# Patient Record
Sex: FEMALE | Race: WHITE | NOT HISPANIC OR LATINO | Employment: OTHER | URBAN - METROPOLITAN AREA
[De-identification: names, ages, dates, MRNs, and addresses within clinical notes are randomized per-mention and may not be internally consistent; named-entity substitution may affect disease eponyms.]

---

## 2016-12-30 ASSESSMENT — MIFFLIN-ST. JEOR
SCORE: 1380.96
SCORE: 1380.96

## 2016-12-31 ASSESSMENT — MIFFLIN-ST. JEOR
SCORE: 1435.13
SCORE: 1435.13
SCORE: 1428.14
SCORE: 1428.14

## 2017-01-01 ASSESSMENT — MIFFLIN-ST. JEOR
SCORE: 1426.32
SCORE: 1426.32

## 2017-01-02 ASSESSMENT — MIFFLIN-ST. JEOR
SCORE: 1359.65
SCORE: 1359.65

## 2017-01-03 ENCOUNTER — ANESTHESIA - HEALTHEAST (OUTPATIENT)
Dept: SURGERY | Facility: HOSPITAL | Age: 62
End: 2017-01-03

## 2017-01-03 ENCOUNTER — SURGERY - HEALTHEAST (OUTPATIENT)
Dept: SURGERY | Facility: HOSPITAL | Age: 62
End: 2017-01-03

## 2017-01-04 ENCOUNTER — SURGERY - HEALTHEAST (OUTPATIENT)
Dept: SURGERY | Facility: HOSPITAL | Age: 62
End: 2017-01-04

## 2017-01-04 ENCOUNTER — ANESTHESIA - HEALTHEAST (OUTPATIENT)
Dept: SURGERY | Facility: HOSPITAL | Age: 62
End: 2017-01-04

## 2017-01-09 ASSESSMENT — MIFFLIN-ST. JEOR
SCORE: 1360.55
SCORE: 1360.55

## 2017-01-11 ENCOUNTER — COMMUNICATION - HEALTHEAST (OUTPATIENT)
Dept: FAMILY MEDICINE | Facility: CLINIC | Age: 62
End: 2017-01-11

## 2017-01-12 ENCOUNTER — COMMUNICATION - HEALTHEAST (OUTPATIENT)
Dept: FAMILY MEDICINE | Facility: CLINIC | Age: 62
End: 2017-01-12

## 2017-01-12 ENCOUNTER — HOME CARE/HOSPICE - HEALTHEAST (OUTPATIENT)
Dept: HOME HEALTH SERVICES | Facility: HOME HEALTH | Age: 62
End: 2017-01-12

## 2017-01-12 DIAGNOSIS — I82.409 DVT (DEEP VENOUS THROMBOSIS) (H): ICD-10-CM

## 2017-01-12 DIAGNOSIS — I26.99 PULMONARY EMBOLISM (H): ICD-10-CM

## 2017-01-13 ENCOUNTER — COMMUNICATION - HEALTHEAST (OUTPATIENT)
Dept: FAMILY MEDICINE | Facility: CLINIC | Age: 62
End: 2017-01-13

## 2017-01-16 ENCOUNTER — COMMUNICATION - HEALTHEAST (OUTPATIENT)
Dept: FAMILY MEDICINE | Facility: CLINIC | Age: 62
End: 2017-01-16

## 2017-01-16 ENCOUNTER — HOME CARE/HOSPICE - HEALTHEAST (OUTPATIENT)
Dept: HOME HEALTH SERVICES | Facility: HOME HEALTH | Age: 62
End: 2017-01-16

## 2017-01-16 DIAGNOSIS — I26.99 PULMONARY EMBOLISM (H): ICD-10-CM

## 2017-01-16 DIAGNOSIS — I82.409 DVT (DEEP VENOUS THROMBOSIS) (H): ICD-10-CM

## 2017-01-19 ENCOUNTER — AMBULATORY - HEALTHEAST (OUTPATIENT)
Dept: FAMILY MEDICINE | Facility: CLINIC | Age: 62
End: 2017-01-19

## 2017-01-20 ENCOUNTER — COMMUNICATION - HEALTHEAST (OUTPATIENT)
Dept: FAMILY MEDICINE | Facility: CLINIC | Age: 62
End: 2017-01-20

## 2017-01-20 ENCOUNTER — HOME CARE/HOSPICE - HEALTHEAST (OUTPATIENT)
Dept: HOME HEALTH SERVICES | Facility: HOME HEALTH | Age: 62
End: 2017-01-20

## 2017-01-20 DIAGNOSIS — I82.409 DVT (DEEP VENOUS THROMBOSIS) (H): ICD-10-CM

## 2017-01-20 DIAGNOSIS — I26.99 PULMONARY EMBOLISM (H): ICD-10-CM

## 2017-01-24 ENCOUNTER — HOME CARE/HOSPICE - HEALTHEAST (OUTPATIENT)
Dept: HOME HEALTH SERVICES | Facility: HOME HEALTH | Age: 62
End: 2017-01-24

## 2017-01-26 ENCOUNTER — HOME CARE/HOSPICE - HEALTHEAST (OUTPATIENT)
Dept: HOME HEALTH SERVICES | Facility: HOME HEALTH | Age: 62
End: 2017-01-26

## 2017-01-26 ENCOUNTER — COMMUNICATION - HEALTHEAST (OUTPATIENT)
Dept: FAMILY MEDICINE | Facility: CLINIC | Age: 62
End: 2017-01-26

## 2017-01-26 DIAGNOSIS — I82.409 DVT (DEEP VENOUS THROMBOSIS) (H): ICD-10-CM

## 2017-01-26 DIAGNOSIS — I26.99 PULMONARY EMBOLISM (H): ICD-10-CM

## 2017-01-30 ENCOUNTER — HOME CARE/HOSPICE - HEALTHEAST (OUTPATIENT)
Dept: HOME HEALTH SERVICES | Facility: HOME HEALTH | Age: 62
End: 2017-01-30

## 2017-01-30 ENCOUNTER — COMMUNICATION - HEALTHEAST (OUTPATIENT)
Dept: FAMILY MEDICINE | Facility: CLINIC | Age: 62
End: 2017-01-30

## 2017-01-30 DIAGNOSIS — I26.99 PULMONARY EMBOLISM (H): ICD-10-CM

## 2017-01-30 DIAGNOSIS — I82.409 DVT (DEEP VENOUS THROMBOSIS) (H): ICD-10-CM

## 2017-02-01 ENCOUNTER — COMMUNICATION - HEALTHEAST (OUTPATIENT)
Dept: FAMILY MEDICINE | Facility: CLINIC | Age: 62
End: 2017-02-01

## 2017-02-02 ENCOUNTER — HOME CARE/HOSPICE - HEALTHEAST (OUTPATIENT)
Dept: HOME HEALTH SERVICES | Facility: HOME HEALTH | Age: 62
End: 2017-02-02

## 2017-02-02 ENCOUNTER — COMMUNICATION - HEALTHEAST (OUTPATIENT)
Dept: FAMILY MEDICINE | Facility: CLINIC | Age: 62
End: 2017-02-02

## 2017-02-02 DIAGNOSIS — I82.409 DVT (DEEP VENOUS THROMBOSIS) (H): ICD-10-CM

## 2017-02-02 DIAGNOSIS — I26.99 PULMONARY EMBOLISM (H): ICD-10-CM

## 2017-02-03 ENCOUNTER — COMMUNICATION - HEALTHEAST (OUTPATIENT)
Dept: NURSING | Facility: CLINIC | Age: 62
End: 2017-02-03

## 2017-02-04 ENCOUNTER — AMBULATORY - HEALTHEAST (OUTPATIENT)
Dept: FAMILY MEDICINE | Facility: CLINIC | Age: 62
End: 2017-02-04

## 2017-02-06 ENCOUNTER — COMMUNICATION - HEALTHEAST (OUTPATIENT)
Dept: FAMILY MEDICINE | Facility: CLINIC | Age: 62
End: 2017-02-06

## 2017-02-07 ENCOUNTER — HOME CARE/HOSPICE - HEALTHEAST (OUTPATIENT)
Dept: HOME HEALTH SERVICES | Facility: HOME HEALTH | Age: 62
End: 2017-02-07

## 2017-02-09 ENCOUNTER — COMMUNICATION - HEALTHEAST (OUTPATIENT)
Dept: FAMILY MEDICINE | Facility: CLINIC | Age: 62
End: 2017-02-09

## 2017-02-09 ENCOUNTER — HOME CARE/HOSPICE - HEALTHEAST (OUTPATIENT)
Dept: HOME HEALTH SERVICES | Facility: HOME HEALTH | Age: 62
End: 2017-02-09

## 2017-02-09 DIAGNOSIS — I26.99 PULMONARY EMBOLISM (H): ICD-10-CM

## 2017-02-09 DIAGNOSIS — I82.409 DVT (DEEP VENOUS THROMBOSIS) (H): ICD-10-CM

## 2017-02-10 ENCOUNTER — COMMUNICATION - HEALTHEAST (OUTPATIENT)
Dept: FAMILY MEDICINE | Facility: CLINIC | Age: 62
End: 2017-02-10

## 2017-02-10 ENCOUNTER — OFFICE VISIT - HEALTHEAST (OUTPATIENT)
Dept: FAMILY MEDICINE | Facility: CLINIC | Age: 62
End: 2017-02-10

## 2017-02-10 DIAGNOSIS — A49.9 ESBL (EXTENDED SPECTRUM BETA-LACTAMASE) PRODUCING BACTERIA INFECTION: ICD-10-CM

## 2017-02-10 DIAGNOSIS — N39.0 URINARY TRACT INFECTION ASSOCIATED WITH CYSTOSTOMY CATHETER, INITIAL ENCOUNTER (H): ICD-10-CM

## 2017-02-10 DIAGNOSIS — Z16.12 ESBL (EXTENDED SPECTRUM BETA-LACTAMASE) PRODUCING BACTERIA INFECTION: ICD-10-CM

## 2017-02-10 DIAGNOSIS — N39.0 URINARY TRACT INFECTION ASSOCIATED WITH CYSTOSTOMY CATHETER, SEQUELA: ICD-10-CM

## 2017-02-10 DIAGNOSIS — E03.9 HYPOTHYROIDISM, UNSPECIFIED TYPE: ICD-10-CM

## 2017-02-10 DIAGNOSIS — T83.510A URINARY TRACT INFECTION ASSOCIATED WITH CYSTOSTOMY CATHETER, INITIAL ENCOUNTER (H): ICD-10-CM

## 2017-02-10 DIAGNOSIS — I26.99 PULMONARY EMBOLISM (H): ICD-10-CM

## 2017-02-10 DIAGNOSIS — T83.510S URINARY TRACT INFECTION ASSOCIATED WITH CYSTOSTOMY CATHETER, SEQUELA: ICD-10-CM

## 2017-02-14 ENCOUNTER — COMMUNICATION - HEALTHEAST (OUTPATIENT)
Dept: FAMILY MEDICINE | Facility: CLINIC | Age: 62
End: 2017-02-14

## 2017-02-14 ENCOUNTER — HOME CARE/HOSPICE - HEALTHEAST (OUTPATIENT)
Dept: HOME HEALTH SERVICES | Facility: HOME HEALTH | Age: 62
End: 2017-02-14

## 2017-02-14 DIAGNOSIS — I26.99 PULMONARY EMBOLISM (H): ICD-10-CM

## 2017-02-14 DIAGNOSIS — I82.409 DVT (DEEP VENOUS THROMBOSIS) (H): ICD-10-CM

## 2017-02-16 ENCOUNTER — HOME CARE/HOSPICE - HEALTHEAST (OUTPATIENT)
Dept: HOME HEALTH SERVICES | Facility: HOME HEALTH | Age: 62
End: 2017-02-16

## 2017-02-16 ENCOUNTER — COMMUNICATION - HEALTHEAST (OUTPATIENT)
Dept: FAMILY MEDICINE | Facility: CLINIC | Age: 62
End: 2017-02-16

## 2017-02-16 DIAGNOSIS — I82.409 DVT (DEEP VENOUS THROMBOSIS) (H): ICD-10-CM

## 2017-02-16 DIAGNOSIS — I26.99 PULMONARY EMBOLISM (H): ICD-10-CM

## 2017-02-18 ENCOUNTER — COMMUNICATION - HEALTHEAST (OUTPATIENT)
Dept: FAMILY MEDICINE | Facility: CLINIC | Age: 62
End: 2017-02-18

## 2017-02-18 DIAGNOSIS — F32.A DEPRESSION: ICD-10-CM

## 2017-02-18 DIAGNOSIS — F33.2 SEVERE EPISODE OF RECURRENT MAJOR DEPRESSIVE DISORDER, WITHOUT PSYCHOTIC FEATURES (H): ICD-10-CM

## 2017-02-18 DIAGNOSIS — F40.9 INSOMNIA DUE TO ANXIETY AND FEAR: ICD-10-CM

## 2017-02-18 DIAGNOSIS — F06.30 MOOD DISORDER SECONDARY TO MULTIPLE MEDICAL PROBLEMS: ICD-10-CM

## 2017-02-18 DIAGNOSIS — F51.05 INSOMNIA DUE TO ANXIETY AND FEAR: ICD-10-CM

## 2017-02-18 DIAGNOSIS — F41.1 GAD (GENERALIZED ANXIETY DISORDER): ICD-10-CM

## 2017-02-18 DIAGNOSIS — F41.1 ANXIETY STATE: ICD-10-CM

## 2017-02-21 ENCOUNTER — HOME CARE/HOSPICE - HEALTHEAST (OUTPATIENT)
Dept: HOME HEALTH SERVICES | Facility: HOME HEALTH | Age: 62
End: 2017-02-21

## 2017-02-21 ENCOUNTER — COMMUNICATION - HEALTHEAST (OUTPATIENT)
Dept: FAMILY MEDICINE | Facility: CLINIC | Age: 62
End: 2017-02-21

## 2017-02-21 DIAGNOSIS — I26.99 PULMONARY EMBOLISM (H): ICD-10-CM

## 2017-02-22 ENCOUNTER — HOME CARE/HOSPICE - HEALTHEAST (OUTPATIENT)
Dept: HOME HEALTH SERVICES | Facility: HOME HEALTH | Age: 62
End: 2017-02-22

## 2017-02-23 ENCOUNTER — HOME CARE/HOSPICE - HEALTHEAST (OUTPATIENT)
Dept: HOME HEALTH SERVICES | Facility: HOME HEALTH | Age: 62
End: 2017-02-23

## 2017-02-26 ENCOUNTER — COMMUNICATION - HEALTHEAST (OUTPATIENT)
Dept: SCHEDULING | Facility: CLINIC | Age: 62
End: 2017-02-26

## 2017-02-27 ENCOUNTER — HOME CARE/HOSPICE - HEALTHEAST (OUTPATIENT)
Dept: HOME HEALTH SERVICES | Facility: HOME HEALTH | Age: 62
End: 2017-02-27

## 2017-02-28 ENCOUNTER — COMMUNICATION - HEALTHEAST (OUTPATIENT)
Dept: FAMILY MEDICINE | Facility: CLINIC | Age: 62
End: 2017-02-28

## 2017-03-02 ENCOUNTER — HOME CARE/HOSPICE - HEALTHEAST (OUTPATIENT)
Dept: HOME HEALTH SERVICES | Facility: HOME HEALTH | Age: 62
End: 2017-03-02

## 2017-03-02 ENCOUNTER — COMMUNICATION - HEALTHEAST (OUTPATIENT)
Dept: FAMILY MEDICINE | Facility: CLINIC | Age: 62
End: 2017-03-02

## 2017-03-06 ENCOUNTER — HOME CARE/HOSPICE - HEALTHEAST (OUTPATIENT)
Dept: HOME HEALTH SERVICES | Facility: HOME HEALTH | Age: 62
End: 2017-03-06

## 2017-03-07 ENCOUNTER — COMMUNICATION - HEALTHEAST (OUTPATIENT)
Dept: FAMILY MEDICINE | Facility: CLINIC | Age: 62
End: 2017-03-07

## 2017-03-07 ENCOUNTER — COMMUNICATION - HEALTHEAST (OUTPATIENT)
Dept: CARE COORDINATION | Facility: CLINIC | Age: 62
End: 2017-03-07

## 2017-03-07 ENCOUNTER — AMBULATORY - HEALTHEAST (OUTPATIENT)
Dept: CARE COORDINATION | Facility: CLINIC | Age: 62
End: 2017-03-07

## 2017-03-07 ENCOUNTER — HOME CARE/HOSPICE - HEALTHEAST (OUTPATIENT)
Dept: HOME HEALTH SERVICES | Facility: HOME HEALTH | Age: 62
End: 2017-03-07

## 2017-03-07 DIAGNOSIS — I26.99 PULMONARY EMBOLISM (H): ICD-10-CM

## 2017-03-07 DIAGNOSIS — I82.409 DVT (DEEP VENOUS THROMBOSIS) (H): ICD-10-CM

## 2017-03-08 ENCOUNTER — COMMUNICATION - HEALTHEAST (OUTPATIENT)
Dept: FAMILY MEDICINE | Facility: CLINIC | Age: 62
End: 2017-03-08

## 2017-03-09 ENCOUNTER — AMBULATORY - HEALTHEAST (OUTPATIENT)
Dept: FAMILY MEDICINE | Facility: CLINIC | Age: 62
End: 2017-03-09

## 2017-03-09 DIAGNOSIS — R10.9 ABDOMINAL PAIN, UNSPECIFIED LOCATION: ICD-10-CM

## 2017-03-10 ENCOUNTER — HOME CARE/HOSPICE - HEALTHEAST (OUTPATIENT)
Dept: HOME HEALTH SERVICES | Facility: HOME HEALTH | Age: 62
End: 2017-03-10

## 2017-03-10 ENCOUNTER — COMMUNICATION - HEALTHEAST (OUTPATIENT)
Dept: FAMILY MEDICINE | Facility: CLINIC | Age: 62
End: 2017-03-10

## 2017-03-10 DIAGNOSIS — I26.99 PULMONARY EMBOLISM (H): ICD-10-CM

## 2017-03-10 DIAGNOSIS — I82.409 DVT (DEEP VENOUS THROMBOSIS) (H): ICD-10-CM

## 2017-03-13 ENCOUNTER — COMMUNICATION - HEALTHEAST (OUTPATIENT)
Dept: FAMILY MEDICINE | Facility: CLINIC | Age: 62
End: 2017-03-13

## 2017-03-13 ENCOUNTER — HOME CARE/HOSPICE - HEALTHEAST (OUTPATIENT)
Dept: HOME HEALTH SERVICES | Facility: HOME HEALTH | Age: 62
End: 2017-03-13

## 2017-03-13 DIAGNOSIS — I82.409 DVT (DEEP VENOUS THROMBOSIS) (H): ICD-10-CM

## 2017-03-13 DIAGNOSIS — I26.99 PULMONARY EMBOLISM (H): ICD-10-CM

## 2017-03-16 ENCOUNTER — HOME CARE/HOSPICE - HEALTHEAST (OUTPATIENT)
Dept: HOME HEALTH SERVICES | Facility: HOME HEALTH | Age: 62
End: 2017-03-16

## 2017-03-20 ENCOUNTER — HOME CARE/HOSPICE - HEALTHEAST (OUTPATIENT)
Dept: HOME HEALTH SERVICES | Facility: HOME HEALTH | Age: 62
End: 2017-03-20

## 2017-03-20 ENCOUNTER — COMMUNICATION - HEALTHEAST (OUTPATIENT)
Dept: FAMILY MEDICINE | Facility: CLINIC | Age: 62
End: 2017-03-20

## 2017-03-20 DIAGNOSIS — I82.409 DVT (DEEP VENOUS THROMBOSIS) (H): ICD-10-CM

## 2017-03-20 DIAGNOSIS — I26.99 PULMONARY EMBOLISM (H): ICD-10-CM

## 2017-03-21 ENCOUNTER — COMMUNICATION - HEALTHEAST (OUTPATIENT)
Dept: FAMILY MEDICINE | Facility: CLINIC | Age: 62
End: 2017-03-21

## 2017-03-24 ENCOUNTER — COMMUNICATION - HEALTHEAST (OUTPATIENT)
Dept: FAMILY MEDICINE | Facility: CLINIC | Age: 62
End: 2017-03-24

## 2017-03-25 ENCOUNTER — HOME CARE/HOSPICE - HEALTHEAST (OUTPATIENT)
Dept: HOME HEALTH SERVICES | Facility: HOME HEALTH | Age: 62
End: 2017-03-25

## 2017-03-27 ENCOUNTER — COMMUNICATION - HEALTHEAST (OUTPATIENT)
Dept: FAMILY MEDICINE | Facility: CLINIC | Age: 62
End: 2017-03-27

## 2017-03-27 ENCOUNTER — HOME CARE/HOSPICE - HEALTHEAST (OUTPATIENT)
Dept: HOME HEALTH SERVICES | Facility: HOME HEALTH | Age: 62
End: 2017-03-27

## 2017-03-27 DIAGNOSIS — I26.99 PULMONARY EMBOLISM (H): ICD-10-CM

## 2017-03-27 DIAGNOSIS — I82.409 DVT (DEEP VENOUS THROMBOSIS) (H): ICD-10-CM

## 2017-03-29 ENCOUNTER — HOME CARE/HOSPICE - HEALTHEAST (OUTPATIENT)
Dept: HOME HEALTH SERVICES | Facility: HOME HEALTH | Age: 62
End: 2017-03-29

## 2017-03-30 ENCOUNTER — COMMUNICATION - HEALTHEAST (OUTPATIENT)
Dept: FAMILY MEDICINE | Facility: CLINIC | Age: 62
End: 2017-03-30

## 2017-03-30 ENCOUNTER — HOME CARE/HOSPICE - HEALTHEAST (OUTPATIENT)
Dept: HOME HEALTH SERVICES | Facility: HOME HEALTH | Age: 62
End: 2017-03-30

## 2017-04-02 ENCOUNTER — HOME CARE/HOSPICE - HEALTHEAST (OUTPATIENT)
Dept: HOME HEALTH SERVICES | Facility: HOME HEALTH | Age: 62
End: 2017-04-02

## 2017-04-05 ENCOUNTER — COMMUNICATION - HEALTHEAST (OUTPATIENT)
Dept: FAMILY MEDICINE | Facility: CLINIC | Age: 62
End: 2017-04-05

## 2017-04-05 ENCOUNTER — COMMUNICATION - HEALTHEAST (OUTPATIENT)
Dept: HOME HEALTH SERVICES | Facility: HOME HEALTH | Age: 62
End: 2017-04-05

## 2017-04-05 ENCOUNTER — HOME CARE/HOSPICE - HEALTHEAST (OUTPATIENT)
Dept: HOME HEALTH SERVICES | Facility: HOME HEALTH | Age: 62
End: 2017-04-05

## 2017-04-05 DIAGNOSIS — I26.99 PULMONARY EMBOLISM (H): ICD-10-CM

## 2017-04-05 DIAGNOSIS — I82.409 DVT (DEEP VENOUS THROMBOSIS) (H): ICD-10-CM

## 2017-04-07 ENCOUNTER — HOME CARE/HOSPICE - HEALTHEAST (OUTPATIENT)
Dept: HOME HEALTH SERVICES | Facility: HOME HEALTH | Age: 62
End: 2017-04-07

## 2017-04-09 ENCOUNTER — RECORDS - HEALTHEAST (OUTPATIENT)
Dept: ADMINISTRATIVE | Facility: OTHER | Age: 62
End: 2017-04-09

## 2017-04-09 ASSESSMENT — MIFFLIN-ST. JEOR: SCORE: 1321.25

## 2017-04-10 ENCOUNTER — RECORDS - HEALTHEAST (OUTPATIENT)
Dept: ADMINISTRATIVE | Facility: OTHER | Age: 62
End: 2017-04-10

## 2017-04-10 ENCOUNTER — COMMUNICATION - HEALTHEAST (OUTPATIENT)
Dept: NURSING | Facility: CLINIC | Age: 62
End: 2017-04-10

## 2017-04-10 ENCOUNTER — HOME CARE/HOSPICE - HEALTHEAST (OUTPATIENT)
Dept: HOME HEALTH SERVICES | Facility: HOME HEALTH | Age: 62
End: 2017-04-10

## 2017-04-10 ENCOUNTER — SURGERY - HEALTHEAST (OUTPATIENT)
Dept: SURGERY | Facility: CLINIC | Age: 62
End: 2017-04-10

## 2017-04-10 ENCOUNTER — ANESTHESIA - HEALTHEAST (OUTPATIENT)
Dept: SURGERY | Facility: CLINIC | Age: 62
End: 2017-04-10

## 2017-04-10 ASSESSMENT — MIFFLIN-ST. JEOR: SCORE: 1330.25

## 2017-04-12 ENCOUNTER — COMMUNICATION - HEALTHEAST (OUTPATIENT)
Dept: FAMILY MEDICINE | Facility: CLINIC | Age: 62
End: 2017-04-12

## 2017-04-12 ENCOUNTER — RECORDS - HEALTHEAST (OUTPATIENT)
Dept: ADMINISTRATIVE | Facility: OTHER | Age: 62
End: 2017-04-12

## 2017-04-12 ASSESSMENT — MIFFLIN-ST. JEOR: SCORE: 1346.25

## 2017-04-13 ENCOUNTER — RECORDS - HEALTHEAST (OUTPATIENT)
Dept: ADMINISTRATIVE | Facility: OTHER | Age: 62
End: 2017-04-13

## 2017-04-13 ASSESSMENT — MIFFLIN-ST. JEOR: SCORE: 1358.25

## 2017-04-14 ENCOUNTER — RECORDS - HEALTHEAST (OUTPATIENT)
Dept: ADMINISTRATIVE | Facility: OTHER | Age: 62
End: 2017-04-14

## 2017-04-14 ENCOUNTER — COMMUNICATION - HEALTHEAST (OUTPATIENT)
Dept: NEUROSURGERY | Facility: CLINIC | Age: 62
End: 2017-04-14

## 2017-04-14 ENCOUNTER — COMMUNICATION - HEALTHEAST (OUTPATIENT)
Dept: FAMILY MEDICINE | Facility: CLINIC | Age: 62
End: 2017-04-14

## 2017-04-14 DIAGNOSIS — S06.5XAA SDH (SUBDURAL HEMATOMA) (H): ICD-10-CM

## 2017-04-14 ASSESSMENT — MIFFLIN-ST. JEOR: SCORE: 1355.11

## 2017-04-17 ENCOUNTER — HOME CARE/HOSPICE - HEALTHEAST (OUTPATIENT)
Dept: HOME HEALTH SERVICES | Facility: HOME HEALTH | Age: 62
End: 2017-04-17

## 2017-04-17 ENCOUNTER — COMMUNICATION - HEALTHEAST (OUTPATIENT)
Dept: FAMILY MEDICINE | Facility: CLINIC | Age: 62
End: 2017-04-17

## 2017-04-18 ENCOUNTER — COMMUNICATION - HEALTHEAST (OUTPATIENT)
Dept: NEUROSURGERY | Facility: CLINIC | Age: 62
End: 2017-04-18

## 2017-04-18 ENCOUNTER — HOME CARE/HOSPICE - HEALTHEAST (OUTPATIENT)
Dept: HOME HEALTH SERVICES | Facility: HOME HEALTH | Age: 62
End: 2017-04-18

## 2017-04-18 ENCOUNTER — COMMUNICATION - HEALTHEAST (OUTPATIENT)
Dept: SCHEDULING | Facility: CLINIC | Age: 62
End: 2017-04-18

## 2017-04-19 ENCOUNTER — COMMUNICATION - HEALTHEAST (OUTPATIENT)
Dept: FAMILY MEDICINE | Facility: CLINIC | Age: 62
End: 2017-04-19

## 2017-04-19 ENCOUNTER — COMMUNICATION - HEALTHEAST (OUTPATIENT)
Dept: NEUROSURGERY | Facility: CLINIC | Age: 62
End: 2017-04-19

## 2017-04-19 DIAGNOSIS — F41.1 ANXIETY STATE: ICD-10-CM

## 2017-04-20 ENCOUNTER — COMMUNICATION - HEALTHEAST (OUTPATIENT)
Dept: SCHEDULING | Facility: CLINIC | Age: 62
End: 2017-04-20

## 2017-04-20 DIAGNOSIS — F41.9 ANXIETY: ICD-10-CM

## 2017-04-20 DIAGNOSIS — G89.29 CHRONIC PAIN: ICD-10-CM

## 2017-04-21 ENCOUNTER — OFFICE VISIT - HEALTHEAST (OUTPATIENT)
Dept: FAMILY MEDICINE | Facility: CLINIC | Age: 62
End: 2017-04-21

## 2017-04-21 DIAGNOSIS — L89.92: ICD-10-CM

## 2017-04-21 DIAGNOSIS — G93.40 ACUTE ENCEPHALOPATHY: ICD-10-CM

## 2017-04-21 DIAGNOSIS — J43.8 OTHER EMPHYSEMA (H): ICD-10-CM

## 2017-04-21 DIAGNOSIS — I62.03 ACUTE ON CHRONIC INTRACRANIAL SUBDURAL HEMATOMA (H): ICD-10-CM

## 2017-04-21 DIAGNOSIS — G89.4 CHRONIC PAIN SYNDROME: ICD-10-CM

## 2017-04-21 DIAGNOSIS — I62.01 ACUTE ON CHRONIC INTRACRANIAL SUBDURAL HEMATOMA (H): ICD-10-CM

## 2017-04-21 DIAGNOSIS — F32.A DEPRESSION: ICD-10-CM

## 2017-04-21 DIAGNOSIS — F11.20 OPIATE DEPENDENCE, CONTINUOUS (H): ICD-10-CM

## 2017-04-21 DIAGNOSIS — I26.99 PULMONARY EMBOLISM (H): ICD-10-CM

## 2017-04-21 DIAGNOSIS — G93.6 BRAIN EDEMA (H): ICD-10-CM

## 2017-04-22 ENCOUNTER — HOME CARE/HOSPICE - HEALTHEAST (OUTPATIENT)
Dept: HOME HEALTH SERVICES | Facility: HOME HEALTH | Age: 62
End: 2017-04-22

## 2017-04-25 ENCOUNTER — COMMUNICATION - HEALTHEAST (OUTPATIENT)
Dept: ONCOLOGY | Facility: HOSPITAL | Age: 62
End: 2017-04-25

## 2017-04-25 ENCOUNTER — HOME CARE/HOSPICE - HEALTHEAST (OUTPATIENT)
Dept: HOME HEALTH SERVICES | Facility: HOME HEALTH | Age: 62
End: 2017-04-25

## 2017-04-27 ENCOUNTER — HOSPITAL ENCOUNTER (OUTPATIENT)
Dept: CT IMAGING | Facility: CLINIC | Age: 62
Discharge: HOME | End: 2017-04-27
Attending: NEUROLOGICAL SURGERY

## 2017-04-27 ENCOUNTER — OFFICE VISIT - HEALTHEAST (OUTPATIENT)
Dept: NEUROSURGERY | Facility: CLINIC | Age: 62
End: 2017-04-27

## 2017-04-27 DIAGNOSIS — I62.00 NONTRAUMATIC SUBDURAL HEMORRHAGE, UNSPECIFIED (H): ICD-10-CM

## 2017-04-27 DIAGNOSIS — S06.5XAA SDH (SUBDURAL HEMATOMA) (H): ICD-10-CM

## 2017-04-27 DIAGNOSIS — S06.5XAA SUBDURAL HEMATOMA (H): ICD-10-CM

## 2017-04-27 ASSESSMENT — MIFFLIN-ST. JEOR: SCORE: 1346.94

## 2017-04-28 ENCOUNTER — COMMUNICATION - HEALTHEAST (OUTPATIENT)
Dept: NURSING | Facility: CLINIC | Age: 62
End: 2017-04-28

## 2017-04-28 ENCOUNTER — HOME CARE/HOSPICE - HEALTHEAST (OUTPATIENT)
Dept: HOME HEALTH SERVICES | Facility: HOME HEALTH | Age: 62
End: 2017-04-28

## 2017-04-28 ENCOUNTER — COMMUNICATION - HEALTHEAST (OUTPATIENT)
Dept: FAMILY MEDICINE | Facility: CLINIC | Age: 62
End: 2017-04-28

## 2017-04-28 DIAGNOSIS — I82.409 DVT (DEEP VENOUS THROMBOSIS) (H): ICD-10-CM

## 2017-04-28 DIAGNOSIS — I26.99 PULMONARY EMBOLISM (H): ICD-10-CM

## 2017-05-02 ENCOUNTER — HOME CARE/HOSPICE - HEALTHEAST (OUTPATIENT)
Dept: HOME HEALTH SERVICES | Facility: HOME HEALTH | Age: 62
End: 2017-05-02

## 2017-05-04 ENCOUNTER — HOME CARE/HOSPICE - HEALTHEAST (OUTPATIENT)
Dept: HOME HEALTH SERVICES | Facility: HOME HEALTH | Age: 62
End: 2017-05-04

## 2017-05-04 ENCOUNTER — COMMUNICATION - HEALTHEAST (OUTPATIENT)
Dept: ADMINISTRATIVE | Facility: HOSPITAL | Age: 62
End: 2017-05-04

## 2017-05-05 ENCOUNTER — COMMUNICATION - HEALTHEAST (OUTPATIENT)
Dept: FAMILY MEDICINE | Facility: CLINIC | Age: 62
End: 2017-05-05

## 2017-05-06 ENCOUNTER — COMMUNICATION - HEALTHEAST (OUTPATIENT)
Dept: NEUROSURGERY | Facility: CLINIC | Age: 62
End: 2017-05-06

## 2017-05-06 DIAGNOSIS — G89.4 CHRONIC PAIN SYNDROME: ICD-10-CM

## 2017-05-08 ENCOUNTER — COMMUNICATION - HEALTHEAST (OUTPATIENT)
Dept: PULMONOLOGY | Facility: OTHER | Age: 62
End: 2017-05-08

## 2017-05-08 ENCOUNTER — HOME CARE/HOSPICE - HEALTHEAST (OUTPATIENT)
Dept: HOME HEALTH SERVICES | Facility: HOME HEALTH | Age: 62
End: 2017-05-08

## 2017-05-08 ENCOUNTER — COMMUNICATION - HEALTHEAST (OUTPATIENT)
Dept: FAMILY MEDICINE | Facility: CLINIC | Age: 62
End: 2017-05-08

## 2017-05-09 ENCOUNTER — COMMUNICATION - HEALTHEAST (OUTPATIENT)
Dept: FAMILY MEDICINE | Facility: CLINIC | Age: 62
End: 2017-05-09

## 2017-05-09 DIAGNOSIS — R56.9 CONVULSIONS, UNSPECIFIED CONVULSION TYPE (H): ICD-10-CM

## 2017-05-09 DIAGNOSIS — G40.111 PARTIAL SYMPTOMATIC EPILEPSY WITH SIMPLE PARTIAL SEIZURES, INTRACTABLE, WITH STATUS EPILEPTICUS (H): ICD-10-CM

## 2017-05-09 DIAGNOSIS — G89.29 CHRONIC PAIN: ICD-10-CM

## 2017-05-11 ENCOUNTER — COMMUNICATION - HEALTHEAST (OUTPATIENT)
Dept: FAMILY MEDICINE | Facility: CLINIC | Age: 62
End: 2017-05-11

## 2017-05-11 ENCOUNTER — HOME CARE/HOSPICE - HEALTHEAST (OUTPATIENT)
Dept: HOME HEALTH SERVICES | Facility: HOME HEALTH | Age: 62
End: 2017-05-11

## 2017-05-11 ENCOUNTER — AMBULATORY - HEALTHEAST (OUTPATIENT)
Dept: NEUROSURGERY | Facility: CLINIC | Age: 62
End: 2017-05-11

## 2017-05-11 DIAGNOSIS — F41.9 ANXIETY: ICD-10-CM

## 2017-05-12 ENCOUNTER — COMMUNICATION - HEALTHEAST (OUTPATIENT)
Dept: FAMILY MEDICINE | Facility: CLINIC | Age: 62
End: 2017-05-12

## 2017-05-15 ENCOUNTER — COMMUNICATION - HEALTHEAST (OUTPATIENT)
Dept: FAMILY MEDICINE | Facility: CLINIC | Age: 62
End: 2017-05-15

## 2017-05-16 ENCOUNTER — COMMUNICATION - HEALTHEAST (OUTPATIENT)
Dept: FAMILY MEDICINE | Facility: CLINIC | Age: 62
End: 2017-05-16

## 2017-05-16 ENCOUNTER — HOME CARE/HOSPICE - HEALTHEAST (OUTPATIENT)
Dept: HOME HEALTH SERVICES | Facility: HOME HEALTH | Age: 62
End: 2017-05-16

## 2017-05-16 DIAGNOSIS — F41.9 ANXIETY: ICD-10-CM

## 2017-05-16 DIAGNOSIS — G89.29 CHRONIC PAIN: ICD-10-CM

## 2017-05-18 ENCOUNTER — HOME CARE/HOSPICE - HEALTHEAST (OUTPATIENT)
Dept: HOME HEALTH SERVICES | Facility: HOME HEALTH | Age: 62
End: 2017-05-18

## 2017-05-19 ENCOUNTER — AMBULATORY - HEALTHEAST (OUTPATIENT)
Dept: FAMILY MEDICINE | Facility: CLINIC | Age: 62
End: 2017-05-19

## 2017-05-19 ENCOUNTER — COMMUNICATION - HEALTHEAST (OUTPATIENT)
Dept: FAMILY MEDICINE | Facility: CLINIC | Age: 62
End: 2017-05-19

## 2017-05-19 ENCOUNTER — OFFICE VISIT - HEALTHEAST (OUTPATIENT)
Dept: FAMILY MEDICINE | Facility: CLINIC | Age: 62
End: 2017-05-19

## 2017-05-19 DIAGNOSIS — F32.A DEPRESSION: ICD-10-CM

## 2017-05-19 DIAGNOSIS — S06.5XAA SUBDURAL HEMATOMA (H): ICD-10-CM

## 2017-05-19 DIAGNOSIS — C34.90 CANCER OF LUNG (H): ICD-10-CM

## 2017-05-19 DIAGNOSIS — F41.1 GAD (GENERALIZED ANXIETY DISORDER): ICD-10-CM

## 2017-05-19 DIAGNOSIS — R56.9 CONVULSIONS, UNSPECIFIED CONVULSION TYPE (H): ICD-10-CM

## 2017-05-19 DIAGNOSIS — N31.9 NEUROGENIC BLADDER: ICD-10-CM

## 2017-05-19 DIAGNOSIS — G89.4 CHRONIC PAIN SYNDROME: ICD-10-CM

## 2017-05-19 DIAGNOSIS — G82.20 LOWER PARAPLEGIA (H): ICD-10-CM

## 2017-05-19 DIAGNOSIS — L89.92 PRESSURE ULCER, STAGE II (H): ICD-10-CM

## 2017-05-19 DIAGNOSIS — I10 ESSENTIAL HYPERTENSION WITH GOAL BLOOD PRESSURE LESS THAN 140/90: ICD-10-CM

## 2017-05-22 ENCOUNTER — HOME CARE/HOSPICE - HEALTHEAST (OUTPATIENT)
Dept: HOME HEALTH SERVICES | Facility: HOME HEALTH | Age: 62
End: 2017-05-22

## 2017-05-22 ENCOUNTER — COMMUNICATION - HEALTHEAST (OUTPATIENT)
Dept: FAMILY MEDICINE | Facility: CLINIC | Age: 62
End: 2017-05-22

## 2017-05-24 ENCOUNTER — OFFICE VISIT - HEALTHEAST (OUTPATIENT)
Dept: NEUROSURGERY | Facility: CLINIC | Age: 62
End: 2017-05-24

## 2017-05-24 ENCOUNTER — COMMUNICATION - HEALTHEAST (OUTPATIENT)
Dept: HOME HEALTH SERVICES | Facility: HOME HEALTH | Age: 62
End: 2017-05-24

## 2017-05-24 ENCOUNTER — HOSPITAL ENCOUNTER (OUTPATIENT)
Dept: CT IMAGING | Facility: CLINIC | Age: 62
Discharge: HOME | End: 2017-05-24

## 2017-05-24 DIAGNOSIS — I62.00 NONTRAUMATIC SUBDURAL HEMORRHAGE, UNSPECIFIED (H): ICD-10-CM

## 2017-05-24 DIAGNOSIS — S06.5XAA SUBDURAL HEMATOMA (H): ICD-10-CM

## 2017-05-25 ENCOUNTER — COMMUNICATION - HEALTHEAST (OUTPATIENT)
Dept: FAMILY MEDICINE | Facility: CLINIC | Age: 62
End: 2017-05-25

## 2017-05-25 ENCOUNTER — HOME CARE/HOSPICE - HEALTHEAST (OUTPATIENT)
Dept: HOME HEALTH SERVICES | Facility: HOME HEALTH | Age: 62
End: 2017-05-25

## 2017-05-30 ENCOUNTER — HOME CARE/HOSPICE - HEALTHEAST (OUTPATIENT)
Dept: HOME HEALTH SERVICES | Facility: HOME HEALTH | Age: 62
End: 2017-05-30

## 2017-05-31 ENCOUNTER — COMMUNICATION - HEALTHEAST (OUTPATIENT)
Dept: HOME HEALTH SERVICES | Facility: HOME HEALTH | Age: 62
End: 2017-05-31

## 2017-05-31 ENCOUNTER — COMMUNICATION - HEALTHEAST (OUTPATIENT)
Dept: FAMILY MEDICINE | Facility: CLINIC | Age: 62
End: 2017-05-31

## 2017-06-01 ENCOUNTER — COMMUNICATION - HEALTHEAST (OUTPATIENT)
Dept: FAMILY MEDICINE | Facility: CLINIC | Age: 62
End: 2017-06-01

## 2017-06-01 DIAGNOSIS — G89.29 CHRONIC PAIN: ICD-10-CM

## 2017-06-02 ENCOUNTER — COMMUNICATION - HEALTHEAST (OUTPATIENT)
Dept: FAMILY MEDICINE | Facility: CLINIC | Age: 62
End: 2017-06-02

## 2017-06-02 ENCOUNTER — RECORDS - HEALTHEAST (OUTPATIENT)
Dept: ADMINISTRATIVE | Facility: OTHER | Age: 62
End: 2017-06-02

## 2017-06-03 ENCOUNTER — RECORDS - HEALTHEAST (OUTPATIENT)
Dept: ADMINISTRATIVE | Facility: OTHER | Age: 62
End: 2017-06-03

## 2017-06-04 ENCOUNTER — RECORDS - HEALTHEAST (OUTPATIENT)
Dept: ADMINISTRATIVE | Facility: OTHER | Age: 62
End: 2017-06-04

## 2017-06-04 ASSESSMENT — MIFFLIN-ST. JEOR: SCORE: 1349.67

## 2017-06-05 ENCOUNTER — RECORDS - HEALTHEAST (OUTPATIENT)
Dept: ADMINISTRATIVE | Facility: OTHER | Age: 62
End: 2017-06-05

## 2017-06-07 ENCOUNTER — COMMUNICATION - HEALTHEAST (OUTPATIENT)
Dept: FAMILY MEDICINE | Facility: CLINIC | Age: 62
End: 2017-06-07

## 2017-06-07 ENCOUNTER — ANESTHESIA - HEALTHEAST (OUTPATIENT)
Dept: SURGERY | Facility: HOSPITAL | Age: 62
End: 2017-06-07

## 2017-06-08 ENCOUNTER — HOME CARE/HOSPICE - HEALTHEAST (OUTPATIENT)
Dept: HOME HEALTH SERVICES | Facility: HOME HEALTH | Age: 62
End: 2017-06-08

## 2017-06-08 ENCOUNTER — SURGERY - HEALTHEAST (OUTPATIENT)
Dept: SURGERY | Facility: HOSPITAL | Age: 62
End: 2017-06-08

## 2017-06-11 ENCOUNTER — COMMUNICATION - HEALTHEAST (OUTPATIENT)
Dept: FAMILY MEDICINE | Facility: CLINIC | Age: 62
End: 2017-06-11

## 2017-06-11 ENCOUNTER — HOME CARE/HOSPICE - HEALTHEAST (OUTPATIENT)
Dept: HOME HEALTH SERVICES | Facility: HOME HEALTH | Age: 62
End: 2017-06-11

## 2017-06-12 ENCOUNTER — COMMUNICATION - HEALTHEAST (OUTPATIENT)
Dept: CARE COORDINATION | Facility: CLINIC | Age: 62
End: 2017-06-12

## 2017-06-13 ENCOUNTER — HOME CARE/HOSPICE - HEALTHEAST (OUTPATIENT)
Dept: HOME HEALTH SERVICES | Facility: HOME HEALTH | Age: 62
End: 2017-06-13

## 2017-06-13 ENCOUNTER — COMMUNICATION - HEALTHEAST (OUTPATIENT)
Dept: FAMILY MEDICINE | Facility: CLINIC | Age: 62
End: 2017-06-13

## 2017-06-13 DIAGNOSIS — G89.29 CHRONIC PAIN: ICD-10-CM

## 2017-06-14 ENCOUNTER — HOME CARE/HOSPICE - HEALTHEAST (OUTPATIENT)
Dept: HOME HEALTH SERVICES | Facility: HOME HEALTH | Age: 62
End: 2017-06-14

## 2017-06-14 ENCOUNTER — COMMUNICATION - HEALTHEAST (OUTPATIENT)
Dept: FAMILY MEDICINE | Facility: CLINIC | Age: 62
End: 2017-06-14

## 2017-06-16 ENCOUNTER — HOME CARE/HOSPICE - HEALTHEAST (OUTPATIENT)
Dept: HOME HEALTH SERVICES | Facility: HOME HEALTH | Age: 62
End: 2017-06-16

## 2017-06-16 ENCOUNTER — COMMUNICATION - HEALTHEAST (OUTPATIENT)
Dept: FAMILY MEDICINE | Facility: CLINIC | Age: 62
End: 2017-06-16

## 2017-06-18 ENCOUNTER — RECORDS - HEALTHEAST (OUTPATIENT)
Dept: ADMINISTRATIVE | Facility: OTHER | Age: 62
End: 2017-06-18

## 2017-06-19 ENCOUNTER — COMMUNICATION - HEALTHEAST (OUTPATIENT)
Dept: FAMILY MEDICINE | Facility: CLINIC | Age: 62
End: 2017-06-19

## 2017-06-19 ENCOUNTER — HOME CARE/HOSPICE - HEALTHEAST (OUTPATIENT)
Dept: HOME HEALTH SERVICES | Facility: HOME HEALTH | Age: 62
End: 2017-06-19

## 2017-06-22 ENCOUNTER — COMMUNICATION - HEALTHEAST (OUTPATIENT)
Dept: FAMILY MEDICINE | Facility: CLINIC | Age: 62
End: 2017-06-22

## 2017-06-22 ENCOUNTER — HOME CARE/HOSPICE - HEALTHEAST (OUTPATIENT)
Dept: HOME HEALTH SERVICES | Facility: HOME HEALTH | Age: 62
End: 2017-06-22

## 2017-06-22 DIAGNOSIS — G89.29 CHRONIC PAIN: ICD-10-CM

## 2017-06-23 ENCOUNTER — COMMUNICATION - HEALTHEAST (OUTPATIENT)
Dept: NURSING | Facility: CLINIC | Age: 62
End: 2017-06-23

## 2017-06-26 ENCOUNTER — HOME CARE/HOSPICE - HEALTHEAST (OUTPATIENT)
Dept: HOME HEALTH SERVICES | Facility: HOME HEALTH | Age: 62
End: 2017-06-26

## 2017-06-26 ENCOUNTER — COMMUNICATION - HEALTHEAST (OUTPATIENT)
Dept: FAMILY MEDICINE | Facility: CLINIC | Age: 62
End: 2017-06-26

## 2017-06-27 ENCOUNTER — COMMUNICATION - HEALTHEAST (OUTPATIENT)
Dept: FAMILY MEDICINE | Facility: CLINIC | Age: 62
End: 2017-06-27

## 2017-06-27 DIAGNOSIS — G89.29 CHRONIC PAIN: ICD-10-CM

## 2017-06-29 ENCOUNTER — COMMUNICATION - HEALTHEAST (OUTPATIENT)
Dept: HOME HEALTH SERVICES | Facility: HOME HEALTH | Age: 62
End: 2017-06-29

## 2017-06-29 ENCOUNTER — COMMUNICATION - HEALTHEAST (OUTPATIENT)
Dept: ADMINISTRATIVE | Facility: HOSPITAL | Age: 62
End: 2017-06-29

## 2017-06-30 ENCOUNTER — HOME CARE/HOSPICE - HEALTHEAST (OUTPATIENT)
Dept: HOME HEALTH SERVICES | Facility: HOME HEALTH | Age: 62
End: 2017-06-30

## 2017-07-01 ENCOUNTER — HOME CARE/HOSPICE - HEALTHEAST (OUTPATIENT)
Dept: HOME HEALTH SERVICES | Facility: HOME HEALTH | Age: 62
End: 2017-07-01

## 2017-07-03 ENCOUNTER — HOME CARE/HOSPICE - HEALTHEAST (OUTPATIENT)
Dept: HOME HEALTH SERVICES | Facility: HOME HEALTH | Age: 62
End: 2017-07-03

## 2017-07-03 ENCOUNTER — COMMUNICATION - HEALTHEAST (OUTPATIENT)
Dept: FAMILY MEDICINE | Facility: CLINIC | Age: 62
End: 2017-07-03

## 2017-07-05 ENCOUNTER — COMMUNICATION - HEALTHEAST (OUTPATIENT)
Dept: FAMILY MEDICINE | Facility: CLINIC | Age: 62
End: 2017-07-05

## 2017-07-06 ENCOUNTER — HOME CARE/HOSPICE - HEALTHEAST (OUTPATIENT)
Dept: HOME HEALTH SERVICES | Facility: HOME HEALTH | Age: 62
End: 2017-07-06

## 2017-07-06 ENCOUNTER — COMMUNICATION - HEALTHEAST (OUTPATIENT)
Dept: FAMILY MEDICINE | Facility: CLINIC | Age: 62
End: 2017-07-06

## 2017-07-06 ENCOUNTER — COMMUNICATION - HEALTHEAST (OUTPATIENT)
Dept: HOME HEALTH SERVICES | Facility: HOME HEALTH | Age: 62
End: 2017-07-06

## 2017-07-06 DIAGNOSIS — G89.29 CHRONIC PAIN: ICD-10-CM

## 2017-07-07 ENCOUNTER — AMBULATORY - HEALTHEAST (OUTPATIENT)
Dept: CARE COORDINATION | Facility: CLINIC | Age: 62
End: 2017-07-07

## 2017-07-07 ENCOUNTER — COMMUNICATION - HEALTHEAST (OUTPATIENT)
Dept: HOME HEALTH SERVICES | Facility: HOME HEALTH | Age: 62
End: 2017-07-07

## 2017-07-08 ENCOUNTER — COMMUNICATION - HEALTHEAST (OUTPATIENT)
Dept: FAMILY MEDICINE | Facility: CLINIC | Age: 62
End: 2017-07-08

## 2017-07-08 DIAGNOSIS — R56.9 CONVULSIONS, UNSPECIFIED CONVULSION TYPE (H): ICD-10-CM

## 2017-07-10 ENCOUNTER — OFFICE VISIT - HEALTHEAST (OUTPATIENT)
Dept: FAMILY MEDICINE | Facility: CLINIC | Age: 62
End: 2017-07-10

## 2017-07-10 ENCOUNTER — COMMUNICATION - HEALTHEAST (OUTPATIENT)
Dept: FAMILY MEDICINE | Facility: CLINIC | Age: 62
End: 2017-07-10

## 2017-07-10 ENCOUNTER — AMBULATORY - HEALTHEAST (OUTPATIENT)
Dept: FAMILY MEDICINE | Facility: CLINIC | Age: 62
End: 2017-07-10

## 2017-07-10 DIAGNOSIS — G89.29 CHRONIC PAIN: ICD-10-CM

## 2017-07-10 DIAGNOSIS — F41.9 ANXIETY: ICD-10-CM

## 2017-07-10 DIAGNOSIS — F32.9 MAJOR DEPRESSION: ICD-10-CM

## 2017-07-11 ENCOUNTER — COMMUNICATION - HEALTHEAST (OUTPATIENT)
Dept: FAMILY MEDICINE | Facility: CLINIC | Age: 62
End: 2017-07-11

## 2017-07-13 ENCOUNTER — COMMUNICATION - HEALTHEAST (OUTPATIENT)
Dept: HOME HEALTH SERVICES | Facility: HOME HEALTH | Age: 62
End: 2017-07-13

## 2017-07-13 ENCOUNTER — HOME CARE/HOSPICE - HEALTHEAST (OUTPATIENT)
Dept: HOME HEALTH SERVICES | Facility: HOME HEALTH | Age: 62
End: 2017-07-13

## 2017-07-13 ENCOUNTER — COMMUNICATION - HEALTHEAST (OUTPATIENT)
Dept: FAMILY MEDICINE | Facility: CLINIC | Age: 62
End: 2017-07-13

## 2017-07-14 ENCOUNTER — COMMUNICATION - HEALTHEAST (OUTPATIENT)
Dept: FAMILY MEDICINE | Facility: CLINIC | Age: 62
End: 2017-07-14

## 2017-07-14 ENCOUNTER — HOME CARE/HOSPICE - HEALTHEAST (OUTPATIENT)
Dept: HOME HEALTH SERVICES | Facility: HOME HEALTH | Age: 62
End: 2017-07-14

## 2017-07-17 ENCOUNTER — HOME CARE/HOSPICE - HEALTHEAST (OUTPATIENT)
Dept: HOME HEALTH SERVICES | Facility: HOME HEALTH | Age: 62
End: 2017-07-17

## 2017-07-20 ENCOUNTER — HOME CARE/HOSPICE - HEALTHEAST (OUTPATIENT)
Dept: HOME HEALTH SERVICES | Facility: HOME HEALTH | Age: 62
End: 2017-07-20

## 2017-07-20 ENCOUNTER — COMMUNICATION - HEALTHEAST (OUTPATIENT)
Dept: FAMILY MEDICINE | Facility: CLINIC | Age: 62
End: 2017-07-20

## 2017-07-21 ENCOUNTER — COMMUNICATION - HEALTHEAST (OUTPATIENT)
Dept: NURSING | Facility: CLINIC | Age: 62
End: 2017-07-21

## 2017-07-23 ENCOUNTER — HOME CARE/HOSPICE - HEALTHEAST (OUTPATIENT)
Dept: HOME HEALTH SERVICES | Facility: HOME HEALTH | Age: 62
End: 2017-07-23

## 2017-07-24 ENCOUNTER — COMMUNICATION - HEALTHEAST (OUTPATIENT)
Dept: FAMILY MEDICINE | Facility: CLINIC | Age: 62
End: 2017-07-24

## 2017-07-25 ENCOUNTER — COMMUNICATION - HEALTHEAST (OUTPATIENT)
Dept: HOME HEALTH SERVICES | Facility: HOME HEALTH | Age: 62
End: 2017-07-25

## 2017-07-25 ENCOUNTER — COMMUNICATION - HEALTHEAST (OUTPATIENT)
Dept: NURSING | Facility: CLINIC | Age: 62
End: 2017-07-25

## 2017-07-27 ENCOUNTER — HOME CARE/HOSPICE - HEALTHEAST (OUTPATIENT)
Dept: HOME HEALTH SERVICES | Facility: HOME HEALTH | Age: 62
End: 2017-07-27

## 2017-07-27 ENCOUNTER — COMMUNICATION - HEALTHEAST (OUTPATIENT)
Dept: HOME HEALTH SERVICES | Facility: HOME HEALTH | Age: 62
End: 2017-07-27

## 2017-07-27 ENCOUNTER — COMMUNICATION - HEALTHEAST (OUTPATIENT)
Dept: FAMILY MEDICINE | Facility: CLINIC | Age: 62
End: 2017-07-27

## 2017-07-30 ENCOUNTER — COMMUNICATION - HEALTHEAST (OUTPATIENT)
Dept: INTENSIVE CARE | Facility: HOSPITAL | Age: 62
End: 2017-07-30

## 2017-07-31 ENCOUNTER — COMMUNICATION - HEALTHEAST (OUTPATIENT)
Dept: FAMILY MEDICINE | Facility: CLINIC | Age: 62
End: 2017-07-31

## 2017-08-02 ENCOUNTER — COMMUNICATION - HEALTHEAST (OUTPATIENT)
Dept: FAMILY MEDICINE | Facility: CLINIC | Age: 62
End: 2017-08-02

## 2017-08-02 ENCOUNTER — COMMUNICATION - HEALTHEAST (OUTPATIENT)
Dept: NURSING | Facility: CLINIC | Age: 62
End: 2017-08-02

## 2017-08-02 ENCOUNTER — AMBULATORY - HEALTHEAST (OUTPATIENT)
Dept: CARE COORDINATION | Facility: CLINIC | Age: 62
End: 2017-08-02

## 2017-08-03 ENCOUNTER — COMMUNICATION - HEALTHEAST (OUTPATIENT)
Dept: NURSING | Facility: CLINIC | Age: 62
End: 2017-08-03

## 2017-08-04 ENCOUNTER — OFFICE VISIT - HEALTHEAST (OUTPATIENT)
Dept: FAMILY MEDICINE | Facility: CLINIC | Age: 62
End: 2017-08-04

## 2017-08-04 ENCOUNTER — COMMUNICATION - HEALTHEAST (OUTPATIENT)
Dept: FAMILY MEDICINE | Facility: CLINIC | Age: 62
End: 2017-08-04

## 2017-08-04 DIAGNOSIS — N31.9 NEUROGENIC BLADDER: ICD-10-CM

## 2017-08-04 DIAGNOSIS — G89.29 CHRONIC PAIN: ICD-10-CM

## 2017-08-04 DIAGNOSIS — G82.20 PARAPLEGIA (H): ICD-10-CM

## 2017-08-06 ENCOUNTER — COMMUNICATION - HEALTHEAST (OUTPATIENT)
Dept: FAMILY MEDICINE | Facility: CLINIC | Age: 62
End: 2017-08-06

## 2017-08-08 ENCOUNTER — COMMUNICATION - HEALTHEAST (OUTPATIENT)
Dept: FAMILY MEDICINE | Facility: CLINIC | Age: 62
End: 2017-08-08

## 2017-08-08 DIAGNOSIS — G89.29 CHRONIC PAIN: ICD-10-CM

## 2017-08-09 ENCOUNTER — COMMUNICATION - HEALTHEAST (OUTPATIENT)
Dept: FAMILY MEDICINE | Facility: CLINIC | Age: 62
End: 2017-08-09

## 2017-08-11 ENCOUNTER — COMMUNICATION - HEALTHEAST (OUTPATIENT)
Dept: FAMILY MEDICINE | Facility: CLINIC | Age: 62
End: 2017-08-11

## 2017-08-13 ENCOUNTER — COMMUNICATION - HEALTHEAST (OUTPATIENT)
Dept: SCHEDULING | Facility: CLINIC | Age: 62
End: 2017-08-13

## 2017-08-16 ENCOUNTER — AMBULATORY - HEALTHEAST (OUTPATIENT)
Dept: NURSING | Facility: CLINIC | Age: 62
End: 2017-08-16

## 2017-08-16 ENCOUNTER — COMMUNICATION - HEALTHEAST (OUTPATIENT)
Dept: FAMILY MEDICINE | Facility: CLINIC | Age: 62
End: 2017-08-16

## 2017-08-18 ENCOUNTER — COMMUNICATION - HEALTHEAST (OUTPATIENT)
Dept: NURSING | Facility: CLINIC | Age: 62
End: 2017-08-18

## 2017-08-18 ENCOUNTER — OFFICE VISIT - HEALTHEAST (OUTPATIENT)
Dept: FAMILY MEDICINE | Facility: CLINIC | Age: 62
End: 2017-08-18

## 2017-08-18 DIAGNOSIS — I26.99 PULMONARY EMBOLISM (H): ICD-10-CM

## 2017-08-18 DIAGNOSIS — R19.7 DIARRHEA: ICD-10-CM

## 2017-08-18 DIAGNOSIS — I82.409 DVT (DEEP VENOUS THROMBOSIS) (H): ICD-10-CM

## 2017-08-22 ENCOUNTER — COMMUNICATION - HEALTHEAST (OUTPATIENT)
Dept: FAMILY MEDICINE | Facility: CLINIC | Age: 62
End: 2017-08-22

## 2017-08-24 ENCOUNTER — COMMUNICATION - HEALTHEAST (OUTPATIENT)
Dept: FAMILY MEDICINE | Facility: CLINIC | Age: 62
End: 2017-08-24

## 2017-08-24 DIAGNOSIS — F32.A ANXIETY AND DEPRESSION: ICD-10-CM

## 2017-08-24 DIAGNOSIS — F41.9 ANXIETY AND DEPRESSION: ICD-10-CM

## 2017-08-25 ENCOUNTER — COMMUNICATION - HEALTHEAST (OUTPATIENT)
Dept: FAMILY MEDICINE | Facility: CLINIC | Age: 62
End: 2017-08-25

## 2017-08-28 ENCOUNTER — RECORDS - HEALTHEAST (OUTPATIENT)
Dept: ADMINISTRATIVE | Facility: OTHER | Age: 62
End: 2017-08-28

## 2017-08-29 ENCOUNTER — COMMUNICATION - HEALTHEAST (OUTPATIENT)
Dept: FAMILY MEDICINE | Facility: CLINIC | Age: 62
End: 2017-08-29

## 2017-08-29 DIAGNOSIS — R19.7 DIARRHEA: ICD-10-CM

## 2017-09-01 ENCOUNTER — COMMUNICATION - HEALTHEAST (OUTPATIENT)
Dept: FAMILY MEDICINE | Facility: CLINIC | Age: 62
End: 2017-09-01

## 2017-09-05 ENCOUNTER — COMMUNICATION - HEALTHEAST (OUTPATIENT)
Dept: FAMILY MEDICINE | Facility: CLINIC | Age: 62
End: 2017-09-05

## 2017-09-06 ENCOUNTER — COMMUNICATION - HEALTHEAST (OUTPATIENT)
Dept: FAMILY MEDICINE | Facility: CLINIC | Age: 62
End: 2017-09-06

## 2017-09-06 DIAGNOSIS — G89.29 CHRONIC PAIN: ICD-10-CM

## 2017-09-07 ENCOUNTER — COMMUNICATION - HEALTHEAST (OUTPATIENT)
Dept: NURSING | Facility: CLINIC | Age: 62
End: 2017-09-07

## 2017-09-08 ENCOUNTER — COMMUNICATION - HEALTHEAST (OUTPATIENT)
Dept: FAMILY MEDICINE | Facility: CLINIC | Age: 62
End: 2017-09-08

## 2017-09-08 DIAGNOSIS — G89.4 CHRONIC PAIN SYNDROME: ICD-10-CM

## 2017-09-11 ENCOUNTER — COMMUNICATION - HEALTHEAST (OUTPATIENT)
Dept: FAMILY MEDICINE | Facility: CLINIC | Age: 62
End: 2017-09-11

## 2017-09-11 ENCOUNTER — COMMUNICATION - HEALTHEAST (OUTPATIENT)
Dept: NURSING | Facility: CLINIC | Age: 62
End: 2017-09-11

## 2017-09-12 ENCOUNTER — COMMUNICATION - HEALTHEAST (OUTPATIENT)
Dept: FAMILY MEDICINE | Facility: CLINIC | Age: 62
End: 2017-09-12

## 2017-09-15 ENCOUNTER — COMMUNICATION - HEALTHEAST (OUTPATIENT)
Dept: FAMILY MEDICINE | Facility: CLINIC | Age: 62
End: 2017-09-15

## 2017-09-18 ENCOUNTER — COMMUNICATION - HEALTHEAST (OUTPATIENT)
Dept: FAMILY MEDICINE | Facility: CLINIC | Age: 62
End: 2017-09-18

## 2017-09-19 ENCOUNTER — COMMUNICATION - HEALTHEAST (OUTPATIENT)
Dept: FAMILY MEDICINE | Facility: CLINIC | Age: 62
End: 2017-09-19

## 2017-09-21 ENCOUNTER — COMMUNICATION - HEALTHEAST (OUTPATIENT)
Dept: FAMILY MEDICINE | Facility: CLINIC | Age: 62
End: 2017-09-21

## 2017-09-22 ENCOUNTER — OFFICE VISIT - HEALTHEAST (OUTPATIENT)
Dept: FAMILY MEDICINE | Facility: CLINIC | Age: 62
End: 2017-09-22

## 2017-09-22 ENCOUNTER — COMMUNICATION - HEALTHEAST (OUTPATIENT)
Dept: FAMILY MEDICINE | Facility: CLINIC | Age: 62
End: 2017-09-22

## 2017-09-22 DIAGNOSIS — G82.20 LOWER PARAPLEGIA (H): ICD-10-CM

## 2017-09-22 DIAGNOSIS — N31.9 NEUROGENIC BLADDER: ICD-10-CM

## 2017-09-22 DIAGNOSIS — Z93.59 CHRONIC SUPRAPUBIC CATHETER (H): ICD-10-CM

## 2017-09-22 DIAGNOSIS — G89.4 CHRONIC PAIN SYNDROME: ICD-10-CM

## 2017-09-22 DIAGNOSIS — I26.99 PULMONARY EMBOLISM (H): ICD-10-CM

## 2017-09-22 DIAGNOSIS — F32.9 MAJOR DEPRESSION: ICD-10-CM

## 2017-09-22 DIAGNOSIS — E03.9 HYPOTHYROIDISM, UNSPECIFIED TYPE: ICD-10-CM

## 2017-09-22 DIAGNOSIS — F51.05 INSOMNIA DUE TO ANXIETY AND FEAR: ICD-10-CM

## 2017-09-22 DIAGNOSIS — F40.9 INSOMNIA DUE TO ANXIETY AND FEAR: ICD-10-CM

## 2017-09-22 DIAGNOSIS — I10 HYPERTENSION: ICD-10-CM

## 2017-09-26 ENCOUNTER — COMMUNICATION - HEALTHEAST (OUTPATIENT)
Dept: FAMILY MEDICINE | Facility: CLINIC | Age: 62
End: 2017-09-26

## 2017-09-30 ENCOUNTER — COMMUNICATION - HEALTHEAST (OUTPATIENT)
Dept: SCHEDULING | Facility: CLINIC | Age: 62
End: 2017-09-30

## 2017-09-30 DIAGNOSIS — G89.4 CHRONIC PAIN SYNDROME: ICD-10-CM

## 2017-10-02 ENCOUNTER — COMMUNICATION - HEALTHEAST (OUTPATIENT)
Dept: NURSING | Facility: CLINIC | Age: 62
End: 2017-10-02

## 2017-10-02 ENCOUNTER — COMMUNICATION - HEALTHEAST (OUTPATIENT)
Dept: FAMILY MEDICINE | Facility: CLINIC | Age: 62
End: 2017-10-02

## 2017-10-03 ENCOUNTER — COMMUNICATION - HEALTHEAST (OUTPATIENT)
Dept: SCHEDULING | Facility: CLINIC | Age: 62
End: 2017-10-03

## 2017-10-09 ENCOUNTER — COMMUNICATION - HEALTHEAST (OUTPATIENT)
Dept: FAMILY MEDICINE | Facility: CLINIC | Age: 62
End: 2017-10-09

## 2017-10-09 DIAGNOSIS — G89.4 CHRONIC PAIN SYNDROME: ICD-10-CM

## 2017-10-09 DIAGNOSIS — G89.29 CHRONIC PAIN: ICD-10-CM

## 2017-10-12 ENCOUNTER — COMMUNICATION - HEALTHEAST (OUTPATIENT)
Dept: FAMILY MEDICINE | Facility: CLINIC | Age: 62
End: 2017-10-12

## 2017-10-13 ENCOUNTER — COMMUNICATION - HEALTHEAST (OUTPATIENT)
Dept: FAMILY MEDICINE | Facility: CLINIC | Age: 62
End: 2017-10-13

## 2017-10-17 ENCOUNTER — COMMUNICATION - HEALTHEAST (OUTPATIENT)
Dept: FAMILY MEDICINE | Facility: CLINIC | Age: 62
End: 2017-10-17

## 2017-10-17 ENCOUNTER — AMBULATORY - HEALTHEAST (OUTPATIENT)
Dept: NURSING | Facility: CLINIC | Age: 62
End: 2017-10-17

## 2017-10-20 ENCOUNTER — COMMUNICATION - HEALTHEAST (OUTPATIENT)
Dept: FAMILY MEDICINE | Facility: CLINIC | Age: 62
End: 2017-10-20

## 2017-10-24 ENCOUNTER — COMMUNICATION - HEALTHEAST (OUTPATIENT)
Dept: FAMILY MEDICINE | Facility: CLINIC | Age: 62
End: 2017-10-24

## 2017-10-24 DIAGNOSIS — F41.9 ANXIETY AND DEPRESSION: ICD-10-CM

## 2017-10-24 DIAGNOSIS — F32.A ANXIETY AND DEPRESSION: ICD-10-CM

## 2017-10-27 ENCOUNTER — COMMUNICATION - HEALTHEAST (OUTPATIENT)
Dept: FAMILY MEDICINE | Facility: CLINIC | Age: 62
End: 2017-10-27

## 2017-11-02 ENCOUNTER — COMMUNICATION - HEALTHEAST (OUTPATIENT)
Dept: FAMILY MEDICINE | Facility: CLINIC | Age: 62
End: 2017-11-02

## 2017-11-02 DIAGNOSIS — G89.4 CHRONIC PAIN SYNDROME: ICD-10-CM

## 2017-11-02 DIAGNOSIS — R32 BLADDER INCONTINENCE: ICD-10-CM

## 2017-11-03 ENCOUNTER — COMMUNICATION - HEALTHEAST (OUTPATIENT)
Dept: FAMILY MEDICINE | Facility: CLINIC | Age: 62
End: 2017-11-03

## 2017-11-06 ENCOUNTER — COMMUNICATION - HEALTHEAST (OUTPATIENT)
Dept: NURSING | Facility: CLINIC | Age: 62
End: 2017-11-06

## 2017-11-07 ENCOUNTER — OFFICE VISIT - HEALTHEAST (OUTPATIENT)
Dept: FAMILY MEDICINE | Facility: CLINIC | Age: 62
End: 2017-11-07

## 2017-11-07 ENCOUNTER — COMMUNICATION - HEALTHEAST (OUTPATIENT)
Dept: FAMILY MEDICINE | Facility: CLINIC | Age: 62
End: 2017-11-07

## 2017-11-07 DIAGNOSIS — G89.4 CHRONIC PAIN SYNDROME: ICD-10-CM

## 2017-11-07 DIAGNOSIS — K59.2 NEUROGENIC BOWEL: ICD-10-CM

## 2017-11-07 DIAGNOSIS — F32.A DEPRESSION: ICD-10-CM

## 2017-11-07 DIAGNOSIS — I26.99 PULMONARY EMBOLISM (H): ICD-10-CM

## 2017-11-07 DIAGNOSIS — F41.9 ANXIETY: ICD-10-CM

## 2017-11-08 ENCOUNTER — RECORDS - HEALTHEAST (OUTPATIENT)
Dept: ADMINISTRATIVE | Facility: OTHER | Age: 62
End: 2017-11-08

## 2017-11-08 ENCOUNTER — COMMUNICATION - HEALTHEAST (OUTPATIENT)
Dept: FAMILY MEDICINE | Facility: CLINIC | Age: 62
End: 2017-11-08

## 2017-11-08 DIAGNOSIS — G89.4 CHRONIC PAIN SYNDROME: ICD-10-CM

## 2017-11-09 ENCOUNTER — COMMUNICATION - HEALTHEAST (OUTPATIENT)
Dept: FAMILY MEDICINE | Facility: CLINIC | Age: 62
End: 2017-11-09

## 2017-11-09 DIAGNOSIS — G89.4 CHRONIC PAIN SYNDROME: ICD-10-CM

## 2017-11-10 ENCOUNTER — COMMUNICATION - HEALTHEAST (OUTPATIENT)
Dept: FAMILY MEDICINE | Facility: CLINIC | Age: 62
End: 2017-11-10

## 2017-11-15 ENCOUNTER — COMMUNICATION - HEALTHEAST (OUTPATIENT)
Dept: FAMILY MEDICINE | Facility: CLINIC | Age: 62
End: 2017-11-15

## 2017-11-16 ENCOUNTER — COMMUNICATION - HEALTHEAST (OUTPATIENT)
Dept: NURSING | Facility: CLINIC | Age: 62
End: 2017-11-16

## 2017-11-20 ENCOUNTER — COMMUNICATION - HEALTHEAST (OUTPATIENT)
Dept: FAMILY MEDICINE | Facility: CLINIC | Age: 62
End: 2017-11-20

## 2017-11-20 DIAGNOSIS — F41.9 ANXIETY AND DEPRESSION: ICD-10-CM

## 2017-11-20 DIAGNOSIS — F32.A ANXIETY AND DEPRESSION: ICD-10-CM

## 2017-11-21 ENCOUNTER — AMBULATORY - HEALTHEAST (OUTPATIENT)
Dept: NURSING | Facility: CLINIC | Age: 62
End: 2017-11-21

## 2017-11-22 ENCOUNTER — COMMUNICATION - HEALTHEAST (OUTPATIENT)
Dept: FAMILY MEDICINE | Facility: CLINIC | Age: 62
End: 2017-11-22

## 2017-11-24 ENCOUNTER — COMMUNICATION - HEALTHEAST (OUTPATIENT)
Dept: FAMILY MEDICINE | Facility: CLINIC | Age: 62
End: 2017-11-24

## 2017-11-26 ENCOUNTER — COMMUNICATION - HEALTHEAST (OUTPATIENT)
Dept: SCHEDULING | Facility: CLINIC | Age: 62
End: 2017-11-26

## 2017-11-27 ENCOUNTER — RECORDS - HEALTHEAST (OUTPATIENT)
Dept: ADMINISTRATIVE | Facility: OTHER | Age: 62
End: 2017-11-27

## 2017-11-28 ENCOUNTER — COMMUNICATION - HEALTHEAST (OUTPATIENT)
Dept: FAMILY MEDICINE | Facility: CLINIC | Age: 62
End: 2017-11-28

## 2017-11-29 ENCOUNTER — COMMUNICATION - HEALTHEAST (OUTPATIENT)
Dept: FAMILY MEDICINE | Facility: CLINIC | Age: 62
End: 2017-11-29

## 2017-11-30 ENCOUNTER — COMMUNICATION - HEALTHEAST (OUTPATIENT)
Dept: FAMILY MEDICINE | Facility: CLINIC | Age: 62
End: 2017-11-30

## 2017-11-30 DIAGNOSIS — G89.4 CHRONIC PAIN SYNDROME: ICD-10-CM

## 2017-12-01 ENCOUNTER — COMMUNICATION - HEALTHEAST (OUTPATIENT)
Dept: FAMILY MEDICINE | Facility: CLINIC | Age: 62
End: 2017-12-01

## 2017-12-04 ENCOUNTER — COMMUNICATION - HEALTHEAST (OUTPATIENT)
Dept: FAMILY MEDICINE | Facility: CLINIC | Age: 62
End: 2017-12-04

## 2017-12-04 DIAGNOSIS — G62.9 NEUROPATHY: ICD-10-CM

## 2017-12-05 ENCOUNTER — AMBULATORY - HEALTHEAST (OUTPATIENT)
Dept: FAMILY MEDICINE | Facility: CLINIC | Age: 62
End: 2017-12-05

## 2017-12-06 ENCOUNTER — COMMUNICATION - HEALTHEAST (OUTPATIENT)
Dept: FAMILY MEDICINE | Facility: CLINIC | Age: 62
End: 2017-12-06

## 2017-12-07 ENCOUNTER — COMMUNICATION - HEALTHEAST (OUTPATIENT)
Dept: FAMILY MEDICINE | Facility: CLINIC | Age: 62
End: 2017-12-07

## 2017-12-08 ENCOUNTER — AMBULATORY - HEALTHEAST (OUTPATIENT)
Dept: FAMILY MEDICINE | Facility: CLINIC | Age: 62
End: 2017-12-08

## 2017-12-08 ENCOUNTER — COMMUNICATION - HEALTHEAST (OUTPATIENT)
Dept: FAMILY MEDICINE | Facility: CLINIC | Age: 62
End: 2017-12-08

## 2017-12-12 ENCOUNTER — COMMUNICATION - HEALTHEAST (OUTPATIENT)
Dept: FAMILY MEDICINE | Facility: CLINIC | Age: 62
End: 2017-12-12

## 2017-12-14 ENCOUNTER — COMMUNICATION - HEALTHEAST (OUTPATIENT)
Dept: FAMILY MEDICINE | Facility: CLINIC | Age: 62
End: 2017-12-14

## 2017-12-18 ENCOUNTER — COMMUNICATION - HEALTHEAST (OUTPATIENT)
Dept: FAMILY MEDICINE | Facility: CLINIC | Age: 62
End: 2017-12-18

## 2017-12-19 ENCOUNTER — COMMUNICATION - HEALTHEAST (OUTPATIENT)
Dept: FAMILY MEDICINE | Facility: CLINIC | Age: 62
End: 2017-12-19

## 2017-12-19 DIAGNOSIS — G89.4 CHRONIC PAIN SYNDROME: ICD-10-CM

## 2017-12-20 ENCOUNTER — COMMUNICATION - HEALTHEAST (OUTPATIENT)
Dept: FAMILY MEDICINE | Facility: CLINIC | Age: 62
End: 2017-12-20

## 2017-12-22 ENCOUNTER — ANESTHESIA - HEALTHEAST (OUTPATIENT)
Dept: SURGERY | Facility: CLINIC | Age: 62
End: 2017-12-22

## 2017-12-22 ENCOUNTER — SURGERY - HEALTHEAST (OUTPATIENT)
Dept: SURGERY | Facility: CLINIC | Age: 62
End: 2017-12-22

## 2017-12-22 ASSESSMENT — MIFFLIN-ST. JEOR: SCORE: 1274.37

## 2017-12-24 ASSESSMENT — MIFFLIN-ST. JEOR: SCORE: 1257.36

## 2017-12-25 ENCOUNTER — COMMUNICATION - HEALTHEAST (OUTPATIENT)
Dept: FAMILY MEDICINE | Facility: CLINIC | Age: 62
End: 2017-12-25

## 2017-12-25 ASSESSMENT — MIFFLIN-ST. JEOR: SCORE: 1258.72

## 2017-12-27 ENCOUNTER — COMMUNICATION - HEALTHEAST (OUTPATIENT)
Dept: FAMILY MEDICINE | Facility: CLINIC | Age: 62
End: 2017-12-27

## 2017-12-27 ASSESSMENT — MIFFLIN-ST. JEOR: SCORE: 1216.53

## 2017-12-28 ASSESSMENT — MIFFLIN-ST. JEOR: SCORE: 1215.4

## 2017-12-29 ASSESSMENT — MIFFLIN-ST. JEOR: SCORE: 1263.25

## 2018-01-01 ENCOUNTER — RECORDS - HEALTHEAST (OUTPATIENT)
Dept: ADMINISTRATIVE | Facility: OTHER | Age: 63
End: 2018-01-01

## 2018-01-01 ENCOUNTER — NURSING HOME VISIT (OUTPATIENT)
Dept: GERIATRICS | Facility: CLINIC | Age: 63
End: 2018-01-01
Payer: MEDICARE

## 2018-01-01 ENCOUNTER — COMMUNICATION - HEALTHEAST (OUTPATIENT)
Dept: FAMILY MEDICINE | Facility: CLINIC | Age: 63
End: 2018-01-01

## 2018-01-01 ENCOUNTER — APPOINTMENT (OUTPATIENT)
Dept: GENERAL RADIOLOGY | Facility: CLINIC | Age: 63
DRG: 602 | End: 2018-01-01
Attending: INTERNAL MEDICINE
Payer: MEDICARE

## 2018-01-01 ENCOUNTER — RECORDS - HEALTHEAST (OUTPATIENT)
Dept: EMERGENCY MEDICINE | Facility: CLINIC | Age: 63
End: 2018-01-01

## 2018-01-01 ENCOUNTER — ANESTHESIA - HEALTHEAST (OUTPATIENT)
Dept: SURGERY | Facility: CLINIC | Age: 63
End: 2018-01-01

## 2018-01-01 ENCOUNTER — TELEPHONE (OUTPATIENT)
Dept: PHARMACY | Facility: OTHER | Age: 63
End: 2018-01-01

## 2018-01-01 ENCOUNTER — COMMUNICATION - HEALTHEAST (OUTPATIENT)
Dept: SCHEDULING | Facility: CLINIC | Age: 63
End: 2018-01-01

## 2018-01-01 ENCOUNTER — APPOINTMENT (OUTPATIENT)
Dept: GENERAL RADIOLOGY | Facility: CLINIC | Age: 63
DRG: 602 | End: 2018-01-01
Attending: EMERGENCY MEDICINE
Payer: MEDICARE

## 2018-01-01 ENCOUNTER — COMMUNICATION - HEALTHEAST (OUTPATIENT)
Dept: CARE COORDINATION | Facility: CLINIC | Age: 63
End: 2018-01-01

## 2018-01-01 ENCOUNTER — TELEPHONE (OUTPATIENT)
Dept: CARE COORDINATION | Facility: CLINIC | Age: 63
End: 2018-01-01

## 2018-01-01 ENCOUNTER — PRE VISIT (OUTPATIENT)
Dept: UROLOGY | Facility: CLINIC | Age: 63
End: 2018-01-01

## 2018-01-01 ENCOUNTER — COMMUNICATION - HEALTHEAST (OUTPATIENT)
Dept: PALLIATIVE MEDICINE | Facility: OTHER | Age: 63
End: 2018-01-01

## 2018-01-01 ENCOUNTER — COMMUNICATION - HEALTHEAST (OUTPATIENT)
Dept: ANTICOAGULATION | Facility: CLINIC | Age: 63
End: 2018-01-01

## 2018-01-01 ENCOUNTER — COMMUNICATION - HEALTHEAST (OUTPATIENT)
Dept: PHARMACY | Facility: CLINIC | Age: 63
End: 2018-01-01

## 2018-01-01 ENCOUNTER — RECORDS - HEALTHEAST (OUTPATIENT)
Dept: LAB | Facility: CLINIC | Age: 63
End: 2018-01-01

## 2018-01-01 ENCOUNTER — APPOINTMENT (OUTPATIENT)
Dept: INTERVENTIONAL RADIOLOGY/VASCULAR | Facility: CLINIC | Age: 63
DRG: 602 | End: 2018-01-01
Attending: PHYSICIAN ASSISTANT
Payer: MEDICARE

## 2018-01-01 ENCOUNTER — DOCUMENTATION ONLY (OUTPATIENT)
Dept: CARE COORDINATION | Facility: CLINIC | Age: 63
End: 2018-01-01

## 2018-01-01 ENCOUNTER — TELEPHONE (OUTPATIENT)
Dept: UROLOGY | Facility: CLINIC | Age: 63
End: 2018-01-01

## 2018-01-01 ENCOUNTER — APPOINTMENT (OUTPATIENT)
Dept: INTERVENTIONAL RADIOLOGY/VASCULAR | Facility: CLINIC | Age: 63
End: 2018-01-01
Attending: NURSE PRACTITIONER
Payer: MEDICARE

## 2018-01-01 ENCOUNTER — HOSPITAL ENCOUNTER (INPATIENT)
Facility: CLINIC | Age: 63
LOS: 4 days | Discharge: NURSING, REHAB OR LONG-TERM CARE | DRG: 592 | End: 2018-05-19
Attending: EMERGENCY MEDICINE | Admitting: INTERNAL MEDICINE
Payer: MEDICARE

## 2018-01-01 ENCOUNTER — TRANSFERRED RECORDS (OUTPATIENT)
Dept: HEALTH INFORMATION MANAGEMENT | Facility: CLINIC | Age: 63
End: 2018-01-01

## 2018-01-01 ENCOUNTER — APPOINTMENT (OUTPATIENT)
Dept: GENERAL RADIOLOGY | Facility: CLINIC | Age: 63
End: 2018-01-01
Attending: EMERGENCY MEDICINE
Payer: MEDICARE

## 2018-01-01 ENCOUNTER — APPOINTMENT (OUTPATIENT)
Dept: SPEECH THERAPY | Facility: CLINIC | Age: 63
DRG: 602 | End: 2018-01-01
Payer: MEDICARE

## 2018-01-01 ENCOUNTER — HOME CARE/HOSPICE - HEALTHEAST (OUTPATIENT)
Dept: HOSPICE | Facility: HOSPICE | Age: 63
End: 2018-01-01

## 2018-01-01 ENCOUNTER — HOSPITAL ENCOUNTER (EMERGENCY)
Facility: CLINIC | Age: 63
Discharge: NURSING, REHAB OR LONG-TERM CARE | End: 2018-06-22
Attending: EMERGENCY MEDICINE | Admitting: EMERGENCY MEDICINE
Payer: MEDICARE

## 2018-01-01 ENCOUNTER — HOSPITAL ENCOUNTER (EMERGENCY)
Facility: CLINIC | Age: 63
Discharge: NURSING, REHAB OR LONG-TERM CARE | End: 2018-06-16
Attending: EMERGENCY MEDICINE | Admitting: EMERGENCY MEDICINE
Payer: MEDICARE

## 2018-01-01 ENCOUNTER — COMMUNICATION - HEALTHEAST (OUTPATIENT)
Dept: NURSING | Facility: CLINIC | Age: 63
End: 2018-01-01

## 2018-01-01 ENCOUNTER — OFFICE VISIT - HEALTHEAST (OUTPATIENT)
Dept: FAMILY MEDICINE | Facility: CLINIC | Age: 63
End: 2018-01-01

## 2018-01-01 ENCOUNTER — TELEPHONE (OUTPATIENT)
Dept: GERIATRICS | Facility: CLINIC | Age: 63
End: 2018-01-01

## 2018-01-01 ENCOUNTER — HOME CARE/HOSPICE - HEALTHEAST (OUTPATIENT)
Dept: HOME HEALTH SERVICES | Facility: HOME HEALTH | Age: 63
End: 2018-01-01

## 2018-01-01 ENCOUNTER — COMMUNICATION - HEALTHEAST (OUTPATIENT)
Dept: HOME HEALTH SERVICES | Facility: HOME HEALTH | Age: 63
End: 2018-01-01

## 2018-01-01 ENCOUNTER — HOSPITAL ENCOUNTER (INPATIENT)
Facility: CLINIC | Age: 63
LOS: 27 days | Discharge: NURSING, REHAB OR LONG-TERM CARE | DRG: 602 | End: 2018-05-08
Attending: EMERGENCY MEDICINE | Admitting: HOSPITALIST
Payer: MEDICARE

## 2018-01-01 ENCOUNTER — AMBULATORY - HEALTHEAST (OUTPATIENT)
Dept: FAMILY MEDICINE | Facility: CLINIC | Age: 63
End: 2018-01-01

## 2018-01-01 ENCOUNTER — APPOINTMENT (OUTPATIENT)
Dept: CT IMAGING | Facility: CLINIC | Age: 63
End: 2018-01-01
Attending: EMERGENCY MEDICINE
Payer: MEDICARE

## 2018-01-01 ENCOUNTER — AMBULATORY - HEALTHEAST (OUTPATIENT)
Dept: ANTICOAGULATION | Facility: CLINIC | Age: 63
End: 2018-01-01

## 2018-01-01 ENCOUNTER — HOSPITAL ENCOUNTER (OUTPATIENT)
Dept: CT IMAGING | Facility: CLINIC | Age: 63
Discharge: HOME | End: 2018-12-17
Attending: INTERNAL MEDICINE

## 2018-01-01 ENCOUNTER — DOCUMENTATION ONLY (OUTPATIENT)
Dept: OTHER | Facility: CLINIC | Age: 63
End: 2018-01-01

## 2018-01-01 ENCOUNTER — HOSPITAL ENCOUNTER (EMERGENCY)
Facility: CLINIC | Age: 63
Discharge: NURSING, REHAB OR LONG-TERM CARE | DRG: 592 | End: 2018-05-15
Attending: EMERGENCY MEDICINE | Admitting: EMERGENCY MEDICINE
Payer: MEDICARE

## 2018-01-01 ENCOUNTER — APPOINTMENT (OUTPATIENT)
Dept: CT IMAGING | Facility: CLINIC | Age: 63
DRG: 602 | End: 2018-01-01
Attending: INTERNAL MEDICINE
Payer: MEDICARE

## 2018-01-01 ENCOUNTER — TELEPHONE (OUTPATIENT)
Dept: INTERVENTIONAL RADIOLOGY/VASCULAR | Facility: CLINIC | Age: 63
End: 2018-01-01

## 2018-01-01 ENCOUNTER — SURGERY - HEALTHEAST (OUTPATIENT)
Dept: SURGERY | Facility: CLINIC | Age: 63
End: 2018-01-01

## 2018-01-01 ENCOUNTER — HOSPITAL ENCOUNTER (EMERGENCY)
Facility: CLINIC | Age: 63
Discharge: HOME | End: 2018-05-22
Attending: EMERGENCY MEDICINE | Admitting: EMERGENCY MEDICINE
Payer: MEDICARE

## 2018-01-01 ENCOUNTER — HOSPITAL ENCOUNTER (OUTPATIENT)
Facility: CLINIC | Age: 63
End: 2018-01-01
Admitting: RADIOLOGY
Payer: MEDICARE

## 2018-01-01 ENCOUNTER — DISCHARGE SUMMARY NURSING HOME (OUTPATIENT)
Dept: GERIATRICS | Facility: CLINIC | Age: 63
End: 2018-01-01
Payer: MEDICARE

## 2018-01-01 ENCOUNTER — HOSPITAL ENCOUNTER (OUTPATIENT)
Dept: CT IMAGING | Facility: CLINIC | Age: 63
Discharge: HOME | End: 2018-12-07
Attending: INTERNAL MEDICINE

## 2018-01-01 ENCOUNTER — HOSPITAL ENCOUNTER (EMERGENCY)
Facility: CLINIC | Age: 63
Discharge: NURSING, REHAB OR LONG-TERM CARE | End: 2018-05-09
Attending: EMERGENCY MEDICINE | Admitting: EMERGENCY MEDICINE
Payer: MEDICARE

## 2018-01-01 VITALS
TEMPERATURE: 98.1 F | OXYGEN SATURATION: 94 % | BODY MASS INDEX: 29.26 KG/M2 | WEIGHT: 160 LBS | SYSTOLIC BLOOD PRESSURE: 103 MMHG | HEART RATE: 60 BPM | RESPIRATION RATE: 16 BRPM | DIASTOLIC BLOOD PRESSURE: 63 MMHG

## 2018-01-01 VITALS
DIASTOLIC BLOOD PRESSURE: 74 MMHG | HEART RATE: 99 BPM | WEIGHT: 158 LBS | TEMPERATURE: 98.1 F | OXYGEN SATURATION: 99 % | RESPIRATION RATE: 18 BRPM | TEMPERATURE: 98.6 F | HEART RATE: 77 BPM | SYSTOLIC BLOOD PRESSURE: 121 MMHG | BODY MASS INDEX: 28 KG/M2 | DIASTOLIC BLOOD PRESSURE: 79 MMHG | RESPIRATION RATE: 16 BRPM | HEIGHT: 63 IN | WEIGHT: 158 LBS | OXYGEN SATURATION: 97 % | HEIGHT: 63 IN | BODY MASS INDEX: 28 KG/M2 | SYSTOLIC BLOOD PRESSURE: 140 MMHG

## 2018-01-01 VITALS
TEMPERATURE: 97.1 F | SYSTOLIC BLOOD PRESSURE: 108 MMHG | OXYGEN SATURATION: 97 % | RESPIRATION RATE: 18 BRPM | DIASTOLIC BLOOD PRESSURE: 75 MMHG | HEART RATE: 87 BPM

## 2018-01-01 VITALS
BODY MASS INDEX: 28.44 KG/M2 | RESPIRATION RATE: 22 BRPM | DIASTOLIC BLOOD PRESSURE: 82 MMHG | TEMPERATURE: 99.8 F | HEART RATE: 106 BPM | SYSTOLIC BLOOD PRESSURE: 127 MMHG | OXYGEN SATURATION: 97 % | WEIGHT: 158 LBS

## 2018-01-01 VITALS
SYSTOLIC BLOOD PRESSURE: 138 MMHG | OXYGEN SATURATION: 96 % | DIASTOLIC BLOOD PRESSURE: 77 MMHG | BODY MASS INDEX: 28 KG/M2 | HEART RATE: 74 BPM | RESPIRATION RATE: 18 BRPM | HEIGHT: 63 IN | WEIGHT: 158 LBS | TEMPERATURE: 98.7 F

## 2018-01-01 VITALS
RESPIRATION RATE: 18 BRPM | TEMPERATURE: 98.7 F | SYSTOLIC BLOOD PRESSURE: 138 MMHG | HEIGHT: 63 IN | DIASTOLIC BLOOD PRESSURE: 77 MMHG | WEIGHT: 158 LBS | HEART RATE: 74 BPM | OXYGEN SATURATION: 96 % | BODY MASS INDEX: 28 KG/M2

## 2018-01-01 VITALS
RESPIRATION RATE: 18 BRPM | HEART RATE: 104 BPM | BODY MASS INDEX: 34.02 KG/M2 | OXYGEN SATURATION: 96 % | TEMPERATURE: 97.9 F | SYSTOLIC BLOOD PRESSURE: 108 MMHG | DIASTOLIC BLOOD PRESSURE: 64 MMHG | WEIGHT: 186 LBS

## 2018-01-01 VITALS
BODY MASS INDEX: 34.36 KG/M2 | DIASTOLIC BLOOD PRESSURE: 50 MMHG | WEIGHT: 186.7 LBS | SYSTOLIC BLOOD PRESSURE: 83 MMHG | TEMPERATURE: 99.4 F | HEART RATE: 93 BPM | OXYGEN SATURATION: 93 % | HEIGHT: 62 IN | RESPIRATION RATE: 18 BRPM

## 2018-01-01 VITALS
HEIGHT: 63 IN | WEIGHT: 133 LBS | HEART RATE: 64 BPM | OXYGEN SATURATION: 94 % | BODY MASS INDEX: 23.57 KG/M2 | SYSTOLIC BLOOD PRESSURE: 105 MMHG | RESPIRATION RATE: 16 BRPM | DIASTOLIC BLOOD PRESSURE: 58 MMHG | TEMPERATURE: 98.4 F

## 2018-01-01 VITALS
SYSTOLIC BLOOD PRESSURE: 125 MMHG | TEMPERATURE: 98.1 F | OXYGEN SATURATION: 97 % | HEIGHT: 63 IN | HEART RATE: 100 BPM | WEIGHT: 158 LBS | BODY MASS INDEX: 28 KG/M2 | DIASTOLIC BLOOD PRESSURE: 86 MMHG | RESPIRATION RATE: 16 BRPM

## 2018-01-01 VITALS
TEMPERATURE: 97.5 F | DIASTOLIC BLOOD PRESSURE: 62 MMHG | OXYGEN SATURATION: 94 % | RESPIRATION RATE: 18 BRPM | SYSTOLIC BLOOD PRESSURE: 92 MMHG | HEART RATE: 87 BPM

## 2018-01-01 VITALS
SYSTOLIC BLOOD PRESSURE: 95 MMHG | HEART RATE: 83 BPM | DIASTOLIC BLOOD PRESSURE: 60 MMHG | RESPIRATION RATE: 16 BRPM | OXYGEN SATURATION: 95 % | BODY MASS INDEX: 29.26 KG/M2 | WEIGHT: 160 LBS | TEMPERATURE: 98 F

## 2018-01-01 VITALS
HEART RATE: 99 BPM | TEMPERATURE: 98.4 F | HEIGHT: 62 IN | DIASTOLIC BLOOD PRESSURE: 65 MMHG | OXYGEN SATURATION: 92 % | RESPIRATION RATE: 16 BRPM | SYSTOLIC BLOOD PRESSURE: 97 MMHG

## 2018-01-01 VITALS
BODY MASS INDEX: 28.53 KG/M2 | HEIGHT: 63 IN | HEART RATE: 99 BPM | RESPIRATION RATE: 16 BRPM | SYSTOLIC BLOOD PRESSURE: 121 MMHG | OXYGEN SATURATION: 99 % | DIASTOLIC BLOOD PRESSURE: 74 MMHG | WEIGHT: 161 LBS | TEMPERATURE: 98.1 F

## 2018-01-01 VITALS
TEMPERATURE: 98.3 F | OXYGEN SATURATION: 98 % | RESPIRATION RATE: 16 BRPM | SYSTOLIC BLOOD PRESSURE: 111 MMHG | HEIGHT: 62 IN | DIASTOLIC BLOOD PRESSURE: 72 MMHG | HEART RATE: 84 BPM

## 2018-01-01 VITALS
OXYGEN SATURATION: 98 % | HEART RATE: 97 BPM | BODY MASS INDEX: 28.62 KG/M2 | HEIGHT: 63 IN | SYSTOLIC BLOOD PRESSURE: 104 MMHG | TEMPERATURE: 97.9 F | DIASTOLIC BLOOD PRESSURE: 81 MMHG | WEIGHT: 161.5 LBS | RESPIRATION RATE: 18 BRPM

## 2018-01-01 VITALS
SYSTOLIC BLOOD PRESSURE: 95 MMHG | RESPIRATION RATE: 18 BRPM | BODY MASS INDEX: 34.41 KG/M2 | OXYGEN SATURATION: 94 % | DIASTOLIC BLOOD PRESSURE: 55 MMHG | HEIGHT: 62 IN | HEART RATE: 95 BPM | TEMPERATURE: 98.4 F | WEIGHT: 187 LBS

## 2018-01-01 VITALS
TEMPERATURE: 98.1 F | HEART RATE: 106 BPM | OXYGEN SATURATION: 97 % | RESPIRATION RATE: 22 BRPM | WEIGHT: 158 LBS | BODY MASS INDEX: 28 KG/M2 | HEIGHT: 63 IN | SYSTOLIC BLOOD PRESSURE: 127 MMHG | DIASTOLIC BLOOD PRESSURE: 82 MMHG

## 2018-01-01 VITALS
SYSTOLIC BLOOD PRESSURE: 98 MMHG | TEMPERATURE: 97.4 F | DIASTOLIC BLOOD PRESSURE: 63 MMHG | OXYGEN SATURATION: 97 % | HEART RATE: 59 BPM | RESPIRATION RATE: 18 BRPM

## 2018-01-01 VITALS
HEART RATE: 84 BPM | SYSTOLIC BLOOD PRESSURE: 95 MMHG | OXYGEN SATURATION: 90 % | RESPIRATION RATE: 20 BRPM | DIASTOLIC BLOOD PRESSURE: 64 MMHG | TEMPERATURE: 97.9 F

## 2018-01-01 DIAGNOSIS — G40.909 SEIZURE DISORDER (H): ICD-10-CM

## 2018-01-01 DIAGNOSIS — L89.159 DECUBITUS ULCER OF SACRAL REGION, UNSPECIFIED ULCER STAGE: ICD-10-CM

## 2018-01-01 DIAGNOSIS — N31.9 NEUROGENIC BLADDER: ICD-10-CM

## 2018-01-01 DIAGNOSIS — Z78.9 ALCOHOL USE: ICD-10-CM

## 2018-01-01 DIAGNOSIS — G82.20 PARAPLEGIA AT T4 LEVEL (H): ICD-10-CM

## 2018-01-01 DIAGNOSIS — G82.20 PARAPLEGIA AT T4 LEVEL (H): Primary | ICD-10-CM

## 2018-01-01 DIAGNOSIS — F11.29 OPIOID DEPENDENCE WITH OPIOID-INDUCED DISORDER (H): ICD-10-CM

## 2018-01-01 DIAGNOSIS — Z86.14 HISTORY OF MRSA INFECTION: ICD-10-CM

## 2018-01-01 DIAGNOSIS — F11.20 OPIATE DEPENDENCE, CONTINUOUS (H): ICD-10-CM

## 2018-01-01 DIAGNOSIS — M86.9 OSTEOMYELITIS, UNSPECIFIED SITE, UNSPECIFIED TYPE (H): ICD-10-CM

## 2018-01-01 DIAGNOSIS — R32 URINARY INCONTINENCE, UNSPECIFIED TYPE: ICD-10-CM

## 2018-01-01 DIAGNOSIS — J44.9 CHRONIC OBSTRUCTIVE PULMONARY DISEASE, UNSPECIFIED COPD TYPE (H): ICD-10-CM

## 2018-01-01 DIAGNOSIS — E44.0 MODERATE PROTEIN-CALORIE MALNUTRITION (H): ICD-10-CM

## 2018-01-01 DIAGNOSIS — Z86.718 PERSONAL HISTORY OF DVT (DEEP VEIN THROMBOSIS): ICD-10-CM

## 2018-01-01 DIAGNOSIS — M86.9 OSTEOMYELITIS, UNSPECIFIED SITE, UNSPECIFIED TYPE (H): Primary | ICD-10-CM

## 2018-01-01 DIAGNOSIS — T83.511A URINARY TRACT INFECTION ASSOCIATED WITH CATHETERIZATION OF URINARY TRACT, UNSPECIFIED INDWELLING URINARY CATHETER TYPE, INITIAL ENCOUNTER (H): Primary | ICD-10-CM

## 2018-01-01 DIAGNOSIS — D72.829 LEUKOCYTOSIS, UNSPECIFIED TYPE: ICD-10-CM

## 2018-01-01 DIAGNOSIS — A41.9 SEPSIS, DUE TO UNSPECIFIED ORGANISM: ICD-10-CM

## 2018-01-01 DIAGNOSIS — S72.25XS CLOSED NONDISPLACED SUBTROCHANTERIC FRACTURE OF LEFT FEMUR, SEQUELA: ICD-10-CM

## 2018-01-01 DIAGNOSIS — R19.7 DIARRHEA, UNSPECIFIED TYPE: ICD-10-CM

## 2018-01-01 DIAGNOSIS — G89.29 OTHER CHRONIC PAIN: ICD-10-CM

## 2018-01-01 DIAGNOSIS — G82.20 PARAPLEGIA (H): Primary | ICD-10-CM

## 2018-01-01 DIAGNOSIS — Z86.718 HISTORY OF DEEP VENOUS THROMBOSIS: ICD-10-CM

## 2018-01-01 DIAGNOSIS — D64.9 ANEMIA, UNSPECIFIED TYPE: ICD-10-CM

## 2018-01-01 DIAGNOSIS — R56.9 SEIZURES (H): ICD-10-CM

## 2018-01-01 DIAGNOSIS — I47.10 SVT (SUPRAVENTRICULAR TACHYCARDIA) (H): ICD-10-CM

## 2018-01-01 DIAGNOSIS — E06.3 HYPOTHYROIDISM DUE TO HASHIMOTO'S THYROIDITIS: ICD-10-CM

## 2018-01-01 DIAGNOSIS — Z29.89 SEIZURE PROPHYLAXIS: ICD-10-CM

## 2018-01-01 DIAGNOSIS — F43.23 ADJUSTMENT DISORDER WITH MIXED ANXIETY AND DEPRESSED MOOD: ICD-10-CM

## 2018-01-01 DIAGNOSIS — L30.9 DERMATITIS: ICD-10-CM

## 2018-01-01 DIAGNOSIS — F41.9 ANXIETY: Primary | ICD-10-CM

## 2018-01-01 DIAGNOSIS — J20.8 ACUTE BRONCHITIS DUE TO OTHER SPECIFIED ORGANISMS: ICD-10-CM

## 2018-01-01 DIAGNOSIS — G89.4 CHRONIC PAIN SYNDROME: ICD-10-CM

## 2018-01-01 DIAGNOSIS — D50.9 IRON DEFICIENCY ANEMIA, UNSPECIFIED IRON DEFICIENCY ANEMIA TYPE: ICD-10-CM

## 2018-01-01 DIAGNOSIS — L89.159 PRESSURE INJURY OF SKIN OF SACRAL REGION, UNSPECIFIED INJURY STAGE: ICD-10-CM

## 2018-01-01 DIAGNOSIS — M84.48XA SACRAL INSUFFICIENCY FRACTURE: ICD-10-CM

## 2018-01-01 DIAGNOSIS — L89.154 DECUBITUS ULCER OF SACRAL REGION, STAGE 4 (H): ICD-10-CM

## 2018-01-01 DIAGNOSIS — R53.81 PHYSICAL DECONDITIONING: ICD-10-CM

## 2018-01-01 DIAGNOSIS — E03.9 HYPOTHYROIDISM, UNSPECIFIED TYPE: ICD-10-CM

## 2018-01-01 DIAGNOSIS — T07.XXXA MULTIPLE WOUNDS: ICD-10-CM

## 2018-01-01 DIAGNOSIS — B96.5 PSEUDOMONAS URINARY TRACT INFECTION: ICD-10-CM

## 2018-01-01 DIAGNOSIS — I95.9 HYPOTENSION, UNSPECIFIED HYPOTENSION TYPE: ICD-10-CM

## 2018-01-01 DIAGNOSIS — L89.154 DECUBITUS ULCER OF SACRAL REGION, STAGE 4 (H): Primary | ICD-10-CM

## 2018-01-01 DIAGNOSIS — E46 PROTEIN-CALORIE MALNUTRITION, UNSPECIFIED SEVERITY (H): ICD-10-CM

## 2018-01-01 DIAGNOSIS — F43.23 ADJUSTMENT DISORDER WITH MIXED ANXIETY AND DEPRESSED MOOD: Primary | ICD-10-CM

## 2018-01-01 DIAGNOSIS — R10.84 ABDOMINAL PAIN, GENERALIZED: ICD-10-CM

## 2018-01-01 DIAGNOSIS — L03.317 CELLULITIS OF BUTTOCK, RIGHT: ICD-10-CM

## 2018-01-01 DIAGNOSIS — B36.9 FUNGAL DERMATITIS: ICD-10-CM

## 2018-01-01 DIAGNOSIS — K21.9 GASTROESOPHAGEAL REFLUX DISEASE WITHOUT ESOPHAGITIS: ICD-10-CM

## 2018-01-01 DIAGNOSIS — R78.81 BACTEREMIA: Primary | ICD-10-CM

## 2018-01-01 DIAGNOSIS — L89.314 DECUBITUS ULCER OF RIGHT BUTTOCK, STAGE 4 (H): ICD-10-CM

## 2018-01-01 DIAGNOSIS — R10.13 ABDOMINAL PAIN, EPIGASTRIC: ICD-10-CM

## 2018-01-01 DIAGNOSIS — Z93.59 SUPRAPUBIC CATHETER (H): ICD-10-CM

## 2018-01-01 DIAGNOSIS — L89.619 PRESSURE INJURY OF SKIN OF RIGHT HEEL, UNSPECIFIED INJURY STAGE: ICD-10-CM

## 2018-01-01 DIAGNOSIS — N39.0 PSEUDOMONAS URINARY TRACT INFECTION: ICD-10-CM

## 2018-01-01 DIAGNOSIS — R45.1 AGITATION: ICD-10-CM

## 2018-01-01 DIAGNOSIS — R10.2 PELVIC PAIN: ICD-10-CM

## 2018-01-01 DIAGNOSIS — L89.899 PRESSURE INJURY OF SKIN OF RIGHT FOOT, UNSPECIFIED INJURY STAGE: ICD-10-CM

## 2018-01-01 DIAGNOSIS — R63.0 ANOREXIA: ICD-10-CM

## 2018-01-01 DIAGNOSIS — F41.8 ANXIETY WITH DEPRESSION: ICD-10-CM

## 2018-01-01 DIAGNOSIS — D64.9 CHRONIC ANEMIA: ICD-10-CM

## 2018-01-01 DIAGNOSIS — I26.99 PULMONARY EMBOLISM (H): ICD-10-CM

## 2018-01-01 DIAGNOSIS — R78.81 GRAM-POSITIVE BACTEREMIA: ICD-10-CM

## 2018-01-01 DIAGNOSIS — L89.629 PRESSURE INJURY OF SKIN OF LEFT HEEL, UNSPECIFIED INJURY STAGE: ICD-10-CM

## 2018-01-01 DIAGNOSIS — J43.2 CENTRILOBULAR EMPHYSEMA (H): ICD-10-CM

## 2018-01-01 DIAGNOSIS — T85.528A DISLODGED GASTROSTOMY TUBE: ICD-10-CM

## 2018-01-01 DIAGNOSIS — M86.60 CHRONIC OSTEOMYELITIS (H): ICD-10-CM

## 2018-01-01 DIAGNOSIS — Z93.59 CHRONIC SUPRAPUBIC CATHETER (H): ICD-10-CM

## 2018-01-01 DIAGNOSIS — N39.0 UTI (URINARY TRACT INFECTION): ICD-10-CM

## 2018-01-01 DIAGNOSIS — L73.9 FOLLICULITIS: ICD-10-CM

## 2018-01-01 DIAGNOSIS — N39.0 URINARY TRACT INFECTION ASSOCIATED WITH CATHETERIZATION OF URINARY TRACT, UNSPECIFIED INDWELLING URINARY CATHETER TYPE, INITIAL ENCOUNTER (H): Primary | ICD-10-CM

## 2018-01-01 DIAGNOSIS — R11.0 NAUSEA: ICD-10-CM

## 2018-01-01 DIAGNOSIS — S31.819A WOUND OF RIGHT BUTTOCK, INITIAL ENCOUNTER: ICD-10-CM

## 2018-01-01 DIAGNOSIS — J18.9 PNEUMONIA OF LEFT LOWER LOBE DUE TO INFECTIOUS ORGANISM: Primary | ICD-10-CM

## 2018-01-01 DIAGNOSIS — Z53.29 REFUSAL OF CARE BY PATIENT: ICD-10-CM

## 2018-01-01 DIAGNOSIS — E46 MALNUTRITION, UNSPECIFIED TYPE (H): Primary | ICD-10-CM

## 2018-01-01 DIAGNOSIS — G89.29 CHRONIC PAIN: ICD-10-CM

## 2018-01-01 LAB
AEROBIC BLOOD CULTURE BOTTLE: NO GROWTH
ALBUMIN SERPL-MCNC: 1.3 G/DL (ref 3.4–5)
ALBUMIN SERPL-MCNC: 1.5 G/DL (ref 3.4–5)
ALBUMIN SERPL-MCNC: 1.6 G/DL (ref 3.4–5)
ALBUMIN SERPL-MCNC: 1.8 G/DL (ref 3.4–5)
ALBUMIN SERPL-MCNC: 1.9 G/DL (ref 3.4–5)
ALBUMIN SERPL-MCNC: 2 G/DL (ref 3.4–5)
ALBUMIN SERPL-MCNC: 2 G/DL (ref 3.5–5)
ALBUMIN SERPL-MCNC: 2.2 G/DL (ref 3.4–5)
ALBUMIN SERPL-MCNC: 2.3 G/DL (ref 3.4–5)
ALBUMIN SERPL-MCNC: 2.4 G/DL (ref 3.4–5)
ALBUMIN SERPL-MCNC: 2.5 G/DL (ref 3.5–5)
ALBUMIN SERPL-MCNC: 2.6 G/DL (ref 3.5–5)
ALBUMIN SERPL-MCNC: 2.6 G/DL (ref 3.5–5)
ALBUMIN SERPL-MCNC: 2.8 G/DL (ref 3.4–5)
ALBUMIN UR-MCNC: 10 MG/DL
ALBUMIN UR-MCNC: 30 MG/DL
ALBUMIN UR-MCNC: NEGATIVE MG/DL
ALP SERPL-CCNC: 153 U/L (ref 45–120)
ALP SERPL-CCNC: 153 U/L (ref 45–120)
ALP SERPL-CCNC: 158 U/L (ref 45–120)
ALP SERPL-CCNC: 176 U/L (ref 45–120)
ALP SERPL-CCNC: 176 U/L (ref 45–120)
ALP SERPL-CCNC: 178 U/L (ref 40–150)
ALP SERPL-CCNC: 193 U/L (ref 40–150)
ALP SERPL-CCNC: 195 U/L (ref 40–150)
ALP SERPL-CCNC: 199 U/L (ref 40–150)
ALP SERPL-CCNC: 202 U/L (ref 45–120)
ALP SERPL-CCNC: 202 U/L (ref 45–120)
ALP SERPL-CCNC: 212 U/L (ref 40–150)
ALP SERPL-CCNC: 213 U/L (ref 40–150)
ALP SERPL-CCNC: 216 U/L (ref 40–150)
ALP SERPL-CCNC: 228 U/L (ref 40–150)
ALP SERPL-CCNC: 234 U/L (ref 45–120)
ALP SERPL-CCNC: 234 U/L (ref 45–120)
ALP SERPL-CCNC: 266 U/L (ref 40–150)
ALT SERPL W P-5'-P-CCNC: 11 U/L (ref 0–50)
ALT SERPL W P-5'-P-CCNC: 12 U/L (ref 0–50)
ALT SERPL W P-5'-P-CCNC: 15 U/L (ref 0–50)
ALT SERPL W P-5'-P-CCNC: 15 U/L (ref 0–50)
ALT SERPL W P-5'-P-CCNC: 19 U/L (ref 0–50)
ALT SERPL W P-5'-P-CCNC: 20 U/L (ref 0–45)
ALT SERPL W P-5'-P-CCNC: 26 U/L (ref 0–50)
ALT SERPL W P-5'-P-CCNC: <6 U/L (ref 0–50)
ALT SERPL W P-5'-P-CCNC: <9 U/L (ref 0–45)
ALT SERPL-CCNC: 20 U/L (ref 0–45)
ALT SERPL-CCNC: <9 U/L (ref 0–45)
AMORPH CRY #/AREA URNS HPF: ABNORMAL /HPF
ANAEROBIC BLOOD CULTURE BOTTLE: NO GROWTH
ANION GAP SERPL CALCULATED.3IONS-SCNC: 10 MMOL/L (ref 3–14)
ANION GAP SERPL CALCULATED.3IONS-SCNC: 10 MMOL/L (ref 3–14)
ANION GAP SERPL CALCULATED.3IONS-SCNC: 11 MMOL/L (ref 3–14)
ANION GAP SERPL CALCULATED.3IONS-SCNC: 11 MMOL/L (ref 5–18)
ANION GAP SERPL CALCULATED.3IONS-SCNC: 11 MMOL/L (ref 5–18)
ANION GAP SERPL CALCULATED.3IONS-SCNC: 12 MMOL/L (ref 5–18)
ANION GAP SERPL CALCULATED.3IONS-SCNC: 12 MMOL/L (ref 5–18)
ANION GAP SERPL CALCULATED.3IONS-SCNC: 14 MMOL/L (ref 3–14)
ANION GAP SERPL CALCULATED.3IONS-SCNC: 2 MMOL/L (ref 3–14)
ANION GAP SERPL CALCULATED.3IONS-SCNC: 5 MMOL/L (ref 3–14)
ANION GAP SERPL CALCULATED.3IONS-SCNC: 6 MMOL/L (ref 3–14)
ANION GAP SERPL CALCULATED.3IONS-SCNC: 6 MMOL/L (ref 5–18)
ANION GAP SERPL CALCULATED.3IONS-SCNC: 7 MMOL/L (ref 3–14)
ANION GAP SERPL CALCULATED.3IONS-SCNC: 8 MMOL/L (ref 3–14)
ANION GAP SERPL CALCULATED.3IONS-SCNC: 8 MMOL/L (ref 5–18)
ANION GAP SERPL CALCULATED.3IONS-SCNC: 9 MMOL/L (ref 3–14)
ANION GAP SERPL CALCULATED.3IONS-SCNC: 9 MMOL/L (ref 5–18)
APPEARANCE UR: ABNORMAL
APPEARANCE UR: CLEAR
AST SERPL W P-5'-P-CCNC: 10 U/L (ref 0–40)
AST SERPL W P-5'-P-CCNC: 10 U/L (ref 0–45)
AST SERPL W P-5'-P-CCNC: 11 U/L (ref 0–45)
AST SERPL W P-5'-P-CCNC: 12 U/L (ref 0–40)
AST SERPL W P-5'-P-CCNC: 13 U/L (ref 0–40)
AST SERPL W P-5'-P-CCNC: 13 U/L (ref 0–45)
AST SERPL W P-5'-P-CCNC: 16 U/L (ref 0–45)
AST SERPL W P-5'-P-CCNC: 17 U/L (ref 0–45)
AST SERPL W P-5'-P-CCNC: 18 U/L (ref 0–45)
AST SERPL W P-5'-P-CCNC: 19 U/L (ref 0–45)
AST SERPL W P-5'-P-CCNC: 20 U/L (ref 0–45)
AST SERPL W P-5'-P-CCNC: 21 U/L (ref 0–45)
AST SERPL W P-5'-P-CCNC: 24 U/L (ref 0–40)
AST SERPL W P-5'-P-CCNC: 30 U/L (ref 0–45)
AST SERPL W P-5'-P-CCNC: 9 U/L (ref 0–40)
AST SERPL W P-5'-P-CCNC: 9 U/L (ref 0–45)
AST SERPL-CCNC: 12 U/L (ref 0–40)
AST SERPL-CCNC: 13 U/L (ref 0–40)
AST SERPL-CCNC: 24 U/L (ref 0–40)
AST SERPL-CCNC: 9 U/L (ref 0–40)
BACTERIA #/AREA URNS HPF: ABNORMAL /HPF
BACTERIA SPEC CULT: ABNORMAL
BACTERIA SPEC CULT: NO GROWTH
BASOPHILS # BLD AUTO: 0 10E9/L (ref 0–0.2)
BASOPHILS # BLD AUTO: 0 THOU/UL (ref 0–0.2)
BASOPHILS # BLD AUTO: 0.1 10E9/L (ref 0–0.2)
BASOPHILS # BLD AUTO: 0.1 THOU/UL (ref 0–0.2)
BASOPHILS NFR BLD AUTO: 0 % (ref 0–2)
BASOPHILS NFR BLD AUTO: 0.2 %
BASOPHILS NFR BLD AUTO: 0.3 %
BASOPHILS NFR BLD AUTO: 0.4 %
BASOPHILS NFR BLD AUTO: 0.4 %
BASOPHILS NFR BLD AUTO: 0.5 %
BASOPHILS NFR BLD AUTO: 0.5 %
BASOPHILS NFR BLD AUTO: 0.9 %
BASOPHILS NFR BLD AUTO: 1 % (ref 0–2)
BILIRUB SERPL-MCNC: 0.1 MG/DL (ref 0.2–1.3)
BILIRUB SERPL-MCNC: 0.2 MG/DL (ref 0.2–1.3)
BILIRUB SERPL-MCNC: 0.2 MG/DL (ref 0–1)
BILIRUB SERPL-MCNC: 0.3 MG/DL (ref 0.2–1.3)
BILIRUB SERPL-MCNC: 0.4 MG/DL (ref 0.2–1.3)
BILIRUB UR QL STRIP: NEGATIVE
BUN SERPL-MCNC: 10 MG/DL (ref 7–30)
BUN SERPL-MCNC: 12 MG/DL (ref 7–30)
BUN SERPL-MCNC: 12 MG/DL (ref 8–22)
BUN SERPL-MCNC: 13 MG/DL (ref 7–30)
BUN SERPL-MCNC: 14 MG/DL (ref 7–30)
BUN SERPL-MCNC: 14 MG/DL (ref 7–30)
BUN SERPL-MCNC: 15 MG/DL (ref 7–30)
BUN SERPL-MCNC: 15 MG/DL (ref 8–22)
BUN SERPL-MCNC: 18 MG/DL (ref 7–30)
BUN SERPL-MCNC: 19 MG/DL (ref 7–30)
BUN SERPL-MCNC: 20 MG/DL (ref 7–30)
BUN SERPL-MCNC: 21 MG/DL (ref 8–22)
BUN SERPL-MCNC: 21 MG/DL (ref 8–22)
BUN SERPL-MCNC: 22 MG/DL (ref 7–30)
BUN SERPL-MCNC: 22 MG/DL (ref 7–30)
BUN SERPL-MCNC: 23 MG/DL (ref 7–30)
BUN SERPL-MCNC: 26 MG/DL (ref 7–30)
BUN SERPL-MCNC: 27 MG/DL (ref 7–30)
BUN SERPL-MCNC: 4 MG/DL (ref 7–30)
BUN SERPL-MCNC: 4 MG/DL (ref 7–30)
BUN SERPL-MCNC: 5 MG/DL (ref 7–30)
BUN SERPL-MCNC: 6 MG/DL (ref 7–30)
BUN SERPL-MCNC: 7 MG/DL (ref 7–30)
BUN SERPL-MCNC: 7 MG/DL (ref 7–30)
BUN SERPL-MCNC: 8 MG/DL (ref 7–30)
BUN SERPL-MCNC: 8 MG/DL (ref 8–22)
BUN SERPL-MCNC: 8 MG/DL (ref 8–22)
BUN SERPL-MCNC: 9 MG/DL (ref 7–30)
C DIFF TOX B STL QL: NEGATIVE
C DIFF TOX B STL QL: NEGATIVE
CALCIUM SERPL-MCNC: 6.7 MG/DL (ref 8.5–10.1)
CALCIUM SERPL-MCNC: 7 MG/DL (ref 8.5–10.1)
CALCIUM SERPL-MCNC: 7.1 MG/DL (ref 8.5–10.1)
CALCIUM SERPL-MCNC: 7.6 MG/DL (ref 8.5–10.1)
CALCIUM SERPL-MCNC: 7.8 MG/DL (ref 8.5–10.1)
CALCIUM SERPL-MCNC: 7.8 MG/DL (ref 8.5–10.1)
CALCIUM SERPL-MCNC: 8 MG/DL (ref 8.5–10.1)
CALCIUM SERPL-MCNC: 8.1 MG/DL (ref 8.5–10.1)
CALCIUM SERPL-MCNC: 8.2 MG/DL (ref 8.5–10.1)
CALCIUM SERPL-MCNC: 8.3 MG/DL (ref 8.5–10.1)
CALCIUM SERPL-MCNC: 8.4 MG/DL (ref 8.5–10.1)
CALCIUM SERPL-MCNC: 8.5 MG/DL (ref 8.5–10.1)
CALCIUM SERPL-MCNC: 8.6 MG/DL (ref 8.5–10.1)
CALCIUM SERPL-MCNC: 8.6 MG/DL (ref 8.5–10.5)
CALCIUM SERPL-MCNC: 8.6 MG/DL (ref 8.5–10.5)
CALCIUM SERPL-MCNC: 8.7 MG/DL (ref 8.5–10.1)
CALCIUM SERPL-MCNC: 8.7 MG/DL (ref 8.5–10.1)
CALCIUM SERPL-MCNC: 8.7 MG/DL (ref 8.5–10.5)
CALCIUM SERPL-MCNC: 8.7 MG/DL (ref 8.5–10.5)
CALCIUM SERPL-MCNC: 8.9 MG/DL (ref 8.5–10.5)
CALCIUM SERPL-MCNC: 8.9 MG/DL (ref 8.5–10.5)
CALCIUM SERPL-MCNC: 9 MG/DL (ref 8.5–10.1)
CALCIUM SERPL-MCNC: 9 MG/DL (ref 8.5–10.1)
CALCIUM SERPL-MCNC: 9 MG/DL (ref 8.5–10.5)
CALCIUM SERPL-MCNC: 9.2 MG/DL (ref 8.5–10.5)
CALCIUM SERPL-MCNC: 9.2 MG/DL (ref 8.5–10.5)
CHLORIDE BLD-SCNC: 102 MMOL/L (ref 98–107)
CHLORIDE BLD-SCNC: 103 MMOL/L (ref 98–107)
CHLORIDE BLD-SCNC: 104 MMOL/L (ref 98–107)
CHLORIDE BLD-SCNC: 105 MMOL/L (ref 98–107)
CHLORIDE BLD-SCNC: 105 MMOL/L (ref 98–107)
CHLORIDE SERPL-SCNC: 101 MMOL/L (ref 94–109)
CHLORIDE SERPL-SCNC: 102 MMOL/L (ref 94–109)
CHLORIDE SERPL-SCNC: 102 MMOL/L (ref 94–109)
CHLORIDE SERPL-SCNC: 103 MMOL/L (ref 94–109)
CHLORIDE SERPL-SCNC: 104 MMOL/L (ref 94–109)
CHLORIDE SERPL-SCNC: 105 MMOL/L (ref 94–109)
CHLORIDE SERPL-SCNC: 106 MMOL/L (ref 94–109)
CHLORIDE SERPL-SCNC: 107 MMOL/L (ref 94–109)
CHLORIDE SERPL-SCNC: 108 MMOL/L (ref 94–109)
CHLORIDE SERPL-SCNC: 109 MMOL/L (ref 94–109)
CHLORIDE SERPL-SCNC: 110 MMOL/L (ref 94–109)
CHLORIDE SERPL-SCNC: 113 MMOL/L (ref 94–109)
CHLORIDE SERPL-SCNC: 97 MMOL/L (ref 94–109)
CHLORIDE SERPLBLD-SCNC: 102 MMOL/L (ref 98–107)
CHLORIDE SERPLBLD-SCNC: 103 MMOL/L (ref 98–107)
CHLORIDE SERPLBLD-SCNC: 104 MMOL/L (ref 98–107)
CHLORIDE SERPLBLD-SCNC: 105 MMOL/L (ref 98–107)
CLOSURE TME COLL+ADP BLD: 100 SEC
CLOSURE TME COLL+EPINEP BLD: 161 SEC
CO2 BLDCOV-SCNC: 29 MMOL/L (ref 21–28)
CO2 SERPL-SCNC: 21 MMOL/L (ref 20–32)
CO2 SERPL-SCNC: 22 MMOL/L (ref 20–32)
CO2 SERPL-SCNC: 23 MMOL/L (ref 20–32)
CO2 SERPL-SCNC: 23 MMOL/L (ref 22–31)
CO2 SERPL-SCNC: 23 MMOL/L (ref 22–31)
CO2 SERPL-SCNC: 24 MMOL/L (ref 22–31)
CO2 SERPL-SCNC: 24 MMOL/L (ref 22–31)
CO2 SERPL-SCNC: 25 MMOL/L (ref 20–32)
CO2 SERPL-SCNC: 26 MMOL/L (ref 20–32)
CO2 SERPL-SCNC: 26 MMOL/L (ref 22–31)
CO2 SERPL-SCNC: 27 MMOL/L (ref 20–32)
CO2 SERPL-SCNC: 28 MMOL/L (ref 20–32)
CO2 SERPL-SCNC: 29 MMOL/L (ref 20–32)
CO2 SERPL-SCNC: 29 MMOL/L (ref 22–31)
CO2 SERPL-SCNC: 30 MMOL/L (ref 20–32)
CO2 SERPL-SCNC: 31 MMOL/L (ref 20–32)
CO2 SERPL-SCNC: 31 MMOL/L (ref 20–32)
CO2 SERPL-SCNC: 32 MMOL/L (ref 20–32)
CO2 SERPL-SCNC: 33 MMOL/L (ref 20–32)
COLOR UR AUTO: ABNORMAL
COLOR UR AUTO: ABNORMAL
COLOR UR AUTO: YELLOW
CORTIS SERPL-MCNC: 5.4 UG/DL (ref 4–22)
CREAT SERPL-MCNC: 0.35 MG/DL (ref 0.52–1.04)
CREAT SERPL-MCNC: 0.37 MG/DL (ref 0.52–1.04)
CREAT SERPL-MCNC: 0.42 MG/DL (ref 0.52–1.04)
CREAT SERPL-MCNC: 0.42 MG/DL (ref 0.52–1.04)
CREAT SERPL-MCNC: 0.46 MG/DL (ref 0.52–1.04)
CREAT SERPL-MCNC: 0.46 MG/DL (ref 0.52–1.04)
CREAT SERPL-MCNC: 0.47 MG/DL (ref 0.52–1.04)
CREAT SERPL-MCNC: 0.47 MG/DL (ref 0.52–1.04)
CREAT SERPL-MCNC: 0.48 MG/DL (ref 0.52–1.04)
CREAT SERPL-MCNC: 0.49 MG/DL (ref 0.52–1.04)
CREAT SERPL-MCNC: 0.49 MG/DL (ref 0.52–1.04)
CREAT SERPL-MCNC: 0.5 MG/DL (ref 0.52–1.04)
CREAT SERPL-MCNC: 0.51 MG/DL (ref 0.52–1.04)
CREAT SERPL-MCNC: 0.52 MG/DL (ref 0.52–1.04)
CREAT SERPL-MCNC: 0.52 MG/DL (ref 0.52–1.04)
CREAT SERPL-MCNC: 0.54 MG/DL (ref 0.52–1.04)
CREAT SERPL-MCNC: 0.55 MG/DL (ref 0.52–1.04)
CREAT SERPL-MCNC: 0.56 MG/DL (ref 0.52–1.04)
CREAT SERPL-MCNC: 0.57 MG/DL (ref 0.52–1.04)
CREAT SERPL-MCNC: 0.58 MG/DL (ref 0.6–1.1)
CREAT SERPL-MCNC: 0.58 MG/DL (ref 0.6–1.1)
CREAT SERPL-MCNC: 0.59 MG/DL (ref 0.6–1.1)
CREAT SERPL-MCNC: 0.59 MG/DL (ref 0.6–1.1)
CREAT SERPL-MCNC: 0.6 MG/DL (ref 0.52–1.04)
CREAT SERPL-MCNC: 0.62 MG/DL (ref 0.52–1.04)
CREAT SERPL-MCNC: 0.63 MG/DL (ref 0.6–1.1)
CREAT SERPL-MCNC: 0.66 MG/DL (ref 0.6–1.1)
CREAT SERPL-MCNC: 0.66 MG/DL (ref 0.6–1.1)
CREAT SERPL-MCNC: 0.68 MG/DL (ref 0.6–1.1)
CREAT SERPL-MCNC: 0.68 MG/DL (ref 0.6–1.1)
CRP SERPL-MCNC: 31.6 MG/L (ref 0–8)
CRP SERPL-MCNC: 62 MG/L (ref 0–8)
CRP SERPL-MCNC: 66.4 MG/L (ref 0–8)
CRP SERPL-MCNC: 73.9 MG/L (ref 0–8)
CRP SERPL-MCNC: 74 MG/L (ref 0–8)
CRP SERPL-MCNC: 80 MG/L (ref 0–8)
DIFFERENTIAL METHOD BLD: ABNORMAL
DIFFERENTIAL: ABNORMAL
EOSINOPHIL # BLD AUTO: 0.3 10E9/L (ref 0–0.7)
EOSINOPHIL # BLD AUTO: 0.3 10E9/L (ref 0–0.7)
EOSINOPHIL # BLD AUTO: 0.3 THOU/UL (ref 0–0.4)
EOSINOPHIL # BLD AUTO: 0.4 10E9/L (ref 0–0.7)
EOSINOPHIL # BLD AUTO: 0.4 10E9/L (ref 0–0.7)
EOSINOPHIL # BLD AUTO: 0.4 THOU/UL (ref 0–0.4)
EOSINOPHIL # BLD AUTO: 0.5 10E9/L (ref 0–0.7)
EOSINOPHIL # BLD AUTO: 0.6 10E9/L (ref 0–0.7)
EOSINOPHIL # BLD AUTO: 0.6 THOU/UL (ref 0–0.4)
EOSINOPHIL # BLD AUTO: 1 10E9/L (ref 0–0.7)
EOSINOPHIL NFR BLD AUTO: 10.3 %
EOSINOPHIL NFR BLD AUTO: 11.8 %
EOSINOPHIL NFR BLD AUTO: 3.1 %
EOSINOPHIL NFR BLD AUTO: 4.2 %
EOSINOPHIL NFR BLD AUTO: 4.9 %
EOSINOPHIL NFR BLD AUTO: 4.9 %
EOSINOPHIL NFR BLD AUTO: 5 %
EOSINOPHIL NFR BLD AUTO: 5 % (ref 0–6)
EOSINOPHIL NFR BLD AUTO: 7 % (ref 0–6)
ERYTHROCYTE [DISTWIDTH] IN BLOOD BY AUTOMATED COUNT: 18.5 % (ref 10–15)
ERYTHROCYTE [DISTWIDTH] IN BLOOD BY AUTOMATED COUNT: 18.6 % (ref 10–15)
ERYTHROCYTE [DISTWIDTH] IN BLOOD BY AUTOMATED COUNT: 18.6 % (ref 10–15)
ERYTHROCYTE [DISTWIDTH] IN BLOOD BY AUTOMATED COUNT: 18.8 % (ref 10–15)
ERYTHROCYTE [DISTWIDTH] IN BLOOD BY AUTOMATED COUNT: 18.9 % (ref 10–15)
ERYTHROCYTE [DISTWIDTH] IN BLOOD BY AUTOMATED COUNT: 19 % (ref 10–15)
ERYTHROCYTE [DISTWIDTH] IN BLOOD BY AUTOMATED COUNT: 19 % (ref 10–15)
ERYTHROCYTE [DISTWIDTH] IN BLOOD BY AUTOMATED COUNT: 19 % (ref 11–14.5)
ERYTHROCYTE [DISTWIDTH] IN BLOOD BY AUTOMATED COUNT: 19 % (ref 11–14.5)
ERYTHROCYTE [DISTWIDTH] IN BLOOD BY AUTOMATED COUNT: 19.1 % (ref 10–15)
ERYTHROCYTE [DISTWIDTH] IN BLOOD BY AUTOMATED COUNT: 19.2 % (ref 10–15)
ERYTHROCYTE [DISTWIDTH] IN BLOOD BY AUTOMATED COUNT: 19.2 % (ref 11–14.5)
ERYTHROCYTE [DISTWIDTH] IN BLOOD BY AUTOMATED COUNT: 19.2 % (ref 11–14.5)
ERYTHROCYTE [DISTWIDTH] IN BLOOD BY AUTOMATED COUNT: 19.3 % (ref 10–15)
ERYTHROCYTE [DISTWIDTH] IN BLOOD BY AUTOMATED COUNT: 19.4 % (ref 10–15)
ERYTHROCYTE [DISTWIDTH] IN BLOOD BY AUTOMATED COUNT: 19.5 % (ref 10–15)
ERYTHROCYTE [DISTWIDTH] IN BLOOD BY AUTOMATED COUNT: 19.7 % (ref 11–14.5)
ERYTHROCYTE [DISTWIDTH] IN BLOOD BY AUTOMATED COUNT: 19.7 % (ref 11–14.5)
ERYTHROCYTE [DISTWIDTH] IN BLOOD BY AUTOMATED COUNT: 20.2 % (ref 11–14.5)
ERYTHROCYTE [DISTWIDTH] IN BLOOD BY AUTOMATED COUNT: 20.2 % (ref 11–14.5)
ERYTHROCYTE [DISTWIDTH] IN BLOOD BY AUTOMATED COUNT: 20.3 % (ref 10–15)
ERYTHROCYTE [DISTWIDTH] IN BLOOD BY AUTOMATED COUNT: 20.6 % (ref 11–14.5)
ERYTHROCYTE [SEDIMENTATION RATE] IN BLOOD BY WESTERGREN METHOD: 107 MM/H (ref 0–30)
ERYTHROCYTE [SEDIMENTATION RATE] IN BLOOD BY WESTERGREN METHOD: 64 MM/H (ref 0–30)
GFR SERPL CREATININE-BSD FRML MDRD: >60 ML/MIN/1.73M2
GFR SERPL CREATININE-BSD FRML MDRD: >90 ML/MIN/1.7M2
GLUCOSE BLD-MCNC: 102 MG/DL (ref 70–125)
GLUCOSE BLD-MCNC: 106 MG/DL (ref 70–125)
GLUCOSE BLD-MCNC: 114 MG/DL (ref 70–125)
GLUCOSE BLD-MCNC: 116 MG/DL (ref 70–125)
GLUCOSE BLD-MCNC: 79 MG/DL (ref 70–125)
GLUCOSE SERPL-MCNC: 100 MG/DL (ref 70–99)
GLUCOSE SERPL-MCNC: 101 MG/DL (ref 70–99)
GLUCOSE SERPL-MCNC: 101 MG/DL (ref 70–99)
GLUCOSE SERPL-MCNC: 102 MG/DL (ref 70–125)
GLUCOSE SERPL-MCNC: 102 MG/DL (ref 70–99)
GLUCOSE SERPL-MCNC: 106 MG/DL (ref 70–125)
GLUCOSE SERPL-MCNC: 108 MG/DL (ref 70–99)
GLUCOSE SERPL-MCNC: 109 MG/DL (ref 70–99)
GLUCOSE SERPL-MCNC: 109 MG/DL (ref 70–99)
GLUCOSE SERPL-MCNC: 111 MG/DL (ref 70–99)
GLUCOSE SERPL-MCNC: 113 MG/DL (ref 70–99)
GLUCOSE SERPL-MCNC: 114 MG/DL (ref 70–125)
GLUCOSE SERPL-MCNC: 116 MG/DL (ref 70–125)
GLUCOSE SERPL-MCNC: 117 MG/DL (ref 70–99)
GLUCOSE SERPL-MCNC: 118 MG/DL (ref 70–99)
GLUCOSE SERPL-MCNC: 123 MG/DL (ref 70–99)
GLUCOSE SERPL-MCNC: 126 MG/DL (ref 70–99)
GLUCOSE SERPL-MCNC: 127 MG/DL (ref 70–99)
GLUCOSE SERPL-MCNC: 146 MG/DL (ref 70–99)
GLUCOSE SERPL-MCNC: 261 MG/DL (ref 70–99)
GLUCOSE SERPL-MCNC: 79 MG/DL (ref 70–99)
GLUCOSE SERPL-MCNC: 82 MG/DL (ref 70–99)
GLUCOSE SERPL-MCNC: 85 MG/DL (ref 70–99)
GLUCOSE SERPL-MCNC: 87 MG/DL (ref 70–99)
GLUCOSE SERPL-MCNC: 92 MG/DL (ref 70–99)
GLUCOSE SERPL-MCNC: 93 MG/DL (ref 70–99)
GLUCOSE SERPL-MCNC: 94 MG/DL (ref 70–99)
GLUCOSE SERPL-MCNC: 95 MG/DL (ref 70–99)
GLUCOSE SERPL-MCNC: 97 MG/DL (ref 70–99)
GLUCOSE UR STRIP-MCNC: NEGATIVE MG/DL
HCT VFR BLD AUTO: 24.1 % (ref 35–47)
HCT VFR BLD AUTO: 24.3 % (ref 35–47)
HCT VFR BLD AUTO: 24.3 % (ref 35–47)
HCT VFR BLD AUTO: 25.4 % (ref 35–47)
HCT VFR BLD AUTO: 25.9 % (ref 35–47)
HCT VFR BLD AUTO: 26.1 % (ref 35–47)
HCT VFR BLD AUTO: 26.1 % (ref 35–47)
HCT VFR BLD AUTO: 26.4 % (ref 35–47)
HCT VFR BLD AUTO: 26.5 % (ref 35–47)
HCT VFR BLD AUTO: 27 % (ref 35–47)
HCT VFR BLD AUTO: 27 % (ref 35–47)
HCT VFR BLD AUTO: 27.4 % (ref 35–47)
HCT VFR BLD AUTO: 27.8 % (ref 35–47)
HCT VFR BLD AUTO: 27.9 % (ref 35–47)
HCT VFR BLD AUTO: 28.2 % (ref 35–47)
HCT VFR BLD AUTO: 28.3 % (ref 35–47)
HCT VFR BLD AUTO: 29.4 % (ref 35–47)
HCT VFR BLD AUTO: 29.4 % (ref 35–47)
HCT VFR BLD AUTO: 29.5 % (ref 35–47)
HCT VFR BLD AUTO: 31.4 % (ref 35–47)
HCT VFR BLD AUTO: 31.4 % (ref 35–47)
HCT VFR BLD AUTO: 35.7 % (ref 35–47)
HEMOGLOBIN: 7 G/DL (ref 12–16)
HEMOGLOBIN: 7 G/DL (ref 12–16)
HEMOGLOBIN: 7.7 G/DL (ref 12–16)
HEMOGLOBIN: 8.8 G/DL (ref 12–16)
HGB BLD-MCNC: 10.4 G/DL (ref 11.7–15.7)
HGB BLD-MCNC: 6.9 G/DL (ref 11.7–15.7)
HGB BLD-MCNC: 7 G/DL (ref 12–16)
HGB BLD-MCNC: 7 G/DL (ref 12–16)
HGB BLD-MCNC: 7.3 G/DL (ref 11.7–15.7)
HGB BLD-MCNC: 7.7 G/DL (ref 11.7–15.7)
HGB BLD-MCNC: 7.7 G/DL (ref 12–16)
HGB BLD-MCNC: 7.8 G/DL (ref 11.7–15.7)
HGB BLD-MCNC: 7.9 G/DL (ref 11.7–15.7)
HGB BLD-MCNC: 7.9 G/DL (ref 11.7–15.7)
HGB BLD-MCNC: 8.1 G/DL (ref 11.7–15.7)
HGB BLD-MCNC: 8.2 G/DL (ref 11.7–15.7)
HGB BLD-MCNC: 8.2 G/DL (ref 12–16)
HGB BLD-MCNC: 8.4 G/DL (ref 11.7–15.7)
HGB BLD-MCNC: 8.4 G/DL (ref 11.7–15.7)
HGB BLD-MCNC: 8.5 G/DL (ref 11.7–15.7)
HGB BLD-MCNC: 8.8 G/DL (ref 12–16)
HGB UR QL STRIP: ABNORMAL
HGB UR QL STRIP: ABNORMAL
HGB UR QL STRIP: NEGATIVE
IMM GRANULOCYTES # BLD: 0 10E9/L (ref 0–0.4)
IMM GRANULOCYTES NFR BLD: 0.2 %
IMM GRANULOCYTES NFR BLD: 0.3 %
INR PPP: 1.17 (ref 0.86–1.14)
INR PPP: 1.3 (ref 0.86–1.14)
INR PPP: 1.42 (ref 0.86–1.14)
INR PPP: 1.5 (ref 0.86–1.14)
INR PPP: 1.68 (ref 0.86–1.14)
INR PPP: 1.8 (ref 0.9–1.1)
INR PPP: 1.8 (ref 0.9–1.1)
INR PPP: 1.83 (ref 0.86–1.14)
INR PPP: 1.89 (ref 0.86–1.14)
INTERPRETATION ECG - MUSE: NORMAL
KETONES UR STRIP-MCNC: NEGATIVE MG/DL
LACTATE BLD-SCNC: 0.8 MMOL/L (ref 0.7–2)
LACTATE BLD-SCNC: 1.2 MMOL/L (ref 0.4–1.9)
LACTATE BLD-SCNC: 1.3 MMOL/L (ref 0.7–2)
LACTATE BLD-SCNC: 1.5 MMOL/L (ref 0.4–1.9)
LACTATE BLD-SCNC: 1.6 MMOL/L (ref 0.7–2)
LACTATE BLD-SCNC: 1.6 MMOL/L (ref 0.7–2.1)
LACTATE BLD-SCNC: 1.8 MMOL/L (ref 0.7–2)
LACTATE BLD-SCNC: 2.1 MMOL/L (ref 0.4–1.9)
LEUKOCYTE ESTERASE UR QL STRIP: ABNORMAL
LIPASE SERPL-CCNC: 38 U/L (ref 73–393)
LIPASE SERPL-CCNC: 40 U/L (ref 73–393)
LYMPHOCYTES # BLD AUTO: 0.9 10E9/L (ref 0.8–5.3)
LYMPHOCYTES # BLD AUTO: 1 10E9/L (ref 0.8–5.3)
LYMPHOCYTES # BLD AUTO: 1.1 10E9/L (ref 0.8–5.3)
LYMPHOCYTES # BLD AUTO: 1.1 THOU/UL (ref 0.8–4.4)
LYMPHOCYTES # BLD AUTO: 1.3 10E9/L (ref 0.8–5.3)
LYMPHOCYTES # BLD AUTO: 1.3 THOU/UL (ref 0.8–4.4)
LYMPHOCYTES # BLD AUTO: 1.3 THOU/UL (ref 0.8–4.4)
LYMPHOCYTES # BLD AUTO: 1.4 10E9/L (ref 0.8–5.3)
LYMPHOCYTES # BLD AUTO: 1.5 10E9/L (ref 0.8–5.3)
LYMPHOCYTES # BLD AUTO: 2 10E9/L (ref 0.8–5.3)
LYMPHOCYTES NFR BLD AUTO: 10.3 %
LYMPHOCYTES NFR BLD AUTO: 13 % (ref 20–40)
LYMPHOCYTES NFR BLD AUTO: 13 % (ref 20–40)
LYMPHOCYTES NFR BLD AUTO: 13.4 %
LYMPHOCYTES NFR BLD AUTO: 13.5 %
LYMPHOCYTES NFR BLD AUTO: 14.5 %
LYMPHOCYTES NFR BLD AUTO: 15 % (ref 20–40)
LYMPHOCYTES NFR BLD AUTO: 16 % (ref 20–40)
LYMPHOCYTES NFR BLD AUTO: 16.6 %
LYMPHOCYTES NFR BLD AUTO: 17 % (ref 20–40)
LYMPHOCYTES NFR BLD AUTO: 17.4 %
LYMPHOCYTES NFR BLD AUTO: 24.7 %
Lab: ABNORMAL
Lab: NORMAL
MAGNESIUM SERPL-MCNC: 1.7 MG/DL (ref 1.6–2.3)
MAGNESIUM SERPL-MCNC: 1.8 MG/DL (ref 1.6–2.3)
MAGNESIUM SERPL-MCNC: 1.9 MG/DL (ref 1.6–2.3)
MAGNESIUM SERPL-MCNC: 2 MG/DL (ref 1.6–2.3)
MAGNESIUM SERPL-MCNC: 2.1 MG/DL (ref 1.6–2.3)
MAGNESIUM SERPL-MCNC: 2.2 MG/DL (ref 1.6–2.3)
MAGNESIUM SERPL-MCNC: 2.3 MG/DL (ref 1.6–2.3)
MAGNESIUM SERPL-MCNC: 2.3 MG/DL (ref 1.6–2.3)
MAGNESIUM SERPL-MCNC: 2.4 MG/DL (ref 1.6–2.3)
MAGNESIUM SERPL-MCNC: 2.4 MG/DL (ref 1.6–2.3)
MCH RBC QN AUTO: 21.1 PG (ref 26.5–33)
MCH RBC QN AUTO: 21.5 PG (ref 27–34)
MCH RBC QN AUTO: 21.8 PG (ref 27–34)
MCH RBC QN AUTO: 21.9 PG (ref 27–34)
MCH RBC QN AUTO: 22.8 PG (ref 27–34)
MCH RBC QN AUTO: 22.8 PG (ref 27–34)
MCH RBC QN AUTO: 23.1 PG (ref 26.5–33)
MCH RBC QN AUTO: 23.3 PG (ref 27–34)
MCH RBC QN AUTO: 23.3 PG (ref 27–34)
MCH RBC QN AUTO: 23.5 PG (ref 26.5–33)
MCH RBC QN AUTO: 23.6 PG (ref 26.5–33)
MCH RBC QN AUTO: 23.7 PG (ref 26.5–33)
MCH RBC QN AUTO: 23.9 PG (ref 26.5–33)
MCH RBC QN AUTO: 23.9 PG (ref 26.5–33)
MCH RBC QN AUTO: 24 PG (ref 26.5–33)
MCH RBC QN AUTO: 24 PG (ref 26.5–33)
MCH RBC QN AUTO: 24.4 PG (ref 26.5–33)
MCH RBC QN AUTO: 24.5 PG (ref 26.5–33)
MCH RBC QN AUTO: 24.9 PG (ref 26.5–33)
MCH RBC QN AUTO: 25.2 PG (ref 26.5–33)
MCH RBC QN AUTO: 25.2 PG (ref 27–34)
MCH RBC QN AUTO: 25.2 PG (ref 27–34)
MCHC RBC AUTO-ENTMCNC: 27.6 G/DL (ref 32–36)
MCHC RBC AUTO-ENTMCNC: 27.6 G/DL (ref 32–36)
MCHC RBC AUTO-ENTMCNC: 27.8 G/DL (ref 32–36)
MCHC RBC AUTO-ENTMCNC: 28 G/DL (ref 32–36)
MCHC RBC AUTO-ENTMCNC: 28 G/DL (ref 32–36)
MCHC RBC AUTO-ENTMCNC: 28.1 G/DL (ref 31.5–36.5)
MCHC RBC AUTO-ENTMCNC: 28.2 G/DL (ref 31.5–36.5)
MCHC RBC AUTO-ENTMCNC: 28.3 G/DL (ref 31.5–36.5)
MCHC RBC AUTO-ENTMCNC: 28.5 G/DL (ref 31.5–36.5)
MCHC RBC AUTO-ENTMCNC: 28.6 G/DL (ref 31.5–36.5)
MCHC RBC AUTO-ENTMCNC: 28.6 G/DL (ref 31.5–36.5)
MCHC RBC AUTO-ENTMCNC: 28.7 G/DL (ref 31.5–36.5)
MCHC RBC AUTO-ENTMCNC: 28.8 G/DL (ref 32–36)
MCHC RBC AUTO-ENTMCNC: 28.8 G/DL (ref 32–36)
MCHC RBC AUTO-ENTMCNC: 28.9 G/DL (ref 31.5–36.5)
MCHC RBC AUTO-ENTMCNC: 29 G/DL (ref 31.5–36.5)
MCHC RBC AUTO-ENTMCNC: 29.1 G/DL (ref 31.5–36.5)
MCHC RBC AUTO-ENTMCNC: 29.3 G/DL (ref 31.5–36.5)
MCHC RBC AUTO-ENTMCNC: 29.5 G/DL (ref 32–36)
MCHC RBC AUTO-ENTMCNC: 29.5 G/DL (ref 32–36)
MCHC RBC AUTO-ENTMCNC: 29.6 G/DL (ref 31.5–36.5)
MCHC RBC AUTO-ENTMCNC: 30.6 G/DL (ref 31.5–36.5)
MCV RBC AUTO: 73 FL (ref 78–100)
MCV RBC AUTO: 77 FL (ref 80–100)
MCV RBC AUTO: 78 FL (ref 80–100)
MCV RBC AUTO: 78 FL (ref 80–100)
MCV RBC AUTO: 81 FL (ref 78–100)
MCV RBC AUTO: 81 FL (ref 80–100)
MCV RBC AUTO: 81 FL (ref 80–100)
MCV RBC AUTO: 82 FL (ref 78–100)
MCV RBC AUTO: 83 FL (ref 78–100)
MCV RBC AUTO: 83 FL (ref 80–100)
MCV RBC AUTO: 83 FL (ref 80–100)
MCV RBC AUTO: 84 FL (ref 78–100)
MCV RBC AUTO: 85 FL (ref 78–100)
MCV RBC AUTO: 85 FL (ref 78–100)
MCV RBC AUTO: 86 FL (ref 80–100)
MCV RBC AUTO: 86 FL (ref 80–100)
MONOCYTES # BLD AUTO: 0.3 10E9/L (ref 0–1.3)
MONOCYTES # BLD AUTO: 0.4 10E9/L (ref 0–1.3)
MONOCYTES # BLD AUTO: 0.4 10E9/L (ref 0–1.3)
MONOCYTES # BLD AUTO: 0.4 THOU/UL (ref 0–0.9)
MONOCYTES # BLD AUTO: 0.5 10E9/L (ref 0–1.3)
MONOCYTES # BLD AUTO: 0.5 THOU/UL (ref 0–0.9)
MONOCYTES # BLD AUTO: 0.5 THOU/UL (ref 0–0.9)
MONOCYTES # BLD AUTO: 0.6 10E9/L (ref 0–1.3)
MONOCYTES # BLD AUTO: 0.6 10E9/L (ref 0–1.3)
MONOCYTES # BLD AUTO: 0.6 THOU/UL (ref 0–0.9)
MONOCYTES # BLD AUTO: 0.6 THOU/UL (ref 0–0.9)
MONOCYTES # BLD AUTO: 0.8 10E9/L (ref 0–1.3)
MONOCYTES NFR BLD AUTO: 3.8 %
MONOCYTES NFR BLD AUTO: 5.1 %
MONOCYTES NFR BLD AUTO: 5.6 %
MONOCYTES NFR BLD AUTO: 5.9 %
MONOCYTES NFR BLD AUTO: 5.9 %
MONOCYTES NFR BLD AUTO: 6 % (ref 2–10)
MONOCYTES NFR BLD AUTO: 7.8 %
MONOCYTES NFR BLD AUTO: 8 % (ref 2–10)
MONOCYTES NFR BLD AUTO: 9.1 %
MUCOUS THREADS #/AREA URNS LPF: PRESENT /LPF
MUCOUS THREADS #/AREA URNS LPF: PRESENT /LPF
NEUTROPHILS # BLD AUTO: 2 10E9/L (ref 1.6–8.3)
NEUTROPHILS # BLD AUTO: 5.3 THOU/UL (ref 2–7.7)
NEUTROPHILS # BLD AUTO: 5.5 THOU/UL (ref 2–7.7)
NEUTROPHILS # BLD AUTO: 5.6 10E9/L (ref 1.6–8.3)
NEUTROPHILS # BLD AUTO: 6 THOU/UL (ref 2–7.7)
NEUTROPHILS # BLD AUTO: 6.1 10E9/L (ref 1.6–8.3)
NEUTROPHILS # BLD AUTO: 6.2 THOU/UL (ref 2–7.7)
NEUTROPHILS # BLD AUTO: 6.3 THOU/UL (ref 2–7.7)
NEUTROPHILS # BLD AUTO: 7 10E9/L (ref 1.6–8.3)
NEUTROPHILS # BLD AUTO: 7.1 10E9/L (ref 1.6–8.3)
NEUTROPHILS # BLD AUTO: 7.7 10E9/L (ref 1.6–8.3)
NEUTROPHILS # BLD AUTO: 8.9 10E9/L (ref 1.6–8.3)
NEUTROPHILS NFR BLD AUTO: 53.6 %
NEUTROPHILS NFR BLD AUTO: 70.7 %
NEUTROPHILS NFR BLD AUTO: 72 % (ref 50–70)
NEUTROPHILS NFR BLD AUTO: 72.1 %
NEUTROPHILS NFR BLD AUTO: 73 % (ref 50–70)
NEUTROPHILS NFR BLD AUTO: 74 %
NEUTROPHILS NFR BLD AUTO: 75.2 %
NEUTROPHILS NFR BLD AUTO: 75.8 %
NEUTROPHILS NFR BLD AUTO: 76 % (ref 50–70)
NEUTROPHILS NFR BLD AUTO: 76.1 %
NITRATE UR QL: NEGATIVE
NITRATE UR QL: POSITIVE
NITRATE UR QL: POSITIVE
NRBC # BLD AUTO: 0 10*3/UL
NRBC BLD AUTO-RTO: 0 /100
NT-PROBNP SERPL-MCNC: 349 PG/ML (ref 0–900)
NT-PROBNP SERPL-MCNC: 353 PG/ML (ref 0–900)
NT-PROBNP SERPL-MCNC: 708 PG/ML (ref 0–900)
OVALOCYTES: ABNORMAL
OVALOCYTES: ABNORMAL
PCO2 BLDV: 48 MM HG (ref 40–50)
PH BLDV: 7.38 PH (ref 7.32–7.43)
PH UR STRIP: 7 PH (ref 5–7)
PH UR STRIP: 7.5 PH (ref 5–7)
PH UR STRIP: 8 PH (ref 5–7)
PH UR STRIP: 8.5 PH (ref 5–7)
PH UR STRIP: 8.5 PH (ref 5–7)
PHOSPHATE SERPL-MCNC: 2.5 MG/DL (ref 2.5–4.5)
PHOSPHATE SERPL-MCNC: 3.3 MG/DL (ref 2.5–4.5)
PHOSPHATE SERPL-MCNC: 3.4 MG/DL (ref 2.5–4.5)
PHOSPHATE SERPL-MCNC: 3.6 MG/DL (ref 2.5–4.5)
PHOSPHATE SERPL-MCNC: 3.6 MG/DL (ref 2.5–4.5)
PHOSPHATE SERPL-MCNC: 3.8 MG/DL (ref 2.5–4.5)
PHOSPHATE SERPL-MCNC: 3.9 MG/DL (ref 2.5–4.5)
PHOSPHATE SERPL-MCNC: 4 MG/DL (ref 2.5–4.5)
PHOSPHATE SERPL-MCNC: 4.3 MG/DL (ref 2.5–4.5)
PHOSPHATE SERPL-MCNC: 4.3 MG/DL (ref 2.5–4.5)
PHOSPHATE SERPL-MCNC: 4.4 MG/DL (ref 2.5–4.5)
PHOSPHATE SERPL-MCNC: 4.7 MG/DL (ref 2.5–4.5)
PHOSPHATE SERPL-MCNC: 4.9 MG/DL (ref 2.5–4.5)
PLAT MORPH BLD: ABNORMAL
PLAT MORPH BLD: ABNORMAL
PLATELET # BLD AUTO: 207 10E9/L (ref 150–450)
PLATELET # BLD AUTO: 212 10E9/L (ref 150–450)
PLATELET # BLD AUTO: 248 10E9/L (ref 150–450)
PLATELET # BLD AUTO: 256 10E9/L (ref 150–450)
PLATELET # BLD AUTO: 285 10E9/L (ref 150–450)
PLATELET # BLD AUTO: 300 10E9/L (ref 150–450)
PLATELET # BLD AUTO: 365 10E9/L (ref 150–450)
PLATELET # BLD AUTO: 367 THOU/UL (ref 140–440)
PLATELET # BLD AUTO: 367 THOU/UL (ref 140–440)
PLATELET # BLD AUTO: 409 10E9/L (ref 150–450)
PLATELET # BLD AUTO: 430 10E9/L (ref 150–450)
PLATELET # BLD AUTO: 433 THOU/UL (ref 140–440)
PLATELET # BLD AUTO: 433 THOU/UL (ref 140–440)
PLATELET # BLD AUTO: 457 10E9/L (ref 150–450)
PLATELET # BLD AUTO: 463 THOU/UL (ref 140–440)
PLATELET # BLD AUTO: 463 THOU/UL (ref 140–440)
PLATELET # BLD AUTO: 470 10E9/L (ref 150–450)
PLATELET # BLD AUTO: 474 10E9/L (ref 150–450)
PLATELET # BLD AUTO: 486 THOU/UL (ref 140–440)
PLATELET # BLD AUTO: 519 10E9/L (ref 150–450)
PLATELET # BLD AUTO: 549 THOU/UL (ref 140–440)
PLATELET # BLD AUTO: 549 THOU/UL (ref 140–440)
PLATELET # BLD EST: ABNORMAL 10*3/UL
PLATELET # BLD EST: ABNORMAL 10*3/UL
PLATELET FUNCTION ASA: 584 ARU
PMV BLD AUTO: 10.3 FL (ref 8.5–12.5)
PMV BLD AUTO: 10.3 FL (ref 8.5–12.5)
PMV BLD AUTO: 10.7 FL (ref 8.5–12.5)
PMV BLD AUTO: 10.8 FL (ref 8.5–12.5)
PMV BLD AUTO: 11.6 FL (ref 8.5–12.5)
PO2 BLDV: 29 MM HG (ref 25–47)
POLYCHROMASIA BLD QL SMEAR: ABNORMAL
POLYCHROMASIA BLD QL SMEAR: ABNORMAL
POTASSIUM BLD-SCNC: 3.8 MMOL/L (ref 3.5–5)
POTASSIUM BLD-SCNC: 4.1 MMOL/L (ref 3.5–5)
POTASSIUM BLD-SCNC: 4.1 MMOL/L (ref 3.5–5)
POTASSIUM BLD-SCNC: 4.3 MMOL/L (ref 3.5–5)
POTASSIUM BLD-SCNC: 4.4 MMOL/L (ref 3.5–5)
POTASSIUM SERPL-SCNC: 3.1 MMOL/L (ref 3.4–5.3)
POTASSIUM SERPL-SCNC: 3.1 MMOL/L (ref 3.4–5.3)
POTASSIUM SERPL-SCNC: 3.4 MMOL/L (ref 3.4–5.3)
POTASSIUM SERPL-SCNC: 3.4 MMOL/L (ref 3.4–5.3)
POTASSIUM SERPL-SCNC: 3.5 MMOL/L (ref 3.4–5.3)
POTASSIUM SERPL-SCNC: 3.5 MMOL/L (ref 3.4–5.3)
POTASSIUM SERPL-SCNC: 3.7 MMOL/L (ref 3.4–5.3)
POTASSIUM SERPL-SCNC: 3.8 MMOL/L (ref 3.4–5.3)
POTASSIUM SERPL-SCNC: 3.8 MMOL/L (ref 3.4–5.3)
POTASSIUM SERPL-SCNC: 3.8 MMOL/L (ref 3.5–5)
POTASSIUM SERPL-SCNC: 3.9 MMOL/L (ref 3.4–5.3)
POTASSIUM SERPL-SCNC: 4 MMOL/L (ref 3.4–5.3)
POTASSIUM SERPL-SCNC: 4.1 MMOL/L (ref 3.4–5.3)
POTASSIUM SERPL-SCNC: 4.1 MMOL/L (ref 3.5–5)
POTASSIUM SERPL-SCNC: 4.2 MMOL/L (ref 3.4–5.3)
POTASSIUM SERPL-SCNC: 4.3 MMOL/L (ref 3.5–5)
POTASSIUM SERPL-SCNC: 4.4 MMOL/L (ref 3.4–5.3)
POTASSIUM SERPL-SCNC: 4.4 MMOL/L (ref 3.4–5.3)
POTASSIUM SERPL-SCNC: 4.4 MMOL/L (ref 3.5–5)
POTASSIUM SERPL-SCNC: 4.5 MMOL/L (ref 3.4–5.3)
POTASSIUM SERPL-SCNC: 4.5 MMOL/L (ref 3.4–5.3)
POTASSIUM SERPL-SCNC: 4.6 MMOL/L (ref 3.4–5.3)
POTASSIUM SERPL-SCNC: 5.7 MMOL/L (ref 3.4–5.3)
PREALB SERPL IA-MCNC: 12 MG/DL (ref 15–45)
PREALB SERPL IA-MCNC: 13 MG/DL (ref 15–45)
PREALB SERPL IA-MCNC: 19 MG/DL (ref 15–45)
PREALB SERPL IA-MCNC: 20 MG/DL (ref 15–45)
PREALB SERPL IA-MCNC: 6 MG/DL (ref 15–45)
PREALB SERPL IA-MCNC: 8 MG/DL (ref 15–45)
PROCALCITONIN SERPL-MCNC: <0.05 NG/ML
PROT SERPL-MCNC: 6 G/DL (ref 6.8–8.8)
PROT SERPL-MCNC: 6.8 G/DL (ref 6.8–8.8)
PROT SERPL-MCNC: 6.9 G/DL (ref 6.8–8.8)
PROT SERPL-MCNC: 7.2 G/DL (ref 6–8)
PROT SERPL-MCNC: 7.2 G/DL (ref 6–8)
PROT SERPL-MCNC: 7.4 G/DL (ref 6.8–8.8)
PROT SERPL-MCNC: 7.4 G/DL (ref 6–8)
PROT SERPL-MCNC: 7.4 G/DL (ref 6–8)
PROT SERPL-MCNC: 7.7 G/DL (ref 6.8–8.8)
PROT SERPL-MCNC: 7.8 G/DL (ref 6.8–8.8)
PROT SERPL-MCNC: 7.9 G/DL (ref 6.8–8.8)
PROT SERPL-MCNC: 8 G/DL (ref 6–8)
PROT SERPL-MCNC: 8 G/DL (ref 6–8)
PROT SERPL-MCNC: 8.2 G/DL (ref 6–8)
PROT SERPL-MCNC: 8.3 G/DL (ref 6.8–8.8)
PROT SERPL-MCNC: 8.8 G/DL (ref 6.8–8.8)
PROT SERPL-MCNC: 8.8 G/DL (ref 6.8–8.8)
PROT SERPL-MCNC: 8.9 G/DL (ref 6–8)
PROT SERPL-MCNC: 8.9 G/DL (ref 6–8)
PROT SERPL-MCNC: 9 G/DL (ref 6.8–8.8)
RBC # BLD AUTO: 2.99 10E12/L (ref 3.8–5.2)
RBC # BLD AUTO: 3 MILL/UL (ref 3.8–5.4)
RBC # BLD AUTO: 3 MILL/UL (ref 3.8–5.4)
RBC # BLD AUTO: 3.05 MILL/UL (ref 3.8–5.4)
RBC # BLD AUTO: 3.05 MILL/UL (ref 3.8–5.4)
RBC # BLD AUTO: 3.07 MILL/UL (ref 3.8–5.4)
RBC # BLD AUTO: 3.07 MILL/UL (ref 3.8–5.4)
RBC # BLD AUTO: 3.1 10E12/L (ref 3.8–5.2)
RBC # BLD AUTO: 3.22 10E12/L (ref 3.8–5.2)
RBC # BLD AUTO: 3.23 10E12/L (ref 3.8–5.2)
RBC # BLD AUTO: 3.26 10E12/L (ref 3.8–5.2)
RBC # BLD AUTO: 3.29 10E12/L (ref 3.8–5.2)
RBC # BLD AUTO: 3.31 10E12/L (ref 3.8–5.2)
RBC # BLD AUTO: 3.32 10E12/L (ref 3.8–5.2)
RBC # BLD AUTO: 3.37 10E12/L (ref 3.8–5.2)
RBC # BLD AUTO: 3.42 10E12/L (ref 3.8–5.2)
RBC # BLD AUTO: 3.52 10E12/L (ref 3.8–5.2)
RBC # BLD AUTO: 3.82 MILL/UL (ref 3.8–5.4)
RBC # BLD AUTO: 4.02 10E12/L (ref 3.8–5.2)
RBC # BLD AUTO: 4.02 MILL/UL (ref 3.6–5.4)
RBC # BLD AUTO: 4.02 MILL/UL (ref 3.8–5.4)
RBC # BLD AUTO: 4.18 10E12/L (ref 3.8–5.2)
RBC #/AREA URNS AUTO: 1 /HPF (ref 0–2)
RBC #/AREA URNS AUTO: 12 /HPF (ref 0–2)
RBC #/AREA URNS AUTO: 14 /HPF (ref 0–2)
RBC #/AREA URNS AUTO: 3 /HPF (ref 0–2)
RBC #/AREA URNS AUTO: >182 /HPF (ref 0–2)
RENAL EPI CELLS #/AREA URNS HPF: 1 /HPF
SAO2 % BLDV FROM PO2: 52 %
SODIUM SERPL-SCNC: 133 MMOL/L (ref 133–144)
SODIUM SERPL-SCNC: 136 MMOL/L (ref 133–144)
SODIUM SERPL-SCNC: 137 MMOL/L (ref 133–144)
SODIUM SERPL-SCNC: 138 MMOL/L (ref 133–144)
SODIUM SERPL-SCNC: 138 MMOL/L (ref 136–145)
SODIUM SERPL-SCNC: 139 MMOL/L (ref 133–144)
SODIUM SERPL-SCNC: 140 MMOL/L (ref 133–144)
SODIUM SERPL-SCNC: 140 MMOL/L (ref 136–145)
SODIUM SERPL-SCNC: 141 MMOL/L (ref 133–144)
SODIUM SERPL-SCNC: 143 MMOL/L (ref 133–144)
SODIUM SERPL-SCNC: 143 MMOL/L (ref 133–144)
SODIUM SERPL-SCNC: 144 MMOL/L (ref 133–144)
SODIUM SERPL-SCNC: 145 MMOL/L (ref 133–144)
SOURCE: ABNORMAL
SP GR UR STRIP: 1 (ref 1–1.03)
SP GR UR STRIP: 1 (ref 1–1.03)
SP GR UR STRIP: 1.01 (ref 1–1.03)
SPECIMEN SOURCE: ABNORMAL
SPECIMEN SOURCE: NORMAL
SQUAMOUS #/AREA URNS AUTO: 1 /HPF (ref 0–1)
SQUAMOUS #/AREA URNS AUTO: 3 /HPF (ref 0–1)
SQUAMOUS #/AREA URNS AUTO: <1 /HPF (ref 0–1)
SQUAMOUS #/AREA URNS AUTO: <1 /HPF (ref 0–1)
T4 FREE SERPL-MCNC: 0.76 NG/DL (ref 0.76–1.46)
TEAR DROP: ABNORMAL
TRANS CELLS #/AREA URNS HPF: <1 /HPF (ref 0–1)
TRIGL SERPL-MCNC: 1080 MG/DL
TRIGL SERPL-MCNC: 172 MG/DL
TRIGL SERPL-MCNC: 247 MG/DL
TRIGL SERPL-MCNC: 285 MG/DL
TRIGL SERPL-MCNC: 317 MG/DL
TRIGL SERPL-MCNC: 401 MG/DL
TROPONIN I SERPL-MCNC: <0.015 UG/L (ref 0–0.04)
TSH SERPL DL<=0.005 MIU/L-ACNC: 20.39 UIU/ML (ref 0.3–5)
TSH SERPL DL<=0.005 MIU/L-ACNC: 23.91 UIU/ML (ref 0.3–5)
TSH SERPL DL<=0.005 MIU/L-ACNC: 29.15 UIU/ML (ref 0.3–5)
TSH SERPL DL<=0.005 MIU/L-ACNC: 32.78 UIU/ML (ref 0.3–5)
TSH SERPL DL<=0.005 MIU/L-ACNC: 49.56 MU/L (ref 0.4–4)
TSH SERPL DL<=0.005 MIU/L-ACNC: 59.47 MU/L (ref 0.4–4)
TSH SERPL-ACNC: 23.91 UIU/ML (ref 0.3–5)
TSH SERPL-ACNC: 29.15 UIU/ML (ref 0.3–5)
TSH SERPL-ACNC: 32.78 UIU/ML (ref 0.3–5)
URNS CMNT MICRO: ABNORMAL
UROBILINOGEN UR STRIP-MCNC: 0.2 MG/DL (ref 0–2)
UROBILINOGEN UR STRIP-MCNC: NORMAL MG/DL (ref 0–2)
VANCOMYCIN SERPL-MCNC: 23.2 MG/L
WBC # BLD AUTO: 10 10E9/L (ref 4–11)
WBC # BLD AUTO: 10.1 10E9/L (ref 4–11)
WBC # BLD AUTO: 12 10E9/L (ref 4–11)
WBC # BLD AUTO: 3.2 10E9/L (ref 4–11)
WBC # BLD AUTO: 3.7 10E9/L (ref 4–11)
WBC # BLD AUTO: 3.8 10E9/L (ref 4–11)
WBC # BLD AUTO: 5.1 10E9/L (ref 4–11)
WBC # BLD AUTO: 6.2 10E9/L (ref 4–11)
WBC # BLD AUTO: 6.9 10E9/L (ref 4–11)
WBC # BLD AUTO: 7.3 THOU/UL (ref 4–11)
WBC # BLD AUTO: 7.7 THOU/UL (ref 4–11)
WBC # BLD AUTO: 7.8 10E9/L (ref 4–11)
WBC # BLD AUTO: 8 10E9/L (ref 4–11)
WBC # BLD AUTO: 8.3 THOU/UL (ref 4–11)
WBC # BLD AUTO: 8.6 THOU/UL (ref 4–11)
WBC # BLD AUTO: 9.2 10E9/L (ref 4–11)
WBC # BLD AUTO: 9.3 10E9/L (ref 4–11)
WBC #/AREA URNS AUTO: 1 /HPF (ref 0–5)
WBC #/AREA URNS AUTO: 10 /HPF (ref 0–5)
WBC #/AREA URNS AUTO: 10 /HPF (ref 0–5)
WBC #/AREA URNS AUTO: 360 /HPF (ref 0–5)
WBC #/AREA URNS AUTO: 52 /HPF (ref 0–5)
WBC CLUMPS #/AREA URNS HPF: PRESENT /HPF
WBC CLUMPS #/AREA URNS HPF: PRESENT /HPF
WBC: 7.3 THOU/UL (ref 4–11)
WBC: 7.7 THOU/UL (ref 4–11)
WBC: 8.2 THOU/UL (ref 4–11)
WBC: 8.3 THOU/UL (ref 4–11)
WBC: 8.6 THOU/UL (ref 4–11)
YEAST #/AREA URNS HPF: ABNORMAL /HPF

## 2018-01-01 PROCEDURE — 25000132 ZZH RX MED GY IP 250 OP 250 PS 637: Mod: GY | Performed by: INTERNAL MEDICINE

## 2018-01-01 PROCEDURE — 40000556 ZZH STATISTIC PERIPHERAL IV START W US GUIDANCE

## 2018-01-01 PROCEDURE — A9270 NON-COVERED ITEM OR SERVICE: HCPCS | Mod: GY | Performed by: INTERNAL MEDICINE

## 2018-01-01 PROCEDURE — 25000128 H RX IP 250 OP 636: Performed by: NURSE PRACTITIONER

## 2018-01-01 PROCEDURE — 84443 ASSAY THYROID STIM HORMONE: CPT | Performed by: INTERNAL MEDICINE

## 2018-01-01 PROCEDURE — 25000128 H RX IP 250 OP 636: Performed by: INTERNAL MEDICINE

## 2018-01-01 PROCEDURE — 25000132 ZZH RX MED GY IP 250 OP 250 PS 637: Mod: GY | Performed by: EMERGENCY MEDICINE

## 2018-01-01 PROCEDURE — A9270 NON-COVERED ITEM OR SERVICE: HCPCS | Mod: GY | Performed by: HOSPITALIST

## 2018-01-01 PROCEDURE — 12000001 ZZH R&B MED SURG/OB UMMC

## 2018-01-01 PROCEDURE — 99232 SBSQ HOSP IP/OBS MODERATE 35: CPT | Performed by: NURSE PRACTITIONER

## 2018-01-01 PROCEDURE — 74018 RADEX ABDOMEN 1 VIEW: CPT

## 2018-01-01 PROCEDURE — 83605 ASSAY OF LACTIC ACID: CPT | Performed by: EMERGENCY MEDICINE

## 2018-01-01 PROCEDURE — 99308 SBSQ NF CARE LOW MDM 20: CPT | Performed by: NURSE PRACTITIONER

## 2018-01-01 PROCEDURE — 83735 ASSAY OF MAGNESIUM: CPT | Performed by: HOSPITALIST

## 2018-01-01 PROCEDURE — 86140 C-REACTIVE PROTEIN: CPT | Performed by: NURSE PRACTITIONER

## 2018-01-01 PROCEDURE — 87186 SC STD MICRODIL/AGAR DIL: CPT | Performed by: EMERGENCY MEDICINE

## 2018-01-01 PROCEDURE — 36415 COLL VENOUS BLD VENIPUNCTURE: CPT | Performed by: INTERNAL MEDICINE

## 2018-01-01 PROCEDURE — 85018 HEMOGLOBIN: CPT | Performed by: INTERNAL MEDICINE

## 2018-01-01 PROCEDURE — 40000065 ZZH STATISTIC EKG NON-CHARGEABLE: Performed by: EMERGENCY MEDICINE

## 2018-01-01 PROCEDURE — 40000275 ZZH STATISTIC RCP TIME EA 10 MIN

## 2018-01-01 PROCEDURE — 94640 AIRWAY INHALATION TREATMENT: CPT | Mod: 76

## 2018-01-01 PROCEDURE — 27210429 ZZH NUTRITION PRODUCT INTERMEDIATE LITER

## 2018-01-01 PROCEDURE — 25000125 ZZHC RX 250: Performed by: INTERNAL MEDICINE

## 2018-01-01 PROCEDURE — 85027 COMPLETE CBC AUTOMATED: CPT | Performed by: INTERNAL MEDICINE

## 2018-01-01 PROCEDURE — 82040 ASSAY OF SERUM ALBUMIN: CPT | Performed by: SURGERY

## 2018-01-01 PROCEDURE — 80048 BASIC METABOLIC PNL TOTAL CA: CPT | Performed by: INTERNAL MEDICINE

## 2018-01-01 PROCEDURE — 80053 COMPREHEN METABOLIC PANEL: CPT | Performed by: EMERGENCY MEDICINE

## 2018-01-01 PROCEDURE — 84100 ASSAY OF PHOSPHORUS: CPT | Performed by: NURSE PRACTITIONER

## 2018-01-01 PROCEDURE — 87088 URINE BACTERIA CULTURE: CPT | Performed by: EMERGENCY MEDICINE

## 2018-01-01 PROCEDURE — 25000132 ZZH RX MED GY IP 250 OP 250 PS 637: Mod: GY | Performed by: HOSPITALIST

## 2018-01-01 PROCEDURE — 99316 NF DSCHRG MGMT 30 MIN+: CPT | Performed by: NURSE PRACTITIONER

## 2018-01-01 PROCEDURE — 87077 CULTURE AEROBIC IDENTIFY: CPT | Performed by: EMERGENCY MEDICINE

## 2018-01-01 PROCEDURE — 99232 SBSQ HOSP IP/OBS MODERATE 35: CPT | Performed by: INTERNAL MEDICINE

## 2018-01-01 PROCEDURE — A9270 NON-COVERED ITEM OR SERVICE: HCPCS | Mod: GY | Performed by: UROLOGY

## 2018-01-01 PROCEDURE — 83880 ASSAY OF NATRIURETIC PEPTIDE: CPT | Performed by: INTERNAL MEDICINE

## 2018-01-01 PROCEDURE — 99285 EMERGENCY DEPT VISIT HI MDM: CPT | Mod: 25 | Performed by: EMERGENCY MEDICINE

## 2018-01-01 PROCEDURE — 85610 PROTHROMBIN TIME: CPT | Performed by: INTERNAL MEDICINE

## 2018-01-01 PROCEDURE — 85027 COMPLETE CBC AUTOMATED: CPT | Performed by: NURSE PRACTITIONER

## 2018-01-01 PROCEDURE — 36415 COLL VENOUS BLD VENIPUNCTURE: CPT | Performed by: EMERGENCY MEDICINE

## 2018-01-01 PROCEDURE — 99239 HOSP IP/OBS DSCHRG MGMT >30: CPT | Performed by: NURSE PRACTITIONER

## 2018-01-01 PROCEDURE — 40000986 XR CHEST PORT 1 VW

## 2018-01-01 PROCEDURE — 80048 BASIC METABOLIC PNL TOTAL CA: CPT | Performed by: HOSPITALIST

## 2018-01-01 PROCEDURE — 80053 COMPREHEN METABOLIC PANEL: CPT | Performed by: HOSPITALIST

## 2018-01-01 PROCEDURE — 81001 URINALYSIS AUTO W/SCOPE: CPT | Performed by: EMERGENCY MEDICINE

## 2018-01-01 PROCEDURE — 99285 EMERGENCY DEPT VISIT HI MDM: CPT | Mod: 25

## 2018-01-01 PROCEDURE — 99233 SBSQ HOSP IP/OBS HIGH 50: CPT | Performed by: INTERNAL MEDICINE

## 2018-01-01 PROCEDURE — 82803 BLOOD GASES ANY COMBINATION: CPT

## 2018-01-01 PROCEDURE — 99310 SBSQ NF CARE HIGH MDM 45: CPT | Performed by: NURSE PRACTITIONER

## 2018-01-01 PROCEDURE — 96375 TX/PRO/DX INJ NEW DRUG ADDON: CPT | Performed by: EMERGENCY MEDICINE

## 2018-01-01 PROCEDURE — 83735 ASSAY OF MAGNESIUM: CPT | Performed by: INTERNAL MEDICINE

## 2018-01-01 PROCEDURE — C1769 GUIDE WIRE: HCPCS

## 2018-01-01 PROCEDURE — 0DH63UZ INSERTION OF FEEDING DEVICE INTO STOMACH, PERCUTANEOUS APPROACH: ICD-10-PCS | Performed by: RADIOLOGY

## 2018-01-01 PROCEDURE — A9270 NON-COVERED ITEM OR SERVICE: HCPCS | Mod: GY | Performed by: EMERGENCY MEDICINE

## 2018-01-01 PROCEDURE — 49450 REPLACE G/C TUBE PERC: CPT

## 2018-01-01 PROCEDURE — 36569 INSJ PICC 5 YR+ W/O IMAGING: CPT

## 2018-01-01 PROCEDURE — 84132 ASSAY OF SERUM POTASSIUM: CPT | Performed by: INTERNAL MEDICINE

## 2018-01-01 PROCEDURE — 87186 SC STD MICRODIL/AGAR DIL: CPT | Performed by: INTERNAL MEDICINE

## 2018-01-01 PROCEDURE — 12000008 ZZH R&B INTERMEDIATE UMMC

## 2018-01-01 PROCEDURE — 99223 1ST HOSP IP/OBS HIGH 75: CPT | Performed by: CLINICAL NURSE SPECIALIST

## 2018-01-01 PROCEDURE — 87070 CULTURE OTHR SPECIMN AEROBIC: CPT | Performed by: EMERGENCY MEDICINE

## 2018-01-01 PROCEDURE — 84439 ASSAY OF FREE THYROXINE: CPT | Performed by: INTERNAL MEDICINE

## 2018-01-01 PROCEDURE — G0463 HOSPITAL OUTPT CLINIC VISIT: HCPCS | Mod: 25

## 2018-01-01 PROCEDURE — 94640 AIRWAY INHALATION TREATMENT: CPT

## 2018-01-01 PROCEDURE — 25000132 ZZH RX MED GY IP 250 OP 250 PS 637: Mod: GY | Performed by: STUDENT IN AN ORGANIZED HEALTH CARE EDUCATION/TRAINING PROGRAM

## 2018-01-01 PROCEDURE — 71260 CT THORAX DX C+: CPT

## 2018-01-01 PROCEDURE — 36415 COLL VENOUS BLD VENIPUNCTURE: CPT | Performed by: HOSPITALIST

## 2018-01-01 PROCEDURE — A9270 NON-COVERED ITEM OR SERVICE: HCPCS | Mod: GY | Performed by: NURSE PRACTITIONER

## 2018-01-01 PROCEDURE — 49440 PLACE GASTROSTOMY TUBE PERC: CPT

## 2018-01-01 PROCEDURE — 99207 ZZC CDG-MDM COMPONENT: MEETS MODERATE - UP CODED: CPT | Performed by: NURSE PRACTITIONER

## 2018-01-01 PROCEDURE — 25000132 ZZH RX MED GY IP 250 OP 250 PS 637: Mod: GY | Performed by: PHYSICIAN ASSISTANT

## 2018-01-01 PROCEDURE — 94668 MNPJ CHEST WALL SBSQ: CPT

## 2018-01-01 PROCEDURE — 36592 COLLECT BLOOD FROM PICC: CPT | Performed by: NURSE PRACTITIONER

## 2018-01-01 PROCEDURE — 84134 ASSAY OF PREALBUMIN: CPT | Performed by: INTERNAL MEDICINE

## 2018-01-01 PROCEDURE — 25000128 H RX IP 250 OP 636: Performed by: HOSPITALIST

## 2018-01-01 PROCEDURE — 80048 BASIC METABOLIC PNL TOTAL CA: CPT | Performed by: EMERGENCY MEDICINE

## 2018-01-01 PROCEDURE — 25000125 ZZHC RX 250

## 2018-01-01 PROCEDURE — 83690 ASSAY OF LIPASE: CPT | Performed by: EMERGENCY MEDICINE

## 2018-01-01 PROCEDURE — 99285 EMERGENCY DEPT VISIT HI MDM: CPT | Mod: Z6 | Performed by: EMERGENCY MEDICINE

## 2018-01-01 PROCEDURE — 27210916 ZZ H TUBE GASTRO CR5

## 2018-01-01 PROCEDURE — 85610 PROTHROMBIN TIME: CPT | Performed by: HOSPITALIST

## 2018-01-01 PROCEDURE — G0463 HOSPITAL OUTPT CLINIC VISIT: HCPCS

## 2018-01-01 PROCEDURE — 86140 C-REACTIVE PROTEIN: CPT | Performed by: INTERNAL MEDICINE

## 2018-01-01 PROCEDURE — 84145 PROCALCITONIN (PCT): CPT | Performed by: EMERGENCY MEDICINE

## 2018-01-01 PROCEDURE — 83605 ASSAY OF LACTIC ACID: CPT

## 2018-01-01 PROCEDURE — 25000125 ZZHC RX 250: Performed by: HOSPITALIST

## 2018-01-01 PROCEDURE — 84134 ASSAY OF PREALBUMIN: CPT | Performed by: SURGERY

## 2018-01-01 PROCEDURE — 3E0436Z INTRODUCTION OF NUTRITIONAL SUBSTANCE INTO CENTRAL VEIN, PERCUTANEOUS APPROACH: ICD-10-PCS | Performed by: INTERNAL MEDICINE

## 2018-01-01 PROCEDURE — 82533 TOTAL CORTISOL: CPT | Performed by: INTERNAL MEDICINE

## 2018-01-01 PROCEDURE — 36592 COLLECT BLOOD FROM PICC: CPT | Performed by: HOSPITALIST

## 2018-01-01 PROCEDURE — 99356 ZZC PROLONGED SERV,INPATIENT,1ST HR: CPT | Performed by: CLINICAL NURSE SPECIALIST

## 2018-01-01 PROCEDURE — 84478 ASSAY OF TRIGLYCERIDES: CPT | Performed by: HOSPITALIST

## 2018-01-01 PROCEDURE — 87040 BLOOD CULTURE FOR BACTERIA: CPT | Performed by: INTERNAL MEDICINE

## 2018-01-01 PROCEDURE — 36415 COLL VENOUS BLD VENIPUNCTURE: CPT | Performed by: SURGERY

## 2018-01-01 PROCEDURE — 25000128 H RX IP 250 OP 636: Performed by: RADIOLOGY

## 2018-01-01 PROCEDURE — 99283 EMERGENCY DEPT VISIT LOW MDM: CPT | Performed by: EMERGENCY MEDICINE

## 2018-01-01 PROCEDURE — 85027 COMPLETE CBC AUTOMATED: CPT | Performed by: HOSPITALIST

## 2018-01-01 PROCEDURE — 84478 ASSAY OF TRIGLYCERIDES: CPT | Performed by: INTERNAL MEDICINE

## 2018-01-01 PROCEDURE — 84100 ASSAY OF PHOSPHORUS: CPT | Performed by: INTERNAL MEDICINE

## 2018-01-01 PROCEDURE — 92610 EVALUATE SWALLOWING FUNCTION: CPT | Mod: GN | Performed by: SPEECH-LANGUAGE PATHOLOGIST

## 2018-01-01 PROCEDURE — 25000132 ZZH RX MED GY IP 250 OP 250 PS 637: Mod: GY | Performed by: NURSE PRACTITIONER

## 2018-01-01 PROCEDURE — 96361 HYDRATE IV INFUSION ADD-ON: CPT | Mod: 59

## 2018-01-01 PROCEDURE — 96374 THER/PROPH/DIAG INJ IV PUSH: CPT | Performed by: EMERGENCY MEDICINE

## 2018-01-01 PROCEDURE — 25000128 H RX IP 250 OP 636: Performed by: EMERGENCY MEDICINE

## 2018-01-01 PROCEDURE — 84134 ASSAY OF PREALBUMIN: CPT | Performed by: HOSPITALIST

## 2018-01-01 PROCEDURE — 80053 COMPREHEN METABOLIC PANEL: CPT | Performed by: INTERNAL MEDICINE

## 2018-01-01 PROCEDURE — 82040 ASSAY OF SERUM ALBUMIN: CPT | Performed by: INTERNAL MEDICINE

## 2018-01-01 PROCEDURE — 96376 TX/PRO/DX INJ SAME DRUG ADON: CPT

## 2018-01-01 PROCEDURE — 36592 COLLECT BLOOD FROM PICC: CPT | Performed by: INTERNAL MEDICINE

## 2018-01-01 PROCEDURE — 85576 BLOOD PLATELET AGGREGATION: CPT | Performed by: PHYSICIAN ASSISTANT

## 2018-01-01 PROCEDURE — 27210807 ZZH SHEATH CR6

## 2018-01-01 PROCEDURE — 99152 MOD SED SAME PHYS/QHP 5/>YRS: CPT

## 2018-01-01 PROCEDURE — 84100 ASSAY OF PHOSPHORUS: CPT | Performed by: HOSPITALIST

## 2018-01-01 PROCEDURE — 99309 SBSQ NF CARE MODERATE MDM 30: CPT | Performed by: NURSE PRACTITIONER

## 2018-01-01 PROCEDURE — 99207 ZZC APP CREDIT; MD BILLING SHARED VISIT: CPT | Performed by: NURSE PRACTITIONER

## 2018-01-01 PROCEDURE — 87040 BLOOD CULTURE FOR BACTERIA: CPT | Performed by: EMERGENCY MEDICINE

## 2018-01-01 PROCEDURE — 25000125 ZZHC RX 250: Performed by: RADIOLOGY

## 2018-01-01 PROCEDURE — 25000128 H RX IP 250 OP 636

## 2018-01-01 PROCEDURE — 83735 ASSAY OF MAGNESIUM: CPT | Performed by: NURSE PRACTITIONER

## 2018-01-01 PROCEDURE — 99207 ZZC CDG-MDM COMPONENT: MEETS LOW - DOWN CODED: CPT | Performed by: INTERNAL MEDICINE

## 2018-01-01 PROCEDURE — 36592 COLLECT BLOOD FROM PICC: CPT | Performed by: PHYSICIAN ASSISTANT

## 2018-01-01 PROCEDURE — 85025 COMPLETE CBC W/AUTO DIFF WBC: CPT | Performed by: EMERGENCY MEDICINE

## 2018-01-01 PROCEDURE — 97602 WOUND(S) CARE NON-SELECTIVE: CPT

## 2018-01-01 PROCEDURE — 83605 ASSAY OF LACTIC ACID: CPT | Performed by: INTERNAL MEDICINE

## 2018-01-01 PROCEDURE — 83605 ASSAY OF LACTIC ACID: CPT | Performed by: HOSPITALIST

## 2018-01-01 PROCEDURE — 99207 ZZC CONSULT E&M CHANGED TO INITIAL LEVEL: CPT | Performed by: PHYSICIAN ASSISTANT

## 2018-01-01 PROCEDURE — 99283 EMERGENCY DEPT VISIT LOW MDM: CPT | Mod: Z6 | Performed by: EMERGENCY MEDICINE

## 2018-01-01 PROCEDURE — 27210886 ZZH ACCESSORY CR5

## 2018-01-01 PROCEDURE — 94667 MNPJ CHEST WALL 1ST: CPT

## 2018-01-01 PROCEDURE — 85652 RBC SED RATE AUTOMATED: CPT | Performed by: EMERGENCY MEDICINE

## 2018-01-01 PROCEDURE — 87086 URINE CULTURE/COLONY COUNT: CPT | Performed by: EMERGENCY MEDICINE

## 2018-01-01 PROCEDURE — 86140 C-REACTIVE PROTEIN: CPT | Performed by: EMERGENCY MEDICINE

## 2018-01-01 PROCEDURE — 71046 X-RAY EXAM CHEST 2 VIEWS: CPT

## 2018-01-01 PROCEDURE — 87086 URINE CULTURE/COLONY COUNT: CPT | Performed by: INTERNAL MEDICINE

## 2018-01-01 PROCEDURE — 74177 CT ABD & PELVIS W/CONTRAST: CPT

## 2018-01-01 PROCEDURE — 27210905 ZZH KIT CR7

## 2018-01-01 PROCEDURE — 87106 FUNGI IDENTIFICATION YEAST: CPT | Performed by: INTERNAL MEDICINE

## 2018-01-01 PROCEDURE — A9270 NON-COVERED ITEM OR SERVICE: HCPCS | Mod: GY | Performed by: STUDENT IN AN ORGANIZED HEALTH CARE EDUCATION/TRAINING PROGRAM

## 2018-01-01 PROCEDURE — 84134 ASSAY OF PREALBUMIN: CPT | Performed by: NURSE PRACTITIONER

## 2018-01-01 PROCEDURE — 81001 URINALYSIS AUTO W/SCOPE: CPT | Performed by: NURSE PRACTITIONER

## 2018-01-01 PROCEDURE — 87493 C DIFF AMPLIFIED PROBE: CPT | Performed by: INTERNAL MEDICINE

## 2018-01-01 PROCEDURE — 99207 ZZC CDG-CODE INCORRECT PER BILLING BASED ON TIME: CPT | Performed by: INTERNAL MEDICINE

## 2018-01-01 PROCEDURE — 85576 BLOOD PLATELET AGGREGATION: CPT | Performed by: HOSPITALIST

## 2018-01-01 PROCEDURE — 80048 BASIC METABOLIC PNL TOTAL CA: CPT | Performed by: NURSE PRACTITIONER

## 2018-01-01 PROCEDURE — 25000132 ZZH RX MED GY IP 250 OP 250 PS 637: Mod: GY | Performed by: UROLOGY

## 2018-01-01 PROCEDURE — 25000128 H RX IP 250 OP 636: Performed by: PHYSICIAN ASSISTANT

## 2018-01-01 PROCEDURE — 87800 DETECT AGNT MULT DNA DIREC: CPT | Performed by: EMERGENCY MEDICINE

## 2018-01-01 PROCEDURE — 93005 ELECTROCARDIOGRAM TRACING: CPT | Performed by: EMERGENCY MEDICINE

## 2018-01-01 PROCEDURE — 49465 FLUORO EXAM OF G/COLON TUBE: CPT

## 2018-01-01 PROCEDURE — 96365 THER/PROPH/DIAG IV INF INIT: CPT | Performed by: EMERGENCY MEDICINE

## 2018-01-01 PROCEDURE — 85025 COMPLETE CBC W/AUTO DIFF WBC: CPT | Performed by: INTERNAL MEDICINE

## 2018-01-01 PROCEDURE — 99284 EMERGENCY DEPT VISIT MOD MDM: CPT | Mod: Z6 | Performed by: EMERGENCY MEDICINE

## 2018-01-01 PROCEDURE — 87088 URINE BACTERIA CULTURE: CPT | Performed by: INTERNAL MEDICINE

## 2018-01-01 PROCEDURE — 87040 BLOOD CULTURE FOR BACTERIA: CPT | Performed by: HOSPITALIST

## 2018-01-01 PROCEDURE — 96374 THER/PROPH/DIAG INJ IV PUSH: CPT

## 2018-01-01 PROCEDURE — 85025 COMPLETE CBC W/AUTO DIFF WBC: CPT | Performed by: NURSE PRACTITIONER

## 2018-01-01 PROCEDURE — 99238 HOSP IP/OBS DSCHRG MGMT 30/<: CPT | Performed by: INTERNAL MEDICINE

## 2018-01-01 PROCEDURE — 99207 ZZC NO CHARGE LOS: CPT | Performed by: NURSE PRACTITIONER

## 2018-01-01 PROCEDURE — 99221 1ST HOSP IP/OBS SF/LOW 40: CPT | Performed by: PSYCHIATRY & NEUROLOGY

## 2018-01-01 PROCEDURE — 99207 ZZC NO CHARGE FOLLOW UP PS: CPT

## 2018-01-01 PROCEDURE — 83880 ASSAY OF NATRIURETIC PEPTIDE: CPT | Performed by: HOSPITALIST

## 2018-01-01 PROCEDURE — 80202 ASSAY OF VANCOMYCIN: CPT | Performed by: INTERNAL MEDICINE

## 2018-01-01 PROCEDURE — 25000125 ZZHC RX 250: Performed by: PHYSICIAN ASSISTANT

## 2018-01-01 PROCEDURE — 99207 ZZC CDG-MDM COMPONENT: MEETS MODERATE - UP CODED: CPT | Performed by: INTERNAL MEDICINE

## 2018-01-01 PROCEDURE — 74176 CT ABD & PELVIS W/O CONTRAST: CPT

## 2018-01-01 PROCEDURE — 25000125 ZZHC RX 250: Performed by: STUDENT IN AN ORGANIZED HEALTH CARE EDUCATION/TRAINING PROGRAM

## 2018-01-01 PROCEDURE — 84484 ASSAY OF TROPONIN QUANT: CPT | Performed by: EMERGENCY MEDICINE

## 2018-01-01 PROCEDURE — 27210738 ZZH ACCESSORY CR2

## 2018-01-01 PROCEDURE — 99222 1ST HOSP IP/OBS MODERATE 55: CPT | Mod: AI | Performed by: INTERNAL MEDICINE

## 2018-01-01 PROCEDURE — 96376 TX/PRO/DX INJ SAME DRUG ADON: CPT | Mod: 59 | Performed by: EMERGENCY MEDICINE

## 2018-01-01 PROCEDURE — 81001 URINALYSIS AUTO W/SCOPE: CPT | Performed by: INTERNAL MEDICINE

## 2018-01-01 PROCEDURE — 93010 ELECTROCARDIOGRAM REPORT: CPT | Mod: Z6 | Performed by: EMERGENCY MEDICINE

## 2018-01-01 PROCEDURE — 25800025 ZZH RX 258: Performed by: HOSPITALIST

## 2018-01-01 PROCEDURE — 27210775 ZZH KIT CR11

## 2018-01-01 PROCEDURE — 27210903 ZZH KIT CR5

## 2018-01-01 PROCEDURE — 99222 1ST HOSP IP/OBS MODERATE 55: CPT | Performed by: PHYSICIAN ASSISTANT

## 2018-01-01 PROCEDURE — A9270 NON-COVERED ITEM OR SERVICE: HCPCS | Mod: GY | Performed by: PHYSICIAN ASSISTANT

## 2018-01-01 PROCEDURE — 40000225 ZZH STATISTIC SLP WARD VISIT: Performed by: SPEECH-LANGUAGE PATHOLOGIST

## 2018-01-01 PROCEDURE — C1887 CATHETER, GUIDING: HCPCS

## 2018-01-01 PROCEDURE — 70450 CT HEAD/BRAIN W/O DYE: CPT

## 2018-01-01 PROCEDURE — 71045 X-RAY EXAM CHEST 1 VIEW: CPT

## 2018-01-01 PROCEDURE — 36415 COLL VENOUS BLD VENIPUNCTURE: CPT | Performed by: NURSE PRACTITIONER

## 2018-01-01 PROCEDURE — 25000128 H RX IP 250 OP 636: Performed by: STUDENT IN AN ORGANIZED HEALTH CARE EDUCATION/TRAINING PROGRAM

## 2018-01-01 PROCEDURE — 87040 BLOOD CULTURE FOR BACTERIA: CPT | Performed by: NURSE PRACTITIONER

## 2018-01-01 PROCEDURE — 99153 MOD SED SAME PHYS/QHP EA: CPT

## 2018-01-01 PROCEDURE — 99305 1ST NF CARE MODERATE MDM 35: CPT | Performed by: INTERNAL MEDICINE

## 2018-01-01 PROCEDURE — 99306 1ST NF CARE HIGH MDM 50: CPT | Performed by: INTERNAL MEDICINE

## 2018-01-01 PROCEDURE — 96375 TX/PRO/DX INJ NEW DRUG ADDON: CPT

## 2018-01-01 PROCEDURE — 99222 1ST HOSP IP/OBS MODERATE 55: CPT | Performed by: PSYCHIATRY & NEUROLOGY

## 2018-01-01 RX ORDER — DIPHENHYDRAMINE HCL 25 MG
25 CAPSULE ORAL EVERY 6 HOURS PRN
Status: DISCONTINUED | OUTPATIENT
Start: 2018-01-01 | End: 2018-01-01 | Stop reason: HOSPADM

## 2018-01-01 RX ORDER — FLUCONAZOLE 200 MG/1
200 TABLET ORAL DAILY
Status: DISCONTINUED | OUTPATIENT
Start: 2018-01-01 | End: 2018-01-01

## 2018-01-01 RX ORDER — LEVOTHYROXINE SODIUM 125 UG/1
125 TABLET ORAL
Status: DISCONTINUED | OUTPATIENT
Start: 2018-01-01 | End: 2018-01-01 | Stop reason: HOSPADM

## 2018-01-01 RX ORDER — OXYCODONE HYDROCHLORIDE 5 MG/1
5-10 TABLET ORAL EVERY 4 HOURS PRN
Status: DISCONTINUED | OUTPATIENT
Start: 2018-01-01 | End: 2018-01-01 | Stop reason: HOSPADM

## 2018-01-01 RX ORDER — HYDROMORPHONE HYDROCHLORIDE 2 MG/1
2 TABLET ORAL EVERY 4 HOURS PRN
Status: DISCONTINUED | OUTPATIENT
Start: 2018-01-01 | End: 2018-01-01

## 2018-01-01 RX ORDER — ZOLPIDEM TARTRATE 5 MG/1
5 TABLET ORAL
Status: DISCONTINUED | OUTPATIENT
Start: 2018-01-01 | End: 2018-01-01 | Stop reason: HOSPADM

## 2018-01-01 RX ORDER — LOPERAMIDE HCL 2 MG
2 CAPSULE ORAL EVERY 4 HOURS
Status: DISCONTINUED | OUTPATIENT
Start: 2018-01-01 | End: 2018-01-01

## 2018-01-01 RX ORDER — LIDOCAINE HYDROCHLORIDE 10 MG/ML
INJECTION, SOLUTION EPIDURAL; INFILTRATION; INTRACAUDAL; PERINEURAL
Status: DISCONTINUED
Start: 2018-01-01 | End: 2018-01-01 | Stop reason: HOSPADM

## 2018-01-01 RX ORDER — DOXYCYCLINE 100 MG/1
100 CAPSULE ORAL EVERY 12 HOURS SCHEDULED
Status: DISCONTINUED | OUTPATIENT
Start: 2018-01-01 | End: 2018-01-01

## 2018-01-01 RX ORDER — LEVOTHYROXINE SODIUM 125 UG/1
125 TABLET ORAL EVERY MORNING
Status: DISCONTINUED | OUTPATIENT
Start: 2018-01-01 | End: 2018-01-01

## 2018-01-01 RX ORDER — OXYCODONE HYDROCHLORIDE 5 MG/1
15 TABLET ORAL ONCE
Status: COMPLETED | OUTPATIENT
Start: 2018-01-01 | End: 2018-01-01

## 2018-01-01 RX ORDER — MAGNESIUM HYDROXIDE 1200 MG/15ML
LIQUID ORAL
Status: DISCONTINUED
Start: 2018-01-01 | End: 2018-01-01 | Stop reason: HOSPADM

## 2018-01-01 RX ORDER — QUETIAPINE FUMARATE 25 MG/1
25 TABLET, FILM COATED ORAL DAILY
Qty: 60 TABLET | DISCHARGE
Start: 2018-01-01 | End: 2018-01-01

## 2018-01-01 RX ORDER — HYDROMORPHONE HYDROCHLORIDE 1 MG/ML
.3-.5 INJECTION, SOLUTION INTRAMUSCULAR; INTRAVENOUS; SUBCUTANEOUS EVERY 4 HOURS PRN
Status: DISCONTINUED | OUTPATIENT
Start: 2018-01-01 | End: 2018-01-01

## 2018-01-01 RX ORDER — GUAR GUM
1 PACKET (EA) ORAL 3 TIMES DAILY
Status: DISCONTINUED | OUTPATIENT
Start: 2018-01-01 | End: 2018-01-01

## 2018-01-01 RX ORDER — HEPARIN SODIUM,PORCINE 10 UNIT/ML
2-5 VIAL (ML) INTRAVENOUS
Status: COMPLETED | OUTPATIENT
Start: 2018-01-01 | End: 2018-01-01

## 2018-01-01 RX ORDER — IPRATROPIUM BROMIDE AND ALBUTEROL SULFATE 2.5; .5 MG/3ML; MG/3ML
3 SOLUTION RESPIRATORY (INHALATION) EVERY 4 HOURS PRN
Status: DISCONTINUED | OUTPATIENT
Start: 2018-01-01 | End: 2018-01-01 | Stop reason: HOSPADM

## 2018-01-01 RX ORDER — NALOXONE HYDROCHLORIDE 0.4 MG/ML
.1-.4 INJECTION, SOLUTION INTRAMUSCULAR; INTRAVENOUS; SUBCUTANEOUS
Status: DISCONTINUED | OUTPATIENT
Start: 2018-01-01 | End: 2018-01-01

## 2018-01-01 RX ORDER — ACETAMINOPHEN 325 MG/1
650 TABLET ORAL EVERY 6 HOURS PRN
Status: DISCONTINUED | OUTPATIENT
Start: 2018-01-01 | End: 2018-01-01

## 2018-01-01 RX ORDER — OXYCODONE HYDROCHLORIDE 10 MG/1
10 TABLET ORAL EVERY 6 HOURS PRN
Start: 2018-01-01

## 2018-01-01 RX ORDER — NALOXONE HYDROCHLORIDE 0.4 MG/ML
.1-.4 INJECTION, SOLUTION INTRAMUSCULAR; INTRAVENOUS; SUBCUTANEOUS
Status: DISCONTINUED | OUTPATIENT
Start: 2018-01-01 | End: 2018-01-01 | Stop reason: HOSPADM

## 2018-01-01 RX ORDER — DIAZEPAM 2 MG
4 TABLET ORAL EVERY 6 HOURS PRN
Status: DISCONTINUED | OUTPATIENT
Start: 2018-01-01 | End: 2018-01-01 | Stop reason: HOSPADM

## 2018-01-01 RX ORDER — PIPERACILLIN SODIUM, TAZOBACTAM SODIUM 4; .5 G/20ML; G/20ML
4.5 INJECTION, POWDER, LYOPHILIZED, FOR SOLUTION INTRAVENOUS EVERY 6 HOURS
Status: DISCONTINUED | OUTPATIENT
Start: 2018-01-01 | End: 2018-01-01

## 2018-01-01 RX ORDER — CEFAZOLIN SODIUM 1 G/50ML
1250 SOLUTION INTRAVENOUS EVERY 12 HOURS
Status: DISCONTINUED | OUTPATIENT
Start: 2018-01-01 | End: 2018-01-01

## 2018-01-01 RX ORDER — ONDANSETRON 2 MG/ML
4 INJECTION INTRAMUSCULAR; INTRAVENOUS EVERY 6 HOURS PRN
Status: DISCONTINUED | OUTPATIENT
Start: 2018-01-01 | End: 2018-01-01

## 2018-01-01 RX ORDER — FUROSEMIDE 20 MG
20 TABLET ORAL 2 TIMES DAILY
Status: DISCONTINUED | OUTPATIENT
Start: 2018-01-01 | End: 2018-01-01

## 2018-01-01 RX ORDER — FUROSEMIDE 20 MG
20 TABLET ORAL DAILY
Status: DISCONTINUED | OUTPATIENT
Start: 2018-01-01 | End: 2018-01-01

## 2018-01-01 RX ORDER — IPRATROPIUM BROMIDE AND ALBUTEROL SULFATE 2.5; .5 MG/3ML; MG/3ML
3 SOLUTION RESPIRATORY (INHALATION) EVERY 4 HOURS PRN
Status: DISCONTINUED | OUTPATIENT
Start: 2018-01-01 | End: 2018-01-01

## 2018-01-01 RX ORDER — GABAPENTIN 300 MG/1
300 CAPSULE ORAL 3 TIMES DAILY
Status: DISCONTINUED | OUTPATIENT
Start: 2018-01-01 | End: 2018-01-01

## 2018-01-01 RX ORDER — ARIPIPRAZOLE 5 MG/1
5 TABLET ORAL AT BEDTIME
Qty: 7 TABLET | Refills: 1 | Status: SHIPPED | OUTPATIENT
Start: 2018-01-01 | End: 2018-01-01 | Stop reason: DRUGHIGH

## 2018-01-01 RX ORDER — DIAZEPAM 2 MG
2 TABLET ORAL EVERY 6 HOURS PRN
Status: DISCONTINUED | OUTPATIENT
Start: 2018-01-01 | End: 2018-01-01

## 2018-01-01 RX ORDER — ACETAMINOPHEN 325 MG/1
650 TABLET ORAL ONCE
Status: COMPLETED | OUTPATIENT
Start: 2018-01-01 | End: 2018-01-01

## 2018-01-01 RX ORDER — LEVETIRACETAM 250 MG/1
250 TABLET ORAL EVERY EVENING
Status: DISCONTINUED | OUTPATIENT
Start: 2018-01-01 | End: 2018-01-01 | Stop reason: HOSPADM

## 2018-01-01 RX ORDER — DIAZEPAM 5 MG
5 TABLET ORAL ONCE
Status: DISCONTINUED | OUTPATIENT
Start: 2018-01-01 | End: 2018-01-01 | Stop reason: HOSPADM

## 2018-01-01 RX ORDER — CALCIUM CARBONATE 500 MG/1
500 TABLET, CHEWABLE ORAL ONCE
Status: DISCONTINUED | OUTPATIENT
Start: 2018-01-01 | End: 2018-01-01

## 2018-01-01 RX ORDER — DEXTROSE, SOYBEAN OIL, ELECTROLYTES, LYSINE, PHENYLALANINE, LEUCINE, VALINE, THREONINE, METHIONINE, ISOLEUCINE, TRYPTOPHAN, ALANINE, ARGININE, GLYCINE, PROLINE, HISTIDINE, GLUTAMIC ACID, SERINE, ASPARTIC ACID AND TYROSINE 9.8; 3.9; 239; 174; 147; 96; 29; 263; 231; 231; 213; 164; 164; 164; 55; 467; 330; 231; 199; 199; 164; 131; 99; 6.7 G/100ML; G/100ML; MG/100ML; MG/100ML; MG/100ML; MG/100ML; MG/100ML; MG/100ML; MG/100ML; MG/100ML; MG/100ML; MG/100ML; MG/100ML; MG/100ML; MG/100ML; MG/100ML; MG/100ML; MG/100ML; MG/100ML; MG/100ML; MG/100ML; MG/100ML; MG/100ML; MG/100ML
INJECTION, EMULSION INTRAVENOUS CONTINUOUS
Status: DISCONTINUED | OUTPATIENT
Start: 2018-01-01 | End: 2018-01-01

## 2018-01-01 RX ORDER — OXYCODONE HYDROCHLORIDE 5 MG/1
5 TABLET ORAL ONCE
Status: COMPLETED | OUTPATIENT
Start: 2018-01-01 | End: 2018-01-01

## 2018-01-01 RX ORDER — OXYCODONE HYDROCHLORIDE 5 MG/1
5-10 TABLET ORAL EVERY 4 HOURS PRN
Qty: 40 TABLET | Refills: 0 | Status: ON HOLD | DISCHARGE
Start: 2018-01-01 | End: 2018-01-01

## 2018-01-01 RX ORDER — AMINO ACIDS/PROTEIN HYDROLYS 11G-40/45
1 LIQUID IN PACKET (ML) ORAL 3 TIMES DAILY
Status: DISCONTINUED | OUTPATIENT
Start: 2018-01-01 | End: 2018-01-01

## 2018-01-01 RX ORDER — CEFAZOLIN SODIUM 2 G/100ML
2 INJECTION, SOLUTION INTRAVENOUS
Status: COMPLETED | OUTPATIENT
Start: 2018-01-01 | End: 2018-01-01

## 2018-01-01 RX ORDER — HYDROMORPHONE HYDROCHLORIDE 1 MG/ML
1 INJECTION, SOLUTION INTRAMUSCULAR; INTRAVENOUS; SUBCUTANEOUS ONCE
Status: COMPLETED | OUTPATIENT
Start: 2018-01-01 | End: 2018-01-01

## 2018-01-01 RX ORDER — LEVETIRACETAM 250 MG/1
250 TABLET ORAL 2 TIMES DAILY
Status: DISCONTINUED | OUTPATIENT
Start: 2018-01-01 | End: 2018-01-01 | Stop reason: HOSPADM

## 2018-01-01 RX ORDER — GUAIFENESIN 600 MG/1
600 TABLET, EXTENDED RELEASE ORAL 2 TIMES DAILY
Status: DISCONTINUED | OUTPATIENT
Start: 2018-01-01 | End: 2018-01-01

## 2018-01-01 RX ORDER — DULOXETIN HYDROCHLORIDE 60 MG/1
60 CAPSULE, DELAYED RELEASE ORAL EVERY MORNING
Status: DISCONTINUED | OUTPATIENT
Start: 2018-01-01 | End: 2018-01-01

## 2018-01-01 RX ORDER — PIPERACILLIN SODIUM, TAZOBACTAM SODIUM 3; .375 G/15ML; G/15ML
3.38 INJECTION, POWDER, LYOPHILIZED, FOR SOLUTION INTRAVENOUS ONCE
Status: COMPLETED | OUTPATIENT
Start: 2018-01-01 | End: 2018-01-01

## 2018-01-01 RX ORDER — OXYCODONE HYDROCHLORIDE 15 MG/1
15 TABLET ORAL
Qty: 120 TABLET | Refills: 0 | Status: SHIPPED | OUTPATIENT
Start: 2018-01-01 | End: 2018-01-01

## 2018-01-01 RX ORDER — LAMOTRIGINE 25 MG/1
25 TABLET ORAL EVERY MORNING
Status: DISCONTINUED | OUTPATIENT
Start: 2018-01-01 | End: 2018-01-01

## 2018-01-01 RX ORDER — GUAR GUM
1 PACKET (EA) ORAL
Status: DISCONTINUED | OUTPATIENT
Start: 2018-01-01 | End: 2018-01-01

## 2018-01-01 RX ORDER — OXYCODONE HYDROCHLORIDE 10 MG/1
10 TABLET ORAL ONCE
Status: COMPLETED | OUTPATIENT
Start: 2018-01-01 | End: 2018-01-01

## 2018-01-01 RX ORDER — SODIUM CHLORIDE 9 MG/ML
1000 INJECTION, SOLUTION INTRAVENOUS CONTINUOUS
Status: DISCONTINUED | OUTPATIENT
Start: 2018-01-01 | End: 2018-01-01 | Stop reason: HOSPADM

## 2018-01-01 RX ORDER — LEVOTHYROXINE SODIUM 125 UG/1
125 TABLET ORAL
Status: DISCONTINUED | OUTPATIENT
Start: 2018-01-01 | End: 2018-01-01

## 2018-01-01 RX ORDER — DIAZEPAM 5 MG
5 TABLET ORAL EVERY 4 HOURS PRN
Status: DISCONTINUED | OUTPATIENT
Start: 2018-01-01 | End: 2018-01-01

## 2018-01-01 RX ORDER — AMOXICILLIN 250 MG
2 CAPSULE ORAL 2 TIMES DAILY
COMMUNITY

## 2018-01-01 RX ORDER — MAGNESIUM SULFATE HEPTAHYDRATE 40 MG/ML
4 INJECTION, SOLUTION INTRAVENOUS EVERY 4 HOURS PRN
Status: DISCONTINUED | OUTPATIENT
Start: 2018-01-01 | End: 2018-01-01 | Stop reason: HOSPADM

## 2018-01-01 RX ORDER — HYDROXYZINE HYDROCHLORIDE 25 MG/1
25 TABLET, FILM COATED ORAL EVERY 4 HOURS PRN
Qty: 60 TABLET | Refills: 1
Start: 2018-01-01 | End: 2018-01-01

## 2018-01-01 RX ORDER — CEFPODOXIME PROXETIL 200 MG/1
200 TABLET, FILM COATED ORAL 2 TIMES DAILY
Qty: 20 TABLET | Refills: 0
Start: 2018-01-01 | End: 2018-01-01

## 2018-01-01 RX ORDER — OXYBUTYNIN CHLORIDE 15 MG/1
15 TABLET, EXTENDED RELEASE ORAL AT BEDTIME
Qty: 30 TABLET | DISCHARGE
Start: 2018-01-01 | End: 2018-01-01 | Stop reason: DRUGHIGH

## 2018-01-01 RX ORDER — AMINO ACIDS/PROTEIN HYDROLYS 11G-40/45
1 LIQUID IN PACKET (ML) ORAL
Status: DISCONTINUED | OUTPATIENT
Start: 2018-01-01 | End: 2018-01-01 | Stop reason: HOSPADM

## 2018-01-01 RX ORDER — HYDROMORPHONE HYDROCHLORIDE 1 MG/ML
0.5 INJECTION, SOLUTION INTRAMUSCULAR; INTRAVENOUS; SUBCUTANEOUS
Status: DISCONTINUED | OUTPATIENT
Start: 2018-01-01 | End: 2018-01-01 | Stop reason: HOSPADM

## 2018-01-01 RX ORDER — LAMOTRIGINE 25 MG/1
50 TABLET ORAL EVERY MORNING
Status: DISCONTINUED | OUTPATIENT
Start: 2018-01-01 | End: 2018-01-01

## 2018-01-01 RX ORDER — DIAZEPAM 5 MG
5 TABLET ORAL ONCE
Status: COMPLETED | OUTPATIENT
Start: 2018-01-01 | End: 2018-01-01

## 2018-01-01 RX ORDER — HYDROMORPHONE HYDROCHLORIDE 1 MG/ML
.3-.5 INJECTION, SOLUTION INTRAMUSCULAR; INTRAVENOUS; SUBCUTANEOUS
Status: DISCONTINUED | OUTPATIENT
Start: 2018-01-01 | End: 2018-01-01

## 2018-01-01 RX ORDER — LEVOTHYROXINE SODIUM 75 UG/1
150 TABLET ORAL
Status: DISCONTINUED | OUTPATIENT
Start: 2018-01-01 | End: 2018-01-01 | Stop reason: HOSPADM

## 2018-01-01 RX ORDER — ZINC SULFATE 50(220)MG
220 CAPSULE ORAL DAILY
Status: DISCONTINUED | OUTPATIENT
Start: 2018-01-01 | End: 2018-01-01

## 2018-01-01 RX ORDER — GABAPENTIN 100 MG/1
100 CAPSULE ORAL 3 TIMES DAILY
Status: DISCONTINUED | OUTPATIENT
Start: 2018-01-01 | End: 2018-01-01

## 2018-01-01 RX ORDER — IPRATROPIUM BROMIDE AND ALBUTEROL SULFATE 2.5; .5 MG/3ML; MG/3ML
3 SOLUTION RESPIRATORY (INHALATION)
Status: DISCONTINUED | OUTPATIENT
Start: 2018-01-01 | End: 2018-01-01

## 2018-01-01 RX ORDER — LOPERAMIDE HCL 2 MG
2 CAPSULE ORAL 3 TIMES DAILY
Status: DISCONTINUED | OUTPATIENT
Start: 2018-01-01 | End: 2018-01-01

## 2018-01-01 RX ORDER — DRONABINOL 2.5 MG/1
2.5 CAPSULE ORAL 2 TIMES DAILY
Status: DISCONTINUED | OUTPATIENT
Start: 2018-01-01 | End: 2018-01-01 | Stop reason: HOSPADM

## 2018-01-01 RX ORDER — LOPERAMIDE HCL 2 MG
4 CAPSULE ORAL 4 TIMES DAILY
Status: DISCONTINUED | OUTPATIENT
Start: 2018-01-01 | End: 2018-01-01 | Stop reason: HOSPADM

## 2018-01-01 RX ORDER — TRIAMCINOLONE ACETONIDE 1 MG/G
CREAM TOPICAL 3 TIMES DAILY
Status: DISCONTINUED | OUTPATIENT
Start: 2018-01-01 | End: 2018-01-01 | Stop reason: HOSPADM

## 2018-01-01 RX ORDER — CALCIUM CARBONATE 500 MG/1
500 TABLET, CHEWABLE ORAL DAILY PRN
Status: DISCONTINUED | OUTPATIENT
Start: 2018-01-01 | End: 2018-01-01 | Stop reason: HOSPADM

## 2018-01-01 RX ORDER — FENTANYL CITRATE 50 UG/ML
25-50 INJECTION, SOLUTION INTRAMUSCULAR; INTRAVENOUS EVERY 5 MIN PRN
Status: DISCONTINUED | OUTPATIENT
Start: 2018-01-01 | End: 2018-01-01 | Stop reason: CLARIF

## 2018-01-01 RX ORDER — DIAZEPAM 2 MG
4 TABLET ORAL EVERY 6 HOURS PRN
Qty: 60 TABLET | Status: SHIPPED | DISCHARGE
Start: 2018-01-01 | End: 2018-01-01 | Stop reason: ALTCHOICE

## 2018-01-01 RX ORDER — MINERAL OIL/HYDROPHIL PETROLAT
OINTMENT (GRAM) TOPICAL 2 TIMES DAILY
Status: DISCONTINUED | OUTPATIENT
Start: 2018-01-01 | End: 2018-01-01 | Stop reason: HOSPADM

## 2018-01-01 RX ORDER — ONDANSETRON 4 MG/1
4 TABLET, ORALLY DISINTEGRATING ORAL EVERY 6 HOURS PRN
Status: DISCONTINUED | OUTPATIENT
Start: 2018-01-01 | End: 2018-01-01 | Stop reason: HOSPADM

## 2018-01-01 RX ORDER — ARIPIPRAZOLE 5 MG/1
5 TABLET ORAL EVERY MORNING
Status: DISCONTINUED | OUTPATIENT
Start: 2018-01-01 | End: 2018-01-01

## 2018-01-01 RX ORDER — OXYCODONE HYDROCHLORIDE 10 MG/1
10 TABLET ORAL
Start: 2018-01-01 | End: 2018-01-01

## 2018-01-01 RX ORDER — OXYCODONE HYDROCHLORIDE 5 MG/1
5-10 TABLET ORAL EVERY 4 HOURS PRN
Status: DISCONTINUED | OUTPATIENT
Start: 2018-01-01 | End: 2018-01-01

## 2018-01-01 RX ORDER — ZOLPIDEM TARTRATE 5 MG/1
5 TABLET ORAL AT BEDTIME
Status: DISCONTINUED | OUTPATIENT
Start: 2018-01-01 | End: 2018-01-01 | Stop reason: HOSPADM

## 2018-01-01 RX ORDER — LEVETIRACETAM 250 MG/1
250 TABLET ORAL EVERY EVENING
Status: DISCONTINUED | OUTPATIENT
Start: 2018-01-01 | End: 2018-01-01

## 2018-01-01 RX ORDER — OXYBUTYNIN CHLORIDE 5 MG/1
5 TABLET ORAL DAILY
Status: DISCONTINUED | OUTPATIENT
Start: 2018-01-01 | End: 2018-01-01

## 2018-01-01 RX ORDER — LOPERAMIDE HCL 2 MG
4 CAPSULE ORAL 3 TIMES DAILY
Status: DISCONTINUED | OUTPATIENT
Start: 2018-01-01 | End: 2018-01-01

## 2018-01-01 RX ORDER — ACETAMINOPHEN 325 MG/1
650 TABLET ORAL 4 TIMES DAILY
Status: DISCONTINUED | OUTPATIENT
Start: 2018-01-01 | End: 2018-01-01

## 2018-01-01 RX ORDER — LACTOSE-REDUCED FOOD
LIQUID (ML) ORAL 3 TIMES DAILY
COMMUNITY
End: 2018-01-01

## 2018-01-01 RX ORDER — HYDROMORPHONE HYDROCHLORIDE 2 MG/1
2-4 TABLET ORAL EVERY 4 HOURS PRN
Status: DISCONTINUED | OUTPATIENT
Start: 2018-01-01 | End: 2018-01-01

## 2018-01-01 RX ORDER — OXYCODONE HCL 10 MG/1
20 TABLET, FILM COATED, EXTENDED RELEASE ORAL EVERY 12 HOURS
Status: DISCONTINUED | OUTPATIENT
Start: 2018-01-01 | End: 2018-01-01

## 2018-01-01 RX ORDER — IOPAMIDOL 612 MG/ML
1-15 INJECTION, SOLUTION INTRATHECAL ONCE
Status: COMPLETED | OUTPATIENT
Start: 2018-01-01 | End: 2018-01-01

## 2018-01-01 RX ORDER — HYDROMORPHONE HYDROCHLORIDE 1 MG/ML
0.5 INJECTION, SOLUTION INTRAMUSCULAR; INTRAVENOUS; SUBCUTANEOUS
Status: COMPLETED | OUTPATIENT
Start: 2018-01-01 | End: 2018-01-01

## 2018-01-01 RX ORDER — ZINC SULFATE 50(220)MG
220 CAPSULE ORAL DAILY
Status: DISCONTINUED | OUTPATIENT
Start: 2018-01-01 | End: 2018-01-01 | Stop reason: HOSPADM

## 2018-01-01 RX ORDER — TRIAMCINOLONE ACETONIDE 1 MG/G
CREAM TOPICAL 3 TIMES DAILY
DISCHARGE
Start: 2018-01-01 | End: 2018-01-01

## 2018-01-01 RX ORDER — BISACODYL 10 MG
10 SUPPOSITORY, RECTAL RECTAL DAILY PRN
COMMUNITY

## 2018-01-01 RX ORDER — ALBUTEROL SULFATE 1.25 MG/3ML
1 SOLUTION RESPIRATORY (INHALATION) 4 TIMES DAILY
Status: ON HOLD | COMMUNITY
Start: 2018-04-09 | End: 2018-01-01

## 2018-01-01 RX ORDER — SODIUM CHLORIDE 9 MG/ML
INJECTION, SOLUTION INTRAVENOUS
Status: DISPENSED
Start: 2018-01-01 | End: 2018-01-01

## 2018-01-01 RX ORDER — BISACODYL 10 MG
10 SUPPOSITORY, RECTAL RECTAL DAILY PRN
Status: DISCONTINUED | OUTPATIENT
Start: 2018-01-01 | End: 2018-01-01 | Stop reason: HOSPADM

## 2018-01-01 RX ORDER — ZINC/PETROLATUM,WHITE
PASTE (GRAM) TOPICAL
Status: DISCONTINUED | OUTPATIENT
Start: 2018-01-01 | End: 2018-01-01 | Stop reason: HOSPADM

## 2018-01-01 RX ORDER — LOPERAMIDE HCL 2 MG
2 CAPSULE ORAL 4 TIMES DAILY PRN
Status: DISCONTINUED | OUTPATIENT
Start: 2018-01-01 | End: 2018-01-01

## 2018-01-01 RX ORDER — LIDOCAINE 4 G/G
1 PATCH TOPICAL
Status: DISCONTINUED | OUTPATIENT
Start: 2018-01-01 | End: 2018-01-01 | Stop reason: HOSPADM

## 2018-01-01 RX ORDER — POTASSIUM CHLORIDE 7.45 MG/ML
10 INJECTION INTRAVENOUS ONCE
Status: COMPLETED | OUTPATIENT
Start: 2018-01-01 | End: 2018-01-01

## 2018-01-01 RX ORDER — OXYBUTYNIN CHLORIDE 5 MG/1
5 TABLET ORAL 2 TIMES DAILY
Status: DISCONTINUED | OUTPATIENT
Start: 2018-01-01 | End: 2018-01-01

## 2018-01-01 RX ORDER — FLUCONAZOLE 150 MG/1
150 TABLET ORAL DAILY
Qty: 1 TABLET | Status: ON HOLD | DISCHARGE
Start: 2018-01-01 | End: 2018-01-01

## 2018-01-01 RX ORDER — OXYCODONE HYDROCHLORIDE 5 MG/1
5 TABLET ORAL ONCE
Status: DISCONTINUED | OUTPATIENT
Start: 2018-01-01 | End: 2018-01-01 | Stop reason: HOSPADM

## 2018-01-01 RX ORDER — ASCORBIC ACID 500 MG
500 TABLET ORAL DAILY
Status: DISPENSED | OUTPATIENT
Start: 2018-01-01 | End: 2018-01-01

## 2018-01-01 RX ORDER — DIVALPROEX SODIUM 250 MG/1
250 TABLET, DELAYED RELEASE ORAL 2 TIMES DAILY
Status: ON HOLD | COMMUNITY
End: 2018-01-01

## 2018-01-01 RX ORDER — DIVALPROEX SODIUM 125 MG/1
250 CAPSULE, COATED PELLETS ORAL 2 TIMES DAILY
Status: DISCONTINUED | OUTPATIENT
Start: 2018-01-01 | End: 2018-01-01

## 2018-01-01 RX ORDER — MULTIPLE VITAMINS W/ MINERALS TAB 9MG-400MCG
1 TAB ORAL DAILY
Status: DISCONTINUED | OUTPATIENT
Start: 2018-01-01 | End: 2018-01-01 | Stop reason: HOSPADM

## 2018-01-01 RX ORDER — CALCIUM CARBONATE 500 MG/1
500-1000 TABLET, CHEWABLE ORAL 4 TIMES DAILY PRN
Status: DISCONTINUED | OUTPATIENT
Start: 2018-01-01 | End: 2018-01-01 | Stop reason: HOSPADM

## 2018-01-01 RX ORDER — FENTANYL CITRATE 50 UG/ML
50 INJECTION, SOLUTION INTRAMUSCULAR; INTRAVENOUS ONCE
Status: COMPLETED | OUTPATIENT
Start: 2018-01-01 | End: 2018-01-01

## 2018-01-01 RX ORDER — ONDANSETRON 2 MG/ML
4 INJECTION INTRAMUSCULAR; INTRAVENOUS EVERY 30 MIN PRN
Status: DISCONTINUED | OUTPATIENT
Start: 2018-01-01 | End: 2018-01-01 | Stop reason: HOSPADM

## 2018-01-01 RX ORDER — PAROXETINE 10 MG/1
10 TABLET, FILM COATED ORAL AT BEDTIME
Qty: 7 TABLET | Refills: 1 | Status: SHIPPED | OUTPATIENT
Start: 2018-01-01 | End: 2018-01-01

## 2018-01-01 RX ORDER — ASCORBIC ACID 500 MG
500 TABLET ORAL DAILY
Status: DISCONTINUED | OUTPATIENT
Start: 2018-01-01 | End: 2018-01-01 | Stop reason: HOSPADM

## 2018-01-01 RX ORDER — CYCLOBENZAPRINE HCL 5 MG
5 TABLET ORAL 3 TIMES DAILY PRN
Status: DISCONTINUED | OUTPATIENT
Start: 2018-01-01 | End: 2018-01-01 | Stop reason: HOSPADM

## 2018-01-01 RX ORDER — ASPIRIN 81 MG/1
81 TABLET, CHEWABLE ORAL DAILY
Status: DISCONTINUED | OUTPATIENT
Start: 2018-01-01 | End: 2018-01-01

## 2018-01-01 RX ORDER — ACETAMINOPHEN 325 MG/1
975 TABLET ORAL 3 TIMES DAILY
Status: DISCONTINUED | OUTPATIENT
Start: 2018-01-01 | End: 2018-01-01 | Stop reason: HOSPADM

## 2018-01-01 RX ORDER — METHADONE HYDROCHLORIDE 5 MG/1
5 TABLET ORAL EVERY 8 HOURS
Refills: 0
Start: 2018-01-01

## 2018-01-01 RX ORDER — HYDROMORPHONE HYDROCHLORIDE 2 MG/1
2 TABLET ORAL
Status: DISCONTINUED | OUTPATIENT
Start: 2018-01-01 | End: 2018-01-01

## 2018-01-01 RX ORDER — FENTANYL CITRATE 50 UG/ML
25-50 INJECTION, SOLUTION INTRAMUSCULAR; INTRAVENOUS EVERY 5 MIN PRN
Status: DISCONTINUED | OUTPATIENT
Start: 2018-01-01 | End: 2018-01-01 | Stop reason: HOSPADM

## 2018-01-01 RX ORDER — GABAPENTIN 100 MG/1
100 CAPSULE ORAL 2 TIMES DAILY
Status: DISCONTINUED | OUTPATIENT
Start: 2018-01-01 | End: 2018-01-01

## 2018-01-01 RX ORDER — CALCIUM CARBONATE 500 MG/1
500 TABLET, CHEWABLE ORAL 4 TIMES DAILY PRN
Status: DISCONTINUED | OUTPATIENT
Start: 2018-01-01 | End: 2018-01-01

## 2018-01-01 RX ORDER — LOPERAMIDE HCL 2 MG
4 CAPSULE ORAL 4 TIMES DAILY
Qty: 20 CAPSULE | DISCHARGE
Start: 2018-01-01 | End: 2018-01-01

## 2018-01-01 RX ORDER — DRONABINOL 2.5 MG/1
2.5 CAPSULE ORAL
Refills: 0 | Status: SHIPPED | DISCHARGE
Start: 2018-01-01 | End: 2018-01-01

## 2018-01-01 RX ORDER — DIPHENHYDRAMINE HCL 25 MG
25 CAPSULE ORAL EVERY 8 HOURS
Status: DISCONTINUED | OUTPATIENT
Start: 2018-01-01 | End: 2018-01-01 | Stop reason: HOSPADM

## 2018-01-01 RX ORDER — QUETIAPINE FUMARATE 50 MG/1
50 TABLET, FILM COATED ORAL AT BEDTIME
Status: DISCONTINUED | OUTPATIENT
Start: 2018-01-01 | End: 2018-01-01 | Stop reason: HOSPADM

## 2018-01-01 RX ORDER — IOPAMIDOL 755 MG/ML
115 INJECTION, SOLUTION INTRAVASCULAR ONCE
Status: DISCONTINUED | OUTPATIENT
Start: 2018-01-01 | End: 2018-01-01 | Stop reason: CLARIF

## 2018-01-01 RX ORDER — VANCOMYCIN HYDROCHLORIDE 1 G/200ML
1000 INJECTION, SOLUTION INTRAVENOUS EVERY 12 HOURS
Status: DISCONTINUED | OUTPATIENT
Start: 2018-01-01 | End: 2018-01-01

## 2018-01-01 RX ORDER — HYDRALAZINE HYDROCHLORIDE 20 MG/ML
20 INJECTION INTRAMUSCULAR; INTRAVENOUS ONCE
Status: DISCONTINUED | OUTPATIENT
Start: 2018-01-01 | End: 2018-01-01 | Stop reason: CLARIF

## 2018-01-01 RX ORDER — ARIPIPRAZOLE 5 MG/1
5 TABLET ORAL 2 TIMES DAILY
COMMUNITY

## 2018-01-01 RX ORDER — HYDROXYZINE HYDROCHLORIDE 50 MG/1
50 TABLET, FILM COATED ORAL AT BEDTIME
Status: DISCONTINUED | OUTPATIENT
Start: 2018-01-01 | End: 2018-01-01

## 2018-01-01 RX ORDER — IPRATROPIUM BROMIDE AND ALBUTEROL SULFATE 2.5; .5 MG/3ML; MG/3ML
3 SOLUTION RESPIRATORY (INHALATION) EVERY 4 HOURS PRN
Qty: 360 ML | DISCHARGE
Start: 2018-01-01

## 2018-01-01 RX ORDER — POLYETHYLENE GLYCOL 3350 17 G/17G
1 POWDER, FOR SOLUTION ORAL DAILY
Qty: 527 G | Refills: 0 | Status: SHIPPED | OUTPATIENT
Start: 2018-01-01 | End: 2018-01-01

## 2018-01-01 RX ORDER — LEVETIRACETAM 500 MG/1
500 TABLET ORAL EVERY MORNING
Status: DISCONTINUED | OUTPATIENT
Start: 2018-01-01 | End: 2018-01-01

## 2018-01-01 RX ORDER — FLUCONAZOLE 150 MG/1
150 TABLET ORAL DAILY
Status: DISCONTINUED | OUTPATIENT
Start: 2018-01-01 | End: 2018-01-01 | Stop reason: HOSPADM

## 2018-01-01 RX ORDER — FUROSEMIDE 20 MG
20 TABLET ORAL EVERY 8 HOURS
Status: COMPLETED | OUTPATIENT
Start: 2018-01-01 | End: 2018-01-01

## 2018-01-01 RX ORDER — MULTIPLE VITAMINS W/ MINERALS TAB 9MG-400MCG
1 TAB ORAL DAILY
Qty: 30 EACH | Refills: 0 | Status: ON HOLD | DISCHARGE
Start: 2018-01-01 | End: 2018-01-01

## 2018-01-01 RX ORDER — DULOXETIN HYDROCHLORIDE 30 MG/1
30 CAPSULE, DELAYED RELEASE ORAL EVERY MORNING
Status: DISCONTINUED | OUTPATIENT
Start: 2018-01-01 | End: 2018-01-01

## 2018-01-01 RX ORDER — POTASSIUM CHLORIDE 750 MG/1
40 TABLET, EXTENDED RELEASE ORAL ONCE
Status: DISCONTINUED | OUTPATIENT
Start: 2018-01-01 | End: 2018-01-01 | Stop reason: HOSPADM

## 2018-01-01 RX ORDER — SODIUM CHLORIDE, SODIUM LACTATE, POTASSIUM CHLORIDE, CALCIUM CHLORIDE 600; 310; 30; 20 MG/100ML; MG/100ML; MG/100ML; MG/100ML
INJECTION, SOLUTION INTRAVENOUS
Status: COMPLETED
Start: 2018-01-01 | End: 2018-01-01

## 2018-01-01 RX ORDER — QUETIAPINE FUMARATE 25 MG/1
25 TABLET, FILM COATED ORAL 2 TIMES DAILY
Status: DISCONTINUED | OUTPATIENT
Start: 2018-01-01 | End: 2018-01-01

## 2018-01-01 RX ORDER — PIPERACILLIN SODIUM, TAZOBACTAM SODIUM 3; .375 G/15ML; G/15ML
3.38 INJECTION, POWDER, LYOPHILIZED, FOR SOLUTION INTRAVENOUS EVERY 6 HOURS
Status: DISCONTINUED | OUTPATIENT
Start: 2018-01-01 | End: 2018-01-01

## 2018-01-01 RX ORDER — POTASSIUM CHLORIDE 750 MG/1
40 TABLET, EXTENDED RELEASE ORAL ONCE
Status: COMPLETED | OUTPATIENT
Start: 2018-01-01 | End: 2018-01-01

## 2018-01-01 RX ORDER — HYDROMORPHONE HYDROCHLORIDE 1 MG/ML
1 INJECTION, SOLUTION INTRAMUSCULAR; INTRAVENOUS; SUBCUTANEOUS
Status: COMPLETED | OUTPATIENT
Start: 2018-01-01 | End: 2018-01-01

## 2018-01-01 RX ORDER — FERROUS SULFATE 325(65) MG
325 TABLET ORAL 2 TIMES DAILY
Status: DISCONTINUED | OUTPATIENT
Start: 2018-01-01 | End: 2018-01-01

## 2018-01-01 RX ORDER — ONDANSETRON 2 MG/ML
4 INJECTION INTRAMUSCULAR; INTRAVENOUS EVERY 6 HOURS
Status: DISCONTINUED | OUTPATIENT
Start: 2018-01-01 | End: 2018-01-01 | Stop reason: HOSPADM

## 2018-01-01 RX ORDER — ASCORBIC ACID 500 MG
500 TABLET ORAL DAILY
DISCHARGE
Start: 2018-01-01 | End: 2018-01-01

## 2018-01-01 RX ORDER — LEVETIRACETAM 250 MG/1
250 TABLET ORAL 2 TIMES DAILY
COMMUNITY

## 2018-01-01 RX ORDER — QUETIAPINE FUMARATE 25 MG/1
25 TABLET, FILM COATED ORAL DAILY
Status: DISCONTINUED | OUTPATIENT
Start: 2018-01-01 | End: 2018-01-01 | Stop reason: HOSPADM

## 2018-01-01 RX ORDER — ZINC SULFATE 50(220)MG
220 CAPSULE ORAL DAILY
Qty: 7 CAPSULE | Refills: 0 | DISCHARGE
Start: 2018-01-01 | End: 2018-01-01

## 2018-01-01 RX ORDER — LEVOTHYROXINE SODIUM 75 UG/1
150 TABLET ORAL EVERY MORNING
Status: DISCONTINUED | OUTPATIENT
Start: 2018-01-01 | End: 2018-01-01

## 2018-01-01 RX ORDER — HEPARIN SODIUM,PORCINE 10 UNIT/ML
5 VIAL (ML) INTRAVENOUS ONCE
Status: COMPLETED | OUTPATIENT
Start: 2018-01-01 | End: 2018-01-01

## 2018-01-01 RX ORDER — LEVETIRACETAM 250 MG/1
250 TABLET ORAL EVERY EVENING
DISCHARGE
Start: 2018-01-01 | End: 2018-01-01 | Stop reason: DRUGHIGH

## 2018-01-01 RX ORDER — CYCLOBENZAPRINE HCL 5 MG
5 TABLET ORAL 3 TIMES DAILY PRN
Qty: 42 TABLET | DISCHARGE
Start: 2018-01-01

## 2018-01-01 RX ORDER — IOPAMIDOL 755 MG/ML
114 INJECTION, SOLUTION INTRAVASCULAR ONCE
Status: COMPLETED | OUTPATIENT
Start: 2018-01-01 | End: 2018-01-01

## 2018-01-01 RX ORDER — MAGNESIUM HYDROXIDE 1200 MG/15ML
LIQUID ORAL
Status: DISPENSED
Start: 2018-01-01 | End: 2018-01-01

## 2018-01-01 RX ORDER — IOPAMIDOL 755 MG/ML
100 INJECTION, SOLUTION INTRAVASCULAR ONCE
Status: COMPLETED | OUTPATIENT
Start: 2018-01-01 | End: 2018-01-01

## 2018-01-01 RX ORDER — BUPIVACAINE HYDROCHLORIDE 2.5 MG/ML
1-10 INJECTION, SOLUTION EPIDURAL; INFILTRATION; INTRACAUDAL ONCE
Status: COMPLETED | OUTPATIENT
Start: 2018-01-01 | End: 2018-01-01

## 2018-01-01 RX ORDER — IODIXANOL 320 MG/ML
50 INJECTION, SOLUTION INTRAVASCULAR ONCE
Status: COMPLETED | OUTPATIENT
Start: 2018-01-01 | End: 2018-01-01

## 2018-01-01 RX ORDER — ZINC OXIDE
OINTMENT (GRAM) TOPICAL PRN
COMMUNITY

## 2018-01-01 RX ORDER — SODIUM CHLORIDE, SODIUM LACTATE, POTASSIUM CHLORIDE, CALCIUM CHLORIDE 600; 310; 30; 20 MG/100ML; MG/100ML; MG/100ML; MG/100ML
INJECTION, SOLUTION INTRAVENOUS CONTINUOUS
Status: DISCONTINUED | OUTPATIENT
Start: 2018-01-01 | End: 2018-01-01

## 2018-01-01 RX ORDER — ONDANSETRON 4 MG/1
4 TABLET, ORALLY DISINTEGRATING ORAL EVERY 6 HOURS PRN
Qty: 20 TABLET | Refills: 1 | DISCHARGE
Start: 2018-01-01 | End: 2018-01-01

## 2018-01-01 RX ORDER — ONDANSETRON 4 MG/1
4 TABLET, ORALLY DISINTEGRATING ORAL EVERY 6 HOURS PRN
Status: DISCONTINUED | OUTPATIENT
Start: 2018-01-01 | End: 2018-01-01

## 2018-01-01 RX ORDER — POTASSIUM CHLORIDE 750 MG/1
10 TABLET, EXTENDED RELEASE ORAL EVERY MORNING
Status: DISCONTINUED | OUTPATIENT
Start: 2018-01-01 | End: 2018-01-01

## 2018-01-01 RX ORDER — HYDROXYZINE HYDROCHLORIDE 25 MG/1
50 TABLET, FILM COATED ORAL EVERY 4 HOURS PRN
Qty: 60 TABLET | Refills: 1
Start: 2018-01-01

## 2018-01-01 RX ORDER — ZINC SULFATE 50(220)MG
220 CAPSULE ORAL DAILY
Status: DISPENSED | OUTPATIENT
Start: 2018-01-01 | End: 2018-01-01

## 2018-01-01 RX ORDER — QUETIAPINE FUMARATE 50 MG/1
50 TABLET, FILM COATED ORAL AT BEDTIME
Qty: 120 TABLET | DISCHARGE
Start: 2018-01-01 | End: 2018-01-01

## 2018-01-01 RX ORDER — AMINO ACIDS/PROTEIN HYDROLYS 11G-40/45
1 LIQUID IN PACKET (ML) ORAL 3 TIMES DAILY
DISCHARGE
Start: 2018-01-01 | End: 2018-01-01

## 2018-01-01 RX ORDER — AMINO ACIDS/PROTEIN HYDROLYS 11G-40/45
1 LIQUID IN PACKET (ML) ORAL 3 TIMES DAILY
Status: DISCONTINUED | OUTPATIENT
Start: 2018-01-01 | End: 2018-01-01 | Stop reason: HOSPADM

## 2018-01-01 RX ORDER — FERROUS SULFATE 325(65) MG
325 TABLET ORAL DAILY
Status: DISCONTINUED | OUTPATIENT
Start: 2018-01-01 | End: 2018-01-01

## 2018-01-01 RX ORDER — FLUMAZENIL 0.1 MG/ML
0.2 INJECTION, SOLUTION INTRAVENOUS
Status: DISCONTINUED | OUTPATIENT
Start: 2018-01-01 | End: 2018-01-01 | Stop reason: CLARIF

## 2018-01-01 RX ORDER — LIDOCAINE 40 MG/G
CREAM TOPICAL
Status: DISCONTINUED | OUTPATIENT
Start: 2018-01-01 | End: 2018-01-01 | Stop reason: HOSPADM

## 2018-01-01 RX ORDER — HYDROMORPHONE HCL/0.9% NACL/PF 0.2MG/0.2
0.2 SYRINGE (ML) INTRAVENOUS
Status: DISCONTINUED | OUTPATIENT
Start: 2018-01-01 | End: 2018-01-01

## 2018-01-01 RX ADMIN — ZINC SULFATE CAP 220 MG (50 MG ELEMENTAL ZN) 220 MG: 220 (50 ZN) CAP at 15:15

## 2018-01-01 RX ADMIN — LEVOTHYROXINE SODIUM 150 MCG: 75 TABLET ORAL at 08:42

## 2018-01-01 RX ADMIN — OXYCODONE HYDROCHLORIDE 15 MG: 5 TABLET ORAL at 10:28

## 2018-01-01 RX ADMIN — HYDROXYZINE HYDROCHLORIDE 50 MG: 50 TABLET, FILM COATED ORAL at 21:33

## 2018-01-01 RX ADMIN — LOPERAMIDE HYDROCHLORIDE 2 MG: 2 CAPSULE ORAL at 21:04

## 2018-01-01 RX ADMIN — IPRATROPIUM BROMIDE AND ALBUTEROL SULFATE 3 ML: .5; 3 SOLUTION RESPIRATORY (INHALATION) at 08:13

## 2018-01-01 RX ADMIN — LEVETIRACETAM 250 MG: 250 TABLET, FILM COATED ORAL at 10:10

## 2018-01-01 RX ADMIN — Medication 2 MG: at 12:03

## 2018-01-01 RX ADMIN — PIPERACILLIN SODIUM AND TAZOBACTAM SODIUM 4.5 G: 4; .5 INJECTION, POWDER, LYOPHILIZED, FOR SOLUTION INTRAVENOUS at 17:37

## 2018-01-01 RX ADMIN — OMEPRAZOLE 20 MG: 20 CAPSULE, DELAYED RELEASE ORAL at 10:29

## 2018-01-01 RX ADMIN — RIVAROXABAN 20 MG: 10 TABLET, FILM COATED ORAL at 18:00

## 2018-01-01 RX ADMIN — LOPERAMIDE HYDROCHLORIDE 2 MG: 2 CAPSULE ORAL at 13:06

## 2018-01-01 RX ADMIN — ZOLPIDEM TARTRATE 5 MG: 5 TABLET, COATED ORAL at 22:07

## 2018-01-01 RX ADMIN — LOPERAMIDE HYDROCHLORIDE 4 MG: 2 CAPSULE ORAL at 18:18

## 2018-01-01 RX ADMIN — OXYCODONE HYDROCHLORIDE 20 MG: 10 TABLET, FILM COATED, EXTENDED RELEASE ORAL at 19:24

## 2018-01-01 RX ADMIN — Medication 20000 UNITS: at 17:02

## 2018-01-01 RX ADMIN — DIAZEPAM 5 MG: 5 TABLET ORAL at 14:38

## 2018-01-01 RX ADMIN — Medication 2 MG: at 22:57

## 2018-01-01 RX ADMIN — HYDROMORPHONE HYDROCHLORIDE 2 MG: 2 TABLET ORAL at 17:38

## 2018-01-01 RX ADMIN — LOPERAMIDE HYDROCHLORIDE 2 MG: 2 CAPSULE ORAL at 04:34

## 2018-01-01 RX ADMIN — ONDANSETRON 4 MG: 2 INJECTION INTRAMUSCULAR; INTRAVENOUS at 00:43

## 2018-01-01 RX ADMIN — OXYCODONE HYDROCHLORIDE 20 MG: 10 TABLET, FILM COATED, EXTENDED RELEASE ORAL at 21:52

## 2018-01-01 RX ADMIN — Medication 2 MG: at 04:55

## 2018-01-01 RX ADMIN — Medication 2 MG: at 18:21

## 2018-01-01 RX ADMIN — HYDROMORPHONE HYDROCHLORIDE 1 MG: 1 INJECTION, SOLUTION INTRAMUSCULAR; INTRAVENOUS; SUBCUTANEOUS at 01:06

## 2018-01-01 RX ADMIN — Medication 2 MG: at 09:17

## 2018-01-01 RX ADMIN — LOPERAMIDE HYDROCHLORIDE 4 MG: 2 CAPSULE ORAL at 12:34

## 2018-01-01 RX ADMIN — GUAIFENESIN 600 MG: 600 TABLET, EXTENDED RELEASE ORAL at 09:59

## 2018-01-01 RX ADMIN — SODIUM CHLORIDE 1000 ML: 9 INJECTION, SOLUTION INTRAVENOUS at 05:52

## 2018-01-01 RX ADMIN — IPRATROPIUM BROMIDE AND ALBUTEROL SULFATE 3 ML: .5; 3 SOLUTION RESPIRATORY (INHALATION) at 20:18

## 2018-01-01 RX ADMIN — QUETIAPINE 25 MG: 25 TABLET ORAL at 08:57

## 2018-01-01 RX ADMIN — RIVAROXABAN 20 MG: 20 TABLET, FILM COATED ORAL at 20:00

## 2018-01-01 RX ADMIN — IPRATROPIUM BROMIDE AND ALBUTEROL SULFATE 3 ML: .5; 3 SOLUTION RESPIRATORY (INHALATION) at 11:52

## 2018-01-01 RX ADMIN — LEVETIRACETAM 250 MG: 250 TABLET, FILM COATED ORAL at 19:24

## 2018-01-01 RX ADMIN — SODIUM CHLORIDE, POTASSIUM CHLORIDE, SODIUM LACTATE AND CALCIUM CHLORIDE: 600; 310; 30; 20 INJECTION, SOLUTION INTRAVENOUS at 01:02

## 2018-01-01 RX ADMIN — DIAZEPAM 2 MG: 2 TABLET ORAL at 18:57

## 2018-01-01 RX ADMIN — ACETAMINOPHEN 650 MG: 325 TABLET ORAL at 10:29

## 2018-01-01 RX ADMIN — Medication 1 PACKET: at 09:33

## 2018-01-01 RX ADMIN — OXYCODONE HYDROCHLORIDE 15 MG: 5 TABLET ORAL at 18:30

## 2018-01-01 RX ADMIN — Medication 1 PACKET: at 22:31

## 2018-01-01 RX ADMIN — LOPERAMIDE HYDROCHLORIDE 2 MG: 2 CAPSULE ORAL at 03:46

## 2018-01-01 RX ADMIN — LOPERAMIDE HYDROCHLORIDE 2 MG: 2 CAPSULE ORAL at 20:41

## 2018-01-01 RX ADMIN — HYDROMORPHONE HYDROCHLORIDE 4 MG: 2 TABLET ORAL at 18:21

## 2018-01-01 RX ADMIN — DIVALPROEX SODIUM 250 MG: 125 CAPSULE, COATED PELLETS ORAL at 20:45

## 2018-01-01 RX ADMIN — DIVALPROEX SODIUM 250 MG: 125 CAPSULE, COATED PELLETS ORAL at 10:28

## 2018-01-01 RX ADMIN — LEVOTHYROXINE SODIUM 150 MCG: 75 TABLET ORAL at 06:55

## 2018-01-01 RX ADMIN — OMEPRAZOLE 20 MG: 20 CAPSULE, DELAYED RELEASE ORAL at 16:25

## 2018-01-01 RX ADMIN — HYDROMORPHONE HYDROCHLORIDE 4 MG: 2 TABLET ORAL at 12:48

## 2018-01-01 RX ADMIN — DULOXETINE HYDROCHLORIDE 30 MG: 30 CAPSULE, DELAYED RELEASE ORAL at 09:36

## 2018-01-01 RX ADMIN — ONDANSETRON 4 MG: 2 INJECTION INTRAMUSCULAR; INTRAVENOUS at 17:21

## 2018-01-01 RX ADMIN — LOPERAMIDE HYDROCHLORIDE 4 MG: 2 CAPSULE ORAL at 08:56

## 2018-01-01 RX ADMIN — CALCIUM CARBONATE (ANTACID) CHEW TAB 500 MG 1000 MG: 500 CHEW TAB at 01:12

## 2018-01-01 RX ADMIN — IPRATROPIUM BROMIDE AND ALBUTEROL SULFATE 3 ML: .5; 3 SOLUTION RESPIRATORY (INHALATION) at 13:06

## 2018-01-01 RX ADMIN — Medication 2 MG: at 22:38

## 2018-01-01 RX ADMIN — LOPERAMIDE HYDROCHLORIDE 2 MG: 2 CAPSULE ORAL at 21:41

## 2018-01-01 RX ADMIN — OXYCODONE HYDROCHLORIDE 20 MG: 10 TABLET, FILM COATED, EXTENDED RELEASE ORAL at 14:31

## 2018-01-01 RX ADMIN — DIAZEPAM 2 MG: 2 TABLET ORAL at 18:56

## 2018-01-01 RX ADMIN — OXYCODONE HYDROCHLORIDE 10 MG: 10 TABLET ORAL at 19:21

## 2018-01-01 RX ADMIN — Medication 2 MG: at 17:25

## 2018-01-01 RX ADMIN — PIPERACILLIN SODIUM AND TAZOBACTAM SODIUM 4.5 G: 4; .5 INJECTION, POWDER, LYOPHILIZED, FOR SOLUTION INTRAVENOUS at 05:32

## 2018-01-01 RX ADMIN — ZINC SULFATE CAP 220 MG (50 MG ELEMENTAL ZN) 220 MG: 220 (50 ZN) CAP at 09:37

## 2018-01-01 RX ADMIN — ASPIRIN 81 MG CHEWABLE TABLET 81 MG: 81 TABLET CHEWABLE at 09:36

## 2018-01-01 RX ADMIN — MIDAZOLAM HYDROCHLORIDE 1 MG: 1 INJECTION, SOLUTION INTRAMUSCULAR; INTRAVENOUS at 16:41

## 2018-01-01 RX ADMIN — OXYCODONE HYDROCHLORIDE 20 MG: 10 TABLET, FILM COATED, EXTENDED RELEASE ORAL at 07:48

## 2018-01-01 RX ADMIN — DIVALPROEX SODIUM 250 MG: 125 CAPSULE, COATED PELLETS ORAL at 20:35

## 2018-01-01 RX ADMIN — LOPERAMIDE HYDROCHLORIDE 2 MG: 2 CAPSULE ORAL at 09:07

## 2018-01-01 RX ADMIN — LEVETIRACETAM 250 MG: 250 TABLET, FILM COATED ORAL at 20:26

## 2018-01-01 RX ADMIN — Medication 2 MG: at 20:55

## 2018-01-01 RX ADMIN — IPRATROPIUM BROMIDE AND ALBUTEROL SULFATE 3 ML: .5; 3 SOLUTION RESPIRATORY (INHALATION) at 12:02

## 2018-01-01 RX ADMIN — LOPERAMIDE HYDROCHLORIDE 4 MG: 2 CAPSULE ORAL at 16:07

## 2018-01-01 RX ADMIN — ARIPIPRAZOLE 5 MG: 5 TABLET ORAL at 09:54

## 2018-01-01 RX ADMIN — Medication 5 MG: at 22:17

## 2018-01-01 RX ADMIN — Medication 2 MG: at 16:22

## 2018-01-01 RX ADMIN — FUROSEMIDE 20 MG: 20 TABLET ORAL at 10:09

## 2018-01-01 RX ADMIN — DIAZEPAM 2 MG: 2 TABLET ORAL at 21:17

## 2018-01-01 RX ADMIN — ONDANSETRON 4 MG: 2 INJECTION INTRAMUSCULAR; INTRAVENOUS at 18:38

## 2018-01-01 RX ADMIN — HYDROMORPHONE HYDROCHLORIDE 2 MG: 2 TABLET ORAL at 21:33

## 2018-01-01 RX ADMIN — PIPERACILLIN SODIUM AND TAZOBACTAM SODIUM 4.5 G: 4; .5 INJECTION, POWDER, LYOPHILIZED, FOR SOLUTION INTRAVENOUS at 19:24

## 2018-01-01 RX ADMIN — ACETAMINOPHEN 650 MG: 325 TABLET ORAL at 11:29

## 2018-01-01 RX ADMIN — ONDANSETRON 4 MG: 2 INJECTION INTRAMUSCULAR; INTRAVENOUS at 18:17

## 2018-01-01 RX ADMIN — GABAPENTIN 100 MG: 100 CAPSULE ORAL at 19:37

## 2018-01-01 RX ADMIN — OMEPRAZOLE 20 MG: 20 CAPSULE, DELAYED RELEASE ORAL at 08:28

## 2018-01-01 RX ADMIN — DIAZEPAM 4 MG: 2 TABLET ORAL at 18:30

## 2018-01-01 RX ADMIN — ACETAMINOPHEN 650 MG: 325 TABLET ORAL at 15:43

## 2018-01-01 RX ADMIN — ZOLPIDEM TARTRATE 5 MG: 5 TABLET, COATED ORAL at 22:28

## 2018-01-01 RX ADMIN — CALCIUM CARBONATE (ANTACID) CHEW TAB 500 MG 1000 MG: 500 CHEW TAB at 11:03

## 2018-01-01 RX ADMIN — CALCIUM CARBONATE (ANTACID) CHEW TAB 500 MG 1000 MG: 500 CHEW TAB at 22:24

## 2018-01-01 RX ADMIN — OXYCODONE HYDROCHLORIDE 15 MG: 5 TABLET ORAL at 20:28

## 2018-01-01 RX ADMIN — LOPERAMIDE HYDROCHLORIDE 2 MG: 2 CAPSULE ORAL at 10:43

## 2018-01-01 RX ADMIN — DIVALPROEX SODIUM 250 MG: 125 CAPSULE, COATED PELLETS ORAL at 19:14

## 2018-01-01 RX ADMIN — Medication 1 PACKET: at 16:47

## 2018-01-01 RX ADMIN — PIPERACILLIN SODIUM AND TAZOBACTAM SODIUM 3.38 G: 3; .375 INJECTION, POWDER, LYOPHILIZED, FOR SOLUTION INTRAVENOUS at 18:22

## 2018-01-01 RX ADMIN — LEVETIRACETAM 250 MG: 250 TABLET, FILM COATED ORAL at 18:31

## 2018-01-01 RX ADMIN — IPRATROPIUM BROMIDE AND ALBUTEROL SULFATE 3 ML: .5; 3 SOLUTION RESPIRATORY (INHALATION) at 07:49

## 2018-01-01 RX ADMIN — HYDROMORPHONE HYDROCHLORIDE 2 MG: 2 TABLET ORAL at 04:17

## 2018-01-01 RX ADMIN — CALCIUM CARBONATE (ANTACID) CHEW TAB 500 MG 1000 MG: 500 CHEW TAB at 09:44

## 2018-01-01 RX ADMIN — OXYCODONE HYDROCHLORIDE 20 MG: 10 TABLET, FILM COATED, EXTENDED RELEASE ORAL at 07:50

## 2018-01-01 RX ADMIN — OXYCODONE HYDROCHLORIDE 20 MG: 10 TABLET, FILM COATED, EXTENDED RELEASE ORAL at 19:38

## 2018-01-01 RX ADMIN — GLUCAGON HYDROCHLORIDE 1 MG: 1 INJECTION, POWDER, FOR SOLUTION INTRAMUSCULAR; INTRAVENOUS; SUBCUTANEOUS at 16:51

## 2018-01-01 RX ADMIN — LOPERAMIDE HYDROCHLORIDE 2 MG: 2 CAPSULE ORAL at 08:25

## 2018-01-01 RX ADMIN — ACETAMINOPHEN 650 MG: 325 TABLET ORAL at 12:03

## 2018-01-01 RX ADMIN — DIVALPROEX SODIUM 250 MG: 125 CAPSULE, COATED PELLETS ORAL at 19:56

## 2018-01-01 RX ADMIN — OXYCODONE HYDROCHLORIDE 10 MG: 5 TABLET ORAL at 05:42

## 2018-01-01 RX ADMIN — MULTIPLE VITAMINS W/ MINERALS TAB 1 TABLET: TAB at 08:05

## 2018-01-01 RX ADMIN — PIPERACILLIN SODIUM AND TAZOBACTAM SODIUM 4.5 G: 4; .5 INJECTION, POWDER, LYOPHILIZED, FOR SOLUTION INTRAVENOUS at 14:10

## 2018-01-01 RX ADMIN — OXYCODONE HYDROCHLORIDE 10 MG: 5 TABLET ORAL at 13:37

## 2018-01-01 RX ADMIN — FLUCONAZOLE 150 MG: 150 TABLET ORAL at 12:34

## 2018-01-01 RX ADMIN — OMEPRAZOLE 20 MG: 20 CAPSULE, DELAYED RELEASE ORAL at 17:06

## 2018-01-01 RX ADMIN — Medication 2 MG: at 08:21

## 2018-01-01 RX ADMIN — Medication 1 MG: at 11:06

## 2018-01-01 RX ADMIN — IPRATROPIUM BROMIDE AND ALBUTEROL SULFATE 3 ML: .5; 3 SOLUTION RESPIRATORY (INHALATION) at 12:41

## 2018-01-01 RX ADMIN — GUAIFENESIN 600 MG: 600 TABLET, EXTENDED RELEASE ORAL at 07:48

## 2018-01-01 RX ADMIN — ACETAMINOPHEN 650 MG: 325 TABLET, FILM COATED ORAL at 22:32

## 2018-01-01 RX ADMIN — DIAZEPAM 4 MG: 2 TABLET ORAL at 14:51

## 2018-01-01 RX ADMIN — HYDROMORPHONE HYDROCHLORIDE 0.5 MG: 1 INJECTION, SOLUTION INTRAMUSCULAR; INTRAVENOUS; SUBCUTANEOUS at 21:39

## 2018-01-01 RX ADMIN — DIAZEPAM 4 MG: 2 TABLET ORAL at 09:07

## 2018-01-01 RX ADMIN — PIPERACILLIN SODIUM AND TAZOBACTAM SODIUM 4.5 G: 4; .5 INJECTION, POWDER, LYOPHILIZED, FOR SOLUTION INTRAVENOUS at 14:30

## 2018-01-01 RX ADMIN — RIVAROXABAN 20 MG: 10 TABLET, FILM COATED ORAL at 21:17

## 2018-01-01 RX ADMIN — LEVOTHYROXINE SODIUM 125 MCG: 125 TABLET ORAL at 09:32

## 2018-01-01 RX ADMIN — MULTIPLE VITAMINS W/ MINERALS TAB 1 TABLET: TAB at 08:30

## 2018-01-01 RX ADMIN — MIDAZOLAM HYDROCHLORIDE 1 MG: 1 INJECTION, SOLUTION INTRAMUSCULAR; INTRAVENOUS at 17:19

## 2018-01-01 RX ADMIN — VANCOMYCIN HYDROCHLORIDE 1000 MG: 1 INJECTION, SOLUTION INTRAVENOUS at 13:23

## 2018-01-01 RX ADMIN — OMEPRAZOLE 20 MG: 20 CAPSULE, DELAYED RELEASE ORAL at 07:14

## 2018-01-01 RX ADMIN — IOPAMIDOL 114 ML: 755 INJECTION, SOLUTION INTRAVENOUS at 19:01

## 2018-01-01 RX ADMIN — LEVETIRACETAM 250 MG: 250 TABLET, FILM COATED ORAL at 09:02

## 2018-01-01 RX ADMIN — FENTANYL CITRATE 25 MCG: 50 INJECTION, SOLUTION INTRAMUSCULAR; INTRAVENOUS at 17:10

## 2018-01-01 RX ADMIN — FENTANYL CITRATE 50 MCG: 50 INJECTION, SOLUTION INTRAMUSCULAR; INTRAVENOUS at 17:19

## 2018-01-01 RX ADMIN — RIVAROXABAN 20 MG: 10 TABLET, FILM COATED ORAL at 17:20

## 2018-01-01 RX ADMIN — LEVETIRACETAM 250 MG: 250 TABLET, FILM COATED ORAL at 20:00

## 2018-01-01 RX ADMIN — HYDROMORPHONE HYDROCHLORIDE 4 MG: 2 TABLET ORAL at 01:29

## 2018-01-01 RX ADMIN — LOPERAMIDE HYDROCHLORIDE 4 MG: 2 CAPSULE ORAL at 20:28

## 2018-01-01 RX ADMIN — ISOMETHEPTENE MUCATE, DICHLORALPHENAZONE, AND ACETAMINOPHEN 1 CAPSULE: 65; 100; 325 CAPSULE ORAL at 17:15

## 2018-01-01 RX ADMIN — FENTANYL CITRATE 50 MCG: 50 INJECTION, SOLUTION INTRAMUSCULAR; INTRAVENOUS at 15:32

## 2018-01-01 RX ADMIN — DRONABINOL 2.5 MG: 2.5 CAPSULE ORAL at 08:57

## 2018-01-01 RX ADMIN — PIPERACILLIN SODIUM AND TAZOBACTAM SODIUM 4.5 G: 4; .5 INJECTION, POWDER, LYOPHILIZED, FOR SOLUTION INTRAVENOUS at 11:30

## 2018-01-01 RX ADMIN — CALCIUM GLUCONATE: 98 INJECTION, SOLUTION INTRAVENOUS at 20:31

## 2018-01-01 RX ADMIN — LEVETIRACETAM 250 MG: 250 TABLET, FILM COATED ORAL at 21:04

## 2018-01-01 RX ADMIN — DIVALPROEX SODIUM 250 MG: 125 CAPSULE, COATED PELLETS ORAL at 11:28

## 2018-01-01 RX ADMIN — OXYCODONE HYDROCHLORIDE 20 MG: 10 TABLET, FILM COATED, EXTENDED RELEASE ORAL at 08:26

## 2018-01-01 RX ADMIN — ONDANSETRON 4 MG: 2 INJECTION INTRAMUSCULAR; INTRAVENOUS at 19:04

## 2018-01-01 RX ADMIN — Medication 5 MG: at 22:28

## 2018-01-01 RX ADMIN — LOPERAMIDE HYDROCHLORIDE 2 MG: 2 CAPSULE ORAL at 01:29

## 2018-01-01 RX ADMIN — ACETAMINOPHEN 650 MG: 325 TABLET ORAL at 14:31

## 2018-01-01 RX ADMIN — Medication 2 MG: at 17:05

## 2018-01-01 RX ADMIN — DIAZEPAM 2 MG: 2 TABLET ORAL at 14:56

## 2018-01-01 RX ADMIN — IPRATROPIUM BROMIDE AND ALBUTEROL SULFATE 3 ML: .5; 3 SOLUTION RESPIRATORY (INHALATION) at 08:51

## 2018-01-01 RX ADMIN — PIPERACILLIN SODIUM AND TAZOBACTAM SODIUM 3.38 G: 3; .375 INJECTION, POWDER, LYOPHILIZED, FOR SOLUTION INTRAVENOUS at 10:55

## 2018-01-01 RX ADMIN — Medication 20000 UNITS: at 08:41

## 2018-01-01 RX ADMIN — IPRATROPIUM BROMIDE AND ALBUTEROL SULFATE 3 ML: .5; 3 SOLUTION RESPIRATORY (INHALATION) at 12:07

## 2018-01-01 RX ADMIN — LEVETIRACETAM 250 MG: 250 TABLET, FILM COATED ORAL at 22:13

## 2018-01-01 RX ADMIN — DIAZEPAM 4 MG: 2 TABLET ORAL at 01:14

## 2018-01-01 RX ADMIN — POTASSIUM CHLORIDE: 2 INJECTION, SOLUTION, CONCENTRATE INTRAVENOUS at 20:36

## 2018-01-01 RX ADMIN — FENTANYL CITRATE 50 MCG: 50 INJECTION, SOLUTION INTRAMUSCULAR; INTRAVENOUS at 17:00

## 2018-01-01 RX ADMIN — HYDROMORPHONE HYDROCHLORIDE 0.5 MG: 1 INJECTION, SOLUTION INTRAMUSCULAR; INTRAVENOUS; SUBCUTANEOUS at 09:10

## 2018-01-01 RX ADMIN — OXYCODONE HYDROCHLORIDE 15 MG: 5 TABLET ORAL at 01:15

## 2018-01-01 RX ADMIN — Medication 1 PACKET: at 14:39

## 2018-01-01 RX ADMIN — TRIAMCINOLONE ACETONIDE: 1 CREAM TOPICAL at 20:45

## 2018-01-01 RX ADMIN — LEVOTHYROXINE SODIUM 125 MCG: 125 TABLET ORAL at 09:55

## 2018-01-01 RX ADMIN — PIPERACILLIN SODIUM AND TAZOBACTAM SODIUM 3.38 G: 3; .375 INJECTION, POWDER, LYOPHILIZED, FOR SOLUTION INTRAVENOUS at 17:30

## 2018-01-01 RX ADMIN — DIAZEPAM 4 MG: 2 TABLET ORAL at 08:40

## 2018-01-01 RX ADMIN — ZOLPIDEM TARTRATE 5 MG: 5 TABLET, COATED ORAL at 00:56

## 2018-01-01 RX ADMIN — DIAZEPAM 5 MG: 5 TABLET ORAL at 04:17

## 2018-01-01 RX ADMIN — LEVETIRACETAM 250 MG: 250 TABLET, FILM COATED ORAL at 08:29

## 2018-01-01 RX ADMIN — LOPERAMIDE HYDROCHLORIDE 2 MG: 2 CAPSULE ORAL at 06:59

## 2018-01-01 RX ADMIN — OXYCODONE HYDROCHLORIDE 10 MG: 5 TABLET ORAL at 04:48

## 2018-01-01 RX ADMIN — ONDANSETRON 4 MG: 2 INJECTION INTRAMUSCULAR; INTRAVENOUS at 21:09

## 2018-01-01 RX ADMIN — IPRATROPIUM BROMIDE AND ALBUTEROL SULFATE 3 ML: .5; 3 SOLUTION RESPIRATORY (INHALATION) at 08:40

## 2018-01-01 RX ADMIN — LOPERAMIDE HYDROCHLORIDE 2 MG: 2 CAPSULE ORAL at 18:05

## 2018-01-01 RX ADMIN — LEVETIRACETAM 250 MG: 250 TABLET, FILM COATED ORAL at 07:48

## 2018-01-01 RX ADMIN — ARIPIPRAZOLE 5 MG: 5 TABLET ORAL at 09:38

## 2018-01-01 RX ADMIN — DIAZEPAM 4 MG: 2 TABLET ORAL at 10:28

## 2018-01-01 RX ADMIN — Medication 2 MG: at 00:48

## 2018-01-01 RX ADMIN — OMEPRAZOLE 20 MG: 20 CAPSULE, DELAYED RELEASE ORAL at 14:37

## 2018-01-01 RX ADMIN — Medication 2 MG: at 03:46

## 2018-01-01 RX ADMIN — Medication 1 PACKET: at 20:47

## 2018-01-01 RX ADMIN — LOPERAMIDE HYDROCHLORIDE 2 MG: 2 CAPSULE ORAL at 11:03

## 2018-01-01 RX ADMIN — OMEPRAZOLE 20 MG: 20 CAPSULE, DELAYED RELEASE ORAL at 09:18

## 2018-01-01 RX ADMIN — FENTANYL CITRATE 50 MCG: 50 INJECTION, SOLUTION INTRAMUSCULAR; INTRAVENOUS at 16:12

## 2018-01-01 RX ADMIN — LOPERAMIDE HYDROCHLORIDE 2 MG: 2 CAPSULE ORAL at 18:08

## 2018-01-01 RX ADMIN — DOXYCYCLINE HYCLATE 100 MG: 100 CAPSULE ORAL at 18:44

## 2018-01-01 RX ADMIN — Medication 2 MG: at 01:02

## 2018-01-01 RX ADMIN — GUAIFENESIN 600 MG: 600 TABLET, EXTENDED RELEASE ORAL at 08:41

## 2018-01-01 RX ADMIN — Medication 2 MG: at 05:23

## 2018-01-01 RX ADMIN — LEVETIRACETAM 250 MG: 250 TABLET, FILM COATED ORAL at 08:05

## 2018-01-01 RX ADMIN — BISMUTH SUBSALICYLATE 30 ML: 262 LIQUID ORAL at 21:19

## 2018-01-01 RX ADMIN — Medication 2 MG: at 15:45

## 2018-01-01 RX ADMIN — ONDANSETRON 4 MG: 2 INJECTION INTRAMUSCULAR; INTRAVENOUS at 08:45

## 2018-01-01 RX ADMIN — DULOXETINE HYDROCHLORIDE 30 MG: 30 CAPSULE, DELAYED RELEASE ORAL at 10:00

## 2018-01-01 RX ADMIN — PIPERACILLIN SODIUM AND TAZOBACTAM SODIUM 3.38 G: 3; .375 INJECTION, POWDER, LYOPHILIZED, FOR SOLUTION INTRAVENOUS at 00:46

## 2018-01-01 RX ADMIN — PIPERACILLIN SODIUM AND TAZOBACTAM SODIUM 4.5 G: 4; .5 INJECTION, POWDER, LYOPHILIZED, FOR SOLUTION INTRAVENOUS at 11:22

## 2018-01-01 RX ADMIN — ZOLPIDEM TARTRATE 5 MG: 5 TABLET, COATED ORAL at 21:35

## 2018-01-01 RX ADMIN — Medication 1 MG: at 10:01

## 2018-01-01 RX ADMIN — DOXYCYCLINE HYCLATE 100 MG: 100 CAPSULE ORAL at 09:55

## 2018-01-01 RX ADMIN — HYDROMORPHONE HYDROCHLORIDE 4 MG: 2 TABLET ORAL at 21:16

## 2018-01-01 RX ADMIN — Medication 2 MG: at 14:13

## 2018-01-01 RX ADMIN — ONDANSETRON 4 MG: 2 INJECTION INTRAMUSCULAR; INTRAVENOUS at 01:06

## 2018-01-01 RX ADMIN — OXYCODONE HYDROCHLORIDE 15 MG: 5 TABLET ORAL at 20:19

## 2018-01-01 RX ADMIN — LOPERAMIDE HYDROCHLORIDE 4 MG: 2 CAPSULE ORAL at 14:06

## 2018-01-01 RX ADMIN — Medication 2 MG: at 16:42

## 2018-01-01 RX ADMIN — GUAIFENESIN 600 MG: 600 TABLET, EXTENDED RELEASE ORAL at 20:35

## 2018-01-01 RX ADMIN — DIVALPROEX SODIUM 250 MG: 125 CAPSULE, COATED PELLETS ORAL at 09:58

## 2018-01-01 RX ADMIN — DIAZEPAM 2 MG: 2 TABLET ORAL at 06:59

## 2018-01-01 RX ADMIN — CALCIUM CARBONATE (ANTACID) CHEW TAB 500 MG 1000 MG: 500 CHEW TAB at 09:24

## 2018-01-01 RX ADMIN — LEVETIRACETAM 250 MG: 250 TABLET, FILM COATED ORAL at 09:38

## 2018-01-01 RX ADMIN — POTASSIUM CHLORIDE: 2 INJECTION, SOLUTION, CONCENTRATE INTRAVENOUS at 20:50

## 2018-01-01 RX ADMIN — Medication 5 MG: at 21:31

## 2018-01-01 RX ADMIN — IODIXANOL 20 ML: 320 INJECTION, SOLUTION INTRAVASCULAR at 15:36

## 2018-01-01 RX ADMIN — Medication 1 MG: at 20:28

## 2018-01-01 RX ADMIN — ZOLPIDEM TARTRATE 5 MG: 5 TABLET, COATED ORAL at 21:12

## 2018-01-01 RX ADMIN — LEVETIRACETAM 250 MG: 250 TABLET, FILM COATED ORAL at 14:38

## 2018-01-01 RX ADMIN — DIAZEPAM 4 MG: 2 TABLET ORAL at 23:50

## 2018-01-01 RX ADMIN — LOPERAMIDE HYDROCHLORIDE 2 MG: 2 CAPSULE ORAL at 17:19

## 2018-01-01 RX ADMIN — Medication 2 MG: at 17:06

## 2018-01-01 RX ADMIN — Medication 2 MG: at 13:49

## 2018-01-01 RX ADMIN — Medication 2 MG: at 09:07

## 2018-01-01 RX ADMIN — ZINC SULFATE CAP 220 MG (50 MG ELEMENTAL ZN) 220 MG: 220 (50 ZN) CAP at 11:26

## 2018-01-01 RX ADMIN — MICONAZOLE NITRATE: 2 POWDER TOPICAL at 11:15

## 2018-01-01 RX ADMIN — DULOXETINE HYDROCHLORIDE 30 MG: 30 CAPSULE, DELAYED RELEASE ORAL at 11:56

## 2018-01-01 RX ADMIN — ONDANSETRON 4 MG: 2 INJECTION INTRAMUSCULAR; INTRAVENOUS at 06:38

## 2018-01-01 RX ADMIN — OXYCODONE HYDROCHLORIDE 20 MG: 10 TABLET, FILM COATED, EXTENDED RELEASE ORAL at 20:01

## 2018-01-01 RX ADMIN — LIDOCAINE HYDROCHLORIDE 1 ML: 20 JELLY TOPICAL at 15:32

## 2018-01-01 RX ADMIN — ONDANSETRON 4 MG: 2 INJECTION INTRAMUSCULAR; INTRAVENOUS at 21:23

## 2018-01-01 RX ADMIN — DIAZEPAM 2 MG: 2 TABLET ORAL at 00:48

## 2018-01-01 RX ADMIN — LAMOTRIGINE 50 MG: 25 TABLET ORAL at 09:04

## 2018-01-01 RX ADMIN — LEVETIRACETAM 250 MG: 250 TABLET, FILM COATED ORAL at 20:34

## 2018-01-01 RX ADMIN — DIAZEPAM 4 MG: 2 TABLET ORAL at 16:09

## 2018-01-01 RX ADMIN — Medication 2 MG: at 13:16

## 2018-01-01 RX ADMIN — PIPERACILLIN AND TAZOBACTAM 3.38 G: 3; .375 INJECTION, POWDER, LYOPHILIZED, FOR SOLUTION INTRAVENOUS; PARENTERAL at 22:24

## 2018-01-01 RX ADMIN — Medication 2 MG: at 04:01

## 2018-01-01 RX ADMIN — LEVOTHYROXINE SODIUM 150 MCG: 75 TABLET ORAL at 08:05

## 2018-01-01 RX ADMIN — SODIUM CHLORIDE, POTASSIUM CHLORIDE, SODIUM LACTATE AND CALCIUM CHLORIDE: 600; 310; 30; 20 INJECTION, SOLUTION INTRAVENOUS at 12:11

## 2018-01-01 RX ADMIN — RIVAROXABAN 20 MG: 10 TABLET, FILM COATED ORAL at 16:43

## 2018-01-01 RX ADMIN — LOPERAMIDE HYDROCHLORIDE 4 MG: 2 CAPSULE ORAL at 09:46

## 2018-01-01 RX ADMIN — OMEPRAZOLE 20 MG: 20 CAPSULE, DELAYED RELEASE ORAL at 16:44

## 2018-01-01 RX ADMIN — LAMOTRIGINE 50 MG: 25 TABLET ORAL at 11:25

## 2018-01-01 RX ADMIN — LOPERAMIDE HYDROCHLORIDE 2 MG: 2 CAPSULE ORAL at 22:42

## 2018-01-01 RX ADMIN — LEVETIRACETAM 250 MG: 250 TABLET, FILM COATED ORAL at 21:12

## 2018-01-01 RX ADMIN — DOXYCYCLINE HYCLATE 100 MG: 100 CAPSULE ORAL at 08:38

## 2018-01-01 RX ADMIN — Medication 2 MG: at 18:00

## 2018-01-01 RX ADMIN — OXYCODONE HYDROCHLORIDE AND ACETAMINOPHEN 500 MG: 500 TABLET ORAL at 10:29

## 2018-01-01 RX ADMIN — OXYCODONE HYDROCHLORIDE 20 MG: 10 TABLET, FILM COATED, EXTENDED RELEASE ORAL at 08:41

## 2018-01-01 RX ADMIN — OXYCODONE HYDROCHLORIDE 20 MG: 10 TABLET, FILM COATED, EXTENDED RELEASE ORAL at 10:27

## 2018-01-01 RX ADMIN — DIAZEPAM 2 MG: 2 TABLET ORAL at 00:21

## 2018-01-01 RX ADMIN — FLUCONAZOLE 150 MG: 150 TABLET ORAL at 13:04

## 2018-01-01 RX ADMIN — LEVOTHYROXINE SODIUM 150 MCG: 75 TABLET ORAL at 06:48

## 2018-01-01 RX ADMIN — Medication 2 MG: at 19:38

## 2018-01-01 RX ADMIN — DIAZEPAM 2 MG: 2 TABLET ORAL at 11:02

## 2018-01-01 RX ADMIN — ZOLPIDEM TARTRATE 5 MG: 5 TABLET, COATED ORAL at 20:57

## 2018-01-01 RX ADMIN — Medication 5 MG: at 21:59

## 2018-01-01 RX ADMIN — IPRATROPIUM BROMIDE AND ALBUTEROL SULFATE 3 ML: .5; 3 SOLUTION RESPIRATORY (INHALATION) at 16:24

## 2018-01-01 RX ADMIN — DIAZEPAM 4 MG: 2 TABLET ORAL at 13:05

## 2018-01-01 RX ADMIN — DIPHENHYDRAMINE HYDROCHLORIDE 25 MG: 25 CAPSULE ORAL at 10:43

## 2018-01-01 RX ADMIN — Medication 2 MG: at 17:30

## 2018-01-01 RX ADMIN — LEVOTHYROXINE SODIUM 150 MCG: 75 TABLET ORAL at 13:40

## 2018-01-01 RX ADMIN — DIAZEPAM 5 MG: 5 TABLET ORAL at 01:28

## 2018-01-01 RX ADMIN — LOPERAMIDE HYDROCHLORIDE 2 MG: 2 CAPSULE ORAL at 20:27

## 2018-01-01 RX ADMIN — OXYCODONE HYDROCHLORIDE 10 MG: 5 TABLET ORAL at 14:35

## 2018-01-01 RX ADMIN — DOXYCYCLINE HYCLATE 100 MG: 100 CAPSULE ORAL at 19:52

## 2018-01-01 RX ADMIN — IPRATROPIUM BROMIDE AND ALBUTEROL SULFATE 3 ML: .5; 3 SOLUTION RESPIRATORY (INHALATION) at 20:06

## 2018-01-01 RX ADMIN — ZOLPIDEM TARTRATE 5 MG: 5 TABLET, COATED ORAL at 22:01

## 2018-01-01 RX ADMIN — LEVETIRACETAM 250 MG: 250 TABLET, FILM COATED ORAL at 19:15

## 2018-01-01 RX ADMIN — ACETAMINOPHEN 650 MG: 325 TABLET ORAL at 15:37

## 2018-01-01 RX ADMIN — DIAZEPAM 2 MG: 2 TABLET ORAL at 11:06

## 2018-01-01 RX ADMIN — LEVETIRACETAM 250 MG: 250 TABLET, FILM COATED ORAL at 10:29

## 2018-01-01 RX ADMIN — RIVAROXABAN 20 MG: 20 TABLET, FILM COATED ORAL at 02:16

## 2018-01-01 RX ADMIN — DIPHENHYDRAMINE HYDROCHLORIDE 25 MG: 25 CAPSULE ORAL at 20:26

## 2018-01-01 RX ADMIN — OMEPRAZOLE 20 MG: 20 CAPSULE, DELAYED RELEASE ORAL at 06:48

## 2018-01-01 RX ADMIN — OXYCODONE HYDROCHLORIDE AND ACETAMINOPHEN 500 MG: 500 TABLET ORAL at 11:26

## 2018-01-01 RX ADMIN — OMEPRAZOLE 20 MG: 20 CAPSULE, DELAYED RELEASE ORAL at 10:36

## 2018-01-01 RX ADMIN — DIVALPROEX SODIUM 250 MG: 125 CAPSULE, COATED PELLETS ORAL at 11:23

## 2018-01-01 RX ADMIN — DOXYCYCLINE HYCLATE 100 MG: 100 CAPSULE ORAL at 09:37

## 2018-01-01 RX ADMIN — Medication 2 MG: at 17:36

## 2018-01-01 RX ADMIN — DIAZEPAM 2 MG: 2 TABLET ORAL at 00:42

## 2018-01-01 RX ADMIN — DULOXETINE HYDROCHLORIDE 30 MG: 30 CAPSULE, DELAYED RELEASE ORAL at 08:38

## 2018-01-01 RX ADMIN — MICONAZOLE NITRATE: 2 POWDER TOPICAL at 08:28

## 2018-01-01 RX ADMIN — LOPERAMIDE HYDROCHLORIDE 2 MG: 2 CAPSULE ORAL at 02:12

## 2018-01-01 RX ADMIN — HYDROMORPHONE HYDROCHLORIDE 2 MG: 2 TABLET ORAL at 10:54

## 2018-01-01 RX ADMIN — DOXYCYCLINE HYCLATE 100 MG: 100 CAPSULE ORAL at 18:46

## 2018-01-01 RX ADMIN — ACETAMINOPHEN 650 MG: 325 TABLET ORAL at 21:03

## 2018-01-01 RX ADMIN — LOPERAMIDE HYDROCHLORIDE 2 MG: 2 CAPSULE ORAL at 19:14

## 2018-01-01 RX ADMIN — ASPIRIN 81 MG CHEWABLE TABLET 81 MG: 81 TABLET CHEWABLE at 07:48

## 2018-01-01 RX ADMIN — LEVETIRACETAM 250 MG: 250 TABLET, FILM COATED ORAL at 11:57

## 2018-01-01 RX ADMIN — CALCIUM CARBONATE (ANTACID) CHEW TAB 500 MG 1000 MG: 500 CHEW TAB at 19:28

## 2018-01-01 RX ADMIN — Medication 1 MG: at 10:27

## 2018-01-01 RX ADMIN — SODIUM CHLORIDE, POTASSIUM CHLORIDE, SODIUM LACTATE AND CALCIUM CHLORIDE: 600; 310; 30; 20 INJECTION, SOLUTION INTRAVENOUS at 17:22

## 2018-01-01 RX ADMIN — Medication 1 PACKET: at 10:29

## 2018-01-01 RX ADMIN — OMEPRAZOLE 20 MG: 20 CAPSULE, DELAYED RELEASE ORAL at 08:40

## 2018-01-01 RX ADMIN — PIPERACILLIN SODIUM AND TAZOBACTAM SODIUM 3.38 G: 3; .375 INJECTION, POWDER, LYOPHILIZED, FOR SOLUTION INTRAVENOUS at 12:24

## 2018-01-01 RX ADMIN — ONDANSETRON 4 MG: 2 INJECTION INTRAMUSCULAR; INTRAVENOUS at 12:35

## 2018-01-01 RX ADMIN — VANCOMYCIN HYDROCHLORIDE 1250 MG: 10 INJECTION, POWDER, LYOPHILIZED, FOR SOLUTION INTRAVENOUS at 11:26

## 2018-01-01 RX ADMIN — ONDANSETRON 4 MG: 2 INJECTION INTRAMUSCULAR; INTRAVENOUS at 17:48

## 2018-01-01 RX ADMIN — LEVETIRACETAM 250 MG: 250 TABLET, FILM COATED ORAL at 20:16

## 2018-01-01 RX ADMIN — OXYCODONE HYDROCHLORIDE 20 MG: 10 TABLET, FILM COATED, EXTENDED RELEASE ORAL at 20:29

## 2018-01-01 RX ADMIN — MIDAZOLAM HYDROCHLORIDE 1 MG: 1 INJECTION, SOLUTION INTRAMUSCULAR; INTRAVENOUS at 17:10

## 2018-01-01 RX ADMIN — LOPERAMIDE HYDROCHLORIDE 4 MG: 2 CAPSULE ORAL at 02:49

## 2018-01-01 RX ADMIN — OXYCODONE HYDROCHLORIDE 10 MG: 5 TABLET ORAL at 08:47

## 2018-01-01 RX ADMIN — DIVALPROEX SODIUM 250 MG: 125 CAPSULE, COATED PELLETS ORAL at 08:42

## 2018-01-01 RX ADMIN — IPRATROPIUM BROMIDE AND ALBUTEROL SULFATE 3 ML: .5; 3 SOLUTION RESPIRATORY (INHALATION) at 20:11

## 2018-01-01 RX ADMIN — DULOXETINE HYDROCHLORIDE 30 MG: 30 CAPSULE, DELAYED RELEASE ORAL at 10:38

## 2018-01-01 RX ADMIN — LOPERAMIDE HYDROCHLORIDE 4 MG: 2 CAPSULE ORAL at 07:57

## 2018-01-01 RX ADMIN — GABAPENTIN 100 MG: 100 CAPSULE ORAL at 19:33

## 2018-01-01 RX ADMIN — Medication 5 MG: at 21:26

## 2018-01-01 RX ADMIN — ASPIRIN 81 MG CHEWABLE TABLET 81 MG: 81 TABLET CHEWABLE at 09:53

## 2018-01-01 RX ADMIN — DIVALPROEX SODIUM 250 MG: 125 CAPSULE, COATED PELLETS ORAL at 19:33

## 2018-01-01 RX ADMIN — DIAZEPAM 4 MG: 2 TABLET ORAL at 17:30

## 2018-01-01 RX ADMIN — OXYCODONE HYDROCHLORIDE 20 MG: 10 TABLET, FILM COATED, EXTENDED RELEASE ORAL at 20:28

## 2018-01-01 RX ADMIN — Medication 2 MG: at 20:26

## 2018-01-01 RX ADMIN — CYCLOBENZAPRINE HYDROCHLORIDE 5 MG: 5 TABLET, FILM COATED ORAL at 16:08

## 2018-01-01 RX ADMIN — Medication 5 MG: at 22:48

## 2018-01-01 RX ADMIN — Medication 1 PACKET: at 08:03

## 2018-01-01 RX ADMIN — PIPERACILLIN SODIUM AND TAZOBACTAM SODIUM 4.5 G: 4; .5 INJECTION, POWDER, LYOPHILIZED, FOR SOLUTION INTRAVENOUS at 19:38

## 2018-01-01 RX ADMIN — ONDANSETRON 4 MG: 2 INJECTION INTRAMUSCULAR; INTRAVENOUS at 00:04

## 2018-01-01 RX ADMIN — DIAZEPAM 4 MG: 2 TABLET ORAL at 02:17

## 2018-01-01 RX ADMIN — WHITE PETROLATUM: 1.75 OINTMENT TOPICAL at 14:22

## 2018-01-01 RX ADMIN — DIVALPROEX SODIUM 250 MG: 125 CAPSULE, COATED PELLETS ORAL at 20:51

## 2018-01-01 RX ADMIN — RIVAROXABAN 20 MG: 10 TABLET, FILM COATED ORAL at 21:24

## 2018-01-01 RX ADMIN — OXYCODONE HYDROCHLORIDE 15 MG: 5 TABLET ORAL at 09:55

## 2018-01-01 RX ADMIN — OXYCODONE HYDROCHLORIDE 20 MG: 10 TABLET, FILM COATED, EXTENDED RELEASE ORAL at 09:57

## 2018-01-01 RX ADMIN — LAMOTRIGINE 50 MG: 25 TABLET ORAL at 09:35

## 2018-01-01 RX ADMIN — OXYCODONE HYDROCHLORIDE 20 MG: 10 TABLET, FILM COATED, EXTENDED RELEASE ORAL at 20:42

## 2018-01-01 RX ADMIN — OXYCODONE HYDROCHLORIDE 20 MG: 10 TABLET, FILM COATED, EXTENDED RELEASE ORAL at 10:04

## 2018-01-01 RX ADMIN — CALCIUM CARBONATE (ANTACID) CHEW TAB 500 MG 1000 MG: 500 CHEW TAB at 21:31

## 2018-01-01 RX ADMIN — Medication 5 MG: at 22:07

## 2018-01-01 RX ADMIN — ZOLPIDEM TARTRATE 5 MG: 5 TABLET, COATED ORAL at 20:00

## 2018-01-01 RX ADMIN — LOPERAMIDE HYDROCHLORIDE 2 MG: 2 CAPSULE ORAL at 14:21

## 2018-01-01 RX ADMIN — DIVALPROEX SODIUM 250 MG: 125 CAPSULE, COATED PELLETS ORAL at 19:38

## 2018-01-01 RX ADMIN — ACETAMINOPHEN 650 MG: 325 TABLET ORAL at 09:07

## 2018-01-01 RX ADMIN — Medication 2 MG: at 10:35

## 2018-01-01 RX ADMIN — Medication 2 MG: at 20:58

## 2018-01-01 RX ADMIN — BISMUTH SUBSALICYLATE 30 ML: 262 LIQUID ORAL at 19:10

## 2018-01-01 RX ADMIN — DIVALPROEX SODIUM 250 MG: 125 CAPSULE, COATED PELLETS ORAL at 09:05

## 2018-01-01 RX ADMIN — HYDROMORPHONE HYDROCHLORIDE 0.5 MG: 1 INJECTION, SOLUTION INTRAMUSCULAR; INTRAVENOUS; SUBCUTANEOUS at 13:48

## 2018-01-01 RX ADMIN — OXYCODONE HYDROCHLORIDE 20 MG: 10 TABLET, FILM COATED, EXTENDED RELEASE ORAL at 09:44

## 2018-01-01 RX ADMIN — FENTANYL CITRATE 50 MCG: 50 INJECTION, SOLUTION INTRAMUSCULAR; INTRAVENOUS at 16:40

## 2018-01-01 RX ADMIN — ACETAMINOPHEN 650 MG: 325 TABLET ORAL at 22:48

## 2018-01-01 RX ADMIN — ONDANSETRON 4 MG: 4 TABLET, ORALLY DISINTEGRATING ORAL at 20:00

## 2018-01-01 RX ADMIN — GUAIFENESIN 600 MG: 600 TABLET, EXTENDED RELEASE ORAL at 20:27

## 2018-01-01 RX ADMIN — PIPERACILLIN SODIUM AND TAZOBACTAM SODIUM 4.5 G: 4; .5 INJECTION, POWDER, LYOPHILIZED, FOR SOLUTION INTRAVENOUS at 03:47

## 2018-01-01 RX ADMIN — Medication 2 MG: at 02:00

## 2018-01-01 RX ADMIN — Medication 5 MG: at 21:01

## 2018-01-01 RX ADMIN — OXYCODONE HYDROCHLORIDE 20 MG: 10 TABLET, FILM COATED, EXTENDED RELEASE ORAL at 00:22

## 2018-01-01 RX ADMIN — MULTIPLE VITAMINS W/ MINERALS TAB 1 TABLET: TAB at 10:35

## 2018-01-01 RX ADMIN — DIVALPROEX SODIUM 250 MG: 125 CAPSULE, COATED PELLETS ORAL at 09:37

## 2018-01-01 RX ADMIN — Medication 1 PACKET: at 22:32

## 2018-01-01 RX ADMIN — FUROSEMIDE 20 MG: 20 TABLET ORAL at 19:38

## 2018-01-01 RX ADMIN — PIPERACILLIN SODIUM AND TAZOBACTAM SODIUM 3.38 G: 3; .375 INJECTION, POWDER, LYOPHILIZED, FOR SOLUTION INTRAVENOUS at 05:52

## 2018-01-01 RX ADMIN — DOXYCYCLINE HYCLATE 100 MG: 100 CAPSULE ORAL at 07:48

## 2018-01-01 RX ADMIN — DIVALPROEX SODIUM 250 MG: 125 CAPSULE, COATED PELLETS ORAL at 20:28

## 2018-01-01 RX ADMIN — DOXYCYCLINE HYCLATE 100 MG: 100 CAPSULE ORAL at 11:25

## 2018-01-01 RX ADMIN — ONDANSETRON 4 MG: 2 INJECTION INTRAMUSCULAR; INTRAVENOUS at 18:02

## 2018-01-01 RX ADMIN — OXYCODONE HYDROCHLORIDE AND ACETAMINOPHEN 500 MG: 500 TABLET ORAL at 09:59

## 2018-01-01 RX ADMIN — ZOLPIDEM TARTRATE 5 MG: 5 TABLET, COATED ORAL at 22:16

## 2018-01-01 RX ADMIN — RIVAROXABAN 20 MG: 10 TABLET, FILM COATED ORAL at 18:29

## 2018-01-01 RX ADMIN — OXYCODONE HYDROCHLORIDE AND ACETAMINOPHEN 500 MG: 500 TABLET ORAL at 17:02

## 2018-01-01 RX ADMIN — LAMOTRIGINE 50 MG: 25 TABLET ORAL at 10:30

## 2018-01-01 RX ADMIN — DIVALPROEX SODIUM 250 MG: 125 CAPSULE, COATED PELLETS ORAL at 19:24

## 2018-01-01 RX ADMIN — RIVAROXABAN 20 MG: 10 TABLET, FILM COATED ORAL at 21:33

## 2018-01-01 RX ADMIN — OXYCODONE HYDROCHLORIDE 10 MG: 5 TABLET ORAL at 01:49

## 2018-01-01 RX ADMIN — I.V. FAT EMULSION 250 ML: 20 EMULSION INTRAVENOUS at 09:12

## 2018-01-01 RX ADMIN — OMEPRAZOLE 20 MG: 20 CAPSULE, DELAYED RELEASE ORAL at 08:48

## 2018-01-01 RX ADMIN — DIAZEPAM 2 MG: 2 TABLET ORAL at 10:27

## 2018-01-01 RX ADMIN — DULOXETINE HYDROCHLORIDE 60 MG: 60 CAPSULE, DELAYED RELEASE ORAL at 09:01

## 2018-01-01 RX ADMIN — LOPERAMIDE HYDROCHLORIDE 4 MG: 2 CAPSULE ORAL at 20:38

## 2018-01-01 RX ADMIN — Medication 20000 UNITS: at 10:00

## 2018-01-01 RX ADMIN — MIDAZOLAM HYDROCHLORIDE 1 MG: 1 INJECTION, SOLUTION INTRAMUSCULAR; INTRAVENOUS at 16:25

## 2018-01-01 RX ADMIN — LEVETIRACETAM 250 MG: 250 TABLET, FILM COATED ORAL at 20:28

## 2018-01-01 RX ADMIN — PIPERACILLIN SODIUM AND TAZOBACTAM SODIUM 3.38 G: 3; .375 INJECTION, POWDER, LYOPHILIZED, FOR SOLUTION INTRAVENOUS at 00:23

## 2018-01-01 RX ADMIN — LEVETIRACETAM 250 MG: 250 TABLET, FILM COATED ORAL at 09:59

## 2018-01-01 RX ADMIN — OXYCODONE HYDROCHLORIDE 10 MG: 5 TABLET ORAL at 09:32

## 2018-01-01 RX ADMIN — DIAZEPAM 4 MG: 2 TABLET ORAL at 16:44

## 2018-01-01 RX ADMIN — PIPERACILLIN SODIUM AND TAZOBACTAM SODIUM 4.5 G: 4; .5 INJECTION, POWDER, LYOPHILIZED, FOR SOLUTION INTRAVENOUS at 01:18

## 2018-01-01 RX ADMIN — Medication 5 MG: at 21:35

## 2018-01-01 RX ADMIN — LOPERAMIDE HYDROCHLORIDE 4 MG: 2 CAPSULE ORAL at 08:40

## 2018-01-01 RX ADMIN — ZINC SULFATE CAP 220 MG (50 MG ELEMENTAL ZN) 220 MG: 220 (50 ZN) CAP at 17:02

## 2018-01-01 RX ADMIN — HYDROMORPHONE HYDROCHLORIDE 0.5 MG: 1 INJECTION, SOLUTION INTRAMUSCULAR; INTRAVENOUS; SUBCUTANEOUS at 18:47

## 2018-01-01 RX ADMIN — PIPERACILLIN SODIUM AND TAZOBACTAM SODIUM 4.5 G: 4; .5 INJECTION, POWDER, LYOPHILIZED, FOR SOLUTION INTRAVENOUS at 02:07

## 2018-01-01 RX ADMIN — BISMUTH SUBSALICYLATE 30 ML: 262 LIQUID ORAL at 13:44

## 2018-01-01 RX ADMIN — LEVOTHYROXINE SODIUM 150 MCG: 75 TABLET ORAL at 06:56

## 2018-01-01 RX ADMIN — CALCIUM CARBONATE (ANTACID) CHEW TAB 500 MG 1000 MG: 500 CHEW TAB at 01:06

## 2018-01-01 RX ADMIN — Medication 5 MG: at 21:12

## 2018-01-01 RX ADMIN — LOPERAMIDE HYDROCHLORIDE 2 MG: 2 CAPSULE ORAL at 21:24

## 2018-01-01 RX ADMIN — Medication 2 MG: at 11:03

## 2018-01-01 RX ADMIN — CALCIUM GLUCONATE: 98 INJECTION, SOLUTION INTRAVENOUS at 19:47

## 2018-01-01 RX ADMIN — HYDROMORPHONE HYDROCHLORIDE 4 MG: 2 TABLET ORAL at 00:28

## 2018-01-01 RX ADMIN — I.V. FAT EMULSION 250 ML: 20 EMULSION INTRAVENOUS at 21:33

## 2018-01-01 RX ADMIN — LOPERAMIDE HYDROCHLORIDE 2 MG: 2 CAPSULE ORAL at 00:22

## 2018-01-01 RX ADMIN — IPRATROPIUM BROMIDE AND ALBUTEROL SULFATE 3 ML: .5; 3 SOLUTION RESPIRATORY (INHALATION) at 09:37

## 2018-01-01 RX ADMIN — ZOLPIDEM TARTRATE 5 MG: 5 TABLET, COATED ORAL at 17:19

## 2018-01-01 RX ADMIN — Medication 2 MG: at 04:34

## 2018-01-01 RX ADMIN — LOPERAMIDE HYDROCHLORIDE 2 MG: 2 CAPSULE ORAL at 20:58

## 2018-01-01 RX ADMIN — RIVAROXABAN 20 MG: 10 TABLET, FILM COATED ORAL at 16:45

## 2018-01-01 RX ADMIN — Medication 5 MG: at 21:24

## 2018-01-01 RX ADMIN — ACETAMINOPHEN 650 MG: 325 TABLET ORAL at 20:38

## 2018-01-01 RX ADMIN — IPRATROPIUM BROMIDE AND ALBUTEROL SULFATE 3 ML: .5; 3 SOLUTION RESPIRATORY (INHALATION) at 13:20

## 2018-01-01 RX ADMIN — DULOXETINE HYDROCHLORIDE 30 MG: 30 CAPSULE, DELAYED RELEASE ORAL at 11:26

## 2018-01-01 RX ADMIN — IPRATROPIUM BROMIDE AND ALBUTEROL SULFATE 3 ML: .5; 3 SOLUTION RESPIRATORY (INHALATION) at 08:11

## 2018-01-01 RX ADMIN — PIPERACILLIN SODIUM AND TAZOBACTAM SODIUM 3.38 G: 3; .375 INJECTION, POWDER, LYOPHILIZED, FOR SOLUTION INTRAVENOUS at 06:14

## 2018-01-01 RX ADMIN — DIAZEPAM 2 MG: 2 TABLET ORAL at 00:43

## 2018-01-01 RX ADMIN — OXYBUTYNIN CHLORIDE 15 MG: 10 TABLET, FILM COATED, EXTENDED RELEASE ORAL at 21:01

## 2018-01-01 RX ADMIN — Medication 2 MG: at 00:51

## 2018-01-01 RX ADMIN — OXYCODONE HYDROCHLORIDE 15 MG: 5 TABLET ORAL at 17:20

## 2018-01-01 RX ADMIN — Medication 2 MG: at 09:42

## 2018-01-01 RX ADMIN — LOPERAMIDE HYDROCHLORIDE 2 MG: 2 CAPSULE ORAL at 05:05

## 2018-01-01 RX ADMIN — CALCIUM CARBONATE (ANTACID) CHEW TAB 500 MG 1000 MG: 500 CHEW TAB at 15:04

## 2018-01-01 RX ADMIN — LIDOCAINE HYDROCHLORIDE 10 ML: 20 JELLY TOPICAL at 17:33

## 2018-01-01 RX ADMIN — BISMUTH SUBSALICYLATE 30 ML: 262 LIQUID ORAL at 11:41

## 2018-01-01 RX ADMIN — OXYCODONE HYDROCHLORIDE 15 MG: 5 TABLET ORAL at 13:05

## 2018-01-01 RX ADMIN — OXYCODONE HYDROCHLORIDE 15 MG: 5 TABLET ORAL at 06:44

## 2018-01-01 RX ADMIN — LEVETIRACETAM 250 MG: 250 TABLET, FILM COATED ORAL at 20:38

## 2018-01-01 RX ADMIN — DOXYCYCLINE HYCLATE 100 MG: 100 CAPSULE ORAL at 19:04

## 2018-01-01 RX ADMIN — CALCIUM CARBONATE (ANTACID) CHEW TAB 500 MG 1000 MG: 500 CHEW TAB at 07:03

## 2018-01-01 RX ADMIN — ZOLPIDEM TARTRATE 5 MG: 5 TABLET, FILM COATED ORAL at 22:32

## 2018-01-01 RX ADMIN — OMEPRAZOLE 20 MG: 20 CAPSULE, DELAYED RELEASE ORAL at 08:42

## 2018-01-01 RX ADMIN — LEVETIRACETAM 250 MG: 250 TABLET, FILM COATED ORAL at 20:46

## 2018-01-01 RX ADMIN — OXYCODONE HYDROCHLORIDE 20 MG: 10 TABLET, FILM COATED, EXTENDED RELEASE ORAL at 21:41

## 2018-01-01 RX ADMIN — DIAZEPAM 4 MG: 2 TABLET ORAL at 09:54

## 2018-01-01 RX ADMIN — OMEPRAZOLE 20 MG: 20 CAPSULE, DELAYED RELEASE ORAL at 07:50

## 2018-01-01 RX ADMIN — DIAZEPAM 5 MG: 5 TABLET ORAL at 21:03

## 2018-01-01 RX ADMIN — ACETAMINOPHEN 650 MG: 325 TABLET ORAL at 20:45

## 2018-01-01 RX ADMIN — ONDANSETRON 4 MG: 2 INJECTION INTRAMUSCULAR; INTRAVENOUS at 12:48

## 2018-01-01 RX ADMIN — MICONAZOLE NITRATE: 2 POWDER TOPICAL at 20:43

## 2018-01-01 RX ADMIN — OXYCODONE HYDROCHLORIDE 20 MG: 10 TABLET, FILM COATED, EXTENDED RELEASE ORAL at 20:26

## 2018-01-01 RX ADMIN — OMEPRAZOLE 20 MG: 20 CAPSULE, DELAYED RELEASE ORAL at 08:29

## 2018-01-01 RX ADMIN — BISMUTH SUBSALICYLATE 30 ML: 262 LIQUID ORAL at 19:18

## 2018-01-01 RX ADMIN — OXYCODONE HYDROCHLORIDE 20 MG: 10 TABLET, FILM COATED, EXTENDED RELEASE ORAL at 19:56

## 2018-01-01 RX ADMIN — LOPERAMIDE HYDROCHLORIDE 4 MG: 2 CAPSULE ORAL at 12:11

## 2018-01-01 RX ADMIN — QUETIAPINE 50 MG: 50 TABLET ORAL at 21:01

## 2018-01-01 RX ADMIN — HYDROMORPHONE HYDROCHLORIDE 2 MG: 2 TABLET ORAL at 05:52

## 2018-01-01 RX ADMIN — LOPERAMIDE HYDROCHLORIDE 2 MG: 2 CAPSULE ORAL at 10:35

## 2018-01-01 RX ADMIN — FENTANYL CITRATE 50 MCG: 50 INJECTION, SOLUTION INTRAMUSCULAR; INTRAVENOUS at 16:52

## 2018-01-01 RX ADMIN — HYDROMORPHONE HYDROCHLORIDE 0.5 MG: 1 INJECTION, SOLUTION INTRAMUSCULAR; INTRAVENOUS; SUBCUTANEOUS at 17:21

## 2018-01-01 RX ADMIN — GUAIFENESIN 600 MG: 600 TABLET, EXTENDED RELEASE ORAL at 20:51

## 2018-01-01 RX ADMIN — ONDANSETRON 4 MG: 2 INJECTION INTRAMUSCULAR; INTRAVENOUS at 10:47

## 2018-01-01 RX ADMIN — ZOLPIDEM TARTRATE 5 MG: 5 TABLET, COATED ORAL at 22:48

## 2018-01-01 RX ADMIN — OXYCODONE HYDROCHLORIDE 20 MG: 10 TABLET, FILM COATED, EXTENDED RELEASE ORAL at 20:35

## 2018-01-01 RX ADMIN — DIAZEPAM 2 MG: 2 TABLET ORAL at 19:15

## 2018-01-01 RX ADMIN — DOXYCYCLINE HYCLATE 100 MG: 100 CAPSULE ORAL at 10:29

## 2018-01-01 RX ADMIN — Medication 2 MG: at 20:42

## 2018-01-01 RX ADMIN — Medication 1 PACKET: at 22:14

## 2018-01-01 RX ADMIN — ACETAMINOPHEN 650 MG: 325 TABLET ORAL at 16:24

## 2018-01-01 RX ADMIN — LOPERAMIDE HYDROCHLORIDE 2 MG: 2 CAPSULE ORAL at 18:45

## 2018-01-01 RX ADMIN — RIVAROXABAN 20 MG: 10 TABLET, FILM COATED ORAL at 22:03

## 2018-01-01 RX ADMIN — LEVETIRACETAM 250 MG: 250 TABLET, FILM COATED ORAL at 19:37

## 2018-01-01 RX ADMIN — ONDANSETRON 4 MG: 2 INJECTION INTRAMUSCULAR; INTRAVENOUS at 13:28

## 2018-01-01 RX ADMIN — ACETAMINOPHEN 650 MG: 325 TABLET ORAL at 16:38

## 2018-01-01 RX ADMIN — ZOLPIDEM TARTRATE 5 MG: 5 TABLET, COATED ORAL at 21:08

## 2018-01-01 RX ADMIN — OMEPRAZOLE 20 MG: 20 CAPSULE, DELAYED RELEASE ORAL at 16:07

## 2018-01-01 RX ADMIN — ACETAMINOPHEN 650 MG: 325 TABLET ORAL at 15:04

## 2018-01-01 RX ADMIN — DOCUSATE SODIUM 286 ML: 50 LIQUID ORAL at 08:03

## 2018-01-01 RX ADMIN — OXYCODONE HYDROCHLORIDE 10 MG: 5 TABLET ORAL at 19:21

## 2018-01-01 RX ADMIN — OXYCODONE HYDROCHLORIDE AND ACETAMINOPHEN 500 MG: 500 TABLET ORAL at 15:14

## 2018-01-01 RX ADMIN — Medication 20000 UNITS: at 07:48

## 2018-01-01 RX ADMIN — CALCIUM GLUCONATE: 98 INJECTION, SOLUTION INTRAVENOUS at 21:03

## 2018-01-01 RX ADMIN — DIPHENHYDRAMINE HYDROCHLORIDE 25 MG: 25 CAPSULE ORAL at 11:46

## 2018-01-01 RX ADMIN — DOXYCYCLINE HYCLATE 100 MG: 100 CAPSULE ORAL at 18:14

## 2018-01-01 RX ADMIN — DIPHENHYDRAMINE HYDROCHLORIDE 25 MG: 25 CAPSULE ORAL at 14:39

## 2018-01-01 RX ADMIN — LOPERAMIDE HYDROCHLORIDE 4 MG: 2 CAPSULE ORAL at 15:14

## 2018-01-01 RX ADMIN — CALCIUM GLUCONATE: 98 INJECTION, SOLUTION INTRAVENOUS at 20:02

## 2018-01-01 RX ADMIN — HYDROXYZINE HYDROCHLORIDE 50 MG: 50 TABLET, FILM COATED ORAL at 22:03

## 2018-01-01 RX ADMIN — Medication 10 MG: at 21:33

## 2018-01-01 RX ADMIN — LEVETIRACETAM 250 MG: 250 TABLET, FILM COATED ORAL at 21:41

## 2018-01-01 RX ADMIN — CALCIUM CARBONATE (ANTACID) CHEW TAB 500 MG 1000 MG: 500 CHEW TAB at 08:27

## 2018-01-01 RX ADMIN — IPRATROPIUM BROMIDE AND ALBUTEROL SULFATE 3 ML: .5; 3 SOLUTION RESPIRATORY (INHALATION) at 12:55

## 2018-01-01 RX ADMIN — DIAZEPAM 2 MG: 2 TABLET ORAL at 18:00

## 2018-01-01 RX ADMIN — Medication 5 MG: at 17:19

## 2018-01-01 RX ADMIN — RIVAROXABAN 20 MG: 10 TABLET, FILM COATED ORAL at 20:43

## 2018-01-01 RX ADMIN — LEVOTHYROXINE SODIUM 150 MCG: 75 TABLET ORAL at 14:40

## 2018-01-01 RX ADMIN — CALCIUM CARBONATE (ANTACID) CHEW TAB 500 MG 500 MG: 500 CHEW TAB at 17:22

## 2018-01-01 RX ADMIN — LOPERAMIDE HYDROCHLORIDE 4 MG: 2 CAPSULE ORAL at 17:26

## 2018-01-01 RX ADMIN — ALTEPLASE 1 MG: 2.2 INJECTION, POWDER, LYOPHILIZED, FOR SOLUTION INTRAVENOUS at 16:52

## 2018-01-01 RX ADMIN — Medication 2 MG: at 09:44

## 2018-01-01 RX ADMIN — Medication 1 PACKET: at 21:42

## 2018-01-01 RX ADMIN — LEVOTHYROXINE SODIUM 150 MCG: 75 TABLET ORAL at 07:50

## 2018-01-01 RX ADMIN — ACETAMINOPHEN 650 MG: 325 TABLET ORAL at 12:48

## 2018-01-01 RX ADMIN — Medication 2 MG: at 16:38

## 2018-01-01 RX ADMIN — GABAPENTIN 100 MG: 100 CAPSULE ORAL at 08:38

## 2018-01-01 RX ADMIN — OXYCODONE HYDROCHLORIDE AND ACETAMINOPHEN 500 MG: 500 TABLET ORAL at 08:57

## 2018-01-01 RX ADMIN — ACETAMINOPHEN 650 MG: 325 TABLET ORAL at 20:35

## 2018-01-01 RX ADMIN — CALCIUM CARBONATE (ANTACID) CHEW TAB 500 MG 500 MG: 500 CHEW TAB at 11:30

## 2018-01-01 RX ADMIN — FUROSEMIDE 20 MG: 20 TABLET ORAL at 14:31

## 2018-01-01 RX ADMIN — VANCOMYCIN HYDROCHLORIDE 1250 MG: 10 INJECTION, POWDER, LYOPHILIZED, FOR SOLUTION INTRAVENOUS at 11:27

## 2018-01-01 RX ADMIN — PIPERACILLIN SODIUM AND TAZOBACTAM SODIUM 4.5 G: 4; .5 INJECTION, POWDER, LYOPHILIZED, FOR SOLUTION INTRAVENOUS at 10:28

## 2018-01-01 RX ADMIN — HYDROMORPHONE HYDROCHLORIDE 2 MG: 2 TABLET ORAL at 00:56

## 2018-01-01 RX ADMIN — ZINC SULFATE CAP 220 MG (50 MG ELEMENTAL ZN) 220 MG: 220 (50 ZN) CAP at 08:57

## 2018-01-01 RX ADMIN — CALCIUM CARBONATE (ANTACID) CHEW TAB 500 MG 1000 MG: 500 CHEW TAB at 16:33

## 2018-01-01 RX ADMIN — DRONABINOL 2.5 MG: 2.5 CAPSULE ORAL at 17:25

## 2018-01-01 RX ADMIN — LEVETIRACETAM 250 MG: 250 TABLET, FILM COATED ORAL at 19:32

## 2018-01-01 RX ADMIN — RIVAROXABAN 20 MG: 10 TABLET, FILM COATED ORAL at 17:06

## 2018-01-01 RX ADMIN — Medication 20000 UNITS: at 09:35

## 2018-01-01 RX ADMIN — LOPERAMIDE HYDROCHLORIDE 2 MG: 2 CAPSULE ORAL at 10:09

## 2018-01-01 RX ADMIN — OXYCODONE HYDROCHLORIDE 15 MG: 5 TABLET ORAL at 03:09

## 2018-01-01 RX ADMIN — PIPERACILLIN SODIUM AND TAZOBACTAM SODIUM 4.5 G: 4; .5 INJECTION, POWDER, LYOPHILIZED, FOR SOLUTION INTRAVENOUS at 17:26

## 2018-01-01 RX ADMIN — CALCIUM CARBONATE (ANTACID) CHEW TAB 500 MG 1000 MG: 500 CHEW TAB at 08:38

## 2018-01-01 RX ADMIN — I.V. FAT EMULSION 250 ML: 20 EMULSION INTRAVENOUS at 21:02

## 2018-01-01 RX ADMIN — LOPERAMIDE HYDROCHLORIDE 2 MG: 2 CAPSULE ORAL at 08:38

## 2018-01-01 RX ADMIN — POTASSIUM CHLORIDE 10 MEQ: 10 INJECTION, SOLUTION INTRAVENOUS at 00:54

## 2018-01-01 RX ADMIN — OXYCODONE HYDROCHLORIDE 10 MG: 5 TABLET ORAL at 14:11

## 2018-01-01 RX ADMIN — LEVETIRACETAM 250 MG: 250 TABLET, FILM COATED ORAL at 20:36

## 2018-01-01 RX ADMIN — Medication 2 MG: at 21:52

## 2018-01-01 RX ADMIN — OXYCODONE HYDROCHLORIDE 20 MG: 10 TABLET, FILM COATED, EXTENDED RELEASE ORAL at 21:22

## 2018-01-01 RX ADMIN — DIVALPROEX SODIUM 250 MG: 125 CAPSULE, COATED PELLETS ORAL at 22:12

## 2018-01-01 RX ADMIN — Medication 2 MG: at 12:02

## 2018-01-01 RX ADMIN — LOPERAMIDE HYDROCHLORIDE 2 MG: 2 CAPSULE ORAL at 21:37

## 2018-01-01 RX ADMIN — LEVETIRACETAM 250 MG: 250 TABLET, FILM COATED ORAL at 20:42

## 2018-01-01 RX ADMIN — IPRATROPIUM BROMIDE AND ALBUTEROL SULFATE 3 ML: .5; 3 SOLUTION RESPIRATORY (INHALATION) at 21:38

## 2018-01-01 RX ADMIN — SODIUM CHLORIDE 2000 ML: 9 INJECTION, SOLUTION INTRAVENOUS at 01:30

## 2018-01-01 RX ADMIN — Medication 2 MG: at 18:34

## 2018-01-01 RX ADMIN — LOPERAMIDE HYDROCHLORIDE 2 MG: 2 CAPSULE ORAL at 07:50

## 2018-01-01 RX ADMIN — OXYCODONE HYDROCHLORIDE 20 MG: 10 TABLET, FILM COATED, EXTENDED RELEASE ORAL at 08:29

## 2018-01-01 RX ADMIN — OXYCODONE HYDROCHLORIDE 20 MG: 10 TABLET, FILM COATED, EXTENDED RELEASE ORAL at 10:15

## 2018-01-01 RX ADMIN — IPRATROPIUM BROMIDE AND ALBUTEROL SULFATE 3 ML: .5; 3 SOLUTION RESPIRATORY (INHALATION) at 08:49

## 2018-01-01 RX ADMIN — LEVETIRACETAM 250 MG: 250 TABLET, FILM COATED ORAL at 21:09

## 2018-01-01 RX ADMIN — LOPERAMIDE HYDROCHLORIDE 2 MG: 2 CAPSULE ORAL at 17:06

## 2018-01-01 RX ADMIN — PIPERACILLIN SODIUM AND TAZOBACTAM SODIUM 4.5 G: 4; .5 INJECTION, POWDER, LYOPHILIZED, FOR SOLUTION INTRAVENOUS at 15:04

## 2018-01-01 RX ADMIN — MIDAZOLAM HYDROCHLORIDE 1 MG: 1 INJECTION, SOLUTION INTRAMUSCULAR; INTRAVENOUS at 16:46

## 2018-01-01 RX ADMIN — Medication 1 PACKET: at 20:01

## 2018-01-01 RX ADMIN — MIDAZOLAM HYDROCHLORIDE 1 MG: 1 INJECTION, SOLUTION INTRAMUSCULAR; INTRAVENOUS at 16:52

## 2018-01-01 RX ADMIN — RIVAROXABAN 20 MG: 10 TABLET, FILM COATED ORAL at 18:07

## 2018-01-01 RX ADMIN — DOXYCYCLINE HYCLATE 100 MG: 100 CAPSULE ORAL at 19:38

## 2018-01-01 RX ADMIN — DOXYCYCLINE HYCLATE 100 MG: 100 CAPSULE ORAL at 06:27

## 2018-01-01 RX ADMIN — LIDOCAINE HYDROCHLORIDE 5 ML: 10 INJECTION, SOLUTION EPIDURAL; INFILTRATION; INTRACAUDAL; PERINEURAL at 17:26

## 2018-01-01 RX ADMIN — ASPIRIN 81 MG CHEWABLE TABLET 81 MG: 81 TABLET CHEWABLE at 11:23

## 2018-01-01 RX ADMIN — OXYCODONE HYDROCHLORIDE 20 MG: 10 TABLET, FILM COATED, EXTENDED RELEASE ORAL at 12:02

## 2018-01-01 RX ADMIN — LOPERAMIDE HYDROCHLORIDE 2 MG: 2 CAPSULE ORAL at 18:21

## 2018-01-01 RX ADMIN — LOPERAMIDE HYDROCHLORIDE 2 MG: 2 CAPSULE ORAL at 02:16

## 2018-01-01 RX ADMIN — OMEPRAZOLE 20 MG: 20 CAPSULE, DELAYED RELEASE ORAL at 11:56

## 2018-01-01 RX ADMIN — Medication 2 MG: at 22:29

## 2018-01-01 RX ADMIN — OXYCODONE HYDROCHLORIDE 20 MG: 10 TABLET, FILM COATED, EXTENDED RELEASE ORAL at 20:45

## 2018-01-01 RX ADMIN — OXYCODONE HYDROCHLORIDE 15 MG: 5 TABLET ORAL at 02:17

## 2018-01-01 RX ADMIN — LOPERAMIDE HYDROCHLORIDE 2 MG: 2 CAPSULE ORAL at 00:48

## 2018-01-01 RX ADMIN — OMEPRAZOLE 20 MG: 20 CAPSULE, DELAYED RELEASE ORAL at 16:45

## 2018-01-01 RX ADMIN — DIVALPROEX SODIUM 250 MG: 125 CAPSULE, COATED PELLETS ORAL at 21:03

## 2018-01-01 RX ADMIN — CALCIUM CARBONATE (ANTACID) CHEW TAB 500 MG 1000 MG: 500 CHEW TAB at 19:57

## 2018-01-01 RX ADMIN — MICONAZOLE NITRATE: 2 POWDER TOPICAL at 10:14

## 2018-01-01 RX ADMIN — CALCIUM CARBONATE (ANTACID) CHEW TAB 500 MG 1000 MG: 500 CHEW TAB at 21:26

## 2018-01-01 RX ADMIN — DIAZEPAM 2 MG: 2 TABLET ORAL at 12:59

## 2018-01-01 RX ADMIN — CALCIUM CARBONATE (ANTACID) CHEW TAB 500 MG 1000 MG: 500 CHEW TAB at 10:27

## 2018-01-01 RX ADMIN — IPRATROPIUM BROMIDE AND ALBUTEROL SULFATE 3 ML: .5; 3 SOLUTION RESPIRATORY (INHALATION) at 16:58

## 2018-01-01 RX ADMIN — OMEPRAZOLE 20 MG: 20 CAPSULE, DELAYED RELEASE ORAL at 13:55

## 2018-01-01 RX ADMIN — CALCIUM CARBONATE (ANTACID) CHEW TAB 500 MG 1000 MG: 500 CHEW TAB at 11:30

## 2018-01-01 RX ADMIN — ISOMETHEPTENE MUCATE, DICHLORALPHENAZONE, AND ACETAMINOPHEN 2 CAPSULE: 65; 100; 325 CAPSULE ORAL at 11:12

## 2018-01-01 RX ADMIN — LOPERAMIDE HYDROCHLORIDE 2 MG: 2 CAPSULE ORAL at 09:15

## 2018-01-01 RX ADMIN — OXYCODONE HYDROCHLORIDE 15 MG: 5 TABLET ORAL at 17:25

## 2018-01-01 RX ADMIN — LOPERAMIDE HYDROCHLORIDE 2 MG: 2 CAPSULE ORAL at 14:22

## 2018-01-01 RX ADMIN — OXYCODONE HYDROCHLORIDE 10 MG: 5 TABLET ORAL at 21:58

## 2018-01-01 RX ADMIN — LEVOTHYROXINE SODIUM 150 MCG: 75 TABLET ORAL at 11:05

## 2018-01-01 RX ADMIN — IPRATROPIUM BROMIDE AND ALBUTEROL SULFATE 3 ML: .5; 3 SOLUTION RESPIRATORY (INHALATION) at 20:34

## 2018-01-01 RX ADMIN — ONDANSETRON 4 MG: 2 INJECTION INTRAMUSCULAR; INTRAVENOUS at 10:28

## 2018-01-01 RX ADMIN — OXYCODONE HYDROCHLORIDE AND ACETAMINOPHEN 500 MG: 500 TABLET ORAL at 11:56

## 2018-01-01 RX ADMIN — DIPHENHYDRAMINE HYDROCHLORIDE 25 MG: 25 CAPSULE ORAL at 22:24

## 2018-01-01 RX ADMIN — ZINC SULFATE CAP 220 MG (50 MG ELEMENTAL ZN) 220 MG: 220 (50 ZN) CAP at 13:27

## 2018-01-01 RX ADMIN — CALCIUM CARBONATE (ANTACID) CHEW TAB 500 MG 1000 MG: 500 CHEW TAB at 05:58

## 2018-01-01 RX ADMIN — PIPERACILLIN SODIUM AND TAZOBACTAM SODIUM 4.5 G: 4; .5 INJECTION, POWDER, LYOPHILIZED, FOR SOLUTION INTRAVENOUS at 02:12

## 2018-01-01 RX ADMIN — RIVAROXABAN 20 MG: 10 TABLET, FILM COATED ORAL at 18:34

## 2018-01-01 RX ADMIN — DOXYCYCLINE HYCLATE 100 MG: 100 CAPSULE ORAL at 09:58

## 2018-01-01 RX ADMIN — HYDROMORPHONE HYDROCHLORIDE 2 MG: 2 TABLET ORAL at 06:27

## 2018-01-01 RX ADMIN — OXYCODONE HYDROCHLORIDE 15 MG: 5 TABLET ORAL at 15:58

## 2018-01-01 RX ADMIN — Medication 5 MG: at 21:50

## 2018-01-01 RX ADMIN — IOPAMIDOL 70 ML: 755 INJECTION, SOLUTION INTRAVENOUS at 14:33

## 2018-01-01 RX ADMIN — GABAPENTIN 300 MG: 300 CAPSULE ORAL at 14:31

## 2018-01-01 RX ADMIN — Medication 2 MG: at 07:50

## 2018-01-01 RX ADMIN — LOPERAMIDE HYDROCHLORIDE 2 MG: 2 CAPSULE ORAL at 19:04

## 2018-01-01 RX ADMIN — Medication 1 PACKET: at 08:18

## 2018-01-01 RX ADMIN — RIVAROXABAN 20 MG: 10 TABLET, FILM COATED ORAL at 21:26

## 2018-01-01 RX ADMIN — OXYCODONE HYDROCHLORIDE 10 MG: 5 TABLET ORAL at 10:53

## 2018-01-01 RX ADMIN — DRONABINOL 2.5 MG: 2.5 CAPSULE ORAL at 16:08

## 2018-01-01 RX ADMIN — DIAZEPAM 4 MG: 2 TABLET ORAL at 02:56

## 2018-01-01 RX ADMIN — OXYCODONE HYDROCHLORIDE 15 MG: 5 TABLET ORAL at 20:34

## 2018-01-01 RX ADMIN — SODIUM CHLORIDE 1000 ML: 9 INJECTION, SOLUTION INTRAVENOUS at 01:03

## 2018-01-01 RX ADMIN — Medication 20000 UNITS: at 08:56

## 2018-01-01 RX ADMIN — Medication 2 MG: at 05:05

## 2018-01-01 RX ADMIN — OMEPRAZOLE 20 MG: 20 CAPSULE, DELAYED RELEASE ORAL at 17:08

## 2018-01-01 RX ADMIN — Medication 1 PACKET: at 08:58

## 2018-01-01 RX ADMIN — GUAIFENESIN 600 MG: 600 TABLET, EXTENDED RELEASE ORAL at 09:38

## 2018-01-01 RX ADMIN — LEVOTHYROXINE SODIUM 125 MCG: 125 TABLET ORAL at 08:40

## 2018-01-01 RX ADMIN — LOPERAMIDE HYDROCHLORIDE 2 MG: 2 CAPSULE ORAL at 10:34

## 2018-01-01 RX ADMIN — SODIUM CHLORIDE, PRESERVATIVE FREE 78 ML: 5 INJECTION INTRAVENOUS at 19:01

## 2018-01-01 RX ADMIN — GABAPENTIN 100 MG: 100 CAPSULE ORAL at 07:48

## 2018-01-01 RX ADMIN — ASPIRIN 81 MG CHEWABLE TABLET 81 MG: 81 TABLET CHEWABLE at 09:01

## 2018-01-01 RX ADMIN — MICONAZOLE NITRATE: 2 POWDER TOPICAL at 09:02

## 2018-01-01 RX ADMIN — ONDANSETRON 4 MG: 2 INJECTION INTRAMUSCULAR; INTRAVENOUS at 20:37

## 2018-01-01 RX ADMIN — OXYCODONE HYDROCHLORIDE 20 MG: 10 TABLET, FILM COATED, EXTENDED RELEASE ORAL at 10:35

## 2018-01-01 RX ADMIN — ARIPIPRAZOLE 5 MG: 5 TABLET ORAL at 09:58

## 2018-01-01 RX ADMIN — GABAPENTIN 100 MG: 100 CAPSULE ORAL at 20:51

## 2018-01-01 RX ADMIN — ACETAMINOPHEN 650 MG: 325 TABLET ORAL at 20:58

## 2018-01-01 RX ADMIN — ASPIRIN 81 MG CHEWABLE TABLET 81 MG: 81 TABLET CHEWABLE at 08:42

## 2018-01-01 RX ADMIN — LOPERAMIDE HYDROCHLORIDE 2 MG: 2 CAPSULE ORAL at 14:38

## 2018-01-01 RX ADMIN — LOPERAMIDE HYDROCHLORIDE 2 MG: 2 CAPSULE ORAL at 08:51

## 2018-01-01 RX ADMIN — LOPERAMIDE HYDROCHLORIDE 2 MG: 2 CAPSULE ORAL at 15:37

## 2018-01-01 RX ADMIN — ONDANSETRON 4 MG: 2 INJECTION INTRAMUSCULAR; INTRAVENOUS at 09:26

## 2018-01-01 RX ADMIN — ONDANSETRON 4 MG: 2 INJECTION INTRAMUSCULAR; INTRAVENOUS at 22:14

## 2018-01-01 RX ADMIN — LOPERAMIDE HYDROCHLORIDE 2 MG: 2 CAPSULE ORAL at 17:20

## 2018-01-01 RX ADMIN — DOXYCYCLINE HYCLATE 100 MG: 100 CAPSULE ORAL at 18:21

## 2018-01-01 RX ADMIN — POTASSIUM CHLORIDE 40 MEQ: 750 TABLET, FILM COATED, EXTENDED RELEASE ORAL at 09:35

## 2018-01-01 RX ADMIN — DIAZEPAM 4 MG: 2 TABLET ORAL at 03:18

## 2018-01-01 RX ADMIN — ONDANSETRON 4 MG: 2 INJECTION INTRAMUSCULAR; INTRAVENOUS at 08:50

## 2018-01-01 RX ADMIN — ACETAMINOPHEN 650 MG: 325 TABLET ORAL at 11:43

## 2018-01-01 RX ADMIN — ZOLPIDEM TARTRATE 5 MG: 5 TABLET, COATED ORAL at 21:50

## 2018-01-01 RX ADMIN — ZOLPIDEM TARTRATE 5 MG: 5 TABLET, COATED ORAL at 21:41

## 2018-01-01 RX ADMIN — OXYCODONE HYDROCHLORIDE 20 MG: 10 TABLET, FILM COATED, EXTENDED RELEASE ORAL at 20:51

## 2018-01-01 RX ADMIN — GABAPENTIN 100 MG: 100 CAPSULE ORAL at 09:37

## 2018-01-01 RX ADMIN — ACETAMINOPHEN 650 MG: 325 TABLET ORAL at 17:19

## 2018-01-01 RX ADMIN — LEVETIRACETAM 250 MG: 250 TABLET, FILM COATED ORAL at 07:50

## 2018-01-01 RX ADMIN — FERROUS SULFATE TAB 325 MG (65 MG ELEMENTAL FE) 325 MG: 325 (65 FE) TAB at 10:54

## 2018-01-01 RX ADMIN — ZOLPIDEM TARTRATE 5 MG: 5 TABLET, COATED ORAL at 20:58

## 2018-01-01 RX ADMIN — Medication 1 MG: at 13:29

## 2018-01-01 RX ADMIN — OMEPRAZOLE 20 MG: 20 CAPSULE, DELAYED RELEASE ORAL at 16:13

## 2018-01-01 RX ADMIN — MULTIPLE VITAMINS W/ MINERALS TAB 1 TABLET: TAB at 08:38

## 2018-01-01 RX ADMIN — CALCIUM CARBONATE (ANTACID) CHEW TAB 500 MG 500 MG: 500 CHEW TAB at 15:27

## 2018-01-01 RX ADMIN — GABAPENTIN 100 MG: 100 CAPSULE ORAL at 20:35

## 2018-01-01 RX ADMIN — DRONABINOL 2.5 MG: 2.5 CAPSULE ORAL at 08:25

## 2018-01-01 RX ADMIN — Medication 5 MG: at 20:57

## 2018-01-01 RX ADMIN — RIVAROXABAN 20 MG: 10 TABLET, FILM COATED ORAL at 00:56

## 2018-01-01 RX ADMIN — DIAZEPAM 2 MG: 2 TABLET ORAL at 00:47

## 2018-01-01 RX ADMIN — LOPERAMIDE HYDROCHLORIDE 2 MG: 2 CAPSULE ORAL at 21:23

## 2018-01-01 RX ADMIN — CALCIUM CARBONATE (ANTACID) CHEW TAB 500 MG 1000 MG: 500 CHEW TAB at 09:59

## 2018-01-01 RX ADMIN — OXYCODONE HYDROCHLORIDE 10 MG: 5 TABLET ORAL at 22:09

## 2018-01-01 RX ADMIN — PIPERACILLIN SODIUM AND TAZOBACTAM SODIUM 4.5 G: 4; .5 INJECTION, POWDER, LYOPHILIZED, FOR SOLUTION INTRAVENOUS at 07:10

## 2018-01-01 RX ADMIN — OMEPRAZOLE 20 MG: 20 CAPSULE, DELAYED RELEASE ORAL at 09:58

## 2018-01-01 RX ADMIN — OXYCODONE HYDROCHLORIDE 20 MG: 10 TABLET, FILM COATED, EXTENDED RELEASE ORAL at 13:11

## 2018-01-01 RX ADMIN — DIAZEPAM 4 MG: 2 TABLET ORAL at 15:14

## 2018-01-01 RX ADMIN — ASPIRIN 81 MG CHEWABLE TABLET 81 MG: 81 TABLET CHEWABLE at 11:28

## 2018-01-01 RX ADMIN — HYDROMORPHONE HYDROCHLORIDE 2 MG: 2 TABLET ORAL at 14:31

## 2018-01-01 RX ADMIN — OXYCODONE HYDROCHLORIDE 20 MG: 10 TABLET, FILM COATED, EXTENDED RELEASE ORAL at 00:56

## 2018-01-01 RX ADMIN — ZINC SULFATE CAP 220 MG (50 MG ELEMENTAL ZN) 220 MG: 220 (50 ZN) CAP at 11:57

## 2018-01-01 RX ADMIN — CALCIUM CARBONATE (ANTACID) CHEW TAB 500 MG 500 MG: 500 CHEW TAB at 19:14

## 2018-01-01 RX ADMIN — DIAZEPAM 2 MG: 2 TABLET ORAL at 04:15

## 2018-01-01 RX ADMIN — VANCOMYCIN HYDROCHLORIDE 1000 MG: 1 INJECTION, SOLUTION INTRAVENOUS at 14:28

## 2018-01-01 RX ADMIN — ZOLPIDEM TARTRATE 5 MG: 5 TABLET, COATED ORAL at 21:26

## 2018-01-01 RX ADMIN — LOPERAMIDE HYDROCHLORIDE 4 MG: 2 CAPSULE ORAL at 14:40

## 2018-01-01 RX ADMIN — HYDROMORPHONE HYDROCHLORIDE 2 MG: 2 TABLET ORAL at 01:28

## 2018-01-01 RX ADMIN — ONDANSETRON 4 MG: 2 INJECTION INTRAMUSCULAR; INTRAVENOUS at 00:21

## 2018-01-01 RX ADMIN — LEVETIRACETAM 250 MG: 250 TABLET, FILM COATED ORAL at 10:35

## 2018-01-01 RX ADMIN — PIPERACILLIN SODIUM AND TAZOBACTAM SODIUM 3.38 G: 3; .375 INJECTION, POWDER, LYOPHILIZED, FOR SOLUTION INTRAVENOUS at 09:50

## 2018-01-01 RX ADMIN — LOPERAMIDE HYDROCHLORIDE 2 MG: 2 CAPSULE ORAL at 17:59

## 2018-01-01 RX ADMIN — LOPERAMIDE HYDROCHLORIDE 2 MG: 2 CAPSULE ORAL at 00:47

## 2018-01-01 RX ADMIN — OXYCODONE HYDROCHLORIDE 20 MG: 10 TABLET, FILM COATED, EXTENDED RELEASE ORAL at 00:46

## 2018-01-01 RX ADMIN — DIPHENHYDRAMINE HYDROCHLORIDE 25 MG: 25 CAPSULE ORAL at 13:37

## 2018-01-01 RX ADMIN — GABAPENTIN 100 MG: 100 CAPSULE ORAL at 11:24

## 2018-01-01 RX ADMIN — CYCLOBENZAPRINE HYDROCHLORIDE 5 MG: 5 TABLET, FILM COATED ORAL at 00:13

## 2018-01-01 RX ADMIN — Medication 2 MG: at 00:03

## 2018-01-01 RX ADMIN — LEVOTHYROXINE SODIUM 150 MCG: 75 TABLET ORAL at 08:38

## 2018-01-01 RX ADMIN — I.V. FAT EMULSION 250 ML: 20 EMULSION INTRAVENOUS at 20:37

## 2018-01-01 RX ADMIN — IPRATROPIUM BROMIDE AND ALBUTEROL SULFATE 3 ML: .5; 3 SOLUTION RESPIRATORY (INHALATION) at 16:09

## 2018-01-01 RX ADMIN — Medication 2 MG: at 18:08

## 2018-01-01 RX ADMIN — DIAZEPAM 4 MG: 2 TABLET ORAL at 10:35

## 2018-01-01 RX ADMIN — HYDROMORPHONE HYDROCHLORIDE 1 MG: 1 INJECTION, SOLUTION INTRAMUSCULAR; INTRAVENOUS; SUBCUTANEOUS at 05:08

## 2018-01-01 RX ADMIN — RIVAROXABAN 20 MG: 20 TABLET, FILM COATED ORAL at 21:01

## 2018-01-01 RX ADMIN — IPRATROPIUM BROMIDE AND ALBUTEROL SULFATE 3 ML: .5; 3 SOLUTION RESPIRATORY (INHALATION) at 20:21

## 2018-01-01 RX ADMIN — OMEPRAZOLE 20 MG: 20 CAPSULE, DELAYED RELEASE ORAL at 06:59

## 2018-01-01 RX ADMIN — LEVETIRACETAM 250 MG: 250 TABLET, FILM COATED ORAL at 09:17

## 2018-01-01 RX ADMIN — Medication 5 MG: at 21:08

## 2018-01-01 RX ADMIN — ASPIRIN 81 MG CHEWABLE TABLET 81 MG: 81 TABLET CHEWABLE at 10:28

## 2018-01-01 RX ADMIN — LOPERAMIDE HYDROCHLORIDE 2 MG: 2 CAPSULE ORAL at 20:55

## 2018-01-01 RX ADMIN — LOPERAMIDE HYDROCHLORIDE 2 MG: 2 CAPSULE ORAL at 05:55

## 2018-01-01 RX ADMIN — LEVETIRACETAM 250 MG: 250 TABLET, FILM COATED ORAL at 21:23

## 2018-01-01 RX ADMIN — Medication 2 MG: at 22:13

## 2018-01-01 RX ADMIN — HEPARIN, PORCINE (PF) 10 UNIT/ML INTRAVENOUS SYRINGE 5 ML: at 17:33

## 2018-01-01 RX ADMIN — ONDANSETRON 4 MG: 2 INJECTION INTRAMUSCULAR; INTRAVENOUS at 14:47

## 2018-01-01 RX ADMIN — LOPERAMIDE HYDROCHLORIDE 2 MG: 2 CAPSULE ORAL at 13:16

## 2018-01-01 RX ADMIN — OXYCODONE HYDROCHLORIDE 15 MG: 5 TABLET ORAL at 09:36

## 2018-01-01 RX ADMIN — OXYCODONE HYDROCHLORIDE 15 MG: 5 TABLET ORAL at 09:32

## 2018-01-01 RX ADMIN — BISMUTH SUBSALICYLATE 30 ML: 262 LIQUID ORAL at 07:02

## 2018-01-01 RX ADMIN — OMEPRAZOLE 20 MG: 20 CAPSULE, DELAYED RELEASE ORAL at 18:00

## 2018-01-01 RX ADMIN — LAMOTRIGINE 50 MG: 25 TABLET ORAL at 08:42

## 2018-01-01 RX ADMIN — LOPERAMIDE HYDROCHLORIDE 4 MG: 2 CAPSULE ORAL at 12:14

## 2018-01-01 RX ADMIN — ACETAMINOPHEN 650 MG: 325 TABLET ORAL at 15:55

## 2018-01-01 RX ADMIN — ACETAMINOPHEN 650 MG: 325 TABLET ORAL at 09:54

## 2018-01-01 RX ADMIN — DIVALPROEX SODIUM 250 MG: 125 CAPSULE, COATED PELLETS ORAL at 07:47

## 2018-01-01 RX ADMIN — LEVOTHYROXINE SODIUM 150 MCG: 75 TABLET ORAL at 09:58

## 2018-01-01 RX ADMIN — Medication 20000 UNITS: at 10:30

## 2018-01-01 RX ADMIN — OXYCODONE HYDROCHLORIDE 15 MG: 5 TABLET ORAL at 12:14

## 2018-01-01 RX ADMIN — LAMOTRIGINE 50 MG: 25 TABLET ORAL at 11:58

## 2018-01-01 RX ADMIN — PIPERACILLIN SODIUM AND TAZOBACTAM SODIUM 4.5 G: 4; .5 INJECTION, POWDER, LYOPHILIZED, FOR SOLUTION INTRAVENOUS at 11:13

## 2018-01-01 RX ADMIN — Medication 2 MG: at 12:43

## 2018-01-01 RX ADMIN — LOPERAMIDE HYDROCHLORIDE 4 MG: 2 CAPSULE ORAL at 18:45

## 2018-01-01 RX ADMIN — LEVETIRACETAM 250 MG: 250 TABLET, FILM COATED ORAL at 20:29

## 2018-01-01 RX ADMIN — HYDROMORPHONE HYDROCHLORIDE 4 MG: 2 TABLET ORAL at 06:55

## 2018-01-01 RX ADMIN — DIATRIZOATE MEGLUMINE AND DIATRIZOATE SODIUM 120 ML: 660; 100 SOLUTION ORAL; RECTAL at 18:54

## 2018-01-01 RX ADMIN — ZOLPIDEM TARTRATE 5 MG: 5 TABLET, COATED ORAL at 21:24

## 2018-01-01 RX ADMIN — DULOXETINE HYDROCHLORIDE 30 MG: 30 CAPSULE, DELAYED RELEASE ORAL at 07:48

## 2018-01-01 RX ADMIN — Medication 1 MG: at 14:38

## 2018-01-01 RX ADMIN — IPRATROPIUM BROMIDE AND ALBUTEROL SULFATE 3 ML: .5; 3 SOLUTION RESPIRATORY (INHALATION) at 16:25

## 2018-01-01 RX ADMIN — MICONAZOLE NITRATE: 2 POWDER TOPICAL at 09:59

## 2018-01-01 RX ADMIN — CALCIUM CARBONATE (ANTACID) CHEW TAB 500 MG 1000 MG: 500 CHEW TAB at 00:46

## 2018-01-01 RX ADMIN — LEVETIRACETAM 250 MG: 250 TABLET, FILM COATED ORAL at 19:56

## 2018-01-01 RX ADMIN — POTASSIUM CHLORIDE 10 MEQ: 750 TABLET, EXTENDED RELEASE ORAL at 10:54

## 2018-01-01 RX ADMIN — ZINC SULFATE CAP 220 MG (50 MG ELEMENTAL ZN) 220 MG: 220 (50 ZN) CAP at 10:29

## 2018-01-01 RX ADMIN — LEVETIRACETAM 250 MG: 250 TABLET, FILM COATED ORAL at 20:03

## 2018-01-01 RX ADMIN — LOPERAMIDE HYDROCHLORIDE 2 MG: 2 CAPSULE ORAL at 21:59

## 2018-01-01 RX ADMIN — ONDANSETRON 4 MG: 2 INJECTION INTRAMUSCULAR; INTRAVENOUS at 07:48

## 2018-01-01 RX ADMIN — I.V. FAT EMULSION 250 ML: 20 EMULSION INTRAVENOUS at 20:50

## 2018-01-01 RX ADMIN — OMEPRAZOLE 20 MG: 20 CAPSULE, DELAYED RELEASE ORAL at 08:04

## 2018-01-01 RX ADMIN — DIAZEPAM 2 MG: 2 TABLET ORAL at 12:50

## 2018-01-01 RX ADMIN — VANCOMYCIN HYDROCHLORIDE 1000 MG: 1 INJECTION, SOLUTION INTRAVENOUS at 02:54

## 2018-01-01 RX ADMIN — PIPERACILLIN SODIUM AND TAZOBACTAM SODIUM 4.5 G: 4; .5 INJECTION, POWDER, LYOPHILIZED, FOR SOLUTION INTRAVENOUS at 00:02

## 2018-01-01 RX ADMIN — ONDANSETRON 4 MG: 2 INJECTION INTRAMUSCULAR; INTRAVENOUS at 14:40

## 2018-01-01 RX ADMIN — GABAPENTIN 100 MG: 100 CAPSULE ORAL at 22:15

## 2018-01-01 RX ADMIN — LOPERAMIDE HYDROCHLORIDE 2 MG: 2 CAPSULE ORAL at 11:13

## 2018-01-01 RX ADMIN — ZOLPIDEM TARTRATE 5 MG: 5 TABLET, COATED ORAL at 20:43

## 2018-01-01 RX ADMIN — OXYCODONE HYDROCHLORIDE 5 MG: 5 TABLET ORAL at 22:12

## 2018-01-01 RX ADMIN — MIDAZOLAM HYDROCHLORIDE 1 MG: 1 INJECTION, SOLUTION INTRAMUSCULAR; INTRAVENOUS at 17:00

## 2018-01-01 RX ADMIN — DIAZEPAM 4 MG: 2 TABLET ORAL at 13:24

## 2018-01-01 RX ADMIN — SODIUM CHLORIDE 58 ML: 9 INJECTION, SOLUTION INTRAVENOUS at 14:34

## 2018-01-01 RX ADMIN — DIAZEPAM 2 MG: 2 TABLET ORAL at 18:22

## 2018-01-01 RX ADMIN — DIAZEPAM 4 MG: 2 TABLET ORAL at 02:12

## 2018-01-01 RX ADMIN — ARIPIPRAZOLE 5 MG: 5 TABLET ORAL at 11:25

## 2018-01-01 RX ADMIN — PIPERACILLIN SODIUM AND TAZOBACTAM SODIUM 4.5 G: 4; .5 INJECTION, POWDER, LYOPHILIZED, FOR SOLUTION INTRAVENOUS at 12:35

## 2018-01-01 RX ADMIN — HYDROMORPHONE HYDROCHLORIDE 0.5 MG: 1 INJECTION, SOLUTION INTRAMUSCULAR; INTRAVENOUS; SUBCUTANEOUS at 07:58

## 2018-01-01 RX ADMIN — LOPERAMIDE HYDROCHLORIDE 2 MG: 2 CAPSULE ORAL at 04:01

## 2018-01-01 RX ADMIN — ONDANSETRON 4 MG: 2 INJECTION INTRAMUSCULAR; INTRAVENOUS at 11:31

## 2018-01-01 RX ADMIN — ONDANSETRON 4 MG: 2 INJECTION INTRAMUSCULAR; INTRAVENOUS at 04:51

## 2018-01-01 RX ADMIN — OXYCODONE HYDROCHLORIDE 15 MG: 5 TABLET ORAL at 00:12

## 2018-01-01 RX ADMIN — QUETIAPINE 25 MG: 25 TABLET ORAL at 20:00

## 2018-01-01 RX ADMIN — IOPAMIDOL 10 ML: 612 INJECTION, SOLUTION INTRATHECAL at 17:25

## 2018-01-01 RX ADMIN — LEVETIRACETAM 250 MG: 250 TABLET, FILM COATED ORAL at 08:42

## 2018-01-01 RX ADMIN — DIAZEPAM 4 MG: 2 TABLET ORAL at 00:58

## 2018-01-01 RX ADMIN — DIPHENHYDRAMINE HYDROCHLORIDE 25 MG: 25 CAPSULE ORAL at 04:48

## 2018-01-01 RX ADMIN — DIAZEPAM 4 MG: 2 TABLET ORAL at 20:34

## 2018-01-01 RX ADMIN — I.V. FAT EMULSION 250 ML: 20 EMULSION INTRAVENOUS at 19:47

## 2018-01-01 RX ADMIN — Medication 2 MG: at 06:59

## 2018-01-01 RX ADMIN — SODIUM CHLORIDE 500 ML: 9 INJECTION, SOLUTION INTRAVENOUS at 00:50

## 2018-01-01 RX ADMIN — PIPERACILLIN SODIUM AND TAZOBACTAM SODIUM 4.5 G: 4; .5 INJECTION, POWDER, LYOPHILIZED, FOR SOLUTION INTRAVENOUS at 18:14

## 2018-01-01 RX ADMIN — DIAZEPAM 2 MG: 2 TABLET ORAL at 11:41

## 2018-01-01 RX ADMIN — HYDROMORPHONE HYDROCHLORIDE 2 MG: 2 TABLET ORAL at 21:03

## 2018-01-01 RX ADMIN — DIAZEPAM 2 MG: 2 TABLET ORAL at 08:50

## 2018-01-01 RX ADMIN — DOXYCYCLINE HYCLATE 100 MG: 100 CAPSULE ORAL at 19:15

## 2018-01-01 RX ADMIN — Medication 2 MG: at 02:16

## 2018-01-01 RX ADMIN — ONDANSETRON 4 MG: 2 INJECTION INTRAMUSCULAR; INTRAVENOUS at 01:49

## 2018-01-01 RX ADMIN — ARIPIPRAZOLE 5 MG: 5 TABLET ORAL at 08:42

## 2018-01-01 RX ADMIN — ACETAMINOPHEN 650 MG: 325 TABLET ORAL at 17:06

## 2018-01-01 RX ADMIN — ONDANSETRON 4 MG: 2 INJECTION INTRAMUSCULAR; INTRAVENOUS at 05:38

## 2018-01-01 RX ADMIN — ZOLPIDEM TARTRATE 5 MG: 5 TABLET, COATED ORAL at 20:54

## 2018-01-01 RX ADMIN — PIPERACILLIN SODIUM AND TAZOBACTAM SODIUM 4.5 G: 4; .5 INJECTION, POWDER, LYOPHILIZED, FOR SOLUTION INTRAVENOUS at 11:14

## 2018-01-01 RX ADMIN — CEFAZOLIN SODIUM 2 G: 2 INJECTION, SOLUTION INTRAVENOUS at 16:25

## 2018-01-01 RX ADMIN — MICONAZOLE NITRATE: 2 POWDER TOPICAL at 21:38

## 2018-01-01 RX ADMIN — Medication 2 MG: at 17:15

## 2018-01-01 RX ADMIN — GABAPENTIN 100 MG: 100 CAPSULE ORAL at 11:55

## 2018-01-01 RX ADMIN — LEVETIRACETAM 250 MG: 250 TABLET, FILM COATED ORAL at 20:35

## 2018-01-01 RX ADMIN — OXYCODONE HYDROCHLORIDE 10 MG: 10 TABLET ORAL at 20:35

## 2018-01-01 RX ADMIN — PIPERACILLIN SODIUM AND TAZOBACTAM SODIUM 3.38 G: 3; .375 INJECTION, POWDER, LYOPHILIZED, FOR SOLUTION INTRAVENOUS at 21:15

## 2018-01-01 RX ADMIN — Medication 1 PACKET: at 13:06

## 2018-01-01 RX ADMIN — RIVAROXABAN 20 MG: 10 TABLET, FILM COATED ORAL at 22:48

## 2018-01-01 RX ADMIN — OXYCODONE HYDROCHLORIDE 20 MG: 10 TABLET, FILM COATED, EXTENDED RELEASE ORAL at 08:25

## 2018-01-01 RX ADMIN — BISMUTH SUBSALICYLATE 30 ML: 262 LIQUID ORAL at 20:58

## 2018-01-01 RX ADMIN — DIAZEPAM 2 MG: 2 TABLET ORAL at 22:12

## 2018-01-01 RX ADMIN — LIDOCAINE 1 PATCH: 560 PATCH PERCUTANEOUS; TOPICAL; TRANSDERMAL at 18:48

## 2018-01-01 RX ADMIN — Medication 2 MG: at 13:43

## 2018-01-01 RX ADMIN — ONDANSETRON 4 MG: 4 TABLET, ORALLY DISINTEGRATING ORAL at 19:43

## 2018-01-01 RX ADMIN — BISMUTH SUBSALICYLATE 30 ML: 262 LIQUID ORAL at 22:28

## 2018-01-01 RX ADMIN — LOPERAMIDE HYDROCHLORIDE 2 MG: 2 CAPSULE ORAL at 13:43

## 2018-01-01 RX ADMIN — LOPERAMIDE HYDROCHLORIDE 4 MG: 2 CAPSULE ORAL at 20:34

## 2018-01-01 RX ADMIN — OXYCODONE HYDROCHLORIDE AND ACETAMINOPHEN 500 MG: 500 TABLET ORAL at 08:42

## 2018-01-01 RX ADMIN — PIPERACILLIN SODIUM AND TAZOBACTAM SODIUM 4.5 G: 4; .5 INJECTION, POWDER, LYOPHILIZED, FOR SOLUTION INTRAVENOUS at 17:21

## 2018-01-01 RX ADMIN — OMEPRAZOLE 20 MG: 20 CAPSULE, DELAYED RELEASE ORAL at 16:51

## 2018-01-01 RX ADMIN — DULOXETINE HYDROCHLORIDE 60 MG: 60 CAPSULE, DELAYED RELEASE ORAL at 09:58

## 2018-01-01 RX ADMIN — CALCIUM CARBONATE (ANTACID) CHEW TAB 500 MG 500 MG: 500 CHEW TAB at 10:27

## 2018-01-01 RX ADMIN — DIAZEPAM 5 MG: 5 TABLET ORAL at 09:01

## 2018-01-01 RX ADMIN — Medication 2 MG: at 10:15

## 2018-01-01 RX ADMIN — OXYCODONE HYDROCHLORIDE 20 MG: 10 TABLET, FILM COATED, EXTENDED RELEASE ORAL at 14:37

## 2018-01-01 RX ADMIN — ONDANSETRON 4 MG: 2 INJECTION INTRAMUSCULAR; INTRAVENOUS at 15:51

## 2018-01-01 RX ADMIN — LOPERAMIDE HYDROCHLORIDE 2 MG: 2 CAPSULE ORAL at 11:05

## 2018-01-01 RX ADMIN — LEVOTHYROXINE SODIUM 150 MCG: 75 TABLET ORAL at 10:10

## 2018-01-01 RX ADMIN — FUROSEMIDE 20 MG: 20 TABLET ORAL at 14:21

## 2018-01-01 RX ADMIN — IPRATROPIUM BROMIDE AND ALBUTEROL SULFATE 3 ML: .5; 3 SOLUTION RESPIRATORY (INHALATION) at 08:14

## 2018-01-01 RX ADMIN — Medication 2 MG: at 22:12

## 2018-01-01 RX ADMIN — LOPERAMIDE HYDROCHLORIDE 4 MG: 2 CAPSULE ORAL at 08:47

## 2018-01-01 RX ADMIN — DIAZEPAM 2 MG: 2 TABLET ORAL at 19:06

## 2018-01-01 RX ADMIN — ONDANSETRON 4 MG: 2 INJECTION INTRAMUSCULAR; INTRAVENOUS at 00:29

## 2018-01-01 RX ADMIN — IPRATROPIUM BROMIDE AND ALBUTEROL SULFATE 3 ML: .5; 3 SOLUTION RESPIRATORY (INHALATION) at 12:34

## 2018-01-01 RX ADMIN — DOXYCYCLINE HYCLATE 100 MG: 100 CAPSULE ORAL at 08:42

## 2018-01-01 RX ADMIN — GABAPENTIN 300 MG: 300 CAPSULE ORAL at 09:02

## 2018-01-01 RX ADMIN — Medication 1 MG: at 02:16

## 2018-01-01 RX ADMIN — LOPERAMIDE HYDROCHLORIDE 2 MG: 2 CAPSULE ORAL at 05:22

## 2018-01-01 RX ADMIN — Medication 1 PACKET: at 20:28

## 2018-01-01 RX ADMIN — OXYCODONE HYDROCHLORIDE 20 MG: 10 TABLET, FILM COATED, EXTENDED RELEASE ORAL at 21:01

## 2018-01-01 RX ADMIN — OXYCODONE HYDROCHLORIDE 20 MG: 10 TABLET, FILM COATED, EXTENDED RELEASE ORAL at 08:04

## 2018-01-01 RX ADMIN — LEVETIRACETAM 250 MG: 250 TABLET, FILM COATED ORAL at 07:57

## 2018-01-01 RX ADMIN — LOPERAMIDE HYDROCHLORIDE 2 MG: 2 CAPSULE ORAL at 12:43

## 2018-01-01 RX ADMIN — DIAZEPAM 2 MG: 2 TABLET ORAL at 09:24

## 2018-01-01 RX ADMIN — LOPERAMIDE HYDROCHLORIDE 2 MG: 2 CAPSULE ORAL at 16:25

## 2018-01-01 RX ADMIN — LOPERAMIDE HYDROCHLORIDE 2 MG: 2 CAPSULE ORAL at 00:50

## 2018-01-01 RX ADMIN — BUPIVACAINE HYDROCHLORIDE 8 ML: 2.5 INJECTION, SOLUTION EPIDURAL; INFILTRATION; INTRACAUDAL at 17:27

## 2018-01-01 RX ADMIN — CYCLOBENZAPRINE HYDROCHLORIDE 5 MG: 5 TABLET, FILM COATED ORAL at 13:34

## 2018-01-01 RX ADMIN — LEVETIRACETAM 250 MG: 250 TABLET, FILM COATED ORAL at 02:16

## 2018-01-01 RX ADMIN — ZOLPIDEM TARTRATE 5 MG: 5 TABLET, FILM COATED ORAL at 02:17

## 2018-01-01 RX ADMIN — OXYCODONE HYDROCHLORIDE 20 MG: 10 TABLET, FILM COATED, EXTENDED RELEASE ORAL at 20:55

## 2018-01-01 RX ADMIN — LOPERAMIDE HYDROCHLORIDE 2 MG: 2 CAPSULE ORAL at 00:59

## 2018-01-01 RX ADMIN — DIPHENHYDRAMINE HYDROCHLORIDE 25 MG: 25 CAPSULE ORAL at 20:38

## 2018-01-01 RX ADMIN — CALCIUM CARBONATE (ANTACID) CHEW TAB 500 MG 1000 MG: 500 CHEW TAB at 21:18

## 2018-01-01 RX ADMIN — ONDANSETRON 4 MG: 2 INJECTION INTRAMUSCULAR; INTRAVENOUS at 19:49

## 2018-01-01 RX ADMIN — DIPHENHYDRAMINE HYDROCHLORIDE 25 MG: 25 CAPSULE ORAL at 13:15

## 2018-01-01 RX ADMIN — CALCIUM GLUCONATE: 98 INJECTION, SOLUTION INTRAVENOUS at 21:32

## 2018-01-01 RX ADMIN — DIVALPROEX SODIUM 250 MG: 125 CAPSULE, COATED PELLETS ORAL at 11:57

## 2018-01-01 RX ADMIN — Medication 2 MG: at 19:42

## 2018-01-01 RX ADMIN — Medication 2 MG: at 18:11

## 2018-01-01 RX ADMIN — BISMUTH SUBSALICYLATE 30 ML: 262 LIQUID ORAL at 22:52

## 2018-01-01 RX ADMIN — LOPERAMIDE HYDROCHLORIDE 2 MG: 2 CAPSULE ORAL at 00:52

## 2018-01-01 RX ADMIN — ONDANSETRON 4 MG: 2 INJECTION INTRAMUSCULAR; INTRAVENOUS at 13:43

## 2018-01-01 RX ADMIN — DIPHENHYDRAMINE HYDROCHLORIDE 25 MG: 25 CAPSULE ORAL at 03:52

## 2018-01-01 RX ADMIN — LEVETIRACETAM 250 MG: 250 TABLET, FILM COATED ORAL at 11:30

## 2018-01-01 RX ADMIN — OXYCODONE HYDROCHLORIDE 5 MG: 5 TABLET ORAL at 21:50

## 2018-01-01 RX ADMIN — ISOMETHEPTENE MUCATE, DICHLORALPHENAZONE, AND ACETAMINOPHEN 2 CAPSULE: 65; 100; 325 CAPSULE ORAL at 12:42

## 2018-01-01 RX ADMIN — LEVOTHYROXINE SODIUM 150 MCG: 75 TABLET ORAL at 11:57

## 2018-01-01 RX ADMIN — Medication 20000 UNITS: at 11:23

## 2018-01-01 RX ADMIN — ONDANSETRON 4 MG: 2 INJECTION INTRAMUSCULAR; INTRAVENOUS at 18:45

## 2018-01-01 RX ADMIN — LOPERAMIDE HYDROCHLORIDE 2 MG: 2 CAPSULE ORAL at 19:38

## 2018-01-01 RX ADMIN — DOCUSATE SODIUM 286 ML: 50 LIQUID ORAL at 12:00

## 2018-01-01 RX ADMIN — ONDANSETRON 4 MG: 2 INJECTION INTRAMUSCULAR; INTRAVENOUS at 18:54

## 2018-01-01 RX ADMIN — OMEPRAZOLE 20 MG: 20 CAPSULE, DELAYED RELEASE ORAL at 15:43

## 2018-01-01 RX ADMIN — GUAIFENESIN 600 MG: 600 TABLET, EXTENDED RELEASE ORAL at 08:38

## 2018-01-01 RX ADMIN — ZOLPIDEM TARTRATE 5 MG: 5 TABLET, COATED ORAL at 21:59

## 2018-01-01 RX ADMIN — LOPERAMIDE HYDROCHLORIDE 4 MG: 2 CAPSULE ORAL at 20:16

## 2018-01-01 RX ADMIN — ASPIRIN 81 MG CHEWABLE TABLET 81 MG: 81 TABLET CHEWABLE at 11:58

## 2018-01-01 RX ADMIN — OMEPRAZOLE 20 MG: 20 CAPSULE, DELAYED RELEASE ORAL at 16:03

## 2018-01-01 RX ADMIN — OXYCODONE HYDROCHLORIDE 10 MG: 5 TABLET ORAL at 06:02

## 2018-01-01 RX ADMIN — CALCIUM CARBONATE (ANTACID) CHEW TAB 500 MG 1000 MG: 500 CHEW TAB at 11:04

## 2018-01-01 RX ADMIN — HYDROMORPHONE HYDROCHLORIDE 0.5 MG: 1 INJECTION, SOLUTION INTRAMUSCULAR; INTRAVENOUS; SUBCUTANEOUS at 18:21

## 2018-01-01 RX ADMIN — DIAZEPAM 2 MG: 2 TABLET ORAL at 04:51

## 2018-01-01 RX ADMIN — Medication 2 MG: at 17:24

## 2018-01-01 RX ADMIN — Medication 2 MG: at 16:07

## 2018-01-01 RX ADMIN — DEXTROSE MONOHYDRATE: 100 INJECTION, SOLUTION INTRAVENOUS at 17:18

## 2018-01-01 RX ADMIN — Medication 20000 UNITS: at 15:14

## 2018-01-01 RX ADMIN — QUETIAPINE FUMARATE 25 MG: 25 TABLET ORAL at 08:27

## 2018-01-01 RX ADMIN — Medication 5 MG: at 21:22

## 2018-01-01 RX ADMIN — DIPHENHYDRAMINE HYDROCHLORIDE 25 MG: 25 CAPSULE ORAL at 18:30

## 2018-01-01 RX ADMIN — LEVETIRACETAM 250 MG: 250 TABLET, FILM COATED ORAL at 08:47

## 2018-01-01 RX ADMIN — GABAPENTIN 100 MG: 100 CAPSULE ORAL at 09:58

## 2018-01-01 RX ADMIN — OXYCODONE HYDROCHLORIDE 20 MG: 10 TABLET, FILM COATED, EXTENDED RELEASE ORAL at 09:59

## 2018-01-01 RX ADMIN — OXYCODONE HYDROCHLORIDE 15 MG: 5 TABLET ORAL at 14:37

## 2018-01-01 RX ADMIN — ARIPIPRAZOLE 5 MG: 5 TABLET ORAL at 10:30

## 2018-01-01 RX ADMIN — LAMOTRIGINE 50 MG: 25 TABLET ORAL at 07:48

## 2018-01-01 RX ADMIN — Medication 20000 UNITS: at 08:38

## 2018-01-01 RX ADMIN — Medication 1 PACKET: at 12:11

## 2018-01-01 RX ADMIN — ZOLPIDEM TARTRATE 5 MG: 5 TABLET, COATED ORAL at 21:31

## 2018-01-01 RX ADMIN — POTASSIUM CHLORIDE 10 MEQ: 750 TABLET, EXTENDED RELEASE ORAL at 09:55

## 2018-01-01 RX ADMIN — Medication 2 MG: at 11:27

## 2018-01-01 RX ADMIN — ACETAMINOPHEN 650 MG: 325 TABLET ORAL at 14:35

## 2018-01-01 RX ADMIN — ZOLPIDEM TARTRATE 5 MG: 5 TABLET, COATED ORAL at 21:23

## 2018-01-01 RX ADMIN — PIPERACILLIN SODIUM AND TAZOBACTAM SODIUM 4.5 G: 4; .5 INJECTION, POWDER, LYOPHILIZED, FOR SOLUTION INTRAVENOUS at 05:34

## 2018-01-01 RX ADMIN — DIAZEPAM 4 MG: 2 TABLET ORAL at 20:42

## 2018-01-01 RX ADMIN — GUAIFENESIN 600 MG: 600 TABLET, EXTENDED RELEASE ORAL at 20:45

## 2018-01-01 RX ADMIN — OMEPRAZOLE 20 MG: 20 CAPSULE, DELAYED RELEASE ORAL at 06:56

## 2018-01-01 RX ADMIN — RIVAROXABAN 20 MG: 10 TABLET, FILM COATED ORAL at 21:08

## 2018-01-01 RX ADMIN — FENTANYL CITRATE 50 MCG: 50 INJECTION, SOLUTION INTRAMUSCULAR; INTRAVENOUS at 16:46

## 2018-01-01 RX ADMIN — OMEPRAZOLE 20 MG: 20 CAPSULE, DELAYED RELEASE ORAL at 18:08

## 2018-01-01 RX ADMIN — LOPERAMIDE HYDROCHLORIDE 2 MG: 2 CAPSULE ORAL at 11:31

## 2018-01-01 RX ADMIN — ZOLPIDEM TARTRATE 5 MG: 5 TABLET, COATED ORAL at 21:01

## 2018-01-01 RX ADMIN — LOPERAMIDE HYDROCHLORIDE 4 MG: 2 CAPSULE ORAL at 10:28

## 2018-01-01 RX ADMIN — PIPERACILLIN SODIUM AND TAZOBACTAM SODIUM 4.5 G: 4; .5 INJECTION, POWDER, LYOPHILIZED, FOR SOLUTION INTRAVENOUS at 23:28

## 2018-01-01 RX ADMIN — SODIUM CHLORIDE, POTASSIUM CHLORIDE, SODIUM LACTATE AND CALCIUM CHLORIDE 500 ML: 600; 310; 30; 20 INJECTION, SOLUTION INTRAVENOUS at 05:48

## 2018-01-01 RX ADMIN — LEVOTHYROXINE SODIUM 125 MCG: 125 TABLET ORAL at 09:01

## 2018-01-01 RX ADMIN — Medication 1 MG: at 22:32

## 2018-01-01 RX ADMIN — ONDANSETRON 4 MG: 2 INJECTION INTRAMUSCULAR; INTRAVENOUS at 10:16

## 2018-01-01 RX ADMIN — MULTIPLE VITAMINS W/ MINERALS TAB 1 TABLET: TAB at 07:47

## 2018-01-01 RX ADMIN — OXYCODONE HYDROCHLORIDE AND ACETAMINOPHEN 500 MG: 500 TABLET ORAL at 07:48

## 2018-01-01 RX ADMIN — DIPHENHYDRAMINE HYDROCHLORIDE 25 MG: 25 CAPSULE ORAL at 20:16

## 2018-01-01 RX ADMIN — ONDANSETRON 4 MG: 2 INJECTION INTRAMUSCULAR; INTRAVENOUS at 01:00

## 2018-01-01 RX ADMIN — VANCOMYCIN HYDROCHLORIDE 1250 MG: 10 INJECTION, POWDER, LYOPHILIZED, FOR SOLUTION INTRAVENOUS at 00:21

## 2018-01-01 RX ADMIN — Medication 1 PACKET: at 08:09

## 2018-01-01 RX ADMIN — LOPERAMIDE HYDROCHLORIDE 2 MG: 2 CAPSULE ORAL at 04:15

## 2018-01-01 RX ADMIN — HYDROXYZINE HYDROCHLORIDE 50 MG: 50 TABLET, FILM COATED ORAL at 00:56

## 2018-01-01 RX ADMIN — OMEPRAZOLE 20 MG: 20 CAPSULE, DELAYED RELEASE ORAL at 09:02

## 2018-01-01 RX ADMIN — LOPERAMIDE HYDROCHLORIDE 2 MG: 2 CAPSULE ORAL at 18:29

## 2018-01-01 RX ADMIN — VANCOMYCIN HYDROCHLORIDE 1250 MG: 10 INJECTION, POWDER, LYOPHILIZED, FOR SOLUTION INTRAVENOUS at 00:47

## 2018-01-01 RX ADMIN — OXYCODONE HYDROCHLORIDE 20 MG: 10 TABLET, FILM COATED, EXTENDED RELEASE ORAL at 22:14

## 2018-01-01 RX ADMIN — Medication 1 PACKET: at 14:37

## 2018-01-01 RX ADMIN — OXYCODONE HYDROCHLORIDE 10 MG: 5 TABLET ORAL at 18:38

## 2018-01-01 RX ADMIN — DIAZEPAM 4 MG: 2 TABLET ORAL at 20:58

## 2018-01-01 RX ADMIN — MULTIPLE VITAMINS W/ MINERALS TAB 1 TABLET: TAB at 09:19

## 2018-01-01 RX ADMIN — DOXYCYCLINE HYCLATE 100 MG: 100 CAPSULE ORAL at 20:35

## 2018-01-01 RX ADMIN — I.V. FAT EMULSION 250 ML: 20 EMULSION INTRAVENOUS at 09:29

## 2018-01-01 RX ADMIN — GABAPENTIN 100 MG: 100 CAPSULE ORAL at 20:46

## 2018-01-01 RX ADMIN — DIAZEPAM 2 MG: 2 TABLET ORAL at 17:22

## 2018-01-01 RX ADMIN — OXYCODONE HYDROCHLORIDE 10 MG: 5 TABLET ORAL at 02:08

## 2018-01-01 RX ADMIN — CALCIUM CARBONATE (ANTACID) CHEW TAB 500 MG 1000 MG: 500 CHEW TAB at 12:34

## 2018-01-01 RX ADMIN — LEVOTHYROXINE SODIUM 150 MCG: 75 TABLET ORAL at 09:35

## 2018-01-01 RX ADMIN — DIVALPROEX SODIUM 250 MG: 125 CAPSULE, COATED PELLETS ORAL at 09:57

## 2018-01-01 RX ADMIN — OXYCODONE HYDROCHLORIDE 20 MG: 10 TABLET, FILM COATED, EXTENDED RELEASE ORAL at 09:56

## 2018-01-01 RX ADMIN — ONDANSETRON 4 MG: 2 INJECTION INTRAMUSCULAR; INTRAVENOUS at 06:54

## 2018-01-01 RX ADMIN — ZINC SULFATE CAP 220 MG (50 MG ELEMENTAL ZN) 220 MG: 220 (50 ZN) CAP at 10:38

## 2018-01-01 RX ADMIN — ONDANSETRON 4 MG: 2 INJECTION INTRAMUSCULAR; INTRAVENOUS at 00:03

## 2018-01-01 RX ADMIN — ONDANSETRON 4 MG: 4 TABLET, ORALLY DISINTEGRATING ORAL at 08:43

## 2018-01-01 RX ADMIN — LOPERAMIDE HYDROCHLORIDE 2 MG: 2 CAPSULE ORAL at 02:00

## 2018-01-01 RX ADMIN — LEVETIRACETAM 250 MG: 250 TABLET, FILM COATED ORAL at 09:57

## 2018-01-01 RX ADMIN — DIAZEPAM 4 MG: 2 TABLET ORAL at 07:14

## 2018-01-01 RX ADMIN — OMEPRAZOLE 20 MG: 20 CAPSULE, DELAYED RELEASE ORAL at 17:19

## 2018-01-01 RX ADMIN — Medication 1 PACKET: at 00:58

## 2018-01-01 RX ADMIN — GABAPENTIN 300 MG: 300 CAPSULE ORAL at 21:03

## 2018-01-01 RX ADMIN — PIPERACILLIN SODIUM AND TAZOBACTAM SODIUM 4.5 G: 4; .5 INJECTION, POWDER, LYOPHILIZED, FOR SOLUTION INTRAVENOUS at 08:04

## 2018-01-01 RX ADMIN — Medication 2 MG: at 15:04

## 2018-01-01 RX ADMIN — Medication 2 MG: at 22:05

## 2018-01-01 RX ADMIN — SODIUM CHLORIDE 1000 ML: 9 INJECTION, SOLUTION INTRAVENOUS at 02:42

## 2018-01-01 RX ADMIN — HEPARIN, PORCINE (PF) 10 UNIT/ML INTRAVENOUS SYRINGE 5 ML: at 06:49

## 2018-01-01 RX ADMIN — GUAIFENESIN 600 MG: 600 TABLET, EXTENDED RELEASE ORAL at 11:40

## 2018-01-01 RX ADMIN — ZOLPIDEM TARTRATE 5 MG: 5 TABLET, FILM COATED ORAL at 01:14

## 2018-01-01 RX ADMIN — DIAZEPAM 2 MG: 2 TABLET ORAL at 22:42

## 2018-01-01 RX ADMIN — SODIUM CHLORIDE, POTASSIUM CHLORIDE, SODIUM LACTATE AND CALCIUM CHLORIDE 1000 ML: 600; 310; 30; 20 INJECTION, SOLUTION INTRAVENOUS at 05:59

## 2018-01-01 RX ADMIN — DIAZEPAM 5 MG: 5 TABLET ORAL at 15:56

## 2018-01-01 RX ADMIN — FUROSEMIDE 20 MG: 20 TABLET ORAL at 12:03

## 2018-01-01 RX ADMIN — DIPHENHYDRAMINE HYDROCHLORIDE 25 MG: 25 CAPSULE ORAL at 03:54

## 2018-01-01 RX ADMIN — GABAPENTIN 100 MG: 100 CAPSULE ORAL at 10:38

## 2018-01-01 RX ADMIN — OXYCODONE HYDROCHLORIDE 15 MG: 5 TABLET ORAL at 16:09

## 2018-01-01 RX ADMIN — LEVETIRACETAM 250 MG: 250 TABLET, FILM COATED ORAL at 11:29

## 2018-01-01 RX ADMIN — HYDROMORPHONE HYDROCHLORIDE 2 MG: 2 TABLET ORAL at 09:28

## 2018-01-01 RX ADMIN — DIAZEPAM 5 MG: 5 TABLET ORAL at 06:45

## 2018-01-01 RX ADMIN — HYDROMORPHONE HYDROCHLORIDE 4 MG: 2 TABLET ORAL at 08:41

## 2018-01-01 RX ADMIN — ONDANSETRON 4 MG: 2 INJECTION INTRAMUSCULAR; INTRAVENOUS at 05:54

## 2018-01-01 RX ADMIN — ARIPIPRAZOLE 5 MG: 5 TABLET ORAL at 09:06

## 2018-01-01 RX ADMIN — ASPIRIN 81 MG CHEWABLE TABLET 81 MG: 81 TABLET CHEWABLE at 10:00

## 2018-01-01 RX ADMIN — DIAZEPAM 2 MG: 2 TABLET ORAL at 00:22

## 2018-01-01 RX ADMIN — HYDROMORPHONE HYDROCHLORIDE 1 MG: 1 INJECTION, SOLUTION INTRAMUSCULAR; INTRAVENOUS; SUBCUTANEOUS at 00:38

## 2018-01-01 RX ADMIN — DOXYCYCLINE HYCLATE 100 MG: 100 CAPSULE ORAL at 21:35

## 2018-01-01 RX ADMIN — ACETAMINOPHEN 650 MG: 325 TABLET ORAL at 11:13

## 2018-01-01 RX ADMIN — CALCIUM CARBONATE (ANTACID) CHEW TAB 500 MG 1000 MG: 500 CHEW TAB at 14:56

## 2018-01-01 RX ADMIN — RIVAROXABAN 20 MG: 10 TABLET, FILM COATED ORAL at 21:39

## 2018-01-01 RX ADMIN — DIPHENHYDRAMINE HYDROCHLORIDE 25 MG: 25 CAPSULE ORAL at 04:25

## 2018-01-01 RX ADMIN — LOPERAMIDE HYDROCHLORIDE 2 MG: 2 CAPSULE ORAL at 00:46

## 2018-01-01 RX ADMIN — CALCIUM CARBONATE (ANTACID) CHEW TAB 500 MG 500 MG: 500 CHEW TAB at 20:08

## 2018-01-01 RX ADMIN — CALCIUM CARBONATE (ANTACID) CHEW TAB 500 MG 1000 MG: 500 CHEW TAB at 00:49

## 2018-01-01 RX ADMIN — LOPERAMIDE HYDROCHLORIDE 2 MG: 2 CAPSULE ORAL at 21:16

## 2018-01-01 RX ADMIN — Medication 5 MG: at 20:54

## 2018-01-01 RX ADMIN — GABAPENTIN 300 MG: 300 CAPSULE ORAL at 19:15

## 2018-01-01 RX ADMIN — OXYCODONE HYDROCHLORIDE AND ACETAMINOPHEN 500 MG: 500 TABLET ORAL at 10:28

## 2018-01-01 RX ADMIN — Medication 20000 UNITS: at 10:28

## 2018-01-01 RX ADMIN — RIVAROXABAN 20 MG: 10 TABLET, FILM COATED ORAL at 21:30

## 2018-01-01 RX ADMIN — LEVOTHYROXINE SODIUM 125 MCG: 125 TABLET ORAL at 10:28

## 2018-01-01 RX ADMIN — DIAZEPAM 2 MG: 2 TABLET ORAL at 12:58

## 2018-01-01 RX ADMIN — ACETAMINOPHEN 650 MG: 325 TABLET ORAL at 15:42

## 2018-01-01 RX ADMIN — Medication 2 MG: at 17:47

## 2018-01-01 RX ADMIN — LOPERAMIDE HYDROCHLORIDE 4 MG: 2 CAPSULE ORAL at 13:15

## 2018-01-01 RX ADMIN — ZOLPIDEM TARTRATE 5 MG: 5 TABLET, COATED ORAL at 22:03

## 2018-01-01 RX ADMIN — GABAPENTIN 300 MG: 300 CAPSULE ORAL at 14:21

## 2018-01-01 RX ADMIN — OXYBUTYNIN CHLORIDE 5 MG: 5 TABLET ORAL at 08:56

## 2018-01-01 RX ADMIN — BISMUTH SUBSALICYLATE 30 ML: 262 LIQUID ORAL at 04:03

## 2018-01-01 RX ADMIN — LAMOTRIGINE 50 MG: 25 TABLET ORAL at 09:55

## 2018-01-01 RX ADMIN — VANCOMYCIN HYDROCHLORIDE 1250 MG: 10 INJECTION, POWDER, LYOPHILIZED, FOR SOLUTION INTRAVENOUS at 00:01

## 2018-01-01 RX ADMIN — ARIPIPRAZOLE 5 MG: 5 TABLET ORAL at 11:58

## 2018-01-01 RX ADMIN — Medication 5 MG: at 20:58

## 2018-01-01 RX ADMIN — OXYCODONE HYDROCHLORIDE 10 MG: 5 TABLET ORAL at 18:18

## 2018-01-01 RX ADMIN — ZOLPIDEM TARTRATE 5 MG: 5 TABLET, FILM COATED ORAL at 21:01

## 2018-01-01 RX ADMIN — Medication 1 PACKET: at 20:48

## 2018-01-01 RX ADMIN — DIAZEPAM 4 MG: 2 TABLET ORAL at 03:52

## 2018-01-01 RX ADMIN — Medication 2 MG: at 13:06

## 2018-01-01 RX ADMIN — Medication 2 MG: at 00:59

## 2018-01-01 RX ADMIN — LEVOTHYROXINE SODIUM 150 MCG: 75 TABLET ORAL at 10:29

## 2018-01-01 RX ADMIN — OXYCODONE HYDROCHLORIDE 20 MG: 10 TABLET, FILM COATED, EXTENDED RELEASE ORAL at 11:28

## 2018-01-01 RX ADMIN — LOPERAMIDE HYDROCHLORIDE 4 MG: 2 CAPSULE ORAL at 09:16

## 2018-01-01 RX ADMIN — ACETAMINOPHEN 650 MG: 325 TABLET ORAL at 16:13

## 2018-01-01 RX ADMIN — LEVOTHYROXINE SODIUM 150 MCG: 75 TABLET ORAL at 11:24

## 2018-01-01 RX ADMIN — FUROSEMIDE 20 MG: 20 TABLET ORAL at 16:38

## 2018-01-01 RX ADMIN — HYDROMORPHONE HYDROCHLORIDE 4 MG: 2 TABLET ORAL at 13:27

## 2018-01-01 RX ADMIN — OXYCODONE HYDROCHLORIDE 10 MG: 5 TABLET ORAL at 14:39

## 2018-01-01 RX ADMIN — DIAZEPAM 4 MG: 2 TABLET ORAL at 06:02

## 2018-01-01 RX ADMIN — HYDROMORPHONE HYDROCHLORIDE 2 MG: 2 TABLET ORAL at 14:18

## 2018-01-01 RX ADMIN — Medication 2 MG: at 05:04

## 2018-01-01 RX ADMIN — Medication 2 MG: at 03:47

## 2018-01-01 RX ADMIN — Medication 5 MG: at 22:01

## 2018-01-01 RX ADMIN — PIPERACILLIN SODIUM AND TAZOBACTAM SODIUM 4.5 G: 4; .5 INJECTION, POWDER, LYOPHILIZED, FOR SOLUTION INTRAVENOUS at 02:27

## 2018-01-01 RX ADMIN — ZINC SULFATE CAP 220 MG (50 MG ELEMENTAL ZN) 220 MG: 220 (50 ZN) CAP at 08:42

## 2018-01-01 RX ADMIN — LOPERAMIDE HYDROCHLORIDE 2 MG: 2 CAPSULE ORAL at 10:00

## 2018-01-01 RX ADMIN — Medication 2 MG: at 12:33

## 2018-01-01 RX ADMIN — Medication 5 MG: at 20:43

## 2018-01-01 RX ADMIN — CALCIUM CARBONATE (ANTACID) CHEW TAB 500 MG 1000 MG: 500 CHEW TAB at 23:43

## 2018-01-01 RX ADMIN — RIVAROXABAN 20 MG: 20 TABLET, FILM COATED ORAL at 22:32

## 2018-01-01 RX ADMIN — PIPERACILLIN SODIUM AND TAZOBACTAM SODIUM 4.5 G: 4; .5 INJECTION, POWDER, LYOPHILIZED, FOR SOLUTION INTRAVENOUS at 00:44

## 2018-01-01 RX ADMIN — ACETAMINOPHEN 650 MG: 325 TABLET ORAL at 17:00

## 2018-01-01 RX ADMIN — LOPERAMIDE HYDROCHLORIDE 2 MG: 2 CAPSULE ORAL at 04:14

## 2018-01-01 RX ADMIN — HYDROMORPHONE HYDROCHLORIDE 4 MG: 2 TABLET ORAL at 17:19

## 2018-01-01 RX ADMIN — ONDANSETRON 4 MG: 2 INJECTION INTRAMUSCULAR; INTRAVENOUS at 18:21

## 2018-01-01 RX ADMIN — LOPERAMIDE HYDROCHLORIDE 2 MG: 2 CAPSULE ORAL at 16:38

## 2018-01-01 RX ADMIN — LOPERAMIDE HYDROCHLORIDE 2 MG: 2 CAPSULE ORAL at 05:01

## 2018-01-01 RX ADMIN — IPRATROPIUM BROMIDE AND ALBUTEROL SULFATE 3 ML: .5; 3 SOLUTION RESPIRATORY (INHALATION) at 20:24

## 2018-01-01 RX ADMIN — Medication 2 MG: at 11:25

## 2018-01-01 RX ADMIN — DIAZEPAM 4 MG: 2 TABLET ORAL at 14:22

## 2018-01-01 RX ADMIN — CALCIUM CARBONATE (ANTACID) CHEW TAB 500 MG 1000 MG: 500 CHEW TAB at 20:38

## 2018-01-01 RX ADMIN — ARIPIPRAZOLE 5 MG: 5 TABLET ORAL at 07:48

## 2018-01-01 RX ADMIN — DIAZEPAM 5 MG: 5 TABLET ORAL at 15:53

## 2018-01-01 RX ADMIN — HYDROMORPHONE HYDROCHLORIDE 0.5 MG: 1 INJECTION, SOLUTION INTRAMUSCULAR; INTRAVENOUS; SUBCUTANEOUS at 19:12

## 2018-01-01 RX ADMIN — LOPERAMIDE HYDROCHLORIDE 4 MG: 2 CAPSULE ORAL at 14:11

## 2018-01-01 RX ADMIN — OMEPRAZOLE 20 MG: 20 CAPSULE, DELAYED RELEASE ORAL at 18:29

## 2018-01-01 RX ADMIN — DULOXETINE HYDROCHLORIDE 30 MG: 30 CAPSULE, DELAYED RELEASE ORAL at 08:42

## 2018-01-01 RX ADMIN — ONDANSETRON 4 MG: 2 INJECTION INTRAMUSCULAR; INTRAVENOUS at 18:22

## 2018-01-01 RX ADMIN — LOPERAMIDE HYDROCHLORIDE 4 MG: 2 CAPSULE ORAL at 16:08

## 2018-01-01 RX ADMIN — IPRATROPIUM BROMIDE AND ALBUTEROL SULFATE 3 ML: .5; 3 SOLUTION RESPIRATORY (INHALATION) at 12:17

## 2018-01-01 RX ADMIN — ONDANSETRON 4 MG: 2 INJECTION INTRAMUSCULAR; INTRAVENOUS at 00:13

## 2018-01-01 RX ADMIN — OMEPRAZOLE 20 MG: 20 CAPSULE, DELAYED RELEASE ORAL at 08:56

## 2018-01-01 RX ADMIN — Medication 1 PACKET: at 20:09

## 2018-01-01 RX ADMIN — MULTIVITAMIN 15 ML: LIQUID ORAL at 08:56

## 2018-01-01 RX ADMIN — ACETAMINOPHEN 650 MG: 325 TABLET ORAL at 10:54

## 2018-01-01 RX ADMIN — DIPHENHYDRAMINE HYDROCHLORIDE 25 MG: 25 CAPSULE ORAL at 12:04

## 2018-01-01 RX ADMIN — Medication 20000 UNITS: at 11:57

## 2018-01-01 RX ADMIN — DIAZEPAM 4 MG: 2 TABLET ORAL at 19:17

## 2018-01-01 RX ADMIN — BISMUTH SUBSALICYLATE 30 ML: 262 LIQUID ORAL at 00:56

## 2018-01-01 RX ADMIN — DIAZEPAM 4 MG: 2 TABLET ORAL at 10:52

## 2018-01-01 RX ADMIN — DIAZEPAM 5 MG: 5 TABLET ORAL at 11:43

## 2018-01-01 RX ADMIN — DOXYCYCLINE HYCLATE 100 MG: 100 CAPSULE ORAL at 06:45

## 2018-01-01 RX ADMIN — OXYCODONE HYDROCHLORIDE AND ACETAMINOPHEN 500 MG: 500 TABLET ORAL at 09:37

## 2018-01-01 RX ADMIN — LEVOTHYROXINE SODIUM 150 MCG: 75 TABLET ORAL at 11:30

## 2018-01-01 RX ADMIN — ONDANSETRON 4 MG: 2 INJECTION INTRAMUSCULAR; INTRAVENOUS at 11:25

## 2018-01-01 RX ADMIN — ZOLPIDEM TARTRATE 5 MG: 5 TABLET, COATED ORAL at 19:15

## 2018-01-01 RX ADMIN — ONDANSETRON 4 MG: 2 INJECTION INTRAMUSCULAR; INTRAVENOUS at 11:04

## 2018-01-01 RX ADMIN — LOPERAMIDE HYDROCHLORIDE 2 MG: 2 CAPSULE ORAL at 02:56

## 2018-01-01 RX ADMIN — IPRATROPIUM BROMIDE AND ALBUTEROL SULFATE 3 ML: .5; 3 SOLUTION RESPIRATORY (INHALATION) at 16:05

## 2018-01-01 RX ADMIN — IPRATROPIUM BROMIDE AND ALBUTEROL SULFATE 3 ML: .5; 3 SOLUTION RESPIRATORY (INHALATION) at 19:35

## 2018-01-01 RX ADMIN — OMEPRAZOLE 20 MG: 20 CAPSULE, DELAYED RELEASE ORAL at 11:30

## 2018-01-01 RX ADMIN — LAMOTRIGINE 50 MG: 25 TABLET ORAL at 09:57

## 2018-01-01 RX ADMIN — CALCIUM CARBONATE (ANTACID) CHEW TAB 500 MG 1000 MG: 500 CHEW TAB at 11:56

## 2018-01-01 RX ADMIN — DOXYCYCLINE HYCLATE 100 MG: 100 CAPSULE ORAL at 11:56

## 2018-01-01 RX ADMIN — PIPERACILLIN SODIUM AND TAZOBACTAM SODIUM 4.5 G: 4; .5 INJECTION, POWDER, LYOPHILIZED, FOR SOLUTION INTRAVENOUS at 19:35

## 2018-01-01 RX ADMIN — DIAZEPAM 4 MG: 2 TABLET ORAL at 08:21

## 2018-01-01 RX ADMIN — OXYCODONE HYDROCHLORIDE 10 MG: 5 TABLET ORAL at 23:33

## 2018-01-01 RX ADMIN — ACETAMINOPHEN 650 MG: 325 TABLET ORAL at 19:15

## 2018-01-01 RX ADMIN — LEVOTHYROXINE SODIUM 125 MCG: 125 TABLET ORAL at 09:58

## 2018-01-01 RX ADMIN — GABAPENTIN 300 MG: 300 CAPSULE ORAL at 09:54

## 2018-01-01 RX ADMIN — SODIUM CHLORIDE, POTASSIUM CHLORIDE, SODIUM LACTATE AND CALCIUM CHLORIDE: 600; 310; 30; 20 INJECTION, SOLUTION INTRAVENOUS at 14:27

## 2018-01-01 RX ADMIN — OXYCODONE HYDROCHLORIDE 15 MG: 5 TABLET ORAL at 13:23

## 2018-01-01 RX ADMIN — Medication 1 PACKET: at 08:47

## 2018-01-01 RX ADMIN — ZINC SULFATE CAP 220 MG (50 MG ELEMENTAL ZN) 220 MG: 220 (50 ZN) CAP at 09:59

## 2018-01-01 RX ADMIN — FENTANYL CITRATE 50 MCG: 50 INJECTION, SOLUTION INTRAMUSCULAR; INTRAVENOUS at 15:57

## 2018-01-01 RX ADMIN — LOPERAMIDE HYDROCHLORIDE 2 MG: 2 CAPSULE ORAL at 08:00

## 2018-01-01 RX ADMIN — HYDROMORPHONE HYDROCHLORIDE 2 MG: 2 TABLET ORAL at 15:42

## 2018-01-01 RX ADMIN — LEVOTHYROXINE SODIUM 150 MCG: 75 TABLET ORAL at 06:38

## 2018-01-01 RX ADMIN — LEVOTHYROXINE SODIUM 150 MCG: 75 TABLET ORAL at 06:59

## 2018-01-01 RX ADMIN — HYDROMORPHONE HYDROCHLORIDE 4 MG: 2 TABLET ORAL at 04:14

## 2018-01-01 RX ADMIN — ONDANSETRON 4 MG: 2 INJECTION INTRAMUSCULAR; INTRAVENOUS at 08:28

## 2018-01-01 RX ADMIN — RIVAROXABAN 20 MG: 10 TABLET, FILM COATED ORAL at 21:35

## 2018-01-01 RX ADMIN — Medication 5 MG: at 21:41

## 2018-01-01 RX ADMIN — LOPERAMIDE HYDROCHLORIDE 2 MG: 2 CAPSULE ORAL at 11:25

## 2018-01-01 RX ADMIN — OXYCODONE HYDROCHLORIDE 15 MG: 5 TABLET ORAL at 06:01

## 2018-01-01 RX ADMIN — DOXYCYCLINE HYCLATE 100 MG: 100 CAPSULE ORAL at 19:24

## 2018-01-01 RX ADMIN — LEVETIRACETAM 250 MG: 250 TABLET, FILM COATED ORAL at 20:51

## 2018-01-01 RX ADMIN — DIPHENHYDRAMINE HYDROCHLORIDE 25 MG: 25 CAPSULE ORAL at 09:56

## 2018-01-01 RX ADMIN — DRONABINOL 2.5 MG: 2.5 CAPSULE ORAL at 15:58

## 2018-01-01 RX ADMIN — Medication 1 PACKET: at 20:27

## 2018-01-01 RX ADMIN — DIAZEPAM 2 MG: 2 TABLET ORAL at 06:55

## 2018-01-01 RX ADMIN — DIAZEPAM 4 MG: 2 TABLET ORAL at 17:03

## 2018-01-01 RX ADMIN — GABAPENTIN 100 MG: 100 CAPSULE ORAL at 08:42

## 2018-01-01 RX ADMIN — OXYCODONE HYDROCHLORIDE 15 MG: 5 TABLET ORAL at 08:40

## 2018-01-01 RX ADMIN — LOPERAMIDE HYDROCHLORIDE 2 MG: 2 CAPSULE ORAL at 20:34

## 2018-01-01 RX ADMIN — OXYCODONE HYDROCHLORIDE 20 MG: 10 TABLET, FILM COATED, EXTENDED RELEASE ORAL at 12:24

## 2018-01-01 RX ADMIN — LOPERAMIDE HYDROCHLORIDE 2 MG: 2 CAPSULE ORAL at 14:12

## 2018-01-01 RX ADMIN — LOPERAMIDE HYDROCHLORIDE 2 MG: 2 CAPSULE ORAL at 00:03

## 2018-01-01 RX ADMIN — LEVOTHYROXINE SODIUM 150 MCG: 75 TABLET ORAL at 11:46

## 2018-01-01 ASSESSMENT — MIFFLIN-ST. JEOR
SCORE: 1227.87
SCORE: 1278.22
SCORE: 1297.04

## 2018-01-01 ASSESSMENT — ENCOUNTER SYMPTOMS
BACK PAIN: 1
ABDOMINAL PAIN: 1
SHORTNESS OF BREATH: 0
NECK STIFFNESS: 0
DIFFICULTY URINATING: 0
ADENOPATHY: 0
NAUSEA: 1
ABDOMINAL DISTENTION: 1
FEVER: 0
NAUSEA: 0
FEVER: 0
ABDOMINAL PAIN: 0
FLANK PAIN: 0
ABDOMINAL PAIN: 0
WOUND: 1
NECK PAIN: 0
COUGH: 1
CHILLS: 0
AGITATION: 0
FEVER: 1
BACK PAIN: 0
POLYDIPSIA: 0
VOMITING: 0
VOMITING: 0
CHILLS: 0
FEVER: 0
APPETITE CHANGE: 0
COLOR CHANGE: 0
SHORTNESS OF BREATH: 0
LIGHT-HEADEDNESS: 0
FEVER: 0

## 2018-01-01 ASSESSMENT — ACTIVITIES OF DAILY LIVING (ADL)
RETIRED_EATING: 0-->INDEPENDENT
FALL_HISTORY_WITHIN_LAST_SIX_MONTHS: NO
TOILETING: 4-->COMPLETELY DEPENDENT
RETIRED_EATING: 0-->INDEPENDENT
RETIRED_COMMUNICATION: 0-->UNDERSTANDS/COMMUNICATES WITHOUT DIFFICULTY
COGNITION: 0 - NO COGNITION ISSUES REPORTED
TRANSFERRING: 4-->COMPLETELY DEPENDENT
BATHING: 4-->COMPLETELY DEPENDENT
DRESS: 4-->COMPLETELY DEPENDENT
FALL_HISTORY_WITHIN_LAST_SIX_MONTHS: YES
SWALLOWING: 0-->SWALLOWS FOODS/LIQUIDS WITHOUT DIFFICULTY
TOILETING: 4-->COMPLETELY DEPENDENT
RETIRED_COMMUNICATION: 0-->UNDERSTANDS/COMMUNICATES WITHOUT DIFFICULTY
BATHING: 4-->COMPLETELY DEPENDENT
AMBULATION: 4-->COMPLETELY DEPENDENT
SWALLOWING: 0-->SWALLOWS FOODS/LIQUIDS WITHOUT DIFFICULTY
COGNITION: 0 - NO COGNITION ISSUES REPORTED
DRESS: 4-->COMPLETELY DEPENDENT
TRANSFERRING: 4-->COMPLETELY DEPENDENT
AMBULATION: 4-->COMPLETELY DEPENDENT

## 2018-01-01 ASSESSMENT — PAIN DESCRIPTION - DESCRIPTORS
DESCRIPTORS: ACHING;DISCOMFORT;SORE
DESCRIPTORS: ACHING;DISCOMFORT;SORE;SPASM
DESCRIPTORS: PATIENT UNABLE TO DESCRIBE

## 2018-01-03 ENCOUNTER — COMMUNICATION - HEALTHEAST (OUTPATIENT)
Dept: FAMILY MEDICINE | Facility: CLINIC | Age: 63
End: 2018-01-03

## 2018-01-03 ENCOUNTER — HOME CARE/HOSPICE - HEALTHEAST (OUTPATIENT)
Dept: HOME HEALTH SERVICES | Facility: HOME HEALTH | Age: 63
End: 2018-01-03

## 2018-01-03 ASSESSMENT — MIFFLIN-ST. JEOR: SCORE: 1302.26

## 2018-01-04 ENCOUNTER — COMMUNICATION - HEALTHEAST (OUTPATIENT)
Dept: NURSING | Facility: CLINIC | Age: 63
End: 2018-01-04

## 2018-01-04 ENCOUNTER — COMMUNICATION - HEALTHEAST (OUTPATIENT)
Dept: FAMILY MEDICINE | Facility: CLINIC | Age: 63
End: 2018-01-04

## 2018-01-05 ENCOUNTER — COMMUNICATION - HEALTHEAST (OUTPATIENT)
Dept: FAMILY MEDICINE | Facility: CLINIC | Age: 63
End: 2018-01-05

## 2018-01-05 ENCOUNTER — COMMUNICATION - HEALTHEAST (OUTPATIENT)
Dept: SCHEDULING | Facility: CLINIC | Age: 63
End: 2018-01-05

## 2018-01-05 DIAGNOSIS — G89.4 CHRONIC PAIN SYNDROME: ICD-10-CM

## 2018-01-05 LAB — INR PPP: 3.5 (ref 0.9–1.1)

## 2018-01-08 ENCOUNTER — COMMUNICATION - HEALTHEAST (OUTPATIENT)
Dept: FAMILY MEDICINE | Facility: CLINIC | Age: 63
End: 2018-01-08

## 2018-01-08 LAB — INR PPP: 2.9 (ref 0.9–1.1)

## 2018-01-11 ENCOUNTER — COMMUNICATION - HEALTHEAST (OUTPATIENT)
Dept: FAMILY MEDICINE | Facility: CLINIC | Age: 63
End: 2018-01-11

## 2018-01-12 ENCOUNTER — COMMUNICATION - HEALTHEAST (OUTPATIENT)
Dept: FAMILY MEDICINE | Facility: CLINIC | Age: 63
End: 2018-01-12

## 2018-01-12 LAB — INR PPP: 2.9 (ref 0.9–1.1)

## 2018-01-16 ENCOUNTER — COMMUNICATION - HEALTHEAST (OUTPATIENT)
Dept: FAMILY MEDICINE | Facility: CLINIC | Age: 63
End: 2018-01-16

## 2018-01-22 ENCOUNTER — COMMUNICATION - HEALTHEAST (OUTPATIENT)
Dept: FAMILY MEDICINE | Facility: CLINIC | Age: 63
End: 2018-01-22

## 2018-01-24 ENCOUNTER — AMBULATORY - HEALTHEAST (OUTPATIENT)
Dept: NURSING | Facility: CLINIC | Age: 63
End: 2018-01-24

## 2018-01-24 ENCOUNTER — AMBULATORY - HEALTHEAST (OUTPATIENT)
Dept: GERIATRICS | Facility: CLINIC | Age: 63
End: 2018-01-24

## 2018-01-25 ENCOUNTER — COMMUNICATION - HEALTHEAST (OUTPATIENT)
Dept: FAMILY MEDICINE | Facility: CLINIC | Age: 63
End: 2018-01-25

## 2018-01-26 ENCOUNTER — COMMUNICATION - HEALTHEAST (OUTPATIENT)
Dept: FAMILY MEDICINE | Facility: CLINIC | Age: 63
End: 2018-01-26

## 2018-01-26 ENCOUNTER — AMBULATORY - HEALTHEAST (OUTPATIENT)
Dept: GERIATRICS | Facility: CLINIC | Age: 63
End: 2018-01-26

## 2018-01-29 ENCOUNTER — OFFICE VISIT - HEALTHEAST (OUTPATIENT)
Dept: GERIATRICS | Facility: CLINIC | Age: 63
End: 2018-01-29

## 2018-01-29 DIAGNOSIS — G82.20 PARAPLEGIA AT T4 LEVEL (H): ICD-10-CM

## 2018-01-29 DIAGNOSIS — J44.9 CHRONIC OBSTRUCTIVE PULMONARY DISEASE, UNSPECIFIED COPD TYPE (H): ICD-10-CM

## 2018-01-29 DIAGNOSIS — T83.010D SUPRAPUBIC CATHETER DYSFUNCTION, SUBSEQUENT ENCOUNTER: ICD-10-CM

## 2018-01-29 DIAGNOSIS — N31.9 NEUROGENIC BLADDER: ICD-10-CM

## 2018-01-29 DIAGNOSIS — F51.05 INSOMNIA DUE TO ANXIETY AND FEAR: ICD-10-CM

## 2018-01-29 DIAGNOSIS — Z79.01 CHRONIC ANTICOAGULATION: ICD-10-CM

## 2018-01-29 DIAGNOSIS — F40.9 INSOMNIA DUE TO ANXIETY AND FEAR: ICD-10-CM

## 2018-01-29 DIAGNOSIS — G40.909 SEIZURE DISORDER (H): ICD-10-CM

## 2018-01-29 DIAGNOSIS — F10.11 HISTORY OF ALCOHOL ABUSE: ICD-10-CM

## 2018-01-29 DIAGNOSIS — S06.5XAA SUBDURAL HEMATOMA (H): ICD-10-CM

## 2018-01-29 DIAGNOSIS — F41.9 ANXIETY: ICD-10-CM

## 2018-01-29 DIAGNOSIS — Z86.718 HISTORY OF DVT (DEEP VEIN THROMBOSIS): ICD-10-CM

## 2018-01-29 DIAGNOSIS — L02.31 ABSCESS, GLUTEAL, RIGHT: ICD-10-CM

## 2018-01-29 DIAGNOSIS — F33.9 EPISODE OF RECURRENT MAJOR DEPRESSIVE DISORDER, UNSPECIFIED DEPRESSION EPISODE SEVERITY (H): ICD-10-CM

## 2018-01-29 DIAGNOSIS — F11.20 OPIATE DEPENDENCE, CONTINUOUS (H): ICD-10-CM

## 2018-01-29 DIAGNOSIS — E06.3 HYPOTHYROIDISM DUE TO HASHIMOTO'S THYROIDITIS: ICD-10-CM

## 2018-01-29 DIAGNOSIS — G89.4 CHRONIC PAIN SYNDROME: ICD-10-CM

## 2018-01-31 ASSESSMENT — MIFFLIN-ST. JEOR: SCORE: 1227.87

## 2018-02-02 ASSESSMENT — MIFFLIN-ST. JEOR: SCORE: 1273.8

## 2018-02-03 ENCOUNTER — ANESTHESIA - HEALTHEAST (OUTPATIENT)
Dept: SURGERY | Facility: CLINIC | Age: 63
End: 2018-02-03

## 2018-02-05 ENCOUNTER — SURGERY - HEALTHEAST (OUTPATIENT)
Dept: SURGERY | Facility: CLINIC | Age: 63
End: 2018-02-05

## 2018-02-06 ENCOUNTER — AMBULATORY - HEALTHEAST (OUTPATIENT)
Dept: GERIATRICS | Facility: CLINIC | Age: 63
End: 2018-02-06

## 2018-02-16 ENCOUNTER — COMMUNICATION - HEALTHEAST (OUTPATIENT)
Dept: FAMILY MEDICINE | Facility: CLINIC | Age: 63
End: 2018-02-16

## 2018-02-16 ASSESSMENT — MIFFLIN-ST. JEOR: SCORE: 1146.68

## 2018-02-19 ENCOUNTER — AMBULATORY - HEALTHEAST (OUTPATIENT)
Dept: NURSING | Facility: CLINIC | Age: 63
End: 2018-02-19

## 2018-03-08 ENCOUNTER — AMBULATORY - HEALTHEAST (OUTPATIENT)
Dept: NURSING | Facility: CLINIC | Age: 63
End: 2018-03-08

## 2018-03-13 ENCOUNTER — COMMUNICATION - HEALTHEAST (OUTPATIENT)
Dept: FAMILY MEDICINE | Facility: CLINIC | Age: 63
End: 2018-03-13

## 2018-03-15 ENCOUNTER — AMBULATORY - HEALTHEAST (OUTPATIENT)
Dept: NURSING | Facility: CLINIC | Age: 63
End: 2018-03-15

## 2018-03-17 ENCOUNTER — RECORDS - HEALTHEAST (OUTPATIENT)
Dept: ADMINISTRATIVE | Facility: OTHER | Age: 63
End: 2018-03-17

## 2018-03-28 ENCOUNTER — RECORDS - HEALTHEAST (OUTPATIENT)
Dept: ADMINISTRATIVE | Facility: OTHER | Age: 63
End: 2018-03-28

## 2018-03-29 ENCOUNTER — AMBULATORY - HEALTHEAST (OUTPATIENT)
Dept: ANTICOAGULATION | Facility: CLINIC | Age: 63
End: 2018-03-29

## 2018-04-02 ENCOUNTER — HOSPITAL ENCOUNTER (EMERGENCY)
Facility: CLINIC | Age: 63
Discharge: OTHER INSTITUTION | DRG: 592 | End: 2018-04-02
Attending: EMERGENCY MEDICINE | Admitting: EMERGENCY MEDICINE
Payer: MEDICARE

## 2018-04-02 VITALS
SYSTOLIC BLOOD PRESSURE: 106 MMHG | DIASTOLIC BLOOD PRESSURE: 75 MMHG | RESPIRATION RATE: 18 BRPM | OXYGEN SATURATION: 99 % | HEART RATE: 81 BPM | TEMPERATURE: 97.8 F | BODY MASS INDEX: 29.23 KG/M2 | HEIGHT: 63 IN | WEIGHT: 165 LBS

## 2018-04-02 DIAGNOSIS — R10.2 PELVIC PAIN IN FEMALE: ICD-10-CM

## 2018-04-02 DIAGNOSIS — G82.20 PARAPLEGIA (H): ICD-10-CM

## 2018-04-02 LAB
ANION GAP SERPL CALCULATED.3IONS-SCNC: 10 MMOL/L (ref 3–14)
BASOPHILS # BLD AUTO: 0.1 10E9/L (ref 0–0.2)
BASOPHILS NFR BLD AUTO: 0.6 %
BUN SERPL-MCNC: 8 MG/DL (ref 7–30)
CALCIUM SERPL-MCNC: 8.3 MG/DL (ref 8.5–10.1)
CHLORIDE SERPL-SCNC: 101 MMOL/L (ref 94–109)
CO2 SERPL-SCNC: 27 MMOL/L (ref 20–32)
CREAT SERPL-MCNC: 0.49 MG/DL (ref 0.52–1.04)
DIFFERENTIAL METHOD BLD: ABNORMAL
EOSINOPHIL # BLD AUTO: 1.3 10E9/L (ref 0–0.7)
EOSINOPHIL NFR BLD AUTO: 12.4 %
ERYTHROCYTE [DISTWIDTH] IN BLOOD BY AUTOMATED COUNT: 19.3 % (ref 10–15)
GFR SERPL CREATININE-BSD FRML MDRD: >90 ML/MIN/1.7M2
GLUCOSE SERPL-MCNC: 89 MG/DL (ref 70–99)
HCT VFR BLD AUTO: 34.3 % (ref 35–47)
HGB BLD-MCNC: 10.7 G/DL (ref 11.7–15.7)
IMM GRANULOCYTES # BLD: 0.1 10E9/L (ref 0–0.4)
IMM GRANULOCYTES NFR BLD: 0.5 %
INR PPP: 1.15 (ref 0.86–1.14)
LYMPHOCYTES # BLD AUTO: 1.9 10E9/L (ref 0.8–5.3)
LYMPHOCYTES NFR BLD AUTO: 17.4 %
MCH RBC QN AUTO: 25.6 PG (ref 26.5–33)
MCHC RBC AUTO-ENTMCNC: 31.2 G/DL (ref 31.5–36.5)
MCV RBC AUTO: 82 FL (ref 78–100)
MONOCYTES # BLD AUTO: 0.8 10E9/L (ref 0–1.3)
MONOCYTES NFR BLD AUTO: 7.1 %
NEUTROPHILS # BLD AUTO: 6.7 10E9/L (ref 1.6–8.3)
NEUTROPHILS NFR BLD AUTO: 62 %
NRBC # BLD AUTO: 0 10*3/UL
NRBC BLD AUTO-RTO: 0 /100
PLATELET # BLD AUTO: 447 10E9/L (ref 150–450)
POTASSIUM SERPL-SCNC: 4.2 MMOL/L (ref 3.4–5.3)
RBC # BLD AUTO: 4.18 10E12/L (ref 3.8–5.2)
SODIUM SERPL-SCNC: 138 MMOL/L (ref 133–144)
WBC # BLD AUTO: 10.7 10E9/L (ref 4–11)

## 2018-04-02 PROCEDURE — 87040 BLOOD CULTURE FOR BACTERIA: CPT | Performed by: EMERGENCY MEDICINE

## 2018-04-02 PROCEDURE — 36415 COLL VENOUS BLD VENIPUNCTURE: CPT

## 2018-04-02 PROCEDURE — 99283 EMERGENCY DEPT VISIT LOW MDM: CPT

## 2018-04-02 PROCEDURE — 85025 COMPLETE CBC W/AUTO DIFF WBC: CPT | Performed by: EMERGENCY MEDICINE

## 2018-04-02 PROCEDURE — 85610 PROTHROMBIN TIME: CPT | Performed by: EMERGENCY MEDICINE

## 2018-04-02 PROCEDURE — 80048 BASIC METABOLIC PNL TOTAL CA: CPT | Performed by: EMERGENCY MEDICINE

## 2018-04-02 ASSESSMENT — ENCOUNTER SYMPTOMS: FEVER: 0

## 2018-04-02 NOTE — ED AVS SNAPSHOT
Emergency Department    [address] [place] [address]    Phone:  [phone]    Fax:  [phone]                                       Marychuy Zhou   MRN: [id number]    Department:   Emergency Department   Date of Visit:  4/2/2018           After Visit Summary Signature Page     I have received my discharge instructions, and my questions have been answered. I have discussed any challenges I see with this plan with the nurse or doctor.    ..........................................................................................................................................  Patient/Patient Representative Signature      ..........................................................................................................................................  Patient Representative Print Name and Relationship to Patient    ..................................................               ................................................  Date                                            Time    ..........................................................................................................................................  Reviewed by Signature/Title    ...................................................              ..............................................  Date                                                            Time

## 2018-04-02 NOTE — ED AVS SNAPSHOT
Emergency Department    [address] [facility] [address]    Phone:  [phone]    Fax:  [phone]                                       Marychuy Zhou   MRN: [id number]    Department:   Emergency Department   Date of Visit:  4/2/2018           Patient Information     Date Of Birth          1955        Your diagnoses for this visit were:     Pelvic pain in female Chronic    Paraplegia (H) T10       You were seen by Penelope Marcum MD.      Follow-up Information     Schedule an appointment as soon as possible for a visit with Mellisa Haji MD.    Contact information:    [facility]  [address]   [facility] [address]  [phone]          Discharge Instructions       I find no evidence of sepsis at this visit, your blood work looks good.  Continue to take your current medications.  Set up an appointment with your doctor for wound recheck and continue your wound care with the wound nurse.  Recheck with your doctor or the emergency room at [facility] if you develop fevers.              24 Hour Appointment Hotline       To make an appointment at any [facility], call [phone] ([phone]). If you don't have a family doctor or clinic, we will help you find one. [facility] are conveniently located to serve the needs of you and your family.             Review of your medicines      Notice     You have not been prescribed any medications.            Procedures and tests performed during your visit     Basic metabolic panel    Blood culture ONE site    CBC with platelets + differential    INR      Orders Needing Specimen Collection     None      Pending Results     Date and Time Order Name Status Description    4/2/2018 2111 Blood culture ONE site In process             Pending Culture Results     Date and Time Order Name Status Description    4/2/2018 2111 Blood culture ONE site In process             Pending Results Instructions     If you had  any lab results that were not finalized at the time of your Discharge, you can call the ED Lab Result RN at [phone]. You will be contacted by this team for any positive Lab results or changes in treatment. The nurses are available 7 days a week from 10A to 6:30P.  You can leave a message 24 hours per day and they will return your call.        Test Results From Your Hospital Stay        4/2/2018  9:40 PM      Component Results     Component Value Ref Range & Units Status    WBC 10.7 4.0 - 11.0 10e9/L Final    RBC Count 4.18 3.8 - 5.2 10e12/L Final    Hemoglobin 10.7 (L) 11.7 - 15.7 g/dL Final    Hematocrit 34.3 (L) 35.0 - 47.0 % Final    MCV 82 78 - 100 fl Final    MCH 25.6 (L) 26.5 - 33.0 pg Final    MCHC 31.2 (L) 31.5 - 36.5 g/dL Final    RDW 19.3 (H) 10.0 - 15.0 % Final    Platelet Count 447 150 - 450 10e9/L Final    Diff Method Automated Method  Final    % Neutrophils 62.0 % Final    % Lymphocytes 17.4 % Final    % Monocytes 7.1 % Final    % Eosinophils 12.4 % Final    % Basophils 0.6 % Final    % Immature Granulocytes 0.5 % Final    Nucleated RBCs 0 0 /100 Final    Absolute Neutrophil 6.7 1.6 - 8.3 10e9/L Final    Absolute Lymphocytes 1.9 0.8 - 5.3 10e9/L Final    Absolute Monocytes 0.8 0.0 - 1.3 10e9/L Final    Absolute Eosinophils 1.3 (H) 0.0 - 0.7 10e9/L Final    Absolute Basophils 0.1 0.0 - 0.2 10e9/L Final    Abs Immature Granulocytes 0.1 0 - 0.4 10e9/L Final    Absolute Nucleated RBC 0.0  Final         4/2/2018  9:53 PM      Component Results     Component Value Ref Range & Units Status    Sodium 138 133 - 144 mmol/L Final    Potassium 4.2 3.4 - 5.3 mmol/L Final    Chloride 101 94 - 109 mmol/L Final    Carbon Dioxide 27 20 - 32 mmol/L Final    Anion Gap 10 3 - 14 mmol/L Final    Glucose 89 70 - 99 mg/dL Final    Urea Nitrogen 8 7 - 30 mg/dL Final    Creatinine 0.49 (L) 0.52 - 1.04 mg/dL Final    GFR Estimate >90 >60 mL/min/1.7m2 Final    Non  GFR Calc    GFR Estimate If Black >90 >60  mL/min/1.7m2 Final     GFR Calc    Calcium 8.3 (L) 8.5 - 10.1 mg/dL Final         4/2/2018  9:54 PM      Component Results     Component Value Ref Range & Units Status    INR 1.15 (H) 0.86 - 1.14 Final         4/2/2018  9:36 PM                Clinical Quality Measure: Blood Pressure Screening     Your blood pressure was checked while you were in the emergency department today. The last reading we obtained was  BP: 107/75 . Please read the guidelines below about what these numbers mean and what you should do about them.  If your systolic blood pressure (the top number) is less than 120 and your diastolic blood pressure (the bottom number) is less than 80, then your blood pressure is normal. There is nothing more that you need to do about it.  If your systolic blood pressure (the top number) is 120-139 or your diastolic blood pressure (the bottom number) is 80-89, your blood pressure may be higher than it should be. You should have your blood pressure rechecked within a year by a primary care provider.  If your systolic blood pressure (the top number) is 140 or greater or your diastolic blood pressure (the bottom number) is 90 or greater, you may have high blood pressure. High blood pressure is treatable, but if left untreated over time it can put you at risk for heart attack, stroke, or kidney failure. You should have your blood pressure rechecked by a primary care provider within the next 4 weeks.  If your provider in the emergency department today gave you specific instructions to follow-up with your doctor or provider even sooner than that, you should follow that instruction and not wait for up to 4 weeks for your follow-up visit.        Thank you for choosing [facility]       Thank you for choosing [facility] for your care. Our goal is always to provide you with excellent care. Hearing back from our patients is one way we can continue to improve our services. Please take a few minutes to complete the  "written survey that you may receive in the mail after you visit with us. Thank you!        [facility] Information     [facility] lets you send messages to your doctor, view your test results, renew your prescriptions, schedule appointments and more. To sign up, go to [email or web address] Click on \"Log in\" on the left side of the screen, which will take you to the Welcome page. Then click on \"Sign up Now\" on the right side of the page.     You will be asked to enter the access code listed below, as well as some personal information. Please follow the directions to create your username and password.     Your access code is: [email or web address]  Expires: 2018 10:46 PM     Your access code will  in 90 days. If you need help or a new code, please call your [facility] clinic or [phone].        Care EveryWhere ID     This is your Care EveryWhere ID. This could be used by other organizations to access your [facility] medical records  [id number]        Equal Access to Services     DANIELLA HILL : Hadjennyfer monet Solucretia, waaxda luqadaha, qaybta kaaltata kay, hemant dave . So [facility] [phone].    ATENCIÓN: Si habla español, tiene a thorpe disposición servicios gratuitos de asistencia lingüística. Llame al [phone].    We comply with applicable federal civil rights laws and [place] laws. We do not discriminate on the basis of race, color, national origin, age, disability, sex, sexual orientation, or gender identity.            After Visit Summary       This is your record. Keep this with you and show to your community pharmacist(s) and doctor(s) at your next visit.                  "

## 2018-04-03 NOTE — ED NOTES
Bed: ED29  Expected date:   Expected time:   Means of arrival:   Comments:  Cathleen 2- 62F- all over pain

## 2018-04-03 NOTE — DISCHARGE INSTRUCTIONS
I find no evidence of sepsis at this visit, your blood work looks good.  Continue to take your current medications.  Set up an appointment with your doctor for wound recheck and continue your wound care with the wound nurse.  Recheck with your doctor or the emergency room at [facility] if you develop fevers.

## 2018-04-04 ENCOUNTER — APPOINTMENT (OUTPATIENT)
Dept: CT IMAGING | Facility: CLINIC | Age: 63
DRG: 592 | End: 2018-04-04
Attending: PHYSICIAN ASSISTANT
Payer: MEDICARE

## 2018-04-04 ENCOUNTER — HOSPITAL ENCOUNTER (INPATIENT)
Facility: CLINIC | Age: 63
LOS: 3 days | Discharge: NURSING, REHAB OR LONG-TERM CARE | DRG: 592 | End: 2018-04-07
Attending: EMERGENCY MEDICINE | Admitting: HOSPITALIST
Payer: MEDICARE

## 2018-04-04 DIAGNOSIS — G47.00 INSOMNIA, UNSPECIFIED TYPE: ICD-10-CM

## 2018-04-04 DIAGNOSIS — G82.20 PARAPLEGIA (H): ICD-10-CM

## 2018-04-04 DIAGNOSIS — L08.9 WOUND INFECTION: ICD-10-CM

## 2018-04-04 DIAGNOSIS — G89.29 OTHER CHRONIC PAIN: Primary | ICD-10-CM

## 2018-04-04 DIAGNOSIS — T14.8XXA WOUND INFECTION: ICD-10-CM

## 2018-04-04 PROBLEM — M86.9 OSTEOMYELITIS (H): Status: ACTIVE | Noted: 2018-04-04

## 2018-04-04 LAB
ANION GAP SERPL CALCULATED.3IONS-SCNC: 5 MMOL/L (ref 3–14)
BASOPHILS # BLD AUTO: 0.1 10E9/L (ref 0–0.2)
BASOPHILS NFR BLD AUTO: 0.6 %
BUN SERPL-MCNC: 8 MG/DL (ref 7–30)
CALCIUM SERPL-MCNC: 7.9 MG/DL (ref 8.5–10.1)
CHLORIDE SERPL-SCNC: 103 MMOL/L (ref 94–109)
CO2 SERPL-SCNC: 29 MMOL/L (ref 20–32)
CREAT SERPL-MCNC: 0.51 MG/DL (ref 0.52–1.04)
DIFFERENTIAL METHOD BLD: ABNORMAL
EOSINOPHIL # BLD AUTO: 0.9 10E9/L (ref 0–0.7)
EOSINOPHIL NFR BLD AUTO: 8.6 %
ERYTHROCYTE [DISTWIDTH] IN BLOOD BY AUTOMATED COUNT: 19.5 % (ref 10–15)
GFR SERPL CREATININE-BSD FRML MDRD: >90 ML/MIN/1.7M2
GLUCOSE SERPL-MCNC: 114 MG/DL (ref 70–99)
HCT VFR BLD AUTO: 33.1 % (ref 35–47)
HGB BLD-MCNC: 10.1 G/DL (ref 11.7–15.7)
IMM GRANULOCYTES # BLD: 0.1 10E9/L (ref 0–0.4)
IMM GRANULOCYTES NFR BLD: 0.5 %
INR PPP: 1.29 (ref 0.86–1.14)
LACTATE SERPL-SCNC: 2.5 MMOL/L (ref 0.4–2)
LYMPHOCYTES # BLD AUTO: 1.6 10E9/L (ref 0.8–5.3)
LYMPHOCYTES NFR BLD AUTO: 14.9 %
MCH RBC QN AUTO: 25 PG (ref 26.5–33)
MCHC RBC AUTO-ENTMCNC: 30.5 G/DL (ref 31.5–36.5)
MCV RBC AUTO: 82 FL (ref 78–100)
MONOCYTES # BLD AUTO: 0.7 10E9/L (ref 0–1.3)
MONOCYTES NFR BLD AUTO: 6 %
NEUTROPHILS # BLD AUTO: 7.6 10E9/L (ref 1.6–8.3)
NEUTROPHILS NFR BLD AUTO: 69.4 %
NRBC # BLD AUTO: 0 10*3/UL
NRBC BLD AUTO-RTO: 0 /100
PLATELET # BLD AUTO: 450 10E9/L (ref 150–450)
POTASSIUM SERPL-SCNC: 4 MMOL/L (ref 3.4–5.3)
RBC # BLD AUTO: 4.04 10E12/L (ref 3.8–5.2)
SODIUM SERPL-SCNC: 137 MMOL/L (ref 133–144)
VALPROATE SERPL-MCNC: 42 MG/L (ref 50–100)
WBC # BLD AUTO: 11 10E9/L (ref 4–11)

## 2018-04-04 PROCEDURE — 85025 COMPLETE CBC W/AUTO DIFF WBC: CPT | Performed by: PHYSICIAN ASSISTANT

## 2018-04-04 PROCEDURE — 99223 1ST HOSP IP/OBS HIGH 75: CPT | Mod: AI | Performed by: HOSPITALIST

## 2018-04-04 PROCEDURE — 74177 CT ABD & PELVIS W/CONTRAST: CPT

## 2018-04-04 PROCEDURE — 85652 RBC SED RATE AUTOMATED: CPT | Performed by: HOSPITALIST

## 2018-04-04 PROCEDURE — 87040 BLOOD CULTURE FOR BACTERIA: CPT | Performed by: PHYSICIAN ASSISTANT

## 2018-04-04 PROCEDURE — 96374 THER/PROPH/DIAG INJ IV PUSH: CPT | Mod: 59

## 2018-04-04 PROCEDURE — 25000132 ZZH RX MED GY IP 250 OP 250 PS 637: Mod: GY | Performed by: HOSPITALIST

## 2018-04-04 PROCEDURE — A9270 NON-COVERED ITEM OR SERVICE: HCPCS | Mod: GY | Performed by: HOSPITALIST

## 2018-04-04 PROCEDURE — 80164 ASSAY DIPROPYLACETIC ACD TOT: CPT | Performed by: PHYSICIAN ASSISTANT

## 2018-04-04 PROCEDURE — 36415 COLL VENOUS BLD VENIPUNCTURE: CPT | Performed by: EMERGENCY MEDICINE

## 2018-04-04 PROCEDURE — 36415 COLL VENOUS BLD VENIPUNCTURE: CPT

## 2018-04-04 PROCEDURE — 99285 EMERGENCY DEPT VISIT HI MDM: CPT | Mod: 25

## 2018-04-04 PROCEDURE — 83605 ASSAY OF LACTIC ACID: CPT | Performed by: PHYSICIAN ASSISTANT

## 2018-04-04 PROCEDURE — 25000128 H RX IP 250 OP 636: Performed by: PHYSICIAN ASSISTANT

## 2018-04-04 PROCEDURE — 25000125 ZZHC RX 250: Performed by: EMERGENCY MEDICINE

## 2018-04-04 PROCEDURE — 87040 BLOOD CULTURE FOR BACTERIA: CPT | Performed by: EMERGENCY MEDICINE

## 2018-04-04 PROCEDURE — 25000128 H RX IP 250 OP 636: Performed by: HOSPITALIST

## 2018-04-04 PROCEDURE — 25000132 ZZH RX MED GY IP 250 OP 250 PS 637: Mod: GY | Performed by: PHYSICIAN ASSISTANT

## 2018-04-04 PROCEDURE — 80048 BASIC METABOLIC PNL TOTAL CA: CPT | Performed by: PHYSICIAN ASSISTANT

## 2018-04-04 PROCEDURE — A9270 NON-COVERED ITEM OR SERVICE: HCPCS | Mod: GY | Performed by: PHYSICIAN ASSISTANT

## 2018-04-04 PROCEDURE — 25000128 H RX IP 250 OP 636: Performed by: EMERGENCY MEDICINE

## 2018-04-04 PROCEDURE — 84145 PROCALCITONIN (PCT): CPT | Performed by: HOSPITALIST

## 2018-04-04 PROCEDURE — 85610 PROTHROMBIN TIME: CPT | Performed by: PHYSICIAN ASSISTANT

## 2018-04-04 PROCEDURE — 80175 DRUG SCREEN QUAN LAMOTRIGINE: CPT | Performed by: PHYSICIAN ASSISTANT

## 2018-04-04 PROCEDURE — 12000000 ZZH R&B MED SURG/OB

## 2018-04-04 PROCEDURE — 96361 HYDRATE IV INFUSION ADD-ON: CPT

## 2018-04-04 RX ORDER — LEVETIRACETAM 250 MG/1
250 TABLET ORAL 2 TIMES DAILY
Status: DISCONTINUED | OUTPATIENT
Start: 2018-04-08 | End: 2018-04-07 | Stop reason: HOSPADM

## 2018-04-04 RX ORDER — OXYCODONE HYDROCHLORIDE 5 MG/1
10 TABLET ORAL ONCE
Status: COMPLETED | OUTPATIENT
Start: 2018-04-04 | End: 2018-04-04

## 2018-04-04 RX ORDER — ARIPIPRAZOLE 5 MG/1
5 TABLET ORAL EVERY MORNING
Status: DISCONTINUED | OUTPATIENT
Start: 2018-04-05 | End: 2018-04-07 | Stop reason: HOSPADM

## 2018-04-04 RX ORDER — IOPAMIDOL 755 MG/ML
81 INJECTION, SOLUTION INTRAVASCULAR ONCE
Status: COMPLETED | OUTPATIENT
Start: 2018-04-04 | End: 2018-04-04

## 2018-04-04 RX ORDER — POLYETHYLENE GLYCOL 3350 17 G/17G
17 POWDER, FOR SOLUTION ORAL DAILY PRN
Status: DISCONTINUED | OUTPATIENT
Start: 2018-04-04 | End: 2018-04-07 | Stop reason: HOSPADM

## 2018-04-04 RX ORDER — ACETAMINOPHEN 325 MG/1
650 TABLET ORAL EVERY 4 HOURS PRN
Status: DISCONTINUED | OUTPATIENT
Start: 2018-04-04 | End: 2018-04-06

## 2018-04-04 RX ORDER — ASPIRIN 81 MG/1
81 TABLET, CHEWABLE ORAL DAILY
Status: ON HOLD | COMMUNITY
End: 2018-01-01

## 2018-04-04 RX ORDER — ALBUTEROL SULFATE 0.83 MG/ML
2.5 SOLUTION RESPIRATORY (INHALATION)
Status: DISCONTINUED | OUTPATIENT
Start: 2018-04-04 | End: 2018-04-07 | Stop reason: HOSPADM

## 2018-04-04 RX ORDER — LEVETIRACETAM 250 MG/1
250 TABLET ORAL EVERY EVENING
Status: DISCONTINUED | OUTPATIENT
Start: 2018-04-04 | End: 2018-04-07 | Stop reason: HOSPADM

## 2018-04-04 RX ORDER — HYDROMORPHONE HYDROCHLORIDE 1 MG/ML
0.5 INJECTION, SOLUTION INTRAMUSCULAR; INTRAVENOUS; SUBCUTANEOUS ONCE
Status: COMPLETED | OUTPATIENT
Start: 2018-04-04 | End: 2018-04-04

## 2018-04-04 RX ORDER — MEROPENEM 1 G/1
1 INJECTION, POWDER, FOR SOLUTION INTRAVENOUS EVERY 8 HOURS
Status: DISCONTINUED | OUTPATIENT
Start: 2018-04-04 | End: 2018-04-07

## 2018-04-04 RX ORDER — HYDROXYZINE HYDROCHLORIDE 25 MG/1
50 TABLET, FILM COATED ORAL AT BEDTIME
Status: DISCONTINUED | OUTPATIENT
Start: 2018-04-04 | End: 2018-04-07 | Stop reason: HOSPADM

## 2018-04-04 RX ORDER — ONDANSETRON 2 MG/ML
4 INJECTION INTRAMUSCULAR; INTRAVENOUS EVERY 6 HOURS PRN
Status: DISCONTINUED | OUTPATIENT
Start: 2018-04-04 | End: 2018-04-07 | Stop reason: HOSPADM

## 2018-04-04 RX ORDER — HYDROMORPHONE HYDROCHLORIDE 2 MG/1
2 TABLET ORAL EVERY 4 HOURS PRN
Status: DISCONTINUED | OUTPATIENT
Start: 2018-04-04 | End: 2018-04-04

## 2018-04-04 RX ORDER — SODIUM CHLORIDE 9 MG/ML
INJECTION, SOLUTION INTRAVENOUS CONTINUOUS
Status: DISCONTINUED | OUTPATIENT
Start: 2018-04-04 | End: 2018-04-05

## 2018-04-04 RX ORDER — OXYCODONE HCL 20 MG/1
20 TABLET, FILM COATED, EXTENDED RELEASE ORAL EVERY 12 HOURS
Status: DISCONTINUED | OUTPATIENT
Start: 2018-04-04 | End: 2018-04-07 | Stop reason: HOSPADM

## 2018-04-04 RX ORDER — DULOXETIN HYDROCHLORIDE 30 MG/1
60 CAPSULE, DELAYED RELEASE ORAL EVERY MORNING
Status: DISCONTINUED | OUTPATIENT
Start: 2018-04-05 | End: 2018-04-07 | Stop reason: HOSPADM

## 2018-04-04 RX ORDER — DOCUSATE SODIUM 100 MG/1
100 CAPSULE, LIQUID FILLED ORAL 2 TIMES DAILY
Status: DISCONTINUED | OUTPATIENT
Start: 2018-04-04 | End: 2018-04-07 | Stop reason: HOSPADM

## 2018-04-04 RX ORDER — LEVETIRACETAM 500 MG/1
500 TABLET ORAL EVERY MORNING
Status: COMPLETED | OUTPATIENT
Start: 2018-04-05 | End: 2018-04-07

## 2018-04-04 RX ORDER — HYDROMORPHONE HYDROCHLORIDE 1 MG/ML
0.2 INJECTION, SOLUTION INTRAMUSCULAR; INTRAVENOUS; SUBCUTANEOUS EVERY 4 HOURS PRN
Status: DISCONTINUED | OUTPATIENT
Start: 2018-04-04 | End: 2018-04-05

## 2018-04-04 RX ORDER — FERROUS SULFATE 325(65) MG
325 TABLET ORAL 2 TIMES DAILY
Status: DISCONTINUED | OUTPATIENT
Start: 2018-04-05 | End: 2018-04-07 | Stop reason: HOSPADM

## 2018-04-04 RX ORDER — DIAZEPAM 5 MG
5 TABLET ORAL EVERY 4 HOURS PRN
Status: DISCONTINUED | OUTPATIENT
Start: 2018-04-04 | End: 2018-04-07 | Stop reason: HOSPADM

## 2018-04-04 RX ORDER — ASPIRIN 81 MG/1
81 TABLET, CHEWABLE ORAL DAILY
Status: DISCONTINUED | OUTPATIENT
Start: 2018-04-05 | End: 2018-04-07 | Stop reason: HOSPADM

## 2018-04-04 RX ORDER — FERROUS SULFATE 325(65) MG
325 TABLET ORAL 2 TIMES DAILY
Status: ON HOLD | COMMUNITY
End: 2018-01-01

## 2018-04-04 RX ORDER — BISACODYL 10 MG
10 SUPPOSITORY, RECTAL RECTAL DAILY PRN
Status: ON HOLD | COMMUNITY
End: 2018-01-01

## 2018-04-04 RX ORDER — OXYCODONE HYDROCHLORIDE 5 MG/1
5-10 TABLET ORAL EVERY 6 HOURS PRN
Status: DISCONTINUED | OUTPATIENT
Start: 2018-04-04 | End: 2018-04-05

## 2018-04-04 RX ORDER — BISACODYL 10 MG
10 SUPPOSITORY, RECTAL RECTAL DAILY PRN
Status: DISCONTINUED | OUTPATIENT
Start: 2018-04-04 | End: 2018-04-07 | Stop reason: HOSPADM

## 2018-04-04 RX ORDER — ONDANSETRON 4 MG/1
4 TABLET, ORALLY DISINTEGRATING ORAL EVERY 6 HOURS PRN
Status: DISCONTINUED | OUTPATIENT
Start: 2018-04-04 | End: 2018-04-07 | Stop reason: HOSPADM

## 2018-04-04 RX ORDER — LAMOTRIGINE 25 MG/1
50 TABLET ORAL DAILY
Status: DISCONTINUED | OUTPATIENT
Start: 2018-04-09 | End: 2018-04-07 | Stop reason: HOSPADM

## 2018-04-04 RX ORDER — LEVETIRACETAM 250 MG/1
250 TABLET ORAL DAILY
Status: DISCONTINUED | OUTPATIENT
Start: 2018-01-01 | End: 2018-04-07 | Stop reason: HOSPADM

## 2018-04-04 RX ORDER — MEROPENEM 1 G/1
1 INJECTION, POWDER, FOR SOLUTION INTRAVENOUS EVERY 6 HOURS
Status: DISCONTINUED | OUTPATIENT
Start: 2018-04-04 | End: 2018-04-04

## 2018-04-04 RX ORDER — DIVALPROEX SODIUM 250 MG/1
250 TABLET, DELAYED RELEASE ORAL 2 TIMES DAILY
Status: DISCONTINUED | OUTPATIENT
Start: 2018-04-04 | End: 2018-04-07 | Stop reason: HOSPADM

## 2018-04-04 RX ORDER — MINERAL OIL/HYDROPHIL PETROLAT
OINTMENT (GRAM) TOPICAL DAILY
COMMUNITY
End: 2018-01-01

## 2018-04-04 RX ORDER — NALOXONE HYDROCHLORIDE 0.4 MG/ML
.1-.4 INJECTION, SOLUTION INTRAMUSCULAR; INTRAVENOUS; SUBCUTANEOUS
Status: DISCONTINUED | OUTPATIENT
Start: 2018-04-04 | End: 2018-04-07 | Stop reason: HOSPADM

## 2018-04-04 RX ORDER — GABAPENTIN 300 MG/1
300 CAPSULE ORAL 3 TIMES DAILY
Status: DISCONTINUED | OUTPATIENT
Start: 2018-04-04 | End: 2018-04-07 | Stop reason: HOSPADM

## 2018-04-04 RX ORDER — AMOXICILLIN 250 MG
2 CAPSULE ORAL 2 TIMES DAILY
Status: ON HOLD | COMMUNITY
End: 2018-01-01

## 2018-04-04 RX ORDER — ZOLPIDEM TARTRATE 5 MG/1
5 TABLET ORAL
Status: DISCONTINUED | OUTPATIENT
Start: 2018-04-04 | End: 2018-04-07 | Stop reason: HOSPADM

## 2018-04-04 RX ORDER — POTASSIUM CHLORIDE 750 MG/1
10 TABLET, EXTENDED RELEASE ORAL EVERY MORNING
Status: DISCONTINUED | OUTPATIENT
Start: 2018-04-05 | End: 2018-04-07 | Stop reason: HOSPADM

## 2018-04-04 RX ORDER — LAMOTRIGINE 25 MG/1
25 TABLET ORAL EVERY MORNING
Status: DISCONTINUED | OUTPATIENT
Start: 2018-04-05 | End: 2018-04-07 | Stop reason: HOSPADM

## 2018-04-04 RX ADMIN — IOPAMIDOL 81 ML: 755 INJECTION, SOLUTION INTRAVENOUS at 18:00

## 2018-04-04 RX ADMIN — SODIUM CHLORIDE: 9 INJECTION, SOLUTION INTRAVENOUS at 23:38

## 2018-04-04 RX ADMIN — DOCUSATE SODIUM 100 MG: 100 CAPSULE, LIQUID FILLED ORAL at 23:32

## 2018-04-04 RX ADMIN — OXYCODONE HYDROCHLORIDE 10 MG: 5 TABLET ORAL at 18:34

## 2018-04-04 RX ADMIN — SODIUM CHLORIDE 65 ML: 9 INJECTION, SOLUTION INTRAVENOUS at 18:01

## 2018-04-04 RX ADMIN — GABAPENTIN 300 MG: 300 CAPSULE ORAL at 23:32

## 2018-04-04 RX ADMIN — HYDROMORPHONE HYDROCHLORIDE 0.5 MG: 1 INJECTION, SOLUTION INTRAMUSCULAR; INTRAVENOUS; SUBCUTANEOUS at 20:44

## 2018-04-04 RX ADMIN — MELATONIN 5 MG TABLET 10 MG: at 23:32

## 2018-04-04 RX ADMIN — HYDROXYZINE HYDROCHLORIDE 50 MG: 25 TABLET ORAL at 23:31

## 2018-04-04 RX ADMIN — HYDROMORPHONE HYDROCHLORIDE 2 MG: 2 TABLET ORAL at 23:34

## 2018-04-04 RX ADMIN — SODIUM CHLORIDE 1000 ML: 9 INJECTION, SOLUTION INTRAVENOUS at 18:33

## 2018-04-04 ASSESSMENT — ENCOUNTER SYMPTOMS
NAUSEA: 0
FEVER: 0
HEMATURIA: 0
CHILLS: 0
FREQUENCY: 0
COLOR CHANGE: 1
WOUND: 1
VOMITING: 0

## 2018-04-04 NOTE — IP AVS SNAPSHOT
` `     [place] [id number] [facility]: [phone]            Medication Administration Report for Marychuy Zhou as of 04/07/18 0619   Legend:    Given Hold Not Given Due Canceled Entry Other Actions    Time Time (Time) Time  Time-Action       Inactive    Active    Linked        Medications 04/01/18 04/02/18 04/03/18 04/04/18 04/05/18 04/06/18 04/07/18    acetaminophen (TYLENOL) tablet 975 mg  Dose: 975 mg  Freq: 3 TIMES DAILY Route: PO  Start: 04/06/18 1600   Admin Instructions: Alternate ibuprofen (if ordered) with acetaminophen.  Maximum acetaminophen dose from all sources = 75 mg/kg/day not to exceed 4 grams/day.          1556 (975 mg)-Given       2214 (975 mg)-Given        0951 (975 mg)-Given       [ ] 1600       [ ] 2200           albuterol neb solution 2.5 mg  Dose: 2.5 mg  Freq: EVERY 2 HOURS PRN Route: NEBULIZATION  PRN Reason: wheezing  Start: 04/04/18 2210              amoxicillin-clavulanate (AUGMENTIN) 875-125 MG per tablet 1 tablet  Dose: 1 tablet  Freq: EVERY 12 HOURS SCHEDULED Route: PO  Indications of Use: OSTEOMYELITIS  Start: 04/07/18 1100          1137 (1 tablet)-Given       [ ] 2000           ARIPiprazole (ABILIFY) tablet 5 mg  Dose: 5 mg  Freq: EVERY MORNING Route: PO  Start: 04/05/18 0900        0932 (5 mg)-Given        0834 (5 mg)-Given        0949 (5 mg)-Given           ascorbic acid (VITAMIN C) tablet 500 mg  Dose: 500 mg  Freq: DAILY Route: PO  Start: 04/05/18 1230   End: 04/15/18 0859        1408 (500 mg)-Given        0834 (500 mg)-Given        0950 (500 mg)-Given           aspirin chewable tablet 81 mg  Dose: 81 mg  Freq: DAILY Route: PO  Start: 04/05/18 0900        0932 (81 mg)-Given        0835 (81 mg)-Given        0951 (81 mg)-Given           beta carotene capsule 10,000 Units  Dose: 10,000 Units  Freq: 2 TIMES DAILY Route: PO  Start: 04/05/18 1230   End: 04/15/18 0859        1408 (10,000 Units)-Given       2156 (10,000 Units)-Given        0833 (10,000  Units)-Given       2030 (10,000 Units)-Given        0950 (10,000 Units)-Given       [ ] 2100           bisacodyl (DULCOLAX) Suppository 10 mg  Dose: 10 mg  Freq: DAILY PRN Route: RE  PRN Reason: constipation  Start: 04/04/18 2208   Admin Instructions: Hold for loose stools.  This is the third step of a three step constipation treatment.               calcium carbonate (TUMS) chewable tablet 1,000 mg  Dose: 1,000 mg  Freq: EVERY 2 HOURS PRN Route: PO  PRN Reason: heartburn  Start: 04/06/18 1355   Admin Instructions: Do not give if calcium level greater than 10 mg/dL.          1411 (1,000 mg)-Given            diazepam (VALIUM) tablet 5 mg  Dose: 5 mg  Freq: EVERY 4 HOURS PRN Route: PO  PRN Reason: anxiety  Start: 04/04/18 2208        2014 (5 mg)-Given        0221 (5 mg)-Given       1402 (5 mg)-Given       1811 (5 mg)-Given       2215 (5 mg)-Given        0552 (5 mg)-Given       1136 (5 mg)-Given           divalproex sodium delayed-release (DEPAKOTE) DR tablet 250 mg  Dose: 250 mg  Freq: 2 TIMES DAILY Route: PO  Start: 04/04/18 2215        0031 (250 mg)-Given       0932 (250 mg)-Given       2156 (250 mg)-Given        0833 (250 mg)-Given       2030 (250 mg)-Given        0950 (250 mg)-Given       [ ] 2100           docusate sodium (COLACE) capsule 100 mg  Dose: 100 mg  Freq: 2 TIMES DAILY Route: PO  Start: 04/04/18 2215   Admin Instructions: To prevent constipation.  Hold for loose stools.        2332 (100 mg)-Given        0932 (100 mg)-Given       2155 (100 mg)-Given        0836 (100 mg)-Given       2030 (100 mg)-Given        0951 (100 mg)-Given       [ ] 2100           DULoxetine (CYMBALTA) EC capsule 60 mg  Dose: 60 mg  Freq: EVERY MORNING Route: PO  Start: 04/05/18 0900        0932 (60 mg)-Given        0834 (60 mg)-Given        0951 (60 mg)-Given           ferrous sulfate (IRON) tablet 325 mg  Dose: 325 mg  Freq: 2 TIMES DAILY Route: PO  Start: 04/05/18 0900   Admin Instructions: Absorbed best on an empty stomach. If  stomach upset occurs, can take with meals.         0932 (325 mg)-Given       2155 (325 mg)-Given        0834 (325 mg)-Given       2030 (325 mg)-Given        0951 (325 mg)-Given       [ ] 2100           gabapentin (NEURONTIN) capsule 300 mg  Dose: 300 mg  Freq: 3 TIMES DAILY Route: PO  Start: 04/04/18 2215       2332 (300 mg)-Given        0932 (300 mg)-Given       1649 (300 mg)-Given       2155 (300 mg)-Given        0833 (300 mg)-Given       1557 (300 mg)-Given       2215 (300 mg)-Given        0950 (300 mg)-Given       [ ] 1600       [ ] 2200           HYDROmorphone (DILAUDID) tablet 4-6 mg  Dose: 4-6 mg  Freq: EVERY 3 HOURS PRN Route: PO  PRN Reasons: severe pain,breakthrough pain  Start: 04/06/18 1119         1203 (6 mg)-Given [C]       1557 (6 mg)-Given       1906 (6 mg)-Given       2215 (6 mg)-Given        0137 (6 mg)-Given       0552 (6 mg)-Given       1136 (6 mg)-Given           HYDROmorphone (PF) (DILAUDID) injection 0.3-0.5 mg  Dose: 0.3-0.5 mg  Freq: EVERY 3 HOURS PRN Route: IV  PRN Reason: severe pain  Start: 04/05/18 1500   Admin Instructions: For ordered doses up to 4 mg give IV Push undiluted. Administer each 2mg over 2-5 minutes.         1508 (0.5 mg)-Given       1822 (0.5 mg)-Given       2158 (0.5 mg)-Given        0151 (0.5 mg)-Given       0830 (0.5 mg)-Given            hydrOXYzine (ATARAX) tablet 50 mg  Dose: 50 mg  Freq: AT BEDTIME Route: PO  Start: 04/04/18 2215       2331 (50 mg)-Given        2154 (50 mg)-Given        2215 (50 mg)-Given        [ ] 2200           lamoTRIgine (LaMICtal) tablet 25 mg  Dose: 25 mg  Freq: EVERY MORNING Route: PO  Start: 04/05/18 0900   End: 04/09/18 0859        0932 (25 mg)-Given        0834 (25 mg)-Given        0950 (25 mg)-Given           levETIRAcetam (KEPPRA) tablet 250 mg  Dose: 250 mg  Freq: EVERY EVENING Route: PO  Start: 04/04/18 2215   End: 04/08/18 1959        0137 (250 mg)-Given       2014 (250 mg)-Given        2030 (250 mg)-Given        [ ] 2000            levothyroxine (SYNTHROID/LEVOTHROID) tablet 125 mcg  Dose: 125 mcg  Freq: EVERY MORNING Route: PO  Start: 04/05/18 0900        0932 (125 mcg)-Given        0834 (125 mcg)-Given        0950 (125 mcg)-Given           magnesium hydroxide (MILK OF MAGNESIA) suspension 30 mL  Dose: 30 mL  Freq: DAILY PRN Route: PO  PRN Reasons: constipation,heartburn  Start: 04/04/18 2208   Admin Instructions: Shake well.               melatonin tablet 10 mg  Dose: 10 mg  Freq: AT BEDTIME Route: PO  Start: 04/04/18 2215       2332 (10 mg)-Given        2157 (10 mg)-Given        2215 (10 mg)-Given        [ ] 2200           miconazole (MICATIN; MICRO GUARD) 2 % powder  Freq: 2 TIMES DAILY Route: Top  Start: 04/04/18 2230   Admin Instructions: Apply to affected area         0137 ( )-Given       0936 ( )-Given       (2202)-Not Given        1034 ( )-Given       [ ] 2100        (1000)-Not Given       [ ] 2100           naloxone (NARCAN) injection 0.1-0.4 mg  Dose: 0.1-0.4 mg  Freq: EVERY 2 MIN PRN Route: IV  PRN Reason: opioid reversal  Start: 04/04/18 2208   Admin Instructions: For respiratory rate LESS than or EQUAL to 8.  Partial reversal dose:  0.1 mg titrated q 2 minutes for Analgesia Side Effects Monitoring Sedation Level of 3 (frequently drowsy, arousable, drifts to sleep during conversation).Full reversal dose:  0.4 mg bolus for Analgesia Side Effects Monitoring Sedation Level of 4 (somnolent, minimal or no response to stimulation).  For ordered doses up to 2mg give IVP. Give each 0.4mg over 15 seconds in emergency situations. For non-emergent situations further dilute in 9mL of NS to facilitate titration of response.               omeprazole (priLOSEC) CR capsule 20 mg  Dose: 20 mg  Freq: DAILY WITH BREAKFAST Route: PO  Start: 04/05/18 0900        0933 (20 mg)-Given        0834 (20 mg)-Given        0950 (20 mg)-Given           ondansetron (ZOFRAN-ODT) ODT tab 4 mg  Dose: 4 mg  Freq: EVERY 6 HOURS PRN Route: PO  PRN Reasons:  nausea,vomiting  Start: 04/04/18 2208   Admin Instructions: This is Step 1 of nausea and vomiting management.  If nausea not resolved in 15 minutes, go to Step 2 prochlorperazine (COMPAZINE). Do not push through foil backing. Peel back foil and gently remove. Place on tongue immediately. Administration with liquid unnecessary  With dry hands, peel back foil backing and gently remove tablet; do not push oral disintegrating tablet through foil backing; administer immediately on tongue and oral disintegrating tablet dissolves in seconds; then swallow with saliva; liquid not required.              Or  ondansetron (ZOFRAN) injection 4 mg  Dose: 4 mg  Freq: EVERY 6 HOURS PRN Route: IV  PRN Reasons: nausea,vomiting  Start: 04/04/18 2208   Admin Instructions: This is Step 1 of nausea and vomiting management.  If nausea not resolved in 15 minutes, go to Step 2 prochlorperazine (COMPAZINE).  Irritant. For ordered doses up to 4 mg, give IV Push undiluted over 2-5 minutes.               oxyCODONE (OxyCONTIN) 12 hr tablet 20 mg  Dose: 20 mg  Freq: EVERY 12 HOURS Route: PO  Start: 04/04/18 2215        0030 (20 mg)-Given       1105 (20 mg)-Given       2156 (20 mg)-Given        1022 (20 mg)-Given       2215 (20 mg)-Given        0950 (20 mg)-Given       [ ] 2215           polyethylene glycol (MIRALAX/GLYCOLAX) Packet 17 g  Dose: 17 g  Freq: DAILY PRN Route: PO  PRN Reason: constipation  Start: 04/04/18 2208   Admin Instructions: Give in 8oz of  water, juice, or soda. Hold for loose stools.  This is the second step of a three step constipation treatment.  1 Packet = 17 grams. Mixed prescribed dose in 8 ounces of water. Follow with 8 oz. of water.               potassium chloride SA (K-DUR/KLOR-CON M) CR tablet 10 mEq  Dose: 10 mEq  Freq: EVERY MORNING Route: PO  Start: 04/05/18 0900   Admin Instructions: DO NOT CRUSH.         0932 (10 mEq)-Given        0833 (10 mEq)-Given        0950 (10 mEq)-Given           rivaroxaban ANTICOAGULANT  (XARELTO) tablet 20 mg  Dose: 20 mg  Freq: AT BEDTIME Route: PO  Start: 04/06/18 2200         2215 (20 mg)-Given        [ ] 2200           zinc sulfate (ZINCATE) capsule 220 mg  Dose: 220 mg  Freq: DAILY Route: PO  Start: 04/05/18 1230   End: 04/15/18 0859        1408 (220 mg)-Given        0834 (220 mg)-Given        0950 (220 mg)-Given           zolpidem (AMBIEN) tablet 5 mg  Dose: 5 mg  Freq: AT BEDTIME PRN Route: PO  PRN Reason: sleep  Start: 04/04/18 2251        2014 (5 mg)-Given        2215 (5 mg)-Given           Future Medications  Medications 04/01/18 04/02/18 04/03/18 04/04/18 04/05/18 04/06/18 04/07/18       lamoTRIgine (LaMICtal) tablet 50 mg  Dose: 50 mg  Freq: DAILY Route: PO  Start: 04/09/18 0900   End: 04/23/18 0859              levETIRAcetam (KEPPRA) tablet 250 mg  Dose: 250 mg  Freq: DAILY Route: PO  Start: 04/22/18 0900              levETIRAcetam (KEPPRA) tablet 250 mg  Dose: 250 mg  Freq: 2 TIMES DAILY Route: PO  Start: 04/08/18 0900   End: 04/22/18 0859   Admin Instructions: Until 4/22/18. Then daily.              Completed Medications  Medications 04/01/18 04/02/18 04/03/18 04/04/18 04/05/18 04/06/18 04/07/18         Dose: 1,000 mL  Freq: ONCE Route: IV  Last Dose: Stopped (04/04/18 1935)  Start: 04/04/18 1715   End: 04/04/18 1935       1833 (1,000 mL)-New Bag       1935-Stopped                Dose: 0.5 mg  Freq: ONCE Route: IV  Start: 04/04/18 2001   End: 04/04/18 2044   Admin Instructions: For ordered doses up to 4 mg give IV Push undiluted. Administer each 2mg over 2-5 minutes.        2044 (0.5 mg)-Given                Dose: 81 mL  Freq: ONCE Route: IV  Start: 04/04/18 1751   End: 04/04/18 1800       1800 (81 mL)-Given                Dose: 500 mg  Freq: EVERY MORNING Route: PO  Start: 04/05/18 0900   End: 04/07/18 0951        0932 (500 mg)-Given        0834 (500 mg)-Given        0951 (500 mg)-Given             Dose: 10 mg  Freq: ONCE Route: PO  Start: 04/04/18 1827   End: 04/04/18 1833        1834 (10 mg)-Given                Dose: 65 mL  Freq: ONCE Route: IV  Start: 04/04/18 1751   End: 04/04/18 1801   Admin Instructions: This entry is for use by Radiology to intermittently used as a flush in patients receiving a CT scan.        1801 (65 mL)-Given             Discontinued Medications  Medications 04/01/18 04/02/18 04/03/18 04/04/18 04/05/18 04/06/18 04/07/18         Dose: 650 mg  Freq: EVERY 4 HOURS PRN Route: PO  PRN Reason: mild pain  Start: 04/04/18 2208   End: 04/06/18 1120   Admin Instructions: Alternate ibuprofen (if ordered) with acetaminophen.  Maximum acetaminophen dose from all sources = 75 mg/kg/day not to exceed 4 grams/day.         0159 (650 mg)-Given        1120-Med Discontinued          Dose: 2 mg  Freq: EVERY 4 HOURS PRN Route: PO  PRN Reason: moderate to severe pain  Start: 04/04/18 2208   End: 04/04/18 2335       2334 (2 mg)-Given       2335-Med Discontinued            Dose: 2-4 mg  Freq: EVERY 3 HOURS PRN Route: PO  PRN Reasons: severe pain,breakthrough pain  Start: 04/05/18 1006   End: 04/06/18 1120        1658 (4 mg)-Given       2013 (4 mg)-Given        0847 (4 mg)-Given       1120-Med Discontinued          Dose: 0.2 mg  Freq: ONCE Route: IV  Start: 04/05/18 1500   End: 04/06/18 1121   Admin Instructions: For ordered doses up to 4 mg give IV Push undiluted. Administer each 2mg over 2-5 minutes.         (1542)-Not Given [C]        1121-Med Discontinued          Dose: 0.2 mg  Freq: EVERY 4 HOURS PRN Route: IV  PRN Reason: severe pain  Start: 04/04/18 2333   End: 04/05/18 1006   Admin Instructions: For ordered doses up to 4 mg give IV Push undiluted. Administer each 2mg over 2-5 minutes.         1006-Med Discontinued           Dose: 0.3-0.5 mg  Freq: EVERY 4 HOURS PRN Route: IV  PRN Reason: severe pain  Start: 04/05/18 1005   End: 04/05/18 1457   Admin Instructions: For ordered doses up to 4 mg give IV Push undiluted. Administer each 2mg over 2-5 minutes.         1120 (0.3  mg)-Given       1457-Med Discontinued           Dose: 1 mg  Freq: AT BEDTIME PRN Route: PO  PRN Reason: sleep  Start: 04/04/18 2208   End: 04/04/18 2219   Admin Instructions: Do not give unless at least 6 hours of uninterrupted sleep is expected.        2219-Med Discontinued            Dose: 1 g  Freq: EVERY 8 HOURS Route: IV  Indications of Use: OSTEOMYELITIS  Last Dose: 1 g (04/06/18 0218)  Start: 04/04/18 2215   End: 04/07/18 1058        0200 (1 g)-New Bag       1101 (1 g)-New Bag       1824 (1 g)-New Bag        0218 (1 g)-New Bag       1023 (1 g)-New Bag       [ ] 1800        (0143)-Not Given       1058-Med Discontinued  (1144)-Not Given             Dose: 1 g  Freq: EVERY 6 HOURS Route: IV  Indications of Use: BONE AND/OR JOINT INFECTION  Start: 04/04/18 1901   End: 04/04/18 1941              1941-Med Discontinued            Freq: 2 TIMES DAILY Route: Top  Start: 04/04/18 2215   End: 04/04/18 2224   Admin Instructions: Apply to groin folds        2224-Med Discontinued                   Dose: 5-10 mg  Freq: EVERY 4 HOURS PRN Route: PO  PRN Reason: moderate to severe pain  Start: 04/05/18 0915   End: 04/05/18 1005        0930 (10 mg)-Given       1005-Med Discontinued           Dose: 5-10 mg  Freq: EVERY 6 HOURS PRN Route: PO  PRN Reason: moderate to severe pain  Start: 04/04/18 2334   End: 04/05/18 0910        0233 (5 mg)-Given       0430 (5 mg)-Given       0910-Med Discontinued           Rate: 75 mL/hr   Freq: CONTINUOUS Route: IV  Start: 04/04/18 2215   End: 04/05/18 1318       2338 ( )-New Bag        1318-Med Discontinued           Dose: 1,500 mg  Freq: EVERY 12 HOURS Route: IV  Indications of Use: OSTEOMYELITIS  Last Dose: 1,500 mg (04/06/18 0042)  Start: 04/04/18 2245   End: 04/06/18 1203   Admin Instructions: Vesicant.         0028 (1,500 mg)-New Bag       1143 (1,500 mg)-New Bag        0042 (1,500 mg)-New Bag              1203-Med Discontinued          Dose: 1,250 mg  Freq: EVERY 12 HOURS Route:  IV  Indications of Use: OSTEOMYELITIS  Start: 04/06/18 1800   End: 04/07/18 1058   Admin Instructions: For an adult with peripheral line and dose of 2-2.5 g, infuse over 2 hours.  Vesicant.          [ ] 1900        [ ] 0600       1058-Med Discontinued    Medications 04/01/18 04/02/18 04/03/18 04/04/18 04/05/18 04/06/18 04/07/18

## 2018-04-04 NOTE — ED NOTES
Bed: ED11  Expected date: 4/4/18  Expected time: 3:56 PM  Means of arrival: Ambulance  Comments:  [place] 62f pelvic pain  ETA 1602     Laz Mane, RN  04/04/18 7318

## 2018-04-04 NOTE — IP AVS SNAPSHOT
"[place] [address] [facility]: [phone]                                              INTERAGENCY TRANSFER FORM - PHYSICIAN ORDERS   2018                    Hospital Admission Date: 2018  SHALINI CHONG   : 1955  Sex: Female        Attending Provider: Melo Arcos MD     Allergies:  No Known Allergies    Infection:  None   Service:  HOSPITALIST    Ht:  1.575 m (5' 2\")   Wt:  78.6 kg (173 lb 4.5 oz)   Admission Wt:  73.5 kg (162 lb)    BMI:  31.69 kg/m 2   BSA:  1.85 m 2            Patient PCP Information     Provider PCP Type    Mellisa Haji MD General      ED Clinical Impression     Diagnosis Description Comment Added By Time Added    Wound infection [T14.8XXA, L08.9] Wound infection [T14.8XXA, L08.9]  Mandie Mcginnis 2018  7:07 PM    Paraplegia (H) [G82.20] Paraplegia (H) [G82.20]  Kathy Barrera PA-C 2018  7:55 PM      Hospital Problems as of 2018              Priority Class Noted POA    Osteomyelitis (H) Medium  2018 Yes      Non-Hospital Problems as of 2018     None      Code Status History     Date Active Date Inactive Code Status Order ID Comments User Context    2018 12:58 PM  Full Code [id number]  Ambar Lopez MD Outpatient    2018 10:08 PM 2018 12:58 PM Full Code [id number]  Melo Arcos MD Inpatient         Medication Review      START taking        Dose / Directions Comments    amoxicillin-clavulanate 875-125 MG per tablet   Commonly known as:  AUGMENTIN   Indication:  Infection of Bone and Bone Marrow   Used for:  Wound infection        Dose:  1 tablet   Take 1 tablet by mouth every 12 hours   Refills:  0          CONTINUE these medications which may have CHANGED, or have new prescriptions. If we are uncertain of the size of tablets/capsules you have at home, strength may be listed as something that might have changed.        Dose / Directions Comments    diazepam 2 MG tablet   Commonly known " as:  VALIUM   This may have changed:  medication strength   Used for:  Other chronic pain        Dose:  5 mg   Take 2.5 tablets (5 mg) by mouth every 4 hours as needed for anxiety   Quantity:  4 tablet   Refills:  0        * oxyCODONE 20 MG 12 hr tablet   Commonly known as:  OXYCONTIN   This may have changed:  medication strength   Used for:  Other chronic pain        Dose:  20 mg   Take 1 tablet (20 mg) by mouth every 12 hours   Quantity:  2 tablet   Refills:  0        * oxyCODONE IR 5 MG tablet   Commonly known as:  ROXICODONE   This may have changed:  medication strength   Used for:  Other chronic pain        Dose:  5-10 mg   Take 1-2 tablets (5-10 mg) by mouth every 4 hours as needed (Give 5mg for pain level 4-6 on a 10 point scale. Give 10mg for pain level 6-10 on a 10 point scale.)   Quantity:  4 tablet   Refills:  0        * Notice:  This list has 2 medication(s) that are the same as other medications prescribed for you. Read the directions carefully, and ask your doctor or other care provider to review them with you.      CONTINUE these medications which have NOT CHANGED        Dose / Directions Comments    * ACETAMINOPHEN PO        Dose:  650 mg   Take 650 mg by mouth 4 times daily   Refills:  0        * ACETAMINOPHEN PO        Dose:  650 mg   Take 650 mg by mouth every 4 hours as needed for pain (May take additional as needed doses. Do not exceed 4000mg in 24 hours.)   Refills:  0        ARIPIPRAZOLE PO        Dose:  5 mg   Take 5 mg by mouth every morning   Refills:  0        aspirin 81 MG chewable tablet        Dose:  81 mg   Take 81 mg by mouth daily   Refills:  0        bisacodyl 10 MG Suppository   Commonly known as:  DULCOLAX        Dose:  10 mg   Place 10 mg rectally daily as needed for constipation   Refills:  0        CYMBALTA PO        Dose:  60 mg   Take 60 mg by mouth every morning   Refills:  0        DIVALPROEX SODIUM PO        Dose:  250 mg   Take 250 mg by mouth 2 times daily   Refills:  0         ferrous sulfate 325 (65 FE) MG tablet   Commonly known as:  IRON        Dose:  325 mg   Take 325 mg by mouth 2 times daily   Refills:  0        GABAPENTIN PO        Dose:  300 mg   Take 300 mg by mouth 3 times daily   Refills:  0        HYDROmorphone 2 MG tablet   Commonly known as:  DILAUDID   Used for:  Other chronic pain        Dose:  2 mg   Take 1 tablet (2 mg) by mouth every 4 hours as needed for moderate to severe pain   Quantity:  4 tablet   Refills:  0        HYDROXYZINE HCL PO        Dose:  50 mg   Take 50 mg by mouth At Bedtime   Refills:  0        K-DUR PO        Dose:  10 mEq   Take 10 mEq by mouth every morning   Refills:  0        * KEPPRA PO        Dose:  250 mg   Take 250 mg by mouth every evening Until 4/7/2018 then decrease to 250mg BID 4/8/2018-4/21/2018, then decrease to 250mg QAM 4/22-5/5/2018   Refills:  0        * KEPPRA PO        Dose:  500 mg   Take 500 mg by mouth every morning Until 4/7/2018 then decrease to 250mg BID 4/8/2018-4/21/2018, then decrease to 250mg QAM 4/22-5/5/2018   Refills:  0        LAMOTRIGINE PO        Dose:  25 mg   Take 25 mg by mouth every morning X 4 more days then increase to 50mg daily from 4/9/2018-4/22/2018.   Refills:  0        LEVOTHYROXINE SODIUM PO        Dose:  125 mcg   Take 125 mcg by mouth every morning   Refills:  0        magnesium hydroxide 400 MG/5ML suspension   Commonly known as:  MILK OF MAGNESIA        Dose:  30 mL   Take 30 mLs by mouth daily as needed for constipation or heartburn   Refills:  0        MELATONIN PO        Dose:  10 mg   Take 10 mg by mouth At Bedtime   Refills:  0        miconazole 2 % Aerp powder   Commonly known as:  MICATIN        Apply topically 2 times daily Apply to groin folds   Refills:  0        mineral oil-hydrophilic petrolatum        Apply topically daily Apply to lower extremities for skin irritation.   Refills:  0        NUTRITIONAL SUPPLEMENT PO        Dose:  1 packet   Take 1 packet by mouth 2 times daily  (Deven Powder)   Refills:  0        OMEPRAZOLE PO        Dose:  20 mg   Take 20 mg by mouth daily (with breakfast)   Refills:  0        senna-docusate 8.6-50 MG per tablet   Commonly known as:  SENOKOT-S;PERICOLACE        Dose:  2 tablet   Take 2 tablets by mouth 2 times daily   Refills:  0        XARELTO PO        Dose:  20 mg   Take 20 mg by mouth At Bedtime   Refills:  0        zolpidem 5 MG tablet   Commonly known as:  AMBIEN   Used for:  Insomnia, unspecified type        Dose:  5 mg   Take 1 tablet (5 mg) by mouth At Bedtime   Quantity:  1 tablet   Refills:  0        * Notice:  This list has 4 medication(s) that are the same as other medications prescribed for you. Read the directions carefully, and ask your doctor or other care provider to review them with you.            After Care     Activity - Up with assistive device           Activity - Up with nursing assistance           Advance Diet as Tolerated       Follow this diet upon discharge: Regular       Fall precautions           Bhandari catheter       To straight gravity drainage. She also has nephrostomy tubes bilaterally.       General info for SNF       Length of Stay Estimate: Short Term Care: Estimated # of Days <30  Condition at Discharge: Stable  Level of care:skilled   Rehabilitation Potential: Fair  Admission H&P remains valid and up-to-date: Yes  Recent Chemotherapy: N/A  Use Nursing Home Standing Orders: Yes       Mantoux instructions       Give two-step Mantoux (PPD) Per Facility Policy Yes             Referrals     Occupational Therapy Adult Consult       Evaluate and treat as clinically indicated.    Reason:  deconditioning       Physical Therapy Adult Consult       Evaluate and treat as clinically indicated.    Reason:  Physical deconditioning             Follow-Up Appointment Instructions     Future Labs/Procedures    Follow Up and recommended labs and tests     Comments:    1. Follow up with your surgeon through [facility] for continued  wound care/management in the next 1 week.   2. Follow up with your primary care.   3. Follow up with Nursing home physician.  No follow up labs or test are needed.      Follow-Up Appointment Instructions     Follow Up and recommended labs and tests       1. Follow up with your surgeon through [facility] for continued wound care/management in the next 1 week.   2. Follow up with your primary care.   3. Follow up with Nursing home physician.  No follow up labs or test are needed.             Statement of Approval     Ordered          04/07/18 1303  I have reviewed and agree with all the recommendations and orders detailed in this document.  EFFECTIVE NOW     Approved and electronically signed by:  Ambar Lopez MD

## 2018-04-04 NOTE — IP AVS SNAPSHOT
MRN:[id number]                      After Visit Summary   4/4/2018    Marychuy Zhou    MRN: [id number]           Thank you!     Thank you for choosing [facility] for your care. Our goal is always to provide you with excellent care. Hearing back from our patients is one way we can continue to improve our services. Please take a few minutes to complete the written survey that you may receive in the mail after you visit with us. Thank you!        Patient Information     Date Of Birth          1955        Designated Caregiver       Most Recent Value    Caregiver    Will someone help with your care after discharge? yes    Name of designated caregiver extended care    Phone number of caregiver extended care    Caregiver address extended care      About your hospital stay     You were admitted on:  April 4, 2018 You last received care in the:  [place] [address] [facility]    You were discharged on:  April 7, 2018       Who to Call     For medical emergencies, please call 911.  For non-urgent questions about your medical care, please call your primary care provider or clinic, [phone]          Attending Provider     Provider Specialty    Pierre Mcghee MD Emergency Medicine    [facility]       Primary Care Provider Office Phone # Fax #    Mellisa Haji [phone]      After Care Instructions     Activity - Up with assistive device           Activity - Up with nursing assistance           Advance Diet as Tolerated       Follow this diet upon discharge: Regular            Fall precautions           Bhandari catheter       To straight gravity drainage. She also has nephrostomy tubes bilaterally.            General info for SNF       Length of Stay Estimate: Short Term Care: Estimated # of Days <30  Condition at Discharge: Stable  Level of care:skilled   Rehabilitation Potential: Fair  Admission H&P remains valid and up-to-date:  "Yes  Recent Chemotherapy: N/A  Use Nursing Home Standing Orders: Yes            Mantoux instructions       Give two-step Mantoux (PPD) Per Facility Policy Yes                  Follow-up Appointments     Follow Up and recommended labs and tests       1. Follow up with your surgeon through [facility] for continued wound care/management in the next 1 week.   2. Follow up with your primary care.   3. Follow up with Nursing home physician.  No follow up labs or test are needed.                  Additional Services     Occupational Therapy Adult Consult       Evaluate and treat as clinically indicated.    Reason:  deconditioning            Physical Therapy Adult Consult       Evaluate and treat as clinically indicated.    Reason:  Physical deconditioning                  Pending Results     Date and Time Order Name Status Description    4/4/2018 1841 Blood culture Preliminary     4/4/2018 1838 Blood culture Preliminary     4/2/2018 2111 Blood culture ONE site Preliminary             Statement of Approval     Ordered          04/07/18 1303  I have reviewed and agree with all the recommendations and orders detailed in this document.  EFFECTIVE NOW     Approved and electronically signed by:  Ambar Lopez MD             Admission Information     Date & Time Provider Department Dept. Phone    4/4/2018 Melo Arcos MD [place] [address] [facility] [phone]      Your Vitals Were     Blood Pressure Pulse Temperature Respirations Height Weight    97/60 (BP Location: Right arm) 96 98.1  F (36.7  C) (Axillary) 16 1.575 m (5' 2\") 78.6 kg (173 lb 4.5 oz)    Pulse Oximetry BMI (Body Mass Index)                90% 31.69 kg/m2          [facility] Information     [facility] lets you send messages to your doctor, view your test results, renew your prescriptions, schedule appointments and more. To sign up, go to [email or web address] . Click on \"Log in\" on the left side of the screen, which will take you to " "the Welcome page. Then click on \"Sign up Now\" on the right side of the page.     You will be asked to enter the access code listed below, as well as some personal information. Please follow the directions to create your username and password.     Your access code is: [email or web address]  Expires: 2018 10:46 PM     Your access code will  in 90 days. If you need help or a new code, please call your [facility] clinic or [phone].        Care EveryWhere ID     This is your Care EveryWhere ID. This could be used by other organizations to access your [facility] medical records  [id number]        Equal Access to Services     [facility] : Drake Madera, lilliam elizabeth, miguel kay, hemant dave . So [facility] [phone].    ATENCIÓN: Si habla español, tiene a thorep disposición servicios gratuitos de asistencia lingüística. Llame al [phone].    We comply with applicable federal civil rights laws and [place] laws. We do not discriminate on the basis of race, color, national origin, age, disability, sex, sexual orientation, or gender identity.               Review of your medicines      START taking        Dose / Directions    amoxicillin-clavulanate 875-125 MG per tablet   Commonly known as:  AUGMENTIN   Indication:  Infection of Bone and Bone Marrow   Used for:  Wound infection        Dose:  1 tablet   Take 1 tablet by mouth every 12 hours   Refills:  0         CONTINUE these medicines which may have CHANGED, or have new prescriptions. If we are uncertain of the size of tablets/capsules you have at home, strength may be listed as something that might have changed.        Dose / Directions    diazepam 2 MG tablet   Commonly known as:  VALIUM   This may have changed:  medication strength   Used for:  Other chronic pain        Dose:  5 mg   Take 2.5 tablets (5 mg) by mouth every 4 hours as needed for anxiety   Quantity:  4 tablet   Refills:  0       * oxyCODONE " 20 MG 12 hr tablet   Commonly known as:  OXYCONTIN   This may have changed:  medication strength   Used for:  Other chronic pain        Dose:  20 mg   Take 1 tablet (20 mg) by mouth every 12 hours   Quantity:  2 tablet   Refills:  0       * oxyCODONE IR 5 MG tablet   Commonly known as:  ROXICODONE   This may have changed:  medication strength   Used for:  Other chronic pain        Dose:  5-10 mg   Take 1-2 tablets (5-10 mg) by mouth every 4 hours as needed (Give 5mg for pain level 4-6 on a 10 point scale. Give 10mg for pain level 6-10 on a 10 point scale.)   Quantity:  4 tablet   Refills:  0       * Notice:  This list has 2 medication(s) that are the same as other medications prescribed for you. Read the directions carefully, and ask your doctor or other care provider to review them with you.      CONTINUE these medicines which have NOT CHANGED        Dose / Directions    * ACETAMINOPHEN PO        Dose:  650 mg   Take 650 mg by mouth 4 times daily   Refills:  0       * ACETAMINOPHEN PO        Dose:  650 mg   Take 650 mg by mouth every 4 hours as needed for pain (May take additional as needed doses. Do not exceed 4000mg in 24 hours.)   Refills:  0       ARIPIPRAZOLE PO        Dose:  5 mg   Take 5 mg by mouth every morning   Refills:  0       aspirin 81 MG chewable tablet        Dose:  81 mg   Take 81 mg by mouth daily   Refills:  0       bisacodyl 10 MG Suppository   Commonly known as:  DULCOLAX        Dose:  10 mg   Place 10 mg rectally daily as needed for constipation   Refills:  0       CYMBALTA PO        Dose:  60 mg   Take 60 mg by mouth every morning   Refills:  0       DIVALPROEX SODIUM PO        Dose:  250 mg   Take 250 mg by mouth 2 times daily   Refills:  0       ferrous sulfate 325 (65 FE) MG tablet   Commonly known as:  IRON        Dose:  325 mg   Take 325 mg by mouth 2 times daily   Refills:  0       GABAPENTIN PO        Dose:  300 mg   Take 300 mg by mouth 3 times daily   Refills:  0       HYDROmorphone  2 MG tablet   Commonly known as:  DILAUDID   Used for:  Other chronic pain        Dose:  2 mg   Take 1 tablet (2 mg) by mouth every 4 hours as needed for moderate to severe pain   Quantity:  4 tablet   Refills:  0       HYDROXYZINE HCL PO        Dose:  50 mg   Take 50 mg by mouth At Bedtime   Refills:  0       K-DUR PO        Dose:  10 mEq   Take 10 mEq by mouth every morning   Refills:  0       * KEPPRA PO        Dose:  250 mg   Take 250 mg by mouth every evening Until 4/7/2018 then decrease to 250mg BID 4/8/2018-4/21/2018, then decrease to 250mg QAM 4/22-5/5/2018   Refills:  0       * KEPPRA PO        Dose:  500 mg   Take 500 mg by mouth every morning Until 4/7/2018 then decrease to 250mg BID 4/8/2018-4/21/2018, then decrease to 250mg QAM 4/22-5/5/2018   Refills:  0       LAMOTRIGINE PO        Dose:  25 mg   Take 25 mg by mouth every morning X 4 more days then increase to 50mg daily from 4/9/2018-4/22/2018.   Refills:  0       LEVOTHYROXINE SODIUM PO        Dose:  125 mcg   Take 125 mcg by mouth every morning   Refills:  0       magnesium hydroxide 400 MG/5ML suspension   Commonly known as:  MILK OF MAGNESIA        Dose:  30 mL   Take 30 mLs by mouth daily as needed for constipation or heartburn   Refills:  0       MELATONIN PO        Dose:  10 mg   Take 10 mg by mouth At Bedtime   Refills:  0       miconazole 2 % Aerp powder   Commonly known as:  MICATIN        Apply topically 2 times daily Apply to groin folds   Refills:  0       mineral oil-hydrophilic petrolatum        Apply topically daily Apply to lower extremities for skin irritation.   Refills:  0       NUTRITIONAL SUPPLEMENT PO        Dose:  1 packet   Take 1 packet by mouth 2 times daily (Deven Powder)   Refills:  0       OMEPRAZOLE PO        Dose:  20 mg   Take 20 mg by mouth daily (with breakfast)   Refills:  0       senna-docusate 8.6-50 MG per tablet   Commonly known as:  SENOKOT-S;PERICOLACE        Dose:  2 tablet   Take 2 tablets by mouth 2 times  daily   Refills:  0       XARELTO PO        Dose:  20 mg   Take 20 mg by mouth At Bedtime   Refills:  0       zolpidem 5 MG tablet   Commonly known as:  AMBIEN   Used for:  Insomnia, unspecified type        Dose:  5 mg   Take 1 tablet (5 mg) by mouth At Bedtime   Quantity:  1 tablet   Refills:  0       * Notice:  This list has 4 medication(s) that are the same as other medications prescribed for you. Read the directions carefully, and ask your doctor or other care provider to review them with you.         Where to get your medicines      Some of these will need a paper prescription and others can be bought over the counter. Ask your nurse if you have questions.     You don't need a prescription for these medications     amoxicillin-clavulanate 875-125 MG per tablet         Information about where to get these medications is not yet available     ! Ask your nurse or doctor about these medications     diazepam 2 MG tablet    HYDROmorphone 2 MG tablet    oxyCODONE 20 MG 12 hr tablet    oxyCODONE IR 5 MG tablet    zolpidem 5 MG tablet                Protect others around you: Learn how to safely use, store and throw away your medicines at www.disposemymeds.org.        ANTIBIOTIC INSTRUCTION     You've Been Prescribed an Antibiotic - Now What?  Your healthcare team thinks that you or your loved one might have an infection. Some infections can be treated with antibiotics, which are powerful, life-saving drugs. Like all medications, antibiotics have side effects and should only be used when necessary. There are some important things you should know about your antibiotic treatment.      Your healthcare team may run tests before you start taking an antibiotic.    Your team may take samples (e.g., from your blood, urine or other areas) to run tests to look for bacteria. These test can be important to determine if you need an antibiotic at all and, if you do, which antibiotic will work best.      Within a few days, your  healthcare team might change or even stop your antibiotic.    Your team may start you on an antibiotic while they are working to find out what is making you sick.    Your team might change your antibiotic because test results show that a different antibiotic would be better to treat your infection.    In some cases, once your team has more information, they learn that you do not need an antibiotic at all. They may find out that you don't have an infection, or that the antibiotic you're taking won't work against your infection. For example, an infection caused by a virus can't be treated with antibiotics. Staying on an antibiotic when you don't need it is more likely to be harmful than helpful.      You may experience side effects from your antibiotic.    Like all medications, antibiotics have side effects. Some of these can be serious.    Let you healthcare team know if you have any known allergies when you are admitted to the hospital.    One significant side effect of nearly all antibiotics is the risk of severe and sometimes deadly diarrhea caused by Clostridium difficile (C. Difficile). This occurs when a person takes antibiotics because some good germs are destroyed. Antibiotic use allows C. diificile to take over, putting patients at high risk for this serious infection.    As a patient or caregiver, it is important to understand your or your loved one's antibiotic treatment. It is especially important for caregivers to speak up when patients can't speak for themselves. Here are some important questions to ask your healthcare team.    What infection is this antibiotic treating and how do you know I have that infection?    What side effects might occur from this antibiotic?    How long will I need to take this antibiotic?    Is it safe to take this antibiotic with other medications or supplements (e.g., vitamins) that I am taking?     Are there any special directions I need to know about taking this antibiotic?  For example, should I take it with food?    How will I be monitored to know whether my infection is responding to the antibiotic?    What tests may help to make sure the right antibiotic is prescribed for me?      Information provided by:  www.cdc.gov/getsmart  U.S. Department of Health and Human Services  Centers for disease Control and Prevention  National Center for Emerging and Zoonotic Infectious Diseases  Division of Healthcare Quality Promotion        Information about OPIOIDS     PRESCRIPTION OPIOIDS: WHAT YOU NEED TO KNOW    Prescription opioids can be used to help relieve moderate to severe pain and are often prescribed following a surgery or injury, or for certain health conditions. These medications can be an important part of treatment but also come with serious risks. It is important to work with your health care provider to make sure you are getting the safest, most effective care.    WHAT ARE THE RISKS AND SIDE EFFECTS OF OPIOID USE?  Prescription opioids carry serious risks of addiction and overdose, especially with prolonged use. An opioid overdose, often marked by slowed breathing can cause sudden death. The use of prescription opioids can have a number of side effects as well, even when taken as directed:      Tolerance - meaning you might need to take more of a medication for the same pain relief    Physical dependence - meaning you have symptoms of withdrawal when a medication is stopped    Increased sensitivity to pain    Constipation    Nausea, vomiting, and dry mouth    Sleepiness and dizziness    Confusion    Depression    Low levels of testosterone that can result in lower sex drive, energy, and strength    Itching and sweating    RISKS ARE GREATER WITH:    History of drug misuse, substance use disorder, or overdose    Mental health conditions (such as depression or anxiety)    Sleep apnea    Older age (65 years or older)    Pregnancy    Avoid alcohol while taking prescription opioids.    Also, unless specifically advised by your health care provider, medications to avoid include:    Benzodiazepines (such as Xanax or Valium)    Muscle relaxants (such as Soma or Flexeril)    Hypnotics (such as Ambien or Lunesta)    Other prescription opioids    KNOW YOUR OPTIONS:  Talk to your health care provider about ways to manage your pain that do not involve prescription opioids. Some of these options may actually work better and have fewer risks and side effects:    Pain relievers such as acetaminophen, ibuprofen, and naproxen    Some medications that are also used for depression or seizures    Physical therapy and exercise    Cognitive behavioral therapy, a psychological, goal-directed approach, in which patients learn how to modify physical, behavioral, and emotional triggers of pain and stress    IF YOU ARE PRESCRIBED OPIOIDS FOR PAIN:    Never take opioids in greater amounts or more often than prescribed    Follow up with your primary health care provider and work together to create a plan on how to manage your pain.    Talk about ways to help manage your pain that do not involve prescription opioids    Talk about all concerns and side effects    Help prevent misuse and abuse    Never sell or share prescription opioids    Never use another person's prescription opioids    Store prescription opioids in a secure place and out of reach of others (this may include visitors, children, friends, and family)    Visit www.cdc.gov/drugoverdose to learn about risks of opioid abuse and overdose    If you believe you may be struggling with addiction, tell your health care provider and ask for guidance or call [facility]'s National Helpline at 7-575-927-HELP    LEARN MORE / www.cdc.gov/drugoverdose/prescribing/guideline.html    Safely dispose of unused prescription opioids: Find your local drug take-back programs and more information about the importance of safe disposal at www.doseofreality.mn.gov             Medication  List: This is a list of all your medications and when to take them. Check marks below indicate your daily home schedule. Keep this list as a reference.      Medications           Morning Afternoon Evening Bedtime As Needed    * ACETAMINOPHEN PO   Take 650 mg by mouth 4 times daily   Last time this was given:  975 mg on 4/7/2018  9:51 AM                                * ACETAMINOPHEN PO   Take 650 mg by mouth every 4 hours as needed for pain (May take additional as needed doses. Do not exceed 4000mg in 24 hours.)   Last time this was given:  975 mg on 4/7/2018  9:51 AM                                amoxicillin-clavulanate 875-125 MG per tablet   Commonly known as:  AUGMENTIN   Take 1 tablet by mouth every 12 hours   Last time this was given:  1 tablet on 4/7/2018 11:37 AM                                ARIPIPRAZOLE PO   Take 5 mg by mouth every morning   Last time this was given:  5 mg on 4/7/2018  9:49 AM                                aspirin 81 MG chewable tablet   Take 81 mg by mouth daily   Last time this was given:  81 mg on 4/7/2018  9:51 AM                                bisacodyl 10 MG Suppository   Commonly known as:  DULCOLAX   Place 10 mg rectally daily as needed for constipation                                CYMBALTA PO   Take 60 mg by mouth every morning   Last time this was given:  60 mg on 4/7/2018  9:51 AM                                diazepam 2 MG tablet   Commonly known as:  VALIUM   Take 2.5 tablets (5 mg) by mouth every 4 hours as needed for anxiety   Last time this was given:  5 mg on 4/7/2018 11:36 AM                                DIVALPROEX SODIUM PO   Take 250 mg by mouth 2 times daily   Last time this was given:  250 mg on 4/7/2018  9:50 AM                                ferrous sulfate 325 (65 FE) MG tablet   Commonly known as:  IRON   Take 325 mg by mouth 2 times daily   Last time this was given:  325 mg on 4/7/2018  9:51 AM                                GABAPENTIN PO   Take 300 mg  by mouth 3 times daily   Last time this was given:  300 mg on 4/7/2018  9:50 AM                                HYDROmorphone 2 MG tablet   Commonly known as:  DILAUDID   Take 1 tablet (2 mg) by mouth every 4 hours as needed for moderate to severe pain   Last time this was given:  6 mg on 4/7/2018 11:36 AM                                HYDROXYZINE HCL PO   Take 50 mg by mouth At Bedtime   Last time this was given:  50 mg on 4/6/2018 10:15 PM                                K-DUR PO   Take 10 mEq by mouth every morning   Last time this was given:  10 mEq on 4/7/2018  9:50 AM                                * KEPPRA PO   Take 250 mg by mouth every evening Until 4/7/2018 then decrease to 250mg BID 4/8/2018-4/21/2018, then decrease to 250mg QAM 4/22-5/5/2018   Last time this was given:  500 mg on 4/7/2018  9:51 AM                                * KEPPRA PO   Take 500 mg by mouth every morning Until 4/7/2018 then decrease to 250mg BID 4/8/2018-4/21/2018, then decrease to 250mg QAM 4/22-5/5/2018   Last time this was given:  500 mg on 4/7/2018  9:51 AM                                LAMOTRIGINE PO   Take 25 mg by mouth every morning X 4 more days then increase to 50mg daily from 4/9/2018-4/22/2018.   Last time this was given:  25 mg on 4/7/2018  9:50 AM                                LEVOTHYROXINE SODIUM PO   Take 125 mcg by mouth every morning   Last time this was given:  125 mcg on 4/7/2018  9:50 AM                                magnesium hydroxide 400 MG/5ML suspension   Commonly known as:  MILK OF MAGNESIA   Take 30 mLs by mouth daily as needed for constipation or heartburn                                MELATONIN PO   Take 10 mg by mouth At Bedtime   Last time this was given:  10 mg on 4/6/2018 10:15 PM                                miconazole 2 % Aerp powder   Commonly known as:  MICATIN   Apply topically 2 times daily Apply to groin folds                                mineral oil-hydrophilic petrolatum   Apply  topically daily Apply to lower extremities for skin irritation.                                NUTRITIONAL SUPPLEMENT PO   Take 1 packet by mouth 2 times daily (Deven Powder)                                OMEPRAZOLE PO   Take 20 mg by mouth daily (with breakfast)   Last time this was given:  20 mg on 4/7/2018  9:50 AM                                * oxyCODONE 20 MG 12 hr tablet   Commonly known as:  OXYCONTIN   Take 1 tablet (20 mg) by mouth every 12 hours   Last time this was given:  20 mg on 4/7/2018  9:50 AM                                * oxyCODONE IR 5 MG tablet   Commonly known as:  ROXICODONE   Take 1-2 tablets (5-10 mg) by mouth every 4 hours as needed (Give 5mg for pain level 4-6 on a 10 point scale. Give 10mg for pain level 6-10 on a 10 point scale.)   Last time this was given:  10 mg on 4/5/2018  9:30 AM                                senna-docusate 8.6-50 MG per tablet   Commonly known as:  SENOKOT-S;PERICOLACE   Take 2 tablets by mouth 2 times daily                                XARELTO PO   Take 20 mg by mouth At Bedtime   Last time this was given:  20 mg on 4/6/2018 10:15 PM                                zolpidem 5 MG tablet   Commonly known as:  AMBIEN   Take 1 tablet (5 mg) by mouth At Bedtime   Last time this was given:  5 mg on 4/6/2018 10:15 PM                                * Notice:  This list has 6 medication(s) that are the same as other medications prescribed for you. Read the directions carefully, and ask your doctor or other care provider to review them with you.

## 2018-04-04 NOTE — IP AVS SNAPSHOT
` `           [place] [address] [facility]: [phone]                 INTERAGENCY TRANSFER FORM - NOTES (H&P, Discharge Summary, Consults, Procedures, Therapies)   2018                    Hospital Admission Date: 2018  SHALINI CHONG   : 1955  Sex: Female        Patient PCP Information     Provider PCP Type    Mellisa Haji MD General      History & Physicals     No notes of this type exist for this encounter.      Discharge Summaries     No notes of this type exist for this encounter.         Consult Notes      Consults signed by Abe Renteria MD at 2018 12:58 PM      Author:  Abe Renteria MD Service:  Infectious Disease Author Type:  Physician    Filed:  2018 12:58 PM Date of Service:  2018  1:22 PM Creation Time:  2018  2:11 PM    Status:  Signed :  Abe Renteria MD (Physician)         Consult Date:  2018      ATTENDING PHYSICIAN:  Melo Arcos MD      REASON FOR CONSULTATION:  I was asked by Dr. Arcos to assess for ischial tuberosity osteomyelitis.      IMPRESSION:   1.  A 62-year-old female with paraplegia x 9 years after development of a spinal hematoma.   2.  Chronic decubitus ulcers, most prominent is stage 4 left buttock ulcer.   3.  Chronic pain syndrome.   4.  Admitted on this occasion for increased generalized pain.  CT scan of the abdomen is interpreted as showing erosive changes of right ischial tuberosity suspicious for osteomyelitis.  The patient's CRP is elevated to 100 and sedimentation rate 62.       RECOMMENDATIONS:   1.  Continue vancomycin and meropenem for the present.   2.  Would strongly consider referring the patient to Dr. Mayela Pelletier, Plastic Surgery at [facility], for consideration of wide debridement, bone biopsy for culture and consideration of flap closure.      HISTORY OF PRESENT ILLNESS:  This is a 62-year-old female with multiple previous hospitalizations in the [facility] system for paraplegia and decubitus ulcers with infection requiring debridement.  On this occasion, [facility] was full and the patient was admitted to [facility] for increased pain.  She has had paraplegia for 9 years since developing a spinal hematoma.  She has had nephrostomy tubes placed bilaterally and has had decubitus ulcers over the past 4 months with previous debridements for infection.  She denies recent fever but has had increase in pain.  Her last hospitalization at [facility] was 03/25-03/28 for acute blood loss anemia.  She was treated for an infected decubitus ulcer with daptomycin and meropenem over a 10-day course, finishing on 03/08.  She has had previous debridement by Dr. Pierre Cazares but she tells me that there has not been any discussion of flap closure.  There have been no recent fevers.  In the Emergency Department, CT scan of the abdomen and pelvis was done which revealed ulcer tracking down to the right ischial tuberosity with erosive changes suspicious for osteomyelitis.  She has been started on vancomycin and meropenem.      PAST MEDICAL HISTORY:   1.  Alcohol dependence.   2.  Carcinoma of the lung.   3.  Chronic kidney disease.   4.  COPD.   5.  Paraplegia.   6.  Chronic decubitus ulcers.   7.  Diastolic congestive heart failure.   8.  History of DVT.   9.  History of ESBL infections.   10.  GERD.   11.  History of herpes simplex.   12.  Hyperlipidemia.   13.  Hypertension.   14.  Hypothyroidism.   15.  History of subdural hematoma.   16.  Opiate dependence.      SOCIAL HISTORY:  Former smoker.  Living independently until about a month ago.      ALLERGIES:  NONE KNOWN.      FAMILY HISTORY:  Positive for COPD, liver disease and lung cancer.      REVIEW OF SYSTEMS:  Negative other than that described above.      PHYSICAL EXAMINATION:   VITAL SIGNS:  Temp 98, blood pressure 97/68, heart rate 98 and regular.   GENERAL:  Well-developed, well-nourished, obese,  paraplegic.  Does not look acutely ill.   SKIN:  No rashes or nodules.   HEENT:  Eyes:  No subconjunctival hemorrhages or scleral icterus.  Oropharynx without erythema or exudate.   NECK:  Supple, no thyromegaly.   LYMPH:  No cervical or axillary lymphadenopathy.   LUNGS:  Clear to auscultation, no use of accessory muscles of respiration.   COR:  S1, S2 normal, no S3, S4 or murmur.   ABDOMEN:  Obese, soft, nontender, no mass or hepatosplenomegaly.   EXTREMITIES:  Deep right ischial ulceration.  This is currently packed.  The surrounding wound edges without erythema or purulence.   NEUROLOGIC:  Paraplegia.      For further details of the patient's history, please refer to the chart.  Thank you very much for this consultation.         ABE TOMLIN MD             D: 2018   T: 2018   MT: NAHUN      Name:     MARYCHUY CHONG   MRN:      [id number]        Account:       [id number]   :      1955           Consult Date:  2018      Document: [id number]       cc: Mellisa Arcos MD   [SO1.1]   Revision History        User Key Date/Time User Provider Type Action    > SO1.1 2018 12:58 PM Abe Tomlin MD Physician Sign            Consults by Son Elliott RPH at 2018  8:53 AM     Author:  Son Elliott RPH Service:  Pain Service Author Type:  Pharmacist    Filed:  2018 11:19 AM Date of Service:  2018  8:53 AM Creation Time:  2018  8:53 AM    Status:  Signed :  Son Elliott RPH (Pharmacist)     Consult Orders:    1. Pain Management Adult IP Consult: Patient to be seen: Routine - within 24 hours; increased pain; Consultant may enter orders: Yes [[id number]] ordered by Sal Lees DO at 18 1456                Inpatient Pain Management Service: Consultation      DATE OF CONSULT: 2018     REASON FOR PAIN CONSULTATION:  Marychuy Chong is a 62 year old female I am seeing in consultation at the  "request of Dr. HAMLET Lees for evaluation and recommendations for increasing pain.     TIME SPENT:[JB1.1] 60[JB1.2] minutes including 10 minutes of face-to-face time (patient was somnolent and not very interested in talking)    --------------------------------------------------------------------------------------------------  PAIN ASSESSMENT     PAIN DESCRIPTION:   Location(s): sacral, couldn't get Marychuy to reveal any other areas of pain  Duration: Contstant  Pain intensity: 10/10, stated she likes to say her pain is always a \"12\"  Quality of the pain: \"I can't describe it, it's the worst\"  Aggravating factors: would not answer  Relieving factors:  \"pain pills help me\"    HEALTH & LIFESTYLE PRACTICES:   Tobacco:  reports that she has never smoked. She does not have any smokeless tobacco history on file.  Alcohol:  reports that she does not drink alcohol.  Illicit drugs:  reports that she does not use illicit drugs.    PAST MEDICAL AND PSYCHIATRIC HISTORY:    Past Medical History:   Diagnosis Date     Anxiety      Chronic pain syndrome      Closed fracture zygoma, with routine healing, subsequent encounter      COPD (chronic obstructive pulmonary disease) (H)      Depressive disorder      DVT (deep vein thrombosis) in pregnancy (H)      Edema      Epilepsy (H)      Flaccid neuropathic bladder, not elsewhere classified      Fracture of femur (H)      Hypothyroidism      Iron deficiency anemia      Paraplegia (H)     unspecified     Pressure ulcer of sacral region      PTSD (post-traumatic stress disorder)      Rheumatoid arthritis (H)      Sepsis (H)     unspecified       PAST SURGICAL HISTORY: History reviewed. No pertinent surgical history.    CAPA (Clinically Aligned Pain Assessment)  Comfort (How is your pain?): Intolerable  Change in Pain (Since your last medication/intervention?): Getting worse  Pain Control (How are your pain treatments working?): Inadequate pain control  Functioning (Are you able to do " activities to get better?) : Can't do anything because of pain  Sleep (Does your pain management allow you to sleep or rest?): Awake with pain most of the night       --------------------------------------------------------------------------------------------------  PAIN MEDICATION ASSESSMENT    ALLERGIES:  No Known Allergies     PREADMISSION PAIN MEDICATIONS:    Prior to Admission medications    Medication Sig Start Date End Date Taking? Authorizing Provider   DULoxetine HCl (CYMBALTA PO) Take 60 mg by mouth every morning   Yes Unknown, Entered By History   HYDROXYZINE HCL PO Take 50 mg by mouth At Bedtime   Yes Unknown, Entered By History   LAMOTRIGINE PO Take 25 mg by mouth every morning X 4 more days then increase to 50mg daily from 4/9/2018-4/22/2018.   Yes Unknown, Entered By History   LevETIRAcetam (KEPPRA PO) Take 250 mg by mouth every evening Until 4/7/2018 then decrease to 250mg BID 4/8/2018-4/21/2018, then decrease to 250mg QAM 4/22-5/5/2018   Yes Unknown, Entered By History   LevETIRAcetam (KEPPRA PO) Take 500 mg by mouth every morning Until 4/7/2018 then decrease to 250mg BID 4/8/2018-4/21/2018, then decrease to 250mg QAM 4/22-5/5/2018   Yes Unknown, Entered By History   DIVALPROEX SODIUM PO Take 250 mg by mouth 2 times daily   Yes Unknown, Entered By History   OxyCODONE HCl (OXYCONTIN PO) Take 20 mg by mouth every 12 hours   Yes Unknown, Entered By History   GABAPENTIN PO Take 300 mg by mouth 3 times daily   Yes Unknown, Entered By History   ACETAMINOPHEN PO Take 650 mg by mouth 4 times daily   Yes Unknown, Entered By History   ACETAMINOPHEN PO Take 650 mg by mouth every 4 hours as needed for pain (May take additional as needed doses. Do not exceed 4000mg in 24 hours.)   Yes Unknown, Entered By History   DIAZEPAM PO Take 5 mg by mouth every 4 hours as needed for anxiety   Yes Unknown, Entered By History   HYDROmorphone HCl (DILAUDID PO) Take 2 mg by mouth every 4 hours as needed for moderate to severe  pain   Yes Unknown, Entered By History   OXYCODONE HCL PO Take 5-10 mg by mouth every 4 hours as needed (Give 5mg for pain level 4-6 on a 10 point scale. Give 10mg for pain level 6-10 on a 10 point scale.)   Yes Unknown, Entered By History       CURRENT INPATIENT PAIN MEDICATIONS:    Medications related to Pain Management (Future)    Start     Dose/Rate Route Frequency Ordered Stop    04/22/18 0900  levETIRAcetam (KEPPRA) tablet 250 mg      250 mg Oral DAILY 04/04/18 2208 04/09/18 0900  lamoTRIgine (LaMICtal) tablet 50 mg      50 mg Oral DAILY 04/04/18 2208 04/23/18 0859    04/08/18 0900  levETIRAcetam (KEPPRA) tablet 250 mg      250 mg Oral 2 TIMES DAILY 04/04/18 2208 04/22/18 0859    04/05/18 1500  HYDROmorphone (PF) (DILAUDID) injection 0.2 mg      0.2 mg Intravenous ONCE 04/05/18 1447      04/05/18 1500  HYDROmorphone (PF) (DILAUDID) injection 0.3-0.5 mg      0.3-0.5 mg Intravenous EVERY 3 HOURS PRN 04/05/18 1457      04/05/18 1006  HYDROmorphone (DILAUDID) tablet 2-4 mg      2-4 mg Oral EVERY 3 HOURS PRN 04/05/18 1006      04/05/18 0900  aspirin chewable tablet 81 mg      81 mg Oral DAILY 04/04/18 2208      04/05/18 0900  DULoxetine (CYMBALTA) EC capsule 60 mg      60 mg Oral EVERY MORNING 04/04/18 2208      04/05/18 0900  lamoTRIgine (LaMICtal) tablet 25 mg      25 mg Oral EVERY MORNING 04/04/18 2208 04/09/18 0859    04/05/18 0900  levETIRAcetam (KEPPRA) tablet 500 mg      500 mg Oral EVERY MORNING 04/04/18 2208 04/08/18 0859    04/04/18 2251  zolpidem (AMBIEN) tablet 5 mg      5 mg Oral AT BEDTIME PRN 04/04/18 2208      04/04/18 2215  docusate sodium (COLACE) capsule 100 mg      100 mg Oral 2 TIMES DAILY 04/04/18 2208 04/04/18 2215  divalproex sodium delayed-release (DEPAKOTE) DR tablet 250 mg      250 mg Oral 2 TIMES DAILY 04/04/18 2208 04/04/18 2215  gabapentin (NEURONTIN) capsule 300 mg      300 mg Oral 3 TIMES DAILY 04/04/18 2208 04/04/18 2215  hydrOXYzine (ATARAX) tablet 50 mg      50  mg Oral AT BEDTIME 04/04/18 2208 04/04/18 2215  levETIRAcetam (KEPPRA) tablet 250 mg      250 mg Oral EVERY EVENING 04/04/18 2208 04/08/18 1959 04/04/18 2215  oxyCODONE (OxyCONTIN) 12 hr tablet 20 mg      20 mg Oral EVERY 12 HOURS 04/04/18 2208 04/04/18 2208  polyethylene glycol (MIRALAX/GLYCOLAX) Packet 17 g      17 g Oral DAILY PRN 04/04/18 2208 04/04/18 2208  magnesium hydroxide (MILK OF MAGNESIA) suspension 30 mL      30 mL Oral DAILY PRN 04/04/18 2208 04/04/18 2208  bisacodyl (DULCOLAX) Suppository 10 mg      10 mg Rectal DAILY PRN 04/04/18 2208 04/04/18 2208  diazepam (VALIUM) tablet 5 mg      5 mg Oral EVERY 4 HOURS PRN 04/04/18 2208 04/04/18 2208  acetaminophen (TYLENOL) tablet 650 mg      650 mg Oral EVERY 4 HOURS PRN 04/04/18 2208            PAST PAIN TREATMENT:   Based on a review of the Board of Pharmacy  database, patient has been seeing multiple prescribers for the following controlled substances:  Valium, ativan, oxycontin, oxycodone, dilaudid, and ambien    LABS THAT CAN AFFECT PAIN MEDICATION CLEARANCE:   Lab Results   Component Value Date    BUN 5 04/06/2018     Lab Results   Component Value Date    CR 0.46 04/06/2018     No results found for: AST  No results found for: ALT    VITAL SIGNS:    B/P: 87/58, T: 99.1, P: 96, R: 18      ASSESSMENT:   1) Acute on top of chronic pain due to stage IV decubitus ulcer in her left buttock, pressure ulcers on her right buttock and left heel with chronic osteomyelitis involving the right ischial tuberosity.  Patient was taking Oxycontin, PRN oxycodone, and PRN dilaudid prior to admission.  In comparing her PTA narcotic doses with what she is currently receiving, she is taking nearly identical amount of narcotics as what she was receiving PTA.  Adding up the dilaudid, and oxycodone IR, oxycontin she was getting at NH and converting it all over to oxycodone units, she was taking  mg of oxycodone equivalents every 24 hour  "period prior to admission.  Her current hospital regimen of Oxycontin + IV dilaudid + oral dilaudid = approximately 90 mg of Oxycodone-units per 24hours.    Given her current increase in pain- I do think it would be ok to increase her PRN dilaudid dose slightly (4-6 mg PO Q3H PRN)[JB1.1].[JB1.2]       2)[JB1.1] O[JB1.2]pioid (and possibly benzodiazepine) dependence with drug seeking behavior.  Based on my review of this patient's admission medication list and reviewing the last 6 months of the  database, this patient appears to have been receiving a[JB1.1] significant[JB1.2] amount of narcotics and benzodiazepines from multiple prescribers.   A review of the  database[JB1.1] also[JB1.2] show[JB1.1]s some[JB1.2] early refills.  During my interview with Marychuy this AM, she appeared extremely focused on opiates and was stating her pain was at a \"10\",[JB1.1] but suspect that this may be inflated as I could not detect any[JB1.2] physical signs[JB1.1]?[JB1.2]  She felt that we were being \"cruel\" by giving small opioid doses.      TREATMENT RECOMMENDATIONS/PLAN:   1)[JB1.1] Continue Oxycontin 20 mg PO BID  2) Continue gabapentin  (Marychuy was fairly somnolent when I spoke with her, so was hesitant to increase her dose today)  Could potentially   3) I've increased her oral dilaudid to 4-6 mg PO Q3H to give her only a slightly higher breakthrough dose than what she was receiving PTA (to equal her PRN oxy + dilaudid PTA).  Would definitely encourage her to take oral instead of IV dilaudid.  4)[JB1.2] Recommend changing PRN acetaminophen to 975 mg PO Q8H scheduled[JB1.1]  5) Limit benzos as you are able, am worried about oversedation with opioids  6) Recommend a pain specialist/clinic appointment as an outpatient (or over at [facility] if she transfers there) to discuss whether her opioids could be slowly weaned down?  She also needs to sign a pain contract, if she hasn't already[JB1.2]    Son Elliott (WONG), " Pharm.D.  Office [phone]  April 6, 2018, 8:53 AM  Inpatient Pain Management Service[JB1.1]       Revision History        User Key Date/Time User Provider Type Action    > JB1.2 4/6/2018 11:19 AM Son Elliott [facility] Pharmacist Sign     JB1.1 4/6/2018  8:53 AM Son Elliott [facility] Pharmacist             Consults by Mili Castro MD at 4/6/2018 10:06 AM     Author:  Mili Castro MD Service:  Plastic Surgery Author Type:  Physician    Filed:  4/6/2018 10:06 AM Date of Service:  4/6/2018 10:06 AM Creation Time:  4/6/2018 10:02 AM    Status:  Signed :  Mili Castro MD (Physician)     Consult Orders:    1. Plastic Surgery IP Consult: Patient to be seen: Routine - within 24 hours; Dr. Pelletier, [facility] plastic surgery, sacral wound; Consultant may enter orders: Yes [[id number]] ordered by Sal Lees DO at 04/05/18 1602                Plastic Surgery    Asked to see patient re sacral wound. Patient followed in Wound Care clinic at [facility] [place] and see by General Surgery who did not feel surgical intervention is indicated at this time. Agree with plan for dressing changes and follow up at [facility] [place] [facility].    Mili Castro MD  Plastic Surgery  [facility]  [phone] (office)[JL1.1]         Revision History        User Key Date/Time User Provider Type Action    > JL1.1 4/6/2018 10:06 AM Mili Castro MD Physician Sign            Consults by Iker Kramer DPM at 4/5/2018  5:34 PM     Author:  Iker Kramer DPM Service:  Podiatry Author Type:  Podiatrist    Filed:  4/5/2018  5:34 PM Date of Service:  4/5/2018  5:34 PM Creation Time:  4/5/2018  5:27 PM    Status:  Signed :  Iker Kramer DPM (Podiatrist)     Consult Orders:    1. Podiatry IP Consult: Patient to be seen: Routine - within 24 hours; left plantar heel wound; Consultant may enter orders: Yes [[id number]] ordered by Nabeel,  Sal Yu DO at 04/05/18 5492                [place] [facility]/PODIATRY CONSULTATION  April 5, 2018      ASSESSMENT: L heel ulceration     PLAN:  Reviewed patient's chart in epic.  Discussed condition and treatment options including pros and cons.    -XR of L heel to assess for osteomyelitis.  -Pt may need surgery pending results.  -[facility] care plan in place for wounds.  -Offload heels at all times while in bed.  -Dr. Brooke to f/u tomorrow.  -Thank you for the consult.     Iker Kramer DPM, FACFAS  Pager: [phone]      PATIENT HISTORY:  I was requested to see this patient for L heel wound by Dr. Lees.  Marychuy Zhou is a 62 year old paraplegic female who was admitted for wound pain, related to decubitus ulcers, most significantly right buttock with related osteomyelitis.   Pt is normally treated at [facility].  Duration of L heel wound unclear.  Pt is unsure which foot the wound is on when asked.    Review of Systems:  Patient denies f/c/n/v.  Rest of 10 pt ROS neg except for HPI.     PAST MEDICAL HISTORY:   Past Medical History:   Diagnosis Date     Anxiety      Chronic pain syndrome      Closed fracture zygoma, with routine healing, subsequent encounter      COPD (chronic obstructive pulmonary disease) (H)      Depressive disorder      DVT (deep vein thrombosis) in pregnancy (H)      Edema      Epilepsy (H)      Flaccid neuropathic bladder, not elsewhere classified      Fracture of femur (H)      Hypothyroidism      Iron deficiency anemia      Paraplegia (H)     unspecified     Pressure ulcer of sacral region      PTSD (post-traumatic stress disorder)      Rheumatoid arthritis (H)      Sepsis (H)     unspecified        PAST SURGICAL HISTORY: History reviewed. No pertinent surgical history.     MEDICATIONS:   Current Facility-Administered Medications:      HYDROmorphone (DILAUDID) tablet 2-4 mg, 2-4 mg, Oral, Q3H PRN, Sal Lees DO, 4 mg at 04/05/18 4305      ascorbic acid (VITAMIN C) tablet 500 mg, 500 mg, Oral, Daily, Sal Lees, , 500 mg at 04/05/18 1408     beta carotene capsule 10,000 Units, 10,000 Units, Oral, BID, Sal Lees DO, 10,000 Units at 04/05/18 1408     zinc sulfate (ZINCATE) capsule 220 mg, 220 mg, Oral, Daily, Sal Lees, , 220 mg at 04/05/18 1408     HYDROmorphone (PF) (DILAUDID) injection 0.2 mg, 0.2 mg, Intravenous, Once, Sal Lees DO     HYDROmorphone (PF) (DILAUDID) injection 0.3-0.5 mg, 0.3-0.5 mg, Intravenous, Q3H PRN, Sal Lees DO, 0.5 mg at 04/05/18 1508     meropenem (MERREM) 1 g vial to attach to  mL bag, 1 g, Intravenous, Q8H, Melo Arcos MD, 1 g at 04/05/18 1101     naloxone (NARCAN) injection 0.1-0.4 mg, 0.1-0.4 mg, Intravenous, Q2 Min PRN, Melo Arcos MD     acetaminophen (TYLENOL) tablet 650 mg, 650 mg, Oral, Q4H PRN, Melo Arcos MD, 650 mg at 04/05/18 0159     docusate sodium (COLACE) capsule 100 mg, 100 mg, Oral, BID, Melo Arcos MD, 100 mg at 04/05/18 0932     polyethylene glycol (MIRALAX/GLYCOLAX) Packet 17 g, 17 g, Oral, Daily PRN, Melo Arcos MD     bisacodyl (DULCOLAX) Suppository 10 mg, 10 mg, Rectal, Daily PRN, Melo Arcos MD     ondansetron (ZOFRAN-ODT) ODT tab 4 mg, 4 mg, Oral, Q6H PRN **OR** ondansetron (ZOFRAN) injection 4 mg, 4 mg, Intravenous, Q6H PRN, Melo Arcos MD     ARIPiprazole (ABILIFY) tablet 5 mg, 5 mg, Oral, Isrrael CASTILLO Tirtha R, MD, 5 mg at 04/05/18 0932     aspirin chewable tablet 81 mg, 81 mg, Oral, Daily, Melo Arcos MD, 81 mg at 04/05/18 0932     diazepam (VALIUM) tablet 5 mg, 5 mg, Oral, Q4H PRN, Melo Arcos MD     divalproex sodium delayed-release (DEPAKOTE) DR tablet 250 mg, 250 mg, Oral, BID, Melo Arcos MD, 250 mg at 04/05/18 0932     DULoxetine (CYMBALTA) EC capsule 60 mg, 60 mg, Oral, Isrrael CASTILLO Tirtha R, MD, 60 mg at 04/05/18 0932     ferrous sulfate (IRON) tablet 325  mg, 325 mg, Oral, BID, Melo Arcos MD, 325 mg at 04/05/18 0932     gabapentin (NEURONTIN) capsule 300 mg, 300 mg, Oral, TID, Melo Arcos MD, 300 mg at 04/05/18 1649     hydrOXYzine (ATARAX) tablet 50 mg, 50 mg, Oral, At Bedtime, Melo Arcos MD, 50 mg at 04/04/18 2331     lamoTRIgine (LaMICtal) tablet 25 mg, 25 mg, Oral, JONATHAN, Melo Arcos MD, 25 mg at 04/05/18 0932     levETIRAcetam (KEPPRA) tablet 250 mg, 250 mg, Oral, QPM, Melo Arcos MD, 250 mg at 04/05/18 0137     levETIRAcetam (KEPPRA) tablet 500 mg, 500 mg, Oral, Isrrael CASTILLO Tirtha R, MD, 500 mg at 04/05/18 0932     levothyroxine (SYNTHROID/LEVOTHROID) tablet 125 mcg, 125 mcg, Oral, Isrrael CASTILLO Tirtha R, MD, 125 mcg at 04/05/18 0932     magnesium hydroxide (MILK OF MAGNESIA) suspension 30 mL, 30 mL, Oral, Daily PRN, Melo Arcos MD     melatonin tablet 10 mg, 10 mg, Oral, At Bedtime, Melo Arcos MD, 10 mg at 04/04/18 2332     omeprazole (priLOSEC) CR capsule 20 mg, 20 mg, Oral, Daily with breakfast, Melo Arcos MD, 20 mg at 04/05/18 0933     oxyCODONE (OxyCONTIN) 12 hr tablet 20 mg, 20 mg, Oral, Q12H, Melo Arcos MD, 20 mg at 04/05/18 1105     potassium chloride SA (K-DUR/KLOR-CON M) CR tablet 10 mEq, 10 mEq, Oral, Isrrael CASTILLO Tirtha R, MD, 10 mEq at 04/05/18 0932     zolpidem (AMBIEN) tablet 5 mg, 5 mg, Oral, At Bedtime PRN, Melo Arcos MD     [START ON 4/8/2018] levETIRAcetam (KEPPRA) tablet 250 mg, 250 mg, Oral, BID, Melo Arcos MD     [START ON 4/22/2018] levETIRAcetam (KEPPRA) tablet 250 mg, 250 mg, Oral, Daily, Melo Arcos MD     [START ON 4/9/2018] lamoTRIgine (LaMICtal) tablet 50 mg, 50 mg, Oral, Daily, Melo Arcos MD     albuterol neb solution 2.5 mg, 2.5 mg, Nebulization, Q2H PRN, Melo Arcos MD     miconazole (MICATIN; MICRO GUARD) 2 % powder, , Topical, BID, Melo Arcos MD     vancomycin (VANCOCIN) 1500 mg in 0.9% NaCl 250 mL PREMIX, 1,500 mg, Intravenous,  "Q12H, Melo Arcos MD, Last Rate: 166.7 mL/hr at 04/05/18 1143, 1,500 mg at 04/05/18 1143     ALLERGIES:  No Known Allergies     SOCIAL HISTORY:   Social History     Social History     Marital status: Single     Spouse name: N/A     Number of children: N/A     Years of education: N/A     Occupational History     Not on file.     Social History Main Topics     Smoking status: Never Smoker     Smokeless tobacco: Not on file     Alcohol use No     Drug use: No     Sexual activity: Not on file     Other Topics Concern     Not on file     Social History Narrative        FAMILY HISTORY: No pertinent family history.     EXAM:Vitals: BP (!) 89/59 (BP Location: Right arm)  Pulse 100  Temp 99.4  F (37.4  C) (Oral)  Resp 18  Ht 1.575 m (5' 2\")  Wt 78.6 kg (173 lb 4.5 oz)  SpO2 96%  BMI 31.69 kg/m2  BMI= Body mass index is 31.69 kg/(m^2).    General appearance: Patient is alert and fully cooperative with history & exam.  No sign of distress is noted during the visit.     Psychiatric: Affect is appropriate.  Patient appears motivated to improve health.     Respiratory: Breathing is regular & unlabored while in bed.     HEENT: Hearing is intact to spoken word.  Speech is clear.  No gross evidence of visual impairment that would impact ambulation.     Dermatologic: L posterior heel ulceration with dark eschar, moist edges, nonstageable.  2x2cm.  No significant depth.  Mild surrounding erythema.       Vascular: DP pulse palpable on the L.  I could not palpate other pedal pulses due to LE edema.  CFT minimally delayed, feet warm.     Neurologic: Lower extremity sensation is diminished to light touch.      Musculoskeletal: Paraplegia.  No foot or ankle joint effusion is noted.        Lab Results   Component Value Date    WBC 8.3 04/05/2018     Lab Results   Component Value Date    RBC 3.50 04/05/2018     Lab Results   Component Value Date    HGB 8.7 04/05/2018     Lab Results   Component Value Date    HCT 28.8 04/05/2018 "     No components found for: MCT  Lab Results   Component Value Date    MCV 82 04/05/2018     Lab Results   Component Value Date    MCH 24.9 04/05/2018     Lab Results   Component Value Date    MCHC 30.2 04/05/2018     Lab Results   Component Value Date    RDW 19.6 04/05/2018     Lab Results   Component Value Date     04/05/2018       ESR 62  [MP1.1]              Revision History        User Key Date/Time User Provider Type Action    > MP1.1 4/5/2018  5:34 PM Ikre Kramer DPM Podiatrist Sign            Consults by Puma Silverman MD at 4/5/2018  2:45 PM     Author:  Puma Silverman MD Service:  Surgery Author Type:  Physician    Filed:  4/5/2018  2:45 PM Date of Service:  4/5/2018  2:45 PM Creation Time:  4/5/2018  2:38 PM    Status:  Signed :  Puma Silverman MD (Physician)     Consult Orders:    1. Surgery General IP Consult: Patient to be seen: Routine - within 24 hours; chronic ischial wound, suspected osteo, ? needs debridement.; Consultant may enter orders: Yes [[id number]] ordered by Melo Arcos MD at 04/04/18 2023                General Surgery Wound Consultation/H&P    Marychuy Zhou MRN#: [id number]   Age: 62 year old YOB: 1955     Date of Admission:          4/4/2018  Reason for consult/H&P: Decubitus wounds   Surgeon:      Puma Silverman MD                  Chief Complaint:   -pelvic pain         History of Present Illness:   This patient is a 62 year old  female who presented to the [facility] ER with wound pain. She has paraplegia with decubitus ulcers, deepest on right buttock being -treated at [facility] wound center. Their facility was full, so she came here. . Denies fever, chills, nausea, vomiting, change in BM or urination. She has had multiple abdominal operations. This ulcer was last debrided two months ago at [facility].  . History is obtained from the patient and chart.         Past Medical History:    has a past medical  history of Anxiety; Chronic pain syndrome; Closed fracture zygoma, with routine healing, subsequent encounter; COPD (chronic obstructive pulmonary disease) (H); Depressive disorder; DVT (deep vein thrombosis) in pregnancy (H); Edema; Epilepsy (H); Flaccid neuropathic bladder, not elsewhere classified; Fracture of femur (H); Hypothyroidism; Iron deficiency anemia; Paraplegia (H); Pressure ulcer of sacral region; PTSD (post-traumatic stress disorder); Rheumatoid arthritis (H); and Sepsis (H).          Past Surgical History:   Past operation list reviewed.         Medications:     Prior to Admission medications    Medication Sig Start Date End Date Taking? Authorizing Provider   mineral oil-hydrophilic petrolatum (AQUAPHOR) Apply topically daily Apply to lower extremities for skin irritation.   Yes Unknown, Entered By History   ARIPIPRAZOLE PO Take 5 mg by mouth every morning   Yes Unknown, Entered By History   aspirin 81 MG chewable tablet Take 81 mg by mouth daily   Yes Unknown, Entered By History   DULoxetine HCl (CYMBALTA PO) Take 60 mg by mouth every morning   Yes Unknown, Entered By History   HYDROXYZINE HCL PO Take 50 mg by mouth At Bedtime   Yes Unknown, Entered By History   LAMOTRIGINE PO Take 25 mg by mouth every morning X 4 more days then increase to 50mg daily from 4/9/2018-4/22/2018.   Yes Unknown, Entered By History   LevETIRAcetam (KEPPRA PO) Take 250 mg by mouth every evening Until 4/7/2018 then decrease to 250mg BID 4/8/2018-4/21/2018, then decrease to 250mg QAM 4/22-5/5/2018   Yes Unknown, Entered By History   LevETIRAcetam (KEPPRA PO) Take 500 mg by mouth every morning Until 4/7/2018 then decrease to 250mg BID 4/8/2018-4/21/2018, then decrease to 250mg QAM 4/22-5/5/2018   Yes Unknown, Entered By History   LEVOTHYROXINE SODIUM PO Take 125 mcg by mouth every morning   Yes Unknown, Entered By History   MELATONIN PO Take 10 mg by mouth At Bedtime   Yes Unknown, Entered By History   OMEPRAZOLE PO Take 20  mg by mouth daily (with breakfast)   Yes Unknown, Entered By History   Potassium Chloride [place] ER (K-DUR PO) Take 10 mEq by mouth every morning   Yes Unknown, Entered By History   Rivaroxaban (XARELTO PO) Take 20 mg by mouth At Bedtime   Yes Unknown, Entered By History   Zolpidem Tartrate (AMBIEN PO) Take 5 mg by mouth At Bedtime   Yes Unknown, Entered By History   DIVALPROEX SODIUM PO Take 250 mg by mouth 2 times daily   Yes Unknown, Entered By History   ferrous sulfate (IRON) 325 (65 FE) MG tablet Take 325 mg by mouth 2 times daily   Yes Unknown, Entered By History   Nutritional Supplements (NUTRITIONAL SUPPLEMENT PO) Take 1 packet by mouth 2 times daily (Deven Powder)   Yes Unknown, Entered By History   miconazole (MICATIN) 2 % AERP powder Apply topically 2 times daily Apply to groin folds   Yes Unknown, Entered By History   OxyCODONE HCl (OXYCONTIN PO) Take 20 mg by mouth every 12 hours   Yes Unknown, Entered By History   senna-docusate (SENOKOT-S;PERICOLACE) 8.6-50 MG per tablet Take 2 tablets by mouth 2 times daily   Yes Unknown, Entered By History   GABAPENTIN PO Take 300 mg by mouth 3 times daily   Yes Unknown, Entered By History   ACETAMINOPHEN PO Take 650 mg by mouth 4 times daily   Yes Unknown, Entered By History   ACETAMINOPHEN PO Take 650 mg by mouth every 4 hours as needed for pain (May take additional as needed doses. Do not exceed 4000mg in 24 hours.)   Yes Unknown, Entered By History   bisacodyl (DULCOLAX) 10 MG Suppository Place 10 mg rectally daily as needed for constipation   Yes Unknown, Entered By History   DIAZEPAM PO Take 5 mg by mouth every 4 hours as needed for anxiety   Yes Unknown, Entered By History   HYDROmorphone HCl (DILAUDID PO) Take 2 mg by mouth every 4 hours as needed for moderate to severe pain   Yes Unknown, Entered By History   magnesium hydroxide (MILK OF MAGNESIA) 400 MG/5ML suspension Take 30 mLs by mouth daily as needed for constipation or heartburn   Yes Unknown, Entered  "By History   OXYCODONE HCL PO Take 5-10 mg by mouth every 4 hours as needed (Give 5mg for pain level 4-6 on a 10 point scale. Give 10mg for pain level 6-10 on a 10 point scale.)   Yes Unknown, Entered By History            Allergies:   No Known Allergies         Social History:     Social History   Substance Use Topics     Smoking status: Never Smoker     Smokeless tobacco: Not on file     Alcohol use No             Family History:    This patient has no significant relevant family history.  Family history is reviewed in detail.          Review of Systems:   Brief ROS is negative other than noted in the HPI.  C: NEGATIVE for fever, chills, change in weight  R: NEGATIVE for significant cough or SOB  CV: NEGATIVE for chest pain, palpitations or peripheral edema  GI:  NEGATIVE for dysuria, heartburn, or change in bowel habits  H: NEGATIVE for bleeding problems         Physical Exam:   Blood pressure 97/68, pulse 100, temperature 98  F (36.7  C), temperature source Oral, resp. rate 18, height 1.575 m (5' 2\"), weight 78.6 kg (173 lb 4.5 oz), SpO2 96 %.  I/O last 3 completed shifts:  In: 702 [I.V.:702]  Out: 805 [Urine:805]    General - This is a well developed,female .    Lungs - Breathing not labored    Pelvis- deep, clean decubitus ulcer right buttock. Moist dressing in wound, removed and replaced.  Extremities - Does not Move all extremities. Warm without edema.            Data:   Labs:  Recent Labs   Lab Test  04/05/18 0825 04/04/18 1648 04/02/18   2131   WBC  8.3  11.0  10.7   HGB  8.7*  10.1*  10.7*   HCT  28.8*  33.1*  34.3*   PLT  342  450  447     Recent Labs   Lab Test  04/05/18 0825 04/04/18 1648 04/02/18   2131   POTASSIUM  3.8  4.0  4.2   CHLORIDE  103  103  101   CO2  27  29  27   BUN  4*  8  8   CR  0.40*  0.51*  0.49*     No lab results found.  Recent Labs   Lab Test  04/04/18 1648 04/02/18   2131   INR  1.29*  1.15*     Recent Labs   Lab Test  04/05/18 0825 04/04/18 1648 04/02/18   " 2131   NATA  7.6*  7.9*  8.3*     Recent Labs   Lab Test  04/05/18   0825  04/04/18   1648  04/02/18   2131   ANIONGAP  6  5  10       CT scan of the abdomen:  Possible osteomyelitis of hip, right. Nephrostomy tubes    Ultrasound of the abdomen: not done                 Assessment:   Deep, fairly clean ischial ulcer in paraplegic         Plan:    She is a regular patient at the [facility] wound care center [facility]. I would not recommend we change their plan. She should continue her care there, as soon as feasible. For now I would not debride this ulcer further. Wet Dakins dressings are fine.       I will follow with you.  Puma Silverman M.D.[PB1.1]          Revision History        User Key Date/Time User Provider Type Action    > PB1.1 4/5/2018  2:45 PM Puma Silverman MD Physician Sign            Consults by Abe Renteria MD at 4/5/2018  1:27 PM     Author:  Abe Renteria MD Service:  (none) Author Type:  Physician    Filed:  4/5/2018  1:27 PM Date of Service:  4/5/2018  1:27 PM Creation Time:  4/5/2018  1:22 PM    Status:  Signed :  Abe Renteria MD (Physician)         ID consult dictated.  Paraplegia, decubitus ulcer and osteomyelitis R ischial tuberosity.  Cont same abs.  Would strongly consider referring pt to Dr. Mayela Pelletier, Plastic Surgery at [facility] for consideration of wide debridement, bone bx for culture and flap closure.[SO1.1]     Revision History        User Key Date/Time User Provider Type Action    > SO1.1 4/5/2018  1:27 PM Abe Renteria MD Physician Sign                     Progress Notes - Physician (Notes from 04/04/18 through 04/07/18)      Progress Notes by Anthony Howell RN at 4/6/2018 12:24 PM     Author:  Anthony Howell RN Service:  Care Coordinator Author Type:      Filed:  4/6/2018  3:13 PM Date of Service:  4/6/2018 12:24 PM Creation Time:  4/6/2018 12:24 PM    Status:  Addendum :  Anthony Howell RN ()          Met with patient to discuss plan of care.[BV1.1] Per surgery patient should return back to [facility] for wound treatment.[BV1.2] Writer spoke with Sade at [facility] . Patient is appropriate for admission to the hospital however They do not have a bed open presently and will call unit when one is available. Contact # [phone]   Patient very argumentative regarding her cares. Patient declined writer to setup an outpatient appointment with wound care clinic stating she will be able to manage it.[BV1.1] Wound Care Clinic number is [phone][BV1.2] Patient requesting to stay in hospital untill Monday till she can figure out transportation for her outpt appointments. Patient feels she should be treated in hospital at  for her sacral wounds. Writer discussed recommendations from surgeon and plastics how ever patient is not in agreement.[BV1.1]         Revision History        User Key Date/Time User Provider Type Action    > BV1.2 4/6/2018  3:13 PM Anthony Howell RN Case Manager Addend     BV1.1 4/6/2018 12:50 PM Anthony Howell RN Case Manager Sign            Progress Notes by Sánchez Brooke DPM at 4/6/2018  2:02 PM     Author:  Sánchez Brooke DPM Service:  Podiatry Author Type:  Physician    Filed:  4/6/2018  2:15 PM Date of Service:  4/6/2018  2:02 PM Creation Time:  4/6/2018  2:02 PM    Status:  Signed :  Sánchez Brooke DPM (Physician)         [place] [facility]      Assessment:     62 year old female, paraplegic, with  1) deep ulcerations buttock with chronic osteomyelitis and  2) a decubitus ulceration posterior left heel.      The posterior left heel ulceration appears stable.  I do not appreciate any concerning changes on the x-rays.      Plan:    Will await radiology report, but expect it to be negative for osteomyelitis and podiatry will sign off.  If negative, no indication for surgery.   Recommend that she continue with wound cares at the [facility]  wound clinic or [facility] [place].     I suggest additional offloading via a foam boot (will order this)    Dressing:  Per LAY RN;  Iodorsorb    Sánchez Brooke DPM, FACFAS, MS  [place] Department of Podiatry/Foot & Ankle Surgery  [facility], [facility], and [facility]  Pager:  [phone]    _______________________________________________________________________      Subjective:  Patient said she didn't realize there was a sore or concern about the left heel.      Exam:    B/P: 91/61, T: 98, P: 96, R: 20    Vascular: DP pulse is palpable, left foot; skin warm, appears well-perfused.    Neuro: no LE sensation on the left.      MSK:  Paraplegia. No voluntary movement of the LEs.  Mild equinus of the left akle.      Derm:  There is a 2cm diameter pressure-type ulcer on the posterior left heel. Unstageable with a well-adhered, blacked, dry eschar.  I do not appreciate any concerning erythema - do not think there is cellulitis.  .      Lab Results   Component Value Date    WBC 6.9 04/06/2018     Lab Results   Component Value Date    RBC 3.74 04/06/2018     Lab Results   Component Value Date    HGB 9.4 04/06/2018     Lab Results   Component Value Date    HCT 30.8 04/06/2018     No components found for: MCT  Lab Results   Component Value Date    MCV 82 04/06/2018     Lab Results   Component Value Date    MCH 25.1 04/06/2018     Lab Results   Component Value Date    MCHC 30.5 04/06/2018     Lab Results   Component Value Date    RDW 19.6 04/06/2018     Lab Results   Component Value Date     04/06/2018       All cultures:[MR1.1]    Recent Labs  Lab 04/04/18  1930 04/04/18  1648 04/02/18  2131   CULT No growth after 2 days No growth after 2 days No growth after 3 days       Hemoglobin   Date Value Ref Range Status   04/06/2018 9.4 (L) 11.7 - 15.7 g/dL Final[MR1.2]     Imaging:     Two views of the left heel.  I personally reviewed.  I do not appreciate any cortical changes to suggest  osteomyelitis.[MR1.1]           Revision History        User Key Date/Time User Provider Type Action    > MR1.2 4/6/2018  2:15 PM Sánchez Brooke DPM Physician Sign     MR1.1 4/6/2018  2:02 PM Sánchez Brooke DPM Physician             Progress Notes by Suni Johnson RN at 4/6/2018  1:01 PM     Author:  Suni Johnson RN Service:  (none) Author Type:  Registered Nurse    Filed:  4/6/2018  1:03 PM Date of Service:  4/6/2018  1:01 PM Creation Time:  4/6/2018  1:01 PM    Status:  Signed :  Suni Johnson RN (Registered Nurse)         Pt remains argumentative after MD rounded with pt. Pt changes her statements and declines previous statements she has made regarding her care and future medical goals. Pt appears forgetful when it comes to times of medications given and cares performed for pt. Pt uses call light often even attempting to get more pain/anti anxiety meds.[GS1.1]      Revision History        User Key Date/Time User Provider Type Action    > GS1.1 4/6/2018  1:03 PM Suni Johnson RN Registered Nurse Sign            Progress Notes by Ambar Lopez MD at 4/6/2018 11:10 AM     Author:  Ambar Lopze MD Service:  Hospitalist Author Type:  Physician    Filed:  4/6/2018 11:42 AM Date of Service:  4/6/2018 11:10 AM Creation Time:  4/6/2018 11:10 AM    Status:  Signed :  Ambar Lopez MD (Physician)         [facility]    Hospitalist Progress Note      Assessment & Plan   Marychuy Zhou is a 62 year old female paraplegic woman with decubital ulcers involving her left heel and right and left buttock with a deep tracking wound over the right buttock with chronic osteomyelitis with recent infection, completed 10 days of IV antibiotics  last month, was transported to the ER for evaluation of the wound check.       Paraplegia and multiple decubitus ulcers, chronic pain involving the right buttock, stage IV, with chronic osteomyelitis.   The patient had  debridement of this ulcer by Dr. Pierre Cazares on 02/05/2018.  She has had wound infection in the past and has a history of MDR infections including MRSA, VRE and ESBL in the past at different sites with the last treatment with IV antibiotic, meropenem and daptomycin, which she completed on 03/08/2018.  She is followed at [facility] for this.    -[KR1.1] CT Aj/P this admission shows ulcer extending down to the right ischial tuberosity with erosive changes suspicious for osteomyelitis  - currently on Meropenem and vancomycin-> continued  - blood cultures are no growth to date  - ID seen and appreciate recs, continue same antibiotics and if discharged recommend Augmentin until followed with her primary surgeons  -general surgery and plastic surgery have evaluated this stay, they don't plan any surgical intervention here, recommend f/u with primary surgeon at [facility] wound clinic  - continue PTA Oxycontin 20mg BID and Dilaudid PO prn, Dilaudid IV prn for breakthrough  - discussed with patient regarding transfer to [facility] for continued care, at this time she states she declines and states she want to talk to her primary care doctor after her discharge, then see her surgeon afterwards. Recommended she call her PCP, since it is better to deal with the wound before developing worsening infection since she may not have adequate time to discharge and f/u. She then stated she does not want  To listen to me at this time. Strongly encouraged her to think about this[KR1.2]     Left plantar heel wound: Details noted in wound care.   - Podiatry[KR1.1] Following, appreciate help  - X-ray of left foot pending[KR1.2]  - Wound nurse following.      Anemia, chronic. The patient has a history of iron deficiency anemia and also her erythropoietin level apparently was low.    - Monitor.[KR1.1]   - Hemoglobin currently stable at 9.4[KR1.2]     Recent left comminuted acute subtrochanteric fracture with recent fall. She was  evaluated by Orthopedic Surgery and recommended x-ray in 2-3 weeks while she was at [facility]. This was noted in the CT that was done in the ER.     - Recommend follow up with Orthopedic Surgery after discharge.      History of deep venous thrombosis. The patient is on Xarelto.    -[KR1.1] Since no surgical intervention planned at this time,  resume prior to admissions Xarelto[KR1.2]     Seizure disorder. The patient used to be on Keppra, but it looks like her Keppra is being tapered and she is currently taking 500 in the morning and 250 mg at bedtime, which is to be continued until 04/07 and from 04/08, she will take 250 mg p.o. b.i.d. until 04/21 and then to decrease to 250 mg q.a.m. from 04/22 until 05/05/2018.   - She has now been started on lamotrigine which she will be taking 25 mg p.o. daily for 4 more days, then to increase to 50 mg daily from 04/09/2018 until 04/22/2018.    - The patient to follow up with Neurology regarding her subsequent dose after 04/22.      Anxiety and depression. Stable.    - Prior to admission Cymbalta, risperidone and Abilify will be npr and Diazepam will be continued.      Paraplegia.  Apparently this was due to left T4 hematoma about 9 years ago. The patient has neurogenic bladder and bowel and has bilateral nephrostomy tubes and suprapubic catheter.  The nephrostomy tubes were placed on 01/19 to divert urine from decubitus ulcer.   - Left nephrostomy tube was dislodged and was replaced 03/06/2018 per report      Hypothyroidism: Patient taking synthroid prior to admission.   - Prior to admission levothyroxine 125 mg p.o. every day continued.      History of diastolic congestive heart failure.  The patient currently appears euvolemic, no sign of exacerbation noted.    -[KR1.1] off fluids[KR1.2]     History of chronic obstructive pulmonary disease, not on oxygen at baseline.  She is not on any maintenance medication, will have p.r.n. albuterol available while she is here.      # Pain Assessment:  Current Pain Score 4/6/2018   Patient currently in pain? sleeping: patient not able to self report   Pain score (0-10) -   Pain location -   Pain descriptors -[KR1.1]   - Marychuy is experiencing pain due to decub ulcers. Pain management was discussed and the plan was created in a collaborative fashion.  Marychuy's response to the current recommendations: disinterested  - Please see the plan for pain management as documented above[KR1.2]    DVT Prophylaxis:[KR1.1] Xarelto[KR1.2]  Code Status: Full Code[KR1.1]    Communications: discussed with RN. Care coordinator[KR1.2]    Disposition: Expected discharge[KR1.1] ? Over the weekend back to TCU with f/u appointment with her surgeons if she she declines to transfer to [facility] and if remains stable. Await further podiatry evaluation.     Spent 40 minutes with >50% of time spent counseling/coordinating care[KR1.2]    Ambar Lopez    Interval History[KR1.1]   Pain is controlled with pain medications.   Had long discussion with patient about her ongoing wound care and transfer to [facility] for further care with her surgeon.  She wants to f/u with her PCP first. Advised her to call her PCP while here. She then told me she does not want to listen to be and does not respect what I am saying.[KR1.2]      -Data reviewed today: I reviewed all new labs and imaging results over the last 24 hours. I personally reviewed[KR1.1] no images or EKG's today[KR1.2].    Physical Exam   Temp: 99.1  F (37.3  C) Temp src: Oral BP: (!) 87/58 Pulse: 96 Heart Rate: 88 Resp: 18 SpO2: 93 % O2 Device: None (Room air)    Vitals:    04/04/18 1615 04/05/18 0649   Weight: 73.5 kg (162 lb) 78.6 kg (173 lb 4.5 oz)     Vital Signs with Ranges  Temp:  [98.3  F (36.8  C)-99.4  F (37.4  C)] 99.1  F (37.3  C)  Pulse:  [96] 96  Heart Rate:  [88-91] 88  Resp:  [16-20] 18  BP: (87-90)/(58-62) 87/58  SpO2:  [93 %-96 %] 93 %  I/O last 3 completed shifts:  In: 720 [P.O.:720]  Out:  2850 [Urine:2850][KR1.1]    Refused physical exam today.[KR1.2]   Medications       ascorbic acid  500 mg Oral Daily     beta carotene  10,000 Units Oral BID     zinc sulfate  220 mg Oral Daily     HYDROmorphone  0.2 mg Intravenous Once     meropenem  1 g Intravenous Q8H     docusate sodium  100 mg Oral BID     ARIPiprazole (ABILIFY) tablet 5 mg  5 mg Oral QAM     aspirin  81 mg Oral Daily     divalproex sodium delayed-release (DEPAKOTE) DR tablet 250 mg  250 mg Oral BID     DULoxetine (CYMBALTA) EC capsule 60 mg  60 mg Oral QAM     ferrous sulfate  325 mg Oral BID     gabapentin (NEURONTIN) capsule 300 mg  300 mg Oral TID     hydrOXYzine  50 mg Oral At Bedtime     lamoTRIgine (LaMICtal) tablet 25 mg  25 mg Oral QAM     levETIRAcetam (KEPPRA) tablet 250 mg  250 mg Oral QPM     levETIRAcetam (KEPPRA) tablet 500 mg  500 mg Oral QAM     levothyroxine (SYNTHROID/LEVOTHROID) tablet 125 mcg  125 mcg Oral QAM     melatonin tablet 10 mg  10 mg Oral At Bedtime     omeprazole (priLOSEC) CR capsule 20 mg  20 mg Oral Daily with breakfast     oxyCODONE (OxyCONTIN) 12 hr tablet 20 mg  20 mg Oral Q12H     potassium chloride SA (K-DUR/KLOR-CON M) CR tablet 10 mEq  10 mEq Oral QAM     [START ON 4/8/2018] levETIRAcetam  250 mg Oral BID     [START ON 4/22/2018] levETIRAcetam  250 mg Oral Daily     [START ON 4/9/2018] lamoTRIgine  50 mg Oral Daily     miconazole   Topical BID     vancomycin (VANCOCIN) IV  1,500 mg Intravenous Q12H       Data     Recent Labs  Lab 04/06/18  0820 04/05/18  0825 04/04/18  1648 04/02/18  2131   WBC 6.9 8.3 11.0 10.7   HGB 9.4* 8.7* 10.1* 10.7*   MCV 82 82 82 82    342 450 447   INR  --   --  1.29* 1.15*    136 137 138   POTASSIUM 4.2 3.8 4.0 4.2   CHLORIDE 106 103 103 101   CO2 27 27 29 27   BUN 5* 4* 8 8   CR 0.46* 0.40* 0.51* 0.49*   ANIONGAP 6 6 5 10   NATA 7.9* 7.6* 7.9* 8.3*   GLC 95 91 114* 89       No results found for this or any previous visit (from the past 24 hour(s)).[KR1.1]      Revision History        User Key Date/Time User Provider Type Action    > KR1.2 4/6/2018 11:42 AM Ambar Lopez MD Physician Sign     KR1.1 4/6/2018 11:10 AM Ambar Lopez MD Physician             Progress Notes by Abe Renteria MD at 4/6/2018  9:59 AM     Author:  Abe Renteria MD Service:  (none) Author Type:  Physician    Filed:  4/6/2018 10:21 AM Date of Service:  4/6/2018  9:59 AM Creation Time:  4/6/2018  9:59 AM    Status:  Signed :  Abe Renteria MD (Physician)         [facility]  Infectious Disease Progress Note          Assessment and Plan:   IMPRESSION:   1.  A 62-year-old female with paraplegia x 9 years after development of a spinal hematoma.   2.  Chronic decubitus ulcers, most prominent is stage 4 left buttock ulcer.   3.  Chronic pain syndrome.   4.  Admitted on this occasion for increased generalized pain.  CT scan of the abdomen is interpreted as showing erosive changes of right ischial tuberosity suspicious for osteomyelitis.  The patient's CRP is elevated to 100 and sedimentation rate 62.        RECOMMENDATIONS:   1.  Continue vancomycin and meropenem for the present.   2.[SO1.1]  eval for heel osteo in process.  3. If discharged over the w/e would temporize by giving PO Augmentin until she can see her other MDs at [facility].  I told her that she needs to see a wound care surgeon and Infectious Disease in the [facility] system.  I have written out these recs for pt and given her the name of Dr. Pelletier at [facility], if helpful to her wound care surgeon..  4. If issues on the w/e please call Dr. Lees.[SO1.2]              Interval History:[SO1.1]   Pain controlled.  Afebrile.  Creat stable.  Blood cxs NGTD.[SO1.2]              Medications:       ascorbic acid  500 mg Oral Daily     beta carotene  10,000 Units Oral BID     zinc sulfate  220 mg Oral Daily     HYDROmorphone  0.2 mg Intravenous Once     meropenem  1 g  "Intravenous Q8H     docusate sodium  100 mg Oral BID     ARIPiprazole (ABILIFY) tablet 5 mg  5 mg Oral QAM     aspirin  81 mg Oral Daily     divalproex sodium delayed-release (DEPAKOTE) DR tablet 250 mg  250 mg Oral BID     DULoxetine (CYMBALTA) EC capsule 60 mg  60 mg Oral QAM     ferrous sulfate  325 mg Oral BID     gabapentin (NEURONTIN) capsule 300 mg  300 mg Oral TID     hydrOXYzine  50 mg Oral At Bedtime     lamoTRIgine (LaMICtal) tablet 25 mg  25 mg Oral QAM     levETIRAcetam (KEPPRA) tablet 250 mg  250 mg Oral QPM     levETIRAcetam (KEPPRA) tablet 500 mg  500 mg Oral QAM     levothyroxine (SYNTHROID/LEVOTHROID) tablet 125 mcg  125 mcg Oral QAM     melatonin tablet 10 mg  10 mg Oral At Bedtime     omeprazole (priLOSEC) CR capsule 20 mg  20 mg Oral Daily with breakfast     oxyCODONE (OxyCONTIN) 12 hr tablet 20 mg  20 mg Oral Q12H     potassium chloride SA (K-DUR/KLOR-CON M) CR tablet 10 mEq  10 mEq Oral QAM     [START ON 4/8/2018] levETIRAcetam  250 mg Oral BID     [START ON 4/22/2018] levETIRAcetam  250 mg Oral Daily     [START ON 4/9/2018] lamoTRIgine  50 mg Oral Daily     miconazole   Topical BID     vancomycin (VANCOCIN) IV  1,500 mg Intravenous Q12H                  Physical Exam:   Blood pressure (!) 87/58, pulse 96, temperature 99.1  F (37.3  C), temperature source Oral, resp. rate 18, height 1.575 m (5' 2\"), weight 78.6 kg (173 lb 4.5 oz), SpO2 93 %.  [unfilled]  Vital Signs with Ranges  Temp:  [98.3  F (36.8  C)-99.4  F (37.4  C)] 99.1  F (37.3  C)  Pulse:  [96] 96  Heart Rate:  [88-91] 88  Resp:  [16-18] 18  BP: (87-90)/(58-62) 87/58  SpO2:  [93 %-96 %] 93 %    Constitutional: Awake, alert, cooperative, no apparent distress   Lungs: Clear to auscultation bilaterally, no crackles or wheezing   Cardiovascular: Regular rate and rhythm, normal S1 and S2, and no murmur noted   Abdomen: Normal bowel sounds, soft, non-distended, non-tender   Skin: No rashes, no cyanosis, no edema   Other:           Data: " "  All microbiology laboratory data reviewed.  Recent Labs   Lab Test  18   0820  18   0825  18   1648   WBC  6.9  8.3  11.0   HGB  9.4*  8.7*  10.1*   HCT  30.8*  28.8*  33.1*   MCV  82  82  82   PLT  313  342  450     Recent Labs   Lab Test  18   0820  18   0825  18   1648   CR  0.46*  0.40*  0.51*     Recent Labs   Lab Test  18   0825   SED  62*[SO1.1]        Revision History        User Key Date/Time User Provider Type Action    > SO1.2 2018 10:21 AM Abe Renteria MD Physician Sign     SO1.1 2018  9:59 AM Abe Renteria MD Physician             Progress Notes by Hermelinda Guerin PA-C at 2018  8:31 AM     Author:  Hermelinda Guerin PA-C Service:  Surgery Author Type:  Physician Assistant - C    Filed:  2018  9:07 AM Date of Service:  2018  8:31 AM Creation Time:  2018  8:31 AM    Status:  Signed :  Hermelinda Guerin PA-C (Physician Assistant - C)         General Surgery Progress Note    Sore,[RS1.1] reports poor pain control,[RS1.2] tolerating diet, UO adequate.   Vitals: Blood pressure (!) 87/58, pulse 96, temperature 99.1  F (37.3  C), temperature source Oral, resp. rate 18, height 5' 2\" (1.575 m), weight 173 lb 4.5 oz (78.6 kg), SpO2 93 %.  Temperature Temp  Av.9  F (37.2  C)  Min: 98.3  F (36.8  C)  Max: 99.4  F (37.4  C)   I/O last 3 completed shifts:  In: 720 [P.O.:720]  Out: 2850 [Urine:2850]    Wounds:[RS1.1] refused exam due to pain[RS1.2].    62 year old paraplegic female with decubitus ulcer.  - Continue wound cares recommended by WOC RN, appreciate your help  - No plans for surgical debridement  - Continue medical mngt  - Will need follow up at [facility] wound clinic    Hermelinda Guerni PA-C  [facility]  [phone][RS1.1]     Revision History        User Key Date/Time User Provider Type Action    > RS1.2 2018  9:07 AM Hermelinda Guerin PA-C Physician Assistant - C Sign     RS1.1 2018  8:31 AM " Hermelinda Guerin PA-C Physician Assistant - C             Progress Notes by Sal Lees DO at 4/5/2018  8:58 AM     Author:  Sal Lees DO Service:  Hospitalist Author Type:  Physician    Filed:  4/5/2018  6:34 PM Date of Service:  4/5/2018  8:58 AM Creation Time:  4/5/2018  8:58 AM    Status:  Addendum :  Sal Lees DO (Physician)         [facility]  Hospitalist Progress Note        Sal Lees DO  04/05/2018        Interval History:[ZA1.1]      Patient complaining of pain today, discussed at length.[ZA1.2]          Assessment and Plan:        62-year-old paraplegic woman with decubital ulcers involving her left heel and right and left buttock with a deep tracking wound over the right buttock with chronic osteomyelitis with recent infection, completed 10 days of IV antibiotics  last month, was transported to the ER for evaluation of the wound check.       Paraplegia and multiple decubitus ulcers[ZA1.1], chronic pain[ZA1.2] involving the right buttock, stage IV, with chronic osteomyelitis[ZA1.1].[ZA1.2] The patient had debridement of this ulcer by Dr. Pierre Cazares on 02/05/2018.  She has had wound infection in the past and has a history of MDR infections including MRSA, VRE and ESBL in the past at different sites with the last treatment with IV antibiotic, meropenem and daptomycin, which she completed on 03/08/2018.  She is followed at [facility] for this.[ZA1.1]    - Pain control.[ZA1.2]   - Pain team consulted.[ZA1.3]   - Wound care consult.[ZA1.1]   - Discontinued NS at 75cc/h.   - PRN IV and PO dilaudid.   - Scheduled long acting maintenance oxycodone continued.[ZA1.2]  - Discussed with plastic surgery at [facility], they would prefer initial debridement with general surgery if possible. However, could consider vascular surgery consultation if indicated by General surgery as possible benefit to contribute in this setting.[ZA1.4]     Left  plantar heel wound: Details noted in wound care.   - Podiatry consult.   - Wound nurse following.[ZA1.5]     Anemia, chronic. The patient has a history of iron deficiency anemia and also her erythropoietin level apparently was low.[ZA1.1]    - Monitor.[ZA1.2]     Recent left comminuted acute subtrochanteric fracture with recent fall. She was evaluated by Orthopedic Surgery and recommended x-ray in 2-3 weeks while she was at [facility]. This was noted in the CT today that was done in the ER.     - Recommend follow up with Orthopedic Surgery after discharge.     History of deep venous thrombosis. The patient is on Xarelto.[ZA1.1]    - Hold Xarelto at this time.   - Restart if no intervention or debridement is planned.[ZA1.2]     Seizure disorder. The patient used to be on Keppra, but it looks like her Keppra is being tapered and she is currently taking 500 in the morning and 250 mg at bedtime, which is to be continued until 04/07 and from 04/08, she will take 250 mg p.o. b.i.d. until 04/21 and then to decrease to 250 mg q.a.m. from 04/22 until 05/05/2018.   - She has now been started on lamotrigine which she will be taking 25 mg p.o. daily for 4 more days, then to increase to 50 mg daily from 04/09/2018 until 04/22/2018.[ZA1.1]    -[ZA1.2] The patient to follow up with Neurology regarding her subsequent dose after 04/22.     Anxiety and depression.[ZA1.1] Stable.[ZA1.2]    - Prior to admission Cymbalta, risperidone and Abilify will be npr and Diazepam will be continued.     Paraplegia.  Apparently this was due to left T4 hematoma about 9 years ago. The patient has neurogenic bladder and bowel and has bilateral nephrostomy tubes and suprapubic catheter.  The nephrostomy tubes were placed on 01/19 to divert urine from decubitus ulcer.   - Left nephrostomy tube was dislodged and was replaced 03/06/2018 per report     Hypothyroidism:[ZA1.1] Patient taking synthroid prior to admission.   -[ZA1.2] Prior to  "admission levothyroxine 125 mg p.o. every day continued.     History of diastolic congestive heart failure.  The patient currently appears euvolemic, no sign of exacerbation noted.[ZA1.1]    - Judicious fluids.[ZA1.2]     History of chronic obstructive pulmonary disease, not on oxygen at baseline.  She is not on any maintenance medication, will have p.r.n. albuterol available while she is here.     Deep venous thrombosis prophylaxis.  This patient is on Xarelto prior to admission, which will be resumed after General Surgery evaluation, if no intervention needed.       CODE:  FULL CODE.      [ZA1.1]  Disposition: Likely few days pending treatment plans and subspecialty recommendations.[ZA1.2] May pursue transfer to [facility] if accepted for transfer.[ZA1.6]     Pain: # Pain Assessment:  Current Pain Score 2018   Patient currently in pain? sleeping: patient not able to self report   Pain score (0-10) -   Pain location -   Pain descriptors -[ZA1.1]   - Marychuy is experiencing pain due to sacrum. Pain management was discussed and the plan was created in a collaborative fashion.  Marychuy's response to the current recommendations: mixed response  - Please see the plan for pain management as documented above[ZA1.2]                   Physical Exam:      Heart Rate: 98    Blood pressure 97/68, pulse 100, temperature 98  F (36.7  C), temperature source Oral, resp. rate 18, height 1.575 m (5' 2\"), weight 78.6 kg (173 lb 4.5 oz), SpO2 96 %.    Vitals:    18 1615 18 0649   Weight: 73.5 kg (162 lb) 78.6 kg (173 lb 4.5 oz)       Vital Sign Ranges  Temperature Temp  Av.6  F (37  C)  Min: 98  F (36.7  C)  Max: 99.5  F (37.5  C)   Blood pressure Systolic (24hrs), Av , Min:87 , Max:124        Diastolic (24hrs), Av, Min:54, Max:85      Pulse Pulse  Av  Min: 100  Max: 100   Respirations Resp  Av.2  Min: 16  Max: 18   Pulse oximetry SpO2  Av.8 %  Min: 94 %  Max: 97 %     Vital Signs " with Ranges  Temp:  [98  F (36.7  C)-99.5  F (37.5  C)] 98  F (36.7  C)  Pulse:  [100] 100  Heart Rate:  [87-98] 98  Resp:  [16-18] 18  BP: ()/(54-85) 97/68  SpO2:  [94 %-97 %] 96 %    I/O Last 3 Shifts:   I/O last 3 completed shifts:  In: 702 [I.V.:702]  Out: 805 [Urine:805]    I/O past 24 hours:     Intake/Output Summary (Last 24 hours) at 04/05/18 0858  Last data filed at 04/05/18 0623   Gross per 24 hour   Intake              702 ml   Output              805 ml   Net             -103 ml     GENERAL: Alert and oriented. NAD. Conversational, appropriate.   HEENT: Normocephalic. EOMI. No icterus or injection. Nares normal.   LUNGS: Clear to auscultation. No dyspnea at rest.   HEART: Regular rate. Extremities perfused.   ABDOMEN: Soft, nontender, and nondistended. Positive bowel sounds. suprapubic catheter in place. percutaneous nephrostomy tubes on both sides  EXTREMITIES: LE edema noted.[ZA1.1] Sacral wound.[ZA1.2]   NEUROLOGIC:[ZA1.1] Paraplegic[ZA1.2].[ZA1.1] Follows commands.[ZA1.2]          Prior to Admission Medications:        Prescriptions Prior to Admission   Medication Sig Dispense Refill Last Dose     mineral oil-hydrophilic petrolatum (AQUAPHOR) Apply topically daily Apply to lower extremities for skin irritation.   4/4/2018 at am     ARIPIPRAZOLE PO Take 5 mg by mouth every morning   4/4/2018 at am     aspirin 81 MG chewable tablet Take 81 mg by mouth daily   4/4/2018 at am     DULoxetine HCl (CYMBALTA PO) Take 60 mg by mouth every morning   4/4/2018 at am     HYDROXYZINE HCL PO Take 50 mg by mouth At Bedtime   4/3/2018 at pm     LAMOTRIGINE PO Take 25 mg by mouth every morning X 4 more days then increase to 50mg daily from 4/9/2018-4/22/2018.   4/4/2018 at am     LevETIRAcetam (KEPPRA PO) Take 250 mg by mouth every evening Until 4/7/2018 then decrease to 250mg BID 4/8/2018-4/21/2018, then decrease to 250mg QAM 4/22-5/5/2018   4/3/2018 at pm     LevETIRAcetam (KEPPRA PO) Take 500 mg by mouth  every morning Until 4/7/2018 then decrease to 250mg BID 4/8/2018-4/21/2018, then decrease to 250mg QAM 4/22-5/5/2018   4/3/2018 at am     LEVOTHYROXINE SODIUM PO Take 125 mcg by mouth every morning   4/4/2018 at 0600     MELATONIN PO Take 10 mg by mouth At Bedtime   4/3/2018 at pm     OMEPRAZOLE PO Take 20 mg by mouth daily (with breakfast)   4/4/2018 at 0800     Potassium Chloride [place] ER (K-DUR PO) Take 10 mEq by mouth every morning   4/4/2018 at am     Rivaroxaban (XARELTO PO) Take 20 mg by mouth At Bedtime   4/3/2018 at pm     Zolpidem Tartrate (AMBIEN PO) Take 5 mg by mouth At Bedtime   4/3/2018 at hs     DIVALPROEX SODIUM PO Take 250 mg by mouth 2 times daily   4/4/2018 at am x 1 dose     ferrous sulfate (IRON) 325 (65 FE) MG tablet Take 325 mg by mouth 2 times daily   4/4/2018 at am x 1 dose     Nutritional Supplements (NUTRITIONAL SUPPLEMENT PO) Take 1 packet by mouth 2 times daily (Deven Powder)   Past Week     miconazole (MICATIN) 2 % AERP powder Apply topically 2 times daily Apply to groin folds   4/4/2018 at am x 1 dose     OxyCODONE HCl (OXYCONTIN PO) Take 20 mg by mouth every 12 hours   4/4/2018 at 0800     senna-docusate (SENOKOT-S;PERICOLACE) 8.6-50 MG per tablet Take 2 tablets by mouth 2 times daily   4/4/2018 at am x 1 dose     GABAPENTIN PO Take 300 mg by mouth 3 times daily   4/4/2018 at x 2 doses     ACETAMINOPHEN PO Take 650 mg by mouth 4 times daily   4/4/2018 at x 2 doses     ACETAMINOPHEN PO Take 650 mg by mouth every 4 hours as needed for pain (May take additional as needed doses. Do not exceed 4000mg in 24 hours.)   Past Week at Unknown time     bisacodyl (DULCOLAX) 10 MG Suppository Place 10 mg rectally daily as needed for constipation   prn med     DIAZEPAM PO Take 5 mg by mouth every 4 hours as needed for anxiety   Past Week at Unknown time     HYDROmorphone HCl (DILAUDID PO) Take 2 mg by mouth every 4 hours as needed for moderate to severe pain   4/4/2018 at 1356     magnesium  hydroxide (MILK OF MAGNESIA) 400 MG/5ML suspension Take 30 mLs by mouth daily as needed for constipation or heartburn   prn med     OXYCODONE HCL PO Take 5-10 mg by mouth every 4 hours as needed (Give 5mg for pain level 4-6 on a 10 point scale. Give 10mg for pain level 6-10 on a 10 point scale.)   prn med            Medications:        Current Facility-Administered Medications   Medication Last Rate     sodium chloride 75 mL/hr at 04/04/18 2338     Current Facility-Administered Medications   Medication Dose Route Frequency     meropenem  1 g Intravenous Q8H     docusate sodium  100 mg Oral BID     ARIPiprazole (ABILIFY) tablet 5 mg  5 mg Oral QAM     aspirin  81 mg Oral Daily     divalproex sodium delayed-release (DEPAKOTE) DR tablet 250 mg  250 mg Oral BID     DULoxetine (CYMBALTA) EC capsule 60 mg  60 mg Oral QAM     ferrous sulfate  325 mg Oral BID     gabapentin (NEURONTIN) capsule 300 mg  300 mg Oral TID     hydrOXYzine  50 mg Oral At Bedtime     lamoTRIgine (LaMICtal) tablet 25 mg  25 mg Oral QAM     levETIRAcetam (KEPPRA) tablet 250 mg  250 mg Oral QPM     levETIRAcetam (KEPPRA) tablet 500 mg  500 mg Oral QAM     levothyroxine (SYNTHROID/LEVOTHROID) tablet 125 mcg  125 mcg Oral QAM     melatonin tablet 10 mg  10 mg Oral At Bedtime     omeprazole (priLOSEC) CR capsule 20 mg  20 mg Oral Daily with breakfast     oxyCODONE (OxyCONTIN) 12 hr tablet 20 mg  20 mg Oral Q12H     potassium chloride SA (K-DUR/KLOR-CON M) CR tablet 10 mEq  10 mEq Oral QAM     [START ON 4/8/2018] levETIRAcetam  250 mg Oral BID     [START ON 4/22/2018] levETIRAcetam  250 mg Oral Daily     [START ON 4/9/2018] lamoTRIgine  50 mg Oral Daily     miconazole   Topical BID     vancomycin (VANCOCIN) IV  1,500 mg Intravenous Q12H     Current Facility-Administered Medications   Medication Dose Route Frequency     naloxone  0.1-0.4 mg Intravenous Q2 Min PRN     acetaminophen  650 mg Oral Q4H PRN     polyethylene glycol  17 g Oral Daily PRN      bisacodyl  10 mg Rectal Daily PRN     ondansetron  4 mg Oral Q6H PRN    Or     ondansetron  4 mg Intravenous Q6H PRN     diazepam (VALIUM) tablet 5 mg  5 mg Oral Q4H PRN     magnesium hydroxide  30 mL Oral Daily PRN     zolpidem (AMBIEN) tablet 5 mg  5 mg Oral At Bedtime PRN     albuterol  2.5 mg Nebulization Q2H PRN     HYDROmorphone  0.2 mg Intravenous Q4H PRN     oxyCODONE IR  5-10 mg Oral Q6H PRN            Data:      Lab data reviewed.     Recent Labs  Lab 04/05/18  0825 04/04/18  1648 04/02/18  2131   HGB 8.7* 10.1* 10.7*   MCV 82 82 82    450 447   INR  --  1.29* 1.15*   NA  --  137 138   POTASSIUM  --  4.0 4.2   CHLORIDE  --  103 101   CO2  --  29 27   BUN  --  8 8   CR  --  0.51* 0.49*   ANIONGAP  --  5 10   NATA  --  7.9* 8.3*   GLC  --  114* 89           Imaging:      Imaging data reviewed.     Dr. Sal Lees D.O.  [facility]  Pager [phone][ZA1.1]       Revision History        User Key Date/Time User Provider Type Action    > ZA1.3 4/5/2018  6:34 PM Sal Lees, DO Physician Addend     ZA1.4 4/5/2018  4:11 PM Sal Lees, DO Physician Addend     ZA1.5 4/5/2018  3:58 PM Sal Lees, DO Physician Addend     ZA1.6 4/5/2018  2:58 PM Sal Lees, DO Physician Addend     ZA1.2 4/5/2018  1:21 PM Sal Lees, DO Physician Sign     ZA1.1 4/5/2018  8:58 AM Sal Lees,  Physician             Progress Notes by Bettie Troncoso, RN at 4/5/2018 12:29 PM     Author:  Bettie Troncoso, RN Service:  [facility] Nurse Author Type:  Registered Nurse    Filed:  4/5/2018  1:25 PM Date of Service:  4/5/2018 12:29 PM Creation Time:  4/5/2018 12:29 PM    Status:  Signed :  Bettie Troncoso, RN (Registered Nurse)         [facility] Nurse Inpatient Adult Pressure Injury (PI) Assessment     Initial Assessment of PI(s) on pt's:   Buttocks[AC1.1], especially right buttock/IT; left plantar heel[AC1.2]     Data:   Patient History:      per MD note(s): HISTORY OF PRESENT ILLNESS:  Marychuy Zhou is a 62-year-old woman with below listed extensive multiple medical problems, especially paraplegia with a stage IV decubitus ulcer in her left buttock; also with other pressure ulcers on her right buttock and left heel with chronic osteomyelitis involving the right ischial tuberosity, who has had multiple hospitalizations at [facility] and debridement and is residing at [facility] currently.        Isaiah Assessment and sub scores:   Isaiah Score  Av.5  Min: 13  Max: 14    Positioning:[AC1.1] Pillows[AC1.2]    Mattress:[AC1.1]  Standard[AC1.2] ,[AC1.1] Atmos Air mattress - Pulsate air mattress ordered today 4-5[AC1.2]      Moisture Management:[AC1.1]  Incontinence Protocol and Diaper and neph tubes[AC1.2]      Current Diet / Nutrition:           Active Diet Order      Combination Diet Regular Diet Adult    Labs:   Recent Labs   Lab Test  18   0825  18   1648   HGB  8.7*  10.1*   INR   --   1.29*   WBC  8.3  11.0   CRP  100.0*   --                                                                                                                         Pressure Injury Assessment  (location):[AC1.1]   Bilateral buttocks, with tunneling wound to right IT area[AC1.2]  Wound History:[AC1.1]   Pt[AC1.2] paraplegic.  S[AC1.3]tates has had for at least 4 months.  Has a wound doctor who visits a couple of times a week.  States has been using a clear barrier paste to periwounds (though has[AC1.2] thick[AC1.3] white paste residue all over buttocks) and wet-to-dry gauze dressings to[AC1.2] rt buttock[AC1.3] wound with sterile water or saline.[AC1.2]      Wound Base:[AC1.1]  1.  Right lower buttock/IT:[AC1.2]  approx[AC1.4] 3 x 5 x 4cm, with various small pockets of tunneled depth of approx. 2-4cm in base of wound.  Outer wound walls are pink-red granular healthy tissue, but deeper in wound is some tan-white smooth  tissue and other subcu tissue that is hard to identify.  Small area of rough bone palpated.[AC1.2]  Moderate serosang drainage, slight creamy.  No real odor.  No pain except 'when they touch the bone with a qtip.'  Periwound red, rough, with scant bleeding to outer buttock shallow abrasions.    2.  Upper sacral area:  Approx. 3 x 10cm area of scattered shallow yellowish wounds with about 0.1-0.2cm depth.  Scant serosang drainage.  Periwound very red, ragged.    3.  Left buttock:  Scattered small wounds in an approx.[AC1.4] 3[AC1.3] x 5cm area, similar to above sacral wounds.  Scant bloody drainage.    Buttocks in general are reddened with scattered rough and raw tissue and shallow wounds.  Groin folds gunky and moist.  Questionable fungal component.[AC1.4]        Pressure Injury Assessment:  Left plantar heel    Wound History:   Present on admission, unknown how long has been present, pt had her lunch arriving and wanted to refuse assessment, but WOC able to see area briefly.      Specific Dimensions (length x width x depth, in cm) :[AC1.2]   Approx. 2 x 2 x ?cm[AC1.4]    Tunneling:[AC1.2]  N/A[AC1.4]    Undermining:[AC1.2] N/A[AC1.4]    Wound Base:[AC1.2] brownish semi-moist eschar but firm, non-fluctuant.  Edges with narrow slivers of moist pink[AC1.4]-white[AC1.3] tissue.[AC1.4]      Palpation of the wound bed:[AC1.2]  firm[AC1.4]    Slough appearance:[AC1.2]  adherent[AC1.4]    Eschar appearance:[AC1.2]  brown[AC1.4]    Periwound Skin:[AC1.2] erythema[AC1.4]    Drainage:[AC1.2]  Moderate old bloody drainage on old dressing[AC1.4]    Odor:[AC1.2] moderate[AC1.4]    Pain:[AC1.2]  absent      S/p[AC1.4] catheter[AC1.5]:  peritube skin red, crusty, yeasty.[AC1.4]             Intervention:     Patient's chart evaluated.      Isaiah Interventions:  Current Isaiah Interventions and Care Plan reviewed and updated, appropriate at this time.[AC1.1]    Wounds assessed, cleansed, partially redressed[AC1.5]    Orders[AC1.1]  "written, but these may change as will need to see what Surgery plan is[AC1.5]    Supplies[AC1.1]  gathered and floor stock[AC1.5]    Discussed plan of care with[AC1.1] Patient and Nurse[AC1.5]           Assessment:     Pressure Injury (PI) located on[AC1.1] right buttock[AC1.5]:[AC1.1] Stage 4, present on admission.  Outer wound appears healthy but there is bone exposed in the base.  Minimal necrotic tissue noted but unclear what is happening deeper in wound.  Await Surgery eval and recs.  If no debridement, would recommend continuing wet-to-dry dressings but with Dakin's solution, to help debride and provide antimicrobial agent.  Pt does not want to change her current plan of saline wet-to-dry at this point but is willing to talk with Surgery.      Rest of buttocks:  Scattered Stage 2 and Stage 3 shallow pressure injuries, present on admission, no s/s infection but skin everywhere is rough and slightly raw.  Would recommend Triad paste if pt allows, as this will help treat the actual wounds; otherwise continue with barrier paste as pt has been doing.      Left heel:  Unstageable PI, present on admission.  Pt refusing to discuss this wound at [facility] visit as wanted to get to her lunch before anyone else came to see her.[AC1.5]   Wound seem[AC1.3]s non-fluctuant but has some odor - may benefit from Podiatry consult if Surgery does not address this wound.      Pt should be on air mattress - ordered today.[AC1.5]         Plan:     Nursing to notify the Provider(s) and re-consult the [facility] Nurse if wound(s)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

## 2018-04-04 NOTE — ED PROVIDER NOTES
History     Chief Complaint:  Wound Check     HPI   Marychuy Zhou is a 62 year old female with a history of DVT, epilepsy, chronic pain, and chronic wounds, anticoagulated on Xarelto, who presents via EMS for a wound check. The patient was seen here yesterday in the Emergency Department for evaluation of a pelvic pain, which was related to her chronic decubitus ulcers. At that point, the patient had various labs done that were negative for signs of sepsis. The patient has a history of chronic UTIs and has been treated with Meropenem via PICC line in the past for osteomyelitis. The patient usually is seen at [facility], and she was recently discharged on 03/28, at which point they discontinued the antibiotics in the PICC line. The patient is currently residing at [facility] and mentioned that she came here tonight because the pain was unbearable and she did not feel like she could take the bumps in the road all of the way to [facility]. The patient does get wound care every morning and evening. She denies fever. She has been getting good urinary output from her suprapubic and nephrostomy tubes.     Lab results from EMERGENCY DEPARTMENT visit 04/03:   CBC: WBC 10.7, HGB 10.7,   BMP: AWNL Creatinine 0.49 (L)  INR: 1.15 (H)    CT scan from 03/27/18:  1.  No retroperitoneal hemorrhage.  2.  Bilateral percutaneous nephrostomies.  3.  Splenomegaly.  4.  Right ishial decubitus ulcer with tuberosity osteomyelitis.  5.  Comminuted left proximal femur fracture with improved adjacent hemorrhage    Allergies:  No known drug allergies      Medications:    Oxybutynin   Valproic acid  Aspirin  Percolace  Gabapentin  Prilosec            Valium                 Cymbalta  Keppra   Abilify                                           Ambien                                                                                                              Lamotrigine                                                                             "            Potassium chloride                         Xarelto                   Levothyroxine  Ferrous sulfate     Past Medical History:    Anxiety  Chronic Pain Syndrome  Closed fracture zygoma, with routine healing  COPD  Depressive disorder  DVT in pregnancy  Edema  Epilepsy  Flaccid neuropathic bladder  Fracture of femur  Hypothyroidism  Iron deficiency anemia  Paraplegia  Pressure ulcer of sacral region  PTSD  Rheumatoid arthritis  Sepsis    Past Surgical History:    History reviewed. No pertinent surgical history.     Family History:    History reviewed. No pertinent family history.      Social History:  Smoking status: Never smoker  Alcohol use: No   Marital Status:  Single [1]     Review of Systems   Constitutional: Negative for chills and fever.   Gastrointestinal: Negative for nausea and vomiting.   Genitourinary: Positive for pelvic pain. Negative for frequency and hematuria.   Skin: Positive for color change and wound.   All other systems reviewed and are negative.    Physical Exam     Patient Vitals for the past 24 hrs:   BP Temp Temp src Heart Rate Resp SpO2 Height Weight   04/04/18 1615 124/85 98  F (36.7  C) Oral 94 16 97 % 1.575 m (5' 2\") 73.5 kg (162 lb)      Physical Exam   General: Alert and interactive. Appears well nourished. Chronically ill appearing.   ENT: The pupils are equal and round. EOMs intact. No scleral icterus. No erythema of the posterior oropharynx.     CV: Regular rate and rhythm. S1 and S2 normal without murmur, click, gallop or rub. 2+ pitting edema bilaterally.   Resp: Breathing comfortably. No respiratory distress.    GI: Abdomen is soft without distension. Minimal suprapubic tenderness. No peritoneal signs.    MS: Moving all extremities well.   Skin:  Chronic decubitus ulcers, as pictured below.        Neuro: Alert and oriented x 3. GCS 15.    Psych: Awake. Alert. Patient is irritable but not inappropriate.   Lymph: No anterior or posterior cervical lymphadenopathy " noted.    Emergency Department Course   Imaging:  Radiographic findings were communicated with the patient who voiced understanding of the findings.    CT Abdomen Pelvis w Contrast:  1. Ulcer extending down to the right ischial tuberosity with erosive  changes suspicious for osteomyelitis.  2. Nonunited left subtrochanteric fracture.  3. Bilateral nephrostomy tubes in place. No evidence of hydronephrosis  or urinary tract stones.  3. Suprapubic catheter in place in the bladder.  As read by Radiology.     Laboratory:  Lactic acid: 2.5 (H)  CBC: HGB 10.1 (L), o/w WNL (WBC 11.0, )   BMP: Glucose 114 (H), Creatinine 0.51 (L), Calcium 7.9 (L), o/w WNL   INR: 1.29 (H)  Valproic acid: 42 (L)    Lamotrigine Level: in process  Blood cultures: in process    Interventions:  1833: NS 1L IV Bolus   1834: 10 mg Oxycodone PO  2044: 0.5 mg Dilaudid IV    Emergency Department Course:  The patient arrived in the emergency department via EMS.  Past medical records, nursing notes, and vitals reviewed.  0500: I performed an exam of the patient and obtained history, as documented above.   IV inserted and blood drawn.  The patient was sent for an abdominal/pelvic CT while in the emergency department, findings above.     I rechecked the patient. Explained findings to the patient.  I spoke with Dr. Borrero of ID, who recommended inpatient surgical consult.     1907: I spoke to Dr. Arcos of the hospitalist service.     1915: Dr. Arcos and I spoke with the patient, who would prefer to be transferred to [facility]. [facility] is on divert to [facility], and when I discussed this with the patient, she refused transfer.     Findings and plan explained to the Patient who consents to admission.     I spoke again to Dr. Arcos. Discussed the patient with Dr. Arcos, who will admit the patient to a medicine bed for further monitoring, evaluation, and treatment.      Impression & Plan    Medical Decision Making: Marychuy Zhou is a 62 year old  female with a complicated past medical history including T10 paraplegia and a history of sepsis, who presents to the Emergency Department for evaluation of a wound check on her decubitus ulcers. The patient gets all of her care at [facility] and was recently discharged from there to [facility]. On exam, the patient has decubitus ulcers on her bottom, extending down to the bone. Repeat CT scan showed further signs of osteomyelitis. Patient's WBC is elevated from yesterday, while still WNL. Lactic acid was 2.5, and with worsening pain and sign of an infection, I felt as if admission to the hospital was necessary.     I discussed the patient with Dr. Borrero of infectious disease, who suggested inpatient admission and surgical consult, rather than antibiotic management, which is on par with ID consult from previous [facility] admission. Patient was given a liter of fluid here in the Emergency Department, and it is likely that the patient's elevation in lactic acid is due to dehydration, rather than sepsis. [facility] was on divert to [facility], and thus the patient refused transfer and would like to be seen here. I discussed this with Dr. Arcos of the hospitalist service, who will admit the patient for further evaluation and treatment, including surgical consult. The patient's pain was controlled here in the Emergency Department and her vitals remained stable. She has no other signs of sepsis, but blood cultures are still pending. She will be given antibiotics pending the return of her blood cultures. The patient had no further questions prior to admission.     Diagnosis:  Wound infection  Paraplegia    Disposition: Admitted to medical floor     I, Kathy Barrera PA-C, interviewed the patient, explained the course of action and discussed the patient with Dr. Mcghee, who then evaluated the patient.    Pavithra Wright  4/4/2018    EMERGENCY DEPARTMENT    I, Pavithra Wright, am serving as a scribe at 5:00 PM on 4/4/2018  to document services personally performed by Kathy Barrera PA-C, based on my observations and the provider's statements to me.         Kathy Barrera PA-C  04/04/18 1667

## 2018-04-04 NOTE — IP AVS SNAPSHOT
"` `     [place] [address] [facility]: [phone]                                              INTERAGENCY TRANSFER FORM - NURSING   2018                    Hospital Admission Date: 2018  SHALINI CHONG   : 1955  Sex: Female        Attending Provider: Melo Arcos MD     Allergies:  No Known Allergies    Infection:  None   Service:  HOSPITALIST    Ht:  1.575 m (5' 2\")   Wt:  78.6 kg (173 lb 4.5 oz)   Admission Wt:  73.5 kg (162 lb)    BMI:  31.69 kg/m 2   BSA:  1.85 m 2            Patient PCP Information     Provider PCP Type    Mellisa Haji MD General      Current Code Status     Date Active Code Status Order ID Comments User Context       Prior      Code Status History     Date Active Date Inactive Code Status Order ID Comments User Context    2018 12:58 PM  Full Code [id number]  Ambar Lopez MD Outpatient    2018 10:08 PM 2018 12:58 PM Full Code [id number]  Melo Arcos MD Inpatient      Advance Directives        Scanned docmt in ACP Activity?           No scanned doc        Hospital Problems as of 2018              Priority Class Noted POA    Osteomyelitis (H) Medium  2018 Yes      Non-Hospital Problems as of 2018     None      Immunizations     Name Date      Influenza (High Dose) 3 valent vaccine 10/16/17          END      ASSESSMENT     Discharge Profile Flowsheet     EXPECTED DISCHARGE     Skin WDL  ex 18 0311    Expected Discharge Date  18 (NH) 18 1515   Skin Color/Characteristics  bruised (ecchymotic) 18 0311    GASTROINTESTINAL (ADULT,PEDIATRIC,OB)     Skin Temperature  warm 18 0311    GI WDL  ex 18 0311   Skin Moisture  flaky;dry 18 0311    GI Signs/Symptoms  constipation 18 0311   Skin Elasticity  quick return to original state 18 031    Passing flatus  yes 18 031   Skin Integrity  bruise(s);drain/device(s);scab(s);wound(s) 18 031    COMMUNICATION " "ASSESSMENT     SAFETY      Patient's communication style  spoken language (English or Bilingual) 04/04/18 1648   Safety WDL  WDL 04/07/18 0311    SKIN     Safety Factors  upper side rails raised x 2, lower side rail raised x 1 04/07/18 0311    Inspection of bony prominences  Full 04/07/18 0311   All Alarms  alarm(s) activated and audible 04/07/18 0311    Inspection under devices  Full 04/07/18 0311                      Assessment WDL (Within Defined Limits) Definitions           Safety WDL     Effective: 09/28/15    Row Information: <b>WDL Definition:</b> Bed in low position, wheels locked; call light in reach; upper side rails up x 2; ID band on<br> <font color=\"gray\"><i>Item=AS safety wdl>>List=AS safety wdl>>Version=F14</i></font>      Skin WDL     Effective: 09/28/15    Row Information: <b>WDL Definition:</b> Warm; dry; intact; elastic; without discoloration; pressure points without redness<br> <font color=\"gray\"><i>Item=AS skin wdl>>List=AS skin wdl>>Version=F14</i></font>      Vitals     Vital Signs Flowsheet     VITAL SIGNS     Pain Management Interventions  analgesia administered 04/06/18 0155    Temp  98.1  F (36.7  C) 04/07/18 0844   Pain Intervention(s)  Medication (See eMAR) 04/07/18 1138    Temp src  Axillary 04/07/18 0844   Response to Interventions  Absence of nonverbal indicators of pain 04/07/18 0701    Resp  16 04/07/18 1138   ANALGESIA SIDE EFFECTS MONITORING      Pulse  96 04/06/18 0217   Side Effects Monitoring: Respiratory Quality  R 04/07/18 1138    Heart Rate  110 04/07/18 0844   Side Effects Monitoring: Respiratory Depth  N 04/07/18 1138    Pulse/Heart Rate Source  Monitor 04/07/18 0844   Side Effects Monitoring: Sedation Level  1 04/07/18 1138    BP  97/60 04/07/18 0844   HEIGHT AND WEIGHT      BP Location  Right arm 04/07/18 0844   Height  1.575 m (5' 2\") 04/04/18 1615    POSITIONING     Height Method  Stated 04/04/18 1615    Body Position  other (see comments) (PATIENT REFUSED " "REPOSTIONING.) 04/07/18 1112   Weight  78.6 kg (173 lb 4.5 oz) 04/05/18 0701    Head of Bed (HOB)  HOB at 20-30 degrees 04/07/18 0628   Weight Method  Bed scale 04/05/18 0701    Positioning/Transfer Devices  pillows;in use 04/07/18 1112   BSA (Calculated - sq m)  1.79 04/04/18 1615    OXYGEN THERAPY     BMI (Calculated)  29.69 04/04/18 1615    SpO2  90 % 04/07/18 0844   GEREMIAS COMA SCALE      O2 Device  None (Room air) 04/07/18 0844   Best Eye Response  4-->(E4) spontaneous 04/07/18 0136    PAIN/COMFORT     Best Motor Response  6-->(M6) obeys commands 04/07/18 0136    Patient Currently in Pain  sleeping: patient not able to self report 04/07/18 0701   Best Verbal Response  4-->(V4) confused 04/07/18 0136    Preferred Pain Scale  number (Numeric Rating Pain Scale) 04/07/18 0627   [place] Coma Scale Score  14 04/07/18 0136    Patient's Stated Pain Goal  No pain 04/07/18 0627   DAILY CARE      0-10 Pain Scale  9 04/07/18 1138   Activity Management  bedrest 04/07/18 0311    Pain Location  Buttocks 04/07/18 1138   Activity Assistance Provided  assistance, 2 people 04/07/18 0311    Pain Orientation  Right 04/07/18 0627   Assistive Device Utilized  mechanical lift 04/07/18 0606    Pain Descriptors  Other (comment) (\"deep\") 04/07/18 1138   Additional Documentation  Activity Device Assistance (Row) 04/05/18 0459            Patient Lines/Drains/Airways Status    Active LINES/DRAINS/AIRWAYS     Name: Placement date: Placement time: Site: Days: Last dressing change:    Nephrostomy Left 04/04/18 2056    2     Nephrostomy Right 04/04/18 2057    2     Suprapubic Catheter 04/04/18 2057      2     Wound 04/04/18 Posterior Coccyx Suspected pressure ulcer 04/04/18 2039   Coccyx   2     Wound 04/05/18 Left Heel Suspected pressure ulcer 04/05/18   1000   Heel   2             Patient Lines/Drains/Airways Status    Active PICC/CVC     None            Intake/Output Detail Report     Date Intake     Output Net    Shift P.O. I.V. IV " Piggyback Total Urine Total       Noc 04/05/18 2300 - 04/06/18 0659 -- -- -- -- 1275 1275 -1275    Day 04/06/18 0700 - 04/06/18 1459 400 -- -- 400 800 800 -400    Elizabeth 04/06/18 1500 - 04/06/18 2259 480 -- -- 480 -- -- 480    Noc 04/06/18 2300 - 04/07/18 0659 -- -- -- -- 1325 1325 -1325    Day 04/07/18 0700 - 04/07/18 1459 -- -- -- -- 475 475 -475      Case Management/Discharge Planning     Case Management/Discharge Planning Flowsheet     LIVING ENVIRONMENT     ABUSE RISK SCREEN      Lives With  facility resident 04/05/18 0431   QUESTION TO PATIENT:  Has a member of your family or a partner(now or in the past) intimidated, hurt, manipulated, or controlled you in any way?  no 04/04/18 1650    COPING/STRESS     QUESTION TO PATIENT: Do you feel safe going back to the place where you are living?  yes 04/04/18 1650    Major Change/Loss/Stressor  hospitalization;medical condition/diagnosis 04/05/18 0434   OBSERVATION: Is there reason to believe there has been maltreatment of a vulnerable adult (ie. Physical/Sexual/Emotional abuse, self neglect, lack of adequate food, shelter, medical care, or financial exploitation)?  no 04/04/18 1650    EXPECTED DISCHARGE     OTHER      Expected Discharge Date  04/07/18 (NH) 04/06/18 1515   Are you depressed or being treated for depression?  No (per records) 04/05/18 0430

## 2018-04-04 NOTE — IP AVS SNAPSHOT
"    [place] [address] [facility]: [phone]                                              INTERAGENCY TRANSFER FORM - LAB / IMAGING / EKG / EMG RESULTS   2018                    Hospital Admission Date: 2018  SHALINI CHONG   : 1955  Sex: Female        Attending Provider: Melo Arcos MD     Allergies:  No Known Allergies    Infection:  None   Service:  HOSPITALIST    Ht:  1.575 m (5' 2\")   Wt:  78.6 kg (173 lb 4.5 oz)   Admission Wt:  73.5 kg (162 lb)    BMI:  31.69 kg/m 2   BSA:  1.85 m 2            Patient PCP Information     Provider PCP Type    Mellisa Haji MD General         Lab Results - 3 Days      Basic metabolic panel [[id number]] (Abnormal)  Resulted: 18 0926, Result status: Final result    Ordering provider: Ambar Lopez MD  18 0000 Resulting lab: [facility]    Specimen Information    Type Source Collected On   Blood  18 0835          Components       Value Reference Range Flag Lab   Sodium 139 133 - 144 mmol/L  FrStHsLb   Potassium 4.2 3.4 - 5.3 mmol/L  FrStHsLb   Chloride 104 94 - 109 mmol/L  FrStHsLb   Carbon Dioxide 29 20 - 32 mmol/L  FrStHsLb   Anion Gap 6 3 - 14 mmol/L  FrStHsLb   Glucose 122 70 - 99 mg/dL H FrStHsLb   Urea Nitrogen 5 7 - 30 mg/dL L FrStHsLb   Creatinine 0.40 0.52 - 1.04 mg/dL L FrStHsLb   GFR Estimate >90 >60 mL/min/1.7m2  FrStHsLb   Comment:  Non  GFR Calc   GFR Estimate If Black >90 >60 mL/min/1.7m2  FrStHsLb   Comment:  African American GFR Calc   Calcium 8.0 8.5 - 10.1 mg/dL L FrStHsLb            CBC with platelets [[id number]] (Abnormal)  Resulted: 18 0910, Result status: Final result    Ordering provider: Ambar Lopez MD  18 0000 Resulting lab: [facility]    Specimen Information    Type Source Collected On   Blood  18 0835          Components       Value Reference Range Flag Lab   WBC 6.1 4.0 - 11.0 10e9/L  FrStHsLb   RBC Count " 3.63 3.8 - 5.2 10e12/L L FrStHsLb   Hemoglobin 9.1 11.7 - 15.7 g/dL L FrStHsLb   Hematocrit 30.2 35.0 - 47.0 % L FrStHsLb   MCV 83 78 - 100 fl  FrStHsLb   MCH 25.1 26.5 - 33.0 pg L FrStHsLb   MCHC 30.1 31.5 - 36.5 g/dL L FrStHsLb   RDW 19.3 10.0 - 15.0 % H FrStHsLb   Platelet Count 301 150 - 450 10e9/L  FrStHsLb            Blood culture [[id number]]  Resulted: 04/07/18 0720, Result status: Preliminary result    Ordering provider: Kathy Barrera PA-C  04/04/18 1838 Resulting lab: [facility]    Specimen Information    Type Source Collected On   Blood Arm, Right 04/04/18 1648   Comment:  Right Arm          Components       Value Reference Range Flag Lab   Specimen Description Blood Right Arm      Special Requests Aerobic and anaerobic bottles received   FrStHsLb   Culture Micro No growth after 3 days   75            Blood culture [[id number]]  Resulted: 04/07/18 0720, Result status: Preliminary result    Ordering provider: Pierre Mcghee MD  04/04/18 1841 Resulting lab: [facility]    Specimen Information    Type Source Collected On   Blood  04/04/18 1930   Comment:  Left Arm          Components       Value Reference Range Flag Lab   Specimen Description Blood Left Arm      Culture Micro No growth after 3 days   75            Vancomycin level [[id number]]  Resulted: 04/06/18 1158, Result status: Final result    Ordering provider: Melo Arcos MD  04/06/18 0500 Resulting lab: [facility]    Specimen Information    Type Source Collected On   Blood  04/06/18 1130          Components       Value Reference Range Flag Lab   Vancomycin Level 22.6 mg/L  FrStHsLb   Comment:         Traditional Dosing therapeutic Range:         Trough 8-20 mg/L         Peak 20-50 mg/L              Basic metabolic panel [[id number]] (Abnormal)  Resulted: 04/06/18 0844, Result status: Final result    Ordering provider: Sal Lees DO  04/06/18  0000 Resulting lab: [facility]    Specimen Information    Type Source Collected On   Blood  04/06/18 0820          Components       Value Reference Range Flag Lab   Sodium 139 133 - 144 mmol/L  FrStHsLb   Potassium 4.2 3.4 - 5.3 mmol/L  FrStHsLb   Chloride 106 94 - 109 mmol/L  FrStHsLb   Carbon Dioxide 27 20 - 32 mmol/L  FrStHsLb   Anion Gap 6 3 - 14 mmol/L  FrStHsLb   Glucose 95 70 - 99 mg/dL  FrStHsLb   Urea Nitrogen 5 7 - 30 mg/dL L FrStHsLb   Creatinine 0.46 0.52 - 1.04 mg/dL L FrStHsLb   GFR Estimate >90 >60 mL/min/1.7m2  FrStHsLb   Comment:  Non  GFR Calc   GFR Estimate If Black >90 >60 mL/min/1.7m2  FrStHsLb   Comment:  African American GFR Calc   Calcium 7.9 8.5 - 10.1 mg/dL L FrStHsLb            CBC with platelets [[id number]] (Abnormal)  Resulted: 04/06/18 0835, Result status: Final result    Ordering provider: Sal Lees DO  04/06/18 0000 Resulting lab: [facility]    Specimen Information    Type Source Collected On   Blood  04/06/18 0820          Components       Value Reference Range Flag Lab   WBC 6.9 4.0 - 11.0 10e9/L  FrStHsLb   RBC Count 3.74 3.8 - 5.2 10e12/L L FrStHsLb   Hemoglobin 9.4 11.7 - 15.7 g/dL L FrStHsLb   Hematocrit 30.8 35.0 - 47.0 % L FrStHsLb   MCV 82 78 - 100 fl  FrStHsLb   MCH 25.1 26.5 - 33.0 pg L FrStHsLb   MCHC 30.5 31.5 - 36.5 g/dL L FrStHsLb   RDW 19.6 10.0 - 15.0 % H FrStHsLb   Platelet Count 313 150 - 450 10e9/L  FrStHsLb            Lamotrigine Level [[id number]] (Abnormal)  Resulted: 04/06/18 0020, Result status: Final result    Ordering provider: Kathy Barrera PA-C  04/04/18 1648 Resulting lab: [facility]    Specimen Information    Type Source Collected On     04/04/18 1648          Components       Value Reference Range Flag Lab   Lamotrigine Level 1.7 2.5 - 15.0 ug/mL L FrStLb   Comment:         (Note)  INTERPRETIVE INFORMATION:  Lamotrigine  Therapeutic Range:  2.5-15.0 ug/mL              Toxic:  Not well established  Pharmacokinetics varies widely, particularly with   co-medications and/or compromised renal function.  Adverse   effects may include dizziness, somnolence, nausea and   vomiting.  Performed by [facility],  [address] [place] [id number] [phone]  [email or web address], Jac Rodgers MD, Lab. Director              Procalcitonin [[id number]]  Resulted: 04/05/18 0939, Result status: Final result    Ordering provider: Melo Arcos MD  04/04/18 2148 Resulting lab: [facility]    Specimen Information    Type Source Collected On   Blood  04/04/18 0825          Components       Value Reference Range Flag Lab   Procalcitonin <0.05 ng/ml  FrStHsLb   Comment:         <0.05 ng/ml  Normal  Recommendation: Very low risk of bacterial infection.   Discourage antibiotics unless strong clinical suspicion for serious infection.              Basic metabolic panel [[id number]] (Abnormal)  Resulted: 04/05/18 0909, Result status: Final result    Ordering provider: Melo Arcos MD  04/05/18 0001 Resulting lab: [facility]    Specimen Information    Type Source Collected On   Blood  04/05/18 0825          Components       Value Reference Range Flag Lab   Sodium 136 133 - 144 mmol/L  FrStHsLb   Potassium 3.8 3.4 - 5.3 mmol/L  FrStHsLb   Chloride 103 94 - 109 mmol/L  FrStHsLb   Carbon Dioxide 27 20 - 32 mmol/L  FrStHsLb   Anion Gap 6 3 - 14 mmol/L  FrStHsLb   Glucose 91 70 - 99 mg/dL  FrStHsLb   Urea Nitrogen 4 7 - 30 mg/dL L FrStHsLb   Creatinine 0.40 0.52 - 1.04 mg/dL L FrStHsLb   GFR Estimate >90 >60 mL/min/1.7m2  FrStHsLb   Comment:  Non  GFR Calc   GFR Estimate If Black >90 >60 mL/min/1.7m2  FrStHsLb   Comment:  African American GFR Calc   Calcium 7.6 8.5 - 10.1 mg/dL L FrStHsLb            CRP inflammation [[id number]] (Abnormal)  Resulted: 04/05/18 0909, Result status: Final result    Ordering provider: Melo Arcos MD  04/05/18 0825  Resulting lab: [facility]    Specimen Information    Type Source Collected On     04/05/18 0825          Components       Value Reference Range Flag Lab   CRP Inflammation 100.0 0.0 - 8.0 mg/L H FrStHsLb            Erythrocyte sedimentation rate auto [[id number]] (Abnormal)  Resulted: 04/05/18 0855, Result status: Final result    Ordering provider: Melo Arcos MD  04/04/18 2148 Resulting lab: [facility]    Specimen Information    Type Source Collected On   Blood  04/04/18 0825          Components       Value Reference Range Flag Lab   Sed Rate 62 0 - 30 mm/h H FrStHsLb            CBC with platelets differential [[id number]] (Abnormal)  Resulted: 04/05/18 0848, Result status: Final result    Ordering provider: Melo Arcos MD  04/05/18 0001 Resulting lab: [facility]    Specimen Information    Type Source Collected On   Blood  04/05/18 0825          Components       Value Reference Range Flag Lab   WBC 8.3 4.0 - 11.0 10e9/L  FrStHsLb   RBC Count 3.50 3.8 - 5.2 10e12/L L FrStHsLb   Hemoglobin 8.7 11.7 - 15.7 g/dL L FrStHsLb   Hematocrit 28.8 35.0 - 47.0 % L FrStHsLb   MCV 82 78 - 100 fl  FrStHsLb   MCH 24.9 26.5 - 33.0 pg L FrStHsLb   MCHC 30.2 31.5 - 36.5 g/dL L FrStHsLb   RDW 19.6 10.0 - 15.0 % H FrStHsLb   Platelet Count 342 150 - 450 10e9/L  FrStHsLb   Diff Method Automated Method   FrStHsLb   % Neutrophils 65.8 %  FrStHsLb   % Lymphocytes 13.3 %  FrStHsLb   % Monocytes 9.8 %  FrStHsLb   % Eosinophils 10.4 %  FrStHsLb   % Basophils 0.5 %  FrStHsLb   % Immature Granulocytes 0.2 %  FrStHsLb   Nucleated RBCs 0 0 /100  FrStHsLb   Absolute Neutrophil 5.4 1.6 - 8.3 10e9/L  FrStHsLb   Absolute Lymphocytes 1.1 0.8 - 5.3 10e9/L  FrStHsLb   Absolute Monocytes 0.8 0.0 - 1.3 10e9/L  FrStHsLb   Absolute Eosinophils 0.9 0.0 - 0.7 10e9/L H FrStHsLb   Absolute Basophils 0.0 0.0 - 0.2 10e9/L  FrStHsLb   Abs Immature Granulocytes 0.0 0 - 0.4 10e9/L  FrStHsLb   Absolute  Nucleated RBC 0.0   FrStHsLb            Valproic acid [[id number]] (Abnormal)  Resulted: 04/04/18 1900, Result status: Final result    Ordering provider: Kathy Barrera PA-C  04/04/18 1648 Resulting lab: [facility]    Specimen Information    Type Source Collected On     04/04/18 1648          Components       Value Reference Range Flag Lab   Valproic Acid Level 42 50 - 100 mg/L L FrStHsLb            INR [[id number]] (Abnormal)  Resulted: 04/04/18 1859, Result status: Final result    Ordering provider: Kathy Barrera PA-C  04/04/18 1648 Resulting lab: [facility]    Specimen Information    Type Source Collected On     04/04/18 1648          Components       Value Reference Range Flag Lab   INR 1.29 0.86 - 1.14 H FrStHsLb            Basic metabolic panel [[id number]] (Abnormal)  Resulted: 04/04/18 1723, Result status: Final result    Ordering provider: Kathy Barrera PA-C  04/04/18 1653 Resulting lab: [facility]    Specimen Information    Type Source Collected On   Blood  04/04/18 1648          Components       Value Reference Range Flag Lab   Sodium 137 133 - 144 mmol/L  FrStHsLb   Potassium 4.0 3.4 - 5.3 mmol/L  FrStHsLb   Chloride 103 94 - 109 mmol/L  FrStHsLb   Carbon Dioxide 29 20 - 32 mmol/L  FrStHsLb   Anion Gap 5 3 - 14 mmol/L  FrStHsLb   Glucose 114 70 - 99 mg/dL H FrStHsLb   Urea Nitrogen 8 7 - 30 mg/dL  FrStHsLb   Creatinine 0.51 0.52 - 1.04 mg/dL L FrStHsLb   GFR Estimate >90 >60 mL/min/1.7m2  FrStHsLb   Comment:  Non  GFR Calc   GFR Estimate If Black >90 >60 mL/min/1.7m2  FrStHsLb   Comment:  African American GFR Calc   Calcium 7.9 8.5 - 10.1 mg/dL L FrStHsLb            Lactic acid [[id number]] (Abnormal)  Resulted: 04/04/18 1707, Result status: Final result    Ordering provider: Kathy Barrera PA-C  04/04/18 1653 Resulting lab: [facility]    Specimen Information    Type Source Collected On    Blood  04/04/18 1648          Components       Value Reference Range Flag Lab   Lactic Acid 2.5 0.4 - 2.0 mmol/L H FrStHsLb            CBC with platelets differential [[id number]] (Abnormal)  Resulted: 04/04/18 1707, Result status: Final result    Ordering provider: Kathy Barrera PA-C  04/04/18 1653 Resulting lab: [facility]    Specimen Information    Type Source Collected On   Blood  04/04/18 1648          Components       Value Reference Range Flag Lab   WBC 11.0 4.0 - 11.0 10e9/L  FrStHsLb   RBC Count 4.04 3.8 - 5.2 10e12/L  FrStHsLb   Hemoglobin 10.1 11.7 - 15.7 g/dL L FrStHsLb   Hematocrit 33.1 35.0 - 47.0 % L FrStHsLb   MCV 82 78 - 100 fl  FrStHsLb   MCH 25.0 26.5 - 33.0 pg L FrStHsLb   MCHC 30.5 31.5 - 36.5 g/dL L FrStHsLb   RDW 19.5 10.0 - 15.0 % H FrStHsLb   Platelet Count 450 150 - 450 10e9/L  FrStHsLb   Diff Method Automated Method   FrStHsLb   % Neutrophils 69.4 %  FrStHsLb   % Lymphocytes 14.9 %  FrStHsLb   % Monocytes 6.0 %  FrStHsLb   % Eosinophils 8.6 %  FrStHsLb   % Basophils 0.6 %  FrStHsLb   % Immature Granulocytes 0.5 %  FrStHsLb   Nucleated RBCs 0 0 /100  FrStHsLb   Absolute Neutrophil 7.6 1.6 - 8.3 10e9/L  FrStHsLb   Absolute Lymphocytes 1.6 0.8 - 5.3 10e9/L  FrStHsLb   Absolute Monocytes 0.7 0.0 - 1.3 10e9/L  FrStHsLb   Absolute Eosinophils 0.9 0.0 - 0.7 10e9/L H FrStHsLb   Absolute Basophils 0.1 0.0 - 0.2 10e9/L  FrStHsLb   Abs Immature Granulocytes 0.1 0 - 0.4 10e9/L  FrStHsLb   Absolute Nucleated RBC 0.0   FrStHsLb            Testing Performed By     Lab - Abbreviation Name Director Address Valid Date Range    14 - FrStHsLb [facility] Unknown [address] [place] [address] 05/08/15 1057 - Present    75 - Unknown [facility] Unknown [address] [facility] [address] 01/15/15 1019 - Present            Unresulted Labs     None         Imaging Results - 3 Days      XR Calcaneus Left G/E 2 Views [[id number]]   Resulted: 04/06/18 1932, Result status: Final result    Ordering provider: Iker Kramer DPM  04/05/18 1726 Resulted by: Yusuf Ureña MD    Performed: 04/05/18 2103 - 04/05/18 2114 Resulting lab: RADIOLOGY RESULTS    Narrative:       LEFT CALCANEUS TWO OR MORE VIEWS   4/5/2018 9:14 PM     HISTORY: Heel ulcer.    COMPARISON: None.      Impression:       IMPRESSION: The bones are severely osteopenic which limits evaluation.  No definite sclerosis is seen of the calcaneus, although given the  marked osteopenia plain film evaluation is severely limited for  possible osteomyelitis.    YUSUF UREÑA MD      CT Abdomen Pelvis w Contrast [[id number]]  Resulted: 04/04/18 2052, Result status: Final result    Ordering provider: Kathy Barrera PA-C  04/04/18 1653 Resulted by: Alonso Thomson MD    Performed: 04/04/18 1750 - 04/04/18 1813 Resulting lab: RADIOLOGY RESULTS    Narrative:       CT ABDOMEN AND PELVIS WITH CONTRAST   4/4/2018 6:13 PM     HISTORY: History of decubitus ulcer, question worsening osteomyelitis.      TECHNIQUE: 81 mL Isovue -370. Radiation dose for this scan was reduced  using automated exposure control, adjustment of the mA and/or kV  according to patient size, or iterative reconstruction technique.    COMPARISON: None.    FINDINGS:   Mild bibasilar atelectasis or fibrosis.    The liver is normal. Previous cholecystectomy. Spleen and pancreas are  normal. No adrenal lesions. Bilateral nephrostomy tubes in place. No  hydronephrosis. No evidence of kidney stones.    There is a suprapubic catheter in place extending into the bladder.    No evidence of diverticulitis. Appendix not definitely visualized but  no inflammatory changes in the right lower quadrant. No dilated bowel.  No free air. No free fluid.    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  There is an ulcer extending down to the right ischial tuberosity with  erosive changes suspicious for osteomyelitis.       Impression:       IMPRESSION:  1. Ulcer extending down to the right ischial tuberosity with erosive  changes suspicious for osteomyelitis.  2. Nonunited left subtrochanteric fracture.  3. Bilateral nephrostomy tubes in place. No evidence of hydronephrosis  or urinary tract stones.  3. Suprapubic catheter in place in the bladder.    DONALD BERNARD MD      Testing Performed By     Lab - Abbreviation Name Director Address Valid Date Range    104 - Rad Rslts RADIOLOGY RESULTS Unknown Unknown 02/16/05 1553 - Present            Encounter-Level Documents:     There are no encounter-level documents.      Order-Level Documents:     There are no order-level documents.

## 2018-04-05 ENCOUNTER — RECORDS - HEALTHEAST (OUTPATIENT)
Dept: ADMINISTRATIVE | Facility: OTHER | Age: 63
End: 2018-04-05

## 2018-04-05 ENCOUNTER — APPOINTMENT (OUTPATIENT)
Dept: GENERAL RADIOLOGY | Facility: CLINIC | Age: 63
DRG: 592 | End: 2018-04-05
Attending: PODIATRIST
Payer: MEDICARE

## 2018-04-05 LAB
ANION GAP SERPL CALCULATED.3IONS-SCNC: 6 MMOL/L (ref 3–14)
BASOPHILS # BLD AUTO: 0 10E9/L (ref 0–0.2)
BASOPHILS NFR BLD AUTO: 0.5 %
BUN SERPL-MCNC: 4 MG/DL (ref 7–30)
CALCIUM SERPL-MCNC: 7.6 MG/DL (ref 8.5–10.1)
CHLORIDE SERPL-SCNC: 103 MMOL/L (ref 94–109)
CO2 SERPL-SCNC: 27 MMOL/L (ref 20–32)
CREAT SERPL-MCNC: 0.4 MG/DL (ref 0.52–1.04)
CRP SERPL-MCNC: 100 MG/L (ref 0–8)
DIFFERENTIAL METHOD BLD: ABNORMAL
EOSINOPHIL # BLD AUTO: 0.9 10E9/L (ref 0–0.7)
EOSINOPHIL NFR BLD AUTO: 10.4 %
ERYTHROCYTE [DISTWIDTH] IN BLOOD BY AUTOMATED COUNT: 19.6 % (ref 10–15)
ERYTHROCYTE [SEDIMENTATION RATE] IN BLOOD BY WESTERGREN METHOD: 62 MM/H (ref 0–30)
GFR SERPL CREATININE-BSD FRML MDRD: >90 ML/MIN/1.7M2
GLUCOSE SERPL-MCNC: 91 MG/DL (ref 70–99)
HCT VFR BLD AUTO: 28.8 % (ref 35–47)
HGB BLD-MCNC: 8.7 G/DL (ref 11.7–15.7)
IMM GRANULOCYTES # BLD: 0 10E9/L (ref 0–0.4)
IMM GRANULOCYTES NFR BLD: 0.2 %
LYMPHOCYTES # BLD AUTO: 1.1 10E9/L (ref 0.8–5.3)
LYMPHOCYTES NFR BLD AUTO: 13.3 %
MCH RBC QN AUTO: 24.9 PG (ref 26.5–33)
MCHC RBC AUTO-ENTMCNC: 30.2 G/DL (ref 31.5–36.5)
MCV RBC AUTO: 82 FL (ref 78–100)
MONOCYTES # BLD AUTO: 0.8 10E9/L (ref 0–1.3)
MONOCYTES NFR BLD AUTO: 9.8 %
NEUTROPHILS # BLD AUTO: 5.4 10E9/L (ref 1.6–8.3)
NEUTROPHILS NFR BLD AUTO: 65.8 %
NRBC # BLD AUTO: 0 10*3/UL
NRBC BLD AUTO-RTO: 0 /100
PLATELET # BLD AUTO: 342 10E9/L (ref 150–450)
POTASSIUM SERPL-SCNC: 3.8 MMOL/L (ref 3.4–5.3)
PROCALCITONIN SERPL-MCNC: <0.05 NG/ML
RBC # BLD AUTO: 3.5 10E12/L (ref 3.8–5.2)
SODIUM SERPL-SCNC: 136 MMOL/L (ref 133–144)
WBC # BLD AUTO: 8.3 10E9/L (ref 4–11)

## 2018-04-05 PROCEDURE — 73650 X-RAY EXAM OF HEEL: CPT | Mod: LT

## 2018-04-05 PROCEDURE — 25000132 ZZH RX MED GY IP 250 OP 250 PS 637: Mod: GY | Performed by: HOSPITALIST

## 2018-04-05 PROCEDURE — 25000128 H RX IP 250 OP 636: Performed by: HOSPITALIST

## 2018-04-05 PROCEDURE — 99222 1ST HOSP IP/OBS MODERATE 55: CPT | Performed by: PODIATRIST

## 2018-04-05 PROCEDURE — 99221 1ST HOSP IP/OBS SF/LOW 40: CPT | Performed by: SURGERY

## 2018-04-05 PROCEDURE — A9270 NON-COVERED ITEM OR SERVICE: HCPCS | Mod: GY | Performed by: HOSPITALIST

## 2018-04-05 PROCEDURE — 85025 COMPLETE CBC W/AUTO DIFF WBC: CPT | Performed by: HOSPITALIST

## 2018-04-05 PROCEDURE — 12000000 ZZH R&B MED SURG/OB

## 2018-04-05 PROCEDURE — 80048 BASIC METABOLIC PNL TOTAL CA: CPT | Performed by: HOSPITALIST

## 2018-04-05 PROCEDURE — 36415 COLL VENOUS BLD VENIPUNCTURE: CPT | Performed by: HOSPITALIST

## 2018-04-05 PROCEDURE — 86140 C-REACTIVE PROTEIN: CPT | Performed by: HOSPITALIST

## 2018-04-05 PROCEDURE — 99232 SBSQ HOSP IP/OBS MODERATE 35: CPT | Performed by: HOSPITALIST

## 2018-04-05 PROCEDURE — G0463 HOSPITAL OUTPT CLINIC VISIT: HCPCS

## 2018-04-05 PROCEDURE — 97602 WOUND(S) CARE NON-SELECTIVE: CPT

## 2018-04-05 RX ORDER — HYDROMORPHONE HYDROCHLORIDE 1 MG/ML
.3-.5 INJECTION, SOLUTION INTRAMUSCULAR; INTRAVENOUS; SUBCUTANEOUS EVERY 4 HOURS PRN
Status: DISCONTINUED | OUTPATIENT
Start: 2018-04-05 | End: 2018-04-05

## 2018-04-05 RX ORDER — HYDROMORPHONE HYDROCHLORIDE 2 MG/1
2-4 TABLET ORAL
Status: DISCONTINUED | OUTPATIENT
Start: 2018-04-05 | End: 2018-04-06

## 2018-04-05 RX ORDER — ASCORBIC ACID 500 MG
500 TABLET ORAL DAILY
Status: DISCONTINUED | OUTPATIENT
Start: 2018-04-05 | End: 2018-04-07 | Stop reason: HOSPADM

## 2018-04-05 RX ORDER — HYDROMORPHONE HYDROCHLORIDE 1 MG/ML
.3-.5 INJECTION, SOLUTION INTRAMUSCULAR; INTRAVENOUS; SUBCUTANEOUS
Status: DISCONTINUED | OUTPATIENT
Start: 2018-04-05 | End: 2018-04-07 | Stop reason: HOSPADM

## 2018-04-05 RX ORDER — OXYCODONE HYDROCHLORIDE 5 MG/1
5-10 TABLET ORAL EVERY 4 HOURS PRN
Status: DISCONTINUED | OUTPATIENT
Start: 2018-04-05 | End: 2018-04-05

## 2018-04-05 RX ORDER — ZINC SULFATE 50(220)MG
220 CAPSULE ORAL DAILY
Status: DISCONTINUED | OUTPATIENT
Start: 2018-04-05 | End: 2018-04-07 | Stop reason: HOSPADM

## 2018-04-05 RX ORDER — HYDROMORPHONE HYDROCHLORIDE 1 MG/ML
0.2 INJECTION, SOLUTION INTRAMUSCULAR; INTRAVENOUS; SUBCUTANEOUS ONCE
Status: DISCONTINUED | OUTPATIENT
Start: 2018-04-05 | End: 2018-04-06

## 2018-04-05 RX ADMIN — GABAPENTIN 300 MG: 300 CAPSULE ORAL at 21:55

## 2018-04-05 RX ADMIN — MEROPENEM 1 G: 1 INJECTION, POWDER, FOR SOLUTION INTRAVENOUS at 11:01

## 2018-04-05 RX ADMIN — MICONAZOLE NITRATE: 2 POWDER TOPICAL at 09:36

## 2018-04-05 RX ADMIN — DIVALPROEX SODIUM 250 MG: 250 TABLET, DELAYED RELEASE ORAL at 21:56

## 2018-04-05 RX ADMIN — OXYCODONE HYDROCHLORIDE 10 MG: 5 TABLET ORAL at 09:30

## 2018-04-05 RX ADMIN — LEVETIRACETAM 250 MG: 250 TABLET, FILM COATED ORAL at 01:37

## 2018-04-05 RX ADMIN — LEVETIRACETAM 250 MG: 250 TABLET, FILM COATED ORAL at 20:14

## 2018-04-05 RX ADMIN — HYDROMORPHONE HYDROCHLORIDE 4 MG: 2 TABLET ORAL at 16:58

## 2018-04-05 RX ADMIN — DIAZEPAM 5 MG: 5 TABLET ORAL at 20:14

## 2018-04-05 RX ADMIN — VANCOMYCIN HYDROCHLORIDE 1500 MG: 5 INJECTION, POWDER, LYOPHILIZED, FOR SOLUTION INTRAVENOUS at 11:43

## 2018-04-05 RX ADMIN — ARIPIPRAZOLE 5 MG: 5 TABLET ORAL at 09:32

## 2018-04-05 RX ADMIN — MICONAZOLE NITRATE: 2 POWDER TOPICAL at 01:37

## 2018-04-05 RX ADMIN — ASPIRIN 81 MG 81 MG: 81 TABLET ORAL at 09:32

## 2018-04-05 RX ADMIN — DULOXETINE 60 MG: 30 CAPSULE, DELAYED RELEASE ORAL at 09:32

## 2018-04-05 RX ADMIN — GABAPENTIN 300 MG: 300 CAPSULE ORAL at 16:49

## 2018-04-05 RX ADMIN — LEVOTHYROXINE SODIUM 125 MCG: 100 TABLET ORAL at 09:32

## 2018-04-05 RX ADMIN — Medication 10000 UNITS: at 21:56

## 2018-04-05 RX ADMIN — FERROUS SULFATE TAB 325 MG (65 MG ELEMENTAL FE) 325 MG: 325 (65 FE) TAB at 21:55

## 2018-04-05 RX ADMIN — HYDROMORPHONE HYDROCHLORIDE 0.5 MG: 1 INJECTION, SOLUTION INTRAMUSCULAR; INTRAVENOUS; SUBCUTANEOUS at 21:58

## 2018-04-05 RX ADMIN — Medication 10000 UNITS: at 14:08

## 2018-04-05 RX ADMIN — OXYCODONE HYDROCHLORIDE 5 MG: 5 TABLET ORAL at 02:33

## 2018-04-05 RX ADMIN — OXYCODONE HYDROCHLORIDE 20 MG: 20 TABLET, FILM COATED, EXTENDED RELEASE ORAL at 21:56

## 2018-04-05 RX ADMIN — VANCOMYCIN HYDROCHLORIDE 1500 MG: 5 INJECTION, POWDER, LYOPHILIZED, FOR SOLUTION INTRAVENOUS at 00:28

## 2018-04-05 RX ADMIN — ZOLPIDEM TARTRATE 5 MG: 5 TABLET, FILM COATED ORAL at 20:14

## 2018-04-05 RX ADMIN — ACETAMINOPHEN 650 MG: 325 TABLET, FILM COATED ORAL at 01:59

## 2018-04-05 RX ADMIN — DOCUSATE SODIUM 100 MG: 100 CAPSULE, LIQUID FILLED ORAL at 09:32

## 2018-04-05 RX ADMIN — HYDROMORPHONE HYDROCHLORIDE 4 MG: 2 TABLET ORAL at 20:13

## 2018-04-05 RX ADMIN — HYDROMORPHONE HYDROCHLORIDE 0.3 MG: 1 INJECTION, SOLUTION INTRAMUSCULAR; INTRAVENOUS; SUBCUTANEOUS at 11:20

## 2018-04-05 RX ADMIN — LAMOTRIGINE 25 MG: 25 TABLET ORAL at 09:32

## 2018-04-05 RX ADMIN — MEROPENEM 1 G: 1 INJECTION, POWDER, FOR SOLUTION INTRAVENOUS at 18:24

## 2018-04-05 RX ADMIN — GABAPENTIN 300 MG: 300 CAPSULE ORAL at 09:32

## 2018-04-05 RX ADMIN — ZINC SULFATE CAP 220 MG (50 MG ELEMENTAL ZN) 220 MG: 220 (50 ZN) CAP at 14:08

## 2018-04-05 RX ADMIN — DOCUSATE SODIUM 100 MG: 100 CAPSULE, LIQUID FILLED ORAL at 21:55

## 2018-04-05 RX ADMIN — HYDROXYZINE HYDROCHLORIDE 50 MG: 25 TABLET ORAL at 21:54

## 2018-04-05 RX ADMIN — OXYCODONE HYDROCHLORIDE AND ACETAMINOPHEN 500 MG: 500 TABLET ORAL at 14:08

## 2018-04-05 RX ADMIN — POTASSIUM CHLORIDE 10 MEQ: 750 TABLET, EXTENDED RELEASE ORAL at 09:32

## 2018-04-05 RX ADMIN — LEVETIRACETAM 500 MG: 500 TABLET, FILM COATED ORAL at 09:32

## 2018-04-05 RX ADMIN — DIVALPROEX SODIUM 250 MG: 250 TABLET, DELAYED RELEASE ORAL at 09:32

## 2018-04-05 RX ADMIN — DIVALPROEX SODIUM 250 MG: 250 TABLET, DELAYED RELEASE ORAL at 00:31

## 2018-04-05 RX ADMIN — MEROPENEM 1 G: 1 INJECTION, POWDER, FOR SOLUTION INTRAVENOUS at 02:00

## 2018-04-05 RX ADMIN — OXYCODONE HYDROCHLORIDE 20 MG: 20 TABLET, FILM COATED, EXTENDED RELEASE ORAL at 11:05

## 2018-04-05 RX ADMIN — OXYCODONE HYDROCHLORIDE 5 MG: 5 TABLET ORAL at 04:30

## 2018-04-05 RX ADMIN — HYDROMORPHONE HYDROCHLORIDE 0.5 MG: 1 INJECTION, SOLUTION INTRAMUSCULAR; INTRAVENOUS; SUBCUTANEOUS at 15:08

## 2018-04-05 RX ADMIN — FERROUS SULFATE TAB 325 MG (65 MG ELEMENTAL FE) 325 MG: 325 (65 FE) TAB at 09:32

## 2018-04-05 RX ADMIN — HYDROMORPHONE HYDROCHLORIDE 0.5 MG: 1 INJECTION, SOLUTION INTRAMUSCULAR; INTRAVENOUS; SUBCUTANEOUS at 18:22

## 2018-04-05 RX ADMIN — MELATONIN 5 MG TABLET 10 MG: at 21:57

## 2018-04-05 RX ADMIN — OMEPRAZOLE 20 MG: 20 CAPSULE, DELAYED RELEASE ORAL at 09:33

## 2018-04-05 RX ADMIN — OXYCODONE HYDROCHLORIDE 20 MG: 20 TABLET, FILM COATED, EXTENDED RELEASE ORAL at 00:30

## 2018-04-05 ASSESSMENT — ACTIVITIES OF DAILY LIVING (ADL)
RETIRED_EATING: 0-->INDEPENDENT
TRANSFERRING: 4-->COMPLETELY DEPENDENT
SWALLOWING: 0-->SWALLOWS FOODS/LIQUIDS WITHOUT DIFFICULTY
FALL_HISTORY_WITHIN_LAST_SIX_MONTHS: NO
DRESS: 2-->ASSISTIVE PERSON
COGNITION: 0 - NO COGNITION ISSUES REPORTED
TOILETING: 4-->COMPLETELY DEPENDENT
AMBULATION: 4-->COMPLETELY DEPENDENT
BATHING: 3-->ASSISTIVE EQUIPMENT AND PERSON
RETIRED_COMMUNICATION: 0-->UNDERSTANDS/COMMUNICATES WITHOUT DIFFICULTY

## 2018-04-05 NOTE — CONSULTS
ID consult dictated.  Paraplegia, decubitus ulcer and osteomyelitis R ischial tuberosity.  Cont same abs.  Would strongly consider referring pt to Dr. Mayela Pelletier, Plastic Surgery at [facility] for consideration of wide debridement, bone bx for culture and flap closure.

## 2018-04-05 NOTE — CONSULTS
Consult Date:  04/05/2018      ATTENDING PHYSICIAN:  Melo Arcos MD      REASON FOR CONSULTATION:  I was asked by Dr. Arcos to assess for ischial tuberosity osteomyelitis.      IMPRESSION:   1.  A 62-year-old female with paraplegia x 9 years after development of a spinal hematoma.   2.  Chronic decubitus ulcers, most prominent is stage 4 left buttock ulcer.   3.  Chronic pain syndrome.   4.  Admitted on this occasion for increased generalized pain.  CT scan of the abdomen is interpreted as showing erosive changes of right ischial tuberosity suspicious for osteomyelitis.  The patient's CRP is elevated to 100 and sedimentation rate 62.       RECOMMENDATIONS:   1.  Continue vancomycin and meropenem for the present.   2.  Would strongly consider referring the patient to Dr. Mayela Pelletier, Plastic Surgery at [facility], for consideration of wide debridement, bone biopsy for culture and consideration of flap closure.      HISTORY OF PRESENT ILLNESS:  This is a 62-year-old female with multiple previous hospitalizations in the [facility] for paraplegia and decubitus ulcers with infection requiring debridement.  On this occasion, [facility] was full and the patient was admitted to [facility] for increased pain.  She has had paraplegia for 9 years since developing a spinal hematoma.  She has had nephrostomy tubes placed bilaterally and has had decubitus ulcers over the past 4 months with previous debridements for infection.  She denies recent fever but has had increase in pain.  Her last hospitalization at [facility] was 03/25-03/28 for acute blood loss anemia.  She was treated for an infected decubitus ulcer with daptomycin and meropenem over a 10-day course, finishing on 03/08.  She has had previous debridement by Dr. Pierre Cazares but she tells me that there has not been any discussion of flap closure.  There have been no recent fevers.  In the Emergency Department, CT scan of  the abdomen and pelvis was done which revealed ulcer tracking down to the right ischial tuberosity with erosive changes suspicious for osteomyelitis.  She has been started on vancomycin and meropenem.      PAST MEDICAL HISTORY:   1.  Alcohol dependence.   2.  Carcinoma of the lung.   3.  Chronic kidney disease.   4.  COPD.   5.  Paraplegia.   6.  Chronic decubitus ulcers.   7.  Diastolic congestive heart failure.   8.  History of DVT.   9.  History of ESBL infections.   10.  GERD.   11.  History of herpes simplex.   12.  Hyperlipidemia.   13.  Hypertension.   14.  Hypothyroidism.   15.  History of subdural hematoma.   16.  Opiate dependence.      SOCIAL HISTORY:  Former smoker.  Living independently until about a month ago.      ALLERGIES:  NONE KNOWN.      FAMILY HISTORY:  Positive for COPD, liver disease and lung cancer.      REVIEW OF SYSTEMS:  Negative other than that described above.      PHYSICAL EXAMINATION:   VITAL SIGNS:  Temp 98, blood pressure 97/68, heart rate 98 and regular.   GENERAL:  Well-developed, well-nourished, obese, paraplegic.  Does not look acutely ill.   SKIN:  No rashes or nodules.   HEENT:  Eyes:  No subconjunctival hemorrhages or scleral icterus.  Oropharynx without erythema or exudate.   NECK:  Supple, no thyromegaly.   LYMPH:  No cervical or axillary lymphadenopathy.   LUNGS:  Clear to auscultation, no use of accessory muscles of respiration.   COR:  S1, S2 normal, no S3, S4 or murmur.   ABDOMEN:  Obese, soft, nontender, no mass or hepatosplenomegaly.   EXTREMITIES:  Deep right ischial ulceration.  This is currently packed.  The surrounding wound edges without erythema or purulence.   NEUROLOGIC:  Paraplegia.      For further details of the patient's history, please refer to the chart.  Thank you very much for this consultation.         PEDRO TOMLIN MD             D: 04/05/2018   T: 04/05/2018   MT: [id number]      Name:     SHALINI CHONG   MRN:      [id number]        Account:        [id number]   :      1955           Consult Date:  2018      Document: [id number]       cc: Mellisa Arcos MD
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
## 2018-04-05 NOTE — CONSULTS
General Surgery Wound Consultation/H&P    Marychuy Zhou MRN#: [id number]   Age: 62 year old YOB: 1955     Date of Admission:          4/4/2018  Reason for consult/H&P: Decubitus wounds   Surgeon:      Puma Silverman MD                  Chief Complaint:   -pelvic pain         History of Present Illness:   This patient is a 62 year old  female who presented to the [facility] ER with wound pain. She has paraplegia with decubitus ulcers, deepest on right buttock being -treated at [facility] wound center. Their facility was full, so she came here. . Denies fever, chills, nausea, vomiting, change in BM or urination. She has had multiple abdominal operations. This ulcer was last debrided two months ago at [facility].  . History is obtained from the patient and chart.         Past Medical History:    has a past medical history of Anxiety; Chronic pain syndrome; Closed fracture zygoma, with routine healing, subsequent encounter; COPD (chronic obstructive pulmonary disease) (H); Depressive disorder; DVT (deep vein thrombosis) in pregnancy (H); Edema; Epilepsy (H); Flaccid neuropathic bladder, not elsewhere classified; Fracture of femur (H); Hypothyroidism; Iron deficiency anemia; Paraplegia (H); Pressure ulcer of sacral region; PTSD (post-traumatic stress disorder); Rheumatoid arthritis (H); and Sepsis (H).          Past Surgical History:   Past operation list reviewed.         Medications:     Prior to Admission medications    Medication Sig Start Date End Date Taking? Authorizing Provider   mineral oil-hydrophilic petrolatum (AQUAPHOR) Apply topically daily Apply to lower extremities for skin irritation.   Yes Unknown, Entered By History   ARIPIPRAZOLE PO Take 5 mg by mouth every morning   Yes Unknown, Entered By History   aspirin 81 MG chewable tablet Take 81 mg by mouth daily   Yes Unknown, Entered By History   DULoxetine HCl (CYMBALTA PO) Take 60 mg by mouth every morning   Yes Unknown,  Entered By History   HYDROXYZINE HCL PO Take 50 mg by mouth At Bedtime   Yes Unknown, Entered By History   LAMOTRIGINE PO Take 25 mg by mouth every morning X 4 more days then increase to 50mg daily from 4/9/2018-4/22/2018.   Yes Unknown, Entered By History   LevETIRAcetam (KEPPRA PO) Take 250 mg by mouth every evening Until 4/7/2018 then decrease to 250mg BID 4/8/2018-4/21/2018, then decrease to 250mg QAM 4/22-5/5/2018   Yes Unknown, Entered By History   LevETIRAcetam (KEPPRA PO) Take 500 mg by mouth every morning Until 4/7/2018 then decrease to 250mg BID 4/8/2018-4/21/2018, then decrease to 250mg QAM 4/22-5/5/2018   Yes Unknown, Entered By History   LEVOTHYROXINE SODIUM PO Take 125 mcg by mouth every morning   Yes Unknown, Entered By History   MELATONIN PO Take 10 mg by mouth At Bedtime   Yes Unknown, Entered By History   OMEPRAZOLE PO Take 20 mg by mouth daily (with breakfast)   Yes Unknown, Entered By History   Potassium Chloride [place] ER (K-DUR PO) Take 10 mEq by mouth every morning   Yes Unknown, Entered By History   Rivaroxaban (XARELTO PO) Take 20 mg by mouth At Bedtime   Yes Unknown, Entered By History   Zolpidem Tartrate (AMBIEN PO) Take 5 mg by mouth At Bedtime   Yes Unknown, Entered By History   DIVALPROEX SODIUM PO Take 250 mg by mouth 2 times daily   Yes Unknown, Entered By History   ferrous sulfate (IRON) 325 (65 FE) MG tablet Take 325 mg by mouth 2 times daily   Yes Unknown, Entered By History   Nutritional Supplements (NUTRITIONAL SUPPLEMENT PO) Take 1 packet by mouth 2 times daily (Deven Powder)   Yes Unknown, Entered By History   miconazole (MICATIN) 2 % AERP powder Apply topically 2 times daily Apply to groin folds   Yes Unknown, Entered By History   OxyCODONE HCl (OXYCONTIN PO) Take 20 mg by mouth every 12 hours   Yes Unknown, Entered By History   senna-docusate (SENOKOT-S;PERICOLACE) 8.6-50 MG per tablet Take 2 tablets by mouth 2 times daily   Yes Unknown, Entered By History   GABAPENTIN PO Take  "300 mg by mouth 3 times daily   Yes Unknown, Entered By History   ACETAMINOPHEN PO Take 650 mg by mouth 4 times daily   Yes Unknown, Entered By History   ACETAMINOPHEN PO Take 650 mg by mouth every 4 hours as needed for pain (May take additional as needed doses. Do not exceed 4000mg in 24 hours.)   Yes Unknown, Entered By History   bisacodyl (DULCOLAX) 10 MG Suppository Place 10 mg rectally daily as needed for constipation   Yes Unknown, Entered By History   DIAZEPAM PO Take 5 mg by mouth every 4 hours as needed for anxiety   Yes Unknown, Entered By History   HYDROmorphone HCl (DILAUDID PO) Take 2 mg by mouth every 4 hours as needed for moderate to severe pain   Yes Unknown, Entered By History   magnesium hydroxide (MILK OF MAGNESIA) 400 MG/5ML suspension Take 30 mLs by mouth daily as needed for constipation or heartburn   Yes Unknown, Entered By History   OXYCODONE HCL PO Take 5-10 mg by mouth every 4 hours as needed (Give 5mg for pain level 4-6 on a 10 point scale. Give 10mg for pain level 6-10 on a 10 point scale.)   Yes Unknown, Entered By History            Allergies:   No Known Allergies         Social History:     Social History   Substance Use Topics     Smoking status: Never Smoker     Smokeless tobacco: Not on file     Alcohol use No             Family History:    This patient has no significant relevant family history.  Family history is reviewed in detail.          Review of Systems:   Brief ROS is negative other than noted in the HPI.  C: NEGATIVE for fever, chills, change in weight  R: NEGATIVE for significant cough or SOB  CV: NEGATIVE for chest pain, palpitations or peripheral edema  GI:  NEGATIVE for dysuria, heartburn, or change in bowel habits  H: NEGATIVE for bleeding problems         Physical Exam:   Blood pressure 97/68, pulse 100, temperature 98  F (36.7  C), temperature source Oral, resp. rate 18, height 1.575 m (5' 2\"), weight 78.6 kg (173 lb 4.5 oz), SpO2 96 %.  I/O last 3 completed " shifts:  In: 702 [I.V.:702]  Out: 805 [Urine:805]    General - This is a well developed,female .    Lungs - Breathing not labored    Pelvis- deep, clean decubitus ulcer right buttock. Moist dressing in wound, removed and replaced.  Extremities - Does not Move all extremities. Warm without edema.            Data:   Labs:  Recent Labs   Lab Test  04/05/18 0825 04/04/18 1648 04/02/18   2131   WBC  8.3  11.0  10.7   HGB  8.7*  10.1*  10.7*   HCT  28.8*  33.1*  34.3*   PLT  342  450  447     Recent Labs   Lab Test  04/05/18 0825 04/04/18 1648 04/02/18   2131   POTASSIUM  3.8  4.0  4.2   CHLORIDE  103  103  101   CO2  27  29  27   BUN  4*  8  8   CR  0.40*  0.51*  0.49*     No lab results found.  Recent Labs   Lab Test  04/04/18 1648 04/02/18   2131   INR  1.29*  1.15*     Recent Labs   Lab Test  04/05/18   0825 04/04/18 1648 04/02/18   2131   NATA  7.6*  7.9*  8.3*     Recent Labs   Lab Test  04/05/18 0825 04/04/18 1648 04/02/18   2131   ANIONGAP  6  5  10       CT scan of the abdomen:  Possible osteomyelitis of hip, right. Nephrostomy tubes    Ultrasound of the abdomen: not done                 Assessment:   Deep, fairly clean ischial ulcer in paraplegic         Plan:    She is a regular patient at the [facility] wound care center [facility]. I would not recommend we change their plan. She should continue her care there, as soon as feasible. For now I would not debride this ulcer further. Wet Dakins dressings are fine.       I will follow with you.  Puma Silverman M.D.
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
## 2018-04-05 NOTE — PLAN OF CARE
Problem: Patient Care Overview  Goal: Plan of Care/Patient Progress Review  Outcome: No Change  Pt arrived form the ED around 2200 A&O VSS on RA. Pt c/o pain gave 2 mg PRN dilaudid effective. PT has 8cm wide and 8cm tunneled wound on the R buttocks wound cleaned and placed sterile dressing. Pt has Bile nep tube patent dressing changed. Pt has folie patent too. Skin has multiple scars and scabs, wounds noted. PT verbally aggressive to the stuff during care. Stuff tries to redirect patients need. IVF started on regular diet infectious disease consult in plan for surgery pending continue to monitor.

## 2018-04-05 NOTE — CONSULTS
[place] [facility]/PODIATRY CONSULTATION  April 5, 2018      ASSESSMENT: L heel ulceration     PLAN:  Reviewed patient's chart in epic.  Discussed condition and treatment options including pros and cons.    -XR of L heel to assess for osteomyelitis.  -Pt may need surgery pending results.  -[facility] care plan in place for wounds.  -Offload heels at all times while in bed.  -Dr. Brooke to f/u tomorrow.  -Thank you for the consult.     Iker Kramer DPM, FACFAS  Pager: [phone]      PATIENT HISTORY:  I was requested to see this patient for L heel wound by Dr. Lees.  Marychuy Zhou is a 62 year old paraplegic female who was admitted for wound pain, related to decubitus ulcers, most significantly right buttock with related osteomyelitis.   Pt is normally treated at [facility].  Duration of L heel wound unclear.  Pt is unsure which foot the wound is on when asked.    Review of Systems:  Patient denies f/c/n/v.  Rest of 10 pt ROS neg except for HPI.     PAST MEDICAL HISTORY:   Past Medical History:   Diagnosis Date     Anxiety      Chronic pain syndrome      Closed fracture zygoma, with routine healing, subsequent encounter      COPD (chronic obstructive pulmonary disease) (H)      Depressive disorder      DVT (deep vein thrombosis) in pregnancy (H)      Edema      Epilepsy (H)      Flaccid neuropathic bladder, not elsewhere classified      Fracture of femur (H)      Hypothyroidism      Iron deficiency anemia      Paraplegia (H)     unspecified     Pressure ulcer of sacral region      PTSD (post-traumatic stress disorder)      Rheumatoid arthritis (H)      Sepsis (H)     unspecified        PAST SURGICAL HISTORY: History reviewed. No pertinent surgical history.     MEDICATIONS:   Current Facility-Administered Medications:      HYDROmorphone (DILAUDID) tablet 2-4 mg, 2-4 mg, Oral, Q3H PRN, Sal Lees, DO, 4 mg at 04/05/18 2607     ascorbic acid (VITAMIN C) tablet 500 mg, 500 mg,  Oral, Daily, Sal Lees DO, 500 mg at 04/05/18 1408     beta carotene capsule 10,000 Units, 10,000 Units, Oral, BID, Sal Lees DO, 10,000 Units at 04/05/18 1408     zinc sulfate (ZINCATE) capsule 220 mg, 220 mg, Oral, Daily, Sal Lees DO, 220 mg at 04/05/18 1408     HYDROmorphone (PF) (DILAUDID) injection 0.2 mg, 0.2 mg, Intravenous, Once, Sal Lees DO     HYDROmorphone (PF) (DILAUDID) injection 0.3-0.5 mg, 0.3-0.5 mg, Intravenous, Q3H PRN, Sal Lees DO, 0.5 mg at 04/05/18 1508     meropenem (MERREM) 1 g vial to attach to  mL bag, 1 g, Intravenous, Q8H, Melo Arcos MD, 1 g at 04/05/18 1101     naloxone (NARCAN) injection 0.1-0.4 mg, 0.1-0.4 mg, Intravenous, Q2 Min PRN, Melo Arcos MD     acetaminophen (TYLENOL) tablet 650 mg, 650 mg, Oral, Q4H PRN, Melo Arcos MD, 650 mg at 04/05/18 0159     docusate sodium (COLACE) capsule 100 mg, 100 mg, Oral, BID, Melo Arcos MD, 100 mg at 04/05/18 0932     polyethylene glycol (MIRALAX/GLYCOLAX) Packet 17 g, 17 g, Oral, Daily PRN, Melo Arcos MD     bisacodyl (DULCOLAX) Suppository 10 mg, 10 mg, Rectal, Daily PRN, Melo Arcos MD     ondansetron (ZOFRAN-ODT) ODT tab 4 mg, 4 mg, Oral, Q6H PRN **OR** ondansetron (ZOFRAN) injection 4 mg, 4 mg, Intravenous, Q6H PRN, Melo Arcos MD     ARIPiprazole (ABILIFY) tablet 5 mg, 5 mg, Oral, Isrrael CASTILLO Tirtha R, MD, 5 mg at 04/05/18 0932     aspirin chewable tablet 81 mg, 81 mg, Oral, Daily, Melo Arcos MD, 81 mg at 04/05/18 0932     diazepam (VALIUM) tablet 5 mg, 5 mg, Oral, Q4H PRN, Melo Arcos MD     divalproex sodium delayed-release (DEPAKOTE) DR tablet 250 mg, 250 mg, Oral, BID, Melo Arcos MD, 250 mg at 04/05/18 0932     DULoxetine (CYMBALTA) EC capsule 60 mg, 60 mg, Oral, Isrrael CASTILLO Tirtha R, MD, 60 mg at 04/05/18 0932     ferrous sulfate (IRON) tablet 325 mg, 325 mg, Oral, BID, Melo Arcos MD, 325  mg at 04/05/18 0932     gabapentin (NEURONTIN) capsule 300 mg, 300 mg, Oral, TID, Melo Arcos MD, 300 mg at 04/05/18 1649     hydrOXYzine (ATARAX) tablet 50 mg, 50 mg, Oral, At Bedtime, Melo Arcos MD, 50 mg at 04/04/18 2331     lamoTRIgine (LaMICtal) tablet 25 mg, 25 mg, Oral, Isrrael CASTILLO Tirtha R, MD, 25 mg at 04/05/18 0932     levETIRAcetam (KEPPRA) tablet 250 mg, 250 mg, Oral, QPM, Melo Arcos MD, 250 mg at 04/05/18 0137     levETIRAcetam (KEPPRA) tablet 500 mg, 500 mg, Oral, Isrrael CASTILLO Tirtha R, MD, 500 mg at 04/05/18 0932     levothyroxine (SYNTHROID/LEVOTHROID) tablet 125 mcg, 125 mcg, Oral, Isrrael CASTILLO Tirtha R, MD, 125 mcg at 04/05/18 0932     magnesium hydroxide (MILK OF MAGNESIA) suspension 30 mL, 30 mL, Oral, Daily PRN, Melo Arcos MD     melatonin tablet 10 mg, 10 mg, Oral, At Bedtime, Melo Arcos MD, 10 mg at 04/04/18 2332     omeprazole (priLOSEC) CR capsule 20 mg, 20 mg, Oral, Daily with breakfast, Melo Arcos MD, 20 mg at 04/05/18 0933     oxyCODONE (OxyCONTIN) 12 hr tablet 20 mg, 20 mg, Oral, Q12H, Melo Arcos MD, 20 mg at 04/05/18 1105     potassium chloride SA (K-DUR/KLOR-CON M) CR tablet 10 mEq, 10 mEq, Oral, Isrrael CASTILLO Tirtha R, MD, 10 mEq at 04/05/18 0932     zolpidem (AMBIEN) tablet 5 mg, 5 mg, Oral, At Bedtime PRN, Melo Arcos MD     [START ON 4/8/2018] levETIRAcetam (KEPPRA) tablet 250 mg, 250 mg, Oral, BID, Melo Arcos MD     [START ON 4/22/2018] levETIRAcetam (KEPPRA) tablet 250 mg, 250 mg, Oral, Daily, Melo Arcos MD     [START ON 4/9/2018] lamoTRIgine (LaMICtal) tablet 50 mg, 50 mg, Oral, Daily, Melo Arcos MD     albuterol neb solution 2.5 mg, 2.5 mg, Nebulization, Q2H PRN, Melo Arcos MD     miconazole (MICATIN; MICRO GUARD) 2 % powder, , Topical, BID, Melo Arcos MD     vancomycin (VANCOCIN) 1500 mg in 0.9% NaCl 250 mL PREMIX, 1,500 mg, Intravenous, Q12H, Melo Arcos MD, Last Rate: 166.7 mL/hr  "at 04/05/18 1143, 1,500 mg at 04/05/18 1143     ALLERGIES:  No Known Allergies     SOCIAL HISTORY:   Social History     Social History     Marital status: Single     Spouse name: N/A     Number of children: N/A     Years of education: N/A     Occupational History     Not on file.     Social History Main Topics     Smoking status: Never Smoker     Smokeless tobacco: Not on file     Alcohol use No     Drug use: No     Sexual activity: Not on file     Other Topics Concern     Not on file     Social History Narrative        FAMILY HISTORY: No pertinent family history.     EXAM:Vitals: BP (!) 89/59 (BP Location: Right arm)  Pulse 100  Temp 99.4  F (37.4  C) (Oral)  Resp 18  Ht 1.575 m (5' 2\")  Wt 78.6 kg (173 lb 4.5 oz)  SpO2 96%  BMI 31.69 kg/m2  BMI= Body mass index is 31.69 kg/(m^2).    General appearance: Patient is alert and fully cooperative with history & exam.  No sign of distress is noted during the visit.     Psychiatric: Affect is appropriate.  Patient appears motivated to improve health.     Respiratory: Breathing is regular & unlabored while in bed.     HEENT: Hearing is intact to spoken word.  Speech is clear.  No gross evidence of visual impairment that would impact ambulation.     Dermatologic: L posterior heel ulceration with dark eschar, moist edges, nonstageable.  2x2cm.  No significant depth.  Mild surrounding erythema.       Vascular: DP pulse palpable on the L.  I could not palpate other pedal pulses due to LE edema.  CFT minimally delayed, feet warm.     Neurologic: Lower extremity sensation is diminished to light touch.      Musculoskeletal: Paraplegia.  No foot or ankle joint effusion is noted.        Lab Results   Component Value Date    WBC 8.3 04/05/2018     Lab Results   Component Value Date    RBC 3.50 04/05/2018     Lab Results   Component Value Date    HGB 8.7 04/05/2018     Lab Results   Component Value Date    HCT 28.8 04/05/2018     No components found for: MCT  Lab Results "   Component Value Date    MCV 82 04/05/2018     Lab Results   Component Value Date    MCH 24.9 04/05/2018     Lab Results   Component Value Date    MCHC 30.2 04/05/2018     Lab Results   Component Value Date    RDW 19.6 04/05/2018     Lab Results   Component Value Date     04/05/2018       ESR 62

## 2018-04-05 NOTE — PHARMACY-ADMISSION MEDICATION HISTORY
Admission medication history interview status for the 4/4/2018  admission is complete. See EPIC admission navigator for prior to admission medications     Medication history source reliability:Good    Actions taken by pharmacist (provider contacted, etc):Verified all medications with patient's MAR from [facility]     Additional medication history information not noted on PTA med list :None    Medication reconciliation/reorder completed by provider prior to medication history? No    Time spent in this activity: 30 minutes    Prior to Admission medications    Medication Sig Last Dose Taking? Auth Provider   mineral oil-hydrophilic petrolatum (AQUAPHOR) Apply topically daily Apply to lower extremities for skin irritation. 4/4/2018 at am Yes Unknown, Entered By History   ARIPIPRAZOLE PO Take 5 mg by mouth every morning 4/4/2018 at am Yes Unknown, Entered By History   aspirin 81 MG chewable tablet Take 81 mg by mouth daily 4/4/2018 at am Yes Unknown, Entered By History   DULoxetine HCl (CYMBALTA PO) Take 60 mg by mouth every morning 4/4/2018 at am Yes Unknown, Entered By History   HYDROXYZINE HCL PO Take 50 mg by mouth At Bedtime 4/3/2018 at pm Yes Unknown, Entered By History   LAMOTRIGINE PO Take 25 mg by mouth every morning X 4 more days then increase to 50mg daily from 4/9/2018-4/22/2018. 4/4/2018 at am Yes Unknown, Entered By History   LevETIRAcetam (KEPPRA PO) Take 250 mg by mouth every evening Until 4/7/2018 then decrease to 250mg BID 4/8/2018-4/21/2018, then decrease to 250mg QAM 4/22-5/5/2018 4/3/2018 at pm Yes Unknown, Entered By History   LevETIRAcetam (KEPPRA PO) Take 500 mg by mouth every morning Until 4/7/2018 then decrease to 250mg BID 4/8/2018-4/21/2018, then decrease to 250mg QAM 4/22-5/5/2018 4/3/2018 at am Yes Unknown, Entered By History   LEVOTHYROXINE SODIUM PO Take 125 mcg by mouth every morning 4/4/2018 at 0600 Yes Unknown, Entered By History   MELATONIN PO Take 10 mg by mouth At Bedtime  4/3/2018 at pm Yes Unknown, Entered By History   OMEPRAZOLE PO Take 20 mg by mouth daily (with breakfast) 4/4/2018 at 0800 Yes Unknown, Entered By History   Potassium Chloride [place] ER (K-DUR PO) Take 10 mEq by mouth every morning 4/4/2018 at am Yes Unknown, Entered By History   Rivaroxaban (XARELTO PO) Take 20 mg by mouth At Bedtime 4/3/2018 at pm Yes Unknown, Entered By History   Zolpidem Tartrate (AMBIEN PO) Take 5 mg by mouth At Bedtime 4/3/2018 at hs Yes Unknown, Entered By History   DIVALPROEX SODIUM PO Take 250 mg by mouth 2 times daily 4/4/2018 at am x 1 dose Yes Unknown, Entered By History   ferrous sulfate (IRON) 325 (65 FE) MG tablet Take 325 mg by mouth 2 times daily 4/4/2018 at am x 1 dose Yes Unknown, Entered By History   Nutritional Supplements (NUTRITIONAL SUPPLEMENT PO) Take 1 packet by mouth 2 times daily (Deven Powder) Past Week Yes Unknown, Entered By History   miconazole (MICATIN) 2 % AERP powder Apply topically 2 times daily Apply to groin folds 4/4/2018 at am x 1 dose Yes Unknown, Entered By History   OxyCODONE HCl (OXYCONTIN PO) Take 20 mg by mouth every 12 hours 4/4/2018 at 0800 Yes Unknown, Entered By History   senna-docusate (SENOKOT-S;PERICOLACE) 8.6-50 MG per tablet Take 2 tablets by mouth 2 times daily 4/4/2018 at am x 1 dose Yes Unknown, Entered By History   GABAPENTIN PO Take 300 mg by mouth 3 times daily 4/4/2018 at x 2 doses Yes Unknown, Entered By History   ACETAMINOPHEN PO Take 650 mg by mouth 4 times daily 4/4/2018 at x 2 doses Yes Unknown, Entered By History   ACETAMINOPHEN PO Take 650 mg by mouth every 4 hours as needed for pain (May take additional as needed doses. Do not exceed 4000mg in 24 hours.) Past Week at Unknown time Yes Unknown, Entered By History   bisacodyl (DULCOLAX) 10 MG Suppository Place 10 mg rectally daily as needed for constipation prn med Yes Unknown, Entered By History   DIAZEPAM PO Take 5 mg by mouth every 4 hours as needed for anxiety Past Week at Unknown  time Yes Unknown, Entered By History   HYDROmorphone HCl (DILAUDID PO) Take 2 mg by mouth every 4 hours as needed for moderate to severe pain 4/4/2018 at 1356 Yes Unknown, Entered By History   magnesium hydroxide (MILK OF MAGNESIA) 400 MG/5ML suspension Take 30 mLs by mouth daily as needed for constipation or heartburn prn med Yes Unknown, Entered By History   OXYCODONE HCL PO Take 5-10 mg by mouth every 4 hours as needed (Give 5mg for pain level 4-6 on a 10 point scale. Give 10mg for pain level 6-10 on a 10 point scale.) prn med Yes Unknown, Entered By History

## 2018-04-05 NOTE — PROGRESS NOTES
[facility]  Hospitalist Progress Note        Sal Aimee Nabeel, DO  04/05/2018        Interval History:      Patient complaining of pain today, discussed at length.          Assessment and Plan:        62-year-old paraplegic woman with decubital ulcers involving her left heel and right and left buttock with a deep tracking wound over the right buttock with chronic osteomyelitis with recent infection, completed 10 days of IV antibiotics  last month, was transported to the ER for evaluation of the wound check.       Paraplegia and multiple decubitus ulcers, chronic pain involving the right buttock, stage IV, with chronic osteomyelitis. The patient had debridement of this ulcer by Dr. Pierre Cazares on 02/05/2018.  She has had wound infection in the past and has a history of MDR infections including MRSA, VRE and ESBL in the past at different sites with the last treatment with IV antibiotic, meropenem and daptomycin, which she completed on 03/08/2018.  She is followed at [facility] for this.    - Pain control.   - Pain team consulted.   - Wound care consult.   - Discontinued NS at 75cc/h.   - PRN IV and PO dilaudid.   - Scheduled long acting maintenance oxycodone continued.  - Discussed with plastic surgery at [facility], they would prefer initial debridement with general surgery if possible. However, could consider vascular surgery consultation if indicated by General surgery as possible benefit to contribute in this setting.     Left plantar heel wound: Details noted in wound care.   - Podiatry consult.   - Wound nurse following.     Anemia, chronic. The patient has a history of iron deficiency anemia and also her erythropoietin level apparently was low.    - Monitor.     Recent left comminuted acute subtrochanteric fracture with recent fall. She was evaluated by Orthopedic Surgery and recommended x-ray in 2-3 weeks while she was at [facility]. This was noted in the CT today that was  done in the ER.     - Recommend follow up with Orthopedic Surgery after discharge.     History of deep venous thrombosis. The patient is on Xarelto.    - Hold Xarelto at this time.   - Restart if no intervention or debridement is planned.     Seizure disorder. The patient used to be on Keppra, but it looks like her Keppra is being tapered and she is currently taking 500 in the morning and 250 mg at bedtime, which is to be continued until 04/07 and from 04/08, she will take 250 mg p.o. b.i.d. until 04/21 and then to decrease to 250 mg q.a.m. from 04/22 until 05/05/2018.   - She has now been started on lamotrigine which she will be taking 25 mg p.o. daily for 4 more days, then to increase to 50 mg daily from 04/09/2018 until 04/22/2018.    - The patient to follow up with Neurology regarding her subsequent dose after 04/22.     Anxiety and depression. Stable.    - Prior to admission Cymbalta, risperidone and Abilify will be npr and Diazepam will be continued.     Paraplegia.  Apparently this was due to left T4 hematoma about 9 years ago. The patient has neurogenic bladder and bowel and has bilateral nephrostomy tubes and suprapubic catheter.  The nephrostomy tubes were placed on 01/19 to divert urine from decubitus ulcer.   - Left nephrostomy tube was dislodged and was replaced 03/06/2018 per report     Hypothyroidism: Patient taking synthroid prior to admission.   - Prior to admission levothyroxine 125 mg p.o. every day continued.     History of diastolic congestive heart failure.  The patient currently appears euvolemic, no sign of exacerbation noted.    - Judicious fluids.     History of chronic obstructive pulmonary disease, not on oxygen at baseline.  She is not on any maintenance medication, will have p.r.n. albuterol available while she is here.     Deep venous thrombosis prophylaxis.  This patient is on Xarelto prior to admission, which will be resumed after General Surgery evaluation, if no intervention  "needed.       CODE:  FULL CODE.        Disposition: Likely few days pending treatment plans and subspecialty recommendations. May pursue transfer to [facility] or [facility] if accepted for transfer.     Pain: # Pain Assessment:  Current Pain Score 2018   Patient currently in pain? sleeping: patient not able to self report   Pain score (0-10) -   Pain location -   Pain descriptors -   - Marychuy is experiencing pain due to sacrum. Pain management was discussed and the plan was created in a collaborative fashion.  Marychuy's response to the current recommendations: mixed response  - Please see the plan for pain management as documented above                   Physical Exam:      Heart Rate: 98    Blood pressure 97/68, pulse 100, temperature 98  F (36.7  C), temperature source Oral, resp. rate 18, height 1.575 m (5' 2\"), weight 78.6 kg (173 lb 4.5 oz), SpO2 96 %.    Vitals:    18 1615 18 0649   Weight: 73.5 kg (162 lb) 78.6 kg (173 lb 4.5 oz)       Vital Sign Ranges  Temperature Temp  Av.6  F (37  C)  Min: 98  F (36.7  C)  Max: 99.5  F (37.5  C)   Blood pressure Systolic (24hrs), Av , Min:87 , Max:124        Diastolic (24hrs), Av, Min:54, Max:85      Pulse Pulse  Av  Min: 100  Max: 100   Respirations Resp  Av.2  Min: 16  Max: 18   Pulse oximetry SpO2  Av.8 %  Min: 94 %  Max: 97 %     Vital Signs with Ranges  Temp:  [98  F (36.7  C)-99.5  F (37.5  C)] 98  F (36.7  C)  Pulse:  [100] 100  Heart Rate:  [87-98] 98  Resp:  [16-18] 18  BP: ()/(54-85) 97/68  SpO2:  [94 %-97 %] 96 %    I/O Last 3 Shifts:   I/O last 3 completed shifts:  In: 702 [I.V.:702]  Out: 805 [Urine:805]    I/O past 24 hours:     Intake/Output Summary (Last 24 hours) at 18 0858  Last data filed at 18 0623   Gross per 24 hour   Intake              702 ml   Output              805 ml   Net             -103 ml     GENERAL: Alert and oriented. NAD. Conversational, appropriate.   HEENT: Normocephalic. EOMI. " No icterus or injection. Nares normal.   LUNGS: Clear to auscultation. No dyspnea at rest.   HEART: Regular rate. Extremities perfused.   ABDOMEN: Soft, nontender, and nondistended. Positive bowel sounds. suprapubic catheter in place. percutaneous nephrostomy tubes on both sides  EXTREMITIES: LE edema noted. Sacral wound.   NEUROLOGIC: Paraplegic. Follows commands.          Prior to Admission Medications:        Prescriptions Prior to Admission   Medication Sig Dispense Refill Last Dose     mineral oil-hydrophilic petrolatum (AQUAPHOR) Apply topically daily Apply to lower extremities for skin irritation.   4/4/2018 at am     ARIPIPRAZOLE PO Take 5 mg by mouth every morning   4/4/2018 at am     aspirin 81 MG chewable tablet Take 81 mg by mouth daily   4/4/2018 at am     DULoxetine HCl (CYMBALTA PO) Take 60 mg by mouth every morning   4/4/2018 at am     HYDROXYZINE HCL PO Take 50 mg by mouth At Bedtime   4/3/2018 at pm     LAMOTRIGINE PO Take 25 mg by mouth every morning X 4 more days then increase to 50mg daily from 4/9/2018-4/22/2018.   4/4/2018 at am     LevETIRAcetam (KEPPRA PO) Take 250 mg by mouth every evening Until 4/7/2018 then decrease to 250mg BID 4/8/2018-4/21/2018, then decrease to 250mg QAM 4/22-5/5/2018   4/3/2018 at pm     LevETIRAcetam (KEPPRA PO) Take 500 mg by mouth every morning Until 4/7/2018 then decrease to 250mg BID 4/8/2018-4/21/2018, then decrease to 250mg QAM 4/22-5/5/2018   4/3/2018 at am     LEVOTHYROXINE SODIUM PO Take 125 mcg by mouth every morning   4/4/2018 at 0600     MELATONIN PO Take 10 mg by mouth At Bedtime   4/3/2018 at pm     OMEPRAZOLE PO Take 20 mg by mouth daily (with breakfast)   4/4/2018 at 0800     Potassium Chloride [place] ER (K-DUR PO) Take 10 mEq by mouth every morning   4/4/2018 at am     Rivaroxaban (XARELTO PO) Take 20 mg by mouth At Bedtime   4/3/2018 at pm     Zolpidem Tartrate (AMBIEN PO) Take 5 mg by mouth At Bedtime   4/3/2018 at hs     DIVALPROEX SODIUM PO Take  250 mg by mouth 2 times daily   4/4/2018 at am x 1 dose     ferrous sulfate (IRON) 325 (65 FE) MG tablet Take 325 mg by mouth 2 times daily   4/4/2018 at am x 1 dose     Nutritional Supplements (NUTRITIONAL SUPPLEMENT PO) Take 1 packet by mouth 2 times daily (Deven Powder)   Past Week     miconazole (MICATIN) 2 % AERP powder Apply topically 2 times daily Apply to groin folds   4/4/2018 at am x 1 dose     OxyCODONE HCl (OXYCONTIN PO) Take 20 mg by mouth every 12 hours   4/4/2018 at 0800     senna-docusate (SENOKOT-S;PERICOLACE) 8.6-50 MG per tablet Take 2 tablets by mouth 2 times daily   4/4/2018 at am x 1 dose     GABAPENTIN PO Take 300 mg by mouth 3 times daily   4/4/2018 at x 2 doses     ACETAMINOPHEN PO Take 650 mg by mouth 4 times daily   4/4/2018 at x 2 doses     ACETAMINOPHEN PO Take 650 mg by mouth every 4 hours as needed for pain (May take additional as needed doses. Do not exceed 4000mg in 24 hours.)   Past Week at Unknown time     bisacodyl (DULCOLAX) 10 MG Suppository Place 10 mg rectally daily as needed for constipation   prn med     DIAZEPAM PO Take 5 mg by mouth every 4 hours as needed for anxiety   Past Week at Unknown time     HYDROmorphone HCl (DILAUDID PO) Take 2 mg by mouth every 4 hours as needed for moderate to severe pain   4/4/2018 at 1356     magnesium hydroxide (MILK OF MAGNESIA) 400 MG/5ML suspension Take 30 mLs by mouth daily as needed for constipation or heartburn   prn med     OXYCODONE HCL PO Take 5-10 mg by mouth every 4 hours as needed (Give 5mg for pain level 4-6 on a 10 point scale. Give 10mg for pain level 6-10 on a 10 point scale.)   prn med            Medications:        Current Facility-Administered Medications   Medication Last Rate     sodium chloride 75 mL/hr at 04/04/18 8518     Current Facility-Administered Medications   Medication Dose Route Frequency     meropenem  1 g Intravenous Q8H     docusate sodium  100 mg Oral BID     ARIPiprazole (ABILIFY) tablet 5 mg  5 mg Oral  QAM     aspirin  81 mg Oral Daily     divalproex sodium delayed-release (DEPAKOTE) DR tablet 250 mg  250 mg Oral BID     DULoxetine (CYMBALTA) EC capsule 60 mg  60 mg Oral QAM     ferrous sulfate  325 mg Oral BID     gabapentin (NEURONTIN) capsule 300 mg  300 mg Oral TID     hydrOXYzine  50 mg Oral At Bedtime     lamoTRIgine (LaMICtal) tablet 25 mg  25 mg Oral QAM     levETIRAcetam (KEPPRA) tablet 250 mg  250 mg Oral QPM     levETIRAcetam (KEPPRA) tablet 500 mg  500 mg Oral QAM     levothyroxine (SYNTHROID/LEVOTHROID) tablet 125 mcg  125 mcg Oral QAM     melatonin tablet 10 mg  10 mg Oral At Bedtime     omeprazole (priLOSEC) CR capsule 20 mg  20 mg Oral Daily with breakfast     oxyCODONE (OxyCONTIN) 12 hr tablet 20 mg  20 mg Oral Q12H     potassium chloride SA (K-DUR/KLOR-CON M) CR tablet 10 mEq  10 mEq Oral QAM     [START ON 4/8/2018] levETIRAcetam  250 mg Oral BID     [START ON 4/22/2018] levETIRAcetam  250 mg Oral Daily     [START ON 4/9/2018] lamoTRIgine  50 mg Oral Daily     miconazole   Topical BID     vancomycin (VANCOCIN) IV  1,500 mg Intravenous Q12H     Current Facility-Administered Medications   Medication Dose Route Frequency     naloxone  0.1-0.4 mg Intravenous Q2 Min PRN     acetaminophen  650 mg Oral Q4H PRN     polyethylene glycol  17 g Oral Daily PRN     bisacodyl  10 mg Rectal Daily PRN     ondansetron  4 mg Oral Q6H PRN    Or     ondansetron  4 mg Intravenous Q6H PRN     diazepam (VALIUM) tablet 5 mg  5 mg Oral Q4H PRN     magnesium hydroxide  30 mL Oral Daily PRN     zolpidem (AMBIEN) tablet 5 mg  5 mg Oral At Bedtime PRN     albuterol  2.5 mg Nebulization Q2H PRN     HYDROmorphone  0.2 mg Intravenous Q4H PRN     oxyCODONE IR  5-10 mg Oral Q6H PRN            Data:      Lab data reviewed.     Recent Labs  Lab 04/05/18  0825 04/04/18  1648 04/02/18  2131   HGB 8.7* 10.1* 10.7*   MCV 82 82 82    450 447   INR  --  1.29* 1.15*   NA  --  137 138   POTASSIUM  --  4.0 4.2   CHLORIDE  --  103 101    CO2  --  29 27   BUN  --  8 8   CR  --  0.51* 0.49*   ANIONGAP  --  5 10   NATA  --  7.9* 8.3*   GLC  --  114* 89           Imaging:      Imaging data reviewed.     Dr. Sal Lees D.O.  [facility]ist  Pager [phone]

## 2018-04-05 NOTE — H&P
Admitted:     04/04/2018      DATE OF ADMISSION: 04/04/2018       PRIMARY CARE PHYSICIAN:  Mellisa Haji MD      CHIEF COMPLAINT:  Pelvic pain.      HISTORY OF PRESENT ILLNESS:  Marychuy Zhou is a 62-year-old woman with below listed extensive multiple medical problems, especially paraplegia with a stage IV decubitus ulcer in her left buttock; also with other pressure ulcers on her right buttock and left heel with chronic osteomyelitis involving the right ischial tuberosity, who has had multiple hospitalizations at [facility] and debridement and is residing [facility].  Was brought to the ER for wound evaluation and pain.  I discussed with the patient and ED care provider and also reviewed her recent admission and discharge summary from [facility] where she was admitted from 03/25 to 03/28/2018 for acute blood loss anemia.      The patient at that time was admitted from the care facility for generalized weakness, fatigue.  She had a significant drop of hemoglobin from 9 to 5.8 and the hemorrhagic shock was managed with red blood cell transfusion and during workup was found to have a very low erythropoietin level as well and she remained on supplement including high dose iron and folic acid. As per the note review, the patient also has a decubitus ulcer on her right buttock and has had infection and was treated with meropenem and daptomycin, a 10-day course was completed on 03/08. This has been previously debrided by Dr. Pierre Cazares on 02/05/2018.  The patient was then discharged to the TCU.     Today, the patient was brought to the ER for evaluation of wound as she was complaining of increased pain. Patient has chronic pain with also notes mentioning narcotic-seeking behavior, is on hydromorphone as well as oxycodone p.r.n., but despite that she was rating her pain as severe. Patient otherwise denies any fever, nausea, vomiting, dyspnea, dizziness.      She has bilateral percutaneous  nephrostomy tubes which were replaced by IR on 03/06/2018.  She also has a suprapubic catheter.      In the ER, the patient was evaluated by Kathy Barrera PA-C. The patient was afebrile. Lab workup revealed a normal white cell count, hemoglobin is at her baseline, normal BMP, elevated lactic acid of 2.5. Blood cultures were drawn. CT scan of the abdomen and pelvis with contrast was performed which revealed ulcer extending down to the right ischial tuberosity with erosive changes suspicious for osteomyelitis. Also nonunited left subtrochanteric fracture. The patient received IV hydration and pain medication in the ER.  Given her extensive care at [facility], when requested admission from ER initially, I discussed with the patient.  We reviewed the options and the patient was willing to be transferred to [facility] initially but subsequently per ER report, [facility] was contacted and apparently they did not have beds to admit the patient, so she is being admitted to our care facility.      REVIEW OF SYSTEMS:  All 10-point systems were reviewed; other than deep pelvic pain, the patient denies any symptoms.      PAST MEDICAL HISTORY:      Alcohol dependence     Anxiety     Cancer of lung 9/24/2013     Chronic kidney disease     Chronic pain     Chronic suprapubic catheter     Constipation     COPD (chronic obstructive pulmonary disease)     Decubitus ulcer     Depression     Diastolic CHF, acute on chronic     DVT (deep venous thrombosis) 5/26/2015     Emphysema lung     ESBL (extended spectrum beta-lactamase) producing bacteria infection 08/2015     Gastroparesis     GERD (gastroesophageal reflux disease)     HCAP (healthcare-associated pneumonia)     Herpes Simplex Type I     Hyperlipemia     Hypertension     Hypothyroid     Hypothyroidism 5/26/2015     Intracranial subdural hematoma     Kidney infection     Kidney stone     Lower paraplegia 4/28/2016     Lung cancer     MRSA  infection     Neurogenic bladder     Neuropathy 5/26/2015     Obesity     Opiate dependence, continuous     Osteoporosis     Paraplegia 2010     Pneumonia     Pulmonary embolism 12/2016     Rheumatoid arthritis     S/P cholecystectomy     Sepsis 5/28/2017     SVT (supraventricular tachycardia) 8/19/2014     UTI (urinary tract infection)     Vascular myelopathies      PAST SURGICAL HISTORY:    She has a past surgical history that includes pr appendectomy; pr removal of tonsils,<12 y/o; pr total abdom hysterectomy; pr monroe w/o facetec foramot/dskc 1/2 vrt seg, cervical; Fracture surgery; Cholecystectomy; PICC Team Line Insertion (8/19/2014); PICC (12/31/2016); ERCP (N/A, 1/3/2017); Laparoscopic cholecystectomy (N/A, 1/4/2017); Craniectomy (Left, 4/10/2017); Colonoscopy (N/A, 6/8/2017); Decubitus ulcer excision (N/A, 12/22/2017); PICC (12/26/2017); hh midline insertion (2/1/2018); Decubitus ulcer excision (Right, 2/5/2018); and PICC Team Line Insertion (2/27/2018).      SOCIAL HISTORY:  The patient is a former smoker, quit smoking 4 years ago.  Does not drink alcohol or recreational drug use.  She was living on her own up until 3-1/2 weeks ago but then was admitted in the hospital and has been at the care facility.      ALLERGIES:  NO KNOWN DRUG ALLERGIES.      FAMILY HISTORY:  COPD in her brother, father and mother, liver disease in her father, lung cancer in her sister.        PTA MEDICATIONS:   Prior to Admission medications    Medication Sig Last Dose Taking? Auth Provider   mineral oil-hydrophilic petrolatum (AQUAPHOR) Apply topically daily Apply to lower extremities for skin irritation. 4/4/2018 at am Yes Unknown, Entered By History   ARIPIPRAZOLE PO Take 5 mg by mouth every morning 4/4/2018 at am Yes Unknown, Entered By History   aspirin 81 MG chewable tablet Take 81 mg by mouth daily 4/4/2018 at am Yes Unknown, Entered By History   DULoxetine HCl (CYMBALTA PO) Take 60 mg by mouth every morning 4/4/2018 at am Yes  Unknown, Entered By History   HYDROXYZINE HCL PO Take 50 mg by mouth At Bedtime 4/3/2018 at pm Yes Unknown, Entered By History   LAMOTRIGINE PO Take 25 mg by mouth every morning X 4 more days then increase to 50mg daily from 4/9/2018-4/22/2018. 4/4/2018 at am Yes Unknown, Entered By History   LevETIRAcetam (KEPPRA PO) Take 250 mg by mouth every evening Until 4/7/2018 then decrease to 250mg BID 4/8/2018-4/21/2018, then decrease to 250mg QAM 4/22-5/5/2018 4/3/2018 at pm Yes Unknown, Entered By History   LevETIRAcetam (KEPPRA PO) Take 500 mg by mouth every morning Until 4/7/2018 then decrease to 250mg BID 4/8/2018-4/21/2018, then decrease to 250mg QAM 4/22-5/5/2018 4/3/2018 at am Yes Unknown, Entered By History   LEVOTHYROXINE SODIUM PO Take 125 mcg by mouth every morning 4/4/2018 at 0600 Yes Unknown, Entered By History   MELATONIN PO Take 10 mg by mouth At Bedtime 4/3/2018 at pm Yes Unknown, Entered By History   OMEPRAZOLE PO Take 20 mg by mouth daily (with breakfast) 4/4/2018 at 0800 Yes Unknown, Entered By History   Potassium Chloride [place] [facility] (K-DUR PO) Take 10 mEq by mouth every morning 4/4/2018 at am Yes Unknown, Entered By History   Rivaroxaban (XARELTO PO) Take 20 mg by mouth At Bedtime 4/3/2018 at pm Yes Unknown, Entered By History   Zolpidem Tartrate (AMBIEN PO) Take 5 mg by mouth At Bedtime 4/3/2018 at hs Yes Unknown, Entered By History   DIVALPROEX SODIUM PO Take 250 mg by mouth 2 times daily 4/4/2018 at am x 1 dose Yes Unknown, Entered By History   ferrous sulfate (IRON) 325 (65 FE) MG tablet Take 325 mg by mouth 2 times daily 4/4/2018 at am x 1 dose Yes Unknown, Entered By History   Nutritional Supplements (NUTRITIONAL SUPPLEMENT PO) Take 1 packet by mouth 2 times daily (Deven Powder) Past Week Yes Unknown, Entered By History   miconazole (MICATIN) 2 % AERP powder Apply topically 2 times daily Apply to groin folds 4/4/2018 at am x 1 dose Yes Unknown, Entered By History   OxyCODONE HCl (OXYCONTIN PO) Take  20 mg by mouth every 12 hours 4/4/2018 at 0800 Yes Unknown, Entered By History   senna-docusate (SENOKOT-S;PERICOLACE) 8.6-50 MG per tablet Take 2 tablets by mouth 2 times daily 4/4/2018 at am x 1 dose Yes Unknown, Entered By History   GABAPENTIN PO Take 300 mg by mouth 3 times daily 4/4/2018 at x 2 doses Yes Unknown, Entered By History   ACETAMINOPHEN PO Take 650 mg by mouth 4 times daily 4/4/2018 at x 2 doses Yes Unknown, Entered By History   ACETAMINOPHEN PO Take 650 mg by mouth every 4 hours as needed for pain (May take additional as needed doses. Do not exceed 4000mg in 24 hours.) Past Week at Unknown time Yes Unknown, Entered By History   bisacodyl (DULCOLAX) 10 MG Suppository Place 10 mg rectally daily as needed for constipation prn med Yes Unknown, Entered By History   DIAZEPAM PO Take 5 mg by mouth every 4 hours as needed for anxiety Past Week at Unknown time Yes Unknown, Entered By History   HYDROmorphone HCl (DILAUDID PO) Take 2 mg by mouth every 4 hours as needed for moderate to severe pain 4/4/2018 at 1356 Yes Unknown, Entered By History   magnesium hydroxide (MILK OF MAGNESIA) 400 MG/5ML suspension Take 30 mLs by mouth daily as needed for constipation or heartburn prn med Yes Unknown, Entered By History   OXYCODONE HCL PO Take 5-10 mg by mouth every 4 hours as needed (Give 5mg for pain level 4-6 on a 10 point scale. Give 10mg for pain level 6-10 on a 10 point scale.) prn med Yes Unknown, Entered By History          PHYSICAL EXAMINATION:   GENERAL:  The patient is alert, awake, oriented x3 and does not appear to be in distress.   VITAL SIGNS:  Blood pressure 103/64, heart rate 87, temperature 98, respirations 16, pulse ox 94 on room air.   HEENT:  PERRLA, EOMI.  Oral mucosa moist.   NECK:  Supple.   LUNGS:  Bilateral equal air entry, clear to auscultation anteriorly, left lung field is clear posteriorly, but right has fine crepitations.  No respiratory distress.  No wheezing.  The patient remains on  room air.   CARDIOVASCULAR:  S1, S2, regular, no murmur, rub or gallop.   ABDOMEN:  Soft, nontender, bowel tones active, suprapubic catheter in place.     The patient has percutaneous nephrostomy tubes on both sides, one on the right is draining very dark urine with quite a lot of sediment and is cloudy in appearance, but no blood was noted.   MUSCULOSKELETAL:  Paraplegic bilateral legs are puffy.  Does not have sensation to touch on right extremities.    The patient has multiple decubitus ulcers including her left buttock and left heel, the largest one is over her right buttock in the gluteal fold, which tracks down to the bone.  Surrounding skin is erythematous.  The image of this area has been uploaded in the Epic in the ED did note section.        LABORATORY: Sodium 137, potassium 4, chloride 103, bicarbonate 29, BUN 8, creatinine 0.51, calcium 7.9, anion gap 5.  Lactic acid 2.5, blood sugar 114.  WBC 11, hemoglobin 10.1, hematocrit 33.1, platelet 450.  INR 1.29.  Blood cultures were drawn, 2 sets.  Valproic acid level 42.        IMAGING: Reviewed  CT abdomen and pelvis report which shows mild bibasilar atelectasis or fibrosis in her chest, but in abdomen and pelvis it shows also extending down to the right ischial tuberosity with erosive changes suggestive for osteomyelitis.  Also noted was nonunited left subtrochanteric fracture.  Bilateral nephrostomy tubes in place without hydronephrosis or urinary tract stones.  Suprapubic catheter in place in the bladder.      ASSESSMENT AND PLAN:  Marychuy Zhou is a 62-year-old paraplegic woman with decubital ulcers involving her left heel and right and left buttock with a deep tracking wound over the right buttock with chronic osteomyelitis with recent infection, completed 10 days of IV antibiotics  last month, was transported to the ER for evaluation of the wound check.      1.  Paraplegia and multiple decubitus ulcers involving the right buttock, stage IV, with  chronic osteomyelitis, now with pain.     --  The patient had debridement of this ulcer by Dr. Pierre Cazares on 02/05/2018. She has had wound infection in the past and has a history of MDR infections including MRSA, VRE and ESBL in the past at different sites with the last treatment with IV antibiotic, meropenem and daptomycin, which she completed on 03/08/2018.  She is followed at [facility] for this. The finding of osteomyelitis noted on CT could be chronic.  Her WBC is normal and there is no fever.  I have ordered procalcitonin, CRP and ESR.  She has 2 sets of blood cultures obtained from the ER.   I will start her on meropenem and vancomycin and will request ID and General Surgery consult.  Her wound does have a slimy discharge with surrounding erythema involving the skin as well as the deep tissue. I do not think local culture is going to help at this point, but will obtain if recommended by ID tomorrow.   --  She has chronic pain and is on oxycodone and hydromorphone both, does have a history of narcotic pain seeking behavior.  I will continue her with her prior to admission medication regimen with a low dose IV hydromorphone made available, but will have to use IV pain medications cautiously.   Nursing to minimize IV pain medication as able.   --  Wound care consult.   --  Her blood pressure was as low as 80 in the ER, one reading, lactic acid is mildly elevated, she does not appear septic.  Per Care Everywhere note, her blood pressure at times runs low in the 80s.  Will monitor.     --  I will maintain her on IV normal saline at 75 mL an hour.   2.  Anemia, chronic.     --  The patient has a history of iron deficiency anemia and also her erythropoietin level apparently was low.  She was started on folic acid and iron supplement, which will be continued.  Her hemoglobin is at her baseline, monitor daily.   3.  Recent left comminuted acute subtrochanteric fracture with recent fall.     --  She was  evaluated by Orthopedic Surgery and recommended x-ray in 2-3 weeks while she was at [facility].     --  This was noted in the CT today that was done in the ER.     --  Recommend follow up with Orthopedic Surgery after discharge.   4.  History of deep venous thrombosis.     --  The patient is on Xarelto.  Will hold it tonight, resume tomorrow after General Surgery eval, if no debridement is needed.   5.  Seizure disorder.     --  The patient used to be on Keppra, but it looks like her Keppra is being tapered and she is currently taking 500 in the morning and 250 mg at bedtime, which is to be continued until 04/07 and from 04/08, she will take 250 mg p.o. b.i.d. until 04/21 and then to decrease to 250 mg q.a.m. from 04/22 until 05/05/2018.   --  She has now been started on lamotrigine which she will be taking 25 mg p.o. daily for 4 more days, then to increase to 50 mg daily from 04/09/2018 until 04/22/2018.  The patient to follow up with Neurology regarding her subsequent dose after 04/22.   6.  Anxiety and depression.   --   Prior to admission Cymbalta, risperidone and Abilify will be npr and Diazepam will be continued.   7.  Paraplegia.  Apparently this was due to left T4 hematoma about 9 years ago.  The patient has neurogenic bladder and bowel and has bilateral nephrostomy tubes and suprapubic catheter.  The nephrostomy tubes were placed on 01/19 to divert urine from decubitus ulcer.   --   Left nephrostomy tube was dislodged and was replaced 03/06/2018 per report   8.  Hypothyroidism:  Prior to admission levothyroxine 125 mg p.o. every day continued.   9.  History of diastolic congestive heart failure.  The patient currently appears euvolemic, no sign of exacerbation noted.  Follow daily weight and intake and output.  I have started her on normal saline at 75 mL an hour, likely will discontinue after overnight hydration.   10.  History of chronic obstructive pulmonary disease, not on oxygen at baseline.   She is not on any maintenance medication, will have p.r.n. albuterol available while she is here.   11.  Deep venous thrombosis prophylaxis.  This patient is on Xarelto prior to admission, which will be resumed after General Surgery evaluation, if no intervention needed.      CODE STATUS:  FULL CODE.  This was discussed with the patient, she also mentioned that she was recommended hospice, but the patient is not ready for that and she is FULL CODE.      I anticipate 2-5 days of hospital stay.  Inpatient orders entered.  Plan of care was discussed with the patient.         EDILIA CAMPUZANO MD             D: 2018   T: 2018   MT: MD      Name:     SHALINI CHONG   MRN:      [id number]        Account:      [id number]   :      1955        Admitted:     2018                   Document: [id number]       cc: Mellisa Haji MD

## 2018-04-05 NOTE — PLAN OF CARE
Problem: Patient Care Overview  Goal: Plan of Care/Patient Progress Review  Outcome: No Change  A&O x4, cooperate but irritated easily. VSS on RA; temp 99.5 then 98.8 after Tylenol. Gave Oxycodone 10mg over shift for pain 7/10, some effectiveness. Left and Right Nephrostomy tube patent, suprapubic catheter patent, all draining adequately. WOC consult placed for R and L buttocks wounds. IVF NS running at 75. IV abx. Surg and ID consult.

## 2018-04-05 NOTE — PLAN OF CARE
Problem: Patient Care Overview  Goal: Plan of Care/Patient Progress Review  Outcome: No Change  A&O x4. VSS on RA. Up w/lift; assist of 2 to turn/repo q2hr, refuses repositioning at times, encouraged. LS clear, denies pain. BS+, denies nausea, tolerating regular diet. C/O consistent 10/10 pain in sacrum, PO oxycodone x1, now d/c, IV dilaudid x2, PO meds encouraged. Wounds to buttocks and left heal, WOC and surgery consulted. R and L nephrostomy tubes and suprapubic catheter preset, patent, adeq output.

## 2018-04-05 NOTE — PROGRESS NOTES
[facility] Nurse Inpatient Adult Pressure Injury (PI) Assessment     Initial Assessment of PI(s) on pt's:   Buttocks, especially right buttock/IT; left plantar heel    Data:   Patient History:      per MD note(s): HISTORY OF PRESENT ILLNESS:  Marychuy Zhou is a 62-year-old woman with below listed extensive multiple medical problems, especially paraplegia with a stage IV decubitus ulcer in her left buttock; also with other pressure ulcers on her right buttock and left heel with chronic osteomyelitis involving the right ischial tuberosity, who has had multiple hospitalizations at [facility] and debridement and is residing at [facility] currently.        Isaiah Assessment and sub scores:   Isaiah Score  Av.5  Min: 13  Max: 14    Positioning: Pillows    Mattress:  Standard , Atmos Air mattress - Pulsate air mattress ordered today 4-5      Moisture Management:  Incontinence Protocol and Diaper and neph tubes      Current Diet / Nutrition:           Active Diet Order      Combination Diet Regular Diet Adult    Labs:   Recent Labs   Lab Test  18   0825  18   1648   HGB  8.7*  10.1*   INR   --   1.29*   WBC  8.3  11.0   CRP  100.0*   --                                                                                                                         Pressure Injury Assessment  (location):   Bilateral buttocks, with tunneling wound to right IT area  Wound History:   Pt paraplegic.  States has had for at least 4 months.  Has a wound doctor who visits a couple of times a week.  [facility] has been using a clear barrier paste to periwounds (though has thick white paste residue all over buttocks) and wet-to-dry gauze dressings to rt buttock wound with sterile water or saline.      Wound Base:  1.  Right lower buttock/IT:  approx 3 x 5 x 4cm, with various small pockets of tunneled depth of approx. 2-4cm in base of wound.  Outer wound walls are pink-red granular healthy tissue, but  deeper in wound is some tan-white smooth tissue and other subcu tissue that is hard to identify.  Small area of rough bone palpated.  Moderate serosang drainage, slight creamy.  No real odor.  No pain except 'when they touch the bone with a qtip.'  Periwound red, rough, with scant bleeding to outer buttock shallow abrasions.    2.  Upper sacral area:  Approx. 3 x 10cm area of scattered shallow yellowish wounds with about 0.1-0.2cm depth.  Scant serosang drainage.  Periwound very red, ragged.    3.  Left buttock:  Scattered small wounds in an approx. 3 x 5cm area, similar to above sacral wounds.  Scant bloody drainage.    Buttocks in general are reddened with scattered rough and raw tissue and shallow wounds.  Groin folds gunky and moist.  Questionable fungal component.        Pressure Injury Assessment:  Left plantar heel    Wound History:   Present on admission, unknown how long has been present, pt had her lunch arriving and wanted to refuse assessment, but WOC able to see area briefly.      Specific Dimensions (length x width x depth, in cm) :   Approx. 2 x 2 x ?cm    Tunneling:  N/A    Undermining: N/A    Wound Base: brownish semi-moist eschar but firm, non-fluctuant.  Edges with narrow slivers of moist pink-white tissue.      Palpation of the wound bed:  firm    Slough appearance:  adherent    Eschar appearance:  brown    Periwound Skin: erythema    Drainage:  Moderate old bloody drainage on old dressing    Odor: moderate    Pain:  absent      S/p catheter:  peritube skin red, crusty, yeasty.             Intervention:     Patient's chart evaluated.      Isaiah Interventions:  Current Isaiah Interventions and Care Plan reviewed and updated, appropriate at this time.    Wounds assessed, cleansed, partially redressed    Orders written, but these may change as will need to see what Surgery plan is    Supplies  gathered and floor stock    Discussed plan of care with Patient and Nurse           Assessment:  "    Pressure Injury (PI) located on right buttock: Stage 4, present on admission.  Outer wound appears healthy but there is bone exposed in the base.  Minimal necrotic tissue noted but unclear what is happening deeper in wound.  Await Surgery eval and recs.  If no debridement, would recommend continuing wet-to-dry dressings but with Dakin's solution, to help debride and provide antimicrobial agent.  Pt does not want to change her current plan of saline wet-to-dry at this point but is willing to talk with Surgery.      Rest of buttocks:  Scattered Stage 2 and Stage 3 shallow pressure injuries, present on admission, no s/s infection but skin everywhere is rough and slightly raw.  Would recommend Triad paste if pt allows, as this will help treat the actual wounds; otherwise continue with barrier paste as pt has been doing.      Left heel:  Unstageable PI, present on admission.  Pt refusing to discuss this wound at [facility] visit as wanted to get to her lunch before anyone else came to see her.   Wound seems non-fluctuant but has some odor - may benefit from Podiatry consult if Surgery does not address this wound.      Pt should be on air mattress - ordered today.         Plan:     Nursing to notify the Provider(s) and re-consult the [facility] Nurse if wound(s) deteriorate(s)or if the wound care plan needs reevaluation.    If pt is refusing to turn or reposition they must be educated on the  potential injury from not off loading pressure.  Then this \"educated refusal\" needs to be documented as an \"educated refusal to turn/ reposition\" and document if alert, etc.      Plan for wound care to buttocks: BID and prn, unless any new orders from Surgery:  1.  Cleanse general skin and shallow wounds with mild cleanser, ie. Greta perineal lotion.  Do not need to scrub off all old paste everytime, just outer soiled layers.   2.  Cleanse open wounds with MicroKlenz, pat dry.    3.  Moisten gauze fluff lightly with saline, unfluff and pack " into wound on right buttock.  **Recommend changing to Dakin's solution instead of saline, if ok with Surgery and pt**  4.  Apply thin glaze of barrier paste (or Triad paste if pt allows) to all pink/raw tissue and over the shallow wounds on sacrum and left buttock.    5.  Cover with ABD pads and Medipore tape.  Label with time/date/initials.       PIP (Pressure injury prevention):   - Pulsate air mattress.  No fitted sheets, no cloth chux.    - Reposition every 1-2 hrs, side to side   - HOB as low as tolerated  - float heels at all times  - ensure pt not lying on any tubing, wrinkles, devices, etc      Plan for left heel: Daily (unless any new orders from Podiatry or Surgery):  1.  Cleanse with MicroKlenz.  2.  Apply moderate glob of Iodosorb gel to Mepilex or gauze, and press onto wound.    3.  Label with time/date/initials.  4.  Recommend Podiatry consult.    WOC Nurse will return: weekly and prn

## 2018-04-05 NOTE — PHARMACY-VANCOMYCIN DOSING SERVICE
Pharmacy Vancomycin Initial Note  Date of Service 2018  Patient's  1955  62 year old, female    Indication: Osteomyelitis    Current estimated CrCl = Estimated Creatinine Clearance: 108.8 mL/min (based on Cr of 0.51).    Creatinine for last 3 days  2018:  9:31 PM Creatinine 0.49 mg/dL  2018:  4:48 PM Creatinine 0.51 mg/dL    Recent Vancomycin Level(s) for last 3 days  No results found for requested labs within last 72 hours.      Vancomycin IV Administrations (past 72 hours)      No vancomycin orders with administrations in past 72 hours.                Nephrotoxins and other renal medications (Future)    Start     Dose/Rate Route Frequency Ordered Stop    18 2245  vancomycin (VANCOCIN) 1500 mg in 0.9% NaCl 250 mL PREMIX      1,500 mg  166.7 mL/hr over 90 Minutes Intravenous EVERY 12 HOURS 18 2245            Contrast Orders - past 72 hours (72h ago through future)    Start     Dose/Rate Route Frequency Ordered Stop    18 1751  iopamidol (ISOVUE-370) solution 81 mL      81 mL Intravenous ONCE 18 1750 18 1800                Plan:  1.  Start vancomycin  1500 mg IV q12h.   2.  Goal Trough Level: 15-20 mg/L   3.  Pharmacy will check trough levels as appropriate in 1-3 Days.    4. Serum creatinine levels will be ordered daily for the first week of therapy and at least twice weekly for subsequent weeks.    5. Headrick method utilized to dose vancomycin therapy: Method 1    Venita Oviedo

## 2018-04-06 LAB
ANION GAP SERPL CALCULATED.3IONS-SCNC: 6 MMOL/L (ref 3–14)
BUN SERPL-MCNC: 5 MG/DL (ref 7–30)
CALCIUM SERPL-MCNC: 7.9 MG/DL (ref 8.5–10.1)
CHLORIDE SERPL-SCNC: 106 MMOL/L (ref 94–109)
CO2 SERPL-SCNC: 27 MMOL/L (ref 20–32)
CREAT SERPL-MCNC: 0.46 MG/DL (ref 0.52–1.04)
ERYTHROCYTE [DISTWIDTH] IN BLOOD BY AUTOMATED COUNT: 19.6 % (ref 10–15)
GFR SERPL CREATININE-BSD FRML MDRD: >90 ML/MIN/1.7M2
GLUCOSE SERPL-MCNC: 95 MG/DL (ref 70–99)
HCT VFR BLD AUTO: 30.8 % (ref 35–47)
HGB BLD-MCNC: 9.4 G/DL (ref 11.7–15.7)
LAMOTRIGINE SERPL-MCNC: 1.7 UG/ML (ref 2.5–15)
MCH RBC QN AUTO: 25.1 PG (ref 26.5–33)
MCHC RBC AUTO-ENTMCNC: 30.5 G/DL (ref 31.5–36.5)
MCV RBC AUTO: 82 FL (ref 78–100)
PLATELET # BLD AUTO: 313 10E9/L (ref 150–450)
POTASSIUM SERPL-SCNC: 4.2 MMOL/L (ref 3.4–5.3)
RBC # BLD AUTO: 3.74 10E12/L (ref 3.8–5.2)
SODIUM SERPL-SCNC: 139 MMOL/L (ref 133–144)
VANCOMYCIN SERPL-MCNC: 22.6 MG/L
WBC # BLD AUTO: 6.9 10E9/L (ref 4–11)

## 2018-04-06 PROCEDURE — 99232 SBSQ HOSP IP/OBS MODERATE 35: CPT | Performed by: PODIATRIST

## 2018-04-06 PROCEDURE — 80048 BASIC METABOLIC PNL TOTAL CA: CPT | Performed by: HOSPITALIST

## 2018-04-06 PROCEDURE — 12000000 ZZH R&B MED SURG/OB

## 2018-04-06 PROCEDURE — 25000132 ZZH RX MED GY IP 250 OP 250 PS 637: Mod: GY | Performed by: HOSPITALIST

## 2018-04-06 PROCEDURE — 25000128 H RX IP 250 OP 636: Performed by: HOSPITALIST

## 2018-04-06 PROCEDURE — 80202 ASSAY OF VANCOMYCIN: CPT | Performed by: HOSPITALIST

## 2018-04-06 PROCEDURE — 36415 COLL VENOUS BLD VENIPUNCTURE: CPT | Performed by: HOSPITALIST

## 2018-04-06 PROCEDURE — A9270 NON-COVERED ITEM OR SERVICE: HCPCS | Mod: GY | Performed by: HOSPITALIST

## 2018-04-06 PROCEDURE — 25000132 ZZH RX MED GY IP 250 OP 250 PS 637: Mod: GY | Performed by: INTERNAL MEDICINE

## 2018-04-06 PROCEDURE — A9270 NON-COVERED ITEM OR SERVICE: HCPCS | Mod: GY | Performed by: INTERNAL MEDICINE

## 2018-04-06 PROCEDURE — 85027 COMPLETE CBC AUTOMATED: CPT | Performed by: HOSPITALIST

## 2018-04-06 PROCEDURE — 99233 SBSQ HOSP IP/OBS HIGH 50: CPT | Performed by: INTERNAL MEDICINE

## 2018-04-06 RX ORDER — CALCIUM CARBONATE 500 MG/1
1000 TABLET, CHEWABLE ORAL
Status: DISCONTINUED | OUTPATIENT
Start: 2018-04-06 | End: 2018-04-07 | Stop reason: HOSPADM

## 2018-04-06 RX ORDER — HYDROMORPHONE HYDROCHLORIDE 2 MG/1
4-6 TABLET ORAL
Status: DISCONTINUED | OUTPATIENT
Start: 2018-04-06 | End: 2018-04-07 | Stop reason: HOSPADM

## 2018-04-06 RX ORDER — CEFAZOLIN SODIUM 1 G/50ML
1250 SOLUTION INTRAVENOUS EVERY 12 HOURS
Status: DISCONTINUED | OUTPATIENT
Start: 2018-04-06 | End: 2018-04-07

## 2018-04-06 RX ORDER — ACETAMINOPHEN 325 MG/1
975 TABLET ORAL 3 TIMES DAILY
Status: DISCONTINUED | OUTPATIENT
Start: 2018-04-06 | End: 2018-04-07 | Stop reason: HOSPADM

## 2018-04-06 RX ADMIN — FERROUS SULFATE TAB 325 MG (65 MG ELEMENTAL FE) 325 MG: 325 (65 FE) TAB at 20:30

## 2018-04-06 RX ADMIN — OXYCODONE HYDROCHLORIDE AND ACETAMINOPHEN 500 MG: 500 TABLET ORAL at 08:34

## 2018-04-06 RX ADMIN — ARIPIPRAZOLE 5 MG: 5 TABLET ORAL at 08:34

## 2018-04-06 RX ADMIN — DULOXETINE 60 MG: 30 CAPSULE, DELAYED RELEASE ORAL at 08:34

## 2018-04-06 RX ADMIN — DIAZEPAM 5 MG: 5 TABLET ORAL at 22:15

## 2018-04-06 RX ADMIN — Medication 10000 UNITS: at 20:30

## 2018-04-06 RX ADMIN — POTASSIUM CHLORIDE 10 MEQ: 750 TABLET, EXTENDED RELEASE ORAL at 08:33

## 2018-04-06 RX ADMIN — DOCUSATE SODIUM 100 MG: 100 CAPSULE, LIQUID FILLED ORAL at 20:30

## 2018-04-06 RX ADMIN — OMEPRAZOLE 20 MG: 20 CAPSULE, DELAYED RELEASE ORAL at 08:34

## 2018-04-06 RX ADMIN — DIVALPROEX SODIUM 250 MG: 250 TABLET, DELAYED RELEASE ORAL at 20:30

## 2018-04-06 RX ADMIN — MEROPENEM 1 G: 1 INJECTION, POWDER, FOR SOLUTION INTRAVENOUS at 02:18

## 2018-04-06 RX ADMIN — MELATONIN 5 MG TABLET 10 MG: at 22:15

## 2018-04-06 RX ADMIN — RIVAROXABAN 20 MG: 20 TABLET, FILM COATED ORAL at 22:15

## 2018-04-06 RX ADMIN — HYDROMORPHONE HYDROCHLORIDE 6 MG: 2 TABLET ORAL at 12:03

## 2018-04-06 RX ADMIN — HYDROMORPHONE HYDROCHLORIDE 6 MG: 2 TABLET ORAL at 15:57

## 2018-04-06 RX ADMIN — GABAPENTIN 300 MG: 300 CAPSULE ORAL at 08:33

## 2018-04-06 RX ADMIN — DIAZEPAM 5 MG: 5 TABLET ORAL at 14:02

## 2018-04-06 RX ADMIN — GABAPENTIN 300 MG: 300 CAPSULE ORAL at 22:15

## 2018-04-06 RX ADMIN — HYDROMORPHONE HYDROCHLORIDE 4 MG: 2 TABLET ORAL at 08:47

## 2018-04-06 RX ADMIN — LAMOTRIGINE 25 MG: 25 TABLET ORAL at 08:34

## 2018-04-06 RX ADMIN — FERROUS SULFATE TAB 325 MG (65 MG ELEMENTAL FE) 325 MG: 325 (65 FE) TAB at 08:34

## 2018-04-06 RX ADMIN — Medication 10000 UNITS: at 08:33

## 2018-04-06 RX ADMIN — ACETAMINOPHEN 975 MG: 325 TABLET, FILM COATED ORAL at 22:14

## 2018-04-06 RX ADMIN — ACETAMINOPHEN 975 MG: 325 TABLET, FILM COATED ORAL at 15:56

## 2018-04-06 RX ADMIN — OXYCODONE HYDROCHLORIDE 20 MG: 20 TABLET, FILM COATED, EXTENDED RELEASE ORAL at 22:15

## 2018-04-06 RX ADMIN — CALCIUM CARBONATE (ANTACID) CHEW TAB 500 MG 1000 MG: 500 CHEW TAB at 14:11

## 2018-04-06 RX ADMIN — ZINC SULFATE CAP 220 MG (50 MG ELEMENTAL ZN) 220 MG: 220 (50 ZN) CAP at 08:34

## 2018-04-06 RX ADMIN — DIAZEPAM 5 MG: 5 TABLET ORAL at 02:21

## 2018-04-06 RX ADMIN — LEVETIRACETAM 250 MG: 250 TABLET, FILM COATED ORAL at 20:30

## 2018-04-06 RX ADMIN — MEROPENEM 1 G: 1 INJECTION, POWDER, FOR SOLUTION INTRAVENOUS at 10:23

## 2018-04-06 RX ADMIN — MICONAZOLE NITRATE: 2 POWDER TOPICAL at 10:34

## 2018-04-06 RX ADMIN — ZOLPIDEM TARTRATE 5 MG: 5 TABLET, FILM COATED ORAL at 22:15

## 2018-04-06 RX ADMIN — VANCOMYCIN HYDROCHLORIDE 1500 MG: 5 INJECTION, POWDER, LYOPHILIZED, FOR SOLUTION INTRAVENOUS at 00:42

## 2018-04-06 RX ADMIN — HYDROXYZINE HYDROCHLORIDE 50 MG: 25 TABLET ORAL at 22:15

## 2018-04-06 RX ADMIN — DIAZEPAM 5 MG: 5 TABLET ORAL at 18:11

## 2018-04-06 RX ADMIN — HYDROMORPHONE HYDROCHLORIDE 6 MG: 2 TABLET ORAL at 22:15

## 2018-04-06 RX ADMIN — DOCUSATE SODIUM 100 MG: 100 CAPSULE, LIQUID FILLED ORAL at 08:36

## 2018-04-06 RX ADMIN — HYDROMORPHONE HYDROCHLORIDE 0.5 MG: 1 INJECTION, SOLUTION INTRAMUSCULAR; INTRAVENOUS; SUBCUTANEOUS at 08:30

## 2018-04-06 RX ADMIN — GABAPENTIN 300 MG: 300 CAPSULE ORAL at 15:57

## 2018-04-06 RX ADMIN — LEVETIRACETAM 500 MG: 500 TABLET, FILM COATED ORAL at 08:34

## 2018-04-06 RX ADMIN — LEVOTHYROXINE SODIUM 125 MCG: 100 TABLET ORAL at 08:34

## 2018-04-06 RX ADMIN — HYDROMORPHONE HYDROCHLORIDE 0.5 MG: 1 INJECTION, SOLUTION INTRAMUSCULAR; INTRAVENOUS; SUBCUTANEOUS at 01:51

## 2018-04-06 RX ADMIN — OXYCODONE HYDROCHLORIDE 20 MG: 20 TABLET, FILM COATED, EXTENDED RELEASE ORAL at 10:22

## 2018-04-06 RX ADMIN — ASPIRIN 81 MG 81 MG: 81 TABLET ORAL at 08:35

## 2018-04-06 RX ADMIN — DIVALPROEX SODIUM 250 MG: 250 TABLET, DELAYED RELEASE ORAL at 08:33

## 2018-04-06 RX ADMIN — HYDROMORPHONE HYDROCHLORIDE 6 MG: 2 TABLET ORAL at 19:06

## 2018-04-06 NOTE — PLAN OF CARE
Problem: Patient Care Overview  Goal: Plan of Care/Patient Progress Review  Outcome: No Change  A+Ox4. Hypotensive, otherwise VSS. Patient complains of 6-10/10 back pain, relieved with prn dilaudid (oral and IV), scheduled OxyContin, repositioning and distraction. Turn/reposition every two hours. Hemiplegia in BLE, up with lift. Writer attempted wound care this evening per WOC orders, patient insisted that writer clean and cover wound in a different fashion. Three mepilex applied over open sores, wound with tunneling packed with wet dressing and mepilex applied over packing. Nephrostomy tubes x2 patent, Suprapubic catheter patent, dressing CDI. X-ray of left heel completed this evening. Plan to continue wound care BID, nursing will continue to monitor.

## 2018-04-06 NOTE — PROGRESS NOTES
Met with patient to discuss plan of care. Per surgery patient should return back to [facility] for wound treatment. Writer spoke with Sade at [facility] . Patient is appropriate for admission to the hospital however They do not have a bed open presently and will call unit when one is available. Contact # [phone]   Patient very argumentative regarding her cares. Patient declined writer to setup an outpatient appointment with wound care clinic stating she will be able to manage it. Wound Care Clinic number is [phone] Patient requesting to stay in hospital untill Monday till she can figure out transportation for her outpt appointments. Patient feels she should be treated in hospital at  for her sacral wounds. Writer discussed recommendations from surgeon and plastics how ever patient is not in agreement.

## 2018-04-06 NOTE — CONSULTS
"Inpatient Pain Management Service: Consultation      DATE OF CONSULT: April 6, 2018     REASON FOR PAIN CONSULTATION:  Marychuy Zhou is a 62 year old female I am seeing in consultation at the request of Dr. HAMLET Lees for evaluation and recommendations for increasing pain.     TIME SPENT: 60 minutes including 10 minutes of face-to-face time (patient was somnolent and not very interested in talking)    --------------------------------------------------------------------------------------------------  PAIN ASSESSMENT     PAIN DESCRIPTION:   Location(s): sacral, couldn't get Marychuy to reveal any other areas of pain  Duration: Contstant  Pain intensity: 10/10, stated she likes to say her pain is always a \"12\"  Quality of the pain: \"I can't describe it, it's the worst\"  Aggravating factors: would not answer  Relieving factors:  \"pain pills help me\"    HEALTH & LIFESTYLE PRACTICES:   Tobacco:  reports that she has never smoked. She does not have any smokeless tobacco history on file.  Alcohol:  reports that she does not drink alcohol.  Illicit drugs:  reports that she does not use illicit drugs.    PAST MEDICAL AND PSYCHIATRIC HISTORY:    Past Medical History:   Diagnosis Date     Anxiety      Chronic pain syndrome      Closed fracture zygoma, with routine healing, subsequent encounter      COPD (chronic obstructive pulmonary disease) (H)      Depressive disorder      DVT (deep vein thrombosis) in pregnancy (H)      Edema      Epilepsy (H)      Flaccid neuropathic bladder, not elsewhere classified      Fracture of femur (H)      Hypothyroidism      Iron deficiency anemia      Paraplegia (H)     unspecified     Pressure ulcer of sacral region      PTSD (post-traumatic stress disorder)      Rheumatoid arthritis (H)      Sepsis (H)     unspecified       PAST SURGICAL HISTORY: History reviewed. No pertinent surgical history.    CAPA (Clinically Aligned Pain Assessment)  Comfort (How is your pain?): Intolerable  Change in " Pain (Since your last medication/intervention?): Getting worse  Pain Control (How are your pain treatments working?): Inadequate pain control  Functioning (Are you able to do activities to get better?) : Can't do anything because of pain  Sleep (Does your pain management allow you to sleep or rest?): Awake with pain most of the night       --------------------------------------------------------------------------------------------------  PAIN MEDICATION ASSESSMENT    ALLERGIES:  No Known Allergies     PREADMISSION PAIN MEDICATIONS:    Prior to Admission medications    Medication Sig Start Date End Date Taking? Authorizing Provider   DULoxetine HCl (CYMBALTA PO) Take 60 mg by mouth every morning   Yes Unknown, Entered By History   HYDROXYZINE HCL PO Take 50 mg by mouth At Bedtime   Yes Unknown, Entered By History   LAMOTRIGINE PO Take 25 mg by mouth every morning X 4 more days then increase to 50mg daily from 4/9/2018-4/22/2018.   Yes Unknown, Entered By History   LevETIRAcetam (KEPPRA PO) Take 250 mg by mouth every evening Until 4/7/2018 then decrease to 250mg BID 4/8/2018-4/21/2018, then decrease to 250mg QAM 4/22-5/5/2018   Yes Unknown, Entered By History   LevETIRAcetam (KEPPRA PO) Take 500 mg by mouth every morning Until 4/7/2018 then decrease to 250mg BID 4/8/2018-4/21/2018, then decrease to 250mg QAM 4/22-5/5/2018   Yes Unknown, Entered By History   DIVALPROEX SODIUM PO Take 250 mg by mouth 2 times daily   Yes Unknown, Entered By History   OxyCODONE HCl (OXYCONTIN PO) Take 20 mg by mouth every 12 hours   Yes Unknown, Entered By History   GABAPENTIN PO Take 300 mg by mouth 3 times daily   Yes Unknown, Entered By History   ACETAMINOPHEN PO Take 650 mg by mouth 4 times daily   Yes Unknown, Entered By History   ACETAMINOPHEN PO Take 650 mg by mouth every 4 hours as needed for pain (May take additional as needed doses. Do not exceed 4000mg in 24 hours.)   Yes Unknown, Entered By History   DIAZEPAM PO Take 5 mg by  mouth every 4 hours as needed for anxiety   Yes Unknown, Entered By History   HYDROmorphone HCl (DILAUDID PO) Take 2 mg by mouth every 4 hours as needed for moderate to severe pain   Yes Unknown, Entered By History   OXYCODONE HCL PO Take 5-10 mg by mouth every 4 hours as needed (Give 5mg for pain level 4-6 on a 10 point scale. Give 10mg for pain level 6-10 on a 10 point scale.)   Yes Unknown, Entered By History       CURRENT INPATIENT PAIN MEDICATIONS:    Medications related to Pain Management (Future)    Start     Dose/Rate Route Frequency Ordered Stop    04/22/18 0900  levETIRAcetam (KEPPRA) tablet 250 mg      250 mg Oral DAILY 04/04/18 2208 04/09/18 0900  lamoTRIgine (LaMICtal) tablet 50 mg      50 mg Oral DAILY 04/04/18 2208 04/23/18 0859    04/08/18 0900  levETIRAcetam (KEPPRA) tablet 250 mg      250 mg Oral 2 TIMES DAILY 04/04/18 2208 04/22/18 0859    04/05/18 1500  HYDROmorphone (PF) (DILAUDID) injection 0.2 mg      0.2 mg Intravenous ONCE 04/05/18 1447      04/05/18 1500  HYDROmorphone (PF) (DILAUDID) injection 0.3-0.5 mg      0.3-0.5 mg Intravenous EVERY 3 HOURS PRN 04/05/18 1457      04/05/18 1006  HYDROmorphone (DILAUDID) tablet 2-4 mg      2-4 mg Oral EVERY 3 HOURS PRN 04/05/18 1006      04/05/18 0900  aspirin chewable tablet 81 mg      81 mg Oral DAILY 04/04/18 2208 04/05/18 0900  DULoxetine (CYMBALTA) EC capsule 60 mg      60 mg Oral EVERY MORNING 04/04/18 2208 04/05/18 0900  lamoTRIgine (LaMICtal) tablet 25 mg      25 mg Oral EVERY MORNING 04/04/18 2208 04/09/18 0859    04/05/18 0900  levETIRAcetam (KEPPRA) tablet 500 mg      500 mg Oral EVERY MORNING 04/04/18 2208 04/08/18 0859    04/04/18 2251  zolpidem (AMBIEN) tablet 5 mg      5 mg Oral AT BEDTIME PRN 04/04/18 2208 04/04/18 2215  docusate sodium (COLACE) capsule 100 mg      100 mg Oral 2 TIMES DAILY 04/04/18 2208 04/04/18 2215  divalproex sodium delayed-release (DEPAKOTE) DR tablet 250 mg      250 mg Oral 2 TIMES DAILY  04/04/18 2208 04/04/18 2215  gabapentin (NEURONTIN) capsule 300 mg      300 mg Oral 3 TIMES DAILY 04/04/18 2208 04/04/18 2215  hydrOXYzine (ATARAX) tablet 50 mg      50 mg Oral AT BEDTIME 04/04/18 2208 04/04/18 2215  levETIRAcetam (KEPPRA) tablet 250 mg      250 mg Oral EVERY EVENING 04/04/18 2208 04/08/18 1959 04/04/18 2215  oxyCODONE (OxyCONTIN) 12 hr tablet 20 mg      20 mg Oral EVERY 12 HOURS 04/04/18 2208 04/04/18 2208  polyethylene glycol (MIRALAX/GLYCOLAX) Packet 17 g      17 g Oral DAILY PRN 04/04/18 2208 04/04/18 2208  magnesium hydroxide (MILK OF MAGNESIA) suspension 30 mL      30 mL Oral DAILY PRN 04/04/18 2208 04/04/18 2208  bisacodyl (DULCOLAX) Suppository 10 mg      10 mg Rectal DAILY PRN 04/04/18 2208 04/04/18 2208  diazepam (VALIUM) tablet 5 mg      5 mg Oral EVERY 4 HOURS PRN 04/04/18 2208 04/04/18 2208  acetaminophen (TYLENOL) tablet 650 mg      650 mg Oral EVERY 4 HOURS PRN 04/04/18 2208            PAST PAIN TREATMENT:   Based on a review of the Board of Pharmacy  database, patient has been seeing multiple prescribers for the following controlled substances:  Valium, ativan, oxycontin, oxycodone, dilaudid, and ambien    LABS THAT CAN AFFECT PAIN MEDICATION CLEARANCE:   Lab Results   Component Value Date    BUN 5 04/06/2018     Lab Results   Component Value Date    CR 0.46 04/06/2018     No results found for: AST  No results found for: ALT    VITAL SIGNS:    B/P: 87/58, T: 99.1, P: 96, R: 18      ASSESSMENT:   1) Acute on top of chronic pain due to stage IV decubitus ulcer in her left buttock, pressure ulcers on her right buttock and left heel with chronic osteomyelitis involving the right ischial tuberosity.  Patient was taking Oxycontin, PRN oxycodone, and PRN dilaudid prior to admission.  In comparing her PTA narcotic doses with what she is currently receiving, she is taking nearly identical amount of narcotics as what she was receiving PTA.  Adding  "up the dilaudid, and oxycodone IR, oxycontin she was getting at NH and converting it all over to oxycodone units, she was taking  mg of oxycodone equivalents every 24 hour period prior to admission.  Her current hospital regimen of Oxycontin + IV dilaudid + oral dilaudid = approximately 90 mg of Oxycodone-units per 24hours.    Given her current increase in pain- I do think it would be ok to increase her PRN dilaudid dose slightly (4-6 mg PO Q3H PRN).       2) Opioid (and possibly benzodiazepine) dependence with drug seeking behavior.  Based on my review of this patient's admission medication list and reviewing the last 6 months of the  database, this patient appears to have been receiving a significant amount of narcotics and benzodiazepines from multiple prescribers.   A review of the  database also shows some early refills.  During my interview with Marychuy this AM, she appeared extremely focused on opiates and was stating her pain was at a \"10\", but suspect that this may be inflated as I could not detect any physical signs?  She felt that we were being \"cruel\" by giving small opioid doses.      TREATMENT RECOMMENDATIONS/PLAN:   1) Continue Oxycontin 20 mg PO BID  2) Continue gabapentin  (Marychuy was fairly somnolent when I spoke with her, so was hesitant to increase her dose today)  Could potentially   3) I've increased her oral dilaudid to 4-6 mg PO Q3H to give her only a slightly higher breakthrough dose than what she was receiving PTA (to equal her PRN oxy + dilaudid PTA).  Would definitely encourage her to take oral instead of IV dilaudid.  4) Recommend changing PRN acetaminophen to 975 mg PO Q8H scheduled  5) Limit benzos as you are able, am worried about oversedation with opioids  6) Recommend a pain specialist/clinic appointment as an outpatient (or over at [facility] if she transfers there) to discuss whether her opioids could be slowly weaned down?  She also needs to sign a pain contract, if she " hasn't already    Son SIMMONS), Pharm.D.  Office [phone]  April 6, 2018, 8:53 AM  Inpatient Pain Management Service

## 2018-04-06 NOTE — PROGRESS NOTES
[facility]KLE SURG[facility]      Assessment:     62 year old female, paraplegic, with  1) deep ulcerations buttock with chronic osteomyelitis and  2) a decubitus ulceration posterior left heel.      The posterior left heel ulceration appears stable.  I do not appreciate any concerning changes on the x-rays.      Plan:    Will await radiology report, but expect it to be negative for osteomyelitis and podiatry will sign off.  If negative, no indication for surgery.   Recommend that she continue with wound cares at the [facility] wound clinic or [facility].     I suggest additional offloading via a foam boot (will order this)    Dressing:  Per LAY RN;  Iodorsorb    Sánchez Brooke DPM, FACFAS, MS  [place] [facility] of Podiatry/Foot & Ankle Surgery  [facility], [facility], and [facility]  Pager:  [phone]    _______________________________________________________________________      Subjective:  Patient said she didn't realize there was a sore or concern about the left heel.      Exam:    B/P: 91/61, T: 98, P: 96, R: 20    Vascular: DP pulse is palpable, left foot; skin warm, appears well-perfused.    Neuro: no LE sensation on the left.      MSK:  Paraplegia. No voluntary movement of the LEs.  Mild equinus of the left akle.      Derm:  There is a 2cm diameter pressure-type ulcer on the posterior left heel. Unstageable with a well-adhered, blacked, dry eschar.  I do not appreciate any concerning erythema - do not think there is cellulitis.  .      Lab Results   Component Value Date    WBC 6.9 04/06/2018     Lab Results   Component Value Date    RBC 3.74 04/06/2018     Lab Results   Component Value Date    HGB 9.4 04/06/2018     Lab Results   Component Value Date    HCT 30.8 04/06/2018     No components found for: MCT  Lab Results   Component Value Date    MCV 82 04/06/2018     Lab Results   Component Value Date    MCH 25.1 04/06/2018     Lab Results   Component Value  Date    MCHC 30.5 04/06/2018     Lab Results   Component Value Date    RDW 19.6 04/06/2018     Lab Results   Component Value Date     04/06/2018       All cultures:    Recent Labs  Lab 04/04/18  1930 04/04/18  1648 04/02/18  2131   CULT No growth after 2 days No growth after 2 days No growth after 3 days       Hemoglobin   Date Value Ref Range Status   04/06/2018 9.4 (L) 11.7 - 15.7 g/dL Final     Imaging:     Two views of the left heel.  I personally reviewed.  I do not appreciate any cortical changes to suggest osteomyelitis.

## 2018-04-06 NOTE — CONSULTS
Plastic Surgery    Asked to see patient re sacral wound. Patient followed in Wound Care clinic at [facility] and see by General Surgery who did not feel surgical intervention is indicated at this time. Agree with plan for dressing changes and follow up at [facility].    Mili Castro MD  Plastic Surgery  [facility]  [phone] (office)

## 2018-04-06 NOTE — PROGRESS NOTES
Pt remains argumentative after MD rounded with pt. Pt changes her statements and declines previous statements she has made regarding her care and future medical goals. Pt appears forgetful when it comes to times of medications given and cares performed for pt. Pt uses call light often even attempting to get more pain/anti anxiety meds.

## 2018-04-06 NOTE — PHARMACY-VANCOMYCIN DOSING SERVICE
Pharmacy Vancomycin Note  Date of Service 2018  Patient's  1955   62 year old, female    Indication: Osteomyelitis  Goal Trough Level: 15-20 mg/L  Day of Therapy: 3  Current Vancomycin regimen:  1500 mg IV q12h    Current estimated CrCl = Estimated Creatinine Clearance: 124.7 mL/min (based on Cr of 0.46).    Creatinine for last 3 days  2018:  4:48 PM Creatinine 0.51 mg/dL  2018:  8:25 AM Creatinine 0.40 mg/dL  2018:  8:20 AM Creatinine 0.46 mg/dL    Recent Vancomycin Levels (past 3 days)  2018: 11:30 AM Vancomycin Level 22.6 mg/L    Vancomycin IV Administrations (past 72 hours)                   vancomycin (VANCOCIN) 1500 mg in 0.9% NaCl 250 mL PREMIX (mg) 1,500 mg New Bag 18 0042     1,500 mg New Bag 18 1143     1,500 mg New Bag  0028                Nephrotoxins and other renal medications (Future)    Start     Dose/Rate Route Frequency Ordered Stop    18 1800  vancomycin 1250 mg in 0.9% NaCl 250 mL PREMIX      1,250 mg  166.7 mL/hr over 90 Minutes Intravenous EVERY 12 HOURS 18 1203               Contrast Orders - past 72 hours (72h ago through future)    Start     Dose/Rate Route Frequency Ordered Stop    18 1751  iopamidol (ISOVUE-370) solution 81 mL      81 mL Intravenous ONCE 18 1750 18 1800          Interpretation of levels and current regimen:  Trough level is  Supratherapeutic    Has serum creatinine changed > 50% in last 72 hours: No    Urine output:  unable to determine    Renal Function: Stable    Plan:  1.  Decrease dose to 1250 mg q12h  2.  Pharmacy will check trough levels as appropriate in 1-3 Days.    3. Serum creatinine levels will be ordered daily for the first week of therapy and at least twice weekly for subsequent weeks.      Geoff Matamoros        .

## 2018-04-06 NOTE — PLAN OF CARE
"Problem: Patient Care Overview  Goal: Plan of Care/Patient Progress Review  Outcome: No Change  A&OX4, occ forgetful. Bedrest, turn/repos when pt allows, enc to do at least every 2 hrs although pt refuses. VSS x BP soft, MD aware. C/o pain in \"bone\"/ sacral area, PRN dilaudid increased and given, pain team following. Coccyx drsg changed per pt's instructions and not WOC RN orders. Left heel drsg performed per WOC RN order. Receiving merrem and vanco. LS drsg changed d/t leaking at site. Rachel diet, fair appetite. Pt requests to sleep after discussing future DC plans, requested valium but did not give as she had received her first 6mg dose of PO dilaudid, explained to reasoning to pt, pt remained frustrated.       "

## 2018-04-06 NOTE — PROGRESS NOTES
[facility]  Infectious Disease Progress Note          Assessment and Plan:   IMPRESSION:   1.  A 62-year-old female with paraplegia x 9 years after development of a spinal hematoma.   2.  Chronic decubitus ulcers, most prominent is stage 4 left buttock ulcer.   3.  Chronic pain syndrome.   4.  Admitted on this occasion for increased generalized pain.  CT scan of the abdomen is interpreted as showing erosive changes of right ischial tuberosity suspicious for osteomyelitis.  The patient's CRP is elevated to 100 and sedimentation rate 62.        RECOMMENDATIONS:   1.  Continue vancomycin and meropenem for the present.   2.  eval for heel osteo in process.  3. If discharged over the w/e would temporize by giving PO Augmentin until she can see her other MDs at [facility].  I told her that she needs to see a wound care surgeon and Infectious Disease in the [facility] system.  I have written out these recs for pt and given her the name of Dr. Pelletier at [facility], if helpful to her wound care surgeon..  4. If issues on the w/e please call Dr. Lees.              Interval History:   Pain controlled.  Afebrile.  Creat stable.  Blood cxs NGTD.              Medications:       ascorbic acid  500 mg Oral Daily     beta carotene  10,000 Units Oral BID     zinc sulfate  220 mg Oral Daily     HYDROmorphone  0.2 mg Intravenous Once     meropenem  1 g Intravenous Q8H     docusate sodium  100 mg Oral BID     ARIPiprazole (ABILIFY) tablet 5 mg  5 mg Oral QAM     aspirin  81 mg Oral Daily     divalproex sodium delayed-release (DEPAKOTE) DR tablet 250 mg  250 mg Oral BID     DULoxetine (CYMBALTA) EC capsule 60 mg  60 mg Oral QAM     ferrous sulfate  325 mg Oral BID     gabapentin (NEURONTIN) capsule 300 mg  300 mg Oral TID     hydrOXYzine  50 mg Oral At Bedtime     lamoTRIgine (LaMICtal) tablet 25 mg  25 mg Oral QAM     levETIRAcetam (KEPPRA) tablet 250 mg  250 mg Oral QPM     levETIRAcetam (KEPPRA) tablet 500  "mg  500 mg Oral QAM     levothyroxine (SYNTHROID/LEVOTHROID) tablet 125 mcg  125 mcg Oral QAM     melatonin tablet 10 mg  10 mg Oral At Bedtime     omeprazole (priLOSEC) CR capsule 20 mg  20 mg Oral Daily with breakfast     oxyCODONE (OxyCONTIN) 12 hr tablet 20 mg  20 mg Oral Q12H     potassium chloride SA (K-DUR/KLOR-CON M) CR tablet 10 mEq  10 mEq Oral QAM     [START ON 4/8/2018] levETIRAcetam  250 mg Oral BID     [START ON 4/22/2018] levETIRAcetam  250 mg Oral Daily     [START ON 4/9/2018] lamoTRIgine  50 mg Oral Daily     miconazole   Topical BID     vancomycin (VANCOCIN) IV  1,500 mg Intravenous Q12H                  Physical Exam:   Blood pressure (!) 87/58, pulse 96, temperature 99.1  F (37.3  C), temperature source Oral, resp. rate 18, height 1.575 m (5' 2\"), weight 78.6 kg (173 lb 4.5 oz), SpO2 93 %.  [unfilled]  Vital Signs with Ranges  Temp:  [98.3  F (36.8  C)-99.4  F (37.4  C)] 99.1  F (37.3  C)  Pulse:  [96] 96  Heart Rate:  [88-91] 88  Resp:  [16-18] 18  BP: (87-90)/(58-62) 87/58  SpO2:  [93 %-96 %] 93 %    Constitutional: Awake, alert, cooperative, no apparent distress   Lungs: Clear to auscultation bilaterally, no crackles or wheezing   Cardiovascular: Regular rate and rhythm, normal S1 and S2, and no murmur noted   Abdomen: Normal bowel sounds, soft, non-distended, non-tender   Skin: No rashes, no cyanosis, no edema   Other:           Data:   All microbiology laboratory data reviewed.  Recent Labs   Lab Test  04/06/18   0820 04/05/18   0825  04/04/18   1648   WBC  6.9  8.3  11.0   HGB  9.4*  8.7*  10.1*   HCT  30.8*  28.8*  33.1*   MCV  82  82  82   PLT  313  342  450     Recent Labs   Lab Test  04/06/18   0820 04/05/18   0825 04/04/18   1648   CR  0.46*  0.40*  0.51*     Recent Labs   Lab Test  04/04/18   0825   SED  62*     "

## 2018-04-06 NOTE — PLAN OF CARE
Problem: Patient Care Overview  Goal: Plan of Care/Patient Progress Review  Outcome: No Change  A&O x4, irritable. VSS on RA; BP 90/62. Total care: A2, lift, T/R. C/o consistent pain 10/10, gave IV Dilaudid x1 and Valium x1. L and R nephrostomy tubes draining well, suprapubic catheter patent. Foam dressing to L outer ankle CDI. PIV SL. IV abx. Pharm to dose Vanco today 1100. Pain team and Surg consult. WOC/SW/ID/Podiatry/Nutrition follow.

## 2018-04-06 NOTE — PROGRESS NOTES
"General Surgery Progress Note    Sore, reports poor pain control, tolerating diet, UO adequate.   Vitals: Blood pressure (!) 87/58, pulse 96, temperature 99.1  F (37.3  C), temperature source Oral, resp. rate 18, height 5' 2\" (1.575 m), weight 173 lb 4.5 oz (78.6 kg), SpO2 93 %.  Temperature Temp  Av.9  F (37.2  C)  Min: 98.3  F (36.8  C)  Max: 99.4  F (37.4  C)   I/O last 3 completed shifts:  In: 720 [P.O.:720]  Out: 2850 [Urine:2850]    Wounds: refused exam due to pain.    62 year old paraplegic female with decubitus ulcer.  - Continue wound cares recommended by WOC RN, appreciate your help  - No plans for surgical debridement  - Continue medical mngt  - Will need follow up at [facility] wound clinic    Hermelinda Guerin PA-C  [facility]  [phone]  "

## 2018-04-06 NOTE — PROGRESS NOTES
[facility]    Hospitalist Progress Note      Assessment & Plan   Marychuy Zhou is a 62 year old female paraplegic woman with decubital ulcers involving her left heel and right and left buttock with a deep tracking wound over the right buttock with chronic osteomyelitis with recent infection, completed 10 days of IV antibiotics  last month, was transported to the ER for evaluation of the wound check.       Paraplegia and multiple decubitus ulcers, chronic pain involving the right buttock, stage IV, with chronic osteomyelitis.   The patient had debridement of this ulcer by Dr. Pierre Cazares on 02/05/2018.  She has had wound infection in the past and has a history of MDR infections including MRSA, VRE and ESBL in the past at different sites with the last treatment with IV antibiotic, meropenem and daptomycin, which she completed on 03/08/2018.  She is followed at [facility] for this.    - CT Aj/P this admission shows ulcer extending down to the right ischial tuberosity with erosive changes suspicious for osteomyelitis  - currently on Meropenem and vancomycin-> continued  - blood cultures are no growth to date  - ID seen and appreciate recs, continue same antibiotics and if discharged recommend Augmentin until followed with her primary surgeons  -general surgery and plastic surgery have evaluated this stay, they don't plan any surgical intervention here, recommend f/u with primary surgeon at [facility] wound clinic  - continue PTA Oxycontin 20mg BID and Dilaudid PO prn, Dilaudid IV prn for breakthrough  - discussed with patient regarding transfer to [facility] for continued care, at this time she states she declines and states she want to talk to her primary care doctor after her discharge, then see her surgeon afterwards. Recommended she call her PCP, since it is better to deal with the wound before developing worsening infection since she may not have adequate time to discharge and f/u. She then  stated she does not want  To listen to me at this time. Strongly encouraged her to think about this     Left plantar heel wound: Details noted in wound care.   - Podiatry Following, appreciate help  - X-ray of left foot pending  - Wound nurse following.      Anemia, chronic. The patient has a history of iron deficiency anemia and also her erythropoietin level apparently was low.    - Monitor.   - Hemoglobin currently stable at 9.4     Recent left comminuted acute subtrochanteric fracture with recent fall. She was evaluated by Orthopedic Surgery and recommended x-ray in 2-3 weeks while she was at [facility]. This was noted in the CT that was done in the ER.     - Recommend follow up with Orthopedic Surgery after discharge.      History of deep venous thrombosis. The patient is on Xarelto.    - Since no surgical intervention planned at this time,  resume prior to admissions Xarelto     Seizure disorder. The patient used to be on Keppra, but it looks like her Keppra is being tapered and she is currently taking 500 in the morning and 250 mg at bedtime, which is to be continued until 04/07 and from 04/08, she will take 250 mg p.o. b.i.d. until 04/21 and then to decrease to 250 mg q.a.m. from 04/22 until 05/05/2018.   - She has now been started on lamotrigine which she will be taking 25 mg p.o. daily for 4 more days, then to increase to 50 mg daily from 04/09/2018 until 04/22/2018.    - The patient to follow up with Neurology regarding her subsequent dose after 04/22.      Anxiety and depression. Stable.    - Prior to admission Cymbalta, risperidone and Abilify will be npr and Diazepam will be continued.      Paraplegia.  Apparently this was due to left T4 hematoma about 9 years ago. The patient has neurogenic bladder and bowel and has bilateral nephrostomy tubes and suprapubic catheter.  The nephrostomy tubes were placed on 01/19 to divert urine from decubitus ulcer.   - Left nephrostomy tube was dislodged and  was replaced 03/06/2018 per report      Hypothyroidism: Patient taking synthroid prior to admission.   - Prior to admission levothyroxine 125 mg p.o. every day continued.      History of diastolic congestive heart failure.  The patient currently appears euvolemic, no sign of exacerbation noted.    - off fluids     History of chronic obstructive pulmonary disease, not on oxygen at baseline.  She is not on any maintenance medication, will have p.r.n. albuterol available while she is here.     # Pain Assessment:  Current Pain Score 4/6/2018   Patient currently in pain? sleeping: patient not able to self report   Pain score (0-10) -   Pain location -   Pain descriptors -   - Marychuy is experiencing pain due to decub ulcers. Pain management was discussed and the plan was created in a collaborative fashion.  Marychuy's response to the current recommendations: disinterested  - Please see the plan for pain management as documented above    DVT Prophylaxis: Xarelto  Code Status: Full Code    Communications: discussed with RN. Care coordinator    Disposition: Expected discharge ? Over the weekend back to TCU with f/u appointment with her surgeons if she she declines to transfer to [facility] and if remains stable. Await further podiatry evaluation.     Spent 40 minutes with >50% of time spent counseling/coordinating care    Ambar Lopez    Interval History   Pain is controlled with pain medications.   Had long discussion with patient about her ongoing wound care and transfer to [facility] for further care with her surgeon.  She wants to f/u with her PCP first. Advised her to call her PCP while here. She then told me she does not want to listen to be and does not respect what I am saying.      -Data reviewed today: I reviewed all new labs and imaging results over the last 24 hours. I personally reviewed no images or EKG's today.    Physical Exam   Temp: 99.1  F (37.3  C) Temp src: Oral BP: (!) 87/58 Pulse: 96 Heart Rate:  88 Resp: 18 SpO2: 93 % O2 Device: None (Room air)    Vitals:    04/04/18 1615 04/05/18 0649   Weight: 73.5 kg (162 lb) 78.6 kg (173 lb 4.5 oz)     Vital Signs with Ranges  Temp:  [98.3  F (36.8  C)-99.4  F (37.4  C)] 99.1  F (37.3  C)  Pulse:  [96] 96  Heart Rate:  [88-91] 88  Resp:  [16-20] 18  BP: (87-90)/(58-62) 87/58  SpO2:  [93 %-96 %] 93 %  I/O last 3 completed shifts:  In: 720 [P.O.:720]  Out: 2850 [Urine:2850]    Refused physical exam today.   Medications       ascorbic acid  500 mg Oral Daily     beta carotene  10,000 Units Oral BID     zinc sulfate  220 mg Oral Daily     HYDROmorphone  0.2 mg Intravenous Once     meropenem  1 g Intravenous Q8H     docusate sodium  100 mg Oral BID     ARIPiprazole (ABILIFY) tablet 5 mg  5 mg Oral QAM     aspirin  81 mg Oral Daily     divalproex sodium delayed-release (DEPAKOTE) DR tablet 250 mg  250 mg Oral BID     DULoxetine (CYMBALTA) EC capsule 60 mg  60 mg Oral QAM     ferrous sulfate  325 mg Oral BID     gabapentin (NEURONTIN) capsule 300 mg  300 mg Oral TID     hydrOXYzine  50 mg Oral At Bedtime     lamoTRIgine (LaMICtal) tablet 25 mg  25 mg Oral QAM     levETIRAcetam (KEPPRA) tablet 250 mg  250 mg Oral QPM     levETIRAcetam (KEPPRA) tablet 500 mg  500 mg Oral QAM     levothyroxine (SYNTHROID/LEVOTHROID) tablet 125 mcg  125 mcg Oral QAM     melatonin tablet 10 mg  10 mg Oral At Bedtime     omeprazole (priLOSEC) CR capsule 20 mg  20 mg Oral Daily with breakfast     oxyCODONE (OxyCONTIN) 12 hr tablet 20 mg  20 mg Oral Q12H     potassium chloride SA (K-DUR/KLOR-CON M) CR tablet 10 mEq  10 mEq Oral QAM     [START ON 4/8/2018] levETIRAcetam  250 mg Oral BID     [START ON 4/22/2018] levETIRAcetam  250 mg Oral Daily     [START ON 4/9/2018] lamoTRIgine  50 mg Oral Daily     miconazole   Topical BID     vancomycin (VANCOCIN) IV  1,500 mg Intravenous Q12H       Data     Recent Labs  Lab 04/06/18  0820 04/05/18  0825 04/04/18  1648 04/02/18  2131   WBC 6.9 8.3 11.0 10.7   HGB  9.4* 8.7* 10.1* 10.7*   MCV 82 82 82 82    342 450 447   INR  --   --  1.29* 1.15*    136 137 138   POTASSIUM 4.2 3.8 4.0 4.2   CHLORIDE 106 103 103 101   CO2 27 27 29 27   BUN 5* 4* 8 8   CR 0.46* 0.40* 0.51* 0.49*   ANIONGAP 6 6 5 10   NATA 7.9* 7.6* 7.9* 8.3*   GLC 95 91 114* 89       No results found for this or any previous visit (from the past 24 hour(s)).

## 2018-04-07 VITALS
OXYGEN SATURATION: 90 % | RESPIRATION RATE: 16 BRPM | SYSTOLIC BLOOD PRESSURE: 97 MMHG | TEMPERATURE: 98.1 F | HEART RATE: 96 BPM | HEIGHT: 62 IN | BODY MASS INDEX: 31.89 KG/M2 | WEIGHT: 173.28 LBS | DIASTOLIC BLOOD PRESSURE: 60 MMHG

## 2018-04-07 LAB
ANION GAP SERPL CALCULATED.3IONS-SCNC: 6 MMOL/L (ref 3–14)
BUN SERPL-MCNC: 5 MG/DL (ref 7–30)
CALCIUM SERPL-MCNC: 8 MG/DL (ref 8.5–10.1)
CHLORIDE SERPL-SCNC: 104 MMOL/L (ref 94–109)
CO2 SERPL-SCNC: 29 MMOL/L (ref 20–32)
CREAT SERPL-MCNC: 0.4 MG/DL (ref 0.52–1.04)
ERYTHROCYTE [DISTWIDTH] IN BLOOD BY AUTOMATED COUNT: 19.3 % (ref 10–15)
GFR SERPL CREATININE-BSD FRML MDRD: >90 ML/MIN/1.7M2
GLUCOSE SERPL-MCNC: 122 MG/DL (ref 70–99)
HCT VFR BLD AUTO: 30.2 % (ref 35–47)
HGB BLD-MCNC: 9.1 G/DL (ref 11.7–15.7)
MCH RBC QN AUTO: 25.1 PG (ref 26.5–33)
MCHC RBC AUTO-ENTMCNC: 30.1 G/DL (ref 31.5–36.5)
MCV RBC AUTO: 83 FL (ref 78–100)
PLATELET # BLD AUTO: 301 10E9/L (ref 150–450)
POTASSIUM SERPL-SCNC: 4.2 MMOL/L (ref 3.4–5.3)
RBC # BLD AUTO: 3.63 10E12/L (ref 3.8–5.2)
SODIUM SERPL-SCNC: 139 MMOL/L (ref 133–144)
WBC # BLD AUTO: 6.1 10E9/L (ref 4–11)

## 2018-04-07 PROCEDURE — 25000132 ZZH RX MED GY IP 250 OP 250 PS 637: Mod: GY | Performed by: HOSPITALIST

## 2018-04-07 PROCEDURE — 25000132 ZZH RX MED GY IP 250 OP 250 PS 637: Mod: GY | Performed by: INTERNAL MEDICINE

## 2018-04-07 PROCEDURE — 85027 COMPLETE CBC AUTOMATED: CPT | Performed by: INTERNAL MEDICINE

## 2018-04-07 PROCEDURE — A9270 NON-COVERED ITEM OR SERVICE: HCPCS | Mod: GY | Performed by: INTERNAL MEDICINE

## 2018-04-07 PROCEDURE — 80048 BASIC METABOLIC PNL TOTAL CA: CPT | Performed by: INTERNAL MEDICINE

## 2018-04-07 PROCEDURE — A9270 NON-COVERED ITEM OR SERVICE: HCPCS | Mod: GY | Performed by: HOSPITALIST

## 2018-04-07 PROCEDURE — 36415 COLL VENOUS BLD VENIPUNCTURE: CPT | Performed by: INTERNAL MEDICINE

## 2018-04-07 PROCEDURE — 82565 ASSAY OF CREATININE: CPT | Performed by: INTERNAL MEDICINE

## 2018-04-07 PROCEDURE — 99239 HOSP IP/OBS DSCHRG MGMT >30: CPT | Performed by: INTERNAL MEDICINE

## 2018-04-07 RX ORDER — DIAZEPAM 2 MG
5 TABLET ORAL EVERY 4 HOURS PRN
Qty: 4 TABLET | Status: ON HOLD | DISCHARGE
Start: 2018-04-07 | End: 2018-01-01

## 2018-04-07 RX ORDER — HYDROMORPHONE HYDROCHLORIDE 2 MG/1
2 TABLET ORAL EVERY 4 HOURS PRN
Qty: 4 TABLET | Refills: 0 | Status: ON HOLD | DISCHARGE
Start: 2018-04-07 | End: 2018-01-01

## 2018-04-07 RX ORDER — ZOLPIDEM TARTRATE 5 MG/1
5 TABLET ORAL AT BEDTIME
Qty: 1 TABLET | Status: ON HOLD | DISCHARGE
Start: 2018-04-07 | End: 2018-01-01

## 2018-04-07 RX ORDER — OXYCODONE HYDROCHLORIDE 5 MG/1
5-10 TABLET ORAL EVERY 4 HOURS PRN
Qty: 4 TABLET | Refills: 0 | Status: ON HOLD | DISCHARGE
Start: 2018-04-07 | End: 2018-01-01

## 2018-04-07 RX ORDER — OXYCODONE HCL 20 MG/1
20 TABLET, FILM COATED, EXTENDED RELEASE ORAL EVERY 12 HOURS
Qty: 2 TABLET | Refills: 0 | Status: ON HOLD | DISCHARGE
Start: 2018-04-07 | End: 2018-01-01

## 2018-04-07 RX ADMIN — ARIPIPRAZOLE 5 MG: 5 TABLET ORAL at 09:49

## 2018-04-07 RX ADMIN — Medication 10000 UNITS: at 09:50

## 2018-04-07 RX ADMIN — OMEPRAZOLE 20 MG: 20 CAPSULE, DELAYED RELEASE ORAL at 09:50

## 2018-04-07 RX ADMIN — DIAZEPAM 5 MG: 5 TABLET ORAL at 11:36

## 2018-04-07 RX ADMIN — GABAPENTIN 300 MG: 300 CAPSULE ORAL at 09:50

## 2018-04-07 RX ADMIN — ACETAMINOPHEN 975 MG: 325 TABLET, FILM COATED ORAL at 09:51

## 2018-04-07 RX ADMIN — HYDROMORPHONE HYDROCHLORIDE 6 MG: 2 TABLET ORAL at 05:52

## 2018-04-07 RX ADMIN — DULOXETINE 60 MG: 30 CAPSULE, DELAYED RELEASE ORAL at 09:51

## 2018-04-07 RX ADMIN — HYDROMORPHONE HYDROCHLORIDE 6 MG: 2 TABLET ORAL at 11:36

## 2018-04-07 RX ADMIN — DOCUSATE SODIUM 100 MG: 100 CAPSULE, LIQUID FILLED ORAL at 09:51

## 2018-04-07 RX ADMIN — LEVOTHYROXINE SODIUM 125 MCG: 100 TABLET ORAL at 09:50

## 2018-04-07 RX ADMIN — ASPIRIN 81 MG 81 MG: 81 TABLET ORAL at 09:51

## 2018-04-07 RX ADMIN — FERROUS SULFATE TAB 325 MG (65 MG ELEMENTAL FE) 325 MG: 325 (65 FE) TAB at 09:51

## 2018-04-07 RX ADMIN — DIVALPROEX SODIUM 250 MG: 250 TABLET, DELAYED RELEASE ORAL at 09:50

## 2018-04-07 RX ADMIN — AMOXICILLIN AND CLAVULANATE POTASSIUM 1 TABLET: 875; 125 TABLET, FILM COATED ORAL at 11:37

## 2018-04-07 RX ADMIN — OXYCODONE HYDROCHLORIDE 20 MG: 20 TABLET, FILM COATED, EXTENDED RELEASE ORAL at 09:50

## 2018-04-07 RX ADMIN — LEVETIRACETAM 500 MG: 500 TABLET, FILM COATED ORAL at 09:51

## 2018-04-07 RX ADMIN — OXYCODONE HYDROCHLORIDE AND ACETAMINOPHEN 500 MG: 500 TABLET ORAL at 09:50

## 2018-04-07 RX ADMIN — DIAZEPAM 5 MG: 5 TABLET ORAL at 05:52

## 2018-04-07 RX ADMIN — LAMOTRIGINE 25 MG: 25 TABLET ORAL at 09:50

## 2018-04-07 RX ADMIN — HYDROMORPHONE HYDROCHLORIDE 6 MG: 2 TABLET ORAL at 01:37

## 2018-04-07 RX ADMIN — HYDROMORPHONE HYDROCHLORIDE 6 MG: 2 TABLET ORAL at 15:07

## 2018-04-07 RX ADMIN — POTASSIUM CHLORIDE 10 MEQ: 750 TABLET, EXTENDED RELEASE ORAL at 09:50

## 2018-04-07 RX ADMIN — ZINC SULFATE CAP 220 MG (50 MG ELEMENTAL ZN) 220 MG: 220 (50 ZN) CAP at 09:50

## 2018-04-07 NOTE — PROVIDER NOTIFICATION
MD Notification    Notified Person: MD    Notified Person Name: John    Notification Date/Time: 04/07/2018 2212    Notification Interaction: paged MD    Purpose of Notification:   Loss of IV access. Attempted placement 3x by teams unsuccessful. PICC team requested please. No antibiotics received since 6pm yesterday.   Orders Received: pending    Comments:

## 2018-04-07 NOTE — DISCHARGE SUMMARY
[facility]    Discharge Summary  Hospitalist    Date of Admission:  4/4/2018  Date of Discharge:  4/7/2018  Discharging Provider: Ambar Lopez  Date of Service (when I saw the patient): 04/07/18    Discharge Diagnoses   Paraplegia and multiple decubitus ulcers, chronic pain involving the right buttock, stage IV, with chronic osteomyelitis.  Left plant heel wound  Anemia, chronic.   Recent left comminuted acute subtrochanteric fracture with recent fall  Hx of DVT  Seizure disorder  Anxiety and depression  Paraplegia, due to left T4 hematoma about 9 years ago  Hx of COPD  Hx of hypothyroidism  Hx of diastolic HF      History of Present Illness      Marychuy Zhou is a 62 year old female paraplegic woman with decubital ulcers involving her left heel and right and left buttock with a deep tracking wound over the right buttock with chronic osteomyelitis with recent infection, completed 10 days of IV antibiotics  last month, was transported to the ER for evaluation of the wound check. See H & P     Hospital Course   Marychuy Zhou was admitted on 4/4/2018.  The following problems were addressed during her hospitalization:    Paraplegia and multiple decubitus ulcers, chronic pain involving the right buttock, stage IV, with chronic osteomyelitis.   The patient had debridement of this ulcer by Dr. Pierre Cazares on 02/05/2018.  She has had wound infection in the past and has a history of MDR infections including MRSA, VRE and ESBL in the past at different sites with the last treatment with IV antibiotic, meropenem and daptomycin, which she completed on 03/08/2018.  She is followed at [facility] for this.  CT A/P this admission shows ulcer extending down to the right ischial tuberosity with erosive changes suspicious for osteomyelitis.  She is empirically started with vanc and meropenem. General surgery and plastic surgery consulted who evaluated patient, all recommended follow up with her  surgeon at [facility].  Attempt to transfer her to [facility] directly for further care, however patient declined. She states she is going to see her primary care doctor first before she will do anything about her wound or see  her surgeon. Have discussed with patient the importance of sooner follow up given her wounds. Given this, transfer to [facility] cancelled, will discharge her back to her TCU on oral Augmentin per ID recommendation for temporizing measures until she see her Surgeon.  If patient returns to the ED for wound evaluation, recommend she be transferred directly to [facility] for further care since her patient's surgeon who have been caring for her wounds are there.        Left plantar heel wound: Details noted in wound care.   - X-ray of left foot limited study but no clear osteomyelitis    Anemia, chronic. The patient has a history of iron deficiency anemia and also her erythropoietin level apparently was low.    - stable hemoglobin ~9      Recent left comminuted acute subtrochanteric fracture with recent fall. She was evaluated by Orthopedic Surgery and recommended x-ray in 2-3 weeks while she was at [facility]. This was noted in the CT that was done in the ER.     - Recommend follow up with Orthopedic Surgery after discharge.       History of deep venous thrombosis. The patient is on Xarelto.    - resume prior to admissions Xarelto      Seizure disorder. The patient used to be on Keppra, but it looks like her Keppra is being tapered and she is currently taking 500 in the morning and 250 mg at bedtime, which is to be continued until 04/07 and from 04/08, she will take 250 mg p.o. b.i.d. until 04/21 and then to decrease to 250 mg q.a.m. from 04/22 until 05/05/2018.   - She has now been started on lamotrigine which she will be taking 25 mg p.o. daily for 4 more days, then to increase to 50 mg daily from 04/09/2018 until 04/22/2018.    - The patient to follow up with Neurology regarding her  subsequent dose after 04/22.       Anxiety and depression. Stable.    - Prior to admission Cymbalta, risperidone and Abilify will be npr and Diazepam will be continued.       Paraplegia.  Apparently this was due to left T4 hematoma about 9 years ago. The patient has neurogenic bladder and bowel and has bilateral nephrostomy tubes and suprapubic catheter.  The nephrostomy tubes were placed on 01/19 to divert urine from decubitus ulcer.   - Left nephrostomy tube was dislodged and was replaced 03/06/2018 per report       Hypothyroidism: Patient taking synthroid prior to admission.   - Prior to admission levothyroxine 125 mg p.o. every day continued.       History of diastolic congestive heart failure.  The patient currently appears euvolemic, no sign of exacerbation noted.        History of chronic obstructive pulmonary disease, not on oxygen at baseline.  She is not on any maintenance medication,     # Discharge Pain Plan:   Patient has chronic pain and she is on multiple narcotics prior to admission.  Oxycontin 20mg BID, Dilaudid 2mg q4hprn and oxycodone prn based on severity of pain. Did not make any changes.     Ambar Lopez    Significant Results and Procedures   See labs below.     Pending Results     Unresulted Labs Ordered in the Past 30 Days of this Admission     Date and Time Order Name Status Description    4/4/2018 1841 Blood culture Preliminary     4/4/2018 1838 Blood culture Preliminary     4/2/2018 2111 Blood culture ONE site Preliminary           Code Status   Full Code       Primary Care Physician   Mellisa Haji    Physical Exam   Temp: 98.1  F (36.7  C) Temp src: Axillary BP: 97/60   Heart Rate: 110 Resp: 16 SpO2: 90 % O2 Device: None (Room air)    Vitals:    04/04/18 1615 04/05/18 0649   Weight: 73.5 kg (162 lb) 78.6 kg (173 lb 4.5 oz)     Vital Signs with Ranges  Temp:  [98.1  F (36.7  C)-98.7  F (37.1  C)] 98.1  F (36.7  C)  Heart Rate:  [] 110  Resp:  [16-22] 16  BP:  ()/(50-62) 97/60  SpO2:  [90 %-96 %] 90 %  I/O last 3 completed shifts:  In: 880 [P.O.:880]  Out: 2125 [Urine:2125]    For Subsequent Visit: 2-7 systems with 2+ items equates to 233 and 2-7 systems with 1 item equates to 232    Discharge Disposition   Discharged to short-term care facility  Condition at discharge: Stable    Consultations This Hospital Stay   PHARMACY TO DOSE VANCO  SURGERY GENERAL IP CONSULT  SOCIAL WORK IP CONSULT  INFECTIOUS DISEASES IP CONSULT  WOUND OSTOMY CONTINENCE NURSE  IP CONSULT  PAIN MANAGEMENT ADULT IP CONSULT  PODIATRY IP CONSULT  PLASTIC SURGERY IP CONSULT  PHYSICAL THERAPY ADULT IP CONSULT  OCCUPATIONAL THERAPY ADULT IP CONSULT    Time Spent on this Encounter   IAmbar, personally saw the patient today and spent 45 minutes discharging this patient.    Discharge Orders     General info for SNF   Length of Stay Estimate: Short Term Care: Estimated # of Days <30  Condition at Discharge: Stable  Level of care:skilled   Rehabilitation Potential: Fair  Admission H&P remains valid and up-to-date: Yes  Recent Chemotherapy: N/A  Use Nursing Home Standing Orders: Yes     Mantoux instructions   Give two-step Mantoux (PPD) Per Facility Policy Yes     Bhandari catheter   To straight gravity drainage. She also has nephrostomy tubes bilaterally.     Follow Up and recommended labs and tests   1. Follow up with your surgeon through [facility] for continued wound care/management in the next 1 week.   2. Follow up with your primary care.   3. Follow up with Nursing home physician.  No follow up labs or test are needed.     Activity - Up with nursing assistance     Activity - Up with assistive device     Full Code     Physical Therapy Adult Consult   Evaluate and treat as clinically indicated.    Reason:  Physical deconditioning     Occupational Therapy Adult Consult   Evaluate and treat as clinically indicated.    Reason:  deconditioning     Fall precautions     Advance Diet as  Tolerated   Follow this diet upon discharge: Regular       Discharge Medications   Current Discharge Medication List      START taking these medications    Details   amoxicillin-clavulanate (AUGMENTIN) 875-125 MG per tablet Take 1 tablet by mouth every 12 hours  Refills: 0    Associated Diagnoses: Wound infection         CONTINUE these medications which have CHANGED    Details   HYDROmorphone (DILAUDID) 2 MG tablet Take 1 tablet (2 mg) by mouth every 4 hours as needed for moderate to severe pain  Qty: 4 tablet, Refills: 0    Associated Diagnoses: Other chronic pain      oxyCODONE (OXYCONTIN) 20 MG 12 hr tablet Take 1 tablet (20 mg) by mouth every 12 hours  Qty: 2 tablet, Refills: 0    Associated Diagnoses: Other chronic pain      oxyCODONE IR (ROXICODONE) 5 MG tablet Take 1-2 tablets (5-10 mg) by mouth every 4 hours as needed (Give 5mg for pain level 4-6 on a 10 point scale. Give 10mg for pain level 6-10 on a 10 point scale.)  Qty: 4 tablet, Refills: 0    Associated Diagnoses: Other chronic pain      diazepam (VALIUM) 2 MG tablet Take 2.5 tablets (5 mg) by mouth every 4 hours as needed for anxiety  Qty: 4 tablet    Associated Diagnoses: Other chronic pain      zolpidem (AMBIEN) 5 MG tablet Take 1 tablet (5 mg) by mouth At Bedtime  Qty: 1 tablet    Associated Diagnoses: Insomnia, unspecified type         CONTINUE these medications which have NOT CHANGED    Details   mineral oil-hydrophilic petrolatum (AQUAPHOR) Apply topically daily Apply to lower extremities for skin irritation.      ARIPIPRAZOLE PO Take 5 mg by mouth every morning      aspirin 81 MG chewable tablet Take 81 mg by mouth daily      DULoxetine HCl (CYMBALTA PO) Take 60 mg by mouth every morning      HYDROXYZINE HCL PO Take 50 mg by mouth At Bedtime      LAMOTRIGINE PO Take 25 mg by mouth every morning X 4 more days then increase to 50mg daily from 4/9/2018-4/22/2018.      !! LevETIRAcetam (KEPPRA PO) Take 250 mg by mouth every evening Until 4/7/2018  then decrease to 250mg BID 4/8/2018-4/21/2018, then decrease to 250mg QAM 4/22-5/5/2018      !! LevETIRAcetam (KEPPRA PO) Take 500 mg by mouth every morning Until 4/7/2018 then decrease to 250mg BID 4/8/2018-4/21/2018, then decrease to 250mg QAM 4/22-5/5/2018      LEVOTHYROXINE SODIUM PO Take 125 mcg by mouth every morning      MELATONIN PO Take 10 mg by mouth At Bedtime      OMEPRAZOLE PO Take 20 mg by mouth daily (with breakfast)      Potassium Chloride [place] ER (K-DUR PO) Take 10 mEq by mouth every morning      Rivaroxaban (XARELTO PO) Take 20 mg by mouth At Bedtime      DIVALPROEX SODIUM PO Take 250 mg by mouth 2 times daily      ferrous sulfate (IRON) 325 (65 FE) MG tablet Take 325 mg by mouth 2 times daily      Nutritional Supplements (NUTRITIONAL SUPPLEMENT PO) Take 1 packet by mouth 2 times daily (Deven Powder)      miconazole (MICATIN) 2 % AERP powder Apply topically 2 times daily Apply to groin folds      senna-docusate (SENOKOT-S;PERICOLACE) 8.6-50 MG per tablet Take 2 tablets by mouth 2 times daily      GABAPENTIN PO Take 300 mg by mouth 3 times daily      !! ACETAMINOPHEN PO Take 650 mg by mouth 4 times daily      !! ACETAMINOPHEN PO Take 650 mg by mouth every 4 hours as needed for pain (May take additional as needed doses. Do not exceed 4000mg in 24 hours.)      bisacodyl (DULCOLAX) 10 MG Suppository Place 10 mg rectally daily as needed for constipation      magnesium hydroxide (MILK OF MAGNESIA) 400 MG/5ML suspension Take 30 mLs by mouth daily as needed for constipation or heartburn       !! - Potential duplicate medications found. Please discuss with provider.        Allergies   No Known Allergies  Data   Most Recent 3 CBC's:  Recent Labs   Lab Test  04/07/18   0835  04/06/18   0820  04/05/18   0825   WBC  6.1  6.9  8.3   HGB  9.1*  9.4*  8.7*   MCV  83  82  82   PLT  301  313  342      Most Recent 3 BMP's:  Recent Labs   Lab Test  04/07/18   0835  04/06/18   0820  04/05/18   0825   NA  139  139  136    POTASSIUM  4.2  4.2  3.8   CHLORIDE  104  106  103   CO2  29  27  27   BUN  5*  5*  4*   CR  0.40*  0.46*  0.40*   ANIONGAP  6  6  6   NATA  8.0*  7.9*  7.6*   GLC  122*  95  91     Most Recent 2 LFT's:No lab results found.  Most Recent INR's and Anticoagulation Dosing History:  Anticoagulation Dose History     Recent Dosing and Labs Latest Ref Rng & Units 4/2/2018 4/4/2018    INR 0.86 - 1.14 1.15(H) 1.29(H)        Most Recent 3 Troponin's:No lab results found.  Most Recent Cholesterol Panel:No lab results found.  Most Recent 6 Bacteria Isolates From Any Culture (See EPIC Reports for Culture Details):  Recent Labs   Lab Test  04/04/18   1930  04/04/18   1648  04/02/18   2131   CULT  No growth after 3 days  No growth after 3 days  No growth after 4 days     Most Recent TSH, T4 and A1c Labs:No lab results found.  Results for orders placed or performed during the hospital encounter of 04/04/18   CT Abdomen Pelvis w Contrast    Narrative    CT ABDOMEN AND PELVIS WITH CONTRAST   4/4/2018 6:13 PM     HISTORY: History of decubitus ulcer, question worsening osteomyelitis.      TECHNIQUE: 81 mL Isovue -370. Radiation dose for this scan was reduced  using automated exposure control, adjustment of the mA and/or kV  according to patient size, or iterative reconstruction technique.    COMPARISON: None.    FINDINGS:   Mild bibasilar atelectasis or fibrosis.    The liver is normal. Previous cholecystectomy. Spleen and pancreas are  normal. No adrenal lesions. Bilateral nephrostomy tubes in place. No  hydronephrosis. No evidence of kidney stones.    There is a suprapubic catheter in place extending into the bladder.    No evidence of diverticulitis. Appendix not definitely visualized but  no inflammatory changes in the right lower quadrant. No dilated bowel.  No free air. No free fluid.    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  There is an ulcer extending down to the right ischial tuberosity  with  erosive changes suspicious for osteomyelitis.      Impression    IMPRESSION:  1. Ulcer extending down to the right ischial tuberosity with erosive  changes suspicious for osteomyelitis.  2. Nonunited left subtrochanteric fracture.  3. Bilateral nephrostomy tubes in place. No evidence of hydronephrosis  or urinary tract stones.  3. Suprapubic catheter in place in the bladder.    DONALD BERNARD MD   XR Calcaneus Left G/E 2 Views    Narrative    LEFT CALCANEUS TWO OR MORE VIEWS   4/5/2018 9:14 PM     HISTORY: Heel ulcer.    COMPARISON: None.      Impression    IMPRESSION: The bones are severely osteopenic which limits evaluation.  No definite sclerosis is seen of the calcaneus, although given the  marked osteopenia plain film evaluation is severely limited for  possible osteomyelitis.    YUSUF AGOSTO MD

## 2018-04-07 NOTE — PLAN OF CARE
Problem: Patient Care Overview  Goal: Plan of Care/Patient Progress Review  Outcome: No Change  Pt A&O x4, confused, agitated and frustrated at times. Bedrest with total cares, t&r q2h. Tolerating regular diet, adequate PO intake. VSS on RA, c/o pain 10/10. Given dilaudid 6 mg PO 2x this shift with relief. Pt also had Valium 1x. LS diminished. No IV access, MD aware. Will consult PICC team this a.m. For IV placement. No IV antibiotics given this shift. Contact precautions maintained. Good output from nephrostomy and suprapubic catheters. Dressings on L heel & coccyx CDI. D/C pending placement at [facility], nursing will continue to monitor.

## 2018-04-07 NOTE — PLAN OF CARE
Problem: Patient Care Overview  Goal: Plan of Care/Patient Progress Review  Outcome: No Change  VSS on RA. Pain managed with PRN Dilaudid x1. PRN Valium for anxiety with improvement. Lethargic, drowsy this shift. Declines T/R Q2. Wound cares to bottom and L heel completed. Neph tubes x2 , 200cc R and 100cc L. S/p cath with scant output. Slept intermittently between cares.

## 2018-04-07 NOTE — PLAN OF CARE
Problem: Patient Care Overview  Goal: Plan of Care/Patient Progress Review  Outcome: No Change  Pt A/Ox4 somewhat forgetful. VSS on RA, CMS intact. Coccyx dressing CDI, chirag foam boots on. Tolerating Reg diet.  2 nephro tubes/suprapubic cath patent. Pt c/o sacral pain 8/10 given Dilaudid, Valium for anxiety, IV Vanco/Merrem D/C until am d/t IV loss, PICC team needs to be called in the am to place IV, D/C Pending placement.

## 2018-04-07 NOTE — PLAN OF CARE
Problem: Patient Care Overview  Goal: Plan of Care/Patient Progress Review  Outcome: Adequate for Discharge Date Met: 04/07/18  Pt d/c'd to [facility] via stretcher. All belongings and paperwork sent with. PRN Dilaudid given prior to transport.

## 2018-04-07 NOTE — DISCHARGE INSTRUCTIONS
Wound care DAILY     Comments: Plan for left heel: Daily (unless any new orders from Podiatry or Surgery):   1.  Cleanse with MicroKlenz.   2.  Apply moderate glob of Iodosorb gel to Mepilex or gauze, and press onto wound.     3.  Label with time/date/initials.   4.  Recommend Podiatry consult.    04/05/18 1330      04/05/18 1800  Wound care 2 TIMES DAILY     Comments: Plan for wound care to buttocks: BID and prn, unless any new orders from Surgery.  (Note - BID is best, but ok if pt refuses second dressing change)   1.  Cleanse general skin and shallow wounds with mild cleanser, ie. Greta perineal lotion.  Do not need to scrub off all old paste everytime, just outer soiled layers.   2.  Cleanse open wounds with MicroKlenz, pat dry.     3.  Moisten gauze fluff lightly with saline or wound cleanser, unfluff and pack into wound on right buttock.  **Recommend changing to Dakin's solution instead of saline, if ok with Surgery and pt**   4.  Apply thin glaze of barrier paste (or Triad paste if pt allows) to all pink/raw tissue and over the shallow wounds on sacrum and left buttock.     5.  Cover with ABD pads and minimal Medipore tape.  Label with time/date/initials.     PIP (Pressure injury prevention):   - Pulsate air mattress.  No fitted sheets, no cloth chux.     - Reposition every 1-2 hrs, side to side   - HOB as low as tolerated   - float heels at all times   - ensure pt not lying on any tubing, wrinkles, devices, etc

## 2018-04-07 NOTE — PROGRESS NOTES
D: MARGI following for DC planning. SW reviewed chart. Pt is from [facility]. Pt ready to return today.   I: SW met with pt. She is agreeable to returning to [facility]. HE BLS ride set for 1500. PCS faxed. BLS due to paraplegia and multiple decubitus ulcers. Pt reports she can not sit in a standard wheelchair. Facility updated and orders faxed.   P: DC today.     CYNTHIA Calero, LGSW  [id number]

## 2018-04-07 NOTE — PLAN OF CARE
Problem: Patient Care Overview  Goal: Plan of Care/Patient Progress Review  Outcome: No Change  Pt A/O#4, forgetful, anxious at times, bedrest, total care w/assist of 2, turned/repositioned q2h if pt allows, VSS on RA, coccyx dressing D/C/I, chirag foam boots on, good po, 2 nephro tubes/suprapubic cath patent, no BM, c/o sacral pain 10/10-medicated with Dilaudid po #2 w/some relief, Valium #1 given for anxiety, continues on IV Vanco/Merrem, d/c pending placement, will monitor.

## 2018-04-07 NOTE — PROGRESS NOTES
Dr. Lopez wants to continue Amoxicillin for 1 week. The patient should follow up with her own surgeon. I will notify the nursing home.

## 2018-04-09 LAB
BACTERIA SPEC CULT: NO GROWTH
Lab: NORMAL
SPECIMEN SOURCE: NORMAL

## 2018-04-10 NOTE — IP AVS SNAPSHOT
` `     UR 8A: [phone]            Medication Administration Report for Marychuy Zhou as of 05/08/18 1502   Legend:    Given Hold Not Given Due Canceled Entry Other Actions    Time Time (Time) Time  Time-Action       Inactive    Active    Linked        Medications 05/02/18 05/03/18 05/04/18 05/05/18 05/06/18 05/07/18 05/08/18    bisacodyl (DULCOLAX) Suppository 10 mg  Dose: 10 mg  Freq: DAILY PRN Route: RE  PRN Reason: constipation  Start: 04/11/18 0010   Admin. Amount: 1 suppository (1 × 10 mg suppository)  Dispense Loc: [facility] ADS [facility]               bismuth subsalicylate (PEPTO BISMOL) suspension 30 mL  Dose: 30 mL  Freq: EVERY 6 HOURS PRN Route: PO  PRN Comment: Loose stools  Start: 04/29/18 2145   Admin Instructions: Shake well.    Admin. Amount: 30 mL  Last Admin: 05/05/18 2058  Dispense Loc: [facility]  Volume: 236 mL     1918 (30 mL)-Given         0702 (30 mL)-Given [C]        0056 (30 mL)-Given       1141 (30 mL)-Given       1344 (30 mL)-Given [C]       2058 (30 mL)-Given [C]              calcium carbonate (TUMS) chewable tablet 500-1,000 mg  Dose: 500-1,000 mg  Freq: 4 TIMES DAILY PRN Route: PO  PRN Reason: heartburn  Start: 04/12/18 1935   Admin Instructions: Do not give within 2 hours of doxycycline    Admin. Amount: 1-2 tablet (1-2 × 500 mg tablet)  Last Admin: 05/07/18 2224  Dispense Loc: [facility]S [facility]     0924 (1,000 mg)-Given            1234 (1,000 mg)-Given       2224 (1,000 mg)-Given            dextrose 10 % 1,000 mL infusion  Freq: CONTINUOUS PRN Route: IV  PRN Comment:    Start: 04/21/18 1211   Admin Instructions: For Hypoglycemia Prevention for patients on long-acting subcutaneous basal insulin (Glargine, Detemir, NPH) or continuous insulin infusion. Whenever nutrition support is held or interrupted:  1) Infuse IV D10W at nutrition support rate   2) Notify provider for further instructions    Last Admin: 04/21/18 0543  Dispense Loc: [facility]  Volume: 1,000 mL    Mixture Administration Information:   Medication Type Amount   dextrose 10 % SOLN Base 1,000 mL           Current Line: PICC Double Lumen 18 Right Basilic (Purple)              diazepam (VALIUM) tablet 4 mg  Dose: 4 mg  Freq: EVERY 6 HOURS PRN Route: PO  PRN Reason: anxiety  Start: 18 1141   Admin. Amount: 2 tablet (2 × 2 mg tablet)  Last Admin: 18 1703  Dispense Loc: [facility] ADS [facility]     1324 (4 mg)-Given       1917 (4 mg)-Given        0212 (4 mg)-Given       0821 (4 mg)-Given       1422 (4 mg)-Given       2042 (4 mg)-Given        0256 (4 mg)-Given       0907 (4 mg)-Given       1451 (4 mg)-Given       2058 (4 mg)-Given        0318 (4 mg)-Given       1035 (4 mg)-Given       1830 (4 mg)-Given        0058 (4 mg)-Given       0714 (4 mg)-Given       1644 (4 mg)-Given        0352 (4 mg)-Given       1703 (4 mg)-Given            diazepam (VALIUM) tablet 5 mg  Dose: 5 mg  Freq: ONCE Route: PO  Start: 18 1545   Admin. Amount: 1 tablet (1 × 5 mg tablet)  Dispense Loc: [facility]  Administrations Remainin               diphenhydrAMINE (BENADRYL) capsule 25 mg  Dose: 25 mg  Freq: EVERY 8 HOURS Route: PO  Start: 18 1515   Admin Instructions: Hold prn excess sedation.    Admin. Amount: 1 capsule (1 × 25 mg capsule)  Last Admin: 18 1337  Dispense Loc: [facility]               (25 mg)-Given        0354 (25 mg)-Given       1146 (25 mg)-Given       1830 (25 mg)-Given [C]               0425 (25 mg)-Given       1439 (25 mg)-Given       2016 (25 mg)-Given        0352 (25 mg)-Given       (1133)-Not Given       2038 (25 mg)-Given        0448 (25 mg)-Given       1315 (25 mg)-Given       1337 (25 mg)-Given       [ ]            diphenhydrAMINE (BENADRYL) capsule 25 mg  Dose: 25 mg  Freq: EVERY 6 HOURS PRN Route: PO  PRN Reason: itching  Start: 1849   Admin. Amount: 1 capsule (1 × 25 mg capsule)  Last Admin: 18 8380  Dispense Loc: [facility]     4164  (25 mg)-Given        1043 (25 mg)-Given        1204 (25 mg)-Given          2224 (25 mg)-Given            fluconazole (DIFLUCAN) tablet 150 mg  Dose: 150 mg  Freq: DAILY Route: PO  Indications of Use: CANDIDIASIS  Start: 05/07/18 1145   End: 05/12/18 0759   Admin. Amount: 1 tablet (1 × 150 mg tablet)  Last Admin: 05/08/18 1304  Dispense Loc: [facility]  Administrations Remaining: 3          1234 (150 mg)-Given        1304 (150 mg)-Given           ipratropium - albuterol 0.5 mg/2.5 mg/3 mL (DUONEB) neb solution 3 mL  Dose: 3 mL  Freq: EVERY 4 HOURS PRN Route: NEBULIZATION  PRN Reason: wheezing  Start: 04/29/18 1645   Admin. Amount: 3 mL  Dispense Loc: [facility]  Volume: 3 mL               isometheptene-dichloralphenazone-acetaminophen (MIDRIN) -325 MG per capsule CAPS 1-2 capsule  Dose: 1-2 capsule  Freq: EVERY 4 HOURS PRN Route: PO  PRN Reason: other  PRN Comment: HA  Start: 04/17/18 1013   Admin Instructions: Max 5 capsules/ 12 hours or 8 capsules /24 hour    Admin. Amount: 1-2 capsule  Last Admin: 04/24/18 1112  Dispense Loc: [facility]               levETIRAcetam (KEPPRA) tablet 250 mg  Dose: 250 mg  Freq: 2 TIMES DAILY Route: PO  Start: 04/11/18 0800   Admin. Amount: 1 tablet (1 × 250 mg tablet)  Last Admin: 05/08/18 0847  Dispense Loc: [facility]     0750 (250 mg)-Given       2141 (250 mg)-Given        0805 (250 mg)-Given       2042 (250 mg)-Given        0829 (250 mg)-Given       2026 (250 mg)-Given        1035 (250 mg)-Given       1831 (250 mg)-Given [C]               0757 (250 mg)-Given       2016 (250 mg)-Given        0917 (250 mg)-Given       2038 (250 mg)-Given        0847 (250 mg)-Given       [ ] 2000           levothyroxine (SYNTHROID/LEVOTHROID) tablet 150 mcg  Dose: 150 mcg  Freq: Daily Route: PO  Start: 05/05/18 1200   Admin. Amount: 2 tablet (2 × 75 mcg tablet)  Last Admin: 05/08/18 1340  Dispense Loc: [facility]        1146 (150 mcg)-Given        1440  (150 mcg)-Given        0935 (150 mcg)-Given               1340 (150 mcg)-Given           Lidocaine (LIDOCARE) 4 % Patch 1 patch  Dose: 1 patch  Freq: EVERY 24 HOURS 2000 Route: TD  Start: 18   Admin Instructions: Apply patch(s) to affected area. To prevent lidocaine toxicity, patient should be patch free for 12 hrs daily. Patches may be cut to smaller size prior to removing release liner.  NEVER APPLY HEAT OVER PATCH which increases absorption and risk of local anesthetic toxicity. Do not apply over area where liposomal bupivacaine was injected for 96 hours post injection.    Admin. Amount: 1 patch  Last Admin: 18  Dispense Loc: [facility]  Infused Over: 12 Hours     ()-Not Given        ()-Not Given        ()-Not Given        ()-Not Given        ()-Not Given        ()-Not Given        [ ]            lidocaine (LMX4) kit  Freq: ONCE PRN Route: Top  PRN Reason: moderate pain  PRN Comment: for local anesthetic during PICC insertion  Start: 18   Admin Instructions: Apply to PICC Insertion Site. Apply 30 minutes prior to procedure. MAX Dose: 2.5 gm  (1/2 of 5 gm tube)      Dispense Loc: [facility] Floor Stock  Administrations Remainin               lidocaine 1 % 0.2 mL  Dose: 0.2 mL  Freq: ONCE PRN Route: ID  PRN Comment: for local anesthesia and the Peripheral IV insertion site.  Start: 18   Admin Instructions: Use J-Tip for administration of the product.    Admin. Amount: 0.2 mL  Dispense Loc: [facility]  Administrations Remainin  Volume: 2 mL  POC: IR Pre-procedure               lidocaine 1 % 0.5-5 mL  Dose: 0.5-5 mL  Freq: ONCE PRN Route: OTHER  PRN Comment: mild pain For local anesthetic during PICC insertion.  Start: 18   Admin Instructions: Give Sub-Q/Intradermal.  Give in divided doses as needed.    Admin. Amount: 0.5-5 mL  Dispense Loc: [facility]  Administrations Remainin  Volume: 5 mL                lidocaine patch in PLACE  Freq: EVERY 8 HOURS Route: TD  Start: 04/26/18 1845   Admin Instructions: Chart every shift, confirming that patch is still in place on patient (no barcode scan needed). See patch order for dose information.  NEVER APPLY HEAT OVER PATCH which will increase absorption and may lead to risk of local anesthetic toxicity. Do not apply over area where liposomal bupivacaine injected for 96 hours.    Last Admin: 05/08/18 0324  Dispense Loc: [facility]     0320 ( )-Positive              1806 ( )-Negative               1040 ( )-Negative                      1157 ( )-Patch Removed [C]       1806 ( )-Negative        0200 ( )-Negative       1045 ( )-Read [C]       1819 ( )-Negative        0224 ( )-Negative       1014 ( )-Negative       1824 ( )-Negative        0152 ( )-Negative              2044 ( )-Negative        0324 ( )-Negative       (1045)-Not Given       [ ] 1845           lidocaine patch REMOVAL  Freq: EVERY 24 HOURS 0800 Route: TD  Start: 04/27/18 0645   Admin Instructions: Patient should have a 12 hour patch free interval    Last Admin: 05/08/18 0902  Dispense Loc: [facility]     0742-Hold        0817 ( )-Negative        0838 ( )-Given [C]        0908 ( )-Negative [C]        0804 ( )-Negative        (0923)-Not Given [C]        0902 ( )-Negative           loperamide (IMODIUM) capsule 4 mg  Dose: 4 mg  Freq: 4 TIMES DAILY Route: PO  Start: 05/07/18 1200   Admin. Amount: 2 capsule (2 × 2 mg capsule)  Last Admin: 05/08/18 1315  Dispense Loc: [facility]          0916 (4 mg)-Given       (1132)-Not Given [C]       1234 (4 mg)-Given       1607 (4 mg)-Given       2038 (4 mg)-Given        0847 (4 mg)-Given       1315 (4 mg)-Given       [ ] 1600       [ ] 2000           medication instruction  Freq: CONTINUOUS PRN Route: XX  Start: 04/26/18 2249   Admin Instructions: Take other usual AM meds with small amount of water    Dispense Loc: [facility]  POC: IR  Pre-procedure               melatonin tablet 5 mg  Dose: 5 mg  Freq: AT BEDTIME Route: PO  Start: 04/13/18 2200   Admin. Amount: 1 tablet (1 × 5 mg tablet)  Last Admin: 05/07/18 2207  Dispense Loc: [facility] AD[facility]     2141 (5 mg)-Given        2159 (5 mg)-Given        2058 (5 mg)-Given               2057 (5 mg)-Given [C]               2150 (5 mg)-Given        2207 (5 mg)-Given        [ ] 2200           miconazole (MICATIN; MICRO GUARD) 2 % powder  Freq: 2 TIMES DAILY Route: Top  Start: 04/14/18 2000   Admin Instructions: Apply to groin area.    Last Admin: 05/07/18 2043  Dispense Loc: [facility]     (0742)-Not Given       (2121)-Not Given        (0817)-Not Given       (2046)-Not Given        (0839)-Not Given       (2036)-Not Given        (1045)-Not Given       (1912)-Not Given        (0811)-Not Given       (2022)-Not Given        (0947)-Not Given       2043 ( )-Given        (0901)-Not Given       [ ] 2000           mineral oil-hydrophilic petrolatum (AQUAPHOR)  Freq: 2 TIMES DAILY Route: Top  Start: 05/03/18 2000   Admin Instructions: Apply to areas of itching, dry skin    Substituted from Eucerin cream as [facility] doesn't carry    Dispense Loc: Non Rx Stock      (2046)-Not Given        (0839)-Not Given       (2036)-Not Given        (1045)-Not Given       (1913)-Not Given        (0803)-Not Given       (2022)-Not Given        (0947)-Not Given [C]       (2053)-Not Given        (0901)-Not Given       [ ] 2000           multivitamin, therapeutic with minerals (THERA-VIT-M) tablet 1 tablet  Dose: 1 tablet  Freq: DAILY Route: PO  Start: 04/20/18 2000   Admin. Amount: 1 tablet  Last Admin: 05/07/18 0919  Dispense Loc: [facility]     (0744)-Not Given        0805 (1 tablet)-Given        0830 (1 tablet)-Given        1035 (1 tablet)-Given        (0757)-Not Given        0919 (1 tablet)-Given        (0847)-Not Given           naloxone (NARCAN) injection 0.1-0.4 mg  Dose: 0.1-0.4 mg  Freq: EVERY 2 MIN PRN Route:  IV  PRN Reason: opioid reversal  Start: 04/11/18 0014   Admin Instructions: For respiratory rate LESS than or EQUAL to 8.  Partial reversal dose:  0.1 mg titrated q 2 minutes for Analgesia Side Effects Monitoring Sedation Level of 3 (frequently drowsy, arousable, drifts to sleep during conversation).Full reversal dose:  0.4 mg bolus for Analgesia Side Effects Monitoring Sedation Level of 4 (somnolent, minimal or no response to stimulation).  For ordered doses up to 2mg give IVP. Give each 0.4mg over 15 seconds in emergency situations. For non-emergent situations further dilute in 9mL of NS to facilitate titration of response.    Admin. Amount: 0.1-0.4 mg = 0.25-1 mL Conc: 0.4 mg/mL  Dispense Loc: [facility]  Volume: 1 mL               omeprazole (priLOSEC) CR capsule 20 mg  Dose: 20 mg  Freq: 2 TIMES DAILY BEFORE MEALS Route: PO  Start: 04/16/18 1630   Admin. Amount: 1 capsule (1 × 20 mg capsule)  Last Admin: 05/08/18 0848  Dispense Loc: [facility]     0750 (20 mg)-Given       1800 (20 mg)-Given        0804 (20 mg)-Given       1645 (20 mg)-Given        0829 (20 mg)-Given       1706 (20 mg)-Given        1036 (20 mg)-Given       1829 (20 mg)-Given [C]        0714 (20 mg)-Given       1644 (20 mg)-Given        0918 (20 mg)-Given       1607 (20 mg)-Given        0848 (20 mg)-Given       [ ] 1630           ondansetron (ZOFRAN) injection 4 mg  Dose: 4 mg  Freq: EVERY 6 HOURS Route: IV  Start: 04/20/18 1230   Admin Instructions: This is Step 1 of nausea and vomiting management.  If nausea not resolved in 15 minutes, go to Step 2 prochlorperazine (COMPAZINE).  Irritant. For ordered doses up to 4 mg, give IV Push undiluted over 2-5 minutes.    Admin. Amount: 4 mg = 2 mL Conc: 4 mg/2 mL  Last Admin: 05/07/18 2037  Dispense Loc: [facility]  Infused Over: 2-5 Minutes  Volume: 2 mL   Current Line: PICC Double Lumen 04/21/18 Right Basilic (Red)           0451 (4 mg)-Given       1125 (4 mg)-Given       1802  (4 mg)-Given        (0023)-Not Given       (0540)-Not Given       (1233)-Not Given       1845 (4 mg)-Given        0013 (4 mg)-Given       0554 (4 mg)-Given       (1205)-Not Given       (1806)-Not Given        (0058)-Not Given       (0615)-Not Given       (1144)-Not Given       1822 (4 mg)-Given        0004 (4 mg)-Given       0538 (4 mg)-Given       1440 (4 mg)-Given       1838 (4 mg)-Given        0149 (4 mg)-Given       0926 (4 mg)-Given       (1439)-Not Given       1721 (4 mg)-Given       2037 (4 mg)-Given        (0323)-Not Given       (0900)-Not Given       [ ] 1400       [ ] 2000           oxyCODONE IR (ROXICODONE) tablet 5-10 mg  Dose: 5-10 mg  Freq: EVERY 4 HOURS PRN Route: PO  PRN Reason: moderate to severe pain  Start: 18 1349   Admin. Amount: 1-2 tablet (1-2 × 5 mg tablet)  Last Admin: 18 1337  Dispense Loc: [facility] AD[facility]        1411 (10 mg)-Given       1818 (10 mg)-Given       2209 (10 mg)-Given        0208 (10 mg)-Given       0542 (10 mg)-Given       1053 (10 mg)-Given       1439 (10 mg)-Given       1838 (10 mg)-Given       2158 (10 mg)-Given        0149 (10 mg)-Given       0602 (10 mg)-Given       0932 (10 mg)-Given       1435 (10 mg)-Given       1921 (10 mg)-Given       2333 (10 mg)-Given        0448 (10 mg)-Given       0847 (10 mg)-Given       (1315)-Not Given [C]       1337 (10 mg)-Given           Patient is already receiving anticoagulation with heparin, enoxaparin (LOVENOX), warfarin (COUMADIN)  or other anticoagulant medication  Freq: CONTINUOUS PRN Route: XX  Start: 18 0016   Dispense Loc: [facility]               potassium chloride SA (K-DUR/KLOR-CON M) CR tablet 40 mEq  Dose: 40 mEq  Freq: ONCE Route: PO  Start: 18   Admin Instructions: DO NOT CRUSH.    Admin. Amount: 4 tablet (4 × 10 mEq tablet)  Dispense Loc: [facility]  Administrations Remainin               protein modular (PROSource TF) 1 packet  Dose: 1 packet  Freq: 3 TIMES DAILY  Route: PER FEEDING   Start: 04/28/18 1400   Admin Instructions: Infuse via syringe down feeding tube. Flush feeding tube with 15-30 mL of water after administration. Do not mix with other medications.  No mixing or dilution is required. SUPPLIED BY NUTRITION DEPARTMENT.    Admin. Amount: 1 packet  Last Admin: 05/08/18 0847  Dispense Loc: [facility] Floor Stock     (0813)-Not Given       (1550)-Not Given       (2121)-Not Given       2214 (1 packet)-Given        0809 (1 packet)-Given       (1634)-Not Given       2047 (1 packet)-Given        0818 (1 packet)-Given       1306 (1 packet)-Given       2028 (1 packet)-Given        (1045)-Not Given       1344-Hold [C]       (1913)-Not Given        0803 (1 packet)-Given       1647 (1 packet)-Given       2142 (1 packet)-Given        (0948)-Not Given       1439 (1 packet)-Given       2009 (1 packet)-Given        0847 (1 packet)-Given       [ ] 1400       [ ] 2000           rivaroxaban ANTICOAGULANT (XARELTO) tablet 20 mg  Dose: 20 mg  Freq: DAILY WITH SUPPER Route: PO  Start: 04/28/18 1700   Admin. Amount: 2 tablet (2 × 10 mg tablet)  Last Admin: 05/07/18 1720  Dispense Loc: [facility] AD[facility]     1800 (20 mg)-Given        1645 (20 mg)-Given        1706 (20 mg)-Given        1829 (20 mg)-Given [C]        1643 (20 mg)-Given        1720 (20 mg)-Given        [ ] 1700           sodium chloride (PF) 0.9% PF flush 1-5 mL  Dose: 1-5 mL  Freq: EVERY 1 HOUR PRN Route: IV  PRN Reasons: line flush,post meds or blood draw  Start: 04/26/18 2249   Admin Instructions: for peripheral IV line flush post IV meds.  1-3 mL post IV meds.  1-5 mL post blood draw.  Volume is dependent on catheter size.    Admin. Amount: 1-5 mL  Last Admin: 05/07/18 1301  Dispense Loc: [facility] Floor Stock  Volume: 5 mL  POC: IR Pre-procedure   Current Line: PICC Double Lumen 04/21/18 Right Basilic (Other (see comment))    0453 (3 mL)-Given            1301 (10 mL)-Given [C]            sodium chloride (PF) 0.9% PF flush 10  mL  Dose: 10 mL  Freq: EVERY 7 DAYS Route: IK  Start: 04/21/18 1500   Admin Instructions: And Q1H PRN, to lock each CVC - Valved (Tunneled and Non-Tunneled) dormant lumen.    Admin. Amount: 10 mL  Last Admin: 04/28/18 1520  Dispense Loc: [facility] Floor Stock  Volume: 10 mL        (1415)-Not Given              sodium chloride (PF) 0.9% PF flush 10-20 mL  Dose: 10-20 mL  Freq: EVERY 1 HOUR PRN Route: IK  PRN Reasons: line flush,post meds or blood draw  Start: 04/21/18 1444   Admin Instructions: to flush CVC - Valved (Tunneled and Non-Tunneled).   10 mL post IV meds; 20 mL post blood draw.    Admin. Amount: 10-20 mL  Last Admin: 05/07/18 0611  Dispense Loc: [facility] Floor Stock  Volume: 20 mL   Current Line: PICC Double Lumen 04/21/18 Right Basilic (Red)    0939 (20 mL)-Given [C]         0655 (20 mL)-Given          0611 (20 mL)-Given            sodium chloride (PF) 0.9% PF flush 3 mL  Dose: 3 mL  Freq: EVERY 8 HOURS Route: IV  Start: 04/26/18 2249   Admin Instructions: And Q1H PRN, to lock peripheral IV dormant line.    Admin. Amount: 3 mL  Last Admin: 05/07/18 2335  Dispense Loc: [facility] Floor Stock  Volume: 3 mL  POC: IR Pre-procedure   Current Line: PICC Double Lumen 04/21/18 Right Basilic (Purple)    (0813)-Not Given       (1506)-Not Given [C]               (0817)-Not Given [C]               0013 (3 mL)-Given       0838 (3 mL)-Given       1711 (3 mL)-Given        (0058)-Not Given       (1045)-Not Given       (1501)-Not Given [C]        0005 (3 mL)-Given       0804 (3 mL)-Given       1646 (3 mL)-Given        0149 (3 mL)-Given       (0948)-Not Given       (1644)-Not Given       2335 (3 mL)-Given        [ ] 0800       [ ] 1600           sodium chloride (PF) 0.9% PF flush 5-50 mL  Dose: 5-50 mL  Freq: ONCE PRN Route: IK  PRN Reason: line flush  PRN Comment: to flush each lumen with line placement  Start: 04/20/18 1444   Admin Instructions: May repeat x 1    Admin. Amount: 5-50 mL  Dispense Loc: [facility] Floor  Stock  Administrations Remainin  Volume: 50 mL               triamcinolone (KENALOG) 0.1 % cream  Freq: 3 TIMES DAILY Route: Top  Start: 18 1400   Admin Instructions: Apply to rash on shoulders and legs    Last Admin: 18  Dispense Loc: [facility]          (1234)-Not Given       (1443)-Not Given        ( )-Given        (902)-Not Given       [ ] 1400       [ ]            zinc-white petrolatum (ILEX) 58.3 % paste  Freq: EVERY 1 HOUR PRN Route: Top  PRN Reason: skin protection  Start: 18   Admin Instructions: Apply to dermatitis on lumbar area    Dispense Loc: Non Rx Stock               zolpidem (AMBIEN) tablet 5 mg  Dose: 5 mg  Freq: AT BEDTIME Route: PO  Start: 18 0015   Admin. Amount: 1 tablet (1 × 5 mg tablet)  Last Admin: 18  Dispense Loc: [facility]     214 (5 mg)-Given        215 (5 mg)-Given        205 (5 mg)-Given               205 (5 mg)-Given [C]               2150 (5 mg)-Given        2207 (5 mg)-Given        [ ] 2200          Discontinued Medications  Medications 18         Dose: 1 packet  Freq: 3 times per day Route: PER G TUBE  Start: 18   End: 18 1414   Admin Instructions: Please administer during overnight feeds.  Mix each packet with 60 mL water before administering. SUPPLIED BY NUTRITION DEPARTMENT.    Admin. Amount: 1 packet  Last Admin: 18  Dispense Loc: [facility] Floor Stock        (1 packet)-Given        58 (1 packet)-Given       (542)-Not Given       ()-Not Given        (5)-Not Given       (539)-Not Given       1414-Med Discontinued           Dose: 3 mL  Freq: EVERY 4 HOURS PRN Route: NEBULIZATION  PRN Reason: wheezing  Start: 18 1715   End: 18 1521   Admin. Amount: 3 mL  Dispense Loc: [facility]  Volume: 3 mL          1521-Med Discontinued          Dose: 4 mg  Freq: 3 TIMES DAILY Route: PO  Start:  05/05/18 1400   End: 05/07/18 0911   Admin. Amount: 2 capsule (2 × 2 mg capsule)  Last Admin: 05/06/18 2016  Dispense Loc: [facility] ADS [facility]        1411 (4 mg)-Given       1818 (4 mg)-Given               0757 (4 mg)-Given       1440 (4 mg)-Given       2016 (4 mg)-Given        0911-Med Discontinued  (0925)-Not Given [C]

## 2018-04-10 NOTE — IP AVS SNAPSHOT
` `      8A: [phone]                 INTERAGENCY TRANSFER FORM - NOTES (H&P, Discharge Summary, Consults, Procedures, Therapies)   4/10/2018                    Hospital Admission Date: 4/10/2018  SHALINI CHONG   : 1955  Sex: Female        Patient PCP Information     Provider PCP Type    Mellisa Haji MD General         History & Physicals      H&P by Toan Kingston MD at 2018  2:49 AM     Author:  Toan Kingston MD Service:  Hospitalist Author Type:  Physician    Filed:  2018  3:19 AM Date of Service:  2018  2:49 AM Creation Time:  2018  2:49 AM    Status:  Attested :  Toan Kingston MD (Physician)    Cosigner:  Shahriar Cadet MD at 2018 10:58 AM        Attestation signed by Shahriar Cadet MD at 2018 10:58 AM        Pt was seen by Dr Cadet on 18  Circumstances of admission reviewed with moonlighter  Recent hospitalizations, complex PMH reviewed with in EPIC and with Pt    Pt requested admission here for the evaluation of R buttocks pain, drainage from chronic decubitus.  Pt specifically requested to see Dr Pelletier (as recommended by [facility])  Pt also noted worsening of chest congestion    PMH, meds and further history from admission note    Since admission, Pt has been minimally cooperative cares  Following admission, R PCT was noted to be dislodged and not able to be replaced    Pt currently c/o non-productive cough, mild SOB as well low back and R hip pain.  She is anxious to meet Dr Pelletier    She has refused recent vitals  Pt is chronically ill appearing, + congested cough, at times, irritable and sarcastic, wearing 02  Lungs clear  RR 14  CV rrr no M  Abd soft, non-distended  SP cath in place  Sacral,  R buttocks wounds were not examined by me today  L PNT in place    BC  NG  Results for SHALINI CHONG (MRN [id number]) as of 2018 10:43   Ref. Range 4/10/2018 21:25 4/10/2018 21:41   Sodium Latest Ref Range: 133 - 144 mmol/L 139     Potassium Latest Ref Range: 3.4 - 5.3 mmol/L 4.1    Chloride Latest Ref Range: 94 - 109 mmol/L 105    Carbon Dioxide Latest Ref Range: 20 - 32 mmol/L 32    Urea Nitrogen Latest Ref Range: 7 - 30 mg/dL 7    Creatinine Latest Ref Range: 0.52 - 1.04 mg/dL 0.56    GFR Estimate Latest Ref Range: >60 mL/min/1.7m2 >90    GFR Estimate If Black Latest Ref Range: >60 mL/min/1.7m2 >90    Calcium Latest Ref Range: 8.5 - 10.1 mg/dL 8.2 (L)    Anion Gap Latest Ref Range: 3 - 14 mmol/L 2 (L)    CRP Inflammation Latest Ref Range: 0.0 - 8.0 mg/L 80.0 (H)    Glucose Latest Ref Range: 70 - 99 mg/dL 93    WBC Latest Ref Range: 4.0 - 11.0 10e9/L 3.7 (L)    Hemoglobin Latest Ref Range: 11.7 - 15.7 g/dL 10.4 (L)    Hematocrit Latest Ref Range: 35.0 - 47.0 % 35.7    Platelet Count Latest Ref Range: 150 - 450 10e9/L 285    RBC Count Latest Ref Range: 3.8 - 5.2 10e12/L 4.18    MCV Latest Ref Range: 78 - 100 fl 85    MCH Latest Ref Range: 26.5 - 33.0 pg 24.9 (L)    MCHC Latest Ref Range: 31.5 - 36.5 g/dL 29.1 (L)    RDW Latest Ref Range: 10.0 - 15.0 % 19.4 (H)    Diff Method Unknown Automated Method    % Neutrophils Latest Units: % 53.6    % Lymphocytes Latest Units: % 24.7    % Monocytes Latest Units: % 9.1    % Eosinophils Latest Units: % 11.8    % Basophils Latest Units: % 0.5    % Immature Granulocytes Latest Units: % 0.3    Nucleated RBCs Latest Ref Range: 0 /100 0    Absolute Neutrophil Latest Ref Range: 1.6 - 8.3 10e9/L 2.0    Absolute Lymphocytes Latest Ref Range: 0.8 - 5.3 10e9/L 0.9    Absolute Monocytes Latest Ref Range: 0.0 - 1.3 10e9/L 0.3    Absolute Eosinophils Latest Ref Range: 0.0 - 0.7 10e9/L 0.4    Absolute Basophils Latest Ref Range: 0.0 - 0.2 10e9/L 0.0    Abs Immature Granulocytes Latest Ref Range: 0 - 0.4 10e9/L 0.0    Absolute Nucleated RBC Unknown 0.0    Sed Rate Latest Ref Range: 0 - 30 mm/h 64 (H)    Specimen Description Unknown  Buttock Wound   Culture Micro Unknown  PENDING   WOUND CULTURE AEROBIC BACTERIAL  Unknown  Rpt     CXR interstitial prominence      Assessment    Paraplegia with chronic truncal decubitus ulcers, chronic R sacral osteo, L heel wound, on Augmentin prior to this admission, now on Zosyn and Vanco empirically. Past care at [facility]. + ongoing stool contamination.    Chest congestion, prominent interstitial changes on CXR, ? COPD, ?CHF vs Pneumonia.  Suspect weakness, paraplegia, ineffective cough are contributing. Doxycycline was started on admission. In the past on lasix    S/p B PNT 2 months ago, for the intent of urinary diversion (even with SP cath in place), not effective per Pt. Pt prefers to have remaining tube removed.    Chronic anticoagulation for history of DVT    Chronic anemia, stable    Recent L sub trochanteric hip fracture following fall-no plans for surgery    Seizure d/o, stable on recently reduced dose of Keppra    Depression with anxiety. I suspect also a personality d/o, possible organic mood disorder, with lack of co operation with cares    Plan  Continue current antibiotic therapy  ID and Dr Pelletier to see  Remove remaining PNT   Low dose lasix for possible component of CHF  Scheduled nebs + cough assist device  Albumin level pending  Continue AC  Encourage compliance with cares    D/c plans unclear at this time.    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   She is also refusing care        Plan:  Chronic pain- while she does have significant co morbidities including chronic osteomyelitis, most likely there is a component of pain seeking, will stop oxycodone and continue oral dilaudid with IV dilaudid prn. Her regimen needs to be simplified     Cellulitis in buttock area- recently was on meropenem and daptomycin ( for OM)  until 3/27, CRP was 7.6. Will cover broadly with Zosyn and Vancomycin, cultures have been sent. ID consult will be requested    Possible pneumonia- she has cough and fevers, will get CXR. Given her h/o COPD will add doxycycline for atypical coverage    h/o DVT- continue anticoagulation with rivaroxaban    Seizure disorder- tapering down keppra and increasing lamotrigine gradually  \" The patient used to be on Keppra, but it looks like her Keppra is being tapered and she is currently taking 500 in the morning and 250 mg at bedtime, which is to be continued until 04/07 and from 04/08, she will take 250 mg p.o. b.i.d. until 04/21 and then to decrease to 250 mg q.a.m. from 04/22 until 05/05/2018.   - She has now been started on lamotrigine which she will be taking 25 mg p.o. daily for 4 more days, then to increase to 50 mg daily from 04/09/2018 until 04/22/2018.    - The patient to follow up with Neurology regarding her subsequent dose after 04/22\"      If she continues to refuse care I suggest we involve psychiatry to evaluate her for decision making capacity                    Chief Complaint:   fevers     History is obtained from the patient     This is a 62 year old female with multiple medical problem as outline in PMH section. She was recently hospitalized from 4/4-4/7 at [facility] for treatment of chronic osteomyelitis and multiple stage IV decubital " ulcers, was treated with meropenem and vancomycin since cultures were positive for ESBL, VRE and MRSA. At that time surgery was consulted and recommended transfer to [facility] because her primary surgeon is there. Patient refused. She was discharged on Augmentin until seen by her primary care and her primary surgeon. She was discharged on Augmentin.   In TCU she had fevers, was complaining of more pain and eventually was transferred to [place] for evaluation and finally came here for evaluation by plastic surgeon.     She was seen and examined on the floor. She is covered in blankets from head to toe. Not cooperative with exam, refusing to be examined in details. She did allow me to auscultate her left chest, was screening when I attempted to examine site of nephrostomy tubes and suprapubic catheter. She reports non productive cough, denies chest pain, denies diarrhea.     She is refusing care from nurses.          Past Medical History:     Past Medical History:   Diagnosis Date     Anxiety      Chronic pain syndrome      Closed fracture zygoma, with routine healing, subsequent encounter      COPD (chronic obstructive pulmonary disease) (H)      Depressive disorder      DVT (deep vein thrombosis) in pregnancy (H)      Edema      Epilepsy (H)      Flaccid neuropathic bladder, not elsewhere classified      Fracture of femur (H)      Hypothyroidism      Iron deficiency anemia      Paraplegia (H)     unspecified     Pressure ulcer of sacral region      PTSD (post-traumatic stress disorder)      Rheumatoid arthritis (H)      Sepsis (H)     unspecified             Past Surgical History:    History reviewed. No pertinent surgical history.          Social History:     Social History   Substance Use Topics     Smoking status: Never Smoker     Smokeless tobacco: Not on file     Alcohol use No             Family History:   No family history on file.          Immunizations:     Immunization History   Administered Date(s)  Administered     Influenza (High Dose) 3 valent vaccine 10/16/2017             Allergies:   No Known Allergies          Medications:     Prescriptions Prior to Admission   Medication Sig Dispense Refill Last Dose     HYDROmorphone (DILAUDID) 2 MG tablet Take 1 tablet (2 mg) by mouth every 4 hours as needed for moderate to severe pain 4 tablet 0      oxyCODONE (OXYCONTIN) 20 MG 12 hr tablet Take 1 tablet (20 mg) by mouth every 12 hours 2 tablet 0      oxyCODONE IR (ROXICODONE) 5 MG tablet Take 1-2 tablets (5-10 mg) by mouth every 4 hours as needed (Give 5mg for pain level 4-6 on a 10 point scale. Give 10mg for pain level 6-10 on a 10 point scale.) 4 tablet 0      diazepam (VALIUM) 2 MG tablet Take 2.5 tablets (5 mg) by mouth every 4 hours as needed for anxiety 4 tablet       LevETIRAcetam (KEPPRA PO) Take 500 mg by mouth every morning Until 4/7/2018 then decrease to 250mg BID 4/8/2018-4/21/2018, then decrease to 250mg QAM 4/22-5/5/2018   4/3/2018 at am     zolpidem (AMBIEN) 5 MG tablet Take 1 tablet (5 mg) by mouth At Bedtime 1 tablet       amoxicillin-clavulanate (AUGMENTIN) 875-125 MG per tablet Take 1 tablet by mouth every 12 hours  0      mineral oil-hydrophilic petrolatum (AQUAPHOR) Apply topically daily Apply to lower extremities for skin irritation.   4/4/2018 at am     ARIPIPRAZOLE PO Take 5 mg by mouth every morning   4/4/2018 at am     aspirin 81 MG chewable tablet Take 81 mg by mouth daily   4/4/2018 at am     DULoxetine HCl (CYMBALTA PO) Take 60 mg by mouth every morning   4/4/2018 at am     HYDROXYZINE HCL PO Take 50 mg by mouth At Bedtime   4/3/2018 at pm     LAMOTRIGINE PO Take 25 mg by mouth every morning X 4 more days then increase to 50mg daily from 4/9/2018-4/22/2018.   4/4/2018 at am     LevETIRAcetam (KEPPRA PO) Take 250 mg by mouth every evening Until 4/7/2018 then decrease to 250mg BID 4/8/2018-4/21/2018, then decrease to 250mg QAM 4/22-5/5/2018   4/3/2018 at pm     LEVOTHYROXINE SODIUM PO Take  125 mcg by mouth every morning   4/4/2018 at 0600     MELATONIN PO Take 10 mg by mouth At Bedtime   4/3/2018 at pm     OMEPRAZOLE PO Take 20 mg by mouth daily (with breakfast)   4/4/2018 at 0800     Potassium Chloride [place] ER (K-DUR PO) Take 10 mEq by mouth every morning   4/4/2018 at am     Rivaroxaban (XARELTO PO) Take 20 mg by mouth At Bedtime   4/3/2018 at pm     DIVALPROEX SODIUM PO Take 250 mg by mouth 2 times daily   4/4/2018 at am x 1 dose     ferrous sulfate (IRON) 325 (65 FE) MG tablet Take 325 mg by mouth 2 times daily   4/4/2018 at am x 1 dose     Nutritional Supplements (NUTRITIONAL SUPPLEMENT PO) Take 1 packet by mouth 2 times daily (Deven Powder)   Past Week     miconazole (MICATIN) 2 % AERP powder Apply topically 2 times daily Apply to groin folds   4/4/2018 at am x 1 dose     senna-docusate (SENOKOT-S;PERICOLACE) 8.6-50 MG per tablet Take 2 tablets by mouth 2 times daily   4/4/2018 at am x 1 dose     GABAPENTIN PO Take 300 mg by mouth 3 times daily   4/4/2018 at x 2 doses     ACETAMINOPHEN PO Take 650 mg by mouth 4 times daily   4/4/2018 at x 2 doses     ACETAMINOPHEN PO Take 650 mg by mouth every 4 hours as needed for pain (May take additional as needed doses. Do not exceed 4000mg in 24 hours.)   Past Week at Unknown time     bisacodyl (DULCOLAX) 10 MG Suppository Place 10 mg rectally daily as needed for constipation   prn med     magnesium hydroxide (MILK OF MAGNESIA) 400 MG/5ML suspension Take 30 mLs by mouth daily as needed for constipation or heartburn   prn med             Review of Systems:   A comprehensive review of systems was performed and found to be negative except as described in this note           Physical Exam:   Vitals were reviewed    Exam limited due to patient refusing detailed exam  Left side of the chest is clear, there is no wheezing, some coarse breath sounds at the basis  Heart sounds are regular, no murmurs  Refused the rest of exam            Data:   BMP  Recent Labs  Lab  04/10/18  2125 04/07/18  0835 04/06/18  0820 04/05/18  0825    139 139 136   POTASSIUM 4.1 4.2 4.2 3.8   CHLORIDE 105 104 106 103   NATA 8.2* 8.0* 7.9* 7.6*   CO2 32 29 27 27   BUN 7 5* 5* 4*   CR 0.56 0.40* 0.46* 0.40*   GLC 93 122* 95 91     CBC  Recent Labs  Lab 04/10/18  2125 04/07/18  0835 04/06/18  0820 04/05/18  0825   WBC 3.7* 6.1 6.9 8.3   RBC 4.18 3.63* 3.74* 3.50*   HGB 10.4* 9.1* 9.4* 8.7*   HCT 35.7 30.2* 30.8* 28.8*   MCV 85 83 82 82   MCH 24.9* 25.1* 25.1* 24.9*   MCHC 29.1* 30.1* 30.5* 30.2*   RDW 19.4* 19.3* 19.6* 19.6*    301 313 342     INR  Recent Labs  Lab 04/04/18  1648   INR 1.29*     LFTsNo lab results found in last 7 days.   PANCNo lab results found in last 7 days.       Attestation:  Time spent: 70 minutes , >50% on chart review, coordination of care, counseling and docuemtnation[ID1.1]           Revision History        User Key Date/Time User Provider Type Action    > ID1.1 4/11/2018  3:19 AM Toan Kingston MD Physician Sign                  Discharge Summaries     No notes of this type exist for this encounter.         Consult Notes      Consults by Iker Alan PA-C at 4/25/2018  3:21 PM     Author:  Iker Alan PA-C Service:  Interventional Radiology Author Type:  Physician Assistant    Filed:  4/25/2018  3:21 PM Date of Service:  4/25/2018  3:21 PM Creation Time:  4/25/2018  3:20 PM    Status:  Signed :  Iker Alan PA-C (Physician Assistant)     Consult Orders:    1. Interventional Radiology Adult/Peds IP Consult: Patient to be seen: Routine within 24 hours; Call back #: ; assess for G tube placement [[id number]] ordered by Shahriar Cadet MD at 04/25/18 1031                Patient is on IR schedule 4/27/2018 for a gastrostomy tube placement.     Labs WNL for procedure.      Orders for NPO, contrast, and scrubs have been entered.   Medications to be held include: Xarelto, Aspirin  Consent will be done prior to procedure.      Please contact the IR control RN at [phone] for estimated time of procedure.     Case discussed with primary team.    Lewis Alan PA-C  Interventional Radiology  [phone] [facility].[MB1.1]       Revision History        User Key Date/Time User Provider Type Action    > MB1.1 4/25/2018  3:21 PM Iker Alan PA-C Physician Assistant Sign            Consults by Irvin Morgan APRN CNS at 4/25/2018  9:17 AM     Author:  Irvin Morgan APRN CNS Service:  Palliative Author Type:  Clinical Nurse Specialist    Filed:  4/25/2018  2:22 PM Date of Service:  4/25/2018  9:17 AM Creation Time:  4/25/2018  9:17 AM    Status:  Signed :  Irvin Morgan APRN CNS (Clinical Nurse Specialist)     Consult Orders:    1. Palliative Care Adult IP Consult: Patient to be seen: Routine within 24 hrs; Call back #: ; paraplegia, non-healing decubitus ulcers, frequent refusal of cares; Consultant may enter orders: Yes [[id number]] ordered by Shahriar Cadet MD at 04/24/18 0957                [facility], [place]    Palliative Care Consultation Note    Patient: Marychuy Zhou  Date of Admission:  4/10/2018    Requesting provider/team:[JM1.1] Shahriar Cadet MD[JM1.2]  Reason for consult:[JM1.1] Pain management  Goals of care[JM1.2]    Recommendations:[JM1.1]  -[JM1.3]goals are clear[JM1.2],[JM1.4] proceeding with g-tube placement later this week[JM1.3] and[JM1.2] discharging to facility[JM1.3]  -regarding her[JM1.2] chronic[JM1.4] pain, pain consult[JM1.2] would be appropriate[JM1.5] inpatient[JM1.2] for medication management   -o[JM1.4]ur team will[JM1.1] sign off for now, please re-consult if needs arise[JM1.2].     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 complicated by multiple decubitus ulcers, chronic pain involving her right buttock, chronic osteomyelitis, left plantar heel wound, COPD, anemia, recent left communicated acute trochanter fracture with recent fall admitted with history of four-day fevers dry cough and worsening buttock pain with redness of the skin in the buttock area, purulent drainage, concern for infection[JM1.3].[JM1.2]    Symptoms:    Main source of pain is abdominal pain, she always has pain, will get worse pain when she gets septic, she feels that her pain is not being adequately treated and wants to be on her regimen she was on at the nursing home. She also is requesting more pain medication for Monday when her son is going to trial. Question medications are being used for coping.     Social:          Living situation: was living with son and son in law before, now in nursing home and hospital most recently       Support system: two children, grandchildren, girlfriend of 50 years       Actual/Potential Caregiver: son used to be his PCA       Functional status: wheelchair/bed bound    Coping: family is her support and what gives her strength    Spiritual/Spiritism:    Spiritual background: did request  visit    Advance Care Planning:               Goals of Care: she wants to get better and willing to move forward with plan of care       Health care directive: None in chart        Health care agent: per next of kin policy       Code Status:  Full Code       POLST There is no POLST (Physician orders for life-sustaining treatment) form on file for this patient[JM1.3]      History of Present Illness   Sources of History:[JM1.1]patient and electronic health record[JM1.2]    Marychuy Zhou is a 62 year old female with 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  Since being admitted she has been treated for pneumonia and her breathing has greatly improved, has had ongoing difficulty with incontinence of bowel and bladder.[JM1.3]    Palliative care consulted 4.24 for patient paraplegia, non-healing decubitus ulcers, and frequent refusal of cares.[JM1.2]      ROS:  Palliative Symptom Review (0=no symptom/no concern, 1=mild, 2=moderate, 3=severe):  Pain:[JM1.1] 2[JM1.2]  Fatigue:[JM1.1] 0[JM1.2]  Nausea:[JM1.1] 0[JM1.2]  Constipation:[JM1.1] 0[JM1.2]  Diarrhea:[JM1.1] 3[JM1.2]  Depressive Symptoms:[JM1.1] 0[JM1.2]  Anxiety:[JM1.1] 2[JM1.2]  Drowsiness:[JM1.1] 0[JM1.2]  Poor Appetite:[JM1.1] 0[JM1.2]  Shortness of Breath:[JM1.1] 0[JM1.2]  Insomnia:[JM1.1] 0[JM1.2]  Delirium:[JM1.1] 0[JM1.2]       Past Medical History:   Past Medical History:   Diagnosis Date     Anxiety      Chronic pain syndrome      Closed fracture zygoma, with routine healing, subsequent encounter      COPD (chronic obstructive pulmonary disease) (H)      Depressive disorder      DVT (deep vein thrombosis) in pregnancy (H)      Edema      Epilepsy (H)      Flaccid neuropathic bladder, not elsewhere classified      Fracture of femur (H)      Hypothyroidism      Iron deficiency anemia      Paraplegia (H)     unspecified     Pressure ulcer of sacral region      PTSD (post-traumatic stress disorder)      Rheumatoid arthritis (H)      Sepsis (H)     unspecified          Past Surgical History:   History reviewed. No pertinent surgical history.          Family History:   Family History   Problem Relation Age of Onset     Chronic Obstructive  Pulmonary Disease Brother      Family history[JM1.1] reviewed[JM1.4]       Allergies:   No Known Allergies         Medications:   I have reviewed this patient's medication profile and medications given in the past 24 hours.[JM1.1]    Scheduled acetaminophen 650 mg 4 times a day  aripiprazole 5 mg daily  cymbalta 30 mg daily  Gabapentin 100 mg 2 times a day  duoneb 4 times a day  Loperamide 2 mg q4h   Melatonin 5 mg daily  ondanstron 4 mg q6h  oxycontin 20 mg q12h  Calcium carbonate 4 times a day PRN- x4  Diazepam 2 mg q6h PRN - x2  Hydromorphone 1-2 mg q4h PRN- 12 mg over last 24 hours[JM1.6]           Physical Exam:   Vital Signs:                    Weight: 133 lbs 0 oz    Physical Exam:[JM1.1]  Constitutional: Awake, alert, cooperative, no apparent distress  Lungs: No increased work of breathing  Neurologic: Awake, alert, oriented to name, place and time.    Neuropsychiatric: Normal affect, mood, orientation, memory and insight.[JM1.4]     Data reviewed:[JM1.1]      ROUTINE ICU LABS (Last four results)  CMP[JM1.3]  Recent Labs  Lab 04/25/18  0936 04/24/18  1125 04/23/18  1514 04/23/18  1033 04/22/18  0538    137 145* 133 144   POTASSIUM 4.2 3.8 3.1*  3.1* 5.7* 3.7   CHLORIDE 104 101 113* 97 108   CO2 29 29 21 22 31   ANIONGAP 7 7 11 14 5   * 126* 101* 261* 102*   BUN 22 23 18 19 9   CR 0.46* 0.42* 0.35* 0.37* 0.47*   GFRESTIMATED >90 >90 >90 >90 >90   GFRESTBLACK >90 >90 >90 >90 >90   NATA 8.4* 8.1* 6.7* 7.8* 8.2*   MAG 2.2 2.2 1.8 2.4* 1.9   PHOS 4.0 3.3 2.5 4.9* 3.8   PROTTOTAL  --  7.4 6.0* 7.7 6.8   ALBUMIN  --  1.8* 1.3* 1.5* 1.5*   BILITOTAL  --  0.1* 0.1* 0.4 0.2   ALKPHOS  --  213* 193* 228* 216*   AST  --  13 11 30 19   ALT  --  <6 <6 <6 <6[JM1.7]     CBC[JM1.3]  Recent Labs  Lab 04/20/18  0940   WBC 6.9   RBC 3.37*   HGB 8.1*   HCT 28.2*   MCV 84   MCH 24.0*   MCHC 28.7*   RDW 19.5*   [JM1.7]     INR[JM1.3]  Recent Labs  Lab 04/24/18  1125 04/23/18  1033 04/22/18  0538 04/21/18  0554    INR 1.42* 1.17* 1.68* 1.83*[JM1.7]     Arterial Blood Gas[JM1.3]No lab results found in last 7 days.[JM1.7]    JOSEPH Peacock  Palliative Care Consult Team  Pager: [phone][JM1.3]    [facility] Inpatient Team Consult pager [phone] (M-F 8-4:30)  After-hours Answering Service [phone]  Palliative Clinic: [phone]     Total time spent was[JM1.1] 105[JM1.4] minutes,  >50% of time was spent counseling and/or coordination of care regarding[JM1.1] goals of care and symptom management[JM1.4].[JM1.1]  50[JM1.4] min of that were spent face-to-face, in[JM1.1] 1000[JM1.4], out[JM1.1] 1050[JM1.4].[JM1.1]             Revision History        User Key Date/Time User Provider Type Action    > JM1.5 4/25/2018  2:22 PM Irvin Morgan APRN CNS Clinical Nurse Specialist Sign     JM1.4 4/25/2018 11:55 AM Irvin Morgan APRN CNS Clinical Nurse Specialist      JM1.2 4/25/2018 11:31 AM Irvin Morgan APRN CNS Clinical Nurse Specialist      JM1.7 4/25/2018 10:40 AM Irvin Morgan APRN CNS Clinical Nurse Specialist      JM1.3 4/25/2018 10:39 AM Irvin Morgan APRN CNS Clinical Nurse Specialist      JM1.6 4/25/2018  9:48 AM Irvin Morgan APRN CNS Clinical Nurse Specialist      JM1.1 4/25/2018  9:17 AM Irvin Morgan APRN CNS Clinical Nurse Specialist             Consults signed by Khai De Paz MD at 4/25/2018  8:47 AM      Author:  Khai De Paz MD Service:  Psychiatry Author Type:  Physician    Filed:  4/25/2018  8:47 AM Date of Service:  4/24/2018  9:13 PM Creation Time:  4/24/2018 10:18 PM    Status:  Signed :  Khai De Paz MD (Physician)         Consult Date:  04/24/2018      PSYCHIATRIC CONSULTATION       CHIEF COMPLAINT:  A 62-year-old woman with multiple medical problems currently displaying irritability and is refusing some cares.       HISTORY OF PRESENT ILLNESS:  The patient is a 62-year-old woman with unfortunate  medical history.  She has a paralysis secondary to past hematoma.  She is currently being treated due to decubitus ulcers.  She gets stool contamination of these ulcers.  Here in the hospital the primary team had wanted to consider surgical interventions to prevent some stool contamination of the ulcers; however, she did not qualify because of her poor nutritional status per surgeons.  As such, she is currently on TPN for nutrition.  More recently she has been refusing a lot of cares from staff.  She is quite irritable with staff, particularly she seems very angry at staff who have English as a second language.      When I met with the patient, she indicates that it is the doctor's fault for stopping the Imodium which led to increased diarrhea and increased frequency of stools.  She claims that this change is what led her to not participate in cares.  She also blames the doctor for not prescribing sufficient pain medications and not prescribing sufficient anxiety medication.  She indicates to me that in the past she was on Paxil for her anxiety; however, she has no interest in that right now and said she would like me to have her valium increased or have Xanax added to her treatment protocol.  She is somewhat irritable during the interview when I ask her questions about the declining cares.  She claims that I am not getting the whole story.  Again, she blames the doctor for this current situation.  She states that now that her breathing is getting better as she is clearing the pneumonia, she feels that she is trending in the right direction and will soon be home.  Based on my discussion with Dr. Cadet, this is a relatively unrealistic expectation.  She is not currently doing well and has not shown significant progress in regards to her ulcers.  When I tried to approach her with some hypothetical questions in such as what if we are not able to reduce her stool frequency, will you continue to allow cares.  She is  dismissive, does not really answer the questions.  She indicates that she will be getting better.  She cannot seem to process that her health is on a general decline.        She randomly expresses displeasure towards the inpatient provider here.  Again, she blames him for the infection of her ulcers even though that is what led to her admission.  Overall, she has a general poor understanding of her current status, but she understands her overall situation.  She indicates that the reason for her paralysis is because the doctor did not believe her in the first place.  She indicates that the doctor accused her of drug seeking and as such missed the hematoma that further led to her paralysis.  She continues to blame her current provider in the hospital for not adequately treating her pain, adequately treating her anxiety and claims that she would be more cooperative with treatment if her pain was under better control.  She states that the staff here do not know how to properly move her when they are cleaning her and because they do not speak English well enough, they cannot understand what she is trying to say.  She does get irritated with me at times, but seems to calm herself down.      PAST PSYCHIATRIC HISTORY:  The patient denies any past psychiatric diagnosis.  She claims that she has tried to see a psychiatrist in the past, but the outpatient prescriber who is her primary care doctor was not making an adequate referral.  She seems to hint that this outpatient provider is simply not making a referral and then playing dumb when she asked why it was not happening.  She says she tried to see a psychiatrist for 3 years before giving up.       PAST MEDICAL HISTORY:  Relevant past medical history is noted in the HPI and internal medicine notes.      SUBSTANCE HISTORY:  The patient is quite evasive about this.  Have concerns about some substance use issues given her report of being labeled a drug seeker and her repeated  request for increased pain meds and increased benzos with me.      FAMILY HISTORY:  The patient denies any family history of illness.      SOCIAL HISTORY:  The patient currently lives in a nursing home or assisted living setting.  She indicates that she would like to go back to stay with her friend.  Again, this is likely unrealistic expectations.      ALLERGIES:  NO KNOWN DRUG ALLERGIES.      CURRENT PSYCHIATRIC MEDICATIONS:   1.  Abilify 5 mg daily.   2.  Depakote 250 mg 2 times daily.   3.  Cymbalta 30 mg every morning.   4.  Gabapentin 100 mg 2 times daily.   5.  Lamictal 50 mg in the morning.   6.  Keppra 250 mg 2 times daily.   7.  Ambien 5 mg at bedtime.        The patient also other medications for her medical issues.      LABORATORY DATA:  Metabolic panel notable for albumin of 1.8, alkaline phosphatase is 213.  Triglycerides 317.  Glucose has been ranging from 100-260.  Most recent INR is 1.42.  Most recent CBC notable for a hemoglobin of 8.1, hematocrit of 28.2.  Valproic acid level was 42 on 04/04/2018.  Lamotrigine was 1.7.      VITAL SIGNS:  Temp 98.6, pulse 78, respiratory rate 16, blood pressure 117/70, oxygen saturation 97% on room air.      PHYSICAL EXAMINATION:  I reviewed physical exam as documented by internal medicine physician, Shahriar Cadet, dated 04/24/2018.  No additional findings.      MENTAL STATUS EXAMINATION:  The patient is alert.  She is oriented to person, place and date.  She is wearing hospital attire.  She is in bed.  She is paraplegic.  She has good eye contact.  She is cooperative throughout the interview.  Speech is normal in rate and volume.  She occasionally is interrupting.  Occasionally she has an angry tone.  Language is intact.  Mood is irritable.  Affect is somewhat labile and reactive.  She has no psychomotor agitation.  Strength and tone and gait and station not tested due to her medical status.  Thought process is somewhat illogical.  Associations are intact.  Thought  content is notable for some paranoid thoughts, no signs of psychosis.  No suicidal or homicidal thoughts.  Recent and remote memory somewhat distorted, but intact.  Fund of knowledge is adequate.  Attention and concentration are intact.  Insight is limited.  Judgment is limited.        ASSESSMENT:  The patient is a 62-year-old woman with multiple medical problems who has been refusing medical care.  At this juncture, I see no obvious signs of psychosis or bipolar illness that would be affecting her thought process.  I suspect a significant personality component to her presentation as indicated by her general anger towards anyone that she sees as foreign.  She also has a significant distrust of healthcare professionals, which is not unreasonable considering she indicates that her paralysis was due to a medical professional not believing her and a missed diagnosis.  She claims that she had a lawsuit from this and was able to buy each of her sons a house. I am unsure of all of the details, but that is likely not relevant given that it is the patient's perception that is important in this case. There may be some possibility of improving some of this paranoia with improvement of her antipsychotic; however, I suspect that her current presentation is quite fixed.        The more concerning aspect at this time, however, is her refusal of cares.  At times she will seem to be making sound decisions and have somewhat logical reasoning as to why she will refuse a care now and then.  However, she does not really have a firm grasp on how often she is refusing cares and she does not really seem to fully grasp the consequences from this refusal.  Anytime I walked down this line of thinking with her, she became somewhat irritable so my capacity assessment is somewhat limited.  However, my initial thought is that she has some severe impairments in her capacity in regards to her refusal of cares.  That said, it is difficult to force  cares on someone who is not wanting them.      RECOMMENDATIONS:  The patient may possibly benefit from increase in Abilify; however, I am uncertain how much this will help.  I do not see overt signs of moi, psychosis or significant mood disruption.  The patient is currently indicating that she wants all treatment; however, her actions do not match her expressed desire.  As such, she has some limits in her capacity.  May consider ethics consult if there is any need to either force or deny care in the future to have a firmer assessment of capacity in regards to a specific decision making process.        In the meantime, I recommend that primary team address pain issues as best as they can while obviously being wary of controlled substances given her current attachment to them.  Working on patient rapport is likely the only method at this time that is going to increase patient's compliance with treatment.        I appreciate the consult.  Please call with questions or re-consult if there is any change in status.         KHAI CALDERON MD             D: 2018   T: 2018   MT: NTS      Name:     SHALINI CHONG   MRN:      [id number]        Account:       [id number]   :      1955           Consult Date:  2018      Document: [id number]       cc: Mellisa Haji MD[TP1.1]        Revision History        User Key Date/Time User Provider Type Action    > TP1.1 2018  8:47 AM Khai Calderon MD Physician Sign     [N/A] 2018 10:18 PM Khai Calderon MD Physician Edit            Consults by Khai Calderon MD at 2018  2:51 PM     Author:  Khai Calderon MD Service:  Psychiatry Author Type:  Physician    Filed:  2018  2:51 PM Date of Service:  2018  2:51 PM Creation Time:  2018  2:51 PM    Status:  Signed :  Khai Calderon MD (Physician)     Consult Orders:    1. Psychiatry IP Consult: non-healing decubitus ulcers, frequent refusal of  cares, depression; Consultant may enter orders: Yes; Patient to be seen: Routine; Call back #: [phone] [[id number]] ordered by Shahriar Cadet MD at 04/24/18 0909                Initial consult completed. Dictation to follow.[TP1.1]     Revision History        User Key Date/Time User Provider Type Action    > TP1.1 4/24/2018  2:51 PM Khai De Paz MD Physician Sign            Consults by Baldev Perkins MD at 4/19/2018  5:56 PM     Author:  Baldev Perkins MD Service:  Urology Author Type:  Resident    Filed:  4/20/2018  4:48 PM Date of Service:  4/19/2018  5:56 PM Creation Time:  4/19/2018  5:56 PM    Status:  Attested :  Baldev Perkins MD (Resident)    Cosigner:  Omid Castro MD at 4/21/2018 10:10 AM         Consult Orders:    1. Urology IP Consult: SP catheter - continued leakage out urethra; Consultant may enter orders: Yes; Patient to be seen: Routine - within 24 hours [[id number]] ordered by Ravi Lawrence MD at 04/19/18 1558           Attestation signed by Omid Castro MD at 4/21/2018 10:10 AM        Attestation:  Physician Attestation   I did not see the patient on this date. I discussed the care of this patient with the resident and reviewed her chart. Consult was for urinary diversion vs. Bladder outlet procedure in a lady with T4 paraplegia with chronic Bhandari. Now SPT with multiple medical comorbidities. Options would be bladder neck closure + SPT vs. SPT + sling vs. Urinary diversion. Should followup with Dr. Coyne in clinic.    Omid Castro MD                                 Urology Consult    Name: Marychuy Zhou    MRN: [id number]   YOB: 1955               Chief Complaint:   Leakage per urethra    History is obtained from the patient and chart review          History of Present Illness:   Marychuy Zhou is a 62 year old female with[BM1.1] multiple medical issues including[BM1.2] T4 paraplegia[BM1.1] for past 9 years[BM1.2], chronic pain,  "COPD, seizure disorder,[BM1.1] DVT on Rivaroxaban, multiple psych issues who is[BM1.2] currently admitted with sacral osteomyelitis on zosyn and doxycycline. Has multiple stage IV decubitus ulcers[BM1.1]. She has had much of her care at [facility] but decided to come to [facility] for most recent admission.  She says she is not sure if she has a regular urologist.  Her bladder was reportedly managed with a urethral catheter for ~4 years prior to SPT placement about 4-5 years ago. Urology is consulted because she has continued to leak large amounts of urine per her urethra with her SPT in and it has caused major problems with wound healing of her ulcers as well as skin breakdown on her thighs.  She did have bilateral PNTs placed in January at OSH to help divert her urine from her wounds but the patient reports \"it didn't help\" and she still has leakage per her urethra.  Upon admission here her left tube had fallen out and the hospitalist decided to remove the tubes and not replace (as apparently they weren't effective for urinary diversion). She has also been considered for possible diverting colostomy but per chart there is concern about her poor nutritional status.[BM1.2]          Past Medical History:     Past Medical History:   Diagnosis Date     Anxiety      Chronic pain syndrome      Closed fracture zygoma, with routine healing, subsequent encounter      COPD (chronic obstructive pulmonary disease) (H)      Depressive disorder      DVT (deep vein thrombosis) in pregnancy (H)      Edema      Epilepsy (H)      Flaccid neuropathic bladder, not elsewhere classified      Fracture of femur (H)      Hypothyroidism      Iron deficiency anemia      Paraplegia (H)     unspecified     Pressure ulcer of sacral region      PTSD (post-traumatic stress disorder)      Rheumatoid arthritis (H)      Sepsis (H)     unspecified            Past Surgical History:   History reviewed. No pertinent surgical history.         Social " History:     Social History   Substance Use Topics     Smoking status: Never Smoker     Smokeless tobacco: Not on file     Alcohol use No            Family History:     Family History   Problem Relation Age of Onset     Chronic Obstructive Pulmonary Disease Brother             Allergies:   No Known Allergies         Medications:     Current Facility-Administered Medications   Medication     acetaminophen (TYLENOL) tablet 650 mg     ARIPiprazole (ABILIFY) tablet 5 mg     ascorbic acid (VITAMIN C) tablet 500 mg     aspirin chewable tablet 81 mg     bisacodyl (DULCOLAX) Suppository 10 mg     calcium carbonate (TUMS) chewable tablet 500-1,000 mg     diazepam (VALIUM) tablet 2 mg     divalproex sodium delayed-release (DEPAKOTE SPRINKLE) DR capsule 250 mg     doxycycline (VIBRAMYCIN) capsule 100 mg     DULoxetine (CYMBALTA) EC capsule 30 mg     gabapentin (NEURONTIN) capsule 100 mg     guaiFENesin (MUCINEX) 12 hr tablet 600 mg     HYDROmorphone (DILAUDID) half-tab 1-2 mg     ipratropium - albuterol 0.5 mg/2.5 mg/3 mL (DUONEB) neb solution 3 mL     ipratropium - albuterol 0.5 mg/2.5 mg/3 mL (DUONEB) neb solution 3 mL     isometheptene-dichloralphenazone-acetaminophen (MIDRIN) -325 MG per capsule CAPS 1-2 capsule     lamoTRIgine (LaMICtal) tablet 50 mg     levETIRAcetam (KEPPRA) tablet 250 mg     levothyroxine (SYNTHROID/LEVOTHROID) tablet 150 mcg     melatonin tablet 5 mg     miconazole (MICATIN; MICRO GUARD) 2 % powder     naloxone (NARCAN) injection 0.1-0.4 mg     omeprazole (priLOSEC) CR capsule 20 mg     ondansetron (ZOFRAN-ODT) ODT tab 4 mg    Or     ondansetron (ZOFRAN) injection 4 mg     oxyCODONE (OxyCONTIN) 12 hr tablet 20 mg     Patient is already receiving anticoagulation with heparin, enoxaparin (LOVENOX), warfarin (COUMADIN)  or other anticoagulant medication     piperacillin-tazobactam (ZOSYN) 4.5 g vial to attach to  mL bag     rivaroxaban ANTICOAGULANT (XARELTO) tablet 20 mg     vitamin A  capsule 20,000 Units     zinc sulfate (ZINCATE) capsule 220 mg     zinc-white petrolatum (ILEX) 58.3 % paste     zolpidem (AMBIEN) tablet 5 mg             Review of Systems:    ROS: 10 point ROS neg other than the symptoms noted above in the HPI           Physical Exam:   VS:  T: 97.2    HR: 82    BP: 98/56    RR: 14   GEN:  NAD.    CV:  RRR  LUNGS: Non-labored breathing.   BACK:[BM1.1]  Right stage IV decub ulcer[BM1.3]  ABD:[BM1.1]  Obese.[BM1.2] Multiple scars. Patient abruptly halted exam limiting details.[BM1.3]  :[BM1.1]  Patient refused to be repositioned for exam but skin on anterior and medial thighs clearly macerated and erythematous,[BM1.3]   EXT:  Warm, well perfused.    SKIN:  Warm.   NEURO:  CN grossly intact.            Data:   All laboratory data reviewed:[BM1.1]      Recent Labs  Lab 04/20/18  0940 04/17/18  0925 04/15/18  1049   WBC 6.9 6.2 5.1   HGB 8.1* 8.1* 8.2*    256 248       Recent Labs  Lab 04/20/18  0940 04/18/18  0634 04/17/18  0925 04/15/18  1048   * 143 145* 140   POTASSIUM 3.7 3.4 3.4 3.7   CHLORIDE 108 107 106 102   CO2 29 31 30 33*   BUN 4* 7 6* 8   CR 0.49* 0.50* 0.49* 0.48*   GLC 82 94 93 79   NATA 8.3* 8.4* 8.0* 7.8*   MAG  --   --  1.9  --[BM1.4]      No lab results found in last 7 days.    Invalid input(s): URINEBLOOD[BM1.1]    Urine culture pending[BM1.2]    All pertinent imaging reviewed:[BM1.1]    4/18/18[BM1.2] CT scan of the abdomen/pelvis:[BM1.1]   IMPRESSION:   1. Patchy groundglass and nodular opacities in the right upper lobe  along with peripheral, upper lobe predominant interstitial thickening.  Findings suspicious for infection, potentially superimposed on  interstitial fibrosis or scarring.   2. Nonspecific circumferential bladder wall thickening which is  nonspecific and can be seen with acute or chronic cystitis. Increased  soft tissue thickening and enhancement along the suprapubic catheter  tract, representing granulation tissue or inflammation  associated with  cystitis.   3. Interval removal of bilateral percutaneous nephrostomy tubes. Mild  prominence of the collecting systems without significant  hydronephrosis.  4. Multiple chronic appearing thoracic compression deformities as  detailed in the findings, most significant involving T6 vertebral  body.  5. Redemonstration of left subtrochanteric femoral fracture.  6. Unchanged right sacral decubitus ulcer with associated  osteomyelitis.[BM1.2]            Impression and Plan:   Impression:   Marychuy Zhou is a 62 year old female with[BM1.1] multiple medical issues including[BM1.2] T4 paraplegia[BM1.1], DVT on anticoagulation, chronic non-healing ulcers/wounds due to chronic incontinence of stool and urine into wounds.  Suspect the urine incontinence per urethra is due to patulous urethra from long-term previous indwelling catheter (but again, patient refused exam).     Discussed with her that options include urinary diversion (i.e. Ileal conduit) vs SPT with bladder closure or pubovaginal sling vs another attempt at PNTs to divert urine (not previously successful per patient).  An ileal conduit would probably be the most successful but this would require a large open surgery and she is very high risk for complications and poor wound healing given her poor nutrition.[BM1.3]        Plan:     -[BM1.1] Agree with checking urine culture, consider treatment if patient is having UTI symptoms[BM1.3]    -[BM1.1] Continue SPT for now, change at least every one month[BM1.3]    -[BM1.1] Can try adding anticholinergic medication (i.e. Oxybutynin) to decrease any bladder spasm component of bladder leakage (but I suspect the leakage is due to patulous urethra rather than spasms).  Note that this can cause dry eyes, constipation, delirium, etc.     - Urology will arrange for follow-up with Dr. Coyne in clinic to discuss urinary diversion options.  We will also be in contact with Dr. De La Rosa of colorectal and Dr. Pelletier  of plastic surgery regarding timing of surgery if it is going to be undertaken.  In the mean time, agree with plans to bolster patient's nutrition.[BM1.3]     - Urology will[BM1.1] sign off[BM1.3]. Please contact resident/PA on call with any questions or concerns.         This patient's exam findings, labs, and imaging discussed with urology staff surgeon [BM1.1] Gloria[BM1.2], who developed the treatment plan.    Baldev Perkins MD  Urology Resident[BM1.1]                Revision History        User Key Date/Time User Provider Type Action    > BM1.3 4/20/2018  4:48 PM Baldev Perkins MD Resident Sign     BM1.4 4/20/2018  3:41 PM Baldev Perkins MD Resident      BM1.2 4/20/2018  3:19 PM Baldev Perkins MD Resident      BM1.1 4/19/2018  5:56 PM Baldev Perkins MD Resident             Consults by Jarrett Ortiz MD at 4/12/2018 11:00 AM     Author:  Jarrett rOtiz MD Service:  Colorectal Surgery Author Type:  Physician    Filed:  4/19/2018 10:59 AM Date of Service:  4/12/2018 11:00 AM Creation Time:  4/12/2018 11:00 AM    Status:  Signed :  Jarrett Ortiz MD (Physician)     Consult Orders:    1. Colorectal Surgery Adult IP Consult: Patient to be seen: Routine within 24 hrs; Call back #: ; paraplegia, chronic truncal decubitus with stool incontinence, soling of wounds.  Plastics recommends diverting colostomy. Pt is on AC; Cons... [[id number]] ordered by hSahriar Cadet MD at 04/12/18 1100                Colon and Rectal Surgery Consultation Note  [facility]    Marychuy Zhou MRN# [id number]   Age: 62 year old YOB: 1955     Date of Admission:  4/10/2018    Reason for consult:[JC1.1] Diverting colostomy[JC1.2]       Requesting physician:[JC1.1] Dr. Shahriar Cadet[JC1.2]       Level of consult:[JC1.1] One-time consult to assist in determining a diagnosis and to recommend an appropriate treatment plan[JC1.2]           Assessment:[JC1.1]   63 y/o  F, with PMH of T4 paraplegia, chronic truncal decubitus ulcers, presumed right chronic ischial osteo, COPD, diastolic CHF, h/o DVT on rivaroxaban, neurogenic bladder s/p bilat PNT despite SPT placement, h/o c.diff,[JC1.3] s[JC1.2]eizure d/o, recent left trochanteric hip fracture, severe malnutrition with Prealb 6 and Alb 1.6.  Pt was admitted on[JC1.3] 4/11[JC1.2] for fevers, cough, worsening buttock pain with likely overlying cellulitis and purulent discharge.[JC1.3]  Hospitalization has been complicated by concern for personality type disorder with non-compliance with cares.[JC1.2]  We have been asked to assess[JC1.3] patient[JC1.4] for diverting colostomy[JC1.3] placement[JC1.4] due to continued decubitus wound soilage[JC1.3] and infection[JC1.4].[JC1.3]           Recommendations:[JC1.1]   -[JC1.2] Do not recommend colostomy at this time due to poor nutritional status.  Consider trial of TPN.[JC1.5]    -[JC1.2] if TPN is[JC1.5] not[JC1.4] possible, will need to[JC1.5] optimize nutrition with[JC1.2] consistent[JC1.4] oral nutritional supplements[JC1.2] (documented calorie counts)[JC1.6]  - [facility] note from today was reviewed.[JC1.2] Will also need to be seen by [facility] for ostomy education and marking but closer to surgery[JC1.5] and this can be coordinated by our team[JC1.4].[JC1.5]   -[JC1.2] P[JC1.4]er medicine, would be able to hold anticoagulation for surgical procedure.  Also unclear if prealbumin/albumin is accurately reflecting nutritional status due to chronic state of inflammation with wound and infection.[JC1.2]  Noted that[JC1.4] Medicine does not recommend starting TPN at this time.[JC1.2]    - we will sign off at this time.  Call/page with new questions/concerns.   - pt can f/u in CRS clinic.  Phone numbers left in [facility] AVS[JC1.6]       History of Present Illness:   CC:[JC1.1] decubitus wounds and chronic osteo.  Diverting colostomy.[JC1.5]      Pt states that she spoke to Dr. Pelletier at length regarding a  colostomy.  Pt's understanding is that she would 'get a bag that the poop goes into.'  Further explained that intestine would be brought up to abdominal wall and would pass stool there into a bag and therefore would stop passing stool via anus.  Pt states 'it's sounds like it is necessary and that it would be for the best.'  However, counseled regarding the importance of good nutrition prior to surgery.  States that she is having some GI upset.  Pt declined answering further questioning regarding this.  That she is currently experiencing the worse pneumonia that she has ever had.  Because of this has been unable to eat.  And that talking is making it worse.  Although she is waiting for her lunch.  States that she lives in a horrible nursing home and they serve 'slop' and therefore has not been eating. Eats about 2 meals per day.  She hopes to move out of her nursing home in 3 weeks to live with a girlfriend, although declines any further details regarding this.  Pt states that Dr. Pelletier recommended drinking Boost and she is waiting for that.     Pt declined answering any further questions.  Pt asks this writer to stop asking so many questions.  Pt wants to sleep and does not want to be bothered right now.  Pt also does not want to repeat herself. Pt declined a physical exam.  Pt does not want to see [facility] at this time to further discuss colostomy.[JC1.2]     Pt appears to be mostly afebrile this hospitalization when pt allows vitals to be obtained.  WBC has been WNL. CRP .  procalcitonin <0.05.  However has been hypotensive. Last lactic acid 1.6.  Making good urine amounts.[JC1.4]           Past Medical History:[JC1.1]     History obtained from Chart[JC1.7]    Past Medical History:   Diagnosis Date     Anxiety      Chronic pain syndrome      Closed fracture zygoma, with routine healing, subsequent encounter      COPD (chronic obstructive pulmonary disease) (H)      Depressive disorder      DVT (deep vein  thrombosis) in pregnancy (H)      Edema      Epilepsy (H)      Flaccid neuropathic bladder, not elsewhere classified      Fracture of femur (H)      Hypothyroidism      Iron deficiency anemia      Paraplegia (H)     unspecified     Pressure ulcer of sacral region      PTSD (post-traumatic stress disorder)      Rheumatoid arthritis (H)      Sepsis (H)     unspecified          Past Surgical History:   History reviewed. No pertinent surgical history.[JC1.1]     History obtained from Chart[JC1.7]       Social History:[JC1.1]     History obtained from Chart[JC1.7]  Social History     Social History     Marital status: Single     Spouse name: N/A     Number of children: N/A     Years of education: N/A     Occupational History     Not on file.     Social History Main Topics     Smoking status: Never Smoker     Smokeless tobacco: Not on file     Alcohol use No     Drug use: No     Sexual activity: Not on file     Other Topics Concern     Not on file     Social History Narrative          Family History:[JC1.1]     History obtained from Chart[JC1.7]  Family History   Problem Relation Age of Onset     Chronic Obstructive Pulmonary Disease Brother              Allergies:    No Known Allergies          Medications:     No current facility-administered medications on file prior to encounter.   Current Outpatient Prescriptions on File Prior to Encounter:  HYDROmorphone (DILAUDID) 2 MG tablet Take 1 tablet (2 mg) by mouth every 4 hours as needed for moderate to severe pain   oxyCODONE (OXYCONTIN) 20 MG 12 hr tablet Take 1 tablet (20 mg) by mouth every 12 hours   oxyCODONE IR (ROXICODONE) 5 MG tablet Take 1-2 tablets (5-10 mg) by mouth every 4 hours as needed (Give 5mg for pain level 4-6 on a 10 point scale. Give 10mg for pain level 6-10 on a 10 point scale.)   diazepam (VALIUM) 2 MG tablet Take 2.5 tablets (5 mg) by mouth every 4 hours as needed for anxiety   zolpidem (AMBIEN) 5 MG tablet Take 1 tablet (5 mg) by mouth At Bedtime    amoxicillin-clavulanate (AUGMENTIN) 875-125 MG per tablet Take 1 tablet by mouth every 12 hours   mineral oil-hydrophilic petrolatum (AQUAPHOR) Apply topically daily Apply to lower extremities for skin irritation.   ARIPIPRAZOLE PO Take 5 mg by mouth every morning   aspirin 81 MG chewable tablet Take 81 mg by mouth daily   DULoxetine HCl (CYMBALTA PO) Take 60 mg by mouth every morning   HYDROXYZINE HCL PO Take 50 mg by mouth At Bedtime   LAMOTRIGINE PO Take 25 mg by mouth every morning X 4 more days then increase to 50mg daily from 4/9/2018-4/22/2018.   LevETIRAcetam (KEPPRA PO) Take 250 mg by mouth every evening Until 4/7/2018 then decrease to 250mg BID 4/8/2018-4/21/2018, then decrease to 250mg QAM 4/22-5/5/2018   LEVOTHYROXINE SODIUM PO Take 125 mcg by mouth every morning   MELATONIN PO Take 10 mg by mouth At Bedtime   OMEPRAZOLE PO Take 20 mg by mouth daily (with breakfast)   Potassium Chloride [place] ER (K-DUR PO) Take 10 mEq by mouth every morning   Rivaroxaban (XARELTO PO) Take 20 mg by mouth At Bedtime   ferrous sulfate (IRON) 325 (65 FE) MG tablet Take 325 mg by mouth 2 times daily   Nutritional Supplements (NUTRITIONAL SUPPLEMENT PO) Take 1 packet by mouth 2 times daily (Deven Powder)   miconazole (MICATIN) 2 % AERP powder Apply topically 2 times daily Apply to groin folds   senna-docusate (SENOKOT-S;PERICOLACE) 8.6-50 MG per tablet Take 2 tablets by mouth 2 times daily   GABAPENTIN PO Take 300 mg by mouth 3 times daily   ACETAMINOPHEN PO Take 650 mg by mouth 4 times daily   ACETAMINOPHEN PO Take 650 mg by mouth every 4 hours as needed for pain (May take additional as needed doses. Do not exceed 4000mg in 24 hours.)   bisacodyl (DULCOLAX) 10 MG Suppository Place 10 mg rectally daily as needed for constipation   magnesium hydroxide (MILK OF MAGNESIA) 400 MG/5ML suspension Take 30 mLs by mouth daily as needed for constipation or heartburn             Review of Systems:      All other review of systems  "negative, except for what is mentioned above        Physical Exam:   BP (!) 77/50 (BP Location: Right arm)  Pulse 105  Temp 98.9  F (37.2  C)  Resp 16  Ht 1.588 m (5' 2.5\")  Wt 73.5 kg (162 lb)  SpO2 100%  BMI 29.16 kg/m2  General: Alert, interactive, NAD[JC1.1], irritable at times[JC1.2]  Resp:[JC1.1] normal respiratory effort.  Speaking in full sentences.[JC1.2]  Cardiac:[JC1.1] pt declined exam[JC1.2]  Abdomen:[JC1.1] pt declined exam[JC1.2]  Extremities:[JC1.1] pt declined exam[JC1.2]  Skin:[JC1.1] pt declined exam[JC1.2]   Neuro:[JC1.1] awake and alert.[JC1.2]  CN 2-12[JC1.1] grossly intact.  Declined further exam.[JC1.2]    Mili Callaway PA-C   Colon and Rectal Surgery  Pager: [phone][JC1.1]    Patient was discussed with Attending, Dr. De La Rosa.[JC1.2]             Revision History        User Key Date/Time User Provider Type Action    > [N/A] 4/19/2018 10:59 AM Jarrett Ortiz MD Physician Sign     JC1.6 4/13/2018  1:53 PM Mili Callaway PA-C Physician Assistant Sign     [N/A] 4/12/2018  2:35 PM Mili Callaway PA-C Physician Assistant Sign     JC1.7 4/12/2018  2:34 PM Mili Callaway PA-C Physician Assistant Sign     JC1.4 4/12/2018  2:28 PM Mili Callaway PA-C Physician Assistant      JC1.5 4/12/2018  2:26 PM Mili Callaway PA-C Physician Assistant      JC1.2 4/12/2018 12:16 PM Mili Callaway PA-C Physician Assistant      JC1.3 4/12/2018 11:07 AM Mili Callaway PA-C Physician Assistant      JC1.1 4/12/2018 11:00 AM Mili Callaway PA-C Physician Assistant             Consults by Charles Garsia MD at 4/11/2018  9:15 AM     Author:  Charles Garsia MD Service:  Plastic Surgery Author Type:  Resident    Filed:  4/11/2018  8:27 PM Date of Service:  4/11/2018  9:15 AM Creation Time:  4/11/2018  9:14 AM    Status:  Attested :  Charles Garsia MD (Resident)    " Cosigner:  Sarahy Pelletier MD at 4/17/2018  9:57 AM         Consult Orders:    1. Plastic Surgery IP Consult: Patient to be seen: Routine - within 24 hours; decubital ulcers; Consultant may enter orders: Yes [[id number]] ordered by Toan Kingston MD at 04/11/18 0013           Attestation signed by Sarahy Pelletier MD at 4/17/2018  9:57 AM        Attestation:  Physician Attestation   I agree with the information in this note.    Sarahy Pelletier                                 Plastic Surgery Consult  4/11/2018    PRIMARY TEAM: Hospitalist  PRIMARY PHYSICIAN: Dr Kingston    REASON FOR ADMISSION: Fevers, cellulitis of buttocks    HISTORY PRESENTING ILLNESS: This is a 62 year old female with PMH significant for T4 paraplegia, chronic pain COPD, anemia, recent hip fracture, Hx of DVT on anticoagulation (rivaroxiban), anxiety, depression, diastolic heart failure, hypothyroidism and chronic stage 4 decubti with known osteo who presents with fevers, drainage, and cellulitis of her buttock area. She was admitted last week to [facility][CS1.1] where it was recommended she be transferred for further cares. She refused and was ultimately discharged on PO antibiotics.[CS1.2]     Review of systems: 10 point ROS neg other than the symptoms noted above in the HPI.    PAST MEDICAL HISTORY:[CS1.1]   has a past medical history of Anxiety; Chronic pain syndrome; Closed fracture zygoma, with routine healing, subsequent encounter; COPD (chronic obstructive pulmonary disease) (H); Depressive disorder; DVT (deep vein thrombosis) in pregnancy (H); Edema; Epilepsy (H); Flaccid neuropathic bladder, not elsewhere classified; Fracture of femur (H); Hypothyroidism; Iron deficiency anemia; Paraplegia (H); Pressure ulcer of sacral region; PTSD (post-traumatic stress disorder); Rheumatoid arthritis (H); and Sepsis (H).[CS1.3]    PAST SURGICAL HISTORY:[CS1.1]   has no past surgical history on file.[CS1.3]    FAMILY  HISTORY:[CS1.1]  family history includes Chronic Obstructive Pulmonary Disease in her brother.[CS1.3]    SOCIAL HISTORY:[CS1.1]   reports that she has never smoked. She does not have any smokeless tobacco history on file. She reports that she does not drink alcohol or use illicit drugs.[CS1.3]    ALLERGIES:[CS1.1]  No Known Allergies[CS1.3]    MEDICATIONS:[CS1.1]  Prescriptions Prior to Admission   Medication Sig Dispense Refill Last Dose     albuterol (ACCUNEB) 1.25 MG/3ML nebulizer solution Take 1 vial by nebulization 4 times daily        AZITHROMYCIN PO Take 250 mg by mouth daily        divalproex sodium delayed-release (DEPAKOTE) 250 MG DR tablet Take 250 mg by mouth 2 times daily        HYDROmorphone (DILAUDID) 2 MG tablet Take 1 tablet (2 mg) by mouth every 4 hours as needed for moderate to severe pain 4 tablet 0      oxyCODONE (OXYCONTIN) 20 MG 12 hr tablet Take 1 tablet (20 mg) by mouth every 12 hours 2 tablet 0      oxyCODONE IR (ROXICODONE) 5 MG tablet Take 1-2 tablets (5-10 mg) by mouth every 4 hours as needed (Give 5mg for pain level 4-6 on a 10 point scale. Give 10mg for pain level 6-10 on a 10 point scale.) 4 tablet 0      diazepam (VALIUM) 2 MG tablet Take 2.5 tablets (5 mg) by mouth every 4 hours as needed for anxiety 4 tablet       zolpidem (AMBIEN) 5 MG tablet Take 1 tablet (5 mg) by mouth At Bedtime 1 tablet       amoxicillin-clavulanate (AUGMENTIN) 875-125 MG per tablet Take 1 tablet by mouth every 12 hours  0      mineral oil-hydrophilic petrolatum (AQUAPHOR) Apply topically daily Apply to lower extremities for skin irritation.   4/4/2018 at am     ARIPIPRAZOLE PO Take 5 mg by mouth every morning   4/4/2018 at am     aspirin 81 MG chewable tablet Take 81 mg by mouth daily   4/4/2018 at am     DULoxetine HCl (CYMBALTA PO) Take 60 mg by mouth every morning   4/4/2018 at am     HYDROXYZINE HCL PO Take 50 mg by mouth At Bedtime   4/3/2018 at pm     LAMOTRIGINE PO Take 25 mg by mouth every morning X  4 more days then increase to 50mg daily from 4/9/2018-4/22/2018.   4/4/2018 at am     LevETIRAcetam (KEPPRA PO) Take 250 mg by mouth every evening Until 4/7/2018 then decrease to 250mg BID 4/8/2018-4/21/2018, then decrease to 250mg QAM 4/22-5/5/2018   4/3/2018 at pm     LEVOTHYROXINE SODIUM PO Take 125 mcg by mouth every morning   4/4/2018 at 0600     MELATONIN PO Take 10 mg by mouth At Bedtime   4/3/2018 at pm     OMEPRAZOLE PO Take 20 mg by mouth daily (with breakfast)   4/4/2018 at 0800     Potassium Chloride [place] ER (K-DUR PO) Take 10 mEq by mouth every morning   4/4/2018 at am     Rivaroxaban (XARELTO PO) Take 20 mg by mouth At Bedtime   4/3/2018 at pm     ferrous sulfate (IRON) 325 (65 FE) MG tablet Take 325 mg by mouth 2 times daily   4/4/2018 at am x 1 dose     Nutritional Supplements (NUTRITIONAL SUPPLEMENT PO) Take 1 packet by mouth 2 times daily (Deven Powder)   Past Week     miconazole (MICATIN) 2 % AERP powder Apply topically 2 times daily Apply to groin folds   4/4/2018 at am x 1 dose     senna-docusate (SENOKOT-S;PERICOLACE) 8.6-50 MG per tablet Take 2 tablets by mouth 2 times daily   4/4/2018 at am x 1 dose     GABAPENTIN PO Take 300 mg by mouth 3 times daily   4/4/2018 at x 2 doses     ACETAMINOPHEN PO Take 650 mg by mouth 4 times daily   4/4/2018 at x 2 doses     ACETAMINOPHEN PO Take 650 mg by mouth every 4 hours as needed for pain (May take additional as needed doses. Do not exceed 4000mg in 24 hours.)   Past Week at Unknown time     bisacodyl (DULCOLAX) 10 MG Suppository Place 10 mg rectally daily as needed for constipation   prn med     magnesium hydroxide (MILK OF MAGNESIA) 400 MG/5ML suspension Take 30 mLs by mouth daily as needed for constipation or heartburn   prn med[CS1.3]       PHYSICAL EXAMINATION:[CS1.1]  Pulse:  [88-89] 88  Resp:  [16] 16  BP: (116)/(63) 116/63  SpO2:  [100 %] 100 %[CS1.3]    General: alert, no distress, resting comfortably in bed[CS1.1]  Patient laying in large  amount of loose green stool. She is leaking urine into her wound. She has a severe dermatitis as well as maceration from moisture. Right stage 4 IT wound with fairly clean base. Good granulation tissue, no purulence. Sacral wound healed, but skin appears tenuous.[CS1.4]     LABS:  Arterial Blood Gases[CS1.1]   No lab results found in last 7 days.[CS1.3]  Complete Blood Count[CS1.1]     Recent Labs  Lab 04/10/18  2125 04/07/18  0835 04/06/18  0820 04/05/18  0825   WBC 3.7* 6.1 6.9 8.3   HGB 10.4* 9.1* 9.4* 8.7*    301 313 342[CS1.3]     Basic Metabolic Panel[CS1.1]    Recent Labs  Lab 04/10/18  2125 04/07/18  0835 04/06/18  0820 04/05/18  0825    139 139 136   POTASSIUM 4.1 4.2 4.2 3.8   CHLORIDE 105 104 106 103   CO2 32 29 27 27   BUN 7 5* 5* 4*   CR 0.56 0.40* 0.46* 0.40*   GLC 93 122* 95 91[CS1.3]     Liver Function Tests[CS1.1]    Recent Labs  Lab 04/11/18  1208 04/11/18  0941   ALBUMIN 1.6*  --    INR  --  1.89*[CS1.3]     Pancreatic Enzymes[CS1.1]  No lab results found in last 7 days.[CS1.3]  Coagulation Profile[CS1.1]    Recent Labs  Lab 04/11/18  0941   INR 1.89*[CS1.3]     Lactate[CS1.1]  Invalid input(s): LACTATE[CS1.3]    IMAGING:  Results for orders placed or performed during the hospital encounter of 04/10/18   XR Chest 2 Views    Narrative    XR CHEST 2 VW  4/10/2018 10:13 PM      HISTORY: cough;     COMPARISON: None    FINDINGS: AP and lateral views of the chest semi-upright. Cardiac  silhouette is within normal limits. The pulmonary vasculature is  diffusely prominent. There is no focal airspace opacity, pleural  effusion, or pneumothorax. There is prominence of the major an minor  fissures on the right and the left midlung Ledesma on the left. These  changes could represent chronic scarring versus fluid. The visualized  upper abdomen, osseous structures, and soft tissues are unremarkable.      Impression    IMPRESSION:   1. Pulmonary vascular congestion versus chronic interstitial  scarring.      I have personally reviewed the examination and initial interpretation  and I agree with the findings.    SAURAV JUSTICE MD       ASSESSMENT: Marychuy Zhou is a 62 year old female[CS1.1] with T4 paraplegia and complex medical history including DVT on anticoagulation, osteo, and chronic decubitus ulcers. She unfortunately has a difficult situation[CS1.2].She[CS1.5] is[CS1.2] reportedly[CS1.4] non-compliant with cares[CS1.2] and there is concern for personality type disorder[CS1.4]. She is severely malnourished based on labs. She is anticoagulated.[CS1.5] She also has soiling of her wound with stool and urine given its proximity to her wound. At this time, these factors make her a non-candidate for any type of flap. Her wound is also in OK shape and doesn't need to be debrided at this time. She needs aggressive skin care, nutritional support and a serious consideration for diverting colostomy before we will consider flap coverage.[CS1.4]     PLAN:[CS1.1]  -Agree with ID consult and antibiotic coverage  -[CS1.2]No indication for debridement at this time[CS1.4]  -[CS1.6]Barrier ointment BID and PRN to all areas of dermatitis.   -BID wet-dry wound packing[CS1.4]   -[CS1.2]Nutritional consult and aggressive support  -Serious consideration of colostomy to assist in wound cares and improve quality of life    Seen with Dr Pelletier[CS1.4]    Charles Garsia MD   PGY-[CS1.1]4[CS1.2] Resident,  [facility]  [phone][CS1.1]       Revision History        User Key Date/Time User Provider Type Action    > CS1.3 4/11/2018  8:27 PM Charles Garsia MD Resident Sign     CS1.4 4/11/2018  8:18 PM Charles Garsia MD Resident      CS1.5 4/11/2018  7:18 PM Charles Garsia MD Resident      CS1.6 4/11/2018  9:47 AM Charles Garsia MD Resident      CS1.2 4/11/2018  9:39 AM Charles Garsia MD Resident      CS1.1 4/11/2018  9:14 AM  Charles Garsia MD Resident             Consults by Nay Yuen MD at 4/11/2018  8:15 AM     Author:  Nay Yuen MD Service:  Infectious Disease Author Type:  Fellow    Filed:  4/11/2018  4:33 PM Date of Service:  4/11/2018  8:15 AM Creation Time:  4/11/2018  8:15 AM    Status:  Attested :  Nay Yuen MD (Fellow)    Cosigner:  Brenda Duarte MD at 4/12/2018  9:41 AM         Consult Orders:    1. Infectious Disease [facility] Adult IP Consult: Patient to be seen: Routine within 24 hrs; Call back #: hospitalist; cellulitis, chrponic ostoemyelitis; Consultant may enter orders: Yes [[id number]] ordered by Toan Kingston MD at 04/11/18 0013           Attestation signed by Brenda Duarte MD at 4/12/2018  9:41 AM        Attestation:  Physician Attestation   I, Brenda Duarte, personally saw and evaluated this patient, though it should be noted that the patient refused examination as she has refused examination by other providers.  I discussed the patient with the resident and care team, and agree with the assessment and plan of care as documented in Dr. Yuen's note of 4/11/18.      I personally reviewed vital signs, medications, labs and imaging.    Key findings: This is a patient with paraplegia and chronic osteo of the R ischium. Reports are that she has what would be consistent with superimposed cellulitis, though she is refusing examination. She may also have COPD exacerbation.     Her osteo will not be cured without a surgical procedure. Absent surgery, long durations of antibiotic therapy (oral or parenteral) will only serve to select for increasingly resistant pathogens. Unfortunately she is an extremely poor surgical candidate given her refusal of cares. It is reasonable to treat with a short course of antibiotics for superimposed cellulitis. I do not anticipate that she will be discharged on parenteral therapy.    The overarching issue here seems to be  her lack of engagement in her own care - this factor hinders assessment and substantially increases the likelihood that care plans will be inefffectual.    ID will follow with you.  Brenda Duarte  Date of Service (when I saw the patient): 4/11/18                                 [facility] ID SERVICE: NEW CONSULTATION     Patient:  Marychuy Zhou, Date of birth 1955, Medical record number [id number]  Date of Visit:  April 11, 2018         Assessment and Recommendations:   Problem List:[RL1.1]  1. 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 Pneumoniae, ESBL E. Coli   2. Infected decubitus ulcer  3. Diarrhea  4.[RL1.2] A[RL1.3]typical pneumonia vs COPD exacerbation[RL1.2] vs heart failure exacerbation[RL1.3]  5.[RL1.4] T4 paraplegia 2/2 spinal hematoma[RL1.2]  6[RL1.4]. Chronic left heel ulcer[RL1.2]  7[RL1.4]. COPD[RL1.2]  8[RL1.4]. Neurogenic bladder with nephrostomy tubes, SP catheter in place[RL1.2]  9[RL1.4]. Noncompliance[RL1.2]  10[RL1.4]. History of MDROs  1[RL1.2]1[RL1.4]. History of C. Difficile   1[RL1.2]2[RL1.4]. CKD  1[RL1.2]3[RL1.4]. Chronic pain   1[RL1.2]4[RL1.4]. Seizure disorder[RL1.2]  15. Diastolic heart failure[RL1.3]    Recommendations:[RL1.1]  -[RL1.2]Agree with[RL1.5] continuation of zosyn and doxycycline for treatment of cellulitis/infected decubitus ulcer. Can stop vancomycin. This regimen would also treat any possible pneumonia (unclear if patient has pneumonia, more likely COPD exacerbation vs diastolic heart failure but difficult to discern given exam and history refusal).[RL1.3]   -Await Plastic Surgery consultation for consideration of debridement/flap[RL1.2].[RL1.3] If patient does go to OR would obtain[RL1.2] bone pathology,[RL1.6] gram stain with aerobic/anaerobic bacterial cultures.[RL1.2]   -[RL1.3]If no  surgery is planned, would favor to treat[RL1.5] cellulitis with 7-10 day antibiotic course, and would not favor extending treatment for her chronic osteomyelitis as[RL1.3] treating without surgical intervention[RL1.5] and plan for coverage[RL1.3] would likely not be curative.[RL1.5]  -Obtain C. Difficile PCR given history of C. Difficile and complaints of diarrhea.   -Obtain sputum culture[RL1.2] if possible.[RL1.3]     Discu[RL1.2]ssion:[RL1.1]  Paraplegic patient with complicated medical history[RL1.6] and history of noncompliance[RL1.3] admitted[RL1.6] 4/11/2018[RL1.7] with fevers, fatigue, cough, elevated inflammatory markers concerning for decubitus ulcer infection with underlying[RL1.6] chronic[RL1.3] ischial osteomyelitis, and also possible pneumonia vs COPD exacerbation[RL1.6] vs diastolic heart failure exacerbation[RL1.3]. Patient has had a decubitus ulcer for about 6 months, s/p debridement 2/2018 with[RL1.6] ischial[RL1.3] bone biopsy showing no growth; however without adequate coverage and history of noncompliance[RL1.6] it is likely that her wound has progressed. She has refused exam by anybody except for the ED provider, and they noted a cellulitic appearance of the ulcer which does track down to bone[RL1.3].[RL1.6] Plastic surgery has been consulted to determine if patient would benefit from debridement and is a flap candidate. At this point, recommend to treat her cellulitis with zosyn and doxycyline (this would cover her previously isolated E. Coli, Proteus and K. Pneumoniae as well as MRSA; wound swab here in process); vancomycin can be discontinued as doxycyline should provide resistant gram positive coverage. If she is not deemed a surgical candidate, would not favor to extend therapy to treat chronic osteomyelitis as antibiotics alone would not be curative.[RL1.3] Her refusal of cares and noncompliance makes this a very difficult case.[RL1.6]    Thank you for this consult. ID will continue to  follow this patient. Please feel free to call with any questions.[RL1.1]   Patient seen and discussed with ID staff Dr. Duarte.    Nay Street MD - [facility] ID fellow  [phone][RL1.2]       History of Present Illness:   Marychuy Zhou is a 63 yo female with PMH T4 paraplegia with chronic decubitus ulcers and[RL1.1] suspected ischial[RL1.2] osteomyelitis,[RL1.1] neurogenic bladder[RL1.2] with nephrostomy tubes/SP catheter[RL1.1] (nephrostomy tubes placed due to urinary contamination of ulcer due to patulous urethra)[RL1.2], COPD, DVT, seizure disorder admitted to [facility] 4/11/2018[RL1.1] from nursing home[RL1.2] with fevers[RL1.1],[RL1.2] cough and uptrending inflammatory markers.[RL1.1] Of note she was ho[RL1.2]spitalized 4/4-7 at [facility] for treatment of multiple decubitus ulcers with concern for ischial tuberosity osteomyelitis (erosion on imaging); seen by ID and treated with meropenem and vancomycin. Surgery consulted and recommended transfer to [facility] as her primary surgeon was there, and she refused. She was then discharged on augmentin[RL1.1] as a temporizing measure to a nursing home with plans to see her PCP and primary surgeon as outpatient. She endorses having fevers to 103 with development of cough, shortness of breath, chest pain with deep breathing and fatigue over the past 5 days. Also with diarrhea[RL1.2] and lower abdominal pressure[RL1.3]. She has no sensation and is unable to feel any pain or pressure at her decubitus site; she is unsure if any purulence has been coming out of the wound. Her nephrostomy tubes are painful (she has sensation there). She is intermittently refusing cares, vitals and exam; per ED note -> There is a large, deep wound on right buttock tracking likely all the way to the is[RL1.2]chium[RL1.3]. There is erythema, induration, and blanching of the skin of the right buttock surrounding the wound.  There is some minimal purulent drainage from the wound. There is a  stage IV sacral decubitus ulcer without any signs of infection. Since arriving here patient was started on[RL1.2] zosyn[RL1.3], vancomycin and doxycyline for treatment of infected decubitus ulcer and possible pneumonia.       Patient believes that her decubitus ulcer has been present for about 6 months. Of note patient underwent wound and ischial tuberosity debridement 2/5/18 with wound vac placement (op note without mention of purulence or infection - tissue around wound and portion of ischial tuberosity was removed) - wound cultures with ESBL producing Klebsiella and E. Coli, MRSA and Proteus. She had been on daptomycin and meropenem, but were stopped 2/12 due to no growth on bone culture (see muscle/soft tissue pathology, but no bone pathology).[RL1.2] Her last hospitalization at [facility] was 03/25-03/28 for acute blood loss anemia.  She was treated for an infected decubitus ulcer with daptomycin and meropenem over a 10-day course, finishing on 03/08.[RL1.1] Per patient, there has been no discussion of flap[RL1.2] or diverting colostomy. Per nursing notes she is often noncompliant with wound cares, and sits in feces.[RL1.3]            Review of Systems:[RL1.1]   10 pt ROS negative except where noted in HPI.[RL1.2]        Past Medical History:     Past Medical History:   Diagnosis Date     Anxiety      Chronic pain syndrome      Closed fracture zygoma, with routine healing, subsequent encounter      COPD (chronic obstructive pulmonary disease) (H)      Depressive disorder      DVT (deep vein thrombosis) in pregnancy (H)      Edema      Epilepsy (H)      Flaccid neuropathic bladder, not elsewhere classified      Fracture of femur (H)      Hypothyroidism      Iron deficiency anemia      Paraplegia (H)     unspecified     Pressure ulcer of sacral region      PTSD (post-traumatic stress disorder)      Rheumatoid arthritis (H)      Sepsis (H)     unspecified         Allergies:    No Known Allergies        Recent  Antimicrobials::[RL1.1]   Zosyn[RL1.5] 4/11-current  Vancomycin 4/4-7; 4/11-current  Doxycycline 4/11-current  Meropenem 4/4-7  Augmentin 4/7-11[RL1.2]       Family History:[RL1.1]     Family History   Problem Relation Age of Onset     Chronic Obstructive Pulmonary Disease Brother[RL1.8]           Social History:     Social History     Social History     Marital status: Single     Spouse name: N/A     Number of children: N/A     Years of education: N/A     Occupational History     Not on file.     Social History Main Topics     Smoking status: Never Smoker     Smokeless tobacco: Not on file     Alcohol use No     Drug use: No     Sexual activity: Not on file     Other Topics Concern     Not on file     Social History Narrative            Physical Exam:   Ranges forvital signs:  Temp:  [98.1  F (36.7  C)] 98.1  F (36.7  C)  Pulse:  [89-90] 89  Resp:  [16] 16  BP: (106)/(72) 106/72  SpO2:  [100 %] 100 %  No intake or output data in the 24 hours ending 04/11/18 0815    Exam:[RL1.1]  Refusing exam; NAD obese woman lying in bed[RL1.3]         Laboratory Data:     Creatinine   Date Value Ref Range Status   04/10/2018 0.56 0.52 - 1.04 mg/dL Final   04/07/2018 0.40 (L) 0.52 - 1.04 mg/dL Final   04/06/2018 0.46 (L) 0.52 - 1.04 mg/dL Final   04/05/2018 0.40 (L) 0.52 - 1.04 mg/dL Final   04/04/2018 0.51 (L) 0.52 - 1.04 mg/dL Final     WBC   Date Value Ref Range Status   04/10/2018 3.7 (L) 4.0 - 11.0 10e9/L Final   04/07/2018 6.1 4.0 - 11.0 10e9/L Final   04/06/2018 6.9 4.0 - 11.0 10e9/L Final   04/05/2018 8.3 4.0 - 11.0 10e9/L Final   04/04/2018 11.0 4.0 - 11.0 10e9/L Final     Hemoglobin   Date Value Ref Range Status   04/10/2018 10.4 (L) 11.7 - 15.7 g/dL Final     Platelet Count   Date Value Ref Range Status   04/10/2018 285 150 - 450 10e9/L Final     Lab Results   Component Value Date     04/10/2018    BUN 7 04/10/2018    CO2 32 04/10/2018[RL1.1]     CRP  4/5 4/10  100 80  ESR  4/4 4/10  62 64[RL1.2]       Pertinent  Recent Microbiology Data:[RL1.1]   Blood cultures  4/10 x 2 NGTD  4/4 x 2 NG  4/2 X 1 NG    Wound cultures  4/10 swab pending    Bone culture  2/5 1+ PMNs on gram stain, aerobic and anaerobic cultures NG  2/1 Wound swab   Culture 4+ MRSA Coagulase Positive Staph (A)   Comment: Methicillin resistant Staph aureus, infection status already documented.      Culture 4+ Diphtheroids (A)     Culture 4+ Proteus mirabilis (A)     Culture 1+ ESBL Producing Klebsiella pneumoniae (A)     Culture 1+ ESBL producing Escherichia coli (A)     Gram Stain Result 2+ Polymorphonuclear leukocytes     Gram Stain Result 1+ Gram negative bacilli     Gram Stain Result 1+ Gram positive cocci in pairs     Specimen   Swab - Other     Organism Antibiotic Method Susceptibility   MRSA Coagulase Positive Staph Clindamycin SULLY <=0.5: Sensitive    Cefazolin SULLY >16: Resistant    Doxycycline SULLY <=0.5: Sensitive    Daptomycin SULLY <=1: Sensitive    Linezolid SULLY 2: Sensitive    Oxacillin SULLY >2: Resistant    Trimethoprim + Sulfamethoxazole SULLY <=1/19: Sensitive    Vancomycin SULLY 1: Sensitive   Proteus mirabilis Amoxicillin + Clavulanate SULLY <=4/2: Sensitive    Ampicillin SULLY >16: Resistant    Cefazolin SULLY 8: Sensitive    Cefepime SULLY <=1: Sensitive    Ceftriaxone SULLY <=1: Sensitive    Ciprofloxacin SULLY >2: Resistant    Gentamicin SULLY <=2: Sensitive    Levofloxacin SULLY >4: Resistant    Piperacillin + Tazobactam SULLY <=2/4: Sensitive    Tetracycline SULLY >8: Resistant    Tobramycin SULLY <=2: Sensitive    Trimethoprim + Sulfamethoxazole SULLY >2/38: Resistant   ESBL Producing Klebsiella pneumoniae Amoxicillin + Clavulanate SULLY 16/8: Resistant    Ampicillin SULLY >16: Resistant    Cefazolin SULLY >16: Resistant    Cefepime SULLY >16: Resistant    Ceftriaxone SULLY >32: Resistant    Ciprofloxacin SULLY >2: Resistant    Gentamicin SULLY <=2: Sensitive    Levofloxacin SULLY <=1: Sensitive    Meropenem SULLY <=0.25: Sensitive    Piperacillin + Tazobactam SULLY 16/4: Sensitive     Tetracycline SULLY >8: Resistant    Tobramycin SULLY 8: Intermediate    Trimethoprim + Sulfamethoxazole SULLY <=0.5: Sensitive   ESBL producing Escherichia coli Amoxicillin + Clavulanate SULLY 8/4: Resistant    Ampicillin SULLY >16: Resistant    Cefazolin SULLY >16: Resistant    Cefepime SULLY 4: Resistant    Ceftriaxone SULLY >32: Resistant    Ciprofloxacin SULLY >2: Resistant    Gentamicin SULLY <=2: Sensitive    Levofloxacin SULLY >4: Resistant    Meropenem SULLY <=0.25: Sensitive    Piperacillin + Tazobactam SULLY 8/4: Sensitive    Tetracycline SULLY >8: Resistant    Tobramycin SULLY <=2: Sensitive    Trimethoprim + Sulfamethoxazole SULLY >2/38: Resistant[RL1.2]              Imaging:[RL1.1]   4/10/18 CXR:  AP and lateral views of the chest semi-upright. Cardiac  silhouette is within normal limits. The pulmonary vasculature is  diffusely prominent. There is no focal airspace opacity, pleural  effusion, or pneumothorax. There is prominence of the major an minor  fissures on the right and the left midlung Ledesma on the left. These  changes could represent chronic scarring versus fluid. The visualized  upper abdomen, osseous structures, and soft tissues are unremarkable.    4/4/18 XR L calcaneus:  The bones are severely osteopenic which limits evaluation.  No definite sclerosis is seen of the calcaneus, although given the  marked osteopenia plain film evaluation is severely limited for  possible osteomyelitis.    4/4/18 CT AP:  IMPRESSION:  1. Ulcer extending down to the right ischial tuberosity with erosive  changes suspicious for osteomyelitis.  2. Nonunited left subtrochanteric fracture.  3. Bilateral nephrostomy tubes in place. No evidence of hydronephrosis  or urinary tract stones.  3. Suprapubic catheter in place in the bladder.   [RL1.2]       Revision History        User Key Date/Time User Provider Type Action    > RL1.3 4/11/2018  4:33 PM Nay Yuen MD Fellow Sign     RL1.5 4/11/2018 12:18 PM Nay Yuen MD Fellow   "    RL1.4 2018 12:12 PM Nay Yuen MD Fellow      RL1.7 2018 11:53 AM Nay Yuen MD Fellow      RL1.6 2018 11:52 AM Nay Yuen MD Fellow      RL1.8 2018 11:24 AM Nay Yuen MD Fellow      RL1.2 2018 10:48 AM Nay Yuen MD Fellow      RL1.1 2018  8:15 AM Nay Yuen MD Fellow             Consults by Patria Jaimes MSW at 2018  3:43 PM     Author:  Patria Jaimes MSW Service:  Social Work Author Type:      Filed:  2018  4:09 PM Date of Service:  2018  3:43 PM Creation Time:  2018  3:43 PM    Status:  Signed :  Patria Jaimes MSW ()         Social Work: Assessment with Discharge Plan    Patient Name:  Marychuy Zhou  :  1955  Age:  62 year old  MRN:  [id number]  Risk/Complexity Score:  Filed Complexity Screen Score: 8  Completed assessment with:  Pt, [facility] [phone] F: [phone], Dr Pelletier, Dr Cadet and Infectious Disease    Presenting Information   Reason for Referral:  Discharge plan  Date of Intake:  2018  Referral Source:  Physician  Decision Maker:  pt  Alternate Decision Maker:  Not identified    Living Situation:  Long Term Care:  [facility]. Pt states \"I am going there for 3 more weeks:\" But Chart Review shows that pt is most likely long term resident  Previous Functional Status:  Assistance with Other:  pt is long term resident with bed hold at [facility]  Patient and family understanding of hospitalization:  Pt came in seeking care for possible osteomyelitis and infection of decubitus ulcer. Pt states she was told to come here for Dr Pelletier  Cultural/Language/Spiritual Considerations:  Pt parapalegic, possible personality disorder  Adjustment to Illness:  Non compliant with care    Physical Health  Reason for Admission:[MK1.1]    1. Wound of right buttock, initial encounter    2. " Decubitus ulcer of sacral region, stage 4 (H)    3. Cellulitis of buttock, right[MK1.2]      Services Needed/Recommended:  LTC - Return to [facility] in [place]    Mental Health/Chemical Dependency  Diagnosis:  Possible PD  Support/Services in Place:  LTC community support,  support  Services Needed/Recommended:  Return to [facility]    Support System  Significant relationship at present time:  Not evident, per chart review ,pt states she has a friend she can live with  Family of origin is available for support:  Not evident  Other support available:  Support through [facility] Care staff  Gaps in support system:  None noted  Patient is caregiver to:  None     Provider Information   Primary Care Physician:  Mellisa Haji   [phone]   Clinic:  [address]  / [facility] *      :      Financial   Income Source:  disability  Financial Concerns:  None noted  Insurance:    Payor/Plan Subscriber Name Rel Member # Group #   MEDICARE - MEDICARE FRANCINETOPHERSHALINI CHOW  [id number]       ATTN CLAIMS, [address]   [facility] FRANCINETOPHERZACHERY CHOWHY  [id number]      [address]       Discharge Plan   Patient and family discharge goal:  Pt is agreeable to return to [facility]  Provided education on discharge plan:  YES  Patient agreeable to discharge plan:  YES  A list of Medicare Certified Facilities was provided to the patient and/or family to encourage patient choice. Patient's choices for facility are:  Pt resides in LTC at [facility]  Will NH provide Skilled rehabilitation or complex medical:  YES  General information regarding anticipated insurance coverage and possible out of pocket cost was discussed. Patient and patient's family are aware patient may incur the cost of transportation to the facility, pending insurance payment: YES  Barriers to discharge:  Assessment by medical team    Discharge Recommendations   Anticipated Disposition:  Facility:  Return  "to [facility]  Transportation Needs:  Other:  [facility] [phone]      Additional comments   Writer attempted several times through out day to meet w/pt and introduce role. Writer finally did meet w/pt, she said \"I am sleeping and you are rude to just come in here.\"  Writer did explain role, multiple attempts to see her and provided quick review of DC plan. Pt stated \"Yep, I return to [facility] for 3 weeks.\"    Per Dr Pelletier and medical team, pt is poor surgical candidate based upon her behaviors and refusal for wound cares as pt would need to demonstrate acceptance of wound cares and adherence to care plan to promote positive outcomes.  She is aware of consult and plastics will meet w/pt and evaluate.     SW confirmed there is bed hold at [facility] via admissions, updated information sent via [employer] to ensure facility will con't to be able to meet pt's needs. SW con't to follow[MK1.1]         Revision History        User Key Date/Time User Provider Type Action    > MK1.2 4/11/2018  4:09 PM Patria Jaimes MSW  Sign     MK1.1 4/11/2018  3:43 PM Patria Jaimes MSW              Consults by Malachi Storey PA-C at 4/11/2018  9:01 AM     Author:  Malachi Storey PA-C Service:  Radiology Author Type:  Physician Assistant - KEYON    Filed:  4/11/2018  9:01 AM Date of Service:  4/11/2018  9:01 AM Creation Time:  4/11/2018  8:46 AM    Status:  Signed :  Malachi Storey PA-C (Physician Assistant - C)     Consult Orders:    1. Interventional Radiology Adult/Peds IP Consult: Patient to be seen: URGENT within 4 hours; Call back #: hospitalist; dislodgement of nephrostomy tube, right [[id number]] ordered by Toan Kingston MD at 04/11/18 0657                Patient Marychuy Zhou is a 62 year old female who presents to the hospital for management of her decubitus ulcerations. Patient has generally been refusing all patient care/hygiene. Last night when turning " "the patient her right percutaneous nephrostomy tube was found to be pulled out - it was unclear how long this has been out. Additionally, the patient's bandages had fallen off and the area was covered in stool.     I visited the patient this morning (4/11/2018) to evaluate if the existing tract could be re-navigated. Unfortunately, the percutaneous skin site is closed and I do not believe that the tract could be re-navigated. Additionally, the area showed evidence of dried stool and attempts to place a line though the previous tract would be at high risk for infection.     If the patients clinical picture continues to warrant a percutaneous nephrostomy tube please re-consult IR.     Case discussed with Dr. Cadet and Dr. Bolanos.    Malachi Storey PA-C  Interventional Radiology  Phone: [phone]   Pager: [phone][KO1.1]       Revision History        User Key Date/Time User Provider Type Action    > KO1.1 4/11/2018  9:01 AM Malachi Storey PA-C Physician Assistant - C Sign                     Progress Notes - Physician (Notes from 05/05/18 through 05/08/18)      Progress Notes by Shahriar Cadet MD at 5/8/2018 10:04 AM     Author:  Shahriar Cadet MD Service:  Hospitalist Author Type:  Physician    Filed:  5/8/2018 10:14 AM Date of Service:  5/8/2018 10:04 AM Creation Time:  5/8/2018 10:04 AM    Status:  Signed :  Shahriar Cadet MD (Physician)         Pt was seen, case reviewed with team    Pt is fatigued this am, states she had a \"rough night\" with some abd discomfort as TF rate was increased to 75 cc/hr  + occc loose stools    Pt denies cough, chest pain or SOB  Afebrile VSS  Sleepy, in NAD, pleasant, fully oriented, is aware and agreeable with plans to d/c to [facility] today    Lungs clear  CV rrr  Abd soft, non-tender  Coccyx abd sacral decubitus ulcers were not examined      Assessment      T4 paraplegia     Chronic multiple decubitus ulcers involving sacral area, L heel, " with chronic osteo R sacral. On no antibiotics at this time     COPD, stable     Extensive dermatitis over back, secondary to stool and urine irritation.      Hypoalbuminemia, secondary to poor nutrition and wounds, now on TF     Diarrhea, abd discomfort secondary to TF, appears mild     Hypothyroidism, on replacement.     Depression, prob personality d/o, with refusal to allow cares, meds.      Plan  D/c to TCU   Orders completed  Accepting provider was contacted[DS1.1]         Revision History        User Key Date/Time User Provider Type Action    > DS1.1 5/8/2018 10:14 AM Shahriar Cadet MD Physician Sign            Progress Notes by Patria Jaimes MSW at 5/7/2018  4:14 PM     Author:  Patria Jaimes MSW Service:  Social Work Author Type:      Filed:  5/7/2018  4:39 PM Date of Service:  5/7/2018  4:14 PM Creation Time:  5/7/2018  4:14 PM    Status:  Addendum :  Patria Jaimes MSW ()         Social Work Services Discharge Note      Patient Name:  Marychuy Zhou     Anticipated Discharge Date: 5/8/18    Discharge Disposition:   TCU:  [facility]     Following MD:  Dr Georgina Jennings and NP Sonia Marie      Pre-Admission Screening (PAS) online form has been completed.  The Level of Care (LOC) is:  Determined  Confirmation Code is:  [id number]  Patient/caregiver informed of referral to [facility] for Pre-Admission Screening for skilled nursing facility (SNF) placement and to expect a phone call post discharge from SNF.     Additional Services/Equipment Arranged:  Stretcher ride via [employer]  at 3pm     Patient / Family response to discharge plan:  agreeable     Persons notified of above discharge plan:  Pt, Dr Cadet, Admissions at [facility], pt's [facility]  Yudith    Staff Discharge Instructions:  Please fax discharge orders and signed hard scripts for any controlled substances.  Please print a packet  "and send with patient.     CTS Handoff completed:  YES    Medicare Notice of Rights provided to the patient/family:  YES[MK1.1]      Addendum:  Pt requested and writer complied with request to have her Electric WC plugged in at [facility]. Pt states w/o having a full charge, her WC is not easily moved. Writer called [facility] and spoke w/Puma, who verbalized understanding and agreement to plug in pt's WC.  Pt updated.[MK1.2]     Revision History        User Key Date/Time User Provider Type Action    > MK1.2 5/7/2018  4:39 PM Patria Jaimes MSW  Addend     MK1.1 5/7/2018  4:16 PM Patria Jaimes MSW  Sign            Progress Notes by Patria Jaimes MSW at 5/7/2018 11:45 AM     Author:  Patria Jaimes MSW Service:  Social Work Author Type:      Filed:  5/7/2018  3:56 PM Date of Service:  5/7/2018 11:45 AM Creation Time:  5/7/2018 11:45 AM    Status:  Addendum :  Patria Jaimes MSW ()         Social Work Services Progress Note[MK1.1]    Hospital Day: 28[MK1.2]    Collaborated with:  Yudith FISHER [facility] Care Coordinator    Data:  DC plan    Intervention:  Per [facility] RN, pt has been in communication with Cristina Torres (friend she wants to live with) and has told Cristina Torres that she \"won't have to do anything\" for pt in terms of care.  Per Yudith, pt was supposed to have CADI assessment through [facility] this past fall, 2017. Pt declined assessment at this time. Yudith has made another referral to [facility] for CADI Assessment.    Because of this, writer as made additional referrals to [facility]    Assessment:  pt remains difficult placement due to behaviors    Plan:    Anticipated Disposition:  home vs. SNF    Barriers to d/c plan:  CADI Assessment, finding placement    Follow Up:  SW cont' to follow[MK1.1]      Addenum: 12:48  [facility] is assessing " "[phone][MK1.3]    1406: Writer received voicemail stating pt has been declined from [facility]. [facility] may be able to accept pt but they need to verify days.[MK1.4]    1555: Pt has been accepted to [facility]. Per Dr Cadet and admission at [facility], because it is late in day, plan for Admission on Tuesday. Pt is agreeable to this plan, and happy to know she has placement.[MK1.5]       Revision History        User Key Date/Time User Provider Type Action    > MK1.5 5/7/2018  3:56 PM Patria Jaimes, MSW  Addend     MK1.4 5/7/2018  2:08 PM Patria Jaimes, CYNTHIA  Addend     MK1.3 5/7/2018 12:48 PM Patria Jaimes, MSW  Addend     MK1.2 5/7/2018 11:52 AM Patria Jaimes, CYNTHIA  Sign     MK1.1 5/7/2018 11:45 AM Patria Jaimes, MSW              Progress Notes by Vianey Art RD at 5/7/2018  2:25 PM     Author:  Vianey Art RD Service:  Nutrition Author Type:  Registered Dietitian    Filed:  5/7/2018  2:29 PM Date of Service:  5/7/2018  2:25 PM Creation Time:  5/7/2018  2:25 PM    Status:  Signed :  Vianey Atr RD (Registered Dietitian)         CLINICAL NUTRITION SERVICES BRIEF NOTE    Met with patient briefly today to discuss change in formula as patient has been refusing increases in TF to goal d/t complaints of nausea or vomiting or increased diarrhea.     Discussed potential switch to higher calorie formula to allow for lower volumes and continue to meet nutrition needs for wound healing. Patient at first states \"I need to discuss this with the doctor\", however after further explanation from writer pt was agreeable.     Intervention  Enteral Nutrition - ordered new formula and rate as follows:   TwoCal HN via G-tube of 75 mL/hr x 12 hours 8 pm - 8am + 1 packet Prosource QID to provide 1960 kcal (34 kcal/kg) and 120 g protein (2.1 g/kg) to meet 100% of nutrition " needs.   Continue with free water flushes of 120 mL q 4 hours.     Vianey Art RD, LD  Unit Pager: [phone][CO1.1]     Revision History        User Key Date/Time User Provider Type Action    > CO1.1 5/7/2018  2:29 PM Vianey Art RD Registered Dietitian Sign            Progress Notes by Nena Webber RN at 5/7/2018 11:26 AM     Author:  Nena Webber RN Service:  Care Coordinator Author Type:  Care Coordinator    Filed:  5/7/2018  2:14 PM Date of Service:  5/7/2018 11:26 AM Creation Time:  5/7/2018 11:26 AM    Status:  Signed :  Nena Webber RN (Care Coordinator)           Care Coordinator - Discharge Planning    Admission Date/Time:  4/10/2018  Attending MD:  Toan Kingston MD     Data  Date of initial CC assessment:   5/3/2018  Chart reviewed, discussed with interdisciplinary team.   Patient was admitted for:   1. Wound of right buttock, initial encounter    2. Decubitus ulcer of sacral region, stage 4 (H)    3. Cellulitis of buttock, right         Assessment   Full assessment completed in previous note    Coordination of Care and Referrals: This writer received a call from SARAH Zurita with [facility]. Yudith states patient has fired Harpreet, previous [facility]. Yudith will be taking over for Harpreet. September of 2017 a referral was made to [facility] for a Karen Waiver. Patient declined [facility] services at that time stating she would lose her PCA. Currently, Yudith has submitted a referral to [facility] for a Karen Waiver. Yudith will contact [facility] regarding patient's belongings that are still at the TCU.[HB1.1]  SW looking into potential placement at [facility]. RNCC will continue to follow as needed.    Yudith Ribera RNCC, [facility]  Phone [phone][HB1.2]      Plan  Anticipated Discharge Date:[HB1.1]  TBD[HB1.2]  Anticipated Discharge Plan:[HB1.1]  TBD[HB1.2]    CTS Handoff completed:[HB1.1]  NO    Nena Webber RN, BSN  Care  "Coordinator, 8A  Phone [phone]  Pager [phone][HB1.2]         Revision History        User Key Date/Time User Provider Type Action    > HB1.2 5/7/2018  2:14 PM Nena Webber RN Care Coordinator Sign     HB1.1 5/7/2018 11:26 AM Nena Webber RN Care Coordinator             Progress Notes by Cristina Sandhu RN at 5/7/2018  1:53 PM     Author:  Cristina Sandhu RN Service:  Orthopedics Author Type:  Registered Nurse    Filed:  5/7/2018  2:01 PM Date of Service:  5/7/2018  1:53 PM Creation Time:  5/7/2018  1:53 PM    Status:  Signed :  Cristina Sandhu RN (Registered Nurse)           VS: Refused to allow staff to obtain readings!   O2: Refused to allow staff to obtain readings.   Output: Supra pubic cath continues to leak. Some urine is being collected in drainage bag.   Last BM: 5/7/2018   Activity: Allowed us to turn her x 1.    Skin: Has an increase in redness under right armpit, inbetween groin folds, has a lot of new scratches(dry patches on left side) and has some red patches on lower legs that are dry and reddened.    Pain: Requesting Oxycodone every 4 hours 10 mg.    CMS: Has an increase in swelling in both lower feet.    Dressing: Dressing on bottom changed x 1 after stool inct   Diet: Has not had anything to eat for breakfast or lunch. Drinks a lot of apple juice and gingerale.    LDA:    Equipment:    Plan: Trying a new topical to dry patches if patient allows.    Additional Info: Pt continues to be very difficult with planing cares. Very abusive and noncompliant with any staff that enters into room. Pt wanting to be \"left alone\" all the time. Dr Cadet aware of patient behavior. Status of patient will continue to be monitored.[MC1.1]           Revision History        User Key Date/Time User Provider Type Action    > MC1.1 5/7/2018  2:01 PM Cristina Sandhu RN Registered Nurse Sign            Progress Notes by Shahriar Cadet MD at 5/7/2018 11:33 AM     Author:  Shahriar Cadet MD " "Service:  Hospitalist Author Type:  Physician    Filed:  5/7/2018 11:47 AM Date of Service:  5/7/2018 11:33 AM Creation Time:  5/7/2018 11:33 AM    Status:  Signed :  Shahriar Cadet MD (Physician)         Pt seen, case reviewed with team      Pt continues to intermittently refuse cares, tx  Permitted TF (50 cc/hr) for much but not all of the night--had requested holding of TF for a while, secondary to nausea, abd fullness (she denies this to me today--states TF are going fine\"  + occ loose stools    Pt maintains she has sufficient help at home, with home services and friend, to live at home.  I have suggested to her that we need to review cares with home services and friend.    VSS  Alert, irritable, in NAD  Lungs clear  CV rrr  Abd soft  + extensive folliculitis over shoulders and legs  Wounds over buttocks were not examined    Results for SHALINI CHONG (MRN [id number]) as of 5/7/2018 11:32   Ref. Range 5/7/2018 06:17   Sodium Latest Ref Range: 133 - 144 mmol/L 141   Potassium Latest Ref Range: 3.4 - 5.3 mmol/L 4.5   Chloride Latest Ref Range: 94 - 109 mmol/L 104   Carbon Dioxide Latest Ref Range: 20 - 32 mmol/L 27   Urea Nitrogen Latest Ref Range: 7 - 30 mg/dL 12   Creatinine Latest Ref Range: 0.52 - 1.04 mg/dL 0.50 (L)   GFR Estimate Latest Ref Range: >60 mL/min/1.7m2 >90   GFR Estimate If Black Latest Ref Range: >60 mL/min/1.7m2 >90   Calcium Latest Ref Range: 8.5 - 10.1 mg/dL 8.4 (L)   Anion Gap Latest Ref Range: 3 - 14 mmol/L 10   Magnesium Latest Ref Range: 1.6 - 2.3 mg/dL 2.2   Phosphorus Latest Ref Range: 2.5 - 4.5 mg/dL 4.7 (H)   Albumin Latest Ref Range: 3.4 - 5.0 g/dL 1.9 (L)   Prealbumin Latest Ref Range: 15 - 45 mg/dL 12 (L)   Protein Total Latest Ref Range: 6.8 - 8.8 g/dL 7.9   Bilirubin Total Latest Ref Range: 0.2 - 1.3 mg/dL 0.2   Alkaline Phosphatase Latest Ref Range: 40 - 150 U/L 228 (H)   ALT Latest Ref Range: 0 - 50 U/L 26   AST Latest Ref Range: 0 - 45 U/L 20   Glucose Latest Ref " Range: 70 - 99 mg/dL 97   INR Latest Ref Range: 0.86 - 1.14  1.50 (H)       Abd flat plate 5/518 unremarkable        Assessment    T4 paraplegia    Chronic multiple decubitus ulcers involving sacral area, L heel, with chronic osteo R sacral. On no antibiotics at this time    Chronic back and shoulder pain, stable    COPD, stable    Extensive dermatitis over back, secondary to stool and urine irritation.  Folliculitis type rash over shoulders and legs    Hypoalbuminemia, secondary to poor nutrition and wounds, now on TF    Diarrhea, abd discomfort secondary to TF    Hypothyroidism, on replacement.    Depression, prob personality d/o, with refusal to allow cares, meds. Pt's desire to d/c to home not realistic without extensive help    Plan  TMC cream to rash  Increased scheduled imodium   Continue noct TF, monitor oral intake  Consider advancement of GT to JT (Pt does not desire this)   is attempting to clarify Pt's resources for home[DS1.1]       Revision History        User Key Date/Time User Provider Type Action    > DS1.1 5/7/2018 11:47 AM Shahriar Cadet MD Physician Sign            Progress Notes by Prisca Harrison RN at 5/7/2018  4:08 AM     Author:  Prisca Harrison RN Service:  RH Nursing Author Type:  Registered Nurse    Filed:  5/7/2018  4:09 AM Date of Service:  5/7/2018  4:08 AM Creation Time:  5/7/2018  4:08 AM    Status:  Signed :  Prisca Harrison RN (Registered Nurse)         Patient allowed restart of tubefeeding at 0400. Patient continues to refuse all other cares and assessments.[TB1.1]       Revision History        User Key Date/Time User Provider Type Action    > TB1.1 5/7/2018  4:09 AM Prisca Harrison RN Registered Nurse Sign            Progress Notes by Maria T Sanchez RD at 5/6/2018  2:16 PM     Author:  Maria T Sanchez RD Service:  Nutrition Author Type:  Registered Dietitian    Filed:  5/6/2018  2:19 PM Date of Service:  5/6/2018  2:16 PM  "Creation Time:  5/6/2018  2:16 PM    Status:  Signed :  Maria T Sanchez RD (Registered Dietitian)         Nutrition Services Brief Note    Per discussion with RN, pt is requesting the nutrisource fiber to be discontinued as she feels it is exacerbating her loose stools/ abdominal discomfort. Tube feedings were stopped at 6am this morning per pt request as she felt nauseous    Interventions  1. Nutrisource fiber discontinued.  2. Order to keep TF at 50 mL/hr.    RD will continue to follow per plan of care.[KV1.1]      Maria T Sanchez[KV1.2]CELI[KV1.1]     Revision History        User Key Date/Time User Provider Type Action    > KV1.2 5/6/2018  2:19 PM Maria T Sanchez RD Registered Dietitian Sign     KV1.1 5/6/2018  2:16 PM Maria T Sanchez RD Registered Dietitian             Progress Notes by Ravi Lawrence MD at 5/6/2018  1:46 PM     Author:  Ravi Lawrence MD Service:  Hospitalist Author Type:  Physician    Filed:  5/6/2018  2:01 PM Date of Service:  5/6/2018  1:46 PM Creation Time:  5/6/2018  1:46 PM    Status:  Signed :  Ravi Lawrence MD (Physician)         [facility] Internal Medicine Progress Note            Interval History:   Record reviewed.  Discussed with RN.[WR1.1]  Remains \"difficult\" with staff.[WR1.2]  TF's reduced to 50 cc/hr last night.  Nausea approximately 3AM.  No emesis.[WR1.1]  Large loose BM this AM.  Only minimal abdominal discomfort.[WR1.2]    No  reflux.   SP catheter - wet again this AM.  No CP, SOB, congestion.   Decrease pruritis.  On scheduled benadryl.[WR1.1]  On[WR1.2]  oxycodone in place of dilaudid[WR1.1].  Oxycontin dc'ed.  Scheduled loperimide 4 mg TID.   Plan remains[WR1.2] to staying with a friend who potentially can provide PCA services. Arrangements still not in place.           Medications:   All medications reviewed today          Physical Exam:   Blood pressure 93/55, pulse 64, temperature 97.6  F (36.4 " " C), temperature source Oral, resp. rate 14, height 1.588 m (5' 2.5\"), weight 60.3 kg (133 lb), SpO2 92 %.    Intake/Output Summary (Last 24 hours) at 04/30/18 0853  Last data filed at 04/30/18 0828   Gross per 24 hour   Intake              630 ml   Output             1100 ml   Net             -470 ml          General:  Alert.  Appropriate.  No distress.  No O2.      Heent:      Neck:    Skin:  Buttock area examined with WOC RN 5/4 - significant reduction in degree of erythema.     Chest:  Clear - no rales, rhonchi.     Cardiac:  Reg without gallop, murmur.  No JVD.     Abdomen:  Non distended, soft, subjective mild katia umbilical tenderness.  No guarding.  BS normal.     Extremities:  Perfused.  No edema, calf, thigh induration.     Neuro:            Data:     No results found for this or any previous visit (from the past 24 hour(s)).    CBC RESULTS:   Recent Labs   Lab Test  04/28/18   0934  04/20/18   0940   WBC   --   6.9   RBC   --   3.37*   HGB  8.4*  8.1*   HCT   --   28.2*   MCV   --   84   MCH   --   24.0*   MCHC   --   28.7*   RDW   --   19.5*   PLT   --   300     BMP 5/4 OK.            Assessment and Plan:   1)  T4 paraplegia 2nd to hematoma.  2)  Febrile illness with chronic  multiple stage IV decubital ulcers, L heel wound and chronic R sacral osteomyelitis.  On zosyn and doxycycline.  Wound cultures from buttocks E coli ESBL, proteus, pseudomonas ( R to zosyn).   Impression of chronic osteomyelitis with element of cellulitis/dermatitis.  Abscess or deep acute infection not felt likely per plastic surgery.  10 day ABs  with completion of zosyn 4/20.  Off doxycycline 4/23.   2nd severe dermatitis due to constant moisture from urine leakage.  On diflucan. Off load as pt allows.  Improved.   3)  Chronic pain with generalized discomfort.[WR1.1] Adequate on oxycodone prn off oxycontin.[WR1.2]   4)  COPD with retained upper airway secretions 2nd to weak cough.[WR1.1]  Resolved.  No suggestion of aspiration " despite recent emesis.[WR1.2]   5)  Anemia likely 2nd to CD and iron deficiency per record.  Adequate.    6)  Left comminuted acute subtrochanteric fracture with recent fall.  7)  Hx of DVT on Rivaroxaban. Chest c/o's intermittent largely MSK.   8)  Seizure disorder. On keppra[WR1.1].[WR1.2]  9)  Diastolic CHF.  Compensated.   10)  Depression, anxiety, PTSD.  Off Cymbalta, gabapentin, lamictal as not taking regularly per Dr. Cadet.  Increase prn valium dosing.   11)  Hypothyroidism with TSH 49.56 likely related to med non compliance. Inconsistently taking synthroid. Receiving more regularly.   12)  Hypoalbuminemia.  Protein loss from wounds and poor nutrition.  Reviewed with nutrition and CR surgery - as unlikely to improve significantly with pt's inconsistent po intake.   PICC placed with TPN 4/22 followed by IR G-tube placement 4/27 with TF's.[WR1.1] TF related nausea likely related to volume and potentially intolerance to fiber component per pt.  May benefit from transition to G-J tube.[WR1.2]     12)  Closed fracture zygoma.  13)  Consideration for diverting colostomy.  Per urology will review with CR surgery to consider combined procedure with urinary diversion to keep katia area dry (if continues to be an issue).   14)  S/p B PNT 2 months ago, for the intent of urinary diversion (since removed).  Has SP catheter with chronic urethral leakage.   Candida on UC (prob colonization)  15)  HA discomfort - issue since craniotomy for SDH. Neg CT for acute change.   1[WR1.1]6[WR1.2])[WR1.1]  C[WR1.2]hronic diarrhea[WR1.1] aggravated by TF's on scheduled loperamide as above.[WR1.2]   1[WR1.1]7[WR1.2])  Pruritis potentially related to xerosis.   No rash.  Improved[WR1.1] on eucerin and benadryl.[WR1.2].              PLAN:  1)   Review[WR1.1] TF's[WR1.2] with nutrition[WR1.1] (adjustment of protein, fiber)[WR1.2] -[WR1.1] continue rate[WR1.2] 50 cc/hr tonight[WR1.1].  If nausea persists consider chenge to G-J tube.[WR1.2]   "  2)[WR1.1]   Same meds - oxycodone prn (off oxycontin), scheduled loperamide.  3)[WR1.2]   SW/CC working on dispo. To friend's with services ongoing plan.  Otherwise same[WR1.1] other[WR1.2] meds, orders.  Monitor clinically.   Disposition Plan   Expected discharge to friend's home once arrangements made, hopefully next week. .      Entered: Ravi Lawrence 05/06/2018, 1:46 PM              Attestation:  I have reviewed today's vital signs, notes, medications, labs and imaging.     Ravi Lawrence MD[WR1.1]             Revision History        User Key Date/Time User Provider Type Action    > WR1.2 5/6/2018  2:01 PM Ravi Lawrence MD Physician Sign     WR1.1 5/6/2018  1:46 PM Ravi Lawrence MD Physician             Progress Notes by Beverley Mujica RN at 5/5/2018  3:50 PM     Author:  Beverley Mujica RN Service:  (none) Author Type:  Registered Nurse    Filed:  5/5/2018  3:51 PM Date of Service:  5/5/2018  3:50 PM Creation Time:  5/5/2018  3:50 PM    Status:  Signed :  Beverley Mujica RN (Registered Nurse)         Pt assessed at start of shift.  Pt refused all initial assessments. Pt was cordial with RN, but vulgar with NST when asked about repositioning.  Pt stated, \"I am comfy at this time.  I just want to sleep.  I will call you when I want to be repositioned\".[SH1.1]        Revision History        User Key Date/Time User Provider Type Action    > SH1.1 5/5/2018  3:51 PM Beverley Mujica, RN Registered Nurse Sign            Progress Notes by Ravi Lawrence MD at 5/5/2018  1:05 PM     Author:  Ravi Lawrence MD Service:  Hospitalist Author Type:  Physician    Filed:  5/5/2018  1:48 PM Date of Service:  5/5/2018  1:05 PM Creation Time:  5/5/2018  1:05 PM    Status:  Addendum :  Ravi Lawrence MD (Physician)         [facility] Internal Medicine Progress Note            Interval History:   Record reviewed.  Seen with RN.  Indicates \"really sick\" over " "night.  Onset nausea and emesis during night of TF's.  Not documented by staff but noted on pillow.  Decrease nausea when seen.  Declined zofran.  Large diarrheal stool this AM \"down to her ankles\".  TF's increased to 100 cc's last PM to meet nutrition requirement.    Mild periumbilical abdominal discomfort earlier.  No  reflux.   SP catheter - wet again this AM.  No CP, SOB, congestion.   Decrease pruritis.  On scheduled benadryl.  Wants oxycodone in place of dilaudid and increase imodium.   Remains open to staying with a friend who potentially can provide PCA services. Arrangements still not in place.           Medications:   All medications reviewed today          Physical Exam:   Blood pressure 93/55, pulse 64, temperature 97.6  F (36.4  C), temperature source Oral, resp. rate 14, height 1.588 m (5' 2.5\"), weight 60.3 kg (133 lb), SpO2 92 %.    Intake/Output Summary (Last 24 hours) at 04/30/18 0853  Last data filed at 04/30/18 0828   Gross per 24 hour   Intake              630 ml   Output             1100 ml   Net             -470 ml          General:  Alert.  Appropriate.  No distress.  No O2.  Odor of TF's.     Heent:      Neck:    Skin:  Buttock area examined with WOC RN 5/4 - significant reduction in degree of erythema.     Chest:  Clear - no rales, rhonchi.     Cardiac:  Reg without gallop, murmur.  No JVD.     Abdomen:  Non distended, soft, subjective mild katia umbilical tenderness.  No guarding.  BS normal.     Extremities:  Perfused.  No edema, calf, thigh induration.     Neuro:            Data:     No results found for this or any previous visit (from the past 24 hour(s)).    CBC RESULTS:   Recent Labs   Lab Test  04/28/18   0934  04/20/18   0940   WBC   --   6.9   RBC   --   3.37*   HGB  8.4*  8.1*   HCT   --   28.2*   MCV   --   84   MCH   --   24.0*   MCHC   --   28.7*   RDW   --   19.5*   PLT   --   300     BMP 5/4 OK.            Assessment and Plan:   1)  T4 paraplegia 2nd to hematoma.  2)  Febrile " illness with chronic  multiple stage IV decubital ulcers, L heel wound and chronic R sacral osteomyelitis.  On zosyn and doxycycline.  Wound cultures from buttocks E coli ESBL, proteus, pseudomonas ( R to zosyn).   Impression of chronic osteomyelitis with element of cellulitis/dermatitis.  Abscess or deep acute infection not felt likely per plastic surgery.  10 day ABs  with completion of zosyn 4/20.  Off doxycycline 4/23.   2nd severe dermatitis due to constant moisture from urine leakage.  On diflucan. Off load as pt allows.  Improved.   3)  Chronic pain with generalized discomfort. Thickened bladder wall may account for lower abdominal discomfort.  Candida on UC.   Neg C. Diff.   4)  COPD with retained upper airway secretions 2nd to weak cough.  Interval clearing (remains clear despite emesis)    Maintaining oxygenation.  Ground glass and nodular opacities RUL chest CT, unclear significance.   Infection not responding to treatment unlikley with normal temp, no hypoxemia.  Reviewed with pulmonary and ID.  No new AB recommendations.    5)  Anemia likely 2nd to CD and iron deficiency per record.  Adequate.    6)  Left comminuted acute subtrochanteric fracture with recent fall.  7)  Hx of DVT on Rivaroxaban. Chest c/o's intermittent largely MSK.   8)  Seizure disorder. On keppra taper and lamotrigine titration.    9)  Diastolic CHF.  Compensated.   10)  Depression, anxiety, PTSD.  Off Cymbalta, gabapentin, lamictal as not taking regularly per Dr. Cadet.  Increase prn valium dosing.   11)  Hypothyroidism with TSH 49.56 likely related to med non compliance. Inconsistently taking synthroid. Receiving more regularly.   12)  Hypoalbuminemia.  Protein loss from wounds and poor nutrition.  Reviewed with nutrition and CR surgery - as unlikely to improve significantly with pt's inconsistent po intake.   PICC placed with TPN 4/22 followed by IR G-tube placement 4/27 with TF's. N/V during night as well as diarrhea this AM  "likely related to inability to tolerate 100/hr TF rate.    12)  Closed fracture zygoma.  13)  Consideration for diverting colostomy.  Per urology will review with CR surgery to consider combined procedure with urinary diversion to keep katia area dry (if continues to be an issue).   14)  S/p B PNT 2 months ago, for the intent of urinary diversion (since removed).  Has SP catheter with chronic urethral leakage.   Candida on UC (prob colonization)  15)  Nausea basis unclear - potential relation to meds, GERD.  Resolved.   Lower abdominal discomfort resolved.   Tolerating po.      16)  HA discomfort - issue since craniotomy for SDH. Neg CT for acute change.   17)  Increase in chronic diarrhea requesting increase imodium.   18)  Pruritis potentially related to xerosis.   No rash.  Improved.              PLAN:  1)   Reviewed with nutrition - decrease TF rate to 50 cc/hr tonight with resumed protein supplements.  2)  Abdominal film to ensure unremarkable bowel gas pattern.  3)  Change loperimide to 4 mg TID.  Oxycodone 2.5-5 mg q4h prn  in place of dilaudid.    4)  SW/CC working on dispo. To friend's with services ongoing plan.  Otherwise same meds, orders.  Monitor clinically.   Disposition Plan   Expected discharge to friend's home once arrangements made, hopefully next week. .      Entered: Ravi Lawrence 05/05/2018, 1:05 PM[WR1.1]     ADD:  Reviewed opiate regimen with patient.  Requesting 10 mg oxycodone.  Indicated already on oxycontin 20 mg q12h (\"that does nothing\").  Agrees to IR oxycodone with DC oxycontin.  Reviewed with RN that oxycodone cannot be dc'ed abruptly as may develop WD.[WR1.2]          Attestation:  I have reviewed today's vital signs, notes, medications, labs and imaging.     Ravi Lawrence MD[WR1.1]             Revision History        User Key Date/Time User Provider Type Action    > WR1.2 5/5/2018  1:48 PM Ravi Lawrence MD Physician Addend     WR1.1 5/5/2018  1:23 PM " Ravi Lawrence MD Physician Sign            Progress Notes by Maria T Sanchez RD at 5/5/2018 11:46 AM     Author:  Maria T Sanchez RD Service:  Nutrition Author Type:  Registered Dietitian    Filed:  5/5/2018 11:51 AM Date of Service:  5/5/2018 11:46 AM Creation Time:  5/5/2018 11:46 AM    Status:  Signed :  Maria T Sanchez RD (Registered Dietitian)         Nutrition Services Brief Note    Per discussion with MD, pt did not tolerating feeding at 100 mL/hr last evening. Chart review suggests pt has previously tolerated at 70 mL/hr, but overall has had ongoing issues with tolerance. MD requesting the rate be reduced this evening and the try gradually ramping back up to goal rate of 100 mL/hr.    Intervention  1. TF rate reduced to 50 mL/hr for this evening.    Recommendations  1. Recommend advance TF to 75 mL/hr on 5/6 and 100 mL/hr on 5/7.  2. If patient continues to have issues with tolerance, recommend transition to TwoCal HN in order to allow patient to meet nutritional intake goals at a lower rate.      RD will continue to follow per plan of care.[KV1.1]      Maria T Sanchez[KV1.2]CELI[KV1.1]     Revision History        User Key Date/Time User Provider Type Action    > KV1.2 5/5/2018 11:51 AM Maria T Sanchez RD Registered Dietitian Sign     KV1.1 5/5/2018 11:46 AM Maria T Sanchez RD Registered Dietitian                      Procedure Notes      Procedures by Gaurav Arrington MD at 4/27/2018  5:30 PM     Author:  Gaurav Arrington MD Service:  Interventional Radiology Author Type:  Resident    Filed:  4/27/2018  5:30 PM Date of Service:  4/27/2018  5:30 PM Creation Time:  4/27/2018  5:29 PM    Status:  Signed :  Gaurav Arrington MD (Resident)         Interventional Radiology Brief Post Procedure Note    Procedure: IR GASTROSTOMY TUBE PERCUTANEOUS PLCMNT    Proceduralist: Ayde Melgoza MD    Assistant: Gaurav Arrington MD    Time  Out: Prior to the start of the procedure and with procedural staff participation, I verbally confirmed the patient s identity using two indicators, relevant allergies, that the procedure was appropriate and matched the consent or emergent situation, and that the correct equipment/implants were available. Immediately prior to starting the procedure I conducted the Time Out with the procedural staff and re-confirmed the patient s name, procedure, and site/side. (The [facility] universal protocol was followed.)  Yes    Medications   Medication Event Details Admin User Admin Time   fentaNYL (PF) (SUBLIMAZE) injection 25-50 mcg Medication Given Dose: 50 mcg; Route: Intravenous Sophia Lee RN 4/27/2018  3:57 PM   ipratropium - albuterol 0.5 mg/2.5 mg/3 mL (DUONEB) neb solution 3 mL Medication Not Given Dose: 3 mL; Route: Nebulization; Reason: Not in room; Scheduled Time:  4:00 PM Georgina Stroud, RT 4/27/2018  4:00 PM   fentaNYL (PF) (SUBLIMAZE) injection 25-50 mcg Medication Given Dose: 50 mcg; Route: Intravenous Sophia Lee RN 4/27/2018  4:12 PM   midazolam (VERSED) injection 0.5-1 mg Medication Given Dose: 1 mg; Route: Intravenous Sophia Lee RN 4/27/2018  4:25 PM   ceFAZolin (ANCEF) intermittent infusion 2 g in 100 mL dextrose PRE-MIX Medication Given Dose: 2 g; Route: Intravenous; Scheduled Time:  4:25 PM Sophia Lee RN 4/27/2018  4:25 PM   fentaNYL (PF) (SUBLIMAZE) injection 25-50 mcg Medication Given Dose: 50 mcg; Route: Intravenous Sophia Lee RN 4/27/2018  4:40 PM   midazolam (VERSED) injection 0.5-1 mg Medication Given Dose: 1 mg; Route: Intravenous Sophia Lee RN 4/27/2018  4:41 PM   midazolam (VERSED) injection 0.5-1 mg Medication Given Dose: 1 mg; Route: Intravenous Sophia Lee RN 4/27/2018  4:46 PM   fentaNYL (PF) (SUBLIMAZE) injection 25-50 mcg Medication Given Dose: 50 mcg; Route: Intravenous Sophia Lee RN 4/27/2018  4:46 PM   glucagon injection 1 mg  Medication Given Dose: 1 mg; Route: Intravenous; Scheduled Time:  5:00 PM Sophia Lee RN 4/27/2018  4:51 PM   midazolam (VERSED) injection 0.5-1 mg Medication Given Dose: 1 mg; Route: Intravenous Sophia Lee RN 4/27/2018  4:52 PM   fentaNYL (PF) (SUBLIMAZE) injection 25-50 mcg Medication Given Dose: 50 mcg; Route: Intravenous Sophia Lee RN 4/27/2018  4:52 PM   midazolam (VERSED) injection 0.5-1 mg Medication Given Dose: 1 mg; Route: Intravenous Sophia Lee RN 4/27/2018  5:00 PM   fentaNYL (PF) (SUBLIMAZE) injection 25-50 mcg Medication Given Dose: 50 mcg; Route: Intravenous Sophia Lee RN 4/27/2018  5:00 PM   midazolam (VERSED) injection 0.5-1 mg Medication Given Dose: 1 mg; Route: Intravenous Sophia Lee RN 4/27/2018  5:10 PM   fentaNYL (PF) (SUBLIMAZE) injection 25-50 mcg Medication Given Dose: 25 mcg; Route: Intravenous Sophia Lee RN 4/27/2018  5:10 PM   midazolam (VERSED) injection 0.5-1 mg Medication Given Dose: 1 mg; Route: Intravenous Sophia Lee RN 4/27/2018  5:19 PM   fentaNYL (PF) (SUBLIMAZE) injection 25-50 mcg Medication Given Dose: 50 mcg; Route: Intravenous Sophia Lee RN 4/27/2018  5:19 PM   iopamidol (ISOVUE-M-300) solution 1-15 mL Medication Given Dose: 10 mL; Route: Intravenous; Scheduled Time:  5:30 PM; Comment: given for Gtube placement Sophia Lee RN 4/27/2018  5:25 PM   lidocaine 1 % 0.5-10 mL Medication Given by Other Clinician Dose: 5 mL; Route: Intradermal; Scheduled Time:  5:30 PM Sophia Lee RN 4/27/2018  5:26 PM   BUPivacaine (MARCAINE) preservative free injection 0.25% Medication Given by Other Clinician Dose: 8 mL; Route: Intradermal; Scheduled Time:  5:30 PM Sophia Lee, RN 4/27/2018  5:27 PM       Sedation: IR Nurse Monitored Care   Post Procedure Summary:  Prior to the start of the procedure and with procedural staff participation, I verbally confirmed the patient s identity using two indicators, relevant  allergies, that the procedure was appropriate and matched the consent or emergent situation, and that the correct equipment/implants were available. Immediately prior to starting the procedure I conducted the Time Out with the procedural staff and re-confirmed the patient s name, procedure, and site/side. (The Joint Commission universal protocol was followed.)  Yes       Sedatives: Fentanyl and Midazolam (Versed)    Vital signs, airway and pulse oximetry were monitored and remained stable throughout the procedure and sedation was maintained until the procedure was complete.  The patient was monitored by staff until sedation discharge criteria were met.    Patient tolerance: Patient tolerated the procedure well with no immediate complications.    Time of sedation in minutes: 45 Minutes minutes from beginning to end of physician one to one monitoring.          Findings: successful G tube placement     Estimated Blood Loss: Minimal    Fluoroscopy Time:  minute(s)    SPECIMENS: None    Complications: 1. None     Condition: Stable    Plan:   Bedrest for 1 hr   NPO for 4 hrs then G tube can be used.     Comments: See dictated procedure note for full details.    Gaurav Arrington MD[NR1.1]             Revision History        User Key Date/Time User Provider Type Action    > NR1.1 4/27/2018  5:30 PM Gaurav Arrington MD Resident Sign            Procedures by Suzanna Burns, RN at 4/21/2018  2:46 PM     Author:  Suzanna Burns RN Service:  Infusion Services Author Type:  Registered Nurse    Filed:  4/21/2018  2:51 PM Date of Service:  4/21/2018  2:46 PM Creation Time:  4/21/2018  2:46 PM    Status:  Signed :  Suzanna Burns RN (Registered Nurse)     Procedure Orders:    1. Vascular Access Adult IP Consult [[id number]] ordered by Ravi Lawrence MD at 04/20/18 1445                In pt room for PICC placement.  Procedure explained and consent signed. Double lumen SOLO PICC placed in R basilic vein without  difficulty.  EKG rhythm with constant artifact and no clear, consistent rise in P wave, so CXR ordered and showed PICC tip at cavo-atrial junction.  Report called to Jose CANADA that PICC is ready to use.          [LI1.1]       Revision History        User Key Date/Time User Provider Type Action    > LI1.1 4/21/2018  2:51 PM Suzanna Burns, RN Registered Nurse Sign                  Progress Notes - Therapies (Notes from 05/05/18 through 05/08/18)     No notes of this type exist for this encounter.

## 2018-04-10 NOTE — IP AVS SNAPSHOT
"    UR 8A: [phone]                                              INTERAGENCY TRANSFER FORM - PHYSICIAN ORDERS   4/10/2018                    Hospital Admission Date: 4/10/2018  SHALINI CHONG   : 1955  Sex: Female        Attending Provider: Toan Kingston MD     Allergies:  No Known Allergies    Infection:  CRE, VRE, MRSA, ESBL   Service:  HOSPITALIST    Ht:  1.588 m (5' 2.5\")   Wt:  60.3 kg (133 lb)   Admission Wt:  73.5 kg (162 lb)    BMI:  23.94 kg/m 2   BSA:  1.63 m 2            Patient PCP Information     Provider PCP Type    Mellisa Haji MD General      ED Clinical Impression     Diagnosis Description Comment Added By Time Added    Wound of right buttock, initial encounter [S31.819A] Wound of right buttock, initial encounter [S31.819A]  Logan Chinchilla MD 4/10/2018  9:40 PM    Decubitus ulcer of sacral region, stage 4 (H) [L89.154] Decubitus ulcer of sacral region, stage 4 (H) [L89.154]  Logan Chinchilla MD 4/10/2018  9:40 PM    Cellulitis of buttock, right [L03.317] Cellulitis of buttock, right [L03.317]  Logan Chinchilla MD 4/10/2018  9:40 PM      Hospital Problems as of 2018              Priority Class Noted POA    Cellulitis Medium  2018 Yes    Sepsis (H) Medium  Unknown Yes      Non-Hospital Problems as of 2018              Priority Class Noted    Osteomyelitis (H) Medium  2018    Advance Care Planning Medium  2018      Code Status History     Date Active Date Inactive Code Status Order ID Comments User Context    2018 10:33 AM  Full Code [id number]  Shahriar Cadet MD Outpatient    2018 12:16 AM 2018 10:33 AM Full Code [id number]  Toan Kingston MD Inpatient    2018 12:58 PM 2018 12:16 AM Full Code [id number]  Ambar Lopez MD Outpatient    2018 10:08 PM 2018 12:58 PM Full Code [id number]  Melo Arcos MD Inpatient         Medication Review      START taking        Dose / Directions Comments    fluconazole 150 MG tablet "   Commonly known as:  DIFLUCAN   Indication:  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   Used for:  Fungal dermatitis        Dose:  150 mg   Take 1 tablet (150 mg) by mouth daily for 7 days   Quantity:  1 tablet   Refills:  0        ipratropium - albuterol 0.5 mg/2.5 mg/3 mL 0.5-2.5 (3) MG/3ML neb solution   Commonly known as:  DUONEB   Used for:  Chronic obstructive pulmonary disease, unspecified COPD type (H)        Dose:  3 mL   Take 1 vial (3 mLs) by nebulization every 4 hours as needed for wheezing   Quantity:  360 mL   Refills:  0        loperamide 2 MG capsule   Commonly known as:  IMODIUM   Used for:  Diarrhea, unspecified type        Dose:  4 mg   Take 2 capsules (4 mg) by mouth 4 times daily   Quantity:  20 capsule   Refills:  0        multivitamin, therapeutic with minerals Tabs tablet   Used for:  Decubitus ulcer of sacral region, stage 4 (H)        Dose:  1 tablet   Start taking on:  5/9/2018   Take 1 tablet by mouth daily   Quantity:  30 each   Refills:  0        ondansetron 4 MG ODT tab   Commonly known as:  ZOFRAN ODT   Used for:  Nausea        Dose:  4 mg   Take 1 tablet (4 mg) by mouth every 6 hours as needed for nausea   Quantity:  20 tablet   Refills:  1        triamcinolone 0.1 % cream   Commonly known as:  KENALOG   Used for:  Folliculitis        Apply topically 3 times daily   Refills:  0          CONTINUE these medications which may have CHANGED, or have new prescriptions. If we are uncertain of the size of tablets/capsules you have at home, strength may be listed as something that might have changed.        Dose / Directions Comments    diazepam 2 MG tablet   Commonly known as:  VALIUM   This may have changed:    - how much to take  - when to take this   Used for:  Anxiety        Dose:  4 mg   Take 2 tablets (4 mg) by mouth every 6 hours as needed for anxiety   Quantity:  60 tablet   Refills:  0        levETIRAcetam 250 MG tablet   Commonly known as:  KEPPRA   This may have changed:   medication strength   Used for:  Seizures (H)        Dose:  250 mg   Take 1 tablet (250 mg) by mouth every evening Until 4/7/2018 then decrease to 250mg BID 4/8/2018-4/21/2018, then decrease to 250mg QAM 4/22-5/5/2018   Refills:  0        oxyCODONE IR 5 MG tablet   Commonly known as:  ROXICODONE   This may have changed:  Another medication with the same name was removed. Continue taking this medication, and follow the directions you see here.   Used for:  Other chronic pain        Dose:  5-10 mg   Take 1-2 tablets (5-10 mg) by mouth every 4 hours as needed (Give 5mg for pain level 4-6 on a 10 point scale. Give 10mg for pain level 6-10 on a 10 point scale.)   Quantity:  40 tablet   Refills:  0        protein modular   This may have changed:    - how to take this  - when to take this  - additional instructions   Used for:  Decubitus ulcer of sacral region, stage 4 (H)        Dose:  1 packet   1 packet by Per Feeding Tube route 3 times daily   Refills:  0          CONTINUE these medications which have NOT CHANGED        Dose / Directions Comments    LEVOTHYROXINE SODIUM PO        Dose:  125 mcg   Take 125 mcg by mouth every morning   Refills:  0        miconazole 2 % Aerp powder   Commonly known as:  MICATIN        Apply topically 2 times daily Apply to groin folds   Refills:  0        mineral oil-hydrophilic petrolatum        Apply topically daily Apply to lower extremities for skin irritation.   Refills:  0        OMEPRAZOLE PO        Dose:  20 mg   Take 20 mg by mouth daily (with breakfast)   Refills:  0        XARELTO PO        Dose:  20 mg   Take 20 mg by mouth At Bedtime   Refills:  0          STOP taking     ACETAMINOPHEN PO           albuterol 1.25 MG/3ML nebulizer solution   Commonly known as:  ACCUNEB           amoxicillin-clavulanate 875-125 MG per tablet   Commonly known as:  AUGMENTIN           ARIPIPRAZOLE PO           aspirin 81 MG chewable tablet           AZITHROMYCIN PO           bisacodyl 10 MG  Suppository   Commonly known as:  DULCOLAX           CYMBALTA PO           divalproex sodium delayed-release 250 MG DR tablet   Commonly known as:  DEPAKOTE           ferrous sulfate 325 (65 Fe) MG tablet   Commonly known as:  IRON           GABAPENTIN PO           HYDROmorphone 2 MG tablet   Commonly known as:  DILAUDID           HYDROXYZINE HCL PO           K-DUR PO           LAMOTRIGINE PO           magnesium hydroxide 400 MG/5ML suspension   Commonly known as:  MILK OF MAGNESIA           MELATONIN PO           senna-docusate 8.6-50 MG per tablet   Commonly known as:  SENOKOT-S;PERICOLACE           zolpidem 5 MG tablet   Commonly known as:  AMBIEN                   After Care     Advance Diet as Tolerated       Regular diet       Discharge Instructions       If questions or problems arise regarding tube function (e.g. leaking, dislodges, etc.) Contact Interventional Radiology department 24 hours a day.    For procedures that were done at the [facility] sites,   8:00-4:30 PM Monday through Friday    Contact:[phone].    For afterhours and weekends call the [facility] main phone line [phone] and ask for the [facility] IR on call physician number.    If DIRECTED by the RADIOLOGIST, related to specific problems with the tube functioning,  go to the Emergency Department.       Discharge Instructions       Patient to make a follow up appointment in IR clinic in 10-14 days for the removal of the retention sutures at the G or GJ tube site. Phone number is [phone] if needed.       General info for SNF       Length of Stay Estimate: Short Term Care: Estimated # of Days 31-90  Condition at Discharge: Stable  Level of care:skilled   Rehabilitation Potential: Fair  Admission H&P remains valid and up-to-date: Yes  Recent Chemotherapy: N/A  Use Nursing Home Standing Orders: Yes    Weekly labs:  CBC, CMP, TSH    Wound cares:    Left heel wound: wash every other day with wound cleanser and gauze. Apply  a thick layer of Medihoney (order #[id number]) to wound bed and cover with Mepilex 4x4. If dressing rolls at edges, OK to use Primapore tape to keep in place    Left upper thigh wound: wash every other day and as needed if soiled with wound cleanser and gauze. Pat dry. Cover with Mepilex 4x4.    Right IT wound: wash daily and as needed if soiled with urine or stool with wound cleanser and gauze. Pack wound to entire depth (7 cm) with Kerlix dampened with saline. Cover with ABD and secure with Primapore tape.    Left upper thigh wound: wash every other day and as needed if soiled with wound cleanser and gauze. Pat dry. Cover with Mepilex 4x4.      TF TWO CALL HN  50 CC/HR STARTING  PM, STOP  AM  WATER FLUSHES 120 CC TID, AND 30-60 CC DOWN GT BEFORE AND AFTER MEDICATIONS       Mantoux instructions       Give two-step Mantoux (PPD) Per Facility Policy Yes       Weight bearing status       Pt should be in bed most of the day, she can be up in her customized WC for short periods of times             Your next 10 appointments already scheduled     Jun 11, 2018  3:20 PM CDT   (Arrive by 3:05 PM)   New Patient Visit with Faustino Coyne MD   [facility] ([facility])    [address] [place] [id number] [phone]              Follow-Up Appointment Instructions     Future Labs/Procedures    Adult [facility] Follow-up and recommended labs and tests     Comments:    COLON AND RECTAL SURGERY    FOLLOW-UP  1.  You can follow up in the Colon and Rectal Surgery clinic with first available provider in 4-6 weeks. Please contact our clinic scheduler Cecile Nassar (phone # [phone]) to help you schedule a follow-up appointment.        *For other appointments on [place] and/or [facility] (with [facility] or [facility] provider or service): Call [phone] if you have not heard regarding these appointments within 7 days  of discharge.      Appointments on [place] and/or [facility] (with [facility] or [facility] provider or service). Call [phone] if you haven't heard regarding these appointments within 7 days of discharge.      Follow-Up Appointment Instructions     Adult [facility] Follow-up and recommended labs and tests       COLON AND RECTAL SURGERY    FOLLOW-UP  1.  You can follow up in the Colon and Rectal Surgery clinic with first available provider in 4-6 weeks. Please contact our clinic scheduler Cecile Nassar (phone # [phone]) to help you schedule a follow-up appointment.        *For other appointments on [place] and/or [facility] (with [facility] or [facility] provider or service): Call [phone] if you have not heard regarding these appointments within 7 days of discharge.      Appointments on [place] and/or [facility] (with [facility] or [facility] provider or service). Call [phone] if you haven't heard regarding these appointments within 7 days of discharge.             Statement of Approval     Ordered          05/08/18 1501  I have reviewed and agree with all the recommendations and orders detailed in this document.  EFFECTIVE NOW     Approved and electronically signed by:  Shahriar Cadet MD

## 2018-04-10 NOTE — IP AVS SNAPSHOT
` ` Patient Information     Patient Name Sex     Marychuy Zhou ([id number]) Female 1955       Room Bed    [address] [place] [address]      Patient Demographics     Address Phone    [address] [place] [address] None (Home)      Patient Ethnicity & Race     Ethnic Group Patient Race    American White      Emergency Contact(s)     Name Relation Home Work Mobile    Tobias Paredes Son   [phone]    Sánchez Paredes Son   [phone]    Roberto Carlos Jenkins Son [phone]        Documents on File        Status Date Received Description       Documents for the Patient    Consent for EHR Access Received 18     Insurance Card       External Medication Information Consent       Patient ID       [facility] Specified Other       Consent for Services/Privacy Notice - Hospital/Clinic Received 18     Privacy Notice - [facility] Received 18     Care Everywhere Prospective Auth Received 18     Advance Directives and Living Will Not Received  Invalid Directive dated 2016 received    Consent for Services - UM       Consent for Services - Hospital and Clinic       HIE Auth       Consent for Services - Geriatrics Received 18        Documents for the Encounter    CMS IM for Patient Signature Received 18     Photograph   18 left upper thigh    Photograph   18 right it    Photograph   18 left heel    Photograph   18 Buttocks     Photograph   18 coccyx     Photograph   18 left sacrum     Home Care/Hospice/Nursing Home Record  18 HOME CARE RECORD [facility]    EMS/Ambulance Record  18 PREHOSPITAL CARE REPORT SUMMARY    Photograph   18 left heel     Photograph   18 Buttocks     Photograph   WOC 18 left heel     Photograph   WOC 18 right IT    Photograph   WOC 18 sacrum/coccyx    Photograph   18 bilateral Buttocks     Photograph   18 right it    Photograph   18 left sacrum     Photograph   18 left heel    CMS IM for Patient Signature  Received 05/07/18     CE Point of Care Auth Received        Admission Information     Attending Provider Admitting Provider Admission Type Admission Date/Time    Toan Kingston MD Dumic, Igor, MD Emergency 04/10/18  2005    Discharge Date Hospital Service Auth/Cert Status Service Area     Hospitalist Incomplete [facility]    Unit Room/Bed Admission Status       UR 8A 0805/[id number] Admission (Confirmed)       Admission     Complaint    Cellulitis      Hospital Account     Name Acct ID Class Status Primary Coverage    Marychuy Zhou [id number] Inpatient Open MEDICARE - MEDICARE            Guarantor Account (for Hospital Account #[id number])     Name Relation to Pt Service Area Active? Acct Type    Marychuy Zhou Self FCS Yes Personal/Family    Address Phone          [address] [place] [address] None(H)              Coverage Information (for Hospital Account #[id number])     1. MEDICARE/MEDICARE     F/O Payor/Plan Precert #    MEDICARE/MEDICARE     Subscriber Subscriber #    BakariMarychuy renteria [id number]    Address Phone    [employer]  [address]  [place] [address] [phone]          2. [employer]     F/O Payor/Plan Precert #    [employer]     Subscriber Subscriber #    Winnie Marychuy [id number]    Address Phone    [address]  [place] [address] [phone]

## 2018-04-10 NOTE — IP AVS SNAPSHOT
"    UR 8A: [phone]                                              INTERAGENCY TRANSFER FORM - LAB / IMAGING / EKG / EMG RESULTS   4/10/2018                    Hospital Admission Date: 4/10/2018  SHALINI CHONG   : 1955  Sex: Female        Attending Provider: Toan Kingston MD     Allergies:  No Known Allergies    Infection:  CRE, VRE, MRSA, ESBL   Service:  HOSPITALIST    Ht:  1.588 m (5' 2.5\")   Wt:  60.3 kg (133 lb)   Admission Wt:  73.5 kg (162 lb)    BMI:  23.94 kg/m 2   BSA:  1.63 m 2            Patient PCP Information     Provider PCP Type    Mellisa Haji MD General         Lab Results - 3 Days      Triglycerides [[id number]] (Abnormal)  Resulted: 18 1414, Result status: Final result    Ordering provider: Toan Kingston MD  18 0600 Resulting lab: [facility]    Specimen Information    Type Source Collected On   Blood  18 1307          Components       Value Reference Range Flag Lab   Triglycerides 172 <150 mg/dL H 153   Comment:         Borderline high:  150-199 mg/dl  High:             200-499 mg/dl  Very high:       >499 mg/dl              Magnesium [[id number]]  Resulted: 18 0822, Result status: Final result    Ordering provider: Toan Kingston MD  18 0000 Resulting lab: [facility]    Specimen Information    Type Source Collected On   Blood  18 0617          Components       Value Reference Range Flag Lab   Magnesium 2.2 1.6 - 2.3 mg/dL  153            Comprehensive metabolic panel [[id number]] (Abnormal)  Resulted: 18 0724, Result status: Final result    Ordering provider: Toan Kingston MD  18 0000 Resulting lab: [facility]    Specimen Information    Type Source Collected On   Blood  18 0617          Components       Value Reference Range Flag Lab   Sodium 141 133 - 144 mmol/L  13   Potassium 4.5 3.4 - 5.3 mmol/L  13   Chloride 104 94 - 109 mmol/L  13   Carbon Dioxide 27 " 20 - 32 mmol/L  13   Anion Gap 10 3 - 14 mmol/L  13   Glucose 97 70 - 99 mg/dL  13   Urea Nitrogen 12 7 - 30 mg/dL  13   Creatinine 0.50 0.52 - 1.04 mg/dL L 13   GFR Estimate >90 >60 mL/min/1.7m2  13   Comment:  Non  GFR Calc   GFR Estimate If Black >90 >60 mL/min/1.7m2  13   Comment:  African American GFR Calc   Calcium 8.4 8.5 - 10.1 mg/dL L 13   Bilirubin Total 0.2 0.2 - 1.3 mg/dL  13   Albumin 1.9 3.4 - 5.0 g/dL L 13   Protein Total 7.9 6.8 - 8.8 g/dL  13   Alkaline Phosphatase 228 40 - 150 U/L H 13   ALT 26 0 - 50 U/L  13   AST 20 0 - 45 U/L  13            Phosphorus [[id number]] (Abnormal)  Resulted: 05/07/18 0724, Result status: Final result    Ordering provider: Toan Kingston MD  05/07/18 0000 Resulting lab: [facility]    Specimen Information    Type Source Collected On   Blood  05/07/18 0617          Components       Value Reference Range Flag Lab   Phosphorus 4.7 2.5 - 4.5 mg/dL H 13            Prealbumin [[id number]] (Abnormal)  Resulted: 05/07/18 0724, Result status: Final result    Ordering provider: Toan Kingston MD  05/07/18 0000 Resulting lab: [facility]    Specimen Information    Type Source Collected On   Blood  05/07/18 0617          Components       Value Reference Range Flag Lab   Prealbumin 12 15 - 45 mg/dL L 13            INR [[id number]] (Abnormal)  Resulted: 05/07/18 0652, Result status: Final result    Ordering provider: Toan Kingston MD  05/07/18 0000 Resulting lab: [facility]    Specimen Information    Type Source Collected On   Blood  05/07/18 0617          Components       Value Reference Range Flag Lab   INR 1.50 0.86 - 1.14 H 13            Testing Performed By     Lab - Abbreviation Name Director Address Valid Date Range    13 - Unknown [facility] Unknown [address] [facility] [address] 01/15/15 0916 - Present    153 - Unknown U YAQUELIN MORRIS  [facility] Unknown Unknown 04/28/11 1033 - Present            Unresulted Labs (24h ago through future)    Start       Ordered    04/30/18 1200  Triglycerides  (Every Monday)  EVERY MONDAY,   Routine     Comments:  Please draw triglycerides level when lipids are not running and not from a line contaminated with lipids    04/24/18 1422    04/30/18 0600  Comprehensive metabolic panel  (Every Monday)  EVERY MONDAY,   Routine      04/24/18 1422    04/30/18 0600  Magnesium  (Every Monday)  EVERY MONDAY,   Routine      04/24/18 1422    04/30/18 0600  Phosphorus  (Every Monday)  EVERY MONDAY,   Routine      04/24/18 1422    04/30/18 0600  INR  (Every Monday)  EVERY MONDAY,   Routine      04/24/18 1422    04/30/18 0600  Prealbumin  (Every Monday)  EVERY MONDAY,   Routine      04/24/18 1422    04/25/18 0600  Basic metabolic panel  (EVERY WED/FRI  - AM Draw)  EVERY W,FR,   Routine      04/24/18 1422         Imaging Results - 3 Days      XR Abdomen Port 1 View [[id number]]  Resulted: 05/05/18 1441, Result status: Final result    Ordering provider: Ravi Lawrence MD  05/05/18 1040 Resulted by: Nidia Mendez MD    Performed: 05/05/18 1152 - 05/05/18 1217 Resulting lab: RADIOLOGY RESULTS    Narrative:       Exam: Abdominal x-ray, 1 view, 5/5/2018.    COMPARISON: CT exam 4/18/2018.    HISTORY: N/V on TF.     FINDINGS: Supine view of the abdomen was obtained. The lateral and  inferior portions of the abdomen were collimated out of the image.  Nonobstructive bowel gas pattern. Moderate colonic stool burden.  Surgical clips projecting over the upper abdomen. Degenerative changes  of the spine. Partially visualized gastrostomy tube. Evaluation of  free air is limited in the supine position.      Impression:       IMPRESSION: Nonobstructive bowel gas pattern. Moderate colonic stool  burden.    NIDIA MENDEZ MD      Testing Performed By     Lab - Abbreviation Name Director Address Valid Date Range    104 - Rad  [facility] RADIOLOGY RESULTS Unknown Unknown 02/16/05 1553 - Present            Encounter-Level Documents:     There are no encounter-level documents.      Order-Level Documents:     There are no order-level documents.

## 2018-04-10 NOTE — IP AVS SNAPSHOT
"` `           UR 8A: [phone]                                              INTERAGENCY TRANSFER FORM - NURSING   4/10/2018                    Hospital Admission Date: 4/10/2018  SHALINI CHONG   : 1955  Sex: Female        Attending Provider: Toan Kingston MD     Allergies:  No Known Allergies    Infection:  CRE, VRE, MRSA, ESBL   Service:  HOSPITALIST    Ht:  1.588 m (5' 2.5\")   Wt:  60.3 kg (133 lb)   Admission Wt:  73.5 kg (162 lb)    BMI:  23.94 kg/m 2   BSA:  1.63 m 2            Patient PCP Information     Provider PCP Type    Mellisa Haji MD General      Current Code Status     Date Active Code Status Order ID Comments User Context       Prior      Code Status History     Date Active Date Inactive Code Status Order ID Comments User Context    2018 10:33 AM  Full Code [id number]  Shahriar Cadet MD Outpatient    2018 12:16 AM 2018 10:33 AM Full Code [id number]  Toan Kingston MD Inpatient    2018 12:58 PM 2018 12:16 AM Full Code [id number]  Ambar Lopez MD Outpatient    2018 10:08 PM 2018 12:58 PM Full Code [id number]  Melo Arcos MD Inpatient      Advance Directives        Scanned docmt in ACP Activity?           No scanned doc        Hospital Problems as of 2018              Priority Class Noted POA    Cellulitis Medium  2018 Yes    Sepsis (H) Medium  Unknown Yes      Non-Hospital Problems as of 2018              Priority Class Noted    Osteomyelitis (H) Medium  2018    Advance Care Planning Medium  2018      Immunizations     Name Date      Influenza (High Dose) 3 valent vaccine 10/16/17          END      ASSESSMENT     Discharge Profile Flowsheet     EXPECTED DISCHARGE     FINAL RESOURCES      Expected Discharge Date  18 1542   Resources List  Skilled Nursing Facility 18 1419    DISCHARGE NEEDS ASSESSMENT     SKIN      Concerns To Be Addressed  compliance issue concerns;coping/stress concerns;decision " making concerns;patient refuses services;adjustment to diagnosis/illness concerns;cognitive/perceptual concerns 04/11/18 1542   Inspection of bony prominences  Full except (identify areas not inspected) 05/08/18 0341    Patient/family verbalizes understanding of discharge plan recommendations?  Yes 04/11/18 1542   Full except areas not inspected   Hip, left;Scapula, right;Scapula, left;Heel, left;Heel, right 05/08/18 0341    Medical Team notified of plan?  yes 04/11/18 1542   Inspection under devices  Full except (identify device(s) not inspected) 05/07/18 1800    # of Referrals Placed by CTS  Post Acute Facilities;Transportation 04/07/18 1419   Not Inspected under devices  -- (UTV under foot dressing) 05/05/18 1254    GASTROINTESTINAL (ADULT,PEDIATRIC,OB)     Skin WDL  ex;characteristics 05/08/18 0341    GI WDL  ex 05/08/18 0341   Skin Integrity  drain/device(s) 05/08/18 0341    Abdominal Appearance  rounded 05/08/18 0341   Additional Documentation  Drains (LDA);Pressure Ulcer (LDA);Rash (LDA);Wound (LDA) 05/07/18 1800    All Quadrants Bowel Sounds  -- (Refused auscultation) 05/03/18 0929   Skin Color/Characteristics  -- (has rash in armpits,katia area and scratches on abdomen ) 05/07/18 1101    All Quadrants Palpation  other (see comments) 05/06/18 2036   Skin Temperature  warm 05/07/18 1800    Last Bowel Movement  05/07/18 (small loose bm) 05/08/18 0341   Skin Moisture  dry 05/07/18 0212    GI Signs/Symptoms  abdominal fullness 05/07/18 1800   Skin Elasticity  quick return to original state 05/05/18 1801    Incontinence Containment Product  pad, large volume;changed 05/05/18 1801   Procedural focused assessment (identify areas inspected)   Occiput 05/01/18 1332    Passing flatus  yes 05/08/18 0341   SAFETY      Additional Documentation  Incontinence Containment Product (Row) 05/07/18 1800   Safety WDL  ex 05/08/18 0341    COMMUNICATION ASSESSMENT     Safety Factors  ID band on;wheels locked;call light in  "reach;upper side rails raised x 2 05/08/18 0341    Patient's communication style  spoken language (English or Bilingual) 04/10/18 2006   All Alarms  none present 05/08/18 0341                 Assessment WDL (Within Defined Limits) Definitions           Safety WDL     Effective: 09/28/15    Row Information: <b>WDL Definition:</b> Bed in low position, wheels locked; call light in reach; upper side rails up x 2; ID band on<br> <font color=\"gray\"><i>Item=AS safety wdl>>List=AS safety wdl>>Version=F14</i></font>      Skin WDL     Effective: 09/28/15    Row Information: <b>WDL Definition:</b> Warm; dry; intact; elastic; without discoloration; pressure points without redness<br> <font color=\"gray\"><i>Item=AS skin wdl>>List=AS skin wdl>>Version=F14</i></font>      Vitals     Vital Signs Flowsheet     QUICK ADDS     Functioning  can do most things, but pain gets in the way of some 05/08/18 1351    Quick Adds  -- (pt refused  to turn) 05/07/18 1444   Sleep  normal sleep 05/07/18 1037    COMMENTS     PAIN ASSESSMENT/NONVERBAL ICU (ADULT)      Comments  -- (pt refused vitals assessment) 05/05/18 2059   Presence of Pain  No nonverbal indicators of pain present 04/25/18 1936    VITAL SIGNS     ANALGESIA SIDE EFFECTS MONITORING      Temp  -- (pt refused) 05/08/18 1440   Side Effects Monitoring: Respiratory Quality  R 05/08/18 1351    Temp src  Oral 05/06/18 1613   Side Effects Monitoring: Respiratory Depth  N 05/08/18 1351    Resp  16 05/08/18 1444   Side Effects Monitoring: Sedation Level  1 05/08/18 1351    Pulse  -- (patient refused) 05/07/18 0626   POINT OF CARE TESTING      Heart Rate  -- (patient refused) 05/07/18 0626   Puncture Site  -- (Pt declined BG check) 04/11/18 2337    Pulse/Heart Rate Source  Monitor 05/06/18 1613   HEIGHT AND WEIGHT      BP  -- (patient refused) 05/07/18 0626   Height  1.588 m (5' 2.5\") 04/10/18 2008    BP Location  Left arm 05/06/18 1845   Height Method  Actual 04/18/18 0745    OXYGEN THERAPY     " Weight  60.3 kg (133 lb) 04/23/18 1042    SpO2  -- (patient refused) 05/07/18 0626   Weight Method  Bed scale 04/18/18 0745    O2 Device  None (Room air) 05/06/18 1613   Bed Scale  Standard (fitted sheet, draw sheet/ pad, cover/flat sheet, blanket, two pillows);Pillow (add comment for number);Bhandari bag - must be empty before weighing;Call light (5 pillows) 04/18/18 0745    Oxygen Delivery  4 LPM 04/27/18 1718   BSA (Calculated - sq m)  1.8 04/10/18 2008    PAIN/COMFORT     BMI (Calculated)  29.22 04/10/18 2008    Patient Currently in Pain  sleeping: patient not able to self report 05/08/18 0527   GEREMIAS COMA SCALE      Preferred Pain Scale  CAPA (Clinically Aligned Pain Assessment) ([facility] Adults Only) 05/07/18 2337   Best Eye Response  4-->(E4) spontaneous 05/06/18 0128    0-10 Pain Scale  10 04/29/18 2231   Best Motor Response  6-->(M6) obeys commands 05/06/18 0128    Pain Location  Back 05/08/18 0002   Best Verbal Response  5-->(V5) oriented 05/06/18 0128    Pain Orientation  Left;Lower 05/08/18 0002   [place] Coma Scale Score  15 05/06/18 0128    Pain Descriptors  Aching 05/07/18 2337   POSITIONING      Pain Management Interventions  analgesia administered 05/07/18 2337   Body Position  refuses positioning 05/07/18 0203    Pain Intervention(s)  Medication (See eMAR) 05/07/18 2337   Head of Bed (HOB)  HOB at 20-30 degrees 05/08/18 0341    Response to Interventions  Absence of nonverbal indicators of pain 05/08/18 0527   Positioning/Transfer Devices  pillows 05/07/18 1317    CLINICALLY ALIGNED PAIN ASSESSMENT (CAPA) ([facility] ADULTS ONLY)     Chair  Upright in chair 05/07/18 1317    Comfort  tolerable with discomfort 05/08/18 1351   DAILY CARE      Change in Pain  about the same 05/08/18 1351   Activity Management  bedrest 05/08/18 0341    Pain Control  partially effective 05/08/18 1351   Activity Assistance Provided  assistance, 2 people 05/08/18 0341            Patient  Lines/Drains/Airways Status    Active LINES/DRAINS/AIRWAYS     Name: Placement date: Placement time: Site: Days: Last dressing change:    Gastrostomy/Enterostomy Gastrostomy LUQ 1 16 fr SULLY Gastrostomy Feeding Tube  LOT : [id number] 04/27/18   1734   LUQ   10     Suprapubic Catheter Non-latex 16 fr no urine output patient requested suprapubic catheter to be changed still nol urine output will call MD 04/17/18   1950   Non-latex   20     Pressure Injury 04/12/18 Left Heel Unstagable 04/12/18   1151    26     Pressure Injury 04/12/18 Left;Posterior Leg posterior upper thigh Stage 3 04/12/18   1152    26     Pressure Injury 04/12/18 Right Ischial tuberosity Stage 4 04/12/18   1153    26     Pressure Injury 04/12/18 Coccyx Stage 3 04/12/18   1153    26     Rash Bilateral upper thigh                     Patient Lines/Drains/Airways Status    Active PICC/CVC     Name: Placement date: Placement time: Site: Days: Additional Info Last dressing change:    PICC Double Lumen 04/21/18 Right Basilic 04/21/18   1359   Basilic   17 External Cath Length (cm): 0 cm   05/03/18 1230 (122.54 hrs)         Size (Fr): 5 Fr            Orientation: Right            Extremity Circumference (cm): 32 cm            Catheter Brand: [employer]            Dressing Intervention: Chlorhexidine patch;Transparent;New dressing;Securing device            Description: Valved;Power PICC            Total Catheter Length (cm) Trimmed: 42 cm            Site Prep: Chlorhexidine            Local Anesthetic: Injectable            Inserted by: FANTA Burns RN            Insertion attempts with ultrasound: 1            Patient Tolerance: Tolerated well            Placement Verification: Xray            Difficulty with threading line: No            Tip location: SVC/RA Junction            Full barrier precautions done: Yes            Consent Signed: Yes            Time Out performed: Yes            Lot #: [id number]            Use for : TPN               Intake/Output Detail  Report     Date Intake             Output     Net    Shift P.O. I.V. Other NG/GT IV Piggyback TPN Enteral Total Urine Emesis/NG output Stool Total       Elizabeth 05/07/18 0700 - 05/07/18 1459 240 -- -- 240 -- -- -- 480 0 -- -- -- 480    Noc 05/07/18 1500 - 05/07/18 2359 -- -- -- -- -- -- -- -- 350 -- -- 350 -350    Day 05/08/18 0000 - 05/08/18 0659 -- -- -- 240 -- -- -- 240 -- -- -- -- 240    Elizabeth 05/08/18 0700 - 05/08/18 1459 -- -- -- -- -- -- -- -- -- -- -- -- 0    Noc 05/08/18 1500 - 05/08/18 2359 -- -- -- -- -- -- -- -- -- -- -- -- 0      Last Void/BM       Most Recent Value    Urine Occurrence -- [patient had a lot of leaking of urine also!!] at 05/07/2018 1500    Stool Occurrence 3 at 05/05/2018 1230      Case Management/Discharge Planning     Case Management/Discharge Planning Flowsheet     REFERRAL INFORMATION     Patient/family verbalizes understanding of discharge plan recommendations?  Yes 04/11/18 1542    # of Referrals Placed by CTS  Post Acute Facilities;Transportation 04/07/18 1419   Medical Team notified of plan?  yes 04/11/18 1542    LIVING ENVIRONMENT     FINAL RESOURCES      Lives With  facility resident 05/04/18 1656   Resources List  Skilled Nursing Facility 04/07/18 1419    COPING/STRESS     / CAREGIVER      Major Change/Loss/Stressor  hospitalization;illness;limited support system;loss of independence;mental health condition;medical condition/diagnosis 04/11/18 1542   Filed Complexity Screen Score  8 04/11/18 1542    Patient Personal Strengths  expressive of emotions 04/11/18 1542   ABUSE RISK SCREEN      Sources Of Support  community support 04/11/18 1542   QUESTION TO PATIENT:  Has a member of your family or a partner(now or in the past) intimidated, hurt, manipulated, or controlled you in any way?  no 04/10/18 2011    Reaction To Health Status  denial;unrealistic 04/11/18 1542   QUESTION TO PATIENT: Do you feel safe going back to the place where you are living?  yes 04/10/18 2011     Understanding Of Condition And Treatment  needs additional information;needs time to process;partial understanding of medical condition;partial understanding of treatment 04/11/18 1542   OBSERVATION: Is there reason to believe there has been maltreatment of a vulnerable adult (ie. Physical/Sexual/Emotional abuse, self neglect, lack of adequate food, shelter, medical care, or financial exploitation)?  no 04/10/18 2011    EXPECTED DISCHARGE     HOMICIDE RISK      Expected Discharge Date  04/12/18 04/11/18 1542   Feels Like Hurting Others  no 04/10/18 2011    ASSESSMENT/CONCERNS TO BE ADDRESSED     OTHER      Concerns To Be Addressed  compliance issue concerns;coping/stress concerns;decision making concerns;patient refuses services;adjustment to diagnosis/illness concerns;cognitive/perceptual concerns 04/11/18 1542   Are you depressed or being treated for depression?  Yes 05/04/18 2967    DISCHARGE PLANNING

## 2018-04-10 NOTE — LETTER
Transition Communication Hand-off for Care Transitions to Next Level of Care Provider    Name: Marychuy Zhou  : 1955  MRN #: [id number]  Primary Care Provider: Mellisa Haji     Primary Clinic: [facility] [address]   [facility] [address]     Reason for Hospitalization:  Cellulitis of buttock, right [L03.317]  Decubitus ulcer of sacral region, stage 4 (H) [L89.154]  Wound of right buttock, initial encounter [S31.811Z]  Admit Date/Time: 4/10/2018  8:05 PM  Discharge Date: 18  Payor Source: Payor: MEDICARE / Plan: MEDICARE / Product Type: Medicare /            Reason for Communication Hand-off Referral: Other Discharge plan    Discharge Plan: [address] [phone] Will be followed by Dr Shanique Jennings and Sonia Marie NP  Discharge Plan:       Most Recent Value    Concerns To Be Addressed compliance issue concerns, coping/stress concerns, decision making concerns, patient refuses services, adjustment to diagnosis/illness concerns, cognitive/perceptual concerns           Concern for non-adherence with plan of care:   Y/N Y pt often times refuses cares and treatment  Discharge Needs Assessment:        Follow-up specialty is recommended: Yes-pt to follow up with Colorectal services to have diverting colostomy -possible urostomy    Follow-up plan:  Future Appointments  Date Time Provider Department Center   2018 3:20 PM [facility]       Any outstanding tests or procedures:              FRANSICO Patel  8A/10A Ortho[facility]  [address] [address]    [phone] pager: [phone]      AVS/Discharge Summary is the source of truth; this is a helpful guide for improved communication of patient story

## 2018-04-10 NOTE — IP AVS SNAPSHOT
UR 8A    [address]    [facility] [place] [address]    Phone:  [phone]                                       After Visit Summary   4/10/2018    Marychuy Zhou    MRN: [id number]           After Visit Summary Signature Page     I have received my discharge instructions, and my questions have been answered. I have discussed any challenges I see with this plan with the nurse or doctor.    ..........................................................................................................................................  Patient/Patient Representative Signature      ..........................................................................................................................................  Patient Representative Print Name and Relationship to Patient    ..................................................               ................................................  Date                                            Time    ..........................................................................................................................................  Reviewed by Signature/Title    ...................................................              ..............................................  Date                                                            Time

## 2018-04-10 NOTE — IP AVS SNAPSHOT
MRN:[id number]                      After Visit Summary   4/10/2018    Marychuy Zhou    MRN: [id number]           Thank you!     Thank you for choosing [facility] for your care. Our goal is always to provide you with excellent care. Hearing back from our patients is one way we can continue to improve our services. Please take a few minutes to complete the written survey that you may receive in the mail after you visit with us. Thank you!        Patient Information     Date Of Birth          1955        Designated Caregiver       Most Recent Value    Caregiver    Will someone help with your care after discharge? no      About your hospital stay     You were admitted on:  April 11, 2018 You last received care in the:  UR 8A    You were discharged on:  May 8, 2018       Who to Call     For medical emergencies, please call 911.  For non-urgent questions about your medical care, please call your primary care provider or clinic, [phone]          Attending Provider     Provider Specialty    Logan Chinchilla MD Emergency Medicine    [facility]       Primary Care Provider Office Phone # Fax #    Mellisa Haji [phone]      After Care Instructions     Advance Diet as Tolerated       Regular diet            Discharge Instructions       If questions or problems arise regarding tube function (e.g. leaking, dislodges, etc.) Contact Interventional Radiology department 24 hours a day.    For procedures that were done at the [facility] sites,   8:00-4:30 PM Monday through Friday    Contact:[phone].    For afterhours and weekends call the [facility] main phone line [phone] and ask for the [facility] IR on call physician number.    If DIRECTED by the RADIOLOGIST, related to specific problems with the tube functioning,  go to the Emergency Department.            Discharge Instructions       Patient to make a follow up appointment in IR  clinic in 10-14 days for the removal of the retention sutures at the G or GJ tube site. Phone number is [phone] if needed.            General info for SNF       Length of Stay Estimate: Short Term Care: Estimated # of Days 31-90  Condition at Discharge: Stable  Level of care:skilled   Rehabilitation Potential: Fair  Admission H&P remains valid and up-to-date: Yes  Recent Chemotherapy: N/A  Use Nursing Home Standing Orders: Yes    Weekly labs:  CBC, CMP, TSH    Wound cares:    Left heel wound: wash every other day with wound cleanser and gauze. Apply a thick layer of Medihoney (order #[id number]) to wound bed and cover with Mepilex 4x4. If dressing rolls at edges, OK to use Primapore tape to keep in place    Left upper thigh wound: wash every other day and as needed if soiled with wound cleanser and gauze. Pat dry. Cover with Mepilex 4x4.    Right IT wound: wash daily and as needed if soiled with urine or stool with wound cleanser and gauze. Pack wound to entire depth (7 cm) with Kerlix dampened with saline. Cover with ABD and secure with Primapore tape.    Left upper thigh wound: wash every other day and as needed if soiled with wound cleanser and gauze. Pat dry. Cover with Mepilex 4x4.      TF TWO CALL HN  50 CC/HR STARTING  PM, STOP  AM  WATER FLUSHES 120 CC TID, AND 30-60 CC DOWN GT BEFORE AND AFTER MEDICATIONS            Mantoux instructions       Give two-step Mantoux (PPD) Per Facility Policy Yes            Weight bearing status       Pt should be in bed most of the day, she can be up in her customized WC for short periods of times                  Follow-up Appointments     Adult [facility] Follow-up and recommended labs and tests       COLON AND RECTAL SURGERY    FOLLOW-UP  1.  You can follow up in the Colon and Rectal Surgery clinic with first available provider in 4-6 weeks. Please contact our clinic scheduler Cecile Nassar (phone # [phone]) to help you schedule a follow-up appointment.   "      *For other appointments on [place] and/or [facility] (with [facility] or [facility] provider or service): Call [phone] if you have not heard regarding these appointments within 7 days of discharge.      Appointments on [place] and/or [facility] (with [facility] or [facility] provider or service). Call [phone] if you haven't heard regarding these appointments within 7 days of discharge.                  Your next 10 appointments already scheduled     Jun 11, 2018  3:20 PM CDT   (Arrive by 3:05 PM)   New Patient Visit with Faustino Coyne MD   [facility] ([facility])    [address]  [address] [facility] [place] [address] [phone]              Pending Results     No orders found from 4/8/2018 to 4/11/2018.            Statement of Approval     Ordered          05/08/18 1501  I have reviewed and agree with all the recommendations and orders detailed in this document.  EFFECTIVE NOW     Approved and electronically signed by:  Shahriar Cadet MD             Admission Information     Date & Time Provider Department Dept. Phone    4/10/2018 Toan Kingston MD  [phone]      Your Vitals Were     Blood Pressure Pulse Temperature Respirations Height Weight    105/58 (BP Location: Left arm) 64 98.4  F (36.9  C) (Oral) 16 1.588 m (5' 2.5\") 60.3 kg (133 lb)    Pulse Oximetry BMI (Body Mass Index)                94% 23.94 kg/m2          [email or web address] Information     [email or web address] lets you send messages to your doctor, view your test results, renew your prescriptions, schedule appointments and more. To sign up, go to [email or web address] . Click on \"Log in\" on the left side of the screen, which will take you to the Welcome page. Then click on \"Sign up Now\" on the right side of the page.     You will be asked to enter the access code listed below, as well as some personal information. Please follow the directions to create your " username and password.     Your access code is: [email or web address]  Expires: 2018 10:46 PM     Your access code will  in 90 days. If you need help or a new code, please call your [facility] clinic or [phone].        Care EveryWhere ID     This is your Care EveryWhere ID. This could be used by other organizations to access your [facility] medical records  [id number]        Equal Access to Services     DANIELLA HILL : Hadii tod ruiz hadasho Soomaali, waaxda luqadaha, qaybta kaalmada adeegyada, hemant andrewsmaxxhenry dave . So [facility] [phone].    ATENCIÓN: Si arabella anayajonathan, tiene a thorpe disposición servicios gratuitos de asistencia lingüística. Llame al [phone].    We comply with applicable federal civil rights laws and [place] laws. We do not discriminate on the basis of race, color, national origin, age, disability, sex, sexual orientation, or gender identity.               Review of your medicines      START taking        Dose / Directions    fluconazole 150 MG tablet   Commonly known as:  DIFLUCAN   Indication:  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   Used for:  Fungal dermatitis        Dose:  150 mg   Take 1 tablet (150 mg) by mouth daily for 7 days   Quantity:  1 tablet   Refills:  0       ipratropium - albuterol 0.5 mg/2.5 mg/3 mL 0.5-2.5 (3) MG/3ML neb solution   Commonly known as:  DUONEB   Used for:  Chronic obstructive pulmonary disease, unspecified COPD type (H)        Dose:  3 mL   Take 1 vial (3 mLs) by nebulization every 4 hours as needed for wheezing   Quantity:  360 mL   Refills:  0       loperamide 2 MG capsule   Commonly known as:  IMODIUM   Used for:  Diarrhea, unspecified type        Dose:  4 mg   Take 2 capsules (4 mg) by mouth 4 times daily   Quantity:  20 capsule   Refills:  0       multivitamin, therapeutic with minerals Tabs tablet   Used for:  Decubitus ulcer of sacral region, stage 4 (H)        Dose:  1 tablet   Start taking on:  2018   Take 1 tablet by mouth  daily   Quantity:  30 each   Refills:  0       ondansetron 4 MG ODT tab   Commonly known as:  ZOFRAN ODT   Used for:  Nausea        Dose:  4 mg   Take 1 tablet (4 mg) by mouth every 6 hours as needed for nausea   Quantity:  20 tablet   Refills:  1       triamcinolone 0.1 % cream   Commonly known as:  KENALOG   Used for:  Folliculitis        Apply topically 3 times daily   Refills:  0         CONTINUE these medicines which may have CHANGED, or have new prescriptions. If we are uncertain of the size of tablets/capsules you have at home, strength may be listed as something that might have changed.        Dose / Directions    diazepam 2 MG tablet   Commonly known as:  VALIUM   This may have changed:    - how much to take  - when to take this   Used for:  Anxiety        Dose:  4 mg   Take 2 tablets (4 mg) by mouth every 6 hours as needed for anxiety   Quantity:  60 tablet   Refills:  0       levETIRAcetam 250 MG tablet   Commonly known as:  KEPPRA   This may have changed:  medication strength   Used for:  Seizures (H)        Dose:  250 mg   Take 1 tablet (250 mg) by mouth every evening Until 4/7/2018 then decrease to 250mg BID 4/8/2018-4/21/2018, then decrease to 250mg QAM 4/22-5/5/2018   Refills:  0       oxyCODONE IR 5 MG tablet   Commonly known as:  ROXICODONE   This may have changed:  Another medication with the same name was removed. Continue taking this medication, and follow the directions you see here.   Used for:  Other chronic pain        Dose:  5-10 mg   Take 1-2 tablets (5-10 mg) by mouth every 4 hours as needed (Give 5mg for pain level 4-6 on a 10 point scale. Give 10mg for pain level 6-10 on a 10 point scale.)   Quantity:  40 tablet   Refills:  0       protein modular   This may have changed:    - how to take this  - when to take this  - additional instructions   Used for:  Decubitus ulcer of sacral region, stage 4 (H)        Dose:  1 packet   1 packet by Per Feeding Tube route 3 times daily   Refills:  0          CONTINUE these medicines which have NOT CHANGED        Dose / Directions    LEVOTHYROXINE SODIUM PO        Dose:  125 mcg   Take 125 mcg by mouth every morning   Refills:  0       miconazole 2 % Aerp powder   Commonly known as:  MICATIN        Apply topically 2 times daily Apply to groin folds   Refills:  0       mineral oil-hydrophilic petrolatum        Apply topically daily Apply to lower extremities for skin irritation.   Refills:  0       OMEPRAZOLE PO        Dose:  20 mg   Take 20 mg by mouth daily (with breakfast)   Refills:  0       XARELTO PO        Dose:  20 mg   Take 20 mg by mouth At Bedtime   Refills:  0         STOP taking     ACETAMINOPHEN PO           albuterol 1.25 MG/3ML nebulizer solution   Commonly known as:  ACCUNEB           amoxicillin-clavulanate 875-125 MG per tablet   Commonly known as:  AUGMENTIN           ARIPIPRAZOLE PO           aspirin 81 MG chewable tablet           AZITHROMYCIN PO           bisacodyl 10 MG Suppository   Commonly known as:  DULCOLAX           CYMBALTA PO           divalproex sodium delayed-release 250 MG DR tablet   Commonly known as:  DEPAKOTE           ferrous sulfate 325 (65 Fe) MG tablet   Commonly known as:  IRON           GABAPENTIN PO           HYDROmorphone 2 MG tablet   Commonly known as:  DILAUDID           HYDROXYZINE HCL PO           K-DUR PO           LAMOTRIGINE PO           magnesium hydroxide 400 MG/5ML suspension   Commonly known as:  MILK OF MAGNESIA           MELATONIN PO           senna-docusate 8.6-50 MG per tablet   Commonly known as:  SENOKOT-S;PERICOLACE           zolpidem 5 MG tablet   Commonly known as:  AMBIEN                Where to get your medicines      Some of these will need a paper prescription and others can be bought over the counter. Ask your nurse if you have questions.     You don't need a prescription for these medications     fluconazole 150 MG tablet    ipratropium - albuterol 0.5 mg/2.5 mg/3 mL 0.5-2.5 (3) MG/3ML neb  solution    levETIRAcetam 250 MG tablet    loperamide 2 MG capsule    multivitamin, therapeutic with minerals Tabs tablet    ondansetron 4 MG ODT tab    protein modular    triamcinolone 0.1 % cream         Information about where to get these medications is not yet available     ! Ask your nurse or doctor about these medications     diazepam 2 MG tablet    oxyCODONE IR 5 MG tablet                Protect others around you: Learn how to safely use, store and throw away your medicines at www.disposemymeds.org.        ANTIBIOTIC INSTRUCTION     You've Been Prescribed an Antibiotic - Now What?  Your healthcare team thinks that you or your loved one might have an infection. Some infections can be treated with antibiotics, which are powerful, life-saving drugs. Like all medications, antibiotics have side effects and should only be used when necessary. There are some important things you should know about your antibiotic treatment.      Your healthcare team may run tests before you start taking an antibiotic.    Your team may take samples (e.g., from your blood, urine or other areas) to run tests to look for bacteria. These test can be important to determine if you need an antibiotic at all and, if you do, which antibiotic will work best.      Within a few days, your healthcare team might change or even stop your antibiotic.    Your team may start you on an antibiotic while they are working to find out what is making you sick.    Your team might change your antibiotic because test results show that a different antibiotic would be better to treat your infection.    In some cases, once your team has more information, they learn that you do not need an antibiotic at all. They may find out that you don't have an infection, or that the antibiotic you're taking won't work against your infection. For example, an infection caused by a virus can't be treated with antibiotics. Staying on an antibiotic when you don't need it is more  likely to be harmful than helpful.      You may experience side effects from your antibiotic.    Like all medications, antibiotics have side effects. Some of these can be serious.    Let you healthcare team know if you have any known allergies when you are admitted to the hospital.    One significant side effect of nearly all antibiotics is the risk of severe and sometimes deadly diarrhea caused by Clostridium difficile (C. Difficile). This occurs when a person takes antibiotics because some good germs are destroyed. Antibiotic use allows C. diificile to take over, putting patients at high risk for this serious infection.    As a patient or caregiver, it is important to understand your or your loved one's antibiotic treatment. It is especially important for caregivers to speak up when patients can't speak for themselves. Here are some important questions to ask your healthcare team.    What infection is this antibiotic treating and how do you know I have that infection?    What side effects might occur from this antibiotic?    How long will I need to take this antibiotic?    Is it safe to take this antibiotic with other medications or supplements (e.g., vitamins) that I am taking?     Are there any special directions I need to know about taking this antibiotic? For example, should I take it with food?    How will I be monitored to know whether my infection is responding to the antibiotic?    What tests may help to make sure the right antibiotic is prescribed for me?      Information provided by:  www.cdc.gov/getsmart  U.S. Department of Health and Human Services  Centers for disease Control and Prevention  National Center for Emerging and Zoonotic Infectious Diseases  Division of Healthcare Quality Promotion        Information about OPIOIDS     PRESCRIPTION OPIOIDS: WHAT YOU NEED TO KNOW   You have a prescription for an opioid (narcotic) pain medicine. Opioids can cause addiction. If you have a history of chemical  dependency of any type, you are at a higher risk of becoming addicted to opioids. Only take this medicine after all other options have been tried. Take it for as short a time and as few doses as possible.     Do not:    Drive. If you drive while taking these medicines, you could be arrested for driving under the influence (DUI).    Operate heavy machinery    Do any other dangerous activities while taking these medicines.     Drink any alcohol while taking these medicines.      Take with any other medicines that contain acetaminophen. Read all labels carefully. Look for the word  acetaminophen  or  Tylenol.  Ask your pharmacist if you have questions or are unsure.    Store your pills in a secure place, locked if possible. We will not replace any lost or stolen medicine. If you don t finish your medicine, please throw away (dispose) as directed by your pharmacist. The [place] Pollution Control Agency has more information about safe disposal: https://www.pca.[facility].[place].us/living-green/managing-unwanted-medications    All opioids tend to cause constipation. Drink plenty of water and eat foods that have a lot of fiber, such as fruits, vegetables, prune juice, apple juice and high-fiber cereal. Take a laxative (Miralax, milk of magnesia, Colace, Senna) if you don t move your bowels at least every other day.              Medication List: This is a list of all your medications and when to take them. Check marks below indicate your daily home schedule. Keep this list as a reference.      Medications           Morning Afternoon Evening Bedtime As Needed    diazepam 2 MG tablet   Commonly known as:  VALIUM   Take 2 tablets (4 mg) by mouth every 6 hours as needed for anxiety   Last time this was given:  4 mg on 5/7/2018  5:03 PM                                fluconazole 150 MG tablet   Commonly known as:  DIFLUCAN   Take 1 tablet (150 mg) by mouth daily for 7 days   Last time this was given:  150 mg on 5/8/2018  1:04 PM                                 ipratropium - albuterol 0.5 mg/2.5 mg/3 mL 0.5-2.5 (3) MG/3ML neb solution   Commonly known as:  DUONEB   Take 1 vial (3 mLs) by nebulization every 4 hours as needed for wheezing   Last time this was given:  3 mLs on 4/27/2018 12:34 PM                                levETIRAcetam 250 MG tablet   Commonly known as:  KEPPRA   Take 1 tablet (250 mg) by mouth every evening Until 4/7/2018 then decrease to 250mg BID 4/8/2018-4/21/2018, then decrease to 250mg QAM 4/22-5/5/2018   Last time this was given:  250 mg on 5/8/2018  8:47 AM                                LEVOTHYROXINE SODIUM PO   Take 125 mcg by mouth every morning   Last time this was given:  150 mcg on 5/8/2018  1:40 PM                                loperamide 2 MG capsule   Commonly known as:  IMODIUM   Take 2 capsules (4 mg) by mouth 4 times daily   Last time this was given:  4 mg on 5/8/2018  1:15 PM                                miconazole 2 % Aerp powder   Commonly known as:  MICATIN   Apply topically 2 times daily Apply to groin folds                                mineral oil-hydrophilic petrolatum   Apply topically daily Apply to lower extremities for skin irritation.                                multivitamin, therapeutic with minerals Tabs tablet   Take 1 tablet by mouth daily   Start taking on:  5/9/2018   Last time this was given:  1 tablet on 5/7/2018  9:19 AM                                OMEPRAZOLE PO   Take 20 mg by mouth daily (with breakfast)   Last time this was given:  20 mg on 5/8/2018  8:48 AM                                ondansetron 4 MG ODT tab   Commonly known as:  ZOFRAN ODT   Take 1 tablet (4 mg) by mouth every 6 hours as needed for nausea   Last time this was given:  4 mg on 4/19/2018  7:43 PM                                oxyCODONE IR 5 MG tablet   Commonly known as:  ROXICODONE   Take 1-2 tablets (5-10 mg) by mouth every 4 hours as needed (Give 5mg for pain level 4-6 on a 10 point scale. Give 10mg for  pain level 6-10 on a 10 point scale.)   Last time this was given:  10 mg on 5/8/2018  1:37 PM                                protein modular   1 packet by Per Feeding Tube route 3 times daily   Last time this was given:  1 packet on 5/8/2018  8:47 AM                                triamcinolone 0.1 % cream   Commonly known as:  KENALOG   Apply topically 3 times daily   Last time this was given:  5/7/2018  8:45 PM                                XARELTO PO   Take 20 mg by mouth At Bedtime   Last time this was given:  20 mg on 5/7/2018  5:20 PM

## 2018-04-11 PROBLEM — L03.90 CELLULITIS: Status: ACTIVE | Noted: 2018-01-01

## 2018-04-11 NOTE — PHARMACY-VANCOMYCIN DOSING SERVICE
Pharmacy Vancomycin Initial Note  Date of Service April 10, 2018  Patient's  1955  62 year old, female    Indication: Osteomyelitis and Skin and Soft Tissue Infection    Current estimated CrCl = Estimated Creatinine Clearance: 100.3 mL/min (based on Cr of 0.56).    Creatinine for last 3 days  4/10/2018:  9:25 PM Creatinine 0.56 mg/dL    Recent Vancomycin Level(s) for last 3 days  No results found for requested labs within last 72 hours.          Vancomycin IV Administrations (past 72 hours)      No vancomycin orders with administrations in past 72 hours.                Nephrotoxins and other renal medications (Future)    Start     Dose/Rate Route Frequency Ordered Stop    04/10/18 2202  vancomycin (VANCOCIN) 1250 mg in dextrose 5% 200 mL PREMIX      1,000 mg  200 mL/hr over 1 Hours Intravenous EVERY 12 HOURS 04/10/18 2201      04/10/18 2157  piperacillin-tazobactam (ZOSYN) 3.375 g vial to attach to  mL bag      3.375 g  over 30 Minutes Intravenous ONCE 04/10/18 2156            Contrast Orders - past 72 hours     None                Plan:  1.  Start vancomycin  1250 mg IV q12h (17mg/kg using ABW = 73.5kg) .   2.  Goal Trough Level: 15-20 mg/L   3.  Pharmacy will check trough levels as appropriate in 1-3 Days.    4. Serum creatinine levels will be ordered daily for the first week of therapy and at least twice weekly for subsequent weeks.    5. [place] method utilized to dose vancomycin therapy:  previous kinetics     Vancomycin is a restricted antibiotic and requires ID/Antimicrobial Management Team (AMT) approval for empiric use beyond 48 hours ().    Asha Taylor, PharmD

## 2018-04-11 NOTE — CONSULTS
"Social Work: Assessment with Discharge Plan    Patient Name:  Marychuy Zhou  :  1955  Age:  62 year old  MRN:  [id number]  Risk/Complexity Score:  Filed Complexity Screen Score: 8  Completed assessment with:  Pt, [facility] [phone] F: [phone], Dr Pelletier, Dr Cadet and Infectious Disease    Presenting Information   Reason for Referral:  Discharge plan  Date of Intake:  2018  Referral Source:  Physician  Decision Maker:  pt  Alternate Decision Maker:  Not identified    Living Situation:  Long Term Care:  [facility]. Pt states \"I am going there for 3 more weeks:\" But Chart Review shows that pt is most likely long term resident  Previous Functional Status:  Assistance with Other:  pt is long term resident with bed hold at [facility]  Patient and family understanding of hospitalization:  Pt came in seeking care for possible osteomyelitis and infection of decubitus ulcer. Pt states she was told to come here for Dr Pelletier  Cultural/Language/Spiritual Considerations:  Pt parapalegic, possible personality disorder  Adjustment to Illness:  Non compliant with care    Physical Health  Reason for Admission:    1. Wound of right buttock, initial encounter    2. Decubitus ulcer of sacral region, stage 4 (H)    3. Cellulitis of buttock, right      Services Needed/Recommended:  LTC - Return to [facility] in [place]    Mental Health/Chemical Dependency  Diagnosis:  Possible PD  Support/Services in Place:  LTC community support,  support  Services Needed/Recommended:  Return to [facility]    Support System  Significant relationship at present time:  Not evident, per chart review ,pt states she has a friend she can live with  Family of origin is available for support:  Not evident  Other support available:  Support through [facility] Care staff  Gaps in support system:  None noted  Patient is caregiver to:  None     Provider Information   Primary Care Physician:  Adithya" "Mellisa   [phone]   Clinic:  [facility] [address] [facility]  / [facility] *      :      Financial   Income Source:  disability  Financial Concerns:  None noted  Insurance:    Payor/Plan Subscriber Name Rel Member # Group #   MEDICARE - MEDICARE SHALINI CHONG  [id number]       ATTN CLAIMS, [address]   [employer] [facility]* SHALINI CHONG  [id number]      [address]       Discharge Plan   Patient and family discharge goal:  Pt is agreeable to return to [facility]  Provided education on discharge plan:  YES  Patient agreeable to discharge plan:  YES  A list of Medicare Certified Facilities was provided to the patient and/or family to encourage patient choice. Patient's choices for facility are:  Pt resides in LTC at [facility]  Will NH provide Skilled rehabilitation or complex medical:  YES  General information regarding anticipated insurance coverage and possible out of pocket cost was discussed. Patient and patient's family are aware patient may incur the cost of transportation to the facility, pending insurance payment: YES  Barriers to discharge:  Assessment by medical team    Discharge Recommendations   Anticipated Disposition:  Facility:  Return to [facility]  Transportation Needs:  Other:  [facility] [phone]      Additional comments   Writer attempted several times through out day to meet w/pt and introduce role. Writer finally did meet w/pt, she said \"I am sleeping and you are rude to just come in here.\"  Writer did explain role, multiple attempts to see her and provided quick review of DC plan. Pt stated \"Yep, I return to [facility] for [facility] weeks.\"    Per Dr Pelletier and medical team, pt is poor surgical candidate based upon her behaviors and refusal for wound cares as pt would need to demonstrate acceptance of wound cares and adherence to care plan to promote positive outcomes.  She is aware of consult and plastics will meet w/pt and evaluate. "     MARGI confirmed there is bed hold at [facility] via admissions, updated information sent via [employer] to ensure facility will con't to be able to meet pt's needs. MARGI con't to follow

## 2018-04-11 NOTE — ED NOTES
Patient presents from nursing home via EMS for c/o tailbone pain. Patient has sacral wound, states bone is infected and needs to be seen by specialist. Recently hospitalized for PNA. Patient reports fever of 103 at home, afebrile during triage.

## 2018-04-11 NOTE — CONSULTS
Patient Marychuy Zhou is a 62 year old female who presents to the hospital for management of her decubitus ulcerations. Patient has generally been refusing all patient care/hygiene. Last night when turning the patient her right percutaneous nephrostomy tube was found to be pulled out - it was unclear how long this has been out. Additionally, the patient's bandages had fallen off and the area was covered in stool.     I visited the patient this morning (4/11/2018) to evaluate if the existing tract could be re-navigated. Unfortunately, the percutaneous skin site is closed and I do not believe that the tract could be re-navigated. Additionally, the area showed evidence of dried stool and attempts to place a line though the previous tract would be at high risk for infection.     If the patients clinical picture continues to warrant a percutaneous nephrostomy tube please re-consult IR.     Case discussed with Dr. Cadet and Dr. Bolanos.    Malachi Storey PA-C  Interventional Radiology  Phone: [phone]   Pager: [phone]

## 2018-04-11 NOTE — ED NOTES
Per EMS report, EMS has personally transported this patient several times in the past few weeks to several hospitals in the [facility]. During each transport that patient's chief complaint is tailbone and lower abdominal pain. The patient is also noted to be non-complaint with medications and care at her facility. Staff at her facility state that when the patient becomes upset about a medication or care she does not want to be completed, she calls 911 to be transported to a hospital.

## 2018-04-11 NOTE — PROGRESS NOTES
pt has been lying on her  feces. Pt declined care. Pt educated on the importance of being cleaned up. MD updated and recommended to educate the patient and chare the response.

## 2018-04-11 NOTE — PROGRESS NOTES
Pt arrived to the unit via stretcher accompanied with two attendant. Pt assisted to bed and oriented to room and call light system. Pt came with a bag of vancomycin to be administered. conformed with our pharmacy, and it's okay to administer.

## 2018-04-11 NOTE — PLAN OF CARE
"Problem: Patient Care Overview  Goal: Plan of Care/Patient Progress Review  Outcome: No Change  Pt refused INR draw at 7:30 .Declined morning medications and assessment. Pt stated \"I'm sleeping\".      "

## 2018-04-11 NOTE — PLAN OF CARE
Problem: Patient Care Overview  Goal: Individualization & Mutuality  Pt A&O x's 4.declined vs check , and assessment. Pt noted to be coughing, lungs are audibly congested, reports SOB but declined 02 and pulse-ox monitor.  Pt declined any form of assessment. Had exteremly large loose stool and also had dried feces on the back,butock and legs. Pt able to make needs known, call light with in reach. Will continue to monitor.

## 2018-04-11 NOTE — PROGRESS NOTES
Pt again declined to be changed. When this writer and charge RN went into the pt room to offer care, pt yelled and asked this writer and the charge RN to leave the room. MD and nurse supervisor also spoke with pt but continues to decline care,stating that she is clean. Will continue to offer care.

## 2018-04-11 NOTE — CONSULTS
Plastic Surgery Consult  4/11/2018    PRIMARY TEAM: Hospitalist  PRIMARY PHYSICIAN: Dr Kingston    REASON FOR ADMISSION: Fevers, cellulitis of buttocks    HISTORY PRESENTING ILLNESS: This is a 62 year old female with PMH significant for T4 paraplegia, chronic pain COPD, anemia, recent hip fracture, Hx of DVT on anticoagulation (rivaroxiban), anxiety, depression, diastolic heart failure, hypothyroidism and chronic stage 4 decubti with known osteo who presents with fevers, drainage, and cellulitis of her buttock area. She was admitted last week to [facility] where it was recommended she be transferred for further cares. She refused and was ultimately discharged on PO antibiotics.     Review of systems: 10 point ROS neg other than the symptoms noted above in the HPI.    PAST MEDICAL HISTORY:   has a past medical history of Anxiety; Chronic pain syndrome; Closed fracture zygoma, with routine healing, subsequent encounter; COPD (chronic obstructive pulmonary disease) (H); Depressive disorder; DVT (deep vein thrombosis) in pregnancy (H); Edema; Epilepsy (H); Flaccid neuropathic bladder, not elsewhere classified; Fracture of femur (H); Hypothyroidism; Iron deficiency anemia; Paraplegia (H); Pressure ulcer of sacral region; PTSD (post-traumatic stress disorder); Rheumatoid arthritis (H); and Sepsis (H).    PAST SURGICAL HISTORY:   has no past surgical history on file.    FAMILY HISTORY:  family history includes Chronic Obstructive Pulmonary Disease in her brother.    SOCIAL HISTORY:   reports that she has never smoked. She does not have any smokeless tobacco history on file. She reports that she does not drink alcohol or use illicit drugs.    ALLERGIES:  No Known Allergies    MEDICATIONS:  Prescriptions Prior to Admission   Medication Sig Dispense Refill Last Dose     albuterol (ACCUNEB) 1.25 MG/3ML nebulizer solution Take 1 vial by nebulization 4 times daily        AZITHROMYCIN PO Take 250 mg by mouth daily         divalproex sodium delayed-release (DEPAKOTE) 250 MG DR tablet Take 250 mg by mouth 2 times daily        HYDROmorphone (DILAUDID) 2 MG tablet Take 1 tablet (2 mg) by mouth every 4 hours as needed for moderate to severe pain 4 tablet 0      oxyCODONE (OXYCONTIN) 20 MG 12 hr tablet Take 1 tablet (20 mg) by mouth every 12 hours 2 tablet 0      oxyCODONE IR (ROXICODONE) 5 MG tablet Take 1-2 tablets (5-10 mg) by mouth every 4 hours as needed (Give 5mg for pain level 4-6 on a 10 point scale. Give 10mg for pain level 6-10 on a 10 point scale.) 4 tablet 0      diazepam (VALIUM) 2 MG tablet Take 2.5 tablets (5 mg) by mouth every 4 hours as needed for anxiety 4 tablet       zolpidem (AMBIEN) 5 MG tablet Take 1 tablet (5 mg) by mouth At Bedtime 1 tablet       amoxicillin-clavulanate (AUGMENTIN) 875-125 MG per tablet Take 1 tablet by mouth every 12 hours  0      mineral oil-hydrophilic petrolatum (AQUAPHOR) Apply topically daily Apply to lower extremities for skin irritation.   4/4/2018 at am     ARIPIPRAZOLE PO Take 5 mg by mouth every morning   4/4/2018 at am     aspirin 81 MG chewable tablet Take 81 mg by mouth daily   4/4/2018 at am     DULoxetine HCl (CYMBALTA PO) Take 60 mg by mouth every morning   4/4/2018 at am     HYDROXYZINE HCL PO Take 50 mg by mouth At Bedtime   4/3/2018 at pm     LAMOTRIGINE PO Take 25 mg by mouth every morning X 4 more days then increase to 50mg daily from 4/9/2018-4/22/2018.   4/4/2018 at am     LevETIRAcetam (KEPPRA PO) Take 250 mg by mouth every evening Until 4/7/2018 then decrease to 250mg BID 4/8/2018-4/21/2018, then decrease to 250mg QAM 4/22-5/5/2018   4/3/2018 at pm     LEVOTHYROXINE SODIUM PO Take 125 mcg by mouth every morning   4/4/2018 at 0600     MELATONIN PO Take 10 mg by mouth At Bedtime   4/3/2018 at pm     OMEPRAZOLE PO Take 20 mg by mouth daily (with breakfast)   4/4/2018 at 0800     Potassium Chloride [place] ER (K-DUR PO) Take 10 mEq by mouth every morning   4/4/2018 at am      Rivaroxaban (XARELTO PO) Take 20 mg by mouth At Bedtime   4/3/2018 at pm     ferrous sulfate (IRON) 325 (65 FE) MG tablet Take 325 mg by mouth 2 times daily   4/4/2018 at am x 1 dose     Nutritional Supplements (NUTRITIONAL SUPPLEMENT PO) Take 1 packet by mouth 2 times daily (Deven Powder)   Past Week     miconazole (MICATIN) 2 % AERP powder Apply topically 2 times daily Apply to groin folds   4/4/2018 at am x 1 dose     senna-docusate (SENOKOT-S;PERICOLACE) 8.6-50 MG per tablet Take 2 tablets by mouth 2 times daily   4/4/2018 at am x 1 dose     GABAPENTIN PO Take 300 mg by mouth 3 times daily   4/4/2018 at x 2 doses     ACETAMINOPHEN PO Take 650 mg by mouth 4 times daily   4/4/2018 at x 2 doses     ACETAMINOPHEN PO Take 650 mg by mouth every 4 hours as needed for pain (May take additional as needed doses. Do not exceed 4000mg in 24 hours.)   Past Week at Unknown time     bisacodyl (DULCOLAX) 10 MG Suppository Place 10 mg rectally daily as needed for constipation   prn med     magnesium hydroxide (MILK OF MAGNESIA) 400 MG/5ML suspension Take 30 mLs by mouth daily as needed for constipation or heartburn   prn med       PHYSICAL EXAMINATION:  Pulse:  [88-89] 88  Resp:  [16] 16  BP: (116)/(63) 116/63  SpO2:  [100 %] 100 %    General: alert, no distress, resting comfortably in bed  Patient laying in large amount of loose green stool. She is leaking urine into her wound. She has a severe dermatitis as well as maceration from moisture. Right stage 4 IT wound with fairly clean base. Good granulation tissue, no purulence. Sacral wound healed, but skin appears tenuous.     LABS:  Arterial Blood Gases   No lab results found in last 7 days.  Complete Blood Count     Recent Labs  Lab 04/10/18  2125 04/07/18  0835 04/06/18  0820 04/05/18  0825   WBC 3.7* 6.1 6.9 8.3   HGB 10.4* 9.1* 9.4* 8.7*    301 313 342     Basic Metabolic Panel    Recent Labs  Lab 04/10/18  2125 04/07/18  0835 04/06/18  0820 04/05/18  0825     139 139 136   POTASSIUM 4.1 4.2 4.2 3.8   CHLORIDE 105 104 106 103   CO2 32 29 27 27   BUN 7 5* 5* 4*   CR 0.56 0.40* 0.46* 0.40*   GLC 93 122* 95 91     Liver Function Tests    Recent Labs  Lab 04/11/18  1208 04/11/18  0941   ALBUMIN 1.6*  --    INR  --  1.89*     Pancreatic Enzymes  No lab results found in last 7 days.  Coagulation Profile    Recent Labs  Lab 04/11/18  0941   INR 1.89*     Lactate  Invalid input(s): LACTATE    IMAGING:  Results for orders placed or performed during the hospital encounter of 04/10/18   XR Chest 2 Views    Narrative    XR CHEST 2 VW  4/10/2018 10:13 PM      HISTORY: cough;     COMPARISON: None    FINDINGS: AP and lateral views of the chest semi-upright. Cardiac  silhouette is within normal limits. The pulmonary vasculature is  diffusely prominent. There is no focal airspace opacity, pleural  effusion, or pneumothorax. There is prominence of the major an minor  fissures on the right and the left midlung Led[place]ma on the left. These  changes could represent chronic scarring versus fluid. The visualized  upper abdomen, osseous structures, and soft tissues are unremarkable.      Impression    IMPRESSION:   1. Pulmonary vascular congestion versus chronic interstitial scarring.      I have personally reviewed the examination and initial interpretation  and I agree with the findings.    SAURAV JUSTICE MD       ASSESSMENT: Marychuy Zhou is a 62 year old female with T4 paraplegia and complex medical history including DVT on anticoagulation, osteo, and chronic decubitus ulcers. She unfortunately has a difficult situation.She is reportedly non-compliant with cares and there is concern for personality type disorder. She is severely malnourished based on labs. She is anticoagulated. She also has soiling of her wound with stool and urine given its proximity to her wound. At this time, these factors make her a non-candidate for any type of flap. Her wound is also in OK shape and doesn't need to be  debrided at this time. She needs aggressive skin care, nutritional support and a serious consideration for diverting colostomy before we will consider flap coverage.     PLAN:  -Agree with ID consult and antibiotic coverage  -No indication for debridement at this time  -Barrier ointment BID and PRN to all areas of dermatitis.   -BID wet-dry wound packing   -Nutritional consult and aggressive support  -Serious consideration of colostomy to assist in wound cares and improve quality of life    Seen with Dr Prabhu Garsia MD   PGY-4 Resident,  [facility]  [phone]

## 2018-04-11 NOTE — CONSULTS
[facility] ID SERVICE: NEW CONSULTATION     Patient:  Marychuy Zhou, Date of birth 1955, Medical record number [id number]  Date of Visit:  April 11, 2018         Assessment and Recommendations:   Problem List:  1. Suspected chronic Right ischial osteomyelitis    -S/p debridement 2/5/18 at [facility] - bone culture no growth   -Decubitus ulcer wound culture [place] 2/1/18 with MRSA, Proteus, ESBL K. Pneumoniae, ESBL E. Coli   2. Infected decubitus ulcer  3. Diarrhea  4. Atypical pneumonia vs COPD exacerbation vs heart failure exacerbation  5. 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 Chronic left heel ulcer  7. COPD  8. Neurogenic bladder with nephrostomy tubes, SP catheter in place  9. Noncompliance  10. History of MDROs  11. History of C. Difficile   12. CKD  13. Chronic pain   14. Seizure disorder  15. Diastolic heart failure    Recommendations:  -Agree with continuation of zosyn and doxycycline for treatment of cellulitis/infected decubitus ulcer. Can stop vancomycin. This regimen would also treat any possible pneumonia (unclear if patient has pneumonia, more likely COPD exacerbation vs diastolic heart failure but difficult to discern given exam and history refusal).   -Await Plastic Surgery consultation for consideration of debridement/flap. If patient does go to OR would obtain bone pathology, gram stain with aerobic/anaerobic bacterial cultures.   -If no surgery is planned, would favor to treat cellulitis with 7-10 day antibiotic course, and would not favor extending treatment for her chronic osteomyelitis as treating without surgical intervention and plan for coverage would likely not be curative.  -Obtain C. Difficile PCR given history of C. Difficile and complaints of diarrhea.   -Obtain sputum culture if possible.     Discussion:  Paraplegic patient with complicated medical history and history of noncompliance admitted 4/11/2018 with fevers, fatigue, cough, elevated inflammatory markers  concerning for decubitus ulcer infection with underlying chronic ischial osteomyelitis, and also possible pneumonia vs COPD exacerbation vs diastolic heart failure exacerbation. Patient has had a decubitus ulcer for about 6 months, s/p debridement 2/2018 with ischial bone biopsy showing no growth; however without adequate coverage and history of noncompliance it is likely that her wound has progressed. She has refused exam by anybody except for the ED provider, and they noted a cellulitic appearance of the ulcer which does track down to bone. Plastic surgery has been consulted to determine if patient would benefit from debridement and is a flap candidate. At this point, recommend to treat her cellulitis with zosyn and doxycyline (this would cover her previously isolated E. Coli, Proteus and K. Pneumoniae as well as MRSA; wound swab here in process); vancomycin can be discontinued as doxycyline should provide resistant gram positive coverage. If she is not deemed a surgical candidate, would not favor to extend therapy to treat chronic osteomyelitis as antibiotics alone would not be curative. Her refusal of cares and noncompliance makes this a very difficult case.    Thank you for this consult. ID will continue to follow this patient. Please feel free to call with any questions.   Patient seen and discussed with ID staff Dr. Duarte.    Nay Street MD - [facility] ID fellow  [phone]       History of Present Illness:   Marychuy Zhou is a 63 yo female with PMH T4 paraplegia with chronic decubitus ulcers and suspected ischial osteomyelitis, neurogenic bladder with nephrostomy tubes/SP catheter (nephrostomy tubes placed due to urinary contamination of ulcer due to patulous urethra), COPD, DVT, seizure disorder admitted to [facility] 4/11/2018 from nursing home with fevers, cough and uptrending inflammatory markers. Of note she was hospitalized 4/4-7 at [facility] for treatment of multiple decubitus ulcers with concern  for ischial tuberosity osteomyelitis (erosion on imaging); seen by ID and treated with meropenem and vancomycin. Surgery consulted and recommended transfer to [facility] as her primary surgeon was there, and she refused. She was then discharged on augmentin as a temporizing measure to a nursing home with plans to see her PCP and primary surgeon as outpatient. She endorses having fevers to 103 with development of cough, shortness of breath, chest pain with deep breathing and fatigue over the past 5 days. Also with diarrhea and lower abdominal pressure. She has no sensation and is unable to feel any pain or pressure at her decubitus site; she is unsure if any purulence has been coming out of the wound. Her nephrostomy tubes are painful (she has sensation there). She is intermittently refusing cares, vitals and exam; per ED note -> There is a large, deep wound on right buttock tracking likely all the way to the ischium. There is erythema, induration, and blanching of the skin of the right buttock surrounding the wound.  There is some minimal purulent drainage from the wound. There is a stage IV sacral decubitus ulcer without any signs of infection. Since arriving here patient was started on zosyn, vancomycin and doxycyline for treatment of infected decubitus ulcer and possible pneumonia.       Patient believes that her decubitus ulcer has been present for about 6 months. Of note patient underwent wound and ischial tuberosity debridement 2/5/18 with wound vac placement (op note without mention of purulence or infection - tissue around wound and portion of ischial tuberosity was removed) - wound cultures with ESBL producing Klebsiella and E. Coli, MRSA and Proteus. She had been on daptomycin and meropenem, but were stopped 2/12 due to no growth on bone culture (see muscle/soft tissue pathology, but no bone pathology). Her last hospitalization at [facility] was 03/25-03/28 for acute blood loss anemia.  She was treated  for an infected decubitus ulcer with daptomycin and meropenem over a 10-day course, finishing on 03/08. Per patient, there has been no discussion of flap or diverting colostomy. Per nursing notes she is often noncompliant with wound cares, and sits in feces.            Review of Systems:   10 pt ROS negative except where noted in HPI.        Past Medical History:     Past Medical History:   Diagnosis Date     Anxiety      Chronic pain syndrome      Closed fracture zygoma, with routine healing, subsequent encounter      COPD (chronic obstructive pulmonary disease) (H)      Depressive disorder      DVT (deep vein thrombosis) in pregnancy (H)      Edema      Epilepsy (H)      Flaccid neuropathic bladder, not elsewhere classified      Fracture of femur (H)      Hypothyroidism      Iron deficiency anemia      Paraplegia (H)     unspecified     Pressure ulcer of sacral region      PTSD (post-traumatic stress disorder)      Rheumatoid arthritis (H)      Sepsis (H)     unspecified         Allergies:    No Known Allergies        Recent Antimicrobials::   Zosyn 4/11-current  Vancomycin 4/4-7; 4/11-current  Doxycycline 4/11-current  Meropenem 4/4-7  Augmentin 4/7-11       Family History:     Family History   Problem Relation Age of Onset     Chronic Obstructive Pulmonary Disease Brother           Social History:     Social History     Social History     Marital status: Single     Spouse name: N/A     Number of children: N/A     Years of education: N/A     Occupational History     Not on file.     Social History Main Topics     Smoking status: Never Smoker     Smokeless tobacco: Not on file     Alcohol use No     Drug use: No     Sexual activity: Not on file     Other Topics Concern     Not on file     Social History Narrative            Physical Exam:   Ranges forvital signs:  Temp:  [98.1  F (36.7  C)] 98.1  F (36.7  C)  Pulse:  [89-90] 89  Resp:  [16] 16  BP: (106)/(72) 106/72  SpO2:  [100 %] 100 %  No intake or output data  in the 24 hours ending 04/11/18 0815    Exam:  Refusing exam; NAD obese woman lying in bed         Laboratory Data:     Creatinine   Date Value Ref Range Status   04/10/2018 0.56 0.52 - 1.04 mg/dL Final   04/07/2018 0.40 (L) 0.52 - 1.04 mg/dL Final   04/06/2018 0.46 (L) 0.52 - 1.04 mg/dL Final   04/05/2018 0.40 (L) 0.52 - 1.04 mg/dL Final   04/04/2018 0.51 (L) 0.52 - 1.04 mg/dL Final     WBC   Date Value Ref Range Status   04/10/2018 3.7 (L) 4.0 - 11.0 10e9/L Final   04/07/2018 6.1 4.0 - 11.0 10e9/L Final   04/06/2018 6.9 4.0 - 11.0 10e9/L Final   04/05/2018 8.3 4.0 - 11.0 10e9/L Final   04/04/2018 11.0 4.0 - 11.0 10e9/L Final     Hemoglobin   Date Value Ref Range Status   04/10/2018 10.4 (L) 11.7 - 15.7 g/dL Final     Platelet Count   Date Value Ref Range Status   04/10/2018 285 150 - 450 10e9/L Final     Lab Results   Component Value Date     04/10/2018    BUN 7 04/10/2018    CO2 32 04/10/2018     CRP  4/5 4/10  100 80  ESR  4/4 4/10  62 64       Pertinent Recent Microbiology Data:   Blood cultures  4/10 x 2 NGTD  4/4 x 2 NG  4/2 X 1 NG    Wound cultures  4/10 swab pending    Bone culture  2/5 1+ PMNs on gram stain, aerobic and anaerobic cultures NG  2/1 Wound swab   Culture 4+ MRSA Coagulase Positive Staph (A)   Comment: Methicillin resistant Staph aureus, infection status already documented.      Culture 4+ Diphtheroids (A)     Culture 4+ Proteus mirabilis (A)     Culture 1+ ESBL Producing Klebsiella pneumoniae (A)     Culture 1+ ESBL producing Escherichia coli (A)     Gram Stain Result 2+ Polymorphonuclear leukocytes     Gram Stain Result 1+ Gram negative bacilli     Gram Stain Result 1+ Gram positive cocci in pairs     Specimen   Swab - Other     Organism Antibiotic Method Susceptibility   MRSA Coagulase Positive Staph Clindamycin SULLY <=0.5: Sensitive    Cefazolin SULLY >16: Resistant    Doxycycline SULLY <=0.5: Sensitive    Daptomycin SULLY <=1: Sensitive    Linezolid SULLY 2: Sensitive    Oxacillin SULLY >2:  Resistant    Trimethoprim + Sulfamethoxazole SULLY <=1/19: Sensitive    Vancomycin SULLY 1: Sensitive   Proteus mirabilis Amoxicillin + Clavulanate SULLY <=4/2: Sensitive    Ampicillin SULLY >16: Resistant    Cefazolin SULLY 8: Sensitive    Cefepime SULLY <=1: Sensitive    Ceftriaxone SULLY <=1: Sensitive    Ciprofloxacin SULLY >2: Resistant    Gentamicin SULLY <=2: Sensitive    Levofloxacin SULLY >4: Resistant    Piperacillin + Tazobactam SULLY <=2/4: Sensitive    Tetracycline SULLY >8: Resistant    Tobramycin SULLY <=2: Sensitive    Trimethoprim + Sulfamethoxazole SULLY >2/38: Resistant   ESBL Producing Klebsiella pneumoniae Amoxicillin + Clavulanate SULLY 16/8: Resistant    Ampicillin SULLY >16: Resistant    Cefazolin SULLY >16: Resistant    Cefepime SULLY >16: Resistant    Ceftriaxone SULLY >32: Resistant    Ciprofloxacin SULLY >2: Resistant    Gentamicin SULLY <=2: Sensitive    Levofloxacin SULLY <=1: Sensitive    Meropenem SULLY <=0.25: Sensitive    Piperacillin + Tazobactam SULLY 16/4: Sensitive    Tetracycline SULLY >8: Resistant    Tobramycin SULLY 8: Intermediate    Trimethoprim + Sulfamethoxazole SULLY <=0.5: Sensitive   ESBL producing Escherichia coli Amoxicillin + Clavulanate SULLY 8/4: Resistant    Ampicillin SULLY >16: Resistant    Cefazolin SULLY >16: Resistant    Cefepime SULLY 4: Resistant    Ceftriaxone SULLY >32: Resistant    Ciprofloxacin SULLY >2: Resistant    Gentamicin SULLY <=2: Sensitive    Levofloxacin SULLY >4: Resistant    Meropenem SULLY <=0.25: Sensitive    Piperacillin + Tazobactam SULLY 8/4: Sensitive    Tetracycline SULLY >8: Resistant    Tobramycin SULLY <=2: Sensitive    Trimethoprim + Sulfamethoxazole SULLY >2/38: Resistant              Imagin/10/18 CXR:  AP and lateral views of the chest semi-upright. Cardiac  silhouette is within normal limits. The pulmonary vasculature is  diffusely prominent. There is no focal airspace opacity, pleural  effusion, or pneumothorax. There is prominence of the major an minor  fissures on the right and the left midlung  Ledesma on the left. These  changes could represent chronic scarring versus fluid. The visualized  upper abdomen, osseous structures, and soft tissues are unremarkable.    4/4/18 XR L calcaneus:  The bones are severely osteopenic which limits evaluation.  No definite sclerosis is seen of the calcaneus, although given the  marked osteopenia plain film evaluation is severely limited for  possible osteomyelitis.    4/4/18 CT AP:  IMPRESSION:  1. Ulcer extending down to the right ischial tuberosity with erosive  changes suspicious for osteomyelitis.  2. Nonunited left subtrochanteric fracture.  3. Bilateral nephrostomy tubes in place. No evidence of hydronephrosis  or urinary tract stones.  3. Suprapubic catheter in place in the bladder.

## 2018-04-11 NOTE — PROGRESS NOTES
Shortly after the moonlighter and nurse supervisor talked with pt, the  pt requested staff to  Reposition her , Two staff were in the room to assist the pt.  Pt was  covered with dry feces from mid back to lower legs. Buttock dressing were off, nephrostomy tube was out,  Moonlighter was updated.  Pt was cleaned , but declined wound care, wounds are open to air, also declined katia-care. Pt complained of dizziness  With turing and repositioning but refused vs thought the night. Pt yelled at staff.

## 2018-04-11 NOTE — H&P
"[facility] History and Physical    Marychuy Zhou MRN# [id number]   Age: 62 year old YOB: 1955     Date of Admission:  4/10/2018    Home clinic:   Primary care provider: Mellisa Haji          Assessment and Plan:   Assessment:    This is a 62 year old female with T4 paraplegia due to hematoma with multiple decubitus ulcers, chronic pain involving the right buttock, stage IV, with chronic osteomyelitis, left plant heel wound, COPD, anemia,recent left comminuted acute subtrochanteric fracture with recent fall, Hx of DVT on Rivaroxaban,  seizure disorder, anxiety, depression, diastolic heart failure and hypothyroidism who comes with 4 days of fevers, dry cough and worsening buttock pain with redness of skin in buttock area, purulent discharge and worsening ESR and CRP.   She is also refusing care        Plan:  Chronic pain- while she does have significant co morbidities including chronic osteomyelitis, most likely there is a component of pain seeking, will stop oxycodone and continue oral dilaudid with IV dilaudid prn. Her regimen needs to be simplified     Cellulitis in buttock area- recently was on meropenem and daptomycin ( for OM)  until 3/27, CRP was 7.6. Will cover broadly with Zosyn and Vancomycin, cultures have been sent. ID consult will be requested    Possible pneumonia- she has cough and fevers, will get CXR. Given her h/o COPD will add doxycycline for atypical coverage    h/o DVT- continue anticoagulation with rivaroxaban    Seizure disorder- tapering down keppra and increasing lamotrigine gradually  \" The patient used to be on Keppra, but it looks like her Keppra is being tapered and she is currently taking 500 in the morning and 250 mg at bedtime, which is to be continued until 04/07 and from 04/08, she will take 250 mg p.o. b.i.d. until 04/21 and then to decrease to 250 mg q.a.m. from 04/22 until 05/05/2018.   - She has now been started on lamotrigine which she will be taking 25 mg p.o. " "daily for 4 more days, then to increase to 50 mg daily from 04/09/2018 until 04/22/2018.    - The patient to follow up with Neurology regarding her subsequent dose after 04/22\"      If she continues to refuse care I suggest we involve psychiatry to evaluate her for decision making capacity                    Chief Complaint:   fevers     History is obtained from the patient     This is a 62 year old female with multiple medical problem as outline in PMH section. She was recently hospitalized from 4/4-4/7 at [facility] for treatment of chronic osteomyelitis and multiple stage IV decubital ulcers, was treated with meropenem and vancomycin since cultures were positive for ESBL, VRE and MRSA. At that time surgery was consulted and recommended transfer to [facility] because her primary surgeon is there. Patient refused. She was discharged on Augmentin until seen by her primary care and her primary surgeon. She was discharged on Augmentin.   In TCU she had fevers, was complaining of more pain and eventually was transferred to [facility] for evaluation and finally came here for evaluation by plastic surgeon.     She was seen and examined on the floor. She is covered in blankets from head to toe. Not cooperative with exam, refusing to be examined in details. She did allow me to auscultate her left chest, was screening when I attempted to examine site of nephrostomy tubes and suprapubic catheter. She reports non productive cough, denies chest pain, denies diarrhea.     She is refusing care from nurses.          Past Medical History:     Past Medical History:   Diagnosis Date     Anxiety      Chronic pain syndrome      Closed fracture zygoma, with routine healing, subsequent encounter      COPD (chronic obstructive pulmonary disease) (H)      Depressive disorder      DVT (deep vein thrombosis) in pregnancy (H)      Edema      Epilepsy (H)      Flaccid neuropathic bladder, not elsewhere classified      Fracture " of femur (H)      Hypothyroidism      Iron deficiency anemia      Paraplegia (H)     unspecified     Pressure ulcer of sacral region      PTSD (post-traumatic stress disorder)      Rheumatoid arthritis (H)      Sepsis (H)     unspecified             Past Surgical History:    History reviewed. No pertinent surgical history.          Social History:     Social History   Substance Use Topics     Smoking status: Never Smoker     Smokeless tobacco: Not on file     Alcohol use No             Family History:   No family history on file.          Immunizations:     Immunization History   Administered Date(s) Administered     Influenza (High Dose) 3 valent vaccine 10/16/2017             Allergies:   No Known Allergies          Medications:     Prescriptions Prior to Admission   Medication Sig Dispense Refill Last Dose     HYDROmorphone (DILAUDID) 2 MG tablet Take 1 tablet (2 mg) by mouth every 4 hours as needed for moderate to severe pain 4 tablet 0      oxyCODONE (OXYCONTIN) 20 MG 12 hr tablet Take 1 tablet (20 mg) by mouth every 12 hours 2 tablet 0      oxyCODONE IR (ROXICODONE) 5 MG tablet Take 1-2 tablets (5-10 mg) by mouth every 4 hours as needed (Give 5mg for pain level 4-6 on a 10 point scale. Give 10mg for pain level 6-10 on a 10 point scale.) 4 tablet 0      diazepam (VALIUM) 2 MG tablet Take 2.5 tablets (5 mg) by mouth every 4 hours as needed for anxiety 4 tablet       LevETIRAcetam (KEPPRA PO) Take 500 mg by mouth every morning Until 4/7/2018 then decrease to 250mg BID 4/8/2018-4/21/2018, then decrease to 250mg QAM 4/22-5/5/2018   4/3/2018 at am     zolpidem (AMBIEN) 5 MG tablet Take 1 tablet (5 mg) by mouth At Bedtime 1 tablet       amoxicillin-clavulanate (AUGMENTIN) 875-125 MG per tablet Take 1 tablet by mouth every 12 hours  0      mineral oil-hydrophilic petrolatum (AQUAPHOR) Apply topically daily Apply to lower extremities for skin irritation.   4/4/2018 at am     ARIPIPRAZOLE PO Take 5 mg by mouth every  morning   4/4/2018 at am     aspirin 81 MG chewable tablet Take 81 mg by mouth daily   4/4/2018 at am     DULoxetine HCl (CYMBALTA PO) Take 60 mg by mouth every morning   4/4/2018 at am     HYDROXYZINE HCL PO Take 50 mg by mouth At Bedtime   4/3/2018 at pm     LAMOTRIGINE PO Take 25 mg by mouth every morning X 4 more days then increase to 50mg daily from 4/9/2018-4/22/2018.   4/4/2018 at am     LevETIRAcetam (KEPPRA PO) Take 250 mg by mouth every evening Until 4/7/2018 then decrease to 250mg BID 4/8/2018-4/21/2018, then decrease to 250mg QAM 4/22-5/5/2018   4/3/2018 at pm     LEVOTHYROXINE SODIUM PO Take 125 mcg by mouth every morning   4/4/2018 at 0600     MELATONIN PO Take 10 mg by mouth At Bedtime   4/3/2018 at pm     OMEPRAZOLE PO Take 20 mg by mouth daily (with breakfast)   4/4/2018 at 0800     Potassium Chloride [place] ER (K-DUR PO) Take 10 mEq by mouth every morning   4/4/2018 at am     Rivaroxaban (XARELTO PO) Take 20 mg by mouth At Bedtime   4/3/2018 at pm     DIVALPROEX SODIUM PO Take 250 mg by mouth 2 times daily   4/4/2018 at am x 1 dose     ferrous sulfate (IRON) 325 (65 FE) MG tablet Take 325 mg by mouth 2 times daily   4/4/2018 at am x 1 dose     Nutritional Supplements (NUTRITIONAL SUPPLEMENT PO) Take 1 packet by mouth 2 times daily (Deven Powder)   Past Week     miconazole (MICATIN) 2 % AERP powder Apply topically 2 times daily Apply to groin folds   4/4/2018 at am x 1 dose     senna-docusate (SENOKOT-S;PERICOLACE) 8.6-50 MG per tablet Take 2 tablets by mouth 2 times daily   4/4/2018 at am x 1 dose     GABAPENTIN PO Take 300 mg by mouth 3 times daily   4/4/2018 at x 2 doses     ACETAMINOPHEN PO Take 650 mg by mouth 4 times daily   4/4/2018 at x 2 doses     ACETAMINOPHEN PO Take 650 mg by mouth every 4 hours as needed for pain (May take additional as needed doses. Do not exceed 4000mg in 24 hours.)   Past Week at Unknown time     bisacodyl (DULCOLAX) 10 MG Suppository Place 10 mg rectally daily as  needed for constipation   prn med     magnesium hydroxide (MILK OF MAGNESIA) 400 MG/5ML suspension Take 30 mLs by mouth daily as needed for constipation or heartburn   prn med             Review of Systems:   A comprehensive review of systems was performed and found to be negative except as described in this note           Physical Exam:   Vitals were reviewed    Exam limited due to patient refusing detailed exam  Left side of the chest is clear, there is no wheezing, some coarse breath sounds at the basis  Heart sounds are regular, no murmurs  Refused the rest of exam            Data:   BMP  Recent Labs  Lab 04/10/18  2125 04/07/18  0835 04/06/18  0820 04/05/18  0825    139 139 136   POTASSIUM 4.1 4.2 4.2 3.8   CHLORIDE 105 104 106 103   NATA 8.2* 8.0* 7.9* 7.6*   CO2 32 29 27 27   BUN 7 5* 5* 4*   CR 0.56 0.40* 0.46* 0.40*   GLC 93 122* 95 91     CBC  Recent Labs  Lab 04/10/18  2125 04/07/18  0835 04/06/18  0820 04/05/18  0825   WBC 3.7* 6.1 6.9 8.3   RBC 4.18 3.63* 3.74* 3.50*   HGB 10.4* 9.1* 9.4* 8.7*   HCT 35.7 30.2* 30.8* 28.8*   MCV 85 83 82 82   MCH 24.9* 25.1* 25.1* 24.9*   MCHC 29.1* 30.1* 30.5* 30.2*   RDW 19.4* 19.3* 19.6* 19.6*    301 313 342     INR  Recent Labs  Lab 04/04/18  1648   INR 1.29*     LFTsNo lab results found in last 7 days.   PANCNo lab results found in last 7 days.       Attestation:  Time spent: 70 minutes , >50% on chart review, coordination of care, counseling and docuemtnation

## 2018-04-11 NOTE — PLAN OF CARE
"Problem: Patient Care Overview  Goal: Plan of Care/Patient Progress Review  Outcome: No Change  Pt requested another dose of tylenol ,states that \"I am on fire\". Tried several times to check T ,pt declined. Dr. Cadet notified regarding pt's request for tylenol, tylenol given per Dr. Cadet . Dilaudid 2 mg po for pain given,wet wash cloth to forehead . Will cont to monitor.      "

## 2018-04-11 NOTE — PROGRESS NOTES
Attempted assessment on pt. Pt was willing to participate in lung and cardiac assessment, and only let nurse assess skin as far as what nurse could see when repositioning pt. Nurse noted small amt of stool when turning pt. Notified pt and said she would like to clean pt. Pt declined. Nurse educated pt on skin breakdown and noted that there were already wounds that were present and that leaving stool would only create more skin breakdown and wounds. Pt was adamant that she not be turned anymore or cleaned.  Pt was demanding and when nurse took time to try and put pillowcase on pillow before positioning under pt, pt was rude and demeaning to nurse.  Dr. Cadet ordered nephrostomy tubes pulled. Nurse went in to pull tubes and pt said that she was in pain and would not have them pulled at this time. Nurse asked pt when would be a good time and pt stated noon. Nurse will return at noon to pull tubes.

## 2018-04-11 NOTE — PLAN OF CARE
Problem: Patient Care Overview  Goal: Plan of Care/Patient Progress Review  Outcome: No Change  Pt had 2 episodes of incontinent stools. Allowed this writer and NA to clean but pt.  directs what to do with .Suprapubic cath patent.  Allowed this writer to change suprapubic catheter redressed. Seen by Dr. Cadet, lasix 20 mg po given twice this shift.Valium 5 mg po ,Dilaudid 2 mg po  And scheduled Oxycontin 20 mg po given for lower back pain. Tolerating diet.Writer unable to do assessment, patient does not want writer even to come in the room. Resting between cares. Will continue POC     Audible coarse crackles noted at the beginning of the shift . Pt declined to be assessed . Respiratory improvement noted after neb s and lasix 20 mg po.Declined O2 and Temp.  checked . ACAPELLA started.Vanco and Zosyn IV infused as scheduled.

## 2018-04-11 NOTE — ED NOTES
[facility] [place]   ED Nurse to Floor Handoff     Marychuy Zhou is a 62 year old female who speaks English and lives with others,  in a nursing home  They arrived in the ED by ambulance from nursing home.    ED Chief Complaint: Tailbone Pain    ED Dx;   Final diagnoses:   Wound of right buttock, initial encounter   Decubitus ulcer of sacral region, stage 4 (H)   Cellulitis of buttock, right         Needed?: No    Allergies: No Known Allergies.  Past Medical Hx:   Past Medical History:   Diagnosis Date     Anxiety      Chronic pain syndrome      Closed fracture zygoma, with routine healing, subsequent encounter      COPD (chronic obstructive pulmonary disease) (H)      Depressive disorder      DVT (deep vein thrombosis) in pregnancy (H)      Edema      Epilepsy (H)      Flaccid neuropathic bladder, not elsewhere classified      Fracture of femur (H)      Hypothyroidism      Iron deficiency anemia      Paraplegia (H)     unspecified     Pressure ulcer of sacral region      PTSD (post-traumatic stress disorder)      Rheumatoid arthritis (H)      Sepsis (H)     unspecified      Baseline Mental status: WDL  Current Mental Status changes: at basesline    Infection present or suspected this encounter: yes skin/wound/contact  Sepsis suspected: No  Isolation type: No active isolations     Activity level - Baseline/Home:  Total Care  Activity Level - Current:   Total Care    Bariatric equipment needed?: No    In the ED these meds were given:   Medications   lidocaine (PF) (XYLOCAINE) 1 % injection (not administered)   piperacillin-tazobactam (ZOSYN) 3.375 g vial to attach to  mL bag (not administered)   vancomycin (VANCOCIN) 1,250 mg in sodium chloride 0.9 % 250 mL intermittent infusion (not administered)   oxyCODONE IR (ROXICODONE) tablet 5 mg (5 mg Oral Given 4/10/18 2150)   oxyCODONE IR (ROXICODONE) tablet 5 mg (5 mg Oral Given 4/10/18 2212)       Drips running?  Yes -  "antibiotics    Home pump  No    Current LDAs  Peripheral IV 04/10/18 Left Upper forearm (Active)   Number of days:0       Nephrostomy Left (Active)   Number of days:6       Nephrostomy Right (Active)   Number of days:6       Suprapubic Catheter (Active)   Number of days:6       Wound 04/04/18 Posterior Coccyx Suspected pressure ulcer (Active)   Number of days:6       Wound 04/05/18 Left Heel Suspected pressure ulcer (Active)   Number of days:5       Labs results:   Labs Ordered and Resulted from Time of ED Arrival Up to the Time of Departure from the ED   CBC WITH PLATELETS DIFFERENTIAL - Abnormal; Notable for the following:        Result Value    WBC 3.7 (*)     Hemoglobin 10.4 (*)     MCH 24.9 (*)     MCHC 29.1 (*)     RDW 19.4 (*)     All other components within normal limits   BASIC METABOLIC PANEL - Abnormal; Notable for the following:     Anion Gap 2 (*)     Calcium 8.2 (*)     All other components within normal limits   CRP INFLAMMATION - Abnormal; Notable for the following:     CRP Inflammation 80.0 (*)     All other components within normal limits   ERYTHROCYTE SEDIMENTATION RATE AUTO   PERIPHERAL IV CATHETER   WOUND CULTURE AEROBIC BACTERIAL   BLOOD CULTURE   BLOOD CULTURE       Imaging Studies:   Recent Results (from the past 24 hour(s))   XR Chest 2 Views    Narrative    Preliminary report: Pulmonary vascular congestion.       Recent vital signs:   /72  Pulse 89  Temp 98.1  F (36.7  C) (Oral)  Resp 16  Ht 1.588 m (5' 2.5\")  Wt 73.5 kg (162 lb)  SpO2 100%  BMI 29.16 kg/m2    Cardiac Rhythm: Normal Sinus  Pt needs tele? No  Skin/wound Issues: decub to right buttock, redness/excoriation to buttocks    Code Status: Full Code    Pain control: good    Nausea control: pt had none    Abnormal labs/tests/findings requiring intervention: see epic    Family present during ED course? No   Family Comments/Social Situation comments: n/a    Tasks needing completion: None      [phone]     "

## 2018-04-11 NOTE — PHARMACY-MEDICATION REGIMEN REVIEW
Admission Medication History status for the 4/10/2018 admission is complete.  See EPIC admission navigator for Prior to Admission medications.    Medication history sources:  [facility]     Medication history source reliability: Good    Medication adherence:  Good    Changes made to PTA medication list (reason)  Added: Albuterol, Azithromycin  Deleted: None  Changed: None    Additional medication history information (including reliability of information, actions taken by pharmacist): Since last admission Albuterol and Azithromycin have been added    Time spent in this activity: 45 minutes    Medication history completed by: Joaquin Patel     Prior to Admission medications    Medication Sig Last Dose Taking? Auth Provider   albuterol (ACCUNEB) 1.25 MG/3ML nebulizer solution Take 1 vial by nebulization 4 times daily (until 4/15)  Yes Unknown, Entered By History   AZITHROMYCIN PO Take 250 mg by mouth daily   (Until 4/13)  Yes Unknown, Entered By History   divalproex sodium delayed-release (DEPAKOTE) 250 MG DR tablet Take 250 mg by mouth 2 times daily  Yes Unknown, Entered By History   HYDROmorphone (DILAUDID) 2 MG tablet Take 1 tablet (2 mg) by mouth every 4 hours as needed for moderate to severe pain  Yes Ambar Lopez MD   oxyCODONE (OXYCONTIN) 20 MG 12 hr tablet Take 1 tablet (20 mg) by mouth every 12 hours  Yes Ambar Lopez MD   oxyCODONE IR (ROXICODONE) 5 MG tablet Take 1-2 tablets (5-10 mg) by mouth every 4 hours as needed (Give 5mg for pain level 4-6 on a 10 point scale. Give 10mg for pain level 6-10 on a 10 point scale.)  Yes Ambar Lopez MD   diazepam (VALIUM) 2 MG tablet Take 2.5 tablets (5 mg) by mouth every 4 hours as needed for anxiety  Yes Ambar Lopez MD   zolpidem (AMBIEN) 5 MG tablet Take 1 tablet (5 mg) by mouth At Bedtime   Ambar Lopez MD   amoxicillin-clavulanate (AUGMENTIN) 875-125 MG per tablet Take 1 tablet by mouth every 12 hours  (until 4/14)   Ambar Lopez MD   mineral oil-hydrophilic petrolatum (AQUAPHOR) Apply topically daily Apply to lower extremities for skin irritation.   Unknown, Entered By History   ARIPIPRAZOLE PO Take 5 mg by mouth every morning   Unknown, Entered By History   aspirin 81 MG chewable tablet Take 81 mg by mouth daily   Unknown, Entered By History   DULoxetine HCl (CYMBALTA PO) Take 60 mg by mouth every morning   Unknown, Entered By History   HYDROXYZINE HCL PO Take 50 mg by mouth At Bedtime   Unknown, Entered By History   LAMOTRIGINE PO Take 25 mg by mouth every morning X 4 more days then increase to 50mg daily from 4/9/2018-4/22/2018.   Unknown, Entered By History   LevETIRAcetam (KEPPRA PO) Take 250 mg by mouth every evening Until 4/7/2018 then decrease to 250mg BID 4/8/2018-4/21/2018, then decrease to 250mg QAM 4/22-5/5/2018   Unknown, Entered By History   LEVOTHYROXINE SODIUM PO Take 125 mcg by mouth every morning   Unknown, Entered By History   MELATONIN PO Take 10 mg by mouth At Bedtime   Unknown, Entered By History   OMEPRAZOLE PO Take 20 mg by mouth daily (with breakfast)   Unknown, Entered By History   Potassium Chloride [place] ER (K-DUR PO) Take 10 mEq by mouth every morning   Unknown, Entered By History   Rivaroxaban (XARELTO PO) Take 20 mg by mouth At Bedtime   Unknown, Entered By History   ferrous sulfate (IRON) 325 (65 FE) MG tablet Take 325 mg by mouth 2 times daily   Unknown, Entered By History   Nutritional Supplements (NUTRITIONAL SUPPLEMENT PO) Take 1 packet by mouth 2 times daily (Deven Powder)   Unknown, Entered By History   miconazole (MICATIN) 2 % AERP powder Apply topically 2 times daily Apply to groin folds   Unknown, Entered By History   senna-docusate (SENOKOT-S;PERICOLACE) 8.6-50 MG per tablet Take 2 tablets by mouth 2 times daily   Unknown, Entered By History   GABAPENTIN PO Take 300 mg by mouth 3 times daily   Unknown, Entered By History   ACETAMINOPHEN PO Take 650 mg by mouth 4  times daily   Unknown, Entered By History   ACETAMINOPHEN PO Take 650 mg by mouth every 4 hours as needed for pain (May take additional as needed doses. Do not exceed 4000mg in 24 hours.)   Unknown, Entered By History   bisacodyl (DULCOLAX) 10 MG Suppository Place 10 mg rectally daily as needed for constipation   Unknown, Entered By History   magnesium hydroxide (MILK OF MAGNESIA) 400 MG/5ML suspension Take 30 mLs by mouth daily as needed for constipation or heartburn   Unknown, Entered By History

## 2018-04-12 PROBLEM — Z71.89 ADVANCED DIRECTIVES, COUNSELING/DISCUSSION: Chronic | Status: ACTIVE | Noted: 2018-01-01

## 2018-04-12 NOTE — CONSULTS
Colon and Rectal Surgery Consultation Note  [facility]    Marychuy Zhou MRN# [id number]   Age: 62 year old YOB: 1955     Date of Admission:  4/10/2018    Reason for consult: Diverting colostomy       Requesting physician: Dr. Shahriar Cadet       Level of consult: One-time consult to assist in determining a diagnosis and to recommend an appropriate treatment plan           Assessment:   63 y/o F, with PMH of T4 paraplegia, chronic truncal decubitus ulcers, presumed right chronic ischial osteo, COPD, diastolic CHF, h/o DVT on rivaroxaban, neurogenic bladder s/p bilat PNT despite SPT placement, h/o c.diff, seizure d/o, recent left trochanteric hip fracture, severe malnutrition with Prealb 6 and Alb 1.6.  Pt was admitted on 4/11 for fevers, cough, worsening buttock pain with likely overlying cellulitis and purulent discharge.  Hospitalization has been complicated by concern for personality type disorder with non-compliance with cares.  We have been asked to assess patient for diverting colostomy placement due to continued decubitus wound soilage and infection.           Recommendations:   - Do not recommend colostomy at this time due to poor nutritional status.  Consider trial of TPN.    - if TPN is not possible, will need to optimize nutrition with consistent oral nutritional supplements (documented calorie counts)  - WOCN note from today was reviewed. Will also need to be seen by WOCN for ostomy education and marking but closer to surgery and this can be coordinated by our team.   - Per medicine, would be able to hold anticoagulation for surgical procedure.  Also unclear if prealbumin/albumin is accurately reflecting nutritional status due to chronic state of inflammation with wound and infection.  Noted that Medicine does not recommend starting TPN at this time.    - we will sign off at this time.  Call/page with new questions/concerns.   - pt can f/u in CRS clinic.  Phone numbers  left in DC AVS       History of Present Illness:   CC: decubitus wounds and chronic osteo.  Diverting colostomy.      Pt states that she spoke to Dr. Pelletier at length regarding a colostomy.  Pt's understanding is that she would 'get a bag that the poop goes into.'  Further explained that intestine would be brought up to abdominal wall and would pass stool there into a bag and therefore would stop passing stool via anus.  Pt states 'it's sounds like it is necessary and that it would be for the best.'  However, counseled regarding the importance of good nutrition prior to surgery.  States that she is having some GI upset.  Pt declined answering further questioning regarding this.  That she is currently experiencing the worse pneumonia that she has ever had.  Because of this has been unable to eat.  And that talking is making it worse.  Although she is waiting for her lunch.  States that she lives in a horrible nursing home and they serve 'slop' and therefore has not been eating. Eats about 2 meals per day.  She hopes to move out of her nursing home in 3 weeks to live with a girlfriend, although declines any further details regarding this.  Pt states that Dr. Pelletier recommended drinking Boost and she is waiting for that.     Pt declined answering any further questions.  Pt asks this writer to stop asking so many questions.  Pt wants to sleep and does not want to be bothered right now.  Pt also does not want to repeat herself. Pt declined a physical exam.  Pt does not want to see WOCN at this time to further discuss colostomy.     Pt appears to be mostly afebrile this hospitalization when pt allows vitals to be obtained.  WBC has been WNL. CRP .  procalcitonin <0.05.  However has been hypotensive. Last lactic acid 1.6.  Making good urine amounts.           Past Medical History:     History obtained from Chart    Past Medical History:   Diagnosis Date     Anxiety      Chronic pain syndrome      Closed fracture  zygoma, with routine healing, subsequent encounter      COPD (chronic obstructive pulmonary disease) (H)      Depressive disorder      DVT (deep vein thrombosis) in pregnancy (H)      Edema      Epilepsy (H)      Flaccid neuropathic bladder, not elsewhere classified      Fracture of femur (H)      Hypothyroidism      Iron deficiency anemia      Paraplegia (H)     unspecified     Pressure ulcer of sacral region      PTSD (post-traumatic stress disorder)      Rheumatoid arthritis (H)      Sepsis (H)     unspecified          Past Surgical History:   History reviewed. No pertinent surgical history.     History obtained from Chart       Social History:     History obtained from Chart  Social History     Social History     Marital status: Single     Spouse name: N/A     Number of children: N/A     Years of education: N/A     Occupational History     Not on file.     Social History Main Topics     Smoking status: Never Smoker     Smokeless tobacco: Not on file     Alcohol use No     Drug use: No     Sexual activity: Not on file     Other Topics Concern     Not on file     Social History Narrative          Family History:     History obtained from Chart  Family History   Problem Relation Age of Onset     Chronic Obstructive Pulmonary Disease Brother              Allergies:    No Known Allergies          Medications:     No current facility-administered medications on file prior to encounter.   Current Outpatient Prescriptions on File Prior to Encounter:  HYDROmorphone (DILAUDID) 2 MG tablet Take 1 tablet (2 mg) by mouth every 4 hours as needed for moderate to severe pain   oxyCODONE (OXYCONTIN) 20 MG 12 hr tablet Take 1 tablet (20 mg) by mouth every 12 hours   oxyCODONE IR (ROXICODONE) 5 MG tablet Take 1-2 tablets (5-10 mg) by mouth every 4 hours as needed (Give 5mg for pain level 4-6 on a 10 point scale. Give 10mg for pain level 6-10 on a 10 point scale.)   diazepam (VALIUM) 2 MG tablet Take 2.5 tablets (5 mg) by mouth  every 4 hours as needed for anxiety   zolpidem (AMBIEN) 5 MG tablet Take 1 tablet (5 mg) by mouth At Bedtime   amoxicillin-clavulanate (AUGMENTIN) 875-125 MG per tablet Take 1 tablet by mouth every 12 hours   mineral oil-hydrophilic petrolatum (AQUAPHOR) Apply topically daily Apply to lower extremities for skin irritation.   ARIPIPRAZOLE PO Take 5 mg by mouth every morning   aspirin 81 MG chewable tablet Take 81 mg by mouth daily   DULoxetine HCl (CYMBALTA PO) Take 60 mg by mouth every morning   HYDROXYZINE HCL PO Take 50 mg by mouth At Bedtime   LAMOTRIGINE PO Take 25 mg by mouth every morning X 4 more days then increase to 50mg daily from 4/9/2018-4/22/2018.   LevETIRAcetam (KEPPRA PO) Take 250 mg by mouth every evening Until 4/7/2018 then decrease to 250mg BID 4/8/2018-4/21/2018, then decrease to 250mg QAM 4/22-5/5/2018   LEVOTHYROXINE SODIUM PO Take 125 mcg by mouth every morning   MELATONIN PO Take 10 mg by mouth At Bedtime   OMEPRAZOLE PO Take 20 mg by mouth daily (with breakfast)   Potassium Chloride [place] ER (K-DUR PO) Take 10 mEq by mouth every morning   Rivaroxaban (XARELTO PO) Take 20 mg by mouth At Bedtime   ferrous sulfate (IRON) 325 (65 FE) MG tablet Take 325 mg by mouth 2 times daily   Nutritional Supplements (NUTRITIONAL SUPPLEMENT PO) Take 1 packet by mouth 2 times daily (Deven Powder)   miconazole (MICATIN) 2 % AERP powder Apply topically 2 times daily Apply to groin folds   senna-docusate (SENOKOT-S;PERICOLACE) 8.6-50 MG per tablet Take 2 tablets by mouth 2 times daily   GABAPENTIN PO Take 300 mg by mouth 3 times daily   ACETAMINOPHEN PO Take 650 mg by mouth 4 times daily   ACETAMINOPHEN PO Take 650 mg by mouth every 4 hours as needed for pain (May take additional as needed doses. Do not exceed 4000mg in 24 hours.)   bisacodyl (DULCOLAX) 10 MG Suppository Place 10 mg rectally daily as needed for constipation   magnesium hydroxide (MILK OF MAGNESIA) 400 MG/5ML suspension Take 30 mLs by mouth daily  "as needed for constipation or heartburn             Review of Systems:      All other review of systems negative, except for what is mentioned above        Physical Exam:   BP (!) 77/50 (BP Location: Right arm)  Pulse 105  Temp 98.9  F (37.2  C)  Resp 16  Ht 1.588 m (5' 2.5\")  Wt 73.5 kg (162 lb)  SpO2 100%  BMI 29.16 kg/m2  General: Alert, interactive, NAD, irritable at times  Resp: normal respiratory effort.  Speaking in full sentences.  Cardiac: pt declined exam  Abdomen: pt declined exam  Extremities: pt declined exam  Skin: pt declined exam   Neuro: awake and alert.  CN 2-12 grossly intact.  Declined further exam.    Mili Callaway PA-C   Colon and Rectal Surgery  Pager: [phone]    Patient was discussed with Attending, Dr. De La Rosa.          "

## 2018-04-12 NOTE — PLAN OF CARE
Problem: Infection, Risk/Actual (Adult)  Goal: Identify Related Risk Factors and Signs and Symptoms  Related risk factors and signs and symptoms are identified upon initiation of Human Response Clinical Practice Guideline (CPG).     VS: BP trending soft. Given 2L NS per Dr. Kingston, no increase in BP. . Set off sepsis protocol; lactic acid 2.1. Waiting for LA redraw    O2: 100% on RA; denies SOB   Output: Suprapubic catheter with adequate output    Last BM: 4/11/2018   Activity: Bedrest   Skin: Scars, scabs, wound on buttocks   Pain: Managed with 4 mg Dilaudid 4h   CMS: Paraplegic    Dressing: Mepilex on L heel CDI    Diet: Regular   LDA: SP catheter, PIV SL    Equipment:    Plan: Continue to monitor   Additional Info:

## 2018-04-12 NOTE — PROGRESS NOTES
[facility] [place]: ONGOING CONSULTATION     Patient:  Marychuy Zhou, Date of birth 1955, Medical record number [id number]  Date of Visit:  4/12/2018          Assessment and Recommendations:   Problem List:  1. 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 Pneumoniae, ESBL E. Coli   2. Possible infected decubitus ulcer  3. Diarrhea - 4/11 C. Difficile PCR negative   4. Atypical pneumonia vs COPD exacerbation vs heart failure exacerbation  5. SIRS  6. T4 paraplegia 2/2 spinal hematoma  7. Chronic left heel ulcer  8. COPD  9. Neurogenic bladder with nephrostomy tubes, SP catheter in place  10. Noncompliance  11. History of MDROs  12. History of C. Difficile   13. CKD  14. Chronic pain   15. Seizure disorder  16. Diastolic heart failure    Recommendations:  -Agree with continuation of zosyn and doxycycline for treatment of cellulitis/infected decubitus ulcer. If patient were to clinically decompensate with signs/symptoms of sepsis would suggest to first transition zosyn to meropenem for superior ESBL coverage; if that fails to produce a favorable response would add back vancomycin for additional resistant gram positive coverage.   -If signs/symptoms of sepsis do not resolve, would favor to send a UA/UC to evaluate for possible urinary infectious source.    -At this point favor to treat possible ulcer cellulitis with 7-10 day antibiotic course, and would not favor extending treatment for her chronic osteomyelitis as treating without surgical intervention and plan for coverage would likely not be curative.    Discussion:  Paraplegic patient with complicated medical history and history of noncompliance admitted 4/11/2018 with fevers, fatigue, cough, elevated inflammatory markers concerning for decubitus ulcer infection with underlying chronic ischial  osteomyelitis, and also possible pneumonia vs COPD exacerbation vs diastolic heart failure exacerbation. Patient has had a decubitus ulcer for about 6 months, s/p debridement 2/2018 with ischial bone biopsy showing no growth; however without adequate coverage and history of noncompliance it is likely that her wound has progressed. Plastic surgery evaluated patient's wound yesterday, and mentions macerated and dermatitic area but would favor to continue antibiotics for possible cellulitis - no debridment/flap plans in place at this point, patient awaiting surgery consultation for consideration of diverting colostomy. She is on zosyn and doxycyline (this would cover her previously isolated E. Coli, Proteus and K. Pneumoniae as well as MRSA; wound swab here in process) currently; wound swab growing NLFGNR and LFGNR - await identification. Note that this am patient allowed vitals again and did trip sepsis protocol with hypotension and tachycardia, however she looks well clinically today and is feeling subjectively better. Would continue antibiotics as is; if she were to clinically decompensate with ongoing signs/symtpoms of sepsis would favor to escalate antibiotics in a stepwise approach - first transition zosyn to meropenem for superior ESBL coverage, and then if not responding would add back vancomycin for resistant gram positive coverage. Would also consider obtaining UA/UC to evaluate for possible urinary source of infection (in setting of complicated urinary tract with SP catheter and recent nephrostomy tubes now out), as it is unclear if she compelling has a ulcer infection (she continues to refuse ID exam).     ID will continue to follow this patient. Please feel free to call with any questions.   Patient seen and discussed with ID staff Dr. Duarte.    Nay Street MD - [facility] ID fellow  [phone]       Subjective/Interval events:   -Afebrile  -Did trip sepsis protocol overnight with HR to 105, sBP to 77;  lactate 2.1. Given 2L IVF.   -Plastic surgery evaluated patient; not a flap candidate at this point but discussing with surgery possibility of diverting colostomy.   -Nephrostomy tubes fell out.   -C. Difficile PCR negative  -Wound culture growing 2x strains NLFGNR, single strain GNR     Marychuy feels a little bit better today, with more energy but still wants to be left alone to rest. She is still coughing and short of breath with chest pain with coughing. Having lower pelvic pain. Gets SP catheter changed [id number], last changed 2 weeks ago. When she gets a UTI urine typically gets dark and she gets lower pelvic pain. Feels her urine is now clear, but was maybe dark yesterday.         Recent Antimicrobials::   Zosyn 4/11-current  Doxycycline 4/11-current  Vancomycin 4/4-7; 4/11  Meropenem 4/4-7  Augmentin 4/7-11           Physical Exam:   Ranges forvital signs:  Temp:  [96.9  F (36.1  C)-99.4  F (37.4  C)] 98.9  F (37.2  C)  Pulse:  [] 105  Resp:  [16] 16  BP: (64-94)/(36-53) 77/50  SpO2:  [93 %-100 %] 100 %  No intake or output data in the 24 hours ending 04/11/18 0815    Exam:  Exam limited by patient participation   GEN: obese woman lying in bed, NAD  HEENT: MMM, sclera anicteric   PULM: right lung base with few crackles; refused rest of lung exam   : suprapubic catheter in place, clean dressing, draining light yellow urine with small amt sediment   SKIN: multiple scabs with excoriations across arms            Laboratory Data:     Creatinine   Date Value Ref Range Status   04/12/2018 0.52 0.52 - 1.04 mg/dL Final   04/12/2018 0.52 0.52 - 1.04 mg/dL Final   04/10/2018 0.56 0.52 - 1.04 mg/dL Final   04/07/2018 0.40 (L) 0.52 - 1.04 mg/dL Final   04/06/2018 0.46 (L) 0.52 - 1.04 mg/dL Final     WBC   Date Value Ref Range Status   04/12/2018 3.2 (L) 4.0 - 11.0 10e9/L Final   04/12/2018 3.8 (L) 4.0 - 11.0 10e9/L Final   04/10/2018 3.7 (L) 4.0 - 11.0 10e9/L Final   04/07/2018 6.1 4.0 - 11.0 10e9/L Final   04/06/2018  6.9 4.0 - 11.0 10e9/L Final     Hemoglobin   Date Value Ref Range Status   04/12/2018 7.9 (L) 11.7 - 15.7 g/dL Final     Platelet Count   Date Value Ref Range Status   04/12/2018 212 150 - 450 10e9/L Final     Lab Results   Component Value Date     04/12/2018    BUN 5 (L) 04/12/2018    CO2 27 04/12/2018     CRP  4/5 4/10  100 80  ESR  4/4 4/10  62 64       Pertinent Recent Microbiology Data:   Blood cultures  4/10 x 2 NGTD  4/4 x 2 NG  4/2 X 1 NG    Wound cultures  4/10 swab with 2x strain NLF GNR, single strain LFGNR    Bone culture  2/5 1+ PMNs on gram stain, aerobic and anaerobic cultures NG  2/1 Wound swab   Culture 4+ MRSA Coagulase Positive Staph (A)   Comment: Methicillin resistant Staph aureus, infection status already documented.      Culture 4+ Diphtheroids (A)     Culture 4+ Proteus mirabilis (A)     Culture 1+ ESBL Producing Klebsiella pneumoniae (A)     Culture 1+ ESBL producing Escherichia coli (A)     Gram Stain Result 2+ Polymorphonuclear leukocytes     Gram Stain Result 1+ Gram negative bacilli     Gram Stain Result 1+ Gram positive cocci in pairs     Specimen   Swab - Other     Organism Antibiotic Method Susceptibility   MRSA Coagulase Positive Staph Clindamycin SULLY <=0.5: Sensitive    Cefazolin SULLY >16: Resistant    Doxycycline SULLY <=0.5: Sensitive    Daptomycin SULLY <=1: Sensitive    Linezolid SULLY 2: Sensitive    Oxacillin SULLY >2: Resistant    Trimethoprim + Sulfamethoxazole SULLY <=1/19: Sensitive    Vancomycin SULLY 1: Sensitive   Proteus mirabilis Amoxicillin + Clavulanate SULLY <=4/2: Sensitive    Ampicillin SULLY >16: Resistant    Cefazolin SULLY 8: Sensitive    Cefepime SULLY <=1: Sensitive    Ceftriaxone SULLY <=1: Sensitive    Ciprofloxacin SULLY >2: Resistant    Gentamicin SULLY <=2: Sensitive    Levofloxacin SULLY >4: Resistant    Piperacillin + Tazobactam SULLY <=2/4: Sensitive    Tetracycline SULLY >8: Resistant    Tobramycin SULLY <=2: Sensitive    Trimethoprim + Sulfamethoxazole SULLY >2/38: Resistant    ESBL Producing Klebsiella pneumoniae Amoxicillin + Clavulanate SULLY 16/8: Resistant    Ampicillin USLLY >16: Resistant    Cefazolin SULLY >16: Resistant    Cefepime SULLY >16: Resistant    Ceftriaxone SULLY >32: Resistant    Ciprofloxacin SULLY >2: Resistant    Gentamicin SULLY <=2: Sensitive    Levofloxacin SULLY <=1: Sensitive    Meropenem SULLY <=0.25: Sensitive    Piperacillin + Tazobactam SULLY 16/4: Sensitive    Tetracycline SULLY >8: Resistant    Tobramycin SULLY 8: Intermediate    Trimethoprim + Sulfamethoxazole SULLY <=0.5: Sensitive   ESBL producing Escherichia coli Amoxicillin + Clavulanate SULLY 8/4: Resistant    Ampicillin SULLY >16: Resistant    Cefazolin SULLY >16: Resistant    Cefepime SULLY 4: Resistant    Ceftriaxone SULLY >32: Resistant    Ciprofloxacin SULLY >2: Resistant    Gentamicin SULLY <=2: Sensitive    Levofloxacin SULLY >4: Resistant    Meropenem SULLY <=0.25: Sensitive    Piperacillin + Tazobactam SULLY 8/4: Sensitive    Tetracycline SULLY >8: Resistant    Tobramycin SULLY <=2: Sensitive    Trimethoprim + Sulfamethoxazole SULLY >2/38: Resistant     2018 C. Difficile PCR negative         Imagin/10/18 CXR:  AP and lateral views of the chest semi-upright. Cardiac  silhouette is within normal limits. The pulmonary vasculature is  diffusely prominent. There is no focal airspace opacity, pleural  effusion, or pneumothorax. There is prominence of the major an minor  fissures on the right and the left midlung Ledesma on the left. These  changes could represent chronic scarring versus fluid. The visualized  upper abdomen, osseous structures, and soft tissues are unremarkable.    18 XR L calcaneus:  The bones are severely osteopenic which limits evaluation.  No definite sclerosis is seen of the calcaneus, although given the  marked osteopenia plain film evaluation is severely limited for  possible osteomyelitis.    18 CT AP:  IMPRESSION:  1. Ulcer extending down to the right ischial tuberosity with erosive  changes suspicious  for osteomyelitis.  2. Nonunited left subtrochanteric fracture.  3. Bilateral nephrostomy tubes in place. No evidence of hydronephrosis  or urinary tract stones.  3. Suprapubic catheter in place in the bladder.

## 2018-04-12 NOTE — PROGRESS NOTES
Pt seen, case reviewed with team    Pt states she feels improved, states cough, SOB improved  + loose stools  Intermittently non-compliant with cares, has stool constantly contaminating wound    Pt states she was very encouraged by conversation, would like to proceed with diverting colostomy  Still leaking urine around SP tube    T 98-99  BP 60s-80s/  HR 90s    Alert, in good spirits  Non-toxic appearing  RR 14, unlabored  Lungs clear  CV rrr  Abd soft, non-tender  Sl purulent drainage from L PNT site, no erythema  No LE edema  No pre sacral edema  Wounds examined with [facility] nurse      Results for SHALINI CHONG (MRN [id number]) as of 4/12/2018 10:47   Ref. Range 4/12/2018 06:22   Sodium Latest Ref Range: 133 - 144 mmol/L 144   Potassium Latest Ref Range: 3.4 - 5.3 mmol/L 3.9   Chloride Latest Ref Range: 94 - 109 mmol/L 109   Carbon Dioxide Latest Ref Range: 20 - 32 mmol/L 27   Urea Nitrogen Latest Ref Range: 7 - 30 mg/dL 5 (L)   Creatinine Latest Ref Range: 0.52 - 1.04 mg/dL 0.52   GFR Estimate Latest Ref Range: >60 mL/min/1.7m2 >90   GFR Estimate If Black Latest Ref Range: >60 mL/min/1.7m2 >90   Calcium Latest Ref Range: 8.5 - 10.1 mg/dL 7.0 (L)   Anion Gap Latest Ref Range: 3 - 14 mmol/L 8   Lactic Acid Latest Ref Range: 0.7 - 2.0 mmol/L 1.6   Glucose Latest Ref Range: 70 - 99 mg/dL 93   WBC Latest Ref Range: 4.0 - 11.0 10e9/L 3.2 (L)   Hemoglobin Latest Ref Range: 11.7 - 15.7 g/dL 7.9 (L)   Hematocrit Latest Ref Range: 35.0 - 47.0 % 27.0 (L)   Platelet Count Latest Ref Range: 150 - 450 10e9/L 212   RBC Count Latest Ref Range: 3.8 - 5.2 10e12/L 3.23 (L)   MCV Latest Ref Range: 78 - 100 fl 84   MCH Latest Ref Range: 26.5 - 33.0 pg 24.5 (L)   MCHC Latest Ref Range: 31.5 - 36.5 g/dL 29.3 (L)   RDW Latest Ref Range: 10.0 - 15.0 % 18.9 (H)           Assessment     Paraplegia with chronic truncal decubitus ulcers, chronic R sacral osteo, L heel wound, on Augmentin prior to this admission, now on Zosyn and doxycycline  empirically ID and Plastics doubt abscess or deep acute infection. Suspect chronic osteo with element of cellulitis   BP has been quite low (? At or close to baseline, per Pt), no clear signs of sepsis.  UO good, renal function stable)     Chest congestion, prominent interstitial changes. Seems improved with diuresis, nebs. Less likely pneumonia     S/p B PNT 2 months ago, for the intent of urinary diversion (even with SP cath in place), not effective per Pt. Both now removed. Sl purulent drainage from L flank site, no evidence of abscess or cellulitis in this area    Hypoalbuminemia, secondary to protein loss from wounds and possible poor intake with malnutrition     Chronic anticoagulation for history of DVT     Chronic anemia, likely secondary to chronic infection     Recent L sub trochanteric hip fracture following fall-no plans for surgery     Seizure d/o, stable on recently reduced dose of Keppra     Depression with anxiety, possible underlying personality d/o    Plan  Continue wound cares--importance of compliance with cares discussed  Continue current antibiotic regimen, anticipate 10-14 day course, will review with ID  Monitor vitals, labs for signs of sepsis  Monitor Hgb  Reduce lasix to 20 mg daily, in view of low BP  Maximize nutrition  CRS consult for diverting colostomy--Pt in agreement

## 2018-04-12 NOTE — PLAN OF CARE
Problem: Patient Care Overview  Goal: Individualization & Mutuality  Pt A/O X 4. Afebrile. VSS. Lungs- Clear bilaterally with both anterior and posterior. Bowels- Hyperactive in all four quadrants, and pt incontinent X 1, katia-cares completed. Pt is paraplegic with baseline CMS. Denies numbness and tingling in all extremities. Denies nausea, shortness of breath, and chest pain. Has pain in the left buttocks and given 2mg PO Dilaudid, Valium, and is tolerating well. Suprapubic catheter is patent with 300ml's output this shift. Is on a Regular diet and pt did not have an appetite this shift. Coccyx area is red and blanchable, barrier cream applied, wound cares completed by , pt's left heel has a quarter size scab that is C/D/I and covered with Mepilex. Pt declines repositioning every 2 hours, and pt directs her cares. PIV is patent in the left arm and saline locked. Bilateral heels are elevated off the bed. Pt is able to make needs known and the call light is within the pt's reach. Continue to monitor.

## 2018-04-12 NOTE — PROGRESS NOTES
Called by RN for hypotension, lactic acid 2.1  Patient admitted yesterday for cellulitis, past h/o MDR, seen by ID on Zosyn and Doxycycline, Vancomycin was discontinued  Patient was seen and examined, mentation is normal, legs are warm and well perfused, urine output is adequate  Patient stays she normally has blood pressure in 80-90s, on exam she is non toxic appearing and actually looks better than yesterday.   Plan:  2L IV normal saline  Repeat lactic acid, cbc and bmp  If end organ damage noted than will initiate pressors and transfer to [facility]

## 2018-04-12 NOTE — PLAN OF CARE
"Problem: Patient Care Overview  Goal: Individualization & Mutuality  Patient A&O x4, denied CP, numbness, tinlging and SOB. Pt. refused most of her care including medication except PRN dilaudid and valium during the shift, order placed to change the suprapubic catheter but refused and stated that \"somebody told me will be changed at 4 pm\" writer informed the pt. to call when she is ready so it can be changed, pt. requested PRN dilaudid changed from Q4hr to Q3hr, MD notified and order changed, reported back pain tolerable and managed comfortably with dilaudid. Valium given once, had one incontinent of bowel, pt. stated \" give me oxygen and set a little bit below 2L\" asked for shortness of breath pt. said \" little bit, not much\"  and refused to check her O2 sat. oxygen given and pt. used it once for a couple minute also charge nurse notified, demonstrates the ability to use call light appropriately, will continue to monitor patient as POC.         "

## 2018-04-12 NOTE — PROGRESS NOTES
[facility] Nurse Inpatient Pressure Injury Assessment     Initial Assessment  Reason for consultation: Evaluate and treat multiple pressure injuries and severe IAD      ASSESSMENT    Pressure Injury: on left heel, present on admission   Pressure Injury is Stage Unstageable     Pressure Injury: on left upper thigh, present on admission   Pressure Injury is Stage 3     Pressure Injury: on right IT, present on admission  Pressure Injury is Stage 4     Pressure Injury: on left sacrum, present on admission   Pressure Injury is Stage 3     Pressure Injury: on coccyx, present on admission  Pressure Injury is Stage 3     Wound: on bilateral flanks, previous PNT sites    Severe Incontinence Associated Dermatitis over entire buttock, perineum, upper thighs and anterior groin folds    Status: initial assessment     TREATMENT PLAN    Left heel wound: wash every other day with wound cleanser and gauze. Apply a thick layer of Medihoney (order #[id number]) to wound bed and cover with Mepilex 4x4. If dressing rolls at edges, OK to use Primapore tape to keep in place.    Left upper thigh wound: wash every other day and as needed if soiled with wound cleanser and gauze. Pat dry. Cover with Mepilex 4x4.    Right IT wound: wash daily and as needed if soiled with urine or stool with wound cleanser and gauze. Pack wound to entire depth (7 cm) with Kerlix dampened with saline. Cover with ABD and secure with Primapore tape.    Coccyx and left sacral wound: wash daily with wound cleanser and gauze. Coat with Triad paste (order #[id number]).    Bilateral PNT tube sites: wash daily with wound cleanser and gauze, more frequently if soiled with urine or stool. If drainage present, OK to cover with 4x4 gauze and secure with Primapore tape.    Buttock, perineum, upper thighs IAD: wash twice daily and with each episode of incontinence with Greta Cleanse and Protect and a soft cloth. Apply a thick layer of Cricitaide Paste to skin. Do not attempt to remove  "Criticaide with each episode of incontinence, just wipe of soiled paste and reapply. To remove all Cricicaide, use baby/mineral oil and a soft cloth.    Orders Written  WOC Nurse follow-up plan:weekly  Nursing to notify the Provider(s) and re-consult the WOC Nurse if wound(s) deteriorates or new skin concern.    Patient History  According to provider note(s):  This is a 62 year old female with PMH significant for T4 paraplegia, chronic pain COPD, anemia, recent hip fracture, Hx of DVT on anticoagulation (rivaroxiban), anxiety, depression, diastolic heart failure, hypothyroidism and chronic stage 4 decubti with known osteo who presents with fevers, drainage, and cellulitis of her buttock area.     Objective Data   Containment of urine/stool: Suprapublic catheter, currently bypassing catheter and leaking out urethra. Discussed with Dr Cadet, order for nursing to change catheter.    Patient currently incontinent of frequent, large amount of liquid stool. Due to paraplegia, patient is unaware when she stools. In the nursing home, prior to admission, she states she will sit in her chair for \"hours\" with stool present. During discussion of definitive treatment for right IT Stage 4 Pressure Injury with Dr Pelletier on 4/11/18, a diverting colostomy was discussed and is required prior to patient undergoing any flap surgery. Patient states she is open to discussing this further.    Current Diet/ Nutrition:    Active Diet Order      Regular Diet Adult      Regular Diet Adult    Output:   I/O last 3 completed shifts:  In: 480 [P.O.:480]  Out: 1150 [Urine:1150]    Risk Assessment:   Sensory Perception: 3-->slightly limited  Moisture: 3-->occasionally moist  Activity: 1-->bedfast  Mobility: 2-->very limited  Nutrition: 2-->probably inadequate  Friction and Shear: 1-->problem  Isaiah Score: 12      Labs:   Recent Labs  Lab 04/12/18  0622  04/11/18  0941 04/10/18  2125   HGB 7.9*  < >  --  10.4*   INR  --   --  1.89*  --    WBC 3.2* "  < >  --  3.7*   CRP  --   --   --  80.0*   < > = values in this interval not displayed.      Recent Labs  Lab 04/10/18  2223 04/10/18  2157 04/10/18  2141   CULT No growth after 2 days No growth after 2 days Light growthLactose fermenting gram negative rods*  Light growthStrain 2Non lactose fermenting gram negative rods*  Moderate growthNon lactose fermenting gram negative rods*  Culture in progress       Physical Exam  Skin assessment: full, head to toe due to paraplegia      Wound Location:  Left heel  Date of last Photo 4/12/18      Wound History: Present on admission  Measurements (length x width x depth, in cm) 2 cm x 2.3 cm  x  0 cm   Wound Base: black eschar in center surrounded by yellow adherent slough  Tunneling N/A  Undermining N/A  Palpation of the wound bed: boggy   Periwound skin: denuded  Color: pink  Temperature: normal   Drainage: small  Description of drainage: tan, creamy  Odor: none  Pain: absent, paraplegia     Wound Location:  Left upper thigh  Date of last Photo 4/12/18        Wound History: Present on admission  Measurements (length x width x depth, in cm) 3 cm x 1 cm  x  0.2 cm   Wound Base: smooth, granular base  Tunneling N/A  Undermining N/A  Palpation of the wound bed: normal   Periwound skin: intact  Color: pink  Temperature: normal   Drainage: none  Description of drainage: none  Odor: none  Pain: absent, paraplegia    Wound Location:  Right IT  Date of last Photo 4/12/18        Wound History: Present on admission  Measurements (length x width x depth, in cm) 4 cm x 3.8 cm  x  7.2 cm   Wound Base:  Beefy red granulation tissue with bone palpated at base  Tunneling N/A  Undermining N/A  Palpation of the wound bed: normal   Periwound skin: denuded  Color: red  Temperature: normal   Drainage: small  Description of drainage: bloody  Odor: none  Pain: absent, paraplegia    Wound Location:  Left sacrum  Date of last Photo 4/12/18        Wound History: Present on admission  Measurements  (length x width x depth, in cm) 2 cm x 2 cm  x  0.1 cm   Wound Base:  Yellow slough mixed with smooth, pale granular tissue  Tunneling N/A  Undermining N/A  Palpation of the wound bed: normal   Periwound skin: denuded  Color: red  Temperature: normal   Drainage: none  Description of drainage: none  Odor: none  Pain: absent, paraplegia      Wound Location:  Coccyx  Date of last Photo 4/12/18        Wound History: Present on admission  Measurements (length x width x depth, in cm) 2.5 cm x 1 cm  x  0.1 cm   Wound Base:  Adherent yellow slough mixed with smooth granular tissue  Tunneling N/A  Undermining N/A  Palpation of the wound bed: normal   Periwound skin: denuded  Color: red  Temperature: normal   Drainage: none  Description of drainage: none  Odor: none  Pain: absent, paraplegia    Wound Location:  Buttock, perineum, upper thighs  Date of last Photo 4/12/18        Wound History: Patient incontinent of stool and suprapubic catheter is currently diverting our the urethra.  Raw denuded skin with areas of exposed dermis  Color: purple and red  Temperature: normal   Drainage: none  Description of drainage: none  Odor: none  Pain: absent, paraplegis    Wound Location:  Bilateral flanks  Wound History: PNT tube sites. Left is healing well, right with creamy yellow drainage (Medicine Team aware of drainage from site)  Measurements (length x width x depth, in cm) Tube sites: 1 cm slit, not probed for depth  Wound Base: granulation tissue  Palpation of the wound bed: firm around edges   Periwound skin: intact  Color: pink  Temperature: normal   Drainage: creamy yellow from right tube site, when wound edges are pressed, drainage can be expressed  Description of drainage: yellow  Odor: none  Pain: absent    Interventions  Current support surface: Standard  Low air loss mattress- Pulsate on order    Current off-loading measures: Pillows under calves  Repositioning aid: pillows  Visual inspection of wound(s) completed   Wound  Care: was done per plan of care.  Supplies: gathered and placed at the bedside  Education provided to: patient  and nurse  Discussed importance of:repositioning every 2 hours, off-loading pressure to wound, wearing off-loading boots, their role in pressure injury prevention, dressing change frequency, head elevation <30 degrees, off-loading mattress, nutrition on wound healing and moisture management    Discussed plan of care with Patient, Nurse and Physician    Face to face time: 45 minutes    Karen Burns RN

## 2018-04-13 NOTE — PLAN OF CARE
Entered on behalf of Beverley Mujica, RN:    VSS on 1L O2 per pt request    Output: via suprapubic catheter, dressing CDI; small BM today 4/14 and passing gas    Dressings:  CDI and changed per plan of care.  Added mepliex to coccyx due to open fissure    Pain:  pain expressed in pelvic region.  Help medicaitons due to lethary and sedative.  Pt was extremely sleepy,    Activity:  attempted repositioning multiple times this am.  Finally repositioned pt at 1000 with A1.     Equipment:  pillows, IV pole, O2,     IV:  SL and patent. All new tubing and labeled today    Skin:  Multiple pressue ulcers and some blanchable redness around entire buttocks area.   Educated pt on in the importance of repositioning and skin care.    Plan:  Continue plan of care and management of infection and wounds    Additional info:  Pt desired to not be bothered multiple times this shift.  Pt was delayed on morning meds to refusal at 0900.  Since medications were late per pt, please make sure meds are timed correctly. cluster care if possible.  Pt had good demeanor did not have any aggressive or behavioral issues this shift.

## 2018-04-13 NOTE — PROGRESS NOTES
Lab called with critical value. Culture from wound to right IT taken on 4/10 growing ESBL E. Coli. Infectious disease note from 4/12/18 addressed culture from decubitus ulcer wound on 2/1/18 taken at [facility] also growing ESBL E. Coli. Pt being covered with zosyn and doxycycline for treatment of infection per infectious disease note on 4/12/2018. Oncoming RN notified.     Pt also triggered sepsis protocol at 0015, with vital signs that were taken at 1913 and WBC of 3.2. Lactic acid ordered and oncoming RN notified sepsis protocol was triggered.

## 2018-04-13 NOTE — PLAN OF CARE
"Problem: Patient Care Overview  Goal: Plan of Care/Patient Progress Review  VS: BP soft (baseline for pt) vitals otherwise stable.    O2: Refused spot check, offered to use ear probe but pt still declines. States \"I have oxygen in my nose that defeats the purpose\" pt removes and reapplies nasal cannula throughout night.    Output: 700   Last BM: 4/13   Activity: Bedrest - parapeligic   Skin: Has several pressure wounds on bottom and one on left heel.    Pain: Administered dilaudid for pain once overnight.    CMS: Sensation absent from waist down. Alert and Oriented X4   Dressing: Dressings changed a start of night shift due to being soiled.    Diet: Regular.  Calorie count.   LDA: Suprapubic catheter -catheter and dressing need to be changed but pt refused   Equipment: IV pole   Plan: To be determined.    Additional Info: Pt refuses most cares. Allowed staff to help her reposition one time overnight but became angry that staff had woken her. At this time pillow between legs and pad under pt was solied but pt refused to let staff remove them. Refuses O2 spot checks and will removed nasal cannula at times. Pt insisted that staff not return if she is sleeping. Reinforced education on skin care and importance of repositioning/keeping skin dry. Informed pt of medication that are due early am and pt said \"don't bring them in, I won't take them that early\" offered to reschedule early meds for later in the morning and pt was agreeable to that. Will continue to monitor.            "

## 2018-04-13 NOTE — PLAN OF CARE
"Problem: Patient Care Overview  Goal: Plan of Care/Patient Progress Review  Outcome: No Change    VS: BP (!) 79/45 (BP Location: Left arm)  Pulse 90  Temp 98.1  F (36.7  C) (Oral)  Resp 16  Ht 1.588 m (5' 2.5\")  Wt 73.5 kg (162 lb)  SpO2 93%  BMI 29.16 kg/m2     O2: 89% without oxygen, 93% on 2 L oxygen   Output: 1000   Last BM: 4/12   Activity: Bedrest - parapeligic   Skin: Several pressure ulcers throughout backside   Pain: Persistent   CMS: Intact   Dressing: Changed when soiled.  Dressings do not stick well to patient skin   Diet: Regular.  Calorie count.   LDA: Suprapubic catheter - patient refused it to be changed   Equipment: Bed, IV poll,    Plan: No immediate plans for disharge   Additional Info: Pt refused neds, repositioning q 2 hours, and suprapubic catherter change.  Pt requesting CT scan with contrast of pelvis before she will allow catheter change.  Pt requesting 2 L O2, but refuses oximeter and ripped off finger probe.             "

## 2018-04-13 NOTE — PROGRESS NOTES
[facility] ID SERVICE: ONGOING CONSULTATION     Patient:  Marychuy Zhou, Date of birth 1955, Medical record number [id number]  Date of Visit:  4/13/2018          Assessment and Recommendations:   Problem List:  1. 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 Pneumoniae, ESBL E. Coli; here 4/10/18 with ESBL E. Coli, Proteus, PSA  2. Infected decubitus ulcer  3. Diarrhea - 4/11 C. Difficile PCR negative   4. Atypical pneumonia vs COPD exacerbation vs heart failure exacerbation  5. SIRS  6. T4 paraplegia 2/2 spinal hematoma  7. Chronic left heel ulcer  8. COPD  9. Neurogenic bladder with nephrostomy tubes, SP catheter in place  10. Noncompliance  11. History of MDROs  12. History of C. Difficile   13. CKD  14. Chronic pain   15. Seizure disorder  16. Diastolic heart failure  17. Leukopenia    Recommendations:  -Favor to increase zosyn from 3.375 to 4.5mg Q6H for Pseudomonal coverage for cellulitis/infected decubitus ulcer; continue doxycycline for resistant gram positive coverage. If patient were to clinically decompensate with signs/symptoms of sepsis would suggest to first transition zosyn to meropenem for superior ESBL coverage; if that fails to produce a favorable response would add back vancomycin for additional resistant gram positive coverage.   -At this point favor to treat possible ulcer cellulitis with 7-10 day antibiotic course, and would not favor extending treatment for her chronic osteomyelitis as treating without surgical intervention and plan for coverage would likely not be curative. Due to resistance patterns will likely need full course of parenteral antibiotics as oral options not available.   -Consider topical antifungal for wounds.     Discussion:  Paraplegic patient with complicated medical history and history of noncompliance admitted  4/11/2018 with fevers, fatigue, elevated inflammatory markers concerning for decubitus ulcer infection with underlying chronic ischial osteomyelitis. Patient has had a decubitus ulcer for about 6 months, s/p debridement 2/2018 at OSH with ischial bone biopsy showing no growth; however without adequate coverage and history of noncompliance it is likely that her wound has progressed. Plastic surgery evaluated patient's wound - no debridement/flap plans in place at this point, rather recommend diverting colostomy. GI recommending optimization of nutritional status before consideration of diverting colostomy. Her wound will continue to be bathed in stool until she undergoes colostomy and she will continually be at risk for infection - so would strongly encourage colostomy from an infection standpoint. Her noncompliance with cares and refusal of history of physical make this a very challenging case (ID has not been able to see wound as she refuses exam; WOC pictures in chart quite helpful). She continues to have low blood pressure - however appears to be baseline rather than sepsis mediated. She is currently on zosyn and doxycyline - wound cultures now with ESBL E. Coli (but zosyn sensitive), PSA and Proteus. Would favor to increase zosyn dose to 4.5mg Q6H for Pseudomonal coverage; she appears to overall be improving on her current regimen so zosyn is also adequate therapy for her ESBL E. Coli. Unfortunately due to her resistance patterns, she will need a full parenteral course of antibiotics as no oral options exist; anticipate 7-10 days of antibiosis.    ID will continue to follow this patient; seen and discussed with ID staff Dr. Duarte.    Nay Street MD - [facility] ID fellow       Subjective/Interval events:   -Afebrile  -Did trip sepsis protocol again overnight with systolic BP to 70-80. Lactate 1.5.  -GI recommending to optimize nutritional status, not currently planning on diverting colostomy.   -Wound cultures  growing ESBL E. Coli (zosyn sensitive), Proteus and PSA.     Marychuy feels unwell today but can't say why. Still having lower pelvic pain. Breathing is a bit better. Refusing further questioning.         Recent Antimicrobials::   Zosyn 4/11-current  Doxycycline 4/11-current  Vancomycin 4/4-7; 4/11  Meropenem 4/4-7  Augmentin 4/7-11           Physical Exam:   Ranges forvital signs:  Temp:  [96.4  F (35.8  C)-98.1  F (36.7  C)] 96.4  F (35.8  C)  Pulse:  [84-90] 84  Resp:  [16] 16  BP: (79-93)/(45-59) 93/59  SpO2:  [93 %] 93 %  No intake or output data in the 24 hours ending 04/11/18 0815    Exam:  Exam refused today. Lying in bed, NAD.            Laboratory Data:     Creatinine   Date Value Ref Range Status   04/13/2018 0.42 (L) 0.52 - 1.04 mg/dL Final   04/12/2018 0.52 0.52 - 1.04 mg/dL Final   04/12/2018 0.52 0.52 - 1.04 mg/dL Final   04/10/2018 0.56 0.52 - 1.04 mg/dL Final   04/07/2018 0.40 (L) 0.52 - 1.04 mg/dL Final     WBC   Date Value Ref Range Status   04/12/2018 3.2 (L) 4.0 - 11.0 10e9/L Final   04/12/2018 3.8 (L) 4.0 - 11.0 10e9/L Final   04/10/2018 3.7 (L) 4.0 - 11.0 10e9/L Final   04/07/2018 6.1 4.0 - 11.0 10e9/L Final   04/06/2018 6.9 4.0 - 11.0 10e9/L Final     Hemoglobin   Date Value Ref Range Status   04/13/2018 8.1 (L) 11.7 - 15.7 g/dL Final     Platelet Count   Date Value Ref Range Status   04/12/2018 212 150 - 450 10e9/L Final     Lab Results   Component Value Date     04/13/2018    BUN 4 (L) 04/13/2018    CO2 29 04/13/2018     CRP  4/5 4/10  100 80  ESR  4/4 4/10  62 64       Pertinent Recent Microbiology Data:   Blood cultures  4/10 x 2 NGTD  4/4 x 2 NG  4/2 X 1 NG    Wound cultures  4/10 swab   Culture Micro (Abnormal) 04/10/2018  9:41       Light growth   Escherichia coli ESBL   Enterobacteriaceae that are susceptible to meropenem are usually susceptible to ertapenem.   ESBL (extended beta lactamase) producing organisms require contact precautions.        Culture Micro (Abnormal)  04/10/2018  9:41      Light growth   Proteus mirabilis        Culture Micro (Abnormal) 04/10/2018  9:41      Moderate growth   Pseudomonas aeruginosa   Piperacillin/Tazobactam susceptibilities in progress   4/13/18        Culture Micro 04/10/2018  9:41      Culture in progress     Culture Micro 04/10/2018  9:41      Critical Value/Significant Value called to and read back by   JLUIS ORELLANA RN ([id number]).  04.12.18 2334 GJS          Culture & Susceptibility      ESCHERICHIA COLI ESBL      Antibiotic Interpretation Sensitivity Unit Method Status     AMIKACIN Sensitive 4 ug/mL SULLY Preliminary     AMPICILLIN Resistant >=32 ug/mL SULLY Preliminary     AMPICILLIN/SULBACTAM Sensitive 8 ug/mL SULLY Preliminary     CEFEPIME Resistant  ug/mL SULLY Preliminary     CEFTAZIDIME Resistant  ug/mL SULLY Preliminary     CEFTRIAXONE Resistant >=64 ug/mL SULLY Preliminary     CIPROFLOXACIN Resistant >=4 ug/mL SULLY Preliminary     GENTAMICIN Sensitive <=1 ug/mL SULLY Preliminary     LEVOFLOXACIN Resistant >=8 ug/mL SULLY Preliminary     MEROPENEM Sensitive <=0.25 ug/mL SULLY Preliminary     Piperacillin/Tazo Sensitive 8 ug/mL SULLY Preliminary     TOBRAMYCIN Sensitive 2 ug/mL SULLY Preliminary     Trimethoprim/Sulfa Resistant >=16/304 ug/mL SULLY Preliminary                 PROTEUS MIRABILIS      Antibiotic Interpretation Sensitivity Unit Method Status     AMIKACIN Sensitive <=2 ug/mL SULLY Preliminary     AMPICILLIN Resistant >=32 ug/mL SULLY Preliminary     AMPICILLIN/SULBACTAM Sensitive 8 ug/mL SULLY Preliminary     CEFEPIME Sensitive <=1 ug/mL SULLY Preliminary     CEFTAZIDIME Sensitive <=1 ug/mL SULLY Preliminary     CEFTRIAXONE Sensitive <=1 ug/mL SULLY Preliminary     CIPROFLOXACIN Resistant >=4 ug/mL SULLY Preliminary     GENTAMICIN Sensitive <=1 ug/mL SULLY Preliminary     LEVOFLOXACIN Resistant >=8 ug/mL SULLY Preliminary     MEROPENEM Sensitive 1 ug/mL SULLY Preliminary     Piperacillin/Tazo Sensitive <=4 ug/mL SULLY Preliminary     TOBRAMYCIN  Sensitive <=1 ug/mL SULLY Preliminary     Trimethoprim/Sulfa Resistant >=16/304 ug/mL SULLY Preliminary                 PSEUDOMONAS AERUGINOSA      Antibiotic Interpretation Sensitivity Unit Method Status     AMIKACIN Sensitive 4 ug/mL SULLY Preliminary     CEFEPIME Sensitive 8 ug/mL SULLY Preliminary     CEFTAZIDIME Sensitive 4 ug/mL SULLY Preliminary     CIPROFLOXACIN Resistant >=4 ug/mL SULLY Preliminary     GENTAMICIN Sensitive 4 ug/mL SULLY Preliminary     LEVOFLOXACIN Resistant >=8 ug/mL SULLY Preliminary     MEROPENEM Sensitive 0.5 ug/mL SULLY Preliminary     TOBRAMYCIN Sensitive <=1 ug/mL SULLY Preliminary               Bone culture  2/5 1+ PMNs on gram stain, aerobic and anaerobic cultures NG  2/1 Wound swab   Culture 4+ MRSA Coagulase Positive Staph (A)   Comment: Methicillin resistant Staph aureus, infection status already documented.      Culture 4+ Diphtheroids (A)     Culture 4+ Proteus mirabilis (A)     Culture 1+ ESBL Producing Klebsiella pneumoniae (A)     Culture 1+ ESBL producing Escherichia coli (A)     Gram Stain Result 2+ Polymorphonuclear leukocytes     Gram Stain Result 1+ Gram negative bacilli     Gram Stain Result 1+ Gram positive cocci in pairs     Specimen   Swab - Other     Organism Antibiotic Method Susceptibility   MRSA Coagulase Positive Staph Clindamycin SULLY <=0.5: Sensitive    Cefazolin SULLY >16: Resistant    Doxycycline SULLY <=0.5: Sensitive    Daptomycin SULLY <=1: Sensitive    Linezolid SULLY 2: Sensitive    Oxacillin SULLY >2: Resistant    Trimethoprim + Sulfamethoxazole SULLY <=1/19: Sensitive    Vancomycin SULLY 1: Sensitive   Proteus mirabilis Amoxicillin + Clavulanate SULLY <=4/2: Sensitive    Ampicillin SULLY >16: Resistant    Cefazolin SULLY 8: Sensitive    Cefepime SULLY <=1: Sensitive    Ceftriaxone SULLY <=1: Sensitive    Ciprofloxacin SULLY >2: Resistant    Gentamicin SULLY <=2: Sensitive    Levofloxacin SULLY >4: Resistant    Piperacillin + Tazobactam SULLY <=2/4: Sensitive    Tetracycline SULLY >8: Resistant     Tobramycin SULLY <=2: Sensitive    Trimethoprim + Sulfamethoxazole SULLY >2/38: Resistant   ESBL Producing Klebsiella pneumoniae Amoxicillin + Clavulanate SULLY 16/8: Resistant    Ampicillin SULLY >16: Resistant    Cefazolin SULLY >16: Resistant    Cefepime SULLY >16: Resistant    Ceftriaxone SULLY >32: Resistant    Ciprofloxacin SULLY >2: Resistant    Gentamicin SULLY <=2: Sensitive    Levofloxacin SULLY <=1: Sensitive    Meropenem SULLY <=0.25: Sensitive    Piperacillin + Tazobactam SULLY 16/4: Sensitive    Tetracycline SULLY >8: Resistant    Tobramycin SULLY 8: Intermediate    Trimethoprim + Sulfamethoxazole SULLY <=0.5: Sensitive   ESBL producing Escherichia coli Amoxicillin + Clavulanate SULLY 8/4: Resistant    Ampicillin SULLY >16: Resistant    Cefazolin SULLY >16: Resistant    Cefepime SULLY 4: Resistant    Ceftriaxone SULLY >32: Resistant    Ciprofloxacin SULLY >2: Resistant    Gentamicin SULLY <=2: Sensitive    Levofloxacin SULLY >4: Resistant    Meropenem SULLY <=0.25: Sensitive    Piperacillin + Tazobactam SULLY 8/4: Sensitive    Tetracycline SULLY >8: Resistant    Tobramycin SULLY <=2: Sensitive    Trimethoprim + Sulfamethoxazole SULLY >2/38: Resistant     2018 C. Difficile PCR negative         Imagin/10/18 CXR:  AP and lateral views of the chest semi-upright. Cardiac  silhouette is within normal limits. The pulmonary vasculature is  diffusely prominent. There is no focal airspace opacity, pleural  effusion, or pneumothorax. There is prominence of the major an minor  fissures on the right and the left midlung Ledesma on the left. These  changes could represent chronic scarring versus fluid. The visualized  upper abdomen, osseous structures, and soft tissues are unremarkable.    18 XR L calcaneus:  The bones are severely osteopenic which limits evaluation.  No definite sclerosis is seen of the calcaneus, although given the  marked osteopenia plain film evaluation is severely limited for  possible osteomyelitis.    18 CT AP:  IMPRESSION:  1.  Ulcer extending down to the right ischial tuberosity with erosive  changes suspicious for osteomyelitis.  2. Nonunited left subtrochanteric fracture.  3. Bilateral nephrostomy tubes in place. No evidence of hydronephrosis  or urinary tract stones.  3. Suprapubic catheter in place in the bladder.

## 2018-04-13 NOTE — PROGRESS NOTES
CLINICAL NUTRITION SERVICES - ASSESSMENT NOTE     Nutrition Prescription    RECOMMENDATIONS FOR MDs/PROVIDERS TO ORDER:  1. If PO intakes continue to be minimal per calorie counts, consider placement of enteral feeding tube given patient's severe wounds and increased needs (pending compliance).     Malnutrition Status:    Unable to determine due to unable to obtain hx from pt, suspect patient meets criteria for malnutrition.     Recommendations already ordered by Registered Dietitian (RD):  1. Ordered Boost Plus TID with meals  2. Ordered room service with assist   3. Ordered wound protocol vitamins: zinc sulfate 220 mg, vitamin A 20,000 units, vitamin C 500 mg - all x 10 days; Unable to order multivitamin d/t interactions with other meds.     Future/Additional Recommendations:  1. Encourage PO intakes and provide education on importance of adequate calories/protein for wound healing as able. Adjust supplements as needed.   2. If EN becomes plan of care, recommend checking refeeding lytes (Mg++, Phos, K+) and replace any abnormalities prior to initiating and with advancement to goal. Would recommend initiation of Isosource 1.5 at 20 mL/hr and advance by 10 mL q 8 hours to goal of 50 mL/hr if refeeding lytes WNL. Mix with 3 packets prosource to per day to meet protein needs. Goal would provide 1848 kcal (32 kcal/kg), 115 g protein (2 g/kg).       - Free water flushes: to meet 100% of hydration needs, pt would neet 115 mL free water flushes q 2 hours.      REASON FOR ASSESSMENT  Marychuy Zhou is a 62 year old female assessed by the dietitian for consult - malnutrition, multiple ulcers    NUTRITION HISTORY  Unable to obtain on attempt to visit x2. Pt appears sleeping and does not wake despite multiple attempts.   Per chart review, pt with PMH of T4 paraplegia d/t hematoma with multiple decubitus ulcers, chronic pain, chronic osteomyelitis, left plant heel wound, COPD, diastolic heart failure admitted with fever, dry  "cough, and worsening buttock pain. She has been refusing most cares despite education.      CURRENT NUTRITION ORDERS  Diet: Regular  Intake/Tolerance: Per review of [facility], pt has missed all meals today. Only ordered Boost, rice krispy bar, and jell-o for lunch yesterday. Otherwise no meals. Calorie counts ordered 4/14-4/16.     LABS  Labs reviewed    MEDICATIONS  Medications reviewed    ANTHROPOMETRICS  Height: 158.8 cm (5' 2.5\")  Most Recent Weight: 73.5 kg (162 lb)    IBW: 51 kg  BMI: Overweight BMI 25-29.9  Weight History: Unable to obtain weight history from pt as unable to visit. Per chart, pt with variable weights over the past week, difficult to assess true trends.   Wt Readings from Last 10 Encounters:   04/10/18 73.5 kg (162 lb)   04/05/18 78.6 kg (173 lb 4.5 oz)   04/02/18 74.8 kg (165 lb)     Dosing Weight: 57 kg (adjusted weight)     ASSESSED NUTRITION NEEDS  Estimated Energy Needs: 8414-1150 kcals/day (30 - 35 kcals/kg )  Justification: Wound healing  Estimated Protein Needs: 114+ grams protein/day (2+ g/kg)  Justification: Wound healing, with multiple pressure injuries   Estimated Fluid Needs: 2200+ mL/day (30 + mL/kg)   Justification: Increased needs for wound healing, or per provider pending fluid status    PHYSICAL FINDINGS  See malnutrition section below.  Per WOC nurse assessment:   -Unstageable wound on left heel  -Stage 3 wound on left upper thigh  -Stage 4 wound on right IT  -Stage 3 wound on left sacrum  -Stage 3 wound on coccyx  Per MD: 1+ thigh edema B    MALNUTRITION  % Intake: Unable to assess  % Weight Loss: Unable to assess  Subcutaneous Fat Loss: Unable to assess  Muscle Loss: Unable to assess  Fluid Accumulation/Edema: Mild  Malnutrition Diagnosis: Unable to determine due to unable to obtain hx from pt, suspect patient meets criteria for malnutrition.     NUTRITION DIAGNOSIS  Inadequate protein-energy intake related to increased nutrient needs for wound healing, decreased " appetite suspected as evidenced by pt frequently skipping meals per review of [facility], multiple stage 3 and 4 wounds per WOC.     INTERVENTIONS  Implementation  Nutrition Education: Unable to complete due to unable to wake patient.    Collaboration with other providers - discussed with Dr. Cadet  Medical food supplement therapy - ordered Strawberry Boost Plus TID with meals  Modify composition of meals/snacks - placed patient on room service with assist to ensure she is getting meals ordered.   Multi-trace element supplement therapy, multivitamin/mineral supplement therapy - ordered wound vitamin protocol (zinc sulfate 220 mg, vitamin A 20,000 units, vitamin C 500 mg - all x 10 days). Unable to order multivitamin d/t interactions with other meds.      Goals  Total avg nutritional intake to meet a minimum of 30 kcal/kg and 2 g PRO/kg daily (per dosing wt 57 kg).     Monitoring/Evaluation  Progress toward goals will be monitored and evaluated per protocol.    Vianey Art RD, LD  Unit Pager: [phone]

## 2018-04-13 NOTE — PROGRESS NOTES
"Pt was seen, case reviewed with nursing staff and consultants    Pt continues to frequently refuse cares, vitals and medications--though she denies this to me.    She c/o low back and R hip pain  This am, she c/o fatigue, \"they keep waking me up at night\"  Denies chest pain or SOB, + occ non-productive cough  Nura counts in process    CRS reluctant to proceed with colostomy in view of low albumin      BP 90s/  02 sat low 90s low flow 02    Sleepy, easily alerted, in NAD, fully oriented, minimal insight  RR 20  + congested cough, voice sl raspy  Lungs clear posteriorly except upper airway congestion  CV rrr  Abd soft  1 + thigh edema B  Truncal wounds not examined by me today (Pt has frequently refused skin cares, even after stooling)        Results for SHALINI CHONG (MRN [id number]) as of 4/13/2018 10:43   Ref. Range 4/13/2018 06:40   Sodium Latest Ref Range: 133 - 144 mmol/L 144   Potassium Latest Ref Range: 3.4 - 5.3 mmol/L 4.0   Chloride Latest Ref Range: 94 - 109 mmol/L 110 (H)   Carbon Dioxide Latest Ref Range: 20 - 32 mmol/L 29   Urea Nitrogen Latest Ref Range: 7 - 30 mg/dL 4 (L)   Creatinine Latest Ref Range: 0.52 - 1.04 mg/dL 0.42 (L)   GFR Estimate Latest Ref Range: >60 mL/min/1.7m2 >90   GFR Estimate If Black Latest Ref Range: >60 mL/min/1.7m2 >90   Calcium Latest Ref Range: 8.5 - 10.1 mg/dL 7.6 (L)   Anion Gap Latest Ref Range: 3 - 14 mmol/L 5   T4 Free Latest Ref Range: 0.76 - 1.46 ng/dL 0.76   TSH Latest Ref Range: 0.40 - 4.00 mU/L 49.56 (H)   Glucose Latest Ref Range: 70 - 99 mg/dL 85   Hemoglobin Latest Ref Range: 11.7 - 15.7 g/dL 8.1 (L)       Assessment    Paraplegia with chronic truncal decubitus ulcers, chronic R sacral osteo, L heel wound, on Augmentin prior to this admission, now on Zosyn and doxycycline empirically ID.  Plastics doubt abscess or deep acute infection. Suspect chronic osteo with element of cellulitis/dermatitis  BP has been low at times (when Pt permits vitals). No clear " evidence of sepsis. Hypotension possibly also related to hypothyroidism, multiple psych and pain meds   Management of skin issues, overall medical issues, greatly compromised by Pt's lack of co operation, refusal of cares, meds.     Chest congestion, prominent interstitial changes. Suspect difficulty mobilizing secretions secondary to paraplegia, bed bound status, possibly pneumonia/CHF, superimposed upon COPD    Hypothyroidism, likely secondary to med non-compliance     S/p B PNT 2 months ago, for the intent of urinary diversion (even with SP cath in place), not effective per Pt. Both now removed.     Hypoalbuminemia, secondary to protein loss from wounds and possible poor intake with malnutrition. Nura counts ordered. Doubt albumin will significantly improve until decubitus ulcers, chronic infection resolved      Chronic anticoagulation for history of DVT      Chronic anemia, likely secondary to chronic infection, stable      Seizure d/o, stable on recently reduced dose of Keppra      Depression with anxiety, possible underlying personality d/o, on Cymbalta, gabapentin, lamictal.  Suspect also component of organic mood disorder related to chronic illness, ? Polypharmacy, in particular, diazepam, dilaudid, vistaril     Plan  Increase Zosyn per ID rec  Continue doxy for staph coverage for ulcers and possible resp infection  Increase levothyroxine, encourage compliance with meds  Skin cares  Low dose lasix for component of CHF, reassess daily for need for lasix, as po intake variable  Prn nebs  Nura counts  D/c  hs vistaril, reduce diazepam, reduce Dilaudid, as these meds may be contributing to fatigue, confusion/poor co operation. Discussed need to use meds judiciously.  Check serum cortisol level      I reviewed with Pt this am, all over her meds and treatments. She recognizes all of meds, and states she understands the need to be compliant with meds, as well as skin treatments. The importance of compliance with  levothyroxine and wound cares was particularly emphasized.  She maintains she wants to get better and go home. I stated multiple times, that this could not occur if she continued to refuse tx.  She clearly has poor insight into her current status and ongoing non-compliance--I suspect, secondary to depression, personality d/o, and possible organic brain mood disorder    If compliance does not improve, will ask for psych and palliative care eval.

## 2018-04-13 NOTE — PROVIDER NOTIFICATION
Discussed decreased nutrition with Dr. Cadet and he advised to order meals for patient regardless of her level of compliance, continue to educate and offer nutrition and decrease sedatives.

## 2018-04-14 NOTE — PROGRESS NOTES
Pt seen, case reviewed with team    Pt remains variably compliant with treatments, particularly is refusing cares at night     Pt has been eating better over the last 24 hours    Pt notes sl worsening of chest congestion. She continues to have an occ non-productive cough  She doesn't feel nebs have been helpful     Afebrile  BP 80s/  02 sats low 90s on 2 LPM    Much more alert this am, pleasant, appreciative  + congested cough, RR 20  Lungs + upper airway congestion, no crackles  CV rrr  Abd soft, non-tender, non-distended  1 + presacral and thigh edema B      Labs pending      Assessment       Paraplegia with chronic truncal decubitus ulcers, chronic R sacral osteo, L heel wound, on Augmentin prior to this admission, now on Zosyn and doxycycline, with most recent wound culture from buttocks revealing E coli ESBL, proteus, pseudomonas  Plastics doubt abscess or deep acute infection. Suspect chronic osteo with element of cellulitis/dermatitis.  Pt has been somewhat more co operative with cares.    Hypotension, appears stable, ? Baseline, doubt ongoing sepsis, may be related to hypothyroidism, meds    Chest congestion, with prominent interstitial changes on admission CXR.  Suspect difficulty mobilizing secretions secondary to paraplegia, bed bound status, possibly pneumonia/CHF, superimposed upon COPD.  Pt has received intermittent lasix since admission (secondary to poor intake), with unclear benefit.       Hypothyroidism, likely secondary to med non-compliance      S/p B PNT 2 months ago, for the intent of urinary diversion (even with SP cath in place), not effective per Pt. Both now removed.      Hypoalbuminemia, secondary to protein loss from wounds and possible poor intake with malnutrition. Nura counts ordered. Doubt albumin will significantly improve until decubitus ulcers, chronic infection resolved      Chronic anticoagulation for history of DVT      Chronic anemia, likely secondary to chronic  infection, stable      Seizure d/o, stable on recently reduced dose of Keppra      Depression with anxiety, possible underlying personality d/o, on Cymbalta, gabapentin, lamictal.  Suspect also component of organic mood disorder related to chronic illness.  Pt seems more alert, with reduction in diazepam dose, d.c of vistaril, decrease in gabapentin        Plan  Continue current antibiotic tx  Trial of bid lasix today  Flutter valve, IS, prn nebs  If worsening resp status, recheck CXR  Nura counts  Repeat hgb, BMP in am  Importance of compliance with cares was again emphasized

## 2018-04-14 NOTE — PLAN OF CARE
"Problem: Patient Care Overview  Goal: Plan of Care/Patient Progress Review    VS: Pt A&Ox 4. Patient refusing vital sign assessment.    Output: SP catheter draining clear/yellow urine. Pt LBM 4/13   Activity: Bedrest, assist of 2 for repositioning. Patient refused to be repositioned every two hours. Last repositioned at 0100. Patient angry and verbally abusive when repositioning was politely offered every two hours. Patient yelling, \"What is wrong with you!? Leave me alone! Do not come back in my room the rest of the shift! I will reposition on my own if I need to!\" It was observed upon rounding that patient remained in right side lying position remainder of shift.    Skin: Multiple wounds on buttocks and posterior thighs. Patient refused assessment.    Pain: Denying pain.    CMS: CMS and Neuro's are intact. Patient reports baseline absent sensation to BLE, paraplegia. Denies numbness and tingling in all other extremities. Refusing PCD pumps.     Dressing: Dressings to buttocks and posterior thighs are saturated. Patient refusing to have a dressing change. Patient yelled, \"I do not want to make a big deal about this in the middle of the night. They will be fine until morning. I am sleeping!\"    Diet:  Is on regular diet. Denies nausea. Appetite and nutrition intake are poor.     LDA:   PIV is patent in left arm and IV antibiotics infused without difficulty.   Plan: Continue to monitor pt and encourage compliance with cares.          "

## 2018-04-14 NOTE — PLAN OF CARE
"Problem: Patient Care Overview  Goal: Plan of Care/Patient Progress Review  Outcome: No Change    VS: BP (!) 88/48 (BP Location: Right arm)  Pulse 80  Temp 98.9  F (37.2  C) (Oral)  Resp 16  Ht 1.588 m (5' 2.5\")  Wt 73.5 kg (162 lb)  SpO2 93%  BMI 29.16 kg/m2   O2: Uses 2 L o2 on and off at own discretion   Output: 350 supracatheter   Last BM: 4/13 2x on evening shift, watery   Activity: Bedrest, parapalegic   Skin: Multiple pressure ulcers on back side   Pain: Tolerable with 2 mg dilauidid   CMS: Intact - parapalegic   Dressing: Changed 2 x this shift do to saturation with stool and drainange   Diet: Regular plus boost, eating for the first time in days this shift.  Calorie count   LDA: IV left arm   Equipment: IV poll, bed   Plan: Needs to eat more and be more compliant for doctors to treat    Additional Info: Pt much more compliant this shift.  Pt responded very well to doctor talking to her about the need to eat and was in a much better mood after eating.  Pt still refused q 2 turns, but did turn q 3-4 and allowed to be cleaned up when soiled and took all medications.               "

## 2018-04-14 NOTE — PLAN OF CARE
Problem: Patient Care Overview  Goal: Plan of Care/Patient Progress Review  Outcome: No Change  Patient A & O x 3. Neuros and CMS baseline for patient, sensation absent in BLE (para). VSS and afebrile, SpO2 96% on room air. LS course with crackles, patient using Aerobika at bedside. BS + x 4, patient passing flatus and had a BM yesterday. Suprapubic catheter patent with clear deo urine, insertion site cleaned with Microklenz and new dressing applied. Patient states pain is tolerable. Pain meds given as ordered and PRN. All buttocks/leg wound care completed and new dressings applied. Left PIV saline locked. Patient on a regular diet and tolerating it well. Patient on bedrest and turned every 2 hrs, heels elevated on pillows. Patient able to make needs known. Call light within reach. Will continue with POC.

## 2018-04-15 NOTE — PROGRESS NOTES
Pt was seen, case reviewed with team    Pt is sleepy this am, states she was up all night, watching television    States breathing is sl improved today. + occ cough  Staff notes improved intake ( Nura counts pending)  Pain has been under fair control    She remains occ non-compliant with cares    Pt remains hopeful she can have a diverting colostomy in the near future  (she is aware of CRS concerns re nutritional status)    VSS  BP 120s/  HR  70s  02 sats low 90s RA    Alert, pleasant, co operative this am  RR 12 unlabored, occ moist cough  Lungs clear  CV rrr  Abd soft, non-tender  No calf edema  Coccyx ulcer was not examined by me today    BMP, CBC pending  AM cortisol 5  Wound culture pseudomonas R to zosyn        Assessment    Paraplegia with chronic truncal decubitus ulcers, chronic R sacral osteo, L heel wound, on Augmentin prior to this admission, now on Zosyn and doxycycline, with most recent wound culture from buttocks revealing E coli ESBL, proteus, pseudomonas ( R to zosyn)  Plastics doubt abscess or deep acute infection. Suspect chronic osteo with element of cellulitis/dermatitis.  Reviewed updated culture results with ID today--given stable clinical picture, will not change antibiotics. Duration of antibiotics anticipated to be 10 days     Hypotension, resolved. ? Secondary to reduction in CNS active meds, vs treatment of infection, correction of hypothyroidism. Hypoadrenalism clinically not likely     Chest congestion, with prominent interstitial changes on admission CXR.  Resp status improved today, after resumption of lasix 20 mg bid yesterday.     Hypothyroidism, likely secondary to med non-compliance      S/p B PNT 2 months ago, for the intent of urinary diversion (even with SP cath in place), not effective per Pt. Both now removed.      Hypoalbuminemia, secondary to protein loss from wounds and possible poor intake with malnutrition. Nura counts ordered. Doubt albumin will significantly improve  until decubitus ulcers, chronic infection resolved      Chronic anticoagulation for history of DVT      Chronic anemia, likely secondary to chronic infection, stable. F/u Hgb pending      Seizure d/o, stable on recently reduced dose of Keppra      Depression with anxiety, possible underlying personality d/o, on Cymbalta, gabapentin, lamictal.    Pt continues to be more alert, with reduction in diazepam dose, d.c of vistaril, decrease in gabapentin         Plan  Continue lasix 20 mg bid  Same antibiotics  Nura counts  Review consideration of diverting colostomy with CRS

## 2018-04-15 NOTE — PLAN OF CARE
Problem: Infection, Risk/Actual (Adult)  Goal: Identify Related Risk Factors and Signs and Symptoms  Related risk factors and signs and symptoms are identified upon initiation of Human Response Clinical Practice Guideline (CPG).   Outcome: No Change     VS: Stable.   O2: On RA and stable.   Output:  supracatheter intact.   Last BM: 4/14 1X on evening shift, loose.   Activity: Bedrest, parapalegic. Pt refused to turn frequently.   Skin: Multiple pressure ulcers on back side. Pt's buttocks  reddened from wetness. Pt refused miconazole powder.    Pain: Tolerable with 2 mg dilauidid.   CMS: Paraplegic no sensation in BLE.   Dressing: Changed 1 x this shift do to saturation with stool and drainange.   Diet: Tolerating regular diet and has boost in between.   LDA: IV left arm SL.   Equipment: IV poll, bed   Plan: Needs to eat more and be more compliant for doctors to treat    Additional Info: Pt was not  compliant this shift with wound cares and repositioning .

## 2018-04-15 NOTE — PLAN OF CARE
"Problem: Infection, Risk/Actual (Adult)  Goal: Identify Related Risk Factors and Signs and Symptoms  Related risk factors and signs and symptoms are identified upon initiation of Human Response Clinical Practice Guideline (CPG).   VS: Stable.   O2: On 1.5LPM to remain above 90% stable.   Output: Supracatheter intact.   Last BM: 4/15 on nights    Activity: Bedrest, parapalegic. Pt refused to turn X 4   Skin: Multiple pressure ulcers on back side. Pt's buttocks  reddened from wetness. Pt refused miconazole powder. Educated on importance of repositioning and katia care. Pt stated that her \"skin has been like this for 10 years so don't tell me how to fix it\"   Pain: Tolerable on oral dilauidid.   CMS: Paraplegic no sensation in BLE.   Dressing: Changed 1 x this shift after 3 refusals.    Diet: Tolerating regular diet and has boost in between.   LDA: IV left arm SL.   Equipment: Pillows for positioning    Plan: Increase nutrition and compliancy. Calorie count    Additional Info: Non compliant with repositioning and proper katia cares.             "

## 2018-04-15 NOTE — PLAN OF CARE
Problem: Patient Care Overview  Goal: Plan of Care/Patient Progress Review  Pt. A&Ox4. VSS. Afebrile. Lungs- Exp wheezes. Maintaining sats on RA. IS encouraged. Bowel sounds active, incontinent of stool this shift. PP+ DP+. CMS and neuro's are intact to baseline Denies nausea, shortness of breath, and chest pain. Declined pain meds overnight. Suprapubic patent w/ adequate output. Tolerating regular diet. R IT dressing changed last pm, L posterior thigh dressed this am. Coccyx dressing intact. Pt didn't have dressings over PNT sites, pt would allow me to apply them, stating the wound RN was supposed to do those. Educated pt the WOCRN enters orders that we follow on days they aren't onsite. Pt continued to refuse. Repositioning as pt allows, last at 0630. PIV is patent and SL between abx. Bilateral heels are elevated off the bed. Call light is within reach, pt able to make needs known. Will continue to monitor.

## 2018-04-16 NOTE — PROGRESS NOTES
"[facility] Internal Medicine Progress Note            Interval History:   Record reviewed.  Discussed with Dr. Cadet.   Seen with RN.  Interval increase upper airway congestion.  Weak cough with inability to clear secretions.  Dyspnea.  Right lateral chest discomfort, aggravated by cough.  Sat 89-90% RA.  Nausea without emesis.  Indicates improved po intake.  On calorie counts.  Denies present cough, choking with po intake.  Occas reflux.  Diffuse lower abdominal discomfort.  Mild bloating.  BM 2 days ago.  Has SP catheter.  PNT's out.           Medications:   All medications reviewed today          Physical Exam:   Blood pressure 90/56, pulse 79, temperature 96.7  F (35.9  C), temperature source Axillary, resp. rate 18, height 1.588 m (5' 2.5\"), weight 73.5 kg (162 lb), SpO2 93 %.    Intake/Output Summary (Last 24 hours) at 04/16/18 1030  Last data filed at 04/16/18 0700   Gross per 24 hour   Intake                0 ml   Output              800 ml   Net             -800 ml       General:  Alert.  Appropriate.  No distress.  Resp non labored.  Noted weak cough.  Sat  95% 2 liters O2 by NC.     Heent:      Neck:    Skin:    Chest:  Diffuse upper airway rhonchi.  No bronchospasm.  Bases clear.     Cardiac:  Reg without gallop, murmur.  No JVD. Tender right lateral chest wall with reproduction of pain.      Abdomen:  Non distended, soft, no guarding.   BS normal.     Extremities:  Perfused.  1 plus pretibial, pedal edema.  No posterior calf, thigh induration.     Neuro:            Data:     Results for orders placed or performed during the hospital encounter of 04/10/18 (from the past 24 hour(s))   Basic metabolic panel   Result Value Ref Range    Sodium 140 133 - 144 mmol/L    Potassium 3.7 3.4 - 5.3 mmol/L    Chloride 102 94 - 109 mmol/L    Carbon Dioxide 33 (H) 20 - 32 mmol/L    Anion Gap 5 3 - 14 mmol/L    Glucose 79 70 - 99 mg/dL    Urea Nitrogen 8 7 - 30 mg/dL    Creatinine 0.48 (L) 0.52 - 1.04 mg/dL    " GFR Estimate >90 >60 mL/min/1.7m2    GFR Estimate If Black >90 >60 mL/min/1.7m2    Calcium 7.8 (L) 8.5 - 10.1 mg/dL   CBC with platelets   Result Value Ref Range    WBC 5.1 4.0 - 11.0 10e9/L    RBC Count 3.42 (L) 3.8 - 5.2 10e12/L    Hemoglobin 8.2 (L) 11.7 - 15.7 g/dL    Hematocrit 28.3 (L) 35.0 - 47.0 %    MCV 83 78 - 100 fl    MCH 24.0 (L) 26.5 - 33.0 pg    MCHC 29.0 (L) 31.5 - 36.5 g/dL    RDW 18.6 (H) 10.0 - 15.0 %    Platelet Count 248 150 - 450 10e9/L                Assessment and Plan:   1)  T4 paraplegia 2nd to hematoma.  2)  Febrile illness with chronic  multiple stage IV decubital ulcers, L heel wound and chronic R sacral osteomyelitis.  On zosyn and doxycycline.  Wound cultures from buttocks E coli ESBL, proteus, pseudomonas ( R to zosyn).   Impression of chronic osteomyelitis with element of cellulitis/dermatitis.  Abscess or deep acute infection not felt likely per plastic surgery.  10 day ABs anticipated with completion of zosyn 4/20.   Hospitalized [facility] 4/4-4/7/18 on meropenem and vanco (cultures ESBL, VRE and MRSA) with DC to TCU on augmentin pending PMD and primary surgeon follow up.  3)  Chronic pain.  4)  COPD with CXR 4/10 demonstrating Pulmonary vascular congestion versus chronic interstitial scarring. Increase chest congestion today (previously improved with po lasix).  H/o diastolic CHF.  BNP 4/12 normal 353.  Concern regarding potential oral pharyngeal dysphagia with potential aspiration inability to clear upper airway secretions as ongoing issue.  Not acutely distressed.   5)  Anemia likely 2nd to CD and iron deficiency per record.  Adequate.    6)  Left comminuted acute subtrochanteric fracture with recent fall.  7)  Hx of DVT on Rivaroxaban.  L CP with chest wall component based on exam.  PE unlikely with anticoagulation.   8)  Seizure disorder. On keppra taper and lamotrigine titration.    9)  Diastolic CHF.  Suspect compensated.   10)  Depression, anxiety, PTSD.  On Cymbalta, gabapentin,  lamictal.   Decrease sedation with reduction in diazepam dose, d.c of vistaril, decrease in gabapentin  11)  Hypothyroidism with TSH 49.56 likely related to med non compliance.  12)  Hypoalbuminemia.  Protein loss from wounds and poor nutrition.  Calorie counts ordered.  Improved po despite c/o nausea.    12)  Closed fracture zygoma.  13)  Consideration for diverting colostomy.   14)  S/p B PNT 2 months ago, for the intent of urinary diversion (since removed).  Has SP catheter.   15)  Nausea basis unclear - potential relation to meds, GERD.  Lower abdominal discomfort unclear significance, benign exam.  Monitor for now.      PLAN:  1)  Review meds, orders.  2)  CXR.  Scheduled and prn nebs.  3)  RT to assess for cough assist device.  Add mucinex to help mobilize secretions.  Humidity to O2.  Hold further lasix for now.  4)  Speech path bedside swallow evaluation.  5)  Increase omeprazole to BID, prn zofran.  6)  Continue present ABs through 4/20 per ID rec.  7)  F/u calorie counts.  8)  Trend labs.  Monitor clinically.  Updated Son Sánchez.   Disposition Plan   Expected discharge unclear pending clinical course.      Entered: Ravi Lawrence 04/16/2018, 10:30 AM              Attestation:  I have reviewed today's vital signs, notes, medications, labs and imaging.     Ravi Lawrence MD

## 2018-04-16 NOTE — PLAN OF CARE
Problem: Infection, Risk/Actual (Adult)  Goal: Identify Related Risk Factors and Signs and Symptoms  Related risk factors and signs and symptoms are identified upon initiation of Human Response Clinical Practice Guideline (CPG).   Outcome: No Change          VS:       Pt A/O X 4. Afebrile. BP 90/56 (BP Location: Right arm)  Pulse 79  Temp 96.7  F (35.9  C) (Axillary). Pt refused O2 spot check.   TIMMY Lungs - pt refused assessment. Productive cough noted. TCDB encouraged. Nausea present this evening. Cool wash cloth and PRN zofran administered. Declined to answer questions related to chest pain and SOB.       Output:       Bowels- pt refused assessment. LBM today per pt report. Suprapubic catheter draining clear yellow urine.      Activity:       Pt on strict bedrest. Refused to be turned and repositioned. Nurse educated on pressure ulcer prevention.      Skin:   TIMMY. Pt refused skin assessment and cares including wound care and katia care.      Pain:       Has pain in the buttock and given Dilaudid and Oxycontin.       CMS:       No sensation in bilat LEs.       Dressing:       Pt refused assessment of wounds and wound care.      Diet:       Pt is on a Regular diet and appetite was poor this shift. Continues on calorie count.      LDA:       PIV is patent in the left upper forearm  and saline locked.      Plan:        Education on pressure ulcer prevention, nutrition and skin/wound care.

## 2018-04-16 NOTE — PROGRESS NOTES
"Patient was given Duoneb with no response.  Attempted to assist patient with Aerobika device and she stated \"it makes her want to vomit\". She refused any assistance with cough assist devices.  "

## 2018-04-16 NOTE — PROGRESS NOTES
CALORIE COUNTS    4/15: 768 kcal, 26 g protein (2 meals, 1.5 supplements, refused dinner)   4/14: no calorie count data recorded    Vianey Rasmussen RD LD

## 2018-04-16 NOTE — PROGRESS NOTES
04/16/18 1600   General Information   Onset Date 04/10/18   Start of Care Date 04/16/18   Referring Physician Ravi Lawrence. MD   Comments Paraplegic patient with complicated medical history and history of noncompliance admitted 4/11/2018 with fevers, fatigue, elevated inflammatory markers concerning for decubitus ulcer infection with underlying chronic ischial osteomyelitis. Patient has had a decubitus ulcer for about 6 months, s/p debridement 2/2018 at OSH with ischial bone biopsy showing no growth; however without adequate coverage and history of noncompliance it is likely that her wound has progressed. Plastic surgery evaluated patient's wound - no debridement/flap plans in place at this point, rather recommend diverting colostomy. GI recommending optimization of nutritional status before consideration of diverting colostomy. Her wound will continue to be bathed in stool until she undergoes colostomy and she will continually be at risk for infection - so would strongly encourage colostomy from an infection standpoint. Pt now with weak cough with inability to clear secretions- RT is consulting. Pt is referred for swallow eval to r/o aspiration related compromise to respitory status. Recent chest x-ray clear of infiltrates.   Clinical Swallow Evaluation   Oral Musculature generally intact   Structural Abnormalities none present   Dentition edentulous, does not have dentures;other (see comments)  (no dentures here)   Mucosal Quality good   Mandibular Strength and Mobility intact   Oral Labial Strength and Mobility WFL   Lingual Strength and Mobility WFL   Velar Elevation intact   Buccal Strength and Mobility intact   Laryngeal Function Cough;Throat clear;Swallow;Voicing initiated;Dry swallow palpated   Additional Documentation Yes   Additional evaluation(s) completed today No   Swallow Eval   Feeding Assistance no assistance needed   Clinical Swallow Eval: Thin Liquid Texture Trial   Mode of Presentation,  Thin Liquids cup;straw;self-fed   Volume of Liquid or Food Presented 3 oz   Oral Phase of Swallow WFL   Pharyngeal Phase of Swallow intact   Diagnostic Statement no aspiration s/s of thin liquids by cup rim or by straw with multiple swallows ; swallow response times   Clinical Swallow Eval: Puree Solid Texture Trial   Mode of Presentation, Puree spoon;self-fed   Volume of Puree Presented pt would only agree to 1 teaspoon   Oral Phase, Puree WFL   Pharyngeal Phase, Puree intact   Diagnostic Statement no aspiration s/s ; swallow response timely   Clinical Swallow Eval: Solid Food Texture Trial   Mode of Presentation, Solid self-fed   Volume of Solid Food Presented small bites of regular solids   Oral Phase, Solid other (see comments)  (prolonged 2/2 edentulous)   Pharyngeal Phase, Solid intact   Successful Strategies Trialed During Procedure, Solid hard swallow;other (see comments)  (alternate solids and liquids)   VFSS Evaluation   VFSS Additional Documentation No   Swallow Compensations   Swallow Compensations Effortful swallow;Pacing;Reduce amounts   Results Oral difficulties only   Esophageal Phase of Swallow   Patient reports or presents with symptoms of esophageal dysphagia No   Swallow Eval: Clinical Impressions   Skilled Criteria for Therapy Intervention No problems identified which require skilled intervention   Functional Assessment Scale (FAS) 7   Treatment Diagnosis Swallow function WFL; oral difficulty only 2/2 being edentulous   Diet texture recommendations Regular diet;Thin liquids   Recommended Feeding/Eating Techniques maintain upright posture during/after eating for 30 mins;small sips/bites   Demonstrates Need for Referral to Another Service dietitian;respiratory therapy   Anticipated Discharge Disposition inpatient rehabilitation facility   Patient, family and/or staff in agreement with Plan of Care Yes   Clinical Impression Comments Completed a clinical bedside swallow eval per MD orders to r/o  aspiration as potential cause for ongoing weaker respiratory status. Pt demosntrates a swallow function that is WFL- there were no overt ss/ ofa sprition on regualr solids, pureed solids or multiple trials of thin liquids despite poor positionin- pt is in a slightly reclined positin- does not tolerated HOB upr higher. Pt is also noted to have aprlonge oral prep phase with regaulr solids 2/2 she is missing many teeth and her dentures are not here. REcommended to pt to downgrade to mechanical dental soft  for ease of chewing / oral manipulation but pt is refusing -- pt wants to remain on regular diet- then educated on self- selecting softer foods on a regular diet. - Pt to continue with current regular diet- pt should be as upright as she is able to tolerate, taske small bites/ sips, alternateing consistencies.  Pt verbalizing understanding and aggrement with recommendations. No further swallow tx indicated- sptx will kathleen off.    Total Evaluation Time   Total Evaluation Time (Minutes) 15

## 2018-04-16 NOTE — PROGRESS NOTES
"Pt refused assessment. Pt states \"It is 12am! You shouldn't be in this room with a woman. Get out.\" Will reassess at 2am when IV antibiotic is due.   "

## 2018-04-16 NOTE — PROGRESS NOTES
"Pt allowed me to infuse IV antibiotic and assess LS. When asked to assess wounds and reposition, she replied \"No, Im sleeping.\" Will continue to try to assess wounds.  "

## 2018-04-16 NOTE — PROGRESS NOTES
Brief ID Follow-up Note:    We are assisting with the care of this patient with paraplegia, chronic sacral wounds, SP urinary catheter and recent removal of nephro tubes. She was admitted with fever. She is being treated with pip/tazo and improving - sources possibly urine, respiratory. Wound appears generally uninfected.     She is improving on pip/tazo with rising WBC and overall improved status. She is amenable to diverting ostomy which would be extremely beneficial in reducing fecal contamination of her wounds.    Agree with continuing pip/tazo and aggressive pursuit of ostomy. Suggest total duration of pip/tazo to be 10 days, which translates to a stop date of 4/20.    ID will continue to follow. Dr. Dunne will assume care tomorrow.    Brenda Duarte MD   of Medicine, Division of Infectious Diseases  [facility] [phone]

## 2018-04-16 NOTE — PLAN OF CARE
Problem: Patient Care Overview  Goal: Plan of Care/Patient Progress Review  Completed a clinical bedside swallow eval per MD orders to r/o aspiration as potential cause for ongoing weaker respiratory status. Pt demosntrates a swallow function that is WFL- there were no overt ss/ ofa sprition on regualr solids, pureed solids or multiple trials of thin liquids despite poor positionin- pt is in a slightly reclined positin- does not tolerated HOB upr higher. Pt is also noted to have aprlonge oral prep phase with regaulr solids 2/2 she is missing many teeth and her dentures are not here. REcommended to pt to downgrade to mechanical dental soft  for ease of chewing / oral manipulation but pt is refusing -- pt wants to remain on regular diet- then educated on self- selecting softer foods on a regular diet. - Pt to continue with current regular diet- pt should be as upright as she is able to tolerate, taske small bites/ sips, alternateing consistencies.  Pt verbalizing understanding and aggrement with recommendations. No further swallow tx indicated- sptx will kathleen off.

## 2018-04-16 NOTE — PROGRESS NOTES
"CLINICAL NUTRITION SERVICES BRIEF NOTE    Met with pt to obtain nutrition history as pt not waking on previous RD attempts to visit 4/13. For full RD assessment, see note from 4/13.     Pt reports she is eating \"well\" and drinking 2-3 Boost per day. Writer discussed that patient has increased protein needs for wound healing. Before writer could explain further, pt states \"leave me alone, I only have 30 more minutes to rest.\"    RD will continue to monitor and assess per protocol as able.     Vianey Art RD, LD  Unit Pager: [phone]     "

## 2018-04-16 NOTE — PROGRESS NOTES
Problem: Infection, Risk/Actual (Adult)  Goal: Identify Related Risk Factors and Signs and Symptoms  Related risk factors and signs and symptoms are identified upon initiation of Human Response Clinical Practice Guideline (CPG).   Outcome: No Change          VS:      Pt A/O X 4. Afebrile. BP 92/52 (BP Location: Right arm)  Pulse 100  Temp 98/5  F (35.9  C) (Axillary). Pt refused O2 spot check.  TIMMY Lungs - pt refused assessment. Frequent nonproductive cough noted. TCDB encouraged. Declined answering questions to SOB and further assessment    Output:     Bowels- pt refused assessment. Last BM 4/16 incontinent and did not know she was wet. Suprapubic catheter draining was damp, dressing changed 4/16 clear yellow urine.    Activity:        Pt on strict bedrest does not tolerate flat HOB. Refused to be turned and repositioned except for during cleaning.   Skin: Scabs all over arms. Dry skin on legs and arms. Left ulcer on heel tunneling dressing changed (mepilex). Refused perineal cares despite breakdown of skin.   Posterior left leg wound: changed and cleansed mepilex @4/17  Right Ischial Tuberosity wound: moist pink and red, warm with some drainage (serosanguinous) cleaned and packed per order set.  Coccyx wound pink and warm changes and open to air.    Pain:      Has pain in the buttock and given Dilaudid and scheduled tylonel in addition to valium @1900. Has tension headache given new medication Midrin, no relief.   CMS:      No sensation in bilat LEs. States the cleanser is burning sacral and perineal area.   Dressing:      Pt refused assessment of wounds and wound care, except for when incontinent of stool because they were covered in feces.   Diet:      Pt is on a Regular diet and appetite was poor this shift. Continue on calorie count.    LDA:      PIV is patent in the left upper forearm  and saline locked.    Plan:      Education on pressure ulcer prevention, nutrition and skin/wound care. Possible diverting  colostomy per.

## 2018-04-16 NOTE — PLAN OF CARE
Problem: Infection, Risk/Actual (Adult)  Goal: Identify Related Risk Factors and Signs and Symptoms  Related risk factors and signs and symptoms are identified upon initiation of Human Response Clinical Practice Guideline (CPG).   Outcome: No Change  Pt refused assessment except for LS, wheezy in upper lobes. Pt also refused repositioning. LBM 4/15/2018, per report. Superpubic catheter draining well. Will continue to try assessment.

## 2018-04-16 NOTE — PROGRESS NOTES
"Pt refused assessment, after trying for the third time. Pt states \"I am sleeping.\" Will continue to monitor.   "

## 2018-04-16 NOTE — PROGRESS NOTES
"[facility] [place] Internal Medicine Progress Note            Interval History:   Record reviewed.  Discussed with Dr. Cadet.   Seen with RN.  Interval increase upper airway congestion.  Weak cough with inability to clear secretions.  Dyspnea.  Right lateral chest discomfort, aggravated by cough.  Sat 89-90% RA.  Nausea without emesis.  Indicates improved po intake.  On calorie counts.  Denies present cough, choking with po intake.  Occas reflux.  Diffuse lower abdominal discomfort.  Mild bloating.  BM 2 days ago.  Has SP catheter.  PNT's out.           Medications:   All medications reviewed today          Physical Exam:   Blood pressure 90/56, pulse 79, temperature 96.7  F (35.9  C), temperature source Axillary, resp. rate 18, height 1.588 m (5' 2.5\"), weight 73.5 kg (162 lb), SpO2 93 %.    Intake/Output Summary (Last 24 hours) at 04/16/18 1030  Last data filed at 04/16/18 0700   Gross per 24 hour   Intake                0 ml   Output              800 ml   Net             -800 ml       General:  Alert.  Appropriate.  No distress.  Resp non labored.  Noted weak cough.  Sat  95% 2 liters O2 by NC.     Heent:      Neck:    Skin:    Chest:  Diffuse upper airway rhonchi.  No bronchospasm.  Bases clear.     Cardiac:  Reg without gallop, murmur.  No JVD. Tender right lateral chest wall with reproduction of pain.      Abdomen:  Non distended, soft, no guarding.   BS normal.     Extremities:  Perfused.  1 plus pretibial, pedal edema.  No posterior calf, thigh induration.     Neuro:            Data:     No results found for this or any previous visit (from the past 24 hour(s)).             Assessment and Plan:   1)  T4 paraplegia 2nd to hematoma.  2)  Febrile illness with chronic  multiple stage IV decubital ulcers, L heel wound and chronic R sacral osteomyelitis.  On zosyn and doxycycline.  Wound cultures from buttocks E coli ESBL, proteus, pseudomonas ( R to zosyn).   Impression of chronic osteomyelitis with element of " cellulitis/dermatitis.  Abscess or deep acute infection not felt likely per plastic surgery.  10 day ABs anticipated with completion of zosyn 4/20.   Hospitalized FSD 4/4-4/7/18 on meropenem and vanco (cultures ESBL, VRE and MRSA) with DC to TCU on augmentin pending PMD and primary surgeon follow up.  3)  Chronic pain.  4)  COPD with CXR 4/10 demonstrating Pulmonary vascular congestion versus chronic interstitial scarring. Increase chest congestion today (previously improved with po lasix).  H/o diastolic CHF.  BNP 4/12 normal 353.  Concern regarding potential oral pharyngeal dysphagia with potential aspiration inability to clear upper airway secretions as ongoing issue.  Not acutely distressed.   5)  Anemia likely 2nd to CD and iron deficiency per record.  Adequate.    6)  Left comminuted acute subtrochanteric fracture with recent fall.  7)  Hx of DVT on Rivaroxaban.  L CP with chest wall component based on exam.  PE unlikely with anticoagulation.   8)  Seizure disorder. On keppra taper and lamotrigine titration.    9)  Diastolic CHF.  Suspect compensated.   10)  Depression, anxiety, PTSD.  On Cymbalta, gabapentin, lamictal.   Decrease sedation with reduction in diazepam dose, d.c of vistaril, decrease in gabapentin  11)  Hypothyroidism with TSH 49.56 likely related to med non compliance.  12)  Hypoalbuminemia.  Protein loss from wounds and poor nutrition.  Calorie counts ordered.  Improved po despite c/o nausea.    12)  Closed fracture zygoma.  13)  Consideration for diverting colostomy.   14)  S/p B PNT 2 months ago, for the intent of urinary diversion (since removed).  Has SP catheter.   15)  Nausea basis unclear - potential relation to meds, GERD.  Lower abdominal discomfort unclear significance, benign exam.  Monitor for now.      PLAN:  1)  Review meds, orders.  2)  CXR.  Scheduled and prn nebs.  3)  RT to assess for cough assist device.  Add mucinex to help mobilize secretions.  Humidity to O2.  Hold further  "lasix for now.  4)  Speech path bedside swallow evaluation.  5)  Increase omeprazole to BID, prn zofran.  6)  Continue present ABs through 4/20 per ID rec.  7)  F/u calorie counts.  8)  Trend labs.  Monitor clinically.  Updated Son Sánchez.   Disposition Plan   Expected discharge unclear pending clinical course.      Entered: Ravi Lawrence 04/16/2018, 3:30 PM     ADD:  CXR reviewed.  No clear infiltrate.  Issue mainly related to decrease ability to clear upper airway secretions.  Reviewed with RT and pulmonary and patient.  Flutter valve causes patient to \"gag\".  Cough assist device has low risk of pneumothorax.  Will continue nebs and try chest percussion and vibration by RT.    ADD:  CXR reviewed with radiologist.  Interstitial prominence again noted.  Similar changes on outside CXR 2/27 and 2/28.  Echo 3/26 EF 65%.  Clinically with prior normal BNP feel vascular congestion less likely.  Again suspect difficult to clear upper airway secretions as before.  Reluctant to continue diuresis especially with tendency toward  low BP.         Attestation:  I have reviewed today's vital signs, notes, medications, labs and imaging.     Ravi Lawrence MD          "

## 2018-04-16 NOTE — PLAN OF CARE
Problem: Patient Care Overview  Goal: Plan of Care/Patient Progress Review  Patient has used Dilaudid for pain control, and reports that it does help some.  She is very specific about her comforts and can direct her care. She has a cough, and reports that she has had respiratory issues since a child, with chronic bronchitis. Her cough effort is weak, and she has not produced any mucus.   She does have expiratory wheezes.  Oxygen sat on RA was 89-90%, so placed on NC@2L.  Sats then mid to high 90s.         She is on a calorie count, but has shown little interest in food.  Just picked at her breakfast tray.  She was working on banana bread late shift, and requested to have things that she would enjoy sent to her room from dietary.  She said she was having trouble getting another order, since she had already ordered lunch.  Writer called, and requested those desired items (cheddar cheese, swiss cheese, strawberry yogurt, more banana bread and butter.        SupraPubic cath noted to have leaked this morning.  Bed was  wet. Linens changed, and dressings changed.  Son here to see her this morning, and expressed concern that they do not know when things are going to be done for her.  Patient was aware that MD spoke with her son and updated him as to the plan.

## 2018-04-17 NOTE — PROGRESS NOTES
"[facility] Internal Medicine Progress Note            Interval History:   Record reviewed.   Seen with RN.  Upper airway congestion remains about the same.  No relief with chest percussion.  Nebs help somewhat.  Left posterior thoracic discomfort.  No overt dyspnea.  NP cough.  Mild nausea without emesis.  Indicates improved po intake 4/16 but dinner tray from last night not touched per RN.  No swallow issues per speech path.  No  reflux.  Diffuse lower abdominal discomfort as before.  CT abd/pelvis 4/4/18 no changes of concern.  Mild bloating.  Diarrhea 4/16.  2 stools pudding consistency earlier.    Has SP catheter.  PNT's out.  Indicates worse HA ever this AM.  Similar c/o PM 4/16.  No relief with 1 tab midrin.  Prior SDH.   CT head head 2/26/28, 9/7/17, 8/4/17 no hemorrhage (to evaluate HA c/o.  Craniotomy 4/2017 for SDH.           Medications:   All medications reviewed today          Physical Exam:   Blood pressure 92/52, pulse 100, temperature 98.5  F (36.9  C), temperature source Oral, resp. rate 18, height 1.588 m (5' 2.5\"), weight 73.5 kg (162 lb), SpO2 98 %.    Intake/Output Summary (Last 24 hours) at 04/16/18 1030  Last data filed at 04/16/18 0700   Gross per 24 hour   Intake                0 ml   Output              800 ml   Net             -800 ml       General:  Alert.  Appropriate.  No distress.  Resp non labored.  Noted weak cough.  Off O2.  Uncomfortable due to HA.     Heent:      Neck:    Skin:    Chest:  Diffuse upper airway rhonchi.  No bronchospasm.  Bases clear.     Cardiac:  Reg without gallop, murmur.  No JVD. Tender right lateral chest wall with reproduction of pain.      Abdomen:  Non distended, soft, no guarding.   BS normal.     Extremities:  Perfused.  1 plus pretibial, pedal edema.  No posterior calf, thigh induration.     Neuro:            Data:     Results for orders placed or performed during the hospital encounter of 04/10/18 (from the past 24 hour(s))   XR Chest Port 1 View "    Narrative    Exam: XR CHEST PORT 1 VW, 4/16/2018 3:58 PM    Indication: increase chest congestion;     Comparison: Chest x-ray on April 10, 2018    Findings:   AP single view of the chest. There is interstitial opacity without  focal consolidation. Pulmonary vasculature is prominent. No  pneumothorax or pleural effusion. Cardiac silhouette appears stable.  Upper abdomen is unremarkable.      Impression    Impression:   Increased prominent interstitial opacities, favoring vascular  congestion versus chronic interstitial scarring.     I have personally reviewed the examination and initial interpretation  and I agree with the findings.    CHELE KNAPP MD   Basic metabolic panel   Result Value Ref Range    Sodium 145 (H) 133 - 144 mmol/L    Potassium 3.4 3.4 - 5.3 mmol/L    Chloride 106 94 - 109 mmol/L    Carbon Dioxide 30 20 - 32 mmol/L    Anion Gap 9 3 - 14 mmol/L    Glucose 93 70 - 99 mg/dL    Urea Nitrogen 6 (L) 7 - 30 mg/dL    Creatinine 0.49 (L) 0.52 - 1.04 mg/dL    GFR Estimate >90 >60 mL/min/1.7m2    GFR Estimate If Black >90 >60 mL/min/1.7m2    Calcium 8.0 (L) 8.5 - 10.1 mg/dL   Magnesium   Result Value Ref Range    Magnesium 1.9 1.6 - 2.3 mg/dL   CBC with platelets   Result Value Ref Range    WBC 6.2 4.0 - 11.0 10e9/L    RBC Count 3.32 (L) 3.8 - 5.2 10e12/L    Hemoglobin 8.1 (L) 11.7 - 15.7 g/dL    Hematocrit 27.4 (L) 35.0 - 47.0 %    MCV 83 78 - 100 fl    MCH 24.4 (L) 26.5 - 33.0 pg    MCHC 29.6 (L) 31.5 - 36.5 g/dL    RDW 18.5 (H) 10.0 - 15.0 %    Platelet Count 256 150 - 450 10e9/L   Nt probnp inpatient   Result Value Ref Range    N-Terminal Pro BNP Inpatient 708 0 - 900 pg/mL                Assessment and Plan:   1)  T4 paraplegia 2nd to hematoma.  2)  Febrile illness with chronic  multiple stage IV decubital ulcers, L heel wound and chronic R sacral osteomyelitis.  On zosyn and doxycycline.  Wound cultures from buttocks E coli ESBL, proteus, pseudomonas ( R to zosyn).   Impression of chronic  osteomyelitis with element of cellulitis/dermatitis.  Abscess or deep acute infection not felt likely per plastic surgery.  10 day ABs anticipated with completion of zosyn 4/20.  Improved.  Hospitalized FSD 4/4-4/7/18 on meropenem and vanco (cultures ESBL, VRE and MRSA) with DC to TCU on augmentin pending PMD and primary surgeon follow up.  3)  Chronic pain with generalized discomfort.  No basis noted on CT abd/pelvis for pelvic pain.   4)  COPD with CXR 4/10 demonstrating Pulmonary vascular congestion versus chronic interstitial scarring.  Persistent interstitial prominence on f/u film 4/16.  Serial normal BNP against volume overload.  Suspect largely related to inability to clear upper airway secretions.  No aspiration per speech path.   5)  Anemia likely 2nd to CD and iron deficiency per record.  Adequate.    6)  Left comminuted acute subtrochanteric fracture with recent fall.  7)  Hx of DVT on Rivaroxaban.  L posterior CP potentially chest wall.  PE unlikely with anticoagulation.   8)  Seizure disorder. On keppra taper and lamotrigine titration.    9)  Diastolic CHF.  Suspect compensated.   10)  Depression, anxiety, PTSD.  On Cymbalta, gabapentin, lamictal.   Decrease sedation with reduction in diazepam dose, d.c of vistaril, decrease in gabapentin  11)  Hypothyroidism with TSH 49.56 likely related to med non compliance.  12)  Hypoalbuminemia.  Protein loss from wounds and poor nutrition.  Calorie counts ordered.  Improved po per pt - f/u calorie counts.    12)  Closed fracture zygoma.  13)  Consideration for diverting colostomy.   14)  S/p B PNT 2 months ago, for the intent of urinary diversion (since removed).  Has SP catheter.   15)  Nausea basis unclear - potential relation to meds, GERD.  Lower abdominal discomfort unclear significance, benign exam.  Monitor for now.    16)  HA discomfort - issue since craniotomy for SDH.  Recheck imaging with pt on anticoagulation.     PLAN:  1)  Review meds, orders.  2)   Continue scheduled and prn nebs, mucinex.  3)  Head CT without contrast. Trial increase midrin dose.  4)   Continue present ABs through 4/20 per ID rec.  5)  F/u calorie counts.  6)  Trend labs.  Monitor clinically.  Keep [place] updated   Disposition Plan   Expected discharge unclear pending clinical course.      Entered: Ravi Lawrence 04/17/2018, 12:57 PM            Attestation:  I have reviewed today's vital signs, notes, medications, labs and imaging.     Ravi Lawrence MD

## 2018-04-17 NOTE — PLAN OF CARE
"Problem: Infection, Risk/Actual (Adult)  Goal: Identify Related Risk Factors and Signs and Symptoms  Related risk factors and signs and symptoms are identified upon initiation of Human Response Clinical Practice Guideline (CPG).   Outcome: No Change  Pt refused assessment. Pt states \"Not right now.\" However, pt is more pleasant and calmer than yesterday. PO dilaudid 4mg given for pain. Suprapubic catheter draining, TIMMY site due to refusal of assessment. Wound dressings were changed on the morning shift, due tomorrow. Will continue to monitor.      "

## 2018-04-17 NOTE — PROGRESS NOTES
"Pt refused assessment and repositioning. After asking to perform assessment, pt states, \"Do you see the time? No.\"  "

## 2018-04-17 NOTE — PROGRESS NOTES
"Pt refused assessment. After asking to perform assessment, pt states \"Not right now\". Will recheck @ 0200.   "

## 2018-04-17 NOTE — PLAN OF CARE
Problem: Patient Care Overview  Goal: Plan of Care/Patient Progress Review  Outcome: Improving  Pt c/o generalize pain and left sided HA. Slept late am, up at 11am, then agree to take AM medications. C/O buttocks being very sensitive and painful too. Pt encouraged to allowed staff to turn every 2 hours and change wounds when dressings are saturated which is ~ every 4 to 6 hrs! Very poor appetite continues with poor oral fluid intake- sips on coke and Ginger Ale. Supra Cath patent. X sm BM on day shift. Medicated for pain, has CT scan done today with a whole lot of encouragement. Pt more cooperative this afternoon ( after 12 noon), allowing to be turn every 2 hrs and talkative. Will continue to encourage cooperation with POC.

## 2018-04-17 NOTE — PROGRESS NOTES
"Calorie Counts    4/16: 0 kcal and 0 g protein (no recorded intakes. Per RN note, \"Just picked at her breakfast tray.  She was working on banana bread late shift, and requested to have things that she would enjoy sent to her room from dietary.  She said she was having trouble getting another order, since she had already ordered lunch.  Writer called, and requested those desired items (cheddar cheese, swiss cheese, strawberry yogurt, more banana bread and butter.\" However amounts eaten were not recorded on meal ticket, RN notes, or flowsheet.       Jossy Conner, CELI, LD  Unit Pager: [phone]  "

## 2018-04-18 NOTE — PROGRESS NOTES
"Nutrition Services    Calorie Counts  4/17: 348 kcal, 12 g protein (2 meals- breakfast and dinner)  *No record of lunch intake, however nursing note from 4/17 indicates \"Very poor appetite continues with poor oral fluid intake- sips on coke and Ginger Ale\"      Maria T Sancehz, RD  "

## 2018-04-18 NOTE — PLAN OF CARE
Problem: Infection, Risk/Actual (Adult)  Goal: Identify Related Risk Factors and Signs and Symptoms  Related risk factors and signs and symptoms are identified upon initiation of Human Response Clinical Practice Guideline (CPG).   Outcome: No Change          VS:       Pt A/O X 4 with some forgetfulness. Irritable. Lungs- coarse crackles bilaterally. Non-productive cough present. TCDB encouraged. Denies nausea and SOB. Chest tightness present.       Output:       Bowels - active in all four quadrants. Pt c/o discomfort in mid lower abdomen with tenderness upon palpation. Suprapubic catheter had low output. Bladder scanned for 0cc. New catheter place per pt request. Draining small amount of dark deo urine. Pt reported drinking an adequate amount of fluid. Urology paged and advised no further action.      Activity:       Pt on strict bedrest. Repositioned once this shift. Pt refused other attempts to reposition.    Skin:   TIMMY wounds to sacrum/buttock. Dressings CDI. Redness present between thighs. Pt refused barrier cream and miconazole powder.    Pain:       Has pain in the buttock and given dilaudid 2mg.   CMS:     Sensation absent in bilat LEs.     Diet:     Pt is on a Regular diet and appetite was poor this shift. Ate 25% of meal.      LDA:       PIV is patent in the left forearm and saline locked.      Additional Info:     Pt refused vitals this shift. Continue to monitor.                         2315: Pt had small BM and urinary incontinence that bypassed catheter. Buttocks dressings changed and barrier cream applied to bilat thigh rash.

## 2018-04-18 NOTE — PLAN OF CARE
Problem: Patient Care Overview  Goal: Plan of Care/Patient Progress Review  Outcome: Improving  CT scan was ordered for pt this afternoon, pt placed on cart about 02: pm using hover nehemias and assist of two, pt in the room waiting for transport with call light in reach, writer called transport. CT tech. Called writer about 02:45 and inform writer pt was sent with wrong sticker and has no wrist band, writer is  unsure how pt had wrong sticker since pt was not send with any sticker  When she left the unit. wrist band was by the computer in pt room and new wrist band placed on pt when pt was back in the room from CT.

## 2018-04-18 NOTE — PROGRESS NOTES
Cardiopulmonary Services Progress Note:    RN called at 1152 for PRN nebulizer. RT advised RN that it would be approximately 30 minutes before an RT would be available and advised RN to provide patient with a PRN inhaler if patient was short of breath. RN stated that MD requested that patient receive a nebulizer. When RT arrived to give the patient her neb, she stated that she was sleeping and asked me to come back later. RT recommendation is for patient to have a PRN inhaler to alleviate shortness of breath in between scheduled therapy. Will continue to offer chest physiotherapy and nebs QID as ordered.      Gail Lees RRT-NPS, AE-C

## 2018-04-18 NOTE — PROVIDER NOTIFICATION
Low urine output 50cc this shift and c/o discomfort in lower abdomen with tenderness on palpation. Suprapubic catheter changed. Minimal additional output and bladder scan 0cc. Dr. Howard ny. Advised RN to serge urology.

## 2018-04-18 NOTE — PROGRESS NOTES
"[facility] Internal Medicine Progress Note            Interval History:   Record reviewed.   Seen with RN.  Persistent upper airway congestion.  Indicated \"can't breathe\" on entering room.   Respirations non labored.  Audible upper airway congestion with persistent weak NP cough.  Anterior chest discomfort aggravated by inspiratory effort.  Not receiving scheduled nebs (changed to prn).  O2 sat low 90's RA.  Nausea without emesis.  Tolerating po - indicates eating well.  No reflux.  Persistent c/o diffuse katia umbilical abdominal, supra pubic discomfort.  Diarrheal stool medicated earlier with imodium.  Leaking around SP catheter - changed last PM per urology recommendation.  Left frontal HA persists when questioned about it.  Better afternoon 4/17 after midrin.  Locally tender.  IV fluid bolus last night for low UO on bladder scan (but leaking out urethra or around SP catheter).          Medications:   All medications reviewed today          Physical Exam:   Blood pressure 102/67, pulse 87, temperature 98.2  F (36.8  C), temperature source Oral, resp. rate 16, height 1.588 m (5' 2.5\"), weight 65.5 kg (144 lb 8 oz), SpO2 98 %.    Intake/Output Summary (Last 24 hours) at 04/16/18 1030  Last data filed at 04/16/18 0700   Gross per 24 hour   Intake                0 ml   Output              800 ml   Net             -800 ml       General:  Alert.  Appropriate.  No distress.  Resp non labored.  Noted weak cough.  Off O2 when seen.      Heent:      Neck:    Skin:    Chest:  Diffuse upper airway rhonchi, as before.  No bronchospasm.  Bases faint crackles.     Cardiac:  Reg without gallop, murmur.  No JVD. Tender right lateral chest wall with reproduction of pain.      Abdomen:  Non distended, soft, no guarding.   BS normal.     Extremities:  Perfused.  1 plus pretibial, pedal edema.  No posterior calf, thigh induration.     Neuro:            Data:     Results for orders placed or performed during the hospital " encounter of 04/10/18 (from the past 24 hour(s))   Basic metabolic panel   Result Value Ref Range    Sodium 143 133 - 144 mmol/L    Potassium 3.4 3.4 - 5.3 mmol/L    Chloride 107 94 - 109 mmol/L    Carbon Dioxide 31 20 - 32 mmol/L    Anion Gap 5 3 - 14 mmol/L    Glucose 94 70 - 99 mg/dL    Urea Nitrogen 7 7 - 30 mg/dL    Creatinine 0.50 (L) 0.52 - 1.04 mg/dL    GFR Estimate >90 >60 mL/min/1.7m2    GFR Estimate If Black >90 >60 mL/min/1.7m2    Calcium 8.4 (L) 8.5 - 10.1 mg/dL   Nt probnp inpatient   Result Value Ref Range    N-Terminal Pro BNP Inpatient 349 0 - 900 pg/mL                Assessment and Plan:   1)  T4 paraplegia 2nd to hematoma.  2)  Febrile illness with chronic  multiple stage IV decubital ulcers, L heel wound and chronic R sacral osteomyelitis.  On zosyn and doxycycline.  Wound cultures from buttocks E coli ESBL, proteus, pseudomonas ( R to zosyn).   Impression of chronic osteomyelitis with element of cellulitis/dermatitis.  Abscess or deep acute infection not felt likely per plastic surgery.  10 day ABs anticipated with completion of zosyn 4/20.  Improved.  Hospitalized [facility] 4/4-4/7/18 on meropenem and vanco (cultures ESBL, VRE and MRSA) with DC to TCU on augmentin pending PMD and primary surgeon follow up.  3)  Chronic pain with generalized discomfort.  No basis noted on CT abd/pelvis 4/4/18  for pelvic pain.   With diarrhea concern regarding potential for C. Diff.    4)  COPD with CXR 4/10 demonstrating Pulmonary vascular congestion versus chronic interstitial scarring.  Persistent interstitial prominence on f/u film 4/16.  Serial normal BNP against volume overload.  Suspect largely related to inability to clear upper airway secretions.  No aspiration per speech path.   5)  Anemia likely 2nd to CD and iron deficiency per record.  Adequate.    6)  Left comminuted acute subtrochanteric fracture with recent fall.  7)  Hx of DVT on Rivaroxaban.  L posterior CP potentially chest wall.  PE unlikely with  anticoagulation.   8)  Seizure disorder. On keppra taper and lamotrigine titration.    9)  Diastolic CHF.  Suspect compensated.   10)  Depression, anxiety, PTSD.  On Cymbalta, gabapentin, lamictal.   Decrease sedation with reduction in diazepam dose, d.c of vistaril, decrease in gabapentin  11)  Hypothyroidism with TSH 49.56 likely related to med non compliance.  12)  Hypoalbuminemia.  Protein loss from wounds and poor nutrition.  Calorie counts ordered. Pt alleging improved po.  Reviewed with RD to continue counts. .    12)  Closed fracture zygoma.  13)  Consideration for diverting colostomy.   14)  S/p B PNT 2 months ago, for the intent of urinary diversion (since removed).  Has SP catheter.   15)  Nausea basis unclear - potential relation to meds, GERD.  Lower abdominal discomfort unclear significance, benign exam.  Monitor for now.    16)  HA discomfort - issue since craniotomy for SDH. Neg CT for acute change.     PLAN:  1)  Difficult to assess pt due to multiple somatic c/o's, chronic pain issues.  2)  CT chest - to further define changes on CXR (vascular congestion vs fibrosis vs infiltrate).  CT abd/pelvis with persistent c/o abd/pelvic pain.  Stool for C. Diff if diarrhea.  3)  Resume scheduled and prn nebs,  4)  Replace K.  Trend labs.  5)  F/u calorie counts.  Touch base with CR surgery.  6)  To complete zosyn 4/20.  Monitor clinically.   Disposition Plan   Expected discharge unclear pending clinical course, stability of pulmonary status. .      Entered: Ravi Lawrence 04/18/2018, 4:08 PM            Attestation:  I have reviewed today's vital signs, notes, medications, labs and imaging.     Ravi Lawrence MD

## 2018-04-18 NOTE — PROGRESS NOTES
"Cardiopulmonary Services Progress Note:    Patient ordered on chest physiotherapy and nebulizer QID. Patient slept through 0800 nebulizer but when chest physiotherapy was started, she stated that she was \"not ready to wake up yet.\" An assistant then walked in the room with her breakfast. The patient asked to be uncovered, and that we leave so she could sleep. RT returned for 1200 and 1600 treatments and patient refused both treatments. Will continue to offer treatments as ordered.    Gail Lees RRT-NPS, AE-C  "

## 2018-04-19 NOTE — PLAN OF CARE
Problem: Patient Care Overview  Goal: Plan of Care/Patient Progress Review  Outcome: No Change  Pt slept throughout the night. VSS. Denied pain. Did not want to be waken up for cares. Refused repositioning and skin/dressing assessment.  PIV in left arm patent and infusing. Suprapubic catheter patent and draining adequate amount. Call light within reach. Will continue to monitor.

## 2018-04-19 NOTE — PLAN OF CARE
Problem: Infection, Risk/Actual (Adult)  Goal: Identify Related Risk Factors and Signs and Symptoms  Related risk factors and signs and symptoms are identified upon initiation of Human Response Clinical Practice Guideline (CPG).   Outcome: No Change    VS: Pt refused vitals this AM   O2: Pt refused vitals this AM   Output: Urine output adequate via suprapubic catheter, pt continues to have urine leaking from her urethra - Dr Lawrence aware    Last BM: 4/18 - pt continuing to request Imodium, educated on a c diff sample needed so we are holding the imodium.    Activity: Bedrest - needs strong encouragement to be repositioned. Pt refused to be repositioned and RN explained the risks of not turning and pt agreed to turn.    Skin: Pressure ulcers, bruises, reddness on inner thighs, scabs   Pain: Tolerable with discomfort, scheduled Oxycontin, PRN Dilaudid   CMS: Paraplegic - absent sensation BLE   Dressing: CDI - WOC RN changed dressings this AM   Diet: Regular diet, fairly tolerated. No N/V   LDA: PIV infusing TKO, Suprapubic catheter    Equipment: Pulsate mattress is ordered - pt is aware   Plan: Continue to monitor, urology consult, pt needs frequent encouragement for dressing changes, repositioning, and hygiene care   Additional Info: Pt had incontinence of stool this afternoon and was verbally abusive towards staff and not being compliant with repositioning. Floor RN manager, charge RN aware.    Notified RT to try and get an induced sputum culture for bacterial and fungal culture per Dr Lawrence.

## 2018-04-19 NOTE — PLAN OF CARE
Problem: Patient Care Overview  Goal: Plan of Care/Patient Progress Review  Outcome: Improving  A/Ox's 4. Pain rated comfortably manageable, dilaudid po PRN given for pain control. CMS intact to baseline, pt refused dressing change.Tolerated regular diet pt eat 25-50% of her food today. Denied any nausea, CP, SOB, lightheadedness or dizziness, suprapubic cathter patent with adequate out put. Small loose stool x 3 noted today. Encouraged increased/continued IS use. Bilateral heels elevated of bed. Pt repositioned in bed Q 3-4 hours and pt refused repositioning at times Resting in bed at this time with call light in reach. Able to make needs known. Continue to monitor.

## 2018-04-19 NOTE — CONSULTS
"Urology Consult    Name: Marychuy Zhou    MRN: [id number]   YOB: 1955               Chief Complaint:   Leakage per urethra    History is obtained from the patient and chart review          History of Present Illness:   Marychuy Zhou is a 62 year old female with multiple medical issues including T4 paraplegia for past 9 years, chronic pain, COPD, seizure disorder, DVT on Rivaroxaban, multiple psych issues who is currently admitted with sacral osteomyelitis on zosyn and doxycycline. Has multiple stage IV decubitus ulcers. She has had much of her care at [facility] but decided to come to [facility] for most recent admission.  She says she is not sure if she has a regular urologist.  Her bladder was reportedly managed with a urethral catheter for ~4 years prior to SPT placement about 4-5 years ago. Urology is consulted because she has continued to leak large amounts of urine per her urethra with her SPT in and it has caused major problems with wound healing of her ulcers as well as skin breakdown on her thighs.  She did have bilateral PNTs placed in January at OSH to help divert her urine from her wounds but the patient reports \"it didn't help\" and she still has leakage per her urethra.  Upon admission here her left tube had fallen out and the hospitalist decided to remove the tubes and not replace (as apparently they weren't effective for urinary diversion). She has also been considered for possible diverting colostomy but per chart there is concern about her poor nutritional status.          Past Medical History:     Past Medical History:   Diagnosis Date     Anxiety      Chronic pain syndrome      Closed fracture zygoma, with routine healing, subsequent encounter      COPD (chronic obstructive pulmonary disease) (H)      Depressive disorder      DVT (deep vein thrombosis) in pregnancy (H)      Edema      Epilepsy (H)      Flaccid neuropathic bladder, not elsewhere classified      Fracture of femur " (H)      Hypothyroidism      Iron deficiency anemia      Paraplegia (H)     unspecified     Pressure ulcer of sacral region      PTSD (post-traumatic stress disorder)      Rheumatoid arthritis (H)      Sepsis (H)     unspecified            Past Surgical History:   History reviewed. No pertinent surgical history.         Social History:     Social History   Substance Use Topics     Smoking status: Never Smoker     Smokeless tobacco: Not on file     Alcohol use No            Family History:     Family History   Problem Relation Age of Onset     Chronic Obstructive Pulmonary Disease Brother             Allergies:   No Known Allergies         Medications:     Current Facility-Administered Medications   Medication     acetaminophen (TYLENOL) tablet 650 mg     ARIPiprazole (ABILIFY) tablet 5 mg     ascorbic acid (VITAMIN C) tablet 500 mg     aspirin chewable tablet 81 mg     bisacodyl (DULCOLAX) Suppository 10 mg     calcium carbonate (TUMS) chewable tablet 500-1,000 mg     diazepam (VALIUM) tablet 2 mg     divalproex sodium delayed-release (DEPAKOTE SPRINKLE) DR capsule 250 mg     doxycycline (VIBRAMYCIN) capsule 100 mg     DULoxetine (CYMBALTA) EC capsule 30 mg     gabapentin (NEURONTIN) capsule 100 mg     guaiFENesin (MUCINEX) 12 hr tablet 600 mg     HYDROmorphone (DILAUDID) half-tab 1-2 mg     ipratropium - albuterol 0.5 mg/2.5 mg/3 mL (DUONEB) neb solution 3 mL     ipratropium - albuterol 0.5 mg/2.5 mg/3 mL (DUONEB) neb solution 3 mL     isometheptene-dichloralphenazone-acetaminophen (MIDRIN) -325 MG per capsule CAPS 1-2 capsule     lamoTRIgine (LaMICtal) tablet 50 mg     levETIRAcetam (KEPPRA) tablet 250 mg     levothyroxine (SYNTHROID/LEVOTHROID) tablet 150 mcg     melatonin tablet 5 mg     miconazole (MICATIN; MICRO GUARD) 2 % powder     naloxone (NARCAN) injection 0.1-0.4 mg     omeprazole (priLOSEC) CR capsule 20 mg     ondansetron (ZOFRAN-ODT) ODT tab 4 mg    Or     ondansetron (ZOFRAN) injection 4 mg      oxyCODONE (OxyCONTIN) 12 hr tablet 20 mg     Patient is already receiving anticoagulation with heparin, enoxaparin (LOVENOX), warfarin (COUMADIN)  or other anticoagulant medication     piperacillin-tazobactam (ZOSYN) 4.5 g vial to attach to  mL bag     rivaroxaban ANTICOAGULANT (XARELTO) tablet 20 mg     vitamin A capsule 20,000 Units     zinc sulfate (ZINCATE) capsule 220 mg     zinc-white petrolatum (ILEX) 58.3 % paste     zolpidem (AMBIEN) tablet 5 mg             Review of Systems:    ROS: 10 point ROS neg other than the symptoms noted above in the HPI           Physical Exam:   VS:  T: 97.2    HR: 82    BP: 98/56    RR: 14   GEN:  NAD.    CV:  RRR  LUNGS: Non-labored breathing.   BACK:  Right stage IV decub ulcer  ABD:  Obese. Multiple scars. Patient abruptly halted exam limiting details.  :  Patient refused to be repositioned for exam but skin on anterior and medial thighs clearly macerated and erythematous,   EXT:  Warm, well perfused.    SKIN:  Warm.   NEURO:  CN grossly intact.            Data:   All laboratory data reviewed:      Recent Labs  Lab 04/20/18  0940 04/17/18  0925 04/15/18  1049   WBC 6.9 6.2 5.1   HGB 8.1* 8.1* 8.2*    256 248       Recent Labs  Lab 04/20/18  0940 04/18/18  0634 04/17/18  0925 04/15/18  1048   * 143 145* 140   POTASSIUM 3.7 3.4 3.4 3.7   CHLORIDE 108 107 106 102   CO2 29 31 30 33*   BUN 4* 7 6* 8   CR 0.49* 0.50* 0.49* 0.48*   GLC 82 94 93 79   NATA 8.3* 8.4* 8.0* 7.8*   MAG  --   --  1.9  --      No lab results found in last 7 days.    Invalid input(s): URINEBLOOD    Urine culture pending    All pertinent imaging reviewed:    4/18/18 CT scan of the abdomen/pelvis:   IMPRESSION:   1. Patchy groundglass and nodular opacities in the right upper lobe  along with peripheral, upper lobe predominant interstitial thickening.  Findings suspicious for infection, potentially superimposed on  interstitial fibrosis or scarring.   2. Nonspecific circumferential  bladder wall thickening which is  nonspecific and can be seen with acute or chronic cystitis. Increased  soft tissue thickening and enhancement along the suprapubic catheter  tract, representing granulation tissue or inflammation associated with  cystitis.   3. Interval removal of bilateral percutaneous nephrostomy tubes. Mild  prominence of the collecting systems without significant  hydronephrosis.  4. Multiple chronic appearing thoracic compression deformities as  detailed in the findings, most significant involving T6 vertebral  body.  5. Redemonstration of left subtrochanteric femoral fracture.  6. Unchanged right sacral decubitus ulcer with associated  osteomyelitis.            Impression and Plan:   Impression:   Marychuy Zhou is a 62 year old female with multiple medical issues including T4 paraplegia, DVT on anticoagulation, chronic non-healing ulcers/wounds due to chronic incontinence of stool and urine into wounds.  Suspect the urine incontinence per urethra is due to patulous urethra from long-term previous indwelling catheter (but again, patient refused exam).     Discussed with her that options include urinary diversion (i.e. Ileal conduit) vs SPT with bladder closure or pubovaginal sling vs another attempt at PNTs to divert urine (not previously successful per patient).  An ileal conduit would probably be the most successful but this would require a large open surgery and she is very high risk for complications and poor wound healing given her poor nutrition.        Plan:     - Agree with checking urine culture, consider treatment if patient is having UTI symptoms    - Continue SPT for now, change at least every one month    - Can try adding anticholinergic medication (i.e. Oxybutynin) to decrease any bladder spasm component of bladder leakage (but I suspect the leakage is due to patulous urethra rather than spasms).  Note that this can cause dry eyes, constipation, delirium, etc.     - Urology will  arrange for follow-up with Dr. Coyne in clinic to discuss urinary diversion options.  We will also be in contact with Dr. De La Rosa of colorectal and Dr. Pelletier of plastic surgery regarding timing of surgery if it is going to be undertaken.  In the mean time, agree with plans to bolster patient's nutrition.     - Urology will sign off. Please contact resident/PA on call with any questions or concerns.         This patient's exam findings, labs, and imaging discussed with urology staff surgeon Dr. Castro, who developed the treatment plan.    Baldev Perkins MD  Urology Resident

## 2018-04-19 NOTE — PROGRESS NOTES
[facility] Nurse Inpatient Pressure Injury Assessment     Follow up Assessment  Reason for consultation: Evaluate and treat multiple pressure injuries and severe IAD      ASSESSMENT    Pressure Injury: on left heel, present on admission   Pressure Injury is Stage Unstageable     Pressure Injury: on left upper thigh, present on admission   Pressure Injury is Stage 3     Pressure Injury: on right IT, present on admission  Pressure Injury is Stage 4     Pressure Injury: on left sacrum, present on admission   Pressure Injury is Stage 3     Pressure Injury: on coccyx, present on admission  Pressure Injury is Stage 3     Wound: on bilateral flanks, previous PNT sites    Severe Incontinence Associated Dermatitis over entire buttock, perineum, upper thighs and anterior groin folds    Status: no change since initial assessment one week ago     TREATMENT PLAN    Left heel wound: wash every other day with wound cleanser and gauze. Apply a thick layer of Medihoney (order #[id number]) to wound bed and cover with Mepilex 4x4. If dressing rolls at edges, OK to use Primapore tape to keep in place.    Left upper thigh wound: wash every other day and as needed if soiled with wound cleanser and gauze. Pat dry. Cover with Mepilex 4x4.    Right IT wound: wash daily and as needed if soiled with urine or stool with wound cleanser and gauze. Pack wound to entire depth (7 cm) with Kerlix dampened with saline. Cover with ABD and secure with Primapore tape.    Coccyx and left sacral wound: wash daily with wound cleanser and gauze. Coat with Triad paste (order #[id number]).    Bilateral PNT tube sites: wash daily with wound cleanser and gauze, more frequently if soiled with urine or stool. If drainage present, OK to cover with 4x4 gauze and secure with Primapore tape.    Buttock, perineum, upper thighs IAD: wash twice daily and with each episode of incontinence with Greta Cleanse and Protect and a soft cloth. Apply a thick layer of Triafd to skin. Do not  "attempt to remove Triad with each episode of incontinence, just wipe of soiled paste and reapply. To remove all Chriss, use baby/mineral oil and a soft cloth. Due to large amount of urine being diverted past suprapubic catheter and out urethra, please change Cavalon sheet with position changes every 2 hours to keep dry.    Orders Written  WO Nurse follow-up plan:weekly  Nursing to notify the Provider(s) and re-consult the WOC Nurse if wound(s) deteriorates or new skin concern.    Patient History  According to provider note(s):  This is a 62 year old female with PMH significant for T4 paraplegia, chronic pain COPD, anemia, recent hip fracture, Hx of DVT on anticoagulation (rivaroxiban), anxiety, depression, diastolic heart failure, hypothyroidism and chronic stage 4 decubti with known osteo who presents with fevers, drainage, and cellulitis of her buttock area.     Objective Data   Containment of urine/stool: Suprapublic catheter, continues to bypass catheter and leaking out urethra despite catheter exchange one week ago.     Patient currently incontinent of stool, however frequency and amount is decreasing. Due to paraplegia, patient is unaware when she stools. In the nursing home, prior to admission, she states she will sit in her chair for \"hours\" with stool present. During discussion of definitive treatment for right IT Stage 4 Pressure Injury with Dr Pelletier on 4/11/18, a diverting colostomy was discussed and is required prior to patient undergoing any flap surgery. Patient states she is open to discussing this further, however Colon and Rectal Surgery require the patient to be nutritionally improved before surgery.    Current Diet/ Nutrition:    Active Diet Order      Regular Diet Adult    Output:   I/O last 3 completed shifts:  In: -   Out: 900 [Urine:900]    Risk Assessment:   Sensory Perception: 3-->slightly limited  Moisture: 3-->occasionally moist  Activity: 1-->bedfast  Mobility: 2-->very " limited  Nutrition: 2-->probably inadequate  Friction and Shear: 1-->problem  Isaiah Score: 12      Labs:   Recent Labs  Lab 04/17/18  0925  04/15/18  1048   HGB 8.1*  < >  --    WBC 6.2  < >  --    CRP  --   --  73.9*   < > = values in this interval not displayed.    No lab results found in last 7 days.    Physical Exam  Skin assessment: buttocks, perineum, left heel      Wound Location:  Left heel  Date of last Photo 4/19/18      Wound History: Present on admission  Measurements (length x width x depth, in cm) 2 cm x 2.3 cm  x  0 cm   Wound Base: black eschar in center surrounded by yellow adherent slough  Tunneling N/A  Undermining N/A  Palpation of the wound bed: boggy   Periwound skin: denuded  Color: pink  Temperature: normal   Drainage: small  Description of drainage: tan, creamy  Odor: none  Pain: absent, paraplegia     Wound Location:  Left upper thigh  Date of last Photo 4/12/18        Wound History: Present on admission  Measurements (length x width x depth, in cm) 3 cm x 1 cm  x  0.2 cm   Wound Base: smooth, granular base  Tunneling N/A  Undermining N/A  Palpation of the wound bed: normal   Periwound skin: intact  Color: pink  Temperature: normal   Drainage: none  Description of drainage: none  Odor: none  Pain: absent, paraplegia    Wound Location:  Right IT  Date of last Photo 4/12/18        Wound History: Present on admission  Measurements (length x width x depth, in cm) 4 cm x 3.8 cm  x  7.2 cm   Wound Base:  Beefy red granulation tissue with bone palpated at base  Tunneling N/A  Undermining N/A  Palpation of the wound bed: normal   Periwound skin: denuded  Color: red  Temperature: normal   Drainage: small  Description of drainage: bloody  Odor: none  Pain: absent, paraplegia    Wound Location:  Left sacrum  Date of last Photo 4/12/18        Wound History: Present on admission  Measurements (length x width x depth, in cm) 2 cm x 2 cm  x  0.1 cm   Wound Base:  Yellow slough mixed with smooth, pale  granular tissue  Tunneling N/A  Undermining N/A  Palpation of the wound bed: normal   Periwound skin: denuded  Color: red  Temperature: normal   Drainage: none  Description of drainage: none  Odor: none  Pain: absent, paraplegia      Wound Location:  Coccyx  Date of last Photo 4/12/18        Wound History: Present on admission  Measurements (length x width x depth, in cm) 2.5 cm x 1 cm  x  0.1 cm   Wound Base:  Adherent yellow slough mixed with smooth granular tissue  Tunneling N/A  Undermining N/A  Palpation of the wound bed: normal   Periwound skin: denuded  Color: red  Temperature: normal   Drainage: none  Description of drainage: none  Odor: none  Pain: absent, paraplegia    Wound Location:  Buttock, perineum, upper thighs  Date of last Photo 4/19/18      Wound History: Patient incontinent of stool and suprapubic catheter is currently diverting our the urethra.  Raw denuded skin with areas of exposed dermis  Color: purple and red  Temperature: normal   Drainage: none  Description of drainage: none  Odor: none  Pain: absent, paraplegis    Wound Location:  Bilateral flanks  Wound History: PNT tube sites. Left is healing well, right with creamy yellow drainage (Medicine Team aware of drainage from site)  Measurements (length x width x depth, in cm) Tube sites: 1 cm slit, not probed for depth  Wound Base: granulation tissue  Palpation of the wound bed: firm around edges   Periwound skin: intact  Color: pink  Temperature: normal   Drainage: done  Description of drainage: yellow  Odor: none  Pain: absent    Interventions  Current support surface: Standard  Low air loss mattress- Pulsate on order    Current off-loading measures: Pillows under calves  Repositioning aid: pillows  Visual inspection of wound(s) completed   Wound Care: was done per plan of care.  Supplies: gathered and placed at the bedside  Education provided to: patient  and nurse  Discussed importance of:repositioning every 2 hours, off-loading pressure  to wound, wearing off-loading boots, their role in pressure injury prevention, dressing change frequency, head elevation <30 degrees, off-loading mattress, nutrition on wound healing and moisture management    Discussed plan of care with Patient, Nurse and Physician    Face to face time: 45 minutes    Karen Burns RN

## 2018-04-19 NOTE — PROGRESS NOTES
Calorie Counts    4/18: 267 kcal and 15 g protein (1 meal, 0 supplements - breakfast and lunch not recorded)      Jossy Conner, CELI  Unit Pager: [phone]

## 2018-04-19 NOTE — PLAN OF CARE
Problem: Patient Care Overview  Goal: Plan of Care/Patient Progress Review  Outcome: No Change  Pt is refusing most of her cares. Refused turning and repositioning, personal hygiene, dressing changes, demanding and rude at times toward staff. Pain well manageable with Dilaudid. Pt is incontinent to bowel. Able to make her needs known. Call light within reach. Will continue with plan of care.

## 2018-04-19 NOTE — PROGRESS NOTES
"[facility] Internal Medicine Progress Note            Interval History:   Record reviewed.   Seen with RN.  Continued upper airway congestion.  Better with scheduled nebs.  No CP.  Dyspnea less after neb.  NP cough which pt believes is stronger.   Leaking around SP catheter - changed PM 4/17.  Leaking actually out urethra.  Amalia area constantly wet making wound care difficult.  No nausea, appears to have improved po intake overall.  No reflux.  Persistent lower abdominal pain.  No further  diarrhea since imodium 4/18.   CT chest, abd , pelvis 4/18 - see below.          Medications:   All medications reviewed today          Physical Exam:   Blood pressure 98/56, pulse 82, temperature 97.2  F (36.2  C), temperature source Axillary, resp. rate 14, height 1.588 m (5' 2.5\"), weight 65.5 kg (144 lb 8 oz), SpO2 99 %.    Intake/Output Summary (Last 24 hours) at 04/16/18 1030  Last data filed at 04/16/18 0700   Gross per 24 hour   Intake                0 ml   Output              800 ml   Net             -800 ml       General:  Alert.  Appropriate.  No distress.  Resp non labored.  Cough somewhat stronger.   Off O2.     Heent:      Neck:    Skin:    Chest:  Subtle decrease diffuse upper airway rhonchi.   No bronchospasm.  Bases clear.    Cardiac:  Reg without gallop, murmur.  No JVD.     Abdomen:  Non distended, soft, no guarding.   BS normal.     Extremities:  Perfused.  1 plus pretibial, pedal edema.  No posterior calf, thigh induration.     Neuro:            Data:     No results found for this or any previous visit (from the past 24 hour(s)).  CT chest, abd, pelvis:  1. Patchy groundglass and nodular opacities in the right upper lobe  along with peripheral, upper lobe predominant interstitial thickening.  Findings suspicious for infection, potentially superimposed on  interstitial fibrosis or scarring.   2. Nonspecific circumferential bladder wall thickening which is  nonspecific and can be seen with acute or " chronic cystitis. Increased  soft tissue thickening and enhancement along the suprapubic catheter  tract, representing granulation tissue or inflammation associated with  cystitis.   3. Interval removal of bilateral percutaneous nephrostomy tubes. Mild  prominence of the collecting systems without significant  hydronephrosis.  4. Multiple chronic appearing thoracic compression deformities as  detailed in the findings, most significant involving T6 vertebral  body.  5. Redemonstration of left subtrochanteric femoral fracture.  6. Unchanged right sacral decubitus ulcer with associated  osteomyelitis.  Last Basic Metabolic Panel:  Lab Results   Component Value Date     04/18/2018      Lab Results   Component Value Date    POTASSIUM 3.4 04/18/2018     Lab Results   Component Value Date    CHLORIDE 107 04/18/2018     Lab Results   Component Value Date    NATA 8.4 04/18/2018     Lab Results   Component Value Date    CO2 31 04/18/2018     Lab Results   Component Value Date    BUN 7 04/18/2018     Lab Results   Component Value Date    CR 0.50 04/18/2018     Lab Results   Component Value Date    GLC 94 04/18/2018     Lab Results   Component Value Date    WBC 6.2 04/17/2018     Lab Results   Component Value Date    RBC 3.32 04/17/2018     Lab Results   Component Value Date    HGB 8.1 04/17/2018     Lab Results   Component Value Date    HCT 27.4 04/17/2018     Lab Results   Component Value Date    MCV 83 04/17/2018     Lab Results   Component Value Date    MCH 24.4 04/17/2018     Lab Results   Component Value Date    MCHC 29.6 04/17/2018     Lab Results   Component Value Date    RDW 18.5 04/17/2018     Lab Results   Component Value Date     04/17/2018              Assessment and Plan:   1)  T4 paraplegia 2nd to hematoma.  2)  Febrile illness with chronic  multiple stage IV decubital ulcers, L heel wound and chronic R sacral osteomyelitis.  On zosyn and doxycycline.  Wound cultures from buttocks E coli ESBL,  proteus, pseudomonas ( R to zosyn).   Impression of chronic osteomyelitis with element of cellulitis/dermatitis.  Abscess or deep acute infection not felt likely per plastic surgery.  10 day ABs anticipated with completion of zosyn 4/20.  2nd dermatitis due to constant moisture from urine leakage.  Hospitalized [facility] 4/4-4/7/18 on meropenem and vanco (cultures ESBL, VRE and MRSA) with DC to TCU on augmentin pending PMD and primary surgeon follow up.  3)  Chronic pain with generalized discomfort. Thickened bladder wall may account for lower abdominal discomfort.  Consider UTI with resistant organism with indwelling SP catheter.   With diarrhea concern regarding potential for C. Diff.    4)  COPD with retained upper airway secretions 2nd to weak cough.  Maintaining oxygenation.  CT as above.  Unclear significance of ground glass and nodular opacities RUL.   Infection not responding to treatment unlikley with normal temp, no hypoxemia.  Reviewed with pulmonary.   5)  Anemia likely 2nd to CD and iron deficiency per record.  Adequate.    6)  Left comminuted acute subtrochanteric fracture with recent fall.  7)  Hx of DVT on Rivaroxaban.  L posterior CP potentially chest wall.  PE unlikely with anticoagulation.   8)  Seizure disorder. On keppra taper and lamotrigine titration.    9)  Diastolic CHF.  Suspect compensated.   10)  Depression, anxiety, PTSD.  On Cymbalta, gabapentin, lamictal.   Decrease sedation with reduction in diazepam dose, d.c of vistaril, decrease in gabapentin  11)  Hypothyroidism with TSH 49.56 likely related to med non compliance.  12)  Hypoalbuminemia.  Protein loss from wounds and poor nutrition.  Calorie counts ordered. Pt alleging improved po.  Reviewed with RD to continue counts. .    12)  Closed fracture zygoma.  13)  Consideration for diverting colostomy.   14)  S/p B PNT 2 months ago, for the intent of urinary diversion (since removed).  Has SP catheter.   15)  Nausea basis unclear - potential  relation to meds, GERD.  Lower abdominal discomfort unclear significance, benign exam.  Monitor for now.    16)  HA discomfort - issue since craniotomy for SDH. Neg CT for acute change.     PLAN:  1) Urology consult regarding bladder thickening, urine leakage out urethra.  UA/UC.  2)  Continue nebs.  RT to attempt to induce sputum for culture.  3)  Zosyn to be completed 4/20.  4)  Continue local wound care.  5)  Continue to encourage po.  F/u calorie counts.  Review timing diverting colod=stomy with CR surgery.  6)  Trend labs.  7)  ID f/u regarding CT chest findings.  8)  Monitor clinically.  Has 9 days left on bed hold at SNF.   Disposition Plan   Expected discharge unclear pending clinical course, stability of pulmonary status. .      Entered: Ravi Lawrence 04/19/2018, 4:16 PM            Attestation:  I have reviewed today's vital signs, notes, medications, labs and imaging.     Ravi Lawrence MD

## 2018-04-19 NOTE — PROGRESS NOTES
"Pt with T4 paraplegia and R ischial decubitus.  Admitted with cellulitis around wound. Grew a variety of resistant bacteria from wound. Has been on zosyn and doxycyline for 9 days or so.  Pt says she would like to have colostomy at some point to keep wound more clean and dry. Apparently there has been discussion about this and presently working on improving nutrition.      Pt has had congested cough.  Had CT yesterday and some changes along periphery of RUL suggestive fibrosis =/- new inflammation.   ROS: No itching, No diarrhea, No nausea.  EXAM: BP 98/56 (BP Location: Right arm)  Pulse 82  Temp 97.2  F (36.2  C) (Axillary)  Resp 14  Ht 1.588 m (5' 2.5\")  Wt 65.5 kg (144 lb 8 oz)  SpO2 99%  BMI 26.01 kg/m2  periwound skin very red and macerated.      Imp: Pt appears at this time to have mostly dermatitis rather than cellulitis.  Expect she would benefit from colostomy when possible.  Hopefully CT changes represent chronic scarring.   Plan: Continue present antibiotic.  Agree with plan to not escalate antibiotic use unless combined medical surgical management.   Have ordered repeat CRP to monitor trend.    "

## 2018-04-20 NOTE — PROGRESS NOTES
CLINICAL NUTRITION SERVICES BRIEF NOTE    Consult received for pharmacy/nutrition to start & manage TPN - Poor nutrition - pt preference not to have feeding tube.  Also may negatively impact already present issue of upper airway congestion, weak cough.     Updated Nutrition Needs   ASSESSED NUTRITION NEEDS (dosing weight 57 kg adjusted)  Estimated Energy Needs: 0627-9418 kcals/day (30 - 35 kcals/kg) EN; 8005-0207 kcal (25-30 kcal/kg) PN  Justification: Wound healing  Estimated Protein Needs: 114+ grams protein/day (2+ g/kg)  Justification: Wound healing, with multiple pressure injuries   Estimated Fluid Needs: 2200+ mL/day (30 + mL/kg)   Justification: Increased needs for wound healing, or per provider pending fluid status    Implementation  TPN recommendations: Patient is at risk for refeeding syndrome, will start tonight with Kabiven at 55 mL/hr without lipids to provide 129 g dextrose, 0.8 g/kg AA, GIR 1.6 mg/kg/min. Monitor refeeding lytes (K, Mg, Phos) and advance to custom TPN goal on 4/21  if refeeding lytes (Mg, K, Phos) WNL to goal of 200 gm dextrose, 2 g/kg AA, 250 mL IL daily with GIR 2.4 mg/kg/min. Goal TPN provides 1636 kcal to meet 100% of energy and protein needs.     Collaboration with other providers - discussed with pharmacist and weekend CELI Art RD, LD  Unit Pager: [phone]

## 2018-04-20 NOTE — PROGRESS NOTES
CALORIE COUNTS    4/19: 1300 kcal, 28 g protein (per patient report, nothing documented)    Vianey Art RD, LD  Unit Pager: [phone]

## 2018-04-20 NOTE — PLAN OF CARE
Problem: Infection, Risk/Actual (Adult)  Goal: Identify Related Risk Factors and Signs and Symptoms  Related risk factors and signs and symptoms are identified upon initiation of Human Response Clinical Practice Guideline (CPG).   Outcome: No Change    VS: Refused.   O2: Refused.   Output: Minimal output, UA needed, cath clamped, leaking, new pad provided.   Last BM: 4/19/18, incontinent.   Activity: Bedrest, refuses frequent repositioning, AX2.   Skin: Refused full assessment, wound to buttock, redness, mepilex to left heel.   Pain: Reported stomach ache, given 2 mg oral dilaudid at 1725 and scheduled oxycontin.   CMS: Para, sensation absent BLE.   Dressing: Wet to dry dressing soiled and removed, pt refused dressing change, barrier cream and ABD applied.   Diet: Regular, calorie count, unable to count calories this shift.   LDA: PIV left upper forearm infusing TKO - IV has been in place for more than a week but pt refused new IV placement.   Equipment: Pulsate mattress ordered - pt refused transfer, pillows, IV pole, call light within reach.   Plan: Continue to monitor.   Additional Info: Unable to obtain daily weight as pt refused transfer.

## 2018-04-20 NOTE — PROGRESS NOTES
"[facility] Internal Medicine Progress Note            Interval History:   Record reviewed.   Seen with RN.  Indicates no clinical improvement.  Urology consult - not documented but pt indicates discussion of potential cystectomy.  Reviewed diverting colostomy with  surgery staff.  Indication of need to have much improved nutritional status for surgery due to risk of wound breakdown.  Indicates continued improved po intake - however no po today due to recurrent nausea (intermittent issue).  No reflux.  No emesis.  Persistent mid to lower abdominal discomfort.  Diarrheal stool this AM.  Repeat C. Diff neg.  Continued urinary leakage.  Anterior CP with cough.  No secretions.  Continued sense of dyspnea.  No new AB recommendations from ID with plan to DC zosyn today.          Medications:   All medications reviewed today          Physical Exam:   Blood pressure 98/61, pulse 91, temperature 97.2  F (36.2  C), temperature source Axillary, resp. rate 16, height 1.588 m (5' 2.5\"), weight 65.5 kg (144 lb 8 oz), SpO2 99 %.    Intake/Output Summary (Last 24 hours) at 04/16/18 1030  Last data filed at 04/16/18 0700   Gross per 24 hour   Intake                0 ml   Output              800 ml   Net             -800 ml       General:  Alert.  Appropriate.  No distress.  Resp non labored.    Off O2.     Heent:      Neck:    Skin:    Chest:  Dffuse upper airway rhonchi, as before.   No bronchospasm.  Bases clear.    Cardiac:  Reg without gallop, murmur.  No JVD.     Abdomen:  Non distended, soft, diffuse meid to lower abdominal tenderness.  No guarding.   BS normal.     Extremities:  Perfused.  1 plus pretibial, pedal edema.  No posterior calf, thigh induration.     Neuro:            Data:     Results for orders placed or performed during the hospital encounter of 04/10/18 (from the past 24 hour(s))   Clostridium difficile toxin B PCR   Result Value Ref Range    Specimen Description Feces     C Diff Toxin B PCR Negative " NEG^Negative   Basic metabolic panel   Result Value Ref Range    Sodium 145 (H) 133 - 144 mmol/L    Potassium 3.7 3.4 - 5.3 mmol/L    Chloride 108 94 - 109 mmol/L    Carbon Dioxide 29 20 - 32 mmol/L    Anion Gap 8 3 - 14 mmol/L    Glucose 82 70 - 99 mg/dL    Urea Nitrogen 4 (L) 7 - 30 mg/dL    Creatinine 0.49 (L) 0.52 - 1.04 mg/dL    GFR Estimate >90 >60 mL/min/1.7m2    GFR Estimate If Black >90 >60 mL/min/1.7m2    Calcium 8.3 (L) 8.5 - 10.1 mg/dL   CBC with platelets   Result Value Ref Range    WBC 6.9 4.0 - 11.0 10e9/L    RBC Count 3.37 (L) 3.8 - 5.2 10e12/L    Hemoglobin 8.1 (L) 11.7 - 15.7 g/dL    Hematocrit 28.2 (L) 35.0 - 47.0 %    MCV 84 78 - 100 fl    MCH 24.0 (L) 26.5 - 33.0 pg    MCHC 28.7 (L) 31.5 - 36.5 g/dL    RDW 19.5 (H) 10.0 - 15.0 %    Platelet Count 300 150 - 450 10e9/L   UA with Microscopic   Result Value Ref Range    Color Urine Yellow     Appearance Urine Slightly Cloudy     Glucose Urine Negative NEG^Negative mg/dL    Bilirubin Urine Negative NEG^Negative    Ketones Urine Negative NEG^Negative mg/dL    Specific Gravity Urine 1.015 1.003 - 1.035    Blood Urine Small (A) NEG^Negative    pH Urine 7.5 (H) 5.0 - 7.0 pH    Protein Albumin Urine 30 (A) NEG^Negative mg/dL    Urobilinogen mg/dL 0.2 0.0 - 2.0 mg/dL    Nitrite Urine Negative NEG^Negative    Leukocyte Esterase Urine Small (A) NEG^Negative    Source Catheterized Urine     WBC Urine 10 (H) 0 - 5 /HPF    RBC Urine 12 (H) 0 - 2 /HPF    Bacteria Urine Few (A) NEG^Negative /HPF    Yeast Urine Many (A) NEG^Negative /HPF    Squamous Epithelial /HPF Urine <1 0 - 1 /HPF    Mucous Urine Present (A) NEG^Negative /LPF    Comment Urine       Urine was tested unconcentrated because <10 ml was received.     CT chest, abd, pelvis:  1. Patchy groundglass and nodular opacities in the right upper lobe  along with peripheral, upper lobe predominant interstitial thickening.  Findings suspicious for infection, potentially superimposed on  interstitial fibrosis  or scarring.   2. Nonspecific circumferential bladder wall thickening which is  nonspecific and can be seen with acute or chronic cystitis. Increased  soft tissue thickening and enhancement along the suprapubic catheter  tract, representing granulation tissue or inflammation associated with  cystitis.   3. Interval removal of bilateral percutaneous nephrostomy tubes. Mild  prominence of the collecting systems without significant  hydronephrosis.  4. Multiple chronic appearing thoracic compression deformities as  detailed in the findings, most significant involving T6 vertebral  body.  5. Redemonstration of left subtrochanteric femoral fracture.  6. Unchanged right sacral decubitus ulcer with associated  osteomyelitis.             Assessment and Plan:   1)  T4 paraplegia 2nd to hematoma.  2)  Febrile illness with chronic  multiple stage IV decubital ulcers, L heel wound and chronic R sacral osteomyelitis.  On zosyn and doxycycline.  Wound cultures from buttocks E coli ESBL, proteus, pseudomonas ( R to zosyn).   Impression of chronic osteomyelitis with element of cellulitis/dermatitis.  Abscess or deep acute infection not felt likely per plastic surgery.  10 day ABs anticipated with completion of zosyn today.   2nd dermatitis due to constant moisture from urine leakage.    3)  Chronic pain with generalized discomfort. Thickened bladder wall may account for lower abdominal discomfort.  Consider UTI with resistant organism with indwelling SP catheter.  Active urine sediment not unexpected. Neg C. Diff. 4)  COPD with retained upper airway secretions 2nd to weak cough.  Maintaining oxygenation.  CT as above.  Unclear significance of ground glass and nodular opacities RUL.   Infection not responding to treatment unlikley with normal temp, no hypoxemia.  Reviewed with pulmonary and ID.  No new AB recommendations.  Not interested in suctioning.    5)  Anemia likely 2nd to CD and iron deficiency per record.  Adequate.    6)   Left comminuted acute subtrochanteric fracture with recent fall.  7)  Hx of DVT on Rivaroxaban.  L posterior CP potentially chest wall.  PE unlikely with anticoagulation.   8)  Seizure disorder. On keppra taper and lamotrigine titration.    9)  Diastolic CHF.  Suspect compensated.   10)  Depression, anxiety, PTSD.  On Cymbalta, gabapentin, lamictal.   Decrease sedation with reduction in diazepam dose, d.c of vistaril, decrease in gabapentin  11)  Hypothyroidism with TSH 49.56 likely related to med non compliance.  12)  Hypoalbuminemia.  Protein loss from wounds and poor nutrition.  Reviewed with nutrition and CR surgery - unlikely to improve significantly with pt's inconsistent po intake.  Options enteral feedings or PICC and TPN.   12)  Closed fracture zygoma.  13)  Consideration for diverting colostomy.   14)  S/p B PNT 2 months ago, for the intent of urinary diversion (since removed).  Has SP catheter.   15)  Nausea basis unclear - potential relation to meds, GERD.  Lower abdominal discomfort unclear significance, benign exam.  Monitor for now.    16)  HA discomfort - issue since craniotomy for SDH. Neg CT for acute change.     PLAN:  1) Urology called to place recommendations in chart.   2)  Continue nebs.  RT to attempt to induce sputum for culture.  Percussion and PD.  3)  DC Zosyn   4)  Continue local wound care.  5)  Continue to encourage po.  F/u calorie counts.  Recheck prealbumin and albumin.  Likely TPN or enteral feedings.   6)  Trend labs.  7)  I Monitor clinically.  Has 8 days left on bed hold at SNF.   Disposition Plan   Expected discharge unclear pending clinical course, stability of pulmonary status. .      Entered: Ravi Lawrence 04/20/2018, 2:11 PM     ADD:  Reviewed options with patient - prefers PICC line and TPN.  POA Sean Pickett updated.        Attestation:  I have reviewed today's vital signs, notes, medications, labs and imaging.     Ravi Lawrence MD

## 2018-04-20 NOTE — PLAN OF CARE
Problem: Infection, Risk/Actual (Adult)  Goal: Identify Related Risk Factors and Signs and Symptoms  Related risk factors and signs and symptoms are identified upon initiation of Human Response Clinical Practice Guideline (CPG).   Outcome: No Change    VS:     On RA, refused all VS    Output:     Voids using suprapubic catheter  BM 2x this shift and passing gas; incontinent, and soiled linen 2x, voided only 150ml this shift   Activity:     Bedrest using A2,    Skin: Pressure ulcer, rashes, wounds, scars   Pain:     PRN medications not given this shift  Refused all  Medication suntili 1400  Alternative therapies offered   Neuro/CMS:     Sensation absent in BLE   Dressing:     CDI, changed 2x this shift   Diet:     Regular diet, tolerated well, nausea this am, zofran IV given with good results, poor appetite   LDA:     IV infusing IKO and patent   Equipment:     Pillows, IV   Plan:     Continue plan of care and infection control   Additional Info:     Pt was pleasant this shift.  Allowed cares.  Delayed repositioning but was cooperative when able.  Did not get pt daily weight (forgive me!)

## 2018-04-20 NOTE — PLAN OF CARE
Problem: Patient Care Overview  Goal: Individualization & Mutuality  Pt A&O x's 4. BP soft. Pt declined care and assessment. Pt slept though the night.  Call light within reach.

## 2018-04-20 NOTE — PROGRESS NOTES
Patient seen x3. She refused neb x1 and all attempts (x3) at BD's today. Per CXR, increased pulmonary vascular congestion, no infiltrates. Cough is congested, productive. Patient has Aerobika PEP device at bedside, which she states she uses, but it doesn't help.

## 2018-04-20 NOTE — PROGRESS NOTES
"CLINICAL NUTRITION SERVICES - REASSESSMENT NOTE     Nutrition Prescription    RECOMMENDATIONS FOR MDs/PROVIDERS TO ORDER:  1. If PO intakes continue to be minimal per calorie counts, consider placement of enteral feeding tube given patient's severe wounds and increased needs (pending compliance).     Malnutrition Status:    Difficult to determine, patient does not meet two of the above criteria necessary for diagnosing malnutrition but is at risk for malnutrition    Recommendations already ordered by Registered Dietitian (RD):  Ordered pineapple gelatein and strawberry yogurt x2 at 2 pm delivered to nursing station    Future/Additional Recommendations:  1. Encourage PO intakes and provide education on importance of adequate calories/protein for wound healing as able. Adjust supplements as needed.   2. If EN becomes plan of care, recommend checking refeeding lytes (Mg++, Phos, K+) and replace any abnormalities prior to initiating and with advancement to goal. Would recommend initiation of Isosource 1.5 at 20 mL/hr and advance by 10 mL q 8 hours to goal of 50 mL/hr if refeeding lytes WNL. Mix with 3 packets prosource to per day to meet protein needs. Goal would provide 1848 kcal (32 kcal/kg), 115 g protein (2 g/kg).       - Free water flushes: to meet 100% of hydration needs, pt would neet 115 mL free water flushes q 2 hours.      EVALUATION OF THE PROGRESS TOWARD GOALS   Diet: Regular, Boost Plus TID with meals (strawberry)  Intake: Patient reports she has been eating \"a lot\". Difficult to assess as pt reporting to MD that she is eating great and receiving 2 meal trays per meal, however RN's documenting poor PO intakes and not drinking supplements. Per calorie counts over the past 5 days, pt taking on average 671 kcal and 20 g protein (meets 39% of energy and 18% of protein needs). Patient has stock of 6 Boost Plus in her room today, states she maybe drinks only 1 per day because \"my doctor said I didn't have to " "drink those\". Pt was placed on room service with assist to ensure she is ordering meals (previously missing many meals), however diet tech unable to meet with patient on multiple attempts so defaults were ordered. Last night patient called room service to request defaults be canceled.      NEW FINDINGS    - Per RD discussion with MD 4/17, plan to continue calorie counts as accurately as possible. No concerns with patient's intake as she reports to staff that she is eating well.  - Weight: most recent weight from 4/18 of 65.5 kg is down 8 kg (11%) over the past week. Difficult to assess true wt loss vs error from weight measurement method as pt is paraplegic.   - Skin: Per [facility] nurse assessment on 4/19, pt's 5 PI's are unchanged since initial assessment one week ago. Per MD note, katia area constantly wet making wound care difficult.   - Labs: Sodium 145 (H)  - Meds: pt refusing wound protocol vitamins and other meds today d/t nausea     Dosing Weight: 57 kg (adjusted weight)      ASSESSED NUTRITION NEEDS  Estimated Energy Needs: 3766-2767 kcals/day (30 - 35 kcals/kg )  Justification: Wound healing  Estimated Protein Needs: 114+ grams protein/day (2+ g/kg)  Justification: Wound healing, with multiple pressure injuries   Estimated Fluid Needs: 2200+ mL/day (30 + mL/kg)   Justification: Increased needs for wound healing, or per provider pending fluid status    MALNUTRITION  % Intake: < 75% for > 7 days (non-severe)  % Weight Loss: difficult to assess d/t possible error, suspect at least > 2% in 1 week (severe)  Subcutaneous Fat Loss: None observed  Muscle Loss: None observed  Fluid Accumulation/Edema: None noted  Malnutrition Diagnosis: Difficult to determine, patient does not meet two of the above criteria necessary for diagnosing malnutrition but is at risk for malnutrition    Previous Goals   Total avg nutritional intake to meet a minimum of 30 kcal/kg and 2 g PRO/kg daily (per dosing wt 57 kg).  Evaluation: Not " "met    Previous Nutrition Diagnosis  Inadequate protein-energy intake related to increased nutrient needs for wound healing, decreased appetite suspected as evidenced by pt frequently skipping meals per review of [employer], multiple stage 3 and 4 wounds per WOC.   Evaluation: No change    CURRENT NUTRITION DIAGNOSIS  Inadequate protein-energy intake related to decreased appetite, nausea as evidenced by calorie counts (although data incomplete) meeting only 39% of energy and 18% of protein needs x 5 days, missing meals, drinking only ~1 supplement daily.       INTERVENTIONS  Implementation  Nutrition education for recommended modifications - again reiterated the importance of regular meals, snacks, and supplements to optimize protein intake especially given the status and severity of patient's multiple wounds. Patient reports she is \"eating a lot\" and was not receptive to further education, although is agreeable to sending strawberry yogurt and pineapple Gelatein each afternoon. She would like someone to check with her first before delivering the food to see if she is \"ready\" for it, discussed that it can be delivered to RN station as writer cannot come to her room every day to check if she is ready.  Collaboration with other providers - discussed calorie counts with RN, pt had nothing to eat on her shift yet today  Medical food supplement therapy - ordered pineapple gelatein at 2 pm  Modify composition of meals/snacks - ordered strawberry yogurt x2 at 2 pm delivered to nursing station    Goals  1. Weight maintenance at or above current weight of 65 kg.   2. Total avg nutritional intake to meet a minimum of 30 kcal/kg and 2 g PRO/kg daily (per dosing wt 57 kg).    Monitoring/Evaluation  Progress toward goals will be monitored and evaluated per protocol.    Vianey Art RD, LD  Unit Pager: [phone]     "

## 2018-04-21 NOTE — PHARMACY
Patient's nurse (Indy) confirmed that she was told that patient would not get a PICC line tonight. Kabiven cannot be given peripherally. Clinimix is not allowed for new starts due to shortage. Will have to discontinue the Kabiven order for now. Will leave sign-out note for pharmacist to discuss nutrition plan with RD in morning. Hopefully patient can get a PICC line tomorrow and TPN can be started.    Siobhan Cadet, PharmD, BCPS

## 2018-04-21 NOTE — PROGRESS NOTES
4/21/2018 5:36 AM     Called re: low UOP overnight. Poor PO intake, having PICC placed for TPN. Urology was consulted regarding urinary incontinence leaking around SP catheter. RN not able to examine her so can't say if she's still having much urinary incontinence around the SP cath. Seems to be draining well otherwise. Does not have a lot of vitals charted but BPs on the softer side, normal HR--which is around her baseline. No known cardiac hx. CXR from 4/16 with interstitial markings c/w edema but has been on room air since 4/17.    -  cc bolus            Cherie Marc Le   Med Peds PGY4   [id number]

## 2018-04-21 NOTE — PLAN OF CARE
Problem: Infection, Risk/Actual (Adult)  Goal: Identify Related Risk Factors and Signs and Symptoms  Related risk factors and signs and symptoms are identified upon initiation of Human Response Clinical Practice Guideline (CPG).     VS: Pt refused   O2: Pt refused   Output: SP cath; only output of 50 ml, moonlighter notified. 500 ml bolus ordered. Pt refused to let me examine SP site and urethra for possible leakage.    Last BM: 4/20/2018, passing gas   Activity: Bedrest; refused to be repositioned    Skin: Scabs, scars, drains. Unable to view pressure ulcers as patient refused    Pain: Reported generalized and abdominal pain; managed with 4 mg PO Dilaudid q4h & valium    CMS: Intact; denies numbness/tingling in all extremities    Dressing: UTV    Diet: Regular; continues to report nausea and abdominal pain. Given scheduled Zofran and tums with little relief    LDA: PIV SL   Equipment:    Plan: Continue to monitor. PICC placement today to start TPN    Additional Info:

## 2018-04-21 NOTE — PROCEDURES
In pt room for PICC placement.  Procedure explained and consent signed. Double lumen SOLO PICC placed in R basilic vein without difficulty.  EKG rhythm with constant artifact and no clear, consistent rise in P wave, so CXR ordered and showed PICC tip at cavo-atrial junction.  Report called to Jose [place] that PICC is ready to use.

## 2018-04-21 NOTE — PROGRESS NOTES
Nutrition Brief Note    TPN was not initiated 4/20 d/t PICC line not being placed.     Will begin PN regimen/advancement as follows once PICC line is placed:  1. Initiate: Volume of 1500 mL (or per provider), 100 g Dex (GIR 1.2), 114 g AA (2 g/kg) and 250 mL lipids daily per DW of 57 kg.  2. If refeeding lytes (Mg, K, Phos) are WNL, advance to goal of 200 gm dextrose at next PN bag order.    - Goal PN Regimen: 1500 mL fluid (or per provider), 200 g Dex (GIR 2.4 mg/kg/min), 114 g AA (2 g/kg), 250 mL IL daily (31% kcal from fat). Goal TPN provides 1636 kcal to meet 100% of energy and protein needs  (custom PN was ordered d/t high protein needs 2/2 multiple stagable PI)    Implementations:  - Placed change request order for pharmacy to order as above. Also discussed regimen with pharmacy x2.  - TG lab q Monday.    Future Recommendations:  - Pending MD discretion, transition to EN via NG/NJ tube pending tolerance d/t functioning gut.      Jossy Conner, CELI, LD  On Call Pager: [phone]

## 2018-04-21 NOTE — PLAN OF CARE
Pt is alert, uncooperative. Pt refused some of her medications, and vital signs as well as full assessment for this shift. Pt refused to be changed and repositioned X3. Has generalized pain, given dilaudid, tolerating with discomfort. Has poor appetite. Unable to start on TPN at 2000 due to IR was closed during this shift to place the PICC. Continue to encourage pt to participate in her care and encourage her to reposition q2 hours and as needed and . Pt is able to make needs known. Call light remains within her reach.

## 2018-04-21 NOTE — PLAN OF CARE
Problem: Patient Care Overview  Goal: Plan of Care/Patient Progress Review  Outcome: Improving    VS: WNL   O2: 96% at RA   Output: 150 cc from MD carlos notified.   Last BM: Incontinent of stool early this am, cleaned and linen changed   Activity: Bedrest   Skin: Redness at katia area and stage 3 wound , dressing changed this am   Pain: Pain managed with Dilaudid 4 mg , last dose at 1245 pm   CMS: Edema in bilat LE, per baseline   Dressing: Changed at Rt bottock   Diet: Regular diet with poor appetite.   LDA: New PICC placed in Rt ac, xray done and ready for use.   Equipment: S/P gonzalez patent with small amount.   Plan: To start on TPN at 2000 but on continuos dextrose 10% 1000 cc, not available at the time awaiting on [facility]   Additional Info:

## 2018-04-22 NOTE — PROGRESS NOTES
[facility] Internal Medicine Progress Note            Interval History:   (Note pertains to visit 4/21).  Record reviewed.  Clinically indicates decrease anterior chest congestion and dyspnea after neb.  Cough remains NP.  No CP.  Nausea but tolerating po.  No emesis.  Lower abd pain as before.  Diarrhea.  SP catheter in place.  To receive PICC today in anticipation of TPN.  Pain control appears improved with interval increase po dilaudid to 2-4 mg dose. Urology consult reviewed, discussed.  Will need some type of urinary diversion.         Medications:   All medications reviewed today          Physical Exam:   /55  Rate 90s.  Afebrile.  Resp Non labored.    Intake/Output Summary (Last 24 hours) at 04/16/18 1030  Last data filed at 04/16/18 0700   Gross per 24 hour   Intake                0 ml   Output              800 ml   Net             -800 ml       General:  Alert.  Appropriate.  No distress.  Resp non labored.    Off O2.     Heent:      Neck:    Skin:    Chest:  Dffuse upper airway rhonchi, as before.   No bronchospasm.  Bases clear.    Cardiac:  Reg without gallop, murmur.  No JVD.     Abdomen:  Non distended, soft, diffuse meid to lower abdominal tenderness.  No guarding.   BS normal.     Extremities:  Perfused.  1 plus pretibial, pedal edema.  No posterior calf, thigh induration.     Neuro:            Data:     Results for orders placed or performed during the hospital encounter of 04/10/18 (from the past 24 hour(s))   XR Chest Port 1 View    Narrative    XR CHEST PORT 1 VW  4/21/2018 2:34 PM      HISTORY: RN placed PICC - verify tip placement;     COMPARISON: 4/18/2018 CT, x-ray 4/10/2018    FINDINGS: Right sided PICC with tip at the atriocaval junction. The  left costophrenic angle is off of the field-of-view. Normal cardiac  silhouette. Unchanged interstitial opacities, right greater than left.   Biapical scarring. No pneumothorax or significant pleural effusion.      Impression     IMPRESSION:   1. Right-sided PICC tip at the atriocaval junction.  2. Unchanged interstitial opacities, right greater than left.    I have personally reviewed the examination and initial interpretation  and I agree with the findings.    LEFTY BARAHONA MD   Vascular Access Adult IP Consult    Narrative    Suzanna Burns RN     4/21/2018  2:51 PM  In pt room for PICC placement.  Procedure explained and consent   signed. Double lumen SOLO PICC placed in R basilic vein without   difficulty.  EKG rhythm with constant artifact and no clear,   consistent rise in P wave, so CXR ordered and showed PICC tip at   cavo-atrial junction.  Report called to Jose [place] that PICC is   ready to use.               Magnesium   Result Value Ref Range    Magnesium 1.9 1.6 - 2.3 mg/dL   Phosphorus   Result Value Ref Range    Phosphorus 3.8 2.5 - 4.5 mg/dL   Comprehensive metabolic panel   Result Value Ref Range    Sodium 144 133 - 144 mmol/L    Potassium 3.7 3.4 - 5.3 mmol/L    Chloride 108 94 - 109 mmol/L    Carbon Dioxide 31 20 - 32 mmol/L    Anion Gap 5 3 - 14 mmol/L    Glucose 102 (H) 70 - 99 mg/dL    Urea Nitrogen 9 7 - 30 mg/dL    Creatinine 0.47 (L) 0.52 - 1.04 mg/dL    GFR Estimate >90 >60 mL/min/1.7m2    GFR Estimate If Black >90 >60 mL/min/1.7m2    Calcium 8.2 (L) 8.5 - 10.1 mg/dL    Bilirubin Total 0.2 0.2 - 1.3 mg/dL    Albumin 1.5 (L) 3.4 - 5.0 g/dL    Protein Total 6.8 6.8 - 8.8 g/dL    Alkaline Phosphatase 216 (H) 40 - 150 U/L    ALT <6 0 - 50 U/L    AST 19 0 - 45 U/L   INR   Result Value Ref Range    INR 1.68 (H) 0.86 - 1.14   Prealbumin   Result Value Ref Range    Prealbumin 8 (L) 15 - 45 mg/dL     CT chest, abd, pelvis:  1. Patchy groundglass and nodular opacities in the right upper lobe  along with peripheral, upper lobe predominant interstitial thickening.  Findings suspicious for infection, potentially superimposed on  interstitial fibrosis or scarring.   2. Nonspecific circumferential bladder wall thickening which  is  nonspecific and can be seen with acute or chronic cystitis. Increased  soft tissue thickening and enhancement along the suprapubic catheter  tract, representing granulation tissue or inflammation associated with  cystitis.   3. Interval removal of bilateral percutaneous nephrostomy tubes. Mild  prominence of the collecting systems without significant  hydronephrosis.  4. Multiple chronic appearing thoracic compression deformities as  detailed in the findings, most significant involving T6 vertebral  body.  5. Redemonstration of left subtrochanteric femoral fracture.  6. Unchanged right sacral decubitus ulcer with associated  osteomyelitis.             Assessment and Plan:   1)  T4 paraplegia 2nd to hematoma.  2)  Febrile illness with chronic  multiple stage IV decubital ulcers, L heel wound and chronic R sacral osteomyelitis.  On zosyn and doxycycline.  Wound cultures from buttocks E coli ESBL, proteus, pseudomonas ( R to zosyn).   Impression of chronic osteomyelitis with element of cellulitis/dermatitis.  Abscess or deep acute infection not felt likely per plastic surgery.  10 day ABs anticipated with completion of zosyn today.   2nd dermatitis due to constant moisture from urine leakage.    3)  Chronic pain with generalized discomfort. Thickened bladder wall may account for lower abdominal discomfort.  Consider UTI with resistant organism with indwelling SP catheter.  Active urine sediment not unexpected. Neg C. Diff. 4)  COPD with retained upper airway secretions 2nd to weak cough.  Maintaining oxygenation.  CT as above.  Unclear significance of ground glass and nodular opacities RUL.   Infection not responding to treatment unlikley with normal temp, no hypoxemia.  Reviewed with pulmonary and ID.  No new AB recommendations.  Not interested in suctioning.    5)  Anemia likely 2nd to CD and iron deficiency per record.  Adequate.    6)  Left comminuted acute subtrochanteric fracture with recent fall.  7)  Hx of  DVT on Rivaroxaban.  L posterior CP potentially chest wall.  PE unlikely with anticoagulation.   8)  Seizure disorder. On keppra taper and lamotrigine titration.    9)  Diastolic CHF.  Suspect compensated.   10)  Depression, anxiety, PTSD.  On Cymbalta, gabapentin, lamictal.   Decrease sedation with reduction in diazepam dose, d.c of vistaril, decrease in gabapentin  11)  Hypothyroidism with TSH 49.56 likely related to med non compliance.  12)  Hypoalbuminemia.  Protein loss from wounds and poor nutrition.  Reviewed with nutrition and CR surgery - unlikely to improve significantly with pt's inconsistent po intake. Plan for PICC and TPN.  Pt declined FT.  12)  Closed fracture zygoma.  13)  Consideration for diverting colostomy.   14)  S/p B PNT 2 months ago, for the intent of urinary diversion (since removed).  Has SP catheter.   15)  Nausea basis unclear - potential relation to meds, GERD.  Lower abdominal discomfort unclear significance, benign exam.  Tolerating po.   Monitor for now.    16)  HA discomfort - issue since craniotomy for SDH. Neg CT for acute change.     PLAN:  1) TPN with potential DC back to care center next few days if stable with ultimate plan to undergo diverting colostomy and urinary diversion as combined procedure once nutrition adequate.  Double lumen PICC today.     2)  Continue nebs.   Percussion and PD.  3)  Off Zosyn 4/20  4)  Continue local wound care.  5)  Trend labs.  6)  Monitor clinically.  Has 7 days left on bed hold at SNF. POA updated 4/20 with plan and clinical issues.  Disposition Plan   Expected discharge unclear pending clinical course, stability of pulmonary status. .      Entered: Ravi Lawrence 04/22/2018, 6:45 AM            Attestation:  I have reviewed today's vital signs, notes, medications, labs and imaging.     Ravi Lawrence MD

## 2018-04-22 NOTE — PROGRESS NOTES
[facility] Internal Medicine Progress Note            Interval History:   Record reviewed.  Seen with RN.  No major change clinically.  PICC placed 4/21.  Started on TPN last PM.  Chest congestion better initial period after neb and CPPD.  Requested O2 last PM due to dyspnea - decrease ability to take inspiratory effort.  Dilaudid was increase to 2-4 mg for increase pain c/o's taking 4 mg consistently.   Cough remains NP. No CP.  Nausea, as before  but tolerating po.  No emesis.  Persistent lower abdominal pain. BM 4/21.  SP catheter in place.  Continues to leak out patulous urethra.           Medications:   All medications reviewed today          Physical Exam:   /55  Rate 90s.  Afebrile.  Resp Non labored.    Intake/Output Summary (Last 24 hours) at 04/16/18 1030  Last data filed at 04/16/18 0700   Gross per 24 hour   Intake                0 ml   Output              800 ml   Net             -800 ml       General:  Alert.  Appropriate.  No distress.  Resp non labored.  Off O2 aaron 93-95%.     Heent:      Neck:    Skin:    Chest:  Dffuse upper airway rhonchi, as before. Cough appears more forceful.    No bronchospasm.  Bases clear.    Cardiac:  Reg without gallop, murmur.  No JVD.     Abdomen:  Non distended, soft, diffuse meid to lower abdominal tenderness.  No guarding.   BS normal.     Extremities:  Perfused.  1 plus pretibial, pedal edema.  No posterior calf, thigh induration.     Neuro:            Data:     Results for orders placed or performed during the hospital encounter of 04/10/18 (from the past 24 hour(s))   XR Chest Port 1 View    Narrative    XR CHEST PORT 1 VW  4/21/2018 2:34 PM      HISTORY: RN placed PICC - verify tip placement;     COMPARISON: 4/18/2018 CT, x-ray 4/10/2018    FINDINGS: Right sided PICC with tip at the atriocaval junction. The  left costophrenic angle is off of the field-of-view. Normal cardiac  silhouette. Unchanged interstitial opacities, right greater than left.    Biapical scarring. No pneumothorax or significant pleural effusion.      Impression    IMPRESSION:   1. Right-sided PICC tip at the atriocaval junction.  2. Unchanged interstitial opacities, right greater than left.    I have personally reviewed the examination and initial interpretation  and I agree with the findings.    LEFTY BARAHONA MD   Vascular Access Adult IP Consult    Narrative    Suzanna Burns RN     4/21/2018  2:51 PM  In pt room for PICC placement.  Procedure explained and consent   signed. Double lumen SOLO PICC placed in R basilic vein without   difficulty.  EKG rhythm with constant artifact and no clear,   consistent rise in P wave, so CXR ordered and showed PICC tip at   cavo-atrial junction.  Report called to Jose [place] that PICC is   ready to use.               Magnesium   Result Value Ref Range    Magnesium 1.9 1.6 - 2.3 mg/dL   Phosphorus   Result Value Ref Range    Phosphorus 3.8 2.5 - 4.5 mg/dL   Comprehensive metabolic panel   Result Value Ref Range    Sodium 144 133 - 144 mmol/L    Potassium 3.7 3.4 - 5.3 mmol/L    Chloride 108 94 - 109 mmol/L    Carbon Dioxide 31 20 - 32 mmol/L    Anion Gap 5 3 - 14 mmol/L    Glucose 102 (H) 70 - 99 mg/dL    Urea Nitrogen 9 7 - 30 mg/dL    Creatinine 0.47 (L) 0.52 - 1.04 mg/dL    GFR Estimate >90 >60 mL/min/1.7m2    GFR Estimate If Black >90 >60 mL/min/1.7m2    Calcium 8.2 (L) 8.5 - 10.1 mg/dL    Bilirubin Total 0.2 0.2 - 1.3 mg/dL    Albumin 1.5 (L) 3.4 - 5.0 g/dL    Protein Total 6.8 6.8 - 8.8 g/dL    Alkaline Phosphatase 216 (H) 40 - 150 U/L    ALT <6 0 - 50 U/L    AST 19 0 - 45 U/L   INR   Result Value Ref Range    INR 1.68 (H) 0.86 - 1.14   Prealbumin   Result Value Ref Range    Prealbumin 8 (L) 15 - 45 mg/dL     CT chest, abd, pelvis:  1. Patchy groundglass and nodular opacities in the right upper lobe  along with peripheral, upper lobe predominant interstitial thickening.  Findings suspicious for infection, potentially superimposed on  interstitial  fibrosis or scarring.   2. Nonspecific circumferential bladder wall thickening which is  nonspecific and can be seen with acute or chronic cystitis. Increased  soft tissue thickening and enhancement along the suprapubic catheter  tract, representing granulation tissue or inflammation associated with  cystitis.   3. Interval removal of bilateral percutaneous nephrostomy tubes. Mild  prominence of the collecting systems without significant  hydronephrosis.  4. Multiple chronic appearing thoracic compression deformities as  detailed in the findings, most significant involving T6 vertebral  body.  5. Redemonstration of left subtrochanteric femoral fracture.  6. Unchanged right sacral decubitus ulcer with associated  osteomyelitis.    UC >100,000 colonies/mL   Candida albicans / dubliniensis          Assessment and Plan:   1)  T4 paraplegia 2nd to hematoma.  2)  Febrile illness with chronic  multiple stage IV decubital ulcers, L heel wound and chronic R sacral osteomyelitis.  On zosyn and doxycycline.  Wound cultures from buttocks E coli ESBL, proteus, pseudomonas ( R to zosyn).   Impression of chronic osteomyelitis with element of cellulitis/dermatitis.  Abscess or deep acute infection not felt likely per plastic surgery.  10 day ABs anticipated with completion of zosyn 4/20.  Remains on doxycycline?  2nd dermatitis due to constant moisture from urine leakage.    3)  Chronic pain with generalized discomfort. Thickened bladder wall may account for lower abdominal discomfort.  Candida on UC.   Neg C. Diff.   4)  COPD with retained upper airway secretions 2nd to weak cough.  Maintaining oxygenation.  CT as above.   Unclear significance of ground glass and nodular opacities RUL.   Infection not responding to treatment unlikley with normal temp, no hypoxemia.  Reviewed with pulmonary and ID.  No new AB recommendations.  Patient not interested in NT suctioning.    5)  Anemia likely 2nd to CD and iron deficiency per record.   Adequate.    6)  Left comminuted acute subtrochanteric fracture with recent fall.  7)  Hx of DVT on Rivaroxaban. Chest c/o's intermittent largely MSK.   8)  Seizure disorder. On keppra taper and lamotrigine titration.    9)  Diastolic CHF.  Suspect compensated.   10)  Depression, anxiety, PTSD.  On Cymbalta, gabapentin, lamictal.   Decrease sedation with reduction in diazepam dose, d.c of vistaril, decrease in gabapentin  11)  Hypothyroidism with TSH 49.56 likely related to med non compliance.  On synthroid.   12)  Hypoalbuminemia.  Protein loss from wounds and poor nutrition.  Reviewed with nutrition and CR surgery - unlikely to improve significantly with pt's inconsistent po intake.   PICC and TPN, as above.  (Pt declined FT).  12)  Closed fracture zygoma.  13)  Consideration for diverting colostomy.  Per urology will review with CR surgery to consider combined procedure with urinary diversion to keep katia area dry.   14)  S/p B PNT 2 months ago, for the intent of urinary diversion (since removed).  Has SP catheter with chronic urethral leakage.   Candida on UC (prob colonization)  15)  Nausea basis unclear - potential relation to meds, GERD.  Lower abdominal discomfort unclear significance, benign exam.  Tolerating po.   Monitor for now.    16)  HA discomfort - issue since craniotomy for SDH. Neg CT for acute change.     PLAN:  1) TPN with potential DC back to care center this week if stable with ultimate plan to undergo diverting colostomy and urinary diversion as combined procedure once nutrition adequate.  2)  Continue scheduled nebs with CPPD.  Percussion and PD.  3)  Off Zosyn 4/20  ID to get back on doxycycline and whether to treat Candida on UC.  4)  Continue local wound care.  5)  Trend labs.  6)  Monitor clinically.  Has 6 days left on bed hold at SNF. POA Tobias updated 4/20 with plan and clinical issues.  Disposition Plan   Expected discharge unclear pending clinical course, stability of pulmonary  status. .      Entered: Ravi Lawrence 04/22/2018, 2:02 PM            Attestation:  I have reviewed today's vital signs, notes, medications, labs and imaging.     Ravi Lawrence MD

## 2018-04-22 NOTE — PROGRESS NOTES
Attempted to draw blood glucose from PICC line after explaining to pt, did not get blood return from red line. Pt became upset that she already refused blood sugars - unable to get sample.

## 2018-04-22 NOTE — PLAN OF CARE
Problem: Infection, Risk/Actual (Adult)  Goal: Identify Related Risk Factors and Signs and Symptoms  Related risk factors and signs and symptoms are identified upon initiation of Human Response Clinical Practice Guideline (CPG).           VS:       Pt A/O X 4. Refused vital signs. Lungs- fine crackles in bilateral bases bilaterally with both anterior and posterior. IS encouraged. Denies nausea and chest pain. Slight shortness of breath per pt report. Able to cough adequately, non productive at this time.     Output:       Bowels- active in all four quadrants. Incontinent of stool and urine x 1 this shift. Large amount of urine noted to be leaking from urethra when pt was changed. Loose black/green stool. Incontinence cares done, dressings changed, and linens changed. Suprapubic catheter draining small amounts.      Activity:       Pt on strict bedrest. Allowed for repositioned with incontinence cares and dressing changes, however, asked to be repositioned on same side was previously on with only slight changes. Refuses repositioning Q2 hours and will only allow when she requests.     Skin:   Maceration to bilateral buttocks and bilateral thighs, wounds, pressure ulcers, scabs and scars. Dressing to right buttock and left heel changes this shift. Other areas cleansed per plan of care.      Pain:       Has pain in the abdomen and given prn PO Dilaudid and is tolerating well.      CMS:       CMS and Neuro's are intact to baseline, pt is paraplegic. Altered sensation in BLEs and AROM in BLEs absent.      Dressing:       Dressings changed per plan of care with incontinent episode. Dressing to left heel also changed.       Diet:       Pt is on a regular diet with supplements and appetite was good this shift. Pt ordered chicken, mushrooms, jello, yogurt, and vegetables. Ate all of chicken, most mushrooms, jello, and was still working on rest of tray. Pt states she likes to take her time and eat slowly. TPN and lipids  scheduled to begin at 2000.      LDA:       PICC is patent in the right arm, purple lumen infusing D10, red lumen SL.      Equipment:       Bilateral heels are elevated off the bed, pillows in between bony prominences.     Plan:       Pt is able to make needs known and the call light is within the pt's reach. Continue to monitor.       Additional Info:

## 2018-04-22 NOTE — PLAN OF CARE
Problem: Infection, Risk/Actual (Adult)  Goal: Identify Related Risk Factors and Signs and Symptoms  Related risk factors and signs and symptoms are identified upon initiation of Human Response Clinical Practice Guideline (CPG).     VS: VS stable, afebrile, denies CP   O2: 95% on RA, insisted on wearing O2 nasal cannula on 2L    Output: Suprapubic catheter in place. Also incontinent of urine as it must be leaking out of urethra. Brief and bed pad were soaked with urine however output was 900 in gonzalez bag    Last BM: 4/21/2018, passing gas   Activity: Bedrest; repositioned to left side   Skin: Scabs, drains, pressure wounds   Pain: Managed with PO 4 mg dilaudid, valium   CMS: Absent sensation/ROM in bilateral lower extremities    Dressing: Intact on heel and coccyx area   Diet: Regular, TPN/lipids infusing   LDA: PICC infusing, suprapubic catheter    Equipment:    Plan: Continue to monitor    Additional Info:

## 2018-04-22 NOTE — PLAN OF CARE
Problem: Infection, Risk/Actual (Adult)  Goal: Identify Related Risk Factors and Signs and Symptoms  Related risk factors and signs and symptoms are identified upon initiation of Human Response Clinical Practice Guideline (CPG).   VS:        Pt A/O X 4. VSS. Afebrile. Lungs- ronchi in bilateral upper lobes with both anterior and posterior. IS encouraged. Denies nausea and chest pain. Slight shortness of breath per pt report, improves after nebs. Pt requests oxygen via nasal cannula intermittently. O2 sats on RA = 93-95%. Able to cough adequately, non productive at this time.     Output:        Bowels- active in all four quadrants. Incontinent of stool and urine x 1 this shift. Moderate amount of urine noted to be leaking from urethra when pt was changed. Loose black/green stool. Incontinence cares done, dressings changed, and linens changed. Suprapubic catheter draining adequate amounts.      Activity:        Pt on strict bedrest. Allowed for repositioning with incontinence cares and dressing changes x 1. Refuses repositioning Q2 hours and will only allow when she requests.     Skin:    Maceration to bilateral buttocks and bilateral thighs, perineal area, wounds, pressure ulcers, scabs and scars. Dressing to right buttock changed this shift. Dressing to left heel reinforced per plan of care. Other areas cleansed per plan of care.      Pain:        Has pain in the abdomen and given prn PO Dilaudid and is tolerating well.      CMS:        CMS and Neuro's are intact to baseline, pt is paraplegic. Altered sensation in BLEs and AROM in BLEs absent.       Dressing:        Dressings changed per plan of care with incontinent episode. Dressing to left heel reinforced with primapore tape per plan of care.       Diet:        Pt is on a regular diet with supplements and appetite was good this shift. Pt really likes cherry gelatein and ate multiple cups throughout the day. TPN and lipids infusing into PICC continuously. Pt  informed about orders to check BG Q6 hours x 72 hours. Pt refusing BG checks, MD updated by phone. MD instructed to try and draw BG from PICC, oncoming RN notified.       LDA:        PICC is patent in the right arm, purple lumen infusing TPN and lipids, red lumen SL.      Equipment:        Pillows in between bony prominences.     Plan:        Pt is able to make needs known and the call light is within the pt's reach. Continue to monitor.        Additional Info:

## 2018-04-22 NOTE — PLAN OF CARE
Problem: Patient Care Overview  Goal: Plan of Care/Patient Progress Review  Pt A&O x's 4. Refused vitals. Pt declined care and assessment. Started on TPN and Lipids.  Call light within reach.

## 2018-04-23 NOTE — PROGRESS NOTES
Focus: Abnormal lab readings  DB: Patient labs were discussed with Pharmacy and Dr Cadet.   I: Plan is to redraw labs and reassess if accurate readings were obtained.   E: Will continue to monitor patients status..

## 2018-04-23 NOTE — PROGRESS NOTES
Karen Burns, RN Registered Nurse Addendum [facility] Nurse Progress Notes   Date of Service: 4/19/2018  8:44 AM Creation Time: 4/19/2018  8:44 AM       Refrence from [facility] nurse note from 4/19/2018       Wound Location:  Left heel  Date of last Photo 4/19/18            Wound Location:  Left upper thigh  Date of last Photo 4/12/18               Wound Location:  Right IT  Date of last Photo 4/12/18               Wound Location:  Left sacrum  Date of last Photo 4/12/18                  Wound Location:  Coccyx  Date of last Photo 4/12/18               Wound Location:  Buttock, perineum, upper thighs  Date of last Photo 4/19/18                 Karen Burns RN Registered Nurse Addendum [facility] Nurse Progress Notes   Date of Service: 4/19/2018  8:44 AM Creation Time: 4/19/2018  8:44 AM                     Physical Exam  Skin assessment: buttocks, perineum, left heel        Wound Location:  Left heel  Date of last Photo 4/19/18            Wound Location:  Left upper thigh  Date of last Photo 4/12/18               Wound Location:  Right IT  Date of last Photo 4/12/18               Wound Location:  Left sacrum  Date of last Photo 4/12/18               Wound Location:  Coccyx  Date of last Photo 4/12/18               Wound Location:  Buttock, perineum, upper thighs  Date of last Photo 4/19/18                                                                                                         View Details Report

## 2018-04-23 NOTE — PROGRESS NOTES
NUTRITION SERVICES BRIEF NOTE    Spoke with Dr. Cadet over the phone about calorie counts - pt is no longer ordered for calorie counts and due to their inconsistencies will continue to hold off on ordering them further.     Reviewed patient's labs as follows:   K+: 5.7 (H)  Mg++: 2.4 (H)  Phos: 4.9 (H)  T (H)     Spoke with pharmacist over the phone regarding patient's abnormal lab values. Pharmacist already adjusted TPN accordingly and re-ordered another TG level (not to be drawn while lipids are infusing). If new TG lab level drawn tomorrow is elevated, consider changing to lipids only SEA PRASAD F. Courtney O'Leary, RD, LD  Unit Pager: [phone]

## 2018-04-23 NOTE — PLAN OF CARE
Problem: Infection, Risk/Actual (Adult)  Goal: Identify Related Risk Factors and Signs and Symptoms  Related risk factors and signs and symptoms are identified upon initiation of Human Response Clinical Practice Guideline (CPG).   Outcome: No Change  Pt has refused everything all evening. Refusing BG checks, repositioning, all medications including pain medications. New TPN and lipids were started and all new tubing into her PICC line - blue lumen. Pt has been very frustrated and irritable this evening. Continues to yell at staff. Call light within reach.

## 2018-04-23 NOTE — PROGRESS NOTES
"Pt with decubitus ulcer and history of resistant organisms.  Wants colostomy and receiving nutritional support.    Has been on zosyn.    ROS: No itching, No diarrhea, c/o nausea likely from meds.   EXAM: /70  Pulse 83  Temp 98.6  F (37  C) (Oral)  Resp 16  Ht 1.588 m (5' 2.5\")  Wt 60.3 kg (133 lb)  SpO2 93%  BMI 23.94 kg/m2  Has had perineal maceration and inflammation. Moist from leakage around suprapubic cath.   Receiving TPN through purple port of double lumen PICC.   Lab: yeast from urine.   Imp: perineal maceration from moisture primarily. Has yeast from suprapubic catheter.   Plan: Reviewed note of Dr. Cadet. Agree with plan.  1) treat yeast with fluconazole.    2) Fluconazole will also help to protect TPN line from yeast infection.   3) Reasonable to continue to work toward colostomy.   Doubt that antibiotics contributing much at this point and may be contributing to nausea.   ID will see intermittently.  Please call if new problems or questions.      "

## 2018-04-23 NOTE — PROGRESS NOTES
Pt seen, case reviewed with team, Dr Lawrence    Pt c/o lower abd pain, nausea. Appetite decreased this am,  Last BM yesterday    Pt denies chest pain, has mild SOB, minimal cough    Afebrile  BP 100s-110s.  02 sats mid 90s RA    Lying on L side, appears uncomfortable secondary to abd pain or nausea  RR 12 Lungs clear  Lungs clear, except some upper airway noise  CV rrr  Abd soft, sl distended, active BS, mild tenderness with palp lower abd area  Extensive dermatitis L hip and low back      UC + yeast      Assessment      1)  T4 paraplegia 2nd to hematoma.  2)  Febrile illness with chronic  multiple stage IV decubital ulcers, L heel wound and chronic R sacral osteomyelitis.  Now only on doxycycline for possible pneumonia and cellulitis around R buttocks decub. No indication for continued use  3) severe dermatitis L buttocks, secondary to moisture from urine leakage    3)  Chronic pain with generalized discomfort. Etiology of abd pain not clear. Exam benign.  ? Pain referred from decubitus ulcers. ? Constipation. ? Doxycycline  4)  COPD with retained upper airway secretions 2nd to weak cough.  Status stable, though at high risk for respiratory decompensation  5)  Anemia likely 2nd to CD and iron deficiency per record.  6)  Left comminuted acute subtrochanteric fracture with recent fall.  7)  Hx of DVT on Rivaroxaban.   8)  Seizure disorder. On keppra taper and lamotrigine titration.    9)  Diastolic CHF, stable  10)  Depression, anxiety, PTSD.  On Cymbalta, gabapentin, lamictal.   Decrease sedation with reduction in diazepam dose, d.c of vistaril, decrease in gabapentin  11)  Hypothyroidism with TSH 49.56 likely related to med non compliance.  On synthroid.   12)  Hypoalbuminemia.  Protein loss from wounds and poor nutrition.  TPN has been started      PLAN:    TPN  D/c doxycycline  Assure adequate bowel movements  Judicious use of narcotics  Scheduled nebs  Start diflucan for candiduria and prob candida  dermatitis     MALACHI Collado updated 4/23 with plan and clinical issues, including our concerns for her general fragility, high risk for deterioration (sepsis, pneumonia)    Disposition unclear, in view of abd symptoms, fragile medical status, TPN

## 2018-04-23 NOTE — PROGRESS NOTES
Social Work Services Progress Note    Hospital Day: 14    Collaborated with:  ZAK Mancera [phone] (voicemail message left, [facility] Admissions [phone] (Yudith).     Data:  DC plan    Intervention:  [facility] cannot meet TPN needs. Dr Cadet updated. Writer left voicemail message w/pt's ZAK Mancera.   Per nursing staff, pt con't to engage in behaviors (including verbally aggressive behaviors to staff and refusal of cares) that will make her a difficult placement.       Assessment:  pt is difficult placement due to behaviors and complex medical needs    Plan:    Anticipated Disposition:  Return to [facility] if possible within next 5 days (Pt has 5 days left of 18 day bed hold)    Barriers to d/c plan:  TPN, complex medical needs,     Follow Up:  SW con't to follow

## 2018-04-23 NOTE — PROGRESS NOTES
"  VS: Refused to have temp taken states\" I get gaggy when thermometer goes in my mouth.\" The rest VS stable   O2: RA saturations 93%    Output: Saturating Chux with in ct of urine around suprapubic catheter and leakage from urethra   Last BM: 4/23/2018 charted by yesterdays nurse.   Activity: Bedrest/very noncompliant with moving. Does not like to be placed on back for any periods of time. Wanting to go over only on side when she wants to.    Skin: Looks like a rash around site of wound. Appears to have rash in groin and going down both legs.    Pain: Complains of pain in back and in abdomen. Wrenching this am and refused any oral medications.    CMS: A lot of swelling in lower legs. Tried to reposition pillow between legs and increase circulation with turning.    Dressing: Changed dressing on right buttocks with saturation from urine   Diet: Regular. Only requesting apple juice after medicating with Zofran   LDA: Gerber blood from PICC line. Changed both caps   Equipment: Tried to encouraged to take deep breathes and cough phlegm out of lungs.    Plan:    Additional Info: Spoke with Dr Cadet about patients request for IV Dilaudid and not taking any po med's. Patient is very Diaphoretic and has frequent complaints of discomfort and a lot of other complaints about staff!!       "

## 2018-04-23 NOTE — PLAN OF CARE
"Problem: Infection, Risk/Actual (Adult)  Goal: Identify Related Risk Factors and Signs and Symptoms  Related risk factors and signs and symptoms are identified upon initiation of Human Response Clinical Practice Guideline (CPG).     VS: Pt refused   O2: Pt refused    Output: Suprapubic catheter in place, only had 50 ml output. Unable to check if leaking as pt refused   Last BM: 4/22/2018, passing gas. Unable to assess if BM today as she refused.   Activity: Bedrest; pt refused to be repositioned    Skin: Unable to assess; refused   Pain: Refused to answer   CMS: Absent in lower extremities    Dressing: Unable to assess; refused   Diet: TPN/lipids, regular    LDA: PICC infusing, suprapubic catheter    Equipment:    Plan: Continue to monitor.    Additional Info: Refusing all cares. Offered scheduled and PRN medications to patient multiple times and attempted to clean up and repositioned and but she refused and yelled \"just get out of here and leave me alone!\".              "

## 2018-04-24 NOTE — PLAN OF CARE
"        VS:       Pt A/O X 4 with some forgetfulness. Refused vitals. TIMMY lung sounds (pt. refused). Coughing present. TCDB encouraged. Denies nausea, shortness of breath, and chest pain.  at 1514. To be checked Q 6 hours x 72 hours d/t TPN. Pt refused evening BG check.      Output:     Suprapubic catheter draining clear yellow urine. TIMMY if urine bypassing.     Activity:     Strict bedrest. Pt refused turning and repositioning Q 2 hours.     Skin: Pt refused skin assessment.     Pain:     Has pain in the lower abdomen and given dilaudid 2mg.      CMS:     Sensation absent in bilat LEs. +3 edema present. Elevated on pillows.       Dressing:     Pt refused dressing changes. Pt educated on risk for skin breakdown.      Diet:       Pt is on a Regular diet. No appetite. Drank apple juice.      LDA:       PICC is patent in the right arm and infusing TPN/Lipids continuous.      Plan:           Additional Info:       Potassium 3.1. Dr. Cadet ordered KCL 40mEq PO x 1. Patient refused oral medication. \"It makes me gag.\" Fadi ordered Potassium replacement IV.                    "

## 2018-04-24 NOTE — PHARMACY
I spoke to Jossy Conner-patient accidentally received 2 extra doses of intralipids on 4/22 & 4/23.  Due to this fact we are holding her lipids tonight & re-start lipids M-Fr starting 4/25.

## 2018-04-24 NOTE — PLAN OF CARE
"Problem: Infection, Risk/Actual (Adult)  Goal: Identify Related Risk Factors and Signs and Symptoms  Related risk factors and signs and symptoms are identified upon initiation of Human Response Clinical Practice Guideline (CPG).   Outcome: No Change  VS: Pt refused   O2: Oxygen above 90% on RA   Output: Suprapubic catheter; leaking through urethra.  Insertion site red    Last BM: 4/24/2018, 3 large liquid stools. Notified Physician and imodium changed to scheduled med.     Activity: Bedrest; repositioned x2   Skin: Scabs, open sores, multiple pressure ulcers   Pain: Abdominal pain; managed with PO Dilaudid and tylenol    CMS: Sensation absent in bilateral lower extremities    Dressing: Changed dressings on buttocks.    Diet: TPN/lipids infusing, regular   LDA: PICC infusing TPN    Equipment:     Plan: Continue to monitor. Care conference scheduled and palliative team consulted.    Additional Info: Pt extremely uncooperative and verbally aggressive to staff, primarily those who speak english as a second language. Behaviors discussed with patient and she stated \"You have too many foreigners her that can't speak english\". Patient refused katia cares for BM at beginning of shift and Dr. Cadet notified. Pt then complied with bedding change and dressing change done at that time.            "

## 2018-04-24 NOTE — PLAN OF CARE
"Problem: Infection, Risk/Actual (Adult)  Goal: Identify Related Risk Factors and Signs and Symptoms  Related risk factors and signs and symptoms are identified upon initiation of Human Response Clinical Practice Guideline (CPG).     VS: Pt refused   O2: Pt refused   Output: Suprapubic catheter; leaking through urethra. Changed dressing. Insertion site red    Last BM: 4/24/2018, large soft   Activity: Bedrest; repositioned x1   Skin: Scabs, open sores, multiple pressure ulcers   Pain: Abdominal pain; managed with PO Dilaudid    CMS: Sensation absent in bilateral lower extremities    Dressing: Changed coccyx dressing and    Diet: TPN/lipids infusing, regular   LDA: PICC infusing   Equipment:    Plan: Continue to monitor.    Additional Info: Pt extremely uncooperative and verbally aggressive this shift. Screaming at writer and aide when cleaning up as she had a large BM and saturated sheets stating, \"Don't pull those covers out from under me! I'm going to doug this [facility]\". Pt eventually calmed down and was able to finish cleaning and putting on new sheets and doing some wound care.              "

## 2018-04-24 NOTE — PROGRESS NOTES
Nutrition Brief Note    Triglycerides were 285 (H) at redraw yesterday - per nutrition protocol, will reduce frequency of IV lipids. Also note alk phos was 193 (H), indication strain on liver, likely d/t PN.     Implementations:  - IV lipids 5 days/week (TPN change request sent)    Future Recommendations:  - Consider cycling PN if pt plans to D/C home on PN, however given previous report of upper airway congestion, question if pt will be able to tolerate higher amount of volume over 12 hours.   .  - Continue to recommend transition to EN given functioning gut and poor tolerance with PN per TG and alk phos labs. Also, PN holds higher risk of infection via PICC line post D/C vs NG/NJ tube.       Jossy Conner, CELI, LD  Unit Pager: [phone]

## 2018-04-24 NOTE — TELEPHONE ENCOUNTER
Pt needs an appt with Dr. Coyne. Pt is currently in the ER, will wait to call to confirm appt until pt is discharged

## 2018-04-24 NOTE — PROGRESS NOTES
Pt seen, case reviewed with team    Pt continues to be frequently non-compliant with cares, refusing to allow cleaning of wounds from stools.  Pt denies this, stating she has been compliant with cares. She states she wants to go home with her friend and receive home health cares, and doesn't understand why this can't be done now    She continues to have nausea, poor intake, lower abd pain  Cough, SOB are improved today  + soft stools      VSS  Afebrile  In NAD, fully oriented, pleasant but adamant that she can go home  RR 14, unlabored  Lungs clear  CV rrr  Abd soft, sl distended, mild lower abd tenderness, rebound or guarding  2 + pedal edema B  Decubitus ulcers not examined by me today    Labs pending      Assessment    1)  T4 paraplegia 2nd to hematoma.  2)  Febrile illness with chronic  multiple stage IV decubital ulcers, L heel wound and chronic R sacral osteomyelitis.  Now off antibiotics.  3) severe dermatitis L buttocks, secondary to moisture from urine leakage. Likely component of yeast, but is greatly complicated by non-compliance with cares  3)  Chronic pain with generalized discomfort. Etiology of abd pain remains unclear. Abd exam benign.  ? If pain is related to bladder inflammation, ? Referred from decubitu   4)  COPD with retained upper airway secretions 2nd to weak cough.  Status improved today.  5)  Anemia likely 2nd to CD and iron deficiency per record.  6)  Left comminuted acute subtrochanteric fracture with recent fall.  7)  Hx of DVT on Rivaroxaban.   8)  Seizure disorder. On keppra taper and lamotrigine titration.    9)  Diastolic CHF, stable  10)  Depression, anxiety, PTSD.  On Cymbalta, gabapentin, lamictal.   Decrease sedation with reduction in diazepam dose, d.c of vistaril, decrease in gabapentin  11)  Hypothyroidism with TSH 49.56 likely related to med non compliance.  On synthroid.   12)  Hypoalbuminemia.  Protein loss from wounds and poor nutrition.  TPN has been started  13) Goals of  care.  Pt's goals are clearly unrealistic, as is her assessment of her compliance with cares.  Unclear if this is related to depression/personality d/o or an organic brain syndrome secondary to meds/illness. Her prognosis, in view of nutritional status, inability to have diverting colon/bladder procedures, her occ refusal to accept cares, appears poor    Plan  Continue TPN with routine lab monitoring  Emphasize need for compliance with cares  Monitor GI status  Psych and Palliative care consults for depression, conflict in Pt's goals and actions  Care conference tomorrow

## 2018-04-24 NOTE — PROGRESS NOTES
SPIRITUAL HEALTH SERVICES  [facility] ([facility]) 8AE  ON-CALL VISIT    REFERRAL SOURCE: n/a    Noted that in admission questions patient identifies herself as Mormon. Changed demographics with admissions to reflect this.    PLAN: unit /  will follow up with patient per her request for support at admission.      Eloisa Hare  Staff , [facility]  Pager [phone]

## 2018-04-24 NOTE — PROGRESS NOTES
Social Work Services Progress Note    Hospital Day: 15    Collaborated with:  TAYA ENRIQUEZCalderon [phone], Ganga Collado    Data:  DC plan/Care Conference    Intervention:  Medical team would like to hold care conference for pt to discuss goals of medical care and start DC planning.    Writer spoke w/Ganga Collado, he can be at Care Conference on Wed, 4/25/18 at 10AM. Nena ESCOBAR RNCC and Dr Cadet informed. Writer left voicemail message inviting pt's  Calderon. Pt informed    Assessment:  pt con't to be non-compliant with cares, poor nutritional status and unhealing wounds    Plan:    Anticipated Disposition:  Facility:  pt has bed hold at [facility], up in 4 days    Barriers to d/c plan:  Medical stability,     Follow Up:  SW con't to follow

## 2018-04-25 NOTE — CONSULTS
Patient is on IR schedule 4/27/2018 for a gastrostomy tube placement.     Labs WNL for procedure.      Orders for NPO, contrast, and scrubs have been entered.   Medications to be held include: Xarelto, Aspirin  Consent will be done prior to procedure.     Please [place] IR [place] RN at [phone] for estimated time of procedure.     Case discussed with primary team.    Lewis Alan PA-C  Interventional Radiology  [phone] [facility].

## 2018-04-25 NOTE — CONSULTS
Consult Date:  04/24/2018      PSYCHIATRIC CONSULTATION       CHIEF COMPLAINT:  A 62-year-old woman with multiple medical problems currently displaying irritability and is refusing some cares.       HISTORY OF PRESENT ILLNESS:  The patient is a 62-year-old woman with unfortunate medical history.  She has a paralysis secondary to past hematoma.  She is currently being treated due to decubitus ulcers.  She gets stool contamination of these ulcers.  Here in the hospital the primary team had wanted to consider surgical interventions to prevent some stool contamination of the ulcers; however, she did not qualify because of her poor nutritional status per surgeons.  As such, she is currently on TPN for nutrition.  More recently she has been refusing a lot of cares from staff.  She is quite irritable with staff, particularly she seems very angry at staff who have English as a second language.      When I met with the patient, she indicates that it is the doctor's fault for stopping the Imodium which led to increased diarrhea and increased frequency of stools.  She claims that this change is what led her to not participate in cares.  She also blames the doctor for not prescribing sufficient pain medications and not prescribing sufficient anxiety medication.  She indicates to me that in the past she was on Paxil for her anxiety; however, she has no interest in that right now and said she would like me to have her valium increased or have Xanax added to her treatment protocol.  She is somewhat irritable during the interview when I ask her questions about the declining cares.  She claims that I am not getting the whole story.  Again, she blames the doctor for this current situation.  She states that now that her breathing is getting better as she is clearing the pneumonia, she feels that she is trending in the right direction and will soon be home.  Based on my discussion with Dr. Cadet, this is a relatively unrealistic  expectation.  She is not currently doing well and has not shown significant progress in regards to her ulcers.  When I tried to approach her with some hypothetical questions in such as what if we are not able to reduce her stool frequency, will you continue to allow cares.  She is dismissive, does not really answer the questions.  She indicates that she will be getting better.  She cannot seem to process that her health is on a general decline.        She randomly expresses displeasure towards the inpatient provider here.  Again, she blames him for the infection of her ulcers even though that is what led to her admission.  Overall, she has a general poor understanding of her current status, but she understands her overall situation.  She indicates that the reason for her paralysis is because the doctor did not believe her in the first place.  She indicates that the doctor accused her of drug seeking and as such missed the hematoma that further led to her paralysis.  She continues to blame her current provider in the hospital for not adequately treating her pain, adequately treating her anxiety and claims that she would be more cooperative with treatment if her pain was under better control.  She states that the staff here do not know how to properly move her when they are cleaning her and because they do not speak English well enough, they cannot understand what she is trying to say.  She does get irritated with me at times, but seems to calm herself down.      PAST PSYCHIATRIC HISTORY:  The patient denies any past psychiatric diagnosis.  She claims that she has tried to see a psychiatrist in the past, but the outpatient prescriber who is her primary care doctor was not making an adequate referral.  She seems to hint that this outpatient provider is simply not making a referral and then playing dumb when she asked why it was not happening.  She says she tried to see a psychiatrist for 3 years before giving up.        PAST MEDICAL HISTORY:  Relevant past medical history is noted in the HPI and internal medicine notes.      SUBSTANCE HISTORY:  The patient is quite evasive about this.  Have concerns about some substance use issues given her report of being labeled a drug seeker and her repeated request for increased pain meds and increased benzos with me.      FAMILY HISTORY:  The patient denies any family history of illness.      SOCIAL HISTORY:  The patient currently lives in a nursing home or assisted living setting.  She indicates that she would like to go back to stay with her friend.  Again, this is likely unrealistic expectations.      ALLERGIES:  NO KNOWN DRUG ALLERGIES.      CURRENT PSYCHIATRIC MEDICATIONS:   1.  Abilify 5 mg daily.   2.  Depakote 250 mg 2 times daily.   3.  Cymbalta 30 mg every morning.   4.  Gabapentin 100 mg 2 times daily.   5.  Lamictal 50 mg in the morning.   6.  Keppra 250 mg 2 times daily.   7.  Ambien 5 mg at bedtime.        The patient also other medications for her medical issues.      LABORATORY DATA:  Metabolic panel notable for albumin of 1.8, alkaline phosphatase is 213.  Triglycerides 317.  Glucose has been ranging from 100-260.  Most recent INR is 1.42.  Most recent CBC notable for a hemoglobin of 8.1, hematocrit of 28.2.  Valproic acid level was 42 on 04/04/2018.  Lamotrigine was 1.7.      VITAL SIGNS:  Temp 98.6, pulse 78, respiratory rate 16, blood pressure 117/70, oxygen saturation 97% on room air.      PHYSICAL EXAMINATION:  I reviewed physical exam as documented by internal medicine physician, Shahriar Cadet, dated 04/24/2018.  No additional findings.      MENTAL STATUS EXAMINATION:  The patient is alert.  She is oriented to person, place and date.  She is wearing hospital attire.  She is in bed.  She is paraplegic.  She has good eye contact.  She is cooperative throughout the interview.  Speech is normal in rate and volume.  She occasionally is interrupting.  Occasionally she has an  angry tone.  Language is intact.  Mood is irritable.  Affect is somewhat labile and reactive.  She has no psychomotor agitation.  Strength and tone and gait and station not tested due to her medical status.  Thought process is somewhat illogical.  Associations are intact.  Thought content is notable for some paranoid thoughts, no signs of psychosis.  No suicidal or homicidal thoughts.  Recent and remote memory somewhat distorted, but intact.  Fund of knowledge is adequate.  Attention and concentration are intact.  Insight is limited.  Judgment is limited.        ASSESSMENT:  The patient is a 62-year-old woman with multiple medical problems who has been refusing medical care.  At this juncture, I see no obvious signs of psychosis or bipolar illness that would be affecting her thought process.  I suspect a significant personality component to her presentation as indicated by her general anger towards anyone that she sees as foreign.  She also has a significant distrust of healthcare professionals, which is not unreasonable considering she indicates that her paralysis was due to a medical professional not believing her and a missed diagnosis.  She claims that she had a lawsuit from this and was able to buy each of her sons a house. I am unsure of all of the details, but that is likely not relevant given that it is the patient's perception that is important in this case. There may be some possibility of improving some of this paranoia with improvement of her antipsychotic; however, I suspect that her current presentation is quite fixed.        The more concerning aspect at this time, however, is her refusal of cares.  At times she will seem to be making sound decisions and have somewhat logical reasoning as to why she will refuse a care now and then.  However, she does not really have a firm grasp on how often she is refusing cares and she does not really seem to fully grasp the consequences from this refusal.  Anytime  I walked down this line of thinking with her, she became somewhat irritable so my capacity assessment is somewhat limited.  However, my initial thought is that she has some severe impairments in her capacity in regards to her refusal of cares.  That said, it is difficult to force cares on someone who is not wanting them.      RECOMMENDATIONS:  The patient may possibly benefit from increase in Abilify; however, I am uncertain how much this will help.  I do not see overt signs of moi, psychosis or significant mood disruption.  The patient is currently indicating that she wants all treatment; however, her actions do not match her expressed desire.  As such, she has some limits in her capacity.  May consider ethics consult if there is any need to either force or deny care in the future to have a firmer assessment of capacity in regards to a specific decision making process.        In the meantime, I recommend that primary team address pain issues as best as they can while obviously being wary of controlled substances given her current attachment to them.  Working on patient rapport is likely the only method at this time that is going to increase patient's compliance with treatment.        I appreciate the consult.  Please call with questions or re-consult if there is any change in status.         ALEE CALDERON MD             D: 2018   T: 2018   MT: WENDI      Name:     SHALINI CHONG   MRN:      [id number]        Account:       [id number]   :      1955           Consult Date:  2018      Document: [id number]       cc: Mellisa Haji MD

## 2018-04-25 NOTE — PLAN OF CARE
"Problem: Infection, Risk/Actual (Adult)  Goal: Identify Related Risk Factors and Signs and Symptoms  Related risk factors and signs and symptoms are identified upon initiation of Human Response Clinical Practice Guideline (CPG).     VS: Pt refused   O2: Pt refused   Output: Suprapubic catheter, leaking urine through urethra    Last BM: 4/25/2018, large loose incontinent    Activity: Bedrest; repositioned x1    Skin: Scabs, pressure wounds, rash on bilateral legs    Pain: Reports abdominal pain, given Dilaudid 4 mg q4h and scheduled Zofran, Tums   CMS: Bilateral lower extremity sensation/movement absent   Dressing: Changed coccyx, ischial tuberosity, and heel dressing. Previous dressings saturated with urine and feces.    Diet: TPN, regular   LDA: PICC infusing TPN   Equipment:    Plan: Continue to monitor. Care conference today with family.    Additional Info: Pt requested Imodium frequently throughout shift. Only scheduled 3 times a day, readjusted times as pt requested Imodium, scheduled 8 hours from previous dose.     Pt very uncooperative and verbally abusive to writer and other staff helping throughout shift. Pt stated \"all of you staff have no idea what you're doing and have clearly never worked with paraplegics before.\"               "

## 2018-04-25 NOTE — PROGRESS NOTES
Pt seen on multiple occasions this am, also while in Care Conference    Pt states she feels improved today.  In particular, her breathing is much improved. She denies cough, chest pain or SOB  Lower abd pain is about the same, overall fair control with dilaudid  Pt is frustrated with frequent stools, feels she needs Imodium ordered more frequently.  She is anxious to have TPN dc.ed, wants to Dc to NH ASAP.  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  2)  Febrile illness with chronic  multiple stage IV decubital ulcers, L heel wound and chronic R sacral osteomyelitis.  Now off antibiotics.  3) severe dermatitis L buttocks, secondary to moisture from urine leakage. Likely component of yeast, but is greatly complicated by non-compliance with cares  3)  Chronic pain with generalized discomfort. Etiology of abd pain remains unclear. Abd exam benign.  ? If pain is related to bladder inflammation, ? Referred from decubitu. Abd pain seems improved today  4) diarrhea with stool incontinence. Possibly antibiotic related (C diff recently neg)   5)  COPD with retained upper airway secretions 2nd to weak cough.  Significantly improved over the last 24 hours  5)  Anemia likely 2nd to CD and iron deficiency per record.  6)  Left comminuted acute subtrochanteric fracture with recent fall.  7)  Hx of DVT on Rivaroxaban.   8)  Seizure disorder. On keppra taper and lamotrigine titration.    9)  Diastolic CHF, stable  10)  Depression, anxiety, PTSD.  On  Cymbalta, gabapentin, lamictal.   Decrease sedation with reduction in diazepam dose, d.c of vistaril, decrease in gabapentin  11)  Hypothyroidism with TSH 49.56 likely related to med non compliance.  On synthroid.   12)  Hypoalbuminemia.  Protein loss from wounds and poor nutrition.  TPN has been started.  Enteral feedings certainly are a better option for the short and long term. Pt is in agreement  13) Goals of care. Pt is anxious to return to NH, improve nutritional status, have colon and urinary diversion procedures and ultimately, flap    Plan  Continue current tx  Consult IR re GT placement.  Hold Xarelto (last dose was last night)  D/c to NH when GT placed  Schedule imodium in attempt to minimize stools and wound contamination.  Importance of compliance with cares discussed

## 2018-04-25 NOTE — PLAN OF CARE
"Problem: Infection, Risk/Actual (Adult)  Goal: Identify Related Risk Factors and Signs and Symptoms  Related risk factors and signs and symptoms are identified upon initiation of Human Response Clinical Practice Guideline (CPG).   Outcome: No Change      VS: Stable.   O2: On RA and stable.   Output:  supracatheter intact.   Last BM: 4/24. Pt incontinence of stool.has X1 watery stool. Pt needs encouragement for changing.    Activity: Bedrest, parapalegic. Pt refused to turn frequently.   Skin: Multiple pressure ulcers on back side. Pt's buttocks  reddened from wetness. Pt refused miconazole powder.    Pain: Tolerable with 2 mg dilauidid.   CMS: Paraplegic no sensation in BLE.   Dressing: Changed 1 x this shift do to saturation with stool and drainange.   Diet: Tolerating regular diet. Has intermittent nausea.    LDA: PICC on right upper arm infusing TPN. Lipids on hold due to pt got extra X2 dose in the past 24 hrs.   Equipment: IV poll, bed   Plan: Needs to eat more and be more compliant for doctors to treat    Additional Info: Pt was compliment half way the shift. offered pt if she wants to be changed but pt refused stating that \" I don't feel wet.\"            "

## 2018-04-25 NOTE — PROGRESS NOTES
Social Work Services Progress Note    Hospital Day: 16    Collaborated with:  Care Conference Held today with following in attendance: MARGI Clarke, RNCC Nena Webber, Dr Cadet, pt, pt's son in law Tobias, [facility] CNS, Palliative Care Melissaana Eddie    Data:  DC plan/Care Conference    Intervention:  Pt expresses desire to return to [facility] upon DC from hospital. She has agreed to having plan for G Tube with night time feedings. Tobias supports this.     Team discussed need for pt's nutrition status to improve to be eligible for diverting colostomy (pt is agreeable to this). Pt and team informed that pt would need to demonstrate compliance with cares /care plan to promote healing and that without consistent compliance, Dr Pelletier may not be able to perform flap. Pt verbalized understanding but blames her non compliance on her perceived incompetence/inappropriate approach from staff. Marychuy has shared belief that nursing staff do not understand special care her para-palegia requires.     Writer left voicemail message w/Admissions at [facility] informing them of DC plan and asking if they would be willing to accept pt for return to their facility on Monday, 4/30/18. Writer left voicemail message for pt's [facility]  [place]        Assessment:  pt and family agreeable to plan for G Tube    Plan:    Anticipated Disposition:  Facility:  [facility] [phone]    Barriers to d/c plan:  G Tube    Follow Up:  MARGI con't to follow

## 2018-04-25 NOTE — PROGRESS NOTES
SPIRITUAL HEALTH SERVICES  SPIRITUAL ASSESSMENT Progress Note  [facility] ([facility]) 8A     REFERRAL SOURCE: Request for     Have communicated with Father Tre to visit patient. He will see her this evening. Patient has been made aware.     PLAN: [facility] remains available for support.     Aram De Leon  Chaplain Resident  Pager [phone]

## 2018-04-25 NOTE — CONSULTS
[facility], [place]    Palliative Care Consultation Note    Patient: Marychuy Zhou  Date of Admission:  4/10/2018    Requesting provider/team: Shahriar Cadet MD  Reason for consult: Pain management  Goals of care    Recommendations:  -goals are clear, proceeding with g-tube placement later this week and discharging to facility  -regarding her chronic pain, pain consult would be appropriate inpatient for medication management   -our team will sign off for now, please re-consult if needs arise.     Thank you for the opportunity to participate in the care of this patient and family.   JOSEPH Peacock CNS  Palliative Care Consult Team  Pager: [phone]    [facility] Inpatient Team Consult pager [phone] (M-F 8-4:30)  After-hours Answering Service [phone]   Palliative Clinic: [phone]       Assessment:  Marychuy Zhou is a 62 year old female with T4 paraplegia due to hematoma complicated by multiple decubitus ulcers, chronic pain involving her right buttock, chronic osteomyelitis, left plantar heel wound, COPD, anemia, recent left communicated acute trochanter fracture with recent fall admitted with history of four-day fevers dry cough and worsening buttock pain with redness of the skin in the buttock area, purulent drainage, concern for infection.    Symptoms:    Main source of pain is abdominal pain, she always has pain, will get worse pain when she gets septic, she feels that her pain is not being adequately treated and wants to be on her regimen she was on at the nursing home. She also is requesting more pain medication for Monday when her son is going to trial. Question medications are being used for coping.     Social:          Living situation: was living with son and son in law before, now in nursing home and hospital most recently       Support system: two children, grandchildren, girlfriend of 50 years       Actual/Potential Caregiver: son used to be his PCA        Functional status: wheelchair/bed bound    Coping: family is her support and what gives her strength    Spiritual/Druze:    Spiritual background: did request  visit    Advance Care Planning:               Goals of Care: she wants to get better and willing to move forward with plan of care       Health care directive: None in chart        Health care agent: per next of kin policy       Code Status:  Full Code       POLST There is no POLST (Physician orders for life-sustaining treatment) form on file for this patient      History of Present Illness   Sources of History:patient and electronic health record    Marychuy Zhou is a 62 year old female with T4 paraplegia due to hematoma complicated by multiple decubitus ulcers, chronic pain involving her right buttock, chronic osteomyelitis, left plantar heel wound, COPD, anemia, recent left communicated acute trochanter fracture with recent fall admitted with history of four-day fevers dry cough and worsening buttock pain with redness of the skin in the buttock area, purulent drainage, concern for infection.  Since being admitted she has been treated for pneumonia and her breathing has greatly improved, has had ongoing difficulty with incontinence of bowel and bladder.    Palliative care consulted 4.24 for patient paraplegia, non-healing decubitus ulcers, and frequent refusal of cares.      ROS:  Palliative Symptom Review (0=no symptom/no concern, 1=mild, 2=moderate, 3=severe):  Pain: 2  Fatigue: 0  Nausea: 0  Constipation: 0  Diarrhea: 3  Depressive Symptoms: 0  Anxiety: 2  Drowsiness: 0  Poor Appetite: 0  Shortness of Breath: 0  Insomnia: 0  Delirium: 0       Past Medical History:   Past Medical History:   Diagnosis Date     Anxiety      Chronic pain syndrome      Closed fracture zygoma, with routine healing, subsequent encounter      COPD (chronic obstructive pulmonary disease) (H)      Depressive disorder      DVT (deep vein thrombosis) in pregnancy (H)       Edema      Epilepsy (H)      Flaccid neuropathic bladder, not elsewhere classified      Fracture of femur (H)      Hypothyroidism      Iron deficiency anemia      Paraplegia (H)     unspecified     Pressure ulcer of sacral region      PTSD (post-traumatic stress disorder)      Rheumatoid arthritis (H)      Sepsis (H)     unspecified          Past Surgical History:   History reviewed. No pertinent surgical history.          Family History:   Family History   Problem Relation Age of Onset     Chronic Obstructive Pulmonary Disease Brother      Family history reviewed       Allergies:   No Known Allergies         Medications:   I have reviewed this patient's medication profile and medications given in the past 24 hours.    Scheduled acetaminophen 650 mg 4 times a day  aripiprazole 5 mg daily  cymbalta 30 mg daily  Gabapentin 100 mg 2 times a day  duoneb 4 times a day  Loperamide 2 mg q4h   Melatonin 5 mg daily  ondanstron 4 mg q6h  oxycontin 20 mg q12h  Calcium carbonate 4 times a day PRN- x4  Diazepam 2 mg q6h PRN - x2  Hydromorphone 1-2 mg q4h PRN- 12 mg over last 24 hours           Physical Exam:   Vital Signs:                    Weight: 133 lbs 0 oz    Physical Exam:  Constitutional: Awake, alert, cooperative, no apparent distress  Lungs: No increased work of breathing  Neurologic: Awake, alert, oriented to name, place and time.    Neuropsychiatric: Normal affect, mood, orientation, memory and insight.     Data reviewed:      ROUTINE ICU LABS (Last four results)  CMP  Recent Labs  Lab 04/25/18  0936 04/24/18  1125 04/23/18  1514 04/23/18  1033 04/22/18  0538    137 145* 133 144   POTASSIUM 4.2 3.8 3.1*  3.1* 5.7* 3.7   CHLORIDE 104 101 113* 97 108   CO2 29 29 21 22 31   ANIONGAP 7 7 11 14 5   * 126* 101* 261* 102*   BUN 22 23 18 19 9   CR 0.46* 0.42* 0.35* 0.37* 0.47*   GFRESTIMATED >90 >90 >90 >90 >90   GFRESTBLACK >90 >90 >90 >90 >90   NATA 8.4* 8.1* 6.7* 7.8* 8.2*   MAG 2.2 2.2 1.8 2.4* 1.9    PHOS 4.0 3.3 2.5 4.9* 3.8   PROTTOTAL  --  7.4 6.0* 7.7 6.8   ALBUMIN  --  1.8* 1.3* 1.5* 1.5*   BILITOTAL  --  0.1* 0.1* 0.4 0.2   ALKPHOS  --  213* 193* 228* 216*   AST  --  13 11 30 19   ALT  --  <6 <6 <6 <6     CBC  Recent Labs  Lab 04/20/18  0940   WBC 6.9   RBC 3.37*   HGB 8.1*   HCT 28.2*   MCV 84   MCH 24.0*   MCHC 28.7*   RDW 19.5*        INR  Recent Labs  Lab 04/24/18  1125 04/23/18  1033 04/22/18  0538 04/21/18  0554   INR 1.42* 1.17* 1.68* 1.83*     Arterial Blood GasNo lab results found in last 7 days.    JOSEPH Peacock CNS  Palliative Care Consult Team  Pager: [phone]    [facility] Inpatient Team Consult pager [phone] (M-F 8-4:30)  After-hours Answering Service [phone]  Palliative Clinic: [phone]     Total time spent was 105 minutes,  >50% of time was spent counseling and/or coordination of care regarding goals of care and symptom management.  50 min of that were spent face-to-face, in 1000, out 1050.

## 2018-04-26 NOTE — PROGRESS NOTES
SPIRITUAL HEALTH SERVICES  SPIRITUAL ASSESSMENT Progress Note  [facility] ([facility] [place]) SLP     REFERRAL SOURCE: Patient Request during prior visit.    Visited with pt inquiring further as to her spiritual state. Pt has been candid and responsive. In anticipation of her surgery, I anointed pt. She expressed the desire for the Eucharist. Having confirmed that she had not been to Confession in a long while, I offered to hear her confession. Pt asked if I could hear her confession tomorrow so that she would be better prepared.     PLAN: Visit with pt tomorrow 04/27 to hear confession and to confer the Sacrament of the Holy Eucharist.    Fr. Tre Dsouza  Amish   Priest arevalo [phone]  Pager [phone]

## 2018-04-26 NOTE — PLAN OF CARE
Problem: Patient Care Overview  Goal: Plan of Care/Patient Progress Review  Outcome: No Change  Pt continues to refuse most cares, assessments and medications. Very demanding and degrading to staff when receiving cares. Pt giving unclear instructions and when staff asking for clarification, pt becoming rude with staff and raising her voice. Asked for incontinence care this morning, but then refused to roll or reposition so staff could adequately perform cares. Requesting pain meds frequently. Refused woundcare by WOC RN this morning. Pt able to make needs known.

## 2018-04-26 NOTE — PROGRESS NOTES
A/Ox's 4. Pt has been agitated and verbally rude to staff during shift. Pt directs her own cares. Pt was refused several meds, VS and most of the assessment. Pt only allowed x1 reposition during 12 hour shift even though she was encouraged to do so more frequently. TIMMY large wound until pt turned in bed at 0400. When turned at 0400 no dressing was in place. Dressing replaced. Pt had x2 scant BMs.  Pt rated pain as tolerable. Dilaudid given for pain control. Dressing CDI. CMS to baseline. TPN and Lipids given. Denied any nausea, CP, SOB, lightheadedness or dizziness. Pt has a Suprapubic catheter. Resting in bed at this time with call light in reach. Able to make needs known. Continue to monitor.

## 2018-04-26 NOTE — PLAN OF CARE
[id number]: pt is alert, flat affect. Denies full assessment and vital signs. Repositioned X1, bedrest. Incontinent of bowl this shift. Given dilaudid for generalized pain. PICC is intact and infusing TPN. Continue with POC

## 2018-04-26 NOTE — PROGRESS NOTES
Nutrition Brief Note    Noted TG to be 401 today.     Per chart review, pt has agreed to a G-tube with planned placement on 4/27.     Implementations:  - Will hold lipids tonight (PN change order request completed).    Future Recommendations:  1. Once 'Provider Order - Registered Dietitian to Assess and Order TF per Medical Nutrition Therapy Protocol' is received and TF placement is verified via ABX, will start TF as follows:  - Initiate IsoSource 1.5 via G-tube @ 20 mL/hr and advance by 10 mL q 8 hrs as tolerated until goal of 50 mL/hr + 1 pkt ProSource TID providing 1200 mL, 1920 kcal (34 kcal/kg), 115 g protein (2.0 g/kg), 211 g CHO, 18 g fiber, and 912 mL free water per DW of 57 kg.  - Water Flushes: 120 mL q 4 hrs (TF + Flushes = 1632 mL; meeting ~90% estimated hydration needs)    2. D/C TPN at this time.    3. Work way towards cycling EN to a 12 hour cycle. Monitor formula acceptance with already present diarrhea (consider Nutren 1.5 at same rate as available vs adding NutriSource Fiber if diarrhea worsens)      Jossy Conner RD, LD  Unit Pager: [phone]

## 2018-04-26 NOTE — PROGRESS NOTES
Pt seen, case reviewed with team    Pt states she feels about the same  Low back pain is under fair control  Slightly improved appetite  Staff feels stooling is less  Breathing remains improved    Pt is anxious to have GT placed (scheduled for tomorrow)    Afebrile  Alert, fully oriented, pleasant this am  Lungs clear  CV rrr  Abd soft, non-distended, non-tender  1-2 + pedal edema B  Decubiti were not examined by me today      Results for SHALINI CHONG (MRN [id number]) as of 4/26/2018 12:57   Ref. Range 4/26/2018 09:12   Sodium Latest Ref Range: 133 - 144 mmol/L 139   Potassium Latest Ref Range: 3.4 - 5.3 mmol/L 4.5   Chloride Latest Ref Range: 94 - 109 mmol/L 103   Carbon Dioxide Latest Ref Range: 20 - 32 mmol/L 26   Urea Nitrogen Latest Ref Range: 7 - 30 mg/dL 22   Creatinine Latest Ref Range: 0.52 - 1.04 mg/dL 0.48 (L)   GFR Estimate Latest Ref Range: >60 mL/min/1.7m2 >90   GFR Estimate If Black Latest Ref Range: >60 mL/min/1.7m2 >90   Calcium Latest Ref Range: 8.5 - 10.1 mg/dL 8.3 (L)   Anion Gap Latest Ref Range: 3 - 14 mmol/L 10   Magnesium Latest Ref Range: 1.6 - 2.3 mg/dL 2.4 (H)   Phosphorus Latest Ref Range: 2.5 - 4.5 mg/dL 3.9       1)  T4 paraplegia 2nd to hematoma.  2)  Febrile illness with chronic  multiple stage IV decubital ulcers, L heel wound and chronic R sacral osteomyelitis.  Now off antibiotics, has been unstable, without gross change in appearance of decubiti  3) severe dermatitis L buttocks, secondary to moisture from urine leakage, on diflucan  3)  Chronic pain with generalized discomfort. Seems stable over the last few days.  Abd exam is benign  4) diarrhea with stool incontinence. Possibly antibiotic related (C diff recently neg), less frequent with scheduled imodium    5)  COPD with retained upper airway secretions 2nd to weak cough.  Significantly improved over the last 3 days  5)  Anemia likely 2nd to CD and iron deficiency per record.  6)  Left comminuted acute subtrochanteric  fracture with recent fall.  7)  Hx of DVT on Rivaroxaban chronically  8)  Seizure disorder. On keppra taper and lamotrigine titration.    9)  Diastolic CHF, stable  10)  Depression, anxiety, PTSD.  On Cymbalta, gabapentin, lamictal.   Decrease sedation with reduction in diazepam dose, d.c of vistaril, decrease in gabapentin  11)  Hypothyroidism with TSH 49.56 likely related to med non compliance.  On synthroid.   12)  Hypoalbuminemia.  Protein loss from wounds and poor nutrition.  TPN has been started.  GT to be placed in IR tomorrow     Plan  GT tomorrow  NPO after midnight  Rivaroxaban on hold  Continue current tx  NH to assess Pt today

## 2018-04-26 NOTE — PLAN OF CARE
Problem: Patient Care Overview  Goal: Plan of Care/Patient Progress Review  Outcome: No Change  Pt continues to direct cares, refusing cares and medications at times. WOC nurse in to see patient. Tolerating regular diet eating small amount. TPN infusing. No acute changes. Pt able to make needs known.

## 2018-04-26 NOTE — PROGRESS NOTES
SPIRITUAL HEALTH SERVICES  SPIRITUAL ASSESSMENT Progress Note  [facility] ([facility] [place]) UR-E     REFERRAL SOURCE: Patient Request    Visited with pt who shared about her 10-year sanchez with various illnesses and the impact that it has had upon her own alie, and the lives of her children and grandchildren. I listened for much of the time, then called upon pt to an even deeper trust in God as her suffering continues.    PLAN: Visit with pt as regularly as schedule permits.    Fr. Tre Dsouza  Mosque mireille Funes v.m. [phone]  Pager [phone]

## 2018-04-26 NOTE — PROGRESS NOTES
Social Work Services Progress Note    Hospital Day: 17  Collaborated with:  Ray [phone] F: [phone]    Data:  DC plan    Intervention:  Writer received voicemail message stating Admission and DON wish to visit pt and do on-site assessment today. Writer left voicemail message w/[facility] confirming receipt of message and reminded facility that pt still has MA Bed hold in place.    Assessment:  pt on bed hold and is wanting to return to [facility]. Anticipate G Tube placement Friday, 4/27/18    Plan:    Anticipated Disposition:  Facility:  Return to [facility]    Barriers to d/c plan:  Assessment of JAKE Bell Tube placement    Follow Up:  MARGI con't to follow

## 2018-04-26 NOTE — PROGRESS NOTES
[facility] Nurse Inpatient Pressure Injury Assessment     Follow up Assessment  Reason for consultation: Evaluate and treat multiple pressure injuries and severe IAD      ASSESSMENT    Pressure Injury: on left heel, present on admission   Pressure Injury is Stage Unstageable     Pressure Injury: on left upper thigh, present on admission   Pressure Injury is Stage 3     Pressure Injury: on right IT, present on admission  Pressure Injury is Stage 4     Pressure Injury: on left sacrum, present on admission   Pressure Injury is Stage 3   Slight improvement    Pressure Injury: on coccyx, present on admission  Pressure Injury is Stage 3     Wound: on bilateral flanks, previous PNT sites    Severe Incontinence Associated Dermatitis over entire buttock, perineum, upper thighs and anterior groin folds    Status: no change since initial assessment one week ago     TREATMENT PLAN    Left heel wound: wash every other day with wound cleanser and gauze. Apply a thick layer of Medihoney (order #[id number]) to wound bed and cover with Mepilex 4x4. If dressing rolls at edges, OK to use Primapore tape to keep in place.    Left upper thigh wound: wash every other day and as needed if soiled with wound cleanser and gauze. Pat dry. Cover with Mepilex 4x4.    Right IT wound: wash daily and as needed if soiled with urine or stool with wound cleanser and gauze. Pack wound to entire depth (7 cm) with Kerlix dampened with saline. Cover with ABD and secure with Primapore tape.    Coccyx and left sacral wound: wash daily with wound cleanser and gauze. Coat with Triad paste (order #[id number]).    Bilateral PNT tube sites: wash daily with wound cleanser and gauze, more frequently if soiled with urine or stool. If drainage present, OK to cover with 4x4 gauze and secure with Primapore tape.    Buttock, perineum, upper thighs IAD: wash twice daily and with each episode of incontinence with Greta Cleanse and Protect and a soft cloth. Apply a thick layer of Triad  "to skin. Do not attempt to remove Triad with each episode of incontinence, just wipe of soiled paste and reapply. To remove all Chriss, use baby/mineral oil and a soft cloth. Due to large amount of urine being diverted past suprapubic catheter and out urethra, please change Covidon sheet with position changes every 2 hours to keep dry.    Orders Written  WOC Nurse follow-up plan:weekly  Nursing to notify the Provider(s) and re-consult the WOC Nurse if wound(s) deteriorates or new skin concern.    Patient History  According to provider note(s):  This is a 62 year old female with PMH significant for T4 paraplegia, chronic pain COPD, anemia, recent hip fracture, Hx of DVT on anticoagulation (rivaroxiban), anxiety, depression, diastolic heart failure, hypothyroidism and chronic stage 4 decubti with known osteo who presents with fevers, drainage, and cellulitis of her buttock area.     Objective Data   Containment of urine/stool: Suprapublic catheter, continues to bypass catheter and leaking out urethra despite catheter exchange one week ago.     Patient currently incontinent of stool, however frequency and amount is decreasing. Due to paraplegia, patient is unaware when she stools. In the nursing home, prior to admission, she states she will sit in her chair for \"hours\" with stool present. During discussion of definitive treatment for right IT Stage 4 Pressure Injury with Dr Pelletier on 4/11/18, a diverting colostomy was discussed and is required prior to patient undergoing any flap surgery. Patient states she is open to discussing this further, however Colon and Rectal Surgery require the patient to be nutritionally improved before surgery.    Current Diet/ Nutrition:    Active Diet Order      Regular Diet Adult    Output:   I/O last 3 completed shifts:  In: -   Out: 1500 [Urine:1500]    Risk Assessment:   Sensory Perception: 3-->slightly limited  Moisture: 3-->occasionally moist  Activity: 1-->bedfast  Mobility: 2-->very " limited  Nutrition: 2-->probably inadequate  Friction and Shear: 1-->problem  Isaiah Score: 12      Labs:   Recent Labs  Lab 04/24/18  1125 04/23/18  1033  04/20/18  0940   HGB  --   --   --  8.1*   INR 1.42* 1.17*  < >  --    WBC  --   --   --  6.9   CRP  --  31.6*  --   --    < > = values in this interval not displayed.      Recent Labs  Lab 04/20/18  1245   CULT >100,000 colonies/mLCandida albicans / dubliniensisCandida albicans and Brianne dubliniensis are not routinely speciatedSusceptibility testing not routinely done*       Physical Exam  Skin assessment: buttocks, perineum, left heel    4/26: Pt refused today's re-assessment x 2 but allowed on the 3rd visit.  Refusing to be turned, refusing wet pad to be changed.  Able to tuck dry pad over damp green pad under her. Refusing pulsate mattress.  Educated pt on pressure injury prevention.  Pt states she doesn't care.  She needs more pain meds to turn comfortably.  Per Charge RN pt has had care conference; clinical manager, MD, nursing staff all aware and attempt to re-educate pt daily.      Wound Location:  Left heel  Date of last Photo 4/26/18 4/19 4/26      Wound History: Present on admission  Measurements (length x width x depth, in cm) 2 cm x 2.3 cm  x  0 cm (4/26: no change)  Wound Base: black eschar in center surrounded by yellow adherent slough  Tunneling N/A  Undermining N/A  Palpation of the wound bed: boggy   Periwound skin: denuded  Color: pink  Temperature: normal   Drainage: small  Description of drainage: tan, creamy  Odor: none  Pain: absent, paraplegia     Wound Location:  Left upper thigh  Date of last Photo 4/12/18        Wound History: Present on admission  Measurements (length x width x depth, in cm) 3 cm x 1 cm  x  0.2 cm   Wound Base: smooth, granular base  Tunneling N/A  Undermining N/A  Palpation of the wound bed: normal   Periwound skin: intact  Color:  pink  Temperature: normal   Drainage: none  Description of drainage: none  Odor: none  Pain: absent, paraplegia    Wound Location:  Right IT  Date of last Photo 4/26/18 4/12 4/26      Wound History: Present on admission  Measurements (length x width x depth, in cm) 4 cm x 3.8 cm  x  7.2 cm (4/26: no change)  Wound Base:  Beefy red granulation tissue with bone palpated at base  Tunneling N/A  Undermining N/A  Palpation of the wound bed: normal   Periwound skin: denuded  Color: red  Temperature: normal   Drainage: small  Description of drainage: bloody  Odor: none  Pain: absent, paraplegia    Wound Location:  Left sacrum  Date of last Photo 4/26/18 4/21 4/26    Wound History: Present on admission  Measurements (length x width x depth, in cm) 2 cm x 1.5 cm  x  0.1 cm   Wound Base:  Yellow slough mixed with smooth, pale granular tissue  Tunneling N/A  Undermining N/A  Palpation of the wound bed: normal   Periwound skin: denuded  Color: red  Temperature: normal   Drainage: none  Description of drainage: none  Odor: none  Pain: absent, paraplegia      Wound Location:  Coccyx  Date of last Photo 4/26/18 4/12 4/26    Wound History: Present on admission  Measurements (length x width x depth, in cm) 2.5 cm x 1 cm  x  0.1 cm   Wound Base:  Adherent yellow slough mixed with smooth granular tissue  Tunneling N/A  Undermining N/A  Palpation of the wound bed: normal   Periwound skin: denuded  Color: red  Temperature: normal   Drainage: none  Description of drainage: none  Odor: none  Pain: absent, paraplegia    Wound Location:  Buttock, perineum, upper thighs  Date of last Photo 4/26/18 4/19                                                                     Wound History: Patient incontinent of stool and suprapubic  catheter is currently diverting our the urethra.  Raw denuded skin with areas of exposed dermis  Color: purple and red  Temperature: normal   Drainage: none  Description of drainage: none  Odor: none  Pain: absent, paraplegis    Wound Location:  Bilateral flanks  Wound History: PNT tube sites. Left is healing well, right with creamy yellow drainage (Medicine Team aware of drainage from site)  Measurements (length x width x depth, in cm) Tube sites: 1 cm slit, not probed for depth  Wound Base: granulation tissue  Palpation of the wound bed: firm around edges   Periwound skin: intact  Color: pink  Temperature: normal   Drainage: done  Description of drainage: yellow  Odor: none  Pain: absent    Interventions  Current support surface: Standard  Low air loss mattress- Pulsate refused by pt multiple times.      Current off-loading measures: Pillows under calves  Repositioning aid: pillows  Visual inspection of wound(s) completed   Wound Care: was done per plan of care.  Supplies: gathered and placed at the bedside  Education provided to: patient  and nurse  Discussed importance of:repositioning every 2 hours, off-loading pressure to wound, wearing off-loading boots, their role in pressure injury prevention, dressing change frequency, head elevation <30 degrees, off-loading mattress, nutrition on wound healing and moisture management    Discussed plan of care with Patient, Nurse    Face to face time: 45 minutes    Nazia Alston RN

## 2018-04-27 NOTE — PLAN OF CARE
Problem: Patient Care Overview  Goal: Plan of Care/Patient Progress Review  Outcome: No Change  A/Ox's 4. Pt has been agitated during shift. Pt directs her own cares. Pt was refused  VS and most of the assessment. Pt refused to turn in bed. TIMMY dressing. Pt allowed NST to look at backside to which there was no stool present at 0230. Pt rated pain as tolerable. Dilaudid given for pain control. Dressing CDI. CMS to baseline. TPN given. NPO for procedure today. IV Pain meds ordered per moonlighter because pt cant take anything PO leading up to the procedure.  Denied any nausea, CP, SOB, lightheadedness or dizziness. Pt has a Suprapubic catheter. Resting in bed at this time with call light in reach. Able to make needs known. Continue to monitor.

## 2018-04-27 NOTE — PLAN OF CARE
Problem: Patient Care Overview  Goal: Individualization & Mutuality  Outcome: Improving  Patient A&O x4, patient refused assessment including lungs sound, bowel sound, linen and dressing was saturated , patient requested to change wound, while in the process of changing wound -patient became verbally abusive towards staff, at times screaming at staff to get out of her room, wound care done and linen changed with the help of 2 other staff including Nurse Manager,  repositioned and turned in bed, refused bath,  TPN infusing at 65.2 cc/hr continuously, demonstrates the ability to use call light appropriately, isolation precaution maintained, will continue to monitor patient and waiting for GT placement today at IR.

## 2018-04-27 NOTE — PROGRESS NOTES
"Pt calling frequently since came back on floor from IR procedure for a GT placement. Refused VS from aid and VS plus assessment from nurse. Risks explained to pt. Pt very irate. Demanding food. Reviewed IR doctor's orders of being NPO as well as not using GT until 4hrs after procedure. Pt began to yell and said \" I'm tired of being lied to\". Per pt states that she was told that she could eat after procedure.  At which time she demanded to talk to a charge nurse. Charge nurse on floor updated. Also pt called  from her room phone and demanded to talk to a charge nurse or doctor on the unit as well.   "

## 2018-04-27 NOTE — PROGRESS NOTES
Social Work Services Progress Note    Hospital Day: 18  Date of Initial Social Work Evaluation:  4.11.18  Collaborated with:  Yudith FISHER ([facility] admissions, [phone])    Data:  SW involved for discharge planning.     Intervention:  SW followed up with Yudith Anne in admissions at [facility]. Yudith stated they are unable to maintain a bed hold for this pt but are willing to meet with pt on Monday and review her records for potential Monday admission.     Assessment: If pt appeared appropriate, Yudith stated they would not see any reason not to accept pt once medically cleared.     Plan:    Anticipated Disposition:  Facility:  [facility]    Barriers to d/c plan:  Medical stability    Follow Up:  SW will continue to follow.     CYNTHIA Alcocer, FRANSICO  [facility]   4/27/2018

## 2018-04-27 NOTE — PROGRESS NOTES
CLINICAL NUTRITION SERVICES - REASSESSMENT NOTE     Nutrition Prescription    RECOMMENDATIONS FOR MDs/PROVIDERS TO ORDER:  None today, see below    Malnutrition Status:    Patient does not meet two of the above criteria necessary for diagnosing malnutrition but is at risk for malnutrition    Recommendations already ordered by Registered Dietitian (RD):  Discontinued Boost Plus order - pt with excess stock in room     Future/Additional Recommendations:  1. Once 'Provider Order - Registered Dietitian to Assess and Order TF per Medical Nutrition Therapy Protocol' is received and TF placement is verified via ABX, will start TF as follows:  - Initiate IsoSource 1.5 via G-tube @ 20 mL/hr and advance by 10 mL q 8 hrs as tolerated until goal of 50 mL/hr + 1 pkt ProSource TID providing 1200 mL, 1920 kcal (34 kcal/kg), 115 g protein (2.0 g/kg), 211 g CHO, 18 g fiber, and 912 mL free water per DW of 57 kg.  - Water Flushes: 120 mL q 4 hrs (TF + Flushes = 1632 mL; meeting ~90% estimated hydration needs)     2. D/C TPN at this time.     3. Work way towards cycling EN to a 12 hour cycle. Monitor formula acceptance with already present diarrhea (consider Nutren 1.5 at same rate as available vs adding NutriSource Fiber if diarrhea worsens)     EVALUATION OF THE PROGRESS TOWARD GOALS   Diet: NPO  Nutrition Support: TPN running at 62.5 mL/hr for total of 1500 mL, 100 g dextrose 114 g AA, 250 mL IL 5 days/week, and GIR of 1.22 mg/kg/min with dosing weight of 57 kg. Provides 1155 kcal (20 kcal/kg) and 2 g/kg protein, meets 80% of energy and 100% of protein needs.   Intake: Patient reports she has not been eating very well due to inadequate pain control. Per flowsheets, documenting 0-75% PO intakes over the past week. Pt reports not drinking Boost, noted excess (10-15 cartons) of Boost in room. TPN started on 4/21, lipids adjusted to 5x/week on 4/24 d/t elevated TG levels.      NEW FINDINGS   - Weight: most recent weight from 4/23 of  60.3 kg is down 5.2 kg (8% body weight) over the past 9 days.  - Labs: TG trending 401 (4/26), 317 (4/24), 285 (4/23) all elevated. Lytes WNL per most recent lab draw this morning (4/27).  - Skin: per [facility] nurse note 4/26, noted no changes in wounds since initial assessment x 2 weeks.  - Meds: Pt on scheduled imodium q 4 hours. Completed 10 day course of wound protocol vitamins on 4/23.   - GI: Per MD note, staff feel pt is stooling less with scheduled imodium. Diarrhea likely related to antibiotics per chart. Pt is anxious to have TPN d/c'd, willing to have G tube placed to avoid TPN and improve candidacy for diverting colon and bladder procedure. G-tube placement planned today (4/27)    ASSESSED NUTRITION NEEDS (dosing weight 57 kg adjusted)  Estimated Energy Needs: 7369-3882 kcals/day (30 - 35 kcals/kg) EN; 6349-1534 kcal (25-30 kcal/kg) PN  Justification: Wound healing  Estimated Protein Needs: 114+ grams protein/day (2+ g/kg)  Justification: Wound healing, with multiple pressure injuries   Estimated Fluid Needs: 2200+ mL/day (30 + mL/kg)   Justification: Increased needs for wound healing, or per provider pending fluid status    MALNUTRITION  % Intake: Decreased intake does not meet criteria  % Weight Loss: > 2% in 1 week (severe)  Subcutaneous Fat Loss: None observed  Muscle Loss: None observed  Fluid Accumulation/Edema: None noted  Malnutrition Diagnosis: Patient does not meet two of the above criteria necessary for diagnosing malnutrition but is at risk for malnutrition    Previous Goals   1. Weight maintenance at or above current weight of 65 kg.   Evaluation: Not met  2. Total avg nutritional intake to meet a minimum of 30 kcal/kg and 2 g PRO/kg daily (per dosing wt 57 kg).  Evaluation: Not met    Previous Nutrition Diagnosis  Inadequate protein-energy intake related to decreased appetite, nausea as evidenced by calorie counts (although data incomplete) meeting only 39% of energy and 18% of protein needs x 5  days, missing meals, drinking only ~1 supplement daily.     Evaluation: Improving    CURRENT NUTRITION DIAGNOSIS  Inadequate energy intake related to NPO status, reliance on TPN to meet nutrition needs as evidenced by TPN meeting only 80% of energy needs, currently NPO for G-tube placement.       INTERVENTIONS  Implementation  Nutrition education for recommended modifications - discussed nutrition plan going forward to utilize G-tube to provide nutrition and meet needs for wound healing as well as potential colon and bladder procedures. Discussed the TF process once placement is verified. Patient wanted to know how she would feel after the procedure, writer states everyone is different but she might have some mild discomfort at the G-tube site. Encouraged her to continue eating high protein foods once diet advances, such as Gelatein, yogurt, Boost, etc. Pt reports she likes the Gelatein and will work on eating more of those once diet advances.     Supplement - discontinued Boost Plus as pt has stock in room to have once diet advances    Goals  1. Weight maintenance at or above 60 kg.  2. Diet advancement post-op to regular diet within 24-48 hours, total average nutritional intakes (PO/EN) to meet minimum 30 kcal/kg and 2 g/kg protein.     Monitoring/Evaluation  Progress toward goals will be monitored and evaluated per protocol.    Vianey Art RD, LD  Unit Pager: [phone]

## 2018-04-27 NOTE — PROGRESS NOTES
Pt is anxious to have GT placed today  Pt c/o increased mid back pain overnight, improved with IV Dilaudid (given overnight as Pt is NPO)  No cough, chest pain or SOB  Fair control abd pain  Staff notes less stooling      VSS  Alert, in NAD  Lungs clear  CV rrr  Abd soft, non-tender  Decubiti not examined by me    Assessment    1)  T4 paraplegia 2nd to hematoma.  2)  Febrile illness with chronic  multiple stage IV decubital ulcers, L heel wound and chronic R sacral osteomyelitis.  Now off antibiotics, stable,  3) severe dermatitis L buttocks, secondary to moisture from urine leakage, on diflucan  3)  Chronic pain with generalized discomfort, stable  4) diarrhea with stool incontinence, less frequent with scheduled immodium   5)  COPD with retained upper airway secretions 2nd to weak cough. Improved over the last few days  5)  Anemia likely 2nd to CD and iron deficiency  6)  Left comminuted acute subtrochanteric fracture with recent fall.  7)  Hx of DVT on Rivaroxaban chronically, on hold for GT  8)  Seizure disorder. On keppra taper and lamotrigine titration.    9)  Diastolic CHF, stable  10)  Depression, anxiety, PTSD.  On Cymbalta, gabapentin, lamictal.   Decrease sedation with reduction in diazepam dose, d.c of vistaril, decrease in gabapentin  11)  Hypothyroidism with TSH 49.56 likely related to med non compliance.  On synthroid.   12)  Hypoalbuminemia.  Protein loss from wounds and poor nutrition.  TPN has been started.  GT to be placed today    Plan  GT tube today  Recheck Hgb, TSH  Continue TPN for the next few days  Resume Xarelto after procedure  Hopefully d/c to NH early next week

## 2018-04-27 NOTE — PROGRESS NOTES
Interventional Radiology Intra-procedural Nursing Note    Patient Name: Marychuy Zhou  Medical Record Number: [id number]  Today's Date: April 27, 2018    Start Time: 1600  End of procedure time: 1728  Procedure: Gastrostomy tube placement  Report given to: DREAD Mansfield  Time pt departs:  1740      Other Notes:   Pt had new Gastrostomy tube placed by Dr. Nolasco and Dr. Melgoza.  Total sedation medications given during procedure - Versed 7 mg and Fentanyl 375 mcg.  Vital signs remained stable.  Pt talkative throughout procedure.  Report given to staff on 8A, transported back to her room by transport services.      Anamaria Mishra

## 2018-04-27 NOTE — PROCEDURES
Interventional Radiology Brief Post Procedure Note    Procedure: IR GASTROSTOMY TUBE PERCUTANEOUS PLCMNT    Proceduralist: Ayde Melgoza MD    Assistant: Gaurav Arrington MD    Time Out: Prior to the start of the procedure and with procedural staff participation, I verbally confirmed the patient s identity using two indicators, relevant allergies, that the procedure was appropriate and matched the consent or emergent situation, and that the correct equipment/implants were available. Immediately prior to starting the procedure I conducted the Time Out with the procedural staff and re-confirmed the patient s name, procedure, and site/side. (The Joint [facility] universal protocol was followed.)  Yes    Medications   Medication Event Details Admin User Admin Time   fentaNYL (PF) (SUBLIMAZE) injection 25-50 mcg Medication Given Dose: 50 mcg; Route: Intravenous Sophia Lee RN 4/27/2018  3:57 PM   ipratropium - albuterol 0.5 mg/2.5 mg/3 mL (DUONEB) neb solution 3 mL Medication Not Given Dose: 3 mL; Route: Nebulization; Reason: Not in room; Scheduled Time:  4:00 PM Georgina Stroud RT 4/27/2018  4:00 PM   fentaNYL (PF) (SUBLIMAZE) injection 25-50 mcg Medication Given Dose: 50 mcg; Route: Intravenous Sophia Lee RN 4/27/2018  4:12 PM   midazolam (VERSED) injection 0.5-1 mg Medication Given Dose: 1 mg; Route: Intravenous Sophia Lee RN 4/27/2018  4:25 PM   ceFAZolin (ANCEF) intermittent infusion 2 g in 100 mL dextrose PRE-MIX Medication Given Dose: 2 g; Route: Intravenous; Scheduled Time:  4:25 PM Sophia Lee RN 4/27/2018  4:25 PM   fentaNYL (PF) (SUBLIMAZE) injection 25-50 mcg Medication Given Dose: 50 mcg; Route: Intravenous Sophia Lee RN 4/27/2018  4:40 PM   midazolam (VERSED) injection 0.5-1 mg Medication Given Dose: 1 mg; Route: Intravenous Sophia Lee RN 4/27/2018  4:41 PM   midazolam (VERSED) injection 0.5-1 mg Medication Given Dose: 1 mg; Route: Intravenous Rosa  DREAD Cortes 4/27/2018  4:46 PM   fentaNYL (PF) (SUBLIMAZE) injection 25-50 mcg Medication Given Dose: 50 mcg; Route: Intravenous Sophia Lee RN 4/27/2018  4:46 PM   glucagon injection 1 mg Medication Given Dose: 1 mg; Route: Intravenous; Scheduled Time:  5:00 PM Sophia Lee RN 4/27/2018  4:51 PM   midazolam (VERSED) injection 0.5-1 mg Medication Given Dose: 1 mg; Route: Intravenous Sophia Lee RN 4/27/2018  4:52 PM   fentaNYL (PF) (SUBLIMAZE) injection 25-50 mcg Medication Given Dose: 50 mcg; Route: Intravenous Sophia Lee RN 4/27/2018  4:52 PM   midazolam (VERSED) injection 0.5-1 mg Medication Given Dose: 1 mg; Route: Intravenous Sophia Lee RN 4/27/2018  5:00 PM   fentaNYL (PF) (SUBLIMAZE) injection 25-50 mcg Medication Given Dose: 50 mcg; Route: Intravenous Sophia Lee RN 4/27/2018  5:00 PM   midazolam (VERSED) injection 0.5-1 mg Medication Given Dose: 1 mg; Route: Intravenous Sophia Lee RN 4/27/2018  5:10 PM   fentaNYL (PF) (SUBLIMAZE) injection 25-50 mcg Medication Given Dose: 25 mcg; Route: Intravenous Sophia Lee RN 4/27/2018  5:10 PM   midazolam (VERSED) injection 0.5-1 mg Medication Given Dose: 1 mg; Route: Intravenous Sophia Lee RN 4/27/2018  5:19 PM   fentaNYL (PF) (SUBLIMAZE) injection 25-50 mcg Medication Given Dose: 50 mcg; Route: Intravenous Sophia Lee RN 4/27/2018  5:19 PM   iopamidol (ISOVUE-M-300) solution 1-15 mL Medication Given Dose: 10 mL; Route: Intravenous; Scheduled Time:  5:30 PM; Comment: given for Gtube placement Sophia Lee RN 4/27/2018  5:25 PM   lidocaine 1 % 0.5-10 mL Medication Given by Other Clinician Dose: 5 mL; Route: Intradermal; Scheduled Time:  5:30 PM Sophia Lee RN 4/27/2018  5:26 PM   BUPivacaine (MARCAINE) preservative free injection 0.25% Medication Given by Other Clinician Dose: 8 mL; Route: Intradermal; Scheduled Time:  5:30 PM Sophia Lee RN 4/27/2018  5:27 PM       Sedation: IR Nurse  Monitored Care   Post Procedure Summary:  Prior to the start of the procedure and with procedural staff participation, I verbally confirmed the patient s identity using two indicators, relevant allergies, that the procedure was appropriate and matched the consent or emergent situation, and that the correct equipment/implants were available. Immediately prior to starting the procedure I conducted the Time Out with the procedural staff and re-confirmed the patient s name, procedure, and site/side. (The Joint Commission universal protocol was followed.)  Yes       Sedatives: Fentanyl and Midazolam (Versed)    Vital signs, airway and pulse oximetry were monitored and remained stable throughout the procedure and sedation was maintained until the procedure was complete.  The patient was monitored by staff until sedation discharge criteria were met.    Patient tolerance: Patient tolerated the procedure well with no immediate complications.    Time of sedation in minutes: 45 Minutes minutes from beginning to end of physician one to one monitoring.          Findings: successful G tube placement     Estimated Blood Loss: Minimal    Fluoroscopy Time:  minute(s)    SPECIMENS: None    Complications: 1. None     Condition: Stable    Plan:   Bedrest for 1 hr   NPO for 4 hrs then G tube can be used.     Comments: See dictated procedure note for full details.    Gaurav Arrington MD

## 2018-04-27 NOTE — PROGRESS NOTES
Interventional Radiology Pre-Procedure Sedation Assessment   Time of Assessment: 3:54 PM    Expected Level: Moderate Sedation    Indication: Sedation is required for the following type of Procedure: GI    Sedation and procedural consent: Risks, benefits and alternatives were discussed with Patient    PO Intake: Appropriately NPO for procedure    ASA Class: Class 3 - SEVERE SYSTEMIC DISEASE, DEFINITE FUNCTIONAL LIMITATIONS.    Mallampati: Grade 2:  Soft palate, base of uvula, tonsillar pillars, and portion of posterior pharyngeal wall visible    Lungs: Lungs Clear with good breath sounds bilaterally    Heart: Normal heart sounds and rate    History and physical reviewed and no updates needed. I have reviewed the lab findings, diagnostic data, medications, and the plan for sedation. I have determined this patient to be an appropriate candidate for the planned sedation and procedure and have reassessed the patient IMMEDIATELY PRIOR to sedation and procedure.    Gaurav Arrington MD

## 2018-04-27 NOTE — PLAN OF CARE
Problem: Patient Care Overview  Goal: Individualization & Mutuality  Outcome: Improving  Patient A&O x4, patient refused all assessment and said she wants to sleep before her procedure, pain tolerable and taking IV dilaudid, as per MD, patient can have Ice chips and also verify with RN at interventional radiation- can take ice chips until 1100 patient informed, has suprapubic catheter, unable to see wound at this time, demonstrates the ability to use call light appropriately, will continue to monitor patient.

## 2018-04-27 NOTE — PROVIDER NOTIFICATION
Fadi paged per pt request. Pt c/o inadequate pain control and was unhappy with what has been offered to her so far. Fadi said he would be able to stop in and speak with the pt in a few hours, as he has multiple admissions currently. Will inform pt.

## 2018-04-27 NOTE — PROGRESS NOTES
Took over care of pt at 1500. Pt has been NPO. Allowed us to briefly clean her and change sheets. Pt would not part with purse. Purse in bed with her on transport to IR. PICC patent and TPN infusing. Pt down to IR via transport at about 1540.

## 2018-04-27 NOTE — PLAN OF CARE
"Problem: Patient Care Overview  Goal: Plan of Care/Patient Progress Review  Outcome: No Change  Patient refused most assessments this edla. Was able to listen to her posterior LS, clear, diminished. Refused turns, refused repositioning, aside from minor tweaks to her leg positioning. Applied thick layers of barrier cream to buttocks and inner thighs x 2 this shift. Complains of a pain between the shoulder blades that was \"more than double\" her regular pain. Lidocaine patch tried, Pt. stated no relief. Fadi paged, and one time order of oxycodone 10 mg given.   Earlier in shift Pt. Was extremely rude, disrespectful, and hostile, demanding a new nurse. Refused many cares, degrading, and very difficult to deal with. She has since resided to a state on introspection about religous thinking and a conversation with a  about the meaning of pain and getting closer to God.   Currently Pt. States that pain is under control.  Call light with in reach, able to make needs known.       "

## 2018-04-28 NOTE — PROGRESS NOTES
Offered to reposition pt, she refused. Asked to assess suprapubic catheter site, due to urine smell, pt refused.

## 2018-04-28 NOTE — PROGRESS NOTES
"D/I:  Met with Marychuy in her room on 8A.  She has a jury trial court hearing scheduled for Monday, 4/30.  She was unwilling to provide any more information about her legal issues, state that it's \"private\", but has been going on for years and can not, nor should not be rescheduled.  Dr. Cadet is aware and will write a pass for her to be able to leave on Monday for this.  He also talked with her son-in-law about this who also stressed the importance of her being there.  She will call [employer] to arrange her ride (she has the address) and a friend, Cristina Washington will be going with her.  She can not tell me how long she will be gone.  A:  Marychuy will be on pass for a portion of Monday.  She is also going to be evaluated by her nursing facility for readmission either later in the day or the following day. She is unable to provide any further information about the court issue (except that it is in [facility]).  P  Plan for discharge after returning from court tomorrow.  I have not connected with the [facility] nor have I made transportation arrangements, which will need to be followed up on Monday.  "

## 2018-04-28 NOTE — PLAN OF CARE
Problem: Patient Care Overview  Goal: Plan of Care/Patient Progress Review  Outcome: No Change        VS:       Pt A/O X 4. Pt refused vitals and assessment. Denies shortness of breath, and chest pain.     Output:     SP catheter draining clear yellow urine. Also incontinent of urine.      Activity:     Pt turned and repositioned x 2. Refused any additional attempts to reposition.      Skin: Blanchable redness to inner thighs and buttock. Wounds to coccyx and right IT.      Pain:     Has pain in the lower back and given dilaudid 2mg.   CMS:     Neuro's are intact. Sensation absent in bilat LEs.      Dressing:     Coccyx and right IT dressing changed per plan of care. Skin to buttock and inner thighs cleansed and barrier cream applied.       Diet:     Pt is on a Regular diet. Weaned off TPN this afternoon. Refused to start TF until this evening.      LDA:     PICC is patent in the right arm and saline locked.      Plan:     Potential discharge to nursing home on Monday.       Additional Info:

## 2018-04-28 NOTE — PROGRESS NOTES
"Respiratory note: Attempted to assess patient for scheduled nebulizer therapy and bronchial drainage. Patient refused, stating she felt \"gaggy\". Discussed with bedside RN. Shanique Scott RRT-NPS  "

## 2018-04-28 NOTE — PROGRESS NOTES
"2030: Called in room by patient. Pt wanted her dressing changes done. Pt updated that there was an air mattress available. Pt refused. Pt updated that we than need to get her off the Hovermat. Pt said ok to this. Writer updated pt that he will need an additional person to help turn her and get her off the hovermat. Pt became quite agitated that writer could not do this by himself. Charge nurse came to room to help. Pt turned in bed and got off the hovermat after several complaints. Gown removed d/t it being wet. Pt refused a new gown stating she was so hot.  Dressing changed and pt told writer to leave the room.     2040: Call into room pt yelling at writer. \"Why did you leave me like this, naked only a few hours after surgery?\" Writer reminded pt that she had asked him to leave. Pt then demanded to be pulled over in bed and covered up. Writer told pt that he would need help to move her in bed safely. \"what are you a weakling? Per patient\" Help came after a couple minutes during which pt hurled several insults at writer. Pt wanted us to pull her over in bed instead of using the green sheet to move her. After several minutes of arguing pt finally allowed us to move her using the green sheet. Pt kicked writer out of room stating your not my nurse anymore. Charge nurse finished up getting patient dressed and blankets put on.     Pt called several more times but would not allow writer to help. Combo of charge nurse/ RN flyer assisted patient the balance of the shift.    "

## 2018-04-28 NOTE — PLAN OF CARE
Problem: Patient Care Overview  Goal: Plan of Care/Patient Progress Review  Outcome: No Change  Pt refused VS and assessments before and after GI procedure. Risks explained. Pt verbally yelling, demanding for food after GI procedure, see note. Pt refused to turn. SPD spoken with and will bring up pulsate mattress that was ordered. PICC patent with TPN infusing. S/p catheter in place with urine. Updated oncoming shift.

## 2018-04-28 NOTE — PROGRESS NOTES
Writer went into room to introduce self as next nurse and Give pt her TPN and Lipids. Pt was very upset and was verbally rude to writer. Pt's accused writer of lying about when she can have something to drink. Pt said writer was incompetent in preforming tasks. Pt was lying on her back and refused assessment, refused to answer questions  and refused to turn in bed. Pt said to writer leave the room and do not come back until 2200 when she can have some apple juice to drink.

## 2018-04-28 NOTE — PROGRESS NOTES
Pt was seen, case reviewed with team    Events of GT placement reviewed with team  Ultimately, placement was relatively uncomplicated  She is tolerating NS down GT 25 cc/hr  Pt denies abd pain, cough, chest pain    She continues to stool less frequently with addition of scheduled imodium, per staff account    Today, she is primarily concerned re the need to attend a court hearing Monday am (discussed with son who concurs). Pt states she is arranging a ride with [employer].    VSS  Alert, in NAD, pleasant at times  Lungs clear  CV rrr  Abd soft, sl distended, non-tender  2 + pedal edema  Buttocks exam reviewed, small apparently new open area superior aspect of L buttocks wound.  Dermatitis appears improved      Results for SHALINI CHONG (MRN [id number]) as of 4/28/2018 13:22   Ref. Range 4/28/2018 09:34   Sodium Latest Ref Range: 133 - 144 mmol/L 138   Potassium Latest Ref Range: 3.4 - 5.3 mmol/L 4.1   Chloride Latest Ref Range: 94 - 109 mmol/L 104   Carbon Dioxide Latest Ref Range: 20 - 32 mmol/L 26   Urea Nitrogen Latest Ref Range: 7 - 30 mg/dL 26   Creatinine Latest Ref Range: 0.52 - 1.04 mg/dL 0.46 (L)   GFR Estimate Latest Ref Range: >60 mL/min/1.7m2 >90   GFR Estimate If Black Latest Ref Range: >60 mL/min/1.7m2 >90   Calcium Latest Ref Range: 8.5 - 10.1 mg/dL 8.3 (L)   Anion Gap Latest Ref Range: 3 - 14 mmol/L 8   Glucose Latest Ref Range: 70 - 99 mg/dL 111 (H)   Hemoglobin Latest Ref Range: 11.7 - 15.7 g/dL 8.4 (L)       Assessment    1)  T4 paraplegia 2nd to hematoma.  2)  Febrile illness with chronic  multiple stage IV decubital ulcers, L heel wound and chronic R sacral osteomyelitis.  Now off antibiotics, stable. Possible new ulcer as noted above  3) severe dermatitis L buttocks, secondary to moisture from urine leakage, on diflucan  3)  Chronic pain with generalized discomfort, stable  4) diarrhea with stool incontinence, less frequent with scheduled imodium. Abd exam is benighn   5)  COPD with  retained upper airway secretions 2nd to weak cough. Improved   5)  Anemia likely 2nd to CD and iron deficiency, stable  6)  Left comminuted acute subtrochanteric fracture with recent fall.  7)  Hx of DVT on Rivaroxaban chronically, on hold for GT, to be resumed today  8)  Seizure disorder. On keppra taper.  Pt has been refusing lamictal consistently  9)  Diastolic CHF, stable  10)  Depression, anxiety, PTSD.  On Cymbalta, gabapentin, lamictal.  Pt has been refusing these meds for the most part  11)  Hypothyroidism with TSH 49.56 likely related to med non compliance.  On synthroid.(pt often refusing )   12)  Hypoalbuminemia.  Protein loss from wounds and poor nutrition. On TPN, tube feedings to be started today    Plan   Start TF  Wean off TPN  Monitor decubitus ulcers, off load as Pt allows  Document bowel status--goal is to minimize frequent incontinent stools but need to assure Pt is having a least occ stools  D/c cymbalta, gabapentin and lamictal, as Pt is taking rarely  Resume xarelto (oked by IR)   with try to facilitate Pt going on pass for court hearing Monday  NH to assess Pt on Monday for acceptance.

## 2018-04-28 NOTE — PROGRESS NOTES
Nutrition Services Note    Provider order received 4/27 for Registered Dietitian to Assess and Recommend TF, Provider is responsible for entering the TF orders. Comment added to order to read Dietitian to Assess and Order per MNT Protocol. Provider is responsible for nutrition oversight.    Patient post G-tube placement 4/27. G tube patent and infusing NS at 20ml per RN note. TPN and Lipids infusing in R PICC, providing 1500 mL, 114 gm AA, 100 gm dex and 250 mL IL M-F for average 1153 kcal (20 kcal/kg) per DW 57 kg. Per discussion with unit pharmacist, plan to run at 1/2 rate x4 hours and then d/c.     Per discussion with Provider, appropriate to initiate EN via continuous drip at this time with advancement to goal over next 24 hours. Will likely discharge to nursing facility early next week on continuous feeds, with consolidation of EN regimen post discharge.     Implementation:  1. Initiate feeds of IsoSource 1.5 via G-tube @ 20 mL/hr. Advance by 10 mL q 8 hrs as tolerated to goal of 50 mL/hr. Ordered 1 packet ProSource TID, which when combined with goal EN will provide 1200 mL formula, 1920 kcal (34 kcal/kg), 114.6 g protein (2 g/kg), 211 g CHO, 18 g fiber and 912 mL free water per DW of 57 kg, meeting 100% estimated energy and protein needs.  - Water Flushes: 120 mL q 4 hrs (TF + Flushes = 1632 mL).         See RD note dated 4/27 for full nutrition assessment.    Obdulia Walter, RD, LD  Weekend/On-call Pager: [phone]

## 2018-04-28 NOTE — PLAN OF CARE
"Problem: Infection, Risk/Actual (Adult)  Goal: Identify Related Risk Factors and Signs and Symptoms  Related risk factors and signs and symptoms are identified upon initiation of Human Response Clinical Practice Guideline (CPG).   Outcome: No Change  Pt A/O X 4. Pt refused full assessment but she has been calm. Pt states, \"I want to sleep.\" G tube patent and infusing NS at 20ml. TPN and Lipids infusing in R PICC, purple port. Pt refused pulsating bed and repositioning. Suprapubic catheter draining adequate amount, TIMMY site. R IT dressing changed on evening shift. Unless pt will be complaint, will no longer attempt assessment due to potential aggitation. Continue to monitor.   "

## 2018-04-29 NOTE — PROGRESS NOTES
Pt seen, case reviewed with team    Pt remains occ compliant with meds and treatments  Often refuses wound tx and assessment of bowel status  Pt believes last BP was 3 days ago.  Appetite diminished over the last 2 days. Minimal abd discomfort, + occ nausea. No chest pain, SOB or cough    Remains adamant re need to go on pass to court tomorrow (supported by son who is POA).  Pt is arranging ride with [employer]. She needs to sit up in WC today, to assure she can tolerate sitting    TF now at 40 cc/hr to be increased to 50 cc/hr + free water flushes    VSS  Alert, in NAD, pleasant to this examiner, less so to nursing staff  Lungs clear  CV rrr  Abd soft, sl distended, non-tender  Coccyx/buttocks not examined by me    TSH 59.47 yesterday      Assessment       1)  T4 paraplegia 2nd to hematoma.  2)  Febrile illness with chronic  multiple stage IV decubital ulcers, L heel wound and chronic R sacral osteomyelitis.  Now off antibiotics, stable. Possible new ulcer as noted above, assessment difficult.  3) severe dermatitis L buttocks, secondary to moisture from urine leakage. Diflucan ordered but refused by Pt  3)  Chronic pain with generalized discomfort, stable  4) diarrhea with stool incontinence, decreased with scheduled imodium. Abd exam is benign. Theoretically, at risk for constipation with chronic imodium.  Assessment limited by Pt's lack of co operation (she denies)   5)  COPD with retained upper airway secretions 2nd to weak cough. Improved   5)  Anemia likely 2nd to CD and iron deficiency, stable  6)  Left comminuted acute subtrochanteric fracture with recent fall.  7)  Hx of DVT on Rivaroxaban chronically, on hold for GT, resumed yesterday  8)  Seizure disorder. On keppra taper.  Lamictal dced as Pt was consistently refusing.  9)  Diastolic CHF, stable  10)  Depression, anxiety, PTSD.  Previously on Cymbalta, gabapentin, lamictal.  Pt had been refusing these meds for the most part; there fore I have d/lucila  them.  11)  Hypothyroidism with TSH 49.56 likely related to med non compliance.  On synthroid.(pt often refusing )   12)  Hypoalbuminemia.  Protein loss from wounds and poor nutrition. Now off TPN, on TF    Plan  Advance TF  Discussed with Pt the need to be compliant with wound cares, bowel assessment and meds.  May need to reduce Imodium if she is not having adequate stools or becomes more distended  May need to have levothyroxine administered down GT    Pt will be given a pass to attend court tomorrow. She will sit up in WC today to assure tolerance with sitting.  NH to assess tomorrow re readmission

## 2018-04-29 NOTE — PROGRESS NOTES
TF started at 2100.  Starting rate is 20cc/hour- increase by 10cc Q8H until goal rate of 50cc/hour.

## 2018-04-29 NOTE — PLAN OF CARE
Problem: Infection, Risk/Actual (Adult)  Goal: Identify Related Risk Factors and Signs and Symptoms  Related risk factors and signs and symptoms are identified upon initiation of Human Response Clinical Practice Guideline (CPG).   Outcome: No Change  Pt refused VS, full assessment, and repositioning. G tube feeds infusing at 30ml/hr with free water flushes 120ml q4h, advanced 10ml at 0500, tolerating it well. R PICC SL. Suprapubic cathter draining well. Pain controlled with 4mg PO dilaudid. Continue to monitor.

## 2018-04-29 NOTE — PROGRESS NOTES
"  VS: Stable/ blood pressure stable when up in wheelchair   O2: RA Saturations 92 %    Output: Supra pubic catheter leaking. Some is draining into bag.    Last BM: X 2 large loose BM's.   Activity: Sat up in wheelchair today x 2 hours. Went to gift shop with staff and purchased new socks, earrings and brush. Patient very worried about court hearing tomorrow.   Skin: A lot of redness on coccyx and groin area and going down in betwen both upper legs. Changed dressing to bottom x 3. Cleaned patients katia area every 2 hours.    Pain: Complaining of abdominal pain off and on. Taking Dilaudid for pain when pt request.    CMS: Has edema in both lower extremities. Pt has no sensation in both lower legs.    Dressing: Changed dressing to bottom x 3 today. Followed plan of cares.    Diet: Pt was taking Tube feeding per plan of cares. When pt go up in wheelchair pt stated\" Im not going to take that tube feeding anymore during the day. That's why I am starting to get those loose stools. Call the Dr and tell him I am not going to take the tube feeding during the days anymore.\" A call was placed to Dr Thomas([facility]) to let her know patients refusal. Obtained orders to try to restart tube feeding at 6 pm at 40 cc an hour. If pt allows.Evening nurse was reported off to about this plan of cares. Pt drinks gingerale and eats small amounts of foods orally . When she feels like she has an appetite.    LDA:    Equipment: Used farrah lift to get pt up to wheelchair.   Plan: Will continue to turn patient every 2 hours. Patient calls on the call light constantly and is very belittling to any staff. She refuses a lot of med's, plans of cares etc.... Drs are aware of patients behavior.    Additional Info: Pt called [employer] and plans on being picked up at 0900 tomorrow. Dr Cadet already wrote patient a pass for tomorrow. Patient has the orders in her purse that she has in her bed with her. Patient very upset about going to this court " hearing tomorrow. Status of patient will continue to be monitored.

## 2018-04-29 NOTE — PLAN OF CARE
Problem: Infection, Risk/Actual (Adult)  Goal: Identify Related Risk Factors and Signs and Symptoms  Related risk factors and signs and symptoms are identified upon initiation of Human Response Clinical Practice Guideline (CPG).   Outcome: Declining    VS: Pt refuses vitals this shift. Denies shortness of breath and chest pain   O2: Stable on room air - refused SpO2 spot checks through evening   Output: Voiding adequate amounts via suprapubic catheter.   Last BM: Per flow sheet 4.25.18. Pt refused to answer question   Activity: Refused all repositioning attempts   Skin: Refused skin assessment - refused wound care   Pain: Managed with PRN Dilaudid 2mg every 4hrs   CMS: Neuro's intact - sensation absent in BLE   Dressing: Refused wound care and dressing interventions this shift - they were preformed on day shift   Diet: Regular diet - Tube Feeding initiated at 2100 per pt request. Per plan of care start tube feeding at 20cc/hr inrease by 10cc every 8hrs until goal of 50cc/hr is met. 120cc free water flush every 4hrs   LDA: PICC right basilic. Gastronomy tube   Equipment: Tube feeding equipment/IV poles   Plan: Possible dc to nursing home Monday   Additional Info: Refused glucose monitoring. Refused weight. Pt remains verbally abusive to staff. She refused all assessments and interventions; and was selective about which medications she took.

## 2018-04-30 NOTE — PLAN OF CARE
"Problem: Infection, Risk/Actual (Adult)  Goal: Identify Related Risk Factors and Signs and Symptoms  Related risk factors and signs and symptoms are identified upon initiation of Human Response Clinical Practice Guideline (CPG).   Outcome: No Change  Pt is still refusing VSS, assessments, and repositioning. She is more calm and pleasant today. Per report pt has red lumps on her back, moonlighter suggest to monitor it, however, pt refused. Pt refused TF, due to it causing her diarrhea. She states, \"We're not starting that tonight.\" G tube site TIMMY, due to refusal. PICC SL, dressing needs changing, but again, pt refused. Suprapubic catheter draining, TIMMY site. Pt had loose stools on eves, scheduled immodium given. Plan for pt to go to court tomorrow at 9am. Continue to monitor.       "

## 2018-04-30 NOTE — PLAN OF CARE
Problem: Infection, Risk/Actual (Adult)  Goal: Identify Related Risk Factors and Signs and Symptoms  Related risk factors and signs and symptoms are identified upon initiation of Human Response Clinical Practice Guideline (CPG).   Outcome: Improving    VS:     VSS refused, pt refused most of meds this shift   Output:     Voids using suprapubic catheter  BM 4/30 and passing gas, incontinent of stool, changed 1x   Activity:     UP into wheelchair using ceiling lift and A2,    Skin: Wounds, scars, scabs   Pain:     PRN medications not given this shift  Ice applied as requested  Alternative therapies offered   Neuro/CMS:     BLE numbness and tingling   Dressing:     Ischial and coccyx dressings Changed per plan of care.  Refused changing of heel dressing - Not assessed.   Diet:     regular diet, tolerated poorly, small amount of nausea this am, IV zofran given and Tums   LDA:     PICC SL and patent   Equipment:     Pillows IV pole, catheter, lift   Plan:     Continue plan of care and infection control.     Additional Info:     Pt left the floor approximately 1000 to go to Court.  No medications given except Tums, zofran, synthroid and imodium.  Pt repositioned well using lift.  Uncertainty regarding return to floor and nursing home status.

## 2018-04-30 NOTE — PROGRESS NOTES
"Social Work Services Progress Note    Hospital Day: 21  Collaborated with:  8A IDT, Admissions at [facility] [phone]    Data:  DC plan    Intervention:  Pt is ready for DC and return to [facility]. Pt's MA Bed hold has lapsed and Admissions at [facility] is asking for new referral (writer has been sending them assessment information through out pt's hospitalization). Writer provided information that pt is ready for DC today.    Per Admissions, they state [facility] DON would need to complete an additional on site assessment prior to fully accepting pt for return.    Writer spoke w/pt's bedside RN Shefali. Pt has gone to attend a court appearance. She arranged her own [employer] ride, and friend brought clothing. It is uncertain if pt has gone to attend court for herself or for her adult son.      Assessment:  pt is ready for DC if [facility] can accept    Plan:    Anticipated Disposition:  [facility]    Barriers to d/c plan:  Assessment process, pt is ready for DC    Follow Up:  [facility] cont' to follow      1531 Addendum:    Pt has been declined from [facility] due to being \" no longer clinically appropriate for our facility\".    Writer made telephone referral to [facility] via Pt Liaison Patria Prasad. Writer also called [facility] and spoke w/Donnell. Per Donnell, [facility] is full and has no female beds. Writer did make referral to [facility]. Await Assessment responses.    Dr Lawrence, Nena Webber, RNCC and pt's [facility] Care Coordinator Ricardo [phone] updated . [facility]GI con't to follow  "

## 2018-04-30 NOTE — PROGRESS NOTES
"[facility] Internal Medicine Progress Note            Interval History:   Record reviewed. Discussed with Dr. Cadet.   Seen with RN.  Dressed sitting in wheelchair - ready to leave for court.  Indicates will not return until approximately 5:30 PM.  Court to last all week.  Believes plan in place to DC back to SNF.   In general feeling well.  Refused TF's after 10PM last night due to diarrhea with continuous infusion.  No postural dizziness.  Dyspnea, cough resolved.    No CP,  nausea, reflux, abd pain. Loose stool on evenings.  Has SP catheter.           Medications:   All medications reviewed today          Physical Exam:   Blood pressure 108/75, pulse 82, temperature 96.7  F (35.9  C), temperature source Oral, resp. rate 16, height 1.588 m (5' 2.5\"), weight 60.3 kg (133 lb), SpO2 100 %.    Intake/Output Summary (Last 24 hours) at 04/30/18 0853  Last data filed at 04/30/18 0828   Gross per 24 hour   Intake              630 ml   Output             1100 ml   Net             -470 ml       General:  Alert.  Appropriate.  No distress. No O2.     Heent:      Neck:    Skin:    Chest:  Clear - resolution of upper airway rhonchi since my last evaluation.     Cardiac:  Reg without gallop, murmur.  No JVD.     Abdomen:  Non distended, soft, non-tender.  BS normal.     Extremities:  Perfused.  Pedal edema.      Neuro:            Data:   No results found for this or any previous visit (from the past 24 hour(s)).  Last Basic Metabolic Panel:  Lab Results   Component Value Date     04/28/2018      Lab Results   Component Value Date    POTASSIUM 4.1 04/28/2018     Lab Results   Component Value Date    CHLORIDE 104 04/28/2018     Lab Results   Component Value Date    NATA 8.3 04/28/2018     Lab Results   Component Value Date    CO2 26 04/28/2018     Lab Results   Component Value Date    BUN 26 04/28/2018     Lab Results   Component Value Date    CR 0.46 04/28/2018     Lab Results   Component Value Date     " 04/28/2018     CBC RESULTS:   Recent Labs   Lab Test  04/28/18   0934  04/20/18   0940   WBC   --   6.9   RBC   --   3.37*   HGB  8.4*  8.1*   HCT   --   28.2*   MCV   --   84   MCH   --   24.0*   MCHC   --   28.7*   RDW   --   19.5*   PLT   --   300                Assessment and Plan:   1)  T4 paraplegia 2nd to hematoma.  2)  Febrile illness with chronic  multiple stage IV decubital ulcers, L heel wound and chronic R sacral osteomyelitis.  On zosyn and doxycycline.  Wound cultures from buttocks E coli ESBL, proteus, pseudomonas ( R to zosyn).   Impression of chronic osteomyelitis with element of cellulitis/dermatitis.  Abscess or deep acute infection not felt likely per plastic surgery.  10 day ABs  with completion of zosyn 4/20.  Off doxycycline 4/23.   2nd severe dermatitis due to constant moisture from urine leakage.  On diflucan. Off load as Pt allows  3)  Chronic pain with generalized discomfort. Thickened bladder wall may account for lower abdominal discomfort.  Candida on UC.   Neg C. Diff.   4)  COPD with retained upper airway secretions 2nd to weak cough.  Interval clearing.    Maintaining oxygenation.  Ground glass and nodular opacities RUL, unclear significance.   Infection not responding to treatment unlikley with normal temp, no hypoxemia.  Reviewed with pulmonary and ID.  No new AB recommendations.    5)  Anemia likely 2nd to CD and iron deficiency per record.  Adequate.    6)  Left comminuted acute subtrochanteric fracture with recent fall.  7)  Hx of DVT on Rivaroxaban. Chest c/o's intermittent largely MSK.   8)  Seizure disorder. On keppra taper and lamotrigine titration.    9)  Diastolic CHF.  Compensated.   10)  Depression, anxiety, PTSD.  Off Cymbalta, gabapentin, lamictal as not taking regularly per Dr. Cadet.   11)  Hypothyroidism with TSH 49.56 likely related to med non compliance. Inconsistently taking synthroid. Took today.   12)  Hypoalbuminemia.  Protein loss from wounds and poor  nutrition.  Reviewed with nutrition and CR surgery - as unlikely to improve significantly with pt's inconsistent po intake.   PICC placed with TPN 4/22 followed by IR G-tube placement 4/27 with TF's.  12)  Closed fracture zygoma.  13)  Consideration for diverting colostomy.  Per urology will review with CR surgery to consider combined procedure with urinary diversion to keep katia area dry.   14)  S/p B PNT 2 months ago, for the intent of urinary diversion (since removed).  Has SP catheter with chronic urethral leakage.   Candida on UC (prob colonization)  15)  Nausea basis unclear - potential relation to meds, GERD.  Improved.  Lower abdominal discomfort resolved.   Tolerating po.      16)  HA discomfort - issue since craniotomy for SDH. Neg CT for acute change.   17)  Chronic diarrhea improved with scheduled imodium. Goal is to minimize frequent incontinent stools but need to assure pt is having a least occ stools             PLAN:  1)  Review meds, orders.  2)  Court today.  Confirm SNF placement with SW.  3)  Review TF regimen with nutrition.  4)  Will need coordination of diverting colostomy and urinary diversion procedure after DC once nutrition adequate.   Disposition Plan   Expected discharge this week to SNF.      Entered: Ravi Lawrence 04/30/2018, 8:53 AM     ADD:  Pt declined acceptance by NH.          Attestation:  I have reviewed today's vital signs, notes, medications, labs and imaging.     Ravi Lawrence MD

## 2018-04-30 NOTE — PLAN OF CARE
Fadi Thomas paged approx [phone] with update on pt. She is experiencing loose stools, and complaining of dehydration. Pt indicated she does not want tube feeding as she believes this is the cause of loose stools. While turning pt this RN and charge RN noticed elevated red bumps on pt back. Charge RN suspected shingles. Fadi informed RN to monitor pt back for potential shingles and that the pt was within her rights to refuse tube feeding.

## 2018-04-30 NOTE — PROGRESS NOTES
SPIRITUAL HEALTH SERVICES  SPIRITUAL ASSESSMENT Progress Note  [facility] ([facility]) SLP     REFERRAL SOURCE: Patient Request during previous visit.    Attempted to visit with pt, but pt was out of bed and out of room.    PLAN: Follow-up on Wednesday 04/02.    Fr. Tre Dsouza  [employer] [facility] [phone]  Pager [phone]

## 2018-04-30 NOTE — PLAN OF CARE
Problem: Infection, Risk/Actual (Adult)  Goal: Identify Related Risk Factors and Signs and Symptoms  Related risk factors and signs and symptoms are identified upon initiation of Human Response Clinical Practice Guideline (CPG).   Outcome: Declining    VS: Refused vitals this shift. - what is documented appears to be trending WDL   O2: Refused spot checks - what data is available appears to be stable   Output: Draining adequate amounts via suprapubic catheter   Last BM: 4.29.18 incontinent 2x   Activity: Bed rest - declined sitting in chair. Was amendable to one position change   Skin: Refused much of full skin assessment. What is visible, wounds rashes, abrasions and scars. There appears to be what looks like shingles on pt upper right back. Moonlighter was paged and advised to monitor    Pain: Continues to endorse pain and poor pain management.   CMS: Sensation absent BLE, edema in BLE. Denies numbness and tingling elsewhere   Dressing: Specific dressing changes were done per pt's request and at her direction. She did refuse some dressing changes and would only allow the changes she requested to be done at her preference.    Diet: Regular diet - appetite is poor. Pt refusing tube feedings. States she is becoming dehydrated.   LDA: PICC right basilic.    Equipment:    Plan: Pt has pass tomorrow for court appearance.    Additional Info: Somewhat more pleasant with this writer this evening. Remains demanding at times and irritable. Continues to be selective with cares and medication choices. She has refused tube feedings this shift.

## 2018-05-01 NOTE — PROGRESS NOTES
"Social Work Services Progress Note    Hospital Day: 22    Collaborated with:  Donnell ([facility] pt liaison), Dr Lawrence, 8A IDT    Data:  DC plan    Intervention:  Pt has been globally denied from [facility] due to behaviors. Dr Lawrence will contact [facility] and request to speak with DON or  to appeal their decision to not accept pt for return to facility.     Donnell states [facility] only has shared rooms available but the facility is requesting additional and updated information to con't their assessment process. Writer suggested possible referral to [facility].     Donnell anticipates he will need to conduct on site assessment and meet pt prior to facility giving final answer about ability to meet pt's needs.        Assessment:  pt upset about being declined from [facility]. She has anxiety about her belongings left at [facility] without ability to pay for storage or have family/friends avialable to move things.     Plan:    Anticipated Disposition:  [facility] con't assessment of pt    Barriers to d/c plan:  Pt's behaviors, lack of bed availability    Follow Up:   con't to follow      Addendum:  Writer, pt's bedside RN Yamileth, Dr Lawrence and pt discussed pt's DC plans and pt's request to have a pass to attend her son's trial on Wednesday, 5/2/18. Writer reminded pt that there are no passes given off of medical/surgical unit and that she was admitted to hospital to receive medical care. Pt states belief that she needs to be character witness for her son \"otherwise he will be hanged without me\". Pt did give verbal consent for writer to contact her son's atty (Glen June [phone]) and discuss pt's inability to attend court. Pt states intent to schedule ride through [employer] mobility for  at 8:30 am on Wed, 5/2/18 despite [facility] medical team telling pt she cannot leave hospital to attend court.    Writer did call pt's son's atty and spoke w/Glen June's " "assistant, Neema Onofre. Neema was not able to confirm/deny that pt is on witness list and Glen was in trial and not available. Neema verbalized understanding of hospital's policy for passes and for AMA discharges. Neema will updated Glen June when he is no longer in trial.  Pt updated by writer.     Donnell from [facility] arrived and visited w/pt. He will follow up w/[facility] and inform writer of outcome of assessment. Pt was receptive to [facility] and liked idea of being followed by Dr. Cadet \"I like him a lot.\"  SW con't to follow    "

## 2018-05-01 NOTE — PLAN OF CARE
Problem: Patient Care Overview  Goal: Interdisciplinary Rounds/Family Conf  Outcome: Improving    VS: VSS, she is able to make her needs known   O2: She is on room air, her lungs are clear   Output: She has a urostomy, draining clear yellow urine   Last BM: 5/1/2018   Activity: She is bedfast, turns and repositions with assist from staff   Skin: She has numerous skin breakdowns, from incontinence and pressure points   Pain: She complains of upper back pain, she is frequently asking for her dilaudid    CMS: She does not have any feeling in her lower extremeties, has use of her upper ectremeties   Dressing: Changed to her right IT, and coccyx, did not want her heel dressing changed   Diet: Regular diet, Tube Feedings to begin at 2000 tonight until 0800   LDA: She has a PICC   Equipment: Ceiling lift, wheelchair, GT,    Plan: Is for her to be discharged to a Long Term Facility when a bed becomes available   Additional Info: Her son is going to court all week, she says she is a prime witness in his case, she went to court yesterday and wants to go tomorrow, was told she cannot leave the hospital to go, this is very upsetting to her, she is medically stable to leave this acute facility, but as of yet has no TCU that will take her. Requested something Valium to calm her down, was given Valium 5 mg twice today. This did help.

## 2018-05-01 NOTE — PLAN OF CARE
Problem: Patient Care Overview  Goal: Plan of Care/Patient Progress Review  Outcome: No Change  Patient A/Ox4, remains much the same regarding allowing and not allowing nursing care. Pt has been respectful to this writer for vast majority of this shift, only minor comments regarding writer's nursing skill. Pt was uncouth towards NST during evening at the beginning of shift. VS refused. Denies CP, SOB, dizziness/LH. Declines most assessment. Voiding through a blatantly leaky suprapubic catheter, leaks through pt's legs towards posterior. CMS baseline, pt is paraplegic. Dressings to back side vary - mepilex on coccyx usually not affected by pt's frequent/nearly constant flow of stool and urine to back and side, dressing on R IT needs to be changed essentially every time pt is turned or has a stool episode - has been changed x1 this shift so far, pt managed to not soil dsg with first episode. Tolerating regular diet without NV, but has stomach discomfort and takes pepto bismol for that (gives the most relief with Tums). Activity level is bedrest with some turning, pt was supine for eves and on R side for NOC shift. IV PICC in place, SL. Pain rated as constant, sore back pain throughout shift, managed with oral PRN dilaudid, oxycontin scheduled, and PRN oral valium. Plan is for pt to return to a SNF of some kind, SW has sent referrals. Pt verbalized hopefulness to son(?) over the phone that one of them will pan out. Patient has demonstrated ability to call appropriately; seems to be much needier on evening shift as opposed to overnight. Patient is resting with call light within reach. Will continue to monitor.

## 2018-05-01 NOTE — PROGRESS NOTES
"CLINICAL NUTRITION SERVICES BRIEF NOTE    Consult received from provider - \"Pt does not want continuous, \"causes diarrhea\".  Desires HS feedings - is this appropriate.\"    Spoke with Dr. Lawrence and patient who requests to have nocturnal feedings only because she has increased diarrhea when the feeds are on continuously. Per MD, okay to consolidate feeds to overnight only. Writer also discussed adding more fiber to feeds to see if this helps bulk stool, patient agreeable. However, pt states \"the moment there is diarrhea that TF will be stopped\". Reiterated importance of TF and PO to meet nutritional needs for wound healing, discussed that some of her diarrhea is likely related to abx treatment however pt reports \"I don't believe that\".     Interventions  Enteral nutrition: modified TF orders as follows: Isosource 1.5 via G tube initiation at 30 mL/hr from 8pm - 8 am. Advance by 10 mL q 2 hours as tolerated to goal of 100 mL/hr. Add 1 packet prosource TID (provides additional 120 kcal and 33 g protein) and 1 packet Nutrisource fiber TID (provides additional 45 kcal and 9 g fiber). Goal (TF + modulars) provides 1200 mL, 1965 kcal (34 kcal/kg), 115 g protein (2 g/kg), 211 g CHO, 27 g fiber, and 912 mL free water per DW of 57 kg, meeting 100% of estimated energy and protein needs.   - Water flushes: continue 120 mL q 4 hours as ordered (TF + Flushes = 1632 mL).      Vianey Art, RD, LD  Unit Pager: [phone]     "

## 2018-05-01 NOTE — PROGRESS NOTES
"[facility] Internal Medicine Progress Note            Interval History:   Record reviewed.  Seen with RN. Multiple repeat visits.  Discussion with SW.  Calls to SNF.  Contacted by DON at SNF - pt declined as bed hold  as of  with issues raised regarding patient behavior, non compliance with treatment plan, leaving facility without staff notification.  SW looking into other options.  Pt also informed per hospital policy cannot go on pass to attend court proceedings.  Resultant anxiety requesting valium.  No new clinical concerns otherwise.  Seen by nutrition with plan to change TF's to HS.  No recurrence of chest congestion, CP, SOB, cough, nausea, reflux, abd pain.  Diarrhea this AM.  Remaining dry without urine leakage.             Medications:   All medications reviewed today          Physical Exam:   Blood pressure 108/75, pulse 82, temperature 96.7  F (35.9  C), temperature source Oral, resp. rate 16, height 1.588 m (5' 2.5\"), weight 60.3 kg (133 lb), SpO2 100 %.    Intake/Output Summary (Last 24 hours) at 18 0853  Last data filed at 18 0828   Gross per 24 hour   Intake              630 ml   Output             1100 ml   Net             -470 ml       General:  Alert.  Appropriate.  Aside for emotional upset no clinical distress. No O2.     Heent:      Neck:    Skin:  Buttock area not examined.     Chest:  Clear     Cardiac:  Reg without gallop, murmur.  No JVD.     Abdomen:  Non distended, soft, non-tender.  BS normal.     Extremities:  Perfused.  No edema, calf, thigh induration.     Neuro:            Data:   No results found for this or any previous visit (from the past 24 hour(s)).  Last Basic Metabolic Panel:  Lab Results   Component Value Date     2018      Lab Results   Component Value Date    POTASSIUM 4.1 2018     Lab Results   Component Value Date    CHLORIDE 104 2018     Lab Results   Component Value Date    NATA 8.3 2018     Lab Results "   Component Value Date    CO2 26 04/28/2018     Lab Results   Component Value Date    BUN 26 04/28/2018     Lab Results   Component Value Date    CR 0.46 04/28/2018     Lab Results   Component Value Date     04/28/2018     CBC RESULTS:   Recent Labs   Lab Test  04/28/18   0934  04/20/18   0940   WBC   --   6.9   RBC   --   3.37*   HGB  8.4*  8.1*   HCT   --   28.2*   MCV   --   84   MCH   --   24.0*   MCHC   --   28.7*   RDW   --   19.5*   PLT   --   300                Assessment and Plan:   1)  T4 paraplegia 2nd to hematoma.  2)  Febrile illness with chronic  multiple stage IV decubital ulcers, L heel wound and chronic R sacral osteomyelitis.  On zosyn and doxycycline.  Wound cultures from buttocks E coli ESBL, proteus, pseudomonas ( R to zosyn).   Impression of chronic osteomyelitis with element of cellulitis/dermatitis.  Abscess or deep acute infection not felt likely per plastic surgery.  10 day ABs  with completion of zosyn 4/20.  Off doxycycline 4/23.   2nd severe dermatitis due to constant moisture from urine leakage.  On diflucan. Off load as Pt allows  3)  Chronic pain with generalized discomfort. Thickened bladder wall may account for lower abdominal discomfort.  Candida on UC.   Neg C. Diff.   4)  COPD with retained upper airway secretions 2nd to weak cough.  Interval clearing.    Maintaining oxygenation.  Ground glass and nodular opacities RUL chest CT, unclear significance.   Infection not responding to treatment unlikley with normal temp, no hypoxemia.  Reviewed with pulmonary and ID.  No new AB recommendations.    5)  Anemia likely 2nd to CD and iron deficiency per record.  Adequate.    6)  Left comminuted acute subtrochanteric fracture with recent fall.  7)  Hx of DVT on Rivaroxaban. Chest c/o's intermittent largely MSK.   8)  Seizure disorder. On keppra taper and lamotrigine titration.    9)  Diastolic CHF.  Compensated.   10)  Depression, anxiety, PTSD.  Off Cymbalta, gabapentin, lamictal as  not taking regularly per Dr. Cadet.   11)  Hypothyroidism with TSH 49.56 likely related to med non compliance. Inconsistently taking synthroid. Took today.   12)  Hypoalbuminemia.  Protein loss from wounds and poor nutrition.  Reviewed with nutrition and CR surgery - as unlikely to improve significantly with pt's inconsistent po intake.   PICC placed with TPN 4/22 followed by IR G-tube placement 4/27 with TF's.  12)  Closed fracture zygoma.  13)  Consideration for diverting colostomy.  Per urology will review with CR surgery to consider combined procedure with urinary diversion to keep katia area dry (if continues to be an issue).   14)  S/p B PNT 2 months ago, for the intent of urinary diversion (since removed).  Has SP catheter with chronic urethral leakage.   Candida on UC (prob colonization)  15)  Nausea basis unclear - potential relation to meds, GERD.  Improved.  Lower abdominal discomfort resolved.   Tolerating po.      16)  HA discomfort - issue since craniotomy for SDH. Neg CT for acute change.   17)  Chronic diarrhea improved with scheduled imodium. Goal is to minimize frequent incontinent stools but need to assure pt is having a least occ stools             PLAN:  1)  TF's at HS.  2)  SW working on dispo.  3)  Informed of Hospital policy not to go on pass for court - SW to attempt to contact patient's .  4)  Monitor clinically.  5)  Valium 5 mg po X2 administered.   Disposition Plan   Expected discharge this week to SNF if arranged.      Entered: Ravi Lawrence 05/01/2018, 5:07 PM              Attestation:  I have reviewed today's vital signs, notes, medications, labs and imaging.     Ravi Lawrence MD

## 2018-05-01 NOTE — PLAN OF CARE
Problem: Patient Care Overview  Goal: Interdisciplinary Rounds/Family Conf  Outcome: No Change    VS: VSS, she is able to make her needs known   O2: Room air lungs are clear   Output: Suprapubic catheter   Last BM: 4/30/2018   Activity: Using ceiling lift to get from chair to bed   Skin: Rash between legs numerous other skin breakdowns   Pain: Chronic pain    CMS: No movement of both lower extremeties   Dressing: Changed this morning   Diet: Reg tolerating it well, will not have Tube Feeding connected tonight, spoke with Dr. Lawrence and he will be talking to dietician tomorrow and come up with a plan to put her on the Tube Feeding during the night only pt aware   LDA: PICC, suprapubic cath   Equipment: Ceiling lift wheelchair    Plan: To find a Long Term Facility for her to go to   Additional Info:

## 2018-05-02 NOTE — PROGRESS NOTES
Care Coordinator - Discharge Planning    Admission Date/Time:  4/10/2018  Attending MD:  Toan Kingston MD     Data  Date of initial CC assessment:  5/2/2018  Chart reviewed, discussed with interdisciplinary team.   Patient was admitted for:   1. Wound of right buttock, initial encounter    2. Decubitus ulcer of sacral region, stage 4 (H)    3. Cellulitis of buttock, right         Assessment   Concerns with insurance coverage for discharge needs: None.  Current Living Situation: Patient lives alone.  Support System: Uninvolved  Services Involved: Home Care and Skilled Nursing Facility, TCU  Transportation at Discharge: TBD  Transportation to Medical Appointments:    - Not applicable  Barriers to Discharge: dependent with mobility/activities of daily living, history of non-alliance, lack of support system/caregiver, lives alone and physical impairment      Coordination of Care and Referrals: Provided patient/family with options for Home Care and Skilled Nursing Facility, TCU. This writer and SW, attempted to meet with patient to discuss discharge options. Patient appeared to be sleeping. Will attempt to see patient on 5/3/2018.      Plan  Anticipated Discharge Date:  TBD  Anticipated Discharge Plan:  TBD    CTS Handoff completed:  NO    Nena Webber RN, BSN  Care Coordinator, 8A  Phone [phone]  Pager [phone]

## 2018-05-02 NOTE — PLAN OF CARE
Problem: Patient Care Overview  Goal: Plan of Care/Patient Progress Review  Outcome: Improving  VS: VSS, she is able to make her needs known   O2: Room air lungs are clear   Output: Suprapubic catheter with brown drainage. Refused for it to be cleaned.    Last BM: 5/2/18   Activity: Using ceiling lift to get from chair to bed   Skin: Rash between legs numerous other skin breakdowns   Pain: Chronic pain    CMS: No movement of both lower extremeties   Dressing: Changed this morning   Diet: Reg diet and tolerating it. Eating lots of sugary food. Tube feeding ran overnight until 0930 this am. Water flushes every 4 hours.    LDA: PICC, suprapubic cath   Equipment: bedrest   Plan: To find a Long Term Facility for her to go to   Additional Info:

## 2018-05-02 NOTE — PROGRESS NOTES
"[facility] Internal Medicine Progress Note            Interval History:   Record reviewed.  Seen with RN.   More relaxed, pleasant today.  Did not bring up court proceedings.  Not allowed to go unless court specifically for pat per UR.   Waiting for suitable placement.  Denies chest congestion, cough, nausea, abd pain.  Tolerated TFs last night without diarrhea.  SP catheter remains in place.  Has remained dry.  C/o generalized pruritis.  Indicates anxiety not controlled.  No benefit with vistaril.             Medications:   All medications reviewed today          Physical Exam:   Blood pressure 108/75, pulse 82, temperature 96.7  F (35.9  C), temperature source Oral, resp. rate 16, height 1.588 m (5' 2.5\"), weight 60.3 kg (133 lb), SpO2 100 %.    Intake/Output Summary (Last 24 hours) at 04/30/18 0853  Last data filed at 04/30/18 0828   Gross per 24 hour   Intake              630 ml   Output             1100 ml   Net             -470 ml       General:  Alert.  Appropriate.  No distress.  No O2.      Heent:      Neck:    Skin:  Buttock area not examined.  UE's excoriated.  Xerosis.     Chest:  Clear     Cardiac:  Reg without gallop, murmur.  No JVD.     Abdomen:  Non distended, soft, non-tender.  BS normal.     Extremities:  Perfused.  No edema, calf, thigh induration.     Neuro:            Data:     Results for orders placed or performed during the hospital encounter of 04/10/18 (from the past 24 hour(s))   Basic metabolic panel   Result Value Ref Range    Sodium 136 133 - 144 mmol/L    Potassium 4.0 3.4 - 5.3 mmol/L    Chloride 105 94 - 109 mmol/L    Carbon Dioxide 23 20 - 32 mmol/L    Anion Gap 8 3 - 14 mmol/L    Glucose 127 (H) 70 - 99 mg/dL    Urea Nitrogen 13 7 - 30 mg/dL    Creatinine 0.51 (L) 0.52 - 1.04 mg/dL    GFR Estimate >90 >60 mL/min/1.7m2    GFR Estimate If Black >90 >60 mL/min/1.7m2    Calcium 8.1 (L) 8.5 - 10.1 mg/dL       CBC RESULTS:   Recent Labs   Lab Test  04/28/18   0934  04/20/18   " 0940   WBC   --   6.9   RBC   --   3.37*   HGB  8.4*  8.1*   HCT   --   28.2*   MCV   --   84   MCH   --   24.0*   MCHC   --   28.7*   RDW   --   19.5*   PLT   --   300                Assessment and Plan:   1)  T4 paraplegia 2nd to hematoma.  2)  Febrile illness with chronic  multiple stage IV decubital ulcers, L heel wound and chronic R sacral osteomyelitis.  On zosyn and doxycycline.  Wound cultures from buttocks E coli ESBL, proteus, pseudomonas ( R to zosyn).   Impression of chronic osteomyelitis with element of cellulitis/dermatitis.  Abscess or deep acute infection not felt likely per plastic surgery.  10 day ABs  with completion of zosyn 4/20.  Off doxycycline 4/23.   2nd severe dermatitis due to constant moisture from urine leakage.  On diflucan. Off load as pt allows  3)  Chronic pain with generalized discomfort. Thickened bladder wall may account for lower abdominal discomfort.  Candida on UC.   Neg C. Diff.   4)  COPD with retained upper airway secretions 2nd to weak cough.  Interval clearing.    Maintaining oxygenation.  Ground glass and nodular opacities RUL chest CT, unclear significance.   Infection not responding to treatment unlikley with normal temp, no hypoxemia.  Reviewed with pulmonary and ID.  No new AB recommendations.    5)  Anemia likely 2nd to CD and iron deficiency per record.  Adequate.    6)  Left comminuted acute subtrochanteric fracture with recent fall.  7)  Hx of DVT on Rivaroxaban. Chest c/o's intermittent largely MSK.   8)  Seizure disorder. On keppra taper and lamotrigine titration.    9)  Diastolic CHF.  Compensated.   10)  Depression, anxiety, PTSD.  Off Cymbalta, gabapentin, lamictal as not taking regularly per Dr. Cadet.   11)  Hypothyroidism with TSH 49.56 likely related to med non compliance. Inconsistently taking synthroid. Took today.   12)  Hypoalbuminemia.  Protein loss from wounds and poor nutrition.  Reviewed with nutrition and CR surgery - as unlikely to improve  significantly with pt's inconsistent po intake.   PICC placed with TPN 4/22 followed by IR G-tube placement 4/27 with TF's.  Tolerated over night regimen.   12)  Closed fracture zygoma.  13)  Consideration for diverting colostomy.  Per urology will review with CR surgery to consider combined procedure with urinary diversion to keep katia area dry (if continues to be an issue).   14)  S/p B PNT 2 months ago, for the intent of urinary diversion (since removed).  Has SP catheter with chronic urethral leakage.   Candida on UC (prob colonization)  15)  Nausea basis unclear - potential relation to meds, GERD.  Improved.  Lower abdominal discomfort resolved.   Tolerating po.      16)  HA discomfort - issue since craniotomy for SDH. Neg CT for acute change.   17)  Chronic diarrhea improved with scheduled imodium. Goal is to minimize frequent incontinent stools but need to assure pt is having a least occ stools  18)  Pruritis potentially related to xerosis.   No rash.              PLAN:  1)  TF's at HS.  2)  SW working on dispo.  3)  Informed of Hospital policy not to go on pass for court - SW to attempting to contact patient's .  4)  Benadryl prn and eucerin cream.  Monitor clinically.  5)  Increase valium 4 mg prn dosing.   Disposition Plan   Expected discharge this week to SNF if arranged.      Entered: Ravi Lawrence 05/02/2018, 1:27 PM              Attestation:  I have reviewed today's vital signs, notes, medications, labs and imaging.     Ravi Lawrence MD

## 2018-05-02 NOTE — PLAN OF CARE
Problem: Infection, Risk/Actual (Adult)  Goal: Identify Related Risk Factors and Signs and Symptoms  Related risk factors and signs and symptoms are identified upon initiation of [employer] Clinical Practice Guideline (CPG).   Outcome: No Change  Care from 2005-1521  VS: Refused VS   O2: Refused spot check   Output: Suprapubic in place with adequate output - refused cleaning of site   Last BM: 5/2; incontinent at baseline   Activity: Refuses q3h repositioning. Refused at 1530 but allowed turning at 1715.   Skin: Wounds on bottom, thigh, left heel.    Pain: Managed with PO dilaudid   CMS: Baseline paraplegic   Dressing: Bottom dressings done today and are CDI. Heel dressing needs to be done yet today - refused.   Diet: Regular with tube feedings at night   LDA: PICC in place. Refused assessment; unable to observe dressing d/t pt laying on R arm. Writer asked if pt could turn slightly to visualize dressing and pt became agitated. Refused assessment of G tube   Equipment: Suprapubic catheter, specialty bed, pillows, PICC   Plan: TBD

## 2018-05-02 NOTE — PLAN OF CARE
"Problem: Patient Care Overview  Goal: Plan of Care/Patient Progress Review    VS: Pt refused vitals, approached and educated several times but continue to refused. Stated\" I am not a medical pt here so I don't need it.\" Educated yet pt continues to refused.    O2: In room air and  No signs of SOB   Output: Adequate urine output,approached to clean the suprapubic opening but pt declined.    Last BM: Small loose BM this AM, 5/2/18   Activity: Bedrest, repositioned side to side per pt reference   Skin: Several skin breakdown as baseline.   Pain: Given Dilaudid 2mg every 4 hours    CMS: Paraplegic as baseline   Dressing: Dressing changed in right IT 1x this shift   Diet: Regular. Started Isosource 1.5 at 30ml/hr around 2230 per pt request.  Advance feeding by 10 ml/hr every 2hrs.    LDA: Double lumen PICC at R arm patent and in SL.Unable to see the due date for dressing changed. Pt will not allow writer to see and check site.   Equipment: IV pole, G tube pump, w/c and personal belongings.    Plan: TBD   Additional Info:            "

## 2018-05-02 NOTE — PROGRESS NOTES
"Social Work Services Progress Note    Hospital Day: 23    Collaborated with:  Donnell FISHER [facility] pt liaison    Data:  DC plan    Intervention:  Per Donnell, pt has been declined from [facility] facilities as \"We are in the business of helping people get better; Marychuy's behaviors and consistent refusal to accept help or care means we cannot meet her needs and will not be able to help her get better.\"    Writer and RNCC Nena Webber attempted to meet w/pt and update her on DC plan including exploring alternative DC options. Pt was sleeping soundly and did not waken to presence of SW And RNCC or voices of RNCC and SW.     Assessment:  pt difficult to placed based upon her noncompliance with cares    Plan:    Anticipated Disposition:  [facility]    Barriers to d/c plan:  Finding safe DC plan and looking for placement    Follow Up:  SW con't to follow    "

## 2018-05-03 NOTE — PROGRESS NOTES
"[facility] Internal Medicine Progress Note            Interval History:   Record reviewed.  Seen with RN.   Remains relaxed, pleasant.  Again did not bring up court proceedings.  Continued TF's over night.  Indicates diarrhea X 2 this AM.  No nausea, abd pain.  Has SP catheter - remains dry.  No CP, SOB, congestion.   Decrease pruritis.  Open to staying with a friend who potentially can provide PCA services.  Not accepted at any SNF's.             Medications:   All medications reviewed today          Physical Exam:   Blood pressure 108/75, pulse 82, temperature 96.7  F (35.9  C), temperature source Oral, resp. rate 16, height 1.588 m (5' 2.5\"), weight 60.3 kg (133 lb), SpO2 100 %.    Intake/Output Summary (Last 24 hours) at 04/30/18 0853  Last data filed at 04/30/18 0828   Gross per 24 hour   Intake              630 ml   Output             1100 ml   Net             -470 ml          General:  Alert.  Appropriate.  No distress.  No O2.      Heent:      Neck:    Skin:  Buttock area not examined.      Chest:  Clear     Cardiac:  Reg without gallop, murmur.  No JVD.     Abdomen:  Non distended, soft, non-tender.  BS normal.     Extremities:  Perfused.  No edema, calf, thigh induration.     Neuro:            Data:     No results found for this or any previous visit (from the past 24 hour(s)).    CBC RESULTS:   Recent Labs   Lab Test  04/28/18   0934  04/20/18   0940   WBC   --   6.9   RBC   --   3.37*   HGB  8.4*  8.1*   HCT   --   28.2*   MCV   --   84   MCH   --   24.0*   MCHC   --   28.7*   RDW   --   19.5*   PLT   --   300                Assessment and Plan:   1)  T4 paraplegia 2nd to hematoma.  2)  Febrile illness with chronic  multiple stage IV decubital ulcers, L heel wound and chronic R sacral osteomyelitis.  On zosyn and doxycycline.  Wound cultures from buttocks E coli ESBL, proteus, pseudomonas ( R to zosyn).   Impression of chronic osteomyelitis with element of cellulitis/dermatitis.  Abscess or deep " acute infection not felt likely per plastic surgery.  10 day ABs  with completion of zosyn 4/20.  Off doxycycline 4/23.   2nd severe dermatitis due to constant moisture from urine leakage.  On diflucan. Off load as pt allows  3)  Chronic pain with generalized discomfort. Thickened bladder wall may account for lower abdominal discomfort.  Candida on UC.   Neg C. Diff.   4)  COPD with retained upper airway secretions 2nd to weak cough.  Interval clearing.    Maintaining oxygenation.  Ground glass and nodular opacities RUL chest CT, unclear significance.   Infection not responding to treatment unlikley with normal temp, no hypoxemia.  Reviewed with pulmonary and ID.  No new AB recommendations.    5)  Anemia likely 2nd to CD and iron deficiency per record.  Adequate.    6)  Left comminuted acute subtrochanteric fracture with recent fall.  7)  Hx of DVT on Rivaroxaban. Chest c/o's intermittent largely MSK.   8)  Seizure disorder. On keppra taper and lamotrigine titration.    9)  Diastolic CHF.  Compensated.   10)  Depression, anxiety, PTSD.  Off Cymbalta, gabapentin, lamictal as not taking regularly per Dr. Cadet.   11)  Hypothyroidism with TSH 49.56 likely related to med non compliance. Inconsistently taking synthroid. Took today.   12)  Hypoalbuminemia.  Protein loss from wounds and poor nutrition.  Reviewed with nutrition and CR surgery - as unlikely to improve significantly with pt's inconsistent po intake.   PICC placed with TPN 4/22 followed by IR G-tube placement 4/27 with TF's.  Tolerated over night regimen.   12)  Closed fracture zygoma.  13)  Consideration for diverting colostomy.  Per urology will review with CR surgery to consider combined procedure with urinary diversion to keep katia area dry (if continues to be an issue).   14)  S/p B PNT 2 months ago, for the intent of urinary diversion (since removed).  Has SP catheter with chronic urethral leakage.   Candida on UC (prob colonization)  15)  Nausea basis  unclear - potential relation to meds, GERD.  Resolved.   Lower abdominal discomfort resolved.   Tolerating po.      16)  HA discomfort - issue since craniotomy for SDH. Neg CT for acute change.   17)  Chronic diarrhea improved with scheduled imodium. Goal is to minimize frequent incontinent stools but need to assure pt is having a least occ stools  18)  Pruritis potentially related to xerosis.   No rash.  Improved.              PLAN:  1) Continue TF's at HS.  2)  SW/CC working on dispo. To friend's with services being considered.  3)  Informed of Hospital policy not to go on pass for court - SW to attempting to contact patient's .  4) same meds, orders.  Monitor clinically.   Disposition Plan   Expected discharge this week to SNF if arranged.      Entered: Ravi Lawrence 05/03/2018, 2:35 PM              Attestation:  I have reviewed today's vital signs, notes, medications, labs and imaging.     Ravi Lawrence MD

## 2018-05-03 NOTE — PLAN OF CARE
Problem: Patient Care Overview  Goal: Plan of Care/Patient Progress Review  Outcome: No Change    VS: Pt refused.   O2: Pt refused.   Output: Adequate per gonzalez.   Last BM: 05/03/18   Activity: Bedrest, pt refused repositioning every 2 hours this evening.    Skin: Pt refused full assessment this evening.    Pain: comfortably manageable with dilaudid po PRN.    CMS: Baseline paraplegic.    Dressing: Done during day shift by day RN pt refused assessment un able to view.    Diet: Regular and G tube feeding at night.    LDA: PICC line, gonzalez, G tube.   Equipment: IV pole, PCD.    Plan: TBD.    Additional Info:

## 2018-05-03 NOTE — PROGRESS NOTES
Care Coordinator - Discharge Planning    Admission Date/Time:  4/10/2018  Attending MD:  Toan Kingston MD     Data  Date of initial CC assessment:  5/2/2018  Chart reviewed, discussed with interdisciplinary team.   Patient was admitted for:   1. Wound of right buttock, initial encounter    2. Decubitus ulcer of sacral region, stage 4 (H)    3. Cellulitis of buttock, right         Assessment   Concerns with insurance coverage for discharge needs: None.  Current Living Situation: Patient lives alone.  Support System: Involved  Services Involved: DME and Home Care  Transportation at Discharge: TBD  Transportation to Medical Appointments:    - Name of caregiver: Felix Weston  Barriers to Discharge: dependent with mobility/activities of daily living, history of non-alliance, lack of support system/caregiver and physical impairment      Coordination of Care and Referrals: Met with patient at bedside to discuss discharge planning. Patient would benefit from a TCU stay for complex wound care and nutritional needs. Unfortunately due to patient's documented behavior and non compliance issues patient has been denied from several TCU's. Patient currently lives alone. Patient states she would be able to stay with her friend Cristina Washington. According to patient, Cristina Torres is currently working on becoming her PCA. Patient sees this becoming a reality in the next week. If able to discharge to [facility], patient would need a hospital bed with an air flow mattress. Patient is on nightly tube feeds to help improve her nutritional status in the hopes of future surgery for a diverted colostomy. Patient would need daily dressing changes and has been on service, in the past, with [facility].     TCU would be patient's best option for health goal success. Providers express concerns with patient going home and not being able to get optimum care with here complex medical issues. RNCC will continue to follow.           Plan  Anticipated Discharge Date:  TBD  Anticipated Discharge Plan:  TBD    CTS Handoff completed:  ANOOP Webber RN, BSN  Care Coordinator, 8A  Phone [phone]  Pager [phone]

## 2018-05-03 NOTE — PLAN OF CARE
Problem: Infection, Risk/Actual (Adult)  Goal: Identify Related Risk Factors and Signs and Symptoms  Related risk factors and signs and symptoms are identified upon initiation of Human Response Clinical Practice Guideline (CPG).   Outcome: Improving    VS: Refused   O2: Refused   Output: Suprapubic in place   Last BM: 5/3   Activity: Bedrest; refused repositioning q2h; only repositioned when pt requested to be turned.   Skin: BUE dry, itchy; eucerin cream ordered. Wounds on L heel, L posterior upper thigh, R IT, coccyx. Rash on chest.   Pain: Managed with PO dilaudid   CMS: Baseline paraplegic   Dressing: L heel, L posterior upper thigh, R IT and coccyx dressings changed this shift. G tube dressing CDI.   Diet: Regular diet with tube feedings at night. Pt drank a large amount of apple juice throughout the shift.   LDA: G tube feeding stopped at 1000. PICC dressing changed today. Suprapubic in place; pt refused anything more than a quick wipe of the site; refused turning to assess any further.   Equipment: G tube, Suprapubic, PICC   Plan: TBD   Additional Info: Benadryl given x1, also taking valium.

## 2018-05-03 NOTE — PROGRESS NOTES
WOC Consult    WOC following patient for multiple pressure injuries (all present on admission) as well as severe IAD.    Attempted to assess wounds today. Patient refused stating dressings do not need to be changed.    Attempted to discuss use of specialty mattress which patient has refused to use. Patient states she is being discharged and that the wound on her right IT is healing and that she does not need a specialty mattress.    Attempted to discuss her refusal to turn. Patient refused to answer questions or engage in conversation.    WOC will coordinate dressing change for tomorrow 5/4/18 with nursing so WOC can fully assess wounds and skin.    Karen Burns RN, CWOCN

## 2018-05-03 NOTE — PLAN OF CARE
Problem: Patient Care Overview  Goal: Plan of Care/Patient Progress Review  Outcome: No Change  Patient A/Ox4. VS refused. Refused most assessments. Voiding onto bed (?) due to suprapubic catheter being (presumably) out of patient, unable to irrigate successfully or get proper visualization of site. CMS baseline for pt, paraplegia. Dressings all over patient UTV, did see G-tube when placing tube feeding, RN May assisted with listening and starting feeding. Tolerating regular diet without NV, pt has not requested anything for N/V or c/o any issues. Activity is bedrest. IV PICC in place, UTV and dressing change is almost certainly overdue as pt refuses dsg changes. Pain rated as tolerable throughout shift, pt taking oral dilaudid PRN. Patient has demonstrated ability to call appropriately. Patient is resting with call light within reach. Will continue to monitor.

## 2018-05-03 NOTE — PLAN OF CARE
"Pt output in catheter is very low, pt possibly laying on it, refused to be repositioned, stated \"Oh, it's fine\" while half asleep. Did manage to move catheter free from underneath pt, but UTV site. Area underneath pt seems to be moist from catheter leakage. Pt is pleasant at this hour but uncooperative per her usual overnight behavior. Requested a green blanket around 0240.  Addendum: Gonzalez bag still has limited to no output, assumedly the suprapubic catheter is out of pt. Pt sleeping, will not allow any visualization of site. Did attempt to irrigate gonzalez, unsuccessful; syringe did not draw back after infusing irrigant. Pt smells slightly of urine, has many pillows and difficult to visualize any skin.  "

## 2018-05-04 NOTE — PLAN OF CARE
Problem: Infection, Risk/Actual (Adult)  Goal: Identify Related Risk Factors and Signs and Symptoms  Related risk factors and signs and symptoms are identified upon initiation of Human Response Clinical Practice Guideline (CPG).   Outcome: No Change  VS: Pt refused.   O2: RA.   Output: Suprapubic catheter that is leaking at urethra    Last BM: Incontinent, loose, 5/4/18.   Activity: Bedrest, refused frequent repositioning.   Skin: Pt refused full assessment. Wounds to buttock and heel, rash.   Pain: Pain in shoulders managed with dilaudid    CMS: Pt refused assessment   Dressing: Dressings to buttocks changed around on pau shift my WOC nurse. Pt refused change at 1800. Dressing CDI.    Diet: Regular, tube feedings to start at 2000 and run overnight   LDA: G-tube, pt refused assessment. PICC right basilic SL. Suprapubic catheter.    Equipment: Pillows, call light within reach.   Plan: Continue to monitor to determine placement.    Additional Info: Declined zofran

## 2018-05-04 NOTE — PLAN OF CARE
Problem: Infection, Risk/Actual (Adult)  Goal: Identify Related Risk Factors and Signs and Symptoms  Related risk factors and signs and symptoms are identified upon initiation of Human Response Clinical Practice Guideline (CPG).   Outcome: No Change  VS: Refused   O2: Refused   Output: Suprapubic in place, patient refusing to let writer assess low output or site.    Last BM: 5/3. Patient had last stool this evening.    Activity: Bedrest; refused repositioning q2h; only repositioned when pt requested to be turned.   Skin: Patient refusing creams or powders this evening. Wounds on L heel. Patient did not let writer assess. Groin appears really red but patient not letting writer assess or clean katia area. Dressings to coccyx and IT soiled with urine and tool. Changed per orders. Patient not allowing staff to remove all linens underneath her and requesting only a chux on top.    Pain: Managed with PO dilaudid   CMS: Baseline paraplegic. Unable to assess any changes.   Dressing: R IT and coccyx dressings changed this shift. G tube dressing CDI. Patient did not let writer assess G-tube site.   Diet: Regular diet with tube feedings at night. Started at 2045 pm.Tube feeding started at 60ml and increased to 70ml/hr this shift. Will continue to increase per orders until 100ml/hr. Tolerating well.    LDA: Picc dressing CDI. Suprapubic in place; pt refused assessment; refused turning to assess any further.   Equipment: G tube, Suprapubic, PICC   Plan: TBD   Additional Info: Patient requesting valium and dilaudid this shift. Patient denied shortness of breath or chest pain. Reports chronic pain and declined any other assessments. Writer attempted to assess G tube site and wound, patient declined. Declined any other assessments.

## 2018-05-04 NOTE — PROGRESS NOTES
"[facility] Internal Medicine Progress Note            Interval History:   Record reviewed.  Seen with RN.   Remains pleasant.  Again did not bring up court proceedings.  Continued TF's over night. Tolerating reasonably well.  Some increase periumbilical abdominal discomfort earlier.  Loose BM this AM.  Increase fiber added to TF by nutrition. No nausea, reflux.    Has SP catheter -wet again this AM.  No CP, SOB, congestion.   Decrease pruritis.  Wants benadryl scheduled.  Remains open to staying with a friend who potentially can provide PCA services.  Not accepted at any SNF's.             Medications:   All medications reviewed today          Physical Exam:   Blood pressure 93/55, pulse 64, temperature 97.6  F (36.4  C), temperature source Oral, resp. rate 12, height 1.588 m (5' 2.5\"), weight 60.3 kg (133 lb), SpO2 92 %.    Intake/Output Summary (Last 24 hours) at 04/30/18 0853  Last data filed at 04/30/18 0828   Gross per 24 hour   Intake              630 ml   Output             1100 ml   Net             -470 ml   I/O this shift:  In: 1180 [P.O.:90; NG/GT:1090]  Out: -       General:  Alert.  Appropriate.  No distress.  No O2.      Heent:      Neck:    Skin:  Buttock area examined with WOC RN - significant reduction in degree of erythema.     Chest:  Clear - no rales, rhonchi.     Cardiac:  Reg without gallop, murmur.  No JVD.     Abdomen:  Non distended, soft, subjective mild katia umbilical tenderness.  No guarding.  BS normal.     Extremities:  Perfused.  No edema, calf, thigh induration.     Neuro:            Data:     Results for orders placed or performed during the hospital encounter of 04/10/18 (from the past 24 hour(s))   Basic metabolic panel   Result Value Ref Range    Sodium 137 133 - 144 mmol/L    Potassium 4.4 3.4 - 5.3 mmol/L    Chloride 103 94 - 109 mmol/L    Carbon Dioxide 26 20 - 32 mmol/L    Anion Gap 8 3 - 14 mmol/L    Glucose 146 (H) 70 - 99 mg/dL    Urea Nitrogen 15 7 - 30 mg/dL    " Creatinine 0.50 (L) 0.52 - 1.04 mg/dL    GFR Estimate >90 >60 mL/min/1.7m2    GFR Estimate If Black >90 >60 mL/min/1.7m2    Calcium 8.4 (L) 8.5 - 10.1 mg/dL       CBC RESULTS:   Recent Labs   Lab Test  04/28/18   0934  04/20/18   0940   WBC   --   6.9   RBC   --   3.37*   HGB  8.4*  8.1*   HCT   --   28.2*   MCV   --   84   MCH   --   24.0*   MCHC   --   28.7*   RDW   --   19.5*   PLT   --   300                Assessment and Plan:   1)  T4 paraplegia 2nd to hematoma.  2)  Febrile illness with chronic  multiple stage IV decubital ulcers, L heel wound and chronic R sacral osteomyelitis.  On zosyn and doxycycline.  Wound cultures from buttocks E coli ESBL, proteus, pseudomonas ( R to zosyn).   Impression of chronic osteomyelitis with element of cellulitis/dermatitis.  Abscess or deep acute infection not felt likely per plastic surgery.  10 day ABs  with completion of zosyn 4/20.  Off doxycycline 4/23.   2nd severe dermatitis due to constant moisture from urine leakage.  On diflucan. Off load as pt allows.  Improved.   3)  Chronic pain with generalized discomfort. Thickened bladder wall may account for lower abdominal discomfort.  Candida on UC.   Neg C. Diff.   4)  COPD with retained upper airway secretions 2nd to weak cough.  Interval clearing.    Maintaining oxygenation.  Ground glass and nodular opacities RUL chest CT, unclear significance.   Infection not responding to treatment unlikley with normal temp, no hypoxemia.  Reviewed with pulmonary and ID.  No new AB recommendations.    5)  Anemia likely 2nd to CD and iron deficiency per record.  Adequate.    6)  Left comminuted acute subtrochanteric fracture with recent fall.  7)  Hx of DVT on Rivaroxaban. Chest c/o's intermittent largely MSK.   8)  Seizure disorder. On keppra taper and lamotrigine titration.    9)  Diastolic CHF.  Compensated.   10)  Depression, anxiety, PTSD.  Off Cymbalta, gabapentin, lamictal as not taking regularly per Dr. Cadet.  Increase prn  valium dosing.   11)  Hypothyroidism with TSH 49.56 likely related to med non compliance. Inconsistently taking synthroid. Receiving more regularly.   12)  Hypoalbuminemia.  Protein loss from wounds and poor nutrition.  Reviewed with nutrition and CR surgery - as unlikely to improve significantly with pt's inconsistent po intake.   PICC placed with TPN 4/22 followed by IR G-tube placement 4/27 with TF's.  Tolerating reasonably well.  Increase fiber with loose BM.  Monitor abdominal c/o's (exam benign).    12)  Closed fracture zygoma.  13)  Consideration for diverting colostomy.  Per urology will review with CR surgery to consider combined procedure with urinary diversion to keep katia area dry (if continues to be an issue).   14)  S/p B PNT 2 months ago, for the intent of urinary diversion (since removed).  Has SP catheter with chronic urethral leakage.   Candida on UC (prob colonization)  15)  Nausea basis unclear - potential relation to meds, GERD.  Resolved.   Lower abdominal discomfort resolved.   Tolerating po.      16)  HA discomfort - issue since craniotomy for SDH. Neg CT for acute change.   17)  Chronic diarrhea improved with scheduled imodium. Goal is to minimize frequent incontinent stools but need to assure pt is having a least occ stools  18)  Pruritis potentially related to xerosis.   No rash.  Improved.              PLAN:  1) Continue TF's at HS with increase fiber.  2)  SW/CC working on dispo. To friend's with services ongoing plan.  3)  Sheduled benadryl as tolerated.  Otherwise same meds, orders.  Monitor clinically.   Disposition Plan   Expected discharge to friend's home once arrangements made.      Entered: Ravi Lawrence 05/04/2018, 2:57 PM              Attestation:  I have reviewed today's vital signs, notes, medications, labs and imaging.     Ravi Lawrence MD

## 2018-05-04 NOTE — PLAN OF CARE
Problem: Infection, Risk/Actual (Adult)  Goal: Identify Related Risk Factors and Signs and Symptoms  Related risk factors and signs and symptoms are identified upon initiation of Human Response Clinical Practice Guideline (CPG).   Outcome: No Change    VS: Pt refused.   O2: RA.   Output: Suprapubic catheter with 75 ml out. Incontinent of urine, leaking.   Last BM: Incontinent, loose, 5/4/18.   Activity: Bedrest, refused frequent repositioning.   Skin: Pt refused full assessment. Wounds to buttock and heel, rash.   Pain: Pain in shoulders/upper mid back managed with valium 4 mg q6h last given at 0256 and dilaudid 1-2 mg q4h last given at 0455.   CMS: Pt refused assessment, Para - no movement BLE.   Dressing: Dressings to buttocks changed around 0345 after incontinence. WOC nurse to change dressings today.   Diet: Regular, tube feedings overnight advanced to 100 ml/hr.   LDA: G-tube, pt refused assessment. PICC right basilic SL. Suprapubic catheter, pt refused assessment.   Equipment: Pillows, call light within reach.   Plan: Continue to monitor. WOC nurse to change dressings today.   Additional Info: Pepto bismol given at 0702 but pt was unable to tolerate full dose, only 15 mL consumed or half the dose.

## 2018-05-04 NOTE — PROGRESS NOTES
[facility] Nurse Inpatient Pressure Injury Assessment     Follow up Assessment  Reason for consultation: Evaluate and treat multiple pressure injuries and severe IAD      ASSESSMENT    Pressure Injury: on left heel, present on admission   Pressure Injury is Stage Unstageable   Status: stable    Pressure Injury: on left upper thigh, present on admission   Pressure Injury is Stage 3  Status: resurfacing    Pressure Injury: on right IT, present on admission  Pressure Injury is Stage 4   Status: stable    Pressure Injury: on left sacrum, present on admission   Pressure Injury is Stage 3   Status: Slight improvement    Pressure Injury: on coccyx, present on admission  Status: Resurfaced     Severe Incontinence Associated Dermatitis over entire buttock, perineum, upper thighs and anterior groin folds  Status: Improving     TREATMENT PLAN    Left heel wound: wash every other day with wound cleanser and gauze. Apply a thick layer of Medihoney (order #[id number]) to wound bed and cover with Mepilex 4x4. If dressing rolls at edges, OK to use Primapore tape to keep in place.    Left upper thigh wound: wash every other day and as needed if soiled with wound cleanser and gauze. Pat dry. Cover with Mepilex 4x4.    Right IT wound: wash daily and as needed if soiled with urine or stool with wound cleanser and gauze. Pack wound to entire depth (7 cm) with Kerlix dampened with saline. Cover with ABD and secure with Primapore tape.    Left sacral wound: wash daily with wound cleanser and gauze. Cover with Mepilex 4x4.    Bilateral PNT tube sites: healed, dressing no longer indicated    Buttock, perineum, upper thighs IAD: wash twice daily and with each episode of incontinence with Greta Cleanse and Protect and a soft cloth. Apply a thick layer of Criticaide Paste to skin. Do not attempt to remove Criticaide Paste with each episode of incontinence, just wipe of soiled paste and reapply. To remove all Chriss, use baby/mineral oil and a soft cloth.  "Due to large amount of urine being diverted past suprapubic catheter and out urethra, please change Covidon sheet with position changes every 2 hours to keep dry.    Orders Written  WOC Nurse follow-up plan:weekly  Nursing to notify the Provider(s) and re-consult the WOC Nurse if wound(s) deteriorates or new skin concern.    Patient History  According to provider note(s):  This is a 62 year old female with PMH significant for T4 paraplegia, chronic pain COPD, anemia, recent hip fracture, Hx of DVT on anticoagulation (rivaroxiban), anxiety, depression, diastolic heart failure, hypothyroidism and chronic stage 4 decubti with known osteo who presents with fevers, drainage, and cellulitis of her buttock area.     Objective Data   Containment of urine/stool: Suprapublic catheter, continues to bypass catheter and leaking out urethra despite catheter exchange one week ago.     Patient currently incontinent of stool, however frequency and amount is decreasing. Due to paraplegia, patient is unaware when she stools. In the nursing home, prior to admission, she states she will sit in her chair for \"hours\" with stool present. During discussion of definitive treatment for right IT Stage 4 Pressure Injury with Dr Pelletier on 4/11/18, a diverting colostomy was discussed and is required prior to patient undergoing any flap surgery. Patient states she is open to discussing this further, however Colon and Rectal Surgery require the patient to be nutritionally improved before surgery.    Current Diet/ Nutrition:    Active Diet Order      Regular Diet Adult    Output:   I/O last 3 completed shifts:  In: 540 [P.O.:480; NG/GT:60]  Out: 325 [Urine:325]    Risk Assessment:   Sensory Perception: 3-->slightly limited  Moisture: 3-->occasionally moist  Activity: 1-->bedfast  Mobility: 2-->very limited  Nutrition: 2-->probably inadequate  Friction and Shear: 1-->problem  Isaiah Score: 12      Labs:     Recent Labs  Lab 04/30/18  0830 " 04/28/18  0934   HGB  --  8.4*   INR 1.30*  --        No lab results found in last 7 days.    Physical Exam  Skin assessment: buttocks, perineum, left heel    5/2: Patient continues to refuse to be turned, refusing wet pad to be changed.  Able to tuck dry pad over damp green pad under her. Refusing pulsate mattress.  Educated pt on pressure injury prevention.  Pt states she doesn't care.  She needs more pain meds to turn comfortably.  Patient still awaiting placement.    Wound Location:  Left heel  Date of last Photo 5/4/18         Wound History: Present on admission  Measurements (length x width x depth, in cm) 2 cm x 2.3 cm  x  0 cm  Wound Base: black eschar in center surrounded by yellow adherent slough  Tunneling N/A  Undermining N/A  Palpation of the wound bed: boggy   Periwound skin: denuded  Color: pink  Temperature: normal   Drainage: small  Description of drainage: tan, creamy  Odor: none  Pain: absent, paraplegia     Wound Location:  Left upper thigh  Wound History: Present on admission  Measurements (length x width x depth, in cm) 1 cm x 1 cm x 0 cm  Wound Base: smooth, granular base  Tunneling N/A  Undermining N/A  Palpation of the wound bed: normal   Periwound skin: intact  Color: pink  Temperature: normal   Drainage: none  Description of drainage: none  Odor: none  Pain: absent, paraplegia    Wound Location:  Right IT  Date of last Photo 5/4/2018         Wound History: Present on admission  Measurements (length x width x depth, in cm) 4 cm x 3.8 cm  x  7.2 cm (4/26: no change)  Wound Base:  Beefy red granulation tissue with bone palpated at base  Tunneling N/A  Undermining N/A  Palpation of the wound bed: normal   Periwound skin: denuded  Color: red  Temperature: normal   Drainage: small  Description of drainage: bloody  Odor: none  Pain: absent, paraplegia    Wound Location:  Left sacrum  Date of last Photo 5/2/2018         Wound History: Present on admission  Measurements (length x width x depth, in  cm) 2 cm x 1.5 cm  x  0.1 cm   Wound Base:  Yellow slough mixed with smooth, pale granular tissue  Tunneling N/A  Undermining N/A  Palpation of the wound bed: normal   Periwound skin: denuded  Color: red  Temperature: normal   Drainage: none  Description of drainage: none  Odor: none  Pain: absent, paraplegia      Wound Location:  Coccyx  Resurfaced    Wound Location:  Buttock, perineum, upper thighs  Date of last Photo 5/2/2018                                                                   Wound History: Patient incontinent of stool and suprapubic catheter is currently diverting our the urethra.  Denuded skin is improving, less red with healing epidermis  Color: red  Temperature: normal   Drainage: none  Description of drainage: none  Odor: none  Pain: absent, paraplegis    Wound Location:  Bilateral flanks  Healed    Interventions  Current support surface: Standard  Low air loss mattress- Pulsate refused by pt multiple times.      Current off-loading measures: Pillows under calves  Repositioning aid: pillows  Visual inspection of wound(s) completed   Wound Care: was done per plan of care.  Supplies: gathered and placed at the bedside  Education provided to: patient  and nurse  Discussed importance of:repositioning every 2 hours, off-loading pressure to wound, wearing off-loading boots, their role in pressure injury prevention, dressing change frequency, head elevation <30 degrees, off-loading mattress, nutrition on wound healing and moisture management    Discussed plan of care with Patient, Nurse    Face to face time: 45 minutes    Karen Burns RN

## 2018-05-04 NOTE — PROGRESS NOTES
"CLINICAL NUTRITION SERVICES - REASSESSMENT NOTE     Nutrition Prescription    RECOMMENDATIONS FOR MDs/PROVIDERS TO ORDER:  Recommend scheduling Synthroid medication 4 hours after TF are stopped to increase bioavailability     Malnutrition Status:    Patient does not meet two of the above criteria necessary for diagnosing malnutrition    Recommendations already ordered by Registered Dietitian (RD):  Ordered Fibersource in MAR to fernando administered with overnight TF     Future/Additional Recommendations:  1. Continue to advance TF to goal as tolerated. If pt continues to have significant diarrhea without improvement from fiber modulars, could consider increasing fiber vs switching formula.      EVALUATION OF THE PROGRESS TOWARD GOALS   Diet: Regular Diet, snack (yogurt x2, cherry gelatein x2)   Nutrition Support: Isosource 1.5 via G-tube @100 mL/hr x 12 hours overnight (8pm-8am) + 1 pkt prosource TID and 1 pkt fiber TID to provide 1200 mL, 1965 kcal (34 kcal/kg), 115 g protein (2 g/kg), 211 g CHO, 27 g fiber, and 912 mL free water per DW of 57 kg, meeting 100% of estimated energy and protein needs.   Intake: Per nursing chart, pt reached 70 mL/hr 5/3 evening, tolerating well (meets 74% of energy and 79% of protein needs). Per flowsheets, pt finishing 0-50% of meals via PO. Noted per nursing chart \"eating lots of sugary food\". Patient reports the feeds are more tolerable overnight. She reports eating yogurt and Gelatein on occasion but not consistently, reports the Boost in her room taste funny and she thinks they are going bad. Has not been receiving fiber packets as they were not ordered in the MAR - this was changed today.      NEW FINDINGS   -Skin: per [facility] nurse and nursing notes 5/3, pt is refusing skin assessment.   -Labs:  trending down  -Weight: no recent weight over the past 11 days.     MALNUTRITION  % Intake: Decreased intake does not meet criteria  % Weight Loss: Unable to assess  Subcutaneous Fat Loss: " None observed  Muscle Loss: None observed  Fluid Accumulation/Edema: None noted  Malnutrition Diagnosis: Patient does not meet two of the above criteria necessary for diagnosing malnutrition    Previous Goals   1. Weight maintenance at or above 60 kg.  Evaluation: Unable to evaluate  2. Diet advancement post-op to regular diet within 24-48 hours, total average nutritional intakes (PO/EN) to meet minimum 30 kcal/kg and 2 g/kg protein.   Evaluation: Suspect Met    Previous Nutrition Diagnosis  Inadequate energy intake related to NPO status, reliance on TPN to meet nutrition needs as evidenced by TPN meeting only 80% of energy needs, currently NPO for G-tube placement.     Evaluation: Improving    CURRENT NUTRITION DIAGNOSIS  Inadequate protein-energy intake related to inadequate enteral nutrition infusion, variable appetite as evidenced by pt taking inconsistent PO, EN not yet to goal (meeting only 74% of energy and 79% of protein needs at most recent rate).       INTERVENTIONS  Implementation  Collaboration with other providers - discussed with MD and RN  Enteral Nutrition - continue to increase as tolerated to goal of 100 mL/hr x 12 hours overnight. Fiber modular ordered in MAR.   Nutrition education for recommended modifications - continued to encourage PO intakes of high protein foods. Pt states her appetite comes and goes. Discussed that at goal of 100 mL/hr overnight her TF will meet 100% of estimated nutrition needs. Pt requests more fiber.   Nutrition-related medication management - discussed changing timing of synthroid administration to increase bioavailability, RN will change to 12pm.      Goals  Total avg nutritional intake to meet a minimum of 30 kcal/kg and 2 g PRO/kg daily (per dosing wt 57 kg).    Monitoring/Evaluation  Progress toward goals will be monitored and evaluated per protocol.    Vianey Art RD, LD  Unit Pager: [phone]

## 2018-05-04 NOTE — PLAN OF CARE
"Problem: Infection, Risk/Actual (Adult)  Goal: Identify Related Risk Factors and Signs and Symptoms  Related risk factors and signs and symptoms are identified upon initiation of Human Response Clinical Practice Guideline (CPG).   Outcome: No Change  Wounds/ wound care  Pt becomes agitated at staff when ask to repositioned. \" I haven't slept all night\" pt states. Refused to order food until  3 pm. Refused IV Zofran saying she wasn't nauseated. Requested from MD benadryl and this was given for itching. Dilaudid and valium offer to pt and she did take. Tube feeding D/C at 7am. G tube flushed after meds given. Has leaking urine but wouldn't allow nurse to examine site. Urine on Chux pad. Urine dark deo in bag. Moderate amt of brown soft BM. Amalia care given twice. Pt refused to move for sheet change and bath.  P: Offer cares to pt      "

## 2018-05-05 NOTE — PROGRESS NOTES
"Pt assessed at start of shift.  Pt refused all initial assessments. Pt was cordial with RN, but vulgar with NST when asked about repositioning.  Pt stated, \"I am comfy at this time.  I just want to sleep.  I will call you when I want to be repositioned\".     "

## 2018-05-05 NOTE — PROGRESS NOTES
Nutrition Services Brief Note    Per discussion with MD, pt did not tolerating feeding at 100 mL/hr last evening. Chart review suggests pt has previously tolerated at 70 mL/hr, but overall has had ongoing issues with tolerance. MD requesting the rate be reduced this evening and the try gradually ramping back up to goal rate of 100 mL/hr.    Intervention  1. TF rate reduced to 50 mL/hr for this evening.    Recommendations  1. Recommend advance TF to 75 mL/hr on 5/6 and 100 mL/hr on 5/7.  2. If patient continues to have issues with tolerance, recommend transition to TwoCal HN in order to allow patient to meet nutritional intake goals at a lower rate.      RD will continue to follow per plan of care.      Maria T Sanchez RD

## 2018-05-05 NOTE — PLAN OF CARE
"Problem: Infection, Risk/Actual (Adult)  Goal: Identify Related Risk Factors and Signs and Symptoms  Related risk factors and signs and symptoms are identified upon initiation of Human Response Clinical Practice Guideline (CPG).   Outcome: No Change    VS: Refused VS   O2: Refused VS   Output: Pt voiding via suprapubic catheter, leak noted at site (moist urine seen on sheets) but pt refused to let writer assess site as she was laying on her side.   Last BM: Multiple soft loose BM's today, immodium increased. Also takes pepto bismuth.    Activity: Pt bedrest, paraplegic.    Skin: Multiple wounds, TIMMY L foot wound. Coccyx and IT wounds changed when cleaning stool from patient (changed x2). Skin is warm, diaphoretic at times. R big toe blanchable redness.   Pain: Pain is being managed with PRN oxycodone 5-10 mg Q4H (switched from PO dilaudid 2-4 mg Q4H). Oxycontin discontinued.    CMS: Pt paraplegic, swelling +3 noted on bilateral feet.    Dressing: Dressings to coccyx and R IT changed x2 today d/t stool. Pt requesting to have evening nurse change L foot dressing. UTV suprapubic site/dressing. Gtube dressing CDI.    Diet: Pt eating small amounts, has feeding tube for feedings overnight (vomited last night, upset stomach; nutrition changed orders to have feedings at 50 mL/hr instead of 100 mL/hr). Protein packets held d/t diarrhea, per MD can possibly give evening dose if diarrhea has improved.    LDA: PICC SL, dressing change due 5/10. Did not flush as pt refused d/t laying on her side.   Equipment: Chux pads, dressing supplies, personal belongings at bedside   Plan: Continue to monitor patient, manage pain, and wounds. Pt working to discharge with her friend who is training to become a PCA. Discharge TBD pending home arrangements.   Additional Info: Pt vomited last night with tube feeding, emesis noted on sheets near pillow. Pt reported that \"I did not have my call light in reach and I thought I was going to choke\". " Pt refusing scheduled zofran. Tube feeding rate adjusted.   Pt directs her own care

## 2018-05-05 NOTE — PLAN OF CARE
Problem: Patient Care Overview  Goal: Plan of Care/Patient Progress Review  Pt. A&Ox4. Pt refused full assessment and vitals. CMS and neuro's are intact. Denies nausea, shortness of breath, and chest pain. Suprapubic catheter is leaking as urine is passing. Tolerating regular diet. Gtube feeding started at 2030, pt stated she wasn't feeling well and demanded to stop the feeding early this am, 801mL had infused at the time. Pt also refused to have the tubing from the feeding disconnected until later. Pt refused full assessment of dressings, agreed to have the R IT dressing changed at 0400 after having smear of stool. PICC is SL in R arm. Bilateral heels are elevated off the bed. Call light is within reach, pt directs her own cares. Will continue to monitor and offer cares.

## 2018-05-05 NOTE — PROGRESS NOTES
"[facility] Internal Medicine Progress Note            Interval History:   Record reviewed.  Seen with RN.  Indicates \"really sick\" over night.  Onset nausea and emesis during night of TF's.  Not documented by staff but noted on pillow.  Decrease nausea when seen.  Declined zofran.  Large diarrheal stool this AM \"down to her ankles\".  TF's increased to 100 cc's last PM to meet nutrition requirement.    Mild periumbilical abdominal discomfort earlier.  No  reflux.   SP catheter - wet again this AM.  No CP, SOB, congestion.   Decrease pruritis.  On scheduled benadryl.  Wants oxycodone in place of dilaudid and increase imodium.   Remains open to staying with a friend who potentially can provide PCA services. Arrangements still not in place.           Medications:   All medications reviewed today          Physical Exam:   Blood pressure 93/55, pulse 64, temperature 97.6  F (36.4  C), temperature source Oral, resp. rate 14, height 1.588 m (5' 2.5\"), weight 60.3 kg (133 lb), SpO2 92 %.    Intake/Output Summary (Last 24 hours) at 04/30/18 0853  Last data filed at 04/30/18 0828   Gross per 24 hour   Intake              630 ml   Output             1100 ml   Net             -470 ml          General:  Alert.  Appropriate.  No distress.  No O2.  Odor of TF's.     Heent:      Neck:    Skin:  Buttock area examined with WOC RN 5/4 - significant reduction in degree of erythema.     Chest:  Clear - no rales, rhonchi.     Cardiac:  Reg without gallop, murmur.  No JVD.     Abdomen:  Non distended, soft, subjective mild katia umbilical tenderness.  No guarding.  BS normal.     Extremities:  Perfused.  No edema, calf, thigh induration.     Neuro:            Data:     No results found for this or any previous visit (from the past 24 hour(s)).    CBC RESULTS:   Recent Labs   Lab Test  04/28/18   0934  04/20/18   0940   WBC   --   6.9   RBC   --   3.37*   HGB  8.4*  8.1*   HCT   --   28.2*   MCV   --   84   MCH   --   24.0*   MCHC   " --   28.7*   RDW   --   19.5*   PLT   --   300     BMP 5/4 OK.            Assessment and Plan:   1)  T4 paraplegia 2nd to hematoma.  2)  Febrile illness with chronic  multiple stage IV decubital ulcers, L heel wound and chronic R sacral osteomyelitis.  On zosyn and doxycycline.  Wound cultures from buttocks E coli ESBL, proteus, pseudomonas ( R to zosyn).   Impression of chronic osteomyelitis with element of cellulitis/dermatitis.  Abscess or deep acute infection not felt likely per plastic surgery.  10 day ABs  with completion of zosyn 4/20.  Off doxycycline 4/23.   2nd severe dermatitis due to constant moisture from urine leakage.  On diflucan. Off load as pt allows.  Improved.   3)  Chronic pain with generalized discomfort. Thickened bladder wall may account for lower abdominal discomfort.  Candida on UC.   Neg C. Diff.   4)  COPD with retained upper airway secretions 2nd to weak cough.  Interval clearing (remains clear despite emesis)    Maintaining oxygenation.  Ground glass and nodular opacities RUL chest CT, unclear significance.   Infection not responding to treatment unlikley with normal temp, no hypoxemia.  Reviewed with pulmonary and ID.  No new AB recommendations.    5)  Anemia likely 2nd to CD and iron deficiency per record.  Adequate.    6)  Left comminuted acute subtrochanteric fracture with recent fall.  7)  Hx of DVT on Rivaroxaban. Chest c/o's intermittent largely MSK.   8)  Seizure disorder. On keppra taper and lamotrigine titration.    9)  Diastolic CHF.  Compensated.   10)  Depression, anxiety, PTSD.  Off Cymbalta, gabapentin, lamictal as not taking regularly per Dr. Cadet.  Increase prn valium dosing.   11)  Hypothyroidism with TSH 49.56 likely related to med non compliance. Inconsistently taking synthroid. Receiving more regularly.   12)  Hypoalbuminemia.  Protein loss from wounds and poor nutrition.  Reviewed with nutrition and CR surgery - as unlikely to improve significantly with pt's  "inconsistent po intake.   PICC placed with TPN 4/22 followed by IR G-tube placement 4/27 with TF's. N/V during night as well as diarrhea this AM likely related to inability to tolerate 100/hr TF rate.    12)  Closed fracture zygoma.  13)  Consideration for diverting colostomy.  Per urology will review with CR surgery to consider combined procedure with urinary diversion to keep katia area dry (if continues to be an issue).   14)  S/p B PNT 2 months ago, for the intent of urinary diversion (since removed).  Has SP catheter with chronic urethral leakage.   Candida on UC (prob colonization)  15)  Nausea basis unclear - potential relation to meds, GERD.  Resolved.   Lower abdominal discomfort resolved.   Tolerating po.      16)  HA discomfort - issue since craniotomy for SDH. Neg CT for acute change.   17)  Increase in chronic diarrhea requesting increase imodium.   18)  Pruritis potentially related to xerosis.   No rash.  Improved.              PLAN:  1)   Reviewed with nutrition - decrease TF rate to 50 cc/hr tonight with resumed protein supplements.  2)  Abdominal film to ensure unremarkable bowel gas pattern.  3)  Change loperimide to 4 mg TID.  Oxycodone 2.5-5 mg q4h prn  in place of dilaudid.    4)  SW/CC working on dispo. To friend's with services ongoing plan.  Otherwise same meds, orders.  Monitor clinically.   Disposition Plan   Expected discharge to friend's home once arrangements made, hopefully next week. .      Entered: Ravi Lawrence 05/05/2018, 1:05 PM     ADD:  Reviewed opiate regimen with patient.  Requesting 10 mg oxycodone.  Indicated already on oxycontin 20 mg q12h (\"that does nothing\").  Agrees to IR oxycodone with DC oxycontin.  Reviewed with RN that oxycodone cannot be dc'ed abruptly as may develop WD.          Attestation:  I have reviewed today's vital signs, notes, medications, labs and imaging.     Ravi Lawrence MD          "

## 2018-05-06 NOTE — PLAN OF CARE
"Problem: Infection, Risk/Actual (Adult)  Goal: Identify Related Risk Factors and Signs and Symptoms  Related risk factors and signs and symptoms are identified upon initiation of Human Response Clinical Practice Guideline (CPG).     VS: Refused VS   O2: Refused. Infrequent cough   Output: Suprapubic catheter in place, leaking as bed was wet with urine. Dressing around site was applied.     Last BM: 5/5/2018, passing gas   Activity: Bedrest; able to reposition x1   Skin: Scabs, scars, pressure wounds. Unable to assess or complete dressing changes due to refusal    Pain: Managed with 10 mg oxycodone q4h, valium.    CMS: Bilateral LE sensation absent   Dressing: Pt refused dressing changes except for dressing around suprapubic site   Diet: Regular, G-tube Isosource infused from 2000 to 0600. Pt complained of increased nausea and feeling like she was going to vomit and requested G-tube to be stopped. Stopped at 0600 and Zofran given. No emesis. Pt also refused both doses of fiber overnight.    LDA: PICC in R SL, G-tube, suprapubic gonzalez    Equipment:    Plan: Continue to monitor and wait for placement.   Additional Info: Went into patient's room at beginning of shift asking if I could change her sheets as they were soiled and to reposition her. She agreed and I got assistance from an aide. Pt was extremely upset with care as she said we were not doing it the correct way. Continued to do as she requested but it was still not done as she wanted. Eventually got so angry and frustrated that she kicked out aide and said \"get out of my room or I'm going to kill somebody!\". Continued cares with other nurse and able to get clean sheets on. Unable to do dressing changes or assess wounds.              "

## 2018-05-06 NOTE — PROGRESS NOTES
Nutrition Services Brief Note    Per discussion with RN, pt is requesting the nutrisource fiber to be discontinued as she feels it is exacerbating her loose stools/ abdominal discomfort. Tube feedings were stopped at 6am this morning per pt request as she felt nauseous    Interventions  1. Nutrisource fiber discontinued.  2. Order to keep TF at 50 mL/hr.    RD will continue to follow per plan of care.      Maria T Sanchez, CELI

## 2018-05-06 NOTE — PLAN OF CARE
Problem: Infection, Risk/Actual (Adult)  Goal: Identify Related Risk Factors and Signs and Symptoms  Related risk factors and signs and symptoms are identified upon initiation of Human Response Clinical Practice Guideline (CPG).   Outcome: No Change    VS:     VSS on RA   Output:     Voids using suprapubic. Incontinent of stool.  Changed 1x  BM 5/5 and passing gas   Activity:     Bedrest. Refused repositioning all shift.  Used A2 to do wound care   Skin: Wounds, scars, scars, drains   Pain:     PRN 10mg oxycodone given e4h  Ice applied as requested  Alternative therapies offered   Neuro/CMS:      sensation absent BLE   Dressing:     Changed per plan of care.  Thigh dressing not able to be changed or assessed due to pt refusing to be reposititioned   Diet:     regular diet, tolerated poor, pt states nausea,  No vomiting, no appetite, Currently running 50ml/hr Tube feeding with 120ml flush e4hr   LDA:     PICC double lumen SL and  patent   Equipment:     IV pole   Plan:     Continue plan of care and infection control/wound care.   Additional Info:

## 2018-05-06 NOTE — PLAN OF CARE
"Problem: Infection, Risk/Actual (Adult)  Goal: Identify Related Risk Factors and Signs and Symptoms  Related risk factors and signs and symptoms are identified upon initiation of Human Response Clinical Practice Guideline (CPG).   Outcome: No Change   Vitals  Declined x 3   Output:     Voids using suprapubic. Incontinent of stool.  Changed X2.  BM 5/5 and passing gas. Refused to be checked for incontinence at 1705 and 1800. Pt was uncooperative and education was provided on importance of skin/incontinence care.     Activity:     Bedrest. Refused repositioning x 4.  Used A2 to do wound care.    Skin: Wounds, scars, scars, drains. Reddened between legs and patient declined mycatin and education provided. Stated \"it's been like that for 10 years and isn't gonna change\".     Pain:     PRN 10mg oxycodone given q4h   Neuro/CMS:     Sensation absent BLE   Dressing:     All dressings changed per plan of care x 2.    Diet:     Regular diet, tolerated poor, pt states nausea,  No vomiting, no appetite, Protein given by feeding tube, otherwise tube disconnected. Refused glucose monitoring.    LDA:     PICC double lumen SL and  patent   Equipment:     IV pole   Plan:     Continue plan of care and infection control/wound care.   Additional Info:                 "

## 2018-05-06 NOTE — PROGRESS NOTES
"[facility] Internal Medicine Progress Note            Interval History:   Record reviewed.  Discussed with RN.  Remains \"difficult\" with staff.  TF's reduced to 50 cc/hr last night.  Nausea approximately 3AM.  No emesis.  Large loose BM this AM.  Only minimal abdominal discomfort.    No  reflux.   SP catheter - wet again this AM.  No CP, SOB, congestion.   Decrease pruritis.  On scheduled benadryl.  On  oxycodone in place of dilaudid.  Oxycontin dc'ed.  Scheduled loperimide 4 mg TID.   Plan remains to staying with a friend who potentially can provide PCA services. Arrangements still not in place.           Medications:   All medications reviewed today          Physical Exam:   Blood pressure 93/55, pulse 64, temperature 97.6  F (36.4  C), temperature source Oral, resp. rate 14, height 1.588 m (5' 2.5\"), weight 60.3 kg (133 lb), SpO2 92 %.    Intake/Output Summary (Last 24 hours) at 04/30/18 0853  Last data filed at 04/30/18 0828   Gross per 24 hour   Intake              630 ml   Output             1100 ml   Net             -470 ml          General:  Alert.  Appropriate.  No distress.  No O2.      Heent:      Neck:    Skin:  Buttock area examined with WOC RN 5/4 - significant reduction in degree of erythema.     Chest:  Clear - no rales, rhonchi.     Cardiac:  Reg without gallop, murmur.  No JVD.     Abdomen:  Non distended, soft, subjective mild katia umbilical tenderness.  No guarding.  BS normal.     Extremities:  Perfused.  No edema, calf, thigh induration.     Neuro:            Data:     No results found for this or any previous visit (from the past 24 hour(s)).    CBC RESULTS:   Recent Labs   Lab Test  04/28/18   0934  04/20/18   0940   WBC   --   6.9   RBC   --   3.37*   HGB  8.4*  8.1*   HCT   --   28.2*   MCV   --   84   MCH   --   24.0*   MCHC   --   28.7*   RDW   --   19.5*   PLT   --   300     BMP 5/4 OK.            Assessment and Plan:   1)  T4 paraplegia 2nd to hematoma.  2)  Febrile illness with " chronic  multiple stage IV decubital ulcers, L heel wound and chronic R sacral osteomyelitis.  On zosyn and doxycycline.  Wound cultures from buttocks E coli ESBL, proteus, pseudomonas ( R to zosyn).   Impression of chronic osteomyelitis with element of cellulitis/dermatitis.  Abscess or deep acute infection not felt likely per plastic surgery.  10 day ABs  with completion of zosyn 4/20.  Off doxycycline 4/23.   2nd severe dermatitis due to constant moisture from urine leakage.  On diflucan. Off load as pt allows.  Improved.   3)  Chronic pain with generalized discomfort. Adequate on oxycodone prn off oxycontin.   4)  COPD with retained upper airway secretions 2nd to weak cough.  Resolved.  No suggestion of aspiration despite recent emesis.   5)  Anemia likely 2nd to CD and iron deficiency per record.  Adequate.    6)  Left comminuted acute subtrochanteric fracture with recent fall.  7)  Hx of DVT on Rivaroxaban. Chest c/o's intermittent largely MSK.   8)  Seizure disorder. On keppra.  9)  Diastolic CHF.  Compensated.   10)  Depression, anxiety, PTSD.  Off Cymbalta, gabapentin, lamictal as not taking regularly per Dr. Cadet.  Increase prn valium dosing.   11)  Hypothyroidism with TSH 49.56 likely related to med non compliance. Inconsistently taking synthroid. Receiving more regularly.   12)  Hypoalbuminemia.  Protein loss from wounds and poor nutrition.  Reviewed with nutrition and CR surgery - as unlikely to improve significantly with pt's inconsistent po intake.   PICC placed with TPN 4/22 followed by IR G-tube placement 4/27 with TF's. TF related nausea likely related to volume and potentially intolerance to fiber component per pt.  May benefit from transition to G-J tube.     12)  Closed fracture zygoma.  13)  Consideration for diverting colostomy.  Per urology will review with CR surgery to consider combined procedure with urinary diversion to keep katia area dry (if continues to be an issue).   14)  S/p B PNT 2  months ago, for the intent of urinary diversion (since removed).  Has SP catheter with chronic urethral leakage.   Candida on UC (prob colonization)  15)  HA discomfort - issue since craniotomy for SDH. Neg CT for acute change.   16)  Chronic diarrhea aggravated by TF's on scheduled loperamide as above.   17)  Pruritis potentially related to xerosis.   No rash.  Improved on eucerin and benadryl..              PLAN:  1)   Review TF's with nutrition (adjustment of protein, fiber) - continue rate 50 cc/hr tonight.  If nausea persists consider chenge to G-J tube.    2)   Same meds - oxycodone prn (off oxycontin), scheduled loperamide.  3)   SW/CC working on dispo. To friend's with services ongoing plan.  Otherwise same other meds, orders.  Monitor clinically.   Disposition Plan   Expected discharge to friend's home once arrangements made, hopefully next week. .      Entered: Ravi Lawrence 05/06/2018, 1:46 PM              Attestation:  I have reviewed today's vital signs, notes, medications, labs and imaging.     Ravi Lawrence MD

## 2018-05-07 NOTE — PLAN OF CARE
Problem: Patient Care Overview  Goal: Plan of Care/Patient Progress Review  Outcome: No Change  Able to have buttock checked.Incontinent of stool,watery.R IT drsg changed(packed),L buttock wound covered with mepilex drsg.Barrier cream applied all reddened areas on buttocks/groin.Declined to change position,have been laying on R side.L heel mepilex drsg,CDI.SP cath drsg,CDI.GT drsg,CDI.TF isosource initiated at 2130 d/t patient refusing earlier.Taking oxycodone 10mg for L shoudler blade pain.Refusing set of vitals.Denies any dizziness/lightheadedness.Denies nausea.Calls to make needs known.

## 2018-05-07 NOTE — PROGRESS NOTES
"Social Work Services Progress Note    Hospital Day: 28    Collaborated with:  Yudith FISHER, [facility] Care Coordinator    Data:  DC plan    Intervention:  Per [facility] RN, pt has been in communication with Cristina Torres (friend she wants to live with) and has told Cristina Torres that she \"won't have to do anything\" for pt in terms of care.  Per Yudith, pt was supposed to have CADI assessment through [facility] this past fall, 2017. Pt declined assessment at this time. Yudith has made another referral to [facility] for CADI Assessment.    Because of this, writer as made additional referrals to [facility] and [facility]    Assessment:  pt remains difficult placement due to behaviors    Plan:    Anticipated Disposition:  home vs. SNF    Barriers to d/c plan:  CADI Assessment, finding placement    Follow Up:  SW cont' to follow      Addenum: 12:48  [facility] is assessing [phone]    1406: Writer received voicemail stating pt has been declined from [facility]. [facility] may be able to accept pt but they need to verify days.    1555: Pt has been accepted to [facility]. Per Dr Cadet and admission at [facility], because it is late in day, plan for Admission on Tuesday. Pt is agreeable to this plan, and happy to know she has placement.    "

## 2018-05-07 NOTE — PROGRESS NOTES
"  VS: Refused to allow staff to obtain readings!   O2: Refused to allow staff to obtain readings.   Output: Supra pubic cath continues to leak. Some urine is being collected in drainage bag.   Last BM: 5/7/2018   Activity: Allowed us to turn her x 1.    Skin: Has an increase in redness under right armpit, inbetween groin folds, has a lot of new scratches(dry patches on left side) and has some red patches on lower legs that are dry and reddened.    Pain: Requesting Oxycodone every 4 hours 10 mg.    CMS: Has an increase in swelling in both lower feet.    Dressing: Dressing on bottom changed x 1 after stool inct   Diet: Has not had anything to eat for breakfast or lunch. Drinks a lot of apple juice and gingerale.    LDA:    Equipment:    Plan: Trying a new topical to dry patches if patient allows.    Additional Info: Pt continues to be very difficult with planing cares. Very abusive and noncompliant with any staff that enters into room. Pt wanting to be \"left alone\" all the time. Dr Cadet aware of patient behavior. Status of patient will continue to be monitored.        "

## 2018-05-07 NOTE — PROGRESS NOTES
"CLINICAL NUTRITION SERVICES BRIEF NOTE    Met with patient briefly today to discuss change in formula as patient has been refusing increases in TF to goal d/t complaints of nausea or vomiting or increased diarrhea.     Discussed potential switch to higher calorie formula to allow for lower volumes and continue to meet nutrition needs for wound healing. Patient at first states \"I need to discuss this with the doctor\", however after further explanation from writer pt was agreeable.     Intervention  Enteral Nutrition - ordered new formula and rate as follows:   TwoCal HN via G-tube of 75 mL/hr x 12 hours 8 pm - 8am + 1 packet Prosource QID to provide 1960 kcal (34 kcal/kg) and 120 g protein (2.1 g/kg) to meet 100% of nutrition needs.   Continue with free water flushes of 120 mL q 4 hours.     Vianey Art RD, LD  Unit Pager: [phone]  "

## 2018-05-07 NOTE — PROGRESS NOTES
"Pt seen, case reviewed with team      Pt continues to intermittently refuse cares, tx  Permitted TF (50 cc/hr) for much but not all of the night--had requested holding of TF for a while, secondary to nausea, abd fullness (she denies this to me today--states TF are going fine\"  + occ loose stools    Pt maintains she has sufficient help at home, with home services and friend, to live at home.  I have suggested to her that we need to review cares with home services and friend.    VSS  Alert, irritable, in NAD  Lungs clear  CV rrr  Abd soft  + extensive folliculitis over shoulders and legs  Wounds over buttocks were not examined    Results for SHALINI CHONG (MRN [id number]) as of 5/7/2018 11:32   Ref. Range 5/7/2018 06:17   Sodium Latest Ref Range: 133 - 144 mmol/L 141   Potassium Latest Ref Range: 3.4 - 5.3 mmol/L 4.5   Chloride Latest Ref Range: 94 - 109 mmol/L 104   Carbon Dioxide Latest Ref Range: 20 - 32 mmol/L 27   Urea Nitrogen Latest Ref Range: 7 - 30 mg/dL 12   Creatinine Latest Ref Range: 0.52 - 1.04 mg/dL 0.50 (L)   GFR Estimate Latest Ref Range: >60 mL/min/1.7m2 >90   GFR Estimate If Black Latest Ref Range: >60 mL/min/1.7m2 >90   Calcium Latest Ref Range: 8.5 - 10.1 mg/dL 8.4 (L)   Anion Gap Latest Ref Range: 3 - 14 mmol/L 10   Magnesium Latest Ref Range: 1.6 - 2.3 mg/dL 2.2   Phosphorus Latest Ref Range: 2.5 - 4.5 mg/dL 4.7 (H)   Albumin Latest Ref Range: 3.4 - 5.0 g/dL 1.9 (L)   Prealbumin Latest Ref Range: 15 - 45 mg/dL 12 (L)   Protein Total Latest Ref Range: 6.8 - 8.8 g/dL 7.9   Bilirubin Total Latest Ref Range: 0.2 - 1.3 mg/dL 0.2   Alkaline Phosphatase Latest Ref Range: 40 - 150 U/L 228 (H)   ALT Latest Ref Range: 0 - 50 U/L 26   AST Latest Ref Range: 0 - 45 U/L 20   Glucose Latest Ref Range: 70 - 99 mg/dL 97   INR Latest Ref Range: 0.86 - 1.14  1.50 (H)       Abd flat plate 5/518 unremarkable        Assessment    T4 paraplegia    Chronic multiple decubitus ulcers involving sacral area, L heel, with " chronic osteo R sacral. On no antibiotics at this time    Chronic back and shoulder pain, stable    COPD, stable    Extensive dermatitis over back, secondary to stool and urine irritation.  Folliculitis type rash over shoulders and legs    Hypoalbuminemia, secondary to poor nutrition and wounds, now on TF    Diarrhea, abd discomfort secondary to TF    Hypothyroidism, on replacement.    Depression, prob personality d/o, with refusal to allow cares, meds. Pt's desire to d/c to home not realistic without extensive help    Plan  TMC cream to rash  Increased scheduled imodium   Continue noct TF, monitor oral intake  Consider advancement of GT to JT (Pt does not desire this)   is attempting to clarify Pt's resources for home

## 2018-05-07 NOTE — PROGRESS NOTES
Care Coordinator - Discharge Planning    Admission Date/Time:  4/10/2018  Attending MD:  Toan Kingston MD     Data  Date of initial CC assessment:   5/3/2018  Chart reviewed, discussed with interdisciplinary team.   Patient was admitted for:   1. Wound of right buttock, initial encounter    2. Decubitus ulcer of sacral region, stage 4 (H)    3. Cellulitis of buttock, right         Assessment   Full assessment completed in previous note    Coordination of Care and Referrals: This writer received a call from SARAH Zurita with [facility]. Yudith states patient has fired Harpreet, previous [facility] CC. Yudith will be taking over for Harpreet. September of 2017 a referral was made to [facility] for a Karen Waiver. Patient declined [facility] services at that time stating she would lose her PCA. Currently, Yudith has submitted a referral to [facility] for a Karen Waiver. Yudith will contact [facility] regarding patient's belongings that are still at the TCU.  SW looking into potential placement at [facility]. RNSARAH will continue to follow as needed.    Yudith Ribera RNCC, Critical [facility] hospital  Phone [phone]      Plan  Anticipated Discharge Date:  TBD  Anticipated Discharge Plan:  TBD    CTS Handoff completed:  NO    Nena Webber RN, BSN  Care Coordinator,   Phone [phone]  Pager [phone]

## 2018-05-07 NOTE — PROGRESS NOTES
Patient allowed restart of tubefeeding at 0400. Patient continues to refuse all other cares and assessments.

## 2018-05-07 NOTE — PROGRESS NOTES
Social Work Services Discharge Note      Patient Name:  Marychuy Zhou     Anticipated Discharge Date: 5/8/18    Discharge Disposition:   TCU:  [facility]     Following MD:  Dr Georgina Jennings and NP Sonia Marie      Pre-Admission Screening (PAS) online form has been completed.  The Level of Care (LOC) is:  Determined  Confirmation Code is:  [id number]  Patient/caregiver informed of referral to [facility] Line for Pre-Admission Screening for skilled nursing facility (SNF) placement and to expect a phone call post discharge from SNF.     Additional Services/Equipment Arranged:  Stretcher ride via [employer]  at 3pm     Patient / Family response to discharge plan:  agreeable     Persons notified of above discharge plan:  Pt, Dr Cadet, Admissions at [facility], pt's [facility]  Yudith    Staff Discharge Instructions:  Please fax discharge orders and signed hard scripts for any controlled substances.  Please print a packet and send with patient.     CTS Handoff completed:  YES    Medicare Notice of Rights provided to the patient/family:  YES      Addendum:  Pt requested and writer complied with request to have her Electric WC plugged in at [facility]. Pt states w/o having a full charge, her WC is not easily moved. Writer called [facility] and spoke w/Puma, who verbalized understanding and agreement to plug in pt's WC.  Pt updated.

## 2018-05-08 NOTE — PLAN OF CARE
Problem: Infection, Risk/Actual (Adult)  Goal: Identify Related Risk Factors and Signs and Symptoms  Related risk factors and signs and symptoms are identified upon initiation of Human Response Clinical Practice Guideline (CPG).     VS: Declined vitals   O2: In room air and denies SOB   Output: Suprapubic cathter continous to leak. Output:    Last BM: 5/8/18, small loose BM   Activity: Bedrest,refused repositioning   Skin: Redness in inner thigh and buttocks area, cream applied. Unable to assess area due to pt refusal for assessment and repositioning.    Pain: Given PRN Oxycodone twice during shift.    CMS: Baseline numbness on bilateral LE. Paraplegic   Dressing: C/D/I   Diet: Regular. Isosource GTube feeding at 75 mls/hour started at 2030 per report . New bag and tubing attached around 0100. Prior to that, Gtube was seen leaking from previous bag and feeding was all over mattress and blanket. Pt is laying on the wet area and refused to be changed. Educated and approached several times but pt continues to refused change of sheets.    LDA: Double PICC R upper arm: patent and SL.   Equipment: none   Plan: Possible d/c today.    Additional Info: Evening tray still at  Bedside. Pt refused tray to be taken away.

## 2018-05-08 NOTE — PLAN OF CARE
Problem: Patient Care Overview  Goal: Plan of Care/Patient Progress Review  Outcome: Improving    VS: declined   O2: Declined   Output: Leaking suprapibiccatheter   Last BM: 5/8/18   Activity: Bedrest, paraplegic   Skin: wounds   Pain: Managed with Roxicodone 10 mg po   CMS:    Dressing: Dressing done on Rt IT    Diet: regular   LDA: G-tube   Equipment:    Plan: D/c to [facility]    Additional Info: Pt declined most of the cares. Allowed this writer to do dressing change on rt IT. Declined repositioning. Pt directs own cares,would like thins done her way. Asked for pain medication when needed. Suprapubic cleansed and dressing applied. Barrier cream applied to buttocks. Declined inner thigh to be cleaned and likewise refused  dressing  Change on left heel. Able to make needs known. Contact precaution maintained.

## 2018-05-08 NOTE — DISCHARGE SUMMARY
Admit Date:     04/10/2018   Discharge Date:           HISTORY OF PRESENT ILLNESS:  Marychuy Zhou is a 62-year-old female with a complex past medical history related to chronic truncal and extremity decubitus ulcers secondary to paraplegia, who was admitted to the hospital from her nursing home for the evaluation of low grade fevers and worsening pain in her right hip area in the setting of concerns regarding worsening of chronic infection in this area.  The patient has been hospitalized at [facility] several days previously and had been discharged on Augmentin.  The patient had specifically requested to come to this hospital as she had been referred to Dr. Mayela Pelletier of Plastic Surgery for consideration of flap placement.  She had not actually been seen by Dr. Pelletier however.      HOSPITAL COURSE:  The patient was admitted for the evaluation of her multiple decubitus ulcers and for plastic surgery evaluation.  She was started empirically on Zosyn and vancomycin.  After assessment by Plastic Surgery and by the wound nurse, it was felt that while she likely has chronic osteomyelitis of the sacrum, there was no evidence of  systemic infection.  The patient did have an area of erythema surrounding her right sacral wound and was treated for cellulitis with an approximate 2 week course of antibiotics; thereafter antibiotics were discontinued.      The patient was seen by Dr. Pelletier who felt that her nutritional status was too tenuous to allow for surgery at this time.  In addition, the patient does have constant stooling and urinary leakage around her suprapubic catheter which continually soils her decubitus ulcers.  Ultimately, it is felt that she will need a diverting colostomy and some type of diverting urologic procedure, in addition to improvement in her nutritional status, before she would be a candidate for flap surgery.      The bulk of the patient's hospitalization was spent in treating  multiple chronic medical issues, as well as providing wound cares and in attempting to improve her nutritional status.  She initially was started on TPN.  This was discontinued and a G-tube was placed during this hospitalization without incident.  She was able to tolerate tube feedings fairly well, though as expected, she did have some diarrhea which continued to contaminate her decubitus ulcers.  At the time of admission, she had bilateral nephrostomy tubes in place which had been placed several weeks previously in an attempt to divert urine from her bladder in view of leaking around her suprapubic catheter.  At the time of admission, left nephrostomy tube was displaced and was removed.  Ultimately, both nephrostomy tubes were removed as they had not been successfully provided urinary diversion, and she was continuing  to have leakage around the suprapubic catheter.  Again, she at some point should undergo a diverting urologic procedure in addition to a diverting colostomy.      The patient on admission was noted to have chest congestion and to be hypoxic.  This was felt likely to be secondary to pneumonia as well as to impaired clearance of respiratory secretions in the setting of paraplegia.  This did improve with antibiotic therapy and with p.r.n. nebulizer treatments.        Other medical problems addressed included:   1.  Hypothyroidism.  The patient was found to be profoundly hypothyroid with a TSH of 50.  She does have levothyroxine ordered but was often noncompliant with this, as well as most of her other medications.  The importance of maintaining compliance with thyroid replacement was discussed at length.   2.  Chronic pain.  The patient ultimately was maintained on short-acting oxycodone with some improvement in her chronic back and shoulder pain.    3.  Extensive skin irritation, particularly around her sacral decubitus ulcers, largely related to frequent soiling of these areas with stool and urine and  with patient's refusal to allow timely wound and skin cares.  She has been started on oral Diflucan in addition to topical antifungal treatments.   4.  COPD.  As above, the patient did have chest congestion and increased airway secretions which had improved at the time of discharge.   5.  Chronic depression, anxiety, and PTSD.  On admission, the patient was prescribed Cymbalta, gabapentin and Lamictal, as well as p.r.n. Valium.  She consistently refused Cymbalta, gabapentin, and Lamictal and as such, both were discontinued.     6.  Seizure disorder maintained on low dose Keppra without seizures during this hospitalization.   7.  Malnutrition as evidenced by albumin level less than 2, likely secondary to poor intake and protein loss from wounds.  Her albumin remained around 2 during this hospitalization.   8.  Behavioral concerns.  As above, the patient does have history of depression and anxiety.  She frequently refused nursing cares and medications and was often accusatory towards providers.  She is fully oriented and quite goal oriented, though she was unable to understand the importance of compliance with wound cares as a way to achieve her goal, which is to have flap placements in the future.    The patient's son is her power of  and was involved with her care decisions throughout her hospitalization and is aware of the behaviors that have impacted her overall care.      At the time of discharge, the patient is alert, fully oriented, has been tolerating tube feedings at approximately 50 mL an hour during the night with the goal of increasing the tube feeing rate to 75 mL an hour.  She is eating small amounts during the daytime.  Her chronic back pain has been controlled with oxycodone.  She continues to have skin irritation associated with her sacral decubitus ulcer and also has developed a follicular rash over her shoulders and legs, which is being treated with a topical steroid. Her truncal and  extremity decubiti are felt to be stable, per the [facility] Nurse.     As above, the patient is quite insistent that she will ultimately be able to go home where she lives with a friend, though this seems to be an unrealistic expectation at least until her nutrition improves and she can undergo the series of surgeries as noted above.      DISCHARGE DIAGNOSES:   1.  T4 paraplegia secondary to hematoma with multiple truncal and extremity decubitus ulcers including right IT, stage IV injury, left sacral stage III ulcer, left heel ulcer, left upper thigh ulcer and coccyx ulcer.   2.  Incontinence associated dermatitis involving the thighs, buttocks and perineum.   3.  Folliculitis involving the upper shoulder and lower extremity area.   4.  Pneumonia, clinically resolved.   5.  Chronic obstructive pulmonary disease.   6.  History of suprapubic catheter with ongoing leakage around the catheter.   7.  Chronic pain, primarily involving the back, treated with oxycodone.   8.  Chronic pelvis osteomyelitis without evidence of systemic infection during this hospitalization.   9.  Chronic anemia secondary to chronic illness, stable.   10.  Recent left acute subtrochanteric fracture, not requiring orthopedic repair.   11.  History of deep venous thrombosis, on chronic Xarelto.   12.  Seizure disorder on Keppra only.   13.  Hypothyroidism with continued elevation of TSH, likely secondary to medication noncompliance.   14.  Hypoalbuminemia secondary to poor nutrition and protein loss from wounds.   15.  History of subdural hematoma, status post craniotomy with a  head CT negative for acute change during this hospitalization.   16.  Probable personality disorder with frequent refusal to accept cares and medications and with unrealistic expectations for discharge to home.      DISCHARGE MEDICATIONS:     1.  Diflucan 150 mg daily for 7 days to treat a fungal dermatitis associated with her chronic skin irritation over her sacral area.   Extension of this course of treatment will need to be considered by the provider at the TCU.     2.  DuoNeb 4 times a day p.r.n.    3.  Loperamide 4 mg 4 times a day scheduled.     4.  Multivitamins once a day.   5.  Zofran 4 mg p.o. every 6 hours p.r.n. nausea.     6.  Triamcinolone cream 0.1% to the areas of folliculitis t.i.d.   7.  Diazepam 4 mg every 6 hours p.r.n. anxiety.   8.  Oxycodone 5-10 mg every 4 hours p.r.n. pain.    9.  Keppra 250 mg twice daily.   10.  Levothyroxine 125 mcg daily.     11.  Miconazole powder to the groin twice daily.   12.  Omeprazole 20 mg daily.     13.  Xarelto 20 mg at bedtime.        Medications that were discontinued during this hospitalization included Cymbalta, valproic acid, gabapentin, hydroxyzine, Lamictal largely secondary to patient noncompliance.       Wound care orders have been included on the discharge orders in EPIC.     The patient has received a 2 letha high protein tube feed at 50 mL an hour to be started at 8:00 p.m. every night and to be discontinued at 8:00 a.m. in the morning.  Ideally this rate should be increased to 75 mL 8 hours a day.        The patient will need to have labs done on a weekly basis to assess her nutritional status.      Based on the patient's improvement in her nutritional status, she should be was referred to Colorectal Surgery and Urology at the [facility] for consideration of a dual colon and urologic diverting process.  At some point, she should also be referred back to Dr. Mayela Pelletier to assess her candidacy for flap surgery.         CORAZON BUI MD             D: 2018   T: 2018   MT: WENDI      Name:     SHALINI CHONG   MRN:      [id number]        Account:        [id number]   :      1955           Admit Date:     04/10/2018                                  Discharge Date:       Document: [id number]

## 2018-05-08 NOTE — PLAN OF CARE
Problem: Patient Care Overview  Goal: Plan of Care/Patient Progress Review  Outcome: Improving    VS: Pt refused to have VS checked   O2: At RA   Output: 100 cc of urine   Last BM: X 2 loose dark green stools this shift, dressing changed x2   Activity: bedrest   Skin: Raw , red and moist, stage 2 and 4 ulcers at Rt gluteal side   Pain: Managed with Oxycodone 10 mg , last given at 1921   CMS: Per baseline   Dressing: Changed twice   Diet: Regular diet , poor appetite. Tube feeding started at 2030 at 50 cc , increased it gradually to 75 cc, Pt tolerating well    LDA: PICC in rt ac, supra pubic catheter, G-Tube,  refused dressing change at GT site   Equipment: none   Plan: Will continue to monitor   Additional Info: Patient is agitated with cares and refusing most of cares. Cream applied at Rt underneath arm, C/O is itching , gave PRN Benadryl 25 mg. Suggests calamine might be more effective. Sticky note left for MD

## 2018-05-08 NOTE — PROGRESS NOTES
"Pt was seen, case reviewed with team    Pt is fatigued this am, states she had a \"rough night\" with some abd discomfort as TF rate was increased to 75 cc/hr  + occc loose stools    Pt denies cough, chest pain or SOB  Afebrile VSS  Sleepy, in NAD, pleasant, fully oriented, is aware and agreeable with plans to d/c to [place] today    Lungs clear  CV rrr  Abd soft, non-tender  Coccyx abd sacral decubitus ulcers were not examined      Assessment      T4 paraplegia     Chronic multiple decubitus ulcers involving sacral area, L heel, with chronic osteo R sacral. On no antibiotics at this time     COPD, stable     Extensive dermatitis over back, secondary to stool and urine irritation.      Hypoalbuminemia, secondary to poor nutrition and wounds, now on TF     Diarrhea, abd discomfort secondary to TF, appears mild     Hypothyroidism, on replacement.     Depression, prob personality d/o, with refusal to allow cares, meds.      Plan  D/c to TCU   Orders completed  Accepting provider was contacted      "

## 2018-05-09 NOTE — ED AVS SNAPSHOT
[facility], Emergency Department    [address] [address]    Phone:  [phone]                                       Marychuy Zhou   MRN: [id number]    Department:  [facility], Emergency Department   Date of Visit:  5/9/2018           Patient Information     Date Of Birth          1955        Your diagnoses for this visit were:     Dislodged gastrostomy tube (H)        You were seen by Michele Stout MD.        Discharge Instructions       TODAY'S VISIT:  You were seen today for dislodged G-tube, now replaced  -     FOLLOW-UP:  Please make an appointment to follow up with:  -Follow-up as needed    PRESCRIPTIONS / MEDICATIONS:  -    OTHER INSTRUCTIONS:  -     RETURN TO THE EMERGENCY DEPARTMENT  Return to the Emergency Department at any time for new/worsening symptoms.       Your next 10 appointments already scheduled     May 10, 2018  9:30 AM CDT   Nursing Home with [facility]   [facility]iatric[facility] Transitional [facility] ([facility])    [address] [address] [phone]            Jun 11, 2018  3:20 PM CDT   (Arrive by 3:05 PM)   New Patient Visit with Faustino Coyne MD   [facility] ([facility])    [address]  [address] [facility] [address] [phone]              24 Hour Appointment Hotline       To make an appointment at any [facility], call [phone] ([phone]). If you don't have a family doctor or clinic, we will help you find one. [facility] are conveniently located to serve the needs of you and your family.          ED Discharge Orders     Discharge Instructions       If questions or problems arise regarding tube function (e.g. leaking, dislodges, etc.) Contact Interventional Radiology department 24 hours a day.    For procedures that were done at the [facility] sites,   8:00-4:30 PM Monday through Friday     Contact:[phone].    For afterhours and weekends call the [facility] main phone line [phone] and ask for the [facility] IR on call physician number.    If DIRECTED by the RADIOLOGIST, related to specific problems with the tube functioning,  go to the Emergency Department.                     Review of your medicines      Our records show that you are taking the medicines listed below. If these are incorrect, please call your family doctor or clinic.        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   Quantity:  1 tablet        Take 1 tablet (150 mg) by mouth daily for 7 days   Refills:  0        ipratropium - albuterol 0.5 mg/2.5 mg/3 mL 0.5-2.5 (3) MG/3ML neb solution   Commonly known as:  DUONEB   Dose:  3 mL   Quantity:  360 mL        Take 1 vial (3 mLs) by nebulization every 4 hours as needed for wheezing   Refills:  0        levETIRAcetam 250 MG tablet   Commonly known as:  KEPPRA   Dose:  250 mg        Take 1 tablet (250 mg) by mouth every evening Until 4/7/2018 then decrease to 250mg BID 4/8/2018-4/21/2018, then decrease to 250mg QAM 4/22-5/5/2018   Refills:  0        LEVOTHYROXINE SODIUM PO   Dose:  125 mcg        Take 125 mcg by mouth every morning   Refills:  0        loperamide 2 MG capsule   Commonly known as:  IMODIUM   Dose:  4 mg   Quantity:  20 capsule        Take 2 capsules (4 mg) by mouth 4 times daily   Refills:  0        miconazole 2 % Aerp powder   Commonly known as:  MICATIN        Apply topically 2 times daily Apply to groin folds   Refills:  0        mineral oil-hydrophilic petrolatum        Apply topically daily Apply to lower extremities for skin irritation.   Refills:  0         multivitamin, therapeutic with minerals Tabs tablet   Dose:  1 tablet   Quantity:  30 each        Take 1 tablet by mouth daily   Refills:  0        OMEPRAZOLE PO   Dose:  20 mg        Take 20 mg by mouth daily (with breakfast)   Refills:  0        ondansetron 4 MG ODT tab   Commonly known as:  ZOFRAN ODT   Dose:  4 mg   Quantity:  20 tablet        Take 1 tablet (4 mg) by mouth every 6 hours as needed for nausea   Refills:  1        oxyCODONE IR 5 MG tablet   Commonly known as:  ROXICODONE   Dose:  5-10 mg   Quantity:  40 tablet        Take 1-2 tablets (5-10 mg) by mouth every 4 hours as needed (Give 5mg for pain level 4-6 on a 10 point scale. Give 10mg for pain level 6-10 on a 10 point scale.)   Refills:  0        protein modular   Dose:  1 packet        1 packet by Per Feeding Tube route 3 times daily   Refills:  0        triamcinolone 0.1 % cream   Commonly known as:  KENALOG        Apply topically 3 times daily   Refills:  0        XARELTO PO   Dose:  20 mg        Take 20 mg by mouth At Bedtime   Refills:  0                Procedures and tests performed during your visit     IR Gastrostomy Tube Check      Orders Needing Specimen Collection     None      Pending Results     No orders found from 5/7/2018 to 5/10/2018.            Pending Culture Results     No orders found from 5/7/2018 to 5/10/2018.            Pending Results Instructions     If you had any lab results that were not finalized at the time of your Discharge, you can call the ED Lab Result RN at [phone]. You will be contacted by this team for any positive Lab results or changes in treatment. The nurses are available 7 days a week from 10A to 6:30P.  You can leave a message 24 hours per day and they will return your call.        Thank you for choosing [facility]       Thank you for choosing [facility] for your care. Our goal is always to provide you with excellent care. Hearing back from our patients is one way we can continue to improve our  "services. Please take a few minutes to complete the written survey that you may receive in the mail after you visit with us. Thank you!        [employer] Information     [employer] lets you send messages to your doctor, view your test results, renew your prescriptions, schedule appointments and more. To sign up, go to [email or web address] . Click on \"Log in\" on the left side of the screen, which will take you to the Welcome page. Then click on \"Sign up Now\" on the right side of the page.     You will be asked to enter the access code listed below, as well as some personal information. Please follow the directions to create your username and password.     Your access code is: [email or web address]  Expires: 2018 10:46 PM     Your access code will  in 90 days. If you need help or a new code, please call your [facility] clinic or [phone].        Care EveryWhere ID     This is your Care EveryWhere ID. This could be used by other organizations to access your [facility] medical records  [id number]        Equal Access to Services     JUAN [facility] Unit[facility]IA R : Hadii tod [place], wajorge albertoda clara, qaybcherelle kay, hemant dave . So [facility] [phone].    ATENCIÓN: Si habla español, tiene a thorpe disposición servicios gratuitos de asistencia lingüística. Darryl al [phone].    We comply with applicable federal civil rights laws and [place] laws. We do not discriminate on the basis of race, color, national origin, age, disability, sex, sexual orientation, or gender identity.            After Visit Summary       This is your record. Keep this with you and show to your community pharmacist(s) and doctor(s) at your next visit.                  "

## 2018-05-09 NOTE — ED NOTES
Bed: [facility]  Expected date: 5/9/18  Expected time: 1:39 PM  Means of arrival: Ambulance  Comments:  Pedro [address]    63 y/o male    Tube replacement    Triaged:  Yellow

## 2018-05-09 NOTE — ED AVS SNAPSHOT
[facility], [place], Emergency Department    [address] [place] [address]    Phone:  [phone]                                       Marychuy Zhou   MRN: [id number]    Department:  [facility], Emergency Department   Date of Visit:  5/9/2018           After Visit Summary Signature Page     I have received my discharge instructions, and my questions have been answered. I have discussed any challenges I see with this plan with the nurse or doctor.    ..........................................................................................................................................  Patient/Patient Representative Signature      ..........................................................................................................................................  Patient Representative Print Name and Relationship to Patient    ..................................................               ................................................  Date                                            Time    ..........................................................................................................................................  Reviewed by Signature/Title    ...................................................              ..............................................  Date                                                            Time

## 2018-05-09 NOTE — ED NOTES
"Patient has been asking staff to change her wound dressings on her bottom since arrival to the ED. The patient states she has been asking the staff at her facility to change them \"since 11 AM this morning but they wouldn't do it.\" Patient also reports her GJ tube was pulled out by an aide while they were doing a dressing change of her GJ tube at the facility. Patient refused to go to IR for tube replacement without us attending to her the wounds on her bottom. Patient advised we are happy to change them but are unable to do so due to privacy in the hallway. Patient will be placed in a room when she comes back from IR to change wound dressings. Patient just asked the dressing be removed while she goes to IR because it was causing her pain. Nurse removed mepilex and packing from wound. Foul smell noted and wound appeared to not have been changed for awhile. Patient also was full off some type of cream and stool. Nurse is concerned for neglect. Social work now involved.   "

## 2018-05-09 NOTE — DISCHARGE INSTRUCTIONS
TODAY'S VISIT:  You were seen today for dislodged G-tube, now replaced  -     FOLLOW-UP:  Please make an appointment to follow up with:  -Follow-up as needed    PRESCRIPTIONS / MEDICATIONS:  -    OTHER INSTRUCTIONS:  -     RETURN TO THE EMERGENCY DEPARTMENT  Return to the Emergency Department at any time for new/worsening symptoms.

## 2018-05-09 NOTE — PROCEDURES
Interventional Radiology Brief Post Procedure Note    Procedure: IR GASTROSTOMY TUBE CHECK    Proceduralist: Fidencio Arvizu PA-C    Assistant: None    Time Out: Prior to the start of the procedure and with procedural staff participation, I verbally confirmed the patient s identity using two indicators, relevant allergies, that the procedure was appropriate and matched the consent or emergent situation, and that the correct equipment/implants were available. Immediately prior to starting the procedure I conducted the Time Out with the procedural staff and re-confirmed the patient s name, procedure, and site/side. (The Joint Commission universal protocol was followed.)  Yes    Sedation: IR Nurse Monitored Care   Post Procedure Summary:  Prior to the start of the procedure and with procedural staff participation, I verbally confirmed the patient s identity using two indicators, relevant allergies, that the procedure was appropriate and matched the consent or emergent situation, and that the correct equipment/implants were available. Immediately prior to starting the procedure I conducted the Time Out with the procedural staff and re-confirmed the patient s name, procedure, and site/side. (The Joint Commission universal protocol was followed.)  Yes       Sedatives: Fentanyl    Vital signs, airway and pulse oximetry were monitored and remained stable throughout the procedure and sedation was maintained until the procedure was complete.  The patient was monitored by staff until sedation discharge criteria were met.    Patient tolerance: Patient tolerated the procedure well with no immediate complications.    Time of sedation in minutes: 15 minutes from beginning to end of physician one to one monitoring.    Findings: Existing 16 Fr G-tube fell out. Completed fluoroscopy-guided replacement after existing tract was cannulated. New 16 Fr gastrostomy tube ready for immediate use.     Estimated Blood Loss: None    Fluoroscopy  Time: 3.8 minute(s)    Specimens: None    Complications: 1. None     Condition: Stable    Plan: Return in 9-12 months for routine exchange, or sooner if necessary.    Comments: See dictated procedure note for full details.    Fidencio Arvizu PA-C  Interventional Radiology  [phone]

## 2018-05-09 NOTE — ED PROVIDER NOTES
History     Chief Complaint   Patient presents with     Gtube Problem     HPI  Marychuy Zhou is a 62 year old parapalegic female with a history of truncal and extremity decubital ulcers, chronic kidney disease, and sepsis. The patient presents to the Emergency Department today for the chief complaint of a gastrotomy tube problem. She reports that 45 minutes prior to arrival, the dressing of her gastrotomy tube insertion site was being replaced by a nursing home aid when the tube was pulled out.     Per chart review, her gastrotomy tube was placed 4/27/2018.     I have reviewed the Medications, Allergies, Past Medical and Surgical History, and Social History in the [employer] system.    Past Medical History:   Diagnosis Date     Anxiety      Chronic pain syndrome      Closed fracture zygoma, with routine healing, subsequent encounter      COPD (chronic obstructive pulmonary disease) (H)      Depressive disorder      DVT (deep vein thrombosis) in pregnancy (H)      Edema      Epilepsy (H)      Flaccid neuropathic bladder, not elsewhere classified      Fracture of femur (H)      Hypothyroidism      Iron deficiency anemia      Paraplegia (H)     unspecified     Pressure ulcer of sacral region      PTSD (post-traumatic stress disorder)      Rheumatoid arthritis (H)      Sepsis (H)     unspecified       History reviewed. No pertinent surgical history.    Family History   Problem Relation Age of Onset     Chronic Obstructive Pulmonary Disease Brother        Social History   Substance Use Topics     Smoking status: Never Smoker     Smokeless tobacco: Never Used     Alcohol use No       Current Facility-Administered Medications   Medication     fentaNYL (PF) (SUBLIMAZE) injection 25-50 mcg     naloxone (NARCAN) injection 0.1-0.4 mg     Current Outpatient Prescriptions   Medication     diazepam (VALIUM) 2 MG tablet     fluconazole (DIFLUCAN) 150 MG tablet     ipratropium - albuterol 0.5 mg/2.5 mg/3 mL (DUONEB) 0.5-2.5 (3) MG/3ML  "neb solution     levETIRAcetam (KEPPRA) 250 MG tablet     LEVOTHYROXINE SODIUM PO     loperamide (IMODIUM) 2 MG capsule     miconazole (MICATIN) 2 % AERP powder     mineral oil-hydrophilic petrolatum (AQUAPHOR)     multivitamin, therapeutic with minerals (THERA-VIT-M) TABS tablet     OMEPRAZOLE PO     ondansetron (ZOFRAN ODT) 4 MG ODT tab     oxyCODONE IR (ROXICODONE) 5 MG tablet     protein modular (PROSOURCE TF)     Rivaroxaban (XARELTO PO)     triamcinolone (KENALOG) 0.1 % cream      No Known Allergies      Review of Systems   Gastrointestinal:        Positive - gastrotomy tube removed    All other systems reviewed and are negative.      Physical Exam   BP: 103/88  Pulse: 84  Heart Rate: 90  Temp: 98.3  F (36.8  C)  Resp: 16  Height: 157.5 cm (5' 2\")  SpO2: 93 %      Physical Exam  General: awake, alert, NAD, non toxic  Head: normal cephalic  HEENT: pupils equal, conjugate gaze in tact  Neck: Supple  CV: regular rate   Lungs: clear to ascultation.  No increased work of breathing  Abd: soft, non-tender, no guarding, no peritoneal signs.  G-tube noted in upper abdomen.  Neuro: awake, answers questions appropriately. No focal deficits noted       ED Course     ED Course   2:11 PM  The patient was seen and examined by Michele Stout in Room [facility].       Results for orders placed or performed during the hospital encounter of 05/09/18 (from the past 24 hour(s))   IR Gastrostomy Tube Check    Narrative    PRE-PROCEDURE DIAGNOSIS: Dislodged gastrostomy tube    POST-PROCEDURE DIAGNOSIS: Same    PROCEDURE: Gastrostomy tube check    Impression    IMPRESSION: Existing 16 Fr G-tube fell out. Completed  fluoroscopy-guided replacement after existing tract was cannulated.  New 16 Fr gastrostomy tube ready for immediate use.    PLAN: Patient should return in 9-12 months for routine exchange, or  sooner if necessary.     ----------    CLINICAL HISTORY: Paraplegic patient with multiple decubitus ulcers  with recent admission for sepsis " status post gastrostomy tube  placement 4/27/2018. Her G-tube was removed during a dressing change 1  hour prior to this procedure. She presents to the ER via ambulance for  evaluation and possible exchange. Due to decubitus ulcers the patient  cannot tolerate lying on her back and requests pain medicine prior to  positioning for procedure.    PERFORMED BY: Fidencio Arvizu PA-C    ATTENDING PHYSICIAN: Leroy Qureshi MD    CONSENT: Written consent obtained.    MEDICATIONS: 1. Topical lidocaine  2. 50 mcg fentanyl IV    NURSING: The patient was placed on continuous vital signs monitoring.  Vital signs were monitored by nursing staff under IR staff  supervision.     SEDATION TIME: 15 minutes    FLUOROSCOPY TIME: 3.2 minutes    DESCRIPTION: Exam of site shows patent gastrostomy with 2 T-fasteners  still in place. The skin was cleaned in the area was draped. Contrast  injection through existing tract opacifies old tract to stomach. This  was cannulated using AYLIEN guidewire Kumpe catheter. The track was  dilated with 14 Czech Leslie dilator which passed easily under  fluoroscopic guidance. A new 16 Czech gastrostomy tube was advanced  over wire into the stomach. Position confirmed with contrast  injection. Retention balloon filled with 5 mL saline/contrast mixture.  Disc snugged to 4 cm dina.    COMPLICATIONS: No immediate concerns, the patient remained stable  throughout the procedure and tolerated it well.    ESTIMATED BLOOD LOSS: None    SPECIMENS: None    JULIEN ARVIZU PA-C       Procedures               Labs Ordered and Resulted from Time of ED Arrival Up to the Time of Departure from the ED - No data to display         Assessments & Plan (with Medical Decision Making)   Per chart review, gtube was placed approximately two weeks ago on 4/27/2018. Given that this is not a well established track I did consult IR. IR will come see the patient and determine next best steps. Patient reports this happened approximately 30  minutes prior to arrival for a total of slightly over an hour.    IR came in and evaluated the patient. IR replaced the gtube with fluoroscopy guided. Ready for immediate use.     Of note patient did complain of decubitus ulcers. Nursing had cleaned and changed this dressing. There was a nursing note about concern of  neglect  from the nursing home because it appeared they had not been changed the patient s dressing however patient was actually discharged from the hospital yesterday and I spoke with the hospitalist and he felt that he had no concern for neglect in regard to this patient given that she had been to this nursing home less than 24 hours so it does not sound like there would be enough time course for significant neglect. Felt that this was just the baseline of her chronic wound. Patient will be discharged home.     I have reviewed the nursing notes.    I have reviewed the findings, diagnosis, plan and need for follow up with the patient.    Discharge Medication List as of 5/9/2018  4:38 PM          Final diagnoses:   Dislodged gastrostomy tube (H)       I, Iker Herbert, am serving as a trained medical scribe to document services personally performed by Michele tSout MD, based on the provider's statements to me.   IMichele MD, was physically present and have reviewed and verified the accuracy of this note documented by Iker Herbert.      5/9/2018   [facility], [place], EMERGENCY DEPARTMENT     Michele Stout MD  05/09/18 1800

## 2018-05-09 NOTE — ED TRIAGE NOTES
Pt presents to ED from [facility] after GJ tube got displaced. Pt states a nursing aid attempted to change the dressing but did so with too much force that the tube popped out. Pt also complaining that her coccyx hurts and that she needs her pressure ulcer dressing changed. Pt is paraplegic and is power wheelchair dependent.

## 2018-05-10 PROBLEM — S72.22XA CLOSED LEFT SUBTROCHANTERIC FEMUR FRACTURE (H): Status: ACTIVE | Noted: 2018-02-27

## 2018-05-10 PROBLEM — F43.23 ADJUSTMENT DISORDER WITH MIXED ANXIETY AND DEPRESSED MOOD: Status: ACTIVE | Noted: 2018-01-01

## 2018-05-10 PROBLEM — G89.29 OTHER CHRONIC PAIN: Status: ACTIVE | Noted: 2018-01-01

## 2018-05-10 PROBLEM — G40.909 SEIZURE DISORDER (H): Status: ACTIVE | Noted: 2018-01-01

## 2018-05-10 NOTE — PROGRESS NOTES
[facility]  PRIMARY CARE PROVIDER AND CLINIC:  Mellisa Haji [facility] [address] [facility]  / [facility] *  Chief Complaint   Patient presents with     Hospital F/U     [place] Medical Record Number:  [id number]    HPI:    Marychuy Zhou is a 62 year old  (1955),admitted to the [facility] from [facility].  Hospital stay 4/10/18 through 5/8/18.  Admitted to this facility for  rehab, medical management and nursing care.      Hospital Course per [facility] Discharge Summary 5/8/18:    HISTORY OF PRESENT ILLNESS:  Marychuy Zhou is a 62-year-old female with a complex past medical history related to chronic truncal and extremity decubitus ulcers secondary to paraplegia, who was admitted to the hospital from her nursing home for the evaluation of low grade fevers and worsening pain in her right hip area in the setting of concerns regarding worsening of chronic infection in this area.  The patient has been hospitalized at [facility] several days previously and had been discharged on Augmentin.  The patient had specifically requested to come to this hospital as she had been referred to Dr. Mayela Pelletier of Plastic Surgery for consideration of flap placement.  She had not actually been seen by Dr. Pelletier however.       HOSPITAL COURSE:  The patient was admitted for the evaluation of her multiple decubitus ulcers and for plastic surgery evaluation.  She was started empirically on Zosyn and vancomycin.  After assessment by Plastic Surgery and by the wound nurse, it was felt that while she likely has chronic osteomyelitis of the sacrum, there was no evidence of  systemic infection.  The patient did have an area of erythema surrounding her right sacral wound and was treated for cellulitis with an approximate 2 week course of antibiotics; thereafter antibiotics were discontinued.       The patient was seen by Dr. Pelletier who felt  that her nutritional status was too tenuous to allow for surgery at this time.  In addition, the patient does have constant stooling and urinary leakage around her suprapubic catheter which continually soils her decubitus ulcers.  Ultimately, it is felt that she will need a diverting colostomy and some type of diverting urologic procedure, in addition to improvement in her nutritional status, before she would be a candidate for flap surgery.       The bulk of the patient's hospitalization was spent in treating multiple chronic medical issues, as well as providing wound cares and in attempting to improve her nutritional status.  She initially was started on TPN.  This was discontinued and a G-tube was placed during this hospitalization without incident.  She was able to tolerate tube feedings fairly well, though as expected, she did have some diarrhea which continued to contaminate her decubitus ulcers.  At the time of admission, she had bilateral nephrostomy tubes in place which had been placed several weeks previously in an attempt to divert urine from her bladder in view of leaking around her suprapubic catheter.  At the time of admission, left nephrostomy tube was displaced and was removed.  Ultimately, both nephrostomy tubes were removed as they had not been successfully provided urinary diversion, and she was continuing  to have leakage around the suprapubic catheter.  Again, she at some point should undergo a diverting urologic procedure in addition to a diverting colostomy.       The patient on admission was noted to have chest congestion and to be hypoxic.  This was felt likely to be secondary to pneumonia as well as to impaired clearance of respiratory secretions in the setting of paraplegia.  This did improve with antibiotic therapy and with p.r.n. nebulizer treatments.         Other medical problems addressed included:   1.  Hypothyroidism.  The patient was found to be profoundly hypothyroid with a TSH of  50.  She does have levothyroxine ordered but was often noncompliant with this, as well as most of her other medications.  The importance of maintaining compliance with thyroid replacement was discussed at length.   2.  Chronic pain.  The patient ultimately was maintained on short-acting oxycodone with some improvement in her chronic back and shoulder pain.    3.  Extensive skin irritation, particularly around her sacral decubitus ulcers, largely related to frequent soiling of these areas with stool and urine and with patient's refusal to allow timely wound and skin cares.  She has been started on oral Diflucan in addition to topical antifungal treatments.   4.  COPD.  As above, the patient did have chest congestion and increased airway secretions which had improved at the time of discharge.   5.  Chronic depression, anxiety, and PTSD.  On admission, the patient was prescribed Cymbalta, gabapentin and Lamictal, as well as p.r.n. Valium.  She consistently refused Cymbalta, gabapentin, and Lamictal and as such, both were discontinued.     6.  Seizure disorder maintained on low dose Keppra without seizures during this hospitalization.   7.  Malnutrition as evidenced by albumin level less than 2, likely secondary to poor intake and protein loss from wounds.  Her albumin remained around 2 during this hospitalization.   8.  Behavioral concerns.  As above, the patient does have history of depression and anxiety.  She frequently refused nursing cares and medications and was often accusatory towards providers.  She is fully oriented and quite goal oriented, though she was unable to understand the importance of compliance with wound cares as a way to achieve her goal, which is to have flap placements in the future.    The patient's son is her power of  and was involved with her care decisions throughout her hospitalization and is aware of the behaviors that have impacted her overall care.       At the time of  discharge, the patient is alert, fully oriented, has been tolerating tube feedings at approximately 50 mL an hour during the night with the goal of increasing the tube feeing rate to 75 mL an hour.  She is eating small amounts during the daytime.  Her chronic back pain has been controlled with oxycodone.  She continues to have skin irritation associated with her sacral decubitus ulcer and also has developed a follicular rash over her shoulders and legs, which is being treated with a topical steroid. Her truncal and extremity decubiti are felt to be stable, per the [facility] Nurse.      As above, the patient is quite insistent that she will ultimately be able to go home where she lives with a friend, though this seems to be an unrealistic expectation at least until her nutrition improves and she can undergo the series of surgeries as noted above.       DISCHARGE DIAGNOSES:   1.  T4 paraplegia secondary to hematoma with multiple truncal and extremity decubitus ulcers including right IT, stage IV injury, left sacral stage III ulcer, left heel ulcer, left upper thigh ulcer and coccyx ulcer.   2.  Incontinence associated dermatitis involving the thighs, buttocks and perineum.   3.  Folliculitis involving the upper shoulder and lower extremity area.   4.  Pneumonia, clinically resolved.   5.  Chronic obstructive pulmonary disease.   6.  History of suprapubic catheter with ongoing leakage around the catheter.   7.  Chronic pain, primarily involving the back, treated with oxycodone.   8.  Chronic pelvis osteomyelitis without evidence of systemic infection during this hospitalization.   9.  Chronic anemia secondary to chronic illness, stable.   10.  Recent left acute subtrochanteric fracture, not requiring orthopedic repair.   11.  History of deep venous thrombosis, on chronic Xarelto.   12.  Seizure disorder on Keppra only.   13.  Hypothyroidism with continued elevation of TSH, likely secondary to medication noncompliance.   14.   Hypoalbuminemia secondary to poor nutrition and protein loss from wounds.   15.  History of subdural hematoma, status post craniotomy with a  head CT negative for acute change during this hospitalization.   16.  Probable personality disorder with frequent refusal to accept cares and medications and with unrealistic expectations for discharge to home.             HPI information obtained from: facility chart records, facility staff, patient report and [facility] chart review.        Current issues are:      HPI challenging. Patient is upset that her dressing has not been changed and her skin is burning from urine. She says nursing has not been giving her thyroid, seizure, or anticoagulation medication. She says she does not feel safe here. Advised patient that if she does not feel safe, we can offer to send her back to the ER or work on transfer to another facility, but that would take time. She is reminded that social work in the ER offered to help find another TCU for her, but patient declined and chose to return here. She denies any knowledge of this. She declines to make a choice, demands to speak to the . Says she will not make a decision until she speaks to her.     Paraplegia at T4 level (H)  Multiple wounds  Osteomyelitis, unspecified site, unspecified type (H)  Dermatitis  Neurogenic bladder  Chronic obstructive pulmonary disease, unspecified COPD type (H)  Other chronic pain  Personal history of DVT (deep vein thrombosis)  Chronic anemia  Closed nondisplaced subtrochanteric fracture of left femur, sequela  Seizure disorder (H)  Hypothyroidism, unspecified type  Adjustment disorder with mixed anxiety and depressed mood        CODE STATUS/ADVANCE DIRECTIVES DISCUSSION:   CPR/Full code   Patient's living condition: goal is to live with friend    ALLERGIES:Review of patient's allergies indicates no known allergies.  PAST MEDICAL HISTORY:  has a past medical history of Anxiety; Chronic pain  syndrome; Closed fracture zygoma, with routine healing, subsequent encounter; COPD (chronic obstructive pulmonary disease) (H); Depressive disorder; DVT (deep vein thrombosis) in pregnancy (H); Edema; Epilepsy (H); Flaccid neuropathic bladder, not elsewhere classified; Fracture of femur (H); Hypothyroidism; Iron deficiency anemia; Paraplegia (H); Pressure ulcer of sacral region; PTSD (post-traumatic stress disorder); Rheumatoid arthritis (H); and Sepsis (H).  PAST SURGICAL HISTORY:  has no past surgical history on file.  FAMILY HISTORY: family history includes Chronic Obstructive Pulmonary Disease in her brother.  SOCIAL HISTORY:  reports that she has never smoked. She has never used smokeless tobacco. She reports that she does not drink alcohol or use illicit drugs.    Post Discharge Medication Reconciliation Status: discharge medications reconciled, continue medications without change.  Current Outpatient Prescriptions   Medication Sig Dispense Refill     diazepam (VALIUM) 2 MG tablet Take 2 tablets (4 mg) by mouth every 6 hours as needed for anxiety 60 tablet      fluconazole (DIFLUCAN) 150 MG tablet Take 1 tablet (150 mg) by mouth daily for 7 days 1 tablet      ipratropium - albuterol 0.5 mg/2.5 mg/3 mL (DUONEB) 0.5-2.5 (3) MG/3ML neb solution Take 1 vial (3 mLs) by nebulization every 4 hours as needed for wheezing 360 mL      levETIRAcetam (KEPPRA) 250 MG tablet Take 1 tablet (250 mg) by mouth every evening Until 4/7/2018 then decrease to 250mg BID 4/8/2018-4/21/2018, then decrease to 250mg QAM 4/22-5/5/2018       LEVOTHYROXINE SODIUM PO Take 125 mcg by mouth every morning       loperamide (IMODIUM) 2 MG capsule Take 2 capsules (4 mg) by mouth 4 times daily 20 capsule      miconazole (MICATIN) 2 % AERP powder Apply topically 2 times daily Apply to groin folds       mineral oil-hydrophilic petrolatum (AQUAPHOR) Apply topically daily Apply to lower extremities for skin irritation.       multivitamin, therapeutic  with minerals (THERA-VIT-M) TABS tablet Take 1 tablet by mouth daily 30 each 0     OMEPRAZOLE PO Take 20 mg by mouth daily (with breakfast)       ondansetron (ZOFRAN ODT) 4 MG ODT tab Take 1 tablet (4 mg) by mouth every 6 hours as needed for nausea 20 tablet 1     oxyCODONE IR (ROXICODONE) 5 MG tablet Take 1-2 tablets (5-10 mg) by mouth every 4 hours as needed (Give 5mg for pain level 4-6 on a 10 point scale. Give 10mg for pain level 6-10 on a 10 point scale.) 40 tablet 0     Pollen Extracts (PROSTAT PO) Take 1 oz by mouth daily       protein modular (PROSOURCE TF) 1 packet by Per Feeding Tube route 3 times daily       Rivaroxaban (XARELTO PO) Take 20 mg by mouth At Bedtime       triamcinolone (KENALOG) 0.1 % cream Apply topically 3 times daily         ROS:  Limited secondary to refusal to participate    Exam:  BP 92/62  Pulse 87  Temp 97.5  F (36.4  C)  Resp 18  SpO2 94%   Refuses exam  GENERAL APPEARANCE:  Alert, uncooperative  EYES:  EOM, conjunctivae, lids, pupils and irises normal  RESP:  no respiratory distress  PSYCH:  insight and judgement impaired, memory impaired     Lab/Diagnostic data:    CBC RESULTS:   Recent Labs   Lab Test  04/28/18   0934  04/20/18   0940  04/17/18   0925   WBC   --   6.9  6.2   RBC   --   3.37*  3.32*   HGB  8.4*  8.1*  8.1*   HCT   --   28.2*  27.4*   MCV   --   84  83   MCH   --   24.0*  24.4*   MCHC   --   28.7*  29.6*   RDW   --   19.5*  18.5*   PLT   --   300  256       Last Basic Metabolic Panel:  Recent Labs   Lab Test  05/07/18   0617  05/04/18   0655   NA  141  137   POTASSIUM  4.5  4.4   CHLORIDE  104  103   NATA  8.4*  8.4*   CO2  27  26   BUN  12  15   CR  0.50*  0.50*   GLC  97  146*       Liver Function Studies -   Recent Labs   Lab Test  05/07/18   0617  04/30/18   0830   PROTTOTAL  7.9  8.8   ALBUMIN  1.9*  2.3*   BILITOTAL  0.2  0.3   ALKPHOS  228*  266*   AST  20  21   ALT  26  19       TSH   Date Value Ref Range Status   05/10/2018 23.91 (H) 0.30 - 5.00 uIU/mL  Final   04/28/2018 59.47 (H) 0.40 - 4.00 mU/L Final       ASSESSMENT/PLAN:  (G83.9) Paraplegia at T4 level (H)  (primary encounter diagnosis)  Comment: Chronic. Unclear if she was ever living with this friend. If this is a new plan, this friend would certainly have to come in for training with therapy and nursing  Plan: OT eval and treat for PMD use and ADLs    (T07.XXXA) Multiple wounds  Comment: Unable to view wounds. Healing is complicated by frequent soiling with urine and stool, and noncompliance with nursing care often.  Plan: Continue current POC with no changes at this time and adjustments as needed.    (M86.9) Osteomyelitis, unspecified site, unspecified type (H)  Comment: Chronic  Plan: Monitor for acute changes, fever, hypotension, AMS    (L30.9) Dermatitis  Comment: Skin irritation due to urine/stool  Plan: Continue current POC with no changes at this time and adjustments as needed.    (N31.9) Neurogenic bladder  Comment: Chronic  Plan: Suprapubic catheter. F/u urology for probable ileo diversion    (J44.9) Chronic obstructive pulmonary disease, unspecified COPD type (H)  Comment: No acute issues noted  Plan: Continue current POC with no changes at this time and adjustments as needed.    (G89.29) Other chronic pain  Comment: Unable to assess, but she certainly did not mention uncontrolled pain as a complaint  Plan: Continue current POC with no changes at this time and adjustments as needed.    (Z86.718) Personal history of DVT (deep vein thrombosis)  Comment: On Xarelto  Plan: Continue current POC with no changes at this time and adjustments as needed.    (D64.9) Chronic anemia  Comment: Hgb stable  Plan: Recheck prn    (S72.25XS) Closed nondisplaced subtrochanteric fracture of left femur, sequela  Comment: No intervention planned. Unable to assess if any symptoms  Plan: Continue current POC with no changes at this time and adjustments as needed.    (G40.909) Seizure disorder (H)  Comment: Chronic  condition being managed with medications.  Plan: Continue current POC with no changes at this time and adjustments as needed.    (E03.9) Hypothyroidism, unspecified type  Comment: Poorly controlled due to noncompliance  Plan: Continue current POC with no changes at this time. TSH in 6 weeks    (F43.23) Adjustment disorder with mixed anxiety and depressed mood  Comment: Chronic. It is very challenging to provide appropriate care for this patient due to her noncompliance. Most accusations regarding staff are untrue.   Plan: House psych referral if willing    Orders:  House psych    Total time spent with patient visit at the skilled nursing facility was 45 min including patient visit and review of past records. Greater than 50% of total time spent with counseling and coordinating care due to wound care, nursing issues    Electronically signed by:  JOSEPH Montoya [facility]  Pager: [phone]

## 2018-05-10 NOTE — LETTER
5/10/2018        RE: Marychuy Zhou  [address] [place] [address]        [facility]  PRIMARY CARE PROVIDER AND CLINIC:  [facility] [address]  / [facility] *  Chief Complaint   Patient presents with     Hospital F/U     [facility] Medical Record Number:  [id number]    HPI:    Marychuy Zhou is a 62 year old  (1955),admitted to the [facility] from [facility].  Hospital stay 4/10/18 through 5/8/18.  Admitted to this facility for  rehab, medical management and nursing care.      Hospital Course per [facility] Discharge Summary 5/8/18:    HISTORY OF PRESENT ILLNESS:  Marychuy Zhou is a 62-year-old female with a complex past medical history related to chronic truncal and extremity decubitus ulcers secondary to paraplegia, who was admitted to the hospital from her nursing home for the evaluation of low grade fevers and worsening pain in her right hip area in the setting of concerns regarding worsening of chronic infection in this area.  The patient has been hospitalized at [facility] several days previously and had been discharged on Augmentin.  The patient had specifically requested to come to this hospital as she had been referred to Dr. Mayela Pelletier of Plastic Surgery for consideration of flap placement.  She had not actually been seen by Dr. Pelletier however.       HOSPITAL COURSE:  The patient was admitted for the evaluation of her multiple decubitus ulcers and for plastic surgery evaluation.  She was started empirically on Zosyn and vancomycin.  After assessment by Plastic Surgery and by the wound nurse, it was felt that while she likely has chronic osteomyelitis of the sacrum, there was no evidence of  systemic infection.  The patient did have an area of erythema surrounding her right sacral wound and was treated for cellulitis with an approximate 2 week course of antibiotics; thereafter  antibiotics were discontinued.       The patient was seen by Dr. Pelletier who felt that her nutritional status was too tenuous to allow for surgery at this time.  In addition, the patient does have constant stooling and urinary leakage around her suprapubic catheter which continually soils her decubitus ulcers.  Ultimately, it is felt that she will need a diverting colostomy and some type of diverting urologic procedure, in addition to improvement in her nutritional status, before she would be a candidate for flap surgery.       The bulk of the patient's hospitalization was spent in treating multiple chronic medical issues, as well as providing wound cares and in attempting to improve her nutritional status.  She initially was started on TPN.  This was discontinued and a G-tube was placed during this hospitalization without incident.  She was able to tolerate tube feedings fairly well, though as expected, she did have some diarrhea which continued to contaminate her decubitus ulcers.  At the time of admission, she had bilateral nephrostomy tubes in place which had been placed several weeks previously in an attempt to divert urine from her bladder in view of leaking around her suprapubic catheter.  At the time of admission, left nephrostomy tube was displaced and was removed.  Ultimately, both nephrostomy tubes were removed as they had not been successfully provided urinary diversion, and she was continuing  to have leakage around the suprapubic catheter.  Again, she at some point should undergo a diverting urologic procedure in addition to a diverting colostomy.       The patient on admission was noted to have chest congestion and to be hypoxic.  This was felt likely to be secondary to pneumonia as well as to impaired clearance of respiratory secretions in the setting of paraplegia.  This did improve with antibiotic therapy and with p.r.n. nebulizer treatments.         Other medical problems addressed included:   1.   Hypothyroidism.  The patient was found to be profoundly hypothyroid with a TSH of 50.  She does have levothyroxine ordered but was often noncompliant with this, as well as most of her other medications.  The importance of maintaining compliance with thyroid replacement was discussed at length.   2.  Chronic pain.  The patient ultimately was maintained on short-acting oxycodone with some improvement in her chronic back and shoulder pain.    3.  Extensive skin irritation, particularly around her sacral decubitus ulcers, largely related to frequent soiling of these areas with stool and urine and with patient's refusal to allow timely wound and skin cares.  She has been started on oral Diflucan in addition to topical antifungal treatments.   4.  COPD.  As above, the patient did have chest congestion and increased airway secretions which had improved at the time of discharge.   5.  Chronic depression, anxiety, and PTSD.  On admission, the patient was prescribed Cymbalta, gabapentin and Lamictal, as well as p.r.n. Valium.  She consistently refused Cymbalta, gabapentin, and Lamictal and as such, both were discontinued.     6.  Seizure disorder maintained on low dose Keppra without seizures during this hospitalization.   7.  Malnutrition as evidenced by albumin level less than 2, likely secondary to poor intake and protein loss from wounds.  Her albumin remained around 2 during this hospitalization.   8.  Behavioral concerns.  As above, the patient does have history of depression and anxiety.  She frequently refused nursing cares and medications and was often accusatory towards providers.  She is fully oriented and quite goal oriented, though she was unable to understand the importance of compliance with wound cares as a way to achieve her goal, which is to have flap placements in the future.    The patient's son is her power of  and was involved with her care decisions throughout her hospitalization and is aware of  the behaviors that have impacted her overall care.       At the time of discharge, the patient is alert, fully oriented, has been tolerating tube feedings at approximately 50 mL an hour during the night with the goal of increasing the tube feeing rate to 75 mL an hour.  She is eating small amounts during the daytime.  Her chronic back pain has been controlled with oxycodone.  She continues to have skin irritation associated with her sacral decubitus ulcer and also has developed a follicular rash over her shoulders and legs, which is being treated with a topical steroid. Her truncal and extremity decubiti are felt to be stable, per the [facility] Nurse.      As above, the patient is quite insistent that she will ultimately be able to go home where she lives with a friend, though this seems to be an unrealistic expectation at least until her nutrition improves and she can undergo the series of surgeries as noted above.       DISCHARGE DIAGNOSES:   1.  T4 paraplegia secondary to hematoma with multiple truncal and extremity decubitus ulcers including right IT, stage IV injury, left sacral stage III ulcer, left heel ulcer, left upper thigh ulcer and coccyx ulcer.   2.  Incontinence associated dermatitis involving the thighs, buttocks and perineum.   3.  Folliculitis involving the upper shoulder and lower extremity area.   4.  Pneumonia, clinically resolved.   5.  Chronic obstructive pulmonary disease.   6.  History of suprapubic catheter with ongoing leakage around the catheter.   7.  Chronic pain, primarily involving the back, treated with oxycodone.   8.  Chronic pelvis osteomyelitis without evidence of systemic infection during this hospitalization.   9.  Chronic anemia secondary to chronic illness, stable.   10.  Recent left acute subtrochanteric fracture, not requiring orthopedic repair.   11.  History of deep venous thrombosis, on chronic Xarelto.   12.  Seizure disorder on Keppra only.   13.  Hypothyroidism with  continued elevation of TSH, likely secondary to medication noncompliance.   14.  Hypoalbuminemia secondary to poor nutrition and protein loss from wounds.   15.  History of subdural hematoma, status post craniotomy with a  head CT negative for acute change during this hospitalization.   16.  Probable personality disorder with frequent refusal to accept cares and medications and with unrealistic expectations for discharge to home.             HPI information obtained from: facility chart records, facility staff, patient report and [facility] chart review.        Current issues are:      HPI challenging. Patient is upset that her dressing has not been changed and her skin is burning from urine. She says nursing has not been giving her thyroid, seizure, or anticoagulation medication. She says she does not feel safe here. Advised patient that if she does not feel safe, we can offer to send her back to the ER or work on transfer to another facility, but that would take time. She is reminded that social work in the ER offered to help find another TCU for her, but patient declined and chose to return here. She denies any knowledge of this. She declines to make a choice, demands to speak to the . Says she will not make a decision until she speaks to her.     Paraplegia at T4 level (H)  Multiple wounds  Osteomyelitis, unspecified site, unspecified type (H)  Dermatitis  Neurogenic bladder  Chronic obstructive pulmonary disease, unspecified COPD type (H)  Other chronic pain  Personal history of DVT (deep vein thrombosis)  Chronic anemia  Closed nondisplaced subtrochanteric fracture of left femur, sequela  Seizure disorder (H)  Hypothyroidism, unspecified type  Adjustment disorder with mixed anxiety and depressed mood        CODE STATUS/ADVANCE DIRECTIVES DISCUSSION:   CPR/Full code   Patient's living condition: goal is to live with friend    ALLERGIES:Review of patient's allergies indicates no known  allergies.  PAST MEDICAL HISTORY:  has a past medical history of Anxiety; Chronic pain syndrome; Closed fracture zygoma, with routine healing, subsequent encounter; COPD (chronic obstructive pulmonary disease) (H); Depressive disorder; DVT (deep vein thrombosis) in pregnancy (H); Edema; Epilepsy (H); Flaccid neuropathic bladder, not elsewhere classified; Fracture of femur (H); Hypothyroidism; Iron deficiency anemia; Paraplegia (H); Pressure ulcer of sacral region; PTSD (post-traumatic stress disorder); Rheumatoid arthritis (H); and Sepsis (H).  PAST SURGICAL HISTORY:  has no past surgical history on file.  FAMILY HISTORY: family history includes Chronic Obstructive Pulmonary Disease in her brother.  SOCIAL HISTORY:  reports that she has never smoked. She has never used smokeless tobacco. She reports that she does not drink alcohol or use illicit drugs.    Post Discharge Medication Reconciliation Status: discharge medications reconciled, continue medications without change.  Current Outpatient Prescriptions   Medication Sig Dispense Refill     diazepam (VALIUM) 2 MG tablet Take 2 tablets (4 mg) by mouth every 6 hours as needed for anxiety 60 tablet      fluconazole (DIFLUCAN) 150 MG tablet Take 1 tablet (150 mg) by mouth daily for 7 days 1 tablet      ipratropium - albuterol 0.5 mg/2.5 mg/3 mL (DUONEB) 0.5-2.5 (3) MG/3ML neb solution Take 1 vial (3 mLs) by nebulization every 4 hours as needed for wheezing 360 mL      levETIRAcetam (KEPPRA) 250 MG tablet Take 1 tablet (250 mg) by mouth every evening Until 4/7/2018 then decrease to 250mg BID 4/8/2018-4/21/2018, then decrease to 250mg QAM 4/22-5/5/2018       LEVOTHYROXINE SODIUM PO Take 125 mcg by mouth every morning       loperamide (IMODIUM) 2 MG capsule Take 2 capsules (4 mg) by mouth 4 times daily 20 capsule      miconazole (MICATIN) 2 % AERP powder Apply topically 2 times daily Apply to groin folds       mineral oil-hydrophilic petrolatum (AQUAPHOR) Apply topically  daily Apply to lower extremities for skin irritation.       multivitamin, therapeutic with minerals (THERA-VIT-M) TABS tablet Take 1 tablet by mouth daily 30 each 0     OMEPRAZOLE PO Take 20 mg by mouth daily (with breakfast)       ondansetron (ZOFRAN ODT) 4 MG ODT tab Take 1 tablet (4 mg) by mouth every 6 hours as needed for nausea 20 tablet 1     oxyCODONE IR (ROXICODONE) 5 MG tablet Take 1-2 tablets (5-10 mg) by mouth every 4 hours as needed (Give 5mg for pain level 4-6 on a 10 point scale. Give 10mg for pain level 6-10 on a 10 point scale.) 40 tablet 0     Pollen Extracts (PROSTAT PO) Take 1 oz by mouth daily       protein modular (PROSOURCE TF) 1 packet by Per Feeding Tube route 3 times daily       Rivaroxaban (XARELTO PO) Take 20 mg by mouth At Bedtime       triamcinolone (KENALOG) 0.1 % cream Apply topically 3 times daily         ROS:  Limited secondary to refusal to participate    Exam:  BP 92/62  Pulse 87  Temp 97.5  F (36.4  C)  Resp 18  SpO2 94%   Refuses exam  GENERAL APPEARANCE:  Alert, uncooperative  EYES:  EOM, conjunctivae, lids, pupils and irises normal  RESP:  no respiratory distress  PSYCH:  insight and judgement impaired, memory impaired     Lab/Diagnostic data:    CBC RESULTS:   Recent Labs   Lab Test  04/28/18   0934  04/20/18   0940  04/17/18   0925   WBC   --   6.9  6.2   RBC   --   3.37*  3.32*   HGB  8.4*  8.1*  8.1*   HCT   --   28.2*  27.4*   MCV   --   84  83   MCH   --   24.0*  24.4*   MCHC   --   28.7*  29.6*   RDW   --   19.5*  18.5*   PLT   --   300  256       Last Basic Metabolic Panel:  Recent Labs   Lab Test  05/07/18   0617  05/04/18   0655   NA  141  137   POTASSIUM  4.5  4.4   CHLORIDE  104  103   NATA  8.4*  8.4*   CO2  27  26   BUN  12  15   CR  0.50*  0.50*   GLC  97  146*       Liver Function Studies -   Recent Labs   Lab Test  05/07/18   0617  04/30/18   0830   PROTTOTAL  7.9  8.8   ALBUMIN  1.9*  2.3*   BILITOTAL  0.2  0.3   ALKPHOS  228*  266*   AST  20  21   ALT  26   19       TSH   Date Value Ref Range Status   05/10/2018 23.91 (H) 0.30 - 5.00 uIU/mL Final   04/28/2018 59.47 (H) 0.40 - 4.00 mU/L Final       ASSESSMENT/PLAN:  (G83.9) Paraplegia at T4 level (H)  (primary encounter diagnosis)  Comment: Chronic. Unclear if she was ever living with this friend. If this is a new plan, this friend would certainly have to come in for training with therapy and nursing  Plan: OT eval and treat for PMD use and ADLs    (T07.XXXA) Multiple wounds  Comment: Unable to view wounds. Healing is complicated by frequent soiling with urine and stool, and noncompliance with nursing care often.  Plan: Continue current POC with no changes at this time and adjustments as needed.    (M86.9) Osteomyelitis, unspecified site, unspecified type (H)  Comment: Chronic  Plan: Monitor for acute changes, fever, hypotension, AMS    (L30.9) Dermatitis  Comment: Skin irritation due to urine/stool  Plan: Continue current POC with no changes at this time and adjustments as needed.    (N31.9) Neurogenic bladder  Comment: Chronic  Plan: Suprapubic catheter. F/u urology for probable ileo diversion    (J44.9) Chronic obstructive pulmonary disease, unspecified COPD type (H)  Comment: No acute issues noted  Plan: Continue current POC with no changes at this time and adjustments as needed.    (G89.29) Other chronic pain  Comment: Unable to assess, but she certainly did not mention uncontrolled pain as a complaint  Plan: Continue current POC with no changes at this time and adjustments as needed.    (Z86.718) Personal history of DVT (deep vein thrombosis)  Comment: On Xarelto  Plan: Continue current POC with no changes at this time and adjustments as needed.    (D64.9) Chronic anemia  Comment: Hgb stable  Plan: Recheck prn    (S72.25XS) Closed nondisplaced subtrochanteric fracture of left femur, sequela  Comment: No intervention planned. Unable to assess if any symptoms  Plan: Continue current POC with no changes at this time  and adjustments as needed.    (G40.909) Seizure disorder (H)  Comment: Chronic condition being managed with medications.  Plan: Continue current POC with no changes at this time and adjustments as needed.    (E03.9) Hypothyroidism, unspecified type  Comment: Poorly controlled due to noncompliance  Plan: Continue current POC with no changes at this time. TSH in 6 weeks    (F43.23) Adjustment disorder with mixed anxiety and depressed mood  Comment: Chronic. It is very challenging to provide appropriate care for this patient due to her noncompliance. Most accusations regarding staff are untrue.   Plan: House psych referral if willing    Orders:  House psych    Total time spent with patient visit at the skilled nursing facility was 45 min including patient visit and review of past records. Greater than 50% of total time spent with counseling and coordinating care due to wound care, nursing issues    Electronically signed by:  JOSEPH [facility]  Pager: [phone]

## 2018-05-13 NOTE — TELEPHONE ENCOUNTER
Patient with wounds, requested refill of PRN Oxycodone. Sent script to pharmacy for Oxycodone 5 mg tabs #30 no refills and one time Oxycodone 5 mg from [facility] now.     Electronically signed by JOSEPH Cornell, GNP

## 2018-05-14 NOTE — PROGRESS NOTES
Emergency Social Work Services Note    Date of  Intervention: 05/14/18  Last Emergency Department Visit:  05/09/2018  Care Plan:  None  Collaborated with:  ED RN and provider    Data:  Patient is a 62 year old woman from [facility].    Intervention:  RN requested MARGI to meet with patient to check in on TCU placement.     Assessment:  SW met with patient after chart review and discussion with RN in regard to TCU placement at [facility]. She reports that she would like to return if she discharges from the ED today. She is staying for 2 more months and then going to live with her friend. She would like to return to TCU when medically stable to DC.     Plan:    Anticipated Disposition:  Facility:  [facility]    Barriers to d/c plan:  None at this time.    Follow Up:  DC to TCU; potential need for HE WC transport.     Ellie KISER  5/14/2018 6:47 PM

## 2018-05-14 NOTE — ED AVS SNAPSHOT
[facility], [place], Emergency Department    [address] [place] [id number]    Phone:  [phone]                                       Marychuy Zhou   MRN: [id number]    Department:  [facility], Emergency Department   Date of Visit:  5/14/2018           After Visit Summary Signature Page     I have received my discharge instructions, and my questions have been answered. I have discussed any challenges I see with this plan with the nurse or doctor.    ..........................................................................................................................................  Patient/Patient Representative Signature      ..........................................................................................................................................  Patient Representative Print Name and Relationship to Patient    ..................................................               ................................................  Date                                            Time    ..........................................................................................................................................  Reviewed by Signature/Title    ...................................................              ..............................................  Date                                                            Time

## 2018-05-14 NOTE — ED TRIAGE NOTES
Pt BIBA from [facility] with reports of pelvic pain. Pt states she is sepsis because the pain is what she feels when she goes septic. Care facility wanted to do blood work at facility. Pt refused, stating she needs to go to the ED. Pt reports facility is not changing wounds dressing. Pt states she was discharged from the hospital recently without discharge instructions. Vitally stable in triage.

## 2018-05-14 NOTE — PROGRESS NOTES
[facility]    Chief Complaint   Patient presents with     AISHWARYA       [facility] Medical Record Number:  [id number]    HPI:    Marychuy Zhou is a 62 year old  (1955), who is being seen today for an episodic care visit at [facility].  Hospital stay was at [facility] from 4/10/18 through 5/8/18. The patient was admitted for the evaluation of her multiple decubitus ulcers and for plastic surgery evaluation.  She was started empirically on Zosyn and vancomycin.  After assessment by Plastic Surgery and by the wound nurse, it was felt that while she likely has chronic osteomyelitis of the sacrum, there was no evidence of  systemic infection. The patient was seen by Dr. Pelletier who felt that her nutritional status was too tenuous to allow for surgery at this time.  In addition, the patient does have constant stooling and urinary leakage around her suprapubic catheter which continually soils her decubitus ulcers.  Ultimately, it is felt that she will need a diverting colostomy and some type of diverting urologic procedure, in addition to improvement in her nutritional status, before she would be a candidate for flap surgery. The bulk of the patient's hospitalization was spent in treating multiple chronic medical issues, as well as providing wound cares and in attempting to improve her nutritional status. She was initially on TPN, now tube feeding.     The patient does have history of depression and anxiety.  She frequently refused nursing cares and medications and was often accusatory towards providers.  She is fully oriented and quite goal oriented, though she was unable to understand the importance of compliance with wound cares as a way to achieve her goal, which is to have flap placements in the future. The patient's son is her power of  and was involved with her care decisions throughout her hospitalization and is aware of the behaviors that have impacted  her overall care.      Admitted to this facility for  rehab, medical management and nursing care.    HPI information obtained from: facility chart records, facility staff and [facility] chart review.    Today's concern is:  Patient is on her way out, declines having a conversation with provider. Continues to make accusations regarding poor care.    Paraplegia at T4 level (H)  Multiple wounds  Osteomyelitis, unspecified site, unspecified type (H)  Dermatitis  Neurogenic bladder  Chronic obstructive pulmonary disease, unspecified COPD type (H)  Other chronic pain  Personal history of DVT (deep vein thrombosis)  Chronic anemia  Closed nondisplaced subtrochanteric fracture of left femur, sequela  Seizure disorder (H)  Hypothyroidism, unspecified type  Adjustment disorder with mixed anxiety and depressed mood      ALLERGIES: Review of patient's allergies indicates no known allergies.  Past Medical, Surgical, Family and Social History reviewed and updated in [facility].    Current Outpatient Prescriptions   Medication Sig Dispense Refill     diazepam (VALIUM) 2 MG tablet Take 2 tablets (4 mg) by mouth every 6 hours as needed for anxiety 60 tablet      fluconazole (DIFLUCAN) 150 MG tablet Take 1 tablet (150 mg) by mouth daily for 7 days 1 tablet      ipratropium - albuterol 0.5 mg/2.5 mg/3 mL (DUONEB) 0.5-2.5 (3) MG/3ML neb solution Take 1 vial (3 mLs) by nebulization every 4 hours as needed for wheezing 360 mL      levETIRAcetam (KEPPRA) 250 MG tablet Take 1 tablet (250 mg) by mouth every evening Until 4/7/2018 then decrease to 250mg BID 4/8/2018-4/21/2018, then decrease to 250mg QAM 4/22-5/5/2018       LEVOTHYROXINE SODIUM PO Take 125 mcg by mouth every morning       loperamide (IMODIUM) 2 MG capsule Take 2 capsules (4 mg) by mouth 4 times daily 20 capsule      miconazole (MICATIN) 2 % AERP powder Apply topically 2 times daily Apply to groin folds       mineral oil-hydrophilic petrolatum (AQUAPHOR) Apply topically daily  Apply to lower extremities for skin irritation.       multivitamin, therapeutic with minerals (THERA-VIT-M) TABS tablet Take 1 tablet by mouth daily 30 each 0     OMEPRAZOLE PO Take 20 mg by mouth daily (with breakfast)       ondansetron (ZOFRAN ODT) 4 MG ODT tab Take 1 tablet (4 mg) by mouth every 6 hours as needed for nausea 20 tablet 1     oxyCODONE IR (ROXICODONE) 5 MG tablet Take 1-2 tablets (5-10 mg) by mouth every 4 hours as needed (Give 5mg for pain level 4-6 on a 10 point scale. Give 10mg for pain level 6-10 on a 10 point scale.) 40 tablet 0     Pollen Extracts (PROSTAT PO) Take 1 oz by mouth daily       protein modular (PROSOURCE TF) 1 packet by Per Feeding Tube route 3 times daily       Rivaroxaban (XARELTO PO) Take 20 mg by mouth At Bedtime       triamcinolone (KENALOG) 0.1 % cream Apply topically 3 times daily       Medications reconciled to facility chart and changes were made to reflect current medications as identified as above med list.     REVIEW OF SYSTEMS:  Unobtainable secondary to participation     Physical Exam:  BP 98/63  Pulse 59  Temp 97.4  F (36.3  C)  Resp 18  SpO2 97%  GENERAL APPEARANCE:  Alert, in no distress    Recent Labs:     CBC RESULTS:   Recent Labs   Lab Test 05/10/18  04/28/18   0934  04/20/18   0940   WBC  8.6   --   6.9   RBC  3.05*   --   3.37*   HGB  7.7*  8.4*  8.1*   HCT  26.1*   --   28.2*   MCV  86   --   84   MCH  25.2*   --   24.0*   MCHC  29.5*   --   28.7*   RDW  19.0*   --   19.5*   PLT  367   --   300       Last Basic Metabolic Panel:  Recent Labs   Lab Test 05/10/18  05/07/18   0617   NA  138  141   POTASSIUM  4.4  4.5   CHLORIDE  103  104   NATA  8.9  8.4*   CO2  26  27   BUN  12  12   CR  0.58*  0.50*   GLC  116  97       Liver Function Studies -   Recent Labs   Lab Test 05/10/18  05/07/18   0617   PROTTOTAL  8.0  7.9   ALBUMIN  2.0*  1.9*   BILITOTAL  0.2  0.2   ALKPHOS  234*  228*   AST  24  20   ALT  20  26       Assessment/Plan:  (G83.9) Paraplegia at T4  level (H)  (primary encounter diagnosis)  Comment: Chronic.   Plan: OT eval and treat for PMD use and ADLs    (T07.XXXA) Multiple wounds  Comment: Unable to view wounds. Healing is complicated by frequent soiling with urine and stool, and noncompliance with nursing care often.  Plan: Continue current POC with no changes at this time and adjustments as needed.    (M86.9) Osteomyelitis, unspecified site, unspecified type (H)  Comment: Chronic  Plan: Monitor for acute changes, fever, hypotension, AMS    (L30.9) Dermatitis  Comment: Skin irritation due to urine/stool, unable to view today  Plan: Continue current POC with no changes at this time and adjustments as needed.    (N31.9) Neurogenic bladder  Comment: Chronic  Plan: Suprapubic catheter. F/u urology for probable ileo diversion    (J44.9) Chronic obstructive pulmonary disease, unspecified COPD type (H)  Comment: No acute issues noted during brief encounter  Plan: Continue current POC with no changes at this time and adjustments as needed.    (G89.29) Other chronic pain  Comment: Unable to assess, but she certainly did not mention uncontrolled pain as a complaint  Plan: Continue current POC with no changes at this time and adjustments as needed.    (Z86.718) Personal history of DVT (deep vein thrombosis)  Comment: On Xarelto  Plan: Continue current POC with no changes at this time and adjustments as needed.    (D64.9) Chronic anemia  Comment: Hgb stable  Plan: Recheck prn    (S72.25XS) Closed nondisplaced subtrochanteric fracture of left femur, sequela  Comment: No intervention planned. Unable to assess if any symptoms  Plan: Continue current POC with no changes at this time and adjustments as needed.    (G40.909) Seizure disorder (H)  Comment: Chronic condition being managed with medications.  Plan: Continue current POC with no changes at this time and adjustments as needed.    (E03.9) Hypothyroidism, unspecified type  Comment: Poorly controlled due to  noncompliance  Plan: Continue current POC with no changes at this time. TSH in 6 weeks    (F43.23) Adjustment disorder with mixed anxiety and depressed mood  Comment: Chronic. It is very challenging to provide appropriate care for this patient due to her noncompliance. Most accusations regarding staff are untrue.   Plan: House psych referral if willing      Electronically signed by  JOSEPH Montoya CNP   [facility]  Pager: [phone]

## 2018-05-14 NOTE — ED NOTES
Bed: ED09  Expected date:   Expected time:   Means of arrival: Ambulance  Comments:  62 F, paraplegic, possible sepsis

## 2018-05-14 NOTE — ED PROVIDER NOTES
"    [facility] EMERGENCY DEPARTMENT ([facility])  5/14/18   ED 9  History     Chief Complaint   Patient presents with     Pelvic Pain     The history is provided by the patient and medical records.     Marychuy Zhou is a 62 year old female with complex past medical history of paraplegia complicated by multiple decubitus ulcers and chronic osteomyelitis residing at [facility] who was sent in for further evaluation of pain.  Patient had recent hospitalization from 4/10-5/8/18 and her wounds were evaluated at that time.  She was seen and evaluated by Plastic Surgery Dr. Mayela Pelletier who felt that patient's poor nutrition status made her a poor candidate for flap surgery.  Patient has a neurogenic bladder with chronic suprapubic catheter complicated by leakage.  She was seen in clinic today and left abruptly before completion of workup.  She was sent in for further evaluation of pelvic pain.  Here in the ED, patient reports she thinks she may be septic again. She states typically when she becomes septic she gets sharp lower pelvic pain which she has developed today. Patient is also requesting her wounds be checked because they are very deep and she does not feel as though her facility is managing these appropriately. Her wounds have been changed twice daily over the past five days. She has a suprapubic catheter in place, and thinks this needs to be changed today because the bag was only changed once yesterday since she was discharged. She reports despite having a catheter in place, she consistently leaks urine around the catheter. She denies fevers. Her baseline blood pressures are typically 80s/60s. Patient does have a G tube in place and has been receiving nightly feeds through this. She is able to eat by mouth, but states she has not been eating much at her care facility because the food \"is barely edible\". She does have a right upper extremity PICC line in place.     I have reviewed the Medications, " Allergies, Past Medical and Surgical History, and Social History in the [employer] system.  Past Medical History:   Diagnosis Date     Anxiety      Chronic pain syndrome      Closed fracture zygoma, with routine healing, subsequent encounter      COPD (chronic obstructive pulmonary disease) (H)      Depressive disorder      DVT (deep vein thrombosis) in pregnancy (H)      Edema      Epilepsy (H)      Flaccid neuropathic bladder, not elsewhere classified      Fracture of femur (H)      Hypothyroidism      Iron deficiency anemia      Paraplegia (H)     unspecified     Pressure ulcer of sacral region      PTSD (post-traumatic stress disorder)      Rheumatoid arthritis (H)      Sepsis (H)     unspecified       History reviewed. No pertinent surgical history.    Family History   Problem Relation Age of Onset     Chronic Obstructive Pulmonary Disease Brother        Social History   Substance Use Topics     Smoking status: Never Smoker     Smokeless tobacco: Never Used     Alcohol use No       No current facility-administered medications for this encounter.      Current Outpatient Prescriptions   Medication     diazepam (VALIUM) 2 MG tablet     fluconazole (DIFLUCAN) 150 MG tablet     ipratropium - albuterol 0.5 mg/2.5 mg/3 mL (DUONEB) 0.5-2.5 (3) MG/3ML neb solution     levETIRAcetam (KEPPRA) 250 MG tablet     LEVOTHYROXINE SODIUM PO     loperamide (IMODIUM) 2 MG capsule     miconazole (MICATIN) 2 % AERP powder     mineral oil-hydrophilic petrolatum (AQUAPHOR)     multivitamin, therapeutic with minerals (THERA-VIT-M) TABS tablet     OMEPRAZOLE PO     ondansetron (ZOFRAN ODT) 4 MG ODT tab     oxyCODONE IR (ROXICODONE) 5 MG tablet     Pollen Extracts (PROSTAT PO)     protein modular (PROSOURCE TF)     Rivaroxaban (XARELTO PO)     triamcinolone (KENALOG) 0.1 % cream      No Known Allergies    Review of Systems   Constitutional: Negative for fever.   Genitourinary: Positive for pelvic pain.   All other systems reviewed and are  negative.      Physical Exam   BP: 94/66  Heart Rate: 98  Temp: 98  F (36.7  C)  Weight: 72.6 kg (160 lb)  SpO2: 98 %      Physical Exam   Constitutional: She is oriented to person, place, and time. She appears well-developed and well-nourished.   HENT:   Head: Normocephalic and atraumatic.   Neck: Normal range of motion.   Cardiovascular: Normal rate, regular rhythm and normal heart sounds.    Right sided PICC line in place.   Pulmonary/Chest: Effort normal and breath sounds normal. No respiratory distress.   Abdominal: Soft. She exhibits no distension. There is no tenderness. There is no rebound.   Genitourinary:   Genitourinary Comments: Review the catheter in place.     Musculoskeletal: She exhibits no tenderness.   Neurological: She is alert and oriented to person, place, and time.   Skin: Skin is warm and dry.        Psychiatric: She has a normal mood and affect. Her behavior is normal. Thought content normal.       ED Course     ED Course     Procedures       6:33 PM  The patient was seen and examined by Dr. Rubio in Room 9.          Critical Care time:  none         Results for orders placed or performed during the hospital encounter of 05/14/18   CBC with platelets differential   Result Value Ref Range    WBC 12.0 (H) 4.0 - 11.0 10e9/L    RBC Count 3.31 (L) 3.8 - 5.2 10e12/L    Hemoglobin 7.9 (L) 11.7 - 15.7 g/dL    Hematocrit 27.9 (L) 35.0 - 47.0 %    MCV 84 78 - 100 fl    MCH 23.9 (L) 26.5 - 33.0 pg    MCHC 28.3 (L) 31.5 - 36.5 g/dL    RDW 19.3 (H) 10.0 - 15.0 %    Platelet Count 474 (H) 150 - 450 10e9/L    Diff Method Automated Method     % Neutrophils 74.0 %    % Lymphocytes 16.6 %    % Monocytes 3.8 %    % Eosinophils 4.9 %    % Basophils 0.4 %    % Immature Granulocytes 0.3 %    Nucleated RBCs 0 0 /100    Absolute Neutrophil 8.9 (H) 1.6 - 8.3 10e9/L    Absolute Lymphocytes 2.0 0.8 - 5.3 10e9/L    Absolute Monocytes 0.5 0.0 - 1.3 10e9/L    Absolute Eosinophils 0.6 0.0 - 0.7 10e9/L    Absolute  Basophils 0.1 0.0 - 0.2 10e9/L    Abs Immature Granulocytes 0.0 0 - 0.4 10e9/L    Absolute Nucleated RBC 0.0    Comprehensive metabolic panel   Result Value Ref Range    Sodium 138 133 - 144 mmol/L    Potassium 4.0 3.4 - 5.3 mmol/L    Chloride 101 94 - 109 mmol/L    Carbon Dioxide 29 20 - 32 mmol/L    Anion Gap 7 3 - 14 mmol/L    Glucose 101 (H) 70 - 99 mg/dL    Urea Nitrogen 14 7 - 30 mg/dL    Creatinine 0.54 0.52 - 1.04 mg/dL    GFR Estimate >90 >60 mL/min/1.7m2    GFR Estimate If Black >90 >60 mL/min/1.7m2    Calcium 8.5 8.5 - 10.1 mg/dL    Bilirubin Total 0.2 0.2 - 1.3 mg/dL    Albumin 2.2 (L) 3.4 - 5.0 g/dL    Protein Total 8.3 6.8 - 8.8 g/dL    Alkaline Phosphatase 199 (H) 40 - 150 U/L    ALT 15 0 - 50 U/L    AST 17 0 - 45 U/L   Lactic acid   Result Value Ref Range    Lactic Acid 1.8 0.7 - 2.0 mmol/L   ISTAT gases lactate [place] POCT   Result Value Ref Range    Ph Venous 7.38 7.32 - 7.43 pH    PCO2 Venous 48 40 - 50 mm Hg    PO2 Venous 29 25 - 47 mm Hg    Bicarbonate Venous 29 (H) 21 - 28 mmol/L    O2 Sat Venous 52 %    Lactic Acid 1.6 0.7 - 2.1 mmol/L   Blood culture   Result Value Ref Range    Specimen Description Blood PICC     Special Requests Received in aerobic bottle only     Culture Micro PENDING      Medications   oxyCODONE IR (ROXICODONE) tablet 5 mg (not administered)   oxyCODONE IR (ROXICODONE) tablet 5 mg (not administered)            Labs Ordered and Resulted from Time of ED Arrival Up to the Time of Departure from the ED   CBC WITH PLATELETS DIFFERENTIAL - Abnormal; Notable for the following:        Result Value    WBC 12.0 (*)     RBC Count 3.31 (*)     Hemoglobin 7.9 (*)     Hematocrit 27.9 (*)     MCH 23.9 (*)     MCHC 28.3 (*)     RDW 19.3 (*)     Platelet Count 474 (*)     Absolute Neutrophil 8.9 (*)     All other components within normal limits   ISTAT  GASES LACTATE [place] POCT - Abnormal; Notable for the following:     Bicarbonate Venous 29 (*)     All other components within normal limits    COMPREHENSIVE METABOLIC PANEL   LACTIC ACID WHOLE BLOOD   ROUTINE UA WITH MICROSCOPIC            Assessments & Plan (with Medical Decision Making): 62-year-old female with a history of chronic osteomyelitis with no systemic symptoms that was recently discharged from the hospital 1 week prior after prolonged hospital stay.  Patient was told to follow-up with urology to have her suprapubic catheter as well as her chronic urinary leakage fixed before her wounds could be fixed by plastic surgery.  Patient says that she was having pelvic discomfort so came to the ER today.  She says that otherwise she is feeling okay.  Patient also been a little bit upset that the nursing home is not caring for her wounds as well as they were here in the hospital.  Patient here on exam is 1 large wound that does not appear to be acutely infected.  Patient did have dressings applied and I evaluated all of the patient's decubitus ulcers.  Patient here initially had low blood pressures but these are stable for her.  Per review of patient's medical records she does have a history of having low pressures in the past.  Patient here had pressures in the 90s which she says is normal for her.  We did obtain some lab work that shows a mild leukocytosis of 12,000.  Patient had a normal lactic acid.  Patient's pH and gas were normal.  Patient has blood cultures pending.  The patient does not currently have any signs of acute sepsis.  Patient does not have any fevers or chills or rigors.  Do not have acute infection that I recommend starting antibiotics at this time.  Patient says that she feels comfortable going back to her care facility.  Patient lives in a nursing home currently.  Patient told to return to the ER if her symptoms worsen.  At this time I do not recommend starting antibiotics due to no clear infection being visible.  Patient also appears to be at her baseline.  This patient is going to nursing home I feel comfortable discharging  her home.  She was seen by the geriatrician PA in clinic yesterday at the nursing home and her current symptoms seem very similar to what was documented yesterday as well.  Patient will be discharged home with instructions to return if her symptoms worsen or her blood cultures show any signs of acute infection.       I have reviewed the nursing notes.    I have reviewed the findings, diagnosis, plan and need for follow up with the patient.    New Prescriptions    No medications on file       Final diagnoses:   Pelvic pain   Leukocytosis, unspecified type   I, Tequila Cordero, am serving as a trained medical scribe to document services personally performed by No Rubio MD, based on the provider's statements to me.   I, No Rubio MD, was physically present and have reviewed and verified the accuracy of this note documented by Tequila Cordero.      5/14/2018   [facility], [place], EMERGENCY DEPARTMENT     No Rubio MD  05/15/18 0123

## 2018-05-14 NOTE — LETTER
5/14/2018        RE: Marychuy Zhou  [address] [address]        [facility]    Chief Complaint   Patient presents with     MIKIJAVED       [place] Medical Record Number:  [id number]    HPI:    Marychuy Zhou is a 62 year old  (1955), who is being seen today for an episodic care visit at [facility].  Hospital stay was at [facility] from 4/10/18 through 5/8/18. The patient was admitted for the evaluation of her multiple decubitus ulcers and for plastic surgery evaluation.  She was started empirically on Zosyn and vancomycin.  After assessment by Plastic Surgery and by the wound nurse, it was felt that while she likely has chronic osteomyelitis of the sacrum, there was no evidence of  systemic infection. The patient was seen by Dr. Pelletier who felt that her nutritional status was too tenuous to allow for surgery at this time.  In addition, the patient does have constant stooling and urinary leakage around her suprapubic catheter which continually soils her decubitus ulcers.  Ultimately, it is felt that she will need a diverting colostomy and some type of diverting urologic procedure, in addition to improvement in her nutritional status, before she would be a candidate for flap surgery. The bulk of the patient's hospitalization was spent in treating multiple chronic medical issues, as well as providing wound cares and in attempting to improve her nutritional status. She was initially on TPN, now tube feeding.     The patient does have history of depression and anxiety.  She frequently refused nursing cares and medications and was often accusatory towards providers.  She is fully oriented and quite goal oriented, though she was unable to understand the importance of compliance with wound cares as a way to achieve her goal, which is to have flap placements in the future. The patient's son is her power of  and was involved with her care decisions  throughout her hospitalization and is aware of the behaviors that have impacted her overall care.      Admitted to this facility for  rehab, medical management and nursing care.    HPI information obtained from: facility chart records, facility staff and [facility] chart review.    Today's concern is:  Patient is on her way out, declines having a conversation with provider. Continues to make accusations regarding poor care.    Paraplegia at T4 level (H)  Multiple wounds  Osteomyelitis, unspecified site, unspecified type (H)  Dermatitis  Neurogenic bladder  Chronic obstructive pulmonary disease, unspecified COPD type (H)  Other chronic pain  Personal history of DVT (deep vein thrombosis)  Chronic anemia  Closed nondisplaced subtrochanteric fracture of left femur, sequela  Seizure disorder (H)  Hypothyroidism, unspecified type  Adjustment disorder with mixed anxiety and depressed mood      ALLERGIES: Review of patient's allergies indicates no known allergies.  Past Medical, Surgical, Family and Social History reviewed and updated in [facility].    Current Outpatient Prescriptions   Medication Sig Dispense Refill     diazepam (VALIUM) 2 MG tablet Take 2 tablets (4 mg) by mouth every 6 hours as needed for anxiety 60 tablet      fluconazole (DIFLUCAN) 150 MG tablet Take 1 tablet (150 mg) by mouth daily for 7 days 1 tablet      ipratropium - albuterol 0.5 mg/2.5 mg/3 mL (DUONEB) 0.5-2.5 (3) MG/3ML neb solution Take 1 vial (3 mLs) by nebulization every 4 hours as needed for wheezing 360 mL      levETIRAcetam (KEPPRA) 250 MG tablet Take 1 tablet (250 mg) by mouth every evening Until 4/7/2018 then decrease to 250mg BID 4/8/2018-4/21/2018, then decrease to 250mg QAM 4/22-5/5/2018       LEVOTHYROXINE SODIUM PO Take 125 mcg by mouth every morning       loperamide (IMODIUM) 2 MG capsule Take 2 capsules (4 mg) by mouth 4 times daily 20 capsule      miconazole (MICATIN) 2 % AERP powder Apply topically 2 times daily Apply to groin  folds       mineral oil-hydrophilic petrolatum (AQUAPHOR) Apply topically daily Apply to lower extremities for skin irritation.       multivitamin, therapeutic with minerals (THERA-VIT-M) TABS tablet Take 1 tablet by mouth daily 30 each 0     OMEPRAZOLE PO Take 20 mg by mouth daily (with breakfast)       ondansetron (ZOFRAN ODT) 4 MG ODT tab Take 1 tablet (4 mg) by mouth every 6 hours as needed for nausea 20 tablet 1     oxyCODONE IR (ROXICODONE) 5 MG tablet Take 1-2 tablets (5-10 mg) by mouth every 4 hours as needed (Give 5mg for pain level 4-6 on a 10 point scale. Give 10mg for pain level 6-10 on a 10 point scale.) 40 tablet 0     Pollen Extracts (PROSTAT PO) Take 1 oz by mouth daily       protein modular (PROSOURCE TF) 1 packet by Per Feeding Tube route 3 times daily       Rivaroxaban (XARELTO PO) Take 20 mg by mouth At Bedtime       triamcinolone (KENALOG) 0.1 % cream Apply topically 3 times daily       Medications reconciled to facility chart and changes were made to reflect current medications as identified as above med list.     REVIEW OF SYSTEMS:  Unobtainable secondary to participation     Physical Exam:  BP 98/63  Pulse 59  Temp 97.4  F (36.3  C)  Resp 18  SpO2 97%  GENERAL APPEARANCE:  Alert, in no distress    Recent Labs:     CBC RESULTS:   Recent Labs   Lab Test 05/10/18  04/28/18   0934  04/20/18   0940   WBC  8.6   --   6.9   RBC  3.05*   --   3.37*   HGB  7.7*  8.4*  8.1*   HCT  26.1*   --   28.2*   MCV  86   --   84   MCH  25.2*   --   24.0*   MCHC  29.5*   --   28.7*   RDW  19.0*   --   19.5*   PLT  367   --   300       Last Basic Metabolic Panel:  Recent Labs   Lab Test 05/10/18  05/07/18   0617   NA  138  141   POTASSIUM  4.4  4.5   CHLORIDE  103  104   NATA  8.9  8.4*   CO2  26  27   BUN  12  12   CR  0.58*  0.50*   GLC  116  97       Liver Function Studies -   Recent Labs   Lab Test 05/10/18  05/07/18   0617   PROTTOTAL  8.0  7.9   ALBUMIN  2.0*  1.9*   BILITOTAL  0.2  0.2   ALKPHOS  234*   228*   AST  24  20   ALT  20  26       Assessment/Plan:  (G83.9) Paraplegia at T4 level (H)  (primary encounter diagnosis)  Comment: Chronic.   Plan: OT eval and treat for PMD use and ADLs    (T07.XXXA) Multiple wounds  Comment: Unable to view wounds. Healing is complicated by frequent soiling with urine and stool, and noncompliance with nursing care often.  Plan: Continue current POC with no changes at this time and adjustments as needed.    (M86.9) Osteomyelitis, unspecified site, unspecified type (H)  Comment: Chronic  Plan: Monitor for acute changes, fever, hypotension, AMS    (L30.9) Dermatitis  Comment: Skin irritation due to urine/stool, unable to view today  Plan: Continue current POC with no changes at this time and adjustments as needed.    (N31.9) Neurogenic bladder  Comment: Chronic  Plan: Suprapubic catheter. F/u urology for probable ileo diversion    (J44.9) Chronic obstructive pulmonary disease, unspecified COPD type (H)  Comment: No acute issues noted during brief encounter  Plan: Continue current POC with no changes at this time and adjustments as needed.    (G89.29) Other chronic pain  Comment: Unable to assess, but she certainly did not mention uncontrolled pain as a complaint  Plan: Continue current POC with no changes at this time and adjustments as needed.    (Z86.718) Personal history of DVT (deep vein thrombosis)  Comment: On Xarelto  Plan: Continue current POC with no changes at this time and adjustments as needed.    (D64.9) Chronic anemia  Comment: Hgb stable  Plan: Recheck prn    (S72.25XS) Closed nondisplaced subtrochanteric fracture of left femur, sequela  Comment: No intervention planned. Unable to assess if any symptoms  Plan: Continue current POC with no changes at this time and adjustments as needed.    (G40.909) Seizure disorder (H)  Comment: Chronic condition being managed with medications.  Plan: Continue current POC with no changes at this time and adjustments as  needed.    (E03.9) Hypothyroidism, unspecified type  Comment: Poorly controlled due to noncompliance  Plan: Continue current POC with no changes at this time. TSH in 6 weeks    (F43.23) Adjustment disorder with mixed anxiety and depressed mood  Comment: Chronic. It is very challenging to provide appropriate care for this patient due to her noncompliance. Most accusations regarding staff are untrue.   Plan: House psych referral if willing      Electronically signed by  JOSEPH Montoya [facility]  Pager: [phone]

## 2018-05-15 PROBLEM — R78.81 BACTEREMIA: Status: ACTIVE | Noted: 2018-01-01

## 2018-05-15 NOTE — IP AVS SNAPSHOT
"    [address] [facility]: [phone]                                              INTERAGENCY TRANSFER FORM - PHYSICIAN ORDERS   5/15/2018                    Hospital Admission Date: 5/15/2018  SHALINI CHONG   : 1955  Sex: Female        Attending Provider: Lance Marley MD     Allergies:  No Known Allergies    Infection:  CRE, VRE, MRSA, ESBL   Service:  INTERNAL MED    Ht:  1.575 m (5' 2\")   Wt:  84.7 kg (186 lb 11.2 oz)   Admission Wt:  72.6 kg (160 lb)    BMI:  34.15 kg/m 2   BSA:  1.92 m 2            Patient PCP Information     Provider PCP Type    Sánchez Zamora MD, MD General      ED Clinical Impression     Diagnosis Description Comment Added By Time Added    Sepsis, due to unspecified organism (H) [A41.9] Sepsis, due to unspecified organism (H) [A41.9]  No Rubio MD 5/15/2018  7:44 PM    Gram-positive bacteremia [R78.81] Gram-positive bacteremia [R78.81]  No Rubio MD 5/15/2018  7:44 PM      Hospital Problems as of 2018              Priority Class Noted POA    Bacteremia Medium  5/15/2018 Yes      Non-Hospital Problems as of 2018              Priority Class Noted    Neurogenic bladder Medium  2013    Paraplegia at T4 level (H) Medium  2016    Closed left subtrochanteric femur fracture (H) Medium  2018    Osteomyelitis (H) Medium  2018    Cellulitis Medium  2018    Advance Care Planning Medium  2018    Sepsis (H) Medium  Unknown    Other chronic pain Medium  5/10/2018    Seizure disorder (H) Medium  5/10/2018    Adjustment disorder with mixed anxiety and depressed mood Medium  5/10/2018      Code Status History     Date Active Date Inactive Code Status Order ID Comments User Context    2018  8:07 AM  Full Code [id number]  Caryl Goel APRN CNP Outpatient    5/15/2018 11:32 PM 2018  8:07 AM Full Code [id number]  Ryan Diallo MD Inpatient    2018 10:33 AM 5/15/2018 11:32 PM Full Code " [id number]  Shahriar Cadet MD Outpatient    4/11/2018 12:16 AM 5/8/2018 10:33 AM Full Code [id number]  Toan Kingston MD Inpatient    4/7/2018 12:58 PM 4/11/2018 12:16 AM Full Code [id number]  Ambar Lopez MD Outpatient    4/4/2018 10:08 PM 4/7/2018 12:58 PM Full Code [id number]  Melo Arcos MD Inpatient         Medication Review      START taking        Dose / Directions Comments    ascorbic acid 500 MG Tabs   Used for:  Protein-calorie malnutrition, unspecified severity (H)        Dose:  500 mg   Take 1 tablet (500 mg) by mouth daily for 7 days   Refills:  0        cyclobenzaprine 5 MG tablet   Commonly known as:  FLEXERIL   Used for:  Other chronic pain        Dose:  5 mg   Take 1 tablet (5 mg) by mouth 3 times daily as needed for muscle spasms   Quantity:  42 tablet   Refills:  0        dronabinol 2.5 MG capsule   Commonly known as:  MARINOL   Used for:  Protein-calorie malnutrition, unspecified severity (H)        Dose:  2.5 mg   Take 1 capsule (2.5 mg) by mouth 2 times daily (before meals)   Refills:  0        multivitamins with minerals Liqd liquid   Used for:  Protein-calorie malnutrition, unspecified severity (H)        Dose:  15 mL   15 mLs by Per Feeding Tube route daily   Refills:  0        oxybutynin chloride 15 MG Tb24   Used for:  Neurogenic bladder        Dose:  15 mg   Take 1 tablet (15 mg) by mouth At Bedtime   Quantity:  30 tablet   Refills:  0        * QUEtiapine 25 MG tablet   Commonly known as:  SEROquel   Used for:  Adjustment disorder with mixed anxiety and depressed mood        Dose:  25 mg   Take 1 tablet (25 mg) by mouth daily   Quantity:  60 tablet   Refills:  0        * QUEtiapine 50 MG tablet   Commonly known as:  SEROquel   Used for:  Adjustment disorder with mixed anxiety and depressed mood        Dose:  50 mg   Take 1 tablet (50 mg) by mouth At Bedtime   Quantity:  120 tablet   Refills:  0        vitamin A 39050 UNIT capsule   Used for:  Protein-calorie malnutrition,  unspecified severity (H)        Dose:  89325 Units   Take 2 capsules (20,000 Units) by mouth daily for 7 days   Quantity:  14 capsule   Refills:  0        zinc sulfate 220 (50 Zn) MG capsule   Commonly known as:  ZINCATE   Used for:  Protein-calorie malnutrition, unspecified severity (H)        Dose:  220 mg   Take 1 capsule (220 mg) by mouth daily for 7 days   Quantity:  7 capsule   Refills:  0        * Notice:  This list has 2 medication(s) that are the same as other medications prescribed for you. Read the directions carefully, and ask your doctor or other care provider to review them with you.      CONTINUE these medications which may have CHANGED, or have new prescriptions. If we are uncertain of the size of tablets/capsules you have at home, strength may be listed as something that might have changed.        Dose / Directions Comments    levETIRAcetam 250 MG tablet   Commonly known as:  KEPPRA   This may have changed:    - when to take this  - additional instructions   Used for:  Seizures (H)        Dose:  250 mg   Take 1 tablet (250 mg) by mouth every evening Until 4/7/2018 then decrease to 250mg BID 4/8/2018-4/21/2018, then decrease to 250mg QAM 4/22-5/5/2018   Refills:  0        oxyCODONE IR 15 MG tablet   Commonly known as:  ROXICODONE   This may have changed:    - medication strength  - how much to take  - when to take this  - reasons to take this   Used for:  Other chronic pain        Dose:  15 mg   Take 1 tablet (15 mg) by mouth every 3 hours as needed for moderate to severe pain   Quantity:  120 tablet   Refills:  0    Pt discharging to TCU         CONTINUE these medications which have NOT CHANGED        Dose / Directions Comments    diazepam 2 MG tablet   Commonly known as:  VALIUM   Used for:  Anxiety        Dose:  4 mg   Take 2 tablets (4 mg) by mouth every 6 hours as needed for anxiety   Quantity:  60 tablet   Refills:  0        ipratropium - albuterol 0.5 mg/2.5 mg/3 mL 0.5-2.5 (3) MG/3ML neb  solution   Commonly known as:  DUONEB   Used for:  Chronic obstructive pulmonary disease, unspecified COPD type (H)        Dose:  3 mL   Take 1 vial (3 mLs) by nebulization every 4 hours as needed for wheezing   Quantity:  360 mL   Refills:  0        LEVOTHYROXINE SODIUM PO        Dose:  125 mcg   Take 125 mcg by mouth every morning   Refills:  0        loperamide 2 MG capsule   Commonly known as:  IMODIUM   Used for:  Diarrhea, unspecified type        Dose:  4 mg   Take 2 capsules (4 mg) by mouth 4 times daily   Quantity:  20 capsule   Refills:  0        miconazole 2 % Aerp powder   Commonly known as:  MICATIN        Apply topically 2 times daily Apply to groin folds   Refills:  0        mineral oil-hydrophilic petrolatum        Apply topically daily Apply to lower extremities for skin irritation.   Refills:  0        OMEPRAZOLE PO        Dose:  20 mg   Take 20 mg by mouth daily (with breakfast)   Refills:  0        ondansetron 4 MG ODT tab   Commonly known as:  ZOFRAN ODT   Used for:  Nausea        Dose:  4 mg   Take 1 tablet (4 mg) by mouth every 6 hours as needed for nausea   Quantity:  20 tablet   Refills:  1        PROSTAT PO   Indication:  Wound Healing        Dose:  1 oz   1 oz daily   Refills:  0        protein modular   Used for:  Decubitus ulcer of sacral region, stage 4 (H)        Dose:  1 packet   1 packet by Per Feeding Tube route 3 times daily   Refills:  0        triamcinolone 0.1 % cream   Commonly known as:  KENALOG   Used for:  Folliculitis        Apply topically 3 times daily   Refills:  0        XARELTO PO        Dose:  20 mg   Take 20 mg by mouth At Bedtime   Refills:  0          STOP taking     fluconazole 150 MG tablet   Commonly known as:  DIFLUCAN                   Summary of Visit     Reason for your hospital stay       Dear Ms Zhou,     Your were hospitalized at [facility] from 5/15/18 to 5/19/18 due to positive blood cultures from ED visit on 5/14/18.  You  were started on Vancomycin empirically until final repeat cx.  Blood cultures were repeated on 5/15 and 5/16 and were negative.      We are suggesting the following medication changes:  - Oxybutynin 15mg Q24H for urine leakage   - Seroquel 25mg QAM and 50mg HS for anxiety   - Marinol 2.5mg BID to stimulate appetite   - Per Pain Service recommendations, increase Oxycodone 15mg Q3H PRN and Flexeril 5mg TID PRN   - Vitamin A, C supplementation for 10 days to support wound healing, 7 doses remaining     Please set up an appointment with:    It was a pleasure to care for you during this hospital admission.  Please let us know if there is anything else we can do for you so that we can be sure you are leaving completely satisfied with your care experience.    Your hospital unit at the time of discharge is 5B so if you have any questions please call the hospital at [phone] and ask to talk to a nurse on that unit.    Take gentle care,    Caryl Goel CNP  [facility] - Internal Medicine   Hospitalist Service             After Care     Activity - Up with assistive device       Patient w/ T4 paraplegia       Adult Formula Bolus Feeding       continue cycled TF, TwoCal HN @ 50 mL/hr x 12 hours overnight from 8:00 pm-8:00am  Regimen provides: (600  ML/day), 1200 kcals (23 kcal/kg/day), 50 gm PRO (0.9 gm/kg/day), 420 mL H2O, 132 gm CHO and 3.3 gm Fiber daily.       Advance Diet as Tolerated       Follow this diet upon discharge:    Regular diet plus TFs as below:      Adult Formula Drip Feeding: Specified Time TwoCal HN; Gastrostomy; Goal Rate: 50; mL/hr; From: 8:00 PM; 8:00 AM; Medication - Tube Feeding Flush Frequency: At least 15-30 mL water before and after medication administration and with tube clogging; ...      Snacks/Supplements Pediatric: Ensure Plus; Between Meals       Fall precautions           General info for SNF       Length of Stay Estimate: Short Term Care: Estimated # of Days  31-90  Condition at Discharge: Stable  Level of care:skilled   Rehabilitation Potential: Good  Admission H&P remains valid and up-to-date: Yes  Recent Chemotherapy: N/A  Use Nursing Home Standing Orders: Yes       Intake and output       Every shift       Seizure precautions           Wound care (specify)       Per WOCN assessment/instructions:     Left heel wound - wash every other day with wound cleanser and gauze. Apply a thick layer of Medihoney (order #[id number]) to wound bed and cover with Mepilex 4x4. If dressing rolls at edges, OK to use Primapore tape to keep in place.      Left upper thigh wound - wash daily with wound cleanser and gauze. Cover with Mepilex 4x4.      Right IT wound - wash BID and as needed if soiled with urine or stool with wound cleanser and gauze. Pack wound to entire depth (7 cm) with Kerlix dampened with saline. Cover with Mepilex 4x4      Left sacral wound - wash daily with wound cleanser and gauze. Cover with Mepilex 4x4.      Bilateral PNT tube sites - healed, dressing no longer indicated      Buttock, perineum, upper thighs IAD - wash twice daily and with each episode of incontinence with Greta Cleanse and Protect and a soft cloth. Apply a thick layer of Criticaide Paste to skin. Do not attempt to remove Criticaide Paste with each episode of incontinence, just wipe of soiled paste and reapply. To remove all Chriss, use baby/mineral oil and a soft cloth. Due to large amount of urine being diverted past suprapubic catheter and out urethra, please change Covidon sheet with position changes every 2 hours to keep dry.             Referrals     Occupational Therapy Adult Consult       Evaluate and treat as clinically indicated.    Reason:  Deconditioning       Physical Therapy Adult Consult       Evaluate and treat as clinically indicated.    Reason:  Deconditioning             Other Orders     Contact Isolation                 Your next 10 appointments already scheduled     Jun 11, 2018  3:20  PM CDT   (Arrive by 3:05 PM)   New Patient Visit with Faustino Coyne MD   [facility] ([facility])    [address]  [address] [facility] [address] [phone]              Follow-Up Appointment Instructions     Future Labs/Procedures    Follow Up and recommended labs and tests     Comments:    Follow up with Nursing home physician.     Follow up with Urology, to be coordinated with .      Follow-Up Appointment Instructions     Follow Up and recommended labs and tests       Follow up with Nursing home physician.     Follow up with Urology, to be coordinated with .             Statement of Approval     Ordered          05/19/18 1157  I have reviewed and agree with all the recommendations and orders detailed in this document.  EFFECTIVE NOW     Approved and electronically signed by:  Caryl Goel APRN CNP

## 2018-05-15 NOTE — ED NOTES
Pt falling asleep in between cares. States she has been asking for pain medication since 1800. MD and RN at bedside at 2045, pt did not ask for pain medication at that time and was updated on plan of care. Jazmyne RN came to ED to assist RN with wet to dry packing. Pt's BP remains soft in the 80's systolic. MD aware

## 2018-05-15 NOTE — IP AVS SNAPSHOT
"    [address] [facility]: [phone]                                              INTERAGENCY TRANSFER FORM - LAB / IMAGING / EKG / EMG RESULTS   5/15/2018                    Hospital Admission Date: 5/15/2018  SHALINI CHONG   : 1955  Sex: Female        Attending Provider: Lance Marley MD     Allergies:  No Known Allergies    Infection:  CRE, VRE, MRSA, ESBL   Service:  INTERNAL MED    Ht:  1.575 m (5' 2\")   Wt:  84.7 kg (186 lb 11.2 oz)   Admission Wt:  72.6 kg (160 lb)    BMI:  34.15 kg/m 2   BSA:  1.92 m 2            Patient PCP Information     Provider PCP Type    Sánchez Zamora MD, MD General         Lab Results - 3 Days      Blood culture [[id number]]  Resulted: 18 025, Result status: Preliminary result    Ordering provider: Ryan Diallo MD  18 Resulting lab: [facility]    Specimen Information    Type Source Collected On   Blood  18 0713   Comment:  Right Hand          Components       Value Reference Range Flag Lab   Specimen Description Blood Right Hand      Special Requests Received in aerobic bottle only   75   Culture Micro No growth after 3 days   225            Blood culture [[id number]]  Resulted: 18 0256, Result status: Preliminary result    Ordering provider: Ryan Diallo MD  18 Resulting lab: [facility]    Specimen Information    Type Source Collected On   Blood  18 0838   Comment:  Right Arm          Components       Value Reference Range Flag Lab   Specimen Description Blood Right Arm      Special Requests Received in aerobic bottle only   75   Culture Micro No growth after 3 days   225            Blood culture [[id number]]  Resulted: 18 0256, Result status: Preliminary result    Ordering provider: No Rubio MD  05/15/18 2050 Resulting lab: [facility]    Specimen Information    Type Source Collected " On   Blood Urine catheter 05/15/18 2114   Comment:  Left Arm          Components       Value Reference Range Flag Lab   Specimen Description Blood Left Arm      Special Requests Received in aerobic bottle only   75   Culture Micro No growth after 4 days   225            Lactic acid level STAT for sepsis protocol [[id number]]  Resulted: 05/18/18 1756, Result status: Final result    Ordering provider: Lance Marley MD  05/18/18 1622 Resulting lab: [facility]    Specimen Information    Type Source Collected On   Blood  05/18/18 1733          Components       Value Reference Range Flag Lab   Lactate for Sepsis Protocol 1.2 0.4 - 1.9 mmol/L  51            Urine Culture Aerobic Bacterial [[id number]]  Resulted: 05/18/18 1517, Result status: Preliminary result    Ordering provider: Lance Marley MD  05/18/18 1225 Resulting lab: [facility]    Specimen Information    Type Source Collected On   Catheterized Urine  05/18/18 1225          Components       Value Reference Range Flag Lab   Specimen Description Catheterized Urine      Special Requests Specimen received in preservative   75   Culture Micro PENDING               UA with Microscopic reflex to Culture [[id number]] (Abnormal)  Resulted: 05/18/18 1418, Result status: Final result    Ordering provider: Caryl Goel APRN CNP  05/18/18 1123 Resulting lab: [facility]    Specimen Information    Type Source Collected On   Catheterized Urine Urine catheter 05/18/18 1225          Components       Value Reference Range Flag Lab   Color Urine Yellow   51   Appearance Urine Slightly Cloudy   51   Glucose Urine Negative NEG^Negative mg/dL  51   Bilirubin Urine Negative NEG^Negative  51   Ketones Urine Negative NEG^Negative mg/dL  51   Specific Gravity Urine 1.009 1.003 - 1.035  51   Blood Urine Large NEG^Negative A 51   pH Urine 8.0 5.0 - 7.0 pH H 51   Protein  Albumin Urine 10 NEG^Negative mg/dL A 51   Urobilinogen mg/dL Normal 0.0 - 2.0 mg/dL  51   Nitrite Urine Negative NEG^Negative  51   Leukocyte Esterase Urine Large NEG^Negative A 51   Source Catheterized Urine   51   WBC Urine 52 0 - 5 /HPF H 51   RBC Urine >182 0 - 2 /HPF H 51   WBC Clumps Present NEG^Negative /HPF A 51   Squamous Epithelial /HPF Urine <1 0 - 1 /HPF  51   Renal Tub Epi 1 NEG^Negative /HPF A 51            CBC with platelets [[id number]] (Abnormal)  Resulted: 05/18/18 0832, Result status: Final result    Ordering provider: Caryl Goel APRN CNP  05/18/18 0735 Resulting lab: [facility]    Specimen Information    Type Source Collected On   Blood  05/18/18 0800          Components       Value Reference Range Flag Lab   WBC 9.2 4.0 - 11.0 10e9/L  51   RBC Count 3.10 3.8 - 5.2 10e12/L L 51   Hemoglobin 7.3 11.7 - 15.7 g/dL L 51   Hematocrit 25.9 35.0 - 47.0 % L 51   MCV 84 78 - 100 fl  51   MCH 23.5 26.5 - 33.0 pg L 51   MCHC 28.2 31.5 - 36.5 g/dL L 51   RDW 18.6 10.0 - 15.0 % H 51   Platelet Count 365 150 - 450 10e9/L  51            Urine Culture [[id number]] (Abnormal)  Resulted: 05/18/18 0808, Result status: Final result    Ordering provider: No Rubio MD  05/15/18 1942 Resulting lab: [facility]    Specimen Information    Type Source Collected On   Catheterized Urine Urine catheter 05/15/18 2114          Components       Value Reference Range Flag Lab   Specimen Description Catheterized Urine      Special Requests Specimen received in preservative   75   Culture Micro --  A 225   Result:         <10,000 colonies/mL  Proteus mirabilis     Culture Micro --  A 225   Result:         <10,000 colonies/mL  Enterococcus faecium (VRE)              Blood culture [[id number]] (Abnormal)  Resulted: 05/18/18 0749, Result status: Final result    Ordering provider: No Rubio MD  05/14/18 1852 Resulting lab:  [facility]    Specimen Information    Type Source Collected On   Blood PICC 05/14/18 6494   Comment:  PICC          Components       Value Reference Range Flag Lab   Specimen Description Blood PICC      Special Requests Received in aerobic bottle only   75   Culture Micro --  A 225   Result:         Cultured on the 1st day of incubation:  Staphylococcus epidermidis  This isolate is presumed to be clindamycin resistant based on detection of inducible   clindamycin resistance. Erythromycin and clindamycin are resistant, therefore, they are   not recommended for use.     Culture Micro --   225   Result:         Critical Value/Significant Value, preliminary result only, called to and read back by   DR. HAGER @1719 5/15/18. CT     Culture Micro --  A 225   Result:         Cultured on the 1st day of incubation:  Staphylococcus hominis     Culture Micro --   225   Result:         (Note)  POSITIVE for STAPHYLOCOCCUS EPIDERMIDIS and POSITIVE for the mecA  gene (resistant to methicillin) by Verigene multiplex nucleic acid  test. Final identification and antimicrobial susceptibility testing  will be verified by standard methods.    Specimen tested with Verigene multiplex, gram-positive blood culture  nucleic acid test for the following targets: Staph aureus, Staph  epidermidis, Staph lugdunensis, other Staph species, Enterococcus  faecalis, Enterococcus faecium, Streptococcus species, S. agalactiae,  S. anginosus grp., S. pneumoniae, S. pyogenes, Listeria sp., mecA  (methicillin resistance) and Manuel/B (vancomycin resistance).    Critical Value/Significant Value called to and read back by RK FOWLER RN @2018 5/15/18. CT              Magnesium [[id number]]  Resulted: 05/18/18 0716, Result status: Final result    Ordering provider: Caryl Goel APRN CNP  05/18/18 0000 Resulting lab: [facility]    Specimen Information    Type Source Collected On    Blood  05/18/18 0618          Components       Value Reference Range Flag Lab   Magnesium 1.9 1.6 - 2.3 mg/dL  51            Phosphorus [[id number]]  Resulted: 05/18/18 0716, Result status: Final result    Ordering provider: Caryl Goel APRN CNP  05/18/18 0000 Resulting lab: [facility]    Specimen Information    Type Source Collected On   Blood  05/18/18 0618          Components       Value Reference Range Flag Lab   Phosphorus 3.4 2.5 - 4.5 mg/dL  51            Vancomycin level [[id number]]  Resulted: 05/17/18 1203, Result status: Final result    Ordering provider: Lance Marley MD  05/17/18 0430 Resulting lab: [facility]    Specimen Information    Type Source Collected On   Blood  05/17/18 1044          Components       Value Reference Range Flag Lab   Vancomycin Level 23.2 mg/L  51   Comment:         Traditional Dosing therapeutic Range:         Trough 8-20 mg/L         Peak 20-50 mg/L              Prealbumin [[id number]] (Abnormal)  Resulted: 05/17/18 1157, Result status: Final result    Ordering provider: Caryl Goel APRN CNP  05/16/18 1456 Resulting lab: [facility]    Specimen Information    Type Source Collected On   Blood  05/16/18 1617          Components       Value Reference Range Flag Lab   Prealbumin 13 15 - 45 mg/dL L 51            Magnesium [[id number]]  Resulted: 05/17/18 0747, Result status: Final result    Ordering provider: Caryl Goel APRN CNP  05/17/18 0001 Resulting lab: [facility]    Specimen Information    Type Source Collected On   Blood  05/17/18 0543          Components       Value Reference Range Flag Lab   Magnesium 2.0 1.6 - 2.3 mg/dL  51            Phosphorus [[id number]]  Resulted: 05/17/18 0747, Result status: Final result    Ordering provider: Caryl Goel APRN CNP  05/17/18 0001 Resulting  lab: [facility]    Specimen Information    Type Source Collected On   Blood  05/17/18 0543          Components       Value Reference Range Flag Lab   Phosphorus 3.6 2.5 - 4.5 mg/dL  51            Basic metabolic panel [[id number]] (Abnormal)  Resulted: 05/17/18 0747, Result status: Final result    Ordering provider: Caryl Goel APRN CNP  05/17/18 0001 Resulting lab: [facility]    Specimen Information    Type Source Collected On   Blood  05/17/18 0543          Components       Value Reference Range Flag Lab   Sodium 139 133 - 144 mmol/L  51   Potassium 4.1 3.4 - 5.3 mmol/L  51   Chloride 105 94 - 109 mmol/L  51   Carbon Dioxide 27 20 - 32 mmol/L  51   Anion Gap 7 3 - 14 mmol/L  51   Glucose 123 70 - 99 mg/dL H 51   Urea Nitrogen 13 7 - 30 mg/dL  51   Creatinine 0.54 0.52 - 1.04 mg/dL  51   GFR Estimate >90 >60 mL/min/1.7m2  51   Comment:  Non  GFR Calc   GFR Estimate If Black >90 >60 mL/min/1.7m2  51   Comment:  African American GFR Calc   Calcium 8.2 8.5 - 10.1 mg/dL L 51            CBC with platelets differential [[id number]] (Abnormal)  Resulted: 05/17/18 0715, Result status: Final result    Ordering provider: Caryl Goel APRN CNP  05/17/18 0001 Resulting lab: [facility]    Specimen Information    Type Source Collected On   Blood  05/17/18 0543          Components       Value Reference Range Flag Lab   WBC 7.8 4.0 - 11.0 10e9/L  51   RBC Count 3.26 3.8 - 5.2 10e12/L L 51   Hemoglobin 7.7 11.7 - 15.7 g/dL L 51   Hematocrit 27.0 35.0 - 47.0 % L 51   MCV 83 78 - 100 fl  51   MCH 23.6 26.5 - 33.0 pg L 51   MCHC 28.5 31.5 - 36.5 g/dL L 51   RDW 18.8 10.0 - 15.0 % H 51   Platelet Count 457 150 - 450 10e9/L H 51   Diff Method Automated Method   51   % Neutrophils 72.1 %  51   % Lymphocytes 17.4 %  51   % Monocytes 5.6 %  51   % Eosinophils 4.2 %  51   % Basophils 0.4 %  51   %  Immature Granulocytes 0.3 %  51   Nucleated RBCs 0 0 /100  51   Absolute Neutrophil 5.6 1.6 - 8.3 10e9/L  51   Absolute Lymphocytes 1.4 0.8 - 5.3 10e9/L  51   Absolute Monocytes 0.4 0.0 - 1.3 10e9/L  51   Absolute Eosinophils 0.3 0.0 - 0.7 10e9/L  51   Absolute Basophils 0.0 0.0 - 0.2 10e9/L  51   Abs Immature Granulocytes 0.0 0 - 0.4 10e9/L  51   Absolute Nucleated RBC 0.0   51            Magnesium [[id number]]  Resulted: 05/16/18 1706, Result status: Final result    Ordering provider: Caryl Goel APRN CNP  05/16/18 1456 Resulting lab: [facility]    Specimen Information    Type Source Collected On   Blood  05/16/18 1617          Components       Value Reference Range Flag Lab   Magnesium 2.1 1.6 - 2.3 mg/dL  51            Phosphorus [[id number]]  Resulted: 05/16/18 1706, Result status: Final result    Ordering provider: Caryl Goel APRN CNP  05/16/18 1456 Resulting lab: [facility]    Specimen Information    Type Source Collected On   Blood  05/16/18 1617          Components       Value Reference Range Flag Lab   Phosphorus 4.3 2.5 - 4.5 mg/dL  51            CRP inflammation [[id number]] (Abnormal)  Resulted: 05/16/18 1706, Result status: Final result    Ordering provider: Caryl Goel APRN CNP  05/16/18 1456 Resulting lab: [facility]    Specimen Information    Type Source Collected On   Blood  05/16/18 1617          Components       Value Reference Range Flag Lab   CRP Inflammation 62.0 0.0 - 8.0 mg/L H 51            Basic metabolic panel [[id number]] (Abnormal)  Resulted: 05/16/18 0803, Result status: Final result    Ordering provider: Ryan Diallo MD  05/16/18 0001 Resulting lab: [facility]    Specimen Information    Type Source Collected On   Blood  05/16/18 0713          Components       Value Reference Range Flag Lab    Sodium 137 133 - 144 mmol/L  51   Potassium 4.4 3.4 - 5.3 mmol/L  51   Chloride 105 94 - 109 mmol/L  51   Carbon Dioxide 25 20 - 32 mmol/L  51   Anion Gap 8 3 - 14 mmol/L  51   Glucose 108 70 - 99 mg/dL H 51   Urea Nitrogen 13 7 - 30 mg/dL  51   Creatinine 0.57 0.52 - 1.04 mg/dL  51   GFR Estimate >90 >60 mL/min/1.7m2  51   Comment:  Non  GFR Calc   GFR Estimate If Black >90 >60 mL/min/1.7m2  51   Comment:  African American GFR Calc   Calcium 8.7 8.5 - 10.1 mg/dL  51            CBC with platelets differential [[id number]] (Abnormal)  Resulted: 05/16/18 0748, Result status: Final result    Ordering provider: Ryan Diallo MD  05/16/18 0001 Resulting lab: [facility]    Specimen Information    Type Source Collected On   Blood  05/16/18 0713          Components       Value Reference Range Flag Lab   WBC 8.0 4.0 - 11.0 10e9/L  51   RBC Count 3.29 3.8 - 5.2 10e12/L L 51   Hemoglobin 7.8 11.7 - 15.7 g/dL L 51   Hematocrit 27.8 35.0 - 47.0 % L 51   MCV 85 78 - 100 fl  51   MCH 23.7 26.5 - 33.0 pg L 51   MCHC 28.1 31.5 - 36.5 g/dL L 51   RDW 19.0 10.0 - 15.0 % H 51   Platelet Count 430 150 - 450 10e9/L  51   Diff Method Automated Method   51   % Neutrophils 76.1 %  51   % Lymphocytes 13.4 %  51   % Monocytes 5.1 %  51   % Eosinophils 5.0 %  51   % Basophils 0.2 %  51   % Immature Granulocytes 0.2 %  51   Nucleated RBCs 0 0 /100  51   Absolute Neutrophil 6.1 1.6 - 8.3 10e9/L  51   Absolute Lymphocytes 1.1 0.8 - 5.3 10e9/L  51   Absolute Monocytes 0.4 0.0 - 1.3 10e9/L  51   Absolute Eosinophils 0.4 0.0 - 0.7 10e9/L  51   Absolute Basophils 0.0 0.0 - 0.2 10e9/L  51   Abs Immature Granulocytes 0.0 0 - 0.4 10e9/L  51   Absolute Nucleated RBC 0.0   51            Testing Performed By     Lab - Abbreviation Name Director Address Valid Date Range    51 - Unknown [facility] Unknown [address] [facility] [address] 12/31/14 1010 -  "Present    75 - Unknown [facility] Unknown [address] [facility] [address] 01/15/15 1019 - Present    225 - Unknown [facility] Unknown [address] [facility] [address] 12/19/14 0954 - Present            Unresulted Labs (24h ago through future)    Start       Ordered    05/21/18 0600  CRP inflammation  EVERY MONDAY,   Routine     Comments:  Every Monday while on enteral tube feeding.    05/16/18 1456    05/21/18 0600  Prealbumin  EVERY MONDAY,   Routine     Comments:  Every Monday while on enteral tube feeding.    05/16/18 1456    Unscheduled  Magnesium  (Magnesium Replacement -  Adult - \"Standard\" - Replacement for all levels less than 1.6 mg/dL )  CONDITIONAL (SPECIFY),   Routine     Comments:  Obtain Magnesium Level for these conditions:  *IF no magnesium result within 24 hrs before initiation of order set, draw magnesium level with next lab collect.    *2 HOURS AFTER last magnesium replacement dose when magnesium replacement given for level less than 1.6   *Next morning after magnesium dose.     Repeat Magnesium Replacement if necessary.    05/18/18 0734    Unscheduled  Phosphorus  (POTASSIUM Phosphate - \"Standard\" - Replacement for levels less than or equal to 2.4 mg/dL )  CONDITIONAL (SPECIFY),   Routine     Comments:  Obtain Phosphorus Level for these conditions:  *IF no phosphorus result within 24 hrs before initiation of order set, draw phosphorus level with next lab collect.    *2 HOURS AFTER last phosphorus replacement dose for levels less than 2.0.  *Next morning after phosphorus dose.     Repeat Phosphorus Replacement if necessary.    05/18/18 0734      Encounter-Level Documents:     There are no encounter-level documents.      Order-Level Documents:     There are no order-level documents.      "

## 2018-05-15 NOTE — IP AVS SNAPSHOT
[address] [address]    Phone:  [phone]                                       After Visit Summary   5/15/2018    Marychuy Zhou    MRN: [id number]           After Visit Summary Signature Page     I have received my discharge instructions, and my questions have been answered. I have discussed any challenges I see with this plan with the nurse or doctor.    ..........................................................................................................................................  Patient/Patient Representative Signature      ..........................................................................................................................................  Patient Representative Print Name and Relationship to Patient    ..................................................               ................................................  Date                                            Time    ..........................................................................................................................................  Reviewed by Signature/Title    ...................................................              ..............................................  Date                                                            Time

## 2018-05-15 NOTE — DISCHARGE INSTRUCTIONS
Continue doing twice a day dressing changes.   Keep wounds clean and dry.     Return to the ER if symptoms worsen.

## 2018-05-15 NOTE — IP AVS SNAPSHOT
` ` Patient Information     Patient Name Sex     Marychuy Zhou ([id number]) Female 1955       Room Bed    5221 5221-01      Patient Demographics     Address Phone    [address] [place] [address] None (Home)      Patient Ethnicity & Race     Ethnic Group Patient Race    American White      Emergency Contact(s)     Name Relation Home Work Mobile    Tobias Paredes Son   [phone]    Sánchez Paredes Son   [phone]    Roberto Carlos Jenkins Son [phone]        Documents on File        Status Date Received Description       Documents for the Patient    Consent for EHR Access Received 18     Insurance Card       External Medication Information Consent       Patient ID       [facility] Specified Other       Consent for Services/Privacy Notice - Hospital/Clinic Received 18     Privacy Notice - [place] Received 18     Care Everywhere Prospective Auth Received 18     Advance Directives and Living Will Not Received  Invalid Directive dated 2016 received    Consent for Services - UM       Consent for Services - Hospital and Clinic Received 18     HIE Auth Received 18     Consent for Services - Geriatrics Received 18        Documents for the Encounter    CMS IM for Patient Signature Received 18       Admission Information     Attending Provider Admitting Provider Admission Type Admission Date/Time    Lance Marley MD Zewde, Anteneh G, MD Emergency 05/15/18  1928    Discharge Date Hospital Service Auth/Cert Status Service Area     Internal Medicine Incomplete [facility]    Unit Room/Bed Admission Status       UU U5B 5221/5221-01 Admission (Confirmed)       Admission     Complaint    Bacteremia      Hospital Account     Name Acct ID Class Status Primary Coverage    Marychuy Zhou [id number] Inpatient Open MEDICARE - MEDICARE            Guarantor Account (for Hospital Account #[id number])     Name Relation to Pt Service Area Active? Acct Type     Marychuy Zhou Self FCS Yes Personal/Family    Address Phone          [address] [place] [address] None(H)              Coverage Information (for Hospital Account #[id number])     1. MEDICARE/MEDICARE     F/O Payor/Plan Precert #    MEDICARE/MEDICARE     Subscriber Subscriber #    BakariMarychuy renteria [id number]    Address Phone    [employer]  [address]  [place] [address] [phone]          2. [employer]     F/O Payor/Plan Precert #    [employer]     Subscriber Subscriber #    BakariChristie renteriahy [id number]    Address Phone    [address]  [place] [address] [phone]

## 2018-05-15 NOTE — IP AVS SNAPSHOT
` `     [address] [facility]: [phone]                 INTERAGENCY TRANSFER FORM - NOTES (H&P, Discharge Summary, Consults, Procedures, Therapies)   5/15/2018                    Hospital Admission Date: 5/15/2018  SHALINI ZHOU   : 1955  Sex: Female        Patient PCP Information     Provider PCP Type    Sánchez Zamora MD, MD General         History & Physicals      H&P by Lance Marley MD at 5/15/2018 11:31 PM     Author:  Lance Marley MD Service:  Internal Medicine Author Type:  Physician    Filed:  2018  5:40 AM Date of Service:  5/15/2018 11:31 PM Creation Time:  5/15/2018 11:30 PM    Status:  Signed :  Lance Marley MD (Physician)         [facility]    Internal Medicine History and Physical - [facility] Service       Date of Admission:  5/15/2018    Chief Complaint   'pelvis pain'      History is obtained from the patient    History of Present Illness   Shalini Zhou is a 62 year old female With past medical history of chronic truncal, sacral decubitus ulcers secondary paraplegia, history of spinal hematoma resulting paraplegia, COPD, chronic normocytic anemia, seizure disorder, probable personality disorder, depressive disorder, hypothyroidism, chronic cervical neck pain surgery, G-tube, malnutrition, neurogenic bladder with suprapubic, urinary and stool incontinence, history of DVT on chronic anticoagulation     Currently she reports increasing acute on chronic pelvic pain since discharge from hospital. She thinks this is due to worsening of chronic ulcers. She states that her wounds have been changed 2x daily at the nursing home, but per notes she refuses cares and dressing changes frequently. She is unable to say if drainage, wound characteristics have changes much. Denies a change in odor, she is chronically incontinent of urine and stool. She had a PICC line placed 5/3 that is non painful. Denies fevers, chills, or additional new  constitutional symptoms. Pain is 12/10, stabbing, and hard to localize.      Recently hospitalized 4/10-5/8 at [facility].  Admitted for evaluation and antibiotic therapy of decubitus ulcers.  Was started on Vanco and Zosyn.  Was seen by plastic surgery thought she had chronic osteomyelitis and consider future skin flap when she is at a better nutritional standpoint.  She completed 2 weeks of antibiotics.  She was initially started on TPN but this was transitioned to G-tube feedings.  Of note on admission she had bilateral nephrostomy tubes trying to divert urine to allow healing from chronic ulcers.  This was unsuccessful and tubes were removed.  She continued to have leakage around suprapubic catheter and from urethra.  With plan for future diverting urologic and colostomy.    Review of Systems   The 10 point Review of Systems is negative other than noted in the HPI or here.     Past Medical History    I have reviewed this patient's medical history and updated it with pertinent information if needed.[AZ1.1]   Past Medical History:   Diagnosis Date     Anxiety      Chronic pain syndrome      Closed fracture zygoma, with routine healing, subsequent encounter      COPD (chronic obstructive pulmonary disease) (H)      Depressive disorder      DVT (deep vein thrombosis) in pregnancy (H)      Edema      Epilepsy (H)      Flaccid neuropathic bladder, not elsewhere classified      Fracture of femur (H)      Hypothyroidism      Iron deficiency anemia      Paraplegia (H)     unspecified     Pressure ulcer of sacral region      PTSD (post-traumatic stress disorder)      Rheumatoid arthritis (H)      Sepsis (H)     unspecified[AZ1.2]        Past Surgical History   I have reviewed this patient's surgical history and updated it with pertinent information if needed.    Social History[AZ1.1]   Social History   Substance Use Topics     Smoking status: Never Smoker     Smokeless tobacco: Never Used     Alcohol use No[AZ1.2]        Family History   I have reviewed this patient's family history and updated it with pertinent information if needed.[AZ1.1]   Family History   Problem Relation Age of Onset     Chronic Obstructive Pulmonary Disease Brother[AZ1.2]        Prior to Admission Medications   Prior to Admission Medications   Prescriptions Last Dose Informant Patient Reported? Taking?   LEVOTHYROXINE SODIUM PO  Nursing Home Yes No   Sig: Take 125 mcg by mouth every morning   OMEPRAZOLE PO  Nursing Home Yes No   Sig: Take 20 mg by mouth daily (with breakfast)   Pollen Extracts (PROSTAT PO)   Yes No   Sig: Take 1 oz by mouth daily   Rivaroxaban (XARELTO PO)  Nursing Home Yes No   Sig: Take 20 mg by mouth At Bedtime   diazepam (VALIUM) 2 MG tablet   No No   Sig: Take 2 tablets (4 mg) by mouth every 6 hours as needed for anxiety   fluconazole (DIFLUCAN) 150 MG tablet   No No   Sig: Take 1 tablet (150 mg) by mouth daily for 7 days   ipratropium - albuterol 0.5 mg/2.5 mg/3 mL (DUONEB) 0.5-2.5 (3) MG/3ML neb solution   No No   Sig: Take 1 vial (3 mLs) by nebulization every 4 hours as needed for wheezing   levETIRAcetam (KEPPRA) 250 MG tablet   No No   Sig: Take 1 tablet (250 mg) by mouth every evening Until 4/7/2018 then decrease to 250mg BID 4/8/2018-4/21/2018, then decrease to 250mg QAM 4/22-5/5/2018   loperamide (IMODIUM) 2 MG capsule   No No   Sig: Take 2 capsules (4 mg) by mouth 4 times daily   miconazole (MICATIN) 2 % AERP powder  Nursing Home Yes No   Sig: Apply topically 2 times daily Apply to groin folds   mineral oil-hydrophilic petrolatum (AQUAPHOR)  Nursing Home Yes No   Sig: Apply topically daily Apply to lower extremities for skin irritation.   multivitamin, therapeutic with minerals (THERA-VIT-M) TABS tablet   No No   Sig: Take 1 tablet by mouth daily   ondansetron (ZOFRAN ODT) 4 MG ODT tab   No No   Sig: Take 1 tablet (4 mg) by mouth every 6 hours as needed for nausea   oxyCODONE IR (ROXICODONE) 5 MG tablet   No No   Sig: Take  1-2 tablets (5-10 mg) by mouth every 4 hours as needed (Give 5mg for pain level 4-6 on a 10 point scale. Give 10mg for pain level 6-10 on a 10 point scale.)   protein modular (PROSOURCE TF)   No No   Si packet by Per Feeding Tube route 3 times daily   triamcinolone (KENALOG) 0.1 % cream   No No   Sig: Apply topically 3 times daily      Facility-Administered Medications: None     Allergies   No Known Allergies    Physical Exam   Vital Signs: Temp: 97.7  F (36.5  C) Temp src: Oral BP: 95/71 Pulse: 93   Resp: 16 SpO2: 100 % O2 Device: Nasal cannula Oxygen Delivery: 3 LPM  Weight: 160 lbs 0 oz     General: AAOx3, sleeping when I enter the room, at this time states she is in severe 12/10 pain. Irritable and limited in responses   Skin: Not jaundiced, no rash, no ecchymoses  HEENT: MMM, PERRLA, EOM intact  CV: RRR, normal S1S2, no murmur, clicks, rubs  Resp: Clear to auscultation bilaterally, no wheezes, rhonchi  Abd: Soft, non-tender, BS+, no masses appreciated, G tube midline, suprapubic in place, both c/d/i  Extremities: warm and well perfused, palpable pulses, chronic 2+ edema   Skin: large midline sacral wound with partially overlying meriplex, right and left IT tuberosity with meriplex, all wounds with surrounding pressure induced erythema, chronically mal odorous, without any visible purulent drainage    (patient refuses removal of any bandages as they were just replaced, see ED notes for most recent exam)  Neuro: unable to feel and or move LE. UE with normal movement and sensation.       Assessment & Plan     Marychuy Zhou is a 62 year old female  With past medical history of chronic truncal, sacral decubitus ulcers secondary paraplegia, history of spinal hematoma resulting paraplegia, COPD, chronic normocytic anemia, seizure disorder, probable personality disorder, depressive disorder, hypothyroidism, chronic cervical neck pain surgery, G-tube, malnutrition, neurogenic bladder with suprapubic, urinary and  stool incontinence, history of DVT on chronic anticoagulation presenting with acute on chronic pelvic pain and 2 of 2 + BCX[AZ1.1]      Bacteremia gram-positive cocci in clusters  Blood cultures from 5/14 2 of 2 resulted 5/15 as positive..  Veregen positive for staph epidermidis with MEC a  resistance gene.  Source likely chronic wounds.  Versed PICC versus other including urine, endocarditis  -Receive penicillin tazobactam ED, we will not continue as we have speciation for now brought in if she spikes or becomes unstable.  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  Wound ostomy care consulted.  Continue miconazole powder and triamcinolone cream    Chronic medical problems    T4 paraplegia secondary to spinal hematoma in distant past     COPD continue as needed duo nebs     anemia daily hemoglobin     [facility]    Mental health, likely depression, likely personality disorder: Previously refused all psychotropic medications except for benzodiazepines.  Ordered Valium as of now.  Strongly encourage discontinuing this medication    History of DVT on chronic Xarelto continue    Acute on chronic pain: Outpatient oxycodone 5-10 mg every 4 hours will increase this to 3 tabs every 4 hours as needed with IV Dilaudid dose ×1 now          # Pain Assessment:  Current Pain Score 5/8/2018   Patient currently in pain? sleeping: patient not able to self report   Pain score (0-10) -   Pain location -   Pain descriptors -   - Marychuy is experiencing pain due to chronic ulcer. Pain management was discussed and the plan was created in a collaborative fashion.  Marychuy's response to the current recommendations:  engaged  - Please see the plan for pain management as documented above        Diet:   regular diet with  additional protein supplement per previous orders and nutrition consult  Fluids: No IV fluids needed  DVT Prophylaxis: Chronic rivaroxaban  Code Status: Full Code    Disposition Plan   Expected discharge: 2 - 3 days; recommended to prior living arrangement once antibiotic plan established.     Entered: Ryan Diallo 05/15/2018, 11:30 PM   Information in the above section will display in the discharge planner report.    The patient was discussed with Dr. Ayaka Diallo  [facility]   Pager: [phone]   Please see sticky note for cross cover information      Data[AZ1.1]   Data     Recent Labs  Lab 05/15/18  2029 05/14/18  1754 05/10/18   WBC 10.0 12.0* 8.6   HGB 8.4* 7.9* 7.7*   MCV 84 84 86   * 474* 367   INR  --   --  1.80*    138 138   POTASSIUM 4.6 4.0 4.4   CHLORIDE 101 101 103   CO2 28 29 26   BUN 14 14 12   CR 0.54 0.54 0.58*   ANIONGAP 7 7 9   NATA 9.0 8.5 8.9   GLC 95 101* 116   ALBUMIN 2.4* 2.2* 2.0*   PROTTOTAL 8.8 8.3 8.0   BILITOTAL 0.1* 0.2 0.2   ALKPHOS 195* 199* 234*   ALT 15 15 20   AST 16 17 24     No results found for this or any previous visit (from the past 24 hour(s)).[AZ1.3]      Physician Attestation[AZ2.2]   Lance GUERRERO saw this patient with the resident and agree with the resident s findings and plan of care as documented in the resident s note    I personally reviewed vital signs, medications, labs and imaging.[AZ2.1]    Lance Marley[AZ2.2]  Date of Service (when I saw the patient):[AZ2.1] 5/15/18[AZ2.2]       Revision History        User Key Date/Time User Provider Type Action    > AZ2.2 5/16/2018  5:40 AM Lance Marley MD Physician Sign     AZ2.1 5/16/2018  5:38 AM Lance Marley MD Physician      [N/A] 5/16/2018  4:14 AM Ryan Diallo MD Resident Sign     AZ1.3 5/16/2018 12:57  "AM Ryan Diallo MD Resident Sign     AZ1.2 5/16/2018 12:29 AM Ryan Diallo MD Resident      AZ1.1 5/15/2018 11:30 PM Ryan Diallo MD Resident                   Discharge Summaries     No notes of this type exist for this encounter.         Consult Notes      Consults signed by Ravi Guardado MD at 5/17/2018  5:33 PM      Author:  Ravi Guardado MD Service:  Psychiatry Author Type:  Physician    Filed:  5/17/2018  5:33 PM Date of Service:  5/17/2018  2:24 PM Creation Time:  5/17/2018  2:48 PM    Status:  Signed :  Ravi Guardado MD (Physician)         Consult Date:  05/17/2018      PSYCHIATRIC CONSULTATION      IDENTIFICATION:  Ms. Marychuy Zhou is a 62-year-old white female who unfortunately has paraplegia and is hospitalized with sacral decubitus ulcers.  Ulcers are draining.  She also has urinary incontinence as well as fecal incontinence with repeated infection.  I am asked to evaluate her psychiatric status by Caryl Goel PA-C.      Prior to interviewing this patient, I had an opportunity to review a previous psychiatric consultation completed on her last hospitalization by Dr. De Paz on 04/24/2018.  At that time this psychiatric consultation was requested because the patient was displaying \"irritability and refusing some cares\".  It was a very similar presentation to the current psychiatric consultation. 's consultation suggests that her medication regimen included Abilify and Cymbalta.  It appears  that those medications are no longer on her medication list.  When I asked the patient about that, she has no idea why her psychiatric medications had been discontinued.      There is quite a bit of documentation in the nursing notes suggesting that the patient continues to frequently refuse cares and then complain that no one is caring for her.  She continues to be very irritable and often somewhat abusive, calling the patient's " "stupid or idiots, etc.  I did discuss this case with nursing.  Nursing staff has been really quite sympathetic.  They realize that the patient has been miserable and they are not taking personal offense; however, they are concerned that they will not be able to care for her appropriately if she is constantly refusing cares and yelling at the staff.  Dr. De Paz notes that the patient always blames the medical personnel including doctors and nurses for her problems, and though some of this may be true, it certainly has not been helpful in her care.      The patient really is in a difficult situation nutritionally.  She reports she cannot even force feed herself because her appetite is so low.  At the same time she realizes that she needs improved nutrition or she is unlikely to receive necessary surgery.  On my interview, the patient told me that someone had discussed the use of Marinol with her.  She thought this would be a good idea and I  agree.  Along with the Marinol, however, I suggested that  we should consider adding Seroquel at bedtime for sleep, appetite, and anxiety.  The patient reports that she has been on Seroquel in the past and found benefit from it, and therefore I will be suggesting Seroquel 25 mg p.o. b.i.d.      The patient reports that she is \"always depressed.\"  However, I am not eager to restart Cymbalta secondary to multiple potential drug-drug interactions.  I do think she would likely benefit from low dose of Seroquel or olanzapine.  I am choosing Seroquel secondary to its sedating properties.  In reviewing the chart, the patient has carried a past history of depressive disorder.      PAST MEDICAL HISTORY:  The patient's past medical history is quite extensive.  It is documented in the history and physical dictated by Dr. Marley on 06/15/2018.  The patient reported it also includes a brain bleed and brain surgery.      FAMILY HISTORY:  According to our records, there is no family psychiatric " "history.      SOCIAL HISTORY:  The patient is living in a TCU at present.  What she would like to do is receive her surgery and then move in with a friend.  She is a nonsmoker.  There is no history of alcohol use disorder.  Over the years she has been on multiple prescriptions of benzodiazepines and narcotics.      ALLERGIES:  THE PATIENT HAS NO KNOWN ALLERGIES.      PHYSICAL REVIEW OF SYSTEMS:  On my interview, she reports chronic left-sided headache since her \"brain surgery.\"  She reports that she is paralyzed from the nipple line down.  She denied chest pain or shortness of breath.  She does report problems with vision, but no problems hearing.  She reports constant urinary leakage, also diarrhea and abdominal \"queasiness\".  She has chronic pain and significant ulcerative lesions.      MENTAL STATUS EXAMINATION:  On my interview, the patient was pleasant and cooperative.  She did not appear angry during my interview.  Her mood was reported as chronically depressed.  Her affect was restricted.  Her speech was coherent and goal oriented.  Her associations were tight and her thought processes logical and linear.  Her content of thought was without overt psychosis or suicidal ideation.  Recent and remote memory, concentration, fund of knowledge, and use of language appear to be at baseline.  She is alert and oriented x 3.  Insight and judgment are guarded.  Muscle strength and tone are of course markedly decreased in her lowers, but appear to be at baseline.  Recent vital signs include a temperature of 96, heart rate of 85, respiration rate of 16 with 97% oxygen saturation and a blood pressure of 90/49.      ASSESSMENT:  Paraplegia, history of depressive disorder, personality disorder, unspecified.  (She may have some residual from her past brain bleed and brain surgery.  Alternatively, she may have cluster B personality issues.)      RECOMMENDATION:   1.  Consider Marinol.   2.  Seroquel starting at 25 mg p.o. " b.i.d.  If this seems to be beneficial without negative sequelae, I would consider increasing to Seroquel 50 b.i.d., or perhaps 25 in the morning and 50 at night.  It could be titrated as beneficial based on side effects.         RAVI GUARDADO MD             D: 2018   T: 2018   MT: NTS      Name:     MARYCHUY CHONG   MRN:      [id number]        Account:       [id number]   :      1955           Consult Date:  2018      Document: [id number]       cc: CARYL RUIZ, WILLIAN Zamora MD[WM1.1]        Revision History        User Key Date/Time User Provider Type Action    > WM1.1 2018  5:33 PM Ravi Guardado MD Physician Sign     [N/A] 2018  2:48 PM Ravi Guardado MD Physician Edit            Consults by Glen Agosto MD at 2018  9:38 AM     Author:  Glen Agosto MD Service:  Infectious Disease Author Type:  Physician    Filed:  2018  3:08 PM Date of Service:  2018  9:38 AM Creation Time:  2018  9:38 AM    Status:  Signed :  Glen Agosto MD (Physician)     Consult Orders:    1. Infectious Disease General Adult IP Consult: Patient to be seen: Routine within 24 hrs; Call back #: Caryl Amando [phone]; Blood cx w/ staph hominus, staph epidermidis, currently on vancomycin; hx VRE, CRE, ESBL; Consultant may enter orders: Yes [[id number]] ordered by Caryl Goel APRN CNP at 18 [id number]                  [facility] ID SERVICE: NEW CONSULTATION   Marychuy Chong : 1955 Sex: female:   Medical record number [id number]  Date of Admission: 5/15/2018  Consult Requester:Lance Marley MD  Date of Service: May 17, 2018    REASON FOR CONSULTATION:[RR1.1] Bacteremia versus contamination[RR1.2]    PROBLEM LIST: (New and established)  1.[RR1.1] PICC line colonization, contamination or bacteremia due to[RR1.3] MRSE and Staphylococcus hominis[RR1.4]  2.[RR1.1] T4 paraplegia secondary to  hematoma with multiple truncal and extremity decubitus ulcers including right IT, stage IV injury, left sacral stage III ulcer, left heel ulcer, left upper thigh ulcer and coccyx ulcer.[RR1.2]   3.[RR1.1] History of suprapubic catheter with ongoing leakage around the catheter[RR1.2]  4.[RR1.1] Chronic pelvis osteomyelitis without evidence of systemic infection[RR1.2]  5.[RR1.1] Chronic pain, primarily involving the back, treated with oxycodone  6. Seizure disorder on Keppra only  7. Probable personality disorder with frequent refusal to accept cares and medications and with unrealistic expectations for discharge to home[RR1.2]    RECOMMENDATIONS:[RR1.1]   1. Agree with empiric vancomycin   2. Follow 5/15 and 5/16 blood cultures to determine if therapy is needed[RR1.4]  3.[RR1.1] Appreciate WOC input[RR1.5]    DISCUSSION:[RR1.1]   62 year old female with past medical history of chronic truncal, sacral decubitus ulcers secondary paraplegia, history of spinal hematoma resulting paraplegia, COPD, chronic normocytic anemia, seizure disorder, probable personality disorder, depressive disorder, hypothyroidism, chronic cervical neck pain surgery, G-tube, malnutrition, neurogenic bladder with suprapubic, urinary and stool incontinence, history of DVT on chronic anticoagulation, presented 5/14/2018 with increasing acute on chronic pelvic pain since discharge from hospital.  She had[RR1.6] a positive blood culture obtained from a PICC on 5/14/18. Organism is MRSE and Staphylococcus hominis. Since it was only one blood culture, this finding could represent a contaminant, simple colonization of the PICC, or a true bacteremia. The first two possibilities do not need any intervention; the latter needs treatment with vancomycin +/- removal of the PICC (depending on how difficult a stick/critical need of PICC). Luckily, blood cultures x 1 and blood cultures x 2 from the periphery were obtained 5/15 and 5/16 respectively. This  information may salvage the current quandary. All three were obtained prior to administration of vancomycin today at 1127. If these are negative, then the PICC line is either colonized or the blood culture from the PICC was contaminated; no intervention is needed. If the 5/15 and 5/16 blood cultures also grow MRSE, then she has a true bacteremia and treatment with vancomycin x 2 weeks +/- PICC removal would be recommended. The negative procalcitonin is supportive of the notion that this finding represents a contaminant or simple colonization of the PICC.[RR1.4] So far 5/15 and 5/16 cultures remain NGTD.[RR1.5]    ID will continue to follow  SAYRA Agosto M.D.  [facility] ID Service Staff  [phone]     HPI: (Extended HPI: Requires four HPI elements or the status of three chronic problems)[RR1.1]  62 year old female with past medical history of chronic truncal, sacral decubitus ulcers secondary paraplegia, history of spinal hematoma resulting paraplegia, COPD, chronic normocytic anemia, seizure disorder, probable personality disorder, depressive disorder, hypothyroidism, chronic cervical neck pain surgery, G-tube, malnutrition, neurogenic bladder with suprapubic, urinary and stool incontinence, history of DVT on chronic anticoagulation, presented 5/14/2018 with increasing acute on chronic pelvic pain since discharge from hospital. She thought this was due to worsening of her chronic ulcers. She states that her wounds have been changed 2x daily at the nursing home, but per notes she refuses cares and dressing changes frequently. She was unable to say if drainage or the wound characteristics have changed. Denied a change in odor; she is chronically incontinent of urine and stool. She had a PICC line placed 5/3 that is non painful. Denied fevers, chills, or additional new constitutional symptoms. Pain was 12/10, stabbing, and hard to localize.       She was recently hospitalized from 4/10-5/8/18 at [facility].  She[RR1.6] was admitted to the hospital from her nursing home for the evaluation of low grade fevers and worsening pain in her right hip area in the setting of concerns regarding worsening of chronic infection in this area[RR1.7].[RR1.6] The patient has been hospitalized at [facility] several days previously and had been discharged on Augmentin. The patient had specifically requested to come to [facility] as she had been referred to Dr. Mayela Pelletier of Plastic Surgery for consideration of flap placement.[RR1.7] She was started on Vanco and Zosyn.[RR1.6] After assessment by Plastic Surgery and by the wound nurse, it was felt that while she likely has chronic osteomyelitis of the sacrum, there was no evidence of  systemic infection. The patient did have an area of erythema surrounding her right sacral wound and was treated for cellulitis with an approximate 2 week course of antibiotics; thereafter antibiotics were discontinued. The patient was seen by Dr. Pelletier who felt that her nutritional status was too tenuous to allow for surgery at this time.  In addition, the patient does have constant stooling and urinary leakage around her suprapubic catheter which continually soils her decubitus ulcers.  Ultimately, it is felt that she will need a diverting colostomy and some type of diverting urologic procedure, in addition to improvement in her nutritional status, before she would be a candidate for flap surgery.[RR1.7] She was initially started on TPN but this was transitioned to G-tube feedings.  Of note on admission she had bilateral nephrostomy tubes trying to divert urine to allow healing from chronic ulcers.  This was unsuccessful and tubes were removed.  She continued to have leakage around suprapubic catheter and from urethra.[RR1.6] She was discharged on Diflucan 150 mg daily for 7 days to treat a fungal dermatitis associated with her chronic skin irritation over her sacral  area.[RR1.8]  Currently she is afebrile and has a normal WBC. Procal is negative. CRP is 74-->62. GFR >90; negative UA; UC with <10K mixed jamie.[RR1.9] She states that her pain has never been under control. No PICC issues; apparently used for poor IV access. TF going ok; no leakage. Gets nauseous with feeds. No fever or chills. WOC involved for extensive LE wounds.[RR1.5]  All available records were reviewed and summarized above.  This ID consultation question represents a new problem, with additional work-up planned and described in the recommendations section above.  ANTI-INFECTIVES:   Current:[RR1.8] Vancomycin ([RR1.4]5/16-)[RR1.10]  Previous:[RR1.1] Zosyn (5/15)[RR1.10]  Other current medications were reviewed with an eye on potential drug-drug interactions.  ROS: (Recommend ? 10 systems)   A ten point review of systems was obtained and was negative with the exception of that which is described in the HPI.  PMH: (At least ONE specific item from THREE of the three components of PFSH must be documented for a Complete PFSH)  Past Medical History:   Diagnosis Date     Anxiety      Chronic pain syndrome      Closed fracture zygoma, with routine healing, subsequent encounter      COPD (chronic obstructive pulmonary disease) (H)      Depressive disorder      DVT (deep vein thrombosis) in pregnancy (H)      Edema      Epilepsy (H)      Flaccid neuropathic bladder, not elsewhere classified      Fracture of femur (H)      Hypothyroidism      Iron deficiency anemia      Paraplegia (H)     unspecified     Pressure ulcer of sacral region      PTSD (post-traumatic stress disorder)      Rheumatoid arthritis (H)      Sepsis (H)     unspecified     History reviewed. No pertinent surgical history.    Medication allergies were reviewed. Notable allergies include:[RR1.1] None[RR1.11]  SOCIAL HISTORY AND RISK FACTORS   Social History   Substance Use Topics     Smoking status: Never Smoker     Smokeless tobacco: Never Used      "Alcohol use No     History   Sexual Activity     Sexual activity: Not on file     FAMILY HISTORY:   Reviewed and non-contributory  Family History   Problem Relation Age of Onset     Chronic Obstructive Pulmonary Disease Brother        EXAMINATION: (Recommend ? 9 systems; 2 items each)   BP 90/49  Pulse 83  Temp 96  F (35.6  C) (Oral)  Resp 16  Ht 1.575 m (5' 2\")  Wt 80.3 kg (177 lb 1.6 oz)  SpO2 97%  Breastfeeding? No  BMI 32.39 kg/m2  GENERAL:  well-developed, well-nourished, in bed in no acute distress.   EYES:  Eyes have anicteric sclerae without conjunctival injection    ENT:  Atraumatic. Oropharynx is moist without exudates or ulcers  NECK:  Supple. No cervical lymphadenopathy  LUNGS:  Clear to auscultation bilateral. Respiratory effort is normal  CARDIOVASCULAR:  Regular rate and rhythm with no murmurs, gallops or rubs.  ABDOMEN:  Normal bowel sounds, soft, nontender. No appreciable hepatosplenomegaly[RR1.1]. G-tube site C/D/I[RR1.12]  SKIN:  No acute rashes.  Line(s) are in place without any surrounding erythema or exudate.[RR1.1] Wound not let me exam her wounds; multiple, extensive, deep sacral/buttock wounds; left heel wound; no obvious foul smell; images in WOC note[RR1.5]  NEUROLOGIC:[RR1.1]  Paraplegic[RR1.5]  PSYCH: Appropriate affect, alert and oriented to person, place and time  CURRENT LINES:[RR1.1] Right arm PICC; non-tender; no erythema[RR1.5]  RELEVANT DATA:   Reviewed medicine test (PFTs, EKG, cardiac echo or cath):[RR1.1] NO[RR1.9]  BASIC LABS   The following basic labs were personally reviewed:  Inflammatory Markers    Recent Labs   Lab Test  05/16/18   1617  05/15/18   2029  04/23/18   1033  04/18/18   0634  04/15/18   1048  04/10/18   2125  04/05/18   0825  04/04/18   0825   SED   --   107*   --    --    --   64*   --   62*   CRP  62.0*  74.0*  31.6*  66.4*  73.9*  80.0*  100.0*   --        Hematology Studies    Recent Labs   Lab Test  05/17/18   0543  05/16/18   0713  05/15/18   " 2029  05/14/18   1754 05/10/18  04/28/18   0934  04/20/18   0940   04/10/18   2125   04/05/18   0825   WBC  7.8  8.0  10.0  12.0*  8.6   --   6.9   < >  3.7*   < >  8.3   ANEU  5.6  6.1  7.1  8.9*   --    --    --    --   2.0   --   5.4   AEOS  0.3  0.4  1.0*  0.6   --    --    --    --   0.4   --   0.9*   HGB  7.7*  7.8*  8.4*  7.9*  7.7*  8.4*  8.1*   < >  10.4*   < >  8.7*   MCV  83  85  84  84  86   --   84   < >  85   < >  82   PLT  457*  430  470*  474*  367   --   300   < >  285   < >  342    < > = values in this interval not displayed.       Immune Globulin Studies  No lab results found.    Metabolic Studies     Recent Labs   Lab Test  05/17/18   0543  05/16/18   0713  05/15/18   2029  05/14/18   1754 05/10/18   NA  139  137  136  138  138   POTASSIUM  4.1  4.4  4.6  4.0  4.4   CHLORIDE  105  105  101  101  103   CO2  27  25  28  29  26   BUN  13  13  14  14  12   CR  0.54  0.57  0.54  0.54  0.58*   GFRESTIMATED  >90  >90  >90  >90  >60       Hepatic Studies    Recent Labs   Lab Test  05/15/18   2029  05/14/18   1754 05/10/18  05/07/18   0617  04/30/18   0830  04/24/18   1125   BILITOTAL  0.1*  0.2  0.2  0.2  0.3  0.1*   ALKPHOS  195*  199*  234*  228*  266*  213*   ALBUMIN  2.4*  2.2*  2.0*  1.9*  2.3*  1.8*   AST  16  17  24  20  21  13   ALT  15  15  20  26  19  <6       Thyroid Studies    Recent Labs   Lab Test 05/10/18  04/28/18   0934  04/13/18   0640   TSH  23.91*  59.47*  49.56*   T4   --    --   0.76       MICROBIOLOGY LABS  The following microbiology studies were personally reviewed:  Culture Micro   Date Value Ref Range Status   05/16/2018 No growth after 21 hours  Preliminary   05/16/2018 No growth after 20 hours  Preliminary   05/15/2018 <10,000 colonies/mL  Proteus mirabilis   (A)  Preliminary   05/15/2018 <10,000 colonies/mL  Enterococcus faecium   (A)  Preliminary   05/15/2018 Susceptibility testing in progress  Preliminary   05/15/2018 No growth after 2 days  Preliminary   05/14/2018 (A)   Preliminary    Cultured on the 1st day of incubation:  Staphylococcus epidermidis  Susceptibility testing in progress     05/14/2018   Preliminary    Critical Value/Significant Value, preliminary result only, called to and read back by   DR. HAGER @1719 5/15/18. CT     05/14/2018 (A)  Preliminary    Cultured on the 1st day of incubation:  Staphylococcus hominis  Susceptibility testing in progress     05/14/2018   Preliminary    (Note)  POSITIVE for STAPHYLOCOCCUS EPIDERMIDIS and POSITIVE for the mecA  gene (resistant to methicillin) by [employer] multiplex nucleic acid  test. Final identification and antimicrobial susceptibility testing  will be verified by standard methods.    Specimen tested with Verigene multiplex, gram-positive blood culture  nucleic acid test for the following targets: Staph aureus, Staph  epidermidis, Staph lugdunensis, other Staph species, Enterococcus  faecalis, Enterococcus faecium, Streptococcus species, S. agalactiae,  S. anginosus grp., S. pneumoniae, S. pyogenes, Listeria sp., mecA  (methicillin resistance) and Manuel/B (vancomycin resistance).    Critical Value/Significant Value called to and read back by RK FOWLER RN @2018 5/15/18. CT     04/20/2018 (A)  Final    >100,000 colonies/mL  Candida albicans / dubliniensis  Candida albicans and Candida dubliniensis are not routinely speciated  Susceptibility testing not routinely done     04/10/2018 No growth  Final   04/10/2018 No growth  Final   04/10/2018 (A)  Final    Light growth  Escherichia coli ESBL  Enterobacteriaceae that are susceptible to meropenem are usually susceptible to ertapenem.  ESBL (extended beta lactamase) producing organisms require contact precautions.     04/10/2018 Light growth  Proteus mirabilis   (A)  Final   04/10/2018 Moderate growth  Pseudomonas aeruginosa   (A)  Final   04/10/2018 Moderate growth  Enterococcus faecium (VRE)   (A)  Final   04/10/2018   Final    Critical Value/Significant Value called to  and read back by  JLUIS ORELLANA RN ([id number]).  04.12.18 2334 GJS     04/04/2018 No growth  Final   04/04/2018 No growth  Final   04/02/2018 No growth  Final       Urine Studies    Recent Labs   Lab Test  05/15/18   2114  04/20/18   1245   LEUKEST  Trace*  Small*   WBCU  1  10*       Vancomycin Levels    Recent Labs   Lab Test  04/06/18   1130   VANCOMYCIN  22.6       IMAGING RESULTS  The following imaging studies were independently reviewed:[RR1.1]    None[RR1.9]        Revision History        User Key Date/Time User Provider Type Action    > RR1.5 5/17/2018  3:08 PM Glen Agosto MD Physician Sign     RR1.12 5/17/2018  3:01 PM Glen Agosto MD Physician      RR1.9 5/17/2018 10:02 AM Glen Agosto MD Physician      RR1.2 5/17/2018  9:58 AM Glen Agosto MD Physician      RR1.3 5/17/2018  9:57 AM Glen Agosto MD Physician      RR1.8 5/17/2018  9:56 AM Glen Agosto MD Physician      RR1.7 5/17/2018  9:50 AM Glen Agosto MD Physician      RR1.6 5/17/2018  9:43 AM Glen Agosto MD Physician      RR1.11 5/17/2018  9:42 AM Glen Agosto MD Physician      RR1.10 5/17/2018  9:41 AM Glen Agosto MD Physician      RR1.4 5/17/2018  9:39 AM Glen Agosto MD Physician      RR1.1 5/17/2018  9:38 AM Glen Agosto MD Physician             Consults by Mita Yu PA-C at 5/17/2018 12:34 PM     Author:  Mita Yu PA-C Service:  Pain Service Author Type:  Physician Assistant - C    Filed:  5/17/2018  2:29 PM Date of Service:  5/17/2018 12:34 PM Creation Time:  5/17/2018 12:34 PM    Status:  Signed :  Mita Yu PA-C (Physician Assistant - C)     Consult Orders:    1. Pain Management Adult IP Consult: Patient to be seen: Routine - within 24 hours; Call back #: Caryl Goel [phone]; Acute on chronic pain limiting dressing changes, turns/repositioning; Consultant may enter orders: Yes [[id number]] ordered by Caryl Goel  "JOSEPH Albert CNP at 05/17/18 1015                Inpatient Pain Management Service: Consultation      DATE OF CONSULT:[LT1.1] May 17, 2018[LT1.2]      REASON FOR PAIN CONSULTATION:  Marychuy Zhou is a 62 year old female I am seeing in consultation at the request of Caryl Goel CNP for evaluation and recommendations for her acute on chronic buttock pain from grade IV decubitus ulcer, pt declining care to reposition.       CHIEF PAIN COMPLAINT:[LT1.1] pain at buttock from decubitus ulcers[LT1.3]      ASSESSMENT:[LT1.1]  1. Acute on chronic pain of stage IV decubitus ulcers- was admitted on 5/15/18 for continued pelvic pain, thought to be due to chronic osteomyelitis.  Surgery team evaluated her on 5/16/18 and did not feel that her decubitus ulcers warrant surgical intervention at this time.  Awaiting for blood culture results, currently on empirical IV vancomycin.    2. Chronic opioid use- PTA, has been on oxycodone 5-10mg PO 4x/day at SNF for chronic pain between the shoulder blades.  3. Healthcare distrust- states that she has to be her own medical advocate because she feels that is not in competent hands.  Felt that she had poor medical experiences in the past to make her \"nervous.\"  She states that she knows her body best and will direct her care.  During this admission, has been asking staff to leave the room, calling staff names, and declining cares.  4. Malnutrition-on tube feeding via G tube-is concerned that her nutrition status will not improved for her planned skin flap surgery and diverting urologic and colostomy in 2 months and 3 weeks.  She would like to be considered for appetite inducing medication (she means Marinol).    5. T4 Paraplegia since 2008 due to spinal hematoma.  6. H/o seizure-on Keppra  7. H/o DVTs, on chronic anticoagulants  8. H/o depression-is not on psychotropic medications (except valium), pt declining.  Had tried Paxil, Cymbalta, Lamictal, gabapentin.[LT1.3]    -- Outpatient " opioid requirements prior to admission: OME =[LT1.1]oxycodone 5-10mg PO 4x/day.  [place]  reviewed on 5/17/18.[LT1.3]    Primary Care Provider: Sánchez Zamora  Chronic Pain Provider:[LT1.1] none[LT1.3]    TREATMENT RECOMMENDATIONS/PLAN:[LT1.1]   1. Change oxycodone 15mg PO Q 3 hours PRN.  Of note, she did not maximize her oxycodone 15mg Q4 hours regimen yesterday.  Had a period of 14 hours without any opioid, declined care from RN.  She declined to opioid rotate to Dilaudid-felt that it does not last as long as oxycodone.  2. Would avoid IV opioid due to not maximizing oral opioid regimen and chronic nature of pain.  Patient states that she prefer to have IV Dilaudid Q2 hours in between the current oral oxycodone regimen.  3. Start Flexeril 5mg PO TID PRN.  States that this was helpful in the past without side effects.  4. Continue acetaminophen 650mg PO Q 6 hours PRN.  Patient does not feel that this is helpful.  5. Patient is declining topicals such as lidocaine and menthol patches. State that they do not help.  6. Patient is declining gabapentin as she states that they do not help, however was using the gabapentin previously for depression.  She does endorse that the decubitus ulcer pain is a burning quality and discussed that gabapentin is a neuro modulator that may help with such pain.  7. Offer behavior health for emotional support as she has suffered many losses including loss of function related to paraplegia[LT1.3] and brother recently passed away[LT1.4].  She[LT1.3] is[LT1.4] declining stating that if she needs it, she will let us know.  Of note, psychiatry evaluated her on 4/24/18 during her last admission at [facility].[LT1.3]    Pain Service will[LT1.1] Continue to follow at this time[LT1.3].     Recommendations were discussed with[LT1.1] medicine team and pain team.[LT1.3]  Plan was reviewed by the Pain Service consisting of[LT1.1] Dayton Vallejo MD.[LT1.3]    Thank you for consulting the Inpatient Pain  Management Service.   The above recommendations are to be acted upon at the primary team s discretion.    To reach us:  Mon - Friday 8 AM - 3 PM: Pager [phone] (Text Page)  After hours, weekends and holidays: Primary service should call [phone] for the on-call pain specialist[LT1.1]     [LT1.3]  HISTORY OF PRESENT ILLNESS:   Marychuy Zhou is a 62 year old female with history of  of chronic truncal, sacral decubitus ulcers secondary paraplegia, history of spinal hematoma resulting paraplegia, COPD, chronic normocytic anemia, seizure disorder, probable personality disorder, depressive disorder, hypothyroidism, chronic cervical neck pain surgery, G-tube, malnutrition, neurogenic bladder with suprapubic, urinary and stool incontinence, history of DVT on chronic anticoagulation[LT1.1].  She was admitted on 5/15/18 for continued buttock pain secondary to stage IV decubitus ulcers, concerned for infection.[LT1.3]    [LT1.1]    Today 5/17/18[LT1.3], patient is lying in bed.  NAD. States that the worst pain is in her buttock due to the chronic decubitus ulcers which is a burning type pain. States it's a 12/10 on the VAS.  Best pain level is 5-6/10 if she is able to get IV Dilaudid with oral oxycodone.   She feels what would help her is her current oxycodone 15mg Q 4hours PRN and IV Dilaudid Q 2 hours.  Discussed adjuvants which she mostly declined because she tried them without benefits.  She voices her desire to try an appetite stimulant (Marinol) as she wants to improve her nutrition status in order to proceed with her planned flap surgery.  Of note, she c/o left rib cage pain that felt like a bruise and also pain in between shoulder blades, offered topicals/ice/heat which she declined.[LT1.4]    PAIN HISTORY:   Location:[LT1.1] buttock[LT1.3]  Duration:[LT1.1] constant[LT1.3]  Pain intensity:[LT1.1] 12/10[LT1.3]  Quality of the pain:[LT1.1] burning[LT1.3]  Aggravating factors:[LT1.1] movements[LT1.3]  Relieving  factors :[LT1.1] rest, pain pills[LT1.3]    CAPA (Clinically Aligned Pain Assessment)  Comfort (How is your pain?):[LT1.1] Intolerable[LT1.3]  Change in Pain (Since your last medication/intervention?):[LT1.1] About the same[LT1.3]  Pain Control (How are your pain treatments working?):[LT1.1] Inadequate pain control[LT1.3]  Functioning (Are you able to do activities to get better?) :[LT1.1] Can't do anything because of pain[LT1.3]  Sleep (Does your pain management allow you to sleep or rest?):[LT1.1] Awake with pain most of the night[LT1.3]       FUNCTIONAL STATUS:  Change:[LT1.1]      unchanged[LT1.3]  Oral intake:[LT1.1]     Regular-but no appetite, tube feeding[LT1.3]  Bowel function:[LT1.1]    Liquid or diarrhea-on loperamide[LT1.3]  Activity level:[LT1.1]     Bedrest, wheelchair bound due to paraplegia[LT1.3]  Sleep:[LT1.1]      Did not sleep well[LT1.3]  Mood:[LT1.1]      Irritable, cooperative[LT1.3]      REVIEW OF 10 BODY SYSTEMS: 10 point ROS of systems including Constitutional, Eyes, Respiratory, Cardiovascular, Gastroenterology, Genitourinary, Integumentary, Musculoskeletal, Psychiatric were all negative except for pertinent positives noted in my HPI.       INPATIENT MEDICATIONS PERTINENT TO PAIN CONSULT:   Medications related to Pain Management (Future)    Start     Dose/Rate Route Frequency Ordered Stop    05/17/18 1400  ketamine (KETALAR) 5%-gabapentin (NEURONTIN) 8% topical PLO cream       Topical 3 TIMES DAILY 05/17/18 1131      05/17/18 1400  acetaminophen (TYLENOL) tablet 975 mg      975 mg Oral 3 TIMES DAILY 05/17/18 1228      05/16/18 0122  zolpidem (AMBIEN) tablet 5 mg      5 mg Oral AT BEDTIME PRN 05/16/18 0123      05/16/18 0000  levETIRAcetam (KEPPRA) tablet 250 mg      250 mg Oral EVERY EVENING 05/15/18 2345      05/15/18 2319  oxyCODONE IR (ROXICODONE) tablet 15 mg      15 mg Oral EVERY 4 HOURS PRN 05/15/18 2321      05/15/18 2316  diazepam (VALIUM) tablet 4 mg      4 mg Oral EVERY 6 HOURS  PRN 05/15/18 2321              CURRENT MEDICATIONS:   Current Facility-Administered Medications Ordered in Epic   Medication Dose Route Frequency Provider Last Rate Last Dose     acetaminophen (TYLENOL) tablet 975 mg  975 mg Oral TID Caryl Goel APRN CNP         ascorbic acid (VITAMIN C) tablet 500 mg  500 mg Oral Daily Lance Marley MD   500 mg at 05/17/18 1028     bismuth subsalicylate (PEPTO BISMOL) suspension 30 mL  30 mL Oral Q6H PRN Ursula Joel PA-C   30 mL at 05/16/18 2252     calcium carbonate (TUMS) chewable tablet 500 mg  500 mg Oral Daily PRN Caryl Goel APRN CNP   500 mg at 05/16/18 1722     dextrose 10 % 1,000 mL infusion   Intravenous Continuous PRN Caryl Goel APRN CNP         diazepam (VALIUM) tablet 4 mg  4 mg Oral Q6H PRN Ryan Diallo MD   4 mg at 05/17/18 1028     ipratropium - albuterol 0.5 mg/2.5 mg/3 mL (DUONEB) neb solution 3 mL  3 mL Nebulization Q4H PRN Ryan Diallo MD         ketamine (KETALAR) 5%-gabapentin (NEURONTIN) 8% topical PLO cream   Topical TID Caryl Goel APRN CNP         lactated ringers infusion   Intravenous Continuous Caryl Goel APRN  mL/hr at 05/16/18 1722       levETIRAcetam (KEPPRA) tablet 250 mg  250 mg Oral QPM Ryan Diallo MD   250 mg at 05/16/18 2034     levothyroxine (SYNTHROID/LEVOTHROID) tablet 125 mcg  125 mcg Oral QAM AC Lance Marley MD   125 mcg at 05/17/18 1028     lidocaine (viscous) (XYLOCAINE) 2 % 15 mL, alum & mag hydroxide-simethicone (MYLANTA ES/MAALOX  ES) 15 mL GI Cocktail  30 mL Oral Once PRN Ursula Joel PA-C         loperamide (IMODIUM) capsule 4 mg  4 mg Oral 4x Daily Ryan Diallo MD   4 mg at 05/17/18 1028     melatonin tablet 1 mg  1 mg Oral At Bedtime PRN Ryan Diallo MD   1 mg at 05/16/18 3451     miconazole (MICATIN; MICRO GUARD) 2 % powder   Topical BID  Lance Marley MD         multivitamins with minerals (CERTAVITE/CEROVITE) liquid 15 mL  15 mL Per Feeding Tube Daily Caryl Goel APRN CNP         naloxone (NARCAN) injection 0.1-0.4 mg  0.1-0.4 mg Intravenous Q2 Min PRN Ryan Diallo MD         omeprazole (priLOSEC) CR capsule 20 mg  20 mg Oral QAM AC Ryan Diallo MD   20 mg at 05/16/18 0840     ondansetron (ZOFRAN-ODT) ODT tab 4 mg  4 mg Oral Q6H PRN Ryan Diallo MD   4 mg at 05/17/18 0843     oxybutynin (DITROPAN) tablet 5 mg  5 mg Oral BID Caryl Goel APRN CNP         oxyCODONE IR (ROXICODONE) tablet 15 mg  15 mg Oral Q4H PRN Ryan Diallo MD   15 mg at 05/17/18 1028     Patient is already receiving anticoagulation with heparin, enoxaparin (LOVENOX), warfarin (COUMADIN)  or other anticoagulant medication   Does not apply Continuous PRN Ryan Diallo MD         protein modular (PROSource TF) 1 packet  1 packet Per Feeding Tube TID Caryl Goel APRN CNP         rivaroxaban ANTICOAGULANT (XARELTO) tablet 20 mg  20 mg Oral At Bedtime Ryan Diallo MD   20 mg at 05/16/18 2232     triamcinolone (KENALOG) 0.1 % cream   Topical TID Ryan Diallo MD         vancomycin (VANCOCIN) 1000 mg in dextrose 5% 200 mL PREMIX  1,000 mg Intravenous Q12H Lance Marley MD         vitamin A capsule 20,000 Units  20,000 Units Oral Daily Lance Marley MD   20,000 Units at 05/17/18 1028     zinc sulfate (ZINCATE) capsule 220 mg  220 mg Oral Daily Lance Marley MD   220 mg at 05/17/18 1029     zolpidem (AMBIEN) tablet 5 mg  5 mg Oral At Bedtime PRN Ryan Diallo MD   5 mg at 05/16/18 2232     No current Epic-ordered outpatient prescriptions on file.           HOME/PREVIOUS MEDICATIONS:   Prior to Admission medications    Medication Sig Start Date End Date Taking? Authorizing Provider   diazepam (VALIUM) 2 MG tablet Take 2  tablets (4 mg) by mouth every 6 hours as needed for anxiety 5/8/18  Yes Shahriar Cadet MD   ipratropium - albuterol 0.5 mg/2.5 mg/3 mL (DUONEB) 0.5-2.5 (3) MG/3ML neb solution Take 1 vial (3 mLs) by nebulization every 4 hours as needed for wheezing 5/8/18  Yes Shahriar Cadet MD   levETIRAcetam (KEPPRA) 250 MG tablet Take 1 tablet (250 mg) by mouth every evening Until 4/7/2018 then decrease to 250mg BID 4/8/2018-4/21/2018, then decrease to 250mg QAM 4/22-5/5/2018  Patient taking differently: Take 250 mg by mouth 2 times daily  5/8/18  Yes Shahriar Cadet MD   LEVOTHYROXINE SODIUM PO Take 125 mcg by mouth every morning   Yes Unknown, Entered By History   loperamide (IMODIUM) 2 MG capsule Take 2 capsules (4 mg) by mouth 4 times daily 5/8/18  Yes Shahriar Cadet MD   miconazole (MICATIN) 2 % AERP powder Apply topically 2 times daily Apply to groin folds   Yes Unknown, Entered By History   mineral oil-hydrophilic petrolatum (AQUAPHOR) Apply topically daily Apply to lower extremities for skin irritation.   Yes Unknown, Entered By History   OMEPRAZOLE PO Take 20 mg by mouth daily (with breakfast)   Yes Unknown, Entered By History   ondansetron (ZOFRAN ODT) 4 MG ODT tab Take 1 tablet (4 mg) by mouth every 6 hours as needed for nausea 5/8/18  Yes Shahriar Cadet MD   oxyCODONE IR (ROXICODONE) 5 MG tablet Take 1-2 tablets (5-10 mg) by mouth every 4 hours as needed (Give 5mg for pain level 4-6 on a 10 point scale. Give 10mg for pain level 6-10 on a 10 point scale.) 5/8/18  Yes Shahriar Cadet MD   Rivaroxaban (XARELTO PO) Take 20 mg by mouth At Bedtime   Yes Unknown, Entered By History   triamcinolone (KENALOG) 0.1 % cream Apply topically 3 times daily 5/8/18  Yes Shahriar Cadet MD   Pollen Extracts (PROSTAT PO) 1 oz daily    Unknown, Entered By History   protein modular (YOGITECH TF) 1 packet by Per Feeding Tube route 3 times daily 5/8/18   Shahriar Cadet MD           PAST  "PAIN TREATMENT:[LT1.1] chronic opioid management.[LT1.3]        ALLERGIES:  No Known Allergies         PAST MEDICAL AND PSYCHIATRIC HISTORY:    Past Medical History:   Diagnosis Date     Anxiety      Chronic pain syndrome      Closed fracture zygoma, with routine healing, subsequent encounter      COPD (chronic obstructive pulmonary disease) (H)      Depressive disorder      DVT (deep vein thrombosis) in pregnancy (H)      Edema      Epilepsy (H)      Flaccid neuropathic bladder, not elsewhere classified      Fracture of femur (H)      Hypothyroidism      Iron deficiency anemia      Paraplegia (H)     unspecified     Pressure ulcer of sacral region      PTSD (post-traumatic stress disorder)      Rheumatoid arthritis (H)      Sepsis (H)     unspecified           PAST SURGICAL HISTORY: History reviewed. No pertinent surgical history.        FAMILY HISTORY: family history includes Chronic Obstructive Pulmonary Disease in her brother.      HEALTH & LIFESTYLE PRACTICES:   Tobacco:  reports that she has never smoked. She has never used smokeless tobacco.  Alcohol:  reports that she does not drink alcohol.  Illicit drugs:  reports that she does not use illicit drugs.      SOCIAL HISTORY:[LT1.1]  States that she won a settlement after spinal hematoma leading to paraplegia, felt it was \"worth it, but had to lose legs for it.\"  Was able to a house for family with settlement.[LT1.3]  Social History     Social History     Marital status: Single     Spouse name: N/A     Number of children: N/A     Years of education: N/A     Occupational History     Not on file.     Social History Main Topics     Smoking status: Never Smoker     Smokeless tobacco: Never Used     Alcohol use No     Drug use: No     Sexual activity: Not on file     Other Topics Concern     Not on file     Social History Narrative[LT1.5]         LABORATORY VALUES:   Last Basic Metabolic Panel:  Lab Results   Component Value Date     05/17/2018      Lab " Results   Component Value Date    POTASSIUM 4.1 05/17/2018     Lab Results   Component Value Date    CHLORIDE 105 05/17/2018     Lab Results   Component Value Date    NATA 8.2 05/17/2018     Lab Results   Component Value Date    CO2 27 05/17/2018     Lab Results   Component Value Date    BUN 13 05/17/2018     Lab Results   Component Value Date    CR 0.54 05/17/2018     Lab Results   Component Value Date     05/17/2018       CBC results:  Lab Results   Component Value Date    WBC 7.8 05/17/2018     Lab Results   Component Value Date    HGB 7.7 05/17/2018     Lab Results   Component Value Date    HCT 27.0 05/17/2018     Lab Results   Component Value Date     05/17/2018       DIAGNOSTIC TESTS:       Labs above reviewed as well as additional relevant diagnostic studies from the EPIC record.       PHYSICAL EXAMINATION:  VITAL SIGNS:  B/P: 90/49, T: 96, P: 83, R: 16    CONSTITUTIONAL/GENERAL APPEARANCE:[LT1.1] Awake and alert female after woken up from sleep.[LT1.3]  EYES:[LT1.1] EOMIs, sclerae clear[LT1.3]  ENT/NECK:[LT1.1] neck is supple, mucosa moist[LT1.3]  RESPIRATORY:[LT1.1] non labored breathing[LT1.3]  CARDIOVASCULAR:[LT1.1] good capillary refill[LT1.3]  GI:[LT1.1] soft, able to feel pressure on palpation, feeding tube present[LT1.3]    MUSCULOSKELETAL/BACK/SPINE/EXTREMITIES:[LT1.1] gript 5/5 bilaterally.  No movements in LE (paraplegia)[LT1.3]      NEURO:[LT1.1]  SILT to UE, No sensation to LE.[LT1.3]  SKIN/VASCULAR EXAM:[LT1.1]  warm[LT1.3]  PSYCHIATRIC/BEHAVIORAL/OBSERVATIONS:  No objective signs of pain observed during our interview.   Judgment/Insight -[LT1.1]fair[LT1.3]   Orientation -[LT1.1] x3[LT1.3]   Memory -[LT1.1]fair[LT1.3]   Mood and affect -[LT1.1]irritable at times but mostly cooperative[LT1.3]           TIME SPENT:60 minutes including 30 minutes of face-to-face time counseling her  about her pain management treatment options, and coordinating care with the primary team.    Mita NICOLE  AMRITA Yu  May 17, 2018, 12:34 PM  Inpatient Pain Management Service[LT1.1]             Revision History        User Key Date/Time User Provider Type Action    > LT1.4 5/17/2018  2:29 PM Mita Yu PA-C Physician Assistant - KEYON Sign     LT1.5 5/17/2018  1:37 PM Mita Yu PA-C Physician Assistant - KEYON      LT1.3 5/17/2018  1:02 PM Mita Yu PA-C Physician Assistant - C      LT1.2 5/17/2018 12:36 PM Mita Yu PA-C Physician Assistant - C      LT1.1 5/17/2018 12:34 PM Mita Yu PA-C Physician Assistant - C             Consults by Ravi Guardado MD at 5/15/2018  7:28 PM     Author:  Ravi Guardado MD Service:  Psychiatry Author Type:  Physician    Filed:  5/17/2018  2:26 PM Date of Service:  5/15/2018  7:28 PM Creation Time:  5/17/2018  2:25 PM    Status:  Addendum :  Ravi Guardado MD (Physician)     Consult Orders:    1. Psychiatry IP Consult: Hx mood/personality disorder, anxiety, depression; quite agitated and refusing cares depsite complaints of not being cared for; Consultant may enter orders: Yes; Patient to be seen: Routine; Call back #: Caryl Goel [phone]... [[id number]] ordered by Caryl Goel APRN CNP at 05/17/18 1027                Patient seen and chart reviewed. Formal consult dictated(initial)[WM1.1] (seen5/17/18)[WM1.2]    Ravi Guardado MD[WM1.1]     Revision History        User Key Date/Time User Provider Type Action    > [N/A] 5/17/2018  2:26 PM Ravi Guardado MD Physician Addend     WM1.2 5/17/2018  2:26 PM Ravi Guardado MD Physician Sign     WM1.1 5/17/2018  2:25 PM Ravi Guardado MD Physician             Consults by Roberto Carlos Ortiz MD at 5/16/2018  4:13 PM     Author:  Roberto Carlos Ortiz MD Service:  Surgery Author Type:  Resident    Filed:  5/16/2018  5:55 PM Date of Service:  5/16/2018  4:13 PM Creation Time:  5/16/2018  4:13 PM    Status:  Attested :  Angel  "Roberto Carlos Hong MD (Resident)    Cosigner:  Roel Lora MD at 5/16/2018  6:11 PM         Consult Orders:    1. Surgery General Adult IP Consult: Patient to be seen: Routine within 24 hrs; Call back #: Caryl Goel [phone]; Worsening chronic sacral & decubitis ulcers, now with staph bacteremia; please evaluate for debridement; Consultant may enter orders: ... [[id number]] ordered by Caryl Goel APRN CNP at 05/16/18 1600           Attestation signed by Roel Lora MD at 5/16/2018  6:11 PM        [facility] Staff:     I reviewed the patient's history at the bedside today. I agree with the clinical assessment and the surgical plan that I formulated with the members of the [facility] service team today. I agree with the documentation above. I have reviewed and discussed with the [facility] service team members their history, physical exam findings, and their plan for the patient today. Please refer to their note found below for details. I personally reviewed vital signs, medications, labs and imaging.     Roel Lora M.D., Ph.D.                                  Brief surgery note    Patient seen at bedside but refusing exam, stating that she does not think her decubitus ulcers is causing her to be ill and does not want \"any surgery\". Wound evaluated per WOCN note on 5/16/18, does not appear infected. WBC 8, afebrile, does not appear systemically toxic. Wound culture from PICC line, likely colonization vs PICC line infection.    - Bacteremia unlikely from chronic wound with granulation tissue at wound base without s/sx of infection  - No area in need of debridement form review of wound pictures taken on 5/16/18  -[BL1.1] General surgery will sign off for now, p[BL1.2]lease contact team if[BL1.1] WOCN has wound concerns or if other[BL1.2] concerns arise    Roberto Carlos Ortiz MD   Surgery PGY-2[BL1.1]       Revision History        User Key Date/Time User Provider Type Action    > [N/A] 5/16/2018  5:55 PM Roberto Carlos Ortiz " MD [facility] Resident Sign     BL1.2 5/16/2018  5:54 PM Roberto Carlos Ortiz MD Resident Sign     BL1.1 5/16/2018  5:50 PM Roberto Carlos Ortiz MD Resident      [N/A] 5/16/2018  4:13 PM Roberto Carlos Ortiz MD Resident Share                     Progress Notes - Physician (Notes from 05/16/18 through 05/19/18)      Progress Notes by Monica Emanuel LICSW at 5/19/2018 12:18 PM     Author:  Monica Emanuel LICSW Service:  Social Work Author Type:      Filed:  5/19/2018 12:24 PM Date of Service:  5/19/2018 12:18 PM Creation Time:  5/19/2018 12:18 PM    Status:  Signed :  Monica Emanuel LICSW ()         Clinical   On Call     Instructed by weekday SW per their discharge note from 5.18.18 to set up ride for patient to discharge back to [facility] today[EL1.1] 5/19/2018[EL1.2]. Per medical team patient is ready to go back. Medical team wondered if the PICC line should be removed prior to discharge of if the facility was using it - writer spoke with RN at [facility] [phone] and he noted that they are only flushing the PICC line not using it for anything - updated NP.    Ride set up for 1600 today via [facility] ride - updated NP and Charge RN. Vladimir also updated about ride time.    Please call RN to RN report to [phone]    Please fax discharge orders and signed hard scripts for any controlled substances to [phone].    Monica Emanuel MSSEA KEE  5/19/2018    ON CALL PAGER   0800 - 1600   [phone]    ON CALL COVERAGE AFTER 1600  [phone][EL1.1]         Revision History        User Key Date/Time User Provider Type Action    > EL1.2 5/19/2018 12:24 PM Monica Emanuel LICSW  Sign     EL1.1 5/19/2018 12:18 PM Monica Emanuel LICSW              Progress Notes by Shanique Ledesma at 5/19/2018  9:46 AM     Author:  Shanique Ledesma Service:  (none) Author Type:  Nutrition Associate    Filed:  5/19/2018   "9:47 AM Date of Service:  5/19/2018  9:46 AM Creation Time:  5/19/2018  9:46 AM    Status:  Signed :  Shanique Ledesma (Nutrition Associate)         Calorie Counts    Intake recorded for: 5/18 Kcal: 0  Protein: 0g    # meals: No food intake recorded.     # supplements: 0[SF1.1]     Revision History        User Key Date/Time User Provider Type Action    > SF1.1 5/19/2018  9:47 AM Shanique Ledesma Nutrition Associate Sign            Progress Notes by Nena Stephenson RN at 5/18/2018  8:00 PM     Author:  Nena Stephenson RN Service:  General Medicine Author Type:  Registered Nurse    Filed:  5/18/2018 10:32 PM Date of Service:  5/18/2018  8:00 PM Creation Time:  5/18/2018 10:20 PM    Status:  Signed :  Nena Stephenson RN (Registered Nurse)         Wound Care/ Incontinence:  Patient called to say that urine was \"burning in my open wound\" and asking for dressings to be changed around 1850. Writer and channing Samano RN present at bedside to change coccyx and thigh dressings. Patient lying on bedding that is soaked in urine and she has refused for the last several hours to have the wet bedding changed. Saying 'I can't turn; my ribs are broken.\" She is very particular with cares and verbally abusive to nursing staff. Nursing staff changed dressings on coccyx and posterior thigh according to patient's directions. Gently pushed urine soaked sheets through to remove. She would only let us push clean chux pads assisted under her and no sheets. Perineum is very red and excoriated; she had wet urine soaked maxi pads there. Perineal done and new maxi pads placed although she did not want any barrier cream. Wanted barrier cream on buttocks which was done. 2 RN's spent over 50 minutes doing above cares.[HK1.1]     Revision History        User Key Date/Time User Provider Type Action    > HK1.1 5/18/2018 10:32 PM Nena Stephenson, RN Registered Nurse Sign            Progress Notes by Destiny Maradiaga " "AJAY RN at 5/18/2018  5:40 PM     Author:  Destiny Maradiaga RN Service:  (none) Author Type:  Registered Nurse    Filed:  5/18/2018  8:40 PM Date of Service:  5/18/2018  5:40 PM Creation Time:  5/18/2018  5:40 PM    Status:  Addendum :  Destiny Maradiaga RN (Registered Nurse)         D/I  This RN went in with NST[CS1.1] ([CS1.2]after patient flagged Sepsis[CS1.1])[CS1.2] to get vitals, and  I asked if we could change the wet linen underneath her and she stated \"these are all clean, they are not soiled\".  Tried to explain to patient that they are the wet linens from the day shift but she refused to listen, continues to be irritable, upset, and agitated with any guidance or direction from Nursing.[CS1.1]  Very argumentative and confrontational.[CS1.3]  Refuses to be repositioned. Just wanted pain meds and wanted this RN to get out[CS1.1] of her room.  I  i[CS1.2]nformed her that I can't leave any meds with her,  I would return when you agree to a plan on changing your linens and repositioning. Patient became more and more demanding and stated \"you can leave now, but give me my meds\"[CS1.1]. Patient then had the Nursing Supervisor paged, plan was discussed with her, this RN and the supervisor.  Meds given, and Charge Nurse made a staffing change at 1900pm.[CS1.2]    D/I  Sepsis flagged at 1650pm.  Lactic Acid resulted at 1.2[CS1.4]     Revision History        User Key Date/Time User Provider Type Action    > CS1.3 5/18/2018  8:40 PM Destiny Maradiaga RN Registered Nurse Addend     CS1.4 5/18/2018  8:19 PM Destiny Maradiaga RN Registered Nurse Addend     CS1.2 5/18/2018  7:58 PM Destiny Maradiaga RN Registered Nurse Sign     CS1.1 5/18/2018  5:40 PM Destiny Maradiaga RN Registered Nurse             Progress Notes by Caryl Goel APRN CNP at 5/18/2018  3:20 PM     Author:  Caryl Goel APRN CNP Service:  Hospitalist Author Type:  Nurse " Practitioner    Filed:  5/18/2018  4:03 PM Date of Service:  5/18/2018  3:20 PM Creation Time:  5/18/2018  3:20 PM    Status:  Attested :  Caryl Goel APRN CNP (Nurse Practitioner)    Cosigner:  Daron Guerrero MD at 5/18/2018  4:19 PM        Attestation signed by Daron Guerrero MD at 5/18/2018  4:19 PM        Attestation:  Physician Attestation   I, Daron Guerrero MD, have reviewed and discussed with the advanced practice provider their history, physical and plan for Marychuy Zhou. I did not participate in a shared visit by interviewing or examining the patient and this should be billed as an advanced practice provider only visit.    Daron Guerrero MD  Date of Service (when I saw the patient): I did not personally see this patient today.  D/C Vanc and monitor overnight. If remains afebrile for d/c tomorrow  - D/C PICC if not being used                               [facility], [place]    Internal Medicine Progress Note - Gold Service      Assessment & Plan   Marychuy Zhou is a 62 year old female admitted on 5/15/2018. She has a history of chronic truncal & sacral decubitus ulcers in setting of paraplegia, chronic osteomyelitis, hx spinal hematoma (resulting in paraplegia), COPD, chronic normocytic anemia, seizure disorder, hypothyroidism, chronic cervical neck pain, malnutrition on G tube feedings, neurogenic bladder with suprapubic catheter, urinary and stool incontinence, history of DVTs on chronic AC, chronic pain, probable personality disorder, depression, and anxiety is admitted for worsening pelvic pain and positive blood cx from ED visit on 5/15.     # Bacteremia with blood cx positive for staph epidermidis, staph hominis   # Hx of CRE, VRE, ESBL, MRSA  Repeat blood cx on 5/15 and 5/16 NGTD.  D/w ID, blood cx from 5/14 felt to be contaminant vs colonization.  S/p Zosyn x 1 dose in ED.  UA/UC c/w colonization.  Started on Vancomycin on 5/15.  Continues to  "be without fevers or leukocytosis.  CRP downtrending.    - Appreciate ID recommendations   - Given blood cx NGTD, will d/c Vancomycin   - Monitor for fevers and resume abx[LO1.1] &[LO1.2] pan cx if becomes febrile   - Daily CBC, CRP     # Chronic pain 2/2 chronic ulcers, back/mskl chest pain - Worse with attempts to turn/reposition and perform wound cares.  Has old rib fractures and \"bruised ribs\".  On Oxycodone Q4H PRN with custom dosing (5mg for pain 4-6, 10mg for pain 6-10).  Has refused cares due to poorly controlled pain.[LO1.1]  Evaluated by Pain Service, partially amenable to recommendations.[LO1.2]      -[LO1.1] Appreciate Pain Service recommendations, as below  - Increased Oxycodone to 15mg PO Q3H PRN - please offer prior to dressing changes/wound care, repositioning   - Added Flexeril 5mg TID PRN   -[LO1.2] Added Tylenol 975mg TID (scheduled)[LO1.1] and Gabapentin PLO cream, Pain Service in agreement, however pt has refused[LO1.2]     # Pelvic pain   # Chronic truncal, sacral decubitus ulcers in setting of T4 level paraplegia   # Chronic pressure ulcers on left heel, left upper thigh, right IT   Recently hospitalized 4/10 through 5/8 at [facility] for antibiotics and plastic surgery evaluation.  Currently not surgical candidate due to malnutrition.  Seen by WOCN this a[LO1.1]dmission[LO1.2], refer to note for wound care recs.  D/w Gen Surg for possible debridement, attempted to see patient, pt refused exam/assessment.  Per review of WOCN photos of wounds, wounds felt to be healing well and with good granulation tissue, no apparent s/s infection.  D/w case w/ Plastic Surgery, who recommended follow up OP.    - Appreciate Gen Surg recs  - WOCN consulted, will see weekly[LO1.1] while inpatient[LO1.2] - please perform dressing changes per WOCN note   - Atmos mattress   - Turn/reposition Q2H   - MVI, Vitamin A, Vitamin C, Zinc x 10 days for wound healing[LO1.1]   - Follow up w/ Plastic Surgery OP[LO1.2] "     # Seizure disorder - Stable. Continue PTA Keppra[LO1.1] according to taper.[LO1.2]     # Chronic DVT - Continue PTA Xarelto    # COPD - Stable.  Does not require home O2.  Continue PTA nebs.      # Malnutrition on TF -  G-tube placed[LO1.1] o[LO1.2]n 4/[LO1.1]25/[LO1.2]2018.  Nutrition consult placed for TFs. Prealbumin low at 13, Mag 2.1, Phos 4.3.[LO1.1]  On TPN brieftly PTA, now discontinued.    - Marinol BID   - Continue TFs, currently at goal   - Encourage PO intake, protein supplementation[LO1.2]     #Hypothyroidism - TSH 23.91 on 5/10/18.  On Synthroid 125mcg daily with questionable compliance.  Has improved since last check on 4/28 w/ TSH 59.4.       - Continue Synthroid at current dose     # Neurogenic bladder with suprapubic catheter  # Urinary, stool incontinence  Pt continues to be w/ increased leakage from catheter, urethra.  Seen by Urology during previous admission, felt that she will eventually need a diverting colostomy with urinary diversion, but likely cannot undergo this procedure until nutritional status optimized.  Trialed with nephrostomy tubes during recent admission to [place] which were unsuccessful at providing urinary diversion.  D/w Urology 5/[LO1.1]18 due to continued drainage through urethera, will increase Oxybutynin and replace suprapubic catheter.  - Pt agreeable to catheter exchange   - Oxybutynin 15mg Q24h  - Follow up placed for Urology visit as OP[LO1.2]     # Hx anxiety, depression, ?personality disorder - Not currently on PTA regimen.  Psych consulted, agreeable to try Seroquel 25mg BID[LO1.1] and will adjust dosing pending effect.   - Increased Seroquel to 50mg QHS per Psychiatry recs  - Continue Seroquel 25mg QAM[LO1.2]       # Pain Assessment:  Current Pain Score 5/18/2018   Patient currently in pain? yes   Pain score (0-10) -   Pain location Rib cage   Pain descriptors -       Diet: Combination Diet Regular Diet Adult  Room Service  Adult Formula Drip Feeding:  Specified Time TwoCal HN; Gastrostomy; Goal Rate: 50; mL/hr; From: 8:00 PM; 8:00 AM; Medication - Tube Feeding Flush Frequency: At least 15-30 mL water before and after medication administration and with tube clogging; ...  Snacks/Supplements Pediatric: Ensure Plus; Between Meals  Calorie Counts  Fluids:[LO1.1] PO[LO1.2]   DVT Prophylaxis: On Xarelto PTA  Code Status: Full Code    Disposition Plan   Expected discharge:[LO1.1] Tomorrow[LO1.2], recommended to prior living arrangement[LO1.1] when bed available.[LO1.2]       Entered: Caryl Goel 05/18/2018, 3:20 PM   Information in the above section will display in the discharge planner report.      The patient's care was discussed with the Attending Physician, Dr. Guerrero.    Caryl Goel  Internal Medicine Staff Hospitalist Service  [facility]  Pager: [phone]  Please see sticky note for cross cover information    Interval History   Marychuy is resting in bed.  She[LO1.1] reports chronic rib pain.  Able to partially turn and reposition.  Agitated.  Denies fevers, chills, abdominal pain, chest pain, and dyspnea.[LO1.2]       Data reviewed today: I reviewed all medications, new labs and imaging results over the last 24 hours.     Physical Exam   Vital Signs:                    Weight: 186 lbs 11.2 oz    GENERAL: Alert and oriented x 3. Agitated.   HEENT: Normocephalic, atraumatic. Anicteric sclera. Mucous membranes moist.[LO1.1]   Respiratory: Lungs CTAB.   Cardio: RRR, S1, S2.  No murmurs.[LO1.2]   SKIN: Erythematous patches of excoriation on buttocks and backs of thighs          Data   Medications     IV fluid REPLACEMENT ONLY       - MEDICATION INSTRUCTIONS -         acetaminophen  975 mg Oral TID     ascorbic acid  500 mg Oral Daily     dronabinol  2.5 mg Oral BID     ketamine (KETALAR) 5%-gabapentin (NEURONTIN) 8%   Topical TID     levETIRAcetam  250 mg Oral QPM     levothyroxine (SYNTHROID/LEVOTHROID) tablet 125 mcg  125 mcg Oral QAM AC      loperamide  4 mg Oral 4x Daily     miconazole   Topical BID     multivitamins with minerals  15 mL Per Feeding Tube Daily     omeprazole (priLOSEC) CR capsule 20 mg  20 mg Oral QAM AC     oxybutynin  15 mg Oral At Bedtime     protein modular  1 packet Per Feeding Tube TID     [START ON 5/19/2018] QUEtiapine  25 mg Oral Daily     QUEtiapine  50 mg Oral At Bedtime     rivaroxaban ANTICOAGULANT  20 mg Oral At Bedtime     triamcinolone   Topical TID     vitamin A  20,000 Units Oral Daily     zinc sulfate  220 mg Oral Daily     Data     Recent Labs  Lab 05/18/18  0800 05/17/18  0543 05/16/18  0713 05/15/18  2029 05/14/18  1754   WBC 9.2 7.8 8.0 10.0 12.0*   HGB 7.3* 7.7* 7.8* 8.4* 7.9*   MCV 84 83 85 84 84    457* 430 470* 474*   NA  --  139 137 136 138   POTASSIUM  --  4.1 4.4 4.6 4.0   CHLORIDE  --  105 105 101 101   CO2  --  27 25 28 29   BUN  --  13 13 14 14   CR  --  0.54 0.57 0.54 0.54   ANIONGAP  --  7 8 7 7   NATA  --  8.2* 8.7 9.0 8.5   GLC  --  123* 108* 95 101*   ALBUMIN  --   --   --  2.4* 2.2*   PROTTOTAL  --   --   --  8.8 8.3   BILITOTAL  --   --   --  0.1* 0.2   ALKPHOS  --   --   --  195* 199*   ALT  --   --   --  15 15   AST  --   --   --  16 17     No results found for this or any previous visit (from the past 24 hour(s)).[LO1.1]     Revision History        User Key Date/Time User Provider Type Action    > LO1.2 5/18/2018  4:03 PM Caryl Goel APRN CNP Nurse Practitioner Sign     LO1.1 5/18/2018  3:20 PM Caryl Goel APRN CNP Nurse Practitioner             Progress Notes by Caryl Goel APRN CNP at 5/17/2018  7:23 AM     Author:  Caryl Goel APRN CNP Service:  Hospitalist Author Type:  Nurse Practitioner    Filed:  5/17/2018  6:53 PM Date of Service:  5/17/2018  7:23 AM Creation Time:  5/17/2018  7:23 AM    Status:  Attested :  Caryl Goel APRN CNP (Nurse Practitioner)    Cosigner:  Daron Guerrero MD at  5/18/2018  3:56 PM        Attestation signed by Daron Guerrero MD at 5/18/2018  3:56 PM        Attestation:  Physician Attestation   I, Daron Guerrero MD, have reviewed and discussed with the advanced practice provider their history, physical and plan for Marychuy Zhou. I did not participate in a shared visit by interviewing or examining the patient and this should be billed as an advanced practice provider only visit.    Daron Guerrero MD  Date of Service (when I saw the patient): I did not personally see this patient today.                               [facility], [place]    Internal Medicine Progress Note - Gold Service      Assessment & Plan   Marychuy Zhou is a 62 year old female admitted on 5/15/2018. She has a history of chronic truncal & sacral decubitus ulcers in setting of paraplegia, chronic osteomyelitis, hx spinal hematoma (resulting in paraplegia), COPD, chronic normocytic anemia, seizure disorder, hypothyroidism, chronic cervical neck pain, malnutrition on G tube feedings, neurogenic bladder with suprapubic catheter, urinary and stool incontinence, history of DVTs on chronic AC, chronic pain, probable personality disorder, depression, and anxiety is admitted for worsening pelvic pain and positive blood cx from ED visit on 5/15.     # Bacteremia with blood cx positive for staph epidermidis, staph hominis   # UA with positive nitrites   # Hx of CRE, VRE, ESBL, MRSA  Blood cx x 2 on 5/14 positive for gram positive cocci in clusters,[LO1.1] final[LO1.2] c/s pending.  S/p Zosyn x 1 dose in ED.[LO1.1]  UA w/ positive nitrites, UC w/ < 10K Proteus mirabilis, < 10K Enterococcus faecium.[LO1.2]  Started on Vancomycin.  Currently afebrile and without leukocytosis.  CRP 74.  Repeat blood cx x 2 currently NGTD.[LO1.1]  ID consulted, feel that positive cx from 5/14 possible a contaminant vs colonization, and that for now, will continue to monitor final c/s from blood cx on 5/15 and  "5/16.  Procal negative, CRP 62.    - Appreciate ID recommendations[LO1.2]   - Continue Vancomycin IV for now[LO1.1] pending final blood cx[LO1.2]   - Daily CBC, CRP   - Please broaden abx if fever spikes[LO1.1]     # Chronic pain 2/2 chronic ulcers, back/mskl chest pain - Worse with attempts to turn/reposition and perform wound cares.  Has old rib fractures and \"bruised ribs\".  On Oxycodone Q4H PRN with custom dosing (5mg for pain 4-6, 10mg for pain 6-10).  Has refused cares due to poorly controlled pain.    - Pain Service consult placed   - Added Tylenol 975mg TID (scheduled)   - For now, please offer oxycodone 10-15mg prior to dressing changes - pt care order placed   - Gabapentin PLO cream to affected areas of rib pain[LO1.2]          # Pelvic pain   # Chronic truncal, sacral decubitus ulcers in setting of T4 level paraplegia   # Chronic pressure ulcers on left heel, left upper thigh, right IT   Recently hospitalized 4/10 through 5/8 at [facility] for antibiotics and plastic surgery evaluation.  Currently not surgical candidate due to malnutrition.  Seen by WOCN this am, refer to note for wound care recs.[LO1.1]  D/w Gen Surg for possible debridement, attempted to see patient, pt refused exam/assessment.  Per review of WOCN photos of wounds, wounds felt to be healing well and with good granulation tissue, no apparent s/s infection.  D/w case w/ Plastic Surgery, who recommended follow up OP.    - Appreciate Gen Surg recs[LO1.2]  - WOCN consulted, will see weekly[LO1.1] - please perform dressing changes per WOCN note[LO1.2]   - Atmos mattress   - Turn/reposition Q2H   - MVI, Vitamin A, Vitamin C, Zinc x 10 days for wound healing     # Seizure disorder - Stable. Continue PTA Keppra     # Chronic DVT - Continue PTA Xarelto    # COPD - Stable.  Does not require home O2.  Continue PTA nebs.      # Malnutrition on TF -  G-tube placed in 4/2018.  Nutrition consult placed for TFs.[LO1.1] Prealbumin low at 13, Mag " 2.1, Phos 4.3.[LO1.2]  Starting trial w/ Marinol per Dietary recs.[LO1.3]     #Hypothyroidism - TSH 23.91 on 5/10/18.  On Synthroid 125mcg daily with questionable compliance.  Has improved since last check on 4/28 w/ TSH 59.4.       - Continue Synthroid at current dose     # Neurogenic bladder with suprapubic catheter  # Urinary, stool incontinence  Pt[LO1.1] continues to be w/[LO1.3] increased leakage from catheter, urethra.  Seen by Urology during previous admission, felt that she will eventually need a diverting colostomy with urinary diversion, but likely cannot undergo this procedure until nutritional status optimized.  Trialed with nephrostomy tubes during recent admission to [facility] which were unsuccessful at providing urinary diversion.  D/w Urology[LO1.1] 5/16[LO1.3], felt that pt may need replacement of catheter or trial on anticholinergic if leakage persists.[LO1.1]   - Oxybutynin 5mg BID[LO1.2]       # Hx anxiety, depression, ?personality disorder - Not currently on PTA regimen.  Psych consulted, agreeable to try Seroquel 25mg BID.[LO1.3]           # Pain Assessment:  Current Pain Score 5/17/2018   Patient currently in pain? sleeping: patient not able to self report   Pain score (0-10) -   Pain location -   Pain descriptors -       Diet: Combination Diet Regular Diet Adult  Room Service  Adult Formula Drip Feeding: Specified Time TwoCal HN; Gastrostomy; Goal Rate: 50; mL/hr; From: 8:00 PM; 8:00 AM; Medication - Tube Feeding Flush Frequency: At least 15-30 mL water before and after medication administration and with tube clogging; ...  Snacks/Supplements Pediatric: Ensure Plus; Between Meals  Calorie Counts  Fluids: LR at 100cc/hr   DVT Prophylaxis: On Xarelto PTA  Code Status: Full Code    Disposition Plan   Expected discharge: 4 - 7 days, recommended to prior living arrangement vs  once antibiotic plan established and at goal on TFs.     Entered: Caryl Goel 05/17/2018, 7:23 AM   Information in  "the above section will display in the discharge planner report.      The patient's care was discussed with the Attending Physician, Dr. Guerrero.    Caryl Goel  Internal Medicine Staff Hospitalist Service  [facility]  Pager: [phone]  Please see sticky note for cross cover information    Interval History   Marychuy is resting in bed.  She is minimally interactive with me.[LO1.1] Reports pain with dressing changes.  Refusing any further interaction/exam with me today.  \"Don't ask me to lay here and take deep breaths with the rib pain I'm having, I have bruised ribs\"[LO1.3]     Data reviewed today: I reviewed all medications, new labs and imaging results over the last 24 hours.     Physical Exam   Vital Signs: Temp: 96  F (35.6  C) Temp src: Oral BP: 90/49 Pulse: 83 Heart Rate: 85 Resp: 16 SpO2: 97 % O2 Device: None (Room air) Oxygen Delivery: 3 LPM  Weight: 177 lbs 1.6 oz    GENERAL: Alert and oriented x 3.[LO1.1] Agitated.[LO1.3]   HEENT: Normocephalic, atraumatic. Anicteric sclera. Mucous membranes moist.   SKIN: Erythematous patches of excoriation on buttocks and backs of thighs          Data   Medications     IV fluid REPLACEMENT ONLY       lactated ringers 100 mL/hr at 05/16/18 1722     - MEDICATION INSTRUCTIONS -         ascorbic acid  500 mg Oral Daily     calcium carbonate  500 mg Oral Once     levETIRAcetam  250 mg Oral QPM     levothyroxine (SYNTHROID/LEVOTHROID) tablet 125 mcg  125 mcg Oral QAM AC     loperamide  4 mg Oral 4x Daily     miconazole   Topical BID     multivitamins with minerals  15 mL Per Feeding Tube Daily     omeprazole (priLOSEC) CR capsule 20 mg  20 mg Oral QAM AC     protein modular  1 packet Per Feeding Tube TID     protein modular  1 packet Per Feeding Tube TID     rivaroxaban ANTICOAGULANT  20 mg Oral At Bedtime     triamcinolone   Topical TID     vancomycin (VANCOCIN) IV  1,250 mg Intravenous Q12H     vitamin A  20,000 Units Oral Daily     zinc sulfate  220 mg " Oral Daily     Data     Recent Labs  Lab 18  0543 18  0713 05/15/18  2029 05/14/18  1754   WBC 7.8 8.0 10.0 12.0*   HGB 7.7* 7.8* 8.4* 7.9*   MCV 83 85 84 84   * 430 470* 474*   NA  --  137 136 138   POTASSIUM  --  4.4 4.6 4.0   CHLORIDE  --  105 101 101   CO2  --  25 28 29   BUN  --  13 14 14   CR  --  0.57 0.54 0.54   ANIONGAP  --  8 7 7   NATA  --  8.7 9.0 8.5   GLC  --  108* 95 101*   ALBUMIN  --   --  2.4* 2.2*   PROTTOTAL  --   --  8.8 8.3   BILITOTAL  --   --  0.1* 0.2   ALKPHOS  --   --  195* 199*   ALT  --   --  15 15   AST  --   --  16 17     No results found for this or any previous visit (from the past 24 hour(s)).[LO1.1]       Revision History        User Key Date/Time User Provider Type Action    > LO1.3 2018  6:53 PM Caryl Goel APRN CNP Nurse Practitioner Sign     LO1.2 2018 12:11 PM Caryl Goel APRN CNP Nurse Practitioner      LO1.1 2018  7:23 AM Caryl Goel APRN CNP Nurse Practitioner             Progress Notes by Glen Agosto MD at 2018  3:47 PM     Author:  Glen Agosto MD Service:  Infectious Disease Author Type:  Physician    Filed:  2018  3:49 PM Date of Service:  2018  3:47 PM Creation Time:  2018  3:47 PM    Status:  Signed :  Glen Agosto MD (Physician)           RED GENERAL ID SERVICE: COURTESY NOTE   Marychuy Zhou : 1955 Sex: female:   Medical record number [id number] Attending Physician: Lance Marley MD  Date of Service: May 18, 2018    DISCUSSION:[RR1.1]   She had a positive blood culture obtained from a PICC on 18. Organism is MRSE and Staphylococcus hominis. Since it was only one blood culture, this finding could represent a contaminant, simple colonization of the PICC, or a true bacteremia. The first two possibilities do not need any intervention; the latter needs treatment with vancomycin +/- removal of the PICC (depending on how difficult  a stick/critical need of PICC). Luckily, blood cultures x 1 and blood cultures x 2 from the periphery were obtained 5/15 and 5/16 respectively. This information may salvage the current quandary. All three were obtained prior to administration of vancomycin today at 1127. If these are negative, then the PICC line is either colonized or the blood culture from the PICC was contaminated; no intervention is needed. If the 5/15 and 5/16 blood cultures also grow MRSE, then she has a true bacteremia and treatment with vancomycin x 2 weeks +/- PICC removal would be recommended. The negative procalcitonin is supportive of the notion that this finding represents a contaminant or simple colonization of the PICC.   So far 5/15 and 5/16 cultures remain NGTD after 2-3 days. Low counts of bacteria in urine represents colonization in setting of catheter; no need to treat.[RR1.2]    RECOMMENDATIONS:   1. D/C vancomycin[RR1.1]    Call with any further questions.[RR1.2]     SAYRA Agosto M.D.  [facility] ID Service Staff  [phone]   [RR1.1]     Revision History        User Key Date/Time User Provider Type Action    > RR1.2 5/18/2018  3:49 PM Glen Agosto MD Physician Sign     RR1.1 5/18/2018  3:47 PM Glen Agosto MD Physician             Progress Notes by Elly Paul MSW at 5/18/2018  2:21 PM     Author:  Elly Paul MSW Service:  (none) Author Type:      Filed:  5/18/2018  3:19 PM Date of Service:  5/18/2018  2:21 PM Creation Time:  5/18/2018  2:21 PM    Status:  Signed :  Elly Paul MSW ()         Social Work Services Discharge Note      Patient Name:  Marychuy Zhou     Anticipated Discharge Date:  5/18/18    Discharge Disposition:   TCU:  [facility]- (Main Ph: [phone], Admissions Ph: [phone], Fax: [phone])     Following MD:  facility assignment     Pre-Admission Screening (PAS) online form has been completed.  The Level of Care (LOC) is:   Determined  Confirmation Code is:  NA return to facility  Patient/caregiver informed of referral to [facility] for Pre-Admission Screening for skilled nursing facility (SNF) placement and to expect a phone call post discharge from SNF.     Additional Services/Equipment Arranged:[BZ1.1]  Transport TBD on Saturday via weekend sw facilitation, pager for weekend sw is [phone][BZ1.2]     Patient / Family response to discharge plan:  in agreement patient has a bed hold at facility     Persons notified of above discharge plan:  Patient, RN, MD, facility    Staff Discharge Instructions:  Please fax discharge orders and signed hard scripts for any controlled substances.  Please print a packet and send with patient.     CTS Handoff completed:  YES    Medicare Notice of Rights provided to the patient/family:  YES[BZ1.1]    Elly BATEMAN, CYNTHIA  5B  (Medical/Surgical)  Phone: [phone]  Pager: [phone][BZ1.3]        Revision History        User Key Date/Time User Provider Type Action    > BZ1.2 5/18/2018  3:19 PM Elly Paul MSW  Sign     BZ1.3 5/18/2018  2:24 PM Elly Paul, CYNTHIA       BZ1.1 5/18/2018  2:21 PM Elly Paul, CYNTHIA              Progress Notes by Yudith Mccollum RPH at 5/18/2018 12:36 PM     Author:  Yudith Mccollum RPH Service:  Pain Service Author Type:  Pharmacist    Filed:  5/18/2018  2:43 PM Date of Service:  5/18/2018 12:36 PM Creation Time:  5/18/2018 12:36 PM    Status:  Signed :  Yudith Mccollum RPH (Pharmacist)           Patient: Marychuy Zhou  Date of Service: May 18, 2018 Admission Date:5/15/2018   * No surgery found *     Chief Pain Endorsement:[JH1.1] pain in buttocks from decubitus ulcers[JH1.2]     Recommendations were discussed and relayed to[JH1.1] Caryl Goel CNP[JH1.2]   Plan was reviewed by the Inpatient Pain Service and staff attending, [JH1.1] [place] Vallejo[JH1.2]    [JH1.1]  1. Continue  oxycodone 15 mg PO q3 hours PRN.   2. Continue cyclobenzaprine 5 mg PO TID PRN  3. Discontinue acetaminophen 975 mg PO TID scheduled. Patient is refusing.  4. Discontinue ketamine 5%/gabapentin 8% PLO cream. Patient is refusing.[JH1.2]   5. Bowel regimen per primary team to prevent opioid induced constipation.[JH1.1] Patient currently on loperamide scheduled.   6. Continue current orders for diazepam, quetiapine and zolpidem per primary team.[JH1.2]     Pain Service will[JH1.1] Sign Off at this time[JH1.2].     Thank you for consulting the Inpatient Pain Management Service. The above recommendations are to be acted upon at the primary team s discretion.       To reach us:  Mon - Friday 8 AM - 3 PM: Pager [phone] (Text Page)  After hours, weekends and holidays: Primary service should call [phone] for the on-call pain specialist    PAIN MEDICATION SAFE USE:   Prior to discharge instruct patient on the following in addition to the medication fact sheet:    Caution: these medications can cause sedation    Take prescription medicine only if it has been prescribed by your doctor    Do not take more medicine or take it more often than instructed     Call your doctor if pain gets worse    Never mix pain medicine with alcohol, sleeping pills, or any illicit drugs    Do not operate heavy machinery, including vehicles, when initiation opioid therapy or increasing dosage    Store prescription opioids in a locked container, whenever possible     Dispose of unused opioids appropriately     Do not stop abruptly once at higher doses.  These medications must be tapered off.    Opioid pain medications do carry the risk for physical dependence and addiction and patients should be counseled about this.     [JH1.1]  1. Acute on chronic pain of stage IV decubitus ulcers- was admitted on 5/15/18 for continued pelvic pain, thought to be due to chronic osteomyelitis.  Surgery team evaluated her on 5/16/18 and did not feel that her  "decubitus ulcers warrant surgical intervention at this time.  Awaiting for blood culture results, currently on empirical IV vancomycin.    2. Chronic opioid use- PTA, has been on oxycodone 5-10mg PO 4x/day at SNF for chronic pain between the shoulder blades.  3. Healthcare distrust- states that she has to be her own medical advocate because she feels that is not in competent hands.  Felt that she had poor medical experiences in the past to make her \"nervous.\"  She states that she knows her body best and will direct her care.  During this admission, has been asking staff to leave the room, calling staff names, and declining cares.  4. Malnutrition-on tube feeding via G tube-is concerned that her nutrition status will not improved for her planned skin flap surgery and diverting urologic and colostomy in 2 months and 3 weeks.  She would like to be considered for appetite inducing medication (she means Marinol).    5. T4 Paraplegia since 2008 due to spinal hematoma.  6. H/o seizure-on Keppra  7. H/o DVTs, on chronic anticoagulants  8. H/o depression-is not on psychotropic medications (except valium), pt declining.  Had tried Paxil, Cymbalta, Lamictal, gabapentin.    -- Outpatient opioid requirements prior to admission: OME = 60 (max)   - oxycodone 5-10 mg PO 4x/day  MN  reviewed on 5/17/18.     Primary Care Provider: Sánchez Zamora  Chronic Pain Provider: none[JH1.2]    Interval History:  Marychuy Zhou was seen today (May 18, 2018) and she reports that[JH1.1] her pain is worst in her buttocks but is also in her pelvis, ribs and shoulders. She states the pain is burning, constant, and intolerable. She states the only things that make the pain better are when she doesn't move and when she takes oxycodone, which brings her pain from a 12/10 on the VAS to a 7-8/10. She did not sleep well last night which she states has always been an issue for her.     When additional adjuvant medications discussed to help with pain " "such as current orders for acetaminophen, ketamine/gabapentin cream or other potential medications (oral gabapentin, lidocaine patch, menthol patch, etc), Marychuy refuses stating none of those things will work for her. She did mention that she wants her new dronabinol evening dose to be scheduled at 1600 and that she states her quetiapine that was started yesterday \"isn't working yet\" and that the dose \"needs to be increased.\"[JH1.2]     CAPA (Clinically Aligned Pain Assessment):    Comfort (How is your pain?):[JH1.1] Intolerable[JH1.2]  Change in Pain (Since your last medication/intervention?):[JH1.1] About the same[JH1.2]  Pain Control (How are your pain treatments working?):[JH1.1]  Inadequate pain control[JH1.2]  Functioning (Are you able to do activities to get better?) :[JH1.1] Can't do anything because of pain[JH1.2]  Sleep (Does your pain management allow you to sleep or rest?):[JH1.1] Awake with pain most of night[JH1.2]      FUNCTIONAL STATUS:  Change:[JH1.1]      unchanged[JH1.2]  Oral intake:[JH1.1]     Tube feeds; no appetite[JH1.2]   Activity level:[JH1.1]     Bedrest; wheelchair bound d/t paraplegia[JH1.2]  Mood:[JH1.1]      Agitation but cooperative[JH1.2]     -- Inpatient Medications Related to Pain Management:   Medications related to Pain Management (Future)    Start     Dose/Rate Route Frequency Ordered Stop    05/17/18 1522  cyclobenzaprine (FLEXERIL) tablet 5 mg      5 mg Oral 3 TIMES DAILY PRN 05/17/18 1522 05/17/18 1522  oxyCODONE IR (ROXICODONE) tablet 15 mg      15 mg Oral EVERY 3 HOURS PRN 05/17/18 1522 05/17/18 1400  ketamine (KETALAR) 5%-gabapentin (NEURONTIN) 8% topical PLO cream       Topical 3 TIMES DAILY 05/17/18 1131      05/17/18 1400  acetaminophen (TYLENOL) tablet 975 mg      975 mg Oral 3 TIMES DAILY 05/17/18 1228      05/16/18 0122  zolpidem (AMBIEN) tablet 5 mg      5 mg Oral AT BEDTIME PRN 05/16/18 0123      05/16/18 0000  levETIRAcetam (KEPPRA) tablet 250 mg      250 " mg Oral EVERY EVENING 05/15/18 2345      05/15/18 2316  diazepam (VALIUM) tablet 4 mg      4 mg Oral EVERY 6 HOURS PRN 05/15/18 2321            LAB DATA:    Recent Labs  Lab 05/18/18  0800 05/17/18  0543 05/16/18  0713 05/15/18  2029 05/14/18  1754   CR  --  0.54 0.57 0.54 0.54   WBC 9.2 7.8 8.0 10.0 12.0*   HGB 7.3* 7.7* 7.8* 8.4* 7.9*   AST  --   --   --  16 17   ALT  --   --   --  15 15         ----------------------------------------------------------------------------------  Yudith Mccollum,[JH1.1] PharmKIMBERLY, BCPS[JH1.2]   Inpatient Pain Service       Helpful Resources:  Getting Rid of Unwanted Medications (printable PDF for patients)   Opioid Overdose Prevention Toolkit (printable PDF for patients)   Prescription Opioids: What You Need To Know (printable PDF for patients)[JH1.1]        Revision History        User Key Date/Time User Provider Type Action    > JH1.2 5/18/2018  2:43 PM Yudith Mccollum [facility] Pharmacist Sign     JH1.1 5/18/2018 12:36 PM Yudith Mccollum [facility] Pharmacist             Progress Notes by Gail Larios at 5/18/2018  8:28 AM     Author:  Gail Larios Service:  (none) Author Type:  Nutrition Associate    Filed:  5/18/2018  8:29 AM Date of Service:  5/18/2018  8:28 AM Creation Time:  5/18/2018  8:28 AM    Status:  Signed :  Gail Larios (Nutrition Associate)         Calorie Counts    Intake recorded for: 5/17 Kcals: 0 Protein: 0g    # Meals Recorded: 2 meals ordered, no food intake recorded    # Supplements Recorded: 0[AG1.1]     Revision History        User Key Date/Time User Provider Type Action    > AG1.1 5/18/2018  8:29 AM Gail Larios Nutrition Associate Sign            Progress Notes by Caryl Goel APRN CNP at 5/16/2018  8:03 AM     Author:  Caryl Goel APRN CNP Service:  Hospitalist Author Type:  Nurse Practitioner    Filed:  5/16/2018  8:38 PM Date of Service:  5/16/2018  8:03 AM Creation Time:  5/16/2018  8:03 AM    Status:  Attested :   "Caryl Goel APRN CNP (Nurse Practitioner)    Cosigner:  Daron Guerrero MD at 5/17/2018  1:58 PM        Attestation signed by Daron Guerrero MD at 5/17/2018  1:58 PM        Physician Attestation     IDaron MD, saw and evaluated Marychuy Zhou as part of a shared visit.  I have reviewed and discussed with the advanced practice provider their history, physical and plan.     I personally reviewed the vital signs, medications, labs and imaging.     My key history or physical exam findings: hisotry revisited, lower abdo pain 2-3 days when had BC checked. No other complaint. Has been uncooperative with most of staff, to me ok.   Although BP documented low- denies any CP/Palpitaitons/ Dizziness  BP (!) 81/39  Pulse 85  Temp 96.6  F (35.9  C) (Oral)  Resp 16  Ht 1.575 m (5' 2\")  Wt 72.6 kg (160 lb)  SpO2 96%  Breastfeeding? No  BMI 29.26 kg/m2  Gen- pleasant laying on bed  HEENT- NAD, CHANCE, anicteric  Neck- supple  CVS- I+II+ no m/r/g  RS- CTAB  Abdo- soft, no tenderness . No g/r/r. suprapubic  Ext- edema +  CNS- no focal signs   Sacral area- multiple dressings (details in wOCN note)     Key management decisions made by me:   # Bacteremia gram-positive cocci in clusters: Ct Abx. F/u c/s and than ID consult  - plastic Sx consult   - recheck BP: if still low for Bolus and check lactic acid  # Ct cares for chronic diseases      Daron Guerrero MD  Date of Service (when I saw the patient): 05/16/18                                  [facility], [place]    Internal Medicine Progress Note - Gold Service[LO1.1]      Assessment & Plan[LO1.2]   Marychuy Zhou is a 62 year old female admitted on 5/15/2018. She has a history of chronic truncal & sacral decubitus ulcers in setting of paraplegia, chronic osteomyelitis, hx spinal hematoma (resulting in paraplegia), COPD, chronic normocytic anemia, seizure disorder, hypothyroidism, chronic cervical neck pain, malnutrition on G " tube feedings, neurogenic bladder with suprapubic catheter, urinary and stool incontinence, history of DVTs on chronic AC, chronic pain, probable personality disorder, depression, and anxiety is admitted for worsening pelvic pain and positive blood cx from ED visit on 5/15.     # Bacteremia[LO1.1] with blood cx positive for staph epidermidis, staph hominis   # UA with positive nitrites[LO1.3]   # Hx of CRE, VRE, ESBL, MRSA  Blood cx x 2 on 5/14 positive for gram positive cocci in clusters[LO1.1], c/s pending[LO1.3].  S/p Zosyn x 1 dose in ED.[LO1.1]  Started on Vancomycin.  Hypotensive today to SBP 80s, BL appears to be 80-90s.  BL Currently afebrile and without leukocytosis. CRP 74.  Repeat blood cx x 2 currently NGTD.   - Continue Vancomycin IV for now     - Daily blood cx, will follow up  - Awaiting c/s, will d/w ID as appropriate (consult placed)   - Daily CBC, CRP   - Please broaden abx if fever spikes     # Pelvic pain[LO1.3]   # Chronic truncal, sacral decubitus ulcers in setting of[LO1.1] T4 level[LO1.3] paraplegia[LO1.1]   # Chronic pressure ulcers on left heel, left upper thigh, right IT   Recently hospitalized 4/10 through 5/8 at [facility] for antibiotics and plastic surgery evaluation.  Currently not surgical candidate due to malnutrition.  Seen by WOCN this am, refer to note for wound care recs.    - Gen Surg consult placed for debridement[LO1.3]   - WOCN consulted[LO1.1], will see weekly  - Atmos mattress   - Turn/reposition Q2H   - MVI, Vitamin A, Vitamin C, Zinc x 10 days for wound healing[LO1.3]     # Seizure disorder[LO1.1] - Stable. Continue PTA Keppra[LO1.3]     # Chronic DVT - Continue PTA Xarelto    # COPD -[LO1.1] Stable.  Does not require home O2.[LO1.3]  Continue[LO1.1] PTA nebs.[LO1.3]      # Malnutrition on TF -[LO1.1]  G-tube placed[LO1.3] in 4/2018.  Nutrition consult placed for TFs.  - Prealbumin, Mag, Phos in am[LO1.4]     #Hypothyroidism[LO1.1] - TSH 23.91 on 5/10/18.  On  Synthroid 125mcg daily with questionable compliance.  Has improved since last check on 4/28 w/ TSH 59.4.       - Continue Synthroid at current dose[LO1.3]     # Neurogenic bladder with suprapubic catheter  # Urinary, stool incontinence[LO1.1]  Pt w/ increased leakage from catheter, urethra.  Seen by Urology during previous admission, felt that[LO1.3] she will eventually need a diverting colostomy with urinary diversion, but likely cannot undergo this procedure until nutritional status optimized.  Trialed with nephrostomy tubes during recent admission to [place] which were unsuccessful at providing urinary diversion.  D/w Urology today, felt that pt may need replacement of catheter or trial on anticholinergic if leakage persists.       # Chronic pain - On Oxycodone 5-10mg PO Q4H PRN PTA.  - Continue[LO1.4]       # Pain Assessment:[LO1.1]  Current Pain Score 5/8/2018   Patient currently in pain? sleeping: patient not able to self report   Pain score (0-10) -   Pain location -   Pain descriptors -[LO1.2]       Diet:[LO1.1] Combination Diet Regular Diet Adult  Room Service[LO1.2]  Fluids:[LO1.1] LR at 100cc/hr[LO1.4]   DVT Prophylaxis: On Xarelto PTA  Code Status:[LO1.1] Full Code    Disposition Plan[LO1.2]   Expected discharge:[LO1.1] 4 - 7 days[LO1.4], recommended to[LO1.1] prior living arrangement vs[LO1.4]  once[LO1.1] antibiotic plan established and at goal on TFs[LO1.4].     Entered:[LO1.1] Caryl Goel 05/16/2018[LO1.2],[LO1.1] 8:04 AM[LO1.2]   Information in the above section will display in the discharge planner report.      The patient's care was discussed with the Attending Physician, Dr. Guerrero.[LO1.1]    Caryl Goel[LO1.2]  Internal Medicine Staff Hospitalist Service  [facility]  Pager: [phone]  Please see sticky note for cross cover information[LO1.1]    Interval History[LO1.2]   Marychuy is resting in bed.  She is minimally interactive with me.  She says the wounds on her  "skin feel like they are \"covered in ammonia\".  No chest pain, dyspnea, fevers, or chills.  Some nausea this am after taking pills, asking to be left alone.[LO1.4]     Data reviewed today: I reviewed all medications, new labs and imaging results over the last 24 hours.[LO1.1]     Physical Exam[LO1.2]   Vital Signs:[LO1.1] Temp: 98.5  F (36.9  C) Temp src: Axillary BP: (!) 81/49 Pulse: 93 Heart Rate: 87 Resp: 16 SpO2: 97 % O2 Device: Nasal cannula Oxygen Delivery: 3 LPM[LO1.2]  Weight:[LO1.1] 160 lbs 0 oz[LO1.2]    GENERAL: Alert and oriented x 3. Well nourished, well developed.  No acute distress.    HEENT: Normocephalic, atraumatic. Anicteric sclera. Mucous membranes moist.   CV: RRR. S1, S2. No murmurs appreciated.   RESPIRATORY: Effort normal on[LO1.1] room air[LO1.4]. Lungs CTAB with no wheezing, rales, or rhonchi.[LO1.1]   SKIN: Erythematous patches of excoriation on buttocks and backs of thighs[LO1.4]          Data[LO1.2]   Medications     - MEDICATION INSTRUCTIONS -         levETIRAcetam  250 mg Oral QPM     levothyroxine (SYNTHROID/LEVOTHROID) tablet 125 mcg  125 mcg Oral QAM AC     loperamide  4 mg Oral 4x Daily     miconazole   Topical BID     omeprazole (priLOSEC) CR capsule 20 mg  20 mg Oral QAM AC     protein modular  1 packet Per Feeding Tube TID     rivaroxaban ANTICOAGULANT  20 mg Oral At Bedtime     triamcinolone   Topical TID     vancomycin (VANCOCIN) IV  1,250 mg Intravenous Q12H     Data     Recent Labs  Lab 05/16/18  0713 05/15/18  2029 05/14/18  1754 05/10/18   WBC 8.0 10.0 12.0* 8.6   HGB 7.8* 8.4* 7.9* 7.7*   MCV 85 84 84 86    470* 474* 367   INR  --   --   --  1.80*    136 138 138   POTASSIUM 4.4 4.6 4.0 4.4   CHLORIDE 105 101 101 103   CO2 25 28 29 26   BUN 13 14 14 12   CR 0.57 0.54 0.54 0.58*   ANIONGAP 8 7 7 9   NATA 8.7 9.0 8.5 8.9   * 95 101* 116   ALBUMIN  --  2.4* 2.2* 2.0*   PROTTOTAL  --  8.8 8.3 8.0   BILITOTAL  --  0.1* 0.2 0.2   ALKPHOS  --  195* 199* 234* " "  ALT  --  15 15 20   AST  --  16 17 24     No results found for this or any previous visit (from the past 24 hour(s)).[LO1.5]         Revision History        User Key Date/Time User Provider Type Action    > LO1.4 5/16/2018  8:38 PM Caryl Goel APRN CNP Nurse Practitioner Sign     LO1.3 5/16/2018  3:10 PM Caryl Goel APRN CNP Nurse Practitioner      LO1.5 5/16/2018  8:35 AM Caryl Goel APRN CNP Nurse Practitioner      LO1.2 5/16/2018  8:04 AM Caryl Goel APRN CNP Nurse Practitioner      LO1.1 5/16/2018  8:03 AM Caryl Goel APRN CNP Nurse Practitioner             Progress Notes by Sanam Kruger RN at 5/17/2018 10:28 AM     Author:  Sanam Kruger RN Service:  (none) Author Type:  Registered Nurse    Filed:  5/17/2018 11:01 AM Date of Service:  5/17/2018 10:28 AM Creation Time:  5/17/2018 10:28 AM    Status:  Signed :  Sanam Kruger RN (Registered Nurse)         Writer entered pt room at 1000 to do dressing changes, bed change, and medications. The patient's bed was soaking wet to the point urine was dripping off the blankets and sheets and had created a puddle on the floor. When writer suggested a bedding change patient became very agitated and told writer \"don't change my bed just change my dressings\". Writer then explained to the patient that it was a big safety hazard to have her lay in urine and unsafe to staff to have puddles on the floor. Writer also explained that cleaning her wounds without changing the bed would not help her wounds heal. Patient then agreed to a bed change. Throughout the entire bed change/dressing change the patient was very verbally aggressive and abuse toward writer and nursing aids. Repetitively calling writer \"stupid\" and an \"idiot\". When changing wounds writer explained to patient that [facility] nurse had written a note on her wounds and given instructions how to redress them. Patient replied " "with \"you think you know it all\" and multiple other verbally degrading comments. Writer and nursing aids continued to change the bed, but patient refused to roll and stated she could only lay on one side, making the bed change difficult. Patient continually making comments about how we weren't doing anything correctly. Writer's ASCOM rang while in the room, writer answered to speak to an MD and patient yelled \"Can't you just finish with me before you answer that damn phone!\" Writer explained that there are other patient's she has to take care of as well and patient stated \"well I feel sorry for them because you don't do a very good job\". Once the bed was changed writer placed a brief with two maxi pads in between the patient's legs to try to avoid urine leakage on patient's bottom. The patient angrily responded with \"you put the brief in the wrong spot you idiot, it needs to be up front\". Writer tried to explain that her leakage was out the back and making her new dressings wet and that placing towards the back would help soak that up. Pt responded aggressively with \"no are you stupid it leaks to the front and I need it pulled up front\" Per request we pulled the brief to the front and laid multiple juliana pads below the patient's bottom . Writer then left the room and asked a coworker to administer medications.[MB1.1]      Revision History        User Key Date/Time User Provider Type Action    > MB1.1 2018 11:01 AM Sanam Kruger RN Registered Nurse Sign            Progress Notes by Glen Agosto MD at 2018  4:10 PM     Author:  Glen Agosto MD Service:  Infectious Disease Author Type:  Physician    Filed:  2018  4:23 PM Date of Service:  2018  4:10 PM Creation Time:  2018  4:10 PM    Status:  Signed :  Glen Agosto MD (Physician)           [facility] GENERAL ID SERVICE: COURTESY NOTE   Marychuy Zhou : 1955 Sex: female:   Medical record number [id number] " Attending Physician: Lance Marley MD  Date of Service: May 16, 2018    DISCUSSION:   Received consult on this patient for a positive blood culture obtained from a PICC on 5/14/18[RR1.1]. Organism is MRSE and Staphylococcus hominis. Since it was only one blood culture, this finding could represent a contaminant, simple colonization of the PICC[RR1.2],[RR1.3] or[RR1.2] a[RR1.3] true bacteremia. The first two possibilities do not need any intervention; the latter needs treatment with vancomycin +/- removal of the PICC (depending on how difficult a stick/critical need of PICC).[RR1.2] Luckily, blood cultures x 1 and blood cultures x 2[RR1.4] from the periphery[RR1.3] were obtained 5/15 and 5/16 respectively. This information may salvage the current quandary.[RR1.4] All three were obtained prior to administration of vancomycin today at 1127. If these are negative, then the PICC line is either colonized or the blood culture from the PICC was contaminated; no intervention is needed. If the 5/15 and 5/16 blood cultures also grow MRSE, then she has a true bacteremia and treatment with vancomycin x 2 weeks +/- PICC removal would be recommended. The negative procalcitonin is supportive of the notion that[RR1.3] this finding represent[RR1.2]s[RR1.3] a contaminant[RR1.2] or[RR1.3] simple colonization of the PICC[RR1.2].[RR1.3]    RECOMMENDATIONS:   1.[RR1.1] Agree with empiric vancomycin[RR1.3]   2.[RR1.1] Follow 5/15 and 5/16 blood cultures to determine if therapy is needed    Formal consultation[RR1.3] to follow.      SAYRA Agosto M.D.  [facility] ID Service Staff  [id number]   [RR1.1]     Revision History        User Key Date/Time User Provider Type Action    > RR1.3 5/16/2018  4:23 PM Glen Agosto MD Physician Sign     RR1.4 5/16/2018  4:15 PM Glen Agosto MD Physician      RR1.2 5/16/2018  4:12 PM Glen Agosto MD Physician      RR1.1 5/16/2018  4:10 PM Glen Agosto MD Physician              Progress Notes by Lizette Lott RD at 5/16/2018  1:13 PM     Author:  Lizette Lott RD Service:  Nutrition Author Type:  Registered Dietitian    Filed:  5/16/2018  3:07 PM Date of Service:  5/16/2018  1:13 PM Creation Time:  5/16/2018  1:13 PM    Status:  Signed :  Lizette Lott RD (Registered Dietitian)         CLINICAL NUTRITION SERVICES - ASSESSMENT NOTE     Nutrition Prescription    RECOMMENDATIONS FOR MDs/PROVIDERS TO ORDER:  1. Consider appetite stimulant (Remeron, Marinol, or Megace) to assist with improvement of po intake. (Discussed with patient and she would like a trial ).     2. Discussed possible bolus feed via Gastrostomy tube to decrease risk for aspiration. However patient would like to continue with night time cycle feeds to allow for PO during the day.     Malnutrition Status:    Non-severe malnutrition in the context of chronic illness     Recommendations already ordered by Registered Dietitian (RD):  Ordered the following TF regimen per home regimen , modified rate to better meet patients need:     Dosing wt: 53 kg adjusted wt   Access: PEG tube     Regimen: Cycle TF, TwoCal HN @ 50 mL/hr x 12 hours overnight from 8:00 pm-8:00am  Regimen provides: (600  ML/day), 1200 kcals (23 kcal/kg/day), 50 gm PRO (0.9 gm/kg/day), 420 mL H2O, 132 gm CHO and 3.3 gm Fiber daily.    Modules: Continue with Prosource protein modular, 1 packet per feeding tube, 3 times daily. Provides 120 kcal and 33 gm protein.    TF + Prosource provides: 1320 kcal/day(25 kcal/kg) and 83 gm protein /day ( 1.6 gm/kg).  - Patient does not want any further increase in protein supplementation due to ongoing diarrhea.     Water flushes ordered per home regimen of 120 ml every 4 hours      2. Ordered the following micronutrient supplementation with chronic non-healing wound:   -- Certavite (15 ml daily via FT ) to ensure meeting micronutrient needs.  -- Vitamin C (500 mg daily p.o. x10 days)  -- Zinc Sulfate (220 mg daily  "p.o. x10 days)  -- Vitamin A (25,000 units daily p.o. x10 days)    3. Ordered boost plus once daily to improve kcal/protein intake per patients request       Future/Additional Recommendations:  1.Discussed with patient possible TF change to high protein impact peptide 1.5 to improve protein provision.  Patient worries about diarrhea and would like to continue with TwoCal HN at this time. Pending patients approval and pending improve in po intake, May consider to switch TF to Impact Peptide @ goal 60 ml/hr x 12 hours overnight (720 ml/day) to improve protein regimen for wound healing support.  It provides 1080 kcals (20  Kcal/kg/day + pending PO), 68 gm PRO (1.3 gm/kg/day), 555 ml free H2O, 46 gm Fat (50% from MCTs), 101 gm  CHO and no Fiber daily.       REASON FOR ASSESSMENT  Marychuy Zhou is a/an 62 year old female assessed by the dietitian for Provider Order - Registered Dietitian to Assess and Order TF per Medical Nutrition Therapy Protocol    Chart reviewed: Past medical history of chronic truncal, sacral decubitus ulcers secondary paraplegia, history of spinal hematoma resulting paraplegia, COPD, chronic normocytic anemia, seizure disorder, neurogenic bladder with suprapubic, urinary and stool incontinence, history of DVT on chronic anticoagulation.    - Presenting with acute on chronic pelvic pain and 2 of 2 + BCX    -Per providers note, \" Recently hospitalized 4/10-5/8 at [facility].  Admitted for evaluation and antibiotic therapy of decubitus ulcers.  Was started on Vanco and Zosyn.  Was seen by plastic surgery thought she had chronic osteomyelitis and consider future skin flap when she is at a better nutritional standpoint.  She completed 2 weeks of antibiotics.  She was initially started on TPN but this was transitioned to G-tube feedings.      NUTRITION HISTORY  Obtained from patient and Rehab RD notes:   Patient has been G-tube dependent since 4/27 due to poor appetite, decrease oral " "intake.    Oral intake:   Poor po intake due to limited appetite. Patient also reports poor quality of meals at her new NH. NH meals doesn't taste good.     TF:   - Patient has been on Cycle TF with TwoCal HN via G-tube since ( 5/7).     - Goal was to run TF @ 75 ml/hr x 12 hours from ( 8:00 am-8:00 pm),  However patient notes inability to tolerate high volume infusion due to severe nausea, diarrhea and abdominal discomfort.     - TFs has been kept @ 50 ml/hr overnight at the NH and patient reports tolerating the night time regimen well.   - Previous TF regimen was Isosource 1.5 via G-tube, @ 100 ml/hr x 12 hour cycle overnight which patient could not tolerate at all per her report.      - Previous TF: TwoCal HN @ 75 ml/hr x 12 hour overnight to provide + 1 packet of prosource TID Provided: 1800 kcal  and 76 gm protein + protein modules: 120 kcal + 33 gm protein.  TF + protein module = 1920 kcal ( 36 kcal /kg) and 109 gm protein ( 2.1 gm/kg).   - Water flushes: 120 ml every 4 hours     CURRENT NUTRITION ORDERS  Diet: Regular  Intake/Tolerance: Patient notes low po intake. Would like to get Boost nutrition supplements BID to increase her kcal /protein intake.     LABS  CRP: 74 ( elevated on 5/15 admission)  Prealbumin: 12 ( low on 5/7), no value this admit to view  Lytes: All within acceptable range   BUN: 13, Cr:0.57 - normal range this admit        MEDICATIONS  Synthroid/levothyroxine, (Levothyroxine dose, 1 tablet (125 mcg) by mouth every morning, before breakfast). Cycle TF around PO with med dose, Discussed with pharmacist).  Imodium QID  Prosource protein modular, 1 packet per feeding tube, 3 times daily       ANTHROPOMETRICS  Height: 157.5 cm (5' 2\")  Most Recent Weight: 72.6 kg (160 lb) admit wt on 5/15/18    IBW: 46.3 kg, 157% of IBW , (IBW of 50 kg - 7.5% for paraplegia = adjusted IBW).   BMI: 29.26 kg /m2 - Overweight BMI 25-29.9  Weight History: 2.2 kg wt loss over the past month ( 2.9% net wt loss over " the past month)[FW1.1]   Wt Readings from Last 10 Encounters:   05/15/18 72.6 kg (160 lb)   05/14/18 72.6 kg (160 lb)   04/23/18 60.3 kg (133 lb)   04/05/18 78.6 kg (173 lb 4.5 oz)   04/02/18 74.8 kg (165 lb)[FW1.2]     Dosing Weight: 53 kg adjusted wt based on admission wt of 72.6 kg and IBW of 46.3 kg     ASSESSED NUTRITION NEEDS  Estimated Energy Needs: 1325 - 1590 kcals/day (25 -30  kcals/kg)  Justification: lowest end for modest need with paraplegia and for Maintenance.   Estimated Protein Needs: 80 - 106 grams protein/day (1.5 - 2 grams of pro/kg)  Justification: Wound healing  Estimated Fluid Needs:  (1 mL/kcal)   Justification: Maintenance      PHYSICAL FINDINGS  WOC RN Assessment note on 5/16:   Pressure Injury: on left heel, present on admission(Pressure Injury is Stage Unstageable, Status: progressing).   - Pressure Injury: on left upper thigh, present on admission, (Pressure Injury is Stage 3, Status: reopened since last admission).   - Pressure Injury: on right IT, present on admission, (Pressure Injury is Stage 4, Status: patient declined to let WOC assess this visit).  - Pressure Injury: on left sacrum, present on admission , (Pressure Injury is Stage 3 , Status: Slight improvement)  - Pressure Injury: on coccyx, present on admission, (Pressure Injury is Stage Unstageable,  Status: declined since last admission).    Extremities:Chronic 2+ edema        MALNUTRITION  % Intake: Decreased intake does not meet criteria ( On chronic TF for the past month)  % Weight Loss: Up to 5% in 1 month (non-severe)  Subcutaneous Fat Loss: None observed  Muscle Loss: paraplegic   Fluid Accumulation/Edema: Mild/Moderate  Malnutrition Diagnosis: Non-severe malnutrition in the context of chronic illness       NUTRITION DIAGNOSIS  Increased nutrition needs with chronic buttocks wound and inadequate PRO intakes AEB eating <50% of meals , significant sx of hypoalbuminemia/edema, reliant on TF to provide for full nutrition  needs and to optimize nutrition status for possible future skin flap surgery       INTERVENTIONS  Implementation  Nutrition Education: Role of RD in TF support, reviewed high protein foods / supplements, encouraged intake if able. Reviewed possible TF change to impact peptide 1.5 to improve protein provision. Patient worries about diarrhea and would like to continue with TwoCal HN at this time.    Enteral Nutrition - Initiate  Feeding tube flush -ordered  Multi-trace element supplement therapy - ordered for wound healing support     Goals  Total avg nutritional intake to meet a minimum of 25 kcal/kg (1325 kcal /day) and 1.5 gm/kg (80 gm PRO daily) per dosing wt 53 kg adjusted wt.      Monitoring/Evaluation  Progress toward goals will be monitored and evaluated per protocol.    Lizette Lott RD/ANNETTE  Pager [phone][FW1.1]            Revision History        User Key Date/Time User Provider Type Action    > FW1.2 5/16/2018  3:07 PM Lizette Lott RD Registered Dietitian Sign     FW1.1 5/16/2018  1:13 PM Lizette Lott RD Registered Dietitian             Progress Notes by Shanique Black RN at 5/16/2018 12:08 PM     Author:  Shanique Black, RN Service:  [facility] Nurse Author Type:  Registered Nurse    Filed:  5/16/2018 12:28 PM Date of Service:  5/16/2018 12:08 PM Creation Time:  5/16/2018 12:08 PM    Status:  Signed :  Shanique Black RN (Registered Nurse)         [facility] Nurse Inpatient Pressure Injury Assessment     Initial Assessment  Reason for consultation: Evaluate and treat multiple pressure injuries      ASSESSMENT    Pressure Injury: on left heel, present on admission   Pressure Injury is Stage Unstageable   Status: progressing     Pressure Injury: on left upper thigh, present on admission   Pressure Injury is Stage 3  Status: reopened since last admission     Pressure Injury: on right IT, present on admission  Pressure Injury is Stage 4   Status: patient declined to let [facility] assess this visit.     Pressure  Injury: on left sacrum, present on admission   Pressure Injury is Stage 3   Status: Slight improvement     Pressure Injury: on coccyx, present on admission  Pressure Injury is Stage Unstageable   Status: declined since last admission      TREATMENT PLAN    Left heel wound: wash every other day with wound cleanser and gauze. Apply a thick layer of Medihoney (order #[id number]) to wound bed and cover with Mepilex 4x4. If dressing rolls at edges, OK to use Primapore tape to keep in place.     Left upper thigh wound:wash daily with wound cleanser and gauze. Cover with Mepilex 4x4.     Right IT wound: wash BID and as needed if soiled with urine or stool with wound cleanser and gauze. Pack wound to entire depth (7 cm) with Kerlix dampened with saline. Cover with Mepilex 4x4     Left sacral wound: wash daily with wound cleanser and gauze. Cover with Mepilex 4x4.     Bilateral PNT tube sites: healed, dressing no longer indicated     Buttock, perineum, upper thighs IAD: wash twice daily and with each episode of incontinence with Greta Cleanse and Protect and a soft cloth. Apply a thick layer of Criticaide Paste to skin. Do not attempt to remove Criticaide Paste with each episode of incontinence, just wipe of soiled paste and reapply. To remove all Chriss, use baby/mineral oil and a soft cloth. Due to large amount of urine being diverted past suprapubic catheter and out urethra, please change Covidon sheet with position changes every 2 hours to keep dry.  Orders Written  WOC Nurse follow-up plan:weekly  Nursing to notify the Provider(s) and re-consult the WOC Nurse if wound(s) deteriorates or new skin concern.    Patient History  According to provider note(s):   62 year old female  With past medical history of chronic truncal, sacral decubitus ulcers secondary paraplegia, history of spinal hematoma resulting paraplegia, COPD, chronic normocytic anemia, seizure disorder, probable personality disorder, depressive disorder,  hypothyroidism, chronic cervical neck pain surgery, G-tube, malnutrition, neurogenic bladder with suprapubic, urinary and stool incontinence, history of DVT on chronic anticoagulation presenting with acute on chronic pelvic pain and 2 of 2 + BCX    Objective Data   Containment of urine/stool: Suprapublic catheter    Current Diet/ Nutrition:    Active Diet Order      Combination Diet Regular Diet Adult    Output:   I/O last 3 completed shifts:  In: 360 [P.O.:360]  Out: -     Risk Assessment:   Sensory Perception: 2-->very limited  Moisture: 2-->very moist  Activity: 1-->bedfast  Mobility: 2-->very limited  Nutrition: 2-->probably inadequate  Friction and Shear: 1-->problem  Isaiah Score: 10      Labs:   Recent Labs  Lab 05/16/18  0713 05/15/18  2029  05/10/18   HGB 7.8* 8.4*  < > 7.7*   INR  --   --   --  1.80*   WBC 8.0 10.0  < > 8.6   CRP  --  74.0*  --   --    < > = values in this interval not displayed.      Recent Labs  Lab 05/16/18  0838 05/16/18  0713 05/15/18  2114 05/14/18  1754   CULT PENDING PENDING Culture negative < 24 hours, reincubate  No growth after 8 hours Cultured on the 1st day of incubation:Staphylococcus epidermidis*  Critical Value/Significant Value, preliminary result only, called to and read back by DR. HAGER @6410 5/15/18. CT  Cultured on the 1st day of incubation:Staphylococcus hominis*  (Note)POSITIVE for STAPHYLOCOCCUS EPIDERMIDIS and POSITIVE for the mecAgene (resistant to methicillin) by [employer] multiplex nucleic acidtest. Final identification and antimicrobial susceptibility testingwill be verified by standard methods.Specimen tested with Verigene multiplex, gram-positive blood culturenucleic acid test for the following targets: Staph aureus, Staphepidermidis, Staph lugdunensis, other Staph species, Enterococcusfaecalis, Enterococcus faecium, Streptococcus species, S. agalactiae,S. anginosus grp., S. pneumoniae, S. pyogenes, Listeria sp., mecA(methicillin resistance) and  Manuel/B (vancomycin resistance).Critical Value/Significant Value called to and read back by MONTY CANADA @2018 5/15/18. CT       Physical Exam  Skin assessment:   Focused skin inspection: patient declined full head to toe skin inspection, patient also declined WOC assessment of Right IT wound. Patient declined pulsate mattress. Education regarding pressure ulcer prevention provided     Wound Location:  Left heel  Date of last Photo 5/4/18           Wound History: Present on admission  Measurements (length x width x depth, in cm) 1.8 cm x 2.3 cm  x  0.5 cm  Wound Base: black eschar in center surrounded by yellow adherent slough  Tunneling N/A  Undermining N/A  Palpation of the wound bed: boggy   Periwound skin: denuded  Color: pink  Temperature: normal   Drainage: moderate  Description of drainage: serosanguineous  Odor: none  Pain: absent, paraplegia      Wound Location:  Left upper thigh  Wound History: Present on admission  Measurements (length x width x depth, in cm) 4 cm x 4.5cm x 0.01 cm  Wound Base: smooth, granular base  Tunneling N/A  Undermining N/A  Palpation of the wound bed: normal   Periwound skin: intact  Color: pink  Temperature: normal   Drainage: moderate  Description of drainage: serosanguineous  Odor: none  Pain: absent, paraplegia     Wound Location:  Right IT  Date of last Photo 5/4/2018           Wound History: Present on admission  Measurements (length x width x depth, in cm) measurements and assessments below from 5/4/18 patient declined writer removing packing this assessment: 4 cm x 3.8 cm  x  7.2 cm   Wound Base:  Beefy red granulation tissue with bone palpated at base  Tunneling N/A  Undermining N/A  Palpation of the wound bed: normal   Periwound skin: denuded  Color: red  Temperature: normal   Drainage: small  Description of drainage: serosanguineous  Odor: none  Pain: absent, paraplegia     Wound Location:  Left sacrum  Date of last Photo 5/4/2018           Wound History: Present on  admission  Measurements (length x width x depth, in cm) 1.8 cm x 1.5 cm  x  0.1 cm   Wound Base:  Pale granular tissue  Tunneling N/A  Undermining N/A  Palpation of the wound bed: normal   Periwound skin: denuded  Color: red  Temperature: normal   Drainage: none  Description of drainage: none  Odor: none  Pain: absent, paraplegia        Wound Location:  Coccyx  Wound History: Present on admission  Measurements (length x width x depth, in cm) 0.6 cm x 0.8 cm  x  0.1 cm   Wound Base: 100% slough  Tunneling N/A  Undermining N/A  Palpation of the wound bed: normal   Periwound skin: denuded  Color: red  Temperature: normal   Drainage: none  Description of drainage: none  Odor: none  Pain: absent, paraplegia     Wound Location:  Buttock, perineum, upper thighs  Date of last Photo 5/4/2018                                                                    Wound History: Patient incontinent of stool and suprapubic catheter is currently diverting our the urethra.  Denuded skin is improving, less red with healing epidermis  Color: red  Temperature: normal   Drainage: none  Description of drainage: none  Odor: none  Pain: absent, paraplegis    Interventions  Current support surface: Standard  Atmos Air mattress    Current off-loading measures: Pillows under calves  Repositioning aid: pillows  Visual inspection of wound(s) completed   Wound Care: was done per plan of care for heel wound, peeled back mepilex on other wounds, nursing had changed packing prior to visit.  Supplies: ordered: medihoney   Education provided to: patient   Discussed importance of:repositioning every 2 hours, off-loading pressure to wound, their role in pressure injury prevention, dressing change frequency, head elevation <30 degrees, off-loading mattress, nutrition on wound healing and moisture management    Discussed plan of care with Patient    Shanique KAHN, RN, CWOCN[ST1.1]      Revision History        User Key Date/Time User Provider Type  Action    > ST1.1 5/16/2018 12:28 PM Shanique Black RN Registered Nurse Sign            ED Notes signed by Nirali Non-Provider at 5/16/2018  7:45 AM      Author:  Nirali Non-Provider Service:  (none) Author Type:  (none)    Filed:  5/16/2018  7:45 AM Date of Service:  5/15/2018  1:07 AM Creation Time:  5/16/2018  7:45 AM    Status:  Signed :  Nirali Non-Provider     Scan on 5/16/2018  7:45 AM by Nirali Non-Provider : [id number]          Revision History        User Key Date/Time User Provider Type Action    > [N/A] 5/16/2018  7:45 AM Scan, Non-Provider (none) Sign            Progress Notes by Rubén Stubbs RN at 5/16/2018  2:44 AM     Author:  Rubén Stubbs RN Service:  General Medicine Author Type:  Registered Nurse    Filed:  5/16/2018  2:52 AM Date of Service:  5/16/2018  2:44 AM Creation Time:  5/16/2018  2:44 AM    Status:  Addendum :  Rubén Stubbs RN (Registered Nurse)         Pt refused Vital signs check, stating that she will call for staff to check VS  when she is ready. This author has attempted to check vs three times since the start of this shift. Pt becoming increasing irritable.[VL1.1]      Revision History        User Key Date/Time User Provider Type Action    > [N/A] 5/16/2018  2:52 AM Rubén Stubbs RN Registered Nurse Addend     VL1.1 5/16/2018  2:46 AM Rubén Stubbs RN Registered Nurse Sign            ED Provider Notes by No Rubio MD at 5/15/2018  7:26 PM     Author:  No Rubio MD Service:  Emergency Medicine Author Type:  Physician    Filed:  5/16/2018 12:37 AM Date of Service:  5/15/2018  7:26 PM Creation Time:  5/15/2018  7:44 PM    Status:  Signed :  No Rubio MD (Physician)           History     Chief Complaint   Patient presents with     Abnormal Labs     positive bc     Pelvic Pain     HPI  Marychuy Zhou is a 62 year old female residing at [facility] with past  medical history of paraplegia c/b multiple decubitus ulcers, neurogenic bladder with chronic suprapubic catheter c/b leakage, anxiety, chronic pain syndrome, depression, and chronic osteomyelitis who was seen here at the ER by Dr. Rubio for sharp, lower pelvic pain. At that time, patient was discharged home with pending blood cultures.    Today, patient presents to the ER for worsened pelvic pain from last night and positive blood cultures. She reports that even during her discharge last night, she was experiencing pelvic pain. Patient also endorses shortness of breath. She denies pain anywhere else, as well as denies fevers, chills, or changes in appetite/liquid intake. Her dressings were changed and catheter bag cleared today.     Past Medical History:   Diagnosis Date     Anxiety      Chronic pain syndrome      Closed fracture zygoma, with routine healing, subsequent encounter      COPD (chronic obstructive pulmonary disease) (H)      Depressive disorder      DVT (deep vein thrombosis) in pregnancy (H)      Edema      Epilepsy (H)      Flaccid neuropathic bladder, not elsewhere classified      Fracture of femur (H)      Hypothyroidism      Iron deficiency anemia      Paraplegia (H)     unspecified     Pressure ulcer of sacral region      PTSD (post-traumatic stress disorder)      Rheumatoid arthritis (H)      Sepsis (H)     unspecified       History reviewed. No pertinent surgical history.    Family History   Problem Relation Age of Onset     Chronic Obstructive Pulmonary Disease Brother        Social History   Substance Use Topics     Smoking status: Never Smoker     Smokeless tobacco: Never Used     Alcohol use No       Current Facility-Administered Medications   Medication     piperacillin-tazobactam (ZOSYN) 3.375 g vial to attach to  mL bag     Current Outpatient Prescriptions   Medication     diazepam (VALIUM) 2 MG tablet     fluconazole (DIFLUCAN) 150 MG tablet     ipratropium - albuterol 0.5 mg/2.5  "mg/3 mL (DUONEB) 0.5-2.5 (3) MG/3ML neb solution     levETIRAcetam (KEPPRA) 250 MG tablet     LEVOTHYROXINE SODIUM PO     loperamide (IMODIUM) 2 MG capsule     miconazole (MICATIN) 2 % AERP powder     mineral oil-hydrophilic petrolatum (AQUAPHOR)     multivitamin, therapeutic with minerals (THERA-VIT-M) TABS tablet     OMEPRAZOLE PO     ondansetron (ZOFRAN ODT) 4 MG ODT tab     oxyCODONE IR (ROXICODONE) 5 MG tablet     Pollen Extracts (PROSTAT PO)     protein modular (PROSOURCE TF)     Rivaroxaban (XARELTO PO)     triamcinolone (KENALOG) 0.1 % cream      No Known Allergies      I have reviewed the Medications, Allergies, Past Medical and Surgical History, and Social History in the Epic system.    Review of Systems   Constitutional: Negative for appetite change, chills and fever.   Genitourinary: Positive for pelvic pain (Sharp, lower).   All other systems reviewed and are negative.      Physical Exam   BP: 95/71  Pulse: 93  Temp: 97.7  F (36.5  C)  Resp: 16  Height: 157.5 cm (5' 2\")  Weight: 72.6 kg (160 lb)  SpO2: 100 %      Physical Exam   Constitutional: She is[HW1.1] oriented to person, place, and time[KS1.1]. She appears[HW1.1] well-developed[KS1.1] and[HW1.1] well-nourished[KS1.1].[HW1.1] No distress[KS1.1].[HW1.1]   Oriented; answers questions well[KS1.1]    HENT:   Head:[HW1.1] Normocephalic[KS1.1] and[HW1.1] atraumatic[KS1.1].   Neck:[HW1.1] Normal range of motion[KS1.1].   Cardiovascular:[HW1.1] Normal rate[KS1.1],[HW1.1] regular rhythm[KS1.1] and[HW1.1] normal heart sounds[KS1.1].    Pulmonary/Chest:[HW1.1] Effort normal[KS1.1] and[HW1.1] breath sounds normal[KS1.1]. No[HW1.1] respiratory distress[KS1.1]. She has[HW1.1] no wheezes[KS1.1]. She has[HW1.1] no rales[KS1.1].   Abdominal:[HW1.1] Soft[KS1.1]. She exhibits[HW1.1] no distension[KS1.1]. There is[HW1.1] no tenderness[KS1.1].[HW1.1]   Suprapubic catheter in place[KS1.1]   Musculoskeletal: She exhibits no[HW1.1] tenderness[KS1.1].   Neurological: She " is[HW1.1] alert[KS1.1] and[HW1.1] oriented to person, place, and time[KS1.1].   Skin: Skin is[HW1.1] warm[KS1.1] and[HW1.1] dry[KS1.1]. She is[HW1.1] not diaphoretic[KS1.1].[HW1.1]   Decubitus ulcers on sacrum with dressings in place[KS1.1]   Psychiatric: She has a[HW1.1] normal mood and affect[KS1.1]. Her[HW1.1] behavior is normal[KS1.1].[HW1.1] Thought content[KS1.1] normal.       ED Course     ED Course     Procedures             Critical Care time:[HW1.1]  none[KS1.1]         Results for orders placed or performed during the hospital encounter of 05/15/18   CBC with platelets differential   Result Value Ref Range    WBC 10.0 4.0 - 11.0 10e9/L    RBC Count 3.52 (L) 3.8 - 5.2 10e12/L    Hemoglobin 8.4 (L) 11.7 - 15.7 g/dL    Hematocrit 29.4 (L) 35.0 - 47.0 %    MCV 84 78 - 100 fl    MCH 23.9 (L) 26.5 - 33.0 pg    MCHC 28.6 (L) 31.5 - 36.5 g/dL    RDW 19.1 (H) 10.0 - 15.0 %    Platelet Count 470 (H) 150 - 450 10e9/L    Diff Method Manual Differential     % Neutrophils 70.7 %    % Lymphocytes 10.3 %    % Monocytes 7.8 %    % Eosinophils 10.3 %    % Basophils 0.9 %    Absolute Neutrophil 7.1 1.6 - 8.3 10e9/L    Absolute Lymphocytes 1.0 0.8 - 5.3 10e9/L    Absolute Monocytes 0.8 0.0 - 1.3 10e9/L    Absolute Eosinophils 1.0 (H) 0.0 - 0.7 10e9/L    Absolute Basophils 0.1 0.0 - 0.2 10e9/L    Platelet Estimate Confirming automated cell count    Comprehensive metabolic panel   Result Value Ref Range    Sodium 136 133 - 144 mmol/L    Potassium 4.6 3.4 - 5.3 mmol/L    Chloride 101 94 - 109 mmol/L    Carbon Dioxide 28 20 - 32 mmol/L    Anion Gap 7 3 - 14 mmol/L    Glucose 95 70 - 99 mg/dL    Urea Nitrogen 14 7 - 30 mg/dL    Creatinine 0.54 0.52 - 1.04 mg/dL    GFR Estimate >90 >60 mL/min/1.7m2    GFR Estimate If Black >90 >60 mL/min/1.7m2    Calcium 9.0 8.5 - 10.1 mg/dL    Bilirubin Total 0.1 (L) 0.2 - 1.3 mg/dL    Albumin 2.4 (L) 3.4 - 5.0 g/dL    Protein Total 8.8 6.8 - 8.8 g/dL    Alkaline Phosphatase 195 (H) 40 - 150 U/L     ALT 15 0 - 50 U/L    AST 16 0 - 45 U/L   UA with Microscopic   Result Value Ref Range    Color Urine Light Red     Appearance Urine Cloudy     Glucose Urine Negative NEG^Negative mg/dL    Bilirubin Urine Negative NEG^Negative    Ketones Urine Negative NEG^Negative mg/dL    Specific Gravity Urine 1.011 1.003 - 1.035    Blood Urine Negative NEG^Negative    pH Urine 8.5 (H) 5.0 - 7.0 pH    Protein Albumin Urine 30 (A) NEG^Negative mg/dL    Urobilinogen mg/dL Normal 0.0 - 2.0 mg/dL    Nitrite Urine Positive (A) NEG^Negative    Leukocyte Esterase Urine Trace (A) NEG^Negative    Source Catheterized Urine     WBC Urine 1 0 - 5 /HPF    RBC Urine 1 0 - 2 /HPF    Bacteria Urine Few (A) NEG^Negative /HPF    Squamous Epithelial /HPF Urine 3 (H) 0 - 1 /HPF    Mucous Urine Present (A) NEG^Negative /LPF    Amorphous Crystals Few (A) NEG^Negative /HPF   Erythrocyte sedimentation rate auto   Result Value Ref Range    Sed Rate 107 (H) 0 - 30 mm/h   CRP inflammation   Result Value Ref Range    CRP Inflammation 74.0 (H) 0.0 - 8.0 mg/L   Procalcitonin   Result Value Ref Range    Procalcitonin <0.05 ng/ml   Urine Culture   Result Value Ref Range    Specimen Description Catheterized Urine     Special Requests Specimen received in preservative     Culture Micro PENDING    Blood culture   Result Value Ref Range    Specimen Description Blood Left Arm     Special Requests Received in aerobic bottle only     Culture Micro PENDING      Medications   vancomycin (VANCOCIN) 1,250 mg in sodium chloride 0.9 % 250 mL intermittent infusion (not administered)   naloxone (NARCAN) injection 0.1-0.4 mg (not administered)   melatonin tablet 1 mg (not administered)   Patient is already receiving anticoagulation with heparin, enoxaparin (LOVENOX), warfarin (COUMADIN)  or other anticoagulant medication (not administered)   HYDROmorphone (PF) (DILAUDID) injection 1 mg (not administered)   diazepam (VALIUM) tablet 4 mg (not administered)   levothyroxine  (SYNTHROID/LEVOTHROID) tablet 125 mcg (not administered)   loperamide (IMODIUM) capsule 4 mg (not administered)   omeprazole (priLOSEC) CR capsule 20 mg (not administered)   ondansetron (ZOFRAN-ODT) ODT tab 4 mg (not administered)   rivaroxaban ANTICOAGULANT (XARELTO) tablet 20 mg (not administered)   oxyCODONE IR (ROXICODONE) tablet 15 mg (not administered)   ipratropium - albuterol 0.5 mg/2.5 mg/3 mL (DUONEB) neb solution 3 mL (not administered)   levETIRAcetam (KEPPRA) tablet 250 mg (not administered)   miconazole (MICATIN) 2 % powder (not administered)   protein modular (PROSource TF) 1 packet (not administered)   triamcinolone (KENALOG) 0.1 % cream (not administered)   piperacillin-tazobactam (ZOSYN) 3.375 g vial to attach to  mL bag (3.375 g Intravenous New Bag 5/15/18 2115)   HYDROmorphone (PF) (DILAUDID) injection 0.5 mg (0.5 mg Intravenous Given 5/15/18 2139)[KS1.2]            Labs Ordered and Resulted from Time of ED Arrival Up to the Time of Departure from the ED - No data to display         Assessments & Plan (with Medical Decision Making)[HW1.1]  Patient is a 62-year-old female that was seen yesterday in the ER for concern of lower pelvic pain and concern that she is becoming infected.  Patient here yesterday had a mild leukocytosis but was otherwise well-appearing and her sacral wounds looked well.  She did not have any focal signs of acute infection.  Patient had blood cultures drawn and was told that should be called if blood cultures returned positive.  Today, we had a preliminary result of both of her blood cultures were positive for gram-positive cocci in clusters.  We therefore called the nursing home and have the patient sent to the ER.  Patient here says that she is continuing to feel unwell, but denies any focal issues.  She denies any fevers.  No chills or shakes.  Patient's white count here is 10, down from 12 yesterday.  Patient's neutrophils are 7.1, down from 8.9 yesterday.   Patient's S is stable.  Patient was started on vancomycin.  Patient will be admitted to internal medicine for treatment of bacteremia and further evaluation.[GG1.1]       I have reviewed the nursing notes.    I have reviewed the findings, diagnosis, plan and need for follow up with the patient.    New Prescriptions    No medications on file       Final diagnoses:   Sepsis, due to unspecified organism (H)   IJb, am serving as a trained medical scribe to document services personally performed by No Rubio MD, based on the provider's statements to me.   No GUERRERO MD, was physically present and have reviewed and verified the accuracy of this note documented by Jb Mcguire.      5/15/2018   [facility], [place], EMERGENCY DEPARTMENT[HW1.1]     No Rubio MD  05/16/18 0037  [KS1.2]     Revision History        User Key Date/Time User Provider Type Action    > KS1.2 5/16/2018 12:37 AM No Rubio MD Physician Sign     KS1.1 5/16/2018 12:36 AM No Rubio MD Physician      GG1.1 5/15/2018 10:10 PM Abhilash Matt Scribe Share     HW1.1 5/15/2018  8:02 PM Jb Mcguire Scribe Share                  Procedure Notes     No notes of this type exist for this encounter.      Progress Notes - Therapies (Notes from 05/16/18 through 05/19/18)     No notes of this type exist for this encounter.

## 2018-05-15 NOTE — LETTER
Transition Communication Hand-off for Care Transitions to Next Level of Care Provider    Name: Marychuy Zhou  : 1955  MRN #: [id number]  Primary Care Provider: Sánchez Zamora MD     Primary Clinic: [address] [facility] [address]     Reason for Hospitalization:  Gram-positive bacteremia [R78.81]  Sepsis, due to unspecified organism (H) [A41.9]  Admit Date/Time: 5/15/2018  7:28 PM  Discharge Date: 18  Payor Source: Payor: MEDICARE / Plan: MEDICARE / Product Type: Medicare /     Readmission Assessment Measure (SANTIAGO) Risk Score/category: 14    Plan of Care Goals/Milestone Events:   Patient Concerns: 62 year old female with past medical history of chronic truncal, sacral decubitus ulcers secondary paraplegia, history of spinal hematoma resulting paraplegia, COPD, chronic normocytic anemia, seizure disorder, probable personality disorder, depressive disorder, hypothyroidism, chronic cervical neck pain surgery, G-tube, malnutrition, neurogenic bladder with suprapubic, urinary and stool incontinence, history of DVT on chronic anticoagulation, presented 2018 with increasing acute on chronic pelvic pain since discharge from hospital.  She had a positive blood culture obtained from a PICC on 18. Organism is MRSE and Staphylococcus hominis. Since it was only one blood culture, this finding could represent a contaminant, simple colonization of the PICC, or a true bacteremia. The first two possibilities do not need any intervention; the latter needs treatment with vancomycin +/- removal of the PICC (depending on how difficult a stick/critical need of PICC). Luckily, blood cultures x 1 and blood cultures x 2 from the periphery were obtained 5/15 and  respectively. This information may salvage the current quandary. All three were obtained prior to administration of vancomycin today at 1127. If these are negative, then the PICC line is either colonized or the blood culture from the  PICC was contaminated; no intervention is needed. If the 5/15 and 5/16 blood cultures also grow MRSE, then she has a true bacteremia and treatment with vancomycin x 2 weeks +/- PICC removal would be recommended. The negative procalcitonin is supportive of the notion that this finding represents a contaminant or simple colonization of the PICC. So far 5/15 and 5/16 cultures remain NGTD.   Patient Goals:   Short-term    Long-term    Medical Goals   Short-term TCU    Long-term home, management of chronic ailments         Reason for Communication Hand-off Referral: Fragility    Discharge Plan:       Concern for non-adherence with plan of care:   Y/N   Discharge Needs Assessment:      Already enrolled in Tele-monitoring program and name of program:      Follow-up specialty is recommended: Yes    Follow-up plan:  Future Appointments  Date Time Provider Department Center   6/11/2018 3:20 PM Faustino Coyne MD [facility]       Any outstanding tests or procedures:              Key Recommendations:      Elly Paul    AVS/Discharge Summary is the source of truth; this is a helpful guide for improved communication of patient story

## 2018-05-15 NOTE — IP AVS SNAPSHOT
"` `     [address] [facility]: [phone]                                              INTERAGENCY TRANSFER FORM - NURSING   5/15/2018                    Hospital Admission Date: 5/15/2018  SHALINI CHONG   : 1955  Sex: Female        Attending Provider: Lance Marley MD     Allergies:  No Known Allergies    Infection:  CRE, VRE, MRSA, ESBL   Service:  INTERNAL MED    Ht:  1.575 m (5' 2\")   Wt:  84.7 kg (186 lb 11.2 oz)   Admission Wt:  72.6 kg (160 lb)    BMI:  34.15 kg/m 2   BSA:  1.92 m 2            Patient PCP Information     Provider PCP Type    Sánchez Zamora MD, MD General      Current Code Status     Date Active Code Status Order ID Comments User Context       Prior      Code Status History     Date Active Date Inactive Code Status Order ID Comments User Context    2018  8:07 AM  Full Code [id number]  Caryl Goel APRN CNP Outpatient    5/15/2018 11:32 PM 2018  8:07 AM Full Code [id number]  Ryan Diallo MD Inpatient    2018 10:33 AM 5/15/2018 11:32 PM Full Code [id number]  Shahriar Cadet MD Outpatient    2018 12:16 AM 2018 10:33 AM Full Code [id number]  Toan Kingston MD Inpatient    2018 12:58 PM 2018 12:16 AM Full Code [id number]  Ambar Lopez MD Outpatient    2018 10:08 PM 2018 12:58 PM Full Code [id number]  Melo Arcos MD Inpatient      Advance Directives        Scanned docmt in ACP Activity?           Yes, scanned ACP docmt        Hospital Problems as of 2018              Priority Class Noted POA    Bacteremia Medium  5/15/2018 Yes      Non-Hospital Problems as of 2018              Priority Class Noted    Neurogenic bladder Medium  2013    Paraplegia at T4 level (H) Medium  2016    Closed left subtrochanteric femur fracture (H) Medium  2018    Osteomyelitis (H) Medium  2018    Cellulitis Medium  2018    Advance Care Planning Medium  2018    Sepsis (H) Medium  Unknown "    Other chronic pain Medium  5/10/2018    Seizure disorder (H) Medium  5/10/2018    Adjustment disorder with mixed anxiety and depressed mood Medium  5/10/2018      Immunizations     Name Date      Influenza (High Dose) 3 valent vaccine 10/16/17          END      ASSESSMENT     Discharge Profile Flowsheet     DISCHARGE NEEDS ASSESSMENT     Inspection of bony prominences  Full except (identify areas not inspected) 05/19/18 1128    Concerns To Be Addressed  compliance issue concerns;coping/stress concerns;decision making concerns;patient refuses services;adjustment to diagnosis/illness concerns;cognitive/perceptual concerns 04/11/18 1542   Full except areas not inspected   Hip, left;Buttock, left;Knee, left;Heel, left;Foot, left 05/19/18 1128    # of Referrals Placed by CTS  Post Acute Facilities;Transportation 04/07/18 1419   Inspection under devices  -- 05/19/18 1128    GASTROINTESTINAL (ADULT,PEDIATRIC,OB)     Skin WDL  ex 05/19/18 1128    GI WDL  ex 05/19/18 1019   Skin Color/Characteristics  redness blanchable 05/19/18 1128    Abdominal Appearance  contour irregular 05/19/18 1019   Skin Temperature  warm 05/19/18 1128    All Quadrants Bowel Sounds  audible and active in all quadrants 05/19/18 1019   Skin Moisture  moist 05/19/18 1128    Last Bowel Movement  05/17/18 05/19/18 0528   Skin Elasticity  slow return to original state 05/19/18 1128    GI Signs/Symptoms  abdominal fullness 05/19/18 1019   Skin Integrity  drain/device(s) 05/19/18 1128    Passing flatus  yes 05/19/18 1019   Additional Documentation  Wound (LDA) 05/19/18 1128    COMMUNICATION ASSESSMENT     SAFETY      Patient's communication style  spoken language (English or Bilingual) 05/16/18 0654   Safety WDL  WDL 05/19/18 1128    FINAL RESOURCES     Safety Factors  side rails raised x 3 05/19/18 1128    Resources List  Skilled Nursing Facility 04/07/18 1419   All Alarms  none present 05/19/18 1128    SKIN                        Assessment WDL (Within  "Defined Limits) Definitions           Safety WDL     Effective: 09/28/15    Row Information: <b>WDL Definition:</b> Bed in low position, wheels locked; call light in reach; upper side rails up x 2; ID band on<br> <font color=\"gray\"><i>Item=AS safety wdl>>List=AS safety wdl>>Version=F14</i></font>      Skin WDL     Effective: 09/28/15    Row Information: <b>WDL Definition:</b> Warm; dry; intact; elastic; without discoloration; pressure points without redness<br> <font color=\"gray\"><i>Item=AS skin wdl>>List=AS skin wdl>>Version=F14</i></font>      Vitals     Vital Signs Flowsheet     VITAL SIGNS     ANALGESIA SIDE EFFECTS MONITORING      Temp  -- (pt. refused) 05/19/18 0555   Side Effects Monitoring: Respiratory Quality  R 05/19/18 0653    Temp src  Oral 05/18/18 1652   Side Effects Monitoring: Respiratory Depth  N 05/19/18 0653    Resp  18 05/19/18 0555   Side Effects Monitoring: Sedation Level  1 05/19/18 0653    Pulse  93 05/18/18 2256   HEIGHT AND WEIGHT      Heart Rate  97 05/19/18 0555   Height  1.575 m (5' 2\") 05/15/18 1931    Pulse/Heart Rate Source  Monitor 05/19/18 0555   Height Method  Stated 05/15/18 1931    BP  (!)  83/50 05/19/18 0555   Weight  84.7 kg (186 lb 11.2 oz) 05/17/18 2115    BP Location  Left arm 05/19/18 0555   Weight Method  Bed scale 05/17/18 2115    OXYGEN THERAPY     Bed Scale  Standard (fitted sheet, draw sheet/ pad, cover/flat sheet, blanket, two pillows) (w/4 pillows and 2 blankets and 1 sheet) 05/16/18 2129    SpO2  93 % 05/19/18 0555   Estimated Weight (From ED)  72.6 kg (160 lb) 05/15/18 1931    O2 Device  None (Room air) 05/19/18 0555   BSA (Calculated - sq m)  1.78 05/15/18 1931    Oxygen Delivery  3 LPM 05/16/18 0749   BMI (Calculated)  29.33 05/15/18 1931    PAIN/COMFORT     GEREMIAS COMA SCALE      Patient Currently in Pain  sleeping: patient not able to self report 05/19/18 1017   Best Eye Response  4-->(E4) spontaneous 05/18/18 2014    Preferred Pain Scale  CAPA " (Clinically Aligned Pain Assessment) ([facility] Adults Only) 05/18/18 2008   Best Motor Response  6-->(M6) obeys commands 05/18/18 2014    Pain Location  Rib cage 05/19/18 0850   Best Verbal Response  5-->(V5) oriented 05/18/18 2014    Pain Orientation  Left 05/19/18 0621   Florentino Coma Scale Score  15 05/18/18 2014    Pain Descriptors  Aching;Constant 05/19/18 0621   DAILY CARE      Pain Intervention(s)  Repositioned 05/19/18 0850   Activity Management  activity adjusted per tolerance 05/19/18 1128    Response to Interventions  Decrease in pain 05/19/18 1017   Activity Assistance Provided  assistance, 2 people 05/19/18 1128    CLINICALLY ALIGNED PAIN ASSESSMENT (CAPA) ([facility] ADULTS ONLY)     Assistive Device Utilized  -- (refused to have lifting sheet or chux put under her) 05/19/18 0528    Comfort  comfortably manageable 05/19/18 1017   Additional Documentation  -- 05/18/18 1113    Change in Pain  getting better 05/19/18 1017   POSITIONING      Pain Control  fully effective 05/19/18 1017   Body Position  -- (requested certain positioning, but refused lift sheet, unsaf) 05/19/18 0528    Functioning  pain keeps me from doing most of what I need to do 05/19/18 0621   Head of Bed (HOB)  HOB at 20-30 degrees 05/19/18 1128    Sleep  awake with occasional pain 05/19/18 0621   Positioning/Transfer Devices  pillows;in use 05/19/18 1128            Patient Lines/Drains/Airways Status    Active LINES/DRAINS/AIRWAYS     Name: Placement date: Placement time: Site: Days: Last dressing change:    Gastrostomy/Enterostomy Gastrostomy LUQ 1 16 fr LOT: [id number], Exp: 2021-02-01 05/09/18   1538   LUQ   9     Suprapubic Catheter 16 fr Need change for urine sample and Patient was leaking large amt  05/18/18   1325      less than 1     Pressure Injury 04/12/18 Left Heel Unstagable 04/12/18   1151    37     Pressure Injury 04/12/18 Left;Posterior Leg posterior upper thigh Stage 3 04/12/18   1152    37      Pressure Injury 04/12/18 Right Ischial tuberosity Stage 4 04/12/18   1153    37     Pressure Injury 04/11/18 Posterior Coccyx 04/11/18   0115    38     Rash Bilateral upper thigh                     Patient Lines/Drains/Airways Status    Active PICC/CVC     Name: Placement date: Placement time: Site: Days: Additional Info Last dressing change:    PICC Double Lumen 04/21/18 Right Basilic 04/21/18   1359   Basilic   27 External Cath Length (cm): 0 cm   05/16/18 0853 (75.85 hrs)         Size (Fr): 5 Fr            Orientation: Right            Extremity Circumference (cm): 32 cm            Catheter Brand: Bard            Dressing Intervention: Chlorhexidine patch;Transparent;New dressing;Securing device            Description: Valved;Power PICC            Total Catheter Length (cm) Trimmed: 42 cm            Site Prep: Chlorhexidine            Local Anesthetic: Injectable            Inserted by: FANTA Burns RN            Insertion attempts with ultrasound: 1            Patient Tolerance: Tolerated well            Placement Verification: Xray            Difficulty with threading line: No            Tip location: SVC/RA Junction            Full barrier precautions done: Yes            Consent Signed: Yes            Time Out performed: Yes            Lot #: [id number]            Use for : TPN               Intake/Output Detail Report     Date Intake         Output   Net    Shift P.O. I.V. NG/GT IV Piggyback Enteral Total Urine Emesis/NG output Total       Day 05/18/18 0000 - 05/18/18 0659 -- 20 120 -- 350 490 0 -- -- 490    Elizabeth 05/18/18 0700 - 05/18/18 2947 823 2082.67 240 -- -- 2246.67 350 -- 350 1896.67    Noc 05/18/18 1500 - 05/18/18 2359 -- -- -- -- 50 50 1750 -- 1750 -1700    Day 05/19/18 0000 - 05/19/18 0659 -- -- 240 -- 350 590 575 -- 575 15    Elizabeth 05/19/18 0700 - 05/19/18 1459 -- -- 120 -- 200 320 350 -- 350 -30      Last Void/BM       Most Recent Value    Urine Occurrence 1 at 05/17/2018 2200    Stool Occurrence 1 at  05/18/2018 0000      Case Management/Discharge Planning     Case Management/Discharge Planning Flowsheet     REFERRAL INFORMATION     [facility] CAREGIVER      # of Referrals Placed by CTS  Post Acute Facilities;Transportation 04/07/18 1419   Filed Complexity Screen Score  12 05/16/18 0820    LIVING ENVIRONMENT     ABUSE RISK SCREEN      Lives With  facility resident (TCU) 05/18/18 1549   QUESTION TO PATIENT:  Has a member of your family or a partner(now or in the past) intimidated, hurt, manipulated, or controlled you in any way?  -- (Declines to answer anymore questions, says check computer) 05/16/18 0654    COPING/STRESS     QUESTION TO PATIENT: Do you feel safe going back to the place where you are living?  -- (Declines to answer anymore questions, says check computer) 05/16/18 0654    Major Change/Loss/Stressor  none (per patient) 05/18/18 1555   OBSERVATION: Is there reason to believe there has been maltreatment of a vulnerable adult (ie. Physical/Sexual/Emotional abuse, self neglect, lack of adequate food, shelter, medical care, or financial exploitation)?  -- (Declines to answer anymore questions, says check computer) 05/16/18 0654    Patient Personal Strengths  expressive of emotions 04/11/18 1542   OTHER      ASSESSMENT/CONCERNS TO BE ADDRESSED     Are you depressed or being treated for depression?  Yes 05/18/18 1552    Concerns To Be Addressed  compliance issue concerns;coping/stress concerns;decision making concerns;patient refuses services;adjustment to diagnosis/illness concerns;cognitive/perceptual concerns 04/11/18 1542   HOMICIDE RISK      FINAL RESOURCES     Feels Like Hurting Others  other (see comments) (Declines to answer anymore questions, says check computer) 05/16/18 0654    Resources List  Skilled Nursing Facility 04/07/18 1416

## 2018-05-15 NOTE — IP AVS SNAPSHOT
` `     [address] [facility] BANK: [phone]            Medication Administration Report for Marychuy Zhou as of 18 1244   Legend:    Given Hold Not Given Due Canceled Entry Other Actions    Time Time (Time) Time  Time-Action       Inactive    Active    Linked        Medications 05/13/18 05/14/18 05/15/18 05/16/18 05/17/18 05/18/18 05/19/18    acetaminophen (TYLENOL) tablet 975 mg  Dose: 975 mg  Freq: 3 TIMES DAILY Route: PO  Start: 18 1400   Admin Instructions: Maximum acetaminophen dose from all sources = 75 mg/kg/day not to exceed 4 grams/day.    Admin. Amount: 3 tablet (3 × 325 mg tablet)  Dispense Loc: [facility] ADS 5B1         (143)-Not Given       ()-Not Given        (943)-Not Given       ()-Not Given       ()-Not Given        (932)-Not Given       [ ] 1400       [ ]            ascorbic acid (VITAMIN C) tablet 500 mg  Dose: 500 mg  Freq: DAILY Route: PO  Start: 18 1500   End: 18 0759   Admin Instructions: Vitamin C (500 mg daily p.o. x10 days) for wound healing support    Admin. Amount: 1 tablet (1 × 500 mg tablet)  Last Admin: 18 0857  Dispense Loc: [facility] ADS 5B1  Administrations Remainin        1514 (500 mg)-Given        1028 (500 mg)-Given        0857 (500 mg)-Given        (932)-Not Given           bismuth subsalicylate (PEPTO BISMOL) suspension 30 mL  Dose: 30 mL  Freq: EVERY 6 HOURS PRN Route: PO  PRN Reason: indigestion  Start: 18   Admin Instructions: Shake well.    Admin. Amount: 30 mL  Last Admin: 18  Dispense Loc: [facility]  Volume: 236 mL        2252 (30 mL)-Given [C]              calcium carbonate (TUMS) chewable tablet 500 mg  Dose: 500 mg  Freq: DAILY PRN Route: PO  PRN Reason: heartburn  Start: 18 1710   Admin. Amount: 1 tablet (1 × 500 mg tablet)  Last Admin: 18 172  Dispense Loc: [facility] ADS 5B1        1722 (500 mg)-Given              cyclobenzaprine (FLEXERIL) tablet 5 mg  Dose: 5 mg  Freq:  3 TIMES DAILY PRN Route: PO  PRN Reason: muscle spasms  Start: 05/17/18 1522   Admin. Amount: 1 tablet (1 × 5 mg tablet)  Last Admin: 05/19/18 0013  Dispense Loc: [facility] ADS 5B1         1608 (5 mg)-Given        1334 (5 mg)-Given        0013 (5 mg)-Given           dextrose 10 % 1,000 mL infusion  Freq: CONTINUOUS PRN Route: IV  PRN Comment: Hypoglycemia prevention  Start: 05/16/18 1448   Admin Instructions: For Hypoglycemia Prevention for patients on long-acting subcutaneous basal insulin (Glargine, Detemir, NPH) or continuous insulin infusion. Whenever nutrition support is held or interrupted:   1) Infuse IV D10W at nutrition support rate  2) Notify provider for further instructions    Dispense Loc: [facility] Floor Stock  Volume: 1,000 mL   Mixture Administration Information:   Medication Type Amount   dextrose 10 % SOLN Base 1,000 mL                       diazepam (VALIUM) tablet 4 mg  Dose: 4 mg  Freq: EVERY 6 HOURS PRN Route: PO  PRN Reason: anxiety  Start: 05/15/18 2316   Admin. Amount: 2 tablet (2 × 2 mg tablet)  Last Admin: 05/19/18 0602  Dispense Loc: [facility] ADS 5B1        0217 (4 mg)-Given       0840 (4 mg)-Given       1514 (4 mg)-Given       2034 (4 mg)-Given        1028 (4 mg)-Given       1609 (4 mg)-Given        0114 (4 mg)-Given       0954 (4 mg)-Given       1730 (4 mg)-Given       2350 (4 mg)-Given        0602 (4 mg)-Given           dronabinol (MARINOL) capsule 2.5 mg  Dose: 2.5 mg  Freq: 2 TIMES DAILY Route: PO  Start: 05/17/18 1700   Admin. Amount: 1 capsule (1 × 2.5 mg capsule)  Last Admin: 05/19/18 0825  Dispense Loc: [facility] ADS 5B1         1608 (2.5 mg)-Given        0857 (2.5 mg)-Given       1725 (2.5 mg)-Given        0825 (2.5 mg)-Given       [ ] 1600           ipratropium - albuterol 0.5 mg/2.5 mg/3 mL (DUONEB) neb solution 3 mL  Dose: 3 mL  Freq: EVERY 4 HOURS PRN Route: NEBULIZATION  PRN Reason: wheezing  Start: 05/15/18 2335   Admin. Amount: 3 mL  Dispense Loc: [facility] ADS 5B1  Volume: 3 mL                ketamine (KETALAR) 5%-gabapentin (NEURONTIN) 8% topical PLO cream  Freq: 3 TIMES DAILY Route: Top  Start: 18 1400   Admin Instructions: Apply to areas of referred pain at ribs/chest    Dispense Loc: [facility]         (1427)-Not Given       ()-Not Given        (858)-Not Given       ()-Not Given       ()-Not Given        (932)-Not Given       [ ] 1400       [ ]            levETIRAcetam (KEPPRA) tablet 250 mg  Dose: 250 mg  Freq: EVERY EVENING Route: PO  Start: 18 0000   Admin. Amount: 1 tablet (1 × 250 mg tablet)  Last Admin: 18  Dispense Loc: [facility] ADS 5B1        0216 (250 mg)-Given        (250 mg)-Given         (250 mg)-Given         (250 mg)-Given        [ ]            levothyroxine (SYNTHROID/LEVOTHROID) tablet 125 mcg  Dose: 125 mcg  Freq: EVERY MORNING BEFORE BREAKFAST Route: PO  Start: 18 1000   Admin. Amount: 1 tablet (1 × 125 mcg tablet)  Last Admin: 18 0932  Dispense Loc: [facility] ADS 5B1         1028 (125 mcg)-Given        0955 (125 mcg)-Given        0932 (125 mcg)-Given           lidocaine (viscous) (XYLOCAINE) 2 % 15 mL, alum & mag hydroxide-simethicone (MYLANTA ES/MAALOX  ES) 15 mL GI Cocktail  Dose: 30 mL  Freq: ONCE PRN Route: PO  PRN Reason: moderate pain  Start: 18   Admin. Amount: 30 mL  Dispense Loc: [facility] Main Pharmacy  Administrations Remainin  Volume: 30 mL   Mixture Administration Information:   Medication Type Amount   lidocaine (viscous) 2 % SOLN Additives 15 mL   alum & mag hydroxide-simethicone 400-400-40 MG/5ML SUSP Additives 15 mL                       loperamide (IMODIUM) capsule 4 mg  Dose: 4 mg  Freq: 4 TIMES DAILY Route: PO  Start: 18 0800   Admin. Amount: 2 capsule (2 × 2 mg capsule)  Last Admin: 18 0946  Dispense Loc: [facility] ADS 5B1        0840 (4 mg)-Given       1214 (4 mg)-Given       1514 (4 mg)-Given       2034 (4 mg)-Given        1028 (4 mg)-Given       (1206)-Not  Given       1608 (4 mg)-Given       1845 (4 mg)-Given        0856 (4 mg)-Given       1211 (4 mg)-Given       1726 (4 mg)-Given       2028 (4 mg)-Given        0249 (4 mg)-Given       (0934)-Not Given       0946 (4 mg)-Given [C]       [ ] 1400       [ ] 2000           magnesium sulfate 4 g in 100 mL sterile water (premade)  Dose: 4 g  Freq: EVERY 4 HOURS PRN Route: IV  PRN Reason: magnesium supplementation  Start: 05/18/18 0733   Admin Instructions: For serum Mg++ less than 1.6 mg/dL  Give 4 g and recheck magnesium level 2 hours after dose, and next AM.    Admin. Amount: 4 g = 100 mL Conc: 4 g/100 mL  Dispense Loc: [facility]  Infused Over: 120 Minutes  Volume: 100 mL               melatonin tablet 1 mg  Dose: 1 mg  Freq: AT BEDTIME PRN Route: PO  PRN Reason: sleep  Start: 05/15/18 2332   Admin Instructions: Do not give unless at least 6 hours of uninterrupted sleep is expected.<br>Use first    Admin. Amount: 1 tablet (1 × 1 mg tablet)  Last Admin: 05/18/18 2028  Dispense Loc: [facility] ADS 5B1        0216 (1 mg)-Given       2232 (1 mg)-Given         2028 (1 mg)-Given            miconazole (MICATIN; MICRO GUARD) 2 % powder  Freq: 2 TIMES DAILY Route: Top  Start: 05/16/18 0800   Admin Instructions: Apply to affected area    Last Admin: 05/18/18 0902  Dispense Loc: [facility]        (1013)-Not Given       (2036)-Not Given        (1035)-Not Given       (2002)-Not Given        0902 ( )-Given       (1957)-Not Given        (0932)-Not Given       [ ] 2000           multivitamins with minerals (CERTAVITE/CEROVITE) liquid 15 mL  Dose: 15 mL  Freq: DAILY Route: PER FEEDING   Start: 05/16/18 1500   Admin. Amount: 15 mL  Last Admin: 05/18/18 0856  Dispense Loc: [facility] ADS 5B1  Volume: 15 mL        (1514)-Not Given        (1034)-Not Given        0856 (15 mL)-Given        (0933)-Not Given           naloxone (NARCAN) injection 0.1-0.4 mg  Dose: 0.1-0.4 mg  Freq: EVERY 2 MIN PRN Route: IV  PRN Reason: opioid  reversal  Start: 05/15/18 2332   Admin Instructions: For respiratory rate LESS than or EQUAL to 8.  Partial reversal dose:  0.1 mg titrated q 2 minutes for Analgesia Side Effects Monitoring Sedation Level of 3 (frequently drowsy, arousable, drifts to sleep during conversation).Full reversal dose:  0.4 mg bolus for Analgesia Side Effects Monitoring Sedation Level of 4 (somnolent, minimal or no response to stimulation).  For ordered doses up to 2mg give IVP. Give each 0.4mg over 15 seconds in emergency situations. For non-emergent situations further dilute in 9mL of NS to facilitate titration of response.    Admin. Amount: 0.1-0.4 mg = 0.25-1 mL Conc: 0.4 mg/mL  Dispense Loc: [facility] ADS 5B1  Volume: 1 mL               omeprazole (priLOSEC) CR capsule 20 mg  Dose: 20 mg  Freq: EVERY MORNING BEFORE BREAKFAST Route: PO  Start: 05/16/18 0730   Admin. Amount: 1 capsule (1 × 20 mg capsule)  Last Admin: 05/19/18 0828  Dispense Loc: [facility] ADS 5B1        0840 (20 mg)-Given        (1034)-Not Given        0856 (20 mg)-Given        0828 (20 mg)-Given [C]           ondansetron (ZOFRAN-ODT) ODT tab 4 mg  Dose: 4 mg  Freq: EVERY 6 HOURS PRN Route: PO  PRN Reason: nausea  Start: 05/15/18 2318   Admin Instructions: With dry hands, peel back foil backing and gently remove tablet; do not push oral disintegrating tablet through foil backing; administer immediately on tongue and oral disintegrating tablet dissolves in seconds; then swallow with saliva; liquid not required.    Admin. Amount: 1 tablet (1 × 4 mg tablet)  Last Admin: 05/17/18 0843  Dispense Loc: [facility] ADS 5B1         0843 (4 mg)-Given             oxybutynin (DITROPAN-XL) 24 hr tablet 15 mg  Dose: 15 mg  Freq: AT BEDTIME Route: PO  Start: 05/18/18 2200   Admin Instructions: DO NOT CRUSH.    Admin. Amount: 1 tablet (1 × 5 mg tablet) + 1 tablet (1 × 10 mg tablet)  Last Admin: 05/18/18 2101  Dispense Loc: [facility]   Mixture Administration Information:    Medication Type Amount   oxybutynin 5 MG TB24 Medications 5 mg   oxybutynin 10 MG TB24 Medications 10 mg                  2101 (15 mg)-Given        [ ] 2200           oxyCODONE IR (ROXICODONE) tablet 15 mg  Dose: 15 mg  Freq: EVERY 3 HOURS PRN Route: PO  PRN Reason: moderate to severe pain  Start: 05/17/18 1522   Admin Instructions: Please time w/ dressing changes and repositioning as able    Admin. Amount: 1 tablet (1 × 5 mg tablet) + 1 tablet (1 × 10 mg tablet)  Last Admin: 05/19/18 0932  Dispense Loc: [facility] ADS 5B1   Mixture Administration Information:   Medication Type Amount   oxyCODONE IR 5 MG TABS Medications 5 mg   oxyCODONE IR 10 MG TABS Medications 10 mg                 1720 (15 mg)-Given       2019 (15 mg)-Given        0115 (15 mg)-Given       0644 (15 mg)-Given       0955 (15 mg)-Given       1323 (15 mg)-Given       1725 (15 mg)-Given       2028 (15 mg)-Given        0012 (15 mg)-Given       0309 (15 mg)-Given       0601 (15 mg)-Given       0932 (15 mg)-Given           Patient is already receiving anticoagulation with heparin, enoxaparin (LOVENOX), warfarin (COUMADIN)  or other anticoagulant medication  Freq: CONTINUOUS PRN Route: XX  Start: 05/15/18 2332   Dispense Loc: [facility]               potassium phosphate 15 mmol in D5W 250 mL intermittent infusion  Dose: 15 mmol  Freq: DAILY PRN Route: IV  PRN Reason: phosphorous supplementation  Start: 05/18/18 0734   Admin Instructions: For serum phosphorus level 2-2.4  Do not infuse Phosphorus in the same line as TPN.   Give 15 mmol and recheck phosphorus level next AM.  Each mmol of phosphate provides 1.47 mEq of Potassium. Multiply the patient's phosphate dose by 1.47 to determine the amount of potassium in this dose.    Admin. Amount: 15 mmol  Dispense Loc: [facility]  Infused Over: 4 Hours  Volume: 250 mL   Mixture Administration Information:   Medication Type Amount   potassium phosphate 3 MMOLE/ML SOLN Medications 15 mmol    D5W 5 % SOLN Base 250 mL                       potassium phosphate 20 mmol in D5W 250 mL intermittent infusion  Dose: 20 mmol  Freq: EVERY 6 HOURS PRN Route: IV  PRN Reason: phosphorous supplementation  Start: 05/18/18 0734   Admin Instructions: For serum phosphorus level 1.1-1.9  For CENTRAL Line ONLY  Do not infuse Phosphorus in the same line as TPN.   Give 20 mmol and recheck phosphorus level 2 hours after last dose and next AM.  Each mmol of phosphate provides 1.47 mEq of Potassium. Multiply the patient's phosphate dose by 1.47 to determine the amount of potassium in this dose.    Admin. Amount: 20 mmol  Dispense Loc: [facility]  Infused Over: 4 Hours  Volume: 250 mL   Mixture Administration Information:   Medication Type Amount   potassium phosphate 3 MMOLE/ML SOLN Medications 20 mmol   D5W 5 % SOLN Base 250 mL                       potassium phosphate 20 mmol in D5W 500 mL intermittent infusion  Dose: 20 mmol  Freq: EVERY 6 HOURS PRN Route: IV  PRN Reason: phosphorous supplementation  Start: 05/18/18 0734   Admin Instructions: For serum phosphorus level 1.1-1.9  For Peripheral Line  Do not infuse Phosphorus in the same line as TPN.   Give 20 mmol and recheck phosphorus level 2 hours after last dose and next AM.  Each mmol of phosphate provides 1.47 mEq of Potassium. Multiply the patient's phosphate dose by 1.47 to determine the amount of potassium in this dose.    Admin. Amount: 20 mmol  Dispense Loc: [facility]  Infused Over: 4 Hours  Volume: 500 mL   Mixture Administration Information:   Medication Type Amount   potassium phosphate 3 MMOLE/ML SOLN Medications 20 mmol   D5W 5 % SOLN Base 500 mL                       potassium phosphate 25 mmol in D5W 500 mL intermittent infusion  Dose: 25 mmol  Freq: EVERY 8 HOURS PRN Route: IV  PRN Reason: phosphorous supplementation  Start: 05/18/18 0734   Admin Instructions: For serum phosphorus level less than 1.1  Do not infuse Phosphorus in the  same line as TPN.   Give 25 mmol and recheck phosphorus level 2 hours after last dose and next AM.  Each mmol of phosphate provides 1.47 mEq of Potassium. Multiply the patient's phosphate dose by 1.47 to determine the amount of potassium in this dose.    Admin. Amount: 25 mmol  Dispense Loc: [facility]  Infused Over: 6 Hours  Volume: 500 mL   Mixture Administration Information:   Medication Type Amount   potassium phosphate 3 MMOLE/ML SOLN Medications 25 mmol   D5W 5 % SOLN Base 500 mL                       protein modular (PROSource TF) 1 packet  Dose: 1 packet  Freq: 3 TIMES DAILY. Route: PER FEEDING   Start: 05/16/18 1800   Admin Instructions: Infuse via syringe down feeding tube. Flush feeding tube with 15-30 mL of water after administration. Do not mix with other medications.  No mixing or dilution is required. SUPPLIED BY NUTRITION DEPARTMENT.    Admin. Amount: 1 packet  Last Admin: 05/19/18 0933  Dispense Loc: [facility] Floor Stock        (1823)-Not Given        (1034)-Not Given       (1206)-Not Given              2001 (1 packet)-Given        0858 (1 packet)-Given       1211 (1 packet)-Given       (1800)-Not Given        0933 (1 packet)-Given       [ ] 1200       [ ] 1800           QUEtiapine (SEROquel) tablet 25 mg  Dose: 25 mg  Freq: DAILY Route: PO  Start: 05/19/18 0800   Admin. Amount: 1 tablet (1 × 25 mg tablet)  Last Admin: 05/19/18 0827  Dispense Loc: [facility] ADS 5B1           0827 (25 mg)-Given           QUEtiapine (SEROquel) tablet 50 mg  Dose: 50 mg  Freq: AT BEDTIME Route: PO  Start: 05/18/18 2200   Admin. Amount: 1 tablet (1 × 50 mg tablet)  Last Admin: 05/18/18 2101  Dispense Loc: [facility] ADS 5B1          2101 (50 mg)-Given        [ ] 2200           rivaroxaban ANTICOAGULANT (XARELTO) tablet 20 mg  Dose: 20 mg  Freq: AT BEDTIME Route: PO  Start: 05/15/18 2330   Admin. Amount: 1 tablet (1 × 20 mg tablet)  Last Admin: 05/18/18 2101  Dispense Loc: [facility] ADS 5B1        0216 (20  mg)-Given       2232 (20 mg)-Given        2000 (20 mg)-Given               2101 (20 mg)-Given        [ ] 2200           sodium chloride (PF) 0.9% PF flush 10-20 mL  Dose: 10-20 mL  Freq: EVERY 1 HOUR PRN Route: IK  PRN Reason: line flush  Start: 18 0613   Admin. Amount: 10-20 mL  Last Admin: 18 1729  Dispense Loc: [facility] Floor Stock  Volume: 20 mL   Current Line: PICC Double Lumen 18 Right Basilic (Purple)         0614 (20 mL)-Given       0756 (20 mL)-Given       1729 (20 mL)-Given            triamcinolone (KENALOG) 0.1 % cream  Freq: 3 TIMES DAILY Route: Top  Start: 18 0800   Admin Instructions: Apply to affected area    Dispense Loc: [facility]        (1013)-Not Given       ()-Not Given       ()-Not Given        ()-Not Given       ()-Not Given       ()-Not Given        (858)-Not Given       ()-Not Given       ()-Not Given        (933)-Not Given       [ ] 1400       [ ] 2000           vitamin A capsule 20,000 Units  Dose: 20,000 Units  Freq: DAILY Route: PO  Start: 18 1500   End: 18 0759   Admin Instructions: Vitamin A (20,000 units daily p.o. x10 days). For wound healing support    Admin. Amount: 2 capsule (2 × 10,000 Units capsule)  Last Admin: 18 0856  Dispense Loc: [facility] ADS 5B1  Administrations Remainin        1514 (20,000 Units)-Given        1028 (20,000 Units)-Given        0856 (20,000 Units)-Given        (0933)-Not Given           zinc sulfate (ZINCATE) capsule 220 mg  Dose: 220 mg  Freq: DAILY Route: PO  Start: 18 1515   End: 18 0759   Admin Instructions: Zinc Sulfate (220 mg daily p.o. x10 days) for wound healing support  <br><br><br>    Admin. Amount: 1 capsule (1 × 220 mg capsule)  Last Admin: 18 0857  Dispense Loc: [facility] ADS 5B1  Administrations Remainin        1515 (220 mg)-Given        1029 (220 mg)-Given        0857 (220 mg)-Given        (933)-Not Given           zolpidem (AMBIEN)  tablet 5 mg  Dose: 5 mg  Freq: AT BEDTIME PRN Route: PO  PRN Reason: sleep  Start: 18 012   Admin Instructions: Use if melatonin ineffective    Admin. Amount: 1 tablet (1 × 5 mg tablet)  Last Admin: 18  Dispense Loc: [facility] ADS 5B1        0217 (5 mg)-Given       223 (5 mg)-Given         0114 (5 mg)-Given       210 (5 mg)-Given           Completed Medications  Medications 05/13/18 05/14/18 05/15/18 05/16/18 05/17/18 05/18/18 05/19/18         Dose: 1 mg  Freq: ONCE Route: IK  Start: 18 1545   End: 18   Admin. Amount: 1 mg  Last Admin: 18  Dispense Loc: [facility]  Administrations Remainin        1652 (1 mg)-Given                Dose: 20 mL  Freq: ONCE Route: IK  Start: 18 1045   End: 18 1040   Admin. Amount: 20 mL  Last Admin: 18 1040  Dispense Loc: [facility] Floor Stock  Administrations Remainin  Volume: 20 mL   Current Line: PICC Double Lumen 18 Right Basilic (Purple)        1040 (20 mL)-Given               Dose: 20 mL  Freq: ONCE Route: IK  Start: 18 0545   End: 18 0539   Admin. Amount: 20 mL  Last Admin: 18 0539  Administrations Remainin  Volume: 20 mL   Current Line: PICC Double Lumen 18 Right Basilic (Blue)        0539 (20 mL)-Given            Discontinued Medications  Medications 05/13/18 05/14/18 05/15/18 05/16/18 05/17/18 05/18/18 05/19/18         Dose: 650 mg  Freq: EVERY 6 HOURS PRN Route: PO  PRN Reasons: mild pain,fever  Start: 18   End: 18 1228   Admin Instructions: Maximum acetaminophen dose from all sources = 75 mg/kg/day not to exceed 4 grams/day.    Admin. Amount: 2 tablet (2 × 325 mg tablet)  Last Admin: 18  Dispense Loc: [facility] ADS 5B1        2232 (650 mg)-Given        1228-Med Discontinued           Dose: 500 mg  Freq: ONCE Route: PO  Start: 18   End: 18 120   Admin. Amount: 1 tablet (1 × 500 mg tablet)  Dispense Loc: [facility] ADS  5B1  Administrations Remainin        ()-Not Given [C]        1209-Med Discontinued           Rate: 100 mL/hr   Freq: CONTINUOUS Route: IV  Start: 18 1700   End: 18 1509   Last Admin: 18 1211  Dispense Loc: [facility] Floor Stock  Volume: 1,000 mL        1722 ( )-New Bag        1427 ( )-New Bag        0102 ( )-New Bag       1211 ( )-New Bag       1509-Med Discontinued          Dose: 125 mcg  Freq: EVERY MORNING BEFORE BREAKFAST Route: PO  Start: 18 0730   End: 18 1508   Admin. Amount: 1 tablet (1 × 125 mcg tablet)  Last Admin: 18 0840  Dispense Loc: [facility] ADS 5B1        0840 (125 mcg)-Given       1508-Med Discontinued            Dose: 5 mg  Freq: 2 TIMES DAILY Route: PO  Start: 18 1145   End: 18 1135   Admin. Amount: 1 tablet (1 × 5 mg tablet)  Last Admin: 18 0856  Dispense Loc: [facility] ADS 5B1         (1145)-Not Given       ()-Not Given        0856 (5 mg)-Given       1135-Med Discontinued          Dose: 5 mg  Freq: DAILY Route: PO  Start: 18 1030   End: 18 1123   Admin. Amount: 1 tablet (1 × 5 mg tablet)         1123-Med Discontinued  (120)-Not Given               Dose: 15 mg  Freq: EVERY 4 HOURS PRN Route: PO  PRN Comment: Give 5mg for pain level 4-6 on a 10 point scale. Give 10mg for pain level 6-10 on a 10 point scale.  Start: 05/15/18 2319   End: 18 1522   Admin. Amount: 1 tablet (1 × 5 mg tablet) + 1 tablet (1 × 10 mg tablet)  Last Admin: 18 1437  Dispense Loc: [facility] ADS 5B1   Mixture Administration Information:   Medication Type Amount   oxyCODONE IR 5 MG TABS Medications 5 mg   oxyCODONE IR 10 MG TABS Medications 10 mg                0217 (15 mg)-Given       0840 (15 mg)-Given       1214 (15 mg)-Given       1609 (15 mg)-Given        (15 mg)-Given        1028 (15 mg)-Given       1437 (15 mg)-Given       1522-Med Discontinued           Dose: 1 packet  Freq: 3 TIMES DAILY Route: PER FEEDING   Start: 18 0000    End: 18 1208   Admin Instructions: Infuse via syringe down feeding tube. Flush feeding tube with 15-30 mL of water after administration. Do not mix with other medications.  No mixing or dilution is required.  SUPPLIED BY NUTRITION DEPARTMENT.    Admin. Amount: 1 packet  Last Admin: 18  Dispense Loc: [facility] Floor Stock        (0218)-Not Given [C]       (1013)-Not Given       (1609)-Not Given       223 (1 packet)-Given        (1034)-Not Given       1208-Med Discontinued           Dose: 25 mg  Freq: 2 TIMES DAILY Route: PO  Start: 18   End: 18 1443   Admin. Amount: 1 tablet (1 × 25 mg tablet)  Last Admin: 18 0857  Dispense Loc: [facility] ADS 5B1          (25 mg)-Given        08 (25 mg)-Given       1443-Med Discontinued          Start: 18 172   End: 18 0529   Admin Instructions: Abrams, Joshua Oppenheimer : cabinet override    Administrations Remainin529-Med Discontinued          Dose: 1,250 mg  Freq: EVERY 12 HOURS Route: IV  Indications of Use: BONE AND/OR JOINT INFECTION  Start: 05/15/18 2300   End: 18 1217   Admin Instructions: For an adult with peripheral catheter and dose of 2-2.5 g, infuse over 2 hours.  [facility].    Admin. Amount: 1,250 mg  Last Admin: 18 0021  Dispense Loc: [facility]  Infused Over: 90 Minutes  Volume: 250 mL   Mixture Administration Information:   Medication Type Amount   vancomycin 100 mg/mL SOLR Medications 1,250 mg   sodium chloride 0.9 % SOLN Base 250 mL           Current Line: PICC Double Lumen 18 Right Basilic (Red)       0047 (1,250 mg)-New Bag       1127 (1,250 mg)-New Bag        0021 (1,250 mg)-New Bag       1217-Med Discontinued  (1220)-Not Given [C]               Dose: 1,000 mg  Freq: EVERY 12 HOURS Route: IV  Indications of Use: BONE AND/OR JOINT INFECTION  Last Dose: 1,000 mg (18 1323)  Start: 18 1400   End: 18 1509   Admin Instructions: For an adult with  peripheral catheter and dose of 2-2.5 g, infuse over 2 hours.  [facility].    Admin. Amount: 1,000 mg = 200 mL Conc: 1,000 mg/200 mL  Last Admin: 05/18/18 1323  Dispense Loc: [facility] Pharm[facility]  Infused Over: 1 Hours  Volume: 200 mL   Current Line: PICC Double Lumen 04/21/18 Right Basilic (Red)        1428 (1,000 mg)-New Bag        0254 (1,000 mg)-New Bag       1323 (1,000 mg)-New Bag       1509-Med Discontinued          Dose: 220 mg  Freq: DAILY Route: PO  Start: 05/16/18 1500   End: 05/16/18 1505   Admin Instructions: Zinc Sulfate (220 mg daily p.o. x10 days) for wound healing support    Admin. Amount: 1 capsule (1 × 220 mg capsule)  Dispense Loc: [facility] ADS 5B1               1505-Med Discontinued       Medications 05/13/18 05/14/18 05/15/18 05/16/18 05/17/18 05/18/18 05/19/18

## 2018-05-16 NOTE — PROGRESS NOTES
CLINICAL NUTRITION SERVICES - ASSESSMENT NOTE     Nutrition Prescription    RECOMMENDATIONS FOR MDs/PROVIDERS TO ORDER:  1. Consider appetite stimulant (Remeron, Marinol, or Megace) to assist with improvement of po intake. (Discussed with patient and she would like a trial ).     2. Discussed possible bolus feed via Gastrostomy tube to decrease risk for aspiration. However patient would like to continue with night time cycle feeds to allow for PO during the day.     Malnutrition Status:    Non-severe malnutrition in the context of chronic illness     Recommendations already ordered by Registered Dietitian (RD):  Ordered the following TF regimen per home regimen , modified rate to better meet patients need:     Dosing wt: 53 kg adjusted wt   Access: PEG tube     Regimen: Cycle TF, TwoCal HN @ 50 mL/hr x 12 hours overnight from 8:00 pm-8:00am  Regimen provides: (600  ML/day), 1200 kcals (23 kcal/kg/day), 50 gm PRO (0.9 gm/kg/day), 420 mL H2O, 132 gm CHO and 3.3 gm Fiber daily.    Modules: Continue with Prosource protein modular, 1 packet per feeding tube, 3 times daily. Provides 120 kcal and 33 gm protein.    TF + Prosource provides: 1320 kcal/day(25 kcal/kg) and 83 gm protein /day ( 1.6 gm/kg).  - Patient does not want any further increase in protein supplementation due to ongoing diarrhea.     Water flushes ordered per home regimen of 120 ml every 4 hours      2. Ordered the following micronutrient supplementation with chronic non-healing wound:   -- Certavite (15 ml daily via FT ) to ensure meeting micronutrient needs.  -- Vitamin C (500 mg daily p.o. x10 days)  -- Zinc Sulfate (220 mg daily p.o. x10 days)  -- Vitamin A (25,000 units daily p.o. x10 days)    3. Ordered boost plus once daily to improve kcal/protein intake per patients request       Future/Additional Recommendations:  1.Discussed with patient possible TF change to high protein impact peptide 1.5 to improve protein provision.  Patient worries about  "diarrhea and would like to continue with TwoCal HN at this time. Pending patients approval and pending improve in po intake, May consider to switch TF to Impact Peptide @ goal 60 ml/hr x 12 hours overnight (720 ml/day) to improve protein regimen for wound healing support.  It provides 1080 kcals (20  Kcal/kg/day + pending PO), 68 gm PRO (1.3 gm/kg/day), 555 ml free H2O, 46 gm Fat (50% from MCTs), 101 gm  CHO and no Fiber daily.       REASON FOR ASSESSMENT  Marychuy Zhou is a/an 62 year old female assessed by the dietitian for Provider Order - Registered Dietitian to Assess and Order TF per Medical Nutrition Therapy Protocol    Chart reviewed: Past medical history of chronic truncal, sacral decubitus ulcers secondary paraplegia, history of spinal hematoma resulting paraplegia, COPD, chronic normocytic anemia, seizure disorder, neurogenic bladder with suprapubic, urinary and stool incontinence, history of DVT on chronic anticoagulation.    - Presenting with acute on chronic pelvic pain and 2 of 2 + BCX    -Per providers note, \" Recently hospitalized 4/10-5/8 at [facility].  Admitted for evaluation and antibiotic therapy of decubitus ulcers.  Was started on Vanco and Zosyn.  Was seen by plastic surgery thought she had chronic osteomyelitis and consider future skin flap when she is at a better nutritional standpoint.  She completed 2 weeks of antibiotics.  She was initially started on TPN but this was transitioned to G-tube feedings.      NUTRITION HISTORY  Obtained from patient and Rehab RD notes:   Patient has been G-tube dependent since 4/27 due to poor appetite, decrease oral intake.    Oral intake:   Poor po intake due to limited appetite. Patient also reports poor quality of meals at her new NH. NH meals doesn't taste good.     TF:   - Patient has been on Cycle TF with TwoCal HN via G-tube since ( 5/7).     - Goal was to run TF @ 75 ml/hr x 12 hours from ( 8:00 am-8:00 pm),  However patient notes " "inability to tolerate high volume infusion due to severe nausea, diarrhea and abdominal discomfort.     - TFs has been kept @ 50 ml/hr overnight at the NH and patient reports tolerating the night time regimen well.   - Previous TF regimen was Isosource 1.5 via G-tube, @ 100 ml/hr x 12 hour cycle overnight which patient could not tolerate at all per her report.      - Previous TF: TwoCal HN @ 75 ml/hr x 12 hour overnight to provide + 1 packet of prosource TID Provided: 1800 kcal  and 76 gm protein + protein modules: 120 kcal + 33 gm protein.  TF + protein module = 1920 kcal ( 36 kcal /kg) and 109 gm protein ( 2.1 gm/kg).   - Water flushes: 120 ml every 4 hours     CURRENT NUTRITION ORDERS  Diet: Regular  Intake/Tolerance: Patient notes low po intake. Would like to get Boost nutrition supplements BID to increase her kcal /protein intake.     LABS  CRP: 74 ( elevated on 5/15 admission)  Prealbumin: 12 ( low on 5/7), no value this admit to view  Lytes: All within acceptable range   BUN: 13, Cr:0.57 - normal range this admit        MEDICATIONS  Synthroid/levothyroxine, (Levothyroxine dose, 1 tablet (125 mcg) by mouth every morning, before breakfast). Cycle TF around PO with med dose, Discussed with pharmacist).  Imodium QID  Prosource protein modular, 1 packet per feeding tube, 3 times daily       ANTHROPOMETRICS  Height: 157.5 cm (5' 2\")  Most Recent Weight: 72.6 kg (160 lb) admit wt on 5/15/18    IBW: 46.3 kg, 157% of IBW , (IBW of 50 kg - 7.5% for paraplegia = adjusted IBW).   BMI: 29.26 kg /m2 - Overweight BMI 25-29.9  Weight History: 2.2 kg wt loss over the past month ( 2.9% net wt loss over the past month)   Wt Readings from Last 10 Encounters:   05/15/18 72.6 kg (160 lb)   05/14/18 72.6 kg (160 lb)   04/23/18 60.3 kg (133 lb)   04/05/18 78.6 kg (173 lb 4.5 oz)   04/02/18 74.8 kg (165 lb)     Dosing Weight: 53 kg adjusted wt based on admission wt of 72.6 kg and IBW of 46.3 kg     ASSESSED NUTRITION NEEDS  Estimated " Energy Needs: 1325 - 1590 kcals/day (25 -30  kcals/kg)  Justification: lowest end for modest need with paraplegia and for Maintenance.   Estimated Protein Needs: 80 - 106 grams protein/day (1.5 - 2 grams of pro/kg)  Justification: Wound healing  Estimated Fluid Needs:  (1 mL/kcal)   Justification: Maintenance      PHYSICAL FINDINGS  WOC RN Assessment note on 5/16:   Pressure Injury: on left heel, present on admission(Pressure Injury is Stage Unstageable, Status: progressing).   - Pressure Injury: on left upper thigh, present on admission, (Pressure Injury is Stage 3, Status: reopened since last admission).   - Pressure Injury: on right IT, present on admission, (Pressure Injury is Stage 4, Status: patient declined to let WOC assess this visit).  - Pressure Injury: on left sacrum, present on admission , (Pressure Injury is Stage 3 , Status: Slight improvement)  - Pressure Injury: on coccyx, present on admission, (Pressure Injury is Stage Unstageable,  Status: declined since last admission).    Extremities:Chronic 2+ edema        MALNUTRITION  % Intake: Decreased intake does not meet criteria ( On chronic TF for the past month)  % Weight Loss: Up to 5% in 1 month (non-severe)  Subcutaneous Fat Loss: None observed  Muscle Loss: paraplegic   Fluid Accumulation/Edema: Mild/Moderate  Malnutrition Diagnosis: Non-severe malnutrition in the context of chronic illness       NUTRITION DIAGNOSIS  Increased nutrition needs with chronic buttocks wound and inadequate PRO intakes AEB eating <50% of meals , significant sx of hypoalbuminemia/edema, reliant on TF to provide for full nutrition needs and to optimize nutrition status for possible future skin flap surgery       INTERVENTIONS  Implementation  Nutrition Education: Role of RD in TF support, reviewed high protein foods / supplements, encouraged intake if able. Reviewed possible TF change to impact peptide 1.5 to improve protein provision. Patient worries about diarrhea and  [facility].    Enteral Nutrition - Initiate  Feeding tube flush -ordered  Multi-trace element supplement therapy - ordered for wound healing support     Goals  Total avg nutritional intake to meet a minimum of 25 kcal/kg (1325 kcal /day) and 1.5 gm/kg (80 gm PRO daily) per dosing wt 53 kg adjusted wt.      Monitoring/Evaluation  Progress toward goals will be monitored and evaluated per protocol.    Lizette Lott RD/ANNETTE  Pager [phone]

## 2018-05-16 NOTE — CONSULTS
"Brief surgery note    Patient seen at bedside but refusing exam, stating that she does not think her decubitus ulcers is causing her to be ill and does not want \"any surgery\". Wound evaluated per WOCN note on 5/16/18, does not appear infected. WBC 8, afebrile, does not appear systemically toxic. Wound culture from PICC line, likely colonization vs PICC line infection.    - Bacteremia unlikely from chronic wound with granulation tissue at wound base without s/sx of infection  - No area in need of debridement form review of wound pictures taken on 5/16/18  - General surgery will sign off for now, please contact team if WOCN has wound concerns or if other concerns arise    Roberto Carlos Ortiz MD   Surgery PGY-2    "

## 2018-05-16 NOTE — PROVIDER NOTIFICATION
Paged MD for tums request. Alteplase inserted into PICC lines, both flushing with blood return now.     MD was paged for last BP reading, LR started @ 100mL/hr.

## 2018-05-16 NOTE — H&P
[facility], [place]    Internal Medicine History and Physical - [facility] Service       Date of Admission:  5/15/2018    Chief Complaint   'pelvis pain'      History is obtained from the patient    History of Present Illness   Marychuy Zhou is a 62 year old female With past medical history of chronic truncal, sacral decubitus ulcers secondary paraplegia, history of spinal hematoma resulting paraplegia, COPD, chronic normocytic anemia, seizure disorder, probable personality disorder, depressive disorder, hypothyroidism, chronic cervical neck pain surgery, G-tube, malnutrition, neurogenic bladder with suprapubic, urinary and stool incontinence, history of DVT on chronic anticoagulation     Currently she reports increasing acute on chronic pelvic pain since discharge from hospital. She thinks this is due to worsening of chronic ulcers. She states that her wounds have been changed 2x daily at the nursing home, but per notes she refuses cares and dressing changes frequently. She is unable to say if drainage, wound characteristics have changes much. Denies a change in odor, she is chronically incontinent of urine and stool. She had a PICC line placed 5/3 that is non painful. Denies fevers, chills, or additional new constitutional symptoms. Pain is 12/10, stabbing, and hard to localize.      Recently hospitalized 4/10-5/8 at [facility].  Admitted for evaluation and antibiotic therapy of decubitus ulcers.  Was started on Vanco and Zosyn.  Was seen by plastic surgery thought she had chronic osteomyelitis and consider future skin flap when she is at a better nutritional standpoint.  She completed 2 weeks of antibiotics.  She was initially started on TPN but this was transitioned to G-tube feedings.  Of note on admission she had bilateral nephrostomy tubes trying to divert urine to allow healing from chronic ulcers.  This was unsuccessful and tubes were removed.  She continued to have leakage  around suprapubic catheter and from urethra.  With plan for future diverting urologic and colostomy.    Review of Systems   The 10 point Review of Systems is negative other than noted in the HPI or here.     Past Medical History    I have reviewed this patient's medical history and updated it with pertinent information if needed.   Past Medical History:   Diagnosis Date     Anxiety      Chronic pain syndrome      Closed fracture zygoma, with routine healing, subsequent encounter      COPD (chronic obstructive pulmonary disease) (H)      Depressive disorder      DVT (deep vein thrombosis) in pregnancy (H)      Edema      Epilepsy (H)      Flaccid neuropathic bladder, not elsewhere classified      Fracture of femur (H)      Hypothyroidism      Iron deficiency anemia      Paraplegia (H)     unspecified     Pressure ulcer of sacral region      PTSD (post-traumatic stress disorder)      Rheumatoid arthritis (H)      Sepsis (H)     unspecified        Past Surgical History   I have reviewed this patient's surgical history and updated it with pertinent information if needed.    Social History   Social History   Substance Use Topics     Smoking status: Never Smoker     Smokeless tobacco: Never Used     Alcohol use No       Family History   I have reviewed this patient's family history and updated it with pertinent information if needed.   Family History   Problem Relation Age of Onset     Chronic Obstructive Pulmonary Disease Brother        Prior to Admission Medications   Prior to Admission Medications   Prescriptions Last Dose Informant Patient Reported? Taking?   LEVOTHYROXINE SODIUM PO  Nursing Home Yes No   Sig: Take 125 mcg by mouth every morning   OMEPRAZOLE PO  Nursing Home Yes No   Sig: Take 20 mg by mouth daily (with breakfast)   Pollen Extracts (PROSTAT PO)   Yes No   Sig: Take 1 oz by mouth daily   Rivaroxaban (XARELTO PO)  Nursing Home Yes No   Sig: Take 20 mg by mouth At Bedtime   diazepam (VALIUM) 2 MG tablet    No No   Sig: Take 2 tablets (4 mg) by mouth every 6 hours as needed for anxiety   fluconazole (DIFLUCAN) 150 MG tablet   No No   Sig: Take 1 tablet (150 mg) by mouth daily for 7 days   ipratropium - albuterol 0.5 mg/2.5 mg/3 mL (DUONEB) 0.5-2.5 (3) MG/3ML neb solution   No No   Sig: Take 1 vial (3 mLs) by nebulization every 4 hours as needed for wheezing   levETIRAcetam (KEPPRA) 250 MG tablet   No No   Sig: Take 1 tablet (250 mg) by mouth every evening Until 2018 then decrease to 250mg BID 2018-2018, then decrease to 250mg QAM -2018   loperamide (IMODIUM) 2 MG capsule   No No   Sig: Take 2 capsules (4 mg) by mouth 4 times daily   miconazole (MICATIN) 2 % AERP powder  Nursing Home Yes No   Sig: Apply topically 2 times daily Apply to groin folds   mineral oil-hydrophilic petrolatum (AQUAPHOR)  Nursing Home Yes No   Sig: Apply topically daily Apply to lower extremities for skin irritation.   multivitamin, therapeutic with minerals (THERA-VIT-M) TABS tablet   No No   Sig: Take 1 tablet by mouth daily   ondansetron (ZOFRAN ODT) 4 MG ODT tab   No No   Sig: Take 1 tablet (4 mg) by mouth every 6 hours as needed for nausea   oxyCODONE IR (ROXICODONE) 5 MG tablet   No No   Sig: Take 1-2 tablets (5-10 mg) by mouth every 4 hours as needed (Give 5mg for pain level 4-6 on a 10 point scale. Give 10mg for pain level 6-10 on a 10 point scale.)   protein modular (PROSOURCE TF)   No No   Si packet by Per Feeding Tube route 3 times daily   triamcinolone (KENALOG) 0.1 % cream   No No   Sig: Apply topically 3 times daily      Facility-Administered Medications: None     Allergies   No Known Allergies    Physical Exam   Vital Signs: Temp: 97.7  F (36.5  C) Temp src: Oral BP: 95/71 Pulse: 93   Resp: 16 SpO2: 100 % O2 Device: Nasal cannula Oxygen Delivery: 3 LPM  Weight: 160 lbs 0 oz     General: AAOx3, sleeping when I enter the room, at this time states she is in severe 12/10 pain. Irritable and limited in  responses   Skin: Not jaundiced, no rash, no ecchymoses  HEENT: MMM, PERRLA, EOM intact  CV: RRR, normal S1S2, no murmur, clicks, rubs  Resp: Clear to auscultation bilaterally, no wheezes, rhonchi  Abd: Soft, non-tender, BS+, no masses appreciated, G tube midline, suprapubic in place, both c/d/i  Extremities: warm and well perfused, palpable pulses, chronic 2+ edema   Skin: large midline sacral wound with partially overlying meriplex, right and left IT tuberosity with meriplex, all wounds with surrounding pressure induced erythema, chronically mal odorous, without any visible purulent drainage    (patient refuses removal of any bandages as they were just replaced, see ED notes for most recent exam)  Neuro: unable to feel and or move LE. UE with normal movement and sensation.       Assessment & Plan     Marychuy Zhou is a 62 year old female  With past medical history of chronic truncal, sacral decubitus ulcers secondary paraplegia, history of spinal hematoma resulting paraplegia, COPD, chronic normocytic anemia, seizure disorder, probable personality disorder, depressive disorder, hypothyroidism, chronic cervical neck pain surgery, G-tube, malnutrition, neurogenic bladder with suprapubic, urinary and stool incontinence, history of DVT on chronic anticoagulation presenting with acute on chronic pelvic pain and 2 of 2 + BCX      Bacteremia gram-positive cocci in clusters  Blood cultures from 5/14 2 of 2 resulted 5/15 as positive..  Veregen positive for staph epidermidis with MEC a  resistance gene.  Source likely chronic wounds.  Versed PICC versus other including urine, endocarditis  -Receive penicillin tazobactam ED, we will not continue as we have speciation for now brought in if she spikes or becomes unstable.  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  Wound ostomy care consulted.  Continue miconazole powder and triamcinolone cream    Chronic medical problems    T4 paraplegia secondary to spinal hematoma in distant past     COPD continue as needed duo nebs     anemia daily hemoglobin     Seizure disorder continue Keppra    Mental health, likely depression, likely personality disorder: Previously refused all psychotropic medications except for benzodiazepines.  Ordered Valium as of now.  Strongly encourage discontinuing this medication    History of DVT on chronic Xarelto continue    Acute on chronic pain: Outpatient oxycodone 5-10 mg every 4 hours will increase this to 3 tabs every 4 hours as needed with IV Dilaudid dose ×1 now          # Pain Assessment:  Current Pain Score 5/8/2018   Patient currently in pain? sleeping: patient not able to self report   Pain score (0-10) -   Pain location -   Pain descriptors -   - Marychuy is experiencing pain due to chronic ulcer. Pain management was discussed and the plan was created in a collaborative fashion.  Marychuy's response to the current recommendations: engaged  - Please see the plan for pain management as documented above        Diet:   regular diet with  additional protein supplement per previous orders and nutrition consult  Fluids: No IV fluids needed  DVT Prophylaxis: Chronic rivaroxaban  Code Status: Full Code    Disposition Plan   Expected discharge: 2 - 3 days; recommended to prior living arrangement once antibiotic plan established.     Entered: Ryan Diallo 05/15/2018, 11:30 PM   Information in the above section will display in the discharge planner report.    The patient was discussed with Dr. Ayaka Diallo  [facility]   Pager: [phone]   Please see sticky note for cross cover information      Data    Data     Recent Labs  Lab 05/15/18  2029 05/14/18  1754 05/10/18   WBC 10.0 12.0* 8.6   HGB 8.4* 7.9* 7.7*   MCV 84 84 86   * 474* 367   INR  --   --  1.80*    138 138   POTASSIUM 4.6 4.0 4.4   CHLORIDE 101 101 103   CO2 28 29 26   BUN 14 14 12   CR 0.54 0.54 0.58*   ANIONGAP 7 7 9   NATA 9.0 8.5 8.9   GLC 95 101* 116   ALBUMIN 2.4* 2.2* 2.0*   PROTTOTAL 8.8 8.3 8.0   BILITOTAL 0.1* 0.2 0.2   ALKPHOS 195* 199* 234*   ALT 15 15 20   AST 16 17 24     No results found for this or any previous visit (from the past 24 hour(s)).      Physician Attestation   Lance GUERRERO saw this patient with the resident and agree with the resident s findings and plan of care as documented in the resident s note    I personally reviewed vital signs, medications, labs and imaging.    Lance Marley  Date of Service (when I saw the patient): 5/15/18

## 2018-05-16 NOTE — PLAN OF CARE
Problem: Patient Care Overview  Goal: Plan of Care/Patient Progress Review  Outcome: No Change  D/I: Pt is alert and oriented x 4, refused vital signs check all shift. MDs notified. Pt irritable, upset, angry and upset about not getting tube feeding tonight. Declined answering admission questions and also declined admission assessment. States that she will let staff know when she is ready to talk. Refused wound, suprapubic cath, and G tube sites assessment. Pt refusing repositioning. Pt also has a PICC line but says that it does not work. Had a PIV on left arm but wants it taken out. RN told pt she would have to have one another IV access placed.   P: Continue to monitor pt, and complete admission profile when ever pt is ready to talk. Encourage compliance with care.

## 2018-05-16 NOTE — PROGRESS NOTES
[facility] ID SERVICE: COURTESY NOTE   Marychuy Zhou : 1955 Sex: female:   Medical record number [id number] Attending Physician: Lance Marley MD  Date of Service: May 16, 2018    DISCUSSION:   Received consult on this patient for a positive blood culture obtained from a PICC on 18. Organism is MRSE and Staphylococcus hominis. Since it was only one blood culture, this finding could represent a contaminant, simple colonization of the PICC, or a true bacteremia. The first two possibilities do not need any intervention; the latter needs treatment with vancomycin +/- removal of the PICC (depending on how difficult a stick/critical need of PICC). Luckily, blood cultures x 1 and blood cultures x 2 from the periphery were obtained 5/15 and  respectively. This information may salvage the current quandary. All three were obtained prior to administration of vancomycin today at 1127. If these are negative, then the PICC line is either colonized or the blood culture from the PICC was contaminated; no intervention is needed. If the 5/15 and  blood cultures also grow MRSE, then she has a true bacteremia and treatment with vancomycin x 2 weeks +/- PICC removal would be recommended. The negative procalcitonin is supportive of the notion that this finding represents a contaminant or simple colonization of the PICC.    RECOMMENDATIONS:   1. Agree with empiric vancomycin   2. Follow 5/15 and  blood cultures to determine if therapy is needed    Formal consultation to follow.      SAYRA Agosto M.D.  [facility] ID Service Staff  [phone]

## 2018-05-16 NOTE — PLAN OF CARE
Problem: Patient Care Overview  Goal: Plan of Care/Patient Progress Review  Outcome: No Change  Pt AxO but agitated with caregivers all day. Wounds changed by writer and WOC nurse. Pt given vancomycin and tolerated well. PICC occluded and MD notified and writer asked for TPA order. Incontinent of urine consistently but only changed twice, refused other changes. Refusing turns. Oxy given x2 for pain and valium x1. G tube clamped and pt refused dressing change. Subrapubic drainage very minimal. Continue to monitor.

## 2018-05-16 NOTE — SUMMARY OF CARE
Patient arrived to unit at 2245 via stretcher from ED. Only belonging visible is purple sweater. Pt adamant that her wounds be changed immediately before any other cares can be completed. Able to get pt over to bed from stretcher via hover mat. Changed coccyx wounds and applied new mepilex. Tunneled wound on coccyx packed with saline kerlix per pt request. Pt refusing turns and vital signs at this time until a hospitalist sees her. Mark ny and MD is on their way to see pt. Will handoff to oncoming shift.

## 2018-05-16 NOTE — PROGRESS NOTES
Pt refused Vital signs check, stating that she will call for staff to check VS  when she is ready. This author has attempted to check vs three times since the start of this shift. Pt becoming increasing irritable.

## 2018-05-16 NOTE — PROVIDER NOTIFICATION
Paged and notified Mark dooley that pt wanted Ambien with other medications and refused other medications until she is given Ambien as well

## 2018-05-16 NOTE — PROGRESS NOTES
[facility], [place]    Internal Medicine Progress Note - Gold Service      Assessment & Plan   Marychuy Zhou is a 62 year old female admitted on 5/15/2018. She has a history of chronic truncal & sacral decubitus ulcers in setting of paraplegia, chronic osteomyelitis, hx spinal hematoma (resulting in paraplegia), COPD, chronic normocytic anemia, seizure disorder, hypothyroidism, chronic cervical neck pain, malnutrition on G tube feedings, neurogenic bladder with suprapubic catheter, urinary and stool incontinence, history of DVTs on chronic AC, chronic pain, probable personality disorder, depression, and anxiety is admitted for worsening pelvic pain and positive blood cx from ED visit on 5/15.     # Bacteremia with blood cx positive for staph epidermidis, staph hominis   # UA with positive nitrites   # Hx of CRE, VRE, ESBL, MRSA  Blood cx x 2 on 5/14 positive for gram positive cocci in clusters, c/s pending.  S/p Zosyn x 1 dose in ED.  Started on Vancomycin.  Hypotensive today to SBP 80s, BL appears to be 80-90s.  BL Currently afebrile and without leukocytosis. CRP 74.  Repeat blood cx x 2 currently NGTD.   - Continue Vancomycin IV for now     - Daily blood cx, will follow up  - Awaiting c/s, will d/w ID as appropriate (consult placed)   - Daily CBC, CRP   - Please broaden abx if fever spikes     # Pelvic pain   # Chronic truncal, sacral decubitus ulcers in setting of T4 level paraplegia   # Chronic pressure ulcers on left heel, left upper thigh, right IT   Recently hospitalized 4/10 through 5/8 at [facility] for antibiotics and plastic surgery evaluation.  Currently not surgical candidate due to malnutrition.  Seen by WOCN this am, refer to note for wound care recs.    - Gen Surg consult placed for debridement   - WOCN consulted, will see weekly  - Atmos mattress   - Turn/reposition Q2H   - MVI, Vitamin A, Vitamin C, Zinc x 10 days for wound healing     # Seizure disorder -  Stable. Continue PTA Keppra     # Chronic DVT - Continue PTA Xarelto    # COPD - Stable.  Does not require home O2.  Continue PTA nebs.      # Malnutrition on TF -  G-tube placed in 4/2018.  Nutrition consult placed for TFs.  - Prealbumin, Mag, Phos in am     #Hypothyroidism - TSH 23.91 on 5/10/18.  On Synthroid 125mcg daily with questionable compliance.  Has improved since last check on 4/28 w/ TSH 59.4.       - Continue Synthroid at current dose     # Neurogenic bladder with suprapubic catheter  # Urinary, stool incontinence  Pt w/ increased leakage from catheter, urethra.  Seen by Urology during previous admission, felt that she will eventually need a diverting colostomy with urinary diversion, but likely cannot undergo this procedure until nutritional status optimized.  Trialed with nephrostomy tubes during recent admission to [place] which were unsuccessful at providing urinary diversion.  D/w Urology today, felt that pt may need replacement of catheter or trial on anticholinergic if leakage persists.       # Chronic pain - On Oxycodone 5-10mg PO Q4H PRN PTA.  - Continue       # Pain Assessment:  Current Pain Score 5/8/2018   Patient currently in pain? sleeping: patient not able to self report   Pain score (0-10) -   Pain location -   Pain descriptors -       Diet: Combination Diet Regular Diet Adult  Room Service  Fluids: LR at 100cc/hr   DVT Prophylaxis: On Xarelto PTA  Code Status: Full Code    Disposition Plan   Expected discharge: 4 - 7 days, recommended to prior living arrangement vs  once antibiotic plan established and at goal on TFs.     Entered: Caryl Goel 05/16/2018, 8:04 AM   Information in the above section will display in the discharge planner report.      The patient's care was discussed with the Attending Physician, Dr. Guerrero.    Caryl Goel  Internal Medicine Staff Hospitalist Service  [facility]  Pager: [phone]  Please see sticky note for cross cover  "information    Interval History   Marychuy is resting in bed.  She is minimally interactive with me.  She says the wounds on her skin feel like they are \"covered in ammonia\".  No chest pain, dyspnea, fevers, or chills.  Some nausea this am after taking pills, asking to be left alone.     Data reviewed today: I reviewed all medications, new labs and imaging results over the last 24 hours.     Physical Exam   Vital Signs: Temp: 98.5  F (36.9  C) Temp src: Axillary BP: (!) 81/49 Pulse: 93 Heart Rate: 87 Resp: 16 SpO2: 97 % O2 Device: Nasal cannula Oxygen Delivery: 3 LPM  Weight: 160 lbs 0 oz    GENERAL: Alert and oriented x 3. Well nourished, well developed.  No acute distress.    HEENT: Normocephalic, atraumatic. Anicteric sclera. Mucous membranes moist.   CV: RRR. S1, S2. No murmurs appreciated.   RESPIRATORY: Effort normal on room air. Lungs CTAB with no wheezing, rales, or rhonchi.   SKIN: Erythematous patches of excoriation on buttocks and backs of thighs          Data   Medications     - MEDICATION INSTRUCTIONS -         levETIRAcetam  250 mg Oral QPM     levothyroxine (SYNTHROID/LEVOTHROID) tablet 125 mcg  125 mcg Oral QAM AC     loperamide  4 mg Oral 4x Daily     miconazole   Topical BID     omeprazole (priLOSEC) CR capsule 20 mg  20 mg Oral QAM AC     protein modular  1 packet Per Feeding Tube TID     rivaroxaban ANTICOAGULANT  20 mg Oral At Bedtime     triamcinolone   Topical TID     vancomycin (VANCOCIN) IV  1,250 mg Intravenous Q12H     Data     Recent Labs  Lab 05/16/18  0713 05/15/18  2029 05/14/18  1754 05/10/18   WBC 8.0 10.0 12.0* 8.6   HGB 7.8* 8.4* 7.9* 7.7*   MCV 85 84 84 86    470* 474* 367   INR  --   --   --  1.80*    136 138 138   POTASSIUM 4.4 4.6 4.0 4.4   CHLORIDE 105 101 101 103   CO2 25 28 29 26   BUN 13 14 14 12   CR 0.57 0.54 0.54 0.58*   ANIONGAP 8 7 7 9   NATA 8.7 9.0 8.5 8.9   * 95 101* 116   ALBUMIN  --  2.4* 2.2* 2.0*   PROTTOTAL  --  8.8 8.3 8.0   BILITOTAL  --  0.1* " 0.2 0.2   ALKPHOS  --  195* 199* 234*   ALT  --  15 15 20   AST  --  16 17 24     No results found for this or any previous visit (from the past 24 hour(s)).

## 2018-05-16 NOTE — PHARMACY-CONSULT NOTE
Pharmacy Tube Feeding Consult    Medication reviewed for administration by feeding tube and for potential food/drug interactions.    Recommendation: Recommend re-timing Levothyroxine to 1000 am, to prevent interactions with Tube feeds    Pharmacy will continue to follow as new medications are ordered.

## 2018-05-16 NOTE — ED PROVIDER NOTES
History     Chief Complaint   Patient presents with     Abnormal Labs     positive bc     Pelvic Pain     HPI  Marychuy Zhou is a 62 year old female residing at [facility] with past medical history of paraplegia c/b multiple decubitus ulcers, neurogenic bladder with chronic suprapubic catheter c/b leakage, anxiety, chronic pain syndrome, depression, and chronic osteomyelitis who was seen here at the ER by Dr. Rubio for sharp, lower pelvic pain. At that time, patient was discharged home with pending blood cultures.    Today, patient presents to the ER for worsened pelvic pain from last night and positive blood cultures. She reports that even during her discharge last night, she was experiencing pelvic pain. Patient also endorses shortness of breath. She denies pain anywhere else, as well as denies fevers, chills, or changes in appetite/liquid intake. Her dressings were changed and catheter bag cleared today.     Past Medical History:   Diagnosis Date     Anxiety      Chronic pain syndrome      Closed fracture zygoma, with routine healing, subsequent encounter      COPD (chronic obstructive pulmonary disease) (H)      Depressive disorder      DVT (deep vein thrombosis) in pregnancy (H)      Edema      Epilepsy (H)      Flaccid neuropathic bladder, not elsewhere classified      Fracture of femur (H)      Hypothyroidism      Iron deficiency anemia      Paraplegia (H)     unspecified     Pressure ulcer of sacral region      PTSD (post-traumatic stress disorder)      Rheumatoid arthritis (H)      Sepsis (H)     unspecified       History reviewed. No pertinent surgical history.    Family History   Problem Relation Age of Onset     Chronic Obstructive Pulmonary Disease Brother        Social History   Substance Use Topics     Smoking status: Never Smoker     Smokeless tobacco: Never Used     Alcohol use No       Current Facility-Administered Medications   Medication     piperacillin-tazobactam (ZOSYN) 3.375 g vial  "to attach to  mL bag     Current Outpatient Prescriptions   Medication     diazepam (VALIUM) 2 MG tablet     fluconazole (DIFLUCAN) 150 MG tablet     ipratropium - albuterol 0.5 mg/2.5 mg/3 mL (DUONEB) 0.5-2.5 (3) MG/3ML neb solution     levETIRAcetam (KEPPRA) 250 MG tablet     LEVOTHYROXINE SODIUM PO     loperamide (IMODIUM) 2 MG capsule     miconazole (MICATIN) 2 % AERP powder     mineral oil-hydrophilic petrolatum (AQUAPHOR)     multivitamin, therapeutic with minerals (THERA-VIT-M) TABS tablet     OMEPRAZOLE PO     ondansetron (ZOFRAN ODT) 4 MG ODT tab     oxyCODONE IR (ROXICODONE) 5 MG tablet     Pollen Extracts (PROSTAT PO)     protein modular (PROSOURCE TF)     Rivaroxaban (XARELTO PO)     triamcinolone (KENALOG) 0.1 % cream      No Known Allergies      I have reviewed the Medications, Allergies, Past Medical and Surgical History, and Social History in the Epic system.    Review of Systems   Constitutional: Negative for appetite change, chills and fever.   Genitourinary: Positive for pelvic pain (Sharp, lower).   All other systems reviewed and are negative.      Physical Exam   BP: 95/71  Pulse: 93  Temp: 97.7  F (36.5  C)  Resp: 16  Height: 157.5 cm (5' 2\")  Weight: 72.6 kg (160 lb)  SpO2: 100 %      Physical Exam   Constitutional: She is oriented to person, place, and time. She appears well-developed and well-nourished. No distress.   Oriented; answers questions well    HENT:   Head: Normocephalic and atraumatic.   Neck: Normal range of motion.   Cardiovascular: Normal rate, regular rhythm and normal heart sounds.    Pulmonary/Chest: Effort normal and breath sounds normal. No respiratory distress. She has no wheezes. She has no rales.   Abdominal: Soft. She exhibits no distension. There is no tenderness.   Suprapubic catheter in place   Musculoskeletal: She exhibits no tenderness.   Neurological: She is alert and oriented to person, place, and time.   Skin: Skin is warm and dry. She is not diaphoretic. "   Decubitus ulcers on sacrum with dressings in place   Psychiatric: She has a normal mood and affect. Her behavior is normal. Thought content normal.       ED Course     ED Course     Procedures             Critical Care time:  none         Results for orders placed or performed during the hospital encounter of 05/15/18   CBC with platelets differential   Result Value Ref Range    WBC 10.0 4.0 - 11.0 10e9/L    RBC Count 3.52 (L) 3.8 - 5.2 10e12/L    Hemoglobin 8.4 (L) 11.7 - 15.7 g/dL    Hematocrit 29.4 (L) 35.0 - 47.0 %    MCV 84 78 - 100 fl    MCH 23.9 (L) 26.5 - 33.0 pg    MCHC 28.6 (L) 31.5 - 36.5 g/dL    RDW 19.1 (H) 10.0 - 15.0 %    Platelet Count 470 (H) 150 - 450 10e9/L    Diff Method Manual Differential     % Neutrophils 70.7 %    % Lymphocytes 10.3 %    % Monocytes 7.8 %    % Eosinophils 10.3 %    % Basophils 0.9 %    Absolute Neutrophil 7.1 1.6 - 8.3 10e9/L    Absolute Lymphocytes 1.0 0.8 - 5.3 10e9/L    Absolute Monocytes 0.8 0.0 - 1.3 10e9/L    Absolute Eosinophils 1.0 (H) 0.0 - 0.7 10e9/L    Absolute Basophils 0.1 0.0 - 0.2 10e9/L    Platelet Estimate Confirming automated cell count    Comprehensive metabolic panel   Result Value Ref Range    Sodium 136 133 - 144 mmol/L    Potassium 4.6 3.4 - 5.3 mmol/L    Chloride 101 94 - 109 mmol/L    Carbon Dioxide 28 20 - 32 mmol/L    Anion Gap 7 3 - 14 mmol/L    Glucose 95 70 - 99 mg/dL    Urea Nitrogen 14 7 - 30 mg/dL    Creatinine 0.54 0.52 - 1.04 mg/dL    GFR Estimate >90 >60 mL/min/1.7m2    GFR Estimate If Black >90 >60 mL/min/1.7m2    Calcium 9.0 8.5 - 10.1 mg/dL    Bilirubin Total 0.1 (L) 0.2 - 1.3 mg/dL    Albumin 2.4 (L) 3.4 - 5.0 g/dL    Protein Total 8.8 6.8 - 8.8 g/dL    Alkaline Phosphatase 195 (H) 40 - 150 U/L    ALT 15 0 - 50 U/L    AST 16 0 - 45 U/L   UA with Microscopic   Result Value Ref Range    Color Urine Light Red     Appearance Urine Cloudy     Glucose Urine Negative NEG^Negative mg/dL    Bilirubin Urine Negative NEG^Negative    Ketones  Urine Negative NEG^Negative mg/dL    Specific Gravity Urine 1.011 1.003 - 1.035    Blood Urine Negative NEG^Negative    pH Urine 8.5 (H) 5.0 - 7.0 pH    Protein Albumin Urine 30 (A) NEG^Negative mg/dL    Urobilinogen mg/dL Normal 0.0 - 2.0 mg/dL    Nitrite Urine Positive (A) NEG^Negative    Leukocyte Esterase Urine Trace (A) NEG^Negative    Source Catheterized Urine     WBC Urine 1 0 - 5 /HPF    RBC Urine 1 0 - 2 /HPF    Bacteria Urine Few (A) NEG^Negative /HPF    Squamous Epithelial /HPF Urine 3 (H) 0 - 1 /HPF    Mucous Urine Present (A) NEG^Negative /LPF    Amorphous Crystals Few (A) NEG^Negative /HPF   Erythrocyte sedimentation rate auto   Result Value Ref Range    Sed Rate 107 (H) 0 - 30 mm/h   CRP inflammation   Result Value Ref Range    CRP Inflammation 74.0 (H) 0.0 - 8.0 mg/L   Procalcitonin   Result Value Ref Range    Procalcitonin <0.05 ng/ml   Urine Culture   Result Value Ref Range    Specimen Description Catheterized Urine     Special Requests Specimen received in preservative     Culture Micro PENDING    Blood culture   Result Value Ref Range    Specimen Description Blood Left Arm     Special Requests Received in aerobic bottle only     Culture Micro PENDING      Medications   vancomycin (VANCOCIN) 1,250 mg in sodium chloride 0.9 % 250 mL intermittent infusion (not administered)   naloxone (NARCAN) injection 0.1-0.4 mg (not administered)   melatonin tablet 1 mg (not administered)   Patient is already receiving anticoagulation with heparin, enoxaparin (LOVENOX), warfarin (COUMADIN)  or other anticoagulant medication (not administered)   HYDROmorphone (PF) (DILAUDID) injection 1 mg (not administered)   diazepam (VALIUM) tablet 4 mg (not administered)   levothyroxine (SYNTHROID/LEVOTHROID) tablet 125 mcg (not administered)   loperamide (IMODIUM) capsule 4 mg (not administered)   omeprazole (priLOSEC) CR capsule 20 mg (not administered)   ondansetron (ZOFRAN-ODT) ODT tab 4 mg (not administered)   rivaroxaban  ANTICOAGULANT (XARELTO) tablet 20 mg (not administered)   oxyCODONE IR (ROXICODONE) tablet 15 mg (not administered)   ipratropium - albuterol 0.5 mg/2.5 mg/3 mL (DUONEB) neb solution 3 mL (not administered)   levETIRAcetam (KEPPRA) tablet 250 mg (not administered)   miconazole (MICATIN) 2 % powder (not administered)   protein modular (PROSource TF) 1 packet (not administered)   triamcinolone (KENALOG) 0.1 % cream (not administered)   piperacillin-tazobactam (ZOSYN) 3.375 g vial to attach to  mL bag (3.375 g Intravenous New Bag 5/15/18 2115)   HYDROmorphone (PF) (DILAUDID) injection 0.5 mg (0.5 mg Intravenous Given 5/15/18 2139)            Labs Ordered and Resulted from Time of ED Arrival Up to the Time of Departure from the ED - No data to display         Assessments & Plan (with Medical Decision Making)  Patient is a 62-year-old female that was seen yesterday in the ER for concern of lower pelvic pain and concern that she is becoming infected.  Patient here yesterday had a mild leukocytosis but was otherwise well-appearing and her sacral wounds looked well.  She did not have any focal signs of acute infection.  Patient had blood cultures drawn and was told that should be called if blood cultures returned positive.  Today, we had a preliminary result of both of her blood cultures were positive for gram-positive cocci in clusters.  We therefore called the nursing home and have the patient sent to the ER.  Patient here says that she is continuing to feel unwell, but denies any focal issues.  She denies any fevers.  No chills or shakes.  Patient's white count here is 10, down from 12 yesterday.  Patient's neutrophils are 7.1, down from 8.9 yesterday.  Patient's S is stable.  Patient was started on vancomycin.  Patient will be admitted to internal medicine for treatment of bacteremia and further evaluation.       I have reviewed the nursing notes.    I have reviewed the findings, diagnosis, plan and need for  follow up with the patient.    New Prescriptions    No medications on file       Final diagnoses:   Sepsis, due to unspecified organism (H)   IJb, am serving as a trained medical scribe to document services personally performed by No Rubio MD, based on the provider's statements to me.   No GUERRERO MD, was physically present and have reviewed and verified the accuracy of this note documented by Jb Mcguire.      5/15/2018   [facility], [place], EMERGENCY DEPARTMENT     No Rubio MD  05/16/18 0037

## 2018-05-16 NOTE — ED TRIAGE NOTES
"Triage Assessment & Note:    BP 95/71  Pulse 93  Temp 97.7  F (36.5  C) (Oral)  Resp 16  Ht 1.575 m (5' 2\")  Wt 72.6 kg (160 lb)  SpO2 100%  Breastfeeding? No  BMI 29.26 kg/m2      Patient presents with: Pt BIBA from care facility for positive BC, fatigue, and pelvic pain. No reports of CP.     Home Treatments/Remedies: Home medications    Febrile / Afebrile: afebrile    Duration of C/o: ongoing issues    Shanice Kaur RN  May 15, 2018        "

## 2018-05-16 NOTE — PHARMACY-VANCOMYCIN DOSING SERVICE
Pharmacy Vancomycin Initial Note  Date of Service May 15, 2018  Patient's  1955  62 year old, female    Indication: Bone and Joint Infection    Current estimated CrCl = Estimated Creatinine Clearance: 102.1 mL/min (based on Cr of 0.54).    Creatinine for last 3 days  2018:  5:54 PM Creatinine 0.54 mg/dL  5/15/2018:  8:29 PM Creatinine 0.54 mg/dL    Recent Vancomycin Level(s) for last 3 days  No results found for requested labs within last 72 hours.      Vancomycin IV Administrations (past 72 hours)      No vancomycin orders with administrations in past 72 hours.                Nephrotoxins and other renal medications (Future)    Start     Dose/Rate Route Frequency Ordered Stop    05/15/18 2300  vancomycin (VANCOCIN) 1,250 mg in sodium chloride 0.9 % 250 mL intermittent infusion      1,250 mg  over 90 Minutes Intravenous EVERY 12 HOURS 05/15/18 2221            Contrast Orders - past 72 hours     None        Plan:  1.  Start vancomycin 1250 mg IV q12h.   2.  Goal Trough Level: 15-20 mg/L   3.  Pharmacy will check trough levels as appropriate in 1-3 Days.    4. Serum creatinine levels will be ordered daily for the first week of therapy and at least twice weekly for subsequent weeks.    5. [place] method utilized to dose vancomycin therapy: Method 2    Julia Gallo, PharmD, BCPS

## 2018-05-16 NOTE — PROVIDER NOTIFICATION
"Paged and notified Mark dooley that pt wants TF started now. RN tried to talk to pt and pt got very upset and stated \" I must have TF every night.\" However, pt does not know the Formula she is on. She ultimately refused Tube flush until the feeding is available.   "

## 2018-05-16 NOTE — PROGRESS NOTES
[facility] Nurse Inpatient Pressure Injury Assessment     Initial Assessment  Reason for consultation: Evaluate and treat multiple pressure injuries      ASSESSMENT    Pressure Injury: on left heel, present on admission   Pressure Injury is Stage Unstageable   Status: progressing     Pressure Injury: on left upper thigh, present on admission   Pressure Injury is Stage 3  Status: reopened since last admission     Pressure Injury: on right IT, present on admission  Pressure Injury is Stage 4   Status: patient declined to let [facility] assess this visit.     Pressure Injury: on left sacrum, present on admission   Pressure Injury is Stage 3   Status: Slight improvement     Pressure Injury: on coccyx, present on admission  Pressure Injury is Stage Unstageable   Status: declined since last admission      TREATMENT PLAN    Left heel wound: wash every other day with wound cleanser and gauze. Apply a thick layer of Medihoney (order #[id number]) to wound bed and cover with Mepilex 4x4. If dressing rolls at edges, OK to use Primapore tape to keep in place.     Left upper thigh wound:wash daily with wound cleanser and gauze. Cover with Mepilex 4x4.     Right IT wound: wash BID and as needed if soiled with urine or stool with wound cleanser and gauze. Pack wound to entire depth (7 cm) with Kerlix dampened with saline. Cover with Mepilex 4x4     Left sacral wound: wash daily with wound cleanser and gauze. Cover with Mepilex 4x4.     Bilateral PNT tube sites: healed, dressing no longer indicated     Buttock, perineum, upper thighs IAD: wash twice daily and with each episode of incontinence with Greta Cleanse and Protect and a soft cloth. Apply a thick layer of Criticaide Paste to skin. Do not attempt to remove Criticaide Paste with each episode of incontinence, just wipe of soiled paste and reapply. To remove all Chriss, use baby/mineral oil and a soft cloth. Due to large amount of urine being diverted past suprapubic catheter and out urethra, please  change Covidon sheet with position changes every 2 hours to keep dry.  Orders Written  WOC Nurse follow-up plan:weekly  Nursing to notify the Provider(s) and re-consult the WOC Nurse if wound(s) deteriorates or new skin concern.    Patient History  According to provider note(s):   62 year old female  With past medical history of chronic truncal, sacral decubitus ulcers secondary paraplegia, history of spinal hematoma resulting paraplegia, COPD, chronic normocytic anemia, seizure disorder, probable personality disorder, depressive disorder, hypothyroidism, chronic cervical neck pain surgery, G-tube, malnutrition, neurogenic bladder with suprapubic, urinary and stool incontinence, history of DVT on chronic anticoagulation presenting with acute on chronic pelvic pain and 2 of 2 + BCX    Objective Data   Containment of urine/stool: Suprapublic catheter    Current Diet/ Nutrition:    Active Diet Order      Combination Diet Regular Diet Adult    Output:   I/O last 3 completed shifts:  In: 360 [P.O.:360]  Out: -     Risk Assessment:   Sensory Perception: 2-->very limited  Moisture: 2-->very moist  Activity: 1-->bedfast  Mobility: 2-->very limited  Nutrition: 2-->probably inadequate  Friction and Shear: 1-->problem  Isaiah Score: 10      Labs:   Recent Labs  Lab 05/16/18  0713 05/15/18  2029  05/10/18   HGB 7.8* 8.4*  < > 7.7*   INR  --   --   --  1.80*   WBC 8.0 10.0  < > 8.6   CRP  --  74.0*  --   --    < > = values in this interval not displayed.      Recent Labs  Lab 05/16/18  0838 05/16/18  0713 05/15/18  2114 05/14/18  1754   CULT PENDING PENDING Culture negative < 24 hours, reincubate  No growth after 8 hours Cultured on the 1st day of incubation:Staphylococcus epidermidis*  Critical Value/Significant Value, preliminary result only, called to and read back by DR. HAGER @8981 5/15/18. CT  Cultured on the 1st day of incubation:Staphylococcus hominis*  (Note)POSITIVE for STAPHYLOCOCCUS EPIDERMIDIS and POSITIVE  for the mecAgene (resistant to methicillin) by Appistry multiplex nucleic acidtest. Final identification and antimicrobial susceptibility testingwill be verified by standard methods.Specimen tested with Verigene multiplex, gram-positive blood culturenucleic acid test for the following targets: Staph aureus, Staphepidermidis, Staph lugdunensis, other Staph species, Enterococcusfaecalis, Enterococcus faecium, Streptococcus species, S. agalactiae,S. anginosus grp., S. pneumoniae, S. pyogenes, Listeria sp., mecA(methicillin resistance) and Mnauel/B (vancomycin resistance).Critical Value/Significant Value called to and read back by MONTY CANADA @2018 5/15/18. CT       Physical Exam  Skin assessment:   Focused skin inspection: patient declined full head to toe skin inspection, patient also declined WOC assessment of Right IT wound. Patient declined pulsate mattress. Education regarding pressure ulcer prevention provided     Wound Location:  Left heel  Date of last Photo 5/4/18           Wound History: Present on admission  Measurements (length x width x depth, in cm) 1.8 cm x 2.3 cm  x  0.5 cm  Wound Base: black eschar in center surrounded by yellow adherent slough  Tunneling N/A  Undermining N/A  Palpation of the wound bed: boggy   Periwound skin: denuded  Color: pink  Temperature: normal   Drainage: moderate  Description of drainage: serosanguineous  Odor: none  Pain: absent, paraplegia      Wound Location:  Left upper thigh  Wound History: Present on admission  Measurements (length x width x depth, in cm) 4 cm x 4.5cm x 0.01 cm  Wound Base: smooth, granular base  Tunneling N/A  Undermining N/A  Palpation of the wound bed: normal   Periwound skin: intact  Color: pink  Temperature: normal   Drainage: moderate  Description of drainage: serosanguineous  Odor: none  Pain: absent, paraplegia     Wound Location:  Right IT  Date of last Photo 5/4/2018           Wound History: Present on admission  Measurements (length x width  x depth, in cm) measurements and assessments below from 5/4/18 patient declined writer removing packing this assessment: 4 cm x 3.8 cm  x  7.2 cm   Wound Base:  Beefy red granulation tissue with bone palpated at base  Tunneling N/A  Undermining N/A  Palpation of the wound bed: normal   Periwound skin: denuded  Color: red  Temperature: normal   Drainage: small  Description of drainage: serosanguineous  Odor: none  Pain: absent, paraplegia     Wound Location:  Left sacrum  Date of last Photo 5/4/2018           Wound History: Present on admission  Measurements (length x width x depth, in cm) 1.8 cm x 1.5 cm  x  0.1 cm   Wound Base:  Pale granular tissue  Tunneling N/A  Undermining N/A  Palpation of the wound bed: normal   Periwound skin: denuded  Color: red  Temperature: normal   Drainage: none  Description of drainage: none  Odor: none  Pain: absent, paraplegia        Wound Location:  Coccyx  Wound History: Present on admission  Measurements (length x width x depth, in cm) 0.6 cm x 0.8 cm  x  0.1 cm   Wound Base: 100% slough  Tunneling N/A  Undermining N/A  Palpation of the wound bed: normal   Periwound skin: denuded  Color: red  Temperature: normal   Drainage: none  Description of drainage: none  Odor: none  Pain: absent, paraplegia     Wound Location:  Buttock, perineum, upper thighs  Date of last Photo 5/4/2018                                                                    Wound History: Patient incontinent of stool and suprapubic catheter is currently diverting our the urethra.  Denuded skin is improving, less red with healing epidermis  Color: red  Temperature: normal   Drainage: none  Description of drainage: none  Odor: none  Pain: absent, paraplegis    Interventions  Current support surface: Standard  Atmos Air mattress    Current off-loading measures: Pillows under calves  Repositioning aid: pillows  Visual inspection of wound(s) completed   Wound Care: was done per plan of care for heel wound, peeled  back mepilex on other wounds, nursing had changed packing prior to visit.  Supplies: ordered: medihoney   Education provided to: patient   Discussed importance of:repositioning every 2 hours, off-loading pressure to wound, their role in pressure injury prevention, dressing change frequency, head elevation <30 degrees, off-loading mattress, nutrition on wound healing and moisture management    Discussed plan of care with Patient    Shanique ARMSTRONGN, RN, CWOCN

## 2018-05-17 NOTE — PROGRESS NOTES
"Writer entered pt room at 1000 to do dressing changes, bed change, and medications. The patient's bed was soaking wet to the point urine was dripping off the blankets and sheets and had created a puddle on the floor. When writer suggested a bedding change patient became very agitated and told writer \"don't change my bed just change my dressings\". Writer then explained to the patient that it was a big safety hazard to have her lay in urine and unsafe to staff to have puddles on the floor. Writer also explained that cleaning her wounds without changing the bed would not help her wounds heal. Patient then agreed to a bed change. Throughout the entire bed change/dressing change the patient was very verbally aggressive and abuse toward writer and nursing aids. Repetitively calling writer \"stupid\" and an \"idiot\". When changing wounds writer explained to patient that [facility] nurse had written a note on her wounds and given instructions how to redress them. Patient replied with \"you think you know it all\" and multiple other verbally degrading comments. Writer and nursing aids continued to change the bed, but patient refused to roll and stated she could only lay on one side, making the bed change difficult. Patient continually making comments about how we weren't doing anything correctly. Writer's ASCOM rang while in the room, writer answered to speak to an MD and patient yelled \"Can't you just finish with me before you answer that damn phone!\" Writer explained that there are other patient's she has to take care of as well and patient stated \"well I feel sorry for them because you don't do a very good job\". Once the bed was changed writer placed a brief with two maxi pads in between the patient's legs to try to avoid urine leakage on patient's bottom. The patient angrily responded with \"you put the brief in the wrong spot you idiot, it needs to be up front\". Writer tried to explain that her leakage was out the back and making " "her new dressings wet and that placing towards the back would help soak that up. Pt responded aggressively with \"no are you stupid it leaks to the front and I need it pulled up front\" Per request we pulled the brief to the front and laid multiple juliana pads below the patient's bottom . Writer then left the room and asked a coworker to administer medications.   "

## 2018-05-17 NOTE — PHARMACY-VANCOMYCIN DOSING SERVICE
Pharmacy Vancomycin Note  Date of Service May 17, 2018  Patient's  1955   62 year old, female    Indication:Possible Bacteremia - PICC culture growing MRSE. Awaiting peripheral draws  Goal Trough Level: 15-20 mg/L  Day of Therapy: 2  Current Vancomycin regimen:  1250 mg IV q12h    Current estimated CrCl = Estimated Creatinine Clearance: 107.4 mL/min (based on Cr of 0.54).  True creatinine clearance likely less than this. Patient with expected low muscle mass d/t paraplegia.     Creatinine for last 3 days  2018:  5:54 PM Creatinine 0.54 mg/dL  5/15/2018:  8:29 PM Creatinine 0.54 mg/dL  2018:  7:13 AM Creatinine 0.57 mg/dL  2018:  5:43 AM Creatinine 0.54 mg/dL    Recent Vancomycin Levels (past 3 days)  2018: 10:44 AM Vancomycin Level 23.2 mg/L (10.5 hour trough). This was drawn prior to the 3rd dose so likely not yet at steady state.    Vancomycin IV Administrations (past 72 hours)                   vancomycin (VANCOCIN) 1,250 mg in sodium chloride 0.9 % 250 mL intermittent infusion (mg) 1,250 mg New Bag 18 0021     1,250 mg New Bag 18 1127     1,250 mg New Bag  0047                Nephrotoxins and other renal medications (Future)    Start     Dose/Rate Route Frequency Ordered Stop    05/15/18 2300  vancomycin (VANCOCIN) 1,250 mg in sodium chloride 0.9 % 250 mL intermittent infusion      1,250 mg  over 90 Minutes Intravenous EVERY 12 HOURS 05/15/18 2221               Contrast Orders - past 72 hours     None          Interpretation of levels and current regimen:  Trough level is slightly Supratherapeutic.  True trough likely just over 20 mg/L.      Has serum creatinine changed > 50% in last 72 hours: No    Urine output:  good urine output    Renal Function: Stable    Plan:  1.  Decrease Dose to 1 gm iv q12h. This patient is at risk for accumulation and may need to go to q24h dosing if therapy continued for many more days.  2.  Pharmacy will check trough levels as appropriate in  1-3 Days.    3. Serum creatinine levels will be ordered daily for the first week of therapy and at least twice weekly for subsequent weeks.      Laura Rossi        .

## 2018-05-17 NOTE — PLAN OF CARE
"Problem: Patient Care Overview  Goal: Plan of Care/Patient Progress Review  Outcome: No Change  Pt A&O x4, VS stable. Pt refusing positioning even after attempts to educate patient on skin integrity. Pt adamant that she cannot roll onto her back due to her wounds. Remains on R side. Catheter site continuing to leak, patient refusing bedding changes, stating \"It's not wet, just pull out the pad and replace it\". Wound cares to coccyx wounds x3 done this evening per pt request, applied barrier cream. Tube feeds initiated per orders @ 2130. Infusing @ 50 mL/hr. To run for 12 hours, due to be turned off at 0930 tomorrow. PICC lumens showing blood return after tpa given. LR infusing @ 100 mL/hr in PIV. Fair appetite. Patient given multiple PRN medications, see eMAR for details. Continue to monitor and update MD with changes.      "

## 2018-05-17 NOTE — PLAN OF CARE
Problem: Patient Care Overview  Goal: Plan of Care/Patient Progress Review  Outcome: No Change  D/I: Pt is alert and oriented x 4, continues to refuse repositioning all shift. Educated on repositioning and skin integrity. Tube feeding running via Gtube at goal rate of 50 ml/hr and due to be turned off at 0930 this morning (12 hours). . Pt continues to be irritable, upset, and agitated when repositioning is mentioned. Suprapubic catheter in place but bag is empty, however, there is urine incontinent and bed is wet but patient refuses to have it changed because she does not want her sleep interrupted. Right PICC lumen patency established with good blood return, though slougishly. LR infusing @ 100 mL/hr in PICC red lumen.  P: Continue to monitor pt, and update MDs with changes.

## 2018-05-17 NOTE — PROGRESS NOTES
"[facility], [place]    Internal Medicine Progress Note - Gold Service      Assessment & Plan   Marychuy Zhou is a 62 year old female admitted on 5/15/2018. She has a history of chronic truncal & sacral decubitus ulcers in setting of paraplegia, chronic osteomyelitis, hx spinal hematoma (resulting in paraplegia), COPD, chronic normocytic anemia, seizure disorder, hypothyroidism, chronic cervical neck pain, malnutrition on G tube feedings, neurogenic bladder with suprapubic catheter, urinary and stool incontinence, history of DVTs on chronic AC, chronic pain, probable personality disorder, depression, and anxiety is admitted for worsening pelvic pain and positive blood cx from ED visit on 5/15.     # Bacteremia with blood cx positive for staph epidermidis, staph hominis   # UA with positive nitrites   # Hx of CRE, VRE, ESBL, MRSA  Blood cx x 2 on 5/14 positive for gram positive cocci in clusters, final c/s pending.  S/p Zosyn x 1 dose in ED.  UA w/ positive nitrites, UC w/ < 10K Proteus mirabilis, < 10K Enterococcus faecium.  Started on Vancomycin.  Currently afebrile and without leukocytosis.  CRP 74.  Repeat blood cx x 2 currently NGTD.  ID consulted, feel that positive cx from 5/14 possible a contaminant vs colonization, and that for now, will continue to monitor final c/s from blood cx on 5/15 and 5/16.  Procal negative, CRP 62.    - Appreciate ID recommendations   - Continue Vancomycin IV for now pending final blood cx   - Daily CBC, CRP   - Please broaden abx if fever spikes     # Chronic pain 2/2 chronic ulcers, back/mskl chest pain - Worse with attempts to turn/reposition and perform wound cares.  Has old rib fractures and \"bruised ribs\".  On Oxycodone Q4H PRN with custom dosing (5mg for pain 4-6, 10mg for pain 6-10).  Has refused cares due to poorly controlled pain.    - Pain Service consult placed   - Added Tylenol 975mg TID (scheduled)   - For now, please offer oxycodone " 10-15mg prior to dressing changes - pt care order placed   - Gabapentin PLO cream to affected areas of rib pain          # Pelvic pain   # Chronic truncal, sacral decubitus ulcers in setting of T4 level paraplegia   # Chronic pressure ulcers on left heel, left upper thigh, right IT   Recently hospitalized 4/10 through 5/8 at [facility] for antibiotics and plastic surgery evaluation.  Currently not surgical candidate due to malnutrition.  Seen by WOCN this am, refer to note for wound care recs.  D/w Gen Surg for possible debridement, attempted to see patient, pt refused exam/assessment.  Per review of WOCN photos of wounds, wounds felt to be healing well and with good granulation tissue, no apparent s/s infection.  D/w case w/ Plastic Surgery, who recommended follow up OP.    - Appreciate Gen Surg recs  - WOCN consulted, will see weekly - please perform dressing changes per WOCN note   - Atmos mattress   - Turn/reposition Q2H   - MVI, Vitamin A, Vitamin C, Zinc x 10 days for wound healing     # Seizure disorder - Stable. Continue PTA Keppra     # Chronic DVT - Continue PTA Xarelto    # COPD - Stable.  Does not require home O2.  Continue PTA nebs.      # Malnutrition on TF -  G-tube placed in 4/2018.  Nutrition consult placed for TFs. Prealbumin low at 13, Mag 2.1, Phos 4.3.  Starting trial w/ Marinol per Dietary recs.     #Hypothyroidism - TSH 23.91 on 5/10/18.  On Synthroid 125mcg daily with questionable compliance.  Has improved since last check on 4/28 w/ TSH 59.4.       - Continue Synthroid at current dose     # Neurogenic bladder with suprapubic catheter  # Urinary, stool incontinence  Pt continues to be w/ increased leakage from catheter, urethra.  Seen by Urology during previous admission, felt that she will eventually need a diverting colostomy with urinary diversion, but likely cannot undergo this procedure until nutritional status optimized.  Trialed with nephrostomy tubes during recent admission to  "[facility] which were unsuccessful at providing urinary diversion.  D/w Urology 5/16, felt that pt may need replacement of catheter or trial on anticholinergic if leakage persists.   - Oxybutynin 5mg BID       # Hx anxiety, depression, ?personality disorder - Not currently on PTA regimen.  Psych consulted, agreeable to try Seroquel 25mg BID.           # Pain Assessment:  Current Pain Score 5/17/2018   Patient currently in pain? sleeping: patient not able to self report   Pain score (0-10) -   Pain location -   Pain descriptors -       Diet: Combination Diet Regular Diet Adult  Room Service  Adult Formula Drip Feeding: Specified Time TwoCal HN; Gastrostomy; Goal Rate: 50; mL/hr; From: 8:00 PM; 8:00 AM; Medication - Tube Feeding Flush Frequency: At least 15-30 mL water before and after medication administration and with tube clogging; ...  Snacks/Supplements Pediatric: Ensure Plus; Between Meals  Calorie Counts  Fluids: LR at 100cc/hr   DVT Prophylaxis: On Xarelto PTA  Code Status: Full Code    Disposition Plan   Expected discharge: 4 - 7 days, recommended to prior living arrangement vs  once antibiotic plan established and at goal on TFs.     Entered: Caryl Goel 05/17/2018, 7:23 AM   Information in the above section will display in the discharge planner report.      The patient's care was discussed with the Attending Physician, Dr. Guerrero.    Caryl Goel  Internal Medicine Staff Hospitalist Service  [facility]  Pager: [phone]  Please see sticky note for cross cover information    Interval History   Marychuy is resting in bed.  She is minimally interactive with me. Reports pain with dressing changes.  Refusing any further interaction/exam with me today.  \"Don't ask me to lay here and take deep breaths with the rib pain I'm having, I have bruised ribs\"     Data reviewed today: I reviewed all medications, new labs and imaging results over the last 24 hours.     Physical Exam   Vital Signs: " Temp: 96  F (35.6  C) Temp src: Oral BP: 90/49 Pulse: 83 Heart Rate: 85 Resp: 16 SpO2: 97 % O2 Device: None (Room air) Oxygen Delivery: 3 LPM  Weight: 177 lbs 1.6 oz    GENERAL: Alert and oriented x 3. Agitated.   HEENT: Normocephalic, atraumatic. Anicteric sclera. Mucous membranes moist.   SKIN: Erythematous patches of excoriation on buttocks and backs of thighs          Data   Medications     IV fluid REPLACEMENT ONLY       lactated ringers 100 mL/hr at 05/16/18 1722     - MEDICATION INSTRUCTIONS -         ascorbic acid  500 mg Oral Daily     calcium carbonate  500 mg Oral Once     levETIRAcetam  250 mg Oral QPM     levothyroxine (SYNTHROID/LEVOTHROID) tablet 125 mcg  125 mcg Oral QAM AC     loperamide  4 mg Oral 4x Daily     miconazole   Topical BID     multivitamins with minerals  15 mL Per Feeding Tube Daily     omeprazole (priLOSEC) CR capsule 20 mg  20 mg Oral QAM AC     protein modular  1 packet Per Feeding Tube TID     protein modular  1 packet Per Feeding Tube TID     rivaroxaban ANTICOAGULANT  20 mg Oral At Bedtime     triamcinolone   Topical TID     vancomycin (VANCOCIN) IV  1,250 mg Intravenous Q12H     vitamin A  20,000 Units Oral Daily     zinc sulfate  220 mg Oral Daily     Data     Recent Labs  Lab 05/17/18  0543 05/16/18  0713 05/15/18  2029 05/14/18  1754   WBC 7.8 8.0 10.0 12.0*   HGB 7.7* 7.8* 8.4* 7.9*   MCV 83 85 84 84   * 430 470* 474*   NA  --  137 136 138   POTASSIUM  --  4.4 4.6 4.0   CHLORIDE  --  105 101 101   CO2  --  25 28 29   BUN  --  13 14 14   CR  --  0.57 0.54 0.54   ANIONGAP  --  8 7 7   NATA  --  8.7 9.0 8.5   GLC  --  108* 95 101*   ALBUMIN  --   --  2.4* 2.2*   PROTTOTAL  --   --  8.8 8.3   BILITOTAL  --   --  0.1* 0.2   ALKPHOS  --   --  195* 199*   ALT  --   --  15 15   AST  --   --  16 17     No results found for this or any previous visit (from the past 24 hour(s)).

## 2018-05-17 NOTE — CONSULTS
Patient seen and chart reviewed. Formal consult dictated(initial) (seen5/17/18)    Ravi Guardado MD

## 2018-05-17 NOTE — CONSULTS
"Inpatient Pain Management Service: Consultation      DATE OF CONSULT: May 17, 2018      REASON FOR PAIN CONSULTATION:  Marychuy Zhou is a 62 year old female I am seeing in consultation at the request of Caryl Goel CNP for evaluation and recommendations for her acute on chronic buttock pain from grade IV decubitus ulcer, pt declining care to reposition.       CHIEF PAIN COMPLAINT: pain at buttock from decubitus ulcers      ASSESSMENT:  1. Acute on chronic pain of stage IV decubitus ulcers- was admitted on 5/15/18 for continued pelvic pain, thought to be due to chronic osteomyelitis.  Surgery team evaluated her on 5/16/18 and did not feel that her decubitus ulcers warrant surgical intervention at this time.  Awaiting for blood culture results, currently on empirical IV vancomycin.    2. Chronic opioid use- PTA, has been on oxycodone 5-10mg PO 4x/day at [facility] for chronic pain between the shoulder blades.  3. Healthcare distrust- states that she has to be her own medical advocate because she feels that is not in competent hands.  Felt that she had poor medical experiences in the past to make her \"nervous.\"  She states that she knows her body best and will direct her care.  During this admission, has been asking staff to leave the room, calling staff names, and declining cares.  4. Malnutrition-on tube feeding via G tube-is concerned that her nutrition status will not improved for her planned skin flap surgery and diverting urologic and colostomy in 2 months and 3 weeks.  She would like to be considered for appetite inducing medication (she means Marinol).    5. T4 Paraplegia since 2008 due to spinal hematoma.  6. H/o seizure-on Keppra  7. H/o DVTs, on chronic anticoagulants  8. H/o depression-is not on psychotropic medications (except valium), pt declining.  Had tried Paxil, Cymbalta, Lamictal, gabapentin.    -- Outpatient opioid requirements prior to admission: OME =oxycodone 5-10mg PO 4x/day.  [place]  reviewed on " 5/17/18.    Primary Care Provider: Sánchez Zamora  Chronic Pain Provider: none    TREATMENT RECOMMENDATIONS/PLAN:   1. Change oxycodone 15mg PO Q 3 hours PRN.  Of note, she did not maximize her oxycodone 15mg Q4 hours regimen yesterday.  Had a period of 14 hours without any opioid, declined care from RN.  She declined to opioid rotate to Dilaudid-felt that it does not last as long as oxycodone.  2. Would avoid IV opioid due to not maximizing oral opioid regimen and chronic nature of pain.  Patient states that she prefer to have IV Dilaudid Q2 hours in between the current oral oxycodone regimen.  3. Start Flexeril 5mg PO TID PRN.  States that this was helpful in the past without side effects.  4. Continue acetaminophen 650mg PO Q 6 hours PRN.  Patient does not feel that this is helpful.  5. Patient is declining topicals such as lidocaine and menthol patches. State that they do not help.  6. Patient is declining gabapentin as she states that they do not help, however was using the gabapentin previously for depression.  She does endorse that the decubitus ulcer pain is a burning quality and discussed that gabapentin is a neuro modulator that may help with such pain.  7. Offer behavior health for emotional support as she has suffered many losses including loss of function related to paraplegia and brother recently passed away.  She is declining stating that if she needs it, she will let us know.  Of note, psychiatry evaluated her on 4/24/18 during her last admission at [facility].    Pain Service will Continue to follow at this time.     Recommendations were discussed with medicine team and pain team.  Plan was reviewed by the Pain Service consisting of Dayton Vallejo MD.    Thank you for consulting the Inpatient Pain Management Service.   The above recommendations are to be acted upon at the primary team s discretion.    To reach us:  Mon - Friday 8 AM - 3 PM: Pager [phone] (Text Page)  After hours, weekends and  holidays: Primary service should call [phone] for the on-call pain specialist       HISTORY OF PRESENT ILLNESS:   Marychuy Zhou is a 62 year old female with history of  of chronic truncal, sacral decubitus ulcers secondary paraplegia, history of spinal hematoma resulting paraplegia, COPD, chronic normocytic anemia, seizure disorder, probable personality disorder, depressive disorder, hypothyroidism, chronic cervical neck pain surgery, G-tube, malnutrition, neurogenic bladder with suprapubic, urinary and stool incontinence, history of DVT on chronic anticoagulation.  She was admitted on 5/15/18 for continued buttock pain secondary to stage IV decubitus ulcers, concerned for infection.        Today 5/17/18, patient is lying in bed.  NAD. States that the worst pain is in her buttock due to the chronic decubitus ulcers which is a burning type pain. States it's a 12/10 on the VAS.  Best pain level is 5-6/10 if she is able to get IV Dilaudid with oral oxycodone.   She feels what would help her is her current oxycodone 15mg Q 4hours PRN and IV Dilaudid Q 2 hours.  Discussed adjuvants which she mostly declined because she tried them without benefits.  She voices her desire to try an appetite stimulant (Marinol) as she wants to improve her nutrition status in order to proceed with her planned flap surgery.  Of note, she c/o left rib cage pain that felt like a bruise and also pain in between shoulder blades, offered topicals/ice/heat which she declined.    PAIN HISTORY:   Location: buttock  Duration: constant  Pain intensity: 12/10  Quality of the pain: burning  Aggravating factors: movements  Relieving factors : rest, pain pills    CAPA (Clinically Aligned Pain Assessment)  Comfort (How is your pain?): Intolerable  Change in Pain (Since your last medication/intervention?): About the same  Pain Control (How are your pain treatments working?): Inadequate pain control  Functioning (Are you able to do activities to get  better?) : Can't do anything because of pain  Sleep (Does your pain management allow you to sleep or rest?): Awake with pain most of the night       FUNCTIONAL STATUS:  Change:      unchanged  Oral intake:     Regular-but no appetite, tube feeding  Bowel function:    Liquid or diarrhea-on loperamide  Activity level:     Bedrest, wheelchair bound due to paraplegia  Sleep:      Did not sleep well  Mood:      Irritable, cooperative      REVIEW OF 10 BODY SYSTEMS: 10 point ROS of systems including Constitutional, Eyes, Respiratory, Cardiovascular, Gastroenterology, Genitourinary, Integumentary, Musculoskeletal, Psychiatric were all negative except for pertinent positives noted in my HPI.       INPATIENT MEDICATIONS PERTINENT TO PAIN CONSULT:   Medications related to Pain Management (Future)    Start     Dose/Rate Route Frequency Ordered Stop    05/17/18 1400  ketamine (KETALAR) 5%-gabapentin (NEURONTIN) 8% topical PLO cream       Topical 3 TIMES DAILY 05/17/18 1131      05/17/18 1400  acetaminophen (TYLENOL) tablet 975 mg      975 mg Oral 3 TIMES DAILY 05/17/18 1228      05/16/18 0122  zolpidem (AMBIEN) tablet 5 mg      5 mg Oral AT BEDTIME PRN 05/16/18 0123      05/16/18 0000  levETIRAcetam (KEPPRA) tablet 250 mg      250 mg Oral EVERY EVENING 05/15/18 2345      05/15/18 2319  oxyCODONE IR (ROXICODONE) tablet 15 mg      15 mg Oral EVERY 4 HOURS PRN 05/15/18 2321      05/15/18 2316  diazepam (VALIUM) tablet 4 mg      4 mg Oral EVERY 6 HOURS PRN 05/15/18 2321              CURRENT MEDICATIONS:   Current Facility-Administered Medications Ordered in Epic   Medication Dose Route Frequency Provider Last Rate Last Dose     acetaminophen (TYLENOL) tablet 975 mg  975 mg Oral TID Caryl Goel APRN CNP         ascorbic acid (VITAMIN C) tablet 500 mg  500 mg Oral Daily Lance Marley MD   500 mg at 05/17/18 1028     bismuth subsalicylate (PEPTO BISMOL) suspension 30 mL  30 mL Oral Q6H PRN Ursula Joel,  AMRITA   30 mL at 05/16/18 2252     calcium carbonate (TUMS) chewable tablet 500 mg  500 mg Oral Daily PRN Caryl Goel APRN CNP   500 mg at 05/16/18 1722     dextrose 10 % 1,000 mL infusion   Intravenous Continuous PRN Caryl Goel APRN CNP         diazepam (VALIUM) tablet 4 mg  4 mg Oral Q6H PRN Ryan Diallo MD   4 mg at 05/17/18 1028     ipratropium - albuterol 0.5 mg/2.5 mg/3 mL (DUONEB) neb solution 3 mL  3 mL Nebulization Q4H PRN Ryan Diallo MD         ketamine (KETALAR) 5%-gabapentin (NEURONTIN) 8% topical PLO cream   Topical TID Crayl Goel APRN CNP         lactated ringers infusion   Intravenous Continuous Caryl Goel APRN  mL/hr at 05/16/18 1722       levETIRAcetam (KEPPRA) tablet 250 mg  250 mg Oral QPM Ryan Diallo MD   250 mg at 05/16/18 2034     levothyroxine (SYNTHROID/LEVOTHROID) tablet 125 mcg  125 mcg Oral QAM AC Lance Marley MD   125 mcg at 05/17/18 1028     lidocaine (viscous) (XYLOCAINE) 2 % 15 mL, alum & mag hydroxide-simethicone (MYLANTA ES/MAALOX  ES) 15 mL GI Cocktail  30 mL Oral Once PRN Ursula Joel PA-C         loperamide (IMODIUM) capsule 4 mg  4 mg Oral 4x Daily Ryan Diallo MD   4 mg at 05/17/18 1028     melatonin tablet 1 mg  1 mg Oral At Bedtime PRN Ryan Diallo MD   1 mg at 05/16/18 2232     miconazole (MICATIN; MICRO GUARD) 2 % powder   Topical BID Lance Marley MD         multivitamins with minerals (CERTAVITE/CEROVITE) liquid 15 mL  15 mL Per Feeding Tube Daily Caryl Goel APRN CNP         naloxone (NARCAN) injection 0.1-0.4 mg  0.1-0.4 mg Intravenous Q2 Min PRN Ryan Diallo MD         omeprazole (priLOSEC) CR capsule 20 mg  20 mg Oral QAM  yRan Diallo MD   20 mg at 05/16/18 0840     ondansetron (ZOFRAN-ODT) ODT tab 4 mg  4 mg Oral Q6H PRN Ryan Diallo MD   4 mg  at 05/17/18 0843     oxybutynin (DITROPAN) tablet 5 mg  5 mg Oral BID Caryl Goel APRN CNP         oxyCODONE IR (ROXICODONE) tablet 15 mg  15 mg Oral Q4H PRN Ryan Diallo MD   15 mg at 05/17/18 1028     Patient is already receiving anticoagulation with heparin, enoxaparin (LOVENOX), warfarin (COUMADIN)  or other anticoagulant medication   Does not apply Continuous PRN Ryan Diallo MD         protein modular (PROSource TF) 1 packet  1 packet Per Feeding Tube TID Caryl Goel APRN CNP         rivaroxaban ANTICOAGULANT (XARELTO) tablet 20 mg  20 mg Oral At Bedtime Ryan Diallo MD   20 mg at 05/16/18 2232     triamcinolone (KENALOG) 0.1 % cream   Topical TID Ryan Diallo MD         vancomycin (VANCOCIN) 1000 mg in dextrose 5% 200 mL PREMIX  1,000 mg Intravenous Q12H Lance Marley MD         vitamin A capsule 20,000 Units  20,000 Units Oral Daily Lance Marley MD   20,000 Units at 05/17/18 1028     zinc sulfate (ZINCATE) capsule 220 mg  220 mg Oral Daily Lance Marley MD   220 mg at 05/17/18 1029     zolpidem (AMBIEN) tablet 5 mg  5 mg Oral At Bedtime PRN Ryan Diallo MD   5 mg at 05/16/18 2232     No current [facility]-ordered outpatient prescriptions on file.           HOME/PREVIOUS MEDICATIONS:   Prior to Admission medications    Medication Sig Start Date End Date Taking? Authorizing Provider   diazepam (VALIUM) 2 MG tablet Take 2 tablets (4 mg) by mouth every 6 hours as needed for anxiety 5/8/18  Yes Shahriar Cadet MD   ipratropium - albuterol 0.5 mg/2.5 mg/3 mL (DUONEB) 0.5-2.5 (3) MG/3ML neb solution Take 1 vial (3 mLs) by nebulization every 4 hours as needed for wheezing 5/8/18  Yes Shahriar Cadet MD   levETIRAcetam (KEPPRA) 250 MG tablet Take 1 tablet (250 mg) by mouth every evening Until 4/7/2018 then decrease to 250mg BID 4/8/2018-4/21/2018, then decrease to 250mg QAM 4/22-5/5/2018  Patient  taking differently: Take 250 mg by mouth 2 times daily  5/8/18  Yes Shahriar Cadet MD   LEVOTHYROXINE SODIUM PO Take 125 mcg by mouth every morning   Yes Unknown, Entered By History   loperamide (IMODIUM) 2 MG capsule Take 2 capsules (4 mg) by mouth 4 times daily 5/8/18  Yes Shahriar Cadet MD   miconazole (MICATIN) 2 % AERP powder Apply topically 2 times daily Apply to groin folds   Yes Unknown, Entered By History   mineral oil-hydrophilic petrolatum (AQUAPHOR) Apply topically daily Apply to lower extremities for skin irritation.   Yes Unknown, Entered By History   OMEPRAZOLE PO Take 20 mg by mouth daily (with breakfast)   Yes Unknown, Entered By History   ondansetron (ZOFRAN ODT) 4 MG ODT tab Take 1 tablet (4 mg) by mouth every 6 hours as needed for nausea 5/8/18  Yes Shahriar Cadet MD   oxyCODONE IR (ROXICODONE) 5 MG tablet Take 1-2 tablets (5-10 mg) by mouth every 4 hours as needed (Give 5mg for pain level 4-6 on a 10 point scale. Give 10mg for pain level 6-10 on a 10 point scale.) 5/8/18  Yes Shahriar Cadet MD   Rivaroxaban (XARELTO PO) Take 20 mg by mouth At Bedtime   Yes Unknown, Entered By History   triamcinolone (KENALOG) 0.1 % cream Apply topically 3 times daily 5/8/18  Yes Shahriar Cadet MD   Pollen Extracts (PROSTAT PO) 1 oz daily    Unknown, Entered By History   protein modular (PROSOURCE TF) 1 packet by Per Feeding Tube route 3 times daily 5/8/18   Shahriar Cadet MD           PAST PAIN TREATMENT: chronic opioid management.        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 No pertinent surgical history.        FAMILY HISTORY: family history includes Chronic Obstructive Pulmonary Disease in her brother.      HEALTH & LIFESTYLE PRACTICES:   Tobacco:  reports that she has never smoked. She has never used smokeless tobacco.  Alcohol:  reports that she does not drink alcohol.  Illicit drugs:  reports that she does not use illicit drugs.      SOCIAL HISTORY:  States that she won a settlement after spinal hematoma leading to paraplegia, felt it was \"worth it, but had to lose legs for it.\"  Was able to a house for family with settlement.  Social History     Social History     Marital status: Single     Spouse name: N/A     Number of children: N/A     Years of education: N/A     Occupational History     Not on file.     Social History Main Topics     Smoking status: Never Smoker     Smokeless tobacco: Never Used     Alcohol use No     Drug use: No     Sexual activity: Not on file     Other Topics Concern     Not on file     Social History Narrative         LABORATORY VALUES:   Last Basic Metabolic Panel:  Lab Results   Component Value Date     05/17/2018      Lab Results   Component Value Date    POTASSIUM 4.1 05/17/2018     Lab Results   Component Value Date    CHLORIDE 105 05/17/2018     Lab Results   Component Value Date    NATA 8.2 05/17/2018     Lab Results   Component Value Date    CO2 27 05/17/2018     Lab Results   Component Value Date    BUN 13 05/17/2018     Lab Results   Component Value Date    CR 0.54 05/17/2018     Lab Results   Component Value Date     05/17/2018       CBC results:  Lab Results   Component Value Date    WBC 7.8 05/17/2018     Lab Results "   Component Value Date    HGB 7.7 05/17/2018     Lab Results   Component Value Date    HCT 27.0 05/17/2018     Lab Results   Component Value Date     05/17/2018       DIAGNOSTIC TESTS:       Labs above reviewed as well as additional relevant diagnostic studies from the EPIC record.       PHYSICAL EXAMINATION:  VITAL SIGNS:  B/P: 90/49, T: 96, P: 83, R: 16    CONSTITUTIONAL/GENERAL APPEARANCE: Awake and alert female after woken up from sleep.  EYES: EOMIs, sclerae clear  ENT/NECK: neck is supple, mucosa moist  RESPIRATORY: non labored breathing  CARDIOVASCULAR: good capillary refill  GI: soft, able to feel pressure on palpation, feeding tube present    MUSCULOSKELETAL/BACK/SPINE/EXTREMITIES: gript 5/5 bilaterally.  No movements in LE (paraplegia)      NEURO:  SILT to UE, No sensation to LE.  SKIN/VASCULAR EXAM:  warm  PSYCHIATRIC/BEHAVIORAL/OBSERVATIONS:  No objective signs of pain observed during our interview.   Judgment/Insight -fair   Orientation - x3   Memory -fair   Mood and affect -irritable at times but mostly cooperative           TIME SPENT:60 minutes including 30 minutes of face-to-face time counseling her  about her pain management treatment options, and coordinating care with the primary team.    Mita Yu PA-C  May 17, 2018, 12:34 PM  Inpatient Pain Management Service

## 2018-05-17 NOTE — CONSULTS
[facility] ID SERVICE: NEW CONSULTATION   Marychuy Zhou : 1955 Sex: female:   Medical record number [id number]  Date of Admission: 5/15/2018  Consult Requester:Lance Marley MD  Date of Service: May 17, 2018    REASON FOR CONSULTATION: Bacteremia versus contamination    PROBLEM LIST: (New and established)  1. PICC line colonization, contamination or bacteremia due to MRSE and Staphylococcus hominis  2. T4 paraplegia secondary to hematoma with multiple truncal and extremity decubitus ulcers including right IT, stage IV injury, left sacral stage III ulcer, left heel ulcer, left upper thigh ulcer and coccyx ulcer.   3. History of suprapubic catheter with ongoing leakage around the catheter  4. Chronic pelvis osteomyelitis without evidence of systemic infection  5. Chronic pain, primarily involving the back, treated with oxycodone  6. Seizure disorder on Keppra only  7. Probable personality disorder with frequent refusal to accept cares and medications and with unrealistic expectations for discharge to home    RECOMMENDATIONS:   1. Agree with empiric vancomycin   2. Follow 5/15 and  blood cultures to determine if therapy is needed  3. Appreciate WOC input    DISCUSSION:   62 year old female with past medical history of chronic truncal, sacral decubitus ulcers secondary paraplegia, history of spinal hematoma resulting paraplegia, COPD, chronic normocytic anemia, seizure disorder, probable personality disorder, depressive disorder, hypothyroidism, chronic cervical neck pain surgery, G-tube, malnutrition, neurogenic bladder with suprapubic, urinary and stool incontinence, history of DVT on chronic anticoagulation, presented 2018 with increasing acute on chronic pelvic pain since discharge from hospital.  She had a positive blood culture obtained from a PICC on 18. Organism is MRSE and Staphylococcus hominis. Since it was only one blood culture, this finding could represent a contaminant, simple  colonization of the PICC, or a true bacteremia. The first two possibilities do not need any intervention; the latter needs treatment with vancomycin +/- removal of the PICC (depending on how difficult a stick/critical need of PICC). Luckily, blood cultures x 1 and blood cultures x 2 from the periphery were obtained 5/15 and 5/16 respectively. This information may salvage the current quandary. All three were obtained prior to administration of vancomycin today at 1127. If these are negative, then the PICC line is either colonized or the blood culture from the PICC was contaminated; no intervention is needed. If the 5/15 and 5/16 blood cultures also grow MRSE, then she has a true bacteremia and treatment with vancomycin x 2 weeks +/- PICC removal would be recommended. The negative procalcitonin is supportive of the notion that this finding represents a contaminant or simple colonization of the PICC. So far 5/15 and 5/16 cultures remain NGTD.    ID will continue to follow  SAYRA Agosto M.D.  [facility] ID Service Staff  [phone]     HPI: (Extended HPI: Requires four HPI elements or the status of three chronic problems)  62 year old female with past medical history of chronic truncal, sacral decubitus ulcers secondary paraplegia, history of spinal hematoma resulting paraplegia, COPD, chronic normocytic anemia, seizure disorder, probable personality disorder, depressive disorder, hypothyroidism, chronic cervical neck pain surgery, G-tube, malnutrition, neurogenic bladder with suprapubic, urinary and stool incontinence, history of DVT on chronic anticoagulation, presented 5/14/2018 with increasing acute on chronic pelvic pain since discharge from hospital. She thought this was due to worsening of her chronic ulcers. She states that her wounds have been changed 2x daily at the nursing home, but per notes she refuses cares and dressing changes frequently. She was unable to say if drainage or the wound characteristics  have changed. Denied a change in odor; she is chronically incontinent of urine and stool. She had a PICC line placed 5/3 that is non painful. Denied fevers, chills, or additional new constitutional symptoms. Pain was 12/10, stabbing, and hard to localize.       She was recently hospitalized from 4/10-5/8/18 at [facility]. She was admitted to the hospital from her nursing home for the evaluation of low grade fevers and worsening pain in her right hip area in the setting of concerns regarding worsening of chronic infection in this area. The patient has been hospitalized at [facility] several days previously and had been discharged on Augmentin. The patient had specifically requested to come to [facility] as she had been referred to Dr. Mayela Pelletier of Plastic Surgery for consideration of flap placement. She was started on Vanco and Zosyn. After assessment by Plastic Surgery and by the wound nurse, it was felt that while she likely has chronic osteomyelitis of the sacrum, there was no evidence of  systemic infection. The patient did have an area of erythema surrounding her right sacral wound and was treated for cellulitis with an approximate 2 week course of antibiotics; thereafter antibiotics were discontinued. The patient was seen by Dr. Pelletier who felt that her nutritional status was too tenuous to allow for surgery at this time.  In addition, the patient does have constant stooling and urinary leakage around her suprapubic catheter which continually soils her decubitus ulcers.  Ultimately, it is felt that she will need a diverting colostomy and some type of diverting urologic procedure, in addition to improvement in her nutritional status, before she would be a candidate for flap surgery. She was initially started on TPN but this was transitioned to G-tube feedings.  Of note on admission she had bilateral nephrostomy tubes trying to divert urine to allow healing from chronic  ulcers.  This was unsuccessful and tubes were removed.  She continued to have leakage around suprapubic catheter and from urethra. She was discharged on Diflucan 150 mg daily for 7 days to treat a fungal dermatitis associated with her chronic skin irritation over her sacral area.  Currently she is afebrile and has a normal WBC. Procal is negative. CRP is 74-->62. GFR >90; negative UA; UC with <10K mixed jamie. She states that her pain has never been under control. No PICC issues; apparently used for poor IV access. TF going ok; no leakage. Gets nauseous with feeds. No fever or chills. WOC involved for extensive LE wounds.  All available records were reviewed and summarized above.  This ID consultation question represents a new problem, with additional work-up planned and described in the recommendations section above.  ANTI-INFECTIVES:   Current: Vancomycin (5/16-)  Previous: Zosyn (5/15)  Other current medications were reviewed with an eye on potential drug-drug interactions.  ROS: (Recommend ? 10 systems)   A ten point review of systems was obtained and was negative with the exception of that which is described in the HPI.  PMH: (At least ONE specific item from THREE of the three components of PFSH must be documented for a Complete PFSH)  Past Medical History:   Diagnosis Date     Anxiety      Chronic pain syndrome      Closed fracture zygoma, with routine healing, subsequent encounter      COPD (chronic obstructive pulmonary disease) (H)      Depressive disorder      DVT (deep vein thrombosis) in pregnancy (H)      Edema      Epilepsy (H)      Flaccid neuropathic bladder, not elsewhere classified      Fracture of femur (H)      Hypothyroidism      Iron deficiency anemia      Paraplegia (H)     unspecified     Pressure ulcer of sacral region      PTSD (post-traumatic stress disorder)      Rheumatoid arthritis (H)      Sepsis (H)     unspecified     History reviewed. No pertinent surgical history.    Medication  "allergies were reviewed. Notable allergies include: None  SOCIAL HISTORY AND RISK FACTORS   Social History   Substance Use Topics     Smoking status: Never Smoker     Smokeless tobacco: Never Used     Alcohol use No     History   Sexual Activity     Sexual activity: Not on file     FAMILY HISTORY:   Reviewed and non-contributory  Family History   Problem Relation Age of Onset     Chronic Obstructive Pulmonary Disease Brother        EXAMINATION: (Recommend ? 9 systems; 2 items each)   BP 90/49  Pulse 83  Temp 96  F (35.6  C) (Oral)  Resp 16  Ht 1.575 m (5' 2\")  Wt 80.3 kg (177 lb 1.6 oz)  SpO2 97%  Breastfeeding? No  BMI 32.39 kg/m2  GENERAL:  well-developed, well-nourished, in bed in no acute distress.   EYES:  Eyes have anicteric sclerae without conjunctival injection    ENT:  Atraumatic. Oropharynx is moist without exudates or ulcers  NECK:  Supple. No cervical lymphadenopathy  LUNGS:  Clear to auscultation bilateral. Respiratory effort is normal  CARDIOVASCULAR:  Regular rate and rhythm with no murmurs, gallops or rubs.  ABDOMEN:  Normal bowel sounds, soft, nontender. No appreciable hepatosplenomegaly. G-tube site C/D/I  SKIN:  No acute rashes.  Line(s) are in place without any surrounding erythema or exudate. Wound not let me exam her wounds; multiple, extensive, deep sacral/buttock wounds; left heel wound; no obvious foul smell; images in WOC note  NEUROLOGIC:  Paraplegic  PSYCH: Appropriate affect, alert and oriented to person, place and time  CURRENT LINES: Right arm PICC; non-tender; no erythema  RELEVANT DATA:   Reviewed medicine test (PFTs, EKG, cardiac echo or cath): NO  BASIC LABS   The following basic labs were personally reviewed:  Inflammatory Markers    Recent Labs   Lab Test  05/16/18   1617  05/15/18   2029  04/23/18   1033  04/18/18   0634  04/15/18   1048  04/10/18   2125  04/05/18   0825  04/04/18   0825   SED   --   107*   --    --    --   64*   --   62*   CRP  62.0*  74.0*  31.6*  " 66.4*  73.9*  80.0*  100.0*   --        Hematology Studies    Recent Labs   Lab Test  05/17/18   0543  05/16/18   0713  05/15/18   2029  05/14/18   1754 05/10/18  04/28/18   0934  04/20/18   0940   04/10/18   2125   04/05/18   0825   WBC  7.8  8.0  10.0  12.0*  8.6   --   6.9   < >  3.7*   < >  8.3   ANEU  5.6  6.1  7.1  8.9*   --    --    --    --   2.0   --   5.4   AEOS  0.3  0.4  1.0*  0.6   --    --    --    --   0.4   --   0.9*   HGB  7.7*  7.8*  8.4*  7.9*  7.7*  8.4*  8.1*   < >  10.4*   < >  8.7*   MCV  83  85  84  84  86   --   84   < >  85   < >  82   PLT  457*  430  470*  474*  367   --   300   < >  285   < >  342    < > = values in this interval not displayed.       Immune Globulin Studies  No lab results found.    Metabolic Studies     Recent Labs   Lab Test  05/17/18   0543  05/16/18   0713  05/15/18   2029  05/14/18   1754 05/10/18   NA  139  137  136  138  138   POTASSIUM  4.1  4.4  4.6  4.0  4.4   CHLORIDE  105  105  101  101  103   CO2  27  25  28  29  26   BUN  13  13  14  14  12   CR  0.54  0.57  0.54  0.54  0.58*   GFRESTIMATED  >90  >90  >90  >90  >60       Hepatic Studies    Recent Labs   Lab Test  05/15/18   2029  05/14/18   1754 05/10/18  05/07/18   0617  04/30/18   0830  04/24/18   1125   BILITOTAL  0.1*  0.2  0.2  0.2  0.3  0.1*   ALKPHOS  195*  199*  234*  228*  266*  213*   ALBUMIN  2.4*  2.2*  2.0*  1.9*  2.3*  1.8*   AST  16  17  24  20  21  13   ALT  15  15  20  26  19  <6       Thyroid Studies    Recent Labs   Lab Test 05/10/18  04/28/18   0934  04/13/18   0640   TSH  23.91*  59.47*  49.56*   T4   --    --   0.76       MICROBIOLOGY LABS  The following microbiology studies were personally reviewed:  Culture Micro   Date Value Ref Range Status   05/16/2018 No growth after 21 hours  Preliminary   05/16/2018 No growth after 20 hours  Preliminary   05/15/2018 <10,000 colonies/mL  Proteus mirabilis   (A)  Preliminary   05/15/2018 <10,000 colonies/mL  Enterococcus faecium   (A)  Preliminary    05/15/2018 Susceptibility testing in progress  Preliminary   05/15/2018 No growth after 2 days  Preliminary   05/14/2018 (A)  Preliminary    Cultured on the 1st day of incubation:  Staphylococcus epidermidis  Susceptibility testing in progress     05/14/2018   Preliminary    Critical Value/Significant Value, preliminary result only, called to and read back by   DR. HAGER @1719 5/15/18. CT     05/14/2018 (A)  Preliminary    Cultured on the 1st day of incubation:  Staphylococcus hominis  Susceptibility testing in progress     05/14/2018   Preliminary    (Note)  POSITIVE for STAPHYLOCOCCUS EPIDERMIDIS and POSITIVE for the mecA  gene (resistant to methicillin) by [place] multiplex nucleic acid  test. Final identification and antimicrobial susceptibility testing  will be verified by standard methods.    Specimen tested with True Styleigene multiplex, gram-positive blood culture  nucleic acid test for the following targets: Staph aureus, Staph  epidermidis, Staph lugdunensis, other Staph species, Enterococcus  faecalis, Enterococcus faecium, Streptococcus species, S. agalactiae,  S. anginosus grp., S. pneumoniae, S. pyogenes, Listeria sp., mecA  (methicillin resistance) and Manuel/B (vancomycin resistance).    Critical Value/Significant Value called to and read back by RK FOWLER RN @2018 5/15/18. CT     04/20/2018 (A)  Final    >100,000 colonies/mL  Candida albicans / dubliniensis  Candida albicans and Candida dubliniensis are not routinely speciated  Susceptibility testing not routinely done     04/10/2018 No growth  Final   04/10/2018 No growth  Final   04/10/2018 (A)  Final    Light growth  Escherichia coli ESBL  Enterobacteriaceae that are susceptible to meropenem are usually susceptible to ertapenem.  ESBL (extended beta lactamase) producing organisms require contact precautions.     04/10/2018 Light growth  Proteus mirabilis   (A)  Final   04/10/2018 Moderate growth  Pseudomonas aeruginosa   (A)  Final    04/10/2018 Moderate growth  Enterococcus faecium (VRE)   (A)  Final   04/10/2018   Final    Critical Value/Significant Value called to and read back by  JLUIS ORELLANA RN ([id number]).  04.12.18 2334 GJS     04/04/2018 No growth  Final   04/04/2018 No growth  Final   04/02/2018 No growth  Final       Urine Studies    Recent Labs   Lab Test  05/15/18   2114  04/20/18   1245   LEUKEST  Trace*  Small*   WBCU  1  10*       Vancomycin Levels    Recent Labs   Lab Test  04/06/18   1130   VANCOMYCIN  22.6       IMAGING RESULTS  The following imaging studies were independently reviewed:    None

## 2018-05-17 NOTE — CONSULTS
"Consult Date:  05/17/2018      PSYCHIATRIC CONSULTATION      IDENTIFICATION:  Ms. Marychuy Zhou is a 62-year-old white female who unfortunately has paraplegia and is hospitalized with sacral decubitus ulcers.  Ulcers are draining.  She also has urinary incontinence as well as fecal incontinence with repeated infection.  I am asked to evaluate her psychiatric status by Caryl Goel PA-C.      Prior to interviewing this patient, I had an opportunity to review a previous psychiatric consultation completed on her last hospitalization by Dr. De Paz on 04/24/2018.  At that time this psychiatric consultation was requested because the patient was displaying \"irritability and refusing some cares\".  It was a very similar presentation to the current psychiatric consultation. 's consultation suggests that her medication regimen included Abilify and Cymbalta.  It appears  that those medications are no longer on her medication list.  When I asked the patient about that, she has no idea why her psychiatric medications had been discontinued.      There is quite a bit of documentation in the nursing notes suggesting that the patient continues to frequently refuse cares and then complain that no one is caring for her.  She continues to be very irritable and often somewhat abusive, calling the patient's stupid or idiots, etc.  I did discuss this case with nursing.  Nursing staff has been really quite sympathetic.  They realize that the patient has been miserable and they are not taking personal offense; however, they are concerned that they will not be able to care for her appropriately if she is constantly refusing cares and yelling at the staff.  Dr. De Paz notes that the patient always blames the medical personnel including doctors and nurses for her problems, and though some of this may be true, it certainly has not been helpful in her care.      The patient really is in a difficult situation nutritionally.  She reports " "she cannot even force feed herself because her appetite is so low.  At the same time she realizes that she needs improved nutrition or she is unlikely to receive necessary surgery.  On my interview, the patient told me that someone had discussed the use of Marinol with her.  She thought this would be a good idea and I  agree.  Along with the Marinol, however, I suggested that  we should consider adding Seroquel at bedtime for sleep, appetite, and anxiety.  The patient reports that she has been on Seroquel in the past and found benefit from it, and therefore I will be suggesting Seroquel 25 mg p.o. b.i.d.      The patient reports that she is \"always depressed.\"  However, I am not eager to restart Cymbalta secondary to multiple potential drug-drug interactions.  I do think she would likely benefit from low dose of Seroquel or olanzapine.  I am choosing Seroquel secondary to its sedating properties.  In reviewing the chart, the patient has carried a past history of depressive disorder.      PAST MEDICAL HISTORY:  The patient's past medical history is quite extensive.  It is documented in the history and physical dictated by Dr. Marley on 06/15/2018.  The patient reported it also includes a brain bleed and brain surgery.      FAMILY HISTORY:  According to our records, there is no family psychiatric history.      SOCIAL HISTORY:  The patient is living in a TCU at present.  What she would like to do is receive her surgery and then move in with a friend.  She is a nonsmoker.  There is no history of alcohol use disorder.  Over the years she has been on multiple prescriptions of benzodiazepines and narcotics.      ALLERGIES:  THE PATIENT HAS NO KNOWN ALLERGIES.      PHYSICAL REVIEW OF SYSTEMS:  On my interview, she reports chronic left-sided headache since her \"brain surgery.\"  She reports that she is paralyzed from the nipple line down.  She denied chest pain or shortness of breath.  She does report problems with vision, but " "no problems hearing.  She reports constant urinary leakage, also diarrhea and abdominal \"queasiness\".  She has chronic pain and significant ulcerative lesions.      MENTAL STATUS EXAMINATION:  On my interview, the patient was pleasant and cooperative.  She did not appear angry during my interview.  Her mood was reported as chronically depressed.  Her affect was restricted.  Her speech was coherent and goal oriented.  Her associations were tight and her thought processes logical and linear.  Her content of thought was without overt psychosis or suicidal ideation.  Recent and remote memory, concentration, fund of knowledge, and use of language appear to be at baseline.  She is alert and oriented x 3.  Insight and judgment are guarded.  Muscle strength and tone are of course markedly decreased in her lowers, but appear to be at baseline.  Recent vital signs include a temperature of 96, heart rate of 85, respiration rate of 16 with 97% oxygen saturation and a blood pressure of 90/49.      ASSESSMENT:  Paraplegia, history of depressive disorder, personality disorder, unspecified.  (She may have some residual from her past brain bleed and brain surgery.  Alternatively, she may have cluster B personality issues.)      RECOMMENDATION:   1.  Consider Marinol.   2.  Seroquel starting at 25 mg p.o. b.i.d.  If this seems to be beneficial without negative sequelae, I would consider increasing to Seroquel 50 b.i.d., or perhaps 25 in the morning and 50 at night.  It could be titrated as beneficial based on side effects.         BECKY MCARTHUR MD             D: 2018   T: 2018   MT: NTS      Name:     SHALINI CHONG   MRN:      [id number]        Account:       [id number]   :      1955           Consult Date:  2018      Document: [id number]       cc: ABEL RUIZ, WILLIAN Zamora MD     "

## 2018-05-18 NOTE — PLAN OF CARE
"Problem: Patient Care Overview  Goal: Plan of Care/Patient Progress Review  Outcome: No Change  Patient alert and oriented. Unable to assess vitals because pt refused to have them taken overnight. Pt refused head to toe assessment as well as to be repositioned q2h or to even assess to see if she had some incontinence. Pt shouted to writer \"Don't say my name, shut up and leave the room.\" Writer reiterated the need to turn pt and clean her bottom with each incontinence so that wounds/ulcers/excoriations of skin located on her bottom can heal. Also writer reiterated that if pt continues to refuse cares, her skin will breakdown further and might complicate current infection. Post education pt is still adamant about being left alone. Writer was able to replace IVMF and give her requested prn medications for sleep aid and pain. When writer asked pt the location of pain, pt replied \"it's all in the system don't ask me.\" Upon rounding, pt slept through the night.  R: Continue to monitor, reinforce education, and update providing staff about pt refusal of cares.      "

## 2018-05-18 NOTE — PROGRESS NOTES
[facility] SERVICE: COURTESY NOTE   Marychuy Zhou : 1955 Sex: female:   Medical record number [id number] Attending Physician: Lance Marley MD  Date of Service: May 18, 2018    DISCUSSION:   She had a positive blood culture obtained from a PICC on 18. Organism is MRSE and Staphylococcus hominis. Since it was only one blood culture, this finding could represent a contaminant, simple colonization of the PICC, or a true bacteremia. The first two possibilities do not need any intervention; the latter needs treatment with vancomycin +/- removal of the PICC (depending on how difficult a stick/critical need of PICC). Luckily, blood cultures x 1 and blood cultures x 2 from the periphery were obtained 5/15 and  respectively. This information may salvage the current quandary. All three were obtained prior to administration of vancomycin today at 1127. If these are negative, then the PICC line is either colonized or the blood culture from the PICC was contaminated; no intervention is needed. If the 5/15 and  blood cultures also grow MRSE, then she has a true bacteremia and treatment with vancomycin x 2 weeks +/- PICC removal would be recommended. The negative procalcitonin is supportive of the notion that this finding represents a contaminant or simple colonization of the PICC.   So far 5/15 and  cultures remain NGTD after 2-3 days. Low counts of bacteria in urine represents colonization in setting of catheter; no need to treat.    RECOMMENDATIONS:   1. D/C vancomycin    Call with any further questions.     SAYRA Agosto M.D.  [facility] ID Service Staff  [phone]

## 2018-05-18 NOTE — PROGRESS NOTES
Calorie Counts    Intake recorded for: 5/17 Kcals: 0 Protein: 0g    # Meals Recorded: 2 meals ordered, no food intake recorded    # Supplements Recorded: 0

## 2018-05-18 NOTE — PLAN OF CARE
Problem: Patient Care Overview  Goal: Individualization & Mutuality  Outcome: No Change  Dressings to coccyx and buttocks changed. Skin on IT, buttocks, coccyx is red and excoriated. Tunneled wound cleansed and packed with kerlix and covered with mepilex. Others wounds cleansed and covered with mepilex. Pads underneath Ms. Zhou soaked with urine. She is particular about her cares, but agreeable to having dressings changed. Does refuse to reposition, feels comfortable only on left side. Appetite is improved from yesterday, according to what she says. Ate 1/4 tuna salad sandwich, banana bread, rachell milk x2. Tube feeds started at 2000 at 70ml/hour per her request. Continue with cares and monitor for any changes in status.

## 2018-05-18 NOTE — PROGRESS NOTES
"D/I  This RN went in with NST (after patient flagged Sepsis) to get vitals, and  I asked if we could change the wet linen underneath her and she stated \"these are all clean, they are not soiled\".  Tried to explain to patient that they are the wet linens from the day shift but she refused to listen, continues to be irritable, upset, and agitated with any guidance or direction from Nursing.  Very argumentative and confrontational.  Refuses to be repositioned. Just wanted pain meds and wanted this RN to get out of her room.  I  informed her that I can't leave any meds with her,  I would return when you agree to a plan on changing your linens and repositioning. Patient became more and more demanding and stated \"you can leave now, but give me my meds\". Patient then had the Nursing Supervisor paged, plan was discussed with her, this RN and the supervisor.  Meds given, and Charge Nurse made a staffing change at 1900pm.    D/I  Sepsis flagged at 1650pm.  Lactic Acid resulted at 1.2  "

## 2018-05-18 NOTE — DISCHARGE SUMMARY
[facility], [place]    Internal Medicine Discharge Summary- Gold Service    Date of Admission:  5/15/2018  Date of Discharge:  5/19/2018  Discharging Provider: Caryl Goel CNP/Daron Guerrero MD   Discharge Team: Gold 2    Discharge Diagnoses      # Suspected bacteremia w/ blood cx x 1 positive for Staph epidermis, Staph hominis   # Hx CRE, VRE, ESBL, MRSA  # Hypotension  # Acute on chronic pain 2/2 chronic decubitus ulcers, back/musculoskeletal chest pain, pelvic pain    # Chronic truncal, sacral decubitus ulcer (stage 3) in setting of T4 level paraplegia   # Chronic pressure ulcers on left heel (unstageable), left upper thigh (stage 3), right IT (stage 4)   # Chronic osteomyelitis of sacrum   # Seizure disorder   # Chronic DVT   # COPD   # Malnutrition on TF   # Hypothyroidism   # Neurogenic bladder w/ suprapubic catheter   # Urinary, stool incontinence   # Hx anxiety, depression, ?personality disorder       Follow-ups Needed After Discharge   - Follow up with PCP 3-5 days from TCU discharge for further management of hypothyroidism, seizures, chronic pain   - Follow up with Urology OP on 6/11/18  - Follow up with Plastic Surgery as previously planned for further evaluation of chronic wounds     Hospital Course   Marychuy Zhou was admitted on 5/15/2018 for positive blood cultures concerning for possible staph bacteremia.  The following problems were addressed during her hospitalization:    # Suspected bacteremia w/ blood cx positive for Staph epidermis, Staph hominis   # Hypotension  # Hx CRE, VRE, ESBL, MRSA  Pt initially presented to the ED on 5/14 with pelvic pain and concern for sepsis.  She was discharged home and returned to the ED on 5/15 due to increased pain and positive blood cx from 5/14.  Repeat cx were drawn on admission and she was treated w/ Vancomycin 5/15-5/18.  Blood cx from 5/15 and 5/16 negative to date.  Initial cultures felt to be contaminant vs colonization.  Vancomycin discontinued on 5/18, stable overnight off antibiotics.  UC positive for Proteus and VRE, though <10K colonies.  Asymptomatic.  She was noted to be intermittently hypotensive, with SBPs in 80-90s.  Note that it is challenging to establish BL BP for her this admission due to refusal of routine vitals.  Per chart review and discussion w/ patient, this tends to be her baseline, despite PO/IV fluids and increased PO intake.  Of note, she was admitted with a PICC line still in place, appears to have been placed on 4/21/18 for TPN administration.  TPN has since been stopped and she had G tube placed for bolus tube feeds.  PICC line removed on 5/19 as no longer needed.        # Acute on chronic pain 2/2 chronic decubitus ulcers, back/musculoskeletal chest pain, pelvic pain    # Chronic truncal, sacral decubitus ulcer (stage 3) in setting of T4 level paraplegia   # Chronic pressure ulcers on left heel (unstageable), left upper thigh (stage 3), right IT (stage 4)  # Chronic osteomyelitis of sacrum    Pt admitted with increased pain, prompting initial concern for wounds as possible source of sepsis.  She was seen and evaluated by WOCN care and Gen Surgery for possible wound debridement.  At this time, wounds felt to be w/ good granulation tissue and without obvious signs or symptoms of active infection.  Pt refused recommendations for pulsate matters and frequent turning.  Progression of wound healing is overall complicated by the patient's current state of malnutrition and limited mobility, but her refusal of nursing cares has impacted wound healing as well.  She is incontinent and will lay in urine-soaked sheets despite nurses' attempts to change her linens, sending nurses away or becoming verbally abuse towards them.  For now, recommend focus on continuing wound care and frequent assessment.  Inpatient WOCN recommendations as below.  Continue optimizing nutrition with PO intake and tube feeds and vitamin  supplementation.  Of note, Pt most recently hospitalized at [facility] from 4/10/18 to 5/8/18 with similar presentation and at that time, was seeking evaluation from Plastic Surgery.  She was seen by Dr. Pelletier, who at that time, felt that her nutritional status was too tenuous to allow for surgery.  Additionally, pt will likely need possible urinary diversion and diverting colostomy prior to undergoing flap surgery for her chronic wounds; this should be address as nutrition status is optimized.    Wound care (current inpt regimen per WOCN):   Left heel wound -  wash every other day with wound cleanser and gauze. Apply a thick layer of Medihoney (order #[id number]) to wound bed and cover with Mepilex 4x4. If dressing rolls at edges, OK to use Primapore tape to keep in place.  Left upper thigh wound:wash daily with wound cleanser and gauze. Cover with Mepilex 4x4.  Right IT wound: wash BID and as needed if soiled with urine or stool with wound cleanser and gauze. Pack wound to entire depth (7 cm) with Kerlix dampened with saline. Cover with Mepilex 4x4.  Left sacral wound: wash daily with wound cleanser and gauze. Cover with Mepilex 4x4.   Bilateral PNT tube sites: healed, dressing no longer indicated  Buttock, perineum, upper thighs IAD: wash twice daily and with each episode of incontinence with Greta Cleanse and Protect and a soft cloth. Apply a thick layer of Criticaide Paste to skin. Do not attempt to remove Criticaide Paste with each episode of incontinence, just wipe of soiled paste and reapply. To remove all Chriss, use baby/mineral oil and a soft cloth. Due to large amount of urine being diverted past suprapubic catheter and out urethra, please change Covidon sheet with position changes every 2 hours to keep dry.    # Chronic pain - Seen by Pain Service this admission for adjustment to pain regimen.  Patient initially refusing dressing changes and wound cares due to complaints of uncontrolled pain.   "Patient refused scheduled Tylenol and topical Gabapentin PLO cream for \"bruised ribs\" but agreeable to Oxycodone 15mg Q3H PRN and Flexeril 5mg TID PRN.  Please offer this regimen prior to dressing changes, wound cares, and repositioning.      # Seizure disorder - Stable, no seizures this admission.  Appears to have been on dual therapay with Lamictal and Keppra as recently as 4/22/18.  Lamictal discontinued around end of April due to pt refusal.  Had been on Keppra taper, going from BID to daily dosing.  Will continue pt on Keppra 250mg daily and defer further management to PCP on OP follow up.      # Chronic DVTs - Continue PTA Xarelto.      # COPD - Stable, no evidence of hypoxia or respiratory decompensation this admission.  Continue home regimen with DuoNebs.    # Malnutrition on TF - TF infusing in G tube without issue.  Evaluated by Nutrition and continued on Cycle TF, TwoCal HN @ 50 mL/hr x 12 hours overnight from 8:00 pm-8:00am with 1200 kcals (23 kcal/kg/day), 50 gm protein (0.9 gm/kg/day), 420 mL H2O, 132 gm CHO and 3.3 gm Fiber daily.  Started on 10 day course of Certavite, Vitamin C, Vitamin A, and Zinc.  Given need to optimize nutritional status in setting of impending surgeries, have started pt on low dose Marinol 2.5mg BID before meal times.      # Hypothyroidism - TSH 23.91 on 5/10/18.  On Synthroid 125mcg daily with questionable compliance.  Has improved since last check on 4/28 w/ TSH 59.4. Continue PTA dose Synthroid and rechecking TSH in 6-8 weeks.        # Neurogenic bladder w/ suprapubic catheter   # Urinary, stool incontinence   Pt w/ increased leakage from catheter and urethra this admission.  Seen by Urology during previous admissions, felt that she will eventually need a diverting colostomy with urinary diversion, but cannot undergo this procedure until nutritional status optimized.  Trialed with nephrostomy tubes during recent admission to [facility] which were unsuccessful at providing " urinary diversion and were removed.  D/w Urology this admission given ongoing incontinence and urethral leakage.  Exchanged suprapubic catheter and started Oxybutynin 15mg daily.   to arrange OP Urology follow up.       # Hx anxiety, depression, ?personality disorder - Seen by Psych this admission due to apparent behavior dysregulation and agitation.  Concern for depression and poor coping due to burden of chronic illnesses.  Pt agreeable to trial of Zyprexa, with the dual effect of appetite stimulation.  Continue Zyprexa 25mg QAM and 50mg QHS.  If morning somnolence, can do Zyprexa 25mg BID.      # Discharge Pain Plan:   - During her hospitalization, Marychuy experienced pain due to chronic ulcers and chronic musculoskeletal pain.  The pain plan for discharge was discussed with Marychuy and the plan was created in a collaborative fashion.    - Opioids prescribed on discharge: Oxycodone 15mg PO Q3H PRN  - Duration of opioids after discharge: Discharged with short supply 15 days as pt d/c to TCU   - Bowel regimen: not needed due to chronic diarrhea on TFs     Consultations This Hospital Stay   VASCULAR ACCESS CARE ADULT IP CONSULT  PHARMACY TO DOSE VANCO  NUTRITION SERVICES ADULT IP CONSULT  WOUND OSTOMY CONTINENCE NURSE  IP CONSULT  VASCULAR ACCESS CARE ADULT IP CONSULT  PHARMACY IP CONSULT  PLASTIC SURGERY IP CONSULT  INFECTIOUS DISEASE GENERAL ADULT IP CONSULT  SURGERY GENERAL ADULT IP CONSULT  UROLOGY IP CONSULT  PAIN MANAGEMENT ADULT IP CONSULT  PSYCHIATRY IP CONSULT  MEDICATION HISTORY IP PHARMACY CONSULT  PHYSICAL THERAPY ADULT IP CONSULT  OCCUPATIONAL THERAPY ADULT IP CONSULT     Code Status   Full Code    Time Spent on this Encounter   Caryl GUERRERO, personally saw the patient today and spent greater than 30 minutes discharging this patient.       Caryl Goel  Internal Medicine Staff Hospitalist Service  [facility]  Pager:  [phone]  ______________________________________________________________________    Physical Exam   Vital Signs:     BP: 101/64 Pulse: 88   Resp: 16 SpO2: 98 %      Weight: 186 lbs 11.2 oz    GENERAL: Alert and oriented x 3. Agitated.   HEENT: Normocephalic, atraumatic. Anicteric sclera. Mucous membranes moist.   Respiratory: Lungs CTAB.   Cardio: RRR, S1, S2.  No murmurs.   GI: G tube site without erythema.  Abdomen N/T, N/D. Active BS.  SKIN: Erythematous patches of excoriation on buttocks and backs of thighs      Significant Results and Procedures   Most Recent 3 CBC's:  Recent Labs   Lab Test  05/18/18   0800  05/17/18   0543  05/16/18   0713   WBC  9.2  7.8  8.0   HGB  7.3*  7.7*  7.8*   MCV  84  83  85   PLT  365  457*  430     Most Recent 3 BMP's:  Recent Labs   Lab Test  05/17/18   0543  05/16/18   0713  05/15/18   2029   NA  139  137  136   POTASSIUM  4.1  4.4  4.6   CHLORIDE  105  105  101   CO2  27  25  28   BUN  13  13  14   CR  0.54  0.57  0.54   ANIONGAP  7  8  7   NATA  8.2*  8.7  9.0   GLC  123*  108*  95   ,   Results for orders placed or performed during the hospital encounter of 05/09/18   IR Gastrostomy Tube Check    Narrative    PRE-PROCEDURE DIAGNOSIS: Dislodged gastrostomy tube    POST-PROCEDURE DIAGNOSIS: Same    PROCEDURE: Gastrostomy tube check    Impression    IMPRESSION: Existing 16 Fr G-tube fell out. Completed  fluoroscopy-guided replacement after existing tract was cannulated.  New 16 Fr gastrostomy tube ready for immediate use.    PLAN: Patient should return in 9-12 months for routine exchange, or  sooner if necessary.     ----------    CLINICAL HISTORY: Paraplegic patient with multiple decubitus ulcers  with recent admission for sepsis status post gastrostomy tube  placement 4/27/2018. Her G-tube was removed during a dressing change 1  hour prior to this procedure. She presents to the ER via ambulance for  evaluation and possible exchange. Due to decubitus ulcers the  patient  cannot tolerate lying on her back and requests pain medicine prior to  positioning for procedure.    PERFORMED BY: Fidencio Arvizu PA-C    ATTENDING PHYSICIAN: Leroy Qureshi MD    CONSENT: Written consent obtained.    MEDICATIONS: 1. Topical lidocaine  2. 50 mcg fentanyl IV    NURSING: The patient was placed on continuous vital signs monitoring.  Vital signs were monitored by nursing staff under IR staff  supervision.     SEDATION TIME: 15 minutes    FLUOROSCOPY TIME: 3.2 minutes    DESCRIPTION: Exam of site shows patent gastrostomy with 2 T-fasteners  still in place. The skin was cleaned in the area was draped. Contrast  injection through existing tract opacifies old tract to stomach. This  was cannulated using Biomoti guidewire Kumpe catheter. The track was  dilated with 14 French Plyce dilator which passed easily under  fluoroscopic guidance. A new 16 French gastrostomy tube was advanced  over wire into the stomach. Position confirmed with contrast  injection. Retention balloon filled with 5 mL saline/contrast mixture.  Disc snugged to 4 cm dina.    COMPLICATIONS: No immediate concerns, the patient remained stable  throughout the procedure and tolerated it well.    ESTIMATED BLOOD LOSS: None    SPECIMENS: None    JULIEN ARVIZU PA-C       Pending Results     Unresulted Labs Ordered in the Past 30 Days of this Admission     Date and Time Order Name Status Description    5/16/2018 0001 Blood culture Preliminary     5/16/2018 0001 Blood culture Preliminary     5/15/2018 2050 Blood culture Preliminary              Primary Care Physician   Sánchez Zamora MD    Discharge Disposition   Discharged to TCU   Condition at discharge: Stable    Discharge Orders   No discharge procedures on file.  Discharge Medications   Current Discharge Medication List      CONTINUE these medications which have NOT CHANGED    Details   diazepam (VALIUM) 2 MG tablet Take 2 tablets (4 mg) by mouth every 6 hours as needed for anxiety  Qty:  60 tablet    Associated Diagnoses: Anxiety      ipratropium - albuterol 0.5 mg/2.5 mg/3 mL (DUONEB) 0.5-2.5 (3) MG/3ML neb solution Take 1 vial (3 mLs) by nebulization every 4 hours as needed for wheezing  Qty: 360 mL    Associated Diagnoses: Chronic obstructive pulmonary disease, unspecified COPD type (H)      levETIRAcetam (KEPPRA) 250 MG tablet Take 1 tablet (250 mg) by mouth every evening Until 4/7/2018 then decrease to 250mg BID 4/8/2018-4/21/2018, then decrease to 250mg QAM 4/22-5/5/2018    Associated Diagnoses: Seizures (H)      LEVOTHYROXINE SODIUM PO Take 125 mcg by mouth every morning      loperamide (IMODIUM) 2 MG capsule Take 2 capsules (4 mg) by mouth 4 times daily  Qty: 20 capsule    Associated Diagnoses: Diarrhea, unspecified type      miconazole (MICATIN) 2 % AERP powder Apply topically 2 times daily Apply to groin folds      mineral oil-hydrophilic petrolatum (AQUAPHOR) Apply topically daily Apply to lower extremities for skin irritation.      OMEPRAZOLE PO Take 20 mg by mouth daily (with breakfast)      ondansetron (ZOFRAN ODT) 4 MG ODT tab Take 1 tablet (4 mg) by mouth every 6 hours as needed for nausea  Qty: 20 tablet, Refills: 1    Associated Diagnoses: Nausea      oxyCODONE IR (ROXICODONE) 5 MG tablet Take 1-2 tablets (5-10 mg) by mouth every 4 hours as needed (Give 5mg for pain level 4-6 on a 10 point scale. Give 10mg for pain level 6-10 on a 10 point scale.)  Qty: 40 tablet, Refills: 0    Associated Diagnoses: Other chronic pain      Rivaroxaban (XARELTO PO) Take 20 mg by mouth At Bedtime      triamcinolone (KENALOG) 0.1 % cream Apply topically 3 times daily    Associated Diagnoses: Folliculitis      Pollen Extracts (PROSTAT PO) 1 oz daily      protein modular (PROSOURCE TF) 1 packet by Per Feeding Tube route 3 times daily    Associated Diagnoses: Decubitus ulcer of sacral region, stage 4 (H)         STOP taking these medications       fluconazole (DIFLUCAN) 150 MG tablet Comments:   Reason  for Stopping:             Allergies   No Known Allergies

## 2018-05-18 NOTE — PROGRESS NOTES
Patient: Marychuy Zhou  Date of Service: May 18, 2018 Admission Date:5/15/2018   * No surgery found *     Chief Pain Endorsement: pain in buttocks from decubitus ulcers     Recommendations were discussed and relayed to Caryl Goel CNP   Plan was reviewed by the Inpatient Pain Service and staff attending, Dr. Dayton Vallejo      1. Continue oxycodone 15 mg PO q3 hours PRN.   2. Continue cyclobenzaprine 5 mg PO TID PRN  3. Discontinue acetaminophen 975 mg PO TID scheduled. Patient is refusing.  4. Discontinue ketamine 5%/gabapentin 8% PLO cream. Patient is refusing.   5. Bowel regimen per primary team to prevent opioid induced constipation. Patient currently on loperamide scheduled.   6. Continue current orders for diazepam, quetiapine and zolpidem per primary team.     Pain Service will Sign Off at this time.     Thank you for consulting the Inpatient Pain Management Service. The above recommendations are to be acted upon at the primary team s discretion.       To reach us:  Mon - Friday 8 AM - 3 PM: Pager [phone] (Text Page)  After hours, weekends and holidays: Primary service should call [phone] for the on-call pain specialist    PAIN MEDICATION SAFE USE:   Prior to discharge instruct patient on the following in addition to the medication fact sheet:    Caution: these medications can cause sedation    Take prescription medicine only if it has been prescribed by your doctor    Do not take more medicine or take it more often than instructed     Call your doctor if pain gets worse    Never mix pain medicine with alcohol, sleeping pills, or any illicit drugs    Do not operate heavy machinery, including vehicles, when initiation opioid therapy or increasing dosage    Store prescription opioids in a locked container, whenever possible     Dispose of unused opioids appropriately     Do not stop abruptly once at higher doses.  These medications must be tapered off.    Opioid pain medications do carry the risk  "for physical dependence and addiction and patients should be counseled about this.       1. Acute on chronic pain of stage IV decubitus ulcers- was admitted on 5/15/18 for continued pelvic pain, thought to be due to chronic osteomyelitis.  Surgery team evaluated her on 5/16/18 and did not feel that her decubitus ulcers warrant surgical intervention at this time.  Awaiting for blood culture results, currently on empirical IV vancomycin.    2. Chronic opioid use- PTA, has been on oxycodone 5-10mg PO 4x/day at SNF for chronic pain between the shoulder blades.  3. Healthcare distrust- states that she has to be her own medical advocate because she feels that is not in competent hands.  Felt that she had poor medical experiences in the past to make her \"nervous.\"  She states that she knows her body best and will direct her care.  During this admission, has been asking staff to leave the room, calling staff names, and declining cares.  4. Malnutrition-on tube feeding via G tube-is concerned that her nutrition status will not improved for her planned skin flap surgery and diverting urologic and colostomy in 2 months and 3 weeks.  She would like to be considered for appetite inducing medication (she means Marinol).    5. T4 Paraplegia since 2008 due to spinal hematoma.  6. H/o seizure-on Keppra  7. H/o DVTs, on chronic anticoagulants  8. H/o depression-is not on psychotropic medications (except valium), pt declining.  Had tried Paxil, Cymbalta, Lamictal, gabapentin.    -- Outpatient opioid requirements prior to admission: OME = 60 (max)   - oxycodone 5-10 mg PO 4x/day  MN  reviewed on 5/17/18.     Primary Care Provider: Sánchez Zamora  Chronic Pain Provider: none    Interval History:  Marychuy Zhou was seen today (May 18, 2018) and she reports that her pain is worst in her buttocks but is also in her pelvis, ribs and shoulders. She states the pain is burning, constant, and intolerable. She states the only things that " "make the pain better are when she doesn't move and when she takes oxycodone, which brings her pain from a 12/10 on the VAS to a 7-8/10. She did not sleep well last night which she states has always been an issue for her.     When additional adjuvant medications discussed to help with pain such as current orders for acetaminophen, ketamine/gabapentin cream or other potential medications (oral gabapentin, lidocaine patch, menthol patch, etc), Marychuy refuses stating none of those things will work for her. She did mention that she wants her new dronabinol evening dose to be scheduled at 1600 and that she states her quetiapine that was started yesterday \"isn't working yet\" and that the dose \"needs to be increased.\"     CAPA (Clinically Aligned Pain Assessment):    Comfort (How is your pain?): Intolerable  Change in Pain (Since your last medication/intervention?): About the same  Pain Control (How are your pain treatments working?):  Inadequate pain control  Functioning (Are you able to do activities to get better?) : Can't do anything because of pain  Sleep (Does your pain management allow you to sleep or rest?): Awake with pain most of night      FUNCTIONAL STATUS:  Change:      unchanged  Oral intake:     Tube feeds; no appetite   Activity level:     Bedrest; wheelchair bound d/t paraplegia  Mood:      Agitation but cooperative     -- Inpatient Medications Related to Pain Management:   Medications related to Pain Management (Future)    Start     Dose/Rate Route Frequency Ordered Stop    05/17/18 1522  cyclobenzaprine (FLEXERIL) tablet 5 mg      5 mg Oral 3 TIMES DAILY PRN 05/17/18 1522      05/17/18 1522  oxyCODONE IR (ROXICODONE) tablet 15 mg      15 mg Oral EVERY 3 HOURS PRN 05/17/18 1522      05/17/18 1400  ketamine (KETALAR) 5%-gabapentin (NEURONTIN) 8% topical PLO cream       Topical 3 TIMES DAILY 05/17/18 1131      05/17/18 1400  acetaminophen (TYLENOL) tablet 975 mg      975 mg Oral 3 TIMES DAILY 05/17/18 1228   "    05/16/18 0122  zolpidem (AMBIEN) tablet 5 mg      5 mg Oral AT BEDTIME PRN 05/16/18 0123      05/16/18 0000  levETIRAcetam (KEPPRA) tablet 250 mg      250 mg Oral EVERY EVENING 05/15/18 2345      05/15/18 2316  diazepam (VALIUM) tablet 4 mg      4 mg Oral EVERY 6 HOURS PRN 05/15/18 2321            LAB DATA:    Recent Labs  Lab 05/18/18  0800 05/17/18  0543 05/16/18  0713 05/15/18  2029 05/14/18  1754   CR  --  0.54 0.57 0.54 0.54   WBC 9.2 7.8 8.0 10.0 12.0*   HGB 7.3* 7.7* 7.8* 8.4* 7.9*   AST  --   --   --  16 17   ALT  --   --   --  15 15         ----------------------------------------------------------------------------------  Yudith Mccollum PharmD, [facility]   Inpatient Pain Service       Helpful Resources:  Getting Rid of Unwanted Medications (printable PDF for patients)   Opioid Overdose Prevention Toolkit (printable PDF for patients)   Prescription Opioids: What You Need To Know (printable PDF for patients)

## 2018-05-18 NOTE — PROGRESS NOTES
"[facility], [place]    Internal Medicine Progress Note - Gold Service      Assessment & Plan   Marychuy Zhou is a 62 year old female admitted on 5/15/2018. She has a history of chronic truncal & sacral decubitus ulcers in setting of paraplegia, chronic osteomyelitis, hx spinal hematoma (resulting in paraplegia), COPD, chronic normocytic anemia, seizure disorder, hypothyroidism, chronic cervical neck pain, malnutrition on G tube feedings, neurogenic bladder with suprapubic catheter, urinary and stool incontinence, history of DVTs on chronic AC, chronic pain, probable personality disorder, depression, and anxiety is admitted for worsening pelvic pain and positive blood cx from ED visit on 5/15.     # Bacteremia with blood cx positive for staph epidermidis, staph hominis   # Hx of CRE, VRE, ESBL, MRSA  Repeat blood cx on 5/15 and 5/16 NGTD.  D/w ID, blood cx from 5/14 felt to be contaminant vs colonization.  S/p Zosyn x 1 dose in ED.  UA/UC c/w colonization.  Started on Vancomycin on 5/15.  Continues to be without fevers or leukocytosis.  CRP downtrending.    - Appreciate ID recommendations   - Given blood cx NGTD, will d/c Vancomycin   - Monitor for fevers and resume abx & pan cx if becomes febrile   - Daily CBC, CRP     # Chronic pain 2/2 chronic ulcers, back/mskl chest pain - Worse with attempts to turn/reposition and perform wound cares.  Has old rib fractures and \"bruised ribs\".  On Oxycodone Q4H PRN with custom dosing (5mg for pain 4-6, 10mg for pain 6-10).  Has refused cares due to poorly controlled pain.  Evaluated by Pain Service, partially amenable to recommendations.      - Appreciate Pain Service recommendations, as below  - Increased Oxycodone to 15mg PO Q3H PRN - please offer prior to dressing changes/wound care, repositioning   - Added Flexeril 5mg TID PRN   - Added Tylenol 975mg TID (scheduled) and Gabapentin PLO cream, Pain Service in agreement, however pt has refused "     # Pelvic pain   # Chronic truncal, sacral decubitus ulcers in setting of T4 level paraplegia   # Chronic pressure ulcers on left heel, left upper thigh, right IT   Recently hospitalized 4/10 through 5/8 at [facility] for antibiotics and plastic surgery evaluation.  Currently not surgical candidate due to malnutrition.  Seen by WOCN this admission, refer to note for wound care recs.  D/w Gen Surg for possible debridement, attempted to see patient, pt refused exam/assessment.  Per review of WOCN photos of wounds, wounds felt to be healing well and with good granulation tissue, no apparent s/s infection.  D/w case w/ Plastic Surgery, who recommended follow up OP.    - Appreciate Gen Surg recs  - WOCN consulted, will see weekly while inpatient - please perform dressing changes per WOCN note   - Atmos mattress   - Turn/reposition Q2H   - MVI, Vitamin A, Vitamin C, Zinc x 10 days for wound healing   - Follow up w/ Plastic Surgery OP     # Seizure disorder - Stable. Continue PTA Keppra according to taper.     # Chronic DVT - Continue PTA Xarelto    # COPD - Stable.  Does not require home O2.  Continue PTA nebs.      # Malnutrition on TF -  G-tube placed on 4/25/2018.  Nutrition consult placed for TFs. Prealbumin low at 13, Mag 2.1, Phos 4.3.  On TPN brieftly PTA, now discontinued.    - Marinol BID   - Continue TFs, currently at goal   - Encourage PO intake, protein supplementation     #Hypothyroidism - TSH 23.91 on 5/10/18.  On Synthroid 125mcg daily with questionable compliance.  Has improved since last check on 4/28 w/ TSH 59.4.       - Continue Synthroid at current dose     # Neurogenic bladder with suprapubic catheter  # Urinary, stool incontinence  Pt continues to be w/ increased leakage from catheter, urethra.  Seen by Urology during previous admission, felt that she will eventually need a diverting colostomy with urinary diversion, but likely cannot undergo this procedure until nutritional status  optimized.  Trialed with nephrostomy tubes during recent admission to [place] [facility] which were unsuccessful at providing urinary diversion.  D/w Urology 5/18 due to continued drainage through urethera, will increase Oxybutynin and replace suprapubic catheter.  - Pt agreeable to catheter exchange   - Oxybutynin 15mg Q24h  - Follow up placed for Urology visit as OP     # Hx anxiety, depression, ?personality disorder - Not currently on PTA regimen.  Psych consulted, agreeable to try Seroquel 25mg BID and will adjust dosing pending effect.   - Increased Seroquel to 50mg QHS per Psychiatry recs  - Continue Seroquel 25mg QAM       # Pain Assessment:  Current Pain Score 5/18/2018   Patient currently in pain? yes   Pain score (0-10) -   Pain location Rib cage   Pain descriptors -       Diet: Combination Diet Regular Diet Adult  Room Service  Adult Formula Drip Feeding: Specified Time TwoCal HN; Gastrostomy; Goal Rate: 50; mL/hr; From: 8:00 PM; 8:00 AM; Medication - Tube Feeding Flush Frequency: At least 15-30 mL water before and after medication administration and with tube clogging; ...  Snacks/Supplements Pediatric: Ensure Plus; Between Meals  Calorie Counts  Fluids: PO   DVT Prophylaxis: On Xarelto PTA  Code Status: Full Code    Disposition Plan   Expected discharge: Tomorrow, recommended to prior living arrangement when bed available.       Entered: Caryl Goel 05/18/2018, 3:20 PM   Information in the above section will display in the discharge planner report.      The patient's care was discussed with the Attending Physician, Dr. Guerrero.    Caryl Goel  Internal Medicine Staff Hospitalist Service  [facility]  Pager: [phone]  Please see sticky note for cross cover information    Interval History   Marychuy is resting in bed.  She reports chronic rib pain.  Able to partially turn and reposition.  Agitated.  Denies fevers, chills, abdominal pain, chest pain, and dyspnea.       Data reviewed today:  I reviewed all medications, new labs and imaging results over the last 24 hours.     Physical Exam   Vital Signs:                    Weight: 186 lbs 11.2 oz    GENERAL: Alert and oriented x 3. Agitated.   HEENT: Normocephalic, atraumatic. Anicteric sclera. Mucous membranes moist.   Respiratory: Lungs CTAB.   Cardio: RRR, S1, S2.  No murmurs.   SKIN: Erythematous patches of excoriation on buttocks and backs of thighs          Data   Medications     IV fluid REPLACEMENT ONLY       - MEDICATION INSTRUCTIONS -         acetaminophen  975 mg Oral TID     ascorbic acid  500 mg Oral Daily     dronabinol  2.5 mg Oral BID     ketamine (KETALAR) 5%-gabapentin (NEURONTIN) 8%   Topical TID     levETIRAcetam  250 mg Oral QPM     levothyroxine (SYNTHROID/LEVOTHROID) tablet 125 mcg  125 mcg Oral QAM AC     loperamide  4 mg Oral 4x Daily     miconazole   Topical BID     multivitamins with minerals  15 mL Per Feeding Tube Daily     omeprazole (priLOSEC) CR capsule 20 mg  20 mg Oral QAM AC     oxybutynin  15 mg Oral At Bedtime     protein modular  1 packet Per Feeding Tube TID     [START ON 5/19/2018] QUEtiapine  25 mg Oral Daily     QUEtiapine  50 mg Oral At Bedtime     rivaroxaban ANTICOAGULANT  20 mg Oral At Bedtime     triamcinolone   Topical TID     vitamin A  20,000 Units Oral Daily     zinc sulfate  220 mg Oral Daily     Data     Recent Labs  Lab 05/18/18  0800 05/17/18  0543 05/16/18  0713 05/15/18  2029 05/14/18  1754   WBC 9.2 7.8 8.0 10.0 12.0*   HGB 7.3* 7.7* 7.8* 8.4* 7.9*   MCV 84 83 85 84 84    457* 430 470* 474*   NA  --  139 137 136 138   POTASSIUM  --  4.1 4.4 4.6 4.0   CHLORIDE  --  105 105 101 101   CO2  --  27 25 28 29   BUN  --  13 13 14 14   CR  --  0.54 0.57 0.54 0.54   ANIONGAP  --  7 8 7 7   NATA  --  8.2* 8.7 9.0 8.5   GLC  --  123* 108* 95 101*   ALBUMIN  --   --   --  2.4* 2.2*   PROTTOTAL  --   --   --  8.8 8.3   BILITOTAL  --   --   --  0.1* 0.2   ALKPHOS  --   --   --  195* 199*   ALT  --   --    --  15 15   AST  --   --   --  16 17     No results found for this or any previous visit (from the past 24 hour(s)).

## 2018-05-18 NOTE — PROGRESS NOTES
Social Work Services Discharge Note      Patient Name:  Marychuy Zhou     Anticipated Discharge Date:  5/18/18    Discharge Disposition:   TCU:  [facility]- (Main Ph: [phone], Admissions Ph: [phone], Fax: [phone])     Following MD:  facility assignment     Pre-Admission Screening (PAS) online form has been completed.  The Level of Care (LOC) is:  Determined  Confirmation Code is:  NA return to facility  Patient/caregiver informed of referral to [facility] for Pre-Admission Screening for skilled nursing facility (SNF) placement and to expect a phone call post discharge from SNF.     Additional Services/Equipment Arranged:  Transport TBD on Saturday via weekend sw facilitation, pager for weekend sw is [phone]     Patient / Family response to discharge plan:  in agreement patient has a bed hold at facility     Persons notified of above discharge plan:  Patient, RN, MD, facility    Staff Discharge Instructions:  Please fax discharge orders and signed hard scripts for any controlled substances.  Please print a packet and send with patient.     CTS Handoff completed:  YES    Medicare Notice of Rights provided to the patient/family:  YES    Elly BATEMAN, MSW  5B  (Medical/Surgical)  Phone: [phone]  Pager: [phone]

## 2018-05-18 NOTE — PLAN OF CARE
Problem: Patient Care Overview  Goal: Plan of Care/Patient Progress Review  Outcome: No Change  D/I/A: Patient A & O X 3, continue request Oxycodone for pain. Patient refusing reposition and laying on her left side all day. Writer changed S/P cathter, with 16FR , balloon size 10 ml , S/P with 100cc urine return and urine sample sent. After S/P changed leaking decreased . Total urine out put is 350. Call light in reach. Continue monitor closely and update MD with concerns.

## 2018-05-18 NOTE — PLAN OF CARE
Problem: Patient Care Overview  Goal: Plan of Care/Patient Progress Review  Outcome: No Change  D/I/A: Patient A & O X 3, refused Vital,  C/o rib pain and received Oxycodone 15 mg po and Valium 4mg po x 1 .  Incontinent of urine consistently continues to refuse repositioning . Educated on repositioning and skin integrity.  Pt continues to be  irritable. Suprapubic catheter in place but bag is empty, however, there is urine incontinent and bed is wet  Urine is leaking on the floor ,but patient refuses to change  Line because she does not want to be  Interrupted, Patient reported it is my right to refuse the care  .Writer educated patient risk of infections when laying in urine . With multiple approach writer able to change dressing on coccyx per protocol. Right PICC lumen patent,  LR infusing @ 100 mL/hr . Continue monitor closely and update MD with change.

## 2018-05-19 NOTE — PROGRESS NOTES
Calorie Counts    Intake recorded for: 5/18 Kcal: 0  Protein: 0g    # meals: No food intake recorded.     # supplements: 0

## 2018-05-19 NOTE — PROGRESS NOTES
"Wound Care/ Incontinence:  Patient called to say that urine was \"burning in my open wound\" and asking for dressings to be changed around 1850. Writer and herveat Amelia Samano RN present at bedside to change coccyx and thigh dressings. Patient lying on bedding that is soaked in urine and she has refused for the last several hours to have the wet bedding changed. Saying 'I can't turn; my ribs are broken.\" She is very particular with cares and verbally abusive to nursing staff. Nursing staff changed dressings on coccyx and posterior thigh according to patient's directions. Gently pushed urine soaked sheets through to remove. She would only let us push clean chux pads FDC under her and no sheets. Perineum is very red and excoriated; she had wet urine soaked maxi pads there. Perineal done and new maxi pads placed although she did not want any barrier cream. Wanted barrier cream on buttocks which was done. 2 RN's spent over 50 minutes doing above cares.  "

## 2018-05-19 NOTE — PLAN OF CARE
Pt discharged back to [facility] ay 1630 via [facility] stretcher. Purple jacket sent with pt, no other belongings in room. GJ tube clamped, nephrostomy tube intact. All other lines removed. Report called by day nurse.

## 2018-05-19 NOTE — PLAN OF CARE
Problem: Patient Care Overview  Goal: Plan of Care/Patient Progress Review  Outcome: Improving  D: Report called to [facility], pm RN to notify them when she leaved. All IV's out, GT flushed. Pain to rib, abdomen controlled with oral pain meds. Dressing to buttocks changed earlier. She refused to turn to other side for assessment. She was notified of 1600 discharge time. She has tolerated liquid intake orally and small bites of food.P: Discharge this afternoon, monitor and provide cares as able.

## 2018-05-19 NOTE — PLAN OF CARE
Problem: Patient Care Overview  Goal: Plan of Care/Patient Progress Review  Outcome: No Change  The ANS did come to speak with the patient, but then the patient told her to go away because she wanted to rest. The patient never asked for the ANS to return. All medications that the patient wanted to take were given. The patient requested that the maxi-pad that she had between her legs be changed. The CN changed it and it was dry, no urethral urine leakage. Continue with current plan of nursing care, and update MD with concerns as needed. Patient may discharge back to her long term care facility today.
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
## 2018-05-19 NOTE — PROGRESS NOTES
Clinical   On Call     Instructed by weekday SW per their discharge note from 5.18.18 to set up ride for patient to discharge back to [facility] TCU today 5/19/2018. Per medical team patient is ready to go back. Medical team wondered if the PICC line should be removed prior to discharge of if the facility was using it - writer spoke with RN at [facility] [phone] and he noted that they are only flushing the PICC line not using it for anything - updated NP.    Ride set up for 1600 today via [facility] ride - updated NP and Charge RN. Milton also updated about ride time.    Please call RN to RN report to [phone]    Please fax discharge orders and signed hard scripts for any controlled substances to [phone].    Monica MARIE LICSW  5/19/2018    ON CALL PAGER   0800 - 1600   [phone]    ON CALL COVERAGE AFTER 1600  [phone]

## 2018-05-19 NOTE — PLAN OF CARE
"Care from [id number]:     Pt a&o x 4. VSS ex hypotension, appears to be baseline for pt. Pt did not allow RN to take temperature, stating \"I do not have a fever.\" TF started at goal rate of 50 mL/hr at 2030. Pt refused many scheduled medications and took others. 600 mL catheter output. Pt generally refusing care, unwilling to cooperate with attempts to change soiled bedding, incontinence pads. Pt verbally abusive to staff several times this evening. Pt requests clustered care and few interruptions to sleep.   "

## 2018-05-22 NOTE — ED AVS SNAPSHOT
[facility], [place], Emergency Department    [address] [place] [id number]    Phone:  [phone]                                       Marychuy Zhou   MRN: [id number]    Department:  [facility], Emergency Department   Date of Visit:  5/22/2018           After Visit Summary Signature Page     I have received my discharge instructions, and my questions have been answered. I have discussed any challenges I see with this plan with the nurse or doctor.    ..........................................................................................................................................  Patient/Patient Representative Signature      ..........................................................................................................................................  Patient Representative Print Name and Relationship to Patient    ..................................................               ................................................  Date                                            Time    ..........................................................................................................................................  Reviewed by Signature/Title    ...................................................              ..............................................  Date                                                            Time

## 2018-05-22 NOTE — ED NOTES
Patient demands that she is septic, but is refusing all blood draws/IV/vascular access. She states that she will only allow someone to put a PICC line in.

## 2018-05-22 NOTE — ED PROVIDER NOTES
History     Chief Complaint   Patient presents with     Gtube Problem     HPI  Marychuy Zhou is a 62 year old female with a history of T4 paraplegia complicated by chronic decubital ulcer with history of osteomyelitis, as well as neurogenic bladder with recurrent UTI s/p suprapubic catheter in place, COPD, recurrent DVTs, on Xarelto, hypertension, and malnutrition with a G-tube in place who presents to the ER from TCU for evaluation of abdominal pain with concern for a G-tube problem.  Of note, the patient was hospitalized here 5/15-5/19 for bacteremia, ultimately this was felt to be secondary to contamination.  She was not discharged on antibiotics.     The patient reports that yesterday afternoon, after her a nurse at her nursing facility attempted to adjust her G-tube, she developed pain in her abdomen at the site of her G-tube.  She states that since onset the pain has been worsening.  She states also that this morning she had noted brown liquid soaking her bed sheets, which had apparently come from the G-tube tube feeds.  The patient denies a history of the symptoms.  She is states that she has had no trauma involving her G-tube.  The patient denies any fevers or any vomiting.  She does report some nausea, as well as associated abdominal distention.  She is not currently on antibiotics.  The patient reports a history of recurrent UTI.  She states that with UTI she develops a sharp pain in her pelvis.  She denies any such pain presently.  She denies change in bowel habit as well.  The patient does report onset of a migraine this afternoon.  She denies any myalgias or other concerns.  The patient has had no recent medication changes.  The patient does report a history of chronic pain as well and for this takes oxycodone 50 mg 3 times daily.  She states that her chronic pain is predominantly in her upper back, between her shoulder blades.    No current facility-administered medications for this encounter.       Current Outpatient Prescriptions   Medication     ascorbic acid 500 MG TABS     cyclobenzaprine (FLEXERIL) 5 MG tablet     diazepam (VALIUM) 2 MG tablet     dronabinol (MARINOL) 2.5 MG capsule     ipratropium - albuterol 0.5 mg/2.5 mg/3 mL (DUONEB) 0.5-2.5 (3) MG/3ML neb solution     levETIRAcetam (KEPPRA) 250 MG tablet     LEVOTHYROXINE SODIUM PO     loperamide (IMODIUM) 2 MG capsule     miconazole (MICATIN) 2 % AERP powder     mineral oil-hydrophilic petrolatum (AQUAPHOR)     multivitamins with minerals (CERTAVITE/CEROVITE) LIQD liquid     OMEPRAZOLE PO     ondansetron (ZOFRAN ODT) 4 MG ODT tab     oxybutynin 15 MG TB24     oxyCODONE IR (ROXICODONE) 15 MG tablet     Pollen Extracts (PROSTAT PO)     protein modular (PROSOURCE TF)     QUEtiapine (SEROQUEL) 25 MG tablet     QUEtiapine (SEROQUEL) 50 MG tablet     Rivaroxaban (XARELTO PO)     triamcinolone (KENALOG) 0.1 % cream     vitamin A 64466 UNIT capsule     zinc sulfate (ZINCATE) 220 (50 Zn) MG capsule     Past Medical History:   Diagnosis Date     Anxiety      Chronic pain syndrome      Closed fracture zygoma, with routine healing, subsequent encounter      COPD (chronic obstructive pulmonary disease) (H)      Depressive disorder      DVT (deep vein thrombosis) in pregnancy (H)      Edema      Epilepsy (H)      Flaccid neuropathic bladder, not elsewhere classified      Fracture of femur (H)      Hypothyroidism      Iron deficiency anemia      Paraplegia (H)     unspecified     Pressure ulcer of sacral region      PTSD (post-traumatic stress disorder)      Rheumatoid arthritis (H)      Sepsis (H)     unspecified       No past surgical history on file.    Family History   Problem Relation Age of Onset     Chronic Obstructive Pulmonary Disease Brother        Social History   Substance Use Topics     Smoking status: Never Smoker     Smokeless tobacco: Never Used     Alcohol use No     No Known Allergies    I have reviewed the Medications, Allergies, Past  "Medical and Surgical History, and Social History in the Epic system.    Review of Systems   Constitutional: Negative for fever.   Respiratory: Negative for shortness of breath.    Cardiovascular: Negative for chest pain.   Gastrointestinal: Positive for abdominal distention, abdominal pain and nausea. Negative for vomiting.        G-tube leakage   All other systems reviewed and are negative.      Physical Exam   BP: 91/69  Pulse: 99  Heart Rate: 91  Temp: 98.4  F (36.9  C)  Resp: 18  Height: 157.5 cm (5' 2\")  SpO2: 98 %      Physical Exam   General: awake, alert, NAD  Head: normal cephalic  HEENT: pupils equal, conjugate gaze in tact.  Mucous membranes appear moist.  Neck: Supple  CV: regular rate and rhythm without murmur  Lungs: Diffuse scattered wheezes without rales, rhonchi, or crackles. No increased work of breathing.  Abd: Patient has diffuse tenderness of her abdomen, maximal around the G-tube site. G-tube tract in place surrounding skin appears clean and dry without surrounding erythema, induration, or fluctuance.  No drainage coming from the tract.  No guarding or peritoneal signs  EXT: lower extremities without swelling or edema  Neuro: awake, answers questions appropriately.  Patient is paraplegic without function of her lower extremities.  Skin: Large, deep, sacral ulcer with good granulation tissue, no significant purulent discharge, no surrounding cellulitic changes        ED Course     ED Course     Procedures             EKG Interpretation:      Interpreted by Michele Stout  Time reviewed: 1626  Symptoms at time of EKG: abdominal pain   Rhythm: normal sinus   Rate: normal  Axis: normal  Ectopy: none  Conduction: normal  ST Segments/ T Waves: No ST-T wave changes  Q Waves: none  Comparison to prior: Unchanged    Clinical Impression: normal EKG           Labs Ordered and Resulted from Time of ED Arrival Up to the Time of Departure from the ED   CBC WITH PLATELETS DIFFERENTIAL - Abnormal; Notable for " the following:        Result Value    RBC Count 2.99 (*)     Hemoglobin 6.9 (*)     Hematocrit 24.1 (*)     MCH 23.1 (*)     MCHC 28.6 (*)     RDW 19.2 (*)     All other components within normal limits   COMPREHENSIVE METABOLIC PANEL - Abnormal; Notable for the following:     Calcium 8.3 (*)     Albumin 2.0 (*)     Alkaline Phosphatase 212 (*)     All other components within normal limits   LIPASE - Abnormal; Notable for the following:     Lipase 40 (*)     All other components within normal limits   ROUTINE UA WITH MICROSCOPIC - Abnormal; Notable for the following:     Leukocyte Esterase Urine Large (*)     WBC Urine 10 (*)     RBC Urine 3 (*)     All other components within normal limits   TROPONIN I   LACTIC ACID WHOLE BLOOD   PERIPHERAL IV CATHETER   URINE CULTURE AEROBIC BACTERIAL            Assessments & Plan (with Medical Decision Making)   Marychuy is a 62-year-old female with chronical medical conditions who presents with concern that her G-tube is and is functioning also complaining of significant pain surrounding her G-tube site.  Differential would include infection, abscess, dislodgment with resulting tube feeds into the peritoneum, gastritis or other acute intra-abdominal pathology.    On physical exam she is well-appearing.  Nontoxic.  Patient has soft blood pressures though this appears to be baseline chart review.  Is quite tender in her epigastrium.  Initial evaluation will include labs and imaging.  Remainder of her vitals are normal including afebrile.    Labs are notable for a normal white count, no left shift. Normal lactate.  Patient does have an anemia with a hemoglobin of 6.9 though this appears quite close to baseline does not represent an acute drop, I feel patient can follow-up with her primary care provider regarding if/ when to transfuse given the chronicity of her anemia.  Patient's electrolytes, liver studies, and lipase are unremarkable.  She does have an elevated alk phos of this is  been chronically elevated in the past.      CT abdomen pelvis failed to identify any abnormalities that could be causing patient's pain and discomfort.  Gastrostomy balloon is within the stomach and the injected contrast enters the stomach and duodenum without evidence of leak.  At this point patient has had a negative evaluation.  Do not have reason that she is having pain at her G-tube site but I think she can discharge home.  She should return to the emergency department for any new or worsening condition.  Otherwise she should follow-up with her primary care regarding her chronic anemia.    Patient understands she needs follow-up with her primary care provider regarding the chronic anemia.  CT scan concerning for infection versus aspiration in lower lung fields however patient denies cough, shortness of breath, or other signs or symptoms that she typically gets with pneumonia per her report.  She will watch for these and return to the emergency department if she were to develop any of these symptoms.    I have reviewed the nursing notes.    I have reviewed the findings, diagnosis, plan and need for follow up with the patient.    Discharge Medication List as of 5/22/2018  9:25 PM          Final diagnoses:   Abdominal pain, epigastric     I, Josh Narvaez, am serving as a trained medical scribe to document services personally performed by Michele Stout MD, based on the provider's statements to me.      I, Michele Stout MD, was physically present and have reviewed and verified the accuracy of this note documented by Josh Narvaez.    5/22/2018   [facility], EMERGENCY DEPARTMENT      Addendum: The patient was on her way back to the care facility EMS expressed concern that her O2 sats were dropping to 85.  Reported this to the nurse apparently she asked about waveform, whether the blood pressure cuff was running on that side, etc, per nursing EMS reported they had not checked these things, EMS reports to me that they  had and these were good readings with good wave form. They also stated later she went down to 83% while sleeping but then came back into the 90s as soon as she woke up and spoke to them.  Of note patient had normal O2s throughout her ER course.    I went and reevaluated the patient.  No increased work of breathing.  She was sleeping upon my arrival with O2 sats around 90-92% with good wave form, up to 95-97% quickly with arousal and talking.  Again she denied chest pain, shortness of breath, cough, or other URI or respiratory symptoms.  I observe patient for several minutes at bedside.  She had no episodes Desatting even after she fell back asleep. I suspect the hypoxic readings EMS saw were either related to poor waveform versus some undiagnosed sleep apnea.  Patient does know if she would develop any respiratory symptoms she would need to return to the emergency department immediately for concern for pneumonia.  Otherwise she can follow-up with primary care would recommend sleep study as an outpatient.     Michele Stout MD  05/22/18 [id number]

## 2018-05-23 NOTE — DISCHARGE INSTRUCTIONS
TODAY'S VISIT:  You were seen today for abdominal pain.  Negative evaluation for acute intra-abdominal pathology.  -     FOLLOW-UP:  Please make an appointment to follow up with:  - Your Primary Care Provider as soon as possible regarding your chronic anemia, your hemoglobin has dropped to below 7 and may need transfusion.    PRESCRIPTIONS / MEDICATIONS:  -    OTHER INSTRUCTIONS:  -Please return to the emergency department if you develop cough, shortness of breath for there was concern of developing pneumonia near chest CT.  If you develop new or worsening abdominal pain please return to the emergency department.     RETURN TO THE EMERGENCY DEPARTMENT  Return to the Emergency Department at any time for new/worsening symptoms.

## 2018-05-23 NOTE — TELEPHONE ENCOUNTER
MTM referral from: Transitions of Care (recent hospital discharge or ED visit)    MTM referral outreach attempt #1 on May 23, 2018 at 10:35 AM      Outcome: Patient is not interested at this time because she was d/c to a nursing facility, will route to MTM Pharmacist/Provider as an FYI. Thank you for the referral.     eBn Young, MTM Coordinator

## 2018-05-23 NOTE — PROGRESS NOTES
[facility]  PRIMARY CARE PROVIDER AND CLINIC:  [facility] [address] / [facility] [address]  Chief Complaint   Patient presents with     Hospital F/U     [place] Medical Record Number:  [id number]    HPI:    Marychuy Zhou is a 62 year old  (1955),admitted to the [facility] from [facility].  Hospital stay 5/15/18 through 5/19/18.  Admitted to this facility for  rehab, medical management and nursing care.  Resident was sent to [facility] ER on 5/22, and returned same day.     Hospital Course per [facility] Discharge Summary 5/19/18:    Marychuy Zhou was admitted on 5/15/2018 for positive blood cultures concerning for possible staph bacteremia.  The following problems were addressed during her hospitalization:     # Suspected bacteremia w/ blood cx positive for Staph epidermis, Staph hominis   # Hypotension  # Hx CRE, VRE, ESBL, MRSA  Pt initially presented to the ED on 5/14 with pelvic pain and concern for sepsis.  She was discharged home and returned to the ED on 5/15 due to increased pain and positive blood cx from 5/14.  Repeat cx were drawn on admission and she was treated w/ Vancomycin 5/15-5/18.  Blood cx from 5/15 and 5/16 negative to date.  Initial cultures felt to be contaminant vs colonization. Vancomycin discontinued on 5/18, stable overnight off antibiotics.  UC positive for Proteus and VRE, though <10K colonies.  Asymptomatic.  She was noted to be intermittently hypotensive, with SBPs in 80-90s.  Note that it is challenging to establish BL BP for her this admission due to refusal of routine vitals.  Per chart review and discussion w/ patient, this tends to be her baseline, despite PO/IV fluids and increased PO intake.  Of note, she was admitted with a PICC line still in place, appears to have been placed on 4/21/18 for TPN administration.  TPN has since been stopped and she had G tube placed for bolus tube feeds.  PICC line  removed on 5/19 as no longer needed.         # Acute on chronic pain 2/2 chronic decubitus ulcers, back/musculoskeletal chest pain, pelvic pain    # Chronic truncal, sacral decubitus ulcer (stage 3) in setting of T4 level paraplegia   # Chronic pressure ulcers on left heel (unstageable), left upper thigh (stage 3), right IT (stage 4)  # Chronic osteomyelitis of sacrum    Pt admitted with increased pain, prompting initial concern for wounds as possible source of sepsis.  She was seen and evaluated by WOCN care and Gen Surgery for possible wound debridement.  At this time, wounds felt to be w/ good granulation tissue and without obvious signs or symptoms of active infection.  Pt refused recommendations for pulsate matters and frequent turning.  Progression of wound healing is overall complicated by the patient's current state of malnutrition and limited mobility, but her refusal of nursing cares has impacted wound healing as well.  She is incontinent and will lay in urine-soaked sheets despite nurses' attempts to change her linens, sending nurses away or becoming verbally abuse towards them.  For now, recommend focus on continuing wound care and frequent assessment.  Inpatient WOCN recommendations as below.  Continue optimizing nutrition with PO intake and tube feeds and vitamin supplementation.  Of note, Pt most recently hospitalized at [facility] from 4/10/18 to 5/8/18 with similar presentation and at that time, was seeking evaluation from Plastic Surgery.  She was seen by Dr. Pelletier, who at that time, felt that her nutritional status was too tenuous to allow for surgery.  Additionally, pt will likely need possible urinary diversion and diverting colostomy prior to undergoing flap surgery for her chronic wounds; this should be address as nutrition status is optimized.    Wound care (current inpt regimen per WOCN):   Left heel wound -  wash every other day with wound cleanser and gauze. Apply a thick layer  "of Medihoney (order #[id number]) to wound bed and cover with Mepilex 4x4. If dressing rolls at edges, OK to use Primapore tape to keep in place.  Left upper thigh wound:wash daily with wound cleanser and gauze. Cover with Mepilex 4x4.  Right IT wound: wash BID and as needed if soiled with urine or stool with wound cleanser and gauze. Pack wound to entire depth (7 cm) with Kerlix dampened with saline. Cover with Mepilex 4x4.  Left sacral wound: wash daily with wound cleanser and gauze. Cover with Mepilex 4x4.   Bilateral PNT tube sites: healed, dressing no longer indicated  Buttock, perineum, upper thighs IAD: wash twice daily and with each episode of incontinence with Greta Cleanse and Protect and a soft cloth. Apply a thick layer of Criticaide Paste to skin. Do not attempt to remove Criticaide Paste with each episode of incontinence, just wipe of soiled paste and reapply. To remove all Chriss, use baby/mineral oil and a soft cloth. Due to large amount of urine being diverted past suprapubic catheter and out urethra, please change Covidon sheet with position changes every 2 hours to keep dry.     # Chronic pain - Seen by Pain Service this admission for adjustment to pain regimen.  Patient initially refusing dressing changes and wound cares due to complaints of uncontrolled pain.  Patient refused scheduled Tylenol and topical Gabapentin PLO cream for \"bruised ribs\" but agreeable to Oxycodone 15mg Q3H PRN and Flexeril 5mg TID PRN.  Please offer this regimen prior to dressing changes, wound cares, and repositioning.       # Seizure disorder - Stable, no seizures this admission.  Appears to have been on dual therapay with Lamictal and Keppra as recently as 4/22/18.  Lamictal discontinued around end of April due to pt refusal.  Had been on Keppra taper, going from BID to daily dosing.  Will continue pt on Keppra 250mg daily and defer further management to PCP on OP follow up.       # Chronic DVTs - Continue PTA Xarelto.       # " COPD - Stable, no evidence of hypoxia or respiratory decompensation this admission.  Continue home regimen with DuoNebs.     # Malnutrition on TF - TF infusing in G tube without issue.  Evaluated by Nutrition and continued on Cycle TF, TwoCal HN @ 50 mL/hr x 12 hours overnight from 8:00 pm-8:00am with 1200 kcals (23 kcal/kg/day), 50 gm protein (0.9 gm/kg/day), 420 mL H2O, 132 gm CHO and 3.3 gm Fiber daily.  Started on 10 day course of Certavite, Vitamin C, Vitamin A, and Zinc.  Given need to optimize nutritional status in setting of impending surgeries, have started pt on low dose Marinol 2.5mg BID before meal times.       # Hypothyroidism - TSH 23.91 on 5/10/18.  On Synthroid 125mcg daily with questionable compliance.  Has improved since last check on 4/28 w/ TSH 59.4. Continue PTA dose Synthroid and rechecking TSH in 6-8 weeks.        # Neurogenic bladder w/ suprapubic catheter   # Urinary, stool incontinence   Pt w/ increased leakage from catheter and urethra this admission.  Seen by Urology during previous admissions, felt that she will eventually need a diverting colostomy with urinary diversion, but cannot undergo this procedure until nutritional status optimized.  Trialed with nephrostomy tubes during recent admission to [place] which were unsuccessful at providing urinary diversion and were removed.  D/w Urology this admission given ongoing incontinence and urethral leakage.  Exchanged suprapubic catheter and started Oxybutynin 15mg daily.   to arrange OP Urology follow up.        # Hx anxiety, depression, ?personality disorder - Seen by Psych this admission due to apparent behavior dysregulation and agitation.  Concern for depression and poor coping due to burden of chronic illnesses.  Pt agreeable to trial of Zyprexa, with the dual effect of appetite stimulation.  Continue Zyprexa 25mg QAM and 50mg QHS.  If morning somnolence, can do Zyprexa 25mg BID.       # Discharge Pain Plan:   - During her  hospitalization, Marychuy experienced pain due to chronic ulcers and chronic musculoskeletal pain.  The pain plan for discharge was discussed with Marychuy and the plan was created in a collaborative fashion.    - Opioids prescribed on discharge: Oxycodone 15mg PO Q3H PRN  - Duration of opioids after discharge: Discharged with short supply 15 days as pt d/c to TCU   - Bowel regimen: not needed due to chronic diarrhea on TFs            HPI information obtained from: facility chart records, facility staff and [facility] chart review.        Unable to find patient, she has been out all morning. According to nursing staff, patient has been refusing tube feeding, air mattress, dressing changes, medications at times, repositioning. She has developed 2 new pressure areas, stage 1 on R hip, stage 2 on left IT.    Current issues are:      Bacteremia  Hypotension, unspecified hypotension type  Since re-admitting 5/19, BP readings range:  108/64  112/77    History of MRSA infection  Other chronic pain  Paraplegia at T4 level (H)  Decubitus ulcer of sacral region, unspecified ulcer stage  Multiple wounds  Osteomyelitis, unspecified site, unspecified type (H)  Seizure disorder (H)  Chronic obstructive pulmonary disease, unspecified COPD type (H)  Hypothyroidism, unspecified type  TSH   Date Value Ref Range Status   05/10/2018 23.91 (H) 0.30 - 5.00 uIU/mL Final   Current regimen Levothyroxine 125mcg po qday.     Physical deconditioning  Neurogenic bladder  Urinary incontinence, unspecified type  Personal history of DVT (deep vein thrombosis)  Anxiety with depression        CODE STATUS/ADVANCE DIRECTIVES DISCUSSION:   CPR/Full code   Patient's living condition: homeless    ALLERGIES:Review of patient's allergies indicates no known allergies.  PAST MEDICAL HISTORY:  has a past medical history of Anxiety; Chronic pain syndrome; Closed fracture zygoma, with routine healing, subsequent encounter; COPD (chronic obstructive pulmonary  disease) (H); Depressive disorder; DVT (deep vein thrombosis) in pregnancy (H); Edema; Epilepsy (H); Flaccid neuropathic bladder, not elsewhere classified; Fracture of femur (H); Hypothyroidism; Iron deficiency anemia; Paraplegia (H); Pressure ulcer of sacral region; PTSD (post-traumatic stress disorder); Rheumatoid arthritis (H); and Sepsis (H).  PAST SURGICAL HISTORY:  has no past surgical history on file.  FAMILY HISTORY: family history includes Chronic Obstructive Pulmonary Disease in her brother.  SOCIAL HISTORY:  reports that she has never smoked. She has never used smokeless tobacco. She reports that she does not drink alcohol or use illicit drugs.    Post Discharge Medication Reconciliation Status: discharge medications reconciled, continue medications without change.  Current Outpatient Prescriptions   Medication Sig Dispense Refill     ascorbic acid 500 MG TABS Take 1 tablet (500 mg) by mouth daily for 7 days       cyclobenzaprine (FLEXERIL) 5 MG tablet Take 1 tablet (5 mg) by mouth 3 times daily as needed for muscle spasms 42 tablet      diazepam (VALIUM) 2 MG tablet Take 2 tablets (4 mg) by mouth every 6 hours as needed for anxiety 60 tablet      dronabinol (MARINOL) 2.5 MG capsule Take 1 capsule (2.5 mg) by mouth 2 times daily (before meals)  0     ipratropium - albuterol 0.5 mg/2.5 mg/3 mL (DUONEB) 0.5-2.5 (3) MG/3ML neb solution Take 1 vial (3 mLs) by nebulization every 4 hours as needed for wheezing 360 mL      LEVOTHYROXINE SODIUM PO Take 125 mcg by mouth every morning       loperamide (IMODIUM) 2 MG capsule Take 2 capsules (4 mg) by mouth 4 times daily 20 capsule      miconazole (MICATIN) 2 % AERP powder Apply topically 2 times daily Apply to groin folds       mineral oil-hydrophilic petrolatum (AQUAPHOR) Apply topically daily Apply to lower extremities for skin irritation.       multivitamins with minerals (CERTAVITE/CEROVITE) LIQD liquid 15 mLs by Per Feeding Tube route daily       OMEPRAZOLE PO  Take 20 mg by mouth daily (with breakfast)       ondansetron (ZOFRAN ODT) 4 MG ODT tab Take 1 tablet (4 mg) by mouth every 6 hours as needed for nausea 20 tablet 1     oxybutynin 15 MG TB24 Take 1 tablet (15 mg) by mouth At Bedtime 30 tablet      oxyCODONE IR (ROXICODONE) 15 MG tablet Take 1 tablet (15 mg) by mouth every 3 hours as needed for moderate to severe pain 120 tablet 0     Pollen Extracts (PROSTAT PO) 1 oz daily       protein modular (PROSOURCE TF) 1 packet by Per Feeding Tube route 3 times daily       QUEtiapine (SEROQUEL) 25 MG tablet Take 1 tablet (25 mg) by mouth daily 60 tablet      QUEtiapine (SEROQUEL) 50 MG tablet Take 1 tablet (50 mg) by mouth At Bedtime 120 tablet      Rivaroxaban (XARELTO PO) Take 20 mg by mouth At Bedtime       triamcinolone (KENALOG) 0.1 % cream Apply topically 3 times daily       vitamin A 02263 UNIT capsule Take 2 capsules (20,000 Units) by mouth daily for 7 days 14 capsule 0     zinc sulfate (ZINCATE) 220 (50 Zn) MG capsule Take 1 capsule (220 mg) by mouth daily for 7 days 7 capsule 0     levETIRAcetam (KEPPRA) 250 MG tablet Take 1 tablet (250 mg) by mouth every evening Until 4/7/2018 then decrease to 250mg BID 4/8/2018-4/21/2018, then decrease to 250mg QAM 4/22-5/5/2018 (Patient taking differently: Take 250 mg by mouth 2 times daily )         ROS:  Unable to locate patient    Exam:  /64  Pulse 104  Temp 97.9  F (36.6  C)  Resp 18  Wt 186 lb (84.4 kg)  SpO2 96%  BMI 34.02 kg/m2  Unable    Lab/Diagnostic data:    CBC RESULTS:   Recent Labs   Lab Test  05/22/18 1708 05/18/18   0800   WBC  9.3  9.2   RBC  2.99*  3.10*   HGB  6.9*  7.3*   HCT  24.1*  25.9*   MCV  81  84   MCH  23.1*  23.5*   MCHC  28.6*  28.2*   RDW  19.2*  18.6*   PLT  409  365       Last Basic Metabolic Panel:  Recent Labs   Lab Test  05/22/18 1708 05/17/18   0543   NA  136  139   POTASSIUM  3.8  4.1   CHLORIDE  101  105   NATA  8.3*  8.2*   CO2  29  27   BUN  10  13   CR  0.60  0.54   GLC   92  123*       Liver Function Studies -   Recent Labs   Lab Test  05/22/18   1708  05/15/18   2029   PROTTOTAL  7.8  8.8   ALBUMIN  2.0*  2.4*   BILITOTAL  0.2  0.1*   ALKPHOS  212*  195*   AST  9  16   ALT  12  15       TSH   Date Value Ref Range Status   05/10/2018 23.91 (H) 0.30 - 5.00 uIU/mL Final   04/28/2018 59.47 (H) 0.40 - 4.00 mU/L Final     Lab Results   Component Value Date    INR 1.80 05/10/2018    INR 1.50 05/07/2018    INR 1.30 04/30/2018    INR 1.42 04/24/2018    INR 1.17 04/23/2018    INR 1.68 04/22/2018    INR 1.83 04/21/2018    INR 1.89 04/11/2018       ASSESSMENT/PLAN:  (R78.81) Bacteremia  (primary encounter diagnosis)  (I95.9) Hypotension, unspecified hypotension type  (Z86.14) History of MRSA infection  (G89.29) Other chronic pain  (G83.9) Paraplegia at T4 level (H)  (L89.159) Decubitus ulcer of sacral region, unspecified ulcer stage  (T07.XXXA) Multiple wounds  (M86.9) Osteomyelitis, unspecified site, unspecified type (H)  (G40.909) Seizure disorder (H)  (J44.9) Chronic obstructive pulmonary disease, unspecified COPD type (H)  (E03.9) Hypothyroidism, unspecified type  (R53.81) Physical deconditioning  (N31.9) Neurogenic bladder  (R32) Urinary incontinence, unspecified type  (Z86.718) Personal history of DVT (deep vein thrombosis)  (F41.8) Anxiety with depression    Comment: Noncompliant with most treatment, nursing care. She is putting herself at high risk for rehospitalization, further decline in wounds, malnutrition, infection, even death. Staff will continue to do their best in treating her and educating her, but prognosis is poor if she does not cooperate.        Electronically signed by:  JOSEPH Montoay [facility]  Pager: [phone]

## 2018-05-23 NOTE — ED NOTES
EMS expressed concerned that patient has O2 sats in upper 80's on their arrival, however a good waveform and optimal probe placement could not be verified. Patient's sats are continuously documented as greater than 90% on room air. MD to bedside to assess and verified that patient is stable for transport back to nursing home.

## 2018-05-25 NOTE — TELEPHONE ENCOUNTER
UA/UC was checked in the ER earlier this week. Results + pseudomonas aeruginosa. Sensitivities are challenging, predominantly IV abx. Patient often refuses cares, medications. Spends a good part of the day out of the building. IV abx would be challenging. According to sensitivities, Vantin may be appropriate treatment.     PLAN:  Vantin 200mg BID x 10 days  Recheck urine culture after treatment  Monitor for fever, AMS

## 2018-05-31 NOTE — LETTER
5/31/2018        RE: Marychuy Zhou  [address] [place] [address]        [facility]    Chief Complaint   Patient presents with     AISHWARYA       [place] Medical Record Number:  [id number]    HPI:    Marychuy Zhou is a 62 year old  (1955), who is being seen today for an episodic care visit at [facility].  Initial hospital stay was at [facility] 4/10/18 to 5/8/18. PMH of paraplegia secondary to spinal hematoma, chronic truncal and sacral ulcers, COPD, anemia, seizure disorder, probable personality disorder, neurogenic bladder with suprapubic, DVT on chronic anticoagulation. She was admitted due to fevers and worsening hip pain. 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 Due to constant stool and urine leakage around her suprapubic catheter, her wounds are unable to heal. She ultimately needs to have a diverting colostomy and urologic procedure, as well as skin flap surgery. All of this is pending improvement in her nutritional status. She was given TPN and then GJ tube was placed.     Hospitalized again 5/15/18 through 5/19/18 at her request due to increased pelvic pain. She was initially treated for bacteremia, but this was later thought to be contamination.    Admitted to this facility for  rehab, medical management and nursing care.     HPI information obtained from: facility chart records, facility staff, patient report and [facility] chart review.    Today's concern is:  She historically refuses cares often, refuses medical visits, medications, tube feeding at times. Since her readmission here 5/19/18 staff report that she has been more cooperative. They do all of her care with 2 staff members. She has been taking her meds, accepting her tube feeding, generally compliant with dressing changes.     She is awoken from a nap (currently 11am). Examiner  reintroduced self and reason for visit. Patient says she doesn't need anything right now, if she has any medical concerns she will tell the nursing staff. She asks examiner to leave.    Bacteremia  Hypotension, unspecified hypotension type  Since re-admitting 5/19, BP readings range:  108/75  100/61  108/64  112/77    History of MRSA infection  Other chronic pain  Paraplegia at T4 level (H)  Decubitus ulcer of sacral region, unspecified ulcer stage  Multiple wounds  Osteomyelitis, unspecified site, unspecified type (H)  Seizure disorder (H)  Chronic obstructive pulmonary disease, unspecified COPD type (H)  Hypothyroidism, unspecified type  Neurogenic bladder  Urinary incontinence, unspecified type  Personal history of DVT (deep vein thrombosis)  Anxiety with depression          ALLERGIES: Review of patient's allergies indicates no known allergies.  Past Medical, Surgical, Family and Social History reviewed and updated in [facility].    Current Outpatient Prescriptions   Medication Sig Dispense Refill     cefpodoxime (VANTIN) 200 MG tablet Take 1 tablet (200 mg) by mouth 2 times daily for 10 days 20 tablet 0     cyclobenzaprine (FLEXERIL) 5 MG tablet Take 1 tablet (5 mg) by mouth 3 times daily as needed for muscle spasms 42 tablet      diazepam (VALIUM) 2 MG tablet Take 2 tablets (4 mg) by mouth every 6 hours as needed for anxiety 60 tablet      dronabinol (MARINOL) 2.5 MG capsule Take 1 capsule (2.5 mg) by mouth 2 times daily (before meals)  0     ipratropium - albuterol 0.5 mg/2.5 mg/3 mL (DUONEB) 0.5-2.5 (3) MG/3ML neb solution Take 1 vial (3 mLs) by nebulization every 4 hours as needed for wheezing 360 mL      LEVOTHYROXINE SODIUM PO Take 125 mcg by mouth every morning       loperamide (IMODIUM) 2 MG capsule Take 2 capsules (4 mg) by mouth 4 times daily 20 capsule      miconazole (MICATIN) 2 % AERP powder Apply topically 2 times daily Apply to groin folds       mineral oil-hydrophilic petrolatum (AQUAPHOR) Apply  topically daily Apply to lower extremities for skin irritation.       multivitamins with minerals (CERTAVITE/CEROVITE) LIQD liquid 15 mLs by Per Feeding Tube route daily       OMEPRAZOLE PO Take 20 mg by mouth daily (with breakfast)       ondansetron (ZOFRAN ODT) 4 MG ODT tab Take 1 tablet (4 mg) by mouth every 6 hours as needed for nausea 20 tablet 1     oxybutynin 15 MG TB24 Take 1 tablet (15 mg) by mouth At Bedtime 30 tablet      oxyCODONE IR (ROXICODONE) 15 MG tablet Take 1 tablet (15 mg) by mouth every 3 hours as needed for moderate to severe pain 120 tablet 0     Pollen Extracts (PROSTAT PO) 1 oz daily       protein modular (PROSOURCE TF) 1 packet by Per Feeding Tube route 3 times daily       QUEtiapine (SEROQUEL) 25 MG tablet Take 1 tablet (25 mg) by mouth daily 60 tablet      QUEtiapine (SEROQUEL) 50 MG tablet Take 1 tablet (50 mg) by mouth At Bedtime 120 tablet      Rivaroxaban (XARELTO PO) Take 20 mg by mouth At Bedtime       triamcinolone (KENALOG) 0.1 % cream Apply topically 3 times daily       levETIRAcetam (KEPPRA) 250 MG tablet Take 1 tablet (250 mg) by mouth every evening Until 4/7/2018 then decrease to 250mg BID 4/8/2018-4/21/2018, then decrease to 250mg QAM 4/22-5/5/2018 (Patient taking differently: Take 250 mg by mouth 2 times daily )       Medications reviewed:  Medications reconciled to facility chart and changes were made to reflect current medications as identified as above med list.     REVIEW OF SYSTEMS:  Limited secondary to participation but today pt reports no concerns    Physical Exam:  /75  Pulse 87  Temp 97.1  F (36.2  C)  Resp 18  SpO2 97%  GENERAL APPEARANCE:  in no distress, oriented  EYES:  EOM, conjunctivae, lids, pupils and irises normal  RESP:  no respiratory distress    Recent Labs:     CBC RESULTS:   Recent Labs   Lab Test  05/22/18   1708  05/18/18   0800   WBC  9.3  9.2   RBC  2.99*  3.10*   HGB  6.9*  7.3*   HCT  24.1*  25.9*   MCV  81  84   MCH  23.1*  23.5*    MCHC  28.6*  28.2*   RDW  19.2*  18.6*   PLT  409  365       Last Basic Metabolic Panel:  Recent Labs   Lab Test  05/22/18   1708  05/17/18   0543   NA  136  139   POTASSIUM  3.8  4.1   CHLORIDE  101  105   NATA  8.3*  8.2*   CO2  29  27   BUN  10  13   CR  0.60  0.54   GLC  92  123*       Liver Function Studies -   Recent Labs   Lab Test  05/22/18   1708  05/15/18   2029   PROTTOTAL  7.8  8.8   ALBUMIN  2.0*  2.4*   BILITOTAL  0.2  0.1*   ALKPHOS  212*  195*   AST  9  16   ALT  12  15       TSH   Date Value Ref Range Status   05/30/2018 29.15 (A) 0.30 - 5.00 uIU/mL Final   05/24/2018 32.78 (H) 0.30 - 5.00 uIU/mL Final       Assessment/Plan:  (R78.81) Bacteremia  (primary encounter diagnosis)  (I95.9) Hypotension, unspecified hypotension type  (Z86.14) History of MRSA infection  (G89.29) Other chronic pain  (G83.9) Paraplegia at T4 level (H)  (L89.159) Decubitus ulcer of sacral region, unspecified ulcer stage  (T07.XXXA) Multiple wounds  (M86.9) Osteomyelitis, unspecified site, unspecified type (H)  (G40.909) Seizure disorder (H)  (J44.9) Chronic obstructive pulmonary disease, unspecified COPD type (H)  (E03.9) Hypothyroidism, unspecified type  (R53.81) Physical deconditioning  (N31.9) Neurogenic bladder  (R32) Urinary incontinence, unspecified type  (Z86.718) Personal history of DVT (deep vein thrombosis)  (F41.8) Anxiety with depression    Comment: No acute concerns per staff. She has been working better with them, which is promising. It is certainly in her best interest to take her medications and tube feeding as the ultimate goal is to be able to have procedures to improve her quality of life. She has an appointment with urology in 2 weeks.        Electronically signed by  JOSEPH Montoya [facility]  Pager: [phone]

## 2018-05-31 NOTE — PROGRESS NOTES
[facility]    Chief Complaint   Patient presents with     AISHWARYA       [place] Medical Record Number:  [id number]    HPI:    Marychuy Zhou is a 62 year old  (1955), who is being seen today for an episodic care visit at [facility].  Initial hospital stay was at [facility] 4/10/18 to 5/8/18. PMH of paraplegia secondary to spinal hematoma, chronic truncal and sacral ulcers, COPD, anemia, seizure disorder, probable personality disorder, neurogenic bladder with suprapubic, DVT on chronic anticoagulation. She was admitted due to fevers and worsening hip pain. 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 Due to constant stool and urine leakage around her suprapubic catheter, her wounds are unable to heal. She ultimately needs to have a diverting colostomy and urologic procedure, as well as skin flap surgery. All of this is pending improvement in her nutritional status. She was given TPN and then GJ tube was placed.     Hospitalized again 5/15/18 through 5/19/18 at her request due to increased pelvic pain. She was initially treated for bacteremia, but this was later thought to be contamination.    Admitted to this facility for  rehab, medical management and nursing care.     HPI information obtained from: facility chart records, facility staff, patient report and [facility] chart review.    Today's concern is:  She historically refuses cares often, refuses medical visits, medications, tube feeding at times. Since her readmission here 5/19/18 staff report that she has been more cooperative. They do all of her care with 2 staff members. She has been taking her meds, accepting her tube feeding, generally compliant with dressing changes.     She is awoken from a nap (currently 11am). Examiner reintroduced self and reason for visit. Patient says she doesn't need anything right  now, if she has any medical concerns she will tell the nursing staff. She asks examiner to leave.    Bacteremia  Hypotension, unspecified hypotension type  Since re-admitting 5/19, BP readings range:  108/75  100/61  108/64  112/77    History of MRSA infection  Other chronic pain  Paraplegia at T4 level (H)  Decubitus ulcer of sacral region, unspecified ulcer stage  Multiple wounds  Osteomyelitis, unspecified site, unspecified type (H)  Seizure disorder (H)  Chronic obstructive pulmonary disease, unspecified COPD type (H)  Hypothyroidism, unspecified type  Neurogenic bladder  Urinary incontinence, unspecified type  Personal history of DVT (deep vein thrombosis)  Anxiety with depression          ALLERGIES: Review of patient's allergies indicates no known allergies.  Past Medical, Surgical, Family and Social History reviewed and updated in [facility].    Current Outpatient Prescriptions   Medication Sig Dispense Refill     cefpodoxime (VANTIN) 200 MG tablet Take 1 tablet (200 mg) by mouth 2 times daily for 10 days 20 tablet 0     cyclobenzaprine (FLEXERIL) 5 MG tablet Take 1 tablet (5 mg) by mouth 3 times daily as needed for muscle spasms 42 tablet      diazepam (VALIUM) 2 MG tablet Take 2 tablets (4 mg) by mouth every 6 hours as needed for anxiety 60 tablet      dronabinol (MARINOL) 2.5 MG capsule Take 1 capsule (2.5 mg) by mouth 2 times daily (before meals)  0     ipratropium - albuterol 0.5 mg/2.5 mg/3 mL (DUONEB) 0.5-2.5 (3) MG/3ML neb solution Take 1 vial (3 mLs) by nebulization every 4 hours as needed for wheezing 360 mL      LEVOTHYROXINE SODIUM PO Take 125 mcg by mouth every morning       loperamide (IMODIUM) 2 MG capsule Take 2 capsules (4 mg) by mouth 4 times daily 20 capsule      miconazole (MICATIN) 2 % AERP powder Apply topically 2 times daily Apply to groin folds       mineral oil-hydrophilic petrolatum (AQUAPHOR) Apply topically daily Apply to lower extremities for skin irritation.       multivitamins with  minerals (CERTAVITE/CEROVITE) LIQD liquid 15 mLs by Per Feeding Tube route daily       OMEPRAZOLE PO Take 20 mg by mouth daily (with breakfast)       ondansetron (ZOFRAN ODT) 4 MG ODT tab Take 1 tablet (4 mg) by mouth every 6 hours as needed for nausea 20 tablet 1     oxybutynin 15 MG TB24 Take 1 tablet (15 mg) by mouth At Bedtime 30 tablet      oxyCODONE IR (ROXICODONE) 15 MG tablet Take 1 tablet (15 mg) by mouth every 3 hours as needed for moderate to severe pain 120 tablet 0     Pollen Extracts (PROSTAT PO) 1 oz daily       protein modular (PROSOURCE TF) 1 packet by Per Feeding Tube route 3 times daily       QUEtiapine (SEROQUEL) 25 MG tablet Take 1 tablet (25 mg) by mouth daily 60 tablet      QUEtiapine (SEROQUEL) 50 MG tablet Take 1 tablet (50 mg) by mouth At Bedtime 120 tablet      Rivaroxaban (XARELTO PO) Take 20 mg by mouth At Bedtime       triamcinolone (KENALOG) 0.1 % cream Apply topically 3 times daily       levETIRAcetam (KEPPRA) 250 MG tablet Take 1 tablet (250 mg) by mouth every evening Until 4/7/2018 then decrease to 250mg BID 4/8/2018-4/21/2018, then decrease to 250mg QAM 4/22-5/5/2018 (Patient taking differently: Take 250 mg by mouth 2 times daily )       Medications reviewed:  Medications reconciled to facility chart and changes were made to reflect current medications as identified as above med list.     REVIEW OF SYSTEMS:  Limited secondary to participation but today pt reports no concerns    Physical Exam:  /75  Pulse 87  Temp 97.1  F (36.2  C)  Resp 18  SpO2 97%  GENERAL APPEARANCE:  in no distress, oriented  EYES:  EOM, conjunctivae, lids, pupils and irises normal  RESP:  no respiratory distress    Recent Labs:     CBC RESULTS:   Recent Labs   Lab Test  05/22/18   1708  05/18/18   0800   WBC  9.3  9.2   RBC  2.99*  3.10*   HGB  6.9*  7.3*   HCT  24.1*  25.9*   MCV  81  84   MCH  23.1*  23.5*   MCHC  28.6*  28.2*   RDW  19.2*  18.6*   PLT  409  365       Last Basic Metabolic  Panel:  Recent Labs   Lab Test  05/22/18   1708  05/17/18   0543   NA  136  139   POTASSIUM  3.8  4.1   CHLORIDE  101  105   NATA  8.3*  8.2*   CO2  29  27   BUN  10  13   CR  0.60  0.54   GLC  92  123*       Liver Function Studies -   Recent Labs   Lab Test  05/22/18   1708  05/15/18   2029   PROTTOTAL  7.8  8.8   ALBUMIN  2.0*  2.4*   BILITOTAL  0.2  0.1*   ALKPHOS  212*  195*   AST  9  16   ALT  12  15       TSH   Date Value Ref Range Status   05/30/2018 29.15 (A) 0.30 - 5.00 uIU/mL Final   05/24/2018 32.78 (H) 0.30 - 5.00 uIU/mL Final       Assessment/Plan:  (R78.81) Bacteremia  (primary encounter diagnosis)  (I95.9) Hypotension, unspecified hypotension type  (Z86.14) History of MRSA infection  (G89.29) Other chronic pain  (G83.9) Paraplegia at T4 level (H)  (L89.159) Decubitus ulcer of sacral region, unspecified ulcer stage  (T07.XXXA) Multiple wounds  (M86.9) Osteomyelitis, unspecified site, unspecified type (H)  (G40.909) Seizure disorder (H)  (J44.9) Chronic obstructive pulmonary disease, unspecified COPD type (H)  (E03.9) Hypothyroidism, unspecified type  (R53.81) Physical deconditioning  (N31.9) Neurogenic bladder  (R32) Urinary incontinence, unspecified type  (Z86.718) Personal history of DVT (deep vein thrombosis)  (F41.8) Anxiety with depression    Comment: No acute concerns per staff. She has been working better with them, which is promising. It is certainly in her best interest to take her medications and tube feeding as the ultimate goal is to be able to have procedures to improve her quality of life. She has an appointment with urology in 2 weeks.        Electronically signed by  JOSEPH Montoya [facility]  Pager: [phone]

## 2018-06-04 NOTE — LETTER
"    6/4/2018        RE: Marychuy Zhou  [address] [place] [address]        [facility]  INITIAL VISIT NOTE  June 4, 2018    PRIMARY CARE PROVIDER AND CLINIC:  [facility] [address] / [place] [address]    Chief Complaint   Patient presents with     Hospital F/U       HPI:    Marychuy Zhou is a 62 year old  (1955) female who was seen at [facility]  TCU on June 4, 2018 for an initial visit. Medical history is notable for T4 paraplegia, neurogenic bladder s/p suprapubic catheter, malnutrition, agitation/refusal of cares, COPD and hypothyroidism. Has had multiple hospitalizations this year. Most recent at [facility] from 5/15/18 to 5/19/18 where she was called back for a positive blood culture obtained at ER visit one day prior (she presented with increased pain and worried she was septic). That positive blood culture was ultimately deemed a contaminate. She was admitted to this facility for medical management and rehab.     Today, Ms. Zhou wants to know where he \"psych meds of 30 years\" are? I asked her what medications she is referring to and she said, \"I don't know you need to talk to my doctor\". Then said Abilify. States she is not followed by an outpatient psychiatrist. She would like her meds back.     Review of Epic with several psychiatry consultations this year. Reported history of frequently refusing cares, irritability and agitation. As recently as February she was taking buspirone, depakote, duloxetine and lorazepam. In April was taking aripiprazole, depakote, duloxetine, lamotrigine. Due to continual refusal to take, aripiprazole, depakote, duloxetine and lamotrigine were discontinued. She was started on quetapine during most recent hospitalization with goal if it helping with anxiety, sleep and appetite. Duloxetine was not restarted given multiple drug-drug interactions.     CODE STATUS:   CPR/Full code     ALLERGIES:   No Known Allergies    PAST " MEDICAL HISTORY:   Past Medical History:   Diagnosis Date     Anxiety      Chronic pain syndrome      Closed fracture zygoma, with routine healing, subsequent encounter      COPD (chronic obstructive pulmonary disease) (H)      Depressive disorder      DVT (deep vein thrombosis) in pregnancy (H)      Edema      Epilepsy (H)      Flaccid neuropathic bladder, not elsewhere classified      Fracture of femur (H)      Hypothyroidism      Iron deficiency anemia      Paraplegia (H)     unspecified     Pressure ulcer of sacral region      PTSD (post-traumatic stress disorder)      Rheumatoid arthritis (H)      Sepsis (H)     unspecified       PAST SURGICAL HISTORY:   No past surgical history on file.    FAMILY HISTORY:   Family History   Problem Relation Age of Onset     Chronic Obstructive Pulmonary Disease Brother        SOCIAL HISTORY:   Lives alone     MEDICATIONS:  Current Outpatient Prescriptions   Medication Sig Dispense Refill     cefpodoxime (VANTIN) 200 MG tablet Take 1 tablet (200 mg) by mouth 2 times daily for 10 days 20 tablet 0     cyclobenzaprine (FLEXERIL) 5 MG tablet Take 1 tablet (5 mg) by mouth 3 times daily as needed for muscle spasms 42 tablet      diazepam (VALIUM) 2 MG tablet Take 2 tablets (4 mg) by mouth every 6 hours as needed for anxiety 60 tablet      dronabinol (MARINOL) 2.5 MG capsule Take 1 capsule (2.5 mg) by mouth 2 times daily (before meals)  0     ipratropium - albuterol 0.5 mg/2.5 mg/3 mL (DUONEB) 0.5-2.5 (3) MG/3ML neb solution Take 1 vial (3 mLs) by nebulization every 4 hours as needed for wheezing 360 mL      LEVOTHYROXINE SODIUM PO Take 125 mcg by mouth every morning       loperamide (IMODIUM) 2 MG capsule Take 2 capsules (4 mg) by mouth 4 times daily 20 capsule      miconazole (MICATIN) 2 % AERP powder Apply topically 2 times daily Apply to groin folds       mineral oil-hydrophilic petrolatum (AQUAPHOR) Apply topically daily Apply to lower extremities for skin irritation.        multivitamins with minerals (CERTAVITE/CEROVITE) LIQD liquid 15 mLs by Per Feeding Tube route daily       OMEPRAZOLE PO Take 20 mg by mouth daily (with breakfast)       ondansetron (ZOFRAN ODT) 4 MG ODT tab Take 1 tablet (4 mg) by mouth every 6 hours as needed for nausea 20 tablet 1     oxybutynin 15 MG TB24 Take 1 tablet (15 mg) by mouth At Bedtime 30 tablet      OXYCODONE HCL PO Take 15 mg by mouth every 3 hours as needed       protein modular (PROSOURCE TF) 1 packet by Per Feeding Tube route 3 times daily       QUEtiapine (SEROQUEL) 25 MG tablet Take 1 tablet (25 mg) by mouth daily 60 tablet      QUEtiapine (SEROQUEL) 50 MG tablet Take 1 tablet (50 mg) by mouth At Bedtime 120 tablet      Rivaroxaban (XARELTO PO) Take 20 mg by mouth At Bedtime       triamcinolone (KENALOG) 0.1 % cream Apply topically 3 times daily         Post Discharge Medication Reconciliation Status: medication reconcilation previously completed during another office visit.    ROS:  10 point ROS neg other than the symptoms noted above in the HPI.    PHYSICAL EXAM:  BP 95/64  Pulse 84  Temp 97.9  F (36.6  C)  Resp 20  SpO2 90%  Gen: sitting in wheelchair, alert, cooperative and in no acute distress  HEENT: normocephalic; oropharynx clear  Card: RRR, S1, S2, no murmurs  Resp: lungs clear to auscultation bilaterally  : suprapubic catheter in place draining clear yellow urine  MSK: normal muscle tone, no LE edema  Neuro: CX II-XII grossly in tact; ROM in upper extremities grossly in tact  Psych: alert and oriented x3; gruff affect    LABORATORY/IMAGING DATA:  Reviewed as per Epic    ASSESSMENT/PLAN:    T4 Paraplegia  WC bound. Transfers with Cecile.   -- supportive cares  -- PT/OT    Neurogenic Bladder s/p Suprapubic Catheter  Draining clear yellow urine today.   -- continues on oxybutynin 15 mg qhs  -- routine catheter cares  -- follow up with urology as scheduled     Pseudomonas UTI  In setting of above  -- continues on cefpodxime 200 mg BID  (to complete 10 day course)     Sacral Decubitus Ulcer  In setting of paraplegia. I did not see today. Not a candidate for surgery given poor nutritional status.   -- wound cares as ordered    Protein Calorie Malnutrition  -- continues on Two Nura @ 83 cc/hr (7255-3440) as well as 240 cc free water flushes TID  -- nutrition following    COPD  No signs of exacerbation. Not on any controller meds.   -- continues on DuoNebs q4h PRN    DVT  By history.   -- continues on rivaroxaban 20 mg daily     Agitation / Refusal of Cares / Cluster B Traits  See HPI re: recent psychiatry consults and medication history.   -- continues on quetiapine 25 mg qam and 50 mg qhs  -- today would not restart her old meds (ie. Aripiprazole, etc) given history of refusing them - if she would agree to see psychiatry could refer for consult  -- supportive cares     Anorexia  -- continues on dronabinol 2.5 mg BID    Hypothyroidisim  TSH 29 -- elevated as acute phase reactant  -- continues on levothyroxine 125 mcg daily  -- will need recheck in 6-8 weeks    GERD  -- continues on omeprazole 20 mg daily    Physical Deconditioning  In setting of hospitalization and underlying medical conditions  -- ongoing PT/OT              Electronically signed by:  Georgina Jennings MD

## 2018-06-05 NOTE — TELEPHONE ENCOUNTER
MEDICAL RECORDS REQUEST   [facility]  [address] [place] [address]  PHONE: [phone]  Fax: [phone]        FUTURE VISIT INFORMATION                                                   Marychuy Zhou, : 1955 scheduled for future visit at [facility]    APPOINTMENT INFORMATION:    Date: 2018    Provider:  DONNIE CROUCH    Reason for Visit/Diagnosis: NEUROGENIC BLADDER    REFERRAL INFORMATION:    Referring provider:  EMERGENCY DEPARTMENT    Specialty: MD    Referring providers clinic:  EMERGENCY DEPARTMENT    Clinic contact number:  [phone]    RECORDS REQUESTED FOR VISIT                                                     NOTES  STATUS/DETAILS   OFFICE NOTE from referring provider  yes   OFFICE NOTE from other specialist  no   DISCHARGE SUMMARY from hospital  yes   DISCHARGE REPORT from the ER  yes   OPERATIVE REPORT  no   MEDICATION LIST  yes   NEUROGENIC BLADDER      CT ABDOMEN/PELVIS (IMAGES & REPORT)  yes   LABS  no   RENAL/BLADDER ULTRASOUND (IMAGES & REPORT)  yes       PRE-VISIT CHECKLIST      Record collection complete Yes   Appointment appropriately scheduled           (right time/right provider) Yes   Joint diagnostic appointment coordinated correctly          (ensure right order & amount of time) Yes   MyChart activation Yes   Questionnaire complete If no, please explain IN PROCESS     Completed by: Patricia Greene

## 2018-06-05 NOTE — PROGRESS NOTES
"[facility]  INITIAL VISIT NOTE  June 4, 2018    PRIMARY CARE PROVIDER AND CLINIC:  S[facility]chez Zamora [facility] [address] / [facility] [address]    Chief Complaint   Patient presents with     Hospital F/U       HPI:    Marychuy Zhou is a 62 year old  (1955) female [facility]. Medical history is notable for T4 paraplegia, neurogenic bladder s/p suprapubic catheter, malnutrition, agitation/refusal of cares, COPD and hypothyroidism. Has had multiple hospitalizations this year. Most recent at [facility] from 5/15/18 to 5/19/18 where she was called back for a positive blood culture obtained at ER visit one day prior (she presented with increased pain and worried she was septic). That positive blood culture was ultimately deemed a contaminate. She was admitted to this facility for medical management and rehab.     Today, Ms. Zhou wants to know where he \"psych meds of 30 years\" are? I asked her what medications she is referring to and she said, \"I don't know you need to talk to my doctor\". Then said Abilify. States she is not followed by an outpatient psychiatrist. She would like her meds back.     Review of Epic with several psychiatry consultations this year. Reported history of frequently refusing cares, irritability and agitation. As recently as February she was taking buspirone, depakote, duloxetine and lorazepam. In April was taking aripiprazole, depakote, duloxetine, lamotrigine. Due to continual refusal to take, aripiprazole, depakote, duloxetine and lamotrigine were discontinued. She was started on quetapine during most recent hospitalization with goal if it helping with anxiety, sleep and appetite. Duloxetine was not restarted given multiple drug-drug interactions.     CODE STATUS:   CPR/Full code     ALLERGIES:   No Known Allergies    PAST MEDICAL HISTORY:   Past Medical History:   Diagnosis Date     Anxiety      " Chronic pain syndrome      Closed fracture zygoma, with routine healing, subsequent encounter      COPD (chronic obstructive pulmonary disease) (H)      Depressive disorder      DVT (deep vein thrombosis) in pregnancy (H)      Edema      Epilepsy (H)      Flaccid neuropathic bladder, not elsewhere classified      Fracture of femur (H)      Hypothyroidism      Iron deficiency anemia      Paraplegia (H)     unspecified     Pressure ulcer of sacral region      PTSD (post-traumatic stress disorder)      Rheumatoid arthritis (H)      Sepsis (H)     unspecified       PAST SURGICAL HISTORY:   No past surgical history on file.    FAMILY HISTORY:   Family History   Problem Relation Age of Onset     Chronic Obstructive Pulmonary Disease Brother        SOCIAL HISTORY:   Lives alone     MEDICATIONS:  Current Outpatient Prescriptions   Medication Sig Dispense Refill     cefpodoxime (VANTIN) 200 MG tablet Take 1 tablet (200 mg) by mouth 2 times daily for 10 days 20 tablet 0     cyclobenzaprine (FLEXERIL) 5 MG tablet Take 1 tablet (5 mg) by mouth 3 times daily as needed for muscle spasms 42 tablet      diazepam (VALIUM) 2 MG tablet Take 2 tablets (4 mg) by mouth every 6 hours as needed for anxiety 60 tablet      dronabinol (MARINOL) 2.5 MG capsule Take 1 capsule (2.5 mg) by mouth 2 times daily (before meals)  0     ipratropium - albuterol 0.5 mg/2.5 mg/3 mL (DUONEB) 0.5-2.5 (3) MG/3ML neb solution Take 1 vial (3 mLs) by nebulization every 4 hours as needed for wheezing 360 mL      LEVOTHYROXINE SODIUM PO Take 125 mcg by mouth every morning       loperamide (IMODIUM) 2 MG capsule Take 2 capsules (4 mg) by mouth 4 times daily 20 capsule      miconazole (MICATIN) 2 % AERP powder Apply topically 2 times daily Apply to groin folds       mineral oil-hydrophilic petrolatum (AQUAPHOR) Apply topically daily Apply to lower extremities for skin irritation.       multivitamins with minerals (CERTAVITE/CEROVITE) LIQD liquid 15 mLs by Per  Feeding Tube route daily       OMEPRAZOLE PO Take 20 mg by mouth daily (with breakfast)       ondansetron (ZOFRAN ODT) 4 MG ODT tab Take 1 tablet (4 mg) by mouth every 6 hours as needed for nausea 20 tablet 1     oxybutynin 15 MG TB24 Take 1 tablet (15 mg) by mouth At Bedtime 30 tablet      OXYCODONE HCL PO Take 15 mg by mouth every 3 hours as needed       protein modular (PROSOURCE TF) 1 packet by Per Feeding Tube route 3 times daily       QUEtiapine (SEROQUEL) 25 MG tablet Take 1 tablet (25 mg) by mouth daily 60 tablet      QUEtiapine (SEROQUEL) 50 MG tablet Take 1 tablet (50 mg) by mouth At Bedtime 120 tablet      Rivaroxaban (XARELTO PO) Take 20 mg by mouth At Bedtime       triamcinolone (KENALOG) 0.1 % cream Apply topically 3 times daily         Post Discharge Medication Reconciliation Status: medication reconcilation previously completed during another office visit.    ROS:  10 point ROS neg other than the symptoms noted above in the HPI.    PHYSICAL EXAM:  BP 95/64  Pulse 84  Temp 97.9  F (36.6  C)  Resp 20  SpO2 90%  Gen: sitting in wheelchair, alert, cooperative and in no acute distress  HEENT: normocephalic; oropharynx clear  Card: RRR, S1, S2, no murmurs  Resp: lungs clear to auscultation bilaterally  : suprapubic catheter in place draining clear yellow urine  MSK: normal muscle tone, no LE edema  Neuro: CX II-XII grossly in tact; ROM in upper extremities grossly in tact  Psych: alert and oriented x3; gruff affect    LABORATORY/IMAGING DATA:  Reviewed as per Epic    ASSESSMENT/PLAN:    T4 Paraplegia  WC bound. Transfers with Cecile.   -- supportive cares  -- PT/OT    Neurogenic Bladder s/p Suprapubic Catheter  Draining clear yellow urine today.   -- continues on oxybutynin 15 mg qhs  -- routine catheter cares  -- follow up with urology as scheduled     Pseudomonas UTI  In setting of above  -- continues on cefpodxime 200 mg BID (to complete 10 day course)     Sacral Decubitus Ulcer  In setting of  paraplegia. I did not see today. Not a candidate for surgery given poor nutritional status.   -- wound cares as ordered    Protein Calorie Malnutrition  -- continues on Two Nura @ 83 cc/hr (2700-8489) as well as 240 cc free water flushes TID  -- nutrition following    COPD  No signs of exacerbation. Not on any controller meds.   -- continues on DuoNebs q4h PRN    DVT  By history.   -- continues on rivaroxaban 20 mg daily     Agitation / Refusal of Cares / Cluster B Traits  See HPI re: recent psychiatry consults and medication history.   -- continues on quetiapine 25 mg qam and 50 mg qhs  -- today would not restart her old meds (ie. Aripiprazole, etc) given history of refusing them - if she would agree to see psychiatry could refer for consult  -- supportive cares     Anorexia  -- continues on dronabinol 2.5 mg BID    Hypothyroidisim  TSH 29 -- elevated as acute phase reactant  -- continues on levothyroxine 125 mcg daily  -- will need recheck in 6-8 weeks    GERD  -- continues on omeprazole 20 mg daily    Physical Deconditioning  In setting of hospitalization and underlying medical conditions  -- ongoing PT/OT              Electronically signed by:  Georgina Jennings MD

## 2018-06-08 NOTE — TELEPHONE ENCOUNTER
Reason for visit: Discuss urinary diversion for bladder management due to chronic incontinence into non healing wounds    Relevant information: Neurogenic bladder. Pt is wheelchair bound, and has a SPT    Records/imaging/labs: labs and imaging available    Pt called: No need for a call    Rooming: regular

## 2018-06-16 NOTE — ED NOTES
-Report to Pierre CANADA at [facility] and report given.  -[phone]

## 2018-06-16 NOTE — ED AVS SNAPSHOT
[facility], Emergency Department    [address] [facility] [address]    Phone:  [phone]                                       Marychuy Zhou   MRN: [id number]    Department:  [facility], [place], Emergency Department   Date of Visit:  6/16/2018           Patient Information     Date Of Birth          1955        Your diagnoses for this visit were:     Other chronic pain        You were seen by Luigi Helms MD.        Discharge Instructions         Please ask your primary doctor to refill your paxil and abilify.     Please call Urology Clinic (phone: [phone]) and ask for a follow up appointment with the Urologist who knows you (Dr Faustino San).     Please make an appointment to follow up with Pain Clinic (phone: [phone]) as soon as possible.     Managing Chronic Pain   Being in pain can be exhausting. You may find you have trouble working, sleeping, or just doing day-to-day tasks. But you can learn to manage pain, feel better, and regain control of your life.   Understanding chronic pain  Chronic pain is a serious medical problem. It is defined as pain that lasts longer than 3 months. Chronic pain includes pain that you feel regularly, even if it comes and goes. The pain may be from an ongoing injury or health problem. Or it may be because of a chronic pain syndrome, such as fibromyalgia. Sometimes pain persists when no cause can be found.   Pain should be treated  You have a right to have your pain treated. Untreated chronic pain can affect your overall health. It can lead to depression, anxiety, anger, and personality changes. It can also disrupt work, sleep, relationships, and other aspects of normal life. It may not be possible to relieve all of your pain. But it can be reduced to a level you can cope with.   Your role in treatment  Your healthcare provider will work closely with you on a plan to manage your pain. But it s up to you to put this plan into action. Control of chronic pain  is done mainly through self-management. This means that you take an active role in your care. Getting support from family and friends is important too.   Planning your treatment  Your healthcare provider will first look for a cause of your pain that can be treated. He or she will also assess your pain level. This may be done by asking you to rate your pain on a scale from 1 (low pain) to 10 (severe pain). Your provider will also ask you to describe the pain. For example, is your pain sharp or dull? Is it constant or does it come and go? You may be asked to keep a pain log. This is a diary in which you track your pain. It may help identify things that tend to make your pain worse. You and your healthcare provider can make a plan to help prevent and cope with pain on a daily basis. In some cases, you may be referred to a special pain program or clinic. Your treatment plan may include:    Medicines    Complementary therapies    Mind and body therapies    Other medical treatments    Getting physical activity   Medicines  Medicine will most likely be a part of your treatment plan. Your provider will evaluate which are the best medicines for your pain. You may use over-the-counter or prescription medicines. You may need to take more than one medicine. It may take some time to find the best medicine or combination of medicines for your pain. Take all medicines as directed. Pain medicines can be used in many ways. You may take medicines:    Every day to help   stay ahead  of the pain so that it doesn t flare up    At times when pain is worse than usual    Before activities that tend to trigger pain    To decrease sensitivity to pain   Medicines for chronic pain include:    Nonsteroidal anti-inflammatory medicines (NSAIDs) for pain from swelling and inflammation. Your provider may prescribe a type of NSAID called a NOGUERA inhibitor.    Acetaminophen    Anticonvulsants to treat nerve pain called neuropathy    Antidepressants to  treat neuropathy    Muscle relaxants for muscle spasms    Topical medicines. These are put on the skin.    Opioids, or narcotics, to treat severe pain. These very strong medicines can help ease certain kinds of pain. They most often are used for short periods of time. Your provider will monitor your care very closely if you take opioids.   Complementary therapies  These are treatments that can be used along with medical care to help relieve pain. Look for a licensed practitioner with experience treating chronic pain. Talk with your healthcare provider about using complementary therapies such as:    Massage    Acupuncture and acupressure    Chiropractic    Vitamins or herbal supplements   Mind/body therapies  The brain and the body are both part of the pain response. The brain reads the pain signals from the body. This means that your mind has some control over how pain signals are processed. Mind/body therapies may help change how your brain reads pain signals. They may be learned with the help of a trained therapist or in a class. They include:    Deep breathing    Distraction    Visualization    Meditation    Biofeedback   Other medical treatments  If other treatments don t work for you, one of these procedures or devices may help:    Nerve blocks to numb nerves in a painful area    Trigger point injections for painful muscles    Steroid injections for joint pain     Transcutaneous electrical nerve stimulation (TENS) to block pain signals to the brain    Spinal stimulation to block spinal pain    Implanted spinal pump that contains pain medicine    Ablation using heat, cold, or chemicals to destroy painful nerves                                                                                                                    Getting physical activity  Being physically active has many benefits. It can improve your ability to cope with pain. It may also help improve your mood, sleep, and overall health. Your  healthcare provider can help you plan an exercise program that s right for your needs. This may include:    Stretching and range-of-motion exercises    Low-impact exercise such as walking, biking, swimming, and other water exercise    Strength training using light weights    Walking up the stairs instead of taking the elevator    Riding a bike instead of driving    Parking your car farther from your destination   You may need to not do high-impact activities. These involve jumping, running, or sudden starts, stops, or changes of direction. If you haven t exercised in a long time or you have physical limitations, your healthcare provider may refer you to a physical therapist. He or she can teach you stretches and exercises that fit your condition and fitness level.   Being active and healthy  A healthier lifestyle makes it easier to cope with pain and function better. Follow these tips:    Choose a balance of healthy foods and drinks.    Limit alcohol and caffeine.    Go to bed at about the same time each day.    Don t let pain keep you from others. Spend time with friends and family.    Keep your mind active. Read books or take classes.    If you re not working, volunteer or join a club or social group.   Getting support  A support group lets you talk with others who also have chronic pain. Chronic pain support groups can help you feel less isolated. They can also give you tips for coping with pain. To find a local support group, contact your nearest hospital or pain clinic.   You may also want to try counseling. Counseling can help you learn coping skills and methods such as visualization. It can also help with mood problems. When choosing a counselor, look for someone who has worked with people who have chronic pain.   See your provider for regular visits and let him or her know how well treatments are working for you. Also reach out to family and friends for help and support.                              For more  support and information, contact these groups:    American Academy of Pain Management, www.aapainmanage.org    American Academy of Pain Medicine, www.painmed.org    American Chronic Pain Association, www.theacpa.org    National Pain Foundation, www.thenationalpainfoundation.org  Date Last Reviewed: 12/1/2017 2000-2017 [employer]. [address] [place] [address]. All rights reserved. This information is not intended as a substitute for professional medical care. Always follow your healthcare professional's instructions.          24 Hour Appointment Hotline       To make an appointment at any [facility], call [phone] ([phone]). If you don't have a family doctor or clinic, we will help you find one. [facility] are conveniently located to serve the needs of you and your family.             Review of your medicines      START taking        Dose / Directions Last dose taken    ARIPiprazole 5 MG tablet   Commonly known as:  ABILIFY   Dose:  5 mg   Quantity:  7 tablet        Take 1 tablet (5 mg) by mouth At Bedtime   Refills:  1        PARoxetine 10 MG tablet   Commonly known as:  PAXIL   Dose:  10 mg   Quantity:  7 tablet        Take 1 tablet (10 mg) by mouth At Bedtime   Refills:  1          Our records show that you are taking the medicines listed below. If these are incorrect, please call your family doctor or clinic.        Dose / Directions Last dose taken    cyclobenzaprine 5 MG tablet   Commonly known as:  FLEXERIL   Dose:  5 mg   Quantity:  42 tablet        Take 1 tablet (5 mg) by mouth 3 times daily as needed for muscle spasms   Refills:  0        diazepam 2 MG tablet   Commonly known as:  VALIUM   Dose:  4 mg   Quantity:  60 tablet        Take 2 tablets (4 mg) by mouth every 6 hours as needed for anxiety   Refills:  0        dronabinol 2.5 MG capsule   Commonly known as:  MARINOL   Dose:  2.5 mg        Take 1 capsule (2.5 mg) by mouth 2 times daily (before  meals)   Refills:  0        ipratropium - albuterol 0.5 mg/2.5 mg/3 mL 0.5-2.5 (3) MG/3ML neb solution   Commonly known as:  DUONEB   Dose:  3 mL   Quantity:  360 mL        Take 1 vial (3 mLs) by nebulization every 4 hours as needed for wheezing   Refills:  0        LEVOTHYROXINE SODIUM PO   Dose:  125 mcg        Take 125 mcg by mouth every morning   Refills:  0        loperamide 2 MG capsule   Commonly known as:  IMODIUM   Dose:  4 mg   Quantity:  20 capsule        Take 2 capsules (4 mg) by mouth 4 times daily   Refills:  0        miconazole 2 % Aerp powder   Commonly known as:  MICATIN        Apply topically 2 times daily Apply to groin folds   Refills:  0        mineral oil-hydrophilic petrolatum        Apply topically daily Apply to lower extremities for skin irritation.   Refills:  0        multivitamins with minerals Liqd liquid   Dose:  15 mL        15 mLs by Per Feeding Tube route daily   Refills:  0        OMEPRAZOLE PO   Dose:  20 mg        Take 20 mg by mouth daily (with breakfast)   Refills:  0        ondansetron 4 MG ODT tab   Commonly known as:  ZOFRAN ODT   Dose:  4 mg   Quantity:  20 tablet        Take 1 tablet (4 mg) by mouth every 6 hours as needed for nausea   Refills:  1        oxybutynin chloride 15 MG Tb24   Dose:  15 mg   Quantity:  30 tablet        Take 1 tablet (15 mg) by mouth At Bedtime   Refills:  0        OXYCODONE HCL PO   Dose:  15 mg        Take 15 mg by mouth every 3 hours as needed   Refills:  0        protein modular   Dose:  1 packet        1 packet by Per Feeding Tube route 3 times daily   Refills:  0        * QUEtiapine 25 MG tablet   Commonly known as:  SEROquel   Dose:  25 mg   Quantity:  60 tablet        Take 1 tablet (25 mg) by mouth daily   Refills:  0        * QUEtiapine 50 MG tablet   Commonly known as:  SEROquel   Dose:  50 mg   Quantity:  120 tablet        Take 1 tablet (50 mg) by mouth At Bedtime   Refills:  0        triamcinolone 0.1 % cream   Commonly known as:   "KENALOG        Apply topically 3 times daily   Refills:  0        XARELTO PO   Dose:  20 mg        Take 20 mg by mouth At Bedtime   Refills:  0        * Notice:  This list has 2 medication(s) that are the same as other medications prescribed for you. Read the directions carefully, and ask your doctor or other care provider to review them with you.            Prescriptions were sent or printed at these locations (2 Prescriptions)                   Other Prescriptions                Printed at Department/Unit printer (2 of 2)         ARIPiprazole (ABILIFY) 5 MG tablet               PARoxetine (PAXIL) 10 MG tablet                Orders Needing Specimen Collection     None      Pending Results     No orders found from 6/14/2018 to 6/17/2018.            Pending Culture Results     No orders found from 6/14/2018 to 6/17/2018.            Pending Results Instructions     If you had any lab results that were not finalized at the time of your Discharge, you can call the ED Lab Result RN at [phone]. You will be contacted by this team for any positive Lab results or changes in treatment. The nurses are available 7 days a week from 10A to 6:30P.  You can leave a message 24 hours per day and they will return your call.        Thank you for choosing [facility]       Thank you for choosing [facility] for your care. Our goal is always to provide you with excellent care. Hearing back from our patients is one way we can continue to improve our services. Please take a few minutes to complete the written survey that you may receive in the mail after you visit with us. Thank you!        [employer] Information     [employer] lets you send messages to your doctor, view your test results, renew your prescriptions, schedule appointments and more. To sign up, go to [email or web address] . Click on \"Log in\" on the left side of the screen, which will take you to the Welcome page. Then click on \"Sign up Now\" on the right side of the page.     You " will be asked to enter the access code listed below, as well as some personal information. Please follow the directions to create your username and password.     Your access code is: [email or web address]  Expires: 2018 10:46 PM     Your access code will  in 90 days. If you need help or a new code, please call your [facility] clinic or [phone].        Care EveryWhere ID     This is your Care EveryWhere ID. This could be used by other organizations to access your [facility] medical records  [id number]        Equal Access to Services     [facility] HospitalMARIA R : Drake Madera, lillima elizabeth, miguel kay, hemant dave . [facility] [phone].    ATENCIÓN: Si habla español, tiene a thorpe disposición servicios gratuitos de asistencia lingüística. Darryl al [phone].    We comply with applicable federal civil rights laws and [place] laws. We do not discriminate on the basis of race, color, national origin, age, disability, sex, sexual orientation, or gender identity.            After Visit Summary       This is your record. Keep this with you and show to your community pharmacist(s) and doctor(s) at your next visit.

## 2018-06-16 NOTE — ED AVS SNAPSHOT
[facility], [place], Emergency Department    [address] [place] [id number]    Phone:  [phone]                                       Marychuy Zhou   MRN: [id number]    Department:  [facility], Emergency Department   Date of Visit:  6/16/2018           After Visit Summary Signature Page     I have received my discharge instructions, and my questions have been answered. I have discussed any challenges I see with this plan with the nurse or doctor.    ..........................................................................................................................................  Patient/Patient Representative Signature      ..........................................................................................................................................  Patient Representative Print Name and Relationship to Patient    ..................................................               ................................................  Date                                            Time    ..........................................................................................................................................  Reviewed by Signature/Title    ...................................................              ..............................................  Date                                                            Time

## 2018-06-16 NOTE — TELEPHONE ENCOUNTER
[facility] TELEPHONE ENCOUNTER    Chief Complaint   Patient presents with     Pain       Marychuy Zhou is a 62 year old  (1955),Nurse called today to report: Extreme pain    ASSESSMENT/PLAN  On Oxycodone 15mg of Oxycodone for pain q3h. Patient previously stable on 15mg dosing - inquiried re:dosing amount and she swears she takes 45mg q3h. No note of this dose in patient's past medication history. Patient with hx of chronic pain and adjustment disorder.    No change in orders    If patient's pain remains uncontrolled she can send herself to the ED - will not order increased pain medication, especially that extreme dosing.    JOSEPH Desai CNP

## 2018-06-16 NOTE — ED TRIAGE NOTES
Pt BIBA complaining of acute on chronic shoulder pain.  GCS 15    Isolation cart placed outside room.

## 2018-06-16 NOTE — ED PROVIDER NOTES
History     Chief Complaint   Patient presents with     Shoulder Pain     HPI  Marychuy Zhou is a 62 year old female with a history of T4 paraplegia complicated by chronic decubital ulcer with history of osteomyelitis, as well as neurogenic bladder with recurrent UTI s/p suprapubic catheter in place, COPD, recurrent DVTs, on Xarelto, hypertension, and malnutrition with a G-tube in place who presents to the Emergency Department today from her nursing home for evaluation of chronic shoulder pain.  The patient reports that she recently had her oxycodone lowered from 45 mg to 15 mg by her nursing home.  The patient reports that because of this she has been having increasing pain in her chronic shoulder and upper back pain is worsening again.  The patient also reports that she has not been given her antidepressant medications and is not sure why.  Patient reports that her pain is excruciating.  The patient also reports that she has multiple lawsuits against multiple providers.    Of note, the patient was admitted to the hospital from 5/15-5/19 for bacteremia with blood cultures positive for staph epidermidis and staph hominis.    I have reviewed the Medications, Allergies, Past Medical and Surgical History, and Social History in the [facility] system.    Past Medical History:   Diagnosis Date     Anxiety      Chronic pain syndrome      Closed fracture zygoma, with routine healing, subsequent encounter      COPD (chronic obstructive pulmonary disease) (H)      Depressive disorder      DVT (deep vein thrombosis) in pregnancy (H)      Edema      Epilepsy (H)      Flaccid neuropathic bladder, not elsewhere classified      Fracture of femur (H)      Hypothyroidism      Iron deficiency anemia      Paraplegia (H)     unspecified     Pressure ulcer of sacral region      PTSD (post-traumatic stress disorder)      Rheumatoid arthritis (H)      Sepsis (H)     unspecified       History reviewed. No pertinent surgical history.    Family  History   Problem Relation Age of Onset     Chronic Obstructive Pulmonary Disease Brother        Social History   Substance Use Topics     Smoking status: Never Smoker     Smokeless tobacco: Never Used     Alcohol use No       No current facility-administered medications for this encounter.      Current Outpatient Prescriptions   Medication     cyclobenzaprine (FLEXERIL) 5 MG tablet     diazepam (VALIUM) 2 MG tablet     dronabinol (MARINOL) 2.5 MG capsule     ipratropium - albuterol 0.5 mg/2.5 mg/3 mL (DUONEB) 0.5-2.5 (3) MG/3ML neb solution     LEVOTHYROXINE SODIUM PO     loperamide (IMODIUM) 2 MG capsule     miconazole (MICATIN) 2 % AERP powder     mineral oil-hydrophilic petrolatum (AQUAPHOR)     multivitamins with minerals (CERTAVITE/CEROVITE) LIQD liquid     OMEPRAZOLE PO     ondansetron (ZOFRAN ODT) 4 MG ODT tab     oxybutynin 15 MG TB24     OXYCODONE HCL PO     protein modular (PROSOURCE TF)     QUEtiapine (SEROQUEL) 25 MG tablet     QUEtiapine (SEROQUEL) 50 MG tablet     Rivaroxaban (XARELTO PO)     triamcinolone (KENALOG) 0.1 % cream      No Known Allergies     Review of Systems   Constitutional: Negative for fever.   Cardiovascular: Negative for chest pain.   Gastrointestinal: Negative for abdominal pain.   Genitourinary: Negative for flank pain.   Musculoskeletal: Positive for back pain (between shoulder blades).     Physical Exam   BP: 120/73  Pulse: 119  Temp: 98.6  F (37  C)  Resp: 16  Weight: 72.6 kg (160 lb)  SpO2: 97 %    Physical Exam   Constitutional: She is oriented to person, place, and time. No distress.   HENT:   Head: Atraumatic.   Mouth/Throat: Oropharynx is clear and moist. No oropharyngeal exudate.   Eyes: Pupils are equal, round, and reactive to light. No scleral icterus.   Cardiovascular: Normal heart sounds and intact distal pulses.    Pulmonary/Chest: Breath sounds normal. No respiratory distress.   Abdominal: Soft. Bowel sounds are normal. There is no tenderness.   Musculoskeletal:  She exhibits no edema or tenderness.   Neurological: She is alert and oriented to person, place, and time.   Skin: Skin is warm. No rash noted. She is not diaphoretic.   Nursing note and vitals reviewed.      ED Course   5:29 PM  The patient was seen and examined by Dr. Helms in Room 22.    ED Course     Procedures             Critical Care time:  none             Labs Ordered and Resulted from Time of ED Arrival Up to the Time of Departure from the ED - No data to display         Assessments & Plan (with Medical Decision Making)   In summary, this is a 62-year-old female history of T4 paraplegia, COPD and malnutrition who comes in with complaints of recurrent chronic shoulder pain and concern that she may be undermedicated in the TCU with her oxycodone. Patient s presentation is pretty classic for her chronic shoulder pain and she is medicated with her established dose of oxycodone 50 mg p.o. Extensive chart review conducted by both myself and pharmacy consultant in the Emergency Department can find no evidence of patient stated dosage of 45 mg every 3 hours. This was conveyed to the patient with explanation that we cannot increase the dose of her chronic pain medication and would defer instead to pain clinic for re-evaluation. Patient to be discharged back to TCU as discussed.      This part of the medical record was transcribed by Toyin Guevara, Medical Scribe, from a dictation done by Luigi Helms MD.     I have reviewed the nursing notes.    I have reviewed the findings, diagnosis, plan and need for follow up with the patient.    New Prescriptions    No medications on file       Final diagnoses:   Other chronic pain   I, Charles Charles, am serving as a trained medical scribe to document services personally performed by Luigi Helms MD, based on the provider's statements to me.   I, Luigi Helms MD, was physically present and have reviewed and verified the accuracy of this note documented by Charles  KATI Charles.     6/16/2018   [facility], Telephone, EMERGENCY DEPARTMENT     Luigi Helms MD  06/22/18 [id number]

## 2018-06-17 NOTE — DISCHARGE INSTRUCTIONS
Please ask your primary doctor to refill your paxil and abilify.     Please call Urology Clinic (phone: [phone]) and ask for a follow up appointment with the Urologist who knows you (Dr Faustino San).     Please make an appointment to follow up with Pain Clinic (phone: [phone]) as soon as possible.     Managing Chronic Pain   Being in pain can be exhausting. You may find you have trouble working, sleeping, or just doing day-to-day tasks. But you can learn to manage pain, feel better, and regain control of your life.   Understanding chronic pain  Chronic pain is a serious medical problem. It is defined as pain that lasts longer than 3 months. Chronic pain includes pain that you feel regularly, even if it comes and goes. The pain may be from an ongoing injury or health problem. Or it may be because of a chronic pain syndrome, such as fibromyalgia. Sometimes pain persists when no cause can be found.   Pain should be treated  You have a right to have your pain treated. Untreated chronic pain can affect your overall health. It can lead to depression, anxiety, anger, and personality changes. It can also disrupt work, sleep, relationships, and other aspects of normal life. It may not be possible to relieve all of your pain. But it can be reduced to a level you can cope with.   Your role in treatment  Your healthcare provider will work closely with you on a plan to manage your pain. But it s up to you to put this plan into action. Control of chronic pain is done mainly through self-management. This means that you take an active role in your care. Getting support from family and friends is important too.   Planning your treatment  Your healthcare provider will first look for a cause of your pain that can be treated. He or she will also assess your pain level. This may be done by asking you to rate your pain on a scale from 1 (low pain) to 10 (severe pain). Your provider will also ask you to describe the pain.  For example, is your pain sharp or dull? Is it constant or does it come and go? You may be asked to keep a pain log. This is a diary in which you track your pain. It may help identify things that tend to make your pain worse. You and your healthcare provider can make a plan to help prevent and cope with pain on a daily basis. In some cases, you may be referred to a special pain program or clinic. Your treatment plan may include:    Medicines    Complementary therapies    Mind and body therapies    Other medical treatments    Getting physical activity   Medicines  Medicine will most likely be a part of your treatment plan. Your provider will evaluate which are the best medicines for your pain. You may use over-the-counter or prescription medicines. You may need to take more than one medicine. It may take some time to find the best medicine or combination of medicines for your pain. Take all medicines as directed. Pain medicines can be used in many ways. You may take medicines:    Every day to help   stay ahead  of the pain so that it doesn t flare up    At times when pain is worse than usual    Before activities that tend to trigger pain    To decrease sensitivity to pain   Medicines for chronic pain include:    Nonsteroidal anti-inflammatory medicines (NSAIDs) for pain from swelling and inflammation. Your provider may prescribe a type of NSAID called a NOGUERA inhibitor.    Acetaminophen    Anticonvulsants to treat nerve pain called neuropathy    Antidepressants to treat neuropathy    Muscle relaxants for muscle spasms    Topical medicines. These are put on the skin.    Opioids, or narcotics, to treat severe pain. These very strong medicines can help ease certain kinds of pain. They most often are used for short periods of time. Your provider will monitor your care very closely if you take opioids.   Complementary therapies  These are treatments that can be used along with medical care to help relieve pain. Look for a  licensed practitioner with experience treating chronic pain. Talk with your healthcare provider about using complementary therapies such as:    Massage    Acupuncture and acupressure    Chiropractic    Vitamins or herbal supplements   Mind/body therapies  The brain and the body are both part of the pain response. The brain reads the pain signals from the body. This means that your mind has some control over how pain signals are processed. Mind/body therapies may help change how your brain reads pain signals. They may be learned with the help of a trained therapist or in a class. They include:    Deep breathing    Distraction    Visualization    Meditation    Biofeedback   Other medical treatments  If other treatments don t work for you, one of these procedures or devices may help:    Nerve blocks to numb nerves in a painful area    Trigger point injections for painful muscles    Steroid injections for joint pain     Transcutaneous electrical nerve stimulation (TENS) to block pain signals to the brain    Spinal stimulation to block spinal pain    Implanted spinal pump that contains pain medicine    Ablation using heat, cold, or chemicals to destroy painful nerves                                                                                                                    Getting physical activity  Being physically active has many benefits. It can improve your ability to cope with pain. It may also help improve your mood, sleep, and overall health. Your healthcare provider can help you plan an exercise program that s right for your needs. This may include:    Stretching and range-of-motion exercises    Low-impact exercise such as walking, biking, swimming, and other water exercise    Strength training using light weights    Walking up the stairs instead of taking the elevator    Riding a bike instead of driving    Parking your car farther from your destination   You may need to not do high-impact activities.  These involve jumping, running, or sudden starts, stops, or changes of direction. If you haven t exercised in a long time or you have physical limitations, your healthcare provider may refer you to a physical therapist. He or she can teach you stretches and exercises that fit your condition and fitness level.   Being active and healthy  A healthier lifestyle makes it easier to cope with pain and function better. Follow these tips:    Choose a balance of healthy foods and drinks.    Limit alcohol and caffeine.    Go to bed at about the same time each day.    Don t let pain keep you from others. Spend time with friends and family.    Keep your mind active. Read books or take classes.    If you re not working, volunteer or join a club or social group.   Getting support  A support group lets you talk with others who also have chronic pain. Chronic pain support groups can help you feel less isolated. They can also give you tips for coping with pain. To find a local support group, contact your nearest hospital or pain clinic.   You may also want to try counseling. Counseling can help you learn coping skills and methods such as visualization. It can also help with mood problems. When choosing a counselor, look for someone who has worked with people who have chronic pain.   See your provider for regular visits and let him or her know how well treatments are working for you. Also reach out to family and friends for help and support.                              For more support and information, contact these groups:    American Academy of Pain Management, www.aapainmanage.org    American Academy of Pain Medicine, www.painmed.org    American Chronic Pain Association, www.theacpa.org    National Pain Foundation, www.thenationalpainfoundation.org  Date Last Reviewed: 12/1/2017 2000-2017 [employer]. [address] [place] [address], [place] [address]. All rights reserved. This information is not intended as a substitute  for professional medical care. Always follow your healthcare professional's instructions.

## 2018-06-17 NOTE — ED NOTES
Dc instructions and avs was completed by previous nurse. All writer did was gave courtesy meal, milk, and phone number

## 2018-06-21 NOTE — LETTER
6/21/2018        RE: Marychuy Zhou  [facility]  [address]  [facility] [address]        [place] [facility]    Chief Complaint   Patient presents with     MIKIECK       [place] Medical Record Number:  [id number]    HPI:    Marychuy Zhou is a 62 year old  (1955), who is being seen today for an episodic care visit at [facility].  Initial hospital stay was at [facility] 4/10/18 to 5/8/18. PMH of paraplegia secondary to spinal hematoma, chronic truncal and sacral ulcers, COPD, anemia, seizure disorder, probable personality disorder, neurogenic bladder with suprapubic, DVT on chronic anticoagulation. She was admitted due to fevers and worsening hip pain. 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 Due to constant stool and urine leakage around her suprapubic catheter, her wounds are unable to heal. She ultimately needs to have a diverting colostomy and urologic procedure, as well as skin flap surgery. All of this is pending improvement in her nutritional status. She was given TPN and then GJ tube was placed.     Hospitalized again 5/15/18 through 5/19/18 at her request due to increased pelvic pain. She was initially treated for bacteremia, but this was later thought to be contamination.    Admitted to this facility for  rehab, medical management and nursing care.     HPI information obtained from: facility chart records, facility staff, patient report and [facility] chart review.    Today's concern is:  She historically refuses cares often, refuses medical visits, medications, tube feeding at times. Lately she has been taking her meds, accepting her tube feeding, generally compliant with dressing changes.    Patient seen in [facility], says she doesn't need anything right now, if she has any medical concerns she will tell the nursing staff.      Bacteremia  Hypotension, unspecified hypotension type  Since re-admitting 5/19, BP readings range:  104/81  103/68  95/64  121/61  108/75  100/61  108/64  112/77    History of MRSA infection  Other chronic pain  Paraplegia at T4 level (H)  Decubitus ulcer of sacral region, unspecified ulcer stage  Multiple wounds  Osteomyelitis, unspecified site, unspecified type (H)  Seizure disorder (H)  Chronic obstructive pulmonary disease, unspecified COPD type (H)  Hypothyroidism, unspecified type  Neurogenic bladder  Urinary incontinence, unspecified type  Personal history of DVT (deep vein thrombosis)  Anxiety with depression          ALLERGIES: Review of patient's allergies indicates no known allergies.  Past Medical, Surgical, Family and Social History reviewed and updated in [facility].    Current Outpatient Prescriptions   Medication Sig Dispense Refill     ARIPiprazole (ABILIFY) 5 MG tablet Take 1 tablet (5 mg) by mouth At Bedtime 7 tablet 1     cyclobenzaprine (FLEXERIL) 5 MG tablet Take 1 tablet (5 mg) by mouth 3 times daily as needed for muscle spasms 42 tablet      diazepam (VALIUM) 2 MG tablet Take 2 tablets (4 mg) by mouth every 6 hours as needed for anxiety 60 tablet      dronabinol (MARINOL) 2.5 MG capsule Take 1 capsule (2.5 mg) by mouth 2 times daily (before meals)  0     ipratropium - albuterol 0.5 mg/2.5 mg/3 mL (DUONEB) 0.5-2.5 (3) MG/3ML neb solution Take 1 vial (3 mLs) by nebulization every 4 hours as needed for wheezing 360 mL      LEVOTHYROXINE SODIUM PO Take 125 mcg by mouth every morning       loperamide (IMODIUM) 2 MG capsule Take 2 capsules (4 mg) by mouth 4 times daily 20 capsule      miconazole (MICATIN) 2 % AERP powder Apply topically 2 times daily Apply to groin folds       mineral oil-hydrophilic petrolatum (AQUAPHOR) Apply topically daily Apply to lower extremities for skin irritation.       multivitamins with minerals (CERTAVITE/CEROVITE) LIQD liquid 15 mLs by Per Feeding Tube route daily        "OMEPRAZOLE PO Take 20 mg by mouth daily (with breakfast)       ondansetron (ZOFRAN ODT) 4 MG ODT tab Take 1 tablet (4 mg) by mouth every 6 hours as needed for nausea 20 tablet 1     oxybutynin 15 MG TB24 Take 1 tablet (15 mg) by mouth At Bedtime 30 tablet      OXYCODONE HCL PO Take 15 mg by mouth every 3 hours as needed       PARoxetine (PAXIL) 10 MG tablet Take 1 tablet (10 mg) by mouth At Bedtime 7 tablet 1     protein modular (PROSOURCE TF) 1 packet by Per Feeding Tube route 3 times daily       QUEtiapine (SEROQUEL) 25 MG tablet Take 1 tablet (25 mg) by mouth daily 60 tablet      QUEtiapine (SEROQUEL) 50 MG tablet Take 1 tablet (50 mg) by mouth At Bedtime 120 tablet      Rivaroxaban (XARELTO PO) Take 20 mg by mouth At Bedtime       triamcinolone (KENALOG) 0.1 % cream Apply topically 3 times daily       Medications reviewed:  Medications reconciled to facility chart and changes were made to reflect current medications as identified as above med list.     REVIEW OF SYSTEMS:  Limited secondary to participation but today pt reports no concerns    Physical Exam:  /81  Pulse 97  Temp 97.9  F (36.6  C)  Resp 18  Ht 5' 2.5\" (1.588 m)  Wt 161 lb 8 oz (73.3 kg)  SpO2 98%  BMI 29.07 kg/m2  GENERAL APPEARANCE:  in no distress, oriented  EYES:  EOM, conjunctivae, lids, pupils and irises normal  RESP:  no respiratory distress    Recent Labs:     CBC RESULTS:   Recent Labs   Lab Test 05/30/18 05/24/18   WBC  7.7  7.3   RBC  3.07*  3.00*   HGB  7.0*  7.0*   HCT  26.4*  24.3*   MCV  83  81   MCH  22.8*  23.3*   MCHC  27.6*  28.8*   RDW  19.7*  19.2*   PLT  463*  433       Last Basic Metabolic Panel:  Recent Labs   Lab Test 05/30/18 05/24/18   NA  138  138   POTASSIUM  4.1  3.8   CHLORIDE  105  104   NATA  8.7  8.6   CO2  24  23   BUN  12  8   CR  0.59*  0.66   GLC  106  114       Liver Function Studies -   Recent Labs   Lab Test 05/30/18 05/24/18   PROTTOTAL  7.2  7.4   ALBUMIN  2.0*  2.0*   BILITOTAL  0.2  0.2 "   ALKPHOS  153*  202*   AST  9  12   ALT  <9  <9       TSH   Date Value Ref Range Status   05/30/2018 29.15 (A) 0.30 - 5.00 uIU/mL Final   05/24/2018 32.78 (H) 0.30 - 5.00 uIU/mL Final         Assessment/Plan:  (R78.81) Bacteremia  (primary encounter diagnosis)  (I95.9) Hypotension, unspecified hypotension type  (Z86.14) History of MRSA infection  (G89.29) Other chronic pain  (G83.9) Paraplegia at T4 level (H)  (L89.159) Decubitus ulcer of sacral region, unspecified ulcer stage  (T07.XXXA) Multiple wounds  (M86.9) Osteomyelitis, unspecified site, unspecified type (H)  (G40.909) Seizure disorder (H)  (J44.9) Chronic obstructive pulmonary disease, unspecified COPD type (H)  (E03.9) Hypothyroidism, unspecified type  (R53.81) Physical deconditioning  (N31.9) Neurogenic bladder  (R32) Urinary incontinence, unspecified type  (Z86.718) Personal history of DVT (deep vein thrombosis)  (F41.8) Anxiety with depression    Comment: No acute concerns per staff. Her goal has been to move in with a friend. Patient currently requires 24 hour care. Discharge from the nursing home would only be possible if 24 hour care was in place and home had appropriate equipment. Given that she is not on therapy and does not have a discharge plan in place, she should remain LTC and move into an appropriate bed.        Electronically signed by  JOSEPH Montoya [facility]  Pager: [phone]

## 2018-06-21 NOTE — PROGRESS NOTES
[place] [facility]    Chief Complaint   Patient presents with     AISHWARYA       [place] Medical Record Number:  [id number]    HPI:    Marychuy Zhou is a 62 year old  (1955), who is being seen today for an episodic care visit at [facility].  Initial hospital stay was at [facility] 4/10/18 to 5/8/18. PMH of paraplegia secondary to spinal hematoma, chronic truncal and sacral ulcers, COPD, anemia, seizure disorder, probable personality disorder, neurogenic bladder with suprapubic, DVT on chronic anticoagulation. She was admitted due to fevers and worsening hip pain. 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 Due to constant stool and urine leakage around her suprapubic catheter, her wounds are unable to heal. She ultimately needs to have a diverting colostomy and urologic procedure, as well as skin flap surgery. All of this is pending improvement in her nutritional status. She was given TPN and then GJ tube was placed.     Hospitalized again 5/15/18 through 5/19/18 at her request due to increased pelvic pain. She was initially treated for bacteremia, but this was later thought to be contamination.    Admitted to this facility for  rehab, medical management and nursing care.     HPI information obtained from: facility chart records, facility staff, patient report and [facility] chart review.    Today's concern is:  She historically refuses cares often, refuses medical visits, medications, tube feeding at times. Lately she has been taking her meds, accepting her tube feeding, generally compliant with dressing changes.    Patient seen in [facility], says she doesn't need anything right now, if she has any medical concerns she will tell the nursing staff.     Bacteremia  Hypotension, unspecified hypotension type  Since re-admitting 5/19, BP readings  range:  104/81  103/68  95/64  121/61  108/75  100/61  108/64  112/77    History of MRSA infection  Other chronic pain  Paraplegia at T4 level (H)  Decubitus ulcer of sacral region, unspecified ulcer stage  Multiple wounds  Osteomyelitis, unspecified site, unspecified type (H)  Seizure disorder (H)  Chronic obstructive pulmonary disease, unspecified COPD type (H)  Hypothyroidism, unspecified type  Neurogenic bladder  Urinary incontinence, unspecified type  Personal history of DVT (deep vein thrombosis)  Anxiety with depression          ALLERGIES: Review of patient's allergies indicates no known allergies.  Past Medical, Surgical, Family and Social History reviewed and updated in [facility].    Current Outpatient Prescriptions   Medication Sig Dispense Refill     ARIPiprazole (ABILIFY) 5 MG tablet Take 1 tablet (5 mg) by mouth At Bedtime 7 tablet 1     cyclobenzaprine (FLEXERIL) 5 MG tablet Take 1 tablet (5 mg) by mouth 3 times daily as needed for muscle spasms 42 tablet      diazepam (VALIUM) 2 MG tablet Take 2 tablets (4 mg) by mouth every 6 hours as needed for anxiety 60 tablet      dronabinol (MARINOL) 2.5 MG capsule Take 1 capsule (2.5 mg) by mouth 2 times daily (before meals)  0     ipratropium - albuterol 0.5 mg/2.5 mg/3 mL (DUONEB) 0.5-2.5 (3) MG/3ML neb solution Take 1 vial (3 mLs) by nebulization every 4 hours as needed for wheezing 360 mL      LEVOTHYROXINE SODIUM PO Take 125 mcg by mouth every morning       loperamide (IMODIUM) 2 MG capsule Take 2 capsules (4 mg) by mouth 4 times daily 20 capsule      miconazole (MICATIN) 2 % AERP powder Apply topically 2 times daily Apply to groin folds       mineral oil-hydrophilic petrolatum (AQUAPHOR) Apply topically daily Apply to lower extremities for skin irritation.       multivitamins with minerals (CERTAVITE/CEROVITE) LIQD liquid 15 mLs by Per Feeding Tube route daily       OMEPRAZOLE PO Take 20 mg by mouth daily (with breakfast)       ondansetron (ZOFRAN ODT) 4 MG  "ODT tab Take 1 tablet (4 mg) by mouth every 6 hours as needed for nausea 20 tablet 1     oxybutynin 15 MG TB24 Take 1 tablet (15 mg) by mouth At Bedtime 30 tablet      OXYCODONE HCL PO Take 15 mg by mouth every 3 hours as needed       PARoxetine (PAXIL) 10 MG tablet Take 1 tablet (10 mg) by mouth At Bedtime 7 tablet 1     protein modular (PROSOURCE TF) 1 packet by Per Feeding Tube route 3 times daily       QUEtiapine (SEROQUEL) 25 MG tablet Take 1 tablet (25 mg) by mouth daily 60 tablet      QUEtiapine (SEROQUEL) 50 MG tablet Take 1 tablet (50 mg) by mouth At Bedtime 120 tablet      Rivaroxaban (XARELTO PO) Take 20 mg by mouth At Bedtime       triamcinolone (KENALOG) 0.1 % cream Apply topically 3 times daily       Medications reviewed:  Medications reconciled to facility chart and changes were made to reflect current medications as identified as above med list.     REVIEW OF SYSTEMS:  Limited secondary to participation but today pt reports no concerns    Physical Exam:  /81  Pulse 97  Temp 97.9  F (36.6  C)  Resp 18  Ht 5' 2.5\" (1.588 m)  Wt 161 lb 8 oz (73.3 kg)  SpO2 98%  BMI 29.07 kg/m2  GENERAL APPEARANCE:  in no distress, oriented  EYES:  EOM, conjunctivae, lids, pupils and irises normal  RESP:  no respiratory distress    Recent Labs:     CBC RESULTS:   Recent Labs   Lab Test 05/30/18 05/24/18   WBC  7.7  7.3   RBC  3.07*  3.00*   HGB  7.0*  7.0*   HCT  26.4*  24.3*   MCV  83  81   MCH  22.8*  23.3*   MCHC  27.6*  28.8*   RDW  19.7*  19.2*   PLT  463*  433       Last Basic Metabolic Panel:  Recent Labs   Lab Test 05/30/18 05/24/18   NA  138  138   POTASSIUM  4.1  3.8   CHLORIDE  105  104   NATA  8.7  8.6   CO2  24  23   BUN  12  8   CR  0.59*  0.66   GLC  106  114       Liver Function Studies -   Recent Labs   Lab Test 05/30/18 05/24/18   PROTTOTAL  7.2  7.4   ALBUMIN  2.0*  2.0*   BILITOTAL  0.2  0.2   ALKPHOS  153*  202*   AST  9  12   ALT  <9  <9       TSH   Date Value Ref Range Status "   05/30/2018 29.15 (A) 0.30 - 5.00 uIU/mL Final   05/24/2018 32.78 (H) 0.30 - 5.00 uIU/mL Final         Assessment/Plan:  (R78.81) Bacteremia  (primary encounter diagnosis)  (I95.9) Hypotension, unspecified hypotension type  (Z86.14) History of MRSA infection  (G89.29) Other chronic pain  (G83.9) Paraplegia at T4 level (H)  (L89.159) Decubitus ulcer of sacral region, unspecified ulcer stage  (T07.XXXA) Multiple wounds  (M86.9) Osteomyelitis, unspecified site, unspecified type (H)  (G40.909) Seizure disorder (H)  (J44.9) Chronic obstructive pulmonary disease, unspecified COPD type (H)  (E03.9) Hypothyroidism, unspecified type  (R53.81) Physical deconditioning  (N31.9) Neurogenic bladder  (R32) Urinary incontinence, unspecified type  (Z86.718) Personal history of DVT (deep vein thrombosis)  (F41.8) Anxiety with depression    Comment: No acute concerns per staff. Her goal has been to move in with a friend. Patient currently requires 24 hour care. Discharge from the nursing home would only be possible if 24 hour care was in place and home had appropriate equipment. Given that she is not on therapy and does not have a discharge plan in place, she should remain LTC and move into an appropriate bed.        Electronically signed by  JOSEPH Montoya [facility]  Pager: [phone]

## 2018-06-22 NOTE — ED AVS SNAPSHOT
[facility], [place], Emergency Department    [address] [place] [address]    Phone:  [phone]                                       Marychuy Zhou   MRN: [id number]    Department:  [facility], Emergency Department   Date of Visit:  6/22/2018           After Visit Summary Signature Page     I have received my discharge instructions, and my questions have been answered. I have discussed any challenges I see with this plan with the nurse or doctor.    ..........................................................................................................................................  Patient/Patient Representative Signature      ..........................................................................................................................................  Patient Representative Print Name and Relationship to Patient    ..................................................               ................................................  Date                                            Time    ..........................................................................................................................................  Reviewed by Signature/Title    ...................................................              ..............................................  Date                                                            Time

## 2018-06-22 NOTE — ED TRIAGE NOTES
"Triage Assessment & Note:    /76  Pulse 105  Temp 98.7  F (37.1  C) (Oral)  Resp 18  Ht 1.575 m (5' 2\")  Wt 84.8 kg (187 lb)  SpO2 98%  BMI 34.2 kg/m2    Patient presents with: c/o constipation x 2 weeks without BM.    Home Treatments/Remedies: None    Febrile / Afebrile? afebrile    Duration of C/o:  2 weeks     Luis Shaw  June 22, 2018      "

## 2018-06-22 NOTE — ED NOTES
Initial Assessment: VSS. Communicates needs without difficulty. G-Tube drsg replaced. Pt has c/o nausea at intervals. Zofran given. Denies SOB.

## 2018-06-22 NOTE — LETTER
Transition Communication Hand-off for Care Transitions to Next Level of Care Provider    Name: Marychuy Zhou  : 1955  MRN #: [id number]  Primary Care Provider: Sánchez Zamora MD     [facility] [address] [facility] [address]     Reason for Hospitalization:  came to [facility] ED for constipation  Admit Date/Time: 2018 12:04 AM  Discharge Date: 2018  Payor Source: Payor: MEDICARE / Plan: MEDICARE / Product Type: Medicare /     Reason for Communication Hand-off Referral: Multiple providers/specialties  Non-adherence to plan of care related to:  Other - patient often declining medications and nursing care at TCU    Discharge Plan:  Marychuy came to ED d/t constipation and wanted to transfer to a different TCU, had significant concerns about her TCU staff. She has had multiple ED visits recently with similar concerns although declined information and support from  for [facility].  spoke to Marychuy as well as TCU staff about her disposition. Marychuy ultimately d/c back to TCU later in the day.      Concern for non-adherence with plan of care:   Y/N - Yes    Follow-up plan:  No future appointments.    VIDHYA Robb, [facility]  [facility] Work Services, Emergency Dept [facility]  Pager: [phone] Mon-Sat 9 am - 9 pm, on-call/after hours pager [phone]    AVS/Discharge Summary is the source of truth; this is a helpful guide for improved communication of patient story

## 2018-06-22 NOTE — DISCHARGE INSTRUCTIONS
Please make an appointment to follow up with Your Primary Care Provider in 7 days even if entirely better.  You can call to discuss the appropriate follow up timing with your doctor.     Return to the emergency department for any worsening back pain, abdominal pain, fevers or chills, burning with urination, nausea or vomiting, weakness or any other concerns as given or discussed.

## 2018-06-22 NOTE — ED NOTES
Moderate stool produced after enema. Katia cares performed. Brief changed. Clean, dry brief and chux provided. Thick layer of barrier cream with zinc applied to katia area. Wet to dry dressing applied to pressure ulcer with mepilex dressing.

## 2018-06-22 NOTE — ED PROVIDER NOTES
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     Patient currently resides at [facility] since hospitalization here on 4/10/2018-5/8/2018 for multiple decubitus ulcers with G-tube placement. Patient complains of pain at the entrance of her G-tube, intense abdominal pain for the past 9 days, and no bowel movement for the past 2 weeks. Patient denies eating for the past 3 days and denies passing gas.    No raymond chest pain or shortness of breath, though she believes her breathing is somewhat shallow due to abdominal discomfort.  No fever.  Dark urine recently.    She has a history of DVT andis on anticoagulants.   Per chart review, patient is currently taking oxycodone 15 mg every 3 hours as needed and discontinued Senna     I have reviewed the Medications, Allergies, Past Medical and Surgical History, and Social History in the Epic system.    Review of Systems   14 point review of symptoms was performed and is negative except as noted above.     Physical Exam   BP: 127/76  Pulse: 105  Temp: 98.7  F (37.1  C)  Resp: 18  Height: 157.5 cm (5' 2\")  Weight: 84.8 kg (187 lb)  SpO2: 98 %      Physical Exam  GEN: Well appearing, non toxic, cooperative and conversant.   HEENT: The head is normocephalic and atraumatic. Pupils are equal round and reactive to light. Extraocular motions are intact. There is no facial swelling. The neck is nontender and supple.   CV: Regular rate and rhythm without murmurs rubs or gallops. 2+ radial pulses bilaterally.  PULM: Clear to auscultation bilaterally.  ABD: Peg tube left mid abdomen without evidence of infection but dry secretion " suggesting dressing not recently changed; Suprapubic catheter site appears clean with some crusted material;  Diffuse ab tender to palpation non distended; Soft.   EXT: Lower ex paraplegic. Bilateral lower extremities contracted no suspected acute changes per patient.  NEURO: Cranial nerves II through XII are intact and symmetric  PSYCH: Calm and cooperative, interactive.     ED Course     ED Course     Procedures               Labs Ordered and Resulted from Time of ED Arrival Up to the Time of Departure from the ED   CBC WITH PLATELETS DIFFERENTIAL - Abnormal; Notable for the following:        Result Value    Hemoglobin 8.5 (*)     Hematocrit 29.4 (*)     MCV 73 (*)     MCH 21.1 (*)     MCHC 28.9 (*)     RDW 20.3 (*)     Platelet Count 519 (*)     All other components within normal limits   COMPREHENSIVE METABOLIC PANEL - Abnormal; Notable for the following:     Glucose 100 (*)     Albumin 2.8 (*)     Protein Total 9.0 (*)     Alkaline Phosphatase 178 (*)     All other components within normal limits   LIPASE - Abnormal; Notable for the following:     Lipase 38 (*)     All other components within normal limits   LACTIC ACID WHOLE BLOOD   ROUTINE UA WITH MICROSCOPIC REFLEX TO CULTURE   URINE CULTURE AEROBIC BACTERIAL            Assessments & Plan (with Medical Decision Making)   62-year-old female with T4 paraplegia, chronic sacral ulcers, COPD DVT on anticoagulation presenting with abdominal pain and no bowel movements as noted  DDX: Bowel obstruction, constipation, intra-abdominal infection/abscess, G-tube or suprapubic catheter malpositioning, pancreatitis, colitis, mesenteric ischemia, among other causes.    Clinically the patient is nontoxic appearing, but has significant abdominal discomfort.  For workup for broad differential, CT, labs and urinalysis were ordered  These are reviewed.  Labs are unrevealing, and CT shows no acute abnormalities with exception some mild to moderate stool load.    Urinalysis is  pending.  Patient was given a pink lady enema.  We will prescribe MiraLAX.    Patient was signed out to morning attending pending urinalysis.  If evidence of UTI she will be treated, appropriate for discharge subsequently.          I have reviewed the nursing notes.    I have reviewed the findings, diagnosis, plan and need for follow up with the patient.    New Prescriptions    POLYETHYLENE GLYCOL (MIRALAX) POWDER    Take 17 g (1 capful) by mouth daily       Final diagnoses:   Abdominal pain, generalized     I, Iker Herbert, am serving as a trained medical scribe to document services personally performed by Omid Edge MD, based on the provider's statements to me.   IOmid MD, was physically present and have reviewed and verified the accuracy of this note documented by Iker Herbert.    6/22/2018   [facility], [place], EMERGENCY DEPARTMENT     Omid Edge MD  06/22/18 0727

## 2018-06-22 NOTE — ED NOTES
Unable to obtain C.T. W/contrast r/t pain/discomfort at the PIV site. Pt has multiple simultaneous discomforts that she is expressing. MD aware. Meds ordered for discomfort. Pt is chiefly aggravated by her PIV location. RN requested with [employer] for a second I.V.

## 2018-06-22 NOTE — ED AVS SNAPSHOT
[facility], Emergency Department    [address] [facility] [address]    Phone:  [phone]                                       Marychuy Zhou   MRN: [id number]    Department:  [facility], Emergency Department   Date of Visit:  6/22/2018           Patient Information     Date Of Birth          1955        Your diagnoses for this visit were:     Abdominal pain, generalized        You were seen by Omid Edge MD and Luigi Helms MD.        Discharge Instructions       Please make an appointment to follow up with Your Primary Care Provider in 7 days even if entirely better.  You can call to discuss the appropriate follow up timing with your doctor.     Return to the emergency department for any worsening back pain, abdominal pain, fevers or chills, burning with urination, nausea or vomiting, weakness or any other concerns as given or discussed.        Your next 10 appointments already scheduled     Jun 22, 2018  1:40 PM CDT   (Arrive by 1:30 PM)   [facility]    [address] [place] [address] [phone]           Bring a current list of meds and any records pertaining to this visit. For Physicals, please bring immunization records and any forms needing to be filled out. Please arrive 10 minutes early to complete paperwork.              24 Hour Appointment Hotline       To make an appointment at any [facility], call [phone] ([phone]). If you don't have a family doctor or clinic, we will help you find one. [facility] are conveniently located to serve the needs of you and your family.             Review of your medicines      START taking        Dose / Directions Last dose taken    polyethylene glycol powder   Commonly known as:  MIRALAX   Dose:  1 capful   Quantity:  527 g        Take 17 g (1 capful) by mouth daily   Refills:  0          Our records show  that you are taking the medicines listed below. If these are incorrect, please call your family doctor or clinic.        Dose / Directions Last dose taken    ARIPiprazole 5 MG tablet   Commonly known as:  ABILIFY   Dose:  5 mg   Quantity:  7 tablet        Take 1 tablet (5 mg) by mouth At Bedtime   Refills:  1        cyclobenzaprine 5 MG tablet   Commonly known as:  FLEXERIL   Dose:  5 mg   Quantity:  42 tablet        Take 1 tablet (5 mg) by mouth 3 times daily as needed for muscle spasms   Refills:  0        diazepam 2 MG tablet   Commonly known as:  VALIUM   Dose:  4 mg   Quantity:  60 tablet        Take 2 tablets (4 mg) by mouth every 6 hours as needed for anxiety   Refills:  0        dronabinol 2.5 MG capsule   Commonly known as:  MARINOL   Dose:  2.5 mg        Take 1 capsule (2.5 mg) by mouth 2 times daily (before meals)   Refills:  0        ipratropium - albuterol 0.5 mg/2.5 mg/3 mL 0.5-2.5 (3) MG/3ML neb solution   Commonly known as:  DUONEB   Dose:  3 mL   Quantity:  360 mL        Take 1 vial (3 mLs) by nebulization every 4 hours as needed for wheezing   Refills:  0        LEVOTHYROXINE SODIUM PO   Dose:  125 mcg        Take 125 mcg by mouth every morning   Refills:  0        loperamide 2 MG capsule   Commonly known as:  IMODIUM   Dose:  4 mg   Quantity:  20 capsule        Take 2 capsules (4 mg) by mouth 4 times daily   Refills:  0        miconazole 2 % Aerp powder   Commonly known as:  MICATIN        Apply topically 2 times daily Apply to groin folds   Refills:  0        mineral oil-hydrophilic petrolatum        Apply topically daily Apply to lower extremities for skin irritation.   Refills:  0        multivitamins with minerals Liqd liquid   Dose:  15 mL        15 mLs by Per Feeding Tube route daily   Refills:  0        OMEPRAZOLE PO   Dose:  20 mg        Take 20 mg by mouth daily (with breakfast)   Refills:  0        ondansetron 4 MG ODT tab   Commonly known as:  ZOFRAN ODT   Dose:  4 mg   Quantity:  20  tablet        Take 1 tablet (4 mg) by mouth every 6 hours as needed for nausea   Refills:  1        oxybutynin chloride 15 MG Tb24   Dose:  15 mg   Quantity:  30 tablet        Take 1 tablet (15 mg) by mouth At Bedtime   Refills:  0        OXYCODONE HCL PO   Dose:  15 mg        Take 15 mg by mouth every 3 hours as needed   Refills:  0        PARoxetine 10 MG tablet   Commonly known as:  PAXIL   Dose:  10 mg   Quantity:  7 tablet        Take 1 tablet (10 mg) by mouth At Bedtime   Refills:  1        protein modular   Dose:  1 packet        1 packet by Per Feeding Tube route 3 times daily   Refills:  0        * QUEtiapine 25 MG tablet   Commonly known as:  SEROquel   Dose:  25 mg   Quantity:  60 tablet        Take 1 tablet (25 mg) by mouth daily   Refills:  0        * QUEtiapine 50 MG tablet   Commonly known as:  SEROquel   Dose:  50 mg   Quantity:  120 tablet        Take 1 tablet (50 mg) by mouth At Bedtime   Refills:  0        triamcinolone 0.1 % cream   Commonly known as:  KENALOG        Apply topically 3 times daily   Refills:  0        XARELTO PO   Dose:  20 mg        Take 20 mg by mouth At Bedtime   Refills:  0        * Notice:  This list has 2 medication(s) that are the same as other medications prescribed for you. Read the directions carefully, and ask your doctor or other care provider to review them with you.            Prescriptions were sent or printed at these locations (1 Prescription)                   Other Prescriptions                Printed at Department/Unit printer (1 of 1)         polyethylene glycol (MIRALAX) powder                Procedures and tests performed during your visit     CBC with platelets differential    CT Abdomen Pelvis w/o Contrast    Comprehensive metabolic panel    Lactic acid whole blood    Lipase    UA with Microscopic reflex to Culture    Urine Culture Aerobic Bacterial      Orders Needing Specimen Collection     None      Pending Results     Date and Time Order Name Status  "Description    2018 0040 CT Abdomen Pelvis w/o Contrast Preliminary     2018 0038 Urine Culture Aerobic Bacterial In process             Pending Culture Results     Date and Time Order Name Status Description    2018 0038 Urine Culture Aerobic Bacterial In process             Pending Results Instructions     If you had any lab results that were not finalized at the time of your Discharge, you can call the ED Lab Result RN at [phone]. You will be contacted by this team for any positive Lab results or changes in treatment. The nurses are available 7 days a week from 10A to 6:30P.  You can leave a message 24 hours per day and they will return your call.        Thank you for choosing [facility]       Thank you for choosing [facility] for your care. Our goal is always to provide you with excellent care. Hearing back from our patients is one way we can continue to improve our services. Please take a few minutes to complete the written survey that you may receive in the mail after you visit with us. Thank you!        [email or web address] Information     [employer] lets you send messages to your doctor, view your test results, renew your prescriptions, schedule appointments and more. To sign up, go to [email or web address] . Click on \"Log in\" on the left side of the screen, which will take you to the Welcome page. Then click on \"Sign up Now\" on the right side of the page.     You will be asked to enter the access code listed below, as well as some personal information. Please follow the directions to create your username and password.     Your access code is: [email or web address]  Expires: 2018 10:46 PM     Your access code will  in 90 days. If you need help or a new code, please call your [facility] clinic or [phone].        Care EveryWhere ID     This is your Care EveryWhere ID. This could be used by other organizations to access your [facility] medical records  [id number]        Equal Access to Services     DANIELLA " LIZ : [place], wajorge albertoda luqadaha, qaybta kahorace kay, hemant he. So [facility] [phone].    ATENCIÓN: Si habla español, tiene a thorpe disposición servicios gratuitos de asistencia lingüística. Llame al [phone].    We comply with applicable federal civil rights laws and [place] laws. We do not discriminate on the basis of race, color, national origin, age, disability, sex, sexual orientation, or gender identity.            After Visit Summary       This is your record. Keep this with you and show to your community pharmacist(s) and doctor(s) at your next visit.
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
## 2018-07-26 NOTE — PROGRESS NOTES
[facility] [place] [facility]  PRIMARY CARE PROVIDER AND CLINIC:  Sánchez Zamora [facility] [address] / [facility] [address]  Chief Complaint   Patient presents with     Hospital F/U     [facility] Medical Record Number:  [id number]    HPI:    Marychuy Zhou is a 62 year old  (1955),re-admitted to the [facility] from [facility].  Hospital stay 7/13/18 through 7/23/18.  Admitted to this facility for  rehab, medical management and nursing care.      Hospital Course Per [facility] Discharge Summary 7/23/18:      Marychuy Zhou is a 62 y.o. old female with history of T4 paraplegia, neurogenic bladder, suprapubic catheter, nephrostomies, decubitus ulcers, COPD, depression, anxiety, chronic pain, subdural hematoma April 2017, seizures disorder, hypothyroidism, chronic anemia, PE and DVT who was sent from the nursing facility with increasing pain at her sacral decubitis site. On admission she had a mild leukocytosis and lactic acid (3.7) and CT revealed multiple decubitis ulcers with evidenc of chronic osteo. She was started on broad spectrum antibiotics for possible infected decubitis ulcer. Surgery took OR for debridement of necrotic soft tissue which did not obviously extend to bone. Ms. Zhou has been abrasive and abusive towards nursing and staff and non-compliant with her cares. Ethics has left recommendations in the chart. discharged to [facility] for ongoing IV abx and wound care.    At [facility], she completed her IV abx course w/o issues. Her wound care continued with good response and healing. Her functional state improved w rehab. She was stabilized and eating well and transferred back to her TCU for continuation of her complex medical and wound cares.    Her last a/p -    - Behavioral issues:  - Continue to redirect pt issues  - Appreciate palliative management    S/P decubitus/wound debridement by Dr. Amaya  Chronic sacral and ischial decubitus ulcers with  chronic osteomyelitis --left ischial tuberosity   Hx of MRSA, VRE and ESBL-- colonized with MDROs  -continue IV meropenem and daptomycin x 10 days ending today  -reconsider hospice if infection recurs per ID    #acute on chronic pain, opiate dependence - uncontrolled.   Intermittently reports good pain control then gets upset that dilaudid is being tapered.  - palliative care ordered narcotic taper.  - continue methadone  - will not escalate pain meds  - pt clearly abuses outpatient narcotics as determined by the palliative svc    #COPD exacerbation - improving  - continue prednisone for 3 more days then stop (last dose on 7/23 - ordered)  - continue prn nebs    #hx DVT/PE - continue xarelto, will need to be reassessed for long-term need as an outpatient.    Seizure disorder on keppra, none since admit.    Neurogenic bladder, s/p SPC and on ditropan, has been stable    Hypothyroidism. Cont levothyroxine. Continue outpatient fu.        HPI information obtained from: facility chart records, facility staff, patient report and [facility] chart review.        Current issues are:      Patient is seen on the patio, refuses to come inside for visit.    Osteomyelitis, unspecified site, unspecified type (H)  See above.    Adjustment disorder with mixed anxiety and depressed mood  Patient takes Valium chronically. She is upset that she is only getting 4mg now (chronic dose) as she was getting 7.5mg in the hospital. She is advised that 7.5mg is not safe for chronic use due to risk for side effects.    Opioid dependence with opioid-induced disorder (H)  Alcohol use  Staff reported that an empty bottle of hard alcohol was found in her room this week. According to the DON, Marychuy had paid another resident to get it for her. The patient denies that this happened. She says she has not been drinking. She is advised that she cannot drink and take valium and oxycodone as this is terribly unsafe. She says that she is not drinking and  this is abuse if we do not give her her medications. She is advised that no changes will be made right now, but if there is evidence of her drinking again, the valium will be stopped and oxycodone tapered down. If she denies alcohol use, she needs to have BAL checked by the lab in order to get medications. She suggests drawing her BAL right now. She is advised that any alcohol she had in previous days would be out of her system by now. She is again told that she will continue to get her medications as of now, but is warned that drinking alcohol cannot be tolerated with these prescriptions.    SVT (supraventricular tachycardia) (H)  Denies chest pain or palpitations    Anemia, unspecified type  HGB 7.9    Neurogenic bladder  Has suprapubic catheter with chronic leakage into wounds. Urology was considering some type of urinary diversion, but was a no show for her appointment. No follow up on this has been done due to hospitalization    Paraplegia at T4 level (H)  Secondary to hematoma with multiple truncal and extremity decubitus ulcers including right IT, stage IV injury, left sacral stage III ulcer, left heel ulcer, left upper thigh ulcer and coccyx ulcer. She currently does not have her PMD because it was damaged when she ran it into a table. Therapy is trying to work with her for w/c positioning and pressure mapping, but she often refuses. Therapy is wondering how long she should be in her chair for a single episode.    Seizure disorder (H)  On Keppra. No known seizures while at [facility]    Protein-calorie malnutrition, unspecified severity (H)  PEG tube was placed in April due to malnutrition. It is unclear when tube feeding was stopped, she does not have current orders for any.  Wt Readings from Last 4 Encounters:   07/26/18 158 lb (71.7 kg)   07/09/18 161 lb (73 kg)   07/02/18 161 lb 8 oz (73.3 kg)   06/29/18 161 lb 8 oz (73.3 kg)     Weights per [facility]:  07/22/18 164 lb 4.8 oz  (74.5 kg)   07/10/18 175 lb (79.4 kg)   07/05/18 174 lb 8 oz (79.2 kg)       Chronic obstructive pulmonary disease, unspecified COPD type (H)  Denies SOB, wheezing, cough      CODE STATUS/ADVANCE DIRECTIVES DISCUSSION:   CPR/Full code   Patient's living condition: lives in a skilled nursing facility    ALLERGIES:Review of patient's allergies indicates no known allergies.  PAST MEDICAL HISTORY:  has a past medical history of Anxiety; Chronic pain syndrome; Closed fracture zygoma, with routine healing, subsequent encounter; COPD (chronic obstructive pulmonary disease) (H); Depressive disorder; DVT (deep vein thrombosis) in pregnancy (H); Edema; Epilepsy (H); Flaccid neuropathic bladder, not elsewhere classified; Fracture of femur (H); Hypothyroidism; Iron deficiency anemia; Paraplegia (H); Pressure ulcer of sacral region; PTSD (post-traumatic stress disorder); Rheumatoid arthritis (H); and Sepsis (H).  PAST SURGICAL HISTORY:  has no past surgical history on file.  FAMILY HISTORY: family history includes Chronic Obstructive Pulmonary Disease in her brother.  SOCIAL HISTORY:  reports that she has never smoked. She has never used smokeless tobacco. She reports that she does not drink alcohol or use illicit drugs.    Post Discharge Medication Reconciliation Status: discharge medications reconciled, continue medications without change.  Current Outpatient Prescriptions   Medication Sig Dispense Refill     Acetaminophen (TYLENOL PO) Take 500 mg by mouth every 8 hours as needed for mild pain or fever       ARIPiprazole (ABILIFY) 5 MG tablet Take 5 mg by mouth 2 times daily       bisacodyl (DULCOLAX) 10 MG Suppository Place 10 mg rectally daily as needed for constipation       CALCIUM CARBONATE PO Take 500 mg by mouth 4 times daily       cyclobenzaprine (FLEXERIL) 5 MG tablet Take 1 tablet (5 mg) by mouth 3 times daily as needed for muscle spasms 42 tablet      diazepam (VALIUM) 2 MG tablet Take 2 tablets (4 mg) by mouth  "every 6 hours as needed for anxiety 60 tablet      ipratropium - albuterol 0.5 mg/2.5 mg/3 mL (DUONEB) 0.5-2.5 (3) MG/3ML neb solution Take 1 vial (3 mLs) by nebulization every 4 hours as needed for wheezing 360 mL      levETIRAcetam (KEPPRA) 250 MG tablet Take 250 mg by mouth 2 times daily       LEVOTHYROXINE SODIUM PO Take 125 mcg by mouth every morning       MELATONIN PO Take 3 mg by mouth nightly as needed       METHADONE HCL PO Take 5 mg by mouth 2 times daily       miconazole (MICATIN) 2 % AERP powder Apply topically 2 times daily Apply to groin folds       multivitamins with minerals (CERTAVITE/CEROVITE) LIQD liquid 15 mLs by Per Feeding Tube route daily       OMEPRAZOLE PO Take 20 mg by mouth daily (with breakfast)       ondansetron (ZOFRAN ODT) 4 MG ODT tab Take 1 tablet (4 mg) by mouth every 6 hours as needed for nausea 20 tablet 1     OXYBUTYNIN CHLORIDE PO Take 5 mg by mouth 3 times daily       OXYCODONE HCL PO Take 15 mg by mouth every 3 hours as needed       PREDNISONE PO Take as directed per taper.       Rivaroxaban (XARELTO PO) Take 20 mg by mouth At Bedtime       senna-docusate (SENOKOT-S;PERICOLACE) 8.6-50 MG per tablet Take 2 tablets by mouth 2 times daily       VENLAFAXINE HCL PO Take 37.5 mg by mouth 2 times daily       zinc oxide (DESITIN) 40 % ointment Apply topically as needed for dry skin or irritation         ROS:  Limited due to lack of cooperation. Denies chest pain, shortness of breath, fevers, chills, headache, nausea, vomiting    Exam:  /74  Pulse 99  Temp 98.1  F (36.7  C)  Resp 16  Ht 5' 2.5\" (1.588 m)  Wt 158 lb (71.7 kg)  SpO2 99%  BMI 28.44 kg/m2  GENERAL APPEARANCE:  Alert, in no distress, oriented  PSYCH:  oriented X 3    Lab/Diagnostic data:     Labs Reviewed in CareEverywhere    CBC RESULTS:   Recent Labs   Lab Test  06/22/18   0101 06/14/18   WBC  10.1  8.3   RBC  4.02  4.02   HGB  8.5*  8.8*   HCT  29.4*  31.4*   MCV  73*  78*   MCH  21.1*  21.8*   MCHC  28.9*  " 28.0*   RDW  20.3*  20.2*   PLT  519*  549*       Last Basic Metabolic Panel:  Recent Labs   Lab Test  06/22/18   0101 06/14/18   NA  137  140   POTASSIUM  4.1  4.3   CHLORIDE  104  102   NATA  8.6  9.2   CO2  26  26   BUN  13  21   CR  0.62  0.68*   GLC  100*  102*       Liver Function Studies -   Recent Labs   Lab Test  06/22/18   0101 06/14/18   PROTTOTAL  9.0*  8.9*   ALBUMIN  2.8*  2.6*   BILITOTAL  0.2  0.2   ALKPHOS  178*  176*   AST  10  13   ALT  11  <9       TSH   Date Value Ref Range Status   05/30/2018 29.15 (A) 0.30 - 5.00 uIU/mL Final   05/24/2018 32.78 (H) 0.30 - 5.00 uIU/mL Final       ASSESSMENT/PLAN:  (M86.9) Osteomyelitis, unspecified site, unspecified type (H)  (primary encounter diagnosis)  Comment: Recurrent due to wounds with stool and urine contamination  Plan: Continue current POC with no changes at this time and adjustments as needed.    (F43.23) Adjustment disorder with mixed anxiety and depressed mood  Comment: Chronic. It is very challenging to provide appropriate care for this patient due to her noncompliance. Most accusations regarding staff have not proven to be true. Patient is very manipulative. It is certainly important to treat her anxiety and depression, but it is impossible to do so without her full cooperation and honesty. Valium is not an ideal treatment due to risk for side effects and certainly is more risky when combined with alcohol.  Plan: Continue current POC with no changes at this time and adjustments as needed.    (F11.29) Opioid dependence with opioid-induced disorder (H)  Comment: Patient certainly shows no indication of needing increased opioids and would benefit from tapering as able. Staff says she has not asked for the hydromorphone today, will stop this.  Plan: Continue current POC with no changes at this time and adjustments as needed.    (Z78.9) Alcohol use  Comment: Patient is advised that it is not safe for her to consume alcohol with her controlled  substances. This puts her at risk for injury, coma, even death. If there is evidence that she is drinking heavily, valium will be stopped and oxycodone weaned off. Patient is made aware of this  Plan: Nursing to advise provider of any alcohol use.    (I47.1) SVT (supraventricular tachycardia) (H)  Comment: HR controlled  Plan: Continue current POC with no changes at this time and adjustments as needed.    (D64.9) Anemia, unspecified type  Comment: Chronic  Plan: Monitor Hgb prn    (N31.9) Neurogenic bladder  Comment: Chronic. Urinary diversion would certainly be beneficial to patient, but it is very complicated to actually pursue this given her other issues  Plan: Continue current POC with no changes at this time and adjustments as needed. F/u urology if able    (G83.9) Paraplegia at T4 level (H)  Comment: Chronic. Sitting in one position for long periods is detrimental to her skin.   Plan: Recommend maximum 2 hours at a time in current chair until appropriate chair with cushion can be obtained by therapy.    (G40.909) Seizure disorder (H)  Comment: Chronic condition being managed with medications.  Plan: Continue current POC with no changes at this time and adjustments as needed.    (E46) Protein-calorie malnutrition, unspecified severity (H)  Comment: Did not discuss this with patient. It appears she is on regular diet only at this time, no tube feeding  Plan: Monitor intake, weight    (J44.9) Chronic obstructive pulmonary disease, unspecified COPD type (H)  Comment: Chronic condition being managed with medications and is currently asymptomatic.  Plan: Continue current POC with no changes at this time and adjustments as needed.      Orders:  Discontinue hydromorphone      Electronically signed by:  JOSEPH Montoya [facility]  Pager: [phone]

## 2018-07-26 NOTE — LETTER
7/26/2018        RE: Marychuy Zhou  [facility]  [address]  [facility] [address]        [place] [facility]  PRIMARY CARE PROVIDER AND CLINIC:  [facility] [address] [facility] [address]  Chief Complaint   Patient presents with     Hospital F/U     [facility] Record Number:  [id number]    HPI:    Marychuy Zhuo is a 62 year old  (1955),re-admitted to the [facility] from Hospital  [facility].  Hospital stay 7/13/18 through 7/23/18.  Admitted to this facility for  rehab, medical management and nursing care.      Hospital Course Per [facility] Discharge Summary 7/23/18:      Marychuy Zhou is a 62 y.o. old female with history of T4 paraplegia, neurogenic bladder, suprapubic catheter, nephrostomies, decubitus ulcers, COPD, depression, anxiety, chronic pain, subdural hematoma April 2017, seizures disorder, hypothyroidism, chronic anemia, PE and DVT who was sent from the nursing facility with increasing pain at her sacral decubitis site. On admission she had a mild leukocytosis and lactic acid (3.7) and CT revealed multiple decubitis ulcers with evidenc of chronic osteo. She was started on broad spectrum antibiotics for possible infected decubitis ulcer. Surgery took OR for debridement of necrotic soft tissue which did not obviously extend to bone. Ms. Zhou has been abrasive and abusive towards nursing and staff and non-compliant with her cares. Ethics has left recommendations in the chart. discharged to [facility] for ongoing IV abx and wound care.    At [facility], she completed her IV abx course w/o issues. Her wound care continued with good response and healing. Her functional state improved w rehab. She was stabilized and eating well and transferred back to her TCU for continuation of her complex medical and wound cares.    Her last a/p -    - Behavioral issues:  - Continue to redirect pt issues  - Appreciate palliative  management    S/P decubitus/wound debridement by Dr. Amaya  Chronic sacral and ischial decubitus ulcers with chronic osteomyelitis --left ischial tuberosity   Hx of MRSA, VRE and ESBL-- colonized with MDROs  -continue IV meropenem and daptomycin x 10 days ending today  -reconsider hospice if infection recurs per ID    #acute on chronic pain, opiate dependence - uncontrolled.   Intermittently reports good pain control then gets upset that dilaudid is being tapered.  - palliative care ordered narcotic taper.  - continue methadone  - will not escalate pain meds  - pt clearly abuses outpatient narcotics as determined by the palliative svc    #COPD exacerbation - improving  - continue prednisone for 3 more days then stop (last dose on 7/23 - ordered)  - continue prn nebs    #hx DVT/PE - continue xarelto, will need to be reassessed for long-term need as an outpatient.    Seizure disorder on keppra, none since admit.    Neurogenic bladder, s/p SPC and on ditropan, has been stable    Hypothyroidism. Cont levothyroxine. Continue outpatient fu.        HPI information obtained from: facility chart records, facility staff, patient report and [facility] chart review.        Current issues are:      Patient is seen on the patio, refuses to come inside for visit.    Osteomyelitis, unspecified site, unspecified type (H)  See above.    Adjustment disorder with mixed anxiety and depressed mood  Patient takes Valium chronically. She is upset that she is only getting 4mg now (chronic dose) as she was getting 7.5mg in the hospital. She is advised that 7.5mg is not safe for chronic use due to risk for side effects.    Opioid dependence with opioid-induced disorder (H)  Alcohol use  Staff reported that an empty bottle of hard alcohol was found in her room this week. According to the DON, Marychuy had paid another resident to get it for her. The patient denies that this happened. She says she has not been drinking. She is advised that  she cannot drink and take valium and oxycodone as this is terribly unsafe. She says that she is not drinking and this is abuse if we do not give her her medications. She is advised that no changes will be made right now, but if there is evidence of her drinking again, the valium will be stopped and oxycodone tapered down. If she denies alcohol use, she needs to have BAL checked by the lab in order to get medications. She suggests drawing her BAL right now. She is advised that any alcohol she had in previous days would be out of her system by now. She is again told that she will continue to get her medications as of now, but is warned that drinking alcohol cannot be tolerated with these prescriptions.    SVT (supraventricular tachycardia) (H)  Denies chest pain or palpitations    Anemia, unspecified type  HGB 7.9    Neurogenic bladder  Has suprapubic catheter with chronic leakage into wounds. Urology was considering some type of urinary diversion, but was a no show for her appointment. No follow up on this has been done due to hospitalization    Paraplegia at T4 level (H)  Secondary to hematoma with multiple truncal and extremity decubitus ulcers including right IT, stage IV injury, left sacral stage III ulcer, left heel ulcer, left upper thigh ulcer and coccyx ulcer. She currently does not have her PMD because it was damaged when she ran it into a table. Therapy is trying to work with her for w/c positioning and pressure mapping, but she often refuses. Therapy is wondering how long she should be in her chair for a single episode.    Seizure disorder (H)  On Keppra. No known seizures while at [facility]    Protein-calorie malnutrition, unspecified severity (H)  PEG tube was placed in April due to malnutrition. It is unclear when tube feeding was stopped, she does not have current orders for any.  Wt Readings from Last 4 Encounters:   07/26/18 158 lb (71.7 kg)   07/09/18 161 lb (73 kg)   07/02/18 161 lb 8 oz  (73.3 kg)   06/29/18 161 lb 8 oz (73.3 kg)     Weights per [facility]:  07/22/18 164 lb 4.8 oz (74.5 kg)   07/10/18 175 lb (79.4 kg)   07/05/18 174 lb 8 oz (79.2 kg)       Chronic obstructive pulmonary disease, unspecified COPD type (H)  Denies SOB, wheezing, cough      CODE STATUS/ADVANCE DIRECTIVES DISCUSSION:   CPR/Full code   Patient's living condition: lives in a skilled nursing facility    ALLERGIES:Review of patient's allergies indicates no known allergies.  PAST MEDICAL HISTORY:  has a past medical history of Anxiety; Chronic pain syndrome; Closed fracture zygoma, with routine healing, subsequent encounter; COPD (chronic obstructive pulmonary disease) (H); Depressive disorder; DVT (deep vein thrombosis) in pregnancy (H); Edema; Epilepsy (H); Flaccid neuropathic bladder, not elsewhere classified; Fracture of femur (H); Hypothyroidism; Iron deficiency anemia; Paraplegia (H); Pressure ulcer of sacral region; PTSD (post-traumatic stress disorder); Rheumatoid arthritis (H); and Sepsis (H).  PAST SURGICAL HISTORY:  has no past surgical history on file.  FAMILY HISTORY: family history includes Chronic Obstructive Pulmonary Disease in her brother.  SOCIAL HISTORY:  reports that she has never smoked. She has never used smokeless tobacco. She reports that she does not drink alcohol or use illicit drugs.    Post Discharge Medication Reconciliation Status: discharge medications reconciled, continue medications without change.  Current Outpatient Prescriptions   Medication Sig Dispense Refill     Acetaminophen (TYLENOL PO) Take 500 mg by mouth every 8 hours as needed for mild pain or fever       ARIPiprazole (ABILIFY) 5 MG tablet Take 5 mg by mouth 2 times daily       bisacodyl (DULCOLAX) 10 MG Suppository Place 10 mg rectally daily as needed for constipation       CALCIUM CARBONATE PO Take 500 mg by mouth 4 times daily       cyclobenzaprine (FLEXERIL) 5 MG tablet Take 1 tablet (5 mg) by mouth 3  "times daily as needed for muscle spasms 42 tablet      diazepam (VALIUM) 2 MG tablet Take 2 tablets (4 mg) by mouth every 6 hours as needed for anxiety 60 tablet      ipratropium - albuterol 0.5 mg/2.5 mg/3 mL (DUONEB) 0.5-2.5 (3) MG/3ML neb solution Take 1 vial (3 mLs) by nebulization every 4 hours as needed for wheezing 360 mL      levETIRAcetam (KEPPRA) 250 MG tablet Take 250 mg by mouth 2 times daily       LEVOTHYROXINE SODIUM PO Take 125 mcg by mouth every morning       MELATONIN PO Take 3 mg by mouth nightly as needed       METHADONE HCL PO Take 5 mg by mouth 2 times daily       miconazole (MICATIN) 2 % AERP powder Apply topically 2 times daily Apply to groin folds       multivitamins with minerals (CERTAVITE/CEROVITE) LIQD liquid 15 mLs by Per Feeding Tube route daily       OMEPRAZOLE PO Take 20 mg by mouth daily (with breakfast)       ondansetron (ZOFRAN ODT) 4 MG ODT tab Take 1 tablet (4 mg) by mouth every 6 hours as needed for nausea 20 tablet 1     OXYBUTYNIN CHLORIDE PO Take 5 mg by mouth 3 times daily       OXYCODONE HCL PO Take 15 mg by mouth every 3 hours as needed       PREDNISONE PO Take as directed per taper.       Rivaroxaban (XARELTO PO) Take 20 mg by mouth At Bedtime       senna-docusate (SENOKOT-S;PERICOLACE) 8.6-50 MG per tablet Take 2 tablets by mouth 2 times daily       VENLAFAXINE HCL PO Take 37.5 mg by mouth 2 times daily       zinc oxide (DESITIN) 40 % ointment Apply topically as needed for dry skin or irritation         ROS:  Limited due to lack of cooperation. Denies chest pain, shortness of breath, fevers, chills, headache, nausea, vomiting    Exam:  /74  Pulse 99  Temp 98.1  F (36.7  C)  Resp 16  Ht 5' 2.5\" (1.588 m)  Wt 158 lb (71.7 kg)  SpO2 99%  BMI 28.44 kg/m2  GENERAL APPEARANCE:  Alert, in no distress, oriented  PSYCH:  oriented X 3    Lab/Diagnostic data:     Labs Reviewed in CareEverywhere    CBC RESULTS:   Recent Labs   Lab Test  06/22/18   0101 06/14/18   WBC  " 10.1  8.3   RBC  4.02  4.02   HGB  8.5*  8.8*   HCT  29.4*  31.4*   MCV  73*  78*   MCH  21.1*  21.8*   MCHC  28.9*  28.0*   RDW  20.3*  20.2*   PLT  519*  549*       Last Basic Metabolic Panel:  Recent Labs   Lab Test  06/22/18   0101 06/14/18   NA  137  140   POTASSIUM  4.1  4.3   CHLORIDE  104  102   NATA  8.6  9.2   CO2  26  26   BUN  13  21   CR  0.62  0.68*   GLC  100*  102*       Liver Function Studies -   Recent Labs   Lab Test  06/22/18   0101 06/14/18   PROTTOTAL  9.0*  8.9*   ALBUMIN  2.8*  2.6*   BILITOTAL  0.2  0.2   ALKPHOS  178*  176*   AST  10  13   ALT  11  <9       TSH   Date Value Ref Range Status   05/30/2018 29.15 (A) 0.30 - 5.00 uIU/mL Final   05/24/2018 32.78 (H) 0.30 - 5.00 uIU/mL Final       ASSESSMENT/PLAN:  (M86.9) Osteomyelitis, unspecified site, unspecified type (H)  (primary encounter diagnosis)  Comment: Recurrent due to wounds with stool and urine contamination  Plan: Continue current POC with no changes at this time and adjustments as needed.    (F43.23) Adjustment disorder with mixed anxiety and depressed mood  Comment: Chronic. It is very challenging to provide appropriate care for this patient due to her noncompliance. Most accusations regarding staff have not proven to be true. Patient is very manipulative. It is certainly important to treat her anxiety and depression, but it is impossible to do so without her full cooperation and honesty. Valium is not an ideal treatment due to risk for side effects and certainly is more risky when combined with alcohol.  Plan: Continue current POC with no changes at this time and adjustments as needed.    (F11.29) Opioid dependence with opioid-induced disorder (H)  Comment: Patient certainly shows no indication of needing increased opioids and would benefit from tapering as able. Staff says she has not asked for the hydromorphone today, will stop this.  Plan: Continue current POC with no changes at this time and adjustments as needed.    (Z78.9)  Alcohol use  Comment: Patient is advised that it is not safe for her to consume alcohol with her controlled substances. This puts her at risk for injury, coma, even death. If there is evidence that she is drinking heavily, valium will be stopped and oxycodone weaned off. Patient is made aware of this  Plan: Nursing to advise provider of any alcohol use.    (I47.1) SVT (supraventricular tachycardia) (H)  Comment: HR controlled  Plan: Continue current POC with no changes at this time and adjustments as needed.    (D64.9) Anemia, unspecified type  Comment: Chronic  Plan: Monitor Hgb prn    (N31.9) Neurogenic bladder  Comment: Chronic. Urinary diversion would certainly be beneficial to patient, but it is very complicated to actually pursue this given her other issues  Plan: Continue current POC with no changes at this time and adjustments as needed. F/u urology if able    (G83.9) Paraplegia at T4 level (H)  Comment: Chronic. Sitting in one position for long periods is detrimental to her skin.   Plan: Recommend maximum 2 hours at a time in current chair until appropriate chair with cushion can be obtained by therapy.    (G40.909) Seizure disorder (H)  Comment: Chronic condition being managed with medications.  Plan: Continue current POC with no changes at this time and adjustments as needed.    (E46) Protein-calorie malnutrition, unspecified severity (H)  Comment: Did not discuss this with patient. It appears she is on regular diet only at this time, no tube feeding  Plan: Monitor intake, weight    (J44.9) Chronic obstructive pulmonary disease, unspecified COPD type (H)  Comment: Chronic condition being managed with medications and is currently asymptomatic.  Plan: Continue current POC with no changes at this time and adjustments as needed.      Orders:  Discontinue hydromorphone      Electronically signed by:  JOSEPH Montoya [facility]  Pager: [phone]

## 2018-07-30 NOTE — PROGRESS NOTES
[facility]    Chief Complaint   Patient presents with     AISHWARYA       [place] Medical Record Number:  [id number]    HPI:    Marychuy Zhou is a 62 year old  (1955), who is being seen today for an episodic care visit at [facility]. PMH of paraplegia secondary to spinal hematoma (about 10 years ago), chronic truncal and sacral ulcers, COPD, anemia, seizure disorder, probable personality disorder, neurogenic bladder with suprapubic, DVT on chronic anticoagulation.     Most recent hospital stay was at [facility] from 7/13/18 through 7/23/18. She was transferred there from [facility] where she was treated for infected decubitus ulcers. She had surgical debridement and was treated with IV abx. Per the discharge summary, the infection did not appear to extend into the bone.  Other hospital stay at [facility] 6/30/18 through 7/9/18 for pneumonia. Initial hospital stay was at [facility] 4/10/18 to 5/8/18.  She was admitted due to fevers and worsening hip pain. 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 Due to constant stool and urine leakage around her suprapubic catheter, her wounds are unable to heal. She ultimately needs to have a diverting colostomy and urologic procedure, as well as skin flap surgery. All of this is pending improvement in her nutritional status. She was given TPN and then GJ tube was placed.      Admitted to this facility for  rehab, medical management and nursing care    HPI information obtained from: facility chart records, facility staff, patient report, [facility] chart review and Care Everywhere Epic chart review.    Today's concern is:  Patient is chronically abrasive and verbally abusive toward staff. Often noncompliant with cares. She is again seen out on the patio today as she refuses to go back to her room. She says she doesn't  "go in her room during the day at all.     Osteomyelitis, unspecified site, unspecified type (H)  See above. See opioid dependence.    Adjustment disorder with mixed anxiety and depressed mood  Opioid dependence with opioid-induced disorder (H)  Alcohol use  Patient says she takes Valium chronically for anxiety. This is the only medication that works for her. Staff had reported finding an empty bottle of alcohol in her room. On 7/26/18 at noon, this provider advised her that valium and alcohol are a very unsafe combination and that if she continued to drink alcohol, valium could not be prescribed. On 7/26/18 around 3pm, she was witnessed by staff trying to order a drink at the restaurant down the street. Order was given to discontinue Valium and start hydroxyzine 25mg q4h prn. Today she says that she did not try to order a drink and that the witness from here is not credible. She says the only credible witnesses are the other residents here. She says \"I'm the most credible person you will ever meet.\" Information is verified with the staff member, they report that it was indeed Marychuy that tried to order alcohol. Marychuy is advised that valium will not be ordered again and that this is not standard of care for anxiety. Valium is a high risk medication, metabolites can accrue in your system and if combined with alcohol, could lead to coma or death. She is advised that hydroxyzine has been ordered as a replacement. She says she has tried this and it does not work. She will not answer the questions directly on whether she had tried it here since it was ordered. An increased dose is offered, she says it won't work. She says she has been feeling very anxious and may need to start drinking to calm her nerves.     Marychuy is also upset that the prn hydromorphone for her pain was stopped. While at [facility] and [place], hydromorphone was added to her pain regimen as well as methadone. The plan was always to taper off the " "hydromorphone. She says she is in excruciating pain and we aren't doing anything about it. She can't sleep, she is in excruciating pain all the time. It is mentioned that she is already on high dose oxycodone. She says \"if you think 15mg is high dose, you don't know what you're doing.\" She again says she used to be on oxycodone 30mg q3h prn. She says that she has been on this for a long time. She again says that we are making her suffer. Marychuy is advised that the methadone is more appropriate for chronic pain. A standard treatment would be to increase the methadone for her pain, not the oxycodone. She does agree to this.    Given her self-reported prescribing history with her PCP regarding the oxycodone and valium, provider offered to get in touch with Dr. Zamora to discuss her previous prescriptions so that we could effectively treat her anxiety and pain. She says that she does not want Dr. Zamora's office to release any information to us and she is going to call the clinic and tell them that. I advised her that this would be to try to improve her treatment, she again says she does not want that clinic releasing information to [facility].    SVT (supraventricular tachycardia) (H)  Denies chest pain or palpitations    Anemia, unspecified type  HGB 7.9    Neurogenic bladder  Has suprapubic catheter with chronic leakage into wounds. Urology was considering some type of urinary diversion, but was a no show for her appointment. No follow up on this has been done due to hospitalization    Paraplegia at T4 level (H)  Secondary to hematoma with multiple truncal and extremity decubitus ulcers including right IT, stage IV injury, left sacral stage III ulcer, left heel ulcer, left upper thigh ulcer and coccyx ulcer. She currently does not have her PMD because it was damaged when she ran it into a table. Therapy is trying to work with her for w/c positioning and pressure mapping, but she often refuses. Therapy was " "wondering how long she should be in her chair for a single episode. Recommendation was given for max 2 hours at a time in chair. Patient says she got in bed around 1pm yesterday and stayed until this morning. She says she is aware that pressure is bad for her wound. \"I have been paralyzed for 10 years, I know this\"    Seizure disorder (H)  On Keppra. No known seizures while at [facility]    Protein-calorie malnutrition, unspecified severity (H)  PEG tube was placed in April due to malnutrition. It is unclear when tube feeding was stopped, she does not have current orders for any. Dietician is reviewing this. He does say that she has been eating well.  Wt Readings from Last 4 Encounters:   07/30/18 158 lb (71.7 kg)   07/26/18 158 lb (71.7 kg)   07/09/18 161 lb (73 kg)   07/02/18 161 lb 8 oz (73.3 kg)     Weights per [facility]:  07/22/18 164 lb 4.8 oz (74.5 kg)   07/10/18 175 lb (79.4 kg)   07/05/18 174 lb 8 oz (79.2 kg)       Chronic obstructive pulmonary disease, unspecified COPD type (H)  Denies SOB, wheezing, cough      ALLERGIES: Review of patient's allergies indicates no known allergies.  Past Medical, Surgical, Family and Social History reviewed and updated in EPIC.    Current Outpatient Prescriptions   Medication Sig Dispense Refill     Acetaminophen (TYLENOL PO) Take 500 mg by mouth every 8 hours as needed for mild pain or fever       ARIPiprazole (ABILIFY) 5 MG tablet Take 5 mg by mouth 2 times daily       bisacodyl (DULCOLAX) 10 MG Suppository Place 10 mg rectally daily as needed for constipation       CALCIUM CARBONATE PO Take 500 mg by mouth 4 times daily       cyclobenzaprine (FLEXERIL) 5 MG tablet Take 1 tablet (5 mg) by mouth 3 times daily as needed for muscle spasms 42 tablet      hydrOXYzine (ATARAX) 25 MG tablet Take 1 tablet (25 mg) by mouth every 4 hours as needed for anxiety 60 tablet 1     ipratropium - albuterol 0.5 mg/2.5 mg/3 mL (DUONEB) 0.5-2.5 (3) MG/3ML neb " "solution Take 1 vial (3 mLs) by nebulization every 4 hours as needed for wheezing 360 mL      levETIRAcetam (KEPPRA) 250 MG tablet Take 250 mg by mouth 2 times daily       LEVOTHYROXINE SODIUM PO Take 125 mcg by mouth every morning       MELATONIN PO Take 3 mg by mouth nightly as needed       METHADONE HCL PO Take 5 mg by mouth 2 times daily       miconazole (MICATIN) 2 % AERP powder Apply topically 2 times daily Apply to groin folds       multivitamins with minerals (CERTAVITE/CEROVITE) LIQD liquid 15 mLs by Per Feeding Tube route daily       OMEPRAZOLE PO Take 20 mg by mouth daily (with breakfast)       ondansetron (ZOFRAN ODT) 4 MG ODT tab Take 1 tablet (4 mg) by mouth every 6 hours as needed for nausea 20 tablet 1     OXYBUTYNIN CHLORIDE PO Take 5 mg by mouth 3 times daily       OXYCODONE HCL PO Take 15 mg by mouth every 3 hours as needed       PREDNISONE PO Take 10 mg by mouth daily Take as directed per taper.        Rivaroxaban (XARELTO PO) Take 20 mg by mouth At Bedtime       senna-docusate (SENOKOT-S;PERICOLACE) 8.6-50 MG per tablet Take 2 tablets by mouth 2 times daily       VENLAFAXINE HCL PO Take 37.5 mg by mouth 2 times daily       zinc oxide (DESITIN) 40 % ointment Apply topically as needed for dry skin or irritation       Medications reviewed:  Medications reconciled to facility chart and changes were made to reflect current medications as identified as above med list.     REVIEW OF SYSTEMS:  Limited secondary to cooperation    Physical Exam:  /77  Pulse 74  Temp 98.7  F (37.1  C)  Resp 18  Ht 5' 2.5\" (1.588 m)  Wt 158 lb (71.7 kg)  SpO2 96%  BMI 28.44 kg/m2   GENERAL APPEARANCE:  Alert, in no distress, oriented  PSYCH:  oriented X 3      Recent Labs:   Has refused every blood draw ordered here    CBC RESULTS:   Recent Labs   Lab Test  06/22/18   0101 06/14/18   WBC  10.1  8.3   RBC  4.02  4.02   HGB  8.5*  8.8*   HCT  29.4*  31.4*   MCV  73*  78*   MCH  21.1*  21.8*   MCHC  28.9*  28.0* "   RDW  20.3*  20.2*   PLT  519*  549*       Last Basic Metabolic Panel:  Recent Labs   Lab Test  06/22/18   0101 06/14/18   NA  137  140   POTASSIUM  4.1  4.3   CHLORIDE  104  102   NATA  8.6  9.2   CO2  26  26   BUN  13  21   CR  0.62  0.68*   GLC  100*  102*       Liver Function Studies -   Recent Labs   Lab Test  06/22/18   0101 06/14/18   PROTTOTAL  9.0*  8.9*   ALBUMIN  2.8*  2.6*   BILITOTAL  0.2  0.2   ALKPHOS  178*  176*   AST  10  13   ALT  11  <9       TSH   Date Value Ref Range Status   05/30/2018 29.15 (A) 0.30 - 5.00 uIU/mL Final   05/24/2018 32.78 (H) 0.30 - 5.00 uIU/mL Final       Assessment/Plan:  (M86.9) Osteomyelitis, unspecified site, unspecified type (H)  (primary encounter diagnosis)  Comment: Recurrent due to wounds with stool and urine contamination  Plan: Continue current POC with no changes at this time and adjustments as needed.    (F43.23) Adjustment disorder with mixed anxiety and depressed mood  Comment: Chronic. It is very challenging to provide appropriate care for this patient due to her noncompliance. Most accusations regarding staff have not proven to be true. Patient is very manipulative. It is certainly important to treat her anxiety and depression, but it is impossible to do so without her full cooperation and honesty. Valium is not an ideal treatment due to risk for side effects and certainly is more risky when combined with alcohol. This provider did not contact her PCP, but there was information available in her chart that was already released with her consent. Per extensive chart review that was completed after the visit, valium was not on her med list from her last visit with her PCP 11/2017. At that time she was on buspar 7.5mg TID and lorazepam 0.5mg BID. She also had hydroxyzine at bedtime, but no sign of this being tried more frequently than that. PCP had made pain clinic and psychiatry referrals, patient had not followed through on these. The valium first appeared during  a hospital stay 3/2018, it is unknown how this came to be on her list. Will discuss again at next visit. Recommend no valium be prescribed again.  Plan: Increase hydroxyzine to 50mg q4h prn. If any sign of sedation, will decrease again.    (F11.29) Opioid dependence with opioid-induced disorder (H)  Comment: It is likely that patient has some pain, but it seems very unlikely that it would be so severe given her T4 paraplegia. Current dose of oxycodone is more appropriate for acute pain, methadone would be more appropriate for chronic pain. Will increase methadone with plan to decrease oxycodone in the near future. Also per chart review, as of her 11/2017 PCP visit, her only pain medication was Percocet 2 tabs q6h prn. Pain medication first changed during 1/2018 hospital stay. She was discharged at that time on oxycontin 20mg BID and hydromorphone 2-4mg q4h prn. Oxycodone was added at a later hospital stay. It is possible she was confusing oxycodone with oxycontin, neither were ever at 30mg, but she could have taken oxycontin 20mg + oxycodone 10mg to equal 30mg. This would in no way be equal to oxycodone 30mg q3h prn. Oxycodone was 5-10mg q4h prn until her 5/2018 hospital stay when the pain service increased it to 15mg q3h prn and added flexeril due to painful dressing changes.  Plan: Increase methadone to 5mg q8h. Will discuss plan to taper oxycodone at next visit.     (Z78.9) Alcohol use  Comment: Patient is advised that it is not safe for her to consume alcohol with her controlled substances. This puts her at risk for injury, coma, even death. Patient says that if she was drinking she wouldn't be as anxious as she is.   Plan: Nursing to advise provider of any alcohol use.    (I47.1) SVT (supraventricular tachycardia) (H)  Comment: HR controlled  Plan: Continue current POC with no changes at this time and adjustments as needed.    (D64.9) Anemia, unspecified type  Comment: Chronic  Plan: Monitor Hgb prn    (N31.9)  Neurogenic bladder  Comment: Chronic. Urinary diversion would certainly be beneficial to patient, but it is very complicated to actually pursue this given her other issues  Plan: Continue current POC with no changes at this time and adjustments as needed. F/u urology if able    (G83.9) Paraplegia at T4 level (H)  Comment: Chronic. Sitting in one position for long periods is detrimental to her skin. Again, encourage max 2 hours in chair  Plan: Recommend maximum 2 hours at a time in current chair until appropriate chair with cushion can be obtained by therapy.    (G40.909) Seizure disorder (H)  Comment: Chronic condition being managed with medications.  Plan: Continue current POC with no changes at this time and adjustments as needed.    (E46) Protein-calorie malnutrition, unspecified severity (H)  Comment: Did not discuss this with patient. It appears she is on regular diet only at this time, no tube feeding. Dietician was going to discuss with her.  Plan: Monitor intake, weight    (J44.9) Chronic obstructive pulmonary disease, unspecified COPD type (H)  Comment: Chronic condition being managed with medications and is currently asymptomatic.  Plan: Continue current POC with no changes at this time and adjustments as needed.      Orders:  Increase hydroxyzine to 50mg q4h prn  Increase methadone to 5mg q8h    Total time spent with patient visit at the skilled nursing facility was 60 minutes including patient visit and review of past records. Greater than 50% of total time spent with counseling and coordinating care due to chronic pain and anxiety    Electronically signed by  JOSEPH [facility]  Pager: [phone]

## 2018-07-30 NOTE — LETTER
7/30/2018        RE: Marychuy Zhou  [facility]  [address]  [facility] [address]        [place] [facility]    Chief Complaint   Patient presents with     AISHWARYA       [facility] Record Number:  [id number]    HPI:    Marychuy Zhou is a 62 year old  (1955), who is being seen today for an episodic care visit at [facility]. PMH of paraplegia secondary to spinal hematoma (about 10 years ago), chronic truncal and sacral ulcers, COPD, anemia, seizure disorder, probable personality disorder, neurogenic bladder with suprapubic, DVT on chronic anticoagulation.     Most recent hospital stay was at [facility] from 7/13/18 through 7/23/18. She was transferred there from [facility] where she was treated for infected decubitus ulcers. She had surgical debridement and was treated with IV abx. Per the discharge summary, the infection did not appear to extend into the bone.  Other hospital stay at [facility] 6/30/18 through 7/9/18 for pneumonia. Initial hospital stay was at [facility] 4/10/18 to 5/8/18.  She was admitted due to fevers and worsening hip pain. 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 Due to constant stool and urine leakage around her suprapubic catheter, her wounds are unable to heal. She ultimately needs to have a diverting colostomy and urologic procedure, as well as skin flap surgery. All of this is pending improvement in her nutritional status. She was given TPN and then GJ tube was placed.      Admitted to this facility for  rehab, medical management and nursing care    HPI information obtained from: facility chart records, facility staff, patient report, [facility] chart review and Care Everywhere [facility] chart review.    Today's concern is:  Patient is chronically abrasive and verbally abusive toward staff. Often noncompliant with  "cares. She is again seen out on the patio today as she refuses to go back to her room. She says she doesn't go in her room during the day at all.     Osteomyelitis, unspecified site, unspecified type (H)  See above. See opioid dependence.    Adjustment disorder with mixed anxiety and depressed mood  Opioid dependence with opioid-induced disorder (H)  Alcohol use  Patient says she takes Valium chronically for anxiety. This is the only medication that works for her. Staff had reported finding an empty bottle of alcohol in her room. On 7/26/18 at noon, this provider advised her that valium and alcohol are a very unsafe combination and that if she continued to drink alcohol, valium could not be prescribed. On 7/26/18 around 3pm, she was witnessed by staff trying to order a drink at the restaurant down the street. Order was given to discontinue Valium and start hydroxyzine 25mg q4h prn. Today she says that she did not try to order a drink and that the witness from here is not credible. She says the only credible witnesses are the other residents here. She says \"I'm the most credible person you will ever meet.\" Information is verified with the staff member, they report that it was indeed Marychuy that tried to order alcohol. Marychuy is advised that valium will not be ordered again and that this is not standard of care for anxiety. Valium is a high risk medication, metabolites can accrue in your system and if combined with alcohol, could lead to coma or death. She is advised that hydroxyzine has been ordered as a replacement. She says she has tried this and it does not work. She will not answer the questions directly on whether she had tried it here since it was ordered. An increased dose is offered, she says it won't work. She says she has been feeling very anxious and may need to start drinking to calm her nerves.     Marychuy is also upset that the prn hydromorphone for her pain was stopped. While at [facility] and [facility], " "hydromorphone was added to her pain regimen as well as methadone. The plan was always to taper off the hydromorphone. She says she is in excruciating pain and we aren't doing anything about it. She can't sleep, she is in excruciating pain all the time. It is mentioned that she is already on high dose oxycodone. She says \"if you think 15mg is high dose, you don't know what you're doing.\" She again says she used to be on oxycodone 30mg q3h prn. She says that she has been on this for a long time. She again says that we are making her suffer. Marychuy is advised that the methadone is more appropriate for chronic pain. A standard treatment would be to increase the methadone for her pain, not the oxycodone. She does agree to this.    Given her self-reported prescribing history with her PCP regarding the oxycodone and valium, provider offered to get in touch with Dr. Zamora to discuss her previous prescriptions so that we could effectively treat her anxiety and pain. She says that she does not want Dr. Zamora's office to release any information to us and she is going to call the clinic and tell them that. I advised her that this would be to try to improve her treatment, she again says she does not want that clinic releasing information to [facility].    SVT (supraventricular tachycardia) (H)  Denies chest pain or palpitations    Anemia, unspecified type  HGB 7.9    Neurogenic bladder  Has suprapubic catheter with chronic leakage into wounds. Urology was considering some type of urinary diversion, but was a no show for her appointment. No follow up on this has been done due to hospitalization    Paraplegia at T4 level (H)  Secondary to hematoma with multiple truncal and extremity decubitus ulcers including right IT, stage IV injury, left sacral stage III ulcer, left heel ulcer, left upper thigh ulcer and coccyx ulcer. She currently does not have her PMD because it was damaged when she ran it into a table. Therapy is trying " "to work with her for w/c positioning and pressure mapping, but she often refuses. Therapy was wondering how long she should be in her chair for a single episode. Recommendation was given for max 2 hours at a time in chair. Patient says she got in bed around 1pm yesterday and stayed until this morning. She says she is aware that pressure is bad for her wound. \"I have been paralyzed for 10 years, I know this\"    Seizure disorder (H)  On Keppra. No known seizures while at [facility]    Protein-calorie malnutrition, unspecified severity (H)  PEG tube was placed in April due to malnutrition. It is unclear when tube feeding was stopped, she does not have current orders for any. Dietician is reviewing this. He does say that she has been eating well.  Wt Readings from Last 4 Encounters:   07/30/18 158 lb (71.7 kg)   07/26/18 158 lb (71.7 kg)   07/09/18 161 lb (73 kg)   07/02/18 161 lb 8 oz (73.3 kg)     Weights per [facility]:  07/22/18 164 lb 4.8 oz (74.5 kg)   07/10/18 175 lb (79.4 kg)   07/05/18 174 lb 8 oz (79.2 kg)       Chronic obstructive pulmonary disease, unspecified COPD type (H)  Denies SOB, wheezing, cough      ALLERGIES: Review of patient's allergies indicates no known allergies.  Past Medical, Surgical, Family and Social History reviewed and updated in [facility].    Current Outpatient Prescriptions   Medication Sig Dispense Refill     Acetaminophen (TYLENOL PO) Take 500 mg by mouth every 8 hours as needed for mild pain or fever       ARIPiprazole (ABILIFY) 5 MG tablet Take 5 mg by mouth 2 times daily       bisacodyl (DULCOLAX) 10 MG Suppository Place 10 mg rectally daily as needed for constipation       CALCIUM CARBONATE PO Take 500 mg by mouth 4 times daily       cyclobenzaprine (FLEXERIL) 5 MG tablet Take 1 tablet (5 mg) by mouth 3 times daily as needed for muscle spasms 42 tablet      hydrOXYzine (ATARAX) 25 MG tablet Take 1 tablet (25 mg) by mouth every 4 hours as needed for anxiety " "60 tablet 1     ipratropium - albuterol 0.5 mg/2.5 mg/3 mL (DUONEB) 0.5-2.5 (3) MG/3ML neb solution Take 1 vial (3 mLs) by nebulization every 4 hours as needed for wheezing 360 mL      levETIRAcetam (KEPPRA) 250 MG tablet Take 250 mg by mouth 2 times daily       LEVOTHYROXINE SODIUM PO Take 125 mcg by mouth every morning       MELATONIN PO Take 3 mg by mouth nightly as needed       METHADONE HCL PO Take 5 mg by mouth 2 times daily       miconazole (MICATIN) 2 % AERP powder Apply topically 2 times daily Apply to groin folds       multivitamins with minerals (CERTAVITE/CEROVITE) LIQD liquid 15 mLs by Per Feeding Tube route daily       OMEPRAZOLE PO Take 20 mg by mouth daily (with breakfast)       ondansetron (ZOFRAN ODT) 4 MG ODT tab Take 1 tablet (4 mg) by mouth every 6 hours as needed for nausea 20 tablet 1     OXYBUTYNIN CHLORIDE PO Take 5 mg by mouth 3 times daily       OXYCODONE HCL PO Take 15 mg by mouth every 3 hours as needed       PREDNISONE PO Take 10 mg by mouth daily Take as directed per taper.        Rivaroxaban (XARELTO PO) Take 20 mg by mouth At Bedtime       senna-docusate (SENOKOT-S;PERICOLACE) 8.6-50 MG per tablet Take 2 tablets by mouth 2 times daily       VENLAFAXINE HCL PO Take 37.5 mg by mouth 2 times daily       zinc oxide (DESITIN) 40 % ointment Apply topically as needed for dry skin or irritation       Medications reviewed:  Medications reconciled to facility chart and changes were made to reflect current medications as identified as above med list.     REVIEW OF SYSTEMS:  Limited secondary to cooperation    Physical Exam:  /77  Pulse 74  Temp 98.7  F (37.1  C)  Resp 18  Ht 5' 2.5\" (1.588 m)  Wt 158 lb (71.7 kg)  SpO2 96%  BMI 28.44 kg/m2   GENERAL APPEARANCE:  Alert, in no distress, oriented  PSYCH:  oriented X 3      Recent Labs:   Has refused every blood draw ordered here    CBC RESULTS:   Recent Labs   Lab Test  06/22/18   0101 06/14/18   WBC  10.1  8.3   RBC  4.02  4.02   HGB "  8.5*  8.8*   HCT  29.4*  31.4*   MCV  73*  78*   MCH  21.1*  21.8*   MCHC  28.9*  28.0*   RDW  20.3*  20.2*   PLT  519*  549*       Last Basic Metabolic Panel:  Recent Labs   Lab Test  06/22/18   0101 06/14/18   NA  137  140   POTASSIUM  4.1  4.3   CHLORIDE  104  102   NATA  8.6  9.2   CO2  26  26   BUN  13  21   CR  0.62  0.68*   GLC  100*  102*       Liver Function Studies -   Recent Labs   Lab Test  06/22/18   0101 06/14/18   PROTTOTAL  9.0*  8.9*   ALBUMIN  2.8*  2.6*   BILITOTAL  0.2  0.2   ALKPHOS  178*  176*   AST  10  13   ALT  11  <9       TSH   Date Value Ref Range Status   05/30/2018 29.15 (A) 0.30 - 5.00 uIU/mL Final   05/24/2018 32.78 (H) 0.30 - 5.00 uIU/mL Final       Assessment/Plan:  (M86.9) Osteomyelitis, unspecified site, unspecified type (H)  (primary encounter diagnosis)  Comment: Recurrent due to wounds with stool and urine contamination  Plan: Continue current POC with no changes at this time and adjustments as needed.    (F43.23) Adjustment disorder with mixed anxiety and depressed mood  Comment: Chronic. It is very challenging to provide appropriate care for this patient due to her noncompliance. Most accusations regarding staff have not proven to be true. Patient is very manipulative. It is certainly important to treat her anxiety and depression, but it is impossible to do so without her full cooperation and honesty. Valium is not an ideal treatment due to risk for side effects and certainly is more risky when combined with alcohol. This provider did not contact her PCP, but there was information available in her chart that was already released with her consent. Per extensive chart review that was completed after the visit, valium was not on her med list from her last visit with her PCP 11/2017. At that time she was on buspar 7.5mg TID and lorazepam 0.5mg BID. She also had hydroxyzine at bedtime, but no sign of this being tried more frequently than that. PCP had made pain clinic and  psychiatry referrals, patient had not followed through on these. The valium first appeared during a hospital stay 3/2018, it is unknown how this came to be on her list. Will discuss again at next visit. Recommend no valium be prescribed again.  Plan: Increase hydroxyzine to 50mg q4h prn. If any sign of sedation, will decrease again.    (F11.29) Opioid dependence with opioid-induced disorder (H)  Comment: It is likely that patient has some pain, but it seems very unlikely that it would be so severe given her T4 paraplegia. Current dose of oxycodone is more appropriate for acute pain, methadone would be more appropriate for chronic pain. Will increase methadone with plan to decrease oxycodone in the near future. Also per chart review, as of her 11/2017 PCP visit, her only pain medication was Percocet 2 tabs q6h prn. Pain medication first changed during 1/2018 hospital stay. She was discharged at that time on oxycontin 20mg BID and hydromorphone 2-4mg q4h prn. Oxycodone was added at a later hospital stay. It is possible she was confusing oxycodone with oxycontin, neither were ever at 30mg, but she could have taken oxycontin 20mg + oxycodone 10mg to equal 30mg. This would in no way be equal to oxycodone 30mg q3h prn. Oxycodone was 5-10mg q4h prn until her 5/2018 hospital stay when the pain service increased it to 15mg q3h prn and added flexeril due to painful dressing changes.  Plan: Increase methadone to 5mg q8h. Will discuss plan to taper oxycodone at next visit.     (Z78.9) Alcohol use  Comment: Patient is advised that it is not safe for her to consume alcohol with her controlled substances. This puts her at risk for injury, coma, even death. Patient says that if she was drinking she wouldn't be as anxious as she is.   Plan: Nursing to advise provider of any alcohol use.    (I47.1) SVT (supraventricular tachycardia) (H)  Comment: HR controlled  Plan: Continue current POC with no changes at this time and adjustments  as needed.    (D64.9) Anemia, unspecified type  Comment: Chronic  Plan: Monitor Hgb prn    (N31.9) Neurogenic bladder  Comment: Chronic. Urinary diversion would certainly be beneficial to patient, but it is very complicated to actually pursue this given her other issues  Plan: Continue current POC with no changes at this time and adjustments as needed. F/u urology if able    (G83.9) Paraplegia at T4 level (H)  Comment: Chronic. Sitting in one position for long periods is detrimental to her skin. Again, encourage max 2 hours in chair  Plan: Recommend maximum 2 hours at a time in current chair until appropriate chair with cushion can be obtained by therapy.    (G40.909) Seizure disorder (H)  Comment: Chronic condition being managed with medications.  Plan: Continue current POC with no changes at this time and adjustments as needed.    (E46) Protein-calorie malnutrition, unspecified severity (H)  Comment: Did not discuss this with patient. It appears she is on regular diet only at this time, no tube feeding. Dietician was going to discuss with her.  Plan: Monitor intake, weight    (J44.9) Chronic obstructive pulmonary disease, unspecified COPD type (H)  Comment: Chronic condition being managed with medications and is currently asymptomatic.  Plan: Continue current POC with no changes at this time and adjustments as needed.      Orders:  Increase hydroxyzine to 50mg q4h prn  Increase methadone to 5mg q8h    Total time spent with patient visit at the skilled nursing facility was 60 minutes including patient visit and review of past records. Greater than 50% of total time spent with counseling and coordinating care due to chronic pain and anxiety    Electronically signed by  JOSEPH Montoya [facility]  Pager: [phone]

## 2018-08-01 NOTE — LETTER
8/1/2018        RE: Marychuy Lujan  [facility]  [address]  [facility] [address]        [place] [facility]  INITIAL VISIT NOTE  August 1, 2018    PRIMARY CARE PROVIDER AND CLINIC:  Sánchez Zamora [facility] [address] / [facility] [address]    Chief Complaint   Patient presents with     Hospital F/U       HPI:    Marychuy Lujan is a 62 year old  (1955) female who was seen at [facility] on August 1, 2018 for an initial visit. Medical history is notable for T4 paraplegia, neurogenic bladder s/p suprapubic catheter, malnutrition, agitation/refusal of cares, COPD and hypothyroidism. She has had multiple hospitalizations this year and has been in this TCU between hospitalizations since May. She was most recently hospitalized at [facility] from 7/10/18 to 7/13/18 where she presented with increasing pain from her sacral decubitus ulcer. She was started on antibiotics and is s/p debridement. She was discharged to [facility] from 7/13/18 to 7/23/18 where she completed course of antibiotics and received wound cares. She was re-admitted to this facility for medical management and rehab.     Today, Ms. Lujan is seen in her room before breakfast. She was getting settled in her chair and was verbally being quite rude to the nursing staff. It was difficult to have her respond to any of my questions as she was constantly yelling at the facility staff. She ultimately told me to leave.    CODE STATUS:   CPR/Full code     ALLERGIES:   No Known Allergies    PAST MEDICAL HISTORY:   Past Medical History:   Diagnosis Date     Anxiety      Chronic pain syndrome      Closed fracture zygoma, with routine healing, subsequent encounter      COPD (chronic obstructive pulmonary disease) (H)      Depressive disorder      DVT (deep vein thrombosis) in pregnancy (H)      Edema      Epilepsy (H)      Flaccid neuropathic bladder, not elsewhere classified      Fracture of femur (H)       Hypothyroidism      Iron deficiency anemia      Paraplegia (H)     unspecified     Pressure ulcer of sacral region      PTSD (post-traumatic stress disorder)      Rheumatoid arthritis (H)      Sepsis (H)     unspecified       PAST SURGICAL HISTORY:   No past surgical history on file.    FAMILY HISTORY:   Family History   Problem Relation Age of Onset     Chronic Obstructive Pulmonary Disease Brother        SOCIAL HISTORY:   Has been in TCU for months    MEDICATIONS:  Current Outpatient Prescriptions   Medication Sig Dispense Refill     Acetaminophen (TYLENOL PO) Take 500 mg by mouth every 8 hours as needed for mild pain or fever       ARIPiprazole (ABILIFY) 5 MG tablet Take 5 mg by mouth 2 times daily       bisacodyl (DULCOLAX) 10 MG Suppository Place 10 mg rectally daily as needed for constipation       CALCIUM CARBONATE PO Take 500 mg by mouth 4 times daily       cyclobenzaprine (FLEXERIL) 5 MG tablet Take 1 tablet (5 mg) by mouth 3 times daily as needed for muscle spasms 42 tablet      hydrOXYzine (ATARAX) 25 MG tablet Take 2 tablets (50 mg) by mouth every 4 hours as needed for anxiety 60 tablet 1     ipratropium - albuterol 0.5 mg/2.5 mg/3 mL (DUONEB) 0.5-2.5 (3) MG/3ML neb solution Take 1 vial (3 mLs) by nebulization every 4 hours as needed for wheezing 360 mL      levETIRAcetam (KEPPRA) 250 MG tablet Take 250 mg by mouth 2 times daily       LEVOTHYROXINE SODIUM PO Take 125 mcg by mouth every morning       MELATONIN PO Take 3 mg by mouth nightly as needed       methadone (DOLOPHINE) 5 MG tablet Take 1 tablet (5 mg) by mouth every 8 hours  0     miconazole (MICATIN) 2 % AERP powder Apply topically 2 times daily Apply to groin folds       multivitamins with minerals (CERTAVITE/CEROVITE) LIQD liquid 15 mLs by Per Feeding Tube route daily       Nutritional Supplements (ENSURE) LIQD Take by mouth 3 times daily Give 240cc       OMEPRAZOLE PO Take 20 mg by mouth daily (with breakfast)       ondansetron (ZOFRAN ODT) 4  "MG ODT tab Take 1 tablet (4 mg) by mouth every 6 hours as needed for nausea 20 tablet 1     OXYBUTYNIN CHLORIDE PO Take 5 mg by mouth 3 times daily       OXYCODONE HCL PO Take 15 mg by mouth every 3 hours as needed       PREDNISONE PO Take 10 mg by mouth daily Take as directed per taper.        Rivaroxaban (XARELTO PO) Take 20 mg by mouth At Bedtime       senna-docusate (SENOKOT-S;PERICOLACE) 8.6-50 MG per tablet Take 2 tablets by mouth 2 times daily       VENLAFAXINE HCL PO Take 37.5 mg by mouth 2 times daily       zinc oxide (DESITIN) 40 % ointment Apply topically as needed for dry skin or irritation         Post Discharge Medication Reconciliation Status: medication reconcilation previously completed during another office visit.    ROS:  ROS attempted; however, she would not answer any of my questions and ultimately told me to leave.    PHYSICAL EXAM:  /77  Pulse 74  Temp 98.7  F (37.1  C)  Resp 18  Ht 5' 2.5\" (1.588 m)  Wt 158 lb (71.7 kg)  SpO2 96%  BMI 28.44 kg/m2  Gen: sitting in wheelchair, alert and in no acute distress  Resp: breathing non-labored  : catheter draining clear yellow urine  MSK: decreased muscle tone, no LE edema  Neuro: CX II-XII grossly in tact; ROM in upper extremities grossly in tact  Psych: alert and oriented to self and general situation; rude affect    LABORATORY/IMAGING DATA:  Reviewed as per Epic    ASSESSMENT/PLAN:    T4 Paraplegia  Sacral Decubitus Ulcer / Chronic Osteomyelitis   WC bound. Transfers with [facility].   -- wound cares as ordered (and as she allows)  -- supportive cares  -- PT/OT    Opoid Dependence  EtOH Use  No longer on diazepam due to EtOH use in facility. Primary NP tapering down oxycodone and increasing methadone.   -- no benzodiazepines given EtOH use   -- agree with tapering off oxycodone and adjusting methadone dose    Refusal of Cares / Cluster B Traits  See above re: EtOH use and opoid dependence.   -- no benzodiazepines given EtOH use   -- " continues on aripiprazole 5 mg BID and venlafaxine 37.5 mg BID  -- supportive cares    Neurogenic Bladder s/p Suprapubic Catheter  Draining clear yellow urine today.   -- continues on oxybutynin 15 mg TID  -- routine catheter cares  -- follow up with urology as scheduled      COPD  No signs of exacerbation. Not on any controller meds.   -- [facility] 8[facility]4  -- continues on DuoNebs q4h PRN    Seizure Disorder  By history. None at facility  -- continues on levetiracetam 250 mg BID    DVT  By history.   -- continues on rivaroxaban 20 mg daily      Hypothyroidisim  TSH 20 -- elevated as acute phase reactant  -- continues on levothyroxine 125 mcg daily  -- will need recheck in 6-8 weeks     GERD  -- continues on omeprazole 20 mg daily     Physical Deconditioning  In setting of hospitalization and underlying medical conditions  -- ongoing PT/OT       Electronically signed by:  Georgina Jennings MD                        Sincerely,        Georgina Jennings MD

## 2018-08-01 NOTE — PROGRESS NOTES
[facility]  INITIAL VISIT NOTE  August 1, 2018    PRIMARY CARE PROVIDER AND CLINIC:  Sánchez [facility]ora [facility] [address] / [facility] [id number]    Chief Complaint   Patient presents with     Hospital F/U       HPI:    Marychuy Zhou is a 62 year old  (1955) female who was seen at [facility] on August 1, 2018 for an initial visit. Medical history is notable for T4 paraplegia, neurogenic bladder s/p suprapubic catheter, malnutrition, agitation/refusal of cares, COPD and hypothyroidism. She has had multiple hospitalizations this year and has been in this TCU between hospitalizations since May. She was most recently hospitalized at [facility] from 7/10/18 to 7/13/18 where she presented with increasing pain from her sacral decubitus ulcer. She was started on antibiotics and is s/p debridement. She was discharged to [facility] from 7/13/18 to 7/23/18 where she completed course of antibiotics and received wound cares. She was re-admitted to this facility for medical management and rehab.     Today, Ms. Zhou is seen in her room before breakfast. She was getting settled in her chair and was verbally being quite rude to the nursing staff. It was difficult to have her respond to any of my questions as she was constantly yelling at the facility staff. She ultimately told me to leave.    CODE STATUS:   CPR/Full code     ALLERGIES:   No Known Allergies    PAST MEDICAL HISTORY:   Past Medical History:   Diagnosis Date     Anxiety      Chronic pain syndrome      Closed fracture zygoma, with routine healing, subsequent encounter      COPD (chronic obstructive pulmonary disease) (H)      Depressive disorder      DVT (deep vein thrombosis) in pregnancy (H)      Edema      Epilepsy (H)      Flaccid neuropathic bladder, not elsewhere classified      Fracture of femur (H)      Hypothyroidism      Iron deficiency anemia      Paraplegia (H)     unspecified     Pressure ulcer of sacral region       PTSD (post-traumatic stress disorder)      Rheumatoid arthritis (H)      Sepsis (H)     unspecified       PAST SURGICAL HISTORY:   No past surgical history on file.    FAMILY HISTORY:   Family History   Problem Relation Age of Onset     Chronic Obstructive Pulmonary Disease Brother        SOCIAL HISTORY:   Has been in TCU for months    MEDICATIONS:  Current Outpatient Prescriptions   Medication Sig Dispense Refill     Acetaminophen (TYLENOL PO) Take 500 mg by mouth every 8 hours as needed for mild pain or fever       ARIPiprazole (ABILIFY) 5 MG tablet Take 5 mg by mouth 2 times daily       bisacodyl (DULCOLAX) 10 MG Suppository Place 10 mg rectally daily as needed for constipation       CALCIUM CARBONATE PO Take 500 mg by mouth 4 times daily       cyclobenzaprine (FLEXERIL) 5 MG tablet Take 1 tablet (5 mg) by mouth 3 times daily as needed for muscle spasms 42 tablet      hydrOXYzine (ATARAX) 25 MG tablet Take 2 tablets (50 mg) by mouth every 4 hours as needed for anxiety 60 tablet 1     ipratropium - albuterol 0.5 mg/2.5 mg/3 mL (DUONEB) 0.5-2.5 (3) MG/3ML neb solution Take 1 vial (3 mLs) by nebulization every 4 hours as needed for wheezing 360 mL      levETIRAcetam (KEPPRA) 250 MG tablet Take 250 mg by mouth 2 times daily       LEVOTHYROXINE SODIUM PO Take 125 mcg by mouth every morning       MELATONIN PO Take 3 mg by mouth nightly as needed       methadone (DOLOPHINE) 5 MG tablet Take 1 tablet (5 mg) by mouth every 8 hours  0     miconazole (MICATIN) 2 % AERP powder Apply topically 2 times daily Apply to groin folds       multivitamins with minerals (CERTAVITE/CEROVITE) LIQD liquid 15 mLs by Per Feeding Tube route daily       Nutritional Supplements (ENSURE) LIQD Take by mouth 3 times daily Give 240cc       OMEPRAZOLE PO Take 20 mg by mouth daily (with breakfast)       ondansetron (ZOFRAN ODT) 4 MG ODT tab Take 1 tablet (4 mg) by mouth every 6 hours as needed for nausea 20 tablet 1     OXYBUTYNIN CHLORIDE PO  "Take 5 mg by mouth 3 times daily       OXYCODONE HCL PO Take 15 mg by mouth every 3 hours as needed       PREDNISONE PO Take 10 mg by mouth daily Take as directed per taper.        Rivaroxaban (XARELTO PO) Take 20 mg by mouth At Bedtime       senna-docusate (SENOKOT-S;PERICOLACE) 8.6-50 MG per tablet Take 2 tablets by mouth 2 times daily       VENLAFAXINE HCL PO Take 37.5 mg by mouth 2 times daily       zinc oxide (DESITIN) 40 % ointment Apply topically as needed for dry skin or irritation         Post Discharge Medication Reconciliation Status: medication reconcilation previously completed during another office visit.    ROS:  ROS attempted; however, she would not answer any of my questions and ultimately told me to leave.    PHYSICAL EXAM:  /77  Pulse 74  Temp 98.7  F (37.1  C)  Resp 18  Ht 5' 2.5\" (1.588 m)  Wt 158 lb (71.7 kg)  SpO2 96%  BMI 28.44 kg/m2  Gen: sitting in wheelchair, alert and in no acute distress  Resp: breathing non-labored  : catheter draining clear yellow urine  MSK: decreased muscle tone, no LE edema  Neuro: CX II-XII grossly in tact; ROM in upper extremities grossly in tact  Psych: alert and oriented to self and general situation; rude affect    LABORATORY/IMAGING DATA:  Reviewed as per Epic    ASSESSMENT/PLAN:    T4 Paraplegia  Sacral Decubitus Ulcer / Chronic Osteomyelitis   WC bound. Transfers with [facility].   -- wound cares as ordered (and as she allows)  -- supportive cares  -- PT/OT    Opoid Dependence  EtOH Use  No longer on diazepam due to EtOH use in facility. Primary NP tapering down oxycodone and increasing methadone.   -- no benzodiazepines given EtOH use   -- agree with tapering off oxycodone and adjusting methadone dose    Refusal of Cares / Cluster B Traits  See above re: EtOH use and opoid dependence.   -- no benzodiazepines given EtOH use   -- continues on aripiprazole 5 mg BID and venlafaxine 37.5 mg BID  -- supportive cares    Neurogenic Bladder s/p Suprapubic " Catheter  Draining clear yellow urine today.   -- continues on oxybutynin 15 mg TID  -- routine catheter cares  -- follow up with urology as scheduled      COPD  No signs of exacerbation. Not on any controller meds.   -- completing prednisone taper from [facility] - last day 8/4  -- continues on DuoNebs q4h PRN    Seizure Disorder  By history. None at facility  -- continues on levetiracetam 250 mg BID    DVT  By history.   -- continues on rivaroxaban 20 mg daily      Hypothyroidisim  TSH 20 -- elevated as acute phase reactant  -- continues on levothyroxine 125 mcg daily  -- will need recheck in 6-8 weeks     GERD  -- continues on omeprazole 20 mg daily     Physical Deconditioning  In setting of hospitalization and underlying medical conditions  -- ongoing PT/OT       Electronically signed by:  Georgina Jennings MD

## 2018-08-02 PROBLEM — J44.9 CHRONIC OBSTRUCTIVE PULMONARY DISEASE, UNSPECIFIED COPD TYPE (H): Status: ACTIVE | Noted: 2018-01-01

## 2018-08-02 PROBLEM — D64.9 ANEMIA, UNSPECIFIED TYPE: Status: ACTIVE | Noted: 2018-01-01

## 2018-08-02 PROBLEM — Z78.9 ALCOHOL USE: Status: ACTIVE | Noted: 2018-01-01

## 2018-08-02 PROBLEM — I47.10 SVT (SUPRAVENTRICULAR TACHYCARDIA) (H): Status: ACTIVE | Noted: 2018-01-01

## 2018-08-02 PROBLEM — E46 PROTEIN-CALORIE MALNUTRITION, UNSPECIFIED SEVERITY (H): Status: ACTIVE | Noted: 2018-01-01

## 2018-08-02 PROBLEM — F11.29 OPIOID DEPENDENCE WITH OPIOID-INDUCED DISORDER (H): Status: ACTIVE | Noted: 2018-01-01

## 2018-08-02 NOTE — LETTER
8/2/2018        RE: Marychuy Zhou  [facility]  [address]  [facility] [address]        [place] [facility]    Chief Complaint   Patient presents with     MIKIECK       [place] Medical Record Number:  [id number]    HPI:    Marychuy Zhou is a 62 year old  (1955), who is being seen today for an episodic care visit at [facility]. PMH of paraplegia secondary to spinal hematoma (about 10 years ago), chronic truncal and sacral ulcers, COPD, anemia, seizure disorder, probable personality disorder, neurogenic bladder with suprapubic, DVT on chronic anticoagulation.     Most recent hospital stay was at [facility] from 7/13/18 through 7/23/18. She was transferred there from [facility] where she was treated for infected decubitus ulcers. She had surgical debridement and was treated with IV abx. Per the discharge summary, the infection did not appear to extend into the bone.  Other hospital stay at [facility] 6/30/18 through 7/9/18 for pneumonia. Initial hospital stay was at [facility] 4/10/18 to 5/8/18.  She was admitted due to fevers and worsening hip pain. 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 Due to constant stool and urine leakage around her suprapubic catheter, her wounds are unable to heal. She ultimately needs to have a diverting colostomy and urologic procedure, as well as skin flap surgery. All of this is pending improvement in her nutritional status. She was given TPN and then GJ tube was placed.      Admitted to this facility for  rehab, medical management and nursing care    HPI information obtained from: facility chart records, facility staff, patient report, [facility] chart review and Care Everywhere [facility] chart review.    Today's concern is:  Patient is chronically abrasive and verbally abusive toward staff. Often noncompliant with cares.  "She is again seen out on the patio today as she refuses to go back to her room. Answers a few questions at first, but when she is told that the oxycodone should be decreased at this time, she yells at examiner to \"get out of here.\" She is told the oxycodone will be decreased to 10mg, she again yells \"get out of here.\"     Decubitus ulcer of sacral region, stage 4 (H)    Staff report that when dressing changes have been allowed, patient is observed to by laying on her side, calmly giving instructions to staff on what to do. She has been unaware when staff have completed the dressing change. Staff have not noted any sign that she can feel what they are touching.  Marychuy says that she has severe pain. She is white knuckling the railing when they are changing her dressing.  See above. See opioid dependence.    Adjustment disorder with mixed anxiety and depressed mood  Opioid dependence with opioid-induced disorder (H)  Alcohol use  Patient has consistently asked for oxycodone to be increased to 30mg due to excruciating pain. Per chart review, as of her 11/2017 PCP visit, her only pain medication was Percocet 2 tabs q6h prn. Pain medication first changed during 1/2018 hospital stay. She was discharged at that time on oxycontin 20mg BID and hydromorphone 2-4mg q4h prn. Oxycodone was added at a later hospital stay. It is possible she was confusing oxycodone with oxycontin, neither were ever at 30mg, but she could have taken oxycontin 20mg + oxycodone 10mg to equal 30mg. This would in no way be equal to oxycodone 30mg q3h prn. Oxycodone was 5-10mg q4h prn until her 5/2018 hospital stay when the pain service increased it to 15mg q3h prn and added flexeril due to painful dressing changes.    Patient says she takes Valium chronically for anxiety. This is the only medication that works for her. Per extensive chart review, valium was not on her med list from her last visit with her PCP 11/2017. At that time she was on buspar " 7.5mg TID and lorazepam 0.5mg BID. She also had hydroxyzine at bedtime, but no sign of this being tried more frequently than that. PCP had made pain clinic and psychiatry referrals, patient had not followed through on these. The valium first appeared during a hospital stay 3/2018, it is unknown how this came to be on her list.     Staff had reported finding an empty bottle of alcohol in her room. On 7/26/18 at noon, this provider advised her that valium and alcohol are a very unsafe combination and that if she continued to drink alcohol, valium could not be prescribed. On 7/26/18 around 3pm, she was witnessed by staff trying to order a drink at the restaurant down the street. Order was given to discontinue Valium and start hydroxyzine 25mg q4h prn. Marychuy said she has tried this before and it did not work. Dose was increased to 50mg, she says she has tried it and it doesn't work.    Marychuy is advised that the methadone is more appropriate for chronic pain. A standard treatment would be to increase the methadone for her pain, not the oxycodone. The oxycodone needs to be tapered as able.    Anemia, unspecified type  HGB 7.9. Has refused all lab draws here.    Neurogenic bladder  Has suprapubic catheter with chronic leakage into wounds. Urology was considering some type of urinary diversion, but was a no show for her appointment. No follow up on this has been done due to hospitalization    Paraplegia at T4 level (H)  Secondary to hematoma with multiple truncal and extremity decubitus ulcers including right IT, stage IV injury, left sacral stage III ulcer, left heel ulcer, left upper thigh ulcer and coccyx ulcer. She currently does not have her PMD because it was damaged when she ran it into a table. Therapy is trying to work with her for w/c positioning and pressure mapping, but she has refused thus far. Therapy was wondering how long she should be in her chair for a single episode. Recommendation was given for max 2  hours at a time in chair.     Seizure disorder (H)  On Keppra. No known seizures while at [facility]    Protein-calorie malnutrition, unspecified severity (H)  PEG tube was placed in April due to malnutrition. It is unclear during which hospital stay tube feeding was stopped, she does not have current orders for any. Dietician reviewed this, noted that she has been eating well. Tube is being flushed for patency only. Marychuy refuses to have her weight checked.    Chronic obstructive pulmonary disease, unspecified COPD type (H)  No noted SOB, wheezing, cough, hypoxia      ALLERGIES: Review of patient's allergies indicates no known allergies.  Past Medical, Surgical, Family and Social History reviewed and updated in [facility].    Current Outpatient Prescriptions   Medication Sig Dispense Refill     Acetaminophen (TYLENOL PO) Take 500 mg by mouth every 8 hours as needed for mild pain or fever       ARIPiprazole (ABILIFY) 5 MG tablet Take 5 mg by mouth 2 times daily       bisacodyl (DULCOLAX) 10 MG Suppository Place 10 mg rectally daily as needed for constipation       CALCIUM CARBONATE PO Take 500 mg by mouth 4 times daily       cyclobenzaprine (FLEXERIL) 5 MG tablet Take 1 tablet (5 mg) by mouth 3 times daily as needed for muscle spasms 42 tablet      hydrOXYzine (ATARAX) 25 MG tablet Take 2 tablets (50 mg) by mouth every 4 hours as needed for anxiety 60 tablet 1     ipratropium - albuterol 0.5 mg/2.5 mg/3 mL (DUONEB) 0.5-2.5 (3) MG/3ML neb solution Take 1 vial (3 mLs) by nebulization every 4 hours as needed for wheezing 360 mL      levETIRAcetam (KEPPRA) 250 MG tablet Take 250 mg by mouth 2 times daily       LEVOTHYROXINE SODIUM PO Take 125 mcg by mouth every morning       MELATONIN PO Take 3 mg by mouth nightly as needed       methadone (DOLOPHINE) 5 MG tablet Take 1 tablet (5 mg) by mouth every 8 hours  0     miconazole (MICATIN) 2 % AERP powder Apply topically 2 times daily Apply to groin folds       multivitamins  "with minerals (CERTAVITE/CEROVITE) LIQD liquid 15 mLs by Per Feeding Tube route daily       Nutritional Supplements (ENSURE) LIQD Take by mouth 3 times daily Give 240cc       OMEPRAZOLE PO Take 20 mg by mouth daily (with breakfast)       ondansetron (ZOFRAN ODT) 4 MG ODT tab Take 1 tablet (4 mg) by mouth every 6 hours as needed for nausea 20 tablet 1     OXYBUTYNIN CHLORIDE PO Take 5 mg by mouth 3 times daily       OXYCODONE HCL PO Take 15 mg by mouth every 3 hours as needed       PREDNISONE PO Take 10 mg by mouth daily Take as directed per taper.        Rivaroxaban (XARELTO PO) Take 20 mg by mouth At Bedtime       senna-docusate (SENOKOT-S;PERICOLACE) 8.6-50 MG per tablet Take 2 tablets by mouth 2 times daily       VENLAFAXINE HCL PO Take 37.5 mg by mouth 2 times daily       zinc oxide (DESITIN) 40 % ointment Apply topically as needed for dry skin or irritation       Medications reviewed:  Medications reconciled to facility chart and changes were made to reflect current medications as identified as above med list.     REVIEW OF SYSTEMS:  Limited secondary to cooperation    Physical Exam:  /79  Pulse 77  Temp 98.6  F (37  C)  Resp 18  Ht 5' 2.5\" (1.588 m)  Wt 158 lb (71.7 kg)  SpO2 97%   L/min  BMI 28.44 kg/m2   GENERAL APPEARANCE:  Alert, in no distress, oriented  PSYCH:  oriented X 3      Recent Labs:   Has refused every blood draw ordered here    CBC RESULTS:   Recent Labs   Lab Test  06/22/18   0101 06/14/18   WBC  10.1  8.3   RBC  4.02  4.02   HGB  8.5*  8.8*   HCT  29.4*  31.4*   MCV  73*  78*   MCH  21.1*  21.8*   MCHC  28.9*  28.0*   RDW  20.3*  20.2*   PLT  519*  549*       Last Basic Metabolic Panel:  Recent Labs   Lab Test  06/22/18   0101 06/14/18   NA  137  140   POTASSIUM  4.1  4.3   CHLORIDE  104  102   NATA  8.6  9.2   CO2  26  26   BUN  13  21   CR  0.62  0.68*   GLC  100*  102*       Liver Function Studies -   Recent Labs   Lab Test  06/22/18   0101 06/14/18   PROTTOTAL  9.0*  " 8.9*   ALBUMIN  2.8*  2.6*   BILITOTAL  0.2  0.2   ALKPHOS  178*  176*   AST  10  13   ALT  11  <9       TSH   Date Value Ref Range Status   05/30/2018 29.15 (A) 0.30 - 5.00 uIU/mL Final   05/24/2018 32.78 (H) 0.30 - 5.00 uIU/mL Final       Assessment/Plan:  (L89.154) Decubitus ulcer of sacral region, stage 4 (H)  (primary encounter diagnosis)  Comment: This is only one of several ulcers, but is the most severe and the one Marychuy complains about. Wound healing is compromised by her urine and stool contamination at times, pressure with her sitting in the chair all day, and compliance with treatment  Plan: Continue current POC with no changes at this time and adjustments as needed.    (F43.23) Adjustment disorder with mixed anxiety and depressed mood  Comment: Chronic. It is very challenging to provide appropriate care for this patient due to her noncompliance and history of giving false information. Most accusations regarding staff have not proven to be true. Patient is very manipulative. It is certainly important to treat her anxiety and depression, but it is impossible to do so without her full cooperation and honesty. Valium is not an appropriate chronic treatment due to risk for side effects and certainly is more risky when combined with alcohol. Recommend no valium be prescribed again.  Plan: Cont hydroxyzine 50mg q4h prn. If any sign of sedation, will decrease again.    (F11.29) Opioid dependence with opioid-induced disorder (H)  Comment: It is likely that patient has some pain, but it seems very unlikely that it would be so severe given her T4 paraplegia. Staff observation has been more consistent with her having no sensation and little to no observable signs of pain. Current dose of oxycodone is more appropriate for acute pain, methadone would be more appropriate for chronic pain. It is very appropriate at this time to decrease oxycodone.   Plan: Decrease oxycodone to 10mg q3h prn. Cont methadone 5mg q8h.      (Z78.9) Alcohol use  Comment: Patient has been advised that it is not safe for her to consume alcohol with her controlled substances. This puts her at risk for injury, coma, even death.   Plan: Nursing to advise provider of any alcohol use.    (D64.9) Anemia, unspecified type  Comment: Chronic  Plan: Monitor Hgb if patient allows    (N31.9) Neurogenic bladder  Comment: Chronic. Urinary diversion would certainly be beneficial to patient, but it is very complicated to actually pursue this given her other issues  Plan: Continue current POC with no changes at this time and adjustments as needed. F/u urology if able    (G83.9) Paraplegia at T4 level (H)  Comment: Chronic. Sitting in one position for long periods is detrimental to her skin. Again, encourage max 2 hours in chair  Plan: Recommend maximum 2 hours at a time in current chair until appropriate chair with cushion can be obtained by therapy.    (G40.909) Seizure disorder (H)  Comment: Chronic condition being managed with medications.  Plan: Continue current POC with no changes at this time and adjustments as needed.    (E46) Protein-calorie malnutrition, unspecified severity (H)  Comment: Unable to monitor based on weight, but can monitor her intake. Staff note that it has been adequate  Plan: Monitor intake. Dietician monitoring    (J44.9) Chronic obstructive pulmonary disease, unspecified COPD type (H)  Comment: Chronic condition being managed with medications and is currently asymptomatic.  Plan: Continue current POC with no changes at this time and adjustments as needed.      Orders:  Decrease oxycodone to 10mg q3h prn    Electronically signed by  JOSEPH Montoya [facility]  Pager: [phone]

## 2018-08-02 NOTE — PROGRESS NOTES
[facility]    Chief Complaint   Patient presents with     AISHWARYA       [place] Medical Record Number:  [id number]    HPI:    Marychuy Zhou is a 62 year old  (1955), who is being seen today for an episodic care visit at [facility]. PMH of paraplegia secondary to spinal hematoma (about 10 years ago), chronic truncal and sacral ulcers, COPD, anemia, seizure disorder, probable personality disorder, neurogenic bladder with suprapubic, DVT on chronic anticoagulation.     Most recent hospital stay was at [facility] from 7/13/18 through 7/23/18. She was transferred there from [facility] where she was treated for infected decubitus ulcers. She had surgical debridement and was treated with IV abx. Per the discharge summary, the infection did not appear to extend into the bone.  Other hospital stay at [facility] 6/30/18 through 7/9/18 for pneumonia. Initial hospital stay was at [facility] 4/10/18 to 5/8/18.  She was admitted due to fevers and worsening hip pain. 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 Due to constant stool and urine leakage around her suprapubic catheter, her wounds are unable to heal. She ultimately needs to have a diverting colostomy and urologic procedure, as well as skin flap surgery. All of this is pending improvement in her nutritional status. She was given TPN and then GJ tube was placed.      Admitted to this facility for  rehab, medical management and nursing care    HPI information obtained from: facility chart records, facility staff, patient report, [facility] chart review and Care Everywhere Epic chart review.    Today's concern is:  Patient is chronically abrasive and verbally abusive toward staff. Often noncompliant with cares. She is again seen out on the patio today as she refuses to go back to her room. Answers a few  "questions at first, but when she is told that the oxycodone should be decreased at this time, she yells at examiner to \"get out of here.\" She is told the oxycodone will be decreased to 10mg, she again yells \"get out of here.\"     Decubitus ulcer of sacral region, stage 4 (H)    Staff report that when dressing changes have been allowed, patient is observed to by laying on her side, calmly giving instructions to staff on what to do. She has been unaware when staff have completed the dressing change. Staff have not noted any sign that she can feel what they are touching.  Marychuy says that she has severe pain. She is white knuckling the railing when they are changing her dressing.  See above. See opioid dependence.    Adjustment disorder with mixed anxiety and depressed mood  Opioid dependence with opioid-induced disorder (H)  Alcohol use  Patient has consistently asked for oxycodone to be increased to 30mg due to excruciating pain. Per chart review, as of her 11/2017 PCP visit, her only pain medication was Percocet 2 tabs q6h prn. Pain medication first changed during 1/2018 hospital stay. She was discharged at that time on oxycontin 20mg BID and hydromorphone 2-4mg q4h prn. Oxycodone was added at a later hospital stay. It is possible she was confusing oxycodone with oxycontin, neither were ever at 30mg, but she could have taken oxycontin 20mg + oxycodone 10mg to equal 30mg. This would in no way be equal to oxycodone 30mg q3h prn. Oxycodone was 5-10mg q4h prn until her 5/2018 hospital stay when the pain service increased it to 15mg q3h prn and added flexeril due to painful dressing changes.    Patient says she takes Valium chronically for anxiety. This is the only medication that works for her. Per extensive chart review, valium was not on her med list from her last visit with her PCP 11/2017. At that time she was on buspar 7.5mg TID and lorazepam 0.5mg BID. She also had hydroxyzine at bedtime, but no sign of this being " tried more frequently than that. PCP had made pain clinic and psychiatry referrals, patient had not followed through on these. The valium first appeared during a hospital stay 3/2018, it is unknown how this came to be on her list.     Staff had reported finding an empty bottle of alcohol in her room. On 7/26/18 at noon, this provider advised her that valium and alcohol are a very unsafe combination and that if she continued to drink alcohol, valium could not be prescribed. On 7/26/18 around 3pm, she was witnessed by staff trying to order a drink at the restaurant down the street. Order was given to discontinue Valium and start hydroxyzine 25mg q4h prn. Marychuy said she has tried this before and it did not work. Dose was increased to 50mg, she says she has tried it and it doesn't work.    Marychuy is advised that the methadone is more appropriate for chronic pain. A standard treatment would be to increase the methadone for her pain, not the oxycodone. The oxycodone needs to be tapered as able.    Anemia, unspecified type  HGB 7.9. Has refused all lab draws here.    Neurogenic bladder  Has suprapubic catheter with chronic leakage into wounds. Urology was considering some type of urinary diversion, but was a no show for her appointment. No follow up on this has been done due to hospitalization    Paraplegia at T4 level (H)  Secondary to hematoma with multiple truncal and extremity decubitus ulcers including right IT, stage IV injury, left sacral stage III ulcer, left heel ulcer, left upper thigh ulcer and coccyx ulcer. She currently does not have her PMD because it was damaged when she ran it into a table. Therapy is trying to work with her for w/c positioning and pressure mapping, but she has refused thus far. Therapy was wondering how long she should be in her chair for a single episode. Recommendation was given for max 2 hours at a time in chair.     Seizure disorder (H)  On Keppra. No known seizures while at [facility]    Protein-calorie malnutrition, unspecified severity (H)  PEG tube was placed in April due to malnutrition. It is unclear during which hospital stay tube feeding was stopped, she does not have current orders for any. Dietician reviewed this, noted that she has been eating well. Tube is being flushed for patency only. Marychuy refuses to have her weight checked.    Chronic obstructive pulmonary disease, unspecified COPD type (H)  No noted SOB, wheezing, cough, hypoxia      ALLERGIES: Review of patient's allergies indicates no known allergies.  Past Medical, Surgical, Family and Social History reviewed and updated in [facility].    Current Outpatient Prescriptions   Medication Sig Dispense Refill     Acetaminophen (TYLENOL PO) Take 500 mg by mouth every 8 hours as needed for mild pain or fever       ARIPiprazole (ABILIFY) 5 MG tablet Take 5 mg by mouth 2 times daily       bisacodyl (DULCOLAX) 10 MG Suppository Place 10 mg rectally daily as needed for constipation       CALCIUM CARBONATE PO Take 500 mg by mouth 4 times daily       cyclobenzaprine (FLEXERIL) 5 MG tablet Take 1 tablet (5 mg) by mouth 3 times daily as needed for muscle spasms 42 tablet      hydrOXYzine (ATARAX) 25 MG tablet Take 2 tablets (50 mg) by mouth every 4 hours as needed for anxiety 60 tablet 1     ipratropium - albuterol 0.5 mg/2.5 mg/3 mL (DUONEB) 0.5-2.5 (3) MG/3ML neb solution Take 1 vial (3 mLs) by nebulization every 4 hours as needed for wheezing 360 mL      levETIRAcetam (KEPPRA) 250 MG tablet Take 250 mg by mouth 2 times daily       LEVOTHYROXINE SODIUM PO Take 125 mcg by mouth every morning       MELATONIN PO Take 3 mg by mouth nightly as needed       methadone (DOLOPHINE) 5 MG tablet Take 1 tablet (5 mg) by mouth every 8 hours  0     miconazole (MICATIN) 2 % AERP powder Apply topically 2 times daily Apply to groin folds       multivitamins with minerals (CERTAVITE/CEROVITE) LIQD liquid 15 mLs by Per Feeding Tube route daily        "Nutritional Supplements (ENSURE) LIQD Take by mouth 3 times daily Give 240cc       OMEPRAZOLE PO Take 20 mg by mouth daily (with breakfast)       ondansetron (ZOFRAN ODT) 4 MG ODT tab Take 1 tablet (4 mg) by mouth every 6 hours as needed for nausea 20 tablet 1     OXYBUTYNIN CHLORIDE PO Take 5 mg by mouth 3 times daily       OXYCODONE HCL PO Take 15 mg by mouth every 3 hours as needed       PREDNISONE PO Take 10 mg by mouth daily Take as directed per taper.        Rivaroxaban (XARELTO PO) Take 20 mg by mouth At Bedtime       senna-docusate (SENOKOT-S;PERICOLACE) 8.6-50 MG per tablet Take 2 tablets by mouth 2 times daily       VENLAFAXINE HCL PO Take 37.5 mg by mouth 2 times daily       zinc oxide (DESITIN) 40 % ointment Apply topically as needed for dry skin or irritation       Medications reviewed:  Medications reconciled to facility chart and changes were made to reflect current medications as identified as above med list.     REVIEW OF SYSTEMS:  Limited secondary to cooperation    Physical Exam:  /79  Pulse 77  Temp 98.6  F (37  C)  Resp 18  Ht 5' 2.5\" (1.588 m)  Wt 158 lb (71.7 kg)  SpO2 97%   L/min  BMI 28.44 kg/m2   GENERAL APPEARANCE:  Alert, in no distress, oriented  PSYCH:  oriented X 3      Recent Labs:   Has refused every blood draw ordered here    CBC RESULTS:   Recent Labs   Lab Test  06/22/18   0101 06/14/18   WBC  10.1  8.3   RBC  4.02  4.02   HGB  8.5*  8.8*   HCT  29.4*  31.4*   MCV  73*  78*   MCH  21.1*  21.8*   MCHC  28.9*  28.0*   RDW  20.3*  20.2*   PLT  519*  549*       Last Basic Metabolic Panel:  Recent Labs   Lab Test  06/22/18   0101 06/14/18   NA  137  140   POTASSIUM  4.1  4.3   CHLORIDE  104  102   NATA  8.6  9.2   CO2  26  26   BUN  13  21   CR  0.62  0.68*   GLC  100*  102*       Liver Function Studies -   Recent Labs   Lab Test  06/22/18   0101 06/14/18   PROTTOTAL  9.0*  8.9*   ALBUMIN  2.8*  2.6*   BILITOTAL  0.2  0.2   ALKPHOS  178*  176*   AST  10  13   ALT  11 "  <9       TSH   Date Value Ref Range Status   05/30/2018 29.15 (A) 0.30 - 5.00 uIU/mL Final   05/24/2018 32.78 (H) 0.30 - 5.00 uIU/mL Final       Assessment/Plan:  (L89.154) Decubitus ulcer of sacral region, stage 4 (H)  (primary encounter diagnosis)  Comment: This is only one of several ulcers, but is the most severe and the one Marychuy complains about. Wound healing is compromised by her urine and stool contamination at times, pressure with her sitting in the chair all day, and compliance with treatment  Plan: Continue current POC with no changes at this time and adjustments as needed.    (F43.23) Adjustment disorder with mixed anxiety and depressed mood  Comment: Chronic. It is very challenging to provide appropriate care for this patient due to her noncompliance and history of giving false information. Most accusations regarding staff have not proven to be true. Patient is very manipulative. It is certainly important to treat her anxiety and depression, but it is impossible to do so without her full cooperation and honesty. Valium is not an appropriate chronic treatment due to risk for side effects and certainly is more risky when combined with alcohol. Recommend no valium be prescribed again.  Plan: Cont hydroxyzine 50mg q4h prn. If any sign of sedation, will decrease again.    (F11.29) Opioid dependence with opioid-induced disorder (H)  Comment: It is likely that patient has some pain, but it seems very unlikely that it would be so severe given her T4 paraplegia. Staff observation has been more consistent with her having no sensation and little to no observable signs of pain. Current dose of oxycodone is more appropriate for acute pain, methadone would be more appropriate for chronic pain. It is very appropriate at this time to decrease oxycodone.   Plan: Decrease oxycodone to 10mg q3h prn. Cont methadone 5mg q8h.     (Z78.9) Alcohol use  Comment: Patient has been advised that it is not safe for her to consume  alcohol with her controlled substances. This puts her at risk for injury, coma, even death.   Plan: Nursing to advise provider of any alcohol use.    (D64.9) Anemia, unspecified type  Comment: Chronic  Plan: Monitor Hgb if patient allows    (N31.9) Neurogenic bladder  Comment: Chronic. Urinary diversion would certainly be beneficial to patient, but it is very complicated to actually pursue this given her other issues  Plan: Continue current POC with no changes at this time and adjustments as needed. F/u urology if able    (G83.9) Paraplegia at T4 level (H)  Comment: Chronic. Sitting in one position for long periods is detrimental to her skin. Again, encourage max 2 hours in chair  Plan: Recommend maximum 2 hours at a time in current chair until appropriate chair with cushion can be obtained by therapy.    (G40.909) Seizure disorder (H)  Comment: Chronic condition being managed with medications.  Plan: Continue current POC with no changes at this time and adjustments as needed.    (E46) Protein-calorie malnutrition, unspecified severity (H)  Comment: Unable to monitor based on weight, but can monitor her intake. Staff note that it has been adequate  Plan: Monitor intake. Dietician monitoring    (J44.9) Chronic obstructive pulmonary disease, unspecified COPD type (H)  Comment: Chronic condition being managed with medications and is currently asymptomatic.  Plan: Continue current POC with no changes at this time and adjustments as needed.      Orders:  Decrease oxycodone to 10mg q3h prn    Electronically signed by  JOSEPH Montoya [facility]  Pager: [phone]

## 2018-08-09 NOTE — PROGRESS NOTES
[facility]    Chief Complaint   Patient presents with     senior care Acute       [facility] Medical Record Number:  [id number]    HPI:    Marychuy Zhou is a 62 year old  (1955), who is being seen today for an episodic care visit at [facility]. PMH of paraplegia secondary to spinal hematoma (about 10 years ago), chronic truncal and sacral ulcers, COPD, anemia, seizure disorder, probable personality disorder, neurogenic bladder with suprapubic, DVT on chronic anticoagulation.     Most recent hospital stay was at [facility] from 7/13/18 through 7/23/18. She was transferred there from [facility] where she was treated for infected decubitus ulcers. She had surgical debridement and was treated with IV abx. Per the discharge summary, the infection did not appear to extend into the bone.  Other hospital stay at [facility] 6/30/18 through 7/9/18 for pneumonia. Initial hospital stay was at [facility] 4/10/18 to 5/8/18.  She was admitted due to fevers and worsening hip pain. 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 Due to constant stool and urine leakage around her suprapubic catheter, her wounds are unable to heal. She ultimately needs to have a diverting colostomy and urologic procedure, as well as skin flap surgery. All of this is pending improvement in her nutritional status. She was given TPN and then GJ tube was placed.      Admitted to this facility for  rehab, medical management and nursing care      HPI information obtained from: facility chart records, facility staff, patient report, [facility] chart review and Care Everywhere Epic chart review.    Today's concern is:  Patient is chronically abrasive and verbally abusive toward staff. Often noncompliant with cares. She is again seen out on the patio today as she refuses to go back to her room. Answers a  "few questions at first, but when she is told that the oxycodone should be decreased again, she yells at examiner to \"get out of here.\" She says she doesn't want to hear it. When I say that I need to discuss medication changes with her, she says \"no you don't, just send a note through the nurse. Don't come talk to me about medication changes.\"     Decubitus ulcer of sacral region, stage 4 (H)    Staff report that when dressing changes have been allowed, patient is observed to by laying on her side, calmly giving instructions to staff on what to do. She has been unaware when staff have completed the dressing change. Staff have not noted any sign that she can feel what they are touching.  Marychuy says that she has severe pain. See above. See opioid dependence.    Adjustment disorder with mixed anxiety and depressed mood  Opioid dependence with opioid-induced disorder (H)    Patient has consistently asked for oxycodone to be increased due to excruciating pain. Per chart review, as of her 11/2017 PCP visit, her only pain medication was Percocet 2 tabs q6h prn. Pain medication first changed during 1/2018 hospital stay. She was discharged at that time on oxycontin 20mg BID and hydromorphone 2-4mg q4h prn. Oxycodone was added at a later hospital stay. Oxycodone was 5-10mg q4h prn until her 5/2018 hospital stay when the pain service increased it to 15mg q3h prn and added flexeril due to painful dressing changes.    Patient initially said she took Valium chronically for anxiety. This is the only medication that works for her. Per extensive chart review, valium was not on her med list from her last visit with her PCP 11/2017. At that time she was on buspar 7.5mg TID and lorazepam 0.5mg BID. She also had hydroxyzine at bedtime, but no sign of this being tried more frequently than that. PCP had made pain clinic and psychiatry referrals, patient had not followed through on these. The valium first appeared during a hospital stay " 3/2018.    Staff had reported finding an empty bottle of alcohol in her room. On 7/26/18 at noon, this provider advised her that valium and alcohol are a very unsafe combination and that if she continued to drink alcohol, valium could not be prescribed. On 7/26/18 around 3pm, she was witnessed by staff trying to order a drink at the restaurant down the street. Order was given to discontinue Valium and start hydroxyzine 25mg q4h prn. Marychuy said she has tried this before and it did not work. Dose was increased to 50mg, she says she has tried it and it doesn't work.    Today Marychuy discusses how she will be discharging soon and returning to her PCP and then she will get on the right medications. When it is mentioned that her PCP was not prescribing valium, she agrees with this and today agrees that this is not a good medication for her anxiety.    Marychuy is advised that the methadone is more appropriate for chronic pain. A standard treatment would be to increase the methadone for her pain, not the oxycodone. The oxycodone needs to be tapered as able.    Alcohol use  Nursing staff have found an empty bottle of alcohol in her room once. She was witnessed by staff ordering alcohol down the street at a bar. Staff feel strongly that she hides alcohol in her water pitcher mixed with soda. Marychuy has asked other residents to buy her alcohol. Her nurse today thinks that Marychuy was drunk this morning. Blood alcohol level has been ordered with her consent, but then she refused the blood draw.     Anemia, unspecified type  HGB 7.9. Has refused all lab draws here.    Neurogenic bladder  Has suprapubic catheter with chronic leakage into wounds. Urology was considering some type of urinary diversion, but was a no show for her appointment. No follow up on this has been done due to hospitalization    Paraplegia at T4 level (H)  Secondary to hematoma with multiple truncal and extremity decubitus ulcers including right IT, stage IV injury,  left sacral stage III ulcer, left heel ulcer, left upper thigh ulcer and coccyx ulcer. She currently does not have her PMD because it was damaged when she ran it into a table. Therapy is trying to work with her for w/c positioning and pressure mapping, but she has refused thus far. Therapy was wondering how long she should be in her chair for a single episode. Recommendation was given for max 2 hours at a time in chair.     Seizure disorder (H)  On Keppra. No known seizures while at [facility]    Protein-calorie malnutrition, unspecified severity (H)  PEG tube was placed in April due to malnutrition. It is unclear during which hospital stay tube feeding was stopped, she does not have current orders for any. Dietician reviewed this, noted that she has been eating well. Tube is being flushed for patency only. Marychuy refuses to have her weight checked.    Chronic obstructive pulmonary disease, unspecified COPD type (H)  No noted SOB, wheezing, cough, hypoxia      ALLERGIES: Review of patient's allergies indicates no known allergies.  Past Medical, Surgical, Family and Social History reviewed and updated in [facility].    Current Outpatient Prescriptions   Medication Sig Dispense Refill     Acetaminophen (TYLENOL PO) Take 500 mg by mouth every 8 hours as needed for mild pain or fever       ARIPiprazole (ABILIFY) 5 MG tablet Take 5 mg by mouth 2 times daily       bisacodyl (DULCOLAX) 10 MG Suppository Place 10 mg rectally daily as needed for constipation       CALCIUM CARBONATE PO Take 500 mg by mouth 4 times daily       cyclobenzaprine (FLEXERIL) 5 MG tablet Take 1 tablet (5 mg) by mouth 3 times daily as needed for muscle spasms 42 tablet      hydrOXYzine (ATARAX) 25 MG tablet Take 2 tablets (50 mg) by mouth every 4 hours as needed for anxiety 60 tablet 1     ipratropium - albuterol 0.5 mg/2.5 mg/3 mL (DUONEB) 0.5-2.5 (3) MG/3ML neb solution Take 1 vial (3 mLs) by nebulization every 4 hours as needed for wheezing 360 mL       levETIRAcetam (KEPPRA) 250 MG tablet Take 250 mg by mouth 2 times daily       LEVOTHYROXINE SODIUM PO Take 125 mcg by mouth every morning       MELATONIN PO Take 3 mg by mouth nightly as needed       methadone (DOLOPHINE) 5 MG tablet Take 1 tablet (5 mg) by mouth every 8 hours  0     miconazole (MICATIN) 2 % AERP powder Apply topically 2 times daily Apply to groin folds       Nutritional Supplements (LARRY PO) 1 packet by mouth two times a day       OMEPRAZOLE PO Take 20 mg by mouth daily (with breakfast)       ondansetron (ZOFRAN ODT) 4 MG ODT tab Take 1 tablet (4 mg) by mouth every 6 hours as needed for nausea 20 tablet 1     OXYBUTYNIN CHLORIDE PO Take 5 mg by mouth 3 times daily       oxyCODONE IR (ROXICODONE) 10 MG tablet Take 1 tablet (10 mg) by mouth every 3 hours as needed for severe pain       PREDNISONE PO Take 5 mg by mouth daily Take as directed per taper.        Rivaroxaban (XARELTO PO) Take 20 mg by mouth At Bedtime       senna-docusate (SENOKOT-S;PERICOLACE) 8.6-50 MG per tablet Take 2 tablets by mouth 2 times daily       VENLAFAXINE HCL PO Take 37.5 mg by mouth 2 times daily       zinc oxide (DESITIN) 40 % ointment Apply topically as needed for dry skin or irritation       Medications reviewed:  Medications reconciled to facility chart and changes were made to reflect current medications as identified as above med list.     REVIEW OF SYSTEMS:  Limited secondary to cooperation    Physical Exam:  /82  Pulse 106  Temp 99.8  F (37.7  C)  Resp 22  Wt 158 lb (71.7 kg)  SpO2 97%  BMI 28.44 kg/m2   GENERAL APPEARANCE:  Alert, in no distress, oriented  PSYCH:  oriented X 3      Recent Labs:   Has refused every blood draw ordered here    CBC RESULTS:   Recent Labs   Lab Test  06/22/18   0101 06/14/18   WBC  10.1  8.3   RBC  4.02  4.02   HGB  8.5*  8.8*   HCT  29.4*  31.4*   MCV  73*  78*   MCH  21.1*  21.8*   MCHC  28.9*  28.0*   RDW  20.3*  20.2*   PLT  519*  549*       Last Basic Metabolic  Panel:  Recent Labs   Lab Test  06/22/18   0101 06/14/18   NA  137  140   POTASSIUM  4.1  4.3   CHLORIDE  104  102   NATA  8.6  9.2   CO2  26  26   BUN  13  21   CR  0.62  0.68*   GLC  100*  102*       Liver Function Studies -   Recent Labs   Lab Test  06/22/18   0101 06/14/18   PROTTOTAL  9.0*  8.9*   ALBUMIN  2.8*  2.6*   BILITOTAL  0.2  0.2   ALKPHOS  178*  176*   AST  10  13   ALT  11  <9       TSH   Date Value Ref Range Status   05/30/2018 29.15 (A) 0.30 - 5.00 uIU/mL Final   05/24/2018 32.78 (H) 0.30 - 5.00 uIU/mL Final       Assessment/Plan:  (L89.154) Decubitus ulcer of sacral region, stage 4 (H)  (primary encounter diagnosis)  Comment: This is only one of several ulcers, but is the most severe and the one Marychuy complains about. Wound healing is compromised by her urine and stool contamination at times, pressure with her sitting in the chair all day, and noncompliance with treatment  Plan: Continue current POC with no changes at this time and adjustments as needed.    (F43.23) Adjustment disorder with mixed anxiety and depressed mood  Comment: Chronic. It is very challenging to provide appropriate care for this patient due to her noncompliance and history of giving false information. Most accusations regarding staff have not proven to be true. Patient is very manipulative. It is certainly important to treat her anxiety and depression, but it is impossible to do so without her full cooperation and honesty. Valium is not an appropriate chronic treatment due to risk for side effects and certainly is more risky when combined with alcohol. She has been on lorazepam in the past, but again, this is not safe with alcohol.  Plan: Cont hydroxyzine 50mg q4h prn. If any sign of sedation, will decrease again. F/u PCP    (F11.29) Opioid dependence with opioid-induced disorder (H)  Comment: It is likely that patient has some pain, but it seems very unlikely that it would be quite so severe given her T4 paraplegia. Staff  observation has been more consistent with her having little to no observable signs of pain. Current dose of oxycodone is more appropriate for acute pain, methadone would be more appropriate for chronic pain. It is very appropriate to continue to decrease oxycodone as planned.   Plan: Decrease oxycodone to 10mg q6h prn. Cont methadone 5mg q8h.     (Z78.9) Alcohol use  Comment: Ongoing per nursing but is difficult to prove. Patient not compliant with blood alcohol level draws. She certainly could be hiding alcohol in her water pitcher. Patient has been advised that it is not safe for her to consume alcohol with her controlled substances. This puts her at risk for injury, coma, even death.  Plan: Nursing to advise provider of any alcohol use. Will continue to reduce controlled substances as able    (D64.9) Anemia, unspecified type  Comment: Chronic  Plan: Monitor Hgb if patient allows    (N31.9) Neurogenic bladder  Comment: Chronic. Urinary diversion would certainly be beneficial to patient, but it is very complicated to actually pursue this given her other issues  Plan: Continue current POC with no changes at this time and adjustments as needed. F/u urology if able    (G83.9) Paraplegia at T4 level (H)  Comment: Chronic. Sitting in one position for long periods is detrimental to her skin. Again, encourage max 2 hours in chair  Plan: Recommend maximum 2 hours at a time in current chair until appropriate chair with cushion can be obtained by therapy.    (G40.909) Seizure disorder (H)  Comment: Chronic condition being managed with medications.  Plan: Continue current POC with no changes at this time and adjustments as needed.    (E46) Protein-calorie malnutrition, unspecified severity (H)  Comment: Unable to monitor based on weight, but can monitor her intake. Staff note that it has been adequate  Plan: Monitor intake. Dietician monitoring    (J44.9) Chronic obstructive pulmonary disease, unspecified COPD type  (H)  Comment: Chronic condition being managed with medications and is currently asymptomatic.  Plan: Continue current POC with no changes at this time and adjustments as needed.      Orders:  Decrease oxycodone to 10mg q6h prn    Electronically signed by  JOSEPH Montoya [facility]  Pager: [phone]

## 2018-08-09 NOTE — LETTER
8/9/2018        RE: Marychuy Zhou  [facility]  [address]  [facility] [address]        [facility]    Chief Complaint   Patient presents with     alf Acute       [facility] Medical Record Number:  [id number]    HPI:    Marychuy Zhou is a 62 year old  (1955), who is being seen today for an episodic care visit at [facility]. PMH of paraplegia secondary to spinal hematoma (about 10 years ago), chronic truncal and sacral ulcers, COPD, anemia, seizure disorder, probable personality disorder, neurogenic bladder with suprapubic, DVT on chronic anticoagulation.     Most recent hospital stay was at [facility] from 7/13/18 through 7/23/18. She was transferred there from [facility] where she was treated for infected decubitus ulcers. She had surgical debridement and was treated with IV abx. Per the discharge summary, the infection did not appear to extend into the bone.  Other hospital stay at [facility] 6/30/18 through 7/9/18 for pneumonia. Initial hospital stay was at [facility] 4/10/18 to 5/8/18.  She was admitted due to fevers and worsening hip pain. 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 Due to constant stool and urine leakage around her suprapubic catheter, her wounds are unable to heal. She ultimately needs to have a diverting colostomy and urologic procedure, as well as skin flap surgery. All of this is pending improvement in her nutritional status. She was given TPN and then GJ tube was placed.      Admitted to this facility for  rehab, medical management and nursing care      HPI information obtained from: facility chart records, facility staff, patient report, [facility] Epic chart review and Care Everywhere Epic chart review.    Today's concern is:  Patient is chronically abrasive and verbally abusive toward staff. Often  "noncompliant with cares. She is again seen out on the patio today as she refuses to go back to her room. Answers a few questions at first, but when she is told that the oxycodone should be decreased again, she yells at examiner to \"get out of here.\" She says she doesn't want to hear it. When I say that I need to discuss medication changes with her, she says \"no you don't, just send a note through the nurse. Don't come talk to me about medication changes.\"     Decubitus ulcer of sacral region, stage 4 (H)    Staff report that when dressing changes have been allowed, patient is observed to by laying on her side, calmly giving instructions to staff on what to do. She has been unaware when staff have completed the dressing change. Staff have not noted any sign that she can feel what they are touching.  Marychuy says that she has severe pain. See above. See opioid dependence.    Adjustment disorder with mixed anxiety and depressed mood  Opioid dependence with opioid-induced disorder (H)    Patient has consistently asked for oxycodone to be increased due to excruciating pain. Per chart review, as of her 11/2017 PCP visit, her only pain medication was Percocet 2 tabs q6h prn. Pain medication first changed during 1/2018 hospital stay. She was discharged at that time on oxycontin 20mg BID and hydromorphone 2-4mg q4h prn. Oxycodone was added at a later hospital stay. Oxycodone was 5-10mg q4h prn until her 5/2018 hospital stay when the pain service increased it to 15mg q3h prn and added flexeril due to painful dressing changes.    Patient initially said she took Valium chronically for anxiety. This is the only medication that works for her. Per extensive chart review, valium was not on her med list from her last visit with her PCP 11/2017. At that time she was on buspar 7.5mg TID and lorazepam 0.5mg BID. She also had hydroxyzine at bedtime, but no sign of this being tried more frequently than that. PCP had made pain clinic and " psychiatry referrals, patient had not followed through on these. The valium first appeared during a hospital stay 3/2018.    Staff had reported finding an empty bottle of alcohol in her room. On 7/26/18 at noon, this provider advised her that valium and alcohol are a very unsafe combination and that if she continued to drink alcohol, valium could not be prescribed. On 7/26/18 around 3pm, she was witnessed by staff trying to order a drink at the restaurant down the street. Order was given to discontinue Valium and start hydroxyzine 25mg q4h prn. Marychuy said she has tried this before and it did not work. Dose was increased to 50mg, she says she has tried it and it doesn't work.    Today Marychuy discusses how she will be discharging soon and returning to her PCP and then she will get on the right medications. When it is mentioned that her PCP was not prescribing valium, she agrees with this and today agrees that this is not a good medication for her anxiety.    Marychuy is advised that the methadone is more appropriate for chronic pain. A standard treatment would be to increase the methadone for her pain, not the oxycodone. The oxycodone needs to be tapered as able.    Alcohol use  Nursing staff have found an empty bottle of alcohol in her room once. She was witnessed by staff ordering alcohol down the street at a bar. Staff feel strongly that she hides alcohol in her water pitcher mixed with soda. Marychuy has asked other residents to buy her alcohol. Her nurse today thinks that Marychuy was drunk this morning. Blood alcohol level has been ordered with her consent, but then she refused the blood draw.     Anemia, unspecified type  HGB 7.9. Has refused all lab draws here.    Neurogenic bladder  Has suprapubic catheter with chronic leakage into wounds. Urology was considering some type of urinary diversion, but was a no show for her appointment. No follow up on this has been done due to hospitalization    Paraplegia at T4 level  (H)  Secondary to hematoma with multiple truncal and extremity decubitus ulcers including right IT, stage IV injury, left sacral stage III ulcer, left heel ulcer, left upper thigh ulcer and coccyx ulcer. She currently does not have her PMD because it was damaged when she ran it into a table. Therapy is trying to work with her for w/c positioning and pressure mapping, but she has refused thus far. Therapy was wondering how long she should be in her chair for a single episode. Recommendation was given for max 2 hours at a time in chair.     Seizure disorder (H)  On Keppra. No known seizures while at [facility]    Protein-calorie malnutrition, unspecified severity (H)  PEG tube was placed in April due to malnutrition. It is unclear during which hospital stay tube feeding was stopped, she does not have current orders for any. Dietician reviewed this, noted that she has been eating well. Tube is being flushed for patency only. Marychuy refuses to have her weight checked.    Chronic obstructive pulmonary disease, unspecified COPD type (H)  No noted SOB, wheezing, cough, hypoxia      ALLERGIES: Review of patient's allergies indicates no known allergies.  Past Medical, Surgical, Family and Social History reviewed and updated in [facility].    Current Outpatient Prescriptions   Medication Sig Dispense Refill     Acetaminophen (TYLENOL PO) Take 500 mg by mouth every 8 hours as needed for mild pain or fever       ARIPiprazole (ABILIFY) 5 MG tablet Take 5 mg by mouth 2 times daily       bisacodyl (DULCOLAX) 10 MG Suppository Place 10 mg rectally daily as needed for constipation       CALCIUM CARBONATE PO Take 500 mg by mouth 4 times daily       cyclobenzaprine (FLEXERIL) 5 MG tablet Take 1 tablet (5 mg) by mouth 3 times daily as needed for muscle spasms 42 tablet      hydrOXYzine (ATARAX) 25 MG tablet Take 2 tablets (50 mg) by mouth every 4 hours as needed for anxiety 60 tablet 1     ipratropium - albuterol 0.5 mg/2.5 mg/3 mL  (DUONEB) 0.5-2.5 (3) MG/3ML neb solution Take 1 vial (3 mLs) by nebulization every 4 hours as needed for wheezing 360 mL      levETIRAcetam (KEPPRA) 250 MG tablet Take 250 mg by mouth 2 times daily       LEVOTHYROXINE SODIUM PO Take 125 mcg by mouth every morning       MELATONIN PO Take 3 mg by mouth nightly as needed       methadone (DOLOPHINE) 5 MG tablet Take 1 tablet (5 mg) by mouth every 8 hours  0     miconazole (MICATIN) 2 % AERP powder Apply topically 2 times daily Apply to groin folds       Nutritional Supplements (LARRY PO) 1 packet by mouth two times a day       OMEPRAZOLE PO Take 20 mg by mouth daily (with breakfast)       ondansetron (ZOFRAN ODT) 4 MG ODT tab Take 1 tablet (4 mg) by mouth every 6 hours as needed for nausea 20 tablet 1     OXYBUTYNIN CHLORIDE PO Take 5 mg by mouth 3 times daily       oxyCODONE IR (ROXICODONE) 10 MG tablet Take 1 tablet (10 mg) by mouth every 3 hours as needed for severe pain       PREDNISONE PO Take 5 mg by mouth daily Take as directed per taper.        Rivaroxaban (XARELTO PO) Take 20 mg by mouth At Bedtime       senna-docusate (SENOKOT-S;PERICOLACE) 8.6-50 MG per tablet Take 2 tablets by mouth 2 times daily       VENLAFAXINE HCL PO Take 37.5 mg by mouth 2 times daily       zinc oxide (DESITIN) 40 % ointment Apply topically as needed for dry skin or irritation       Medications reviewed:  Medications reconciled to facility chart and changes were made to reflect current medications as identified as above med list.     REVIEW OF SYSTEMS:  Limited secondary to cooperation    Physical Exam:  /82  Pulse 106  Temp 99.8  F (37.7  C)  Resp 22  Wt 158 lb (71.7 kg)  SpO2 97%  BMI 28.44 kg/m2   GENERAL APPEARANCE:  Alert, in no distress, oriented  PSYCH:  oriented X 3      Recent Labs:   Has refused every blood draw ordered here    CBC RESULTS:   Recent Labs   Lab Test  06/22/18   0101 06/14/18   WBC  10.1  8.3   RBC  4.02  4.02   HGB  8.5*  8.8*   HCT  29.4*  31.4*    MCV  73*  78*   MCH  21.1*  21.8*   MCHC  28.9*  28.0*   RDW  20.3*  20.2*   PLT  519*  549*       Last Basic Metabolic Panel:  Recent Labs   Lab Test  06/22/18   0101 06/14/18   NA  137  140   POTASSIUM  4.1  4.3   CHLORIDE  104  102   NATA  8.6  9.2   CO2  26  26   BUN  13  21   CR  0.62  0.68*   GLC  100*  102*       Liver Function Studies -   Recent Labs   Lab Test  06/22/18   0101 06/14/18   PROTTOTAL  9.0*  8.9*   ALBUMIN  2.8*  2.6*   BILITOTAL  0.2  0.2   ALKPHOS  178*  176*   AST  10  13   ALT  11  <9       TSH   Date Value Ref Range Status   05/30/2018 29.15 (A) 0.30 - 5.00 uIU/mL Final   05/24/2018 32.78 (H) 0.30 - 5.00 uIU/mL Final       Assessment/Plan:  (L89.154) Decubitus ulcer of sacral region, stage 4 (H)  (primary encounter diagnosis)  Comment: This is only one of several ulcers, but is the most severe and the one Marychuy complains about. Wound healing is compromised by her urine and stool contamination at times, pressure with her sitting in the chair all day, and noncompliance with treatment  Plan: Continue current POC with no changes at this time and adjustments as needed.    (F43.23) Adjustment disorder with mixed anxiety and depressed mood  Comment: Chronic. It is very challenging to provide appropriate care for this patient due to her noncompliance and history of giving false information. Most accusations regarding staff have not proven to be true. Patient is very manipulative. It is certainly important to treat her anxiety and depression, but it is impossible to do so without her full cooperation and honesty. Valium is not an appropriate chronic treatment due to risk for side effects and certainly is more risky when combined with alcohol. She has been on lorazepam in the past, but again, this is not safe with alcohol.  Plan: Cont hydroxyzine 50mg q4h prn. If any sign of sedation, will decrease again. F/u PCP    (F11.29) Opioid dependence with opioid-induced disorder (H)  Comment: It is likely  that patient has some pain, but it seems very unlikely that it would be quite so severe given her T4 paraplegia. Staff observation has been more consistent with her having little to no observable signs of pain. Current dose of oxycodone is more appropriate for acute pain, methadone would be more appropriate for chronic pain. It is very appropriate to continue to decrease oxycodone as planned.   Plan: Decrease oxycodone to 10mg q6h prn. Cont methadone 5mg q8h.     (Z78.9) Alcohol use  Comment: Ongoing per nursing but is difficult to prove. Patient not compliant with blood alcohol level draws. She certainly could be hiding alcohol in her water pitcher. Patient has been advised that it is not safe for her to consume alcohol with her controlled substances. This puts her at risk for injury, coma, even death.  Plan: Nursing to advise provider of any alcohol use. Will continue to reduce controlled substances as able    (D64.9) Anemia, unspecified type  Comment: Chronic  Plan: Monitor Hgb if patient allows    (N31.9) Neurogenic bladder  Comment: Chronic. Urinary diversion would certainly be beneficial to patient, but it is very complicated to actually pursue this given her other issues  Plan: Continue current POC with no changes at this time and adjustments as needed. F/u urology if able    (G83.9) Paraplegia at T4 level (H)  Comment: Chronic. Sitting in one position for long periods is detrimental to her skin. Again, encourage max 2 hours in chair  Plan: Recommend maximum 2 hours at a time in current chair until appropriate chair with cushion can be obtained by therapy.    (G40.909) Seizure disorder (H)  Comment: Chronic condition being managed with medications.  Plan: Continue current POC with no changes at this time and adjustments as needed.    (E46) Protein-calorie malnutrition, unspecified severity (H)  Comment: Unable to monitor based on weight, but can monitor her intake. Staff note that it has been adequate  Plan:  Monitor intake. Dietician monitoring    (J44.9) Chronic obstructive pulmonary disease, unspecified COPD type (H)  Comment: Chronic condition being managed with medications and is currently asymptomatic.  Plan: Continue current POC with no changes at this time and adjustments as needed.      Orders:  Decrease oxycodone to 10mg q6h prn    Electronically signed by  JOSEPH Montoya [facility]  Pager: [phone]

## 2018-08-16 NOTE — LETTER
2018        RE: Marychuy Zhou  [facility]  [address]  [facility] [address]          Face to Face and Medical Necessity Statement for DME Provider visit    Demographic Information on Marychuy Zhou:  Gender: female  : 1955  [facility]  [address]  [facility] [address]  [phone] (home)     Medical Record: [id number]  Social Security Number: [id number]  Primary Care Provider: Sánchez Zamora  Insurance: Payor: MEDICARE / Plan: MEDICARE / Product Type: Medicare /     HPI:   Marychuy Zhou is a 63 year old  (1955), who is being seen today for a face to face provider visit at [facility]; medical necessity statement for DME included. This patient requires the following:  DME Ordered and Medical Necessity Statement   Hospital bed: Semi electric with 1 set of half side rails  Group 2 air mattress    Pt needing above DME with expected length of need of 99 months due to medical necessity associated with following diagnosis:    (G83.9) Paraplegia at T4 level (H)  (L89.314) Decubitus ulcer of right buttock    Due to the above diagnoses:  The patient does  require positioning of the body in ways not feasible with an ordinary bed due above medical condition that is expected to last her lifetime.   The patient does  require, for the alleviation of pain, postioning of the body in ways not feasible with an ordinary bed.   The patient does not require the head of bed elevated more than 30* most of the time due to chronic pulmonary disease.  The patient does not require traction that can only be attached to a hospital bed.  The patient does  require a bed height different than a fixed height hospital bed to permit tranfers to wheelchair.   The patient does  require frequent or immediate changes in body position due to above diagnoses.     Hospital bed:  Patient will require a hospital bed for use after discharge due to (G83.9) Paraplegia at T4 level (H) and (L89.314)  Decubitus ulcer of right buttock. Due to paraplegia she is unable to reposition herself in a regular bed and is unable to transfer from a fixed height bed. Without a hospital bed, she would be confined to bed. Due to her (L89.314) Decubitus ulcer of right buttock, she needs to be able to position herself in ways not feasible in an ordinary bed and requires frequent changes in body position.    Group 2 air mattress  Patient requires an air mattress due to her (L89.314) Decubitus ulcer of right buttock and risk for further skin breakdown due to (G83.9) Paraplegia at T4 level (H). Right buttock ulcer is 5cm long x 5cm wide x 7 cm deep tunneling. At discharge wound will be followed by Dr. Sánchez Zamora, [facility]. If her wound heals, it will take several months. Having an air mattress will reduce her risk for worsening wounds and hospitalization and will aid in healing her wound.       PMH   has a past medical history of Anxiety; Chronic pain syndrome; Closed fracture zygoma, with routine healing, subsequent encounter; COPD (chronic obstructive pulmonary disease) (H); Depressive disorder; DVT (deep vein thrombosis) in pregnancy (H); Edema; Epilepsy (H); Flaccid neuropathic bladder, not elsewhere classified; Fracture of femur (H); Hypothyroidism; Iron deficiency anemia; Paraplegia (H); Pressure ulcer of sacral region; PTSD (post-traumatic stress disorder); Rheumatoid arthritis (H); and Sepsis (H).  CURRENT MEDICATIONS  Current Outpatient Prescriptions   Medication Sig Dispense Refill     Acetaminophen (TYLENOL PO) Take 500 mg by mouth every 8 hours as needed for mild pain or fever       ARIPiprazole (ABILIFY) 5 MG tablet Take 5 mg by mouth 2 times daily       bisacodyl (DULCOLAX) 10 MG Suppository Place 10 mg rectally daily as needed for constipation       CALCIUM CARBONATE PO Take 500 mg by mouth 4 times daily       cyclobenzaprine (FLEXERIL) 5 MG tablet Take 1 tablet (5 mg) by mouth 3 times daily as needed for  "muscle spasms 42 tablet      hydrOXYzine (ATARAX) 25 MG tablet Take 2 tablets (50 mg) by mouth every 4 hours as needed for anxiety 60 tablet 1     ipratropium - albuterol 0.5 mg/2.5 mg/3 mL (DUONEB) 0.5-2.5 (3) MG/3ML neb solution Take 1 vial (3 mLs) by nebulization every 4 hours as needed for wheezing 360 mL      levETIRAcetam (KEPPRA) 250 MG tablet Take 250 mg by mouth 2 times daily       LEVOTHYROXINE SODIUM PO Take 125 mcg by mouth every morning       MELATONIN PO Take 3 mg by mouth nightly as needed       methadone (DOLOPHINE) 5 MG tablet Take 1 tablet (5 mg) by mouth every 8 hours  0     miconazole (MICATIN) 2 % AERP powder Apply topically 2 times daily Apply to groin folds       Nutritional Supplements (LARRY PO) 1 packet by mouth two times a day       OMEPRAZOLE PO Take 20 mg by mouth daily (with breakfast)       ondansetron (ZOFRAN ODT) 4 MG ODT tab Take 1 tablet (4 mg) by mouth every 6 hours as needed for nausea 20 tablet 1     OXYBUTYNIN CHLORIDE PO Take 5 mg by mouth 3 times daily       oxyCODONE IR (ROXICODONE) 10 MG tablet Take 1 tablet (10 mg) by mouth every 6 hours as needed for severe pain       PREDNISONE PO Take 5 mg by mouth daily Take as directed per taper.        Rivaroxaban (XARELTO PO) Take 20 mg by mouth At Bedtime       senna-docusate (SENOKOT-S;PERICOLACE) 8.6-50 MG per tablet Take 2 tablets by mouth 2 times daily       VENLAFAXINE HCL PO Take 37.5 mg by mouth 2 times daily       zinc oxide (DESITIN) 40 % ointment Apply topically as needed for dry skin or irritation       ROS:10 point ROS of systems including Constitutional, Eyes, Respiratory, Cardiovascular, Gastroenterology, Genitourinary, Integumentary, Muscularskeletal, Psychiatric were all negative except for pertinent positives noted in my HPI.    EXAM  Vitals: /82  Pulse 106  Temp 98.1  F (36.7  C)  Resp 22  Ht 5' 2.5\" (1.588 m)  Wt 158 lb (71.7 kg)  SpO2 97%  BMI 28.44 kg/m2;BMI= Body mass index is 28.44 kg/(m^2). "   GENERAL APPEARANCE:  Alert, in no distress, oriented  M/S: no purposeful movement in BLE  SKIN: R buttock ulcer, stage 4, 100% bright beefy red base, moderate serosanguinous drainage, 1vny2kb with tunneling 7cm  PSYCH:  oriented X 3      ASSESSMENT/PLAN:  (G83.9) Paraplegia at T4 level (H)  (primary encounter diagnosis)  (L89.314) Decubitus ulcer of right buttock    Orders:  [facility] order completed for semi electric hospital bed with Group 2 air mattress    ELECTRONICALLY SIGNED BY YOSI CERTIFIED PROVIDER:  JOSEPH Montoya CNP   NPI: [id number]  [facility]  [address] [place] [address], [address]  [place] [address]

## 2018-08-16 NOTE — LETTER
2018        RE: Marychuy Zhou  [facility]  [address]  [facility] [address]          Face to Face and Medical Necessity Statement for DME Provider visit    Demographic Information on Marychuy Zhou:  Gender: female  : 1955  [facility]  [address]  [facility] [address]  [phone] (home)     Medical Record: [id number]  Social Security Number: [id number]  Primary Care Provider: Sánchez Zamora  Insurance: Payor: MEDICARE / Plan: MEDICARE / Product Type: Medicare /     HPI:   Marychuy Zhou is a 63 year old  (1955), who is being seen today for a face to face provider visit at [facility]; medical necessity statement for DME included. This patient requires the following:  DME Ordered and Medical Necessity Statement   Hospital bed: Semi electric with 1 set of half side rails  Level 2 air mattress    Pt needing above DME with expected length of need of 99 months due to medical necessity associated with following diagnosis:     Paraplegia at T4 level (H)  Decubitus ulcer of sacral region, stage 4 (H)      The patient does  require positioning of the body in ways not feasible with an ordinary bed due above medical condition that is expected to last her lifetime.   The patient does  require, for the alleviation of pain, postioning of the body in ways not feasible with an ordinary bed.   The patient does not require the head of bed elevated more than 30* most of the time due to chronic pulmonary disease.  The patient does not require traction that can only be attached to a hospital bed.  The patient does  require a bed height different than a fixed height hospital bed to permit tranfers to wheelchair.   The patient does  require frequent or immediate changes in body position due to above diagnoses.     Patient will require a hospital bed for use after discharge due to (G83.9) Paraplegia at T4 level (H) and (L89.154) Decubitus ulcer of sacral region, stage 4 (H).  Due to paraplegia she is unable to reposition herself in a regular bed and is unable to transfer from a fixed height bed. Without a hospital bed, she would be confined to bed. Due to pain from her decubitus ulcer, she needs to be able to position herself in ways not feasible in an ordinary bed and requires frequent changes in body position.    Patient requires an air mattress due to her existing decubitus ulcer and risk for further skin breakdown. If her wound heals, it will take several months. Having an air mattress will reduce her risk for worsening wounds and hospitalization and will aid in healing her wound.      PMH   has a past medical history of Anxiety; Chronic pain syndrome; Closed fracture zygoma, with routine healing, subsequent encounter; COPD (chronic obstructive pulmonary disease) (H); Depressive disorder; DVT (deep vein thrombosis) in pregnancy (H); Edema; Epilepsy (H); Flaccid neuropathic bladder, not elsewhere classified; Fracture of femur (H); Hypothyroidism; Iron deficiency anemia; Paraplegia (H); Pressure ulcer of sacral region; PTSD (post-traumatic stress disorder); Rheumatoid arthritis (H); and Sepsis (H).  CURRENT MEDICATIONS  Current Outpatient Prescriptions   Medication Sig Dispense Refill     Acetaminophen (TYLENOL PO) Take 500 mg by mouth every 8 hours as needed for mild pain or fever       ARIPiprazole (ABILIFY) 5 MG tablet Take 5 mg by mouth 2 times daily       bisacodyl (DULCOLAX) 10 MG Suppository Place 10 mg rectally daily as needed for constipation       CALCIUM CARBONATE PO Take 500 mg by mouth 4 times daily       cyclobenzaprine (FLEXERIL) 5 MG tablet Take 1 tablet (5 mg) by mouth 3 times daily as needed for muscle spasms 42 tablet      hydrOXYzine (ATARAX) 25 MG tablet Take 2 tablets (50 mg) by mouth every 4 hours as needed for anxiety 60 tablet 1     ipratropium - albuterol 0.5 mg/2.5 mg/3 mL (DUONEB) 0.5-2.5 (3) MG/3ML neb solution Take 1 vial (3 mLs) by nebulization every 4  "hours as needed for wheezing 360 mL      levETIRAcetam (KEPPRA) 250 MG tablet Take 250 mg by mouth 2 times daily       LEVOTHYROXINE SODIUM PO Take 125 mcg by mouth every morning       MELATONIN PO Take 3 mg by mouth nightly as needed       methadone (DOLOPHINE) 5 MG tablet Take 1 tablet (5 mg) by mouth every 8 hours  0     miconazole (MICATIN) 2 % AERP powder Apply topically 2 times daily Apply to groin folds       Nutritional Supplements (LARRY PO) 1 packet by mouth two times a day       OMEPRAZOLE PO Take 20 mg by mouth daily (with breakfast)       ondansetron (ZOFRAN ODT) 4 MG ODT tab Take 1 tablet (4 mg) by mouth every 6 hours as needed for nausea 20 tablet 1     OXYBUTYNIN CHLORIDE PO Take 5 mg by mouth 3 times daily       oxyCODONE IR (ROXICODONE) 10 MG tablet Take 1 tablet (10 mg) by mouth every 6 hours as needed for severe pain       PREDNISONE PO Take 5 mg by mouth daily Take as directed per taper.        Rivaroxaban (XARELTO PO) Take 20 mg by mouth At Bedtime       senna-docusate (SENOKOT-S;PERICOLACE) 8.6-50 MG per tablet Take 2 tablets by mouth 2 times daily       VENLAFAXINE HCL PO Take 37.5 mg by mouth 2 times daily       zinc oxide (DESITIN) 40 % ointment Apply topically as needed for dry skin or irritation       ROS:10 point ROS of systems including Constitutional, Eyes, Respiratory, Cardiovascular, Gastroenterology, Genitourinary, Integumentary, Muscularskeletal, Psychiatric were all negative except for pertinent positives noted in my HPI.    EXAM  Vitals: /82  Pulse 106  Temp 98.1  F (36.7  C)  Resp 22  Ht 5' 2.5\" (1.588 m)  Wt 158 lb (71.7 kg)  SpO2 97%  BMI 28.44 kg/m2;BMI= Body mass index is 28.44 kg/(m^2).   GENERAL APPEARANCE:  Alert, in no distress, oriented  M/S: no purposeful movement in BLE  PSYCH:  oriented X 3      ASSESSMENT/PLAN:  (G83.9) Paraplegia at T4 level (H)  (primary encounter diagnosis)    (L89.154) Decubitus ulcer of sacral region, stage 4 " (H)      Orders:  [facility] order completed for semi electric hospital bed with Group 2 air mattress    ELECTRONICALLY SIGNED BY YOSI CERTIFIED PROVIDER:  JOSEPH Montoya CNP   NPI: [id number]  [facility]  [address] [place] [address], [address]  [place] [address]

## 2018-08-16 NOTE — PROGRESS NOTES
Face to Face and Medical Necessity Statement for DME Provider visit    Demographic Information on Marychuy Zhou:  Gender: female  : 1955  [facility]  [address]  [facility] [address]  [phone] (home)     Medical Record: [id number]  Social Security Number: [id number]  Primary Care Provider: Sánchez Zamora  Insurance: Payor: MEDICARE / Plan: MEDICARE / Product Type: Medicare /     HPI:   Marychuy Zhou is a 63 year old  (1955), who is being seen today for a face to face provider visit at [facility]; medical necessity statement for DME included. This patient requires the following:  DME Ordered and Medical Necessity Statement   Hospital bed: Semi electric with 1 set of half side rails  Group 2 air mattress    Pt needing above DME with expected length of need of 99 months due to medical necessity associated with following diagnosis:    (G83.9) Paraplegia at T4 level (H)  (L89.314) Decubitus ulcer of right buttock    Due to the above diagnoses:  The patient does  require positioning of the body in ways not feasible with an ordinary bed due above medical condition that is expected to last her lifetime.   The patient does  require, for the alleviation of pain, postioning of the body in ways not feasible with an ordinary bed.   The patient does not require the head of bed elevated more than 30* most of the time due to chronic pulmonary disease.  The patient does not require traction that can only be attached to a hospital bed.  The patient does  require a bed height different than a fixed height hospital bed to permit tranfers to wheelchair.   The patient does  require frequent or immediate changes in body position due to above diagnoses.     Hospital bed:  Patient will require a hospital bed for use after discharge due to (G83.9) Paraplegia at T4 level (H) and (L89.314) Decubitus ulcer of right buttock. Due to paraplegia she is unable to reposition herself in a regular bed  and is unable to transfer from a fixed height bed. Without a hospital bed, she would be confined to bed. Due to her (L89.314) Decubitus ulcer of right buttock, she needs to be able to position herself in ways not feasible in an ordinary bed and requires frequent changes in body position.    Group 2 air mattress  Patient requires an air mattress due to her (L89.314) Decubitus ulcer of right buttock and risk for further skin breakdown due to (G83.9) Paraplegia at T4 level (H). Right buttock ulcer is 5cm long x 5cm wide x 7 cm deep tunneling. At discharge wound will be followed by Dr. Sánchez Zamora, [facility]. If her wound heals, it will take several months. Having an air mattress will reduce her risk for worsening wounds and hospitalization and will aid in healing her wound.       PMH   has a past medical history of Anxiety; Chronic pain syndrome; Closed fracture zygoma, with routine healing, subsequent encounter; COPD (chronic obstructive pulmonary disease) (H); Depressive disorder; DVT (deep vein thrombosis) in pregnancy (H); Edema; Epilepsy (H); Flaccid neuropathic bladder, not elsewhere classified; Fracture of femur (H); Hypothyroidism; Iron deficiency anemia; Paraplegia (H); Pressure ulcer of sacral region; PTSD (post-traumatic stress disorder); Rheumatoid arthritis (H); and Sepsis (H).  CURRENT MEDICATIONS  Current Outpatient Prescriptions   Medication Sig Dispense Refill     Acetaminophen (TYLENOL PO) Take 500 mg by mouth every 8 hours as needed for mild pain or fever       ARIPiprazole (ABILIFY) 5 MG tablet Take 5 mg by mouth 2 times daily       bisacodyl (DULCOLAX) 10 MG Suppository Place 10 mg rectally daily as needed for constipation       CALCIUM CARBONATE PO Take 500 mg by mouth 4 times daily       cyclobenzaprine (FLEXERIL) 5 MG tablet Take 1 tablet (5 mg) by mouth 3 times daily as needed for muscle spasms 42 tablet      hydrOXYzine (ATARAX) 25 MG tablet Take 2 tablets (50 mg) by mouth every 4  "hours as needed for anxiety 60 tablet 1     ipratropium - albuterol 0.5 mg/2.5 mg/3 mL (DUONEB) 0.5-2.5 (3) MG/3ML neb solution Take 1 vial (3 mLs) by nebulization every 4 hours as needed for wheezing 360 mL      levETIRAcetam (KEPPRA) 250 MG tablet Take 250 mg by mouth 2 times daily       LEVOTHYROXINE SODIUM PO Take 125 mcg by mouth every morning       MELATONIN PO Take 3 mg by mouth nightly as needed       methadone (DOLOPHINE) 5 MG tablet Take 1 tablet (5 mg) by mouth every 8 hours  0     miconazole (MICATIN) 2 % AERP powder Apply topically 2 times daily Apply to groin folds       Nutritional Supplements (LARRY PO) 1 packet by mouth two times a day       OMEPRAZOLE PO Take 20 mg by mouth daily (with breakfast)       ondansetron (ZOFRAN ODT) 4 MG ODT tab Take 1 tablet (4 mg) by mouth every 6 hours as needed for nausea 20 tablet 1     OXYBUTYNIN CHLORIDE PO Take 5 mg by mouth 3 times daily       oxyCODONE IR (ROXICODONE) 10 MG tablet Take 1 tablet (10 mg) by mouth every 6 hours as needed for severe pain       PREDNISONE PO Take 5 mg by mouth daily Take as directed per taper.        Rivaroxaban (XARELTO PO) Take 20 mg by mouth At Bedtime       senna-docusate (SENOKOT-S;PERICOLACE) 8.6-50 MG per tablet Take 2 tablets by mouth 2 times daily       VENLAFAXINE HCL PO Take 37.5 mg by mouth 2 times daily       zinc oxide (DESITIN) 40 % ointment Apply topically as needed for dry skin or irritation       ROS:10 point ROS of systems including Constitutional, Eyes, Respiratory, Cardiovascular, Gastroenterology, Genitourinary, Integumentary, Muscularskeletal, Psychiatric were all negative except for pertinent positives noted in my HPI.    EXAM  Vitals: /82  Pulse 106  Temp 98.1  F (36.7  C)  Resp 22  Ht 5' 2.5\" (1.588 m)  Wt 158 lb (71.7 kg)  SpO2 97%  BMI 28.44 kg/m2;BMI= Body mass index is 28.44 kg/(m^2).   GENERAL APPEARANCE:  Alert, in no distress, oriented  M/S: no purposeful movement in BLE  SKIN: R buttock " ulcer, stage 4, 100% bright beefy red base, moderate serosanguinous drainage, [id number] with tunneling 7cm  PSYCH:  oriented X 3      ASSESSMENT/PLAN:  (G83.9) Paraplegia at T4 level (H)  (primary encounter diagnosis)  (L89.314) Decubitus ulcer of right buttock    Orders:  [facility] order completed for semi electric hospital bed with Group 2 air mattress    ELECTRONICALLY SIGNED BY YOSI CERTIFIED PROVIDER:  JOSEPH Montoya CNP   NPI: [id number]  [facility]  [address] [place] [address], [address]  [place] [address]

## 2018-08-16 NOTE — LETTER
2018        RE: Marychuy Zhou  [facility]  [address]  [facility] [address]          Face to Face and Medical Necessity Statement for DME Provider visit    Demographic Information on Marychuy Zhou:  Gender: female  : 1955  [facility]  [address]  [facility] [address]  [phone] (home)     Medical Record: [id number]  Social Security Number: [id number]  Primary Care Provider: Sánchez Zamora  Insurance: Payor: MEDICARE / Plan: MEDICARE / Product Type: Medicare /     HPI:   Marychuy Zhou is a 63 year old  (1955), who is being seen today for a face to face provider visit at [facility]; medical necessity statement for DME included. This patient requires the following:  DME Ordered and Medical Necessity Statement   Hospital bed: Semi electric with 1 set of half side rails  Level 2 air mattress    Pt needing above DME with expected length of need of 99 months due to medical necessity associated with following diagnosis:     Paraplegia at T4 level (H)  Decubitus ulcer of sacral region, stage 4 (H)      The patient does  require positioning of the body in ways not feasible with an ordinary bed due to a medical condition that is expected to last at least 1 month due to above diagnoses.   The patient does not require, for the alleviation of pain, postioning of the body in ways not feasible with an ordinary bed.   The patient does  require the head of bed elevated more than 30* most of the time due to chronic pulmonary disease or aspiration.  The patient does not require traction that can only be attached to a hospital bed.  The patient does  require a bed height different than a fixed height hospital bed to permit tranfers to wheelchair or standing position.   The patient does  require frequent or immediate changes in body position due to above diagnoses.     Patient requires an air mattress due to her existing decubitus ulcer and risk for further skin  breakdown. Having an air mattress will reduce her risk for worsening wounds and hospitalization.      PMH   has a past medical history of Anxiety; Chronic pain syndrome; Closed fracture zygoma, with routine healing, subsequent encounter; COPD (chronic obstructive pulmonary disease) (H); Depressive disorder; DVT (deep vein thrombosis) in pregnancy (H); Edema; Epilepsy (H); Flaccid neuropathic bladder, not elsewhere classified; Fracture of femur (H); Hypothyroidism; Iron deficiency anemia; Paraplegia (H); Pressure ulcer of sacral region; PTSD (post-traumatic stress disorder); Rheumatoid arthritis (H); and Sepsis (H).  CURRENT MEDICATIONS  Current Outpatient Prescriptions   Medication Sig Dispense Refill     Acetaminophen (TYLENOL PO) Take 500 mg by mouth every 8 hours as needed for mild pain or fever       ARIPiprazole (ABILIFY) 5 MG tablet Take 5 mg by mouth 2 times daily       bisacodyl (DULCOLAX) 10 MG Suppository Place 10 mg rectally daily as needed for constipation       CALCIUM CARBONATE PO Take 500 mg by mouth 4 times daily       cyclobenzaprine (FLEXERIL) 5 MG tablet Take 1 tablet (5 mg) by mouth 3 times daily as needed for muscle spasms 42 tablet      hydrOXYzine (ATARAX) 25 MG tablet Take 2 tablets (50 mg) by mouth every 4 hours as needed for anxiety 60 tablet 1     ipratropium - albuterol 0.5 mg/2.5 mg/3 mL (DUONEB) 0.5-2.5 (3) MG/3ML neb solution Take 1 vial (3 mLs) by nebulization every 4 hours as needed for wheezing 360 mL      levETIRAcetam (KEPPRA) 250 MG tablet Take 250 mg by mouth 2 times daily       LEVOTHYROXINE SODIUM PO Take 125 mcg by mouth every morning       MELATONIN PO Take 3 mg by mouth nightly as needed       methadone (DOLOPHINE) 5 MG tablet Take 1 tablet (5 mg) by mouth every 8 hours  0     miconazole (MICATIN) 2 % AERP powder Apply topically 2 times daily Apply to groin folds       Nutritional Supplements (LARRY PO) 1 packet by mouth two times a day       OMEPRAZOLE PO Take 20 mg by  "mouth daily (with breakfast)       ondansetron (ZOFRAN ODT) 4 MG ODT tab Take 1 tablet (4 mg) by mouth every 6 hours as needed for nausea 20 tablet 1     OXYBUTYNIN CHLORIDE PO Take 5 mg by mouth 3 times daily       oxyCODONE IR (ROXICODONE) 10 MG tablet Take 1 tablet (10 mg) by mouth every 6 hours as needed for severe pain       PREDNISONE PO Take 5 mg by mouth daily Take as directed per taper.        Rivaroxaban (XARELTO PO) Take 20 mg by mouth At Bedtime       senna-docusate (SENOKOT-S;PERICOLACE) 8.6-50 MG per tablet Take 2 tablets by mouth 2 times daily       VENLAFAXINE HCL PO Take 37.5 mg by mouth 2 times daily       zinc oxide (DESITIN) 40 % ointment Apply topically as needed for dry skin or irritation       ROS:10 point ROS of systems including Constitutional, Eyes, Respiratory, Cardiovascular, Gastroenterology, Genitourinary, Integumentary, Muscularskeletal, Psychiatric were all negative except for pertinent positives noted in my HPI.    EXAM  Vitals: /82  Pulse 106  Temp 98.1  F (36.7  C)  Resp 22  Ht 5' 2.5\" (1.588 m)  Wt 158 lb (71.7 kg)  SpO2 97%  BMI 28.44 kg/m2;BMI= Body mass index is 28.44 kg/(m^2).   GENERAL APPEARANCE:  Alert, in no distress, oriented  M/S: no purposeful movement in BLE  PSYCH:  oriented X 3      ASSESSMENT/PLAN:  (G83.9) Paraplegia at T4 level (H)  (primary encounter diagnosis)    (L89.154) Decubitus ulcer of sacral region, stage 4 (H)      Orders:  [facility] order completed for semi electric hospital bed with level 2 air mattress    ELECTRONICALLY SIGNED BY YOSI CERTIFIED PROVIDER:  JOSEPH Montoya CNP   NPI: [id number]  [facility]  [address] [place] [address], [address]  [place] [address]              "

## 2018-08-17 NOTE — TELEPHONE ENCOUNTER
Patient was seen 8/16/18 and offered to make appointment to have feeding tube removed as it has not been used now for a couple of weeks. She declined this offer. She said she is leaving soon and does not want anything to delay her discharge. She wanted to have her doctor take care of this.    Message received from nursing that Marychuy is now c/o abdominal pain and would like to have the tube removed.     Plan:  Schedule IR appointment for G tube removal

## 2018-08-23 NOTE — LETTER
8/23/2018        RE: Marychuy Zhou  [facility]  [address]  [facility] [address]          [place] [facility] DISCHARGE SUMMARY    PATIENT'S NAME: Marychuy Zhou  YOB: 1955  MEDICAL RECORD NUMBER:  [id number]    PRIMARY CARE PROVIDER AND CLINIC RESPONSIBLE AFTER TRANSFER: [facility] [address] / [place] [address]     CODE STATUS/ADVANCE DIRECTIVES DISCUSSION:   CPR/Full code      No Known Allergies    TRANSFERRING PROVIDERS: JOSEPH Montoya CNP, Georgina Jennings MD  DATE OF SNF ADMISSION:  May / 08 / 2018  DATE OF SNF (anticipated) DISCHARGE: August / 27 / 2018  DISCHARGE DISPOSITION: Non-[facility] Provider   Nursing Facility: [facility] stay 4/10/18 to 5/8/18.     Hospital stay 5/15/18 through 5/19/18.    Resident was sent to [facility] ER on 5/22, and returned same day.  Hospital stay [facility] from 6/30/18 through 7/9/18.  Hospital stay [facility]  7/13/18 through 7/23/18.    Patient is discharging to a friend's home. Social work did meet with this friend a few weeks ago. There is a ramp being installed this week for her to get into the home. Marychuy says she has a mechanical lift already. A hospital bed has been ordered. Marychuy feels that her wound care can be managed between her and her friend. Discharge is certainly patient driven and is not ideal, but patient does certainly have decision making capacity    Condition on Discharge:  Stable.  Function:  Paraplegia - Cecile lift, assist with ADLs  Cognitive Scores: BIMS 15/15 and PHQ9 07/27.    Equipment: hospital bed with group 2 air mattress    DISCHARGE DIAGNOSIS:   1. Decubitus ulcer of sacral region, stage 4 (H)    2. Adjustment disorder with mixed anxiety and depressed mood    3. Opioid dependence with opioid-induced disorder (H)    4. Alcohol use    5. Anemia, unspecified type    6. Neurogenic bladder    7. Paraplegia at T4  level (H)    8. Seizure disorder (H)    9. Protein-calorie malnutrition, unspecified severity (H)    10. Chronic obstructive pulmonary disease, unspecified COPD type (H)        HPI Nursing Facility Course:  HPI information obtained from: facility chart records, facility staff, patient report and [facility] chart review. PMH of paraplegia secondary to spinal hematoma (about 10 years ago), chronic truncal and sacral ulcers, COPD, anemia, seizure disorder, probable personality disorder, neurogenic bladder with suprapubic, DVT on chronic anticoagulation.     Most recent hospital stay was at [facility] from 7/13/18 through 7/23/18. She was transferred there from [facility] where she was treated for infected decubitus ulcers. She had surgical debridement and was treated with IV abx. Per the discharge summary, the infection did not appear to extend into the bone.  Other hospital stay at [facility] 6/30/18 through 7/9/18 for pneumonia. Initial hospital stay was at [facility] 4/10/18 to 5/8/18.  She was admitted due to fevers and worsening hip pain. 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 Due to constant stool and urine leakage around her suprapubic catheter, her wounds are unable to heal. She ultimately needs to have a diverting colostomy and urologic procedure, as well as skin flap surgery. All of this is pending improvement in her nutritional status. She was given TPN and then GJ tube was placed.    Admitted to this facility for  rehab, medical management and nursing care    Patient is chronically abrasive and verbally abusive toward staff. Often noncompliant with cares. Frequently threatens to doug various staff for mistreatment. She has consistently refused exam by this provider. When she has been advised of decreases to her pain medication and stopping of valium for her own safety,  she gets angry and asks provider to leave.     Decubitus ulcer of sacral region, stage 4 (H)    Staff report that when dressing changes have been allowed, patient is observed to by laying on her side, calmly giving instructions to staff on what to do. She has been unaware when staff have completed the dressing change. Staff have not noted any sign that she can feel what they are touching.  Marychuy says that she has severe pain. See opioid dependence.    Adjustment disorder with mixed anxiety and depressed mood  Opioid dependence with opioid-induced disorder (H)    Patient has consistently asked for oxycodone to be increased due to excruciating pain. Per chart review, as of her 11/2017 PCP visit, her only pain medication was Percocet 2 tabs q6h prn. Pain medication first changed during 1/2018 hospital stay. She was discharged at that time on oxycontin 20mg BID and hydromorphone 2-4mg q4h prn. Oxycodone was added at a later hospital stay. Oxycodone was 5-10mg q4h prn until her 5/2018 hospital stay when the pain service increased it to 15mg q3h prn and added flexeril due to painful dressing changes.    Patient initially said she took Valium chronically for anxiety. This is the only medication that works for her. Per extensive chart review, valium was not on her med list from her last visit with her PCP 11/2017. At that time she was on buspar 7.5mg TID and lorazepam 0.5mg BID. She also had hydroxyzine at bedtime, but no sign of this being tried more frequently than that. PCP had made pain clinic and psychiatry referrals, patient had not followed through on these. The valium first appeared during a hospital stay 3/2018.    Staff had reported finding an empty bottle of alcohol in her room. On 7/26/18 at noon, this provider advised her that valium and alcohol are a very unsafe combination and that if she continued to drink alcohol, valium could not be prescribed. On 7/26/18 around 3pm, she was witnessed by staff trying to  order a drink at the restaurant down the street. Order was given to discontinue Valium and start hydroxyzine 25mg q4h prn. Marychuy said she has tried this before and it did not work. Dose was increased to 50mg, she says she has tried it and it doesn't work.    Today Marychuy discusses how she will be discharging soon and returning to her PCP and then she will get on the right medications. When it is mentioned that her PCP was not prescribing valium, she agrees with this and today agrees that this is not a good medication for her anxiety.    Marychuy is advised that the methadone is more appropriate for chronic pain. A standard treatment would be to increase the methadone for her pain, not the oxycodone. The oxycodone needs to be tapered as able. Dose has been decreased from 15mg q3h prn to 10mg q6h prn.    Alcohol use  Nursing staff have found an empty bottle of alcohol in her room once. She was witnessed by staff ordering alcohol down the street at a bar. Staff feel strongly that she hides alcohol in her water pitcher mixed with soda. Marychuy has asked other residents to buy her alcohol. Blood alcohol level has been ordered with her consent, but then she refused the blood draw.     Anemia, unspecified type  HGB 7.9 in the hospital. Has refused all lab draws here.    Neurogenic bladder  Has suprapubic catheter with chronic leakage into wounds. Urology was considering some type of urinary diversion, but was a no show for her appointment. No follow up on this has been done due to hospitalizations. She says she wants to wait until after discharge to follow up.    Paraplegia at T4 level (H)  Secondary to hematoma with multiple truncal and extremity decubitus ulcers including right IT, stage IV injury, left sacral stage III ulcer, left heel ulcer, left upper thigh ulcer and coccyx ulcer. She currently does not have her PMD because it was damaged when she ran it into a table. Therapy is trying to work with her for w/c positioning  and pressure mapping, but she has refused.    Seizure disorder (H)  On Keppra. No known seizures while at [facility]    Protein-calorie malnutrition, unspecified severity (H)  GJ tube was placed in April due to malnutrition. It is unclear during which hospital stay tube feeding was stopped, but she has been off it for a few weeks now. Dietician reviewed this, noted that she has been eating well. Tube is being flushed for patency only. Appointment is scheduled with IR to have the tube removed on 8/29/18.    Chronic obstructive pulmonary disease, unspecified COPD type (H)  No noted SOB, wheezing, cough, hypoxia          PAST MEDICAL HISTORY:  has a past medical history of Anxiety; Chronic pain syndrome; Closed fracture zygoma, with routine healing, subsequent encounter; COPD (chronic obstructive pulmonary disease) (H); Depressive disorder; DVT (deep vein thrombosis) in pregnancy (H); Edema; Epilepsy (H); Flaccid neuropathic bladder, not elsewhere classified; Fracture of femur (H); Hypothyroidism; Iron deficiency anemia; Paraplegia (H); Pressure ulcer of sacral region; PTSD (post-traumatic stress disorder); Rheumatoid arthritis (H); and Sepsis (H).    DISCHARGE MEDICATIONS:  Current Outpatient Prescriptions   Medication Sig Dispense Refill     Acetaminophen (TYLENOL PO) Take 500 mg by mouth every 8 hours as needed for mild pain or fever       ARIPiprazole (ABILIFY) 5 MG tablet Take 5 mg by mouth 2 times daily       bisacodyl (DULCOLAX) 10 MG Suppository Place 10 mg rectally daily as needed for constipation       CALCIUM CARBONATE PO Take 500 mg by mouth 4 times daily       cyclobenzaprine (FLEXERIL) 5 MG tablet Take 1 tablet (5 mg) by mouth 3 times daily as needed for muscle spasms 42 tablet      hydrOXYzine (ATARAX) 25 MG tablet Take 2 tablets (50 mg) by mouth every 4 hours as needed for anxiety 60 tablet 1     ipratropium - albuterol 0.5 mg/2.5 mg/3 mL (DUONEB) 0.5-2.5 (3) MG/3ML neb solution Take 1 vial (3  "mLs) by nebulization every 4 hours as needed for wheezing 360 mL      levETIRAcetam (KEPPRA) 250 MG tablet Take 250 mg by mouth 2 times daily       LEVOTHYROXINE SODIUM PO Take 125 mcg by mouth every morning       MELATONIN PO Take 3 mg by mouth nightly as needed       methadone (DOLOPHINE) 5 MG tablet Take 1 tablet (5 mg) by mouth every 8 hours  0     miconazole (MICATIN) 2 % AERP powder Apply topically 2 times daily Apply to groin folds       Nutritional Supplements (LARRY PO) 1 packet by mouth two times a day       OMEPRAZOLE PO Take 20 mg by mouth daily (with breakfast)       OXYBUTYNIN CHLORIDE PO Take 5 mg by mouth 3 times daily       oxyCODONE IR (ROXICODONE) 10 MG tablet Take 1 tablet (10 mg) by mouth every 6 hours as needed for severe pain       Rivaroxaban (XARELTO PO) Take 20 mg by mouth At Bedtime       senna-docusate (SENOKOT-S;PERICOLACE) 8.6-50 MG per tablet Take 2 tablets by mouth 2 times daily       VENLAFAXINE HCL PO Take 37.5 mg by mouth 2 times daily       zinc oxide (DESITIN) 40 % ointment Apply topically as needed for dry skin or irritation         MEDICATION CHANGES/RATIONALE:   Valium stopped - not safe due to ongoing alcohol use and not appropriate for anxiety  Hydroxyzine started for anxiety  Methadone was started by [facility], increased here from BID to TID  Oxycodone decreased significantly  Hydromorphone stopped that had been started by [facility]    Controlled medications sent with patient:   Patient will receive 2 week supply of methadone and oxycodone at discharge     ROS:    4 point ROS including Respiratory, CV, GI and , other than that noted in the HPI,  is negative    Physical Exam:   Vitals: /86  Pulse 100  Temp 98.1  F (36.7  C)  Resp 16  Ht 5' 2.5\" (1.588 m)  Wt 158 lb (71.7 kg)  SpO2 97%  BMI 28.44 kg/m2  BMI= Body mass index is 28.44 kg/(m^2).  Refuses exam  GENERAL APPEARANCE:  Alert, in no distress, oriented, uncooperative  RESP:  no respiratory " distress  PSYCH:  oriented X 3    DISCHARGE PLAN:  From:  [facility] PCA services / Nurse   Patient instructed to follow-up with:  PCP in 7 days      Current [place] scheduled appointments:  Future Appointments  Date Time Provider Department Center   8/29/2018 11:00 AM [id number] [place] [address] referral needed and placed by this provider: No    Pending labs: none    SNF labs: Patient refused all lab draws here    CBC RESULTS:   Recent Labs   Lab Test  06/22/18   0101 06/14/18   WBC  10.1  8.3   RBC  4.02  4.02   HGB  8.5*  8.8*   HCT  29.4*  31.4*   MCV  73*  78*   MCH  21.1*  21.8*   MCHC  28.9*  28.0*   RDW  20.3*  20.2*   PLT  519*  549*       Last Basic Metabolic Panel:  Recent Labs   Lab Test  06/22/18   0101 06/14/18   NA  137  140   POTASSIUM  4.1  4.3   CHLORIDE  104  102   NATA  8.6  9.2   CO2  26  26   BUN  13  21   CR  0.62  0.68*   GLC  100*  102*       Liver Function Studies -   Recent Labs   Lab Test  06/22/18   0101 06/14/18   PROTTOTAL  9.0*  8.9*   ALBUMIN  2.8*  2.6*   BILITOTAL  0.2  0.2   ALKPHOS  178*  176*   AST  10  13   ALT  11  <9         Discharge Treatments:  L ischial, R buttock wounds:  two times a day cleanse with dakins solution, pat dry, apply gauze moistened with dakins to woundbed cover with mepilex. AND as needed  L heel  cleanse with wound cleanser, pat dry, apply duo derm and change on mon, wed, Friday.      TOTAL DISCHARGE TIME:   Greater than 30 minutes     Electronically signed by:  JOSEPH Montoya [facility]  Pager: [phone]

## 2018-08-23 NOTE — PROGRESS NOTES
[facility] DISCHARGE SUMMARY    PATIENT'S NAME: Marychuy Zhou  YOB: 1955  MEDICAL RECORD NUMBER:  [id number]    PRIMARY CARE PROVIDER AND CLINIC RESPONSIBLE AFTER TRANSFER: [facility] [address]  / [place] [address]     CODE STATUS/ADVANCE DIRECTIVES DISCUSSION:   CPR/Full code      No Known Allergies    TRANSFERRING PROVIDERS: JOSEPH Montoya CNP, Georgina Jennings MD  DATE OF SNF ADMISSION:  May / 08 / 2018  DATE OF SNF (anticipated) DISCHARGE: August / 27 / 2018  DISCHARGE DISPOSITION: Non-[facility] Provider   Nursing Facility: [facility] stay 4/10/18 to 5/8/18.     Hospital stay 5/15/18 through 5/19/18.    Resident was sent to [facility] ER on 5/22, and returned same day.  Hospital stay [facility] from 6/30/18 through 7/9/18.  Hospital stay [facility]  7/13/18 through 7/23/18.    Patient is discharging to a friend's home. Social work did meet with this friend a few weeks ago. There is a ramp being installed this week for her to get into the home. Marychuy says she has a mechanical lift already. A hospital bed has been ordered. Marychuy feels that her wound care can be managed between her and her friend. Discharge is certainly patient driven and is not ideal, but patient does certainly have decision making capacity    Condition on Discharge:  Stable.  Function:  Paraplegia - Cecile lift, assist with ADLs  Cognitive Scores: BIMS 15/15 and PHQ9 07/27.    Equipment: hospital bed with group 2 air mattress    DISCHARGE DIAGNOSIS:   1. Decubitus ulcer of sacral region, stage 4 (H)    2. Adjustment disorder with mixed anxiety and depressed mood    3. Opioid dependence with opioid-induced disorder (H)    4. Alcohol use    5. Anemia, unspecified type    6. Neurogenic bladder    7. Paraplegia at T4 level (H)    8. Seizure disorder (H)    9. Protein-calorie malnutrition, unspecified severity (H)     10. Chronic obstructive pulmonary disease, unspecified COPD type (H)        HPI Nursing Facility Course:  HPI information obtained from: facility chart records, facility staff, patient report and [facility] chart review. PMH of paraplegia secondary to spinal hematoma (about 10 years ago), chronic truncal and sacral ulcers, COPD, anemia, seizure disorder, probable personality disorder, neurogenic bladder with suprapubic, DVT on chronic anticoagulation.     Most recent hospital stay was at [facility] from 7/13/18 through 7/23/18. She was transferred there from [facility] where she was treated for infected decubitus ulcers. She had surgical debridement and was treated with IV abx. Per the discharge summary, the infection did not appear to extend into the bone.  Other hospital stay at [facility] 6/30/18 through 7/9/18 for pneumonia. Initial hospital stay was at [facility] 4/10/18 to 5/8/18.  She was admitted due to fevers and worsening hip pain. 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 Due to constant stool and urine leakage around her suprapubic catheter, her wounds are unable to heal. She ultimately needs to have a diverting colostomy and urologic procedure, as well as skin flap surgery. All of this is pending improvement in her nutritional status. She was given TPN and then GJ tube was placed.    Admitted to this facility for  rehab, medical management and nursing care    Patient is chronically abrasive and verbally abusive toward staff. Often noncompliant with cares. Frequently threatens to doug various staff for mistreatment. She has consistently refused exam by this provider. When she has been advised of decreases to her pain medication and stopping of valium for her own safety, she gets angry and asks provider to leave.     Decubitus ulcer of sacral region, stage 4  (H)    Staff report that when dressing changes have been allowed, patient is observed to by laying on her side, calmly giving instructions to staff on what to do. She has been unaware when staff have completed the dressing change. Staff have not noted any sign that she can feel what they are touching.  Marychuy says that she has severe pain. See opioid dependence.    Adjustment disorder with mixed anxiety and depressed mood  Opioid dependence with opioid-induced disorder (H)    Patient has consistently asked for oxycodone to be increased due to excruciating pain. Per chart review, as of her 11/2017 PCP visit, her only pain medication was Percocet 2 tabs q6h prn. Pain medication first changed during 1/2018 hospital stay. She was discharged at that time on oxycontin 20mg BID and hydromorphone 2-4mg q4h prn. Oxycodone was added at a later hospital stay. Oxycodone was 5-10mg q4h prn until her 5/2018 hospital stay when the pain service increased it to 15mg q3h prn and added flexeril due to painful dressing changes.    Patient initially said she took Valium chronically for anxiety. This is the only medication that works for her. Per extensive chart review, valium was not on her med list from her last visit with her PCP 11/2017. At that time she was on buspar 7.5mg TID and lorazepam 0.5mg BID. She also had hydroxyzine at bedtime, but no sign of this being tried more frequently than that. PCP had made pain clinic and psychiatry referrals, patient had not followed through on these. The valium first appeared during a hospital stay 3/2018.    Staff had reported finding an empty bottle of alcohol in her room. On 7/26/18 at noon, this provider advised her that valium and alcohol are a very unsafe combination and that if she continued to drink alcohol, valium could not be prescribed. On 7/26/18 around 3pm, she was witnessed by staff trying to order a drink at the restaurant down the street. Order was given to discontinue Valium and  start hydroxyzine 25mg q4h prn. Marychuy said she has tried this before and it did not work. Dose was increased to 50mg, she says she has tried it and it doesn't work.    Today Marychuy discusses how she will be discharging soon and returning to her PCP and then she will get on the right medications. When it is mentioned that her PCP was not prescribing valium, she agrees with this and today agrees that this is not a good medication for her anxiety.    Marychuy is advised that the methadone is more appropriate for chronic pain. A standard treatment would be to increase the methadone for her pain, not the oxycodone. The oxycodone needs to be tapered as able. Dose has been decreased from 15mg q3h prn to 10mg q6h prn.    Alcohol use  Nursing staff have found an empty bottle of alcohol in her room once. She was witnessed by staff ordering alcohol down the street at a bar. Staff feel strongly that she hides alcohol in her water pitcher mixed with soda. Marychuy has asked other residents to buy her alcohol. Blood alcohol level has been ordered with her consent, but then she refused the blood draw.     Anemia, unspecified type  HGB 7.9 in the hospital. Has refused all lab draws here.    Neurogenic bladder  Has suprapubic catheter with chronic leakage into wounds. Urology was considering some type of urinary diversion, but was a no show for her appointment. No follow up on this has been done due to hospitalizations. She says she wants to wait until after discharge to follow up.    Paraplegia at T4 level (H)  Secondary to hematoma with multiple truncal and extremity decubitus ulcers including right IT, stage IV injury, left sacral stage III ulcer, left heel ulcer, left upper thigh ulcer and coccyx ulcer. She currently does not have her PMD because it was damaged when she ran it into a table. Therapy is trying to work with her for w/c positioning and pressure mapping, but she has refused.    Seizure disorder (H)  On Keppra. No known  seizures while at [facility]    Protein-calorie malnutrition, unspecified severity (H)  GJ tube was placed in April due to malnutrition. It is unclear during which hospital stay tube feeding was stopped, but she has been off it for a few weeks now. Dietician reviewed this, noted that she has been eating well. Tube is being flushed for patency only. Appointment is scheduled with IR to have the tube removed on 8/29/18.    Chronic obstructive pulmonary disease, unspecified COPD type (H)  No noted SOB, wheezing, cough, hypoxia          PAST MEDICAL HISTORY:  has a past medical history of Anxiety; Chronic pain syndrome; Closed fracture zygoma, with routine healing, subsequent encounter; COPD (chronic obstructive pulmonary disease) (H); Depressive disorder; DVT (deep vein thrombosis) in pregnancy (H); Edema; Epilepsy (H); Flaccid neuropathic bladder, not elsewhere classified; Fracture of femur (H); Hypothyroidism; Iron deficiency anemia; Paraplegia (H); Pressure ulcer of sacral region; PTSD (post-traumatic stress disorder); Rheumatoid arthritis (H); and Sepsis (H).    DISCHARGE MEDICATIONS:  Current Outpatient Prescriptions   Medication Sig Dispense Refill     Acetaminophen (TYLENOL PO) Take 500 mg by mouth every 8 hours as needed for mild pain or fever       ARIPiprazole (ABILIFY) 5 MG tablet Take 5 mg by mouth 2 times daily       bisacodyl (DULCOLAX) 10 MG Suppository Place 10 mg rectally daily as needed for constipation       CALCIUM CARBONATE PO Take 500 mg by mouth 4 times daily       cyclobenzaprine (FLEXERIL) 5 MG tablet Take 1 tablet (5 mg) by mouth 3 times daily as needed for muscle spasms 42 tablet      hydrOXYzine (ATARAX) 25 MG tablet Take 2 tablets (50 mg) by mouth every 4 hours as needed for anxiety 60 tablet 1     ipratropium - albuterol 0.5 mg/2.5 mg/3 mL (DUONEB) 0.5-2.5 (3) MG/3ML neb solution Take 1 vial (3 mLs) by nebulization every 4 hours as needed for wheezing 360 mL      levETIRAcetam  "(KEPPRA) 250 MG tablet Take 250 mg by mouth 2 times daily       LEVOTHYROXINE SODIUM PO Take 125 mcg by mouth every morning       MELATONIN PO Take 3 mg by mouth nightly as needed       methadone (DOLOPHINE) 5 MG tablet Take 1 tablet (5 mg) by mouth every 8 hours  0     miconazole (MICATIN) 2 % AERP powder Apply topically 2 times daily Apply to groin folds       Nutritional Supplements (LARRY PO) 1 packet by mouth two times a day       OMEPRAZOLE PO Take 20 mg by mouth daily (with breakfast)       OXYBUTYNIN CHLORIDE PO Take 5 mg by mouth 3 times daily       oxyCODONE IR (ROXICODONE) 10 MG tablet Take 1 tablet (10 mg) by mouth every 6 hours as needed for severe pain       Rivaroxaban (XARELTO PO) Take 20 mg by mouth At Bedtime       senna-docusate (SENOKOT-S;PERICOLACE) 8.6-50 MG per tablet Take 2 tablets by mouth 2 times daily       VENLAFAXINE HCL PO Take 37.5 mg by mouth 2 times daily       zinc oxide (DESITIN) 40 % ointment Apply topically as needed for dry skin or irritation         MEDICATION CHANGES/RATIONALE:   Valium stopped - not safe due to ongoing alcohol use and not appropriate for anxiety  Hydroxyzine started for anxiety  Methadone was started by [employer], increased here from BID to TID  Oxycodone decreased significantly  Hydromorphone stopped that had been started by [facility]    Controlled medications sent with patient:   Patient will receive 2 week supply of methadone and oxycodone at discharge     ROS:    4 point ROS including Respiratory, CV, GI and , other than that noted in the HPI,  is negative    Physical Exam:   Vitals: /86  Pulse 100  Temp 98.1  F (36.7  C)  Resp 16  Ht 5' 2.5\" (1.588 m)  Wt 158 lb (71.7 kg)  SpO2 97%  BMI 28.44 kg/m2  BMI= Body mass index is 28.44 kg/(m^2).  Refuses exam  GENERAL APPEARANCE:  Alert, in no distress, oriented, uncooperative  RESP:  no respiratory distress  PSYCH:  oriented X 3    DISCHARGE PLAN:  From:  [facility] PCA services / Nurse "   Patient instructed to follow-up with:  PCP in 7 days      Current [facility] scheduled appointments:  Future Appointments  Date Time Provider Department Center   8/29/2018 11:00 AM [address] referral needed and placed by this provider: No    Pending labs: none    SNF labs: Patient refused all lab draws here    CBC RESULTS:   Recent Labs   Lab Test  06/22/18   0101 06/14/18   WBC  10.1  8.3   RBC  4.02  4.02   HGB  8.5*  8.8*   HCT  29.4*  31.4*   MCV  73*  78*   MCH  21.1*  21.8*   MCHC  28.9*  28.0*   RDW  20.3*  20.2*   PLT  519*  549*       Last Basic Metabolic Panel:  Recent Labs   Lab Test  06/22/18   0101 06/14/18   NA  137  140   POTASSIUM  4.1  4.3   CHLORIDE  104  102   NATA  8.6  9.2   CO2  26  26   BUN  13  21   CR  0.62  0.68*   GLC  100*  102*       Liver Function Studies -   Recent Labs   Lab Test  06/22/18   0101 06/14/18   PROTTOTAL  9.0*  8.9*   ALBUMIN  2.8*  2.6*   BILITOTAL  0.2  0.2   ALKPHOS  178*  176*   AST  10  13   ALT  11  <9         Discharge Treatments:  L ischial, R buttock wounds:  two times a day cleanse with dakins solution, pat dry, apply gauze moistened with dakins to woundbed cover with mepilex. AND as needed  L heel  cleanse with wound cleanser, pat dry, apply duo derm and change on mon, wed, Friday.      TOTAL DISCHARGE TIME:   Greater than 30 minutes     Electronically signed by:  JOSEPH Montoya CNP   [facility]  Pager: [phone]

## 2018-10-22 NOTE — TELEPHONE ENCOUNTER
[facility] now requests orders and shares plan of care/discharge summaries for some patients through [facility].  Please REPLY TO THIS MESSAGE OR ROUTE BACK TO THE AUTHOR in order to give authorization for orders when needed.  This is considered a verbal order, you will still receive a faxed copy of orders for signature.  Thank you for your assistance in improving collaboration for our patients.    ORDER SNV 1xwk1,2xwk2,3xwk2, 3 PRNS visits for wound care, suprapubic cath changes, education, assessments, pain management. Additional Orders Suprapubic cath changes monthly 16french catheter with 10cc balloon.     Wound care orders. Remove old dressing, cleanse with Normal Saline or wound spray. Bilateral ulcers on LE, cover with mepilix and change daily and as needed. Sacral ulcer, apply santyl to wound bed, cover with mepilix and change daily and as needed.    clt refused SW, and HHA, see note below    MD SUMMARY/PLAN OF CARE  SUMMARY TO MD  64 y/o female referred to [facility] for skilled nursing care following recent clinic appt with Primary  clt referred for wound care for chronic wounds, sacral wound, heel ulcers, chronic pain, Paraplegia per clt secondary to Spinal Hematoma missed dx 10 years ago. clt is complex patient who has a substantial HX of non compliance, Per Review of recent EPIC records, clt has had multiple ER visits in the last 4 months 6/16,6/22,8/11,9/26. In addition clt has had multiple admissions for various DX 6/26, 6/30, 7/10, 7/13, 9/2, 9/30. clt had hospice referral 7/19/18, which she declined to enroll per pt.   VS /62, pulse 119 R 18, sats 99 on RA Temp 97.8. lung sounds clear, heart rate regular, Bed fast, reports on bed rest orders for 4 weeks. falls risk score of 6, Isaiah score of 16. Nutritional risk score of 3, Reports constant anxiety and depression related to pain, PHQ2 score of 3.clt is smoker states trying to quit. Clt has suprapubic catheter in  place draining thick urine, with sediment in tubing, cream colored output in drainage bag, clt reports cath change 1 month overdue, writer asked why this was not changed in the hospital, clt voiced they just didn't change it, upon review after visit records documented clt refused cath change, clt is incontinent of bowels, was advised for colostomy placement to assist with ability to heal decubitis ulcer, and clt refused. clt reports to writer her caregiver changed her dressings already and refused writer to remove dressings, but voiced understanding we would need to see the wounds at the next visit. clt requested visit be brief, states she was up all noc in pain and wants to try to sleep. clt voiced having pain 10/10 in LE. grayson has methadone tid, and prn oxycodone and was upset when talking about her pain, stating her MD is not medicating her enough, reports a referral was made to the pain clinic, but she ont be able to to any of these referrals as her pain is too significant to tolerate being in a chair. grayson expressed desire for hospice care, states she's had a referral in the past but refused and was not ready. clt reports her number one goal is to not live in pain any longer. clt denies desire to self harm, clt has significant HX of non compliance ,which is likely contributing to her complexity. grayson did not want writer to touch her bag of meds, told me I could look them up in her record, clt preferred to focus the conversation on her dissatisfaction with her pain management regimen. clt also concerned with supplies, reports her caregivers assist with med management, and all cares.   WOUND/ clt declined writer to assess per records clkishan has heel ulcers on Bilateral heels, and stage 4 sacral ulcer, all with daily wound care with mepilix dressings.   grayson lives in a 2 story home, has Pressure leaving hospital bed, ramps outside, electric W/C, Cecile,   client reports she lives wither girlfriend, whom is partial PCA,  and has additional PCA caregivers for a combined 12 hours per day.clt reports having 2 sons who are supportive,    HX of Paraplegia T4 level, Neurogenic bladder, chronic pain, seizure disorder, anxiety and depression.   Pt is at risk for recurring re hospitalization r/t falls risk, use of high risk meds, medical frailty, dependence on caregivers for assist with IADLs/ADLs and will require skilled interventions to manage his health in the home.  Pt would benefit from SN for wound care, medication education/setup/compliance, disease/symptom management/education, GI/, pain, mood/coping, nutrition/hydration assessments and safety precautions. writer recommended and Client refused SW for community resources, long term care planning, HHA for additional personal care support, PT for ROM exercises, fall risk eval and OT for DME and HSE. writer also requesting a review for hospice eligibility at [facility] request      Any questions please call Lesa Leonardo [phone]

## 2018-10-29 NOTE — PROGRESS NOTES
[facility] now requests orders and shares plan of care/discharge summaries for some patients through [employer].  Please REPLY TO THIS MESSAGE OR ROUTE BACK TO THE AUTHOR in order to give authorization for orders when needed.  This is considered a verbal order, you will still receive a faxed copy of orders for signature.  Thank you for your assistance in improving collaboration for our patients.    ORDER    SW 1 every 14 days 1.    You may route your orders/recommendations back through [employer],    Thank you,    Sravani Chacko RN, BSN    10/29/18  2:43 PM   [facility]  [phone]

## 2018-10-31 NOTE — TELEPHONE ENCOUNTER
Sravani,    Please send communications through [employer].  I work once per week at a facility that uses [employer].  If you send a correspondence through [employer] I will not see it in a timely manner.    Thank You,    Aleksander

## 2019-01-01 ENCOUNTER — SURGERY - HEALTHEAST (OUTPATIENT)
Dept: SURGERY | Facility: CLINIC | Age: 64
End: 2019-01-01

## 2019-01-01 ENCOUNTER — RECORDS - HEALTHEAST (OUTPATIENT)
Dept: MEDSURG UNIT | Facility: CLINIC | Age: 64
End: 2019-01-01

## 2019-01-01 ENCOUNTER — COMMUNICATION - HEALTHEAST (OUTPATIENT)
Dept: SCHEDULING | Facility: CLINIC | Age: 64
End: 2019-01-01

## 2019-01-01 ENCOUNTER — COMMUNICATION - HEALTHEAST (OUTPATIENT)
Dept: FAMILY MEDICINE | Facility: CLINIC | Age: 64
End: 2019-01-01

## 2019-01-01 ENCOUNTER — HOSPITAL ENCOUNTER (INPATIENT)
Dept: MEDSURG UNIT | Facility: CLINIC | Age: 64
End: 2019-04-10
Attending: INTERNAL MEDICINE | Admitting: INTERNAL MEDICINE
Payer: MEDICARE

## 2019-01-01 ENCOUNTER — ANESTHESIA - HEALTHEAST (OUTPATIENT)
Dept: SURGERY | Facility: CLINIC | Age: 64
End: 2019-01-01

## 2019-01-01 ENCOUNTER — ANESTHESIA - HEALTHEAST (OUTPATIENT)
Dept: MEDSURG UNIT | Facility: CLINIC | Age: 64
End: 2019-01-01

## 2019-01-01 DIAGNOSIS — B36.9 FUNGAL DERMATITIS: ICD-10-CM

## 2019-01-01 DIAGNOSIS — D50.9 IRON DEFICIENCY ANEMIA, UNSPECIFIED IRON DEFICIENCY ANEMIA TYPE: ICD-10-CM

## 2019-01-01 DIAGNOSIS — G89.4 CHRONIC PAIN SYNDROME: ICD-10-CM

## 2019-01-01 DIAGNOSIS — L89.159 SACRAL DECUBITUS ULCER: ICD-10-CM

## 2019-01-01 DIAGNOSIS — L89.159 PRESSURE INJURY OF SKIN OF SACRAL REGION: ICD-10-CM

## 2019-01-01 DIAGNOSIS — M54.50 CHRONIC LOW BACK PAIN WITHOUT SCIATICA, UNSPECIFIED BACK PAIN LATERALITY: ICD-10-CM

## 2019-01-01 DIAGNOSIS — S91.309A MULTIPLE OPEN WOUNDS OF FOOT: ICD-10-CM

## 2019-01-01 DIAGNOSIS — L25.8 DERMATITIS ASSOCIATED WITH MOISTURE FROM STOOL INCONTINENCE: ICD-10-CM

## 2019-01-01 DIAGNOSIS — J43.2 CENTRILOBULAR EMPHYSEMA (H): ICD-10-CM

## 2019-01-01 DIAGNOSIS — G89.29 OTHER CHRONIC PAIN: ICD-10-CM

## 2019-01-01 DIAGNOSIS — A41.9 SEPSIS (H): ICD-10-CM

## 2019-01-01 DIAGNOSIS — L89.154 STAGE IV PRESSURE ULCER OF SACRAL REGION (H): ICD-10-CM

## 2019-01-01 DIAGNOSIS — J20.8 ACUTE BRONCHITIS DUE TO OTHER SPECIFIED ORGANISMS: ICD-10-CM

## 2019-01-01 DIAGNOSIS — E44.0 MODERATE PROTEIN-CALORIE MALNUTRITION (H): ICD-10-CM

## 2019-01-01 DIAGNOSIS — L89.314 DECUBITUS ULCER OF RIGHT BUTTOCK, STAGE 4 (H): ICD-10-CM

## 2019-01-01 DIAGNOSIS — G89.29 CHRONIC PAIN: ICD-10-CM

## 2019-01-01 DIAGNOSIS — F11.20 OPIATE DEPENDENCE, CONTINUOUS (H): ICD-10-CM

## 2019-01-01 DIAGNOSIS — F43.23 ADJUSTMENT DISORDER WITH MIXED ANXIETY AND DEPRESSED MOOD: ICD-10-CM

## 2019-01-01 DIAGNOSIS — L89.154 DECUBITUS ULCER OF SACRAL REGION, STAGE 4 (H): ICD-10-CM

## 2019-01-01 DIAGNOSIS — G82.50 QUADRIPLEGIA (H): ICD-10-CM

## 2019-01-01 DIAGNOSIS — G40.909 SEIZURE DISORDER (H): ICD-10-CM

## 2019-01-01 DIAGNOSIS — G89.29 CHRONIC LOW BACK PAIN WITHOUT SCIATICA, UNSPECIFIED BACK PAIN LATERALITY: ICD-10-CM

## 2019-01-01 DIAGNOSIS — M54.9 UPPER BACK PAIN: ICD-10-CM

## 2019-01-01 DIAGNOSIS — R15.9 DERMATITIS ASSOCIATED WITH MOISTURE FROM STOOL INCONTINENCE: ICD-10-CM

## 2019-01-01 DIAGNOSIS — E06.3 HYPOTHYROIDISM DUE TO HASHIMOTO'S THYROIDITIS: ICD-10-CM

## 2019-01-01 DIAGNOSIS — G82.20 PARAPLEGIA AT T4 LEVEL (H): ICD-10-CM

## 2019-01-01 DIAGNOSIS — E87.20 LACTIC ACIDOSIS: ICD-10-CM

## 2019-01-01 DIAGNOSIS — R15.9 FULL INCONTINENCE OF FECES: ICD-10-CM

## 2019-01-01 LAB
ABO/RH(D): NORMAL
AEROBIC BLOOD CULTURE BOTTLE: NO GROWTH
ALBUMIN SERPL-MCNC: 1.2 G/DL (ref 3.5–5)
ALBUMIN SERPL-MCNC: 1.2 G/DL (ref 3.5–5)
ALBUMIN SERPL-MCNC: 1.5 G/DL (ref 3.5–5)
ALBUMIN SERPL-MCNC: 1.6 G/DL (ref 3.5–5)
ALBUMIN SERPL-MCNC: 2 G/DL (ref 3.5–5)
ALBUMIN UR-MCNC: ABNORMAL MG/DL
ALBUMIN UR-MCNC: NEGATIVE MG/DL
ALP SERPL-CCNC: 117 U/L (ref 45–120)
ALP SERPL-CCNC: 133 U/L (ref 45–120)
ALP SERPL-CCNC: 136 U/L (ref 45–120)
ALP SERPL-CCNC: 182 U/L (ref 45–120)
ALP SERPL-CCNC: 192 U/L (ref 45–120)
ALP SERPL-CCNC: 198 U/L (ref 45–120)
ALP SERPL-CCNC: 202 U/L (ref 45–120)
ALT SERPL W P-5'-P-CCNC: 10 U/L (ref 0–45)
ALT SERPL W P-5'-P-CCNC: 10 U/L (ref 0–45)
ALT SERPL W P-5'-P-CCNC: 11 U/L (ref 0–45)
ALT SERPL W P-5'-P-CCNC: 12 U/L (ref 0–45)
ALT SERPL W P-5'-P-CCNC: 12 U/L (ref 0–45)
ALT SERPL W P-5'-P-CCNC: 14 U/L (ref 0–45)
ALT SERPL W P-5'-P-CCNC: 15 U/L (ref 0–45)
ANAEROBIC BLOOD CULTURE BOTTLE: NO GROWTH
ANAEROBIC BLOOD CULTURE BOTTLE: NORMAL
ANION GAP SERPL CALCULATED.3IONS-SCNC: 10 MMOL/L (ref 5–18)
ANION GAP SERPL CALCULATED.3IONS-SCNC: 10 MMOL/L (ref 5–18)
ANION GAP SERPL CALCULATED.3IONS-SCNC: 11 MMOL/L (ref 5–18)
ANION GAP SERPL CALCULATED.3IONS-SCNC: 12 MMOL/L (ref 5–18)
ANION GAP SERPL CALCULATED.3IONS-SCNC: 13 MMOL/L (ref 5–18)
ANION GAP SERPL CALCULATED.3IONS-SCNC: 13 MMOL/L (ref 5–18)
ANION GAP SERPL CALCULATED.3IONS-SCNC: 14 MMOL/L (ref 5–18)
ANION GAP SERPL CALCULATED.3IONS-SCNC: 5 MMOL/L (ref 5–18)
ANION GAP SERPL CALCULATED.3IONS-SCNC: 6 MMOL/L (ref 5–18)
ANION GAP SERPL CALCULATED.3IONS-SCNC: 7 MMOL/L (ref 5–18)
ANION GAP SERPL CALCULATED.3IONS-SCNC: 8 MMOL/L (ref 5–18)
ANION GAP SERPL CALCULATED.3IONS-SCNC: 9 MMOL/L (ref 5–18)
ANTIBODY SCREEN: NEGATIVE
APPEARANCE UR: ABNORMAL
APPEARANCE UR: ABNORMAL
APTT PPP: 39 SECONDS (ref 24–37)
APTT PPP: 56 SECONDS (ref 24–37)
APTT PPP: 75 SECONDS (ref 24–37)
AST SERPL W P-5'-P-CCNC: 10 U/L (ref 0–40)
AST SERPL W P-5'-P-CCNC: 18 U/L (ref 0–40)
AST SERPL W P-5'-P-CCNC: 21 U/L (ref 0–40)
AST SERPL W P-5'-P-CCNC: 22 U/L (ref 0–40)
AST SERPL W P-5'-P-CCNC: 26 U/L (ref 0–40)
AST SERPL W P-5'-P-CCNC: 9 U/L (ref 0–40)
AST SERPL W P-5'-P-CCNC: 9 U/L (ref 0–40)
ATRIAL RATE - MUSE: 107 BPM
ATRIAL RATE - MUSE: 146 BPM
BACTERIA #/AREA URNS HPF: ABNORMAL HPF
BACTERIA #/AREA URNS HPF: ABNORMAL HPF
BACTERIA SPEC CULT: ABNORMAL
BACTERIA SPEC CULT: ABNORMAL
BACTERIA SPEC CULT: NORMAL
BASE EXCESS BLDA CALC-SCNC: 10.8 MMOL/L
BASE EXCESS BLDV CALC-SCNC: 9.4 MMOL/L
BASOPHILS # BLD AUTO: 0 THOU/UL (ref 0–0.2)
BASOPHILS # BLD AUTO: 0.1 THOU/UL (ref 0–0.2)
BASOPHILS NFR BLD AUTO: 0 % (ref 0–2)
BASOPHILS NFR BLD AUTO: 1 % (ref 0–2)
BILIRUB SERPL-MCNC: 0.1 MG/DL (ref 0–1)
BILIRUB SERPL-MCNC: 0.2 MG/DL (ref 0–1)
BILIRUB SERPL-MCNC: 0.2 MG/DL (ref 0–1)
BILIRUB SERPL-MCNC: <0.1 MG/DL (ref 0–1)
BILIRUB UR QL STRIP: NEGATIVE
BILIRUB UR QL STRIP: NEGATIVE
BLD PROD TYP BPU: NORMAL
BLD PROD TYP BPU: NORMAL
BLOOD EXPIRATION DATE: NORMAL
BLOOD EXPIRATION DATE: NORMAL
BLOOD TYPE: 5100
BLOOD TYPE: 5100
BNP SERPL-MCNC: 26 PG/ML (ref 0–98)
BUN SERPL-MCNC: 10 MG/DL (ref 8–22)
BUN SERPL-MCNC: 10 MG/DL (ref 8–22)
BUN SERPL-MCNC: 11 MG/DL (ref 8–22)
BUN SERPL-MCNC: 11 MG/DL (ref 8–22)
BUN SERPL-MCNC: 12 MG/DL (ref 8–22)
BUN SERPL-MCNC: 13 MG/DL (ref 8–22)
BUN SERPL-MCNC: 15 MG/DL (ref 8–22)
BUN SERPL-MCNC: 17 MG/DL (ref 8–22)
BUN SERPL-MCNC: 18 MG/DL (ref 8–22)
BUN SERPL-MCNC: 19 MG/DL (ref 8–22)
BUN SERPL-MCNC: 20 MG/DL (ref 8–22)
BUN SERPL-MCNC: 20 MG/DL (ref 8–22)
BUN SERPL-MCNC: 23 MG/DL (ref 8–22)
BUN SERPL-MCNC: 26 MG/DL (ref 8–22)
BUN SERPL-MCNC: 6 MG/DL (ref 8–22)
BUN SERPL-MCNC: 8 MG/DL (ref 8–22)
BUN SERPL-MCNC: 9 MG/DL (ref 8–22)
C REACTIVE PROTEIN LHE: 24 MG/DL (ref 0–0.8)
CALCIUM SERPL-MCNC: 7 MG/DL (ref 8.5–10.5)
CALCIUM SERPL-MCNC: 7.4 MG/DL (ref 8.5–10.5)
CALCIUM SERPL-MCNC: 7.4 MG/DL (ref 8.5–10.5)
CALCIUM SERPL-MCNC: 7.7 MG/DL (ref 8.5–10.5)
CALCIUM SERPL-MCNC: 7.7 MG/DL (ref 8.5–10.5)
CALCIUM SERPL-MCNC: 7.8 MG/DL (ref 8.5–10.5)
CALCIUM SERPL-MCNC: 7.8 MG/DL (ref 8.5–10.5)
CALCIUM SERPL-MCNC: 7.9 MG/DL (ref 8.5–10.5)
CALCIUM SERPL-MCNC: 8 MG/DL (ref 8.5–10.5)
CALCIUM SERPL-MCNC: 8 MG/DL (ref 8.5–10.5)
CALCIUM SERPL-MCNC: 8.1 MG/DL (ref 8.5–10.5)
CALCIUM SERPL-MCNC: 8.2 MG/DL (ref 8.5–10.5)
CALCIUM SERPL-MCNC: 8.3 MG/DL (ref 8.5–10.5)
CALCIUM SERPL-MCNC: 8.4 MG/DL (ref 8.5–10.5)
CALCIUM SERPL-MCNC: 8.5 MG/DL (ref 8.5–10.5)
CALCIUM SERPL-MCNC: 8.7 MG/DL (ref 8.5–10.5)
CALCIUM SERPL-MCNC: 9.1 MG/DL (ref 8.5–10.5)
CALCIUM SERPL-MCNC: 9.3 MG/DL (ref 8.5–10.5)
CALCIUM SERPL-MCNC: 9.5 MG/DL (ref 8.5–10.5)
CALCIUM SERPL-MCNC: 9.5 MG/DL (ref 8.5–10.5)
CHLORIDE BLD-SCNC: 101 MMOL/L (ref 98–107)
CHLORIDE BLD-SCNC: 101 MMOL/L (ref 98–107)
CHLORIDE BLD-SCNC: 102 MMOL/L (ref 98–107)
CHLORIDE BLD-SCNC: 103 MMOL/L (ref 98–107)
CHLORIDE BLD-SCNC: 104 MMOL/L (ref 98–107)
CHLORIDE BLD-SCNC: 106 MMOL/L (ref 98–107)
CHLORIDE BLD-SCNC: 107 MMOL/L (ref 98–107)
CHLORIDE BLD-SCNC: 107 MMOL/L (ref 98–107)
CHLORIDE BLD-SCNC: 108 MMOL/L (ref 98–107)
CHLORIDE BLD-SCNC: 109 MMOL/L (ref 98–107)
CHLORIDE BLD-SCNC: 109 MMOL/L (ref 98–107)
CHLORIDE BLD-SCNC: 111 MMOL/L (ref 98–107)
CHLORIDE BLD-SCNC: 116 MMOL/L (ref 98–107)
CHLORIDE BLD-SCNC: 88 MMOL/L (ref 98–107)
CHLORIDE BLD-SCNC: 89 MMOL/L (ref 98–107)
CHLORIDE BLD-SCNC: 92 MMOL/L (ref 98–107)
CHLORIDE BLD-SCNC: 93 MMOL/L (ref 98–107)
CHLORIDE BLD-SCNC: 94 MMOL/L (ref 98–107)
CHLORIDE BLD-SCNC: 95 MMOL/L (ref 98–107)
CHLORIDE BLD-SCNC: 95 MMOL/L (ref 98–107)
CHLORIDE BLD-SCNC: 96 MMOL/L (ref 98–107)
CHLORIDE BLD-SCNC: 96 MMOL/L (ref 98–107)
CHLORIDE BLD-SCNC: 98 MMOL/L (ref 98–107)
CO2 SERPL-SCNC: 20 MMOL/L (ref 22–31)
CO2 SERPL-SCNC: 22 MMOL/L (ref 22–31)
CO2 SERPL-SCNC: 22 MMOL/L (ref 22–31)
CO2 SERPL-SCNC: 23 MMOL/L (ref 22–31)
CO2 SERPL-SCNC: 25 MMOL/L (ref 22–31)
CO2 SERPL-SCNC: 26 MMOL/L (ref 22–31)
CO2 SERPL-SCNC: 26 MMOL/L (ref 22–31)
CO2 SERPL-SCNC: 28 MMOL/L (ref 22–31)
CO2 SERPL-SCNC: 29 MMOL/L (ref 22–31)
CO2 SERPL-SCNC: 30 MMOL/L (ref 22–31)
CO2 SERPL-SCNC: 31 MMOL/L (ref 22–31)
CO2 SERPL-SCNC: 32 MMOL/L (ref 22–31)
CO2 SERPL-SCNC: 33 MMOL/L (ref 22–31)
CO2 SERPL-SCNC: 33 MMOL/L (ref 22–31)
CO2 SERPL-SCNC: 34 MMOL/L (ref 22–31)
CO2 SERPL-SCNC: 34 MMOL/L (ref 22–31)
CO2 SERPL-SCNC: 35 MMOL/L (ref 22–31)
CO2 SERPL-SCNC: 35 MMOL/L (ref 22–31)
CO2 SERPL-SCNC: 36 MMOL/L (ref 22–31)
CODING SYSTEM: NORMAL
CODING SYSTEM: NORMAL
COHGB MFR BLD: 98 % (ref 96–97)
COLOR UR AUTO: ABNORMAL
COLOR UR AUTO: YELLOW
COMPONENT (HISTORICAL CONVERSION): NORMAL
COMPONENT (HISTORICAL CONVERSION): NORMAL
CREAT SERPL-MCNC: 0.5 MG/DL (ref 0.6–1.1)
CREAT SERPL-MCNC: 0.5 MG/DL (ref 0.6–1.1)
CREAT SERPL-MCNC: 0.53 MG/DL (ref 0.6–1.1)
CREAT SERPL-MCNC: 0.54 MG/DL (ref 0.6–1.1)
CREAT SERPL-MCNC: 0.55 MG/DL (ref 0.6–1.1)
CREAT SERPL-MCNC: 0.56 MG/DL (ref 0.6–1.1)
CREAT SERPL-MCNC: 0.56 MG/DL (ref 0.6–1.1)
CREAT SERPL-MCNC: 0.57 MG/DL (ref 0.6–1.1)
CREAT SERPL-MCNC: 0.59 MG/DL (ref 0.6–1.1)
CREAT SERPL-MCNC: 0.59 MG/DL (ref 0.6–1.1)
CREAT SERPL-MCNC: 0.61 MG/DL (ref 0.6–1.1)
CREAT SERPL-MCNC: 0.63 MG/DL (ref 0.6–1.1)
CREAT SERPL-MCNC: 0.64 MG/DL (ref 0.6–1.1)
CREAT SERPL-MCNC: 0.66 MG/DL (ref 0.6–1.1)
CREAT SERPL-MCNC: 0.66 MG/DL (ref 0.6–1.1)
CREAT SERPL-MCNC: 0.67 MG/DL (ref 0.6–1.1)
CREAT SERPL-MCNC: 0.68 MG/DL (ref 0.6–1.1)
CREAT SERPL-MCNC: 0.68 MG/DL (ref 0.6–1.1)
CREAT SERPL-MCNC: 0.69 MG/DL (ref 0.6–1.1)
CREAT SERPL-MCNC: 0.7 MG/DL (ref 0.6–1.1)
CREAT SERPL-MCNC: 0.72 MG/DL (ref 0.6–1.1)
CREAT SERPL-MCNC: 0.73 MG/DL (ref 0.6–1.1)
CREAT SERPL-MCNC: 0.73 MG/DL (ref 0.6–1.1)
CREAT SERPL-MCNC: 0.74 MG/DL (ref 0.6–1.1)
CREAT SERPL-MCNC: 0.74 MG/DL (ref 0.6–1.1)
CREAT SERPL-MCNC: 0.76 MG/DL (ref 0.6–1.1)
CREAT SERPL-MCNC: 0.77 MG/DL (ref 0.6–1.1)
CREAT SERPL-MCNC: 0.79 MG/DL (ref 0.6–1.1)
CREAT SERPL-MCNC: 0.8 MG/DL (ref 0.6–1.1)
CREAT SERPL-MCNC: 0.81 MG/DL (ref 0.6–1.1)
CREAT SERPL-MCNC: 0.87 MG/DL (ref 0.6–1.1)
CREAT SERPL-MCNC: 0.95 MG/DL (ref 0.6–1.1)
CROSSMATCH: NORMAL
CROSSMATCH: NORMAL
DIASTOLIC BLOOD PRESSURE - MUSE: NORMAL MMHG
DIASTOLIC BLOOD PRESSURE - MUSE: NORMAL MMHG
EOSINOPHIL # BLD AUTO: 0 THOU/UL (ref 0–0.4)
EOSINOPHIL # BLD AUTO: 0 THOU/UL (ref 0–0.4)
EOSINOPHIL # BLD AUTO: 0.1 THOU/UL (ref 0–0.4)
EOSINOPHIL # BLD AUTO: 0.2 THOU/UL (ref 0–0.4)
EOSINOPHIL # BLD AUTO: 0.2 THOU/UL (ref 0–0.4)
EOSINOPHIL # BLD AUTO: 0.3 THOU/UL (ref 0–0.4)
EOSINOPHIL # BLD AUTO: 0.4 THOU/UL (ref 0–0.4)
EOSINOPHIL # BLD AUTO: 0.4 THOU/UL (ref 0–0.4)
EOSINOPHIL # BLD AUTO: 0.5 THOU/UL (ref 0–0.4)
EOSINOPHIL # BLD AUTO: 0.6 THOU/UL (ref 0–0.4)
EOSINOPHIL # BLD AUTO: 0.8 THOU/UL (ref 0–0.4)
EOSINOPHIL # BLD AUTO: 0.9 THOU/UL (ref 0–0.4)
EOSINOPHIL # BLD AUTO: 1.2 THOU/UL (ref 0–0.4)
EOSINOPHIL COUNT (ABSOLUTE): 0 THOU/UL (ref 0–0.4)
EOSINOPHIL COUNT (ABSOLUTE): 0 THOU/UL (ref 0–0.4)
EOSINOPHIL COUNT (ABSOLUTE): 0.1 THOU/UL (ref 0–0.4)
EOSINOPHIL COUNT (ABSOLUTE): 0.7 THOU/UL (ref 0–0.4)
EOSINOPHIL COUNT (ABSOLUTE): 0.7 THOU/UL (ref 0–0.4)
EOSINOPHIL COUNT (ABSOLUTE): 1 THOU/UL (ref 0–0.4)
EOSINOPHIL COUNT (ABSOLUTE): 1.4 THOU/UL (ref 0–0.4)
EOSINOPHIL COUNT (ABSOLUTE): 1.4 THOU/UL (ref 0–0.4)
EOSINOPHIL NFR BLD AUTO: 0 % (ref 0–6)
EOSINOPHIL NFR BLD AUTO: 1 % (ref 0–6)
EOSINOPHIL NFR BLD AUTO: 2 % (ref 0–6)
EOSINOPHIL NFR BLD AUTO: 3 % (ref 0–6)
EOSINOPHIL NFR BLD AUTO: 3 % (ref 0–6)
EOSINOPHIL NFR BLD AUTO: 4 % (ref 0–6)
EOSINOPHIL NFR BLD AUTO: 5 % (ref 0–6)
EOSINOPHIL NFR BLD AUTO: 5 % (ref 0–6)
EOSINOPHIL NFR BLD AUTO: 6 % (ref 0–6)
EOSINOPHIL NFR BLD AUTO: 7 % (ref 0–6)
EOSINOPHIL NFR BLD AUTO: 7 % (ref 0–6)
EOSINOPHIL NFR BLD AUTO: 9 % (ref 0–6)
EOSINOPHIL NFR BLD AUTO: 9 % (ref 0–6)
ERYTHROCYTE [DISTWIDTH] IN BLOOD BY AUTOMATED COUNT: 18.9 % (ref 11–14.5)
ERYTHROCYTE [DISTWIDTH] IN BLOOD BY AUTOMATED COUNT: 19.1 % (ref 11–14.5)
ERYTHROCYTE [DISTWIDTH] IN BLOOD BY AUTOMATED COUNT: 19.7 % (ref 11–14.5)
ERYTHROCYTE [DISTWIDTH] IN BLOOD BY AUTOMATED COUNT: 19.7 % (ref 11–14.5)
ERYTHROCYTE [DISTWIDTH] IN BLOOD BY AUTOMATED COUNT: 19.8 % (ref 11–14.5)
ERYTHROCYTE [DISTWIDTH] IN BLOOD BY AUTOMATED COUNT: 19.9 % (ref 11–14.5)
ERYTHROCYTE [DISTWIDTH] IN BLOOD BY AUTOMATED COUNT: 20 % (ref 11–14.5)
ERYTHROCYTE [DISTWIDTH] IN BLOOD BY AUTOMATED COUNT: 20.2 % (ref 11–14.5)
ERYTHROCYTE [DISTWIDTH] IN BLOOD BY AUTOMATED COUNT: 20.5 % (ref 11–14.5)
ERYTHROCYTE [DISTWIDTH] IN BLOOD BY AUTOMATED COUNT: 20.6 % (ref 11–14.5)
ERYTHROCYTE [DISTWIDTH] IN BLOOD BY AUTOMATED COUNT: 20.7 % (ref 11–14.5)
ERYTHROCYTE [DISTWIDTH] IN BLOOD BY AUTOMATED COUNT: 20.8 % (ref 11–14.5)
ERYTHROCYTE [DISTWIDTH] IN BLOOD BY AUTOMATED COUNT: 20.8 % (ref 11–14.5)
ERYTHROCYTE [DISTWIDTH] IN BLOOD BY AUTOMATED COUNT: 20.9 % (ref 11–14.5)
ERYTHROCYTE [DISTWIDTH] IN BLOOD BY AUTOMATED COUNT: 21 % (ref 11–14.5)
ERYTHROCYTE [DISTWIDTH] IN BLOOD BY AUTOMATED COUNT: 21.1 % (ref 11–14.5)
ERYTHROCYTE [DISTWIDTH] IN BLOOD BY AUTOMATED COUNT: 21.1 % (ref 11–14.5)
ERYTHROCYTE [DISTWIDTH] IN BLOOD BY AUTOMATED COUNT: 21.2 % (ref 11–14.5)
ERYTHROCYTE [DISTWIDTH] IN BLOOD BY AUTOMATED COUNT: 21.2 % (ref 11–14.5)
ERYTHROCYTE [DISTWIDTH] IN BLOOD BY AUTOMATED COUNT: 21.4 % (ref 11–14.5)
ERYTHROCYTE [DISTWIDTH] IN BLOOD BY AUTOMATED COUNT: 21.6 % (ref 11–14.5)
ERYTHROCYTE [DISTWIDTH] IN BLOOD BY AUTOMATED COUNT: 21.9 % (ref 11–14.5)
ERYTHROCYTE [DISTWIDTH] IN BLOOD BY AUTOMATED COUNT: 22 % (ref 11–14.5)
ERYTHROCYTE [DISTWIDTH] IN BLOOD BY AUTOMATED COUNT: 22.2 % (ref 11–14.5)
ERYTHROCYTE [DISTWIDTH] IN BLOOD BY AUTOMATED COUNT: 22.2 % (ref 11–14.5)
ERYTHROCYTE [DISTWIDTH] IN BLOOD BY AUTOMATED COUNT: 22.3 % (ref 11–14.5)
ERYTHROCYTE [DISTWIDTH] IN BLOOD BY AUTOMATED COUNT: 22.4 % (ref 11–14.5)
ERYTHROCYTE [DISTWIDTH] IN BLOOD BY AUTOMATED COUNT: 22.5 % (ref 11–14.5)
ERYTHROCYTE [DISTWIDTH] IN BLOOD BY AUTOMATED COUNT: 22.7 % (ref 11–14.5)
ERYTHROCYTE [DISTWIDTH] IN BLOOD BY AUTOMATED COUNT: 22.8 % (ref 11–14.5)
ERYTHROCYTE [DISTWIDTH] IN BLOOD BY AUTOMATED COUNT: 22.9 % (ref 11–14.5)
ERYTHROCYTE [DISTWIDTH] IN BLOOD BY AUTOMATED COUNT: 23.1 % (ref 11–14.5)
FLOW: ABNORMAL LPM
FLUAV AG SPEC QL IA: NORMAL
FLUBV AG SPEC QL IA: NORMAL
GENTAMICIN LHE- HISTORICAL: 1.4 UG/ML
GENTAMICIN LHE- HISTORICAL: 5.4 UG/ML
GFR SERPL CREATININE-BSD FRML MDRD: 60 ML/MIN/1.73M2
GFR SERPL CREATININE-BSD FRML MDRD: >60 ML/MIN/1.73M2
GLUCOSE BLD-MCNC: 103 MG/DL (ref 70–125)
GLUCOSE BLD-MCNC: 104 MG/DL (ref 70–125)
GLUCOSE BLD-MCNC: 108 MG/DL (ref 70–125)
GLUCOSE BLD-MCNC: 114 MG/DL (ref 70–125)
GLUCOSE BLD-MCNC: 119 MG/DL (ref 70–125)
GLUCOSE BLD-MCNC: 119 MG/DL (ref 70–125)
GLUCOSE BLD-MCNC: 120 MG/DL (ref 70–125)
GLUCOSE BLD-MCNC: 123 MG/DL (ref 70–125)
GLUCOSE BLD-MCNC: 135 MG/DL (ref 70–125)
GLUCOSE BLD-MCNC: 157 MG/DL (ref 70–125)
GLUCOSE BLD-MCNC: 158 MG/DL (ref 70–125)
GLUCOSE BLD-MCNC: 160 MG/DL (ref 70–125)
GLUCOSE BLD-MCNC: 162 MG/DL (ref 70–125)
GLUCOSE BLD-MCNC: 169 MG/DL (ref 70–125)
GLUCOSE BLD-MCNC: 183 MG/DL (ref 70–125)
GLUCOSE BLD-MCNC: 185 MG/DL (ref 70–125)
GLUCOSE BLD-MCNC: 203 MG/DL (ref 70–125)
GLUCOSE BLD-MCNC: 78 MG/DL (ref 70–125)
GLUCOSE BLD-MCNC: 80 MG/DL (ref 70–125)
GLUCOSE BLD-MCNC: 82 MG/DL (ref 70–125)
GLUCOSE BLD-MCNC: 83 MG/DL (ref 70–125)
GLUCOSE BLD-MCNC: 86 MG/DL (ref 70–125)
GLUCOSE BLD-MCNC: 97 MG/DL (ref 70–125)
GLUCOSE BLDC GLUCOMTR-MCNC: 216 MG/DL (ref 70–139)
GLUCOSE BLDC GLUCOMTR-MCNC: 92 MG/DL (ref 70–139)
GLUCOSE UR STRIP-MCNC: NEGATIVE MG/DL
GLUCOSE UR STRIP-MCNC: NEGATIVE MG/DL
HCO3, ARTERIAL CALC - HISTORICAL: 33.3 MMOL/L (ref 23–29)
HCO3, VENOUS, CALC - HISTORICAL: 31.6 MMOL/L (ref 24–30)
HCT VFR BLD AUTO: 24 % (ref 35–47)
HCT VFR BLD AUTO: 24.1 % (ref 35–47)
HCT VFR BLD AUTO: 24.7 % (ref 35–47)
HCT VFR BLD AUTO: 25.6 % (ref 35–47)
HCT VFR BLD AUTO: 25.9 % (ref 35–47)
HCT VFR BLD AUTO: 26.1 % (ref 35–47)
HCT VFR BLD AUTO: 26.5 % (ref 35–47)
HCT VFR BLD AUTO: 26.6 % (ref 35–47)
HCT VFR BLD AUTO: 26.6 % (ref 35–47)
HCT VFR BLD AUTO: 27 % (ref 35–47)
HCT VFR BLD AUTO: 27.5 % (ref 35–47)
HCT VFR BLD AUTO: 27.6 % (ref 35–47)
HCT VFR BLD AUTO: 27.7 % (ref 35–47)
HCT VFR BLD AUTO: 27.8 % (ref 35–47)
HCT VFR BLD AUTO: 28.5 % (ref 35–47)
HCT VFR BLD AUTO: 29 % (ref 35–47)
HCT VFR BLD AUTO: 29.7 % (ref 35–47)
HCT VFR BLD AUTO: 30.1 % (ref 35–47)
HCT VFR BLD AUTO: 30.4 % (ref 35–47)
HCT VFR BLD AUTO: 30.5 % (ref 35–47)
HCT VFR BLD AUTO: 31.2 % (ref 35–47)
HCT VFR BLD AUTO: 31.2 % (ref 35–47)
HCT VFR BLD AUTO: 31.4 % (ref 35–47)
HCT VFR BLD AUTO: 31.8 % (ref 35–47)
HCT VFR BLD AUTO: 31.8 % (ref 35–47)
HCT VFR BLD AUTO: 32 % (ref 35–47)
HCT VFR BLD AUTO: 32.8 % (ref 35–47)
HCT VFR BLD AUTO: 33 % (ref 35–47)
HCT VFR BLD AUTO: 33 % (ref 35–47)
HCT VFR BLD AUTO: 33.3 % (ref 35–47)
HCT VFR BLD AUTO: 33.5 % (ref 35–47)
HCT VFR BLD AUTO: 33.5 % (ref 35–47)
HCT VFR BLD AUTO: 34.3 % (ref 35–47)
HCT VFR BLD AUTO: 34.4 % (ref 35–47)
HGB BLD-MCNC: 10.2 G/DL (ref 12–16)
HGB BLD-MCNC: 11.7 G/DL (ref 12–16)
HGB BLD-MCNC: 7 G/DL (ref 12–16)
HGB BLD-MCNC: 7.2 G/DL (ref 12–16)
HGB BLD-MCNC: 7.3 G/DL (ref 12–16)
HGB BLD-MCNC: 7.4 G/DL (ref 12–16)
HGB BLD-MCNC: 7.5 G/DL (ref 12–16)
HGB BLD-MCNC: 7.6 G/DL (ref 12–16)
HGB BLD-MCNC: 7.7 G/DL (ref 12–16)
HGB BLD-MCNC: 7.7 G/DL (ref 12–16)
HGB BLD-MCNC: 7.8 G/DL (ref 12–16)
HGB BLD-MCNC: 7.9 G/DL (ref 12–16)
HGB BLD-MCNC: 7.9 G/DL (ref 12–16)
HGB BLD-MCNC: 8 G/DL (ref 12–16)
HGB BLD-MCNC: 8.2 G/DL (ref 12–16)
HGB BLD-MCNC: 8.2 G/DL (ref 12–16)
HGB BLD-MCNC: 8.5 G/DL (ref 12–16)
HGB BLD-MCNC: 8.6 G/DL (ref 12–16)
HGB BLD-MCNC: 8.7 G/DL (ref 12–16)
HGB BLD-MCNC: 8.8 G/DL (ref 12–16)
HGB BLD-MCNC: 8.8 G/DL (ref 12–16)
HGB BLD-MCNC: 8.9 G/DL (ref 12–16)
HGB BLD-MCNC: 9 G/DL (ref 12–16)
HGB BLD-MCNC: 9 G/DL (ref 12–16)
HGB BLD-MCNC: 9.1 G/DL (ref 12–16)
HGB BLD-MCNC: 9.2 G/DL (ref 12–16)
HGB BLD-MCNC: 9.2 G/DL (ref 12–16)
HGB BLD-MCNC: 9.3 G/DL (ref 12–16)
HGB BLD-MCNC: 9.4 G/DL (ref 12–16)
HGB BLD-MCNC: 9.4 G/DL (ref 12–16)
HGB BLD-MCNC: 9.6 G/DL (ref 12–16)
HGB BLD-MCNC: 9.7 G/DL (ref 12–16)
HGB BLD-MCNC: 9.7 G/DL (ref 12–16)
HGB BLD-MCNC: 9.8 G/DL (ref 12–16)
HGB UR QL STRIP: ABNORMAL
HGB UR QL STRIP: ABNORMAL
HYALINE CASTS #/AREA URNS LPF: ABNORMAL LPF
INR PPP: 0.97 (ref 0.9–1.1)
INR PPP: 1.34 (ref 0.9–1.1)
INTERPRETATION ECG - MUSE: NORMAL
INTERPRETATION ECG - MUSE: NORMAL
IRON SATN MFR SERPL: 14 % (ref 20–50)
IRON SERPL-MCNC: 22 UG/DL (ref 42–175)
ISSUE DATE AND TIME: NORMAL
ISSUE DATE AND TIME: NORMAL
KETONES UR STRIP-MCNC: ABNORMAL MG/DL
KETONES UR STRIP-MCNC: NEGATIVE MG/DL
LAB AP CHARGES (HE HISTORICAL CONVERSION): NORMAL
LACTATE SERPL-SCNC: 1.4 MMOL/L (ref 0.5–2.2)
LACTATE SERPL-SCNC: 1.8 MMOL/L (ref 0.5–2.2)
LACTATE SERPL-SCNC: 1.9 MMOL/L (ref 0.5–2.2)
LACTATE SERPL-SCNC: 2.2 MMOL/L (ref 0.5–2.2)
LACTATE SERPL-SCNC: 2.8 MMOL/L (ref 0.5–2.2)
LACTATE SERPL-SCNC: 2.8 MMOL/L (ref 0.5–2.2)
LACTATE SERPL-SCNC: 3.1 MMOL/L (ref 0.5–2.2)
LEUKOCYTE ESTERASE UR QL STRIP: ABNORMAL
LEUKOCYTE ESTERASE UR QL STRIP: ABNORMAL
LYMPHOCYTES # BLD AUTO: 0.4 THOU/UL (ref 0.8–4.4)
LYMPHOCYTES # BLD AUTO: 0.5 THOU/UL (ref 0.8–4.4)
LYMPHOCYTES # BLD AUTO: 0.6 THOU/UL (ref 0.8–4.4)
LYMPHOCYTES # BLD AUTO: 0.6 THOU/UL (ref 0.8–4.4)
LYMPHOCYTES # BLD AUTO: 0.8 THOU/UL (ref 0.8–4.4)
LYMPHOCYTES # BLD AUTO: 0.9 THOU/UL (ref 0.8–4.4)
LYMPHOCYTES # BLD AUTO: 1 THOU/UL (ref 0.8–4.4)
LYMPHOCYTES # BLD AUTO: 1.1 THOU/UL (ref 0.8–4.4)
LYMPHOCYTES # BLD AUTO: 1.3 THOU/UL (ref 0.8–4.4)
LYMPHOCYTES # BLD AUTO: 1.4 THOU/UL (ref 0.8–4.4)
LYMPHOCYTES # BLD AUTO: 1.4 THOU/UL (ref 0.8–4.4)
LYMPHOCYTES # BLD AUTO: 1.5 THOU/UL (ref 0.8–4.4)
LYMPHOCYTES # BLD AUTO: 1.6 THOU/UL (ref 0.8–4.4)
LYMPHOCYTES # BLD AUTO: 1.8 THOU/UL (ref 0.8–4.4)
LYMPHOCYTES # BLD AUTO: 1.8 THOU/UL (ref 0.8–4.4)
LYMPHOCYTES # BLD AUTO: 1.9 THOU/UL (ref 0.8–4.4)
LYMPHOCYTES # BLD AUTO: 2 THOU/UL (ref 0.8–4.4)
LYMPHOCYTES # BLD AUTO: 2.1 THOU/UL (ref 0.8–4.4)
LYMPHOCYTES # BLD AUTO: 2.3 THOU/UL (ref 0.8–4.4)
LYMPHOCYTES # BLD AUTO: 2.6 THOU/UL (ref 0.8–4.4)
LYMPHOCYTES # BLD AUTO: 2.8 THOU/UL (ref 0.8–4.4)
LYMPHOCYTES NFR BLD AUTO: 10 % (ref 20–40)
LYMPHOCYTES NFR BLD AUTO: 10 % (ref 20–40)
LYMPHOCYTES NFR BLD AUTO: 11 % (ref 20–40)
LYMPHOCYTES NFR BLD AUTO: 12 % (ref 20–40)
LYMPHOCYTES NFR BLD AUTO: 12 % (ref 20–40)
LYMPHOCYTES NFR BLD AUTO: 13 % (ref 20–40)
LYMPHOCYTES NFR BLD AUTO: 15 % (ref 20–40)
LYMPHOCYTES NFR BLD AUTO: 16 % (ref 20–40)
LYMPHOCYTES NFR BLD AUTO: 16 % (ref 20–40)
LYMPHOCYTES NFR BLD AUTO: 17 % (ref 20–40)
LYMPHOCYTES NFR BLD AUTO: 17 % (ref 20–40)
LYMPHOCYTES NFR BLD AUTO: 19 % (ref 20–40)
LYMPHOCYTES NFR BLD AUTO: 2 % (ref 20–40)
LYMPHOCYTES NFR BLD AUTO: 20 % (ref 20–40)
LYMPHOCYTES NFR BLD AUTO: 3 % (ref 20–40)
LYMPHOCYTES NFR BLD AUTO: 4 % (ref 20–40)
LYMPHOCYTES NFR BLD AUTO: 5 % (ref 20–40)
LYMPHOCYTES NFR BLD AUTO: 6 % (ref 20–40)
LYMPHOCYTES NFR BLD AUTO: 6 % (ref 20–40)
LYMPHOCYTES NFR BLD AUTO: 9 % (ref 20–40)
LYMPHOCYTES NFR BLD AUTO: 9 % (ref 20–40)
MAGNESIUM SERPL-MCNC: 1.4 MG/DL (ref 1.8–2.6)
MAGNESIUM SERPL-MCNC: 1.5 MG/DL (ref 1.8–2.6)
MAGNESIUM SERPL-MCNC: 1.6 MG/DL (ref 1.8–2.6)
MAGNESIUM SERPL-MCNC: 1.8 MG/DL (ref 1.8–2.6)
MAGNESIUM SERPL-MCNC: 1.9 MG/DL (ref 1.8–2.6)
MAGNESIUM SERPL-MCNC: 2 MG/DL (ref 1.8–2.6)
MAGNESIUM SERPL-MCNC: 2.2 MG/DL (ref 1.8–2.6)
MAGNESIUM SERPL-MCNC: 2.3 MG/DL (ref 1.8–2.6)
MANUAL NRBC PER 100 CELLS: 1
MCH RBC QN AUTO: 22.7 PG (ref 27–34)
MCH RBC QN AUTO: 22.8 PG (ref 27–34)
MCH RBC QN AUTO: 23.4 PG (ref 27–34)
MCH RBC QN AUTO: 24 PG (ref 27–34)
MCH RBC QN AUTO: 24.2 PG (ref 27–34)
MCH RBC QN AUTO: 24.3 PG (ref 27–34)
MCH RBC QN AUTO: 24.4 PG (ref 27–34)
MCH RBC QN AUTO: 24.5 PG (ref 27–34)
MCH RBC QN AUTO: 24.6 PG (ref 27–34)
MCH RBC QN AUTO: 24.6 PG (ref 27–34)
MCH RBC QN AUTO: 24.7 PG (ref 27–34)
MCH RBC QN AUTO: 24.8 PG (ref 27–34)
MCH RBC QN AUTO: 24.8 PG (ref 27–34)
MCH RBC QN AUTO: 24.9 PG (ref 27–34)
MCH RBC QN AUTO: 24.9 PG (ref 27–34)
MCH RBC QN AUTO: 25 PG (ref 27–34)
MCH RBC QN AUTO: 25 PG (ref 27–34)
MCH RBC QN AUTO: 25.2 PG (ref 27–34)
MCH RBC QN AUTO: 25.3 PG (ref 27–34)
MCHC RBC AUTO-ENTMCNC: 28.2 G/DL (ref 32–36)
MCHC RBC AUTO-ENTMCNC: 28.5 G/DL (ref 32–36)
MCHC RBC AUTO-ENTMCNC: 28.5 G/DL (ref 32–36)
MCHC RBC AUTO-ENTMCNC: 28.7 G/DL (ref 32–36)
MCHC RBC AUTO-ENTMCNC: 28.7 G/DL (ref 32–36)
MCHC RBC AUTO-ENTMCNC: 28.8 G/DL (ref 32–36)
MCHC RBC AUTO-ENTMCNC: 28.9 G/DL (ref 32–36)
MCHC RBC AUTO-ENTMCNC: 29 G/DL (ref 32–36)
MCHC RBC AUTO-ENTMCNC: 29.1 G/DL (ref 32–36)
MCHC RBC AUTO-ENTMCNC: 29.1 G/DL (ref 32–36)
MCHC RBC AUTO-ENTMCNC: 29.2 G/DL (ref 32–36)
MCHC RBC AUTO-ENTMCNC: 29.3 G/DL (ref 32–36)
MCHC RBC AUTO-ENTMCNC: 29.4 G/DL (ref 32–36)
MCHC RBC AUTO-ENTMCNC: 29.5 G/DL (ref 32–36)
MCHC RBC AUTO-ENTMCNC: 29.6 G/DL (ref 32–36)
MCHC RBC AUTO-ENTMCNC: 29.7 G/DL (ref 32–36)
MCHC RBC AUTO-ENTMCNC: 30 G/DL (ref 32–36)
MCV RBC AUTO: 79 FL (ref 80–100)
MCV RBC AUTO: 79 FL (ref 80–100)
MCV RBC AUTO: 80 FL (ref 80–100)
MCV RBC AUTO: 81 FL (ref 80–100)
MCV RBC AUTO: 81 FL (ref 80–100)
MCV RBC AUTO: 82 FL (ref 80–100)
MCV RBC AUTO: 83 FL (ref 80–100)
MCV RBC AUTO: 84 FL (ref 80–100)
MCV RBC AUTO: 85 FL (ref 80–100)
MCV RBC AUTO: 86 FL (ref 80–100)
MCV RBC AUTO: 86 FL (ref 80–100)
MCV RBC AUTO: 87 FL (ref 80–100)
MCV RBC AUTO: 88 FL (ref 80–100)
METAMYELOCYTES (ABSOLUTE): 0.1 THOU/UL
METAMYELOCYTES (ABSOLUTE): 0.3 THOU/UL
METAMYELOCYTES (ABSOLUTE): 0.7 THOU/UL
METAMYELOCYTES NFR BLD MANUAL: 1 %
METAMYELOCYTES NFR BLD MANUAL: 2 %
METAMYELOCYTES NFR BLD MANUAL: 4 %
MONOCYTES # BLD AUTO: 0.2 THOU/UL (ref 0–0.9)
MONOCYTES # BLD AUTO: 0.3 THOU/UL (ref 0–0.9)
MONOCYTES # BLD AUTO: 0.3 THOU/UL (ref 0–0.9)
MONOCYTES # BLD AUTO: 0.4 THOU/UL (ref 0–0.9)
MONOCYTES # BLD AUTO: 0.5 THOU/UL (ref 0–0.9)
MONOCYTES # BLD AUTO: 0.6 THOU/UL (ref 0–0.9)
MONOCYTES # BLD AUTO: 0.6 THOU/UL (ref 0–0.9)
MONOCYTES # BLD AUTO: 0.7 THOU/UL (ref 0–0.9)
MONOCYTES # BLD AUTO: 0.8 THOU/UL (ref 0–0.9)
MONOCYTES # BLD AUTO: 0.9 THOU/UL (ref 0–0.9)
MONOCYTES # BLD AUTO: 1.1 THOU/UL (ref 0–0.9)
MONOCYTES NFR BLD AUTO: 1 % (ref 2–10)
MONOCYTES NFR BLD AUTO: 2 % (ref 2–10)
MONOCYTES NFR BLD AUTO: 3 % (ref 2–10)
MONOCYTES NFR BLD AUTO: 3 % (ref 2–10)
MONOCYTES NFR BLD AUTO: 4 % (ref 2–10)
MONOCYTES NFR BLD AUTO: 4 % (ref 2–10)
MONOCYTES NFR BLD AUTO: 5 % (ref 2–10)
MONOCYTES NFR BLD AUTO: 6 % (ref 2–10)
MONOCYTES NFR BLD AUTO: 7 % (ref 2–10)
MONOCYTES NFR BLD AUTO: 7 % (ref 2–10)
MONOCYTES NFR BLD AUTO: 8 % (ref 2–10)
MUCOUS THREADS #/AREA URNS LPF: ABNORMAL LPF
MYELOCYTES (ABSOLUTE): 0.2 THOU/UL
MYELOCYTES (ABSOLUTE): 0.3 THOU/UL
MYELOCYTES (ABSOLUTE): 0.8 THOU/UL
MYELOCYTES NFR BLD MANUAL: 2 %
MYELOCYTES NFR BLD MANUAL: 2 %
MYELOCYTES NFR BLD MANUAL: 4 %
NEUTROPHILS # BLD AUTO: 10.4 THOU/UL (ref 2–7.7)
NEUTROPHILS # BLD AUTO: 10.6 THOU/UL (ref 2–7.7)
NEUTROPHILS # BLD AUTO: 12.3 THOU/UL (ref 2–7.7)
NEUTROPHILS # BLD AUTO: 14.5 THOU/UL (ref 2–7.7)
NEUTROPHILS # BLD AUTO: 16.7 THOU/UL (ref 2–7.7)
NEUTROPHILS # BLD AUTO: 18.5 THOU/UL (ref 2–7.7)
NEUTROPHILS # BLD AUTO: 5.4 THOU/UL (ref 2–7.7)
NEUTROPHILS # BLD AUTO: 6.1 THOU/UL (ref 2–7.7)
NEUTROPHILS # BLD AUTO: 6.7 THOU/UL (ref 2–7.7)
NEUTROPHILS # BLD AUTO: 6.8 THOU/UL (ref 2–7.7)
NEUTROPHILS # BLD AUTO: 7.9 THOU/UL (ref 2–7.7)
NEUTROPHILS # BLD AUTO: 7.9 THOU/UL (ref 2–7.7)
NEUTROPHILS # BLD AUTO: 8.7 THOU/UL (ref 2–7.7)
NEUTROPHILS # BLD AUTO: 8.7 THOU/UL (ref 2–7.7)
NEUTROPHILS # BLD AUTO: 9.2 THOU/UL (ref 2–7.7)
NEUTROPHILS NFR BLD AUTO: 67 % (ref 50–70)
NEUTROPHILS NFR BLD AUTO: 67 % (ref 50–70)
NEUTROPHILS NFR BLD AUTO: 71 % (ref 50–70)
NEUTROPHILS NFR BLD AUTO: 72 % (ref 50–70)
NEUTROPHILS NFR BLD AUTO: 75 % (ref 50–70)
NEUTROPHILS NFR BLD AUTO: 75 % (ref 50–70)
NEUTROPHILS NFR BLD AUTO: 76 % (ref 50–70)
NEUTROPHILS NFR BLD AUTO: 79 % (ref 50–70)
NEUTROPHILS NFR BLD AUTO: 81 % (ref 50–70)
NEUTROPHILS NFR BLD AUTO: 83 % (ref 50–70)
NEUTROPHILS NFR BLD AUTO: 83 % (ref 50–70)
NEUTROPHILS NFR BLD AUTO: 88 % (ref 50–70)
NEUTROPHILS NFR BLD AUTO: 93 % (ref 50–70)
NEUTROPHILS NFR BLD AUTO: 94 % (ref 50–70)
NEUTROPHILS NFR BLD AUTO: 96 % (ref 50–70)
NITRATE UR QL: NEGATIVE
NITRATE UR QL: NEGATIVE
O2/TOTAL GAS SETTING VFR VENT: ABNORMAL %
OVALOCYTES: ABNORMAL
OXYHEMOGLOBIN (VBG) - HISTORICAL: 46.5 % (ref 70–75)
OXYHEMOGLOBIN - HISTORICAL: 91.2 % (ref 96–97)
OXYHGB MFR BLDV: 48.7 % (ref 70–75)
P AXIS - MUSE: 62 DEGREES
P AXIS - MUSE: 70 DEGREES
PATH REPORT.COMMENTS IMP SPEC: NORMAL
PATH REPORT.COMMENTS IMP SPEC: NORMAL
PATH REPORT.FINAL DX SPEC: NORMAL
PATH REPORT.MICROSCOPIC SPEC OTHER STN: ABNORMAL
PATH REPORT.MICROSCOPIC SPEC OTHER STN: NORMAL
PATH REPORT.RELEVANT HX SPEC: NORMAL
PCO2 BLD: 49 MM HG (ref 35–45)
PCO2 BLDV: 51 MM HG (ref 35–50)
PH BLD: 7.47 [PH] (ref 7.37–7.44)
PH BLDV: 7.44 [PH] (ref 7.35–7.45)
PH UR STRIP: 5.5 [PH] (ref 4.5–8)
PH UR STRIP: 5.5 [PH] (ref 4.5–8)
PLAT MORPH BLD: ABNORMAL
PLAT MORPH BLD: NORMAL
PLATELET # BLD AUTO: 248 THOU/UL (ref 140–440)
PLATELET # BLD AUTO: 251 THOU/UL (ref 140–440)
PLATELET # BLD AUTO: 264 THOU/UL (ref 140–440)
PLATELET # BLD AUTO: 265 THOU/UL (ref 140–440)
PLATELET # BLD AUTO: 267 THOU/UL (ref 140–440)
PLATELET # BLD AUTO: 271 THOU/UL (ref 140–440)
PLATELET # BLD AUTO: 276 THOU/UL (ref 140–440)
PLATELET # BLD AUTO: 277 THOU/UL (ref 140–440)
PLATELET # BLD AUTO: 280 THOU/UL (ref 140–440)
PLATELET # BLD AUTO: 284 THOU/UL (ref 140–440)
PLATELET # BLD AUTO: 290 THOU/UL (ref 140–440)
PLATELET # BLD AUTO: 290 THOU/UL (ref 140–440)
PLATELET # BLD AUTO: 294 THOU/UL (ref 140–440)
PLATELET # BLD AUTO: 296 THOU/UL (ref 140–440)
PLATELET # BLD AUTO: 296 THOU/UL (ref 140–440)
PLATELET # BLD AUTO: 297 THOU/UL (ref 140–440)
PLATELET # BLD AUTO: 316 THOU/UL (ref 140–440)
PLATELET # BLD AUTO: 318 THOU/UL (ref 140–440)
PLATELET # BLD AUTO: 319 THOU/UL (ref 140–440)
PLATELET # BLD AUTO: 323 THOU/UL (ref 140–440)
PLATELET # BLD AUTO: 327 THOU/UL (ref 140–440)
PLATELET # BLD AUTO: 327 THOU/UL (ref 140–440)
PLATELET # BLD AUTO: 338 THOU/UL (ref 140–440)
PLATELET # BLD AUTO: 341 THOU/UL (ref 140–440)
PLATELET # BLD AUTO: 343 THOU/UL (ref 140–440)
PLATELET # BLD AUTO: 344 THOU/UL (ref 140–440)
PLATELET # BLD AUTO: 346 THOU/UL (ref 140–440)
PLATELET # BLD AUTO: 354 THOU/UL (ref 140–440)
PLATELET # BLD AUTO: 361 THOU/UL (ref 140–440)
PLATELET # BLD AUTO: 366 THOU/UL (ref 140–440)
PLATELET # BLD AUTO: 368 THOU/UL (ref 140–440)
PLATELET # BLD AUTO: 370 THOU/UL (ref 140–440)
PLATELET # BLD AUTO: 371 THOU/UL (ref 140–440)
PLATELET # BLD AUTO: 374 THOU/UL (ref 140–440)
PLATELET # BLD AUTO: 378 THOU/UL (ref 140–440)
PLATELET # BLD AUTO: 385 THOU/UL (ref 140–440)
PLATELET # BLD AUTO: 425 THOU/UL (ref 140–440)
PLATELET # BLD AUTO: 426 THOU/UL (ref 140–440)
PLATELET # BLD AUTO: 436 THOU/UL (ref 140–440)
PLATELET # BLD AUTO: 491 THOU/UL (ref 140–440)
PLATELET # BLD AUTO: 492 THOU/UL (ref 140–440)
PMV BLD AUTO: 10 FL (ref 8.5–12.5)
PMV BLD AUTO: 10.1 FL (ref 8.5–12.5)
PMV BLD AUTO: 10.1 FL (ref 8.5–12.5)
PMV BLD AUTO: 10.2 FL (ref 8.5–12.5)
PMV BLD AUTO: 10.3 FL (ref 8.5–12.5)
PMV BLD AUTO: 10.4 FL (ref 8.5–12.5)
PMV BLD AUTO: 10.5 FL (ref 8.5–12.5)
PMV BLD AUTO: 9.3 FL (ref 8.5–12.5)
PMV BLD AUTO: 9.3 FL (ref 8.5–12.5)
PMV BLD AUTO: 9.4 FL (ref 8.5–12.5)
PMV BLD AUTO: 9.5 FL (ref 8.5–12.5)
PMV BLD AUTO: 9.5 FL (ref 8.5–12.5)
PMV BLD AUTO: 9.6 FL (ref 8.5–12.5)
PMV BLD AUTO: 9.7 FL (ref 8.5–12.5)
PMV BLD AUTO: 9.8 FL (ref 8.5–12.5)
PMV BLD AUTO: 9.9 FL (ref 8.5–12.5)
PMV BLD AUTO: 9.9 FL (ref 8.5–12.5)
PO2 BLD: 76 MM HG (ref 80–90)
PO2 BLDV: 26 MM HG (ref 25–47)
POLYCHROMASIA BLD QL SMEAR: ABNORMAL
POTASSIUM BLD-SCNC: 2.7 MMOL/L (ref 3.5–5)
POTASSIUM BLD-SCNC: 2.9 MMOL/L (ref 3.5–5)
POTASSIUM BLD-SCNC: 2.9 MMOL/L (ref 3.5–5)
POTASSIUM BLD-SCNC: 3 MMOL/L (ref 3.5–5)
POTASSIUM BLD-SCNC: 3.1 MMOL/L (ref 3.5–5)
POTASSIUM BLD-SCNC: 3.1 MMOL/L (ref 3.5–5)
POTASSIUM BLD-SCNC: 3.2 MMOL/L (ref 3.5–5)
POTASSIUM BLD-SCNC: 3.3 MMOL/L (ref 3.5–5)
POTASSIUM BLD-SCNC: 3.4 MMOL/L (ref 3.5–5)
POTASSIUM BLD-SCNC: 3.5 MMOL/L (ref 3.5–5)
POTASSIUM BLD-SCNC: 3.7 MMOL/L (ref 3.5–5)
POTASSIUM BLD-SCNC: 3.8 MMOL/L (ref 3.5–5)
POTASSIUM BLD-SCNC: 3.8 MMOL/L (ref 3.5–5)
POTASSIUM BLD-SCNC: 3.9 MMOL/L (ref 3.5–5)
POTASSIUM BLD-SCNC: 4 MMOL/L (ref 3.5–5)
POTASSIUM BLD-SCNC: 4 MMOL/L (ref 3.5–5)
POTASSIUM BLD-SCNC: 4.1 MMOL/L (ref 3.5–5)
POTASSIUM BLD-SCNC: 4.1 MMOL/L (ref 3.5–5)
POTASSIUM BLD-SCNC: 4.2 MMOL/L (ref 3.5–5)
POTASSIUM BLD-SCNC: 4.3 MMOL/L (ref 3.5–5)
POTASSIUM BLD-SCNC: 4.3 MMOL/L (ref 3.5–5)
POTASSIUM BLD-SCNC: 4.4 MMOL/L (ref 3.5–5)
POTASSIUM BLD-SCNC: 4.6 MMOL/L (ref 3.5–5)
POTASSIUM BLD-SCNC: 4.7 MMOL/L (ref 3.5–5)
POTASSIUM BLD-SCNC: 4.7 MMOL/L (ref 3.5–5)
POTASSIUM BLD-SCNC: 5 MMOL/L (ref 3.5–5)
POTASSIUM BLD-SCNC: 5.1 MMOL/L (ref 3.5–5)
POTASSIUM BLD-SCNC: 5.7 MMOL/L (ref 3.5–5)
PR INTERVAL - MUSE: 120 MS
PR INTERVAL - MUSE: 134 MS
PROCALCITONIN SERPL-MCNC: 0.13 NG/ML (ref 0–0.49)
PROCALCITONIN SERPL-MCNC: 0.26 NG/ML (ref 0–0.49)
PROCALCITONIN SERPL-MCNC: 0.32 NG/ML (ref 0–0.49)
PROCALCITONIN SERPL-MCNC: 0.42 NG/ML (ref 0–0.49)
PROCALCITONIN SERPL-MCNC: 1.85 NG/ML (ref 0–0.49)
PROT SERPL-MCNC: 4.3 G/DL (ref 6–8)
PROT SERPL-MCNC: 4.5 G/DL (ref 6–8)
PROT SERPL-MCNC: 5.1 G/DL (ref 6–8)
PROT SERPL-MCNC: 5.5 G/DL (ref 6–8)
PROT SERPL-MCNC: 6.1 G/DL (ref 6–8)
PROT SERPL-MCNC: 6.1 G/DL (ref 6–8)
PROT SERPL-MCNC: 6.2 G/DL (ref 6–8)
QRS DURATION - MUSE: 76 MS
QRS DURATION - MUSE: 80 MS
QT - MUSE: 284 MS
QT - MUSE: 362 MS
QTC - MUSE: 442 MS
QTC - MUSE: 483 MS
R AXIS - MUSE: 32 DEGREES
R AXIS - MUSE: 73 DEGREES
RBC # BLD AUTO: 2.89 MILL/UL (ref 3.8–5.4)
RBC # BLD AUTO: 2.89 MILL/UL (ref 3.8–5.4)
RBC # BLD AUTO: 2.95 MILL/UL (ref 3.8–5.4)
RBC # BLD AUTO: 2.96 MILL/UL (ref 3.8–5.4)
RBC # BLD AUTO: 3 MILL/UL (ref 3.8–5.4)
RBC # BLD AUTO: 3.04 MILL/UL (ref 3.8–5.4)
RBC # BLD AUTO: 3.06 MILL/UL (ref 3.8–5.4)
RBC # BLD AUTO: 3.13 MILL/UL (ref 3.8–5.4)
RBC # BLD AUTO: 3.19 MILL/UL (ref 3.8–5.4)
RBC # BLD AUTO: 3.22 MILL/UL (ref 3.8–5.4)
RBC # BLD AUTO: 3.24 MILL/UL (ref 3.8–5.4)
RBC # BLD AUTO: 3.24 MILL/UL (ref 3.8–5.4)
RBC # BLD AUTO: 3.33 MILL/UL (ref 3.8–5.4)
RBC # BLD AUTO: 3.39 MILL/UL (ref 3.8–5.4)
RBC # BLD AUTO: 3.53 MILL/UL (ref 3.8–5.4)
RBC # BLD AUTO: 3.6 MILL/UL (ref 3.8–5.4)
RBC # BLD AUTO: 3.62 MILL/UL (ref 3.8–5.4)
RBC # BLD AUTO: 3.63 MILL/UL (ref 3.8–5.4)
RBC # BLD AUTO: 3.66 MILL/UL (ref 3.8–5.4)
RBC # BLD AUTO: 3.69 MILL/UL (ref 3.8–5.4)
RBC # BLD AUTO: 3.71 MILL/UL (ref 3.8–5.4)
RBC # BLD AUTO: 3.74 MILL/UL (ref 3.8–5.4)
RBC # BLD AUTO: 3.76 MILL/UL (ref 3.8–5.4)
RBC # BLD AUTO: 3.78 MILL/UL (ref 3.8–5.4)
RBC # BLD AUTO: 3.79 MILL/UL (ref 3.8–5.4)
RBC # BLD AUTO: 3.85 MILL/UL (ref 3.8–5.4)
RBC # BLD AUTO: 3.89 MILL/UL (ref 3.8–5.4)
RBC # BLD AUTO: 3.94 MILL/UL (ref 3.8–5.4)
RBC # BLD AUTO: 3.96 MILL/UL (ref 3.8–5.4)
RBC # BLD AUTO: 3.97 MILL/UL (ref 3.8–5.4)
RBC # BLD AUTO: 4.01 MILL/UL (ref 3.8–5.4)
RBC # BLD AUTO: 4.13 MILL/UL (ref 3.8–5.4)
RBC #/AREA URNS AUTO: ABNORMAL HPF
RBC #/AREA URNS AUTO: ABNORMAL HPF
REACTIVE LYMPHS: ABNORMAL
SODIUM SERPL-SCNC: 132 MMOL/L (ref 136–145)
SODIUM SERPL-SCNC: 132 MMOL/L (ref 136–145)
SODIUM SERPL-SCNC: 136 MMOL/L (ref 136–145)
SODIUM SERPL-SCNC: 137 MMOL/L (ref 136–145)
SODIUM SERPL-SCNC: 137 MMOL/L (ref 136–145)
SODIUM SERPL-SCNC: 138 MMOL/L (ref 136–145)
SODIUM SERPL-SCNC: 139 MMOL/L (ref 136–145)
SODIUM SERPL-SCNC: 140 MMOL/L (ref 136–145)
SODIUM SERPL-SCNC: 141 MMOL/L (ref 136–145)
SODIUM SERPL-SCNC: 142 MMOL/L (ref 136–145)
SODIUM SERPL-SCNC: 143 MMOL/L (ref 136–145)
SODIUM SERPL-SCNC: 143 MMOL/L (ref 136–145)
SP GR UR STRIP: 1.01 (ref 1–1.03)
SP GR UR STRIP: 1.02 (ref 1–1.03)
SPHEROCYTES BLD QL SMEAR: ABNORMAL
SQUAMOUS #/AREA URNS AUTO: ABNORMAL LPF
SQUAMOUS #/AREA URNS AUTO: ABNORMAL LPF
STATUS (HISTORICAL CONVERSION): NORMAL
STATUS (HISTORICAL CONVERSION): NORMAL
SYSTOLIC BLOOD PRESSURE - MUSE: NORMAL MMHG
SYSTOLIC BLOOD PRESSURE - MUSE: NORMAL MMHG
T AXIS - MUSE: 44 DEGREES
T AXIS - MUSE: 57 DEGREES
T4 FREE SERPL-MCNC: 0.8 NG/DL (ref 0.7–1.8)
TARGETS BLD QL SMEAR: ABNORMAL
TEAR DROP: ABNORMAL
TEMPERATURE: 37 DEGREES C
TIBC SERPL-MCNC: 152 UG/DL (ref 313–563)
TOTAL NEUTROPHILS-ABS(DIFF): 10.1 THOU/UL (ref 2–7.7)
TOTAL NEUTROPHILS-ABS(DIFF): 10.1 THOU/UL (ref 2–7.7)
TOTAL NEUTROPHILS-ABS(DIFF): 11.7 THOU/UL (ref 2–7.7)
TOTAL NEUTROPHILS-ABS(DIFF): 14 THOU/UL (ref 2–7.7)
TOTAL NEUTROPHILS-ABS(DIFF): 17.6 THOU/UL (ref 2–7.7)
TOTAL NEUTROPHILS-ABS(DIFF): 19.4 THOU/UL (ref 2–7.7)
TOTAL NEUTROPHILS-ABS(DIFF): 19.9 THOU/UL (ref 2–7.7)
TOTAL NEUTROPHILS-ABS(DIFF): 27.4 THOU/UL (ref 2–7.7)
TOTAL NEUTROPHILS-REL(DIFF): 70 % (ref 50–70)
TOTAL NEUTROPHILS-REL(DIFF): 74 % (ref 50–70)
TOTAL NEUTROPHILS-REL(DIFF): 74 % (ref 50–70)
TOTAL NEUTROPHILS-REL(DIFF): 80 % (ref 50–70)
TOTAL NEUTROPHILS-REL(DIFF): 85 % (ref 50–70)
TOTAL NEUTROPHILS-REL(DIFF): 91 % (ref 50–70)
TOTAL NEUTROPHILS-REL(DIFF): 91 % (ref 50–70)
TOTAL NEUTROPHILS-REL(DIFF): 93 % (ref 50–70)
TRANSFERRIN SERPL-MCNC: 122 MG/DL (ref 212–360)
TROPONIN I SERPL-MCNC: 0.01 NG/ML (ref 0–0.29)
TROPONIN I SERPL-MCNC: <0.01 NG/ML (ref 0–0.29)
TSH SERPL DL<=0.005 MIU/L-ACNC: 43.98 UIU/ML (ref 0.3–5)
UFH PPP CHRO-ACNC: 0.31 IU/ML (ref 0.3–0.7)
UNIT ABO/RH (HISTORICAL CONVERSION): NORMAL
UNIT ABO/RH (HISTORICAL CONVERSION): NORMAL
UNIT NUMBER: NORMAL
UNIT NUMBER: NORMAL
UROBILINOGEN UR STRIP-ACNC: ABNORMAL
UROBILINOGEN UR STRIP-ACNC: ABNORMAL
VANCOMYCIN SERPL-MCNC: 24.9 UG/ML
VANCOMYCIN SERPL-MCNC: 25.9 UG/ML
VANCOMYCIN SERPL-MCNC: 26.8 UG/ML
VENTILATION MODE: ABNORMAL
VENTRICULAR RATE- MUSE: 107 BPM
VENTRICULAR RATE- MUSE: 146 BPM
WBC #/AREA URNS AUTO: >100 HPF
WBC #/AREA URNS AUTO: ABNORMAL HPF
WBC CLUMPS #/AREA URNS HPF: PRESENT /[HPF]
WBC CLUMPS #/AREA URNS HPF: PRESENT /[HPF]
WBC: 10.7 THOU/UL (ref 4–11)
WBC: 10.8 THOU/UL (ref 4–11)
WBC: 10.8 THOU/UL (ref 4–11)
WBC: 11.7 THOU/UL (ref 4–11)
WBC: 12.7 THOU/UL (ref 4–11)
WBC: 12.8 THOU/UL (ref 4–11)
WBC: 13.4 THOU/UL (ref 4–11)
WBC: 13.5 THOU/UL (ref 4–11)
WBC: 13.6 THOU/UL (ref 4–11)
WBC: 13.7 THOU/UL (ref 4–11)
WBC: 13.7 THOU/UL (ref 4–11)
WBC: 13.9 THOU/UL (ref 4–11)
WBC: 14 THOU/UL (ref 4–11)
WBC: 14.6 THOU/UL (ref 4–11)
WBC: 15.4 THOU/UL (ref 4–11)
WBC: 15.5 THOU/UL (ref 4–11)
WBC: 16 THOU/UL (ref 4–11)
WBC: 16.6 THOU/UL (ref 4–11)
WBC: 16.7 THOU/UL (ref 4–11)
WBC: 18.7 THOU/UL (ref 4–11)
WBC: 19 THOU/UL (ref 4–11)
WBC: 19.2 THOU/UL (ref 4–11)
WBC: 19.6 THOU/UL (ref 4–11)
WBC: 21.1 THOU/UL (ref 4–11)
WBC: 21.4 THOU/UL (ref 4–11)
WBC: 22 THOU/UL (ref 4–11)
WBC: 22.8 THOU/UL (ref 4–11)
WBC: 24.8 THOU/UL (ref 4–11)
WBC: 30.1 THOU/UL (ref 4–11)
WBC: 7.5 THOU/UL (ref 4–11)
WBC: 7.9 THOU/UL (ref 4–11)
WBC: 8.2 THOU/UL (ref 4–11)
WBC: 9.3 THOU/UL (ref 4–11)
WBC: 9.7 THOU/UL (ref 4–11)
WBC: 9.7 THOU/UL (ref 4–11)
WBC: 9.9 THOU/UL (ref 4–11)
YEAST #/AREA URNS HPF: ABNORMAL HPF

## 2019-01-01 ASSESSMENT — MIFFLIN-ST. JEOR
SCORE: 1238.08
SCORE: 1229.01
SCORE: 1242.61
SCORE: 1329.25
SCORE: 1272.25
SCORE: 1229.01
SCORE: 1238.08
SCORE: 1236.95
SCORE: 1236.95

## 2019-04-13 LAB
AEROBIC BLOOD CULTURE BOTTLE: NO GROWTH
AEROBIC BLOOD CULTURE BOTTLE: NO GROWTH
ANAEROBIC BLOOD CULTURE BOTTLE: NO GROWTH
ANAEROBIC BLOOD CULTURE BOTTLE: NO GROWTH

## 2020-03-02 NOTE — PLAN OF CARE
Problem: Patient Care Overview  Goal: Plan of Care/Patient Progress Review  VS: Pt A&Ox 4. Patient declining vital sign assessment.    Output: Last BM 5/6, incontinent, SP cath in place and leaks out urethra, refusing to let brief/pad be looked at.   Activity: Bedrest. Assist of 2 to reposition. Currently refusing to reposition.    Skin: Buttock wounds, left heel wound   Pain: Has pain in left shoulder and it is being controlled with PRN oxycodone. Patient requests medication frequently, even when medication was just given minutes prior to her asking.    CMS: Sensation absent BLE. Refusing PCD's on BLE.    Dressing: Has dressing to buttocks and left heal. CDI and intact at 0000, refusing further assessment remainder of shift.    Diet:  Is on regular diet and overnight tube feeding. Reports fullness and nausea. PRN Zofran given and tube feeding was put on hold at 0200 per patient request.    LDA:   2 lumen PICC is patent in right arm and saline locked.    Plan: Continue to monitor pt and encourage compliance with care and repositioning.           Never smoker

## 2021-05-26 ENCOUNTER — RECORDS - HEALTHEAST (OUTPATIENT)
Dept: ADMINISTRATIVE | Facility: CLINIC | Age: 66
End: 2021-05-26

## 2021-05-26 NOTE — PROGRESS NOTES
Hospitalist Progress Note        Assessment and Plan  Principal Problem:    Sepsis (H)  Active Problems:    Hospital-acquired pneumonia    Personal history of PE (pulmonary embolism)    Normocytic anemia    Noncompliance with medication regimen    Severe major depression, single episode, with psychotic features (H)    Paranoia (H)    Unable to control anger    Cough    Nonintractable epilepsy without status epilepticus, unspecified epilepsy type (H)    Acute pulmonary edema (H)    Cigarette smoker      Respiratory difficulty  -  Likely multifactorial.  Ongoing tobacco use likely playing a role.  -  Continue furosemide  -  Tobacco cessation education provided today      Paraplegia  -  Was due to an epidural hematoma  -  Continue baclofen  -  Continue gabapentin  -  Supportive care  -  Decubitus ulcer pecautions  -  Wound service consulted      History of seizure disorder  -  Continue levetiracetam  -  Monitoring     History of DVT and PE  -  Continue rivaroxaban anticoagulation      Hypothyroidism  -  Continue levothyroxine      Chronic pain syndrome  -  Continue methadone     Anxiety, other psychiatric conditions  -  Continue quetiapine    AQUILINO  -  Continue ferrous sulfate iron supplementation  -  Monitoring hemoglobin    Overactive bladder  -  Continue oxybutynin    Possible UTI  -  Oral levofloxacin course ordered    Stage IV sacral decubitus ulcer with chronic osteomyelitis, heel wound  -  Wound service consulted    Tobacco use  -  Tobacco cessation education  -  Nicotine replacement therapy as indicated    Anxiety  -  May be responsible for tachycardia  -  PRN lorazepam ordered       VTE risk reduction.  On rivaroxaban anticoagulation.      Discharge.  Awaiting evaluation regarding placement.               Brief Summary  63-year-old lady with a very complex past medical history including alcohol dependence, anxiety, chronic kidney disease, history of spinal hematoma with resultant lower extremity paraplegia and a  chronic indwelling suprapubic catheter, recurrent UTIs, chronic pain syndrome, COPD, depression, history of DVT, GERD, gastroparesis, hypertension, hypothyroidism, lung cancer status post Left lower lobe resection, obesity, rheumatoid arthritis and pulmonary embolism on chronic anticoagulation who is admitted for evaluation and management of respiratory difficulty.    Subjective and Interim Events  Declined EKG, blood work.  Did not report CP or palpitations.    Physical Examination    Vital signs in last 24 hours  Temp:  [98  F (36.7  C)] 98  F (36.7  C)  Heart Rate:  [116] 116  Resp:  [20] 20  BP: (101)/(68) 101/68 98% O2 Device: None (Room air) O2 Flow Rate (L/min): 4 L/min  Weight:   163 lb (73.9 kg) Weight change:     General: Middle-aged lady in wheelchair.  Awake, comfortable, NAD   HEENT: Sclera white.  No redness or jaundice.   Cardiac: RRR, no abnormal heart sounds   Pulmonary: CTA chirag   Abdomen: Nondistended.  Nontender to palpation.   Musculoskeletal: Paresis of bilateral lower extremities  Skin: Normally turgid   Neurologic: Awake, alert, appropriately interactive  Psychiatric: Calm      Intake/Output last 3 shifts  I/O last 3 completed shifts:  In: 475 [P.O.:475]  Out: 825 [Urine:825]  Body mass index is 29.81 kg/m .        Pertinent Labs   Lab Results: personally reviewed.   Results from last 7 days   Lab Units 03/20/19  2359 03/20/19  1803 03/20/19  1335 03/19/19  0650 03/18/19  0504   LN-SODIUM mmol/L  --  136  --  137 138   LN-POTASSIUM mmol/L 4.2 3.5 3.1* 3.7 3.7   LN-CHLORIDE mmol/L  --  94*  --  94* 93*   LN-CO2 mmol/L  --  31  --  31 31   LN-BLOOD UREA NITROGEN mg/dL  --  20  --  19 15   LN-CREATININE mg/dL  --  0.74  --  0.73 0.66   LN-CALCIUM mg/dL  --  9.3  --  9.1 9.5     Results from last 7 days   Lab Units 03/20/19  1803 03/16/19  0617   LN-WHITE BLOOD CELL COUNT thou/uL 15.5* 13.7*   LN-HEMOGLOBIN g/dL 9.8* 9.4*   LN-HEMATOCRIT % 33.0* 31.8*   LN-PLATELET COUNT thou/uL 338 265    LN-NEUTROPHILS RELATIVE PERCENT %  --  79*   LN-MONOCYTES RELATIVE PERCENT %  --  5         Pertinent Radiology   Radiology Results: Personally reviewed impression/s     XR CHEST 1 VIEW PORTABLE  3/18/2019 11:17 AM     INDICATION: Cough, possible acute pulmonary edema  COMPARISON: 03/13/2019     FINDINGS: No change. Heart size normal. Mild diffuse interstitial prominence but no new focal pneumonia. PICC line appears in good position.          EKG Results: Personally reviewed ECG tracing.  3/9/19 ECG demonstrated sinus tachycardia.                Kurt Coleman MD, MS  Internal Medicine Hospitalist  3/22/2019

## 2021-05-26 NOTE — PLAN OF CARE
States pain 10/10. Pain meds [id number]. Lungs are coarse. Cough medication qhrs.  Declined ekg and metoprolol medication that was just ordered today. md notified and will come and talk with her. Some dressings were change that she was willing to let me do. Has been in the chair all day. Goes out to smoke.  Continue to monitor.

## 2021-05-26 NOTE — PLAN OF CARE
Problem: Discharge Barriers  Goal: Patient's discharge needs are met  Outcome: Progressing  Care Plan  Care Management        Care Management Goals of the Day: Progression of care     Care Progression Reviewed With: Charge RN, MD, HUC, RNCM, CMSW     Barriers to Discharge: CADI Assessment     Discharge Disposition: [facility]     Expected Discharge Date: TBD     Transportation: [facility] ([phone])        Care Coordination Narrative:       Pt has been accepted at [facility]. Pt will have her CADI assessment will be on Wednesday 3/27/2019 at 10:00am. [place] is the the [facility]  ([phone]/F: [phone]).

## 2021-05-26 NOTE — PROGRESS NOTES
Hospitalist Progress Note        Assessment and Plan  Principal Problem:    Sepsis (H)  Active Problems:    Hospital-acquired pneumonia    Personal history of PE (pulmonary embolism)    Normocytic anemia    Noncompliance with medication regimen    Severe major depression, single episode, with psychotic features (H)    Paranoia (H)    Unable to control anger    Cough    Nonintractable epilepsy without status epilepticus, unspecified epilepsy type (H)    Acute pulmonary edema (H)    Cigarette smoker      Respiratory difficulty  -  Likely multifactorial.  Ongoing tobacco use likely playing a role.  -  Continue furosemide  -  Tobacco cessation education provided today      Paraplegia  -  Was due to an epidural hematoma  -  Continue baclofen  -  Continue gabapentin  -  Supportive care  -  Decubitus ulcer pecautions  -  Wound service consulted      History of seizure disorder  -  Continue levetiracetam  -  Monitoring     History of DVT and PE  -  Continue rivaroxaban anticoagulation      Hypothyroidism  -  Continue levothyroxine      Chronic pain syndrome  -  Continue methadone     Anxiety, other psychiatric conditions  -  Continue quetiapine    AQUILINO  -  Continue ferrous sulfate iron supplementation  -  Monitoring hemoglobin    Overactive bladder  -  Continue oxybutynin    Possible UTI  -  Oral levofloxacin course ordered    Stage IV sacral decubitus ulcer with chronic osteomyelitis, heel wound  -  Wound service consulted    Tobacco use  -  Tobacco cessation education  -  Nicotine replacement therapy as indicated    Anxiety  -  May be responsible for tachycardia  -  PRN lorazepam ordered       VTE risk reduction.  On rivaroxaban anticoagulation.      Discharge.  Awaiting evaluation regarding placement.               Brief Summary  63-year-old lady with a very complex past medical history including alcohol dependence, anxiety, chronic kidney disease, history of spinal hematoma with resultant lower extremity paraplegia and a  chronic indwelling suprapubic catheter, recurrent UTIs, chronic pain syndrome, COPD, depression, history of DVT, GERD, gastroparesis, hypertension, hypothyroidism, lung cancer status post Left lower lobe resection, obesity, rheumatoid arthritis and pulmonary embolism on chronic anticoagulation who is admitted for evaluation and management of respiratory difficulty.    Subjective and Interim Events  Declined EKG, blood work.  Did not report CP or palpitations.    Physical Examination    Vital signs in last 24 hours  Heart Rate:  [129-132] 129  Resp:  [18-20] 18  BP: (102-157)/(59-68) 102/59 98% O2 Device: None (Room air) O2 Flow Rate (L/min): 4 L/min  Weight:   163 lb (73.9 kg) Weight change:     General: Middle-aged lady in wheelchair.  Awake, comfortable, NAD   HEENT: Sclera white.  No redness or jaundice.   Cardiac: RRR, no abnormal heart sounds   Pulmonary: CTA chirag   Abdomen: Nondistended.  Nontender to palpation.   Musculoskeletal: Paresis of bilateral lower extremities  Skin: Normally turgid   Neurologic: Awake, alert, appropriately interactive  Psychiatric: Calm      Intake/Output last 3 shifts  I/O last 3 completed shifts:  In: 700 [P.O.:700]  Out: 1200 [Urine:1200]  Body mass index is 29.81 kg/m .        Pertinent Labs   Lab Results: personally reviewed.   Results from last 7 days   Lab Units 03/20/19  2359 03/20/19  1803 03/20/19  1335 03/19/19  0650 03/18/19  0504   LN-SODIUM mmol/L  --  136  --  137 138   LN-POTASSIUM mmol/L 4.2 3.5 3.1* 3.7 3.7   LN-CHLORIDE mmol/L  --  94*  --  94* 93*   LN-CO2 mmol/L  --  31  --  31 31   LN-BLOOD UREA NITROGEN mg/dL  --  20  --  19 15   LN-CREATININE mg/dL  --  0.74  --  0.73 0.66   LN-CALCIUM mg/dL  --  9.3  --  9.1 9.5     Results from last 7 days   Lab Units 03/20/19  1803   LN-WHITE BLOOD CELL COUNT thou/uL 15.5*   LN-HEMOGLOBIN g/dL 9.8*   LN-HEMATOCRIT % 33.0*   LN-PLATELET COUNT thou/uL 338         Pertinent Radiology   Radiology Results: Personally reviewed  impression/s     XR CHEST 1 VIEW PORTABLE  3/18/2019 11:17 AM     INDICATION: Cough, possible acute pulmonary edema  COMPARISON: 03/13/2019     FINDINGS: No change. Heart size normal. Mild diffuse interstitial prominence but no new focal pneumonia. PICC line appears in good position.          EKG Results: Personally reviewed ECG tracing.  3/9/19 ECG demonstrated sinus tachycardia.                Kurt Coleman MD, MS  Internal Medicine Hospitalist  3/23/2019

## 2021-05-26 NOTE — PLAN OF CARE
Daily Care      Daily care needs are met Progressing        Pain      Patient's pain/discomfort is manageable Progressing        Safety      Patient will be injury free during hospitalization Progressing          Pt c/o of generalized pain rating it a 10/10. She refused vitals and all assesments. Pt refused to allow writer to do wound care. Pt also refused lab. MD notified. Per MD he rescheduled all morning labs and would like them drawn now. When writer attempted to draw labs pt refused again and stated that she would let the next RN get them. Writer will pass along in report.

## 2021-05-26 NOTE — PROGRESS NOTES
Wound Ostomy  WOC Assessment       Allergies:  No Known Allergies    Diagnosis:   Patient Active Problem List    Diagnosis Date Noted     Cigarette smoker      Acute pulmonary edema (H)      Cough      Nonintractable epilepsy without status epilepticus, unspecified epilepsy type (H)      Unable to control anger      Severe major depression, single episode, with psychotic features (H)      Paranoia (H)      Upper back pain      Abnormal CT scan of lung      Panic anxiety syndrome      Pelvic pain 01/09/2019     Ulcer due to Treponema vincentii, with fat layer exposed (H) 01/07/2019     Atelectasis      Tension-type headache, not intractable, unspecified chronicity pattern      Weakness of left hand 12/04/2018     Focal motor seizure (H) 12/04/2018     Pelvic pain in female 11/21/2018     Chronic osteomyelitis (H)      Drug-induced constipation      Quadriplegia (H)      Goals of care, counseling/discussion 07/16/2018     Advanced care planning/counseling discussion 07/16/2018     Malingerer for IV Dilaudid 07/16/2018     Moderate protein-calorie malnutrition (H) 07/16/2018     Infection 07/14/2018     Noncompliance with medication regimen      Stage IV pressure ulcer of sacral region (H) 07/11/2018     Gangrene (H) 07/10/2018     Centrilobular emphysema (H) 07/02/2018     Bilateral lower extremity edema 07/02/2018     Hypoxemia 07/01/2018     Left lower lobe pneumonia (H) 06/27/2018     Acute bronchitis due to other specified organisms 06/27/2018     Nephrostomy complication (H) 03/06/2018     History of encephalopathy      Closed left subtrochanteric femur fracture (H) 02/27/2018     Acute blood loss anemia      Other specified hypotension      Normocytic anemia 02/26/2018     Adjustment disorder with mixed anxiety and depressed mood      Sleep difficulties      Irritability and anger      Fever in other diseases 01/31/2018     Severe sepsis (H) 01/31/2018     Ulcer 01/31/2018     Hypothyroidism due to Hashimoto's  thyroiditis      Abscess, gluteal, right      History of DVT (deep vein thrombosis)      Urinary incontinence due to urethral sphincter incompetence      Weakness 09/07/2017     Chronic anticoagulation 09/03/2017     Urinary tract infection associated with cystostomy catheter (H) 09/02/2017     Dislodged wound Vacuum 08/14/2017     Abdominal pain, unspecified location      Coagulopathy (H)      Anxiety      UTI (urinary tract infection) 08/10/2017     Elevated lactic acid level 07/29/2017     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     Suprapubic catheter dysfunction, subsequent encounter      Bilateral hydronephrosis      Cystitis      Colon wall thickening      Abdominal pain 06/03/2017     Flank pain 06/02/2017     Right upper quadrant abdominal pain 06/02/2017     Episodic cluster headache, not intractable      Sepsis (H) 05/28/2017     Sepsis secondary to UTI (H) 05/28/2017     Pyelonephritis      Hyponatremia      Chronic obstructive pulmonary disease with acute exacerbation (H) 04/18/2017     Depression 04/18/2017     Partial symptomatic epilepsy with simple partial seizures, intractable, with status epilepticus (H)      Acute on chronic intracranial subdural hematoma (H)      Brain edema (H)      Subdural hematoma (H) 04/09/2017     Convulsions, unspecified convulsion type (H)      Neck infection 03/31/2017     Lower abdominal pain 03/01/2017     Fever, unspecified fever cause      Pneumonia of left lower lobe due to infectious organism (H)      S/P cholecystectomy      Choledocholithiasis with obstruction 01/02/2017     Cholelithiasis 01/02/2017     Hx of pulmonary embolus 01/02/2017     Claustrophobia      Urinary tract infection, site unspecified      Hypotension, unspecified hypotension type      Chronic low back pain without sciatica, unspecified back pain laterality      Acute encephalopathy      Sepsis, due to unspecified organism (H) 12/30/2016     Personal history of PE (pulmonary embolism) 12/01/2016      "Cognitive disorder      Insomnia due to anxiety and fear      HCAP (healthcare-associated pneumonia) 11/04/2016     Atypical chest pain 10/23/2016     Diastolic CHF, acute on chronic (H)      Chest tightness or pressure      History of MDR Enterobacter cloacae infection      Anxiety disorder      Esophageal dysmotility      Major depressive disorder, recurrent episode, moderate (H)      Seizure disorder (H)      Heel ulcer, right, limited to breakdown of skin (H)      Decubitus ulcer of sacral region, stage 4 (H)      Paraplegia at T4 level (H) 04/28/2016     Hypokalemia      Chronic pain syndrome 01/12/2016     Major depression 01/12/2016     Alcohol abuse 01/12/2016     Hospital-acquired pneumonia 10/26/2015     COPD exacerbation (H) 08/03/2015     Gastroesophageal reflux disease without esophagitis 05/26/2015     Acquired hypothyroidism 05/26/2015     Iron deficiency anemia 05/26/2015     Opioid-induced constipation (OIC) 05/26/2015     Paraplegia (H) 05/26/2015     Palliative care encounter 12/08/2014     Nephrostomy tube displaced (H) 12/02/2014     Mechanical complication of suprapubic catheter (H) 11/20/2014     Acute respiratory failure with hypoxia (H) 08/19/2014     COPD (chronic obstructive pulmonary disease) (H) 08/19/2014     Lactic acidosis 08/19/2014     SVT (supraventricular tachycardia) (H) 08/19/2014     Dislodged Bhandari catheter, subsequent encounter 07/30/2014     Other chronic pain      Lumbosacral Disc Degeneration      Herpes Simplex Type I      Nicotine Dependence      Essential hypertension      Cancer of lung (H) 09/24/2013     Neurogenic bladder 01/29/2013     Neurogenic bowel 01/29/2013     Decubitus ulcer 01/25/2013     Mixed hyperlipidemia 01/25/2013     Decubitus ulcer of right buttock, stage 4 (H) 01/25/2013     Decubitus ulcer, stage III (H) 01/25/2013       Height:  5' 2\" (1.575 m)    Weight:   163 lb (73.9 kg)    Labs:  Recent Labs     03/20/19  1803   HGB 9.8*       Isaiah:  Isaiah " Scale Score: 14    Specialty Bed:  [facility]    Wound culture obtained: No    Edema:  No    Patient not in bed at coordinated assessment time this morning. Patient did allow assessment of left foot only.    Anatomic Site/Laterality: Sacrum    Reason for ongoing care:   Wound assessment and plan of care    Encounter Type:  Subsequent Encounter Patient refused assessment of this wound today, see previous assessment from 3/5/19 listed below:    Wound Type:   Pressure Injury (Pressure Ulcer): Present on Admit    Stage:  Stage 4    Associated conditions/related trauma: Patient with malnutrition, paraplegia, adjustment disorder, history of pressure injuries to buttocks, incontinence of both bowel and bladder, CHF, chronic pain syndrome.  Patient's wounds made worse by frequent refusal of cares including turning and being cleaned up.  Had debridement on 1/10/19.    Assessment:    Length: 3 cm    Width: 3 cm    Depth: 2 cm  (measurements positional for this wound)    Tunneling/Undermining: Yes Up to 3cm towards 12 o'clock    Wound Bed: 100% Granular (of wound bed able to be visualized)    Exudate: Yes Serosanguineous Moderate    Periwound Skin: Scar Tissue    Treatment Plan: Gauze: Wet-to-moist, cover with Mepilex Sacral       Anatomic Site/Laterality: B buttocks    Reason for ongoing care:   Wound assessment and plan of care    Encounter Type:  Subsequent Encounter Patient refused assessment of this wound today, see previous assessment from 3/5/19 listed below:    Wound Type:   Pressure Injury (Pressure Ulcer): Present on Admit    Stage:  Stage 3 v. Full thickness IAD (patient sits in urine and fecal matter for hours at a time, refusing to let nursing staff clean area)    Associated conditions/related trauma: See above    Assessment:    Multiple areas of denudement noted to B buttocks likely related to pressure and/or IAD injury. Patient will not turn onto left side or farther over onto right side to allow better assessment  "of R trochanter/lateral buttock area. Calmoseptine was ordered yesterday based upon assessment of staff RN. This is an appropriate barrier product to use, but patient will not allow it to be used stating, \"it dries my skin too much.\" Patient note to have satellite lesions to upper aspect of buttock erythema indicating there may be a fungal component as well. Discussed use of Critic Aid AF with patient - barrier protection as well as more moisturizing than zinc based product. This will also treat possible fungal component and hopefully decrease irritation to skin in this area.    Tunneling/Undermining: No    Wound Bed: fibrin/granular mix    Exudate: Yes Serosanguineous Large    Periwound Skin: Scar Tissue    Treatment Plan: Antifungal: Critic Aid AF           Anatomic Site/Laterality: L IT    Reason for ongoing care:   Wound assessment and plan of care    Encounter Type:  Subsequent Encounter Patient refused assessment of this wound today, see previous assessment from 3/5/19 listed below:    Wound Type:   Pressure Injury (Pressure Ulcer): Present on Admit    Stage:  US    Associated conditions/related trauma: Patient with malnutrition, paraplegia, adjustment disorder, history of pressure injuries to buttocks, incontinence of both bowel and bladder, CHF, chronic pain syndrome.  Patient's wounds made worse by frequent refusal of cares including turning and being cleaned up.  Had debridement on 1/10/19.    Assessment:    Length: 0.5 cm    Width: 0.5 cm    Tunneling/Undermining: No    Wound Bed: 100% Dried drainage    Exudate: No    Periwound Skin: Erythema and satellite lesions    Treatment Plan: Open to air       Anatomic Site/Laterality: Right dorsal foot     Reason for ongoing care:   Wound assessment and plan of care    Encounter Type:  Subsequent Encounter Patient refused assessment of this wound today. See previous assessment from 3/14/19 listed below.     Wound Type:   Denudement:  Foreign body present? " No    Tissue Damage:   Exposed fat layer    Related trauma: Abrasion vs pressure injury.    Assessment:    0.7x0.7cm    Tunneling/Undermining: No    Wound Bed: 100% Agranular    Exudate: Yes Serosanguineous Scant    Periwound Skin: Scar Tissue    Treatment Plan: Silicone foam and gauze as this is only thing patient will allow on wound.       Anatomic Site/Laterality: Right lateral shin    Reason for ongoing care:   Wound assessment and plan of care    Encounter Type:  Subsequent Encounter Patient refused assessment of this wound today. See previous assessment from 3/14/19 listed below.     Pressure Injury (Pressure Ulcer): Present on Admit    Stage:  Stage 3    Associated conditions/related trauma: See above    Assessment:    Length: 5.5cm    Width: 2cm    Depth: 1.5cm    Tunneling/Undermining: No    Wound Bed: 80% Agranular and 20% Eschar/Dried drainage    Exudate: Yes Serosanguineous Moderate    Periwound Skin: Intact, distal to above wound is 0.5x2cm area of dried drainage abrasion    Treatment Plan: Silicone foam and gauze as this is only thing patient will allow on wound.     Anatomic Site/Laterality: Right heel    Reason for ongoing care:   Wound assessment and plan of care    Encounter Type:  Subsequent Encounter Patient refused assessment of this wound today. See previous assessment from 3/14/19 listed below.     Pressure Injury (Pressure Ulcer): Present on Admit    Stage:  Stage 3    Associated conditions/related trauma: See above    Assessment:    Approximately 2x1cm, unable to fully measure as patient will only partially allow foot to be raised    Tunneling/Undermining: No    Wound Bed: Approximately 100% Dried Drainage    Exudate: No    Periwound Skin: Scar Tissue    Treatment Plan: Silicone foam and gauze as this is only thing patient will allow on wound.  Patient refusing Rooke boots, continue offloading with pillow and bath blanket     Anatomic Site/Laterality: Left heel    Reason for ongoing care:    Wound assessment and plan of care    Encounter Type:  Subsequent Encounter     Pressure Injury (Pressure Ulcer): Present on Admit    Stage:  Unstageable    Associated conditions/related trauma: See above    Assessment:    Heel is 4x4.2x0.2cm, 40% granular, 60% fibrin    Medial foot is 5x3cm, 10% agranular, 20% slough, 70% eschar    Tunneling/Undermining: No    Wound Bed: see above    Exudate: Yes Serosanguineous Moderate    Periwound Skin: Maceration and Scar Tissue    Treatment Plan: Silicone foam and gauze as this is only thing patient will allow on wound. Patient refusing Rooke boots, continue offloading with pillow and bath blanket     Anatomic Site/Laterality: Bilateral toes    Reason for ongoing care:   Wound assessment and plan of care    Encounter Type:  Subsequent Encounter Assessment of L foot only    Denudement:  Foreign body present? No    Tissue Damage:   Breakdown of skin    Related trauma: Patient with multiple abrasions to toes from bumping into things.    Assessment:    Scattered areas of dried drainage noted to be intact on multiple toes.    Tunneling/Undermining: No    Wound Bed: 100% Dried Drainage    Exudate: No    Periwound Skin: Intact    Treatment Plan: Open to Air     Nursing care provided was coordinated care with RN.    Discussed plan of care with nurse and patient.    Outcomes and treatment recommendations are to promote skin integrity, contain exudate, promote wound healing and promote debridement autolytic.    Actions taken by WOC RN: 5 minutes of education.      Planned Follow Up: Weekly assessments of each wound will attempted by WOC RN; however, patient is frequently up in chair in the early AM and does not return to bed until later evening hours. At other times she will not allow assessment of wounds d/t a variety of reasons.    Plan for next visit: Reassess wound(s) and Reassess skin integrity.

## 2021-05-26 NOTE — PLAN OF CARE
Compliance with treatment regimen Not Progressing      Skin integrity is maintained or improved Not Progressing      Patient's pain/discomfort is manageable Progressing    Pain controlled with medication. Very vocal and able to make needs known. Refusing cares, repositioning, and some assessments. Bhandari catheter replaced as it had become occluded. Urine cloudy with sediment. Has cleared some since catheter replaced. Monitoring.

## 2021-05-27 ENCOUNTER — RECORDS - HEALTHEAST (OUTPATIENT)
Dept: ADMINISTRATIVE | Facility: CLINIC | Age: 66
End: 2021-05-27

## 2021-05-27 NOTE — PLAN OF CARE
Breathing      STG - Patient will maintain patent airway Progressing      STG - Respiratory rate and effort will be within normal limits for the patient Progressing      Patient performs or directs assisted coughing Progressing

## 2021-05-27 NOTE — PLAN OF CARE
Psychosocial Needs      Demonstrates ability to cope with hospitalization/illness Not Progressing          Pain      Patient's pain/discomfort is manageable Progressing        Pt alter, demanding, pt refuses vital signs and assessment, pt was sleeping in wheelchair until 0400, out to smoke x 1 by herself, pt assisted back to bed at 0530 with lift, pt reports pain 10/10, PRN medications given per pt requested, pt went back to sleep again, will continue to monitor.

## 2021-05-27 NOTE — ANESTHESIA PROCEDURE NOTES
Emergent Intubation  Date/Time: 4/10/2019 6:24 AM    Performing CRNA: Brennan Loza CRNA  Indications: cardio/pulmonary arrest  Route: oral  Technique: direct  Laryngoscope size: June 2  Tube size: 7.5 mm  Tube type: cuffed  Cuff inflated: yes  Level of Difficulty: 0ETT to lip: 23 cm  Tube secured with: ETT norman    Sign out given. CXR and sedation per primary care team.  Comments: CPR in progress. Breathing per ambu. Monitor attached. Sid attached and started. EZ DL with june 2. Good color change on ett sensor.

## 2021-05-27 NOTE — PROGRESS NOTES
Spiritual Care Note    Spiritual Assessment:   referred to provide support for patient; patient had just passed.  arrived to find no family present.     Care Provided:  offered blessing and prayers of commendation.     Plan of Care: No follow up required.     Rest In Peace Dear Marychuy!!     CATA Huizar, BCC

## 2021-05-27 NOTE — PLAN OF CARE
Patient refused assessments.  Re-approached a few times but she continued to put off having assessments done.  Patient went outside several times.  She also sent down to unit [id number] where there was an incident( See Charge Nurse's notes).    Patient is currently still up in her wheel.  Stated she will let us know when she is ready for bed.  Reported offf to on-coming Nurse.

## 2021-05-27 NOTE — PROGRESS NOTES
Attempts were made to contact family for a change in pts condition. Attempt to reach Sánchez on listed home & cell number and reached a recording stating the number was blocked, no attempt was made to call the work phone number at 0615 am. A call was placed with a message left on the cell number for Bill asking to return the call to the unit number, [phone], regarding a change in the pts condition. The only other name listed as contact did not have a number to call.

## 2021-05-27 NOTE — PLAN OF CARE
Compliance with treatment regimen Not Progressing      Patient's pain/discomfort is manageable Progressing      Pt oxygen noted 86-90% room air while laying in bed, lungs coarse and congested.  2L o2 applied with increase to 92-94%.     Pt's pain controlled with prn medications - flexeril, dilaudid, cough syrup, and ibuprofen administered this shift.     Pt refused repositioning, wound care, and some assessments.  Mood is irritable and demanding, very particular about how staff assist her.

## 2021-05-27 NOTE — PROGRESS NOTES
Informed Dr. Edwards that patient is insisting to go outside in her motorized wheelchair to smoke. Dr. Edwards spoke with patient and explained that we don't have liabilities for patient safety after she signed release form . In 10 min after patient left floor external code 9 was called.

## 2021-05-27 NOTE — PROGRESS NOTES
Patient is on room air sating 94  RR 20. Breath sounds diminished with few exp wheezes. Neb given x 1.0800 neb not given, patient not available out smoking. Rt continue to monitor.

## 2021-05-27 NOTE — PROGRESS NOTES
Writer attempting to follow Potassium protocol but due to pts refusal of doses it is difficult to time lab draws.7pm Potassium lab not collected because potassium has not been administered per protocol.   9pm med pass missed due too pt sleeping and then pt went outside when she woke up at 11pm.

## 2021-05-27 NOTE — PROGRESS NOTES
Hospitalist Progress Note        Assessment and Plan  Principal Problem:    Sepsis (H)  Active Problems:    Hospital-acquired pneumonia    Personal history of PE (pulmonary embolism)    Normocytic anemia    Noncompliance with medication regimen    Severe major depression, single episode, with psychotic features (H)    Paranoia (H)    Unable to control anger    Cough    Nonintractable epilepsy without status epilepticus, unspecified epilepsy type (H)    Acute pulmonary edema (H)    Cigarette smoker      Respiratory difficulty  -  Likely multifactorial.  Ongoing tobacco use likely playing a role.  -  Continue furosemide  -  Tobacco cessation education provided       Paraplegia  -  Was due to an epidural hematoma  -  Continue baclofen  -  Continue gabapentin  -  Supportive care  -  Decubitus ulcer pecautions  -  Wound service consulted      History of seizure disorder  -  Continue levetiracetam  -  Monitoring     History of DVT and PE  -  Continue rivaroxaban anticoagulation      Hypothyroidism  -  Continue levothyroxine      Chronic pain syndrome  -  Continue methadone     Anxiety, other psychiatric conditions  -  Continue quetiapine  -  Initiated on lorazepam    AQUILINO  -  Continue ferrous sulfate iron supplementation  -  Monitoring hemoglobin    Overactive bladder  -  Continue oxybutynin    Possible UTI  -  Oral levofloxacin course ordered  -  Clinical monitoring    Stage IV sacral decubitus ulcer with chronic osteomyelitis, heel wound  -  Wound service consulted    Tobacco use  -  Tobacco cessation education  -  Nicotine replacement therapy as indicated    Anxiety  -  May be responsible for tachycardia  -  PRN lorazepam ordered    Tachycardia  -  Declined EKG  -  Patient declined metoprolol succinate as she is concerned about lowering of her blood pressure       VTE risk reduction.  On rivaroxaban anticoagulation.      Discharge.  Awaiting evaluation regarding placement.               Brief Summary  63-year-old  lady with a very complex past medical history including alcohol dependence, anxiety, chronic kidney disease, history of spinal hematoma with resultant lower extremity paraplegia and a chronic indwelling suprapubic catheter, recurrent UTIs, chronic pain syndrome, COPD, depression, history of DVT, GERD, gastroparesis, hypertension, hypothyroidism, lung cancer status post Left lower lobe resection, obesity, rheumatoid arthritis and pulmonary embolism on chronic anticoagulation who is admitted for evaluation and management of respiratory difficulty.    Subjective and Interim Events  Reported improvement in her breathing.  Did not report CP or palpitations.    Physical Examination    Vital signs in last 24 hours  Heart Rate:  [116] 116  Resp:  [18] 18  BP: (141)/(83) 141/83 93% O2 Device: None (Room air) O2 Flow Rate (L/min): 4 L/min  Weight:   163 lb (73.9 kg) Weight change:     General: Middle-aged lady in wheelchair.  Awake, comfortable, NAD   HEENT: Sclera white.  No redness or jaundice.   Cardiac: RRR, no abnormal heart sounds   Pulmonary: Rhonchi in bilateral lung fields  Abdomen: Nondistended.  Nontender to palpation.   Musculoskeletal: Paresis of bilateral lower extremities  Skin: Normally turgid   Neurologic: Awake, alert, appropriately interactive  Psychiatric: Calm      Intake/Output last 3 shifts  I/O last 3 completed shifts:  In: 1010 [P.O.:950; I.V.:60]  Out: 2100 [Urine:2100]  Body mass index is 29.81 kg/m .        Pertinent Labs   Lab Results: personally reviewed.   Results from last 7 days   Lab Units 03/23/19 2024 03/20/19  2359 03/20/19  1803  03/19/19  0650   LN-SODIUM mmol/L 136  --  136  --  137   LN-POTASSIUM mmol/L 3.9  2.7* 4.2 3.5   < > 3.7   LN-CHLORIDE mmol/L 94*  --  94*  --  94*   LN-CO2 mmol/L 28  --  31  --  31   LN-BLOOD UREA NITROGEN mg/dL 26*  --  20  --  19   LN-CREATININE mg/dL 0.95  --  0.74  --  0.73   LN-CALCIUM mg/dL 8.4*  --  9.3  --  9.1    < > = values in this interval not  displayed.     Results from last 7 days   Lab Units 03/23/19 2024 03/20/19  1803   LN-WHITE BLOOD CELL COUNT thou/uL 10.8 15.5*   LN-HEMOGLOBIN g/dL 9.0* 9.8*   LN-HEMATOCRIT % 30.4* 33.0*   LN-PLATELET COUNT thou/uL 361 338         Pertinent Radiology   Radiology Results: Personally reviewed impression/s     XR CHEST 1 VIEW PORTABLE  3/18/2019 11:17 AM     INDICATION: Cough, possible acute pulmonary edema  COMPARISON: 03/13/2019     FINDINGS: No change. Heart size normal. Mild diffuse interstitial prominence but no new focal pneumonia. PICC line appears in good position.          EKG Results: Personally reviewed ECG tracing.  3/9/19 ECG demonstrated sinus tachycardia.                Kurt Coleman MD, MS  Internal Medicine Hospitalist  3/24/2019

## 2021-05-27 NOTE — PROGRESS NOTES
[facility] Daily Progress Note      Date of Service: 3/31/2019     Brief History: Marychuy Zhou is 63 y.o. female with history of alcohol dependence, anxiety disorder, chronic kidney disease, history of spinal hematoma with resultant lower extremity paraplegia and a chronic indwelling suprapubic catheter, recurrent UTIs, chronic pain syndrome, COPD, depression, history of DVT, GERD, gastroparesis, hypertension, hypothyroidism, lung cancer s/p left lower lobe resection, obesity, rheumatoid arthritis, pulmonary embolism on chronic anticoagulation, who was admitted to this hospital on 2/2/2019 for recurrent UTI, sacral decub ulcer and respiratory distress. She was treated with a course of antibiotics for hospital acquired pneumonia, and sacral decub ulcer with osteomyelitis. She has been very non compliant with cares and has been refusing medications and regular nursing cares.    Assessment:       COPD with acute exacerbation. Treated with a course of antibiotic for HCAP. Ongoing tobacco abuse, refusal of bronchial hygiene therapy, poor cough remains an issue. Currently stable. Patient requested X ray, which was done on 03/29, which shows improvement in lung aeration and no evidence of fluid overload or pneumonia. No evidence of exacerbation at this time.    Intermittent respiratory distress. Multifactorial in etiology. Treated for HCAP and COPD exacerbation. Not needing any supplemental oxygen. She is on furosemide. Ongoing tobacco abuse is playing a role as well. Refusing bronchial hygiene therapy and refusing to give up tobacco smoking.    History of paraplegia, secondary to epidural hematoma in 2010. She is on baclofen, gabapentin. She has suprapubic catheter.    Stage IV decub ulcer with osteomyelitis. Completed course of antibiotics. WOC following.    Bilateral leg wounds. Stable. WOC following.    History of seizure disorder on keppra.    History of DVT and PE on xarelto.    Hypothyroidism on  "synthroid.    Chronic pain syndrome, on methadone, and as needed hydromorphone.    Hypokalemia, likely from use of lasix. Replace as needed.    Overactive bladder on oxybutynin    Chronic anemia, multifactorial- iron deficiency    Ongoing tobacco abuse. She refuses to quit    Generalized anxiety disorder. She is on as needed ativan.    Plan:   Continue supportive cares. No need to add any antibiotic or steroid at this time. Continue with potassium replacement.    Subjective:     Chart reviewed, events noted. Pt seen and examined.  The patient apparently went to other patient's room (different unit- 3500), and was telling the nurses to turn the patient and give duoneb. Informed the patient that this type of behavior is not acceptable, especially for HIPAA concerns and given the fact that she has contact isolation.  The patient believes that she needs oral antibiotic for upper respiratory illness. I told her that there is no evidence of infection at this time. She then started accusing me that I don't want to treat her because of her refusal to give up smoking. I told her that I don't want to start antibiotic, as I do not believe that she has any sign of infection at this time. Discussed with nursing staff.    Objective     Vital signs in last 24 hours:  Temp:  [98.8  F (37.1  C)] 98.8  F (37.1  C)  Heart Rate:  [120-134] 134  Resp:  [20-26] 20  BP: (113-119)/(56-71) 113/71  Weight:   163 lb (73.9 kg)  Weight change:   Body mass index is 29.81 kg/m .    Intake/Output last 3 shifts:  I/O last 3 completed shifts:  In: 1320 [P.O.:1320]  Out: 650 [Urine:650]  Intake/Output this shift:  I/O this shift:  In: -   Out: 1500 [Urine:1500]      Physical Exam:  /71 (Patient Position: Sitting)   Pulse (!) 134   Temp 98.8  F (37.1  C) (Oral)   Resp 20   Ht 5' 2\" (1.575 m)   Wt 163 lb (73.9 kg)   LMP  (LMP Unknown)   SpO2 97%   BMI 29.81 kg/m      FiO2 (%): (room air) O2 Device: None (Room air) O2 Flow Rate (L/min): 4 " L/min    General Appearance: Alert, not in distress.  HEENT: Normocephalic. No scleral icterus. Mucous membranes moist.  Heart: S1 S2 heard. Regular rate and rhythm.  Lungs: Coarse breath sounds.  Abdomen: Soft, non tender, bowel sounds present.  Extremity: Bilateral leg edema.   Neurologic: Paraplegia      Lab Results:  personally reviewed.   Lab Results   Component Value Date     03/23/2019    K 3.1 (L) 03/30/2019    CL 94 (L) 03/23/2019    CO2 28 03/23/2019    BUN 26 (H) 03/23/2019    CREATININE 0.68 03/28/2019    CALCIUM 8.4 (L) 03/23/2019     Lab Results   Component Value Date    WBC 10.8 03/23/2019    WBC 6.0 09/19/2015    HGB 9.0 (L) 03/23/2019    HCT 30.4 (L) 03/23/2019    MCV 82 03/23/2019     03/23/2019       Advance Care Planning:   Barriers to discharge: Placement  Anticipated discharge day: Unknown  Disposition: To be determined      Stephen Frausto MD  [facility]  Paging: [employer] Paging

## 2021-05-27 NOTE — CONSULTS
Consultation - Infectious Disease  [facility]  Marychuy Zhou,  1955, MRN [id number]    Admitting Dx: Lactic acidosis [E87.2]  Chronic pain [G89.29]  Sacral decubitus ulcer [L89.159]  Sepsis (H) [A41.9]    PCP: Sánchez Zamora MD, [phone]       ASSESSMENT   63-year-old woman with a history of paraplegia, COPD, recurrent UTIs, chronic decubitus wounds who ID is consulted regarding positive urine culture.    1. Urinary tract infection.  Patient has chronic catheterization via a suprapubic catheter and urethral catheter.  As such he is chronically colonized.  Urine culture was collected in the setting of decompensation with altered mental status and hypotension.  It is difficult to interpret urine culture results, as she is expected to be chronically colonized.  Her urine culture grew ESBL producing E. coli along with Pseudomonas that is resistant to carbapenems.  She has been clinically stable despite not being treated for the Pseudomonas.  She does have episodes of hypotension, but this does not seem to be associated with other sepsis type symptoms.  Her urethral catheter was changed this morning when it came out by accident.  Per charting, the suprapubic has not been changed for greater than 30 days.  2. Chronic decubitus wounds.  Followed by wound care.  Multiple contributing factors including tobacco use and poor adherence to nursing cares that leads to persistent wounds.  Patient complaining of worsening left heel wound.  3. HCAP.  Currently on meropenem.  4. Chronic pain.    Principal Problem:    Sepsis (H)  Active Problems:    Hospital-acquired pneumonia    Personal history of PE (pulmonary embolism)    Normocytic anemia    Noncompliance with medication regimen    Severe major depression, single episode, with psychotic features (H)    Paranoia (H)    Unable to control anger    Cough    Nonintractable epilepsy without status epilepticus, unspecified epilepsy type (H)    Acute  pulmonary edema (H)    Cigarette smoker       PLAN   -Complete 7-day course of meropenem, 2 more days  -Short course of gentamicin x 5 days, pharmacy to dose for UTI  -discontinue vancomycin  -Change suprapubic catheter if it has not been done in the past 30 days  -Wound care to reevaluate wounds on Monday  -Chest x-ray  -Procalcitonin      Thank you for this consult. Will follow.    Apolinar Aguiar MD  [facility]  Direct messaging: [employer] Paging  On-Call ID provider: [phone], option: 9    ===========================================      Chief Complaint   Sepsis (H)       HPI     We have been requested by Jaron Edwards MD to evaluate Marychuy Zhou for the above.    History was obtained from patient.    Marychuy Zhou is a 63 y.o. woman with a history of paraplegia, COPD, tobacco use, recurring UTIs, chronic decubitus wounds known to ID service for multiple prolonged admissions.  ID is consulted regarding the urine culture with growth of resistant E. coli and Pseudomonas.  Urine culture was collected in the setting of transfer to the ICU for sepsis.  The patient became obtunded, hypotensive, and hypoxic.  She has been placed on meropenem for treatment of HCAP.  She has been transferred out of the ICU, and has been clinically stable.  She has had asymptomatic hypotension throughout the day.  She complains of excessive pain, requiring more pain meds, and volume overload.  She tells me she is worried about her sacral wounds and her left heel wound getting worse.  The patient has a pattern of refusing cares during her hospital stays, limiting vital collection and lab draws, and other nursing cares.  Patient frequently leaves her room to go smoking.  She tells me she does this to get out of her room to keep her mind off of the pain.  She says she smokes about 4 cigarettes a day.  This morning the patient's Bhandari catheter (urethral catheter) fell out and was  replaced.          Review of Systems   Ten systems reviewed and negative except for what is noted in the HPI       Medical History  Active Ambulatory (Non-Hospital) Problems    Diagnosis     Upper back pain     Abnormal CT scan of lung     Panic anxiety syndrome     Pelvic pain     Ulcer due to Treponema vincentii, with fat layer exposed (H)     Atelectasis     Tension-type headache, not intractable, unspecified chronicity pattern     Weakness of left hand     Focal motor seizure (H)     Pelvic pain in female     Chronic osteomyelitis (H)     Drug-induced constipation     Quadriplegia (H)     Goals of care, counseling/discussion     Advanced care planning/counseling discussion     Malingerer for IV Dilaudid     Moderate protein-calorie malnutrition (H)     Infection     Stage IV pressure ulcer of sacral region (H)     Gangrene (H)     Centrilobular emphysema (H)     Bilateral lower extremity edema     Hypoxemia     Left lower lobe pneumonia (H)     Acute bronchitis due to other specified organisms     Nephrostomy complication (H)     History of encephalopathy     Closed left subtrochanteric femur fracture (H)     Acute blood loss anemia     Other specified hypotension     Adjustment disorder with mixed anxiety and depressed mood     Sleep difficulties     Irritability and anger     Fever in other diseases     Severe sepsis (H)     Ulcer     Hypothyroidism due to Hashimoto's thyroiditis     Abscess, gluteal, right     History of DVT (deep vein thrombosis)     Urinary incontinence due to urethral sphincter incompetence     Weakness     Chronic anticoagulation     Urinary tract infection associated with cystostomy catheter (H)     Dislodged wound Vacuum     Abdominal pain, unspecified location     Coagulopathy (H)     Anxiety     UTI (urinary tract infection)     Elevated lactic acid level     Paroxysmal hemicrania     Suprapubic catheter dysfunction, subsequent encounter     Bilateral hydronephrosis     Cystitis      Colon wall thickening     Abdominal pain     Flank pain     Right upper quadrant abdominal pain     Episodic cluster headache, not intractable     Sepsis secondary to UTI (H)     Pyelonephritis     Hyponatremia     Chronic obstructive pulmonary disease with acute exacerbation (H)     Depression     Partial symptomatic epilepsy with simple partial seizures, intractable, with status epilepticus (H)     Acute on chronic intracranial subdural hematoma (H)     Brain edema (H)     Subdural hematoma (H)     Convulsions, unspecified convulsion type (H)     Neck infection     Lower abdominal pain     Fever, unspecified fever cause     Pneumonia of left lower lobe due to infectious organism (H)     S/P cholecystectomy     Choledocholithiasis with obstruction     Cholelithiasis     Hx of pulmonary embolus     Claustrophobia     Urinary tract infection, site unspecified     Hypotension, unspecified hypotension type     Chronic low back pain without sciatica, unspecified back pain laterality     Acute encephalopathy     Sepsis, due to unspecified organism (H)     Cognitive disorder     Insomnia due to anxiety and fear     HCAP (healthcare-associated pneumonia)     Atypical chest pain     Diastolic CHF, acute on chronic (H)     Chest tightness or pressure     History of MDR Enterobacter cloacae infection     Anxiety disorder     Esophageal dysmotility     Major depressive disorder, recurrent episode, moderate (H)     Seizure disorder (H)     Heel ulcer, right, limited to breakdown of skin (H)     Decubitus ulcer of sacral region, stage 4 (H)     Paraplegia at T4 level (H)     Hypokalemia     Chronic pain syndrome     Major depression     Alcohol abuse     COPD exacerbation (H)     Gastroesophageal reflux disease without esophagitis     Acquired hypothyroidism     Iron deficiency anemia     Opioid-induced constipation (OIC)     Paraplegia (H)     Palliative care encounter     Nephrostomy tube displaced (H)     Mechanical  complication of suprapubic catheter (H)     Acute respiratory failure with hypoxia (H)     COPD (chronic obstructive pulmonary disease) (H)     Lactic acidosis     SVT (supraventricular tachycardia) (H)     Dislodged Bhandari catheter, subsequent encounter     Other chronic pain     Lumbosacral Disc Degeneration     Herpes Simplex Type I     Nicotine Dependence     Essential hypertension     Cancer of lung (H)     Neurogenic bladder     Neurogenic bowel     Decubitus ulcer     Mixed hyperlipidemia     Decubitus ulcer of right buttock, stage 4 (H)     Decubitus ulcer, stage III (H)     Past Medical History:   Diagnosis Date     Alcohol dependence (H)      Anxiety      Cancer of lung (H) 9/24/2013     Chronic kidney disease      Chronic pain      Chronic suprapubic catheter (H)      Constipation      COPD (chronic obstructive pulmonary disease) (H)      Decubitus ulcer      Depression      Diastolic CHF, acute on chronic (H)      DVT (deep venous thrombosis) (H) 5/26/2015     ESBL (extended spectrum beta-lactamase) producing bacteria infection 08/2015     Gastroparesis      GERD (gastroesophageal reflux disease)      HCAP (healthcare-associated pneumonia)      Herpes Simplex Type I      Hyperlipemia      Hypertension      Hypothyroidism 5/26/2015     Intracranial subdural hematoma (H)      Kidney stone      Lower paraplegia (H) 4/28/2016     MRSA infection      Neurogenic bladder      Neuropathy (H) 5/26/2015     Obesity      Opiate dependence, continuous (H)      Osteoporosis      Pneumonia      Pulmonary embolism (H) 12/2016     Rheumatoid arthritis (H)      Sepsis (H) 5/28/2017     SVT (supraventricular tachycardia) (H) 8/19/2014     Vascular myelopathies (H)     Surgical History  She  has a past surgical history that includes pr appendectomy; pr removal of tonsils,<14 y/o; pr total abdom hysterectomy; pr monroe w/o facetec foramot/dskc 1/2 vrt seg, cervical; Fracture surgery; Cholecystectomy; PICC Team Line Insertion  (8/19/2014); PICC (12/31/2016); ERCP (N/A, 1/3/2017); Laparoscopic cholecystectomy (N/A, 1/4/2017); Craniectomy (Left, 4/10/2017); Colonoscopy (N/A, 6/8/2017); Decubitus ulcer excision (N/A, 12/22/2017); PICC (12/26/2017); hh midline insertion (2/1/2018); Decubitus ulcer excision (Right, 2/5/2018); PICC Team Line Insertion (2/27/2018); Decubitus ulcer excision (N/A, 7/12/2018); PICC Team Line Insertion (7/16/2018); CT Bone Biopsy (11/16/2018); IR PICC Placement 5+ Years W/O Fluoro Guidance (12/8/2018); Incision and drainage of wound (N/A, 1/10/2019); and PICC (2/16/2019).     Social History  Reviewed, and she  reports that she has been smoking cigarettes.  she has never used smokeless tobacco. She reports that she drinks alcohol. She reports that she does not use drugs.  Social History     Social History Narrative    The patient lives at home and has 24 hour care.   She has 2 sons and some grandchildren.    She won a malpractice lawsuit against the doctor who did not diagnose her spinal hematoma correctly.    Patient arrived in the ED alone 10/01/17     Family History  family history includes COPD in her brother, father, and mother; Liver disease in her father; Lung cancer in her sister.  family history reviewed and is not pertinent to the presenting problem.            Allergies   No Known Allergies      Antibiotics   Meropenem 4/2-  Vancomycin 4/1-    Previous:  Zosyn 4/1      Physical Exam     Temp:  [96.6  F (35.9  C)-97.6  F (36.4  C)] 97.5  F (36.4  C)  Heart Rate:  [] 100  Resp:  [18-20] 18  BP: (80-90)/(50-84) 80/55    Vitals:    04/06/19 2037   BP:    Pulse: 100   Resp: 18   Temp:    SpO2: 100%       GENERAL:  well-developed, well-nourished, sitting in chair in no acute distress.   HENT:  Head is normocephalic, atraumatic. Oropharynx is moist without exudates or ulcers.  EYES:  Eyes have anicteric sclerae without conjunctival injection or stigmata of endocarditis.   NECK:  Supple.  LUNGS: Coarse  sounds bilaterally with wheezing  CARDIOVASCULAR:  Regular rate and rhythm with no murmurs, gallops or rubs.  ABDOMEN:  Normal bowel sounds, soft, nontender.   EXT: Bilateral lower extremity edema  SKIN:  No acute rashes.  Left heel wound is wrapped and compared to the most recent wound care note.  Left heel ulcer seems to be bigger than a few days ago.  NEUROLOGIC:  Grossly nonfocal.      Cultures   4/1 urine culture: ESBL producing E. coli (sensitive to gentamicin, meropenem, nitrofurantoin, and tobramycin).  Pseudomonas aeruginosa (sensitive to aztreonam, cefepime, ceftazidime, gentamicin, Zosyn, and tobramycin).          Laboratory results     Results from last 7 days   Lab Units 04/06/19  0651 04/05/19  1115 04/03/19  0958 04/03/19  0405   LN-WHITE BLOOD CELL COUNT thou/uL 12.7*  --  7.9 7.5   LN-HEMOGLOBIN g/dL 7.6* 8.7* 7.4* 7.3*   LN-HEMATOCRIT % 26.1*  --  25.6* 24.7*   LN-PLATELET COUNT thou/uL 251 327 277 290        Results from last 7 days   Lab Units 04/06/19  1943  04/03/19  0405  04/02/19  0437   LN-SODIUM mmol/L  --   --  143  --  142   LN-POTASSIUM mmol/L 2.9*   < > 3.5   < > 3.4*   LN-CHLORIDE mmol/L  --   --  107  --  103   LN-CO2 mmol/L  --   --  30  --  31   LN-BLOOD UREA NITROGEN mg/dL  --   --  9  --  17   LN-CREATININE mg/dL 0.64  --  0.59*  --  0.73    < > = values in this interval not displayed.       Lab Results   Component Value Date    ALT 10 04/01/2019    AST 10 04/01/2019    ALKPHOS 202 (H) 04/01/2019    BILITOT 0.2 04/01/2019       Lab Results   Component Value Date    CRP 24.0 (H) 04/01/2019    CRP 0.7 01/12/2019    CRP 1.1 (H) 01/11/2019    SEDRATE 28 (H) 01/11/2019    SEDRATE 72 (H) 12/01/2018    SEDRATE 58 (H) 11/16/2018           Imaging   Radiology results reviewed    Xr Chest 1 View Portable    Result Date: 4/1/2019  XR CHEST 1 VIEW PORTABLE 4/1/2019 6:34 PM INDICATION: hypoxia with crackles in the Lungs COMPARISON: 03/29/2019 FINDINGS: No change. Mild diffuse interstitial  prominence but no focal pneumonia or pleural effusion evident. Heart size upper limits of normal. Right PICC line in place.      Attestation:  I have reviewed today's Medications, Vital Signs, Imaging, and Labs.

## 2021-05-27 NOTE — PLAN OF CARE
Patient's pain/discomfort is manageable Not Progressing    Pt is alert and oriented X4 this shift and making needs known appropriately. VS are within normal for pt. Pt continues to c/o generalized pain. PRN dilaudid x1, PRN Tylenol 1000mg, PRN flexeril x1 and PRN quaifenssesin x1 administered this shift with moderate relief. Dressing to sacrum and coccyx area changed this shift, large foul smell drainage noted. Catheters in place and draining well.  Daily care needs are met Progressing    Pt was changed in bed this shift.  Patient will be injury free during hospitalization Progressing    No safety concerns this shift. Call light is within reach.

## 2021-05-27 NOTE — PROGRESS NOTES
"Pharmacy Consult: Gentamicin Dosing    Pharmacist consulted to dose Gentamicin for Marychuy Zhou, a 63 y.o. female.    Ordering Provider: Dr. Aguiar    Indication for Gentamicin therapy per consult: urinary tract infection/CAUTI    Goal peak range: 4-6 mcg/mL based on indication    Goal trough range: less than or equal to 1 based on indication    Other current antimicrobials             vancomycin 1,250 mg in sodium chloride 0.9% 500 mL (VANCOCIN)  Every 12 hours          gentamicin 100 mg in sodium chloride 0.9% 100 mL  Every 18 hours             Subjective/Objective:    Patient was admitted for Sepsis (H) on 2/2/2019    Height: 5' 2\" (1.575 m)    Actual Body Weight (ABW): 82.6 kg (182 lb 1.6 oz)    Ideal body weight: 50.1 kg (110 lb 7.2 oz)  Adjusted ideal body weight: 63.1 kg (139 lb 1.8 oz)    Adjusted body weight: 63.1 kg    BMI: Body mass index is 33.31 kg/m .    No Known Allergies    Patient Active Problem List   Diagnosis     Other chronic pain     Lumbosacral Disc Degeneration     Herpes Simplex Type I     Nicotine Dependence     Essential hypertension     Dislodged Bhandari catheter, subsequent encounter     Acute respiratory failure with hypoxia (H)     COPD (chronic obstructive pulmonary disease) (H)     Lactic acidosis     SVT (supraventricular tachycardia) (H)     Gastroesophageal reflux disease without esophagitis     Acquired hypothyroidism     Iron deficiency anemia     Opioid-induced constipation (OIC)     Paraplegia (H)     COPD exacerbation (H)     Chronic pain syndrome     Major depression     Alcohol abuse     Decubitus ulcer     Hospital-acquired pneumonia     Cancer of lung (H)     Neurogenic bladder     Neurogenic bowel     Mixed hyperlipidemia     Palliative care encounter     Hypokalemia     Paraplegia at T4 level (H)     Major depressive disorder, recurrent episode, moderate (H)     Seizure disorder (H)     Heel ulcer, right, limited to breakdown of skin (H)     Decubitus ulcer of sacral " region, stage 4 (H)     Esophageal dysmotility     Anxiety disorder     History of MDR Enterobacter cloacae infection     Mechanical complication of suprapubic catheter (H)     Diastolic CHF, acute on chronic (H)     Chest tightness or pressure     Atypical chest pain     Decubitus ulcer of right buttock, stage 4 (H)     Nephrostomy tube displaced (H)     HCAP (healthcare-associated pneumonia)     Cognitive disorder     Insomnia due to anxiety and fear     Sepsis, due to unspecified organism (H)     Urinary tract infection, site unspecified     Hypotension, unspecified hypotension type     Chronic low back pain without sciatica, unspecified back pain laterality     Acute encephalopathy     Claustrophobia     Choledocholithiasis with obstruction     Cholelithiasis     Hx of pulmonary embolus     S/P cholecystectomy     Fever, unspecified fever cause     Pneumonia of left lower lobe due to infectious organism (H)     Lower abdominal pain     Neck infection     Subdural hematoma (H)     Convulsions, unspecified convulsion type (H)     Partial symptomatic epilepsy with simple partial seizures, intractable, with status epilepticus (H)     Acute on chronic intracranial subdural hematoma (H)     Brain edema (H)     Chronic obstructive pulmonary disease with acute exacerbation (H)     Decubitus ulcer, stage III (H)     Depression     Sepsis (H)     Sepsis secondary to UTI (H)     Pyelonephritis     Hyponatremia     Episodic cluster headache, not intractable     Flank pain     Right upper quadrant abdominal pain     Abdominal pain     Colon wall thickening     Cystitis     Suprapubic catheter dysfunction, subsequent encounter     Bilateral hydronephrosis     Elevated lactic acid level     Paroxysmal hemicrania     UTI (urinary tract infection)     Abdominal pain, unspecified location     Coagulopathy (H)     Anxiety     Dislodged wound Vacuum     Urinary tract infection associated with cystostomy catheter (H)     Personal  history of PE (pulmonary embolism)     Chronic anticoagulation     Weakness     Urinary incontinence due to urethral sphincter incompetence     Abscess, gluteal, right     History of DVT (deep vein thrombosis)     Hypothyroidism due to Hashimoto's thyroiditis     Fever in other diseases     Severe sepsis (H)     Ulcer     Sleep difficulties     Irritability and anger     Adjustment disorder with mixed anxiety and depressed mood     Normocytic anemia     Closed left subtrochanteric femur fracture (H)     Acute blood loss anemia     Other specified hypotension     History of encephalopathy     Nephrostomy complication (H)     Left lower lobe pneumonia (H)     Acute bronchitis due to other specified organisms     Hypoxemia     Centrilobular emphysema (H)     Bilateral lower extremity edema     Stage IV pressure ulcer of sacral region (H)     Gangrene (H)     Noncompliance with medication regimen     Infection     Goals of care, counseling/discussion     Advanced care planning/counseling discussion     Malingerer for IV Dilaudid     Moderate protein-calorie malnutrition (H)     Chronic osteomyelitis (H)     Drug-induced constipation     Quadriplegia (H)     Pelvic pain in female     Weakness of left hand     Focal motor seizure (H)     Tension-type headache, not intractable, unspecified chronicity pattern     Atelectasis     Pelvic pain     Panic anxiety syndrome     Ulcer due to Treponema vincentii, with fat layer exposed (H)     Abnormal CT scan of lung     Upper back pain     Severe major depression, single episode, with psychotic features (H)     Paranoia (H)     Unable to control anger     Cough     Nonintractable epilepsy without status epilepticus, unspecified epilepsy type (H)     Acute pulmonary edema (H)     Cigarette smoker    Past Medical History:   Diagnosis Date     Alcohol dependence (H)     history     Anxiety      Cancer of lung (H) 9/24/2013    Overview:  Adenocarcinoma Stage 1A per preoperative needle  biopsy   Adenocarcinoma Stage 1A per preoperative needle biopsy  Wedge r/s 9/2013     Chronic kidney disease      Chronic pain     on Methadone     Chronic suprapubic catheter (H)      Constipation      COPD (chronic obstructive pulmonary disease) (H)      Decubitus ulcer     had wound vac     Depression      Diastolic CHF, acute on chronic (H)      DVT (deep venous thrombosis) (H) 5/26/2015     ESBL (extended spectrum beta-lactamase) producing bacteria infection 08/2015    urine     Gastroparesis      GERD (gastroesophageal reflux disease)      HCAP (healthcare-associated pneumonia)      Herpes Simplex Type I      Hyperlipemia      Hypertension      Hypothyroidism 5/26/2015     Intracranial subdural hematoma (H)      Kidney stone      Lower paraplegia (H) 4/28/2016    Overview:  spinal epidural hematoma, emergent decomp Overview:  spinal epidural hematoma, emergent decomp Overview:  spinal epidural hematoma, emergent decomp     MRSA infection      Neurogenic bladder     chronic gonzalez     Neuropathy (H) 5/26/2015     Obesity      Opiate dependence, continuous (H)      Osteoporosis      Pneumonia      Pulmonary embolism (H) 12/2016    chronic, on coumadin     Rheumatoid arthritis (H)      Sepsis (H) 5/28/2017     SVT (supraventricular tachycardia) (H) 8/19/2014     Vascular myelopathies (H)         Temp Readings from Current Encounter:     04/08/19 2034 04/09/19 0000 04/09/19 0737   Temp: 98  F (36.7  C) 98  F (36.7  C) 98.7  F (37.1  C)       Net Intake/Output (last 24 hours):  I/O last 3 completed shifts:  In: 370 [P.O.:340; I.V.:30]  Out: 700 [Urine:700]    Recent Labs     04/06/19  1943 04/07/19  0603 04/07/19  1130 04/08/19  0516   WBC  --  9.9 12.8* 9.7   NEUTROABS  --  7.9*  --  6.8   LACTICACID  --   --  2.8*  --    PROCAL  --  0.42  --   --    BUN  --   --  8 6*   CREATININE 0.64  --  0.56* 0.50*       Estimated Creatinine Clearance: 116.2 mL/min (A) (by C-G formula based on SCr of 0.5 mg/dL (L)).    Results  for orders placed or performed during the hospital encounter of 02/02/19   Culture, Urine    Collection Time: 04/01/19  9:21 PM   Result Value Status    Culture >100,000 col/ml ESBL producing Escherichia coli (!) Final    Culture >100,000 col/ml Pseudomonas aeruginosa (!) Final   Culture, Urine    Collection Time: 02/03/19 12:15 AM   Result Value Status    Culture  Final     Mixture of organism types with no predominating organism.       Recent labs: (last 7 days)     04/09/19  0406 04/09/19  0700   GENTLVL 1.4 5.4       Assessment/Plan:    Pharmacist consulted to dose Gentamicin.  Gentamicin will be dosed using traditional  dosing for urinary tract infection. Goal peak of 4-6 mcg/mL and a goal trough of less than or equal to 1 mcg/mL. Based on the patient's other health concerns and prior gentamicin levels from November of 2017, the patient may benefit from a gentamicin frequency of every 18 hours.    Peak and trough levels were done on 4th dose today due at 0500. Dose previous to this was hung 2.5 hours late and 4th dose was given ~ 1 hour prior to due time. Kinetics were calculated using [employer] to approximate True Peak and Trough values.   Measured Peak = 5.4; Calculated True Peak ~ 6.39 after adjusting for actual times med given  Measured Trough = 1.4; Calculated True Trough ~ 0.95 after adjusting for actual times med given    Plan to continue with current regimen as the plan is for a short 5 day course of gentamicin and these levels are approximate while achieving the goal estimated trough of < 1.     1. Continue: Gentamicin 100 mg IV every 18 hours (about 2.1 mg/kg dosing weight for the loading dose and about 1.6 mg/kg dosing weight for the maintenance dose).  2. Pharmacist will plan to re-check Gentamicin level(s) again if therapy to continue for more than 5 days from today.  3. Pharmacist will continue to follow.    Thank you for the consult,    Iker Kramer, PharmD 4/9/2019 10:24 AM

## 2021-05-27 NOTE — PROGRESS NOTES
Patient is not in respiratory distress and is on 3 lpm oxygen; sats are mid 90s. BS are coarse, and the cough is weak/productive. Neb is given as scheduled. RT will monitor.    ASIM Salcedo

## 2021-05-27 NOTE — PROGRESS NOTES
"Clinical Nutrition Therapy Follow Up Note    Current Nutrition Prescription:   Diet: regular;   Diet Supplements: strawberry Boost GC /day--sent with dinner.    S: pt not available--likely out to smoke again. Noted potential for d/c soon.      Current Nutrition Intake:  Pt ordering variable amounts depending on how many meals she skips--ranging from 1425 calories to 3750 calories,  gm protein--averaging 2175 calories, 77 gm protein over past 5 days.     Nursing documenting she is eating 100% of meals she eats.    Intake inadequate only due to pt refusing meals.  Although may be eating snacks/junk food if still has some from [employer].     Anthropometrics:   Height: 5' 2\" (157.5 cm)  Admission weight: 160# stated  Weight: (refused)  Ideal body weight ~100# (IBW-10-15# for paraplegia)  Pt has very few actual weights and usually gives a stated wt with admission and refuses to cooperate to allow bed to be zeroed.  Wt listed for 12/6/18 was 11# higher than prior weights that mentioned that pt refused to allow excess linens to be removed from bed--suspect wt not accurate on 12/6/18 as well.  All prior weights are questionable for this reason and also due to weights with specialty mattresses/beds.    Edema: nonpitting generalized, pt wouldn't allow assessment of edema lately per RNs.    RD Nutrition Focused Physical Exam:  The patient has the following physical signs which could indicate malnutrition: No noted fat/muscle loss noted in face, arms.      GI Status/Output:   GI symptoms include:   Last BM noted in I/Os or doc flowsheet: 3/28 loose mediium      Skin/Wound:  Isaiah score Isaiah Scale Score: 11; multiple pressure ulcers noted; WOCN following. Please see latest W[facility] RN notes for status of wounds.  Doubtful they will heal as pt continues to refuse cares and sits on stool and urine and refuses brief changes.  Also sits in wheelchair with legs dependent.      Medications:  Medications reviewed.      Labs:  Labs " reviewed:   K+ 3.0  WBC increasing again.    Estimated Nutrition Needs:  Assessment weight is 45.5 kg, ideal weight     Energy Needs: 1833-1245 kcals daily, 30-35 kcal/kg  Protein Needs: 68-91 g daily, 1.5-2.0 g/kg.  Fluid Needs: ~7144-7762 mls daily, ~35-40 mls/kg  Very difficult to  needs with no reliable weight with lower muscle mass w/ paraplegia but high needs for healing of multiple wounds.     Malnutrition: Not noted with no reliable hx re: weight or oral intake.     Nutrition Risk Level: stable-moderate risk    Nutrition dx:   Increased nutrient needs r/t wounds as evidenced by standard needs for multiple wounds noted in [facility] RN note.     Goal:   Meet estimated nutrition needs and Wound healing. Averaged intake appears to be meeting lower end of protein goals and higher end of calorie goals.    Intervention:   Continue current plan as pt doesn't want extra Boost.     Monitoring/Evaluation:   Intake, labs, wounds.     Electronically signed by:  Cecile Black RD

## 2021-05-27 NOTE — PLAN OF CARE
Problem: Daily Care  Goal: Daily care needs are met  Pt alert and oriented, slept most of night. Reported no pain, pt repositioned and dressing changed.  Pt's gonzalez was found on the bed pulled out, gonzalez reinserted.   Pt's BP low in mid 80's, MD notified and said to continue to monitor.

## 2021-05-27 NOTE — PROGRESS NOTES
RESPIRATORY CARE NOTE     Patient Name: Marychuy Zhou  Today's Date: 3/29/2019        03/29/19 1303 03/29/19 1309   Respiratory Assessment   Assessment Type Pre-treatment --    Respiratory Pattern Regular --    Chest Assessment Chest expansion symmetrical --    Respiratory Treatments    Medications Albuterol;Atrovent --    $ Delivery Device - Aerosol Initial Tx  HHN --    Pre-Treatment Pulse 114 --    Pre-Treatment Respirations 20 --    Pre-Treatment Sp02 97  (4 lpm nasal cannula) --    Breath Sounds Pre-Treatment Right Coarse --    Breath Sounds Pre-Treatment Left Coarse --    Post-Treatment Pulse --  118   Post-Treatment Respirations --  20   Post-Treatment Sp02 --  100   Breath Sounds Post-Treatment Right --  Coarse   Breath Sounds Post-Treatment Left --  Coarse   Position --  Up in chair   Treatment Tolerance --  Tolerated well     Pt given Duo neb treatment. -118, RR 20, Sp02 97% on 4 lpm nasal cannula.  Breath sound coarse. Pt unable to cough up secretions.  Recommended to use flutter valve and pt declined. Spoke to her that it could help loosen up secretions, and pt declined. RT also suggested to suction pt to try and help remove secretions, and pt declined.  RT will continue to follow as needed.      Cristina Connell, BENJYT

## 2021-05-27 NOTE — PLAN OF CARE
"Problem: Pain  Goal: Patient's pain/discomfort is manageable  Outcome: Progressing  Patient described pain as 12/10 and asked that MD be notified that the decrease in Dilaudid dosage \"was not going to cut it\".  MD met with patient.  She asked for vanco to be stopped when she went out 2x to smoke.  She refused repositioning and skin assessments.  Lung sounds were course.  Patient was in a faily pleasant mood but not fully cooperative with cares.       "

## 2021-05-27 NOTE — PROGRESS NOTES
"Pharmacy Consult: Gentamicin Dosing    Pharmacist consulted to dose Gentamicin for Marychuy Zhou, a 63 y.o. female.    Ordering Provider: Dr. Aguiar    Indication for Gentamicin therapy per consult: urinary tract infection/CAUTI    Goal peak range: 4-6 mcg/mL based on indication    Goal trough range: less than or equal to 1 based on indication    Other current antimicrobials             gentamicin 100 mg in sodium chloride 0.9% 100 mL  Every 12 hours          gentamicin 130 mg in sodium chloride 0.9% 100 mL  Once          meropenem 1 g in NaCl 0.9 % 50 mL (MINI-BAG Plus) (MERREM)  Every 8 hours             Subjective/Objective:    Patient was admitted for Sepsis (H) on 2/2/2019    Height: 5' 2\" (1.575 m)    Actual Body Weight (ABW): 76.9 kg (169 lb 8.5 oz)    Ideal body weight: 50.1 kg (110 lb 7.2 oz)  Adjusted ideal body weight: 60.8 kg (134 lb 1.3 oz)    Adjusted body weight: 60.8 kg    BMI: Body mass index is 31.01 kg/m .    No Known Allergies    Patient Active Problem List   Diagnosis     Other chronic pain     Lumbosacral Disc Degeneration     Herpes Simplex Type I     Nicotine Dependence     Essential hypertension     Dislodged Bhandari catheter, subsequent encounter     Acute respiratory failure with hypoxia (H)     COPD (chronic obstructive pulmonary disease) (H)     Lactic acidosis     SVT (supraventricular tachycardia) (H)     Gastroesophageal reflux disease without esophagitis     Acquired hypothyroidism     Iron deficiency anemia     Opioid-induced constipation (OIC)     Paraplegia (H)     COPD exacerbation (H)     Chronic pain syndrome     Major depression     Alcohol abuse     Decubitus ulcer     Hospital-acquired pneumonia     Cancer of lung (H)     Neurogenic bladder     Neurogenic bowel     Mixed hyperlipidemia     Palliative care encounter     Hypokalemia     Paraplegia at T4 level (H)     Major depressive disorder, recurrent episode, moderate (H)     Seizure disorder (H)     Heel ulcer, right, " limited to breakdown of skin (H)     Decubitus ulcer of sacral region, stage 4 (H)     Esophageal dysmotility     Anxiety disorder     History of MDR Enterobacter cloacae infection     Mechanical complication of suprapubic catheter (H)     Diastolic CHF, acute on chronic (H)     Chest tightness or pressure     Atypical chest pain     Decubitus ulcer of right buttock, stage 4 (H)     Nephrostomy tube displaced (H)     HCAP (healthcare-associated pneumonia)     Cognitive disorder     Insomnia due to anxiety and fear     Sepsis, due to unspecified organism (H)     Urinary tract infection, site unspecified     Hypotension, unspecified hypotension type     Chronic low back pain without sciatica, unspecified back pain laterality     Acute encephalopathy     Claustrophobia     Choledocholithiasis with obstruction     Cholelithiasis     Hx of pulmonary embolus     S/P cholecystectomy     Fever, unspecified fever cause     Pneumonia of left lower lobe due to infectious organism (H)     Lower abdominal pain     Neck infection     Subdural hematoma (H)     Convulsions, unspecified convulsion type (H)     Partial symptomatic epilepsy with simple partial seizures, intractable, with status epilepticus (H)     Acute on chronic intracranial subdural hematoma (H)     Brain edema (H)     Chronic obstructive pulmonary disease with acute exacerbation (H)     Decubitus ulcer, stage III (H)     Depression     Sepsis (H)     Sepsis secondary to UTI (H)     Pyelonephritis     Hyponatremia     Episodic cluster headache, not intractable     Flank pain     Right upper quadrant abdominal pain     Abdominal pain     Colon wall thickening     Cystitis     Suprapubic catheter dysfunction, subsequent encounter     Bilateral hydronephrosis     Elevated lactic acid level     Paroxysmal hemicrania     UTI (urinary tract infection)     Abdominal pain, unspecified location     Coagulopathy (H)     Anxiety     Dislodged wound Vacuum     Urinary tract  infection associated with cystostomy catheter (H)     Personal history of PE (pulmonary embolism)     Chronic anticoagulation     Weakness     Urinary incontinence due to urethral sphincter incompetence     Abscess, gluteal, right     History of DVT (deep vein thrombosis)     Hypothyroidism due to Hashimoto's thyroiditis     Fever in other diseases     Severe sepsis (H)     Ulcer     Sleep difficulties     Irritability and anger     Adjustment disorder with mixed anxiety and depressed mood     Normocytic anemia     Closed left subtrochanteric femur fracture (H)     Acute blood loss anemia     Other specified hypotension     History of encephalopathy     Nephrostomy complication (H)     Left lower lobe pneumonia (H)     Acute bronchitis due to other specified organisms     Hypoxemia     Centrilobular emphysema (H)     Bilateral lower extremity edema     Stage IV pressure ulcer of sacral region (H)     Gangrene (H)     Noncompliance with medication regimen     Infection     Goals of care, counseling/discussion     Advanced care planning/counseling discussion     Malingerer for IV Dilaudid     Moderate protein-calorie malnutrition (H)     Chronic osteomyelitis (H)     Drug-induced constipation     Quadriplegia (H)     Pelvic pain in female     Weakness of left hand     Focal motor seizure (H)     Tension-type headache, not intractable, unspecified chronicity pattern     Atelectasis     Pelvic pain     Panic anxiety syndrome     Ulcer due to Treponema vincentii, with fat layer exposed (H)     Abnormal CT scan of lung     Upper back pain     Severe major depression, single episode, with psychotic features (H)     Paranoia (H)     Unable to control anger     Cough     Nonintractable epilepsy without status epilepticus, unspecified epilepsy type (H)     Acute pulmonary edema (H)     Cigarette smoker    Past Medical History:   Diagnosis Date     Alcohol dependence (H)     history     Anxiety      Cancer of lung (H)  9/24/2013    Overview:  Adenocarcinoma Stage 1A per preoperative needle biopsy   Adenocarcinoma Stage 1A per preoperative needle biopsy  Wedge r/s 9/2013     Chronic kidney disease      Chronic pain     on Methadone     Chronic suprapubic catheter (H)      Constipation      COPD (chronic obstructive pulmonary disease) (H)      Decubitus ulcer     had wound vac     Depression      Diastolic CHF, acute on chronic (H)      DVT (deep venous thrombosis) (H) 5/26/2015     ESBL (extended spectrum beta-lactamase) producing bacteria infection 08/2015    urine     Gastroparesis      GERD (gastroesophageal reflux disease)      HCAP (healthcare-associated pneumonia)      Herpes Simplex Type I      Hyperlipemia      Hypertension      Hypothyroidism 5/26/2015     Intracranial subdural hematoma (H)      Kidney stone      Lower paraplegia (H) 4/28/2016    Overview:  spinal epidural hematoma, emergent decomp Overview:  spinal epidural hematoma, emergent decomp Overview:  spinal epidural hematoma, emergent decomp     MRSA infection      Neurogenic bladder     chronic gonzalez     Neuropathy (H) 5/26/2015     Obesity      Opiate dependence, continuous (H)      Osteoporosis      Pneumonia      Pulmonary embolism (H) 12/2016    chronic, on coumadin     Rheumatoid arthritis (H)      Sepsis (H) 5/28/2017     SVT (supraventricular tachycardia) (H) 8/19/2014     Vascular myelopathies (H)         Temp Readings from Current Encounter:     04/05/19 2349 04/06/19 0422 04/06/19 0734   Temp: 96.6  F (35.9  C) 97.6  F (36.4  C) 97.5  F (36.4  C)       Net Intake/Output (last 24 hours):  I/O last 3 completed shifts:  In: 30 [I.V.:30]  Out: 1050 [Urine:1050]    Recent Labs     04/06/19  0651 04/06/19  1943   WBC 12.7*  --    NEUTROABS 10.4*  --    CREATININE  --  0.64       Estimated Creatinine Clearance: 87.5 mL/min (by C-G formula based on SCr of 0.64 mg/dL).    Results for orders placed or performed during the hospital encounter of 02/02/19    Culture, Urine    Collection Time: 04/01/19  9:21 PM   Result Value Status    Culture >100,000 col/ml ESBL producing Escherichia coli (!) Final    Culture >100,000 col/ml Pseudomonas aeruginosa (!) Final   Culture, Urine    Collection Time: 02/03/19 12:15 AM   Result Value Status    Culture  Final     Mixture of organism types with no predominating organism.       No results for input(s): AMIK, GENTLVL, TOBRALVL, STREPTOMYCIN in the last 168 hours.    Assessment/Plan:    Pharmacist consulted to dose Gentamicin.  Gentamicin will be dosed using traditional  dosing for urinary tract infection. Goal peak of 4-6 mcg/mL and a goal trough of less than or equal to 1 mcg/mL. Based on the patient's other health concerns and prior gentamicin levels from November of 2017, the patient may benefit from a gentamicin frequency of every 18 hours.    1. Initiate: Gentamicin at 130 mg IV once, followed by gentamicin 100 mg IV every 18 hours (about 2.1 mg/kg dosing weight for the loading dose and about 1.6 mg/kg dosing weight for the maintenance dose).  2. Pharmacist will plan to check Gentamicin level(s) before and after the fourth doses of this regimen.  3. Pharmacist will continue to follow.    Thank you for the consult,    Luis Sarabia, PharmD 4/6/2019 10:21 PM

## 2021-05-27 NOTE — PLAN OF CARE
Patient will be injury free during hospitalization Progressing      Patient will remain free of falls Progressing      Patient's pain/discomfort is manageable Progressing      Patient/family/caregiver demonstrates understanding of disease process, treatment plan, medications, and discharge instructions Progressing      Signs and symptoms of infections are decreased or avoided Progressing      Damaged tissue is healing and protected Progressing      Pt continue to be on contact precaution. Pt went out several times for smoke during this shift. Pt came up with gonzalez bags leaking; thus; both  bags were changed. Pt was so demanding and uncooperative with cares and assessment. Declined vital signs check. Skin assesment  And picc line flushed.   Pt c/o generalized pain 10/10, requested for tylenol 1000mg, Dilaudid 2mg, Flexeril and ativan.She refused to go to bed, pt sleeping on her motorized Scooter at the moment. Will continue to monitor.

## 2021-05-27 NOTE — PLAN OF CARE
Problem: Discharge Barriers  Goal: Patient's discharge needs are met  Outcome: Progressing  Care Plan  Care Management        Care Management Goals of the Day: Progression of care     Care Progression Reviewed With: Charge RN, MD, HUC, RNCM, CMSW     Barriers to Discharge: initiation of CADI waiver      Discharge Disposition: [facility]     Expected Discharge Date: TBD     Transportation: [facility] ([phone])        Care Coordination Narrative:       Pt has been accepted at [facility] (P: [phone]/F: [phone]). Pt has had her CADI assessment with Taylor from [facility] Choices assessments ([phone]/F: [phone]) LOKESH Hernandez at [place] [facility] has submitted a cost of cares report to [employer] as well to be approved.     SW has left a message for [facility] (P: [phone]) to inquire about the status of pt's CADI waiver applciation. Pt's MA services are currently billed through [employer] SW to await return call.     SW spoke with [facility] and was informed that they cannot supply a Electric Hospital bed or a farrah lift. They recommended calling [facility]. MARGI spoke with [facility] ([phone]/F: [phone]) who informed the SW that specific perscriptions will be needed for both the hospital bed and farrah. Prescription orders placed for Full electric hospital bed and farrah lift. SW to fax orders once chart notes indicate needs. MARGI has discussed this with MD. Chart notes must indicate the need for the hospital bed such as the pt needs frequent turns and positions due to her chronic ulcers, as well as she is paraplegic at the T4 level and needs more than one person to transfer from the bed to her wheelchair.    SW will continue to follow and assist with further d/c needs.     CYNTHIA Gonsalez  4/5/2019  2:47 PM

## 2021-05-27 NOTE — PROGRESS NOTES
"Pt up at the desk, demanding to be given new medication that MD ordered (Tessalon). Informed patient that medication was not here and had to come up from pharmacy. Pt stating this is not true and that medication was ordered a long time ago, being disruptive, yelling for the charge RN and beeping horn on electric wheelchair. Writer notified patient that medication would be brought into the patient's room as soon as it was sent from the pharmacy, pt started leave desk to go out to smoke stating \"you better be watching for it\". When medication was brought into patient's room pt yelling at writer to hurry up and sign in medications, pt put on the call light while writer was in the room and when asked why stated that this writer was not being fast enough with signing in medications. Referred to staff members as \"stupid\" several times. Patient notified that writer would come back and administer medications when patient could have appropriate conversation.   "

## 2021-05-27 NOTE — PROGRESS NOTES
"  RESPIRATORY CARE NOTE   /72 (Patient Position: Sitting)   Pulse 100   Temp 98  F (36.7  C) (Oral)   Resp 18   Ht 5' 2\" (1.575 m)   Wt 182 lb 1.6 oz (82.6 kg)   LMP  (LMP Unknown)   SpO2 94%   BMI 33.31 kg/m   on RA.    BS coarse, gave prn Duoneb treatment x1, BS post treatment coarse, patient tolerated well.       BENJY VelasquezT      "

## 2021-05-27 NOTE — PROGRESS NOTES
[facility] Daily Progress Note      Date of Service: 3/27/2019     Brief History: Marychuy Zhou is 63 y.o. female with history of alcohol dependence, anxiety disorder, chronic kidney disease, history of spinal hematoma with resultant lower extremity paraplegia and a chronic indwelling suprapubic catheter, recurrent UTIs, chronic pain syndrome, COPD, depression, history of DVT, GERD, gastroparesis, hypertension, hypothyroidism, lung cancer s/p left lower lobe resection, obesity, rheumatoid arthritis, pulmonary embolism on chronic anticoagulation, who was admitted to this hospital on 2/2/2019 for recurrent UTI, sacral decub ulcer and respiratory distress. She was treated with a course of antibiotics for hospital acquired pneumonia, and sacral decub ulcer with osteomyelitis. She has been very non compliant with cares and has been refusing medications and regular nursing cares.    Assessment:     COPD with acute exacerbation. Treated with a course of antibiotic for HCAP. Ongoing tobacco abuse.    Intermittent respiratory distress. Multifactorial in etiology. Treated for HCAP and COPD exacerbation. Not needing any supplemental oxygen. She is on furosemide. Ongoing tobacco abuse is playing a role as well.    History of paraplegia, secondary to epidural hematoma. She is on baclofen, gabapentin. She has suprapubic catheter.    Stage IV decub ulcer with osteomyelitis. Completed course of antibiotics. WOC following.    History of seizure disorder on keppra.    History of DVT and PE on xarelto.    Hypothyroidism on synthroid.    Chronic pain syndrome, on methadone, as needed hydromorphone.    Hypokalemia, likely from use of potassium. Refusing supplements multiple times, which is making replacement very difficult.    Overactive bladder on oxybutynin    Chronic anemia, multifactorial- iron deficiency    Ongoing tobacco abuse. She refuses to quit    Generalized anxiety disorder    Plan:   Continue supportive cares. Continue  "with potassium replacement. Advised to quit smoking.    Subjective:     Chart reviewed, events noted. Pt seen and examined. She still has congested lungs. No chest pain. Breathing is stable.    Objective     Vital signs in last 24 hours:  Heart Rate:  [120] 120  Resp:  [18-20] 20  BP: (110)/(66) 110/66  Weight:   163 lb (73.9 kg)  Weight change:   Body mass index is 29.81 kg/m .    Intake/Output last 3 shifts:  I/O last 3 completed shifts:  In: 720 [P.O.:720]  Out: -   Intake/Output this shift:  I/O this shift:  In: 480 [P.O.:480]  Out: 2300 [Urine:2300]      Physical Exam:  /66 (Patient Position: Sitting)   Pulse (!) 120   Temp 98  F (36.7  C) (Oral)   Resp 20   Ht 5' 2\" (1.575 m)   Wt 163 lb (73.9 kg)   LMP  (LMP Unknown)   SpO2 94%   BMI 29.81 kg/m      FiO2 (%): (room air) O2 Device: None (Room air) O2 Flow Rate (L/min): 2 L/min    General Appearance: Alert, not in distress.  HEENT: Normocephalic. No scleral icterus. Mucous membranes moist.  Heart: S1 S2 heard. Regular rate and rhythm.  Lungs: Coarse breath sounds.  Abdomen: Soft, non tender, bowel sounds present.  Extremity: Bilateral leg edema.   Neurologic: Paraplegia      Lab Results:  personally reviewed.   Lab Results   Component Value Date     03/23/2019    K 2.9 (L) 03/26/2019    CL 94 (L) 03/23/2019    CO2 28 03/23/2019    BUN 26 (H) 03/23/2019    CREATININE 0.95 03/23/2019    CALCIUM 8.4 (L) 03/23/2019     Lab Results   Component Value Date    WBC 10.8 03/23/2019    WBC 6.0 09/19/2015    HGB 9.0 (L) 03/23/2019    HCT 30.4 (L) 03/23/2019    MCV 82 03/23/2019     03/23/2019       Advance Care Planning:   Barriers to discharge: Placement  Anticipated discharge day: Unknown  Disposition: To be determined      Stephen Frausto MD  [facility]  Hospitalist  Paging: [employer] Paging   "

## 2021-05-27 NOTE — PLAN OF CARE
Patient's pain/discomfort is manageable Progressing      Patient slept. No c/o pain.Poonam Weiner

## 2021-05-27 NOTE — PLAN OF CARE
"Pt A&O x4, able to make needs known. Pt c/o chronic pain 10/10 throughout whole body. Pt noted ot have increased drowsiness today. Upon return from outside smoking, pt noted to have increased respirations, coarse crackles bilaterally, and wet/congested cough. Pt verbalized feeling \"like I'm drowning, like breathing through water\". MD updated. New orders obtained for STAT labs and chest xray. Pt refused venous blood gas draw at this time.  Pt continues to go outside to smoke and refusing repositioning from chair to bed. Will continue to monitor at this time.  "

## 2021-05-27 NOTE — PLAN OF CARE
Pain      Patient's pain/discomfort is manageable Not Progressing          her pain 10-9/10 when ask. PRN pain meds given . Will continue to monitor      Knowledge Deficit      Patient/family/caregiver demonstrates understanding of disease process, treatment plan, medications, and discharge instructions Progressing        Potential for Falls      Patient will remain free of falls Progressing        Psychosocial Needs      Collaborate with patient/family/caregiver to identify patient specific goals for this hospitalization Progressing        Pt non -compliant with some of her cares,  pt stayed and slept  on her chair throughout the night education done on preventing skin break down. Pt  refused  dressing change on any of her wounds. Pt was more concern about getting her pain meds.BP was low pt refused re-check. Pt refused her morning lab and morning med. Continue to monitor

## 2021-05-27 NOTE — PLAN OF CARE
Problem: Discharge Barriers  Goal: Patient's discharge needs are met  Outcome: Progressing  Care Plan  Care Management        Care Management Goals of the Day: Progression of care     Care Progression Reviewed With: Charge RN, MD, HUC, RNCM, CMSW     Barriers to Discharge: initiation of CADI waiver      Discharge Disposition: [facility]     Expected Discharge Date: TBD     Transportation: [facility] ([phone])        Care Coordination Narrative:       Pt has been accepted at [facility] the [facility] (P: [phone]/F: [phone]). Pt has had her CADI assessment. Per Taylor, the [facility] Choices  ([phone]/F: [phone]), she will not have her curtesy assessment completed until likely 4/3/2019 then she will send it to [facility] as this is the [facility] her funding is going to be through. Taylor states that after this the DON at [facility] (Mary) would need to submit a cost of cares report to [facility] as well to be approved. MARGI spoke with Mary who has begun the cost of care paperwork and will submit this to [facility] as well.     SW to work to ensure proper bed and lift to will be at facility prior to pt arrival as well.    MARGI will continue to follow and assist with further d/c needs.        CYNTHIA Gonsalez  4/4/2019  11:20 AM

## 2021-05-27 NOTE — PLAN OF CARE
Daily Care      Daily care needs are met Progressing        Infection      Signs and symptoms of infections are decreased or avoided Progressing        Pain      Patient's pain/discomfort is manageable Progressing        Safety      Patient will be injury free during hospitalization Progressing        Patient noted demanding and uncooperative with cares. She complained of generalized body pain 10/10 & 10/10,muscle spasm and cough . Was given Ibuprofen 400 mg, Dilaudid 2mg, flexeril, and Guaifenesin. She refused to go to bed and was sleeping on her motorized Scooter. Declined vital sign assessment, lab draw, emptying of gonzalez catheter, skin assessment, assessment/flushing PICC line,and other head to toe assessment. Will continue to monitor

## 2021-05-27 NOTE — PLAN OF CARE
"Pt calling on phone at nursing desk, asking for RN to see her. Arrived in room and pt stating \" I need something to drink and put the side rails of bed down.\" Informed pt that for safety reasons side rails needed to remain up. Also reminded pt that she was NPO per MD order. Pt continues to argue about needing fluids to drink. Swabs offered. Pt then complains about BP cuff on arm and that her blood pressure is better. Informed pt Bp is fluctuating and we will continue to monitor it freqently and medicate as ordered.  "

## 2021-05-27 NOTE — PLAN OF CARE
Knowledge Deficit      Patient/family/caregiver demonstrates understanding of disease process, treatment plan, medications, and discharge instructions Not Progressing        Non-compliance with treatment regimen      Compliance with treatment regimen Not Progressing        Potential for Compromised Skin Integrity      Skin integrity is maintained or improved Not Progressing        Psychosocial Needs      Demonstrates ability to cope with hospitalization/illness Not Progressing          Potential for Falls      Patient will remain free of falls Progressing        Potential for transmission of organism by Contact, Enteric, Droplet and/or Airborne routes      Prevent transmission of organisms Progressing        Pt refusing most cares. Unable to reposition yet this shift d/t to refusal, pt educated. Minimal urine output, unable to empty catheter's at times. MD aware of low UO. Pressure ulcers not assessed d/t refusal of repositioning. Will continue to monitor.

## 2021-05-27 NOTE — PLAN OF CARE
"Pt A&Ox4, able to make needs known. C/O rated \"12/10\". Stated was upset MD had not been up to round today. MD paged per request. At time of rounding, pt was outside smoking. Pt to be notified upon return. [employer] rounded to fix pt wheelchair back today. Pt verbalized \"metal bars\" were poking her in the ribs and now in the lower back, however refusing repositioning in chair or transfer to bed. Pt continues to refuse many treatments, including vital signs. Will continue to monitor and encourage repositioning.  "

## 2021-05-27 NOTE — PROGRESS NOTES
Writer noted pt  PICC line occlude , TPA  Ordered pt refused to get line declog states she want to wait,  will pass info to incoming nurse

## 2021-05-27 NOTE — PROGRESS NOTES
[phone] was outside most of this shift refused assessment and some of her medication  patient stated writer is too slow. Pt refuse to go to bed states she is going to stay up in her chair. Will re-approach
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
## 2021-05-27 NOTE — PLAN OF CARE
Non-compliance with treatment regimen      Compliance with treatment regimen Progressing        Pt was noncompliant tonight, refuseed to get into bed to be weighted and for wounds to be looked at. Could not complete a full body assessment.   Infection      Signs and symptoms of infections are decreased or avoided Progressing        Pts temp of 100..6 in beginning of shift came does to a 99 degree fahrenhf

## 2021-05-27 NOTE — PLAN OF CARE
Breathing      STG - Patient will maintain patent airway Progressing      STG - Respiratory rate and effort will be within normal limits for the patient Progressing        Impaired Gas Exchange      Demonstrate improved ventilation and adequate oxygenation of tissues as evidenced by absence of respiratory distress Progressing          Shanique Stephenson, LRT, 4/3/2019

## 2021-05-27 NOTE — PLAN OF CARE
Non-compliance with treatment regimen      Compliance with treatment regimen Not Progressing        Potential for Compromised Skin Integrity      Skin integrity is maintained or improved Not Progressing        Patient continues to demand that she manage her own wound treatments and refuses standard nursing cares. Patient transferred to unit 5000.  Georgina Allen RN

## 2021-05-27 NOTE — PROGRESS NOTES
Spoke to pt's son-in-law Tobias to notify of pt's passing. Tobias verbalized he will notify the rest of the family. Unknown if any family will want to come to see her.

## 2021-05-27 NOTE — PROGRESS NOTES
Patient remains on room air with saturations in the mid 90's. Breath sounds are coarse. She coughed but swallowed the phlegm. Stated she has not coughed anything up today. RT will continue to follow as needed.     Andrew Key, LRT

## 2021-05-27 NOTE — SIGNIFICANT EVENT
At the start of the shift pt requested her pain medications, writer brought all of pt's AM  meds to give her. Pt took all medications except for her potassium powder. Pt's potassium level was 2.9. Dr Edwards was notified. Pt stated that she she did not refused her potassium, she just does not want it now. Pt requested other pain and anxiety medications throughout the shift but still refused her AM potassium until the second dose was due which she took then. Pt also refused any and all assessment by writer including lung assessment, cardio, urine assessment, skin, neuro etc. (see documentation). Pt is very agitated, complaining, demanding and angry at everything. Pt remained in her WC all day. Pt also refused repositioning.

## 2021-05-27 NOTE — PROGRESS NOTES
Patient refused all medication this morning except dilaudid, ativan and tylenol. Patient also refused lab draws. Patient stated she has visitors coming and doesn't have time for meds.

## 2021-05-27 NOTE — PROGRESS NOTES
RT responded for Code Blue:  Patient found unresponsive. Code blue called, CPR intiated. Intubated 7.5ETT 23cm art the teeth. Sid applied. ACLS medications given. Code run for 20+ minutes without ROSC.  See MD note.  BENJY HuertaT

## 2021-05-27 NOTE — PLAN OF CARE
Decreased Mental Status Causing Increased Need for Safety      Decrease patient's symptoms Not Progressing        Psychosocial Needs      Demonstrates ability to cope with hospitalization/illness Not Progressing        Tissue Integrity      Damaged tissue is healing and protected Not Progressing          Blood pressure is elevated above 140 over 90 mmHg      Blood pressure (BP) is within acceptable limits Progressing        Pain      Patient's pain/discomfort is manageable Progressing        Pt slept most of the night, woke up and put her call light on to be gotten up to the chair, 2 staff went to the room to get pt up but pt demanded a specific NA to come to her, pt was notified that the NA was busy with other patients  and asked if there is anything she wanted the other NA to do for her that we can do but insisted she wanted the NA. Pt wanted her call light to be left on even though staff were in the room to help her. While this writer and the other staff were in the room pt continued to be demanding, calling us names, rude and at some point she told us to get out of her room. Pt refused the PICC line flush and blood draw this morning.  Reagan Rojas RN

## 2021-05-27 NOTE — PLAN OF CARE
Breathing      STG - Patient will maintain patent airway Adequate for Discharge          Impaired Gas Exchange      Demonstrate improved ventilation and adequate oxygenation of tissues as evidenced by absence of respiratory distress Progressing        Khoi Jon, LRT

## 2021-05-27 NOTE — PLAN OF CARE
Problem: Psychosocial Needs  Goal: Demonstrates ability to cope with hospitalization/illness  Outcome: Not Progressing     Problem: Knowledge Deficit  Goal: Patient/family/caregiver demonstrates understanding of disease process, treatment plan, medications, and discharge instructions  Outcome: Not Progressing     Problem: Potential for Compromised Skin Integrity  Goal: Skin integrity is maintained or improved  Outcome: Not Progressing     Problem: Tissue Integrity  Goal: Damaged tissue is healing and protected  Outcome: Not Progressing     Problem: Non-compliance with treatment regimen  Goal: Compliance with treatment regimen  Outcome: Not Progressing    GOALS NOT MET.   Pt continues to refuse cares, meds, will not allow staff to transfer out of wheelchair into bed, refuses to allow staff to change soiled clothing and pads and continues to go outside in wheelchair to smoke. Assigned RN continues to encourage pt to follow plan of care to improve outcomes, pt is resistant and refuses.

## 2021-05-27 NOTE — PLAN OF CARE
Patient went out to smoke at beginning 7p shift and when came back fell asleep in wheelchair. Patient woken at 2230 by nurse so patient could take scheduled medications and be transferred to bed for night. Patient refused to go to bed and demanded scheduled and prn medication. Patient went out to smoke at this time and when came back was administered medication. When asked to perform assessment and get back to bed patient refused and states is not letting us transfer her to bed on this shift. Patient refusing to be moved in wheelchair even though material underneath is wet. Patient refusing vital signs. Patient currently sleeping in wheelchair. Will try to reassess when patient wakes up.

## 2021-05-27 NOTE — SIGNIFICANT EVENT
Called that patient was developing redness and some mild induration near PICC site on RUE.  Also patient a little more groggy than usual.    Will add Vanco for now and get blood cultures (one periph and one from line).  Check US of arm.    Ask ID to assess in AM to decide if we need to remove PICC.    Malik Lynn MD

## 2021-05-27 NOTE — PLAN OF CARE
Activity Intolerance/Impaired Mobility      Mobility/activity is maintained at optimum level for patient Progressing          Blood pressure is elevated above 140 over 90 mmHg      Blood pressure (BP) is within acceptable limits Progressing        Pt requested dilaudid at start of shift. Pt became upset when told it was too soon to receive medication. Pt refused assessments and went outside to smoke. Pt refused several medications including her midorine and her meropenim. Oncoming shift aware. See MAR.    Pt had episode of hypotension. BP 80/55, . MD notified. Lasix held per MD. Pt insists on taking lasix.

## 2021-05-27 NOTE — PROGRESS NOTES
"Patient seen this evening at her insistence that she has not seen a physician during the day.     She is requesting cough syrup w/ codeine, additional Flexeril, & wants to know what we are doing for the fluid she has in her lungs. She reports that she was told that she has \"fluid in the lungs\" by the RT. She states that she is doing her flutter valve treatments (per chart review she either declines the treatments or completes them only partially), and that is the only bronchial treatment she is willing to do.    I reassured the patient that she was seen several times by myself this morning, Critical Care NP late morning, & Dr. Edwards this afternoon. I explained to the patient that we are not giving her additional Flexeril due to issues w/ her waxing/waning mentation, oversedation, & labile BPs. I reassured the patient that I have reviewed her chest imaging, & my recommendation is for her to perform her bronchial hygiene treatments faithfully. I also explained to her that medications w/ codeine would not help her deal w/ airway congestion.     I will defer further medical management to the Hospitalist team.    Raquel Ash   "

## 2021-05-27 NOTE — PROGRESS NOTES
Patient refusing tx all night. Refused to be taken out of wheelchair or be cleaned up. Nurse was in room around 0600 after nursing assistant unable to find gonzalez's. Nurse went to find Nursing assistant to help move patient in chair to look for catheters. Nurse and tech in room and nurse realized patient pale and not breathing. Code blue called 0620. Code team entered room and took over treatment. Charge nurse tried to call family a couple times during code and was unable to get a hold of any family. Time of death called at 0641.

## 2021-05-27 NOTE — PROGRESS NOTES
Infectious Diseases Progress Note  [facility]    Date of visit: 4/7/2019      ASSESSMENT   63-year-old woman with a history of paraplegia, COPD, recurrent UTIs, chronic decubitus wounds who ID is consulted regarding positive urine culture.    1. Urinary tract infection.  Patient has chronic catheterization via a suprapubic catheter and urethral catheter.  As such she is chronically colonized.  Urine culture was collected in the setting of decompensation with altered mental status and hypotension.  It is difficult to interpret urine culture results, as she is expected to be chronically colonized.  Her urine culture grew ESBL producing E. coli along with Pseudomonas that is resistant to carbapenems.  She has been clinically stable despite not being treated for the Pseudomonas.  She does have episodes of hypotension, but this does not seem to be associated with other sepsis type symptoms.  Her urethral catheter was changed 4/6 when it came out by accident.  Per charting, the suprapubic has not been changed for greater than 30 days.  2. Chronic decubitus wounds.  Followed by wound care.  Multiple contributing factors including tobacco use and poor adherence to nursing cares that leads to persistent wounds.  Patient complaining of worsening left heel wound.  3. HCAP.  Currently on meropenem. cxr yesterday showing LLL pneumonia. procalcitonin is normal. Gentamicin added for UTI, but will also offer double gram-negative coverage  4. Chronic pain.  5. Hypotension. Chronic, managed with midodrine. More drowsy today compared to yesterday.    Principal Problem:    Sepsis (H)  Active Problems:    Hospital-acquired pneumonia    Personal history of PE (pulmonary embolism)    Normocytic anemia    Noncompliance with medication regimen    Severe major depression, single episode, with psychotic features (H)    Paranoia (H)    Unable to control anger    Cough    Nonintractable epilepsy without status epilepticus,  unspecified epilepsy type (H)    Acute pulmonary edema (H)    Cigarette smoker       PLAN   -Complete 7-day course of meropenem, 1 more day  -Short course of gentamicin x 5 days, pharmacy to dose for UTI  -Change suprapubic catheter if it has not been done in the past 30 days  -Wound care to reevaluate wounds on Monday    Apolinar Aguiar MD  [facility]  Direct messaging: [employer]  On-Call ID provider: [phone], option: 9    ===========================================      SUBJECTIVE / INTERVAL HISTORY:     No events. cxr with LLL infiltrate. Normal behavior this morning, but this afternoon more drowsy, not going for smoking breaks like before. Very sleepy during visit. Awakens to acknowledge me, but goes back to sleep. BP low as well. No fevers.      Review of Systems     Patient unable to provide Review of Systems due to sleepiness.          Antibiotics   Meropenem 4/2-  Gentamicin 4/6-    Previous:  Vancomycin 4/1-4/6  Zosyn 4/1      Physical Exam     Temp:  [96.8  F (36  C)-98.2  F (36.8  C)] 98.2  F (36.8  C)  Heart Rate:  [] 110  Resp:  [18-22] 20  BP: ()/(33-64) 98/56    Vitals:    04/07/19 1110   BP: 98/56   Pulse: (!) 110   Resp: 20   Temp: 98.2  F (36.8  C)   SpO2: 90%       GENERAL:  well-developed, well-nourished, sitting in chair sleeping   HENT:  Head is normocephalic, atraumatic.   EYES:  Eyes have anicteric sclerae without conjunctival injection or stigmata of endocarditis.   LUNGS: Coarse sounds bilaterally   CARDIOVASCULAR:  Regular rate and rhythm with no murmurs, gallops or rubs.  ABDOMEN:  Normal bowel sounds, soft, nontender.   EXT: Bilateral lower extremity edema  SKIN:  No acute rashes.  Wounds are wrapped  NEUROLOGIC:  Sleeping. Arousable, but goes back to sleep.      Cultures   4/1 urine culture: ESBL producing E. coli (sensitive to gentamicin, meropenem, nitrofurantoin, and tobramycin).  Pseudomonas aeruginosa (sensitive to aztreonam,  cefepime, ceftazidime, gentamicin, Zosyn, and tobramycin).        Pertinent Labs:     Results from last 7 days   Lab Units 04/07/19  1130 04/07/19  0603 04/06/19  0651   LN-WHITE BLOOD CELL COUNT thou/uL 12.8* 9.9 12.7*   LN-HEMOGLOBIN g/dL 7.6* 7.7* 7.6*   LN-PLATELET COUNT thou/uL 264 271 251   LN-MONOCYTES RELATIVE PERCENT %  --  6 6   LN-MONOCYTES - REL (DIFF) % 2  --   --        Results from last 7 days   Lab Units 04/07/19  1130 04/07/19  0909 04/06/19  1943  04/03/19  0405  04/02/19  0437 04/01/19  1932 04/01/19  1149   LN-SODIUM mmol/L 142  --   --   --  143  --  142 140 138   LN-POTASSIUM mmol/L 3.3* 3.9 2.9*   < > 3.5   < > 3.4* 3.7 3.0*  3.0*   LN-CHLORIDE mmol/L 109*  --   --   --  107  --  103 98 96*   LN-CO2 mmol/L 26  --   --   --  30  --  31 34* 28   LN-BLOOD UREA NITROGEN mg/dL 8  --   --   --  9  --  17 23* 20   LN-CREATININE mg/dL 0.56*  --  0.64  --  0.59*  --  0.73 0.87 0.80   LN-CALCIUM mg/dL 7.0*  --   --   --  7.7*  --  7.8* 8.7 8.4*   LN-ALBUMIN g/dL 1.2*  --   --   --   --   --   --  1.6* 1.6*   LN-PROTEIN TOTAL g/dL 4.3*  --   --   --   --   --   --  6.1 6.1   LN-BILIRUBIN TOTAL mg/dL <0.1  --   --   --   --   --   --  0.2 0.2   LN-ALKALINE PHOSPHATASE U/L 182*  --   --   --   --   --   --  202* 198*   LN-ALT (SGPT) U/L 11  --   --   --   --   --   --  10 10   LN-AST (SGOT) U/L 22  --   --   --   --   --   --  10 9    < > = values in this interval not displayed.       Results from last 7 days   Lab Units 04/01/19  1932   LN-C-REACTIVE PROTEIN mg/dL 24.0*           Imaging:     Xr Chest 1 View Portable    Result Date: 4/7/2019  EXAM: XR CHEST 1 VIEW PORTABLE LOCATION: [facility] DATE/TIME: 4/7/2019 11:52 AM INDICATION: SOb and hypoxia COMPARISON: 04/06/2019 and older studies. FINDINGS: Patient's rotated to the left. No appreciable change in the left lower lobe opacities. New patchy opacities right base laterally. No change in the PICC line catheter which overlies the proximal  SVC. Heart and pulmonary vascularity are normal..    Xr Chest 1 View Portable    Result Date: 4/6/2019  EXAM: XR CHEST 1 VIEW PORTABLE LOCATION: [facility] DATE/TIME: 4/6/2019 9:43 PM INDICATION: pneumonia f/up COMPARISON: 04/01/2019 and older studies. FINDINGS: Right upper extremity PICC line catheter is unchanged. There are new airspace opacities in the left lower lobe consistent with a bronchopneumonia with associated effusion. Right lung is clear. Heart and pulmonary vascularity are normal. NOTE: ABNORMAL REPORT THE DICTATION ABOVE DESCRIBES AN ABNORMALITY FOR WHICH FOLLOW-UP IS NEEDED.         Attestation:  I have reviewed today's Medications, Vital Signs, Imaging, and Labs.

## 2021-05-27 NOTE — PROGRESS NOTES
Wound Ostomy  WOC Assessment       Allergies:  No Known Allergies    Diagnosis:   Patient Active Problem List    Diagnosis Date Noted     Cigarette smoker      Acute pulmonary edema (H)      Cough      Nonintractable epilepsy without status epilepticus, unspecified epilepsy type (H)      Unable to control anger      Severe major depression, single episode, with psychotic features (H)      Paranoia (H)      Upper back pain      Abnormal CT scan of lung      Panic anxiety syndrome      Pelvic pain 01/09/2019     Ulcer due to Treponema vincentii, with fat layer exposed (H) 01/07/2019     Atelectasis      Tension-type headache, not intractable, unspecified chronicity pattern      Weakness of left hand 12/04/2018     Focal motor seizure (H) 12/04/2018     Pelvic pain in female 11/21/2018     Chronic osteomyelitis (H)      Drug-induced constipation      Quadriplegia (H)      Goals of care, counseling/discussion 07/16/2018     Advanced care planning/counseling discussion 07/16/2018     Malingerer for IV Dilaudid 07/16/2018     Moderate protein-calorie malnutrition (H) 07/16/2018     Infection 07/14/2018     Noncompliance with medication regimen      Stage IV pressure ulcer of sacral region (H) 07/11/2018     Gangrene (H) 07/10/2018     Centrilobular emphysema (H) 07/02/2018     Bilateral lower extremity edema 07/02/2018     Hypoxemia 07/01/2018     Left lower lobe pneumonia (H) 06/27/2018     Acute bronchitis due to other specified organisms 06/27/2018     Nephrostomy complication (H) 03/06/2018     History of encephalopathy      Closed left subtrochanteric femur fracture (H) 02/27/2018     Acute blood loss anemia      Other specified hypotension      Normocytic anemia 02/26/2018     Adjustment disorder with mixed anxiety and depressed mood      Sleep difficulties      Irritability and anger      Fever in other diseases 01/31/2018     Severe sepsis (H) 01/31/2018     Ulcer 01/31/2018     Hypothyroidism due to Hashimoto's  thyroiditis      Abscess, gluteal, right      History of DVT (deep vein thrombosis)      Urinary incontinence due to urethral sphincter incompetence      Weakness 09/07/2017     Chronic anticoagulation 09/03/2017     Urinary tract infection associated with cystostomy catheter (H) 09/02/2017     Dislodged wound Vacuum 08/14/2017     Abdominal pain, unspecified location      Coagulopathy (H)      Anxiety      UTI (urinary tract infection) 08/10/2017     Elevated lactic acid level 07/29/2017     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     Suprapubic catheter dysfunction, subsequent encounter      Bilateral hydronephrosis      Cystitis      Colon wall thickening      Abdominal pain 06/03/2017     Flank pain 06/02/2017     Right upper quadrant abdominal pain 06/02/2017     Episodic cluster headache, not intractable      Sepsis (H) 05/28/2017     Sepsis secondary to UTI (H) 05/28/2017     Pyelonephritis      Hyponatremia      Chronic obstructive pulmonary disease with acute exacerbation (H) 04/18/2017     Depression 04/18/2017     Partial symptomatic epilepsy with simple partial seizures, intractable, with status epilepticus (H)      Acute on chronic intracranial subdural hematoma (H)      Brain edema (H)      Subdural hematoma (H) 04/09/2017     Convulsions, unspecified convulsion type (H)      Neck infection 03/31/2017     Lower abdominal pain 03/01/2017     Fever, unspecified fever cause      Pneumonia of left lower lobe due to infectious organism (H)      S/P cholecystectomy      Choledocholithiasis with obstruction 01/02/2017     Cholelithiasis 01/02/2017     Hx of pulmonary embolus 01/02/2017     Claustrophobia      Urinary tract infection, site unspecified      Hypotension, unspecified hypotension type      Chronic low back pain without sciatica, unspecified back pain laterality      Acute encephalopathy      Sepsis, due to unspecified organism (H) 12/30/2016     Personal history of PE (pulmonary embolism) 12/01/2016      "Cognitive disorder      Insomnia due to anxiety and fear      HCAP (healthcare-associated pneumonia) 11/04/2016     Atypical chest pain 10/23/2016     Diastolic CHF, acute on chronic (H)      Chest tightness or pressure      History of MDR Enterobacter cloacae infection      Anxiety disorder      Esophageal dysmotility      Major depressive disorder, recurrent episode, moderate (H)      Seizure disorder (H)      Heel ulcer, right, limited to breakdown of skin (H)      Decubitus ulcer of sacral region, stage 4 (H)      Paraplegia at T4 level (H) 04/28/2016     Hypokalemia      Chronic pain syndrome 01/12/2016     Major depression 01/12/2016     Alcohol abuse 01/12/2016     Hospital-acquired pneumonia 10/26/2015     COPD exacerbation (H) 08/03/2015     Gastroesophageal reflux disease without esophagitis 05/26/2015     Acquired hypothyroidism 05/26/2015     Iron deficiency anemia 05/26/2015     Opioid-induced constipation (OIC) 05/26/2015     Paraplegia (H) 05/26/2015     Palliative care encounter 12/08/2014     Nephrostomy tube displaced (H) 12/02/2014     Mechanical complication of suprapubic catheter (H) 11/20/2014     Acute respiratory failure with hypoxia (H) 08/19/2014     COPD (chronic obstructive pulmonary disease) (H) 08/19/2014     Lactic acidosis 08/19/2014     SVT (supraventricular tachycardia) (H) 08/19/2014     Dislodged Bhandari catheter, subsequent encounter 07/30/2014     Other chronic pain      Lumbosacral Disc Degeneration      Herpes Simplex Type I      Nicotine Dependence      Essential hypertension      Cancer of lung (H) 09/24/2013     Neurogenic bladder 01/29/2013     Neurogenic bowel 01/29/2013     Decubitus ulcer 01/25/2013     Mixed hyperlipidemia 01/25/2013     Decubitus ulcer of right buttock, stage 4 (H) 01/25/2013     Decubitus ulcer, stage III (H) 01/25/2013       Height:  5' 2\" (1.575 m)    Weight:   163 lb (73.9 kg)    Labs:  No results for input(s): CRP, HGBA1C, LABPRE, CDIFFTOX, HGB, " ALBUMIN in the last 72 hours.    Invalid input(s): CDIFF, HEM    Isaiah:  Isaiah Scale Score: 13    Specialty Bed:  [facility]    Wound culture obtained: No    Edema:  No    Patient up in chair and will not be returning to bed this shift (was up in chair already this early AM upon first attempt for WOC assessment). Patient agreeable to view wounds and change dressings on BLE. Did not allow time from re-measurement of wounds.    Anatomic Site/Laterality: Sacrum    Reason for ongoing care:   Wound assessment and plan of care    Encounter Type:  Subsequent Encounter Patient refused assessment of this wound today, see previous assessment from 3/5/19 listed below:    Wound Type:   Pressure Injury (Pressure Ulcer): Present on Admit    Stage:  Stage 4    Associated conditions/related trauma: Patient with malnutrition, paraplegia, adjustment disorder, history of pressure injuries to buttocks, incontinence of both bowel and bladder, CHF, chronic pain syndrome.  Patient's wounds made worse by frequent refusal of cares including turning and being cleaned up.  Had debridement on 1/10/19.    Assessment:    Length: 3 cm    Width: 3 cm    Depth: 2 cm  (measurements positional for this wound)    Tunneling/Undermining: Yes Up to 3cm towards 12 o'clock    Wound Bed: 100% Granular (of wound bed able to be visualized)    Exudate: Yes Serosanguineous Moderate    Periwound Skin: Scar Tissue    Treatment Plan: Gauze: Wet-to-moist, cover with Mepilex Sacral       Anatomic Site/Laterality: B buttocks    Reason for ongoing care:   Wound assessment and plan of care    Encounter Type:  Subsequent Encounter Patient refused assessment of this wound today, see previous assessment from 3/5/19 listed below:    Wound Type:   Pressure Injury (Pressure Ulcer): Present on Admit    Stage:  Stage 3 v. Full thickness IAD (patient sits in urine and fecal matter for hours at a time, refusing to let nursing staff clean area)    Associated conditions/related  "trauma: See above    Assessment:    Multiple areas of denudement noted to B buttocks likely related to pressure and/or IAD injury. Patient will not turn onto left side or farther over onto right side to allow better assessment of R trochanter/lateral buttock area. Calmoseptine was ordered yesterday based upon assessment of staff RN. This is an appropriate barrier product to use, but patient will not allow it to be used stating, \"it dries my skin too much.\" Patient note to have satellite lesions to upper aspect of buttock erythema indicating there may be a fungal component as well. Discussed use of Critic Aid AF with patient - barrier protection as well as more moisturizing than zinc based product. This will also treat possible fungal component and hopefully decrease irritation to skin in this area.    Tunneling/Undermining: No    Wound Bed: fibrin/granular mix    Exudate: Yes Serosanguineous Large    Periwound Skin: Scar Tissue    Treatment Plan: Antifungal: Critic Aid AF           Anatomic Site/Laterality: L IT    Reason for ongoing care:   Wound assessment and plan of care    Encounter Type:  Subsequent Encounter Patient refused assessment of this wound today, see previous assessment from 3/5/19 listed below:    Wound Type:   Pressure Injury (Pressure Ulcer): Present on Admit    Stage:  US    Associated conditions/related trauma: Patient with malnutrition, paraplegia, adjustment disorder, history of pressure injuries to buttocks, incontinence of both bowel and bladder, CHF, chronic pain syndrome.  Patient's wounds made worse by frequent refusal of cares including turning and being cleaned up.  Had debridement on 1/10/19.    Assessment:    Length: 0.5 cm    Width: 0.5 cm    Tunneling/Undermining: No    Wound Bed: 100% Dried drainage    Exudate: No    Periwound Skin: Erythema and satellite lesions    Treatment Plan: Open to air       Anatomic Site/Laterality: Right dorsal foot     Reason for ongoing care:   Wound " assessment and plan of care    Encounter Type:  Subsequent Encounter Healed       Anatomic Site/Laterality: Right lateral shin    Reason for ongoing care:   Wound assessment and plan of care    Encounter Type:  Subsequent Encounter Pressure Injury (Pressure Ulcer): Present on Admit    Stage:  Stage 3    Associated conditions/related trauma: See above    Assessment:   Measurement from previous assessment from 3/14/19:    Length: 5.5cm    Width: 2cm    Depth: 1.5cm    Tunneling/Undermining: No    Wound Bed: 90% Granular and 10% Slough    Exudate: Yes Serosanguineous Moderate    Periwound Skin: Intact     Treatment Plan: Silicone foam and gauze/dimethicone barrier ointment as this is only thing patient will allow on wound.     Anatomic Site/Laterality: Right heel    Reason for ongoing care:   Wound assessment and plan of care    Encounter Type:  Subsequent Encounter Pressure Injury (Pressure Ulcer): Present on Admit    Stage:  Stage 3    Associated conditions/related trauma: See above    Assessment:  Measurement from previous assessment from 3/14/19:  Approximately 2x1cm, unable to fully measure as patient will only partially allow foot to be raised    Tunneling/Undermining: No    Wound Bed: Approximately 100% Dried Drainage    Exudate: No    Periwound Skin: Scar Tissue    Treatment Plan: Silicone foam and gauze/dimethicone barrier ointment as this is only thing patient will allow on wound.  Patient refusing Rooke boots, continue offloading with pillow and bath blanket.     Anatomic Site/Laterality: Left heel    Reason for ongoing care:   Wound assessment and plan of care    Encounter Type:  Subsequent Encounter Pressure Injury (Pressure Ulcer): Present on Admit    Stage:  Unstageable    Associated conditions/related trauma: See above    Assessment:  Measurement from previous assessment from 3/22/19:  Heel is 4x4.2x0.2cm,     Medial foot is 5x3cm    Tunneling/Undermining: No    Wound Bed: Heel - 100% slough and Medial  foot - 20% granular and 80% softening eschar    Exudate: Yes Serosanguineous Moderate    Periwound Skin: Maceration and Scar Tissue    Treatment Plan: Silicone foam and gauze/dimethicone barrier ointment as this is only thing patient will allow on wound. Patient refusing Rooke boots, continue offloading with pillow and bath blanket.     Anatomic Site/Laterality: Bilateral toes    Reason for ongoing care:   Wound assessment and plan of care    Encounter Type:  Subsequent Encounter Denudement:  Foreign body present? No    Tissue Damage:   Breakdown of skin    Related trauma: Patient with multiple abrasions to toes from bumping into things.    Assessment:    Scattered areas of dried drainage noted to be intact on multiple toes.    Tunneling/Undermining: No    Wound Bed: 100% Dried Drainage    Exudate: No    Periwound Skin: Intact    Treatment Plan: Open to Air     Nursing care provided was coordinated care with RN.    Discussed plan of care with nurse, patient, MD and charge nurse.    Outcomes and treatment recommendations are to promote skin integrity, contain exudate, promote wound healing and promote debridement autolytic.    Actions taken by WOC RN: 5 minutes of education. Patient with questions regarding increased pain to left heel. At prior assessment, area was covered with dried eschar which is now autolytically debriding. This may be exposing nerve endings to air and causing increased pain sensation.    Planned Follow Up: Weekly assessments of each wound will attempted by WOC RN; however, patient is frequently up in chair in the early AM and does not return to bed until later evening hours. At other times she will not allow assessment of wounds d/t a variety of reasons.    Plan for next visit: Reassess wound(s) and Reassess skin integrity.

## 2021-05-27 NOTE — PLAN OF CARE
Discharge Barriers      Patient's discharge needs are met Not Progressing        Non-compliance with treatment regimen      Compliance with treatment regimen Not Progressing        Pain      Patient's pain/discomfort is manageable Not Progressing        Psychosocial Needs      Demonstrates ability to cope with hospitalization/illness Not Progressing          Pt arrived to unit 5000 from ICU. Pt arrived around 1400.     Pt left to smoke for 30 minutes. AOx4, pt refused initial vitals check. Pt refused thorough skin assessment, and refused writer to assess urethral catheter and suprapubic catheter sites. Lung sounds remain course. Pt received PRN flexaril, Tylenol, and scheduled methadone for 10/10 pain. Pt received 0.5 mg Ativan for anxiety.     Pt left a 2nd time to smoke, was gone for 30 minutes.     Nursing staff will continue to monitor pt status.

## 2021-05-27 NOTE — PROGRESS NOTES
Patient is on room air, sating 91% RR 18 , breath sounds diminished crackles coarse. Patient has a NP cough. Patient did not get neb tx's on time due to needing to go out and smoke or make phone calls. Nebs given x 2. Patient did cut 1st neb short to talk on the phone. Rt continue to monitor.

## 2021-05-27 NOTE — PROGRESS NOTES
Called to give prn neb to pt. This writer was gowned and gloved ready to give neb, Pt then requested wound cares . This writer waited outside room to give pt private time and room for RN to perform wound cares.   When this writer returned ( less than 3 minutes) Pt was verbally aggressive towards this writer. Pt declined this therapist to assess lung sounds , declined vital signs . Pt then tore off neb and ordered this writer out of the room . This writer tried to encourage pt to have a good day . This pt is verbally aggressive to staff.   Luis Fernando Pichardooner

## 2021-05-27 NOTE — PROGRESS NOTES
S Daily Progress Note    Assessment/Plan:  Marychuy Zhou is 63 y.o. female with history of alcohol dependence, anxiety disorder, chronic kidney disease, history of spinal hematoma with resultant lower extremity paraplegia and a chronic indwelling suprapubic catheter, recurrent UTIs, chronic pain syndrome, COPD, depression, history of DVT, GERD, gastroparesis, hypertension, hypothyroidism, lung cancer s/p left lower lobe resection, obesity, rheumatoid arthritis, pulmonary embolism on chronic anticoagulation, who was admitted to this hospital on 2/2/2019 for recurrent UTI, sacral decub ulcer and respiratory distress. She was treated with a course of antibiotics for hospital acquired pneumonia, and sacral decub ulcer with osteomyelitis. She has been very non compliant with cares and has been refusing medications and regular nursing cares.  On 4/1/2019 patient became hypoxic and hypotensive and was transferred to ICU          Assessment:  Metabolic encephalopathy  Probably on the basis of respiratory infection complicated by respiratory failure as well as acute UTI    Acute E. coli and Pseudomonas urinary tract infection  On meropenem   ESBL E. coli sensitive to meropenem but not Pseudomonas  Will consult ID in this regard    Acute Hypoxic respiratory failure  improving  Discussed the case with intensivist    Probable Aspiration with Hospital-acquired pneumonia   Probably on the basis of aspiration   Unfortunately patient continues to smoke  Refusing bronchial hygiene therapy  On Meropenem as per Pulmonology    COPD with acute exacerbation  ABG will be checked stat    Paraplegia secondary to epidural hematoma in 2010  Uses a walker    Stage IV decubitus ulcer with osteomyelitis  Completed course of antibiotics  Wound care to follow is going to open dressing at 930 tomorrow    History of seizures  Keppra    History of DVT and PE  On Xarelto  On as needed hydromorphone          Plan:  Patient was asking the nurse for  more Ativan and codeine.  I do not think that it would be safe to give her any extra doses at this time.   I would not add any further sedatives to her plan of care  Consult infectious diseases for ESBL E. coli and Pseudomonas growing in her urine  PATIENT WAS NOT SEE AS SHE WAS NOT IN HER ROOM ON MULTIPLE OCCASSIONS     Code status:Full Code        Subjective:  NOT IN THE ROOM     Review of Systems:   No other symptoms of pain  Current Medications Reviewed via EHR List    Objective:  Vital signs in last 24 hours:  Temp:  [99  F (37.2  C)-100.3  F (37.9  C)] 99.6  F (37.6  C)  Heart Rate:  [] 116  Resp:  [16-18] 18  BP: (107-109)/(52-66) 107/52  SpO2:  [95 %-98 %] 98 %    Intake/Output last 3 shifts:  I/O last 3 completed shifts:  In: 590 [P.O.:590]  Out: 3850 [Urine:3850]      Physical Exam:  DID NOT EXAMINE THE PATIENT         Imaging:  Chest x-ray is ordered    Lab Results:  Personally Reviewed.   Fingerstick Blood Glucose: No results for input(s): POCGLUFGR in the last 48 hours.    Recent Results (from the past 24 hour(s))   Potassium - Next AM    Collection Time: 04/05/19  5:04 AM   Result Value Ref Range    Potassium 3.7 3.5 - 5.0 mmol/L   Platelet Count - DO NOT remove unless platelet count already ordered by other source    Collection Time: 04/05/19 11:15 AM   Result Value Ref Range    Platelets 327 140 - 440 thou/uL   Hemoglobin    Collection Time: 04/05/19 11:15 AM   Result Value Ref Range    Hemoglobin 8.7 (L) 12.0 - 16.0 g/dL           Advanced Care Planning  Discharge plan unknown at this time      Jaron Edwards  Date: 4/5/2019  Time: 6:06 PM  Hospitalist Service  Part of this chart was created using a dictation software. Typographic errors, word substitutions, and Grammatical errors may unintentionally occur.

## 2021-05-27 NOTE — PROGRESS NOTES
"Clinical Nutrition Therapy Reassessment Note     Reason for Assessment: Marychuy Zhou is a 63 y.o. female assessed by the dietitian for wound.    Current Nutrition Prescription:   Diet: Regular  Supplements and Modulars: Boost GC daily with meal      Current Nutrition Intake:  Difficult to assess intake. Patient appears to refuse some meals and then order very large meals at other times. Much of patient's intake is not charted so difficult to assess adequacy of average intake.     Anthropometrics:  Height: 5' 2\" (1.575 m)  Admission weight: 160 lb, stated  Most recent weight: 169 lb 8.5 oz (76.9 kg)- +4 edema to BLEs and +2 generalized edema noted  Ideal body weight: ~100 lb(IBW-10-15 lb for paraplegia)  Weight History:   Wt Readings from Last 10 Encounters:   12/08/18 162 lb (73.5 kg)   12/06/18 192 lb 9.6 oz (87.4 kg)   11/19/18 171 lb 1 oz (77.6 kg)   10/16/18 158 lb (71.7 kg)   08/11/18 165 lb (74.8 kg)   07/22/18 164 lb 4.8 oz (74.5 kg)   07/10/18 175 lb (79.4 kg)   07/05/18 174 lb 8 oz (79.2 kg)   06/26/18 158 lb (71.7 kg)   06/22/18 170 lb (77.1 kg)     *Patient frequently refuses to allow bed to be rezeroed or to have any linens or pillows removed. Weights may be inaccurate.    GI Status/Output:   GI symptoms include:Constipation per nurse, but diarrhea noted yesterday per patient (constipation noted earlier in day by patient)  Bowel Sounds present per nurse  Last BM: 4/4/19 per nurse    Skin/Wound:  Isaiah score: 14    Multiple pressure wounds noted. Please see latest WOC RN notes for status of wounds.  Doubtful they will heal as pt continues to refuse cares, sits in stool and urine and refuses brief changes, and is non-compliant with repositioning or prolonged periods in chair with refusal to transfer back to bed.     Medications:  Mg+ protocol  Ca + Vit D3  FeO4  Lasix  Levothyroxine  Meropenem  Methadone  Mvit w/ Fe  Prilosec  Zosyn  Miralax  Vancomycin      Labs:  Results from last 7 days   Lab Units " 04/05/19  0504  04/03/19  0405   LN-SODIUM mmol/L  --   --  143   LN-POTASSIUM mmol/L 3.7   < > 3.5   LN-CHLORIDE mmol/L  --   --  107   LN-CO2 mmol/L  --   --  30   LN-BLOOD UREA NITROGEN mg/dL  --   --  9   LN-CREATININE mg/dL  --   --  0.59*   LN-CALCIUM mg/dL  --   --  7.7*    < > = values in this interval not displayed.     Results from last 7 days   Lab Units 04/01/19  1932   LN-ALBUMIN g/dL 1.6*   LN-PROTEIN TOTAL g/dL 6.1   LN-BILIRUBIN TOTAL mg/dL 0.2   LN-ALKALINE PHOSPHATASE U/L 202*   LN-ALT (SGPT) U/L 10   LN-AST (SGOT) U/L 10         Results from last 7 days   Lab Units 04/04/19  0517   LN-MAGNESIUM mg/dL 1.5*             Results from last 7 days   Lab Units 04/01/19  1932   LN-C-REACTIVE PROTEIN mg/dL 24.0*     Estimated Nutrition Needs:  Assessment weight is 45.5 kg, ideal weight     Energy Needs: 1599-2606 kcals daily, 30-35 kcal/kg  Protein Needs: 68-91 g daily, 1.5-2.0 g/kg.  Fluid Needs: ~1103-1053 mls daily, ~35-40 mls/kg  Very difficult to  needs with no reliable weight with lower muscle mass w/ paraplegia but high needs for healing of multiple wounds.      Malnutrition: Not noted with no reliable hx re: weight or oral intake.      Nutrition Risk Level: stable-moderate risk     Nutrition dx:   Increased nutrient needs r/t wounds as evidenced by standard needs for multiple wounds noted in WOC RN note.      Goal:   Meet estimated nutrition needs and Wound healing. Averaged intake appears to be meeting lower end of protein goals and higher end of calorie goals.     Intervention:   Continue current plan as pt doesn't want extra Boost.      Monitoring/Evaluation:   Intake, labs, wounds.     See Care Plan for further Problems, Goals, and Interventions    Electronically signed by:  Fara Rivera RD

## 2021-05-27 NOTE — PLAN OF CARE
"  Problem: Potential for Compromised Skin Integrity  Goal: Skin integrity is maintained or improved  Outcome: Not Progressing     See previous note regarding regarding other events this shift. The patient complained of extreme pain in her feet. PRN medications given. She then became drowsy and stated, \"I can feel that I'm getting sepsis.\" MD notified, orders placed. Vancomycin infusing at this time. Noted redness around PICC site, MD aware but we are still able to use the PICC line. It is flushing well with blood return but is sluggish with positioning. Patient continues to be demanding and verbally aggressive. She is at ultrasound at this time - will administer seroquel and PRN dilaudid when she returns to the unit. She requested PRN zofran and that was given to her. VSS this shift. Dressing changed to buttocks.     10:54 PM  Patient sleeping soundly at this time. Seroquel not administered prior to ultra sound. Will hold due to drowsiness at this time and notify next shift.   "

## 2021-05-27 NOTE — PROGRESS NOTES
[facility] Daily Progress Note    Assessment/Plan:  Marychuy Zhou is a 63-year-old lady with a very complex past medical history including alcohol dependence, anxiety, chronic kidney disease, history of spinal hematoma with resultant lower extremity paraplegia and a chronic indwelling suprapubic catheter, recurrent UTIs, chronic pain syndrome, COPD, depression, history of DVT, GERD, gastroparesis, hypertension, hypothyroidism, lung cancer status post Left lower lobe resection, obesity, rheumatoid arthritis and pulmonary embolism on chronic anticoagulation who is admitted for evaluation and management of respiratory difficulty          Assessment:  Respiratory difficulty due to COPD with exacerbation  -  Likely multifactorial.    - Ongoing tobacco use likely playing a role.  -  Continue furosemide  -  Tobacco cessation education provided       Paraplegia  -  Was due to an epidural hematoma  -  Continue baclofen  -  Continue gabapentin  -  Supportive care  -  Decubitus ulcer pecautions  -  Wound service consulted      History of seizure disorder  -  Continue levetiracetam  -  Monitoring     History of DVT and PE  -  Continue rivaroxaban anticoagulation      Hypothyroidism  -  Continue levothyroxine      Chronic pain syndrome  -  Continue methadone     Anxiety, other psychiatric conditions  -  Continue quetiapine  -  Continue lorazepam     AQUILINO  -  Continue ferrous sulfate iron supplementation  -  Monitoring hemoglobin     Overactive bladder  -  Continue oxybutynin     Possible UTI  -  Undergoing treatment with oral levofloxacin course  -  Clinical monitoring     Stage IV sacral decubitus ulcer with chronic osteomyelitis, heel wound  -  Wound service consulted     Tobacco use  -  Tobacco cessation education provided again today  -  Nicotine replacement therapy as indicated     Anxiety  -  May be responsible for tachycardia  -  PRN lorazepam ordered           Plan:  Patient wants Ativan dose to be increased.  I told her that  I would evaluate this and at this time I do not think that patient looks anxious to me    Code status:Full Code        Subjective:  Feels that her breathing is somewhat worse unfortunately continues to smoke cigarettes    Review of Systems:   No other symptoms  Want anxiety medication dose to be increased from Lorazepam 0.25-1 mg    Current Medications Reviewed via EHR List    Objective:  Vital signs in last 24 hours:  Heart Rate:  [117-122] 117  Resp:  [16-18] 18  BP: ()/(53-58) 108/58  SpO2:  [87 %-94 %] 94 %    Intake/Output last 3 shifts:  I/O last 3 completed shifts:  In: -   Out: 1550 [Urine:1550]      Physical Exam:  EYES: EOM+   NECK: no JVD  HEART : S1 S2 RRR   LUNGS: Decreased breath sounds in the lung bases without Crepitations but with minimal wheezing   ABDOMEN: Not examined  CNS Alert and Oriented x 3 moving as paraplegia         Lab Results:  Personally Reviewed.   Fingerstick Blood Glucose: No results for input(s): POCGLUFGR in the last 48 hours.    Recent Results (from the past 24 hour(s))   Potassium    Collection Time: 03/26/19  6:13 AM   Result Value Ref Range    Potassium 2.9 (L) 3.5 - 5.0 mmol/L           Advanced Care Planning  Discharge plan: Awaiting placement      Jaron Edwards  Date: 3/26/2019  Time: 6:11 PM  Hospitalist Service  Part of this chart was created using a dictation software. Typographic errors, word substitutions, and Grammatical errors may unintentionally occur.

## 2021-05-27 NOTE — DISCHARGE SUMMARY
[facility] MEDICINE  DISCHARGE SUMMARY     Primary Care Physician: Sánchez Zamora MD              Admission Date: 2019   Discharge Provider: Teddy zAar Discharge Date: 4/10/2019     Condition at Discharge:       REASON FOR PRESENTATION(See Admission Note for Details)     Marychuy Zhou is a 63 y.o. female with a medical history of alcohol dependence, anxiety disorder, chronic kidney disease,  spinal hematoma with resultant lower extremity paraplegia and a chronic indwelling suprapubic catheter, recurrent UTIs, chronic pain syndrome, COPD, personality disorder, history of DVT, GERD, gastroparesis, hypertension, hypothyroidism, lung cancer s/p left lower lobe resection, obesity, rheumatoid arthritis, pulmonary embolism on chronic anticoagulation, who was admitted to this hospital on 2019 for recurrent UTI, sacral decub ulcer and respiratory distress.    PRINCIPAL & ACTIVE DISCHARGE DIAGNOSES     Principal Problem:    Sepsis (H)  Active Problems:    Acute hypoxemic respiratory failure (H)    Paraplegia (H)    COPD exacerbation (H)    Chronic pain syndrome    HCAP (healthcare-associated pneumonia)    Urinary tract infection associated with cystostomy catheter (H)    Personal history of PE (pulmonary embolism)    Normocytic anemia    Pressure injury of sacral region, stage 4 (H)    Noncompliance with medication regimen    Paranoia (H)    Unable to control anger    Cigarette smoker    Personality disorder (H)    Osteomyelitis of vertebra, sacral and sacrococcygeal region (H)      SUMMARY OF HOSPITAL COURSE:      During her hospital stay, patient was treated for sepsis, recurrent E. coli and pseudomonas urinary tract infection due to chronic suprapubic indwelling catheter, hospital acquired pneumonia, acute COPD exacerbation, acute hypoxemic respiratory failure, and stage IV sacral decub ulcer with osteomyelitis. Patient had a prolonged hospital stay due to placement issues. During  hospitalization, patient has been very non compliant with cares.  She often refuses medication and regular nursing cares. She continues to smoke everyday, she refuses bronchial hygiene therapy and nebulizer treatment despite her pneumonia, COPD exacerbation and hypoxemic respiratory failure. She developed hypoxia and hypotension on 4/1/2019 and required to be transferred to ICU for sepsis, HCAP. After she was stabilized and transferred back to the medical floor, she continued to smoke multiple times daily and refuse respiratory care. Patient developed acute respiratory failure on 4/10/2019 due to COPD exacerbation.  She was resuscitated for 21 minutes without ROSC. She was pronounced dead on 4/10/2019 6:45 AM.             Please see EMR for more detailed significant labs, imaging, consultant notes etc.    Total time spent on discharge: 40 minutes    Teddy Azar MD  [facility]: Ph:[phone]    CC:Sánchez Zamora MD

## 2021-05-27 NOTE — PLAN OF CARE
Problem: Discharge Barriers  Goal: Patient s discharge needs will be met  Outcome: Progressing  Care Plan-Care Management    Care Management Goals of the Day:   Progression of Care    Care Progression to be reviewed with MD and RN CM: Updates to be provided to Charge RN, CMSW, HUC,therapy and other disciplines as indicated.    Barriers to Discharge:  Initiation of CADI waiver    Discharge Disposition:   [facility]    Expected Discharge Date: TBD    Transportation:  [facility] ([phone])    Care Coordination Narrative:   Pt has been accepted at [facility] [place] [facility] (P: [phone]/F: [phone]). Pt has had her CADI assessment with Taylor from [facility] Choices assessments ([phone]/F: [phone]) LOKESH Hernandez at [place] has submitted a cost of cares report to [employer] as well to be approved.      Per previous note: SW has left a message for [facility] ([facility] (P: [phone]) to inquire about the status of pt's CADI waiver applciation. Pt's MA services are currently billed through [employer]. SW to await return call.    MARGI spoke with [facility] and was informed that they cannot supply a Electric Hospital bed or a farrah lift. They recommended calling [facility]. MARGI spoke with [facility] ([phone]/F: [phone]) who informed the SW that specific perscriptions will be needed for both the hospital bed and farrah. Prescription orders placed for Full electric hospital bed and farrah lift. SW to fax orders once chart notes indicate needs. MARGI has discussed this with MD. Chart notes must indicate the need for the hospital bed such as the pt needs frequent turns and positions due to her chronic ulcers, as well as she is paraplegic at the T4 level and needs more than one person to transfer from the bed to her wheelchair.    04/08/19 Discussed with MD to indicate why patient needs a hospital bed and farrah lift in  documentation.

## 2021-05-27 NOTE — PLAN OF CARE
Problem: Discharge Barriers  Goal: Patient's discharge needs are met  Outcome: Progressing  Care Plan  Care Management        Care Management Goals of the Day: Progression of care     Care Progression Reviewed With: Charge RN, MD, HUC, RNCM, CMSW     Barriers to Discharge: initiation of CADI waiver      Discharge Disposition: [facility]     Expected Discharge Date: TBD     [facility] ([phone])        Care Coordination Narrative:       Pt has been accepted at [facility] [place] [facility] (P: [phone]/F: [phone]). Pt has had her CADI assessment. Per Taylor, the [facility] Choices  ([phone]/F: [phone]), she will not have her curtesy assessment completed until likely 4/3/2019 then she will send it to [facility] as this is the [facility] her funding is going to be through. Taylor states that after this the DON at [place] (Mary) would need to submit a cost of cares report to [facility] as well to be approved. SW has left a message for Mary to find out if the Cost of Cares report is completed.      Per Pt's request the SW has called 6 additional Group Home/Assisted Living facilities to inquire about availability. At this time all of those 6 additional facilities were either full or could not meet the pt's needs. Facilities are as follows:       [facility]: Declined, Not Handicap accessible    [facility]: Declined, too medically complex     [facility]: No Availability    [facility]: No availability     [facility]: Does not have capacity for Waiver at this time    [facility]: No Availability      SW will continue to follow and assist with further d/c needs.     CYNTHIA Gonsalez LGSW  3/28/2019  9:56 AM

## 2021-05-27 NOTE — SIGNIFICANT EVENT
Writer called lab to verify K+ level that showed for 3/23/18 @ 2024 was actually from the blood that was sent this morning.  did verify that the result for K+ of 3.9 is from the blood this morning. MD notified.

## 2021-05-27 NOTE — PLAN OF CARE
"Pt A&Ox4, able to make needs known. Continues to complain of pain rated 12/10 to buttocks and bilateral lower extremities. Refusing repositioning back into bed to alleviate pressure to buttocks. Wound dressings to bilateral lower extremities changed this AM. Dressings to LLE saturated with purulent and serous drainage. Large area of grey/white/tan tissue in center of wound. All wounds cleaned and pat dry. Pt insisted on putting barrier cream on gauze prior to covering wounds with dressings, stating, \"This is how Frank taught me how to do it so I can teach you\". Pt verbally abusive toward assistive staff this AM. Behavior subsided upon rising to wheelchair. Pt was pleasant and cooperative with all cares and assessments with this writer. Pt request PRN Ativan at 1017, noted to be sleeping at this time. Will continue to monitor.  "

## 2021-05-27 NOTE — PROGRESS NOTES
[place] Daily Progress Note      Date of Service: 3/30/2019     Brief History: Marychuy Zhou is 63 y.o. female with history of alcohol dependence, anxiety disorder, chronic kidney disease, history of spinal hematoma with resultant lower extremity paraplegia and a chronic indwelling suprapubic catheter, recurrent UTIs, chronic pain syndrome, COPD, depression, history of DVT, GERD, gastroparesis, hypertension, hypothyroidism, lung cancer s/p left lower lobe resection, obesity, rheumatoid arthritis, pulmonary embolism on chronic anticoagulation, who was admitted to this hospital on 2/2/2019 for recurrent UTI, sacral decub ulcer and respiratory distress. She was treated with a course of antibiotics for hospital acquired pneumonia, and sacral decub ulcer with osteomyelitis. She has been very non compliant with cares and has been refusing medications and regular nursing cares.    Assessment:       COPD with acute exacerbation. Treated with a course of antibiotic for HCAP. Ongoing tobacco abuse, refusal of bronchial hygiene therapy, poor cough remains an issue. Currently stable. Patient requested X ray, which was done on 03/29, which shows improvement in lung aeration and no evidence of fluid overload or pneumonia.    Intermittent respiratory distress. Multifactorial in etiology. Treated for HCAP and COPD exacerbation. Not needing any supplemental oxygen. She is on furosemide. Ongoing tobacco abuse is playing a role as well. Refusing bronchial hygiene therapy and refusing to give up tobacco smoking.    History of paraplegia, secondary to epidural hematoma in 2010. She is on baclofen, gabapentin. She has suprapubic catheter.    Stage IV decub ulcer with osteomyelitis. Completed course of antibiotics. WOC following.    Bilateral leg wounds. Stable. WOC following.    History of seizure disorder on keppra.    History of DVT and PE on xarelto.    Hypothyroidism on synthroid.    Chronic pain syndrome, on methadone, and as  "needed hydromorphone.    Hypokalemia, likely from use of lasix. Resolved. Monitor intermittently.    Overactive bladder on oxybutynin    Chronic anemia, multifactorial- iron deficiency    Ongoing tobacco abuse. She refuses to quit    Generalized anxiety disorder. She is on as needed ativan.    Plan:   Continue supportive cares. Again counseled to quit smoking. Continue with bronchial hygiene therapy. Discussed with nursing staff.    Subjective:     Chart reviewed, events noted. Pt seen and examined. Again discussed the importance of quitting smoking, which she declined. She was asking me to increase dose of ativan, and I told her that I do not see any sign of worsening anxiety and previously made her lethargic on higher dose of ativan, so I refused to increase the dose. She had a \"cousin\" at her bedside.    Objective     Vital signs in last 24 hours:  Heart Rate:  [] 113  Resp:  [18-20] 18  BP: ()/(56-70) 122/70  Weight:   163 lb (73.9 kg)  Weight change:   Body mass index is 29.81 kg/m .    Intake/Output last 3 shifts:  I/O last 3 completed shifts:  In: -   Out: 3200 [Urine:3200]  Intake/Output this shift:  I/O this shift:  In: -   Out: 550 [Urine:550]      Physical Exam:  /70 (Patient Position: Sitting)   Pulse (!) 113   Temp 97.9  F (36.6  C) (Oral)   Resp 18   Ht 5' 2\" (1.575 m)   Wt 163 lb (73.9 kg)   LMP  (LMP Unknown)   SpO2 94%   BMI 29.81 kg/m      FiO2 (%): (room air) O2 Device: None (Room air) O2 Flow Rate (L/min): 4 L/min    General Appearance: Alert, not in distress.  HEENT: Normocephalic. No scleral icterus. Mucous membranes moist.  Heart: S1 S2 heard. Regular rate and rhythm.  Lungs: Coarse breath sounds.  Abdomen: Soft, non tender, bowel sounds present.  Extremity: Bilateral leg edema.   Neurologic: Paraplegia      Lab Results:  personally reviewed.   Lab Results   Component Value Date     03/23/2019    K 3.5 03/29/2019    CL 94 (L) 03/23/2019    CO2 28 03/23/2019    " BUN 26 (H) 03/23/2019    CREATININE 0.68 03/28/2019    CALCIUM 8.4 (L) 03/23/2019     Lab Results   Component Value Date    WBC 10.8 03/23/2019    WBC 6.0 09/19/2015    HGB 9.0 (L) 03/23/2019    HCT 30.4 (L) 03/23/2019    MCV 82 03/23/2019     03/23/2019       Advance Care Planning:   Barriers to discharge: Placement  Anticipated discharge day: Unknown  Disposition: To be determined      Stephen Frausto MD  [facility] Hospitalist  Paging: [employer] Paging

## 2021-05-27 NOTE — PROGRESS NOTES
Intensivist update  4/03/19    Patient received 2 L LR o/n w/ initial improvement in her BP, & mild improvement in her HR. Her Cr remains stable on morning labs, & her lactic acid is within normal limits. Despite lower BP this morning she is awake, talking w/ nursing staff, & is without complaints of lightheadedness or dizziness. Plan to administer 25 gm of albumin.     Since her initial spinal injury was at the T2 level, she is at risk for autonomic instability that may be exacerbated by possible underlying sepsis. Since she is not demonstrating any clinical signs of end-organ hypoperfusion, plan to trial the albumin infusion before considering vasopressors.    Since she has a h/o ESBL will switch zosyn to meropenem.    Raquel Ash       Addendum:  Patient's BP variable from extremity to extremity. While we have readings of 70s/30s in LUE, they are in 90/50 in RUE. Patient is awake, talking loudly, & is fully oriented. She has spent most of the last 6 months in the hospital, so it is difficult to gauge her baseline BP.    Raquel Ash

## 2021-05-27 NOTE — SIGNIFICANT EVENT
SW and care management informed of event on 8310. Managers for nursing supervisor and Case Management emailed to follow up with patient.

## 2021-05-27 NOTE — PLAN OF CARE
Problem: Pain  Goal: Patient's pain/discomfort is manageable  Outcome: Progressing     Problem: Psychosocial Needs  Goal: Demonstrates ability to cope with hospitalization/illness  Outcome: Progressing

## 2021-05-27 NOTE — PLAN OF CARE
Blood pressure is elevated above 140 over 90 mmHg      Blood pressure (BP) is within acceptable limits Progressing        Daily Care      Daily care needs are met Progressing        Decreased Mental Status Causing Increased Need for Safety      Provide a safe environment for patient (Implement appropriate elements in plan of care) Progressing      Decrease patient's symptoms Progressing      To ensure patient safety, patient will abstain from any threats or actions to harm self or others Progressing        Discharge Barriers      Patient's discharge needs are met Progressing        Impaired Gas Exchange      Demonstrate improved ventilation and adequate oxygenation of tissues as evidenced by absence of respiratory distress Progressing        Infection      Signs and symptoms of infections are decreased or avoided Progressing        Insufficient Nutritional Intake      Mobility/activity is maintained at optimum level for patient Progressing        Knowledge Deficit      Patient/family/caregiver demonstrates understanding of disease process, treatment plan, medications, and discharge instructions Progressing        Non-compliance with treatment regimen      Compliance with treatment regimen Progressing        Pain      Patient's pain/discomfort is manageable Progressing        Potential for Compromised Skin Integrity      Skin integrity is maintained or improved Progressing        Potential for Falls      Patient will remain free of falls Progressing        Potential for transmission of organism by Contact, Enteric, Droplet and/or Airborne routes      Prevent transmission of organisms Progressing        Psychosocial Needs      Demonstrates ability to cope with hospitalization/illness Progressing      Collaborate with patient/family/caregiver to identify patient specific goals for this hospitalization Progressing        Safety      Patient will be injury free during hospitalization Progressing        Tissue Integrity       Damaged tissue is healing and protected Progressing        Pt has been in her wheelchair all shift. Pt has gone outside 2 times this shift. Pt has received multiple prn's. Writer unable to do full assessment on pt d/t her being in her wheelchair. Pt did allow writer to declot her PICC line and send blood for lab. Pt refused b/p medication and bowel meds.

## 2021-05-27 NOTE — PLAN OF CARE
Problem: Pain  Goal: Patient's pain/discomfort is manageable  Outcome: Progressing     Problem: Safety  Goal: Patient will be injury free during hospitalization  Outcome: Progressing     Problem: Daily Care  Goal: Daily care needs are met  Outcome: Progressing     Problem: Psychosocial Needs  Goal: Demonstrates ability to cope with hospitalization/illness  Outcome: Progressing   Pt has been sleeping since 2230; BP in the soft range(84/46, 78/53, 73/33 and order received for one liter bolus which brought the BP up to 104/55. Pt continue to be drowsy. MD came to assess the pt. K+ was 2.9 and pt is receiving the some bumps at this time, will recheck K+ when it is due.

## 2021-05-27 NOTE — PLAN OF CARE
Pain      Patient's pain/discomfort is manageable Progressing    Pt observed with eyes closed, appeared to be resting comfortably.      Potential for Compromised Skin Integrity      Skin integrity is maintained or improved Progressing    Pt sleeping, did not want to wake up for reposition.

## 2021-05-27 NOTE — PROGRESS NOTES
"Pharmacy Consult: Vancomycin Dosing    Pharmacist consulted to dose vancomycin for Marychuy Zhou, a 63 y.o. female.    Ordering provider: Dr MISSY Lynn hospitalist    Indication for vancomycin therapy: Sepsis- rule-out, centarl line infection  (redness and some mild induration near PICC site on RUE.  Also patient a little more groggy than usual.   Will add Vanco for now and get blood cultures (one periph and one from line).  Check US of arm.   Ask ID to assess in AM to decide if we need to remove PICC.)    Goal Trough Range:  10-20 mcg/mL based on indication    Other current antimicrobials             meropenem 1 g in NaCl 0.9 % 50 mL (MINI-BAG Plus) (MERREM)  Every 8 hours          gentamicin 100 mg in sodium chloride 0.9% 100 mL  Every 18 hours             Subjective/Objective:    Patient was admitted for Sepsis (H) on 2/2/2019    Height: 5' 2\" (1.575 m)    Actual Body Weight (ABW): 82.6 kg (182 lb 1.6 oz)    Ideal body weight: 50.1 kg (110 lb 7.2 oz)  Adjusted ideal body weight: 63.1 kg (139 lb 1.8 oz)    BMI: Body mass index is 33.31 kg/m .    No Known Allergies    Patient Active Problem List   Diagnosis     Other chronic pain     Lumbosacral Disc Degeneration     Herpes Simplex Type I     Nicotine Dependence     Essential hypertension     Dislodged Bhandari catheter, subsequent encounter     Acute respiratory failure with hypoxia (H)     COPD (chronic obstructive pulmonary disease) (H)     Lactic acidosis     SVT (supraventricular tachycardia) (H)     Gastroesophageal reflux disease without esophagitis     Acquired hypothyroidism     Iron deficiency anemia     Opioid-induced constipation (OIC)     Paraplegia (H)     COPD exacerbation (H)     Chronic pain syndrome     Major depression     Alcohol abuse     Decubitus ulcer     Hospital-acquired pneumonia     Cancer of lung (H)     Neurogenic bladder     Neurogenic bowel     Mixed hyperlipidemia     Palliative care encounter     Hypokalemia     Paraplegia at T4 level " (H)     Major depressive disorder, recurrent episode, moderate (H)     Seizure disorder (H)     Heel ulcer, right, limited to breakdown of skin (H)     Decubitus ulcer of sacral region, stage 4 (H)     Esophageal dysmotility     Anxiety disorder     History of MDR Enterobacter cloacae infection     Mechanical complication of suprapubic catheter (H)     Diastolic CHF, acute on chronic (H)     Chest tightness or pressure     Atypical chest pain     Decubitus ulcer of right buttock, stage 4 (H)     Nephrostomy tube displaced (H)     HCAP (healthcare-associated pneumonia)     Cognitive disorder     Insomnia due to anxiety and fear     Sepsis, due to unspecified organism (H)     Urinary tract infection, site unspecified     Hypotension, unspecified hypotension type     Chronic low back pain without sciatica, unspecified back pain laterality     Acute encephalopathy     Claustrophobia     Choledocholithiasis with obstruction     Cholelithiasis     Hx of pulmonary embolus     S/P cholecystectomy     Fever, unspecified fever cause     Pneumonia of left lower lobe due to infectious organism (H)     Lower abdominal pain     Neck infection     Subdural hematoma (H)     Convulsions, unspecified convulsion type (H)     Partial symptomatic epilepsy with simple partial seizures, intractable, with status epilepticus (H)     Acute on chronic intracranial subdural hematoma (H)     Brain edema (H)     Chronic obstructive pulmonary disease with acute exacerbation (H)     Decubitus ulcer, stage III (H)     Depression     Sepsis (H)     Sepsis secondary to UTI (H)     Pyelonephritis     Hyponatremia     Episodic cluster headache, not intractable     Flank pain     Right upper quadrant abdominal pain     Abdominal pain     Colon wall thickening     Cystitis     Suprapubic catheter dysfunction, subsequent encounter     Bilateral hydronephrosis     Elevated lactic acid level     Paroxysmal hemicrania     UTI (urinary tract infection)      Abdominal pain, unspecified location     Coagulopathy (H)     Anxiety     Dislodged wound Vacuum     Urinary tract infection associated with cystostomy catheter (H)     Personal history of PE (pulmonary embolism)     Chronic anticoagulation     Weakness     Urinary incontinence due to urethral sphincter incompetence     Abscess, gluteal, right     History of DVT (deep vein thrombosis)     Hypothyroidism due to Hashimoto's thyroiditis     Fever in other diseases     Severe sepsis (H)     Ulcer     Sleep difficulties     Irritability and anger     Adjustment disorder with mixed anxiety and depressed mood     Normocytic anemia     Closed left subtrochanteric femur fracture (H)     Acute blood loss anemia     Other specified hypotension     History of encephalopathy     Nephrostomy complication (H)     Left lower lobe pneumonia (H)     Acute bronchitis due to other specified organisms     Hypoxemia     Centrilobular emphysema (H)     Bilateral lower extremity edema     Stage IV pressure ulcer of sacral region (H)     Gangrene (H)     Noncompliance with medication regimen     Infection     Goals of care, counseling/discussion     Advanced care planning/counseling discussion     Malingerer for IV Dilaudid     Moderate protein-calorie malnutrition (H)     Chronic osteomyelitis (H)     Drug-induced constipation     Quadriplegia (H)     Pelvic pain in female     Weakness of left hand     Focal motor seizure (H)     Tension-type headache, not intractable, unspecified chronicity pattern     Atelectasis     Pelvic pain     Panic anxiety syndrome     Ulcer due to Treponema vincentii, with fat layer exposed (H)     Abnormal CT scan of lung     Upper back pain     Severe major depression, single episode, with psychotic features (H)     Paranoia (H)     Unable to control anger     Cough     Nonintractable epilepsy without status epilepticus, unspecified epilepsy type (H)     Acute pulmonary edema (H)     Cigarette smoker    Past  Medical History:   Diagnosis Date     Alcohol dependence (H)     history     Anxiety      Cancer of lung (H) 9/24/2013    Overview:  Adenocarcinoma Stage 1A per preoperative needle biopsy   Adenocarcinoma Stage 1A per preoperative needle biopsy  Wedge r/s 9/2013     Chronic kidney disease      Chronic pain     on Methadone     Chronic suprapubic catheter (H)      Constipation      COPD (chronic obstructive pulmonary disease) (H)      Decubitus ulcer     had wound vac     Depression      Diastolic CHF, acute on chronic (H)      DVT (deep venous thrombosis) (H) 5/26/2015     ESBL (extended spectrum beta-lactamase) producing bacteria infection 08/2015    urine     Gastroparesis      GERD (gastroesophageal reflux disease)      HCAP (healthcare-associated pneumonia)      Herpes Simplex Type I      Hyperlipemia      Hypertension      Hypothyroidism 5/26/2015     Intracranial subdural hematoma (H)      Kidney stone      Lower paraplegia (H) 4/28/2016    Overview:  spinal epidural hematoma, emergent decomp Overview:  spinal epidural hematoma, emergent decomp Overview:  spinal epidural hematoma, emergent decomp     MRSA infection      Neurogenic bladder     chronic gonzalez     Neuropathy (H) 5/26/2015     Obesity      Opiate dependence, continuous (H)      Osteoporosis      Pneumonia      Pulmonary embolism (H) 12/2016    chronic, on coumadin     Rheumatoid arthritis (H)      Sepsis (H) 5/28/2017     SVT (supraventricular tachycardia) (H) 8/19/2014     Vascular myelopathies (H)         Temp Readings from Current Encounter:     04/07/19 0441 04/07/19 1110 04/08/19 0758   Temp: 97.6  F (36.4  C) 98.2  F (36.8  C) 98  F (36.7  C)     Net Intake/Output (last 24 hours):  I/O last 3 completed shifts:  In: 410 [P.O.:340; I.V.:70]  Out: 535 [Urine:535]    Recent Labs     04/06/19  0651 04/06/19  1943 04/07/19  0603 04/07/19  1130 04/08/19  0516   WBC 12.7*  --  9.9 12.8* 9.7   NEUTROABS 10.4*  --  7.9*  --  6.8   LACTICACID  --   --    --  2.8*  --    PROCAL  --   --  0.42  --   --    BUN  --   --   --  8 6*   CREATININE  --  0.64  --  0.56* 0.50*     Estimated Creatinine Clearance: 116.2 mL/min (A) (by C-G formula based on SCr of 0.5 mg/dL (L)).    No results for input(s): CULTURE in the last 72 hours.    Results for orders placed or performed during the hospital encounter of 02/02/19   Culture, Urine    Collection Time: 04/01/19  9:21 PM   Result Value Status    Culture >100,000 col/ml ESBL producing Escherichia coli (!) Final    Culture >100,000 col/ml Pseudomonas aeruginosa (!) Final   Culture, Urine    Collection Time: 02/03/19 12:15 AM   Result Value Status    Culture  Final     Mixture of organism types with no predominating organism.       No results for input(s): VANCOMYCIN in the last 168 hours.    Vancomycin administrations: (last 120 hours)     Date/Time Action Medication Dose Rate    04/06/19 1422 New Bag    vancomycin 1,250 mg in sodium chloride 0.9% 500 mL (VANCOCIN) 1,250 mg 262.5 mL/hr    04/06/19 0208 New Bag    vancomycin 1,250 mg in sodium chloride 0.9% 500 mL (VANCOCIN) 1,250 mg 262.5 mL/hr    04/05/19 1308 New Bag    vancomycin 1,250 mg in sodium chloride 0.9% 500 mL (VANCOCIN) 1,250 mg 262.5 mL/hr    04/05/19 0133 New Bag    vancomycin 1,250 mg in sodium chloride 0.9% 500 mL (VANCOCIN) 1,250 mg 262.5 mL/hr    04/04/19 1420 New Bag    vancomycin 1,250 mg in sodium chloride 0.9% 500 mL (VANCOCIN) 1,250 mg 262.5 mL/hr    04/04/19 0059 New Bag    vancomycin 1,250 mg in sodium chloride 0.9% 500 mL (VANCOCIN) 1,250 mg 262.5 mL/hr          Assessment/Plan:    Pharmacist consulted to dose vancomycin for Sepsis, goal trough range 10-20 mcg/mL.  1. Initiate vancomycin 1250 mg IV every 12 hours (15 mg/kg actual body weight).  2. No vancomycin level available for assessment.  3. Pharmacist will plan to check a vancomycin trough level when appropriate.  4. Pharmacist will continue to follow.    Thank you for the consult.  Vanda JEWELL  [facility]demar [facility] 4/8/2019 8:19 PM

## 2021-05-27 NOTE — PLAN OF CARE
Patient requested dressing on buttocks to be changed during transfer to wheel chair this am. She set up the dressings and was strict with instructions about application.  Area is deep red with little drainage.  She complained about pain in her feet today.  WOC RN removed dressings and left left off at patient request because she wanted the MD to see.  She has been refusing to have dressings reapplied and says she will do it with her other dressings tonight.   ID saw pt about reddened PICC area.  He says it is ok to continue to use PICC but dressing needs to be changed.  Patient has been refusing this tonight.

## 2021-05-27 NOTE — SIGNIFICANT EVENT
"Late note for Kevon 3/24/19. Speaking with pt and I brought up that \"I  [facility] found a place for you\" awaiting financial issues( [facility]). PT then stated \"I have to talk to Sriram Villatoro not going there. It is too far and [facility] wont take me to see my grankids. It is over an hour away. She (Shanelle) will have to find another place.\"  "

## 2021-05-27 NOTE — PROGRESS NOTES
Rt called for a prn neb. Patient on room air 92% RR 18, she is in her wheel chair in no resp distress. Breath sounds diminished with a congestive NP cough. Neb given x 1.

## 2021-05-27 NOTE — PROGRESS NOTES
Patient notified assigned  Nurse to  See  Charge  Writer  Went  In talked with her told  Her that her assigned   Nurse  Will care  for her she  Was ok  With that.She  Wanted to see the  Dr  regarding  Her swollen feet  MD was  Notified  Who said  Already  Seen the  Patient  And addressed the  Problem early  Patient was updated  Staff  Will  continue to monitor

## 2021-05-27 NOTE — SIGNIFICANT EVENT
Staff assist originally called which transitioned to a code blue immediately.  Patient found unresponsiveness in chair.  Patient quickly moved to bed and CPR intiated. Intubated. ACLS medications given.  See MD note.     Lorie Williamson RN

## 2021-05-27 NOTE — PROGRESS NOTES
Pt remain on RA Sat 95%, HR 98, RR 20. BBS remain coarse post neb.  Pt is very congested, declined for bronchial hygiene tx with flutter. Pt refused noon tx first and called back again after 20 min.  RT will continue follow.    Jhonathan Soria, LRT

## 2021-05-27 NOTE — PROGRESS NOTES
Patient called stating she cannot breathe. Patient was immediately seen. She was able to talk in full sentence. She just refused nebulizations. She was asking for IV lasix.  Lung sounds coarse, similar to what she had before.   SPO2 97 percent on room air. Vitals stable.  Recommended nebs and recommended against lasix. I told her there is no evidence of fluid overload. Her cough remains weak and I told her to focus on her bronchial hygiene therapy, consider stopping smoking. Discussed with nursing staff.    5 minutes later, patient wheeled out of the unit on her motorized chair to smoke. On her way out, she told me not to decrease her ativan dose.

## 2021-05-27 NOTE — PROGRESS NOTES
Patient is on room air sating 92% RR 18 . Breath sounds diminished. Again patient only got one neb as she was out of her room smoking.

## 2021-05-27 NOTE — PLAN OF CARE
Pain      Patient's pain/discomfort is manageable Progressing        Potential for Falls      Patient will remain free of falls Progressing        Psychosocial Needs      Collaborate with patient/family/caregiver to identify patient specific goals for this hospitalization Progressing        Safety      Patient will be injury free during hospitalization Progressing        Less demanding during the shift. Buttock dressing changed. Gave PRN pain meds. Went out during shift. K 2.7 replaced during the shift. Continue to monitor

## 2021-05-27 NOTE — PROGRESS NOTES
" Daily Progress Note      Date of Service: 4/7/2019     Subjective:     Brief History: 63-year-old lady with complex medical history, paraplegia, personality disorder, repeated infections, neurogenic bladder, chronic indwelling Bhandari catheter, repeat sepsis, chronic pain, chronic tobacco use, recent sepsis chronic decubitus ulcers, noncompliance with treatment has been in the hospital since 3 February and [facility] course has [facility].  Most recently she had episodes of unresponsiveness which resulted in ICU admission and repeat antibiotics.  This morning I was contacted by the nurse stating that the patient had episodes of hypotension through the night requiring IV fluids and then this morning patient complained of being short of breath and was saturating 90% on room air.  However this did not preclude the patient from going out to smoke repeatedly.  At the time of my evaluation patient denies any complaints and was sitting up in her chair/wheelchair and drinking her grape soda    Chart reviewed, events noted. Pt seen and examined.     Objective     Vital signs in last 24 hours:  Temp:  [96.8  F (36  C)-97.6  F (36.4  C)] 97.6  F (36.4  C)  Heart Rate:  [] 112  Resp:  [18-22] 20  BP: ()/(33-64) 97/64  Weight:   182 lb 1.6 oz (82.6 kg)  Weight change:   Body mass index is 33.31 kg/m .    Intake/Output last 3 shifts:  I/O last 3 completed shifts:  In: 410 [IV Piggyback:410]  Out: 1775 [Urine:1775]  Intake/Output this shift:  No intake/output data recorded.      Physical Exam:  BP 97/64 (Patient Position: Lying)   Pulse (!) 112   Temp 97.6  F (36.4  C) (Oral)   Resp 20   Ht 5' 2\" (1.575 m)   Wt 182 lb 1.6 oz (82.6 kg)   LMP  (LMP Unknown)   SpO2 98%   BMI 33.31 kg/m      FiO2 (%): (room air) O2 Device: None (Room air) O2 Flow Rate (L/min): 1.5 L/min    General Appearance: Alert, not in distress.  Wheelchair-bound  Diminished basal breath sounds occasional crackles  Declined the rest of " exa      Imaging:  personally reviewed.    Patient's rotated to the left. No appreciable change in the left lower lobe opacities. New patchy opacities right base laterally. No change in the PICC line catheter which overlies the proximal SVC. Heart and pulmonary vascularity are normal..    Lab Results:  personally reviewed.   not applicable  Recent Results (from the past 24 hour(s))   Creatinine    Collection Time: 04/06/19  7:43 PM   Result Value Ref Range    Creatinine 0.64 0.60 - 1.10 mg/dL    GFR MDRD Af Amer >60 >60 mL/min/1.73m2    GFR MDRD Non Af Amer >60 >60 mL/min/1.73m2   Potassium    Collection Time: 04/06/19  7:43 PM   Result Value Ref Range    Potassium 2.9 (L) 3.5 - 5.0 mmol/L   POCT Glucose    Collection Time: 04/06/19  8:20 PM   Result Value Ref Range    Glucose 92 70 - 139 mg/dL   Procalcitonin    Collection Time: 04/07/19  6:03 AM   Result Value Ref Range    Procalcitonin 0.42 0.00 - 0.49 ng/mL   HM1 (CBC with Diff)    Collection Time: 04/07/19  6:03 AM   Result Value Ref Range    WBC 9.9 4.0 - 11.0 thou/uL    RBC 3.06 (L) 3.80 - 5.40 mill/uL    Hemoglobin 7.7 (L) 12.0 - 16.0 g/dL    Hematocrit 27.0 (L) 35.0 - 47.0 %    MCV 88 80 - 100 fL    MCH 25.2 (L) 27.0 - 34.0 pg    MCHC 28.5 (L) 32.0 - 36.0 g/dL    RDW 19.7 (H) 11.0 - 14.5 %    Platelets 271 140 - 440 thou/uL    MPV 10.2 8.5 - 12.5 fL    Neutrophils % 81 (H) 50 - 70 %    Lymphocytes % 10 (L) 20 - 40 %    Monocytes % 6 2 - 10 %    Eosinophils % 2 0 - 6 %    Basophils % 0 0 - 2 %    Neutrophils Absolute 7.9 (H) 2.0 - 7.7 thou/uL    Lymphocytes Absolute 1.0 0.8 - 4.4 thou/uL    Monocytes Absolute 0.6 0.0 - 0.9 thou/uL    Eosinophils Absolute 0.2 0.0 - 0.4 thou/uL    Basophils Absolute 0.0 0.0 - 0.2 thou/uL   Magnesium    Collection Time: 04/07/19  6:37 AM   Result Value Ref Range    Magnesium 1.5 (L) 1.8 - 2.6 mg/dL   Potassium    Collection Time: 04/07/19  9:09 AM   Result Value Ref Range    Potassium 3.9 3.5 - 5.0 mmol/L   Comprehensive  Metabolic Panel    Collection Time: 04/07/19 11:30 AM   Result Value Ref Range    Sodium 142 136 - 145 mmol/L    Potassium 3.3 (L) 3.5 - 5.0 mmol/L    Chloride 109 (H) 98 - 107 mmol/L    CO2 26 22 - 31 mmol/L    Anion Gap, Calculation 7 5 - 18 mmol/L    Glucose 169 (H) 70 - 125 mg/dL    BUN 8 8 - 22 mg/dL    Creatinine 0.56 (L) 0.60 - 1.10 mg/dL    GFR MDRD Af Amer >60 >60 mL/min/1.73m2    GFR MDRD Non Af Amer >60 >60 mL/min/1.73m2    Bilirubin, Total <0.1 0.0 - 1.0 mg/dL    Calcium 7.0 (L) 8.5 - 10.5 mg/dL    Protein, Total 4.3 (L) 6.0 - 8.0 g/dL    Albumin 1.2 (L) 3.5 - 5.0 g/dL    Alkaline Phosphatase 182 (H) 45 - 120 U/L    AST 22 0 - 40 U/L    ALT 11 0 - 45 U/L   Lactic Acid    Collection Time: 04/07/19 11:30 AM   Result Value Ref Range    Lactic Acid 2.8 (H) 0.5 - 2.2 mmol/L   HM1 (CBC with Diff)    Collection Time: 04/07/19 11:30 AM   Result Value Ref Range    WBC 12.8 (H) 4.0 - 11.0 thou/uL    RBC 3.04 (L) 3.80 - 5.40 mill/uL    Hemoglobin 7.6 (L) 12.0 - 16.0 g/dL    Hematocrit 25.9 (L) 35.0 - 47.0 %    MCV 85 80 - 100 fL    MCH 25.0 (L) 27.0 - 34.0 pg    MCHC 29.3 (L) 32.0 - 36.0 g/dL    RDW 20.0 (H) 11.0 - 14.5 %    Platelets 264 140 - 440 thou/uL    MPV 10.3 8.5 - 12.5 fL   Manual Differential    Collection Time: 04/07/19 11:30 AM   Result Value Ref Range    Total Neutrophils % 91 (H) 50 - 70 %    Lymphocytes % 5 (L) 20 - 40 %    Monocytes % 2 2 - 10 %    Eosinophils %  1 0 - 6 %    Basophils % 1 0 - 2 %    Total Neutrophils Absolute 11.7 (H) 2.0 - 7.7 thou/ul    Lymphocytes Absolute 0.6 (L) 0.8 - 4.4 thou/uL    Monocytes Absolute 0.3 0.0 - 0.9 thou/uL    Eosinophils Absolute 0.1 0.0 - 0.4 thou/uL    Basophils Absolute 0.1 0.0 - 0.2 thou/uL    Platelet Estimate Normal Normal    Ovalocytes 1+ (!) Negative    Polychromasia 1+ (!) Negative    Tear Drop Cells 1+ (!) Negative       Results from last 7 days   Lab Units 04/06/19 2020   LN-POC GLUCOSE FINGERSTICK- HE mg/dL 92       Assessment: and plan      Continue supportive cares  Patient's blood pressure has been in the softer side she has been placed on Midrin which will be continued, will place parameters and avoid excessive fluid boluses.  Patient's oral intake is unsure because the patient is very noncompliant with documentation.  Because of possible fluid overload we will give her single dose of IV Lasix.  We will place her on scheduled potassium chloride replacement because she is on Lasix twice a day.  We will change nebulizers to every 6 hours as needed because the patient is not available most of the times adding to the frustration for providing care for this patient.  Profound malnutrition as evidenced by nutritional indicis, patient is very noncompliant with any kind of suggestions.  Chronic pain syndrome continue with methadone.  Patient is on IV antibiotics for possible UTI/ colonization appreciate input by infectious disease    Advance Care Planning:   Barriers to discharge: Multiple issues including placement  Anticipated discharge day: Unclear  Disposition: Unclear    Total time spent in direct patient care today 1 hour    Discussed with patient and nursing    Shauna Moon MD. Former[facility] [place]  Hospitalist

## 2021-05-27 NOTE — PROGRESS NOTES
NAME : Marychuy Zhou  MRN: MRN [id number]  Admit diagnosis: Lactic acidosis [E87.2]  Chronic pain [G89.29]  Sacral decubitus ulcer [L89.159]  Sepsis (H) [A41.9]    Xochitl consult for Marychuy Zhou, from Acute Care Pain Management Team. Team is not currently at [facility], will not be seeing patient in person. Will give xochitl consult info with recs here:    Patient in ICU after respiratory distress/rapid response yesterday. Naloxone given; per charting was more responsive after naloxone administration. Patient weaned to 2L 02 NC this AM from 5L. Per charting, patient's pain consistently 10/10.    Current pain medications:   Gabapentin 900mg daily: currently being held  Methadone 10mg three times a day: currently being held  Flexeril 10mg TIDPRN  Dilaudid 2mg PO q3hprn  Vistaril 35-50mg q6hprn  Ibuprofen 400mg q6hprn  Baclofen 10mg TID    Patient not a good candidate for long term methadone or chronic opioid use, with current metabolic alkalosis, respiratory acidosis, and acute toxic metabolic encephalopathy. Would recommend tapering off of methadone and short acting chronic opioids. Appears that chronic use of opioids not beneficial for patient anyway, as a quick glance of her charted pain scores show over 90% of her pain scores are 9/10 or 10/10. Would recommend utilizing short acting Dilaudid to prevent withdrawal only, while in hospital, and minimizing and then eliminating use of opioids for chronic pain. Would maximize adjunctives and non-opioid avenues of pain management (Currently using gabapentin, Flexeril, Vistaril, Ibuprofen, Baclofen). Would add scheduled APAP to total 4gm/day. Would seek integrative therapies. Agree with psych consult. Patient's expectations/goals of 'no pain' from pain 10/10 currently unrealistic as medical comorbidities currently quite severe.     Patient chart prior to admission reviewed, opioids usually prescribed by Dr. Sánchez Zamora, he reports in January his availability to  prescribe opioids 'on a limited basis'.  My subjective view without seeing patient is that this is now beyond 'limited  Basis' and patient has not chronic indication for opioid use.     Patient seen by Acute Pain Management Team at [facility] multiple times in 2017; each time was recommended to not increase opioids and/or decrease amount of overall opioid usage upon discharge from hospital.       :  [place]  pulled from [place] on 04/02/19. Last refill on 2/2/ (day of admission to hospital)_. This indicated chronic opioid use. 24* total number of prescribing providers noted.   Prescriptions: 81   Total Prescribers: 24   Total Pharmacies: 8   Narcotics*   (excluding buprenorphine    Fill Date ID Written Drug Qty Days Prescriber Rx # Pharmacy Refill Daily Dose * Pymt Type    02/02/2019  6  02/01/2019  Hydromorphone 2 Mg Tablet  20 2 [facility]  [id number] [place] (4177[place]  [place] 80.[place]   02/01/2019  6  01/31/2019  Hydromorphone 2 Mg Tablet  18 2 [facility]  [id number] [place] (4177[place] 72.[place]   02/01/2019  6  01/31/2019  Methadone Hcl 10 Mg Tablet  30 15 [facility]  [id number] [place]mn (4177[place]  [place] 60.[place]   02/01/2019  6  02/01/2019  Gabapentin 300 Mg Capsule  90 30 Cl [facility]  [id number] [place] (4177[place]   02/01/2019  6  01/31/2019  Methadone Hcl 5 Mg Tablet  30 10 [facility]  [id number] [place]mn (4177)  [place] 45.[place]   01/03/2019  5  01/03/2019  Oxycodone Hcl 10 Mg Tablet  24 4 Mi Fin  [id number] Uni (8386)  [place] 90.[place]   01/02/2019  5  12/31/2018  Methadone Hcl 10 Mg Tablet  21 7 Va Jar  [id number] Uni (8386)  [place] 90.[place]   12/31/2018  5  12/29/2018  Oxycodone Hcl 10 Mg Tablet  24 4 Mi Fin  [id number] Uni (8386)  [place] 90.[place]   12/26/2018  5  12/26/2018  Oxycodone Hcl 10 Mg Tablet  24 4 An [place]  [id number] Uni (8386)  [place] 90.[place]   12/26/2018  5  12/26/2018  Methadone Hcl 10 Mg Tablet  21 7 An [facility]  [id number] Uni (4648)  [place] 90.[place]   12/20/2018  5   12/20/2018  Methadone Hcl 10 Mg Tablet  21 7 Mi Fin  [id number] Uni (8386)  [place] 90.[place]   12/19/2018  5  12/18/2018  Oxycodone Hcl 10 Mg Tablet  24 4 Mi Fin  [id number] Uni (8386)  [place] 90.[place]   12/15/2018  5  12/14/2018  Oxycodone Hcl 10 Mg Tablet  24 4 Mi Fin  [id number] Uni (8386)  [place] 90.[place]   12/12/2018  5  12/12/2018  Oxycodone Hcl 10 Mg Tablet  12 2 Mo Baj  [id number] Hea (1287)  [place] 90.[place]   12/06/2018  4  12/06/2018  Methadone Hcl 10 Mg Tablet  21 7  Ked  [id number] Omn (4177)  0 90.00 [place]   12/06/2018  4  12/06/2018  Gabapentin 300 Mg Capsule  240 30  Sik  [id number] [place]mn (4177)  [place]   11/23/2018  1  11/21/2018  Hydrocodone-Acetamin 5-325 Mg  22 2 In Dem  [id number] Uni (8386)  0 55.00 [place]   11/21/2018  1  11/21/2018  Gabapentin 300 Mg Capsule  240 30 In Dem  [id number] Uni (8386)  [place]   11/21/2018  1  11/21/2018  Hydrocodone-Acetamin 5-325 Mg  20 2 In Dem              Creatinine   Date Value Ref Range Status   04/02/2019 0.73 0.60 - 1.10 mg/dL Final       Last BM: Stool Occurrence: 0     Allergies: No Known Allergies     Medical History  Active Ambulatory (Non-Hospital) Problems    Diagnosis     Upper back pain     Abnormal CT scan of lung     Panic anxiety syndrome     Pelvic pain     Ulcer due to Treponema vincentii, with fat layer exposed (H)     Atelectasis     Tension-type headache, not intractable, unspecified chronicity pattern     Weakness of left hand     Focal motor seizure (H)     Pelvic pain in female     Chronic osteomyelitis (H)     Drug-induced constipation     Quadriplegia (H)     Goals of care, counseling/discussion     Advanced care planning/counseling discussion     Malingerer for IV Dilaudid     Moderate protein-calorie malnutrition (H)     Infection     Stage IV pressure ulcer of sacral region (H)     Gangrene (H)     Centrilobular emphysema (H)     Bilateral lower extremity edema     Hypoxemia      Left lower lobe pneumonia (H)     Acute bronchitis due to other specified organisms     Nephrostomy complication (H)     History of encephalopathy     Closed left subtrochanteric femur fracture (H)     Acute blood loss anemia     Other specified hypotension     Adjustment disorder with mixed anxiety and depressed mood     Sleep difficulties     Irritability and anger     Fever in other diseases     Severe sepsis (H)     Ulcer     Hypothyroidism due to Hashimoto's thyroiditis     Abscess, gluteal, right     History of DVT (deep vein thrombosis)     Urinary incontinence due to urethral sphincter incompetence     Weakness     Chronic anticoagulation     Urinary tract infection associated with cystostomy catheter (H)     Dislodged wound Vacuum     Abdominal pain, unspecified location     Coagulopathy (H)     Anxiety     UTI (urinary tract infection)     Elevated lactic acid level     Paroxysmal hemicrania     Suprapubic catheter dysfunction, subsequent encounter     Bilateral hydronephrosis     Cystitis     Colon wall thickening     Abdominal pain     Flank pain     Right upper quadrant abdominal pain     Episodic cluster headache, not intractable     Sepsis secondary to UTI (H)     Pyelonephritis     Hyponatremia     Chronic obstructive pulmonary disease with acute exacerbation (H)     Depression     Partial symptomatic epilepsy with simple partial seizures, intractable, with status epilepticus (H)     Acute on chronic intracranial subdural hematoma (H)     Brain edema (H)     Subdural hematoma (H)     Convulsions, unspecified convulsion type (H)     Neck infection     Lower abdominal pain     Fever, unspecified fever cause     Pneumonia of left lower lobe due to infectious organism (H)     S/P cholecystectomy     Choledocholithiasis with obstruction     Cholelithiasis     Hx of pulmonary embolus     Claustrophobia     Urinary tract infection, site unspecified     Hypotension, unspecified hypotension type     Chronic  low back pain without sciatica, unspecified back pain laterality     Acute encephalopathy     Sepsis, due to unspecified organism (H)     Cognitive disorder     Insomnia due to anxiety and fear     HCAP (healthcare-associated pneumonia)     Atypical chest pain     Diastolic CHF, acute on chronic (H)     Chest tightness or pressure     History of MDR Enterobacter cloacae infection     Anxiety disorder     Esophageal dysmotility     Major depressive disorder, recurrent episode, moderate (H)     Seizure disorder (H)     Heel ulcer, right, limited to breakdown of skin (H)     Decubitus ulcer of sacral region, stage 4 (H)     Paraplegia at T4 level (H)     Hypokalemia     Chronic pain syndrome     Major depression     Alcohol abuse     COPD exacerbation (H)     Gastroesophageal reflux disease without esophagitis     Acquired hypothyroidism     Iron deficiency anemia     Opioid-induced constipation (OIC)     Paraplegia (H)     Palliative care encounter     Nephrostomy tube displaced (H)     Mechanical complication of suprapubic catheter (H)     Acute respiratory failure with hypoxia (H)     COPD (chronic obstructive pulmonary disease) (H)     Lactic acidosis     SVT (supraventricular tachycardia) (H)     Dislodged Bhandari catheter, subsequent encounter     Other chronic pain     Lumbosacral Disc Degeneration     Herpes Simplex Type I     Nicotine Dependence     Essential hypertension     Cancer of lung (H)     Neurogenic bladder     Neurogenic bowel     Decubitus ulcer     Mixed hyperlipidemia     Decubitus ulcer of right buttock, stage 4 (H)     Decubitus ulcer, stage III (H)     Past Medical History:   Diagnosis Date     Alcohol dependence (H)      Anxiety      Cancer of lung (H) 9/24/2013     Chronic kidney disease      Chronic pain      Chronic suprapubic catheter (H)      Constipation      COPD (chronic obstructive pulmonary disease) (H)      Decubitus ulcer      Depression      Diastolic CHF, acute on chronic (H)       DVT (deep venous thrombosis) (H) 5/26/2015     ESBL (extended spectrum beta-lactamase) producing bacteria infection 08/2015     Gastroparesis      GERD (gastroesophageal reflux disease)      HCAP (healthcare-associated pneumonia)      Herpes Simplex Type I      Hyperlipemia      Hypertension      Hypothyroidism 5/26/2015     Intracranial subdural hematoma (H)      Kidney stone      Lower paraplegia (H) 4/28/2016     MRSA infection      Neurogenic bladder      Neuropathy (H) 5/26/2015     Obesity      Opiate dependence, continuous (H)      Osteoporosis      Pneumonia      Pulmonary embolism (H) 12/2016     Rheumatoid arthritis (H)      Sepsis (H) 5/28/2017     SVT (supraventricular tachycardia) (H) 8/19/2014     Vascular myelopathies (H)      Patient Active Problem List    Diagnosis Date Noted     Cigarette smoker      Acute pulmonary edema (H)      Cough      Nonintractable epilepsy without status epilepticus, unspecified epilepsy type (H)      Unable to control anger      Severe major depression, single episode, with psychotic features (H)      Paranoia (H)      Upper back pain      Abnormal CT scan of lung      Panic anxiety syndrome      Pelvic pain 01/09/2019     Ulcer due to Treponema vincentii, with fat layer exposed (H) 01/07/2019     Atelectasis      Tension-type headache, not intractable, unspecified chronicity pattern      Weakness of left hand 12/04/2018     Focal motor seizure (H) 12/04/2018     Pelvic pain in female 11/21/2018     Chronic osteomyelitis (H)      Drug-induced constipation      Quadriplegia (H)      Goals of care, counseling/discussion 07/16/2018     Advanced care planning/counseling discussion 07/16/2018     Malingerer for IV Dilaudid 07/16/2018     Moderate protein-calorie malnutrition (H) 07/16/2018     Infection 07/14/2018     Noncompliance with medication regimen      Stage IV pressure ulcer of sacral region (H) 07/11/2018     Gangrene (H) 07/10/2018     Centrilobular emphysema (H)  07/02/2018     Bilateral lower extremity edema 07/02/2018     Hypoxemia 07/01/2018     Left lower lobe pneumonia (H) 06/27/2018     Acute bronchitis due to other specified organisms 06/27/2018     Nephrostomy complication (H) 03/06/2018     History of encephalopathy      Closed left subtrochanteric femur fracture (H) 02/27/2018     Acute blood loss anemia      Other specified hypotension      Normocytic anemia 02/26/2018     Adjustment disorder with mixed anxiety and depressed mood      Sleep difficulties      Irritability and anger      Fever in other diseases 01/31/2018     Severe sepsis (H) 01/31/2018     Ulcer 01/31/2018     Hypothyroidism due to Hashimoto's thyroiditis      Abscess, gluteal, right      History of DVT (deep vein thrombosis)      Urinary incontinence due to urethral sphincter incompetence      Weakness 09/07/2017     Chronic anticoagulation 09/03/2017     Urinary tract infection associated with cystostomy catheter (H) 09/02/2017     Dislodged wound Vacuum 08/14/2017     Abdominal pain, unspecified location      Coagulopathy (H)      Anxiety      UTI (urinary tract infection) 08/10/2017     Elevated lactic acid level 07/29/2017     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     Suprapubic catheter dysfunction, subsequent encounter      Bilateral hydronephrosis      Cystitis      Colon wall thickening      Abdominal pain 06/03/2017     Flank pain 06/02/2017     Right upper quadrant abdominal pain 06/02/2017     Episodic cluster headache, not intractable      Sepsis (H) 05/28/2017     Sepsis secondary to UTI (H) 05/28/2017     Pyelonephritis      Hyponatremia      Chronic obstructive pulmonary disease with acute exacerbation (H) 04/18/2017     Depression 04/18/2017     Partial symptomatic epilepsy with simple partial seizures, intractable, with status epilepticus (H)      Acute on chronic intracranial subdural hematoma (H)      Brain edema (H)      Subdural hematoma (H) 04/09/2017     Convulsions, unspecified  convulsion type (H)      Neck infection 03/31/2017     Lower abdominal pain 03/01/2017     Fever, unspecified fever cause      Pneumonia of left lower lobe due to infectious organism (H)      S/P cholecystectomy      Choledocholithiasis with obstruction 01/02/2017     Cholelithiasis 01/02/2017     Hx of pulmonary embolus 01/02/2017     Claustrophobia      Urinary tract infection, site unspecified      Hypotension, unspecified hypotension type      Chronic low back pain without sciatica, unspecified back pain laterality      Acute encephalopathy      Sepsis, due to unspecified organism (H) 12/30/2016     Personal history of PE (pulmonary embolism) 12/01/2016     Cognitive disorder      Insomnia due to anxiety and fear      HCAP (healthcare-associated pneumonia) 11/04/2016     Atypical chest pain 10/23/2016     Diastolic CHF, acute on chronic (H)      Chest tightness or pressure      History of MDR Enterobacter cloacae infection      Anxiety disorder      Esophageal dysmotility      Major depressive disorder, recurrent episode, moderate (H)      Seizure disorder (H)      Heel ulcer, right, limited to breakdown of skin (H)      Decubitus ulcer of sacral region, stage 4 (H)      Paraplegia at T4 level (H) 04/28/2016     Hypokalemia      Chronic pain syndrome 01/12/2016     Major depression 01/12/2016     Alcohol abuse 01/12/2016     Hospital-acquired pneumonia 10/26/2015     COPD exacerbation (H) 08/03/2015     Gastroesophageal reflux disease without esophagitis 05/26/2015     Acquired hypothyroidism 05/26/2015     Iron deficiency anemia 05/26/2015     Opioid-induced constipation (OIC) 05/26/2015     Paraplegia (H) 05/26/2015     Palliative care encounter 12/08/2014     Nephrostomy tube displaced (H) 12/02/2014     Mechanical complication of suprapubic catheter (H) 11/20/2014     Acute respiratory failure with hypoxia (H) 08/19/2014     COPD (chronic obstructive pulmonary disease) (H) 08/19/2014     Lactic acidosis  08/19/2014     SVT (supraventricular tachycardia) (H) 08/19/2014     Dislodged Bhandari catheter, subsequent encounter 07/30/2014     Other chronic pain      Lumbosacral Disc Degeneration      Herpes Simplex Type I      Nicotine Dependence      Essential hypertension      Cancer of lung (H) 09/24/2013     Neurogenic bladder 01/29/2013     Neurogenic bowel 01/29/2013     Decubitus ulcer 01/25/2013     Mixed hyperlipidemia 01/25/2013     Decubitus ulcer of right buttock, stage 4 (H) 01/25/2013     Decubitus ulcer, stage III (H) 01/25/2013      Please call [phone] for call back from Acute Pain Management Team for further discussion.  Aggie Orona, PharmD

## 2021-05-27 NOTE — PROGRESS NOTES
Spiritual Care Note    Spiritual Assessment:   made a visit with patient this morning; patient dealing with a long hospital stay. Patient seems anxious as our visit begins and seems focused on many different things and did seem to struggle to talk directly to the . Patient's NA was in her room a few times during visit and patient seems eager to receive help from him.  kept visit short to allow patient needs to get met.     Care Provided: Support provided.     Plan of Care: Spiritual care will continue to follow as part of patient's care team.     CATA Huizar, BCC

## 2021-05-27 NOTE — PROGRESS NOTES
Patient had demanded to increase ativan to 1.5 mg every 8 hours as her anxiety was out of controlled. She did not seem to be anxious on evaluation. I had increased the as needed dose range from 0.5 q8h to 0.5-1 mg every 8 hours yesterday evening. She has been taking 1 mg all the time. She was out of the building most of this morning. I saw her at around noon time. Later this afternoon, she was noted to be sleepy but arousable. I will reduce the ativan down to 0.5 as the 1 mg dose seem to be too high for her tolerance. Blood pressure MAP of 64. Advised to check closely. She otherwise remains very demanding, rude and non compliant with medical cares.

## 2021-05-27 NOTE — PLAN OF CARE
Problem: Psychosocial Needs  Goal: Demonstrates ability to cope with hospitalization/illness  Outcome: Progressing  Pt was sedated for several hours at the start of shift while up in the wheelchair. Vital signs were taken and MD notified of lower BP. Monitored and pt was agreeable to recheck BP after waking up. Was somewhat pleasant this shift and cooperative with staff.     Problem: Tissue Integrity  Goal: Damaged tissue is healing and protected  Outcome: Not Progressing  Pt had this writer pull pad out from under her bottom while in the wheelchair. Refused to get back in bed to get cleaned up after telling her that she was soiled. Dressings were changed when she was placed back to bed around 2200. Leg dressings were changed at the start of shift while in wheelchair. Leg wounds have purulent drainage and a foul smell.

## 2021-05-27 NOTE — PLAN OF CARE
"Patient refused a full head to toe assessment, stated \"No I'm on my way to go outside.\" When staff re approached, she declined a second time stating she \"was busy and needed to make phone calls\" Patient requested PRN pain medication throughout the day, stating pain was 10/10. Patient called nurse and nursing assistant in very frequently throughout the day being demanding with cares and requests. Patient was up in chair at start of shift and refused to transfer out of wheelchair all day. Patient refused wound cares to be done or seen by floor nurse, WOC came in the afternoon. Patient refused vitals to be done. Patient went on and off the unit all throughout the day. Will continue to monitor.   "

## 2021-05-27 NOTE — PROGRESS NOTES
Writer was in room with pt while she was eating the rest of her lunch. Pt started coughing and stated she was choking. Pt wanted this writer to do the heimlich, but encouraged her to keep coughing as she was able. Pt stopped coughing shortly after and was given O2 for comfort for several minutes until comfortable. After recovering from coughing fit, she demanded that the pad be removed from underneath her in the wheelchair. Pad was heavily soiled and foul smelling. Asked pt if we could get her in bed to change her dressing and clean her up but she refused and stated that she is not getting in to bed until later.

## 2021-05-27 NOTE — PLAN OF CARE
Problem: Discharge Barriers  Goal: Patient's discharge needs are met  Outcome: Progressing  Care Plan  Care Management        Care Management Goals of the Day: Progression of care     Care Progression Reviewed With: Charge RN, MD, HUC, RNCM, CMSW     Barriers to Discharge: initiation of CADI waiver      Discharge Disposition: [facility]     Expected Discharge Date: TBD     [facility] ([phone])        Care Coordination Narrative:       Pt has been accepted at [facility] (P: [phone]/F: [phone]). Pt has had her CADI assessment. Per Taylor, the [facility]  ([phone]/F: [phone]), she will not have her curtesy assessment completed until likely 4/3/2019 then she will send it to [facility] as this is the [facility] her funding is going to be through. Taylor states that after this the DON at [facility] (Mary) would need to submit a cost of cares report to [facility] as well to be approved. SW has left a message for Mary to find out if the Cost of Cares report is completed.      Per Pt's request the SW has called 6 additional Group Home/Assisted Living facilities to inquire about availability. At this time all of those 6 additional facilities were either full or could not meet the pt's needs. Facilities are as follows:       [facility]: Declined, Not Handicap accessible    [facility]: Declined, too medically complex     [facility]: No Availability    [facility]: No availability     [facility]: Does not have capacity for Waiver at this time    [facility]: No Availability      SW will continue to follow and assist with further d/c needs.     CYNTHIA Gonsalez  3/29/2019  3:31 PM

## 2021-05-27 NOTE — PROGRESS NOTES
[facility] Daily Progress Note    Assessment/Plan:  Marychuy Zhou is 63 y.o. female with history of alcohol dependence, anxiety disorder, chronic kidney disease, history of spinal hematoma with resultant lower extremity paraplegia and a chronic indwelling suprapubic catheter, recurrent UTIs, chronic pain syndrome, COPD, depression, history of DVT, GERD, gastroparesis, hypertension, hypothyroidism, lung cancer s/p left lower lobe resection, obesity, rheumatoid arthritis, pulmonary embolism on chronic anticoagulation, who was admitted to this hospital on 2/2/2019 for recurrent UTI, sacral decub ulcer and respiratory distress. She was treated with a course of antibiotics for hospital acquired pneumonia, and sacral decub ulcer with osteomyelitis. She has been very non compliant with cares and has been refusing medications and regular nursing cares.  On 4/1/2019 patient became hypoxic and hypotensive and was transferred to ICU          Assessment:  Metabolic encephalopathy  Probably on the basis of respiratory infection complicated by respiratory failure as well as acute UTI    Acute E. coli urinary tract infection  On meropenem sensitivities awaited    Acute Hypoxic respiratory failure  improving  Discussed the case with intensivist    Probable Aspiration with Hospital-acquired pneumonia   Probably on the basis of aspiration   Unfortunately patient continues to smoke  Refusing bronchial hygiene therapy  On Meropenem as per Pulmonology    COPD with acute exacerbation  ABG will be checked stat    Paraplegia secondary to epidural hematoma in 2010  Uses a walker    Stage IV decubitus ulcer with osteomyelitis  Completed course of antibiotics  Wound care to follow is going to open dressing at 930 tomorrow    History of seizures  Keppra    History of DVT and PE  On Xarelto  On as needed hydromorphone          Plan:  Patient was asking the nurse for more Ativan and codeine.  She did not ask me for any pain medications  I would not  add any further sedatives to her plan of care    Code status:Full Code        Subjective:  Patient is less confused and denies any new symptoms except for saying that she feels very anxious    Review of Systems:   No other symptoms of pain  Current Medications Reviewed via EHR List    Objective:  Vital signs in last 24 hours:  Temp:  [97.7  F (36.5  C)-99.1  F (37.3  C)] 97.7  F (36.5  C)  Heart Rate:  [] 111  Resp:  [16-24] 20  BP: (72-94)/(39-59) 94/59  SpO2:  [94 %-100 %] 96 %    Intake/Output last 3 shifts:  I/O last 3 completed shifts:  In: 1890 [P.O.:1100; I.V.:105; IV Piggyback:685]  Out: 1300 [Urine:1300]      Physical Exam:  EYES: closed  NECK: no JVD  HEART : S1 S2 tachycardia is present  LUNGS: Has fever crackles bilaterally without  Wheezing   ABDOMEN:  Soft, No tenderness, Bowel sounds are decreased  CNS patient is alert oriented x 3 paraplegia        Imaging:  Chest x-ray is ordered    Lab Results:  Personally Reviewed.   Fingerstick Blood Glucose: No results for input(s): POCGLUFGR in the last 48 hours.    Recent Results (from the past 24 hour(s))   Potassium    Collection Time: 04/04/19  5:17 AM   Result Value Ref Range    Potassium 4.1 3.5 - 5.0 mmol/L   Magnesium    Collection Time: 04/04/19  5:17 AM   Result Value Ref Range    Magnesium 1.5 (L) 1.8 - 2.6 mg/dL           Advanced Care Planning  Discharge plan unknown at this time      Jaron Edwards  Date: 4/4/2019  Time: 6:06 PM  Hospitalist Service  Part of this chart was created using a dictation software. Typographic errors, word substitutions, and Grammatical errors may unintentionally occur.

## 2021-05-27 NOTE — PROGRESS NOTES
RCAT Treatment Plan    Patient Score: 8    Patient Acuity: 2    Clinical Indication for Therapy: sepsis    Therapy Ordered: nebs Q4 prn    Assessment Summary: patient is on room air. Breath sounds are diminished.      04/07/19 1110   Reason for Assessment (RCAT)   RCAT Assesment Re-eval   Assessment Reason  Other (comment)   $ RCAT Eval Time 30 min.  Yes   Vitals (RCAT)   Heart Rate (!) 110   BP 98/56   Resp 20   SpO2 90 %   Chart Assessment (RCAT)   Pulmonary Status  3   Surgical Status  0   Chest Xray  2   Patient Assessment (RCAT)    Respiratory Pattern  0   Mental Status  0   Breath Sounds  2   Cough Effectiveness  0   Level of Activity  1   O2 Required SpO2 >= 92%  0   Chart + Pt. Assessment Total Points  8   RCAT Acuity Score 8 (Acuity 2)   Clinical Indications (RCAT)   Aerosol Hygiene RCAT protocol   RCAT Order Placed  Yes   Patient continues to go out and smoke while here, so she misses some of her scheduled nebs, she tells rt to come back when she is ready. She is using her own inhaler. Change nebs to Q4 prn      ASIM Thomas  4/7/2019

## 2021-05-27 NOTE — PROGRESS NOTES
[facility] Daily Progress Note      Date of Service: 3/29/2019     Brief History: Marychuy Zhou is 63 y.o. female with history of alcohol dependence, anxiety disorder, chronic kidney disease, history of spinal hematoma with resultant lower extremity paraplegia and a chronic indwelling suprapubic catheter, recurrent UTIs, chronic pain syndrome, COPD, depression, history of DVT, GERD, gastroparesis, hypertension, hypothyroidism, lung cancer s/p left lower lobe resection, obesity, rheumatoid arthritis, pulmonary embolism on chronic anticoagulation, who was admitted to this hospital on 2/2/2019 for recurrent UTI, sacral decub ulcer and respiratory distress. She was treated with a course of antibiotics for hospital acquired pneumonia, and sacral decub ulcer with osteomyelitis. She has been very non compliant with cares and has been refusing medications and regular nursing cares.    Assessment:     COPD with acute exacerbation. Treated with a course of antibiotic for HCAP. Ongoing tobacco abuse. Stable at this time. Patient requesting X ray. She continues to smoke and has coarse breath sounds. No respiratory distress noted.    Intermittent respiratory distress. Multifactorial in etiology. Treated for HCAP and COPD exacerbation. Not needing any supplemental oxygen. She is on furosemide. Ongoing tobacco abuse is playing a role as well. Cough is better. No respiratory distress at this time.    History of paraplegia, secondary to epidural hematoma in 2010. She is on baclofen, gabapentin. She has suprapubic catheter.    Stage IV decub ulcer with osteomyelitis. Completed course of antibiotics. WOC following.    History of seizure disorder on keppra.    History of DVT and PE on xarelto.    Hypothyroidism on synthroid.    Chronic pain syndrome, on methadone, and as needed hydromorphone.    Hypokalemia, likely from use of lasix. Resolved. Monitor intermittently. She refused blood draws and supplements.    Overactive bladder on  "oxybutynin    Chronic anemia, multifactorial- iron deficiency    Ongoing tobacco abuse. She refuses to quit    Generalized anxiety disorder    Plan:   Continue supportive cares. Check chest X ray. Continue current medical cares. Advised to quit smoking.    Subjective:     Chart reviewed, events noted. Pt seen and examined. Discussed about quitting smoking. Asked to examine lower extremity, but she refused. She denies any fever. She was sleepy yesterday afternoon, but back at baseline today. Told her it was related to ativan. She wants her ativan dose increased. I advised against it.    Objective     Vital signs in last 24 hours:  Temp:  [97.9  F (36.6  C)] 97.9  F (36.6  C)  Heart Rate:  [] 74  Resp:  [16-18] 18  BP: ()/(55-90) 135/90  Weight:   163 lb (73.9 kg)  Weight change:   Body mass index is 29.81 kg/m .    Intake/Output last 3 shifts:  I/O last 3 completed shifts:  In: 960 [P.O.:960]  Out: -   Intake/Output this shift:  No intake/output data recorded.      Physical Exam:  /90 (Patient Position: Lying)   Pulse 74   Temp 97.9  F (36.6  C) (Oral)   Resp 18   Ht 5' 2\" (1.575 m)   Wt 163 lb (73.9 kg)   LMP  (LMP Unknown)   SpO2 94%   BMI 29.81 kg/m      FiO2 (%): (room air) O2 Device: None (Room air) O2 Flow Rate (L/min): 4 L/min    General Appearance: Alert, not in distress.  HEENT: Normocephalic. No scleral icterus. Mucous membranes moist.  Heart: S1 S2 heard. Regular rate and rhythm.  Lungs: Coarse breath sounds.  Abdomen: Soft, non tender, bowel sounds present.  Extremity: Bilateral leg edema.   Neurologic: Paraplegia      Lab Results:  personally reviewed.   Lab Results   Component Value Date     03/23/2019    K 3.5 03/29/2019    CL 94 (L) 03/23/2019    CO2 28 03/23/2019    BUN 26 (H) 03/23/2019    CREATININE 0.68 03/28/2019    CALCIUM 8.4 (L) 03/23/2019     Lab Results   Component Value Date    WBC 10.8 03/23/2019    WBC 6.0 09/19/2015    HGB 9.0 (L) 03/23/2019    HCT 30.4 " (L) 03/23/2019    MCV 82 03/23/2019     03/23/2019       Advance Care Planning:   Barriers to discharge: Placement  Anticipated discharge day: Unknown  Disposition: To be determined    Total unit/floor time 37 minutes: Time consisted of the following:  Examination of patient, reviewing of the record and lab results of 17, and completing documentation.  Counseling time 20 minutes. Time spent in counseling with the patient consisted of discussing about anxiety, pneumonia, her respiratory status, Chest X ray and ativan.      Stephen Frausto MD  [facility] Hospitalist  Paging: [employer] Paging

## 2021-05-27 NOTE — PLAN OF CARE
Care Plan  Care Management      Care Management Goals of the Day: Progression of care    Care Progression Reviewed With: Charge RN, MD, HUC, RNCM, CMSW    Barriers to Discharge: initiation of CADI waiver    Discharge Disposition: [facility]    Expected Discharge Date: TBD    Transportation: [facility] ([phone])    Care Coordination Narrative: DME orders and progress notes for hospital bed and farrah lift were faxed to [facility] ([phone]/F: [phone]). SW confirmed fax was received, orders under review.   Call was placed to [facility] ([facility] (P: [phone]) to inquire about status of CADI waiver application. [facility] intake stated they would return call upon review.   1:41 PM    Update:  met with pt and provided update on progress of d/c plan. Pt was adamant that previous SW/CMs have done nothing to find local placement and insisted SW assist with finding a group home in [facility] area. SW informed pt prior  has been working diligently on placement and pt will need to proceed with placement at [facility] because it is the only option at this time. [facility] returned call and informed SW that Elinor will coordinate the waiver process. Message was left for Elinor ([phone]).  [facility] was provided with contact info for Mary at [facility] (P: [phone]/F: [phone]).     4:20 PM  CYNTHIA Rivera [facility]    Per previous note: Pt has been accepted at [facility] [place] [facility] (P: [phone]/F: [phone]). Pt has had her CADI assessment with Taylor from [facility] assessments ([phone]/F: [phone]) LOKESH Hernandez at [facility] has submitted a cost of cares report to [employer] as well to be approved. Pt's MA services are currently billed through [employer].

## 2021-05-27 NOTE — PROGRESS NOTES
Pt remain on 2L NC, BBS remain coarse post neb and flutter valve. Pt is very congested, unable to complete therapy with flutter for bronchial hygiene  She able to do it only 2 min. Pt missed 1600 tx, was sleeping.    Jhonathan Soria, LRT

## 2021-05-27 NOTE — PLAN OF CARE
Daily care needs are met Progressing      Signs and symptoms of infections are decreased or avoided Progressing      Patient/family/caregiver demonstrates understanding of disease process, treatment plan, medications, and discharge instructions Progressing      Patient's pain/discomfort is manageable Progressing      Patient alert and oriented x4, VSS, HR tachy. Complain of chronic pain, PRN dilaudid, flexeril, tylenol, cough syrup, and ibuprofen given. Demanding. Refused some assessments, refused blood draw. Up to chair with assist of 2 and lift. Able to make needs known. Will continue to monitor.

## 2021-05-27 NOTE — PROGRESS NOTES
Infectious Diseases Progress Note      Date of visit: 4/9/2019    reconsulted for redness near PICC  ASSESSMENT   63-year-old woman with a history of paraplegia, COPD, recurrent UTIs, chronic decubitus wounds who ID is consulted regarding positive urine culture.    1. Urinary tract infection.  Patient has chronic catheterization via a suprapubic catheter and urethral catheter.  As such she is chronically colonized.  Urine culture was collected in the setting of decompensation with altered mental status and hypotension.  It is difficult to interpret urine culture results, as she is expected to be chronically colonized.  Her urine culture grew ESBL producing E. coli along with Pseudomonas that is resistant to carbapenems.   2. Chronic decubitus wounds.  Followed by wound care.  Multiple contributing factors including tobacco use and poor adherence to nursing cares that leads to persistent wounds.  Patient complaining of worsening left heel wound.  3. HCAP.  Currently on meropenem. cxr yesterday showing LLL pneumonia. procalcitonin is normal. Gentamicin added for UTI, but will also offer double gram-negative coverage  4. Chronic pain.  Hypotension. Chronic, managed with midodrine.     5. Nicotine addiction     6. I do not see evidence of PICC site infection. Slight irritation from tape. I do not see need to remove PICC or for vancomycin    PLAN   -Completed 7-day course of meropenem, yesterday; with continous smoking int he cold outside, she is at risk of relapse  -Continue Short course of gentamicin x 5 days, pharmacy to dose for UTI, ending 4/10, tomorrow  DC vancomycin  DC PICC if no longer needed afterwards  -SPC has been changed  -Wound care - compliance needed    Will sign off, please call with questions      Tra Wright MD  [facility]      ===========================================      SUBJECTIVE / INTERVAL HISTORY:     No cough during interview  No rash  itching fevers  Hungry        Review of Systems     All systems reviewed, negative except for the above.            Antibiotics   Meropenem 4/2-  Gentamicin 4/6-    Previous:  Vancomycin 4/1-4/6  Zosyn 4/1      Physical Exam     Temp:  [98  F (36.7  C)-98.7  F (37.1  C)] 98  F (36.7  C)  Heart Rate:  [] 107  Resp:  [14-22] 18  BP: ()/(50-73) 120/73    Vitals:    04/09/19 1249   BP:    Pulse:    Resp:    Temp:    SpO2: 92%       GENERAL:  well-developed, well-nourished, sitting in chair sleeping   HENT:  Head is normocephalic, atraumatic.   EYES:  Eyes have anicteric sclerae without conjunctival injection or stigmata of endocarditis.   LUNGS: Coarse sounds bilaterally   CARDIOVASCULAR:  Regular rate and rhythm with no murmurs, gallops or rubs.  ABDOMEN:  Normal bowel sounds, soft, nontender.   EXT: Bilateral lower extremity edema  PICC site RUE: no cellulitis no tenderness no drainage  Little tiny redness  SKIN:  No acute rashes.  Wounds are wrapped, see WOC notes clean  NEUROLOGIC:  Sleeping. Arousable, but goes back to sleep.      Cultures   4/1 urine culture: ESBL producing E. coli (sensitive to gentamicin, meropenem, nitrofurantoin, and tobramycin).  Pseudomonas aeruginosa (sensitive to aztreonam, cefepime, ceftazidime, gentamicin, Zosyn, and tobramycin).        Pertinent Labs:     Results from last 7 days   Lab Units 04/09/19  0700 04/08/19  0516 04/07/19  1130 04/07/19  0603   LN-WHITE BLOOD CELL COUNT thou/uL  --  9.7 12.8* 9.9   LN-HEMOGLOBIN g/dL  --  7.4* 7.6* 7.7*   LN-PLATELET COUNT thou/uL 319 297 264 271   LN-MONOCYTES RELATIVE PERCENT %  --  7  --  6   LN-MONOCYTES - REL (DIFF) %  --   --  2  --        Results from last 7 days   Lab Units 04/09/19  0700 04/08/19  0516 04/07/19  1130  04/06/19  1943  04/03/19  0405   LN-SODIUM mmol/L  --  142 142  --   --   --  143   LN-POTASSIUM mmol/L 3.2* 3.3* 3.3*   < > 2.9*   < > 3.5   LN-CHLORIDE mmol/L  --  104 109*  --   --   --  107   LN-CO2 mmol/L   --  32* 26  --   --   --  30   LN-BLOOD UREA NITROGEN mg/dL  --  6* 8  --   --   --  9   LN-CREATININE mg/dL  --  0.50* 0.56*  --  0.64  --  0.59*   LN-CALCIUM mg/dL  --  7.4* 7.0*  --   --   --  7.7*   LN-ALBUMIN g/dL  --  1.2* 1.2*  --   --   --   --    LN-PROTEIN TOTAL g/dL  --  4.5* 4.3*  --   --   --   --    LN-BILIRUBIN TOTAL mg/dL  --  0.1 <0.1  --   --   --   --    LN-ALKALINE PHOSPHATASE U/L  --  192* 182*  --   --   --   --    LN-ALT (SGPT) U/L  --  12 11  --   --   --   --    LN-AST (SGOT) U/L  --  18 22  --   --   --   --     < > = values in this interval not displayed.                 Imaging:     Us Venous Arm Right    Result Date: 4/9/2019  EXAM: US VENOUS ARM RIGHT LOCATION: [facility] DATE/TIME: 4/8/2019 9:17 PM INDICATION: redness and swelling around PICC site COMPARISON: None. TECHNIQUE: Duplex exam performed utilizing 2D gray-scale imaging, Doppler interrogation with color-flow and spectral waveform analysis. Exam performed without and with compression when possible. FINDINGS: Ultrasound includes evaluation of the internal jugular veins, innominate veins, subclavian veins, axillary veins, and brachial veins. The superficial cephalic and basilic veins were also evaluated where seen. The opposite subclavian vein medially was also included in the evaluation. RIGHT ARM VEINS: Negative for DVT.     CONCLUSION: 1.  The right arm veins are negative for DVT. No evidence for thrombus along the course of the right PICC line.        Attestation:  I have reviewed today's Medications, Vital Signs, Imaging, and Labs.

## 2021-05-27 NOTE — PROGRESS NOTES
[facility] Daily Progress Note      Date of Service: 3/28/2019     Brief History: Marychuy Zhou is 63 y.o. female with history of alcohol dependence, anxiety disorder, chronic kidney disease, history of spinal hematoma with resultant lower extremity paraplegia and a chronic indwelling suprapubic catheter, recurrent UTIs, chronic pain syndrome, COPD, depression, history of DVT, GERD, gastroparesis, hypertension, hypothyroidism, lung cancer s/p left lower lobe resection, obesity, rheumatoid arthritis, pulmonary embolism on chronic anticoagulation, who was admitted to this hospital on 2/2/2019 for recurrent UTI, sacral decub ulcer and respiratory distress. She was treated with a course of antibiotics for hospital acquired pneumonia, and sacral decub ulcer with osteomyelitis. She has been very non compliant with cares and has been refusing medications and regular nursing cares.    Assessment:     COPD with acute exacerbation. Treated with a course of antibiotic for HCAP. Ongoing tobacco abuse. Stable at this time.    Intermittent respiratory distress. Multifactorial in etiology. Treated for HCAP and COPD exacerbation. Not needing any supplemental oxygen. She is on furosemide. Ongoing tobacco abuse is playing a role as well. She is requesting increase in frequency of cough suppressants. She continues to smoke and declines to quit.    History of paraplegia, secondary to epidural hematoma in 2010. She is on baclofen, gabapentin. She has suprapubic catheter.    Stage IV decub ulcer with osteomyelitis. Completed course of antibiotics. WOC following.    History of seizure disorder on keppra.    History of DVT and PE on xarelto.    Hypothyroidism on synthroid.    Chronic pain syndrome, on methadone, as needed hydromorphone.    Hypokalemia, likely from use of potassium. Refusing supplements multiple times, which is making replacement very difficult.    Overactive bladder on oxybutynin    Chronic anemia, multifactorial- iron  "deficiency    Ongoing tobacco abuse. She refuses to quit    Generalized anxiety disorder    Plan:   Continue supportive cares. She denies any difficulty breathing. Increase frequency of as needed cough medications.    Subjective:     Chart reviewed, events noted. Pt seen and examined. Breathing is better. No new issues. She was asking me to increase frequency of her cough syrup. She was also asking to be on antibiotic, however, she does not seem to have any sign of active infection at this time.    Objective     Vital signs in last 24 hours:     Weight:   163 lb (73.9 kg)  Weight change:   Body mass index is 29.81 kg/m .    Intake/Output last 3 shifts:  I/O last 3 completed shifts:  In: 980 [P.O.:980]  Out: 3300 [Urine:3300]  Intake/Output this shift:  No intake/output data recorded.      Physical Exam:  /66 (Patient Position: Sitting)   Pulse (!) 120   Temp 98  F (36.7  C) (Oral)   Resp 20   Ht 5' 2\" (1.575 m)   Wt 163 lb (73.9 kg)   LMP  (LMP Unknown)   SpO2 94%   BMI 29.81 kg/m      FiO2 (%): (room air) O2 Device: Nasal cannula O2 Flow Rate (L/min): 4 L/min    General Appearance: Alert, not in distress.  HEENT: Normocephalic. No scleral icterus. Mucous membranes moist.  Heart: S1 S2 heard. Regular rate and rhythm.  Lungs: Coarse breath sounds.  Abdomen: Soft, non tender, bowel sounds present.  Extremity: Bilateral leg edema.   Neurologic: Paraplegia      Lab Results:  personally reviewed.   Lab Results   Component Value Date     03/23/2019    K 3.8 03/28/2019    CL 94 (L) 03/23/2019    CO2 28 03/23/2019    BUN 26 (H) 03/23/2019    CREATININE 0.68 03/28/2019    CALCIUM 8.4 (L) 03/23/2019     Lab Results   Component Value Date    WBC 10.8 03/23/2019    WBC 6.0 09/19/2015    HGB 9.0 (L) 03/23/2019    HCT 30.4 (L) 03/23/2019    MCV 82 03/23/2019     03/23/2019       Advance Care Planning:   Barriers to discharge: Placement  Anticipated discharge day: Unknown  Disposition: To be " determined      Stephen Frausto MD  [facility] Hospitalist  Paging: [employer] Paging

## 2021-05-27 NOTE — PROGRESS NOTES
"Pharmacy Consult: Vancomycin Dosing    Pharmacist consulted to dose vancomycin for Marychuy Zhou, a 63 y.o. female.    Ordering provider: Dr. Ash- intensivist  Indication for vancomycin therapy: [facility]    Goal Trough Range:  15-20 mcg/mL based on indication    Other current antimicrobials             piperacillin-tazobactam 3.375 g in NaCl 0.9 % 50 mL (MINI-BAG Plus) (ZOSYN)  Every 8 hours          vancomycin 1,250 mg in sodium chloride 0.9% 500 mL (VANCOCIN)  Every 12 hours             Subjective/Objective:    Patient was admitted for Sepsis (H) on 2/2/2019    Height: 5' 2\" (1.575 m)    Actual Body Weight (ABW): 73.9 kg (163 lb)    Ideal body weight: 50.1 kg (110 lb 7.2 oz)  Adjusted ideal body weight: 59.6 kg (131 lb 7.5 oz)    BMI: Body mass index is 29.81 kg/m .    No Known Allergies    Patient Active Problem List   Diagnosis     Other chronic pain     Lumbosacral Disc Degeneration     Herpes Simplex Type I     Nicotine Dependence     Essential hypertension     Dislodged Bhandari catheter, subsequent encounter     Acute respiratory failure with hypoxia (H)     COPD (chronic obstructive pulmonary disease) (H)     Lactic acidosis     SVT (supraventricular tachycardia) (H)     Gastroesophageal reflux disease without esophagitis     Acquired hypothyroidism     Iron deficiency anemia     Opioid-induced constipation (OIC)     Paraplegia (H)     COPD exacerbation (H)     Chronic pain syndrome     Major depression     Alcohol abuse     Decubitus ulcer     Hospital-acquired pneumonia     Cancer of lung (H)     Neurogenic bladder     Neurogenic bowel     Mixed hyperlipidemia     Palliative care encounter     Hypokalemia     Paraplegia at T4 level (H)     Major depressive disorder, recurrent episode, moderate (H)     Seizure disorder (H)     Heel ulcer, right, limited to breakdown of skin (H)     Decubitus ulcer of sacral region, stage 4 (H)     Esophageal dysmotility     Anxiety " disorder     History of MDR Enterobacter cloacae infection     Mechanical complication of suprapubic catheter (H)     Diastolic CHF, acute on chronic (H)     Chest tightness or pressure     Atypical chest pain     Decubitus ulcer of right buttock, stage 4 (H)     Nephrostomy tube displaced (H)     HCAP (healthcare-associated pneumonia)     Cognitive disorder     Insomnia due to anxiety and fear     Sepsis, due to unspecified organism (H)     Urinary tract infection, site unspecified     Hypotension, unspecified hypotension type     Chronic low back pain without sciatica, unspecified back pain laterality     Acute encephalopathy     Claustrophobia     Choledocholithiasis with obstruction     Cholelithiasis     Hx of pulmonary embolus     S/P cholecystectomy     Fever, unspecified fever cause     Pneumonia of left lower lobe due to infectious organism (H)     Lower abdominal pain     Neck infection     Subdural hematoma (H)     Convulsions, unspecified convulsion type (H)     Partial symptomatic epilepsy with simple partial seizures, intractable, with status epilepticus (H)     Acute on chronic intracranial subdural hematoma (H)     Brain edema (H)     Chronic obstructive pulmonary disease with acute exacerbation (H)     Decubitus ulcer, stage III (H)     Depression     Sepsis (H)     Sepsis secondary to UTI (H)     Pyelonephritis     Hyponatremia     Episodic cluster headache, not intractable     Flank pain     Right upper quadrant abdominal pain     Abdominal pain     Colon wall thickening     Cystitis     Suprapubic catheter dysfunction, subsequent encounter     Bilateral hydronephrosis     Elevated lactic acid level     Paroxysmal hemicrania     UTI (urinary tract infection)     Abdominal pain, unspecified location     Coagulopathy (H)     Anxiety     Dislodged wound Vacuum     Urinary tract infection associated with cystostomy catheter (H)     Personal history of PE (pulmonary embolism)     Chronic  anticoagulation     Weakness     Urinary incontinence due to urethral sphincter incompetence     Abscess, gluteal, right     History of DVT (deep vein thrombosis)     Hypothyroidism due to Hashimoto's thyroiditis     Fever in other diseases     Severe sepsis (H)     Ulcer     Sleep difficulties     Irritability and anger     Adjustment disorder with mixed anxiety and depressed mood     Normocytic anemia     Closed left subtrochanteric femur fracture (H)     Acute blood loss anemia     Other specified hypotension     History of encephalopathy     Nephrostomy complication (H)     Left lower lobe pneumonia (H)     Acute bronchitis due to other specified organisms     Hypoxemia     Centrilobular emphysema (H)     Bilateral lower extremity edema     Stage IV pressure ulcer of sacral region (H)     Gangrene (H)     Noncompliance with medication regimen     Infection     Goals of care, counseling/discussion     Advanced care planning/counseling discussion     Malingerer for IV Dilaudid     Moderate protein-calorie malnutrition (H)     Chronic osteomyelitis (H)     Drug-induced constipation     Quadriplegia (H)     Pelvic pain in female     Weakness of left hand     Focal motor seizure (H)     Tension-type headache, not intractable, unspecified chronicity pattern     Atelectasis     Pelvic pain     Panic anxiety syndrome     Ulcer due to Treponema vincentii, with fat layer exposed (H)     Abnormal CT scan of lung     Upper back pain     Severe major depression, single episode, with psychotic features (H)     Paranoia (H)     Unable to control anger     Cough     Nonintractable epilepsy without status epilepticus, unspecified epilepsy type (H)     Acute pulmonary edema (H)     Cigarette smoker    Past Medical History:   Diagnosis Date     Alcohol dependence (H)     history     Anxiety      Cancer of lung (H) 9/24/2013    Overview:  Adenocarcinoma Stage 1A per preoperative needle biopsy   Adenocarcinoma Stage 1A per  preoperative needle biopsy  Wedge r/s 9/2013     Chronic kidney disease      Chronic pain     on Methadone     Chronic suprapubic catheter (H)      Constipation      COPD (chronic obstructive pulmonary disease) (H)      Decubitus ulcer     had wound vac     Depression      Diastolic CHF, acute on chronic (H)      DVT (deep venous thrombosis) (H) 5/26/2015     ESBL (extended spectrum beta-lactamase) producing bacteria infection 08/2015    urine     Gastroparesis      GERD (gastroesophageal reflux disease)      HCAP (healthcare-associated pneumonia)      Herpes Simplex Type I      Hyperlipemia      Hypertension      Hypothyroidism 5/26/2015     Intracranial subdural hematoma (H)      Kidney stone      Lower paraplegia (H) 4/28/2016    Overview:  spinal epidural hematoma, emergent decomp Overview:  spinal epidural hematoma, emergent decomp Overview:  spinal epidural hematoma, emergent decomp     MRSA infection      Neurogenic bladder     chronic gonzalez     Neuropathy (H) 5/26/2015     Obesity      Opiate dependence, continuous (H)      Osteoporosis      Pneumonia      Pulmonary embolism (H) 12/2016    chronic, on coumadin     Rheumatoid arthritis (H)      Sepsis (H) 5/28/2017     SVT (supraventricular tachycardia) (H) 8/19/2014     Vascular myelopathies (H)         Temp Readings from Current Encounter:     04/01/19 1805 04/01/19 1823 04/01/19 1900   Temp: 98.8  F (37.1  C) 97.8  F (36.6  C) 99.4  F (37.4  C)     Net Intake/Output (last 24 hours):  I/O last 3 completed shifts:  In: -   Out: 900 [Urine:900]    Recent Labs     04/01/19  1144 04/01/19  1149 04/01/19  1921 04/01/19  1929 04/01/19  1932   WBC 21.1*  --   --  30.1*  --    LACTICACID  --   --  1.8  --   --    CRP  --   --   --   --  24.0*   BUN  --  20  --   --  23*   CREATININE  --  0.80  --   --  0.87     Estimated Creatinine Clearance: 63.1 mL/min (by C-G formula based on SCr of 0.87 mg/dL).    No results for input(s): CULTURE in the last 72  hours.    Results for orders placed or performed during the hospital encounter of 02/02/19   Culture, Urine    Collection Time: 02/03/19 12:15 AM   Result Value Status    Culture  Final     Mixture of organism types with no predominating organism.       No results for input(s): VANCOMYCIN in the last 168 hours.    Vancomycin administrations: (last 120 hours)     None          Assessment/Plan:    Pharmacist consulted to dose vancomycin for [facility], goal trough range 15-20 mcg/mL.  1. Initiate vancomycin 1250 mg IV every 12 hours (17 mg/kg actual body weight).  2. No vancomycin level available for assessment.  3. Pharmacist will plan to check a vancomycin trough level prior to the forth or fifth dose if vanco continues.  4. Pharmacist will continue to follow.    Thank you for the consult.  Sandra Hernandez RPh 4/1/2019 9:15 PM

## 2021-05-27 NOTE — PLAN OF CARE
Problem: Pain  Goal: Patient's pain/discomfort is manageable  Outcome: Not Progressing  Pt received prn dilaudid x2 and tylenol once for generalize discomfort.     Problem: Psychosocial Needs  Goal: Collaborate with patient/family/caregiver to identify patient specific goals for this hospitalization  Outcome: Not Progressing  Pt refused blood draw for potassium level and stated they can only draw it tomorrow and pt last potassium level 3.9 and presently on lasix and pt updated with present situation and pt stated she know. Pt just placed in bed and allow wound care only to her buttock and coccyx area and no other wound care in other areas allow by pt.

## 2021-05-27 NOTE — PROGRESS NOTES
Pulmonary/Critical Care  4/3/2019  9:51 AM     Chart reviewed. Discussed on morning interdisciplinary rounds.   BP soft overnight but no pressor needs.   Hemogram does not look accurate (WBC drop from 21 -> 7??) - request a redraw.   Discussed with Dr. Edwards. Should be ok for transfer out of ICU with accepting MAPs over 60. Will schedule a small dose of Midodrine to be given at bedtime. Watch BP overnight for first few days to ensure no rebound hypertension with noc timed dose.   Our service will sign off.     Cecile Glasgow, [facility] Pulmonary/Critical Care

## 2021-05-27 NOTE — SIGNIFICANT EVENT
"Patient was down to smoke at 3:45. Upon return, patient was sleeping. Staff allowed pt. to sleep. At 1755, writer found patient to still be sleeping. Pt. Awoke to sternal stimuli, but then would quickly fall back asleep. BP 81/50, , O2 78% on RA. Dr. Edwards was on the unit and immediately notified. Oxygen mask placed and sats came up to 92% on 4L. BP at 1759 97/55. At 1805, BP 87/49 and pulse 132. Pt. stated, \"I can't breathe. I'm dying.\" Due to sense of impending doom, code rapid response called at this time. ABGs and CXR obtained, after which patient was transferred to ICU. Report given to DREAD Bolanos.  "

## 2021-05-27 NOTE — PROGRESS NOTES
RESPIRATORY CARE NOTE     Patient Name: Marychuy Zhou  Today's Date: 4/3/2019       Pt continues to receive nebs. BS are coarse bilaterally Post treatment there is increased aeration. Pt also perceives improvement.  RT encouraged deep breathing and coughing techniques .  RT will continue to monitor and assess.     Said CESAR Mojica, LRT

## 2021-05-27 NOTE — PLAN OF CARE
Problem: Discharge Barriers  Goal: Patient's discharge needs are met  Outcome: Progressing  Care Plan  Care Management        Care Management Goals of the Day: Progression of care     Care Progression Reviewed With: Charge RN, MD, HUC, RNCM, CMSW     Barriers to Discharge: CADI Assessment     Discharge Disposition: [facility]     Expected Discharge Date: TBD     Transportation: [facility] ([phone])        Care Coordination Narrative:       Pt has been accepted at [facility] [place] [facility]. Pt will have her CADI assessment will be on Wednesday 3/27/2019 at 10:00am. Taylor is the the [facility] Choices  ([phone]/F: [phone]).     CYNTHIA Gonsalez  3/26/2019   8:33 AM

## 2021-05-27 NOTE — PROGRESS NOTES
Respiratory Therapy  Note:  Pt denies shortness of breath.  Respiratory effort normal.  Congested cough.  Refused IS/Aerobika, stating only cough syrup w/codeine helps with clearing her mucus.  Cough is congested.  Using nasal cannula intermittently.  Plan is to continue scheduled nebulizer's, encourage bronchial hygiene.  Shanique Stephenson, LRT, 4/3/2019

## 2021-05-27 NOTE — PLAN OF CARE
"Blood pressure (BP) is within acceptable limits Progressing      Patient/family/caregiver demonstrates understanding of disease process, treatment plan, medications, and discharge instructions Progressing      Patient refused all medications but did take them this afternoon. Patient went out to smoke multiple times, finally allowed writer to draw blood but did not want any additional care done. Patient is in her wheelchair and has been for at least 24 hours. Encouraged her to get in the bed to check her wounds but she refused. Continue to encourage skin care and physical cares.     /66 (Patient Position: Sitting)   Pulse (!) 120   Temp 98  F (36.7  C) (Oral)   Resp 20   Ht 5' 2\" (1.575 m)   Wt 163 lb (73.9 kg)   LMP  (LMP Unknown)   SpO2 94%   BMI 29.81 kg/m      "

## 2021-05-27 NOTE — PLAN OF CARE
Problem: Discharge Barriers  Goal: Patient's discharge needs are met  Outcome: Progressing  Care Plan  Care Management        Care Management Goals of the Day: Progression of care     Care Progression Reviewed With: Charge RN, MD, HUC, RNCM, CMSW     Barriers to Discharge: initiation of CADI waiver      Discharge Disposition: [facility]     Expected Discharge Date: TBD     Transportation: [facility] ([phone])        Care Coordination Narrative:      Pt has been accepted at [facility] [place] [facility] (P: [phone]/F: [phone]). Pt has had her CADI assessment. Per Taylor, the [facility] Choices  ([phone]/F: [phone]), she will not have her curtesy assessment completed until likely 4/3/2019 then she will send it to [employer] as this is the [facility] her funding is going to be through. Taylor states that after this the DON at [facility] (Mary) would need to submit a cost of cares report to [employer] as well to be approved. MARGI spoke with Mary who has begun the cost of care paperwork and will submit this to [employer] as well.       MARGI will continue to follow and assist with further d/c needs.     CYNTHIA Gonsalez  4/3/2019  3:54 PM

## 2021-05-27 NOTE — CONSULTS
Critical Care Medicine Consult  2019     Name: Marychuy Zhou  : 1955  MRN: [id number]  PCP: Sánchez Zamora MD         ASSESSMENT/PLAN:  63 y.o. 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 She was transferred to the ICU following a rapid response in setting of acute onset somnolence, hypoxia, & hypotension.     Neuro/Psych:   #. Acute toxic metabolic encephalopathy w/ a broad differential in this patient that includes new infection vs opioid intoxication vs sequelae of metabolic alkalosis vs response to hypotension.     Will keep patient NPO at this time    No acute indications for intubation since she is able to protect her airway    Recommend re-evaluating patient's pain regimen since this likely contributed to her somnolence & hemodynamic instability    Started ID work-up & broad spectrum antibiotics  #. Chronic pain on methadone regimen. Currently holding methadone & gabapentin. Anticipate difficulties balancing patient's pain regimen & need for maintaining a safe airway & clear mentation in the future. Recommend involving Pain Medicine if they are not already following.  #. Anxiety & depression, currently NPO, holding quetiapine & PRN PO lorazepam.  #. Insomnia, currently holding PO meds, including Lunesta & melatonin.    Pulmonary:   #. Acute hypoxic hypercarbic respiratory failure. No evidence on an infiltrate to suspect a new pneumonia, no evidence of pulmonary edema or pneumothorax. ABG is reassuring. Clinically patient appears to be hypoventilating w/ shallow breathing & I suspect that she may have had a mucus plug (her examination is consistent w/ significant airway congestion & she has been declining her bronchial hygiene treatments).      Goal SpO2 >/= 89%, titrate down supplemental O2 as able    Will start scheduled four times a day DuoNeb, continue PRN treatments    Would not start metaneb treatments overnight, but recommend starting these in AM when patient is more awake  #. COPD, chart diagnosis w/o corresponding PFTs. Spirometry done in 2014 is suggestive of a restrictive process (reduced FEV1 & FVC, normal FEV1/FVC ratio). She does not appear to be on maintenance inhaler medications at baseline. At this time I will leave her on scheduled DuoNeb to provide the JIGNESH effect.    Cardiovascular:   #. Hypotension, persistent. From chart review patient has been tachycardic for a few days w/ variable BP measurements. She has not had strict I/O, but has had a consistent UOP. She may be intravascularly depleted, & it does not seem that she received any IVF during the rapid response. Her HCO3 is up-trending as well, which suggests constriction alkalosis d/t diuresis.    Trial  mL bolus, reassess effect on HR & BP    Will consider 25% albumin bolus since patient is quite hypoalbuminemic    Furosemide discontinued    LA wnl, which is reassuring  #. HTN, not currently on any antihypertensive medications.    Infectious:  Antibiotics:  Zosyn, vancomycin  #. Sepsis, suspected. Patient has been hospitalized for almost 2 months, & is at risk for HAP, UTI, & ulcer infections.     Continue empiric broad spectrum abx coverage    Blood cultures, UA/UC, & sputum ordered    Check procalcitonin    Trend CRP    Renal:      Intake/Output Summary (Last 24 hours) at 4/1/2019 1957  Last data filed at 3/31/2019 2332  Gross per 24 hour   Intake --   Output 900 ml   Net -900 ml     #. Metabolic alkalosis w/ concomitant respiratory acidosis. Patient has been receiving diuresis & it is unclear what her oral intake has been recently. Suspect contraction alkalosis. Renal function is stable, which is reassuring. Will bolus w/ LR overnight & hold furosemide.    GI:   #.  GERD, will continue PPI. If patient is not more awake in the morning, this will need to be switched to PO.  #. Gastroparesis, monitor.    Heme:   #. H/o VTE on long-term anticoagulation w/ rivaroxaban.    Heparin drip overnight while patient is NPO    Rivaroxaban discontinued while patient is NPO    Endo:   #. Hypothyroidism. Continued on levothyroxine. If patient is unable to take PO, it is acceptable for her to miss several doses before she is switched to IV.    Access/Lines/Tubes: required and necessary for continued patient cares  PICC, RUE  Suprapubic urinary catheter  PIV, L foot    ICU prophylaxis:   #. Stress ulcer: omeprazole  #. Diet: NPO in setting of altered mental status  #. VTE: heparin drip, high-intensive (unable to take rivaroxaban while NPO)    CODE: FULL    Family was updated by the hospitalist    Dispo: will be observed in the ICU overnight, reassess in AM    Patient is critically ill with total CCT spent 30 minutes thus far today.     This report was prepared using speech recognition software.  Any typographical errors are unintentional.  Please, contact me directly for any clarifications of my report.    Raquel Ash MD  Pulmonary and Critical Care          HISTORY OF PRESENTING ILLNESS:  Marychuy Zhou is a 63 y.o. 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  During her prolonged hospitalization she completed treatments for UTI, HCAP, & osteomyelitis (due to the sacral decubitus ulcer). Per chart review patient has been resistant to cares & has been declining medications & medical interventions during the hospitalization.     On 4/01/19 afternoon patient had a rapid response called due to somnolence, hypotension, and hypoxia.  She received naloxone and was  "started on Zosyn.  At the time of the evaluation in ICU patient is somnolent but awakens to voice and is able to state that she is breathing \"better\".  She knows where she is, but cannot recall what happened this afternoon.  She continues to request frequent repositioning.  She denies pain.    REVIEW OF SYSTEMS:   Review of Systems - unable to review due to patient's mental status.    SOCIAL HISTORY:  Unable to review due to patient's mental status.    FAMILY HISTORY:   Family History   Problem Relation Age of Onset     COPD Mother          in her 50s     COPD Brother      Lung cancer Sister      Liver disease Father      COPD Father          in his 50s     MEDICAL HISTORY:  Past Medical History:   Diagnosis Date     Alcohol dependence (H)     history     Anxiety      Cancer of lung (H) 2013    Overview:  Adenocarcinoma Stage 1A per preoperative needle biopsy   Adenocarcinoma Stage 1A per preoperative needle biopsy  Wedge r/s 2013     Chronic kidney disease      Chronic pain     on Methadone     Chronic suprapubic catheter (H)      Constipation      COPD (chronic obstructive pulmonary disease) (H)      Decubitus ulcer     had wound vac     Depression      Diastolic CHF, acute on chronic (H)      DVT (deep venous thrombosis) (H) 2015     ESBL (extended spectrum beta-lactamase) producing bacteria infection 2015    urine     Gastroparesis      GERD (gastroesophageal reflux disease)      HCAP (healthcare-associated pneumonia)      Herpes Simplex Type I      Hyperlipemia      Hypertension      Hypothyroidism 2015     Intracranial subdural hematoma (H)      Kidney stone      Lower paraplegia (H) 2016    Overview:  spinal epidural hematoma, emergent decomp Overview:  spinal epidural hematoma, emergent decomp Overview:  spinal epidural hematoma, emergent decomp     MRSA infection      Neurogenic bladder     chronic gonzalez     Neuropathy (H) 2015     Obesity      Opiate dependence, " continuous (H)      Osteoporosis      Pneumonia      Pulmonary embolism (H) 12/2016    chronic, on coumadin     Rheumatoid arthritis (H)      Sepsis (H) 5/28/2017     SVT (supraventricular tachycardia) (H) 8/19/2014     Vascular myelopathies (H)        OUTPATIENT MEDICATIONS:   Medication Sig     acetaminophen (TYLENOL) 500 MG tablet Take 1-2 tablets (500-1,000 mg total) by mouth every 4 (four) hours as needed.     albuterol sulfate 90 mcg/actuation AePB Inhale 180 mcg every 4 (four) hours as needed (Wheezing or dyspnea).     benzocaine-menthol (CEPACOL) 15-3.6 mg Take 1 lozenge by mouth every hour as needed.     bisacodyl (DULCOLAX) 10 mg suppository Insert 10 mg into the rectum daily as needed.     calcium, as carbonate, (TUMS) 200 mg calcium (500 mg) chewable tablet Chew 1 tablet (200 mg total) 4 (four) times a day as needed.     cyclobenzaprine (FLEXERIL) 10 MG tablet Take 1 tablet (10 mg total) by mouth 3 (three) times a day as needed for muscle spasms.     diaper,brief,adult,disposable (BRIEFS, ADULT-EXTRA LARGE) Misc Use 1 each As Directed as needed.     disposable gloves (PURPLE NITRILE GLOVES) Misc Use 1 each As Directed as needed.     gabapentin (NEURONTIN) 300 MG capsule Take 3 capsules (900 mg total) by mouth daily.     hydrocortisone 1 % ointment Apply topically 2 (two) times a day. Apply to affected area     HYDROmorphone (DILAUDID) 2 MG tablet Take 1.5 tablets (3 mg total) by mouth every 3 (three) hours as needed.     hydrOXYzine HCl (ATARAX) 25 MG tablet Take 1-2 tablets (25-50 mg total) by mouth every 6 (six) hours as needed for itching.     ibuprofen (ADVIL,MOTRIN) 400 MG tablet Take 1 tablet (400 mg total) by mouth every 6 (six) hours as needed.     levETIRAcetam (KEPPRA) 250 MG tablet Take 1 tablet (250 mg total) by mouth 2 (two) times a day.     levothyroxine (SYNTHROID, LEVOTHROID) 125 MCG tablet Take 125 mcg by mouth Daily at 6:00 am.     lidocaine 4 % patch Place 2 patches on the skin daily.  Remove and discard patch with 12 hours or as directed by MD.     melatonin 3 mg Tab tablet Take 1 tablet (3 mg total) by mouth at bedtime as needed. (Patient taking differently: Take 12 mg by mouth at bedtime as needed .     methadone (DOLOPHINE) 5 MG tablet Take 1 tablet (5 mg total) by mouth 2 (two) times a day.     methadone (DOLOPHINE) 5 MG tablet Take 3 tablets (15 mg total) by mouth at bedtime.     miconazole (MICOTIN) 2 % powder Apply topically 2 (two) times a day. To groin folds     multivitamin with minerals (THERA-M) 9 mg iron-400 mcg Tab tablet Take 1 tablet by mouth daily.     neomycin-bacitracin-polymyxin (NEOSPORIN) ointment Apply topically 2 (two) times a day. Apply to affected area. Apply together with hydrocortisone cream.     omeprazole (PRILOSEC) 20 MG capsule Take 20 mg by mouth daily as needed (heartburn).     oxybutynin (DITROPAN) 5 MG tablet Take 1 tablet (5 mg total) by mouth 3 (three) times a day.     polyethylene glycol (MIRALAX) 17 gram packet Take 1 packet (17 g total) by mouth 2 (two) times a day as needed.     QUEtiapine (SEROQUEL) 25 MG tablet Take 1 tablet (25 mg total) by mouth 2 (two) times a day. Morning and afternoon     QUEtiapine (SEROQUEL) 50 MG tablet Take 1 tablet (50 mg total) by mouth at bedtime.     rivaroxaban 20 mg Tab Take 1 tablet (20 mg total) by mouth daily with supper.     white petrolatum (AQUAPHOR NATURAL HEALING) 41 % Oint Apply 1 application topically 3 (three) times a day as needed (dry skin).     zinc oxide 20 % ointment Apply 1 application topically daily as needed for dry skin (or irritation).     ALLERGIES:  No Known Allergies    PHYSICAL EXAM:  Temp:  [97.8  F (36.6  C)-99.4  F (37.4  C)] 99.4  F (37.4  C)  Heart Rate:  [] 123  Resp:  [16-20] 20  BP: ()/(49-71) 93/58     Physical Exam:   General: somnolent woman, reclining in bed, NAD  HEENT: PERRL, MMM, coarse phlegm gargling in the throat, appears able to handle own secretions  CV:  tachycardic, no M/R/G; extremities adequately perfused  Pulm: coarse equal bilateral breath sounds, no wheezing, diffuse bilateral inspiratory & expiratory rhonchi, no audible crackles, no cough  Abd: rounded, soft, NT, hypoactive bowel sounds  Msk: not moving BLE, reaches up her LUE weakly, little muscle tone  Derm: no acute lesions or rashes on limited exam; thin skin w/ breakdown over the big toes b/l  Neuro: somnolent, but does awaken to loud voice, able to track my face; able to verbalize that she is at [facility], unable to name the month, speaking in short sentences, words garbled but discernible; continuously asks to be repositioned & for the bed to be raised  Psych: calm    LABS: I personally reviewed all the pertinent laboratory studies. ABG 7.47/49/76 on oxymask (presumably at 5 LPM)    IMAGING/STUDIES: I personally reviewed all listed imaging studies. Below are the formal Radiology interpretation results.  #. CXR, 4/01/19:   No change. Mild diffuse interstitial prominence but no focal pneumonia or pleural effusion evident. Heart size upper limits of normal. Right PICC line in place.

## 2021-05-27 NOTE — PROGRESS NOTES
S Daily Progress Note    Assessment/Plan:  Marychuy Zhou is 63 y.o. female with history of alcohol dependence, anxiety disorder, chronic kidney disease, history of spinal hematoma with resultant lower extremity paraplegia and a chronic indwelling suprapubic catheter, recurrent UTIs, chronic pain syndrome, COPD, depression, history of DVT, GERD, gastroparesis, hypertension, hypothyroidism, lung cancer s/p left lower lobe resection, obesity, rheumatoid arthritis, pulmonary embolism on chronic anticoagulation, who was admitted to this hospital on 2/2/2019 for recurrent UTI, sacral decub ulcer and respiratory distress. She was treated with a course of antibiotics for hospital acquired pneumonia, and sacral decub ulcer with osteomyelitis. She has been very non compliant with cares and has been refusing medications and regular nursing cares.  On 4/1/2019 patient became hypoxic and hypotensive and was transferred to ICU          Assessment:  Metabolic encephalopathy  Probably on the basis of respiratory infection complicated by respiratory failure as well as acute UTI    Acute E. coli urinary tract infection  On meropenem sensitivities awaited    Acute Hypoxic respiratory failure  improving  Discussed the case with intensivist    Probable Aspiration with Hospital-acquired pneumonia   Probably on the basis of aspiration   Unfortunately patient continues to smoke  Refusing bronchial hygiene therapy  On Meropenem as per Pulmonology    COPD with acute exacerbation  ABG will be checked stat    Paraplegia secondary to epidural hematoma in 2010  Uses a walker    Stage IV decubitus ulcer with osteomyelitis  Completed course of antibiotics  Wound care to follow is going to open dressing at 930 tomorrow    History of seizures  Keppra    History of DVT and PE  On Xarelto  On as needed hydromorphone          Plan:  Patient was told that Flexeril and codeine will be avoided as also with the plan to de-escalate Ativan as well  slowly.  Very difficult management.  Await urine culture results    Code status:Full Code        Subjective:  Patient is less confused and is again  demanding cough syrup    Review of Systems:   He says that she is drowning her in her own secretions for which I offered her nebulizations rather than sedating medications    Current Medications Reviewed via EHR List    Objective:  Vital signs in last 24 hours:  Temp:  [97.8  F (36.6  C)-99.2  F (37.3  C)] 97.9  F (36.6  C)  Heart Rate:  [] 90  Resp:  [10-52] 16  BP: ()/() 90/53  SpO2:  [77 %-100 %] 93 %  ETCO2 (mmHg):  [0 mmHg-37 mmHg] 0 mmHg    Intake/Output last 3 shifts:  I/O last 3 completed shifts:  In: 2360.2 [P.O.:600; I.V.:73.2; Blood:262; IV Piggyback:1425]  Out: 1350 [Urine:1350]      Physical Exam:  EYES: closed  NECK: no JVD  HEART : S1 S2   LUNGS: Has crackles bilaterally without  Wheezing   ABDOMEN:  Soft, No tenderness, Bowel sounds are decreased  CNS patient is alert oriented x 3 paraplegia        Imaging:  Chest x-ray is ordered    Lab Results:  Personally Reviewed.   Fingerstick Blood Glucose: No results for input(s): POCGLUFGR in the last 48 hours.    Recent Results (from the past 24 hour(s))   Type and Screen    Collection Time: 04/02/19  1:00 PM   Result Value Ref Range    ABORh O POS     Antibody Screen Negative Negative   Potassium    Collection Time: 04/02/19  4:21 PM   Result Value Ref Range    Potassium 5.7 (H) 3.5 - 5.0 mmol/L   Potassium    Collection Time: 04/02/19  7:52 PM   Result Value Ref Range    Potassium 3.2 (L) 3.5 - 5.0 mmol/L   APTT(PTT)    Collection Time: 04/02/19 10:53 PM   Result Value Ref Range    PTT 56 (H) 24 - 37 seconds   Crossmatch    Collection Time: 04/03/19 12:04 AM   Result Value Ref Range    Crossmatch Compatible     Blood Expiration Date [id number]     Unit Type O Pos     Unit Number [id number]     Status Transfused     Component Red Blood Cells     PRODUCT CODE O1816P12     Issue Date and  Time [id number]     Blood Type 5100     CODING SYSTEM [id number]    Magnesium    Collection Time: 04/03/19  4:05 AM   Result Value Ref Range    Magnesium 2.2 1.8 - 2.6 mg/dL   Basic Metabolic Panel    Collection Time: 04/03/19  4:05 AM   Result Value Ref Range    Sodium 143 136 - 145 mmol/L    Potassium 3.5 3.5 - 5.0 mmol/L    Chloride 107 98 - 107 mmol/L    CO2 30 22 - 31 mmol/L    Anion Gap, Calculation 6 5 - 18 mmol/L    Glucose 108 70 - 125 mg/dL    Calcium 7.7 (L) 8.5 - 10.5 mg/dL    BUN 9 8 - 22 mg/dL    Creatinine 0.59 (L) 0.60 - 1.10 mg/dL    GFR MDRD Af Amer >60 >60 mL/min/1.73m2    GFR MDRD Non Af Amer >60 >60 mL/min/1.73m2   HM1 (CBC with Diff)    Collection Time: 04/03/19  4:05 AM   Result Value Ref Range    WBC 7.5 4.0 - 11.0 thou/uL    RBC 2.89 (L) 3.80 - 5.40 mill/uL    Hemoglobin 7.3 (L) 12.0 - 16.0 g/dL    Hematocrit 24.7 (L) 35.0 - 47.0 %    MCV 86 80 - 100 fL    MCH 25.3 (L) 27.0 - 34.0 pg    MCHC 29.6 (L) 32.0 - 36.0 g/dL    RDW 18.9 (H) 11.0 - 14.5 %    Platelets 290 140 - 440 thou/uL    MPV 9.8 8.5 - 12.5 fL    Neutrophils % 83 (H) 50 - 70 %    Lymphocytes % 10 (L) 20 - 40 %    Monocytes % 5 2 - 10 %    Eosinophils % 2 0 - 6 %    Basophils % 0 0 - 2 %    Neutrophils Absolute 6.1 2.0 - 7.7 thou/uL    Lymphocytes Absolute 0.8 0.8 - 4.4 thou/uL    Monocytes Absolute 0.4 0.0 - 0.9 thou/uL    Eosinophils Absolute 0.2 0.0 - 0.4 thou/uL    Basophils Absolute 0.0 0.0 - 0.2 thou/uL           Advanced Care Planning  Discharge plan unknown at this time      Jaron Edwards  Date: 4/3/2019  Time: 6:06 PM  Hospitalist Service  Part of this chart was created using a dictation software. Typographic errors, word substitutions, and Grammatical errors may unintentionally occur.

## 2021-05-27 NOTE — PROGRESS NOTES
[facility] Infectious Disease  We are reconsulted to assess PICC redness.  Patient in th street, smoking .      No fever noted.    Tra Wright M.D.

## 2021-05-27 NOTE — PROGRESS NOTES
Hospitalist Progress Note    Assessment/Plan    Hospital course summary:  Marychuy Zhou is a 63 y.o. female with a medical history of alcohol dependence, anxiety disorder, chronic kidney disease,  spinal hematoma with resultant lower extremity paraplegia and a chronic indwelling suprapubic catheter, recurrent UTIs, chronic pain syndrome, COPD, depression, history of DVT, GERD, gastroparesis, hypertension, hypothyroidism, lung cancer s/p left lower lobe resection, obesity, rheumatoid arthritis, pulmonary embolism on chronic anticoagulation, who was admitted to this hospital on 2/2/2019 for recurrent UTI, sacral decub ulcer and respiratory distress. She was treated with a course of antibiotics for hospital acquired pneumonia, and sacral decub ulcer with osteomyelitis. She has been very non compliant with cares and has been refusing medications and regular nursing cares.  On 4/1/2019 patient became hypoxic and hypotensive and was transferred to ICU. She was treated for HCAP. She is now back on the medical floor and is stable at this time.  She completed meropenem on 4/8 and needs to complete gentamicin for five days. We having been working on placement.     Metabolic encephalopathy: Probably on the basis of respiratory infection complicated by respiratory failure as well as acute UTI, improved     Acute E. coli and Pseudomonas urinary tract infection: with chronic suprapubic indwelling catheter  - Finished meropenem, on IV gentamicin for five days     Probable Aspiration with Hospital-acquired pneumonia, Acute Hypoxic respiratory failure: Improving. Patient continues to smoke, refusing bronchial hygiene therapy  - Continue nebs PRN  - Continue nasal cannula oxygen  - ID input appreciated: Finished meropenem, on IV gentamicin for five days     COPD with acute exacerbation: Improved, nebs PRN     Paraplegia secondary to epidural hematoma in 2010:  Uses a wheelchair and farrah lift     Stage IV decubitus ulcer with  osteomyelitis: Completed course of antibiotics  - Continue Wound care  - Continue as needed hydromorphone    Peripheral edema: on Lasix 40 mg two times a day. Add metolazone     History of seizures: on Keppra     History of DVT and PE:  On Xarelto    Barriers to Discharge:  Pending placement    Anticipated discharge date/Disposition:  To be determined    Subjective  Patient was seen and examined in the afternoon. She complained about pain from her left leg wound. She states that  The pain is bad that she wants dilaudid to be increased. She states that she used to take dilaudid 2 mg q3h. She also complained of worsened bilateral lower leg swelling and chest congestion today. No chest pain.     Objective    Vital signs in last 24 hours  Temp:  [98  F (36.7  C)-98.7  F (37.1  C)] 98  F (36.7  C)  Heart Rate:  [] 100  Resp:  [14-22] 18  BP: ()/(50-73) 100/72 94% O2 Device: None (Room air) O2 Flow Rate (L/min): 1.5 L/min  Weight:   182 lb 1.6 oz (82.6 kg) Weight change:     Intake/Output last 3 shifts  I/O last 3 completed shifts:  In: 30 [I.V.:30]  Out: 1400 [Urine:1400]  Body mass index is 33.31 kg/m .    Physical Exam    General appearance: not in acute distress  HEENT: PERRL, EOMI  Lungs: Coarse breath sounds in bilateral lung fields  Cardiovascular: Regular rate and rhythm, normal S1-S2  Abdomen: Soft, non tender, no distension, normal bowl sound  Musculoskeletal: No joint swelling  Skin: Multiple decubitus ulcer on left and right lower legs, no discharge.   Neurology: AAO ×3.  Cranial nerves II - XII normal.  Paraplegia      Pertinent Labs   Lab Results: personally reviewed.   Results from last 7 days   Lab Units 04/09/19  0700 04/08/19  0516 04/07/19  1130  04/07/19  0637 04/06/19  1943 04/06/19  0651  04/04/19  0517  04/03/19  0405   LN-SODIUM mmol/L  --  142 142  --   --   --   --   --   --   --  143   LN-POTASSIUM mmol/L 3.2* 3.3* 3.3*   < >  --  2.9*  --    < > 4.1   < > 3.5   LN-CHLORIDE mmol/L   --  104 109*  --   --   --   --   --   --   --  107   LN-CO2 mmol/L  --  32* 26  --   --   --   --   --   --   --  30   LN-BLOOD UREA NITROGEN mg/dL  --  6* 8  --   --   --   --   --   --   --  9   LN-CREATININE mg/dL  --  0.50* 0.56*  --   --  0.64  --   --   --   --  0.59*   LN-CALCIUM mg/dL  --  7.4* 7.0*  --   --   --   --   --   --   --  7.7*   LN-ALBUMIN g/dL  --  1.2* 1.2*  --   --   --   --   --   --   --   --    LN-PROTEIN TOTAL g/dL  --  4.5* 4.3*  --   --   --   --   --   --   --   --    LN-BILIRUBIN TOTAL mg/dL  --  0.1 <0.1  --   --   --   --   --   --   --   --    LN-ALKALINE PHOSPHATASE U/L  --  192* 182*  --   --   --   --   --   --   --   --    LN-ALT (SGPT) U/L  --  12 11  --   --   --   --   --   --   --   --    LN-AST (SGOT) U/L  --  18 22  --   --   --   --   --   --   --   --    LN-MAGNESIUM mg/dL  --   --   --   --  1.5*  --  1.4*  --  1.5*  --  2.2    < > = values in this interval not displayed.     Results from last 7 days   Lab Units 04/09/19  0700 04/08/19  0516 04/07/19  1130 04/07/19  0603 04/06/19  0651   LN-WHITE BLOOD CELL COUNT thou/uL  --  9.7 12.8* 9.9 12.7*   LN-HEMOGLOBIN g/dL  --  7.4* 7.6* 7.7* 7.6*   LN-HEMATOCRIT %  --  25.6* 25.9* 27.0* 26.1*   LN-PLATELET COUNT thou/uL 319 297 264 271 251   LN-NEUTROPHILS RELATIVE PERCENT %  --  72*  --  81* 83*   LN-MONOCYTES RELATIVE PERCENT %  --  7  --  6 6   LN-MONOCYTES - REL (DIFF) %  --   --  2  --   --          Results from last 7 days   Lab Units 04/06/19 2020   LN-POC GLUCOSE FINGERSTICK- HE mg/dL 92       Medications  Scheduled Meds:    baclofen  10 mg Oral TID     benzonatate  100 mg Oral TID     calcium-vitamin D  1 tablet Oral DAILY     eszopiclone  3 mg Oral QHS     ferrous sulfate  325 mg Oral BID with meals     furosemide  40 mg Oral BID - diuretic     gabapentin  900 mg Oral DAILY     gentamicin  100 mg Intravenous Q18H     levETIRAcetam  250 mg Oral BID     levothyroxine  125 mcg Oral Daily 0600     methadone  10 mg  Oral TID     metOLazone  5 mg Oral DAILY     midodrine  10 mg Oral TID with meals     midodrine  2.5 mg Oral QHS     multivitamin with minerals  1 tablet Oral DAILY     omeprazole  20 mg Oral QAM AC     oxybutynin  5 mg Oral TID     polyethylene glycol  17 g Oral QHS     potassium chloride ER  10 mEq Oral BID     QUEtiapine  25 mg Oral BID     QUEtiapine  50 mg Oral QHS     rivaroxaban  20 mg Oral Daily with supper     sodium chloride  10-30 mL Intravenous Q8H FIXED TIMES     Continuous Infusions:  PRN Meds:.acetaminophen, albuterol, albuterol **AND** Nebulizer treatment intermittent, alteplase, benzocaine-menthol, bisacodyl, calcium (as carbonate), codeine-guaiFENesin, cyclobenzaprine, HYDROmorphone, hydrOXYzine HCl, ibuprofen, ipratropium-albuterol **AND** [] Nebulizer treatment intermittent, ipratropium-albuterol **AND** Nebulizer treatment intermittent, lidocaine, LORazepam, magnesium hydroxide, melatonin, miconazole nitrate, naloxone **OR** naloxone, OLANZapine, ondansetron **OR** ondansetron, polyethylene glycol, polyvinyl alcohol, senna-docusate, sodium chloride bacteriostatic, sodium chloride bacteriostatic, sodium chloride, sodium chloride, sodium chloride, sodium phosphates 133 mL, traZODone    Pertinent Radiology   Radiology Results: Personally reviewed impression/s  EKG Results: personally reviewed    Advanced Care Planning:  Discharge planning discussed with patient and care team      [facility] Medicine Service  Teddy Azar MD   Pager: [phone]

## 2021-05-27 NOTE — PLAN OF CARE
Compliance with treatment regimen Not Progressing      Pt. refused AM VS from EMILY. Pt. Re-approached x2, but still refused BP and temp. HR is 130s. Lung sounds are coarse bilaterally. Os sats are low 90s on RA. RR is WNL. Pt. Has a congested sounding cough, continues to smoke outside. Pt. refused assessment of wounds. Also refused to have urine assessed and catheter bags emptied. WBC count is elevated, increased from 10 to 21 today. Updated provider on labs/HR.

## 2021-05-27 NOTE — PLAN OF CARE
Pt has fluctuating orientation, will not comply with all assessments nor provided cares. Pt refuses cleaning and repositioning when offered by staff. Pt demands that cares are done when patient wants them done. Continued contact isolation. Gentamicin tough/peak today 4/9. Communicated with Lab staff members ahead of time to hopefully have a cooperative/successful draw with patient. Pt slept a majority of overnight shift. Will continue to monitor.     Problem: Pain  Goal: Patient's pain/discomfort is manageable  Outcome: Progressing     Problem: Safety  Goal: Patient will be injury free during hospitalization  Outcome: Progressing     Problem: Knowledge Deficit  Goal: Patient/family/caregiver demonstrates understanding of disease process, treatment plan, medications, and discharge instructions  Outcome: Not Progressing  Note:   Pt refusing care and repositioning.

## 2021-05-27 NOTE — PROGRESS NOTES
Patient feels that she turns light on and it gets turned off from outside the room, writer spend 3.5 hours of first 4 hour shift in room with patient, as writer was called stupid, slow, no helpful, useless, shocking you got a job , patient doesn't understand reasoning for it to be unsafe to get Narcotics with a BP in 70's/40's, was told by MD, still requesting non stop, refusing meds, refusing cares, calling charge RN as RN is at bedside to get some help, light gets turned on as soon as caretaker leaves room, will continue to stay available to assist as much as safely possible

## 2021-05-27 NOTE — PLAN OF CARE
"Discharge Barriers      Patient's discharge needs are met Not Progressing        Infection      Signs and symptoms of infections are decreased or avoided Not Progressing        Non-compliance with treatment regimen      Compliance with treatment regimen Not Progressing        Tissue Integrity      Damaged tissue is healing and protected Not Progressing         Pt continues to refuse all assessments,cares,and VSS, Very rude and demanding with medications. Very drowsy this shift. Pt was seen sleeping in wheelchair down in lobby. Came back up to and wanted all of her prn meds that were due, even asked for ativan- which was to early. Again found asllep in chair with food in hand - when awakened asked \"do you have my pain meds?\". Immediately fell back asleep. Refused to get back to bed. Will monitor closely.   "

## 2021-05-27 NOTE — PROGRESS NOTES
S Daily Progress Note    Assessment/Plan:  Marychuy Zhou is 63 y.o. female with history of alcohol dependence, anxiety disorder, chronic kidney disease, history of spinal hematoma with resultant lower extremity paraplegia and a chronic indwelling suprapubic catheter, recurrent UTIs, chronic pain syndrome, COPD, depression, history of DVT, GERD, gastroparesis, hypertension, hypothyroidism, lung cancer s/p left lower lobe resection, obesity, rheumatoid arthritis, pulmonary embolism on chronic anticoagulation, who was admitted to this hospital on 2/2/2019 for recurrent UTI, sacral decub ulcer and respiratory distress. She was treated with a course of antibiotics for hospital acquired pneumonia, and sacral decub ulcer with osteomyelitis. She has been very non compliant with cares and has been refusing medications and regular nursing cares.  On 4/1/2019 patient became hypoxic and hypotensive and was transferred to ICU          Assessment:  Metabolic encephalopathy  Probably on the basis of respiratory infection complicated by respiratory failure as well as acute UTI    Acute urinary tract infection  On meropenem    Acute Hypoxic respiratory failure  improving  Discussed the case with intensivist    Probable Aspiration with Hospital-acquired pneumonia   Probably on the basis of aspiration   Unfortunately patient continues to smoke  Refusing bronchial hygiene therapy  On Meropenem as per Pulmonology    COPD with acute exacerbation  ABG will be checked stat    Paraplegia secondary to epidural hematoma in 2010  Uses a walker    Stage IV decubitus ulcer with osteomyelitis  Completed course of antibiotics  Wound care to follow is going to open dressing at 930 tomorrow    History of seizures  Keppra    History of DVT and PE  On Xarelto  On as needed hydromorphone          Plan:  Patient wants me to increase the dose of Flexeril, wants me to give her codeine-containing cough syrup, wants to continue taking methadone and  Ativan -I did explain to the patient that given her bad episode of decreased level of consciousness yesterday giving sedating medications will be not in the best failure of the patient and that I am not going to do any codeine-containing cough syrup or extra Flexeril at this time.  This is my clinical judgment that these are given to her level of consciousness will decrease requiring unnecessary intubation and further respiratory failure management -unfortunately patient does not understand this and states that she knows her body better.  I discussed this plan of care with intensivist who agrees to my plan of care.  I did discuss with the patient advocate who plans to have pain team see the patient possible.     I did try to talk to the patient's sons but neither are available on the phone numbers that are in the chart    Await urine culture results    Code status:Full Code        Subjective:  Patient is less confused and Hypoxia is more improved today  Patient is demanding cough syrup    Review of Systems:   Complains of shortness of breath and states that she is feeling this way because she is not taking cough syrup containing codeine    Current Medications Reviewed via EHR List    Objective:  Vital signs in last 24 hours:  Temp:  [97.8  F (36.6  C)-99.4  F (37.4  C)] 98.2  F (36.8  C)  Heart Rate:  [] 114  Resp:  [10-52] 46  BP: ()/() 76/56  SpO2:  [77 %-100 %] 94 %  ETCO2 (mmHg):  [0 mmHg-42 mmHg] 35 mmHg    Intake/Output last 3 shifts:  I/O last 3 completed shifts:  In: 4034 [I.V.:3459; IV Piggyback:575]  Out: 950 [Urine:950]      Physical Exam:  EYES: closed  NECK: no JVD  HEART : S1 S2 Tachycardia  LUNGS: Has crackles bilaterally without  Wheezing   ABDOMEN:  Soft, No tenderness, Bowel sounds are decreased  CNS patient is sleepy has asterixis         Imaging:  Chest x-ray is ordered    Lab Results:  Personally Reviewed.   Fingerstick Blood Glucose: No results for input(s): POCGLUFGR in the  last 48 hours.    Recent Results (from the past 24 hour(s))   Blood Gases, Arterial    Collection Time: 04/01/19  6:26 PM   Result Value Ref Range    pH, Arterial 7.47 (H) 7.37 - 7.44    pCO2, Arterial 49 (H) 35 - 45 mm Hg    pO2, Arterial 76 (L) 80 - 90 mm Hg    Bicarbonate, Arterial Calc 33.3 (H) 23.0 - 29.0 mmol/L    O2 Sat, Arterial 98.0 (H) 96.0 - 97.0 %    Oxyhemoglobin 91.2 (L) 96.0 - 97.0 %    Base Excess, Arterial Calc 10.8 mmol/L    Ventilation Mode Oxymask     FIO2      Flow  LPM    Sample Stabilized Temperature 37.0 degrees C   Lactic Acid    Collection Time: 04/01/19  7:21 PM   Result Value Ref Range    Lactic Acid 1.8 0.5 - 2.2 mmol/L   BNP(B-type Natriuretic Peptide)    Collection Time: 04/01/19  7:29 PM   Result Value Ref Range    BNP 26 0 - 98 pg/mL   HM1 (CBC with Diff)    Collection Time: 04/01/19  7:29 PM   Result Value Ref Range    WBC 30.1 (HH) 4.0 - 11.0 thou/uL    RBC 3.62 (L) 3.80 - 5.40 mill/uL    Hemoglobin 8.8 (L) 12.0 - 16.0 g/dL    Hematocrit 30.1 (L) 35.0 - 47.0 %    MCV 83 80 - 100 fL    MCH 24.3 (L) 27.0 - 34.0 pg    MCHC 29.2 (L) 32.0 - 36.0 g/dL    RDW 20.2 (H) 11.0 - 14.5 %    Platelets 492 (H) 140 - 440 thou/uL    MPV 10.5 8.5 - 12.5 fL   Protime-INR    Collection Time: 04/01/19  7:29 PM   Result Value Ref Range    INR 1.34 (H) 0.90 - 1.10   aPTT    Collection Time: 04/01/19  7:29 PM   Result Value Ref Range    PTT 39 (H) 24 - 37 seconds   Manual Differential    Collection Time: 04/01/19  7:29 PM   Result Value Ref Range    Total Neutrophils % 91 (H) 50 - 70 %    Lymphocytes % 6 (L) 20 - 40 %    Monocytes % 3 2 - 10 %    Eosinophils %  0 0 - 6 %    Basophils % 0 0 - 2 %    Total Neutrophils Absolute 27.4 (H) 2.0 - 7.7 thou/ul    Lymphocytes Absolute 1.8 0.8 - 4.4 thou/uL    Monocytes Absolute 0.9 0.0 - 0.9 thou/uL    Eosinophils Absolute 0.0 0.0 - 0.4 thou/uL    Basophils Absolute 0.0 0.0 - 0.2 thou/uL    Manual nRBC per 100 Cells 1 (H) 0-<1    Platelet Estimate Increased (!)  Normal    Ovalocytes 1+ (!) Negative    Polychromasia 1+ (!) Negative   Comprehensive Metabolic Panel    Collection Time: 04/01/19  7:32 PM   Result Value Ref Range    Sodium 140 136 - 145 mmol/L    Potassium 3.7 3.5 - 5.0 mmol/L    Chloride 98 98 - 107 mmol/L    CO2 34 (H) 22 - 31 mmol/L    Anion Gap, Calculation 8 5 - 18 mmol/L    Glucose 120 70 - 125 mg/dL    BUN 23 (H) 8 - 22 mg/dL    Creatinine 0.87 0.60 - 1.10 mg/dL    GFR MDRD Af Amer >60 >60 mL/min/1.73m2    GFR MDRD Non Af Amer >60 >60 mL/min/1.73m2    Bilirubin, Total 0.2 0.0 - 1.0 mg/dL    Calcium 8.7 8.5 - 10.5 mg/dL    Protein, Total 6.1 6.0 - 8.0 g/dL    Albumin 1.6 (L) 3.5 - 5.0 g/dL    Alkaline Phosphatase 202 (H) 45 - 120 U/L    AST 10 0 - 40 U/L    ALT 10 0 - 45 U/L   C-Reactive Protein(CRP)    Collection Time: 04/01/19  7:32 PM   Result Value Ref Range    CRP 24.0 (H) 0.0 - 0.8 mg/dL   Urinalysis-UC if Indicated    Collection Time: 04/01/19  9:21 PM   Result Value Ref Range    Color, UA Yellow Colorless, Yellow, Straw, Light Yellow    Clarity, UA Turbid (!) Clear    Glucose, UA Negative Negative    Bilirubin, UA Negative Negative    Ketones, UA Negative Negative    Specific Gravity, UA 1.019 1.001 - 1.030    Blood, UA Moderate (!) Negative    pH, UA 5.5 4.5 - 8.0    Protein, UA 30 mg/dL (!) Negative mg/dL    Urobilinogen, UA <2.0 E.U./dL <2.0 E.U./dL, 2.0 E.U./dL    Nitrite, UA Negative Negative    Leukocytes, UA Large (!) Negative    Bacteria, UA Many (!) None Seen hpf    RBC, UA 25-50 (!) None Seen, 0-2 hpf    WBC, UA >100 (!) None Seen, 0-5 hpf    Squam Epithel, UA 10-25 (!) None Seen, 0-5 lpf    WBC Clumps Present (!) None Seen    Yeast, UA Many (!) None Seen hpf    Mucus, UA Moderate (!) None Seen lpf    Hyaline Casts, UA 0-5 0-5, None Seen lpf   Anti-Xa Heparin Level    Collection Time: 04/02/19  2:45 AM   Result Value Ref Range    Anti-Xa Heparin Assay 0.31 0.30 - 0.70 IU/mL   aPTT Level    Collection Time: 04/02/19  2:45 AM   Result Value  Ref Range    PTT 75 (H) 24 - 37 seconds   Procalcitonin    Collection Time: 04/02/19  4:37 AM   Result Value Ref Range    Procalcitonin 1.85 (H) 0.00 - 0.49 ng/mL   Basic Metabolic Panel    Collection Time: 04/02/19  4:37 AM   Result Value Ref Range    Sodium 142 136 - 145 mmol/L    Potassium 3.4 (L) 3.5 - 5.0 mmol/L    Chloride 103 98 - 107 mmol/L    CO2 31 22 - 31 mmol/L    Anion Gap, Calculation 8 5 - 18 mmol/L    Glucose 119 70 - 125 mg/dL    Calcium 7.8 (L) 8.5 - 10.5 mg/dL    BUN 17 8 - 22 mg/dL    Creatinine 0.73 0.60 - 1.10 mg/dL    GFR MDRD Af Amer >60 >60 mL/min/1.73m2    GFR MDRD Non Af Amer >60 >60 mL/min/1.73m2   Blood Gases, Venous    Collection Time: 04/02/19  4:37 AM   Result Value Ref Range    pH, Venous 7.44 7.35 - 7.45    pCO2, Venous 51 (H) 35 - 50 mm Hg    pO2, Christos 26 25 - 47 mm Hg    Base Excess, Venous 9.4 mmol/L    HCO3, Venous 31.6 (H) 24.0 - 30.0 mmol/L    Oxyhemoglobin 46.5 (L) 70.0 - 75.0 %    O2 Sat, Venous 48.7 (L) 70.0 - 75.0 %   HM1 (CBC with Diff)    Collection Time: 04/02/19  4:37 AM   Result Value Ref Range    WBC 21.4 (H) 4.0 - 11.0 thou/uL    RBC 2.95 (L) 3.80 - 5.40 mill/uL    Hemoglobin 7.2 (L) 12.0 - 16.0 g/dL    Hematocrit 24.0 (L) 35.0 - 47.0 %    MCV 81 80 - 100 fL    MCH 24.4 (L) 27.0 - 34.0 pg    MCHC 30.0 (L) 32.0 - 36.0 g/dL    RDW 19.9 (H) 11.0 - 14.5 %    Platelets 426 140 - 440 thou/uL    MPV 9.6 8.5 - 12.5 fL   Troponin I    Collection Time: 04/02/19  4:37 AM   Result Value Ref Range    Troponin I 0.01 0.00 - 0.29 ng/mL   Manual Differential    Collection Time: 04/02/19  4:37 AM   Result Value Ref Range    Total Neutrophils % 93 (H) 50 - 70 %    Lymphocytes % 5 (L) 20 - 40 %    Monocytes % 2 2 - 10 %    Eosinophils %  0 0 - 6 %    Basophils % 0 0 - 2 %    Total Neutrophils Absolute 19.9 (H) 2.0 - 7.7 thou/ul    Lymphocytes Absolute 1.1 0.8 - 4.4 thou/uL    Monocytes Absolute 0.4 0.0 - 0.9 thou/uL    Eosinophils Absolute 0.0 0.0 - 0.4 thou/uL    Basophils Absolute  0.0 0.0 - 0.2 thou/uL    Platelet Estimate Normal Normal    Ovalocytes 1+ (!) Negative    Polychromasia 1+ (!) Negative   Magnesium    Collection Time: 04/02/19  4:37 AM   Result Value Ref Range    Magnesium 1.5 (L) 1.8 - 2.6 mg/dL           Advanced Care Planning  Discharge plan unknown at this time  Spent 40 minutes of critical care time    Jaron Edwards  Date: 4/2/2019  Time: 6:06 PM  Hospitalist Service  Part of this chart was created using a dictation software. Typographic errors, word substitutions, and Grammatical errors may unintentionally occur.

## 2021-05-27 NOTE — PLAN OF CARE
Problem: Discharge Barriers  Goal: Patient's discharge needs are met  Outcome: Progressing  Care Plan  Care Management        Care Management Goals of the Day: Progression of care     Care Progression Reviewed With: Charge RN, MD, HUC, RNCM, CMSW     Barriers to Discharge: initiation of CADI waiver      Discharge Disposition: [facility]     Expected Discharge Date: TBD     [facility] ([phone])        Care Coordination Narrative:      SW met with pt to discuss transfer to ICU. Pt was agitated and appeared to be slightly confused. Pt was demanding of bedside RN with multiple things of which the bedside RN was complying, however it was not to the satisfaction of the pt. SW encouraged the pt to continue to work with staff and reminded the pt of previous conversation of boundaries and behavior towards staff. Pt continued to perseveration on her breakfast sandwich and how it was cut up for her.     SW will remain available to support staff and pt.     Pt has been accepted at [facility] [place] [facility] (P: [phone]/F: [phone]). Pt has had her CADI assessment. Per Taylor, the [facility]  ([phone]/F: [phone]), she will not have her curtesy assessment completed until likely 4/3/2019 then she will send it to [facility] as this is the [facility] her funding is going to be through. Taylor states that after this the DON at [place] (Mary) would need to submit a cost of cares report to [facility] as well to be approved. SW spoke with Mary who has begun the cost of care paperwork and will submit this to [facility] as well.    CYNTHIA Gonsalez LGSW  4/2/2019   10:57 AM

## 2021-05-27 NOTE — PROGRESS NOTES
Patient has been demanding to start steroid. She has lots of upper respiratory noise and recommended to comply with bronchial hygiene therapy, but she has refused. She has refused to stop smoking, and does not believe this to be related to her smoking at all. I told her that smoking paralyzes her cilia of respiratory mucosa and she will have difficult time clearing mucus, but she does not believe it as well. She demanded X ray, which was done. I personally reviewed X ray by myself, and the aeration seems better than before. Small area of atelectasis noted. This was informed to her and recommended to continue to use incentive spirometry and flutter valve. Recommended against use of steroid as it may worsen her wound and decrease immune response. Recommended against antibiotic as there is no clear evidence of infection. She was not happy with my suggestions. I offered scheduled tessalon for couple of days, which she is interested in.    In a separate visit, I saw her wound along with WOC nurse. No evidence of worsening wound infection. Small amount of biofilm noted with some necrotic tissue.    Additional 33 minutes spent face to face with patient in those two separate visits: [phone]; [phone]

## 2021-05-27 NOTE — PROGRESS NOTES
[facility] Daily Progress Note    Assessment/Plan:  Marychuy Zhou is 63 y.o. female with history of alcohol dependence, anxiety disorder, chronic kidney disease, history of spinal hematoma with resultant lower extremity paraplegia and a chronic indwelling suprapubic catheter, recurrent UTIs, chronic pain syndrome, COPD, depression, history of DVT, GERD, gastroparesis, hypertension, hypothyroidism, lung cancer s/p left lower lobe resection, obesity, rheumatoid arthritis, pulmonary embolism on chronic anticoagulation, who was admitted to this hospital on 2/2/2019 for recurrent UTI, sacral decub ulcer and respiratory distress. She was treated with a course of antibiotics for hospital acquired pneumonia, and sacral decub ulcer with osteomyelitis. She has been very non compliant with cares and has been refusing medications and regular nursing cares.  On 4/1/2019 patient became hypoxic and hypotensive and was transferred to ICU          Assessment:  Metabolic encephalopathy  Probably on the basis of respiratory infection complicated by respiratory failure    Probable acute respiratory failure  Will put on rebreathing mask  ABGs ordered stat  Patient will be transferred to ICU  Discussed the case with intensivist    Hospital-acquired pneumonia   Probably on the basis of aspiration   Unfortunately patient continues to smoke  Refusing bronchial hygiene therapy    COPD with acute exacerbation  ABG will be checked stat    Paraplegia secondary to epidural hematoma in 2010  Uses a walker    Stage IV decubitus ulcer with osteomyelitis  Completed course of antibiotics  Wound care to follow is going to open dressing at 930 tomorrow    History of seizures  Keppra    History of DVT and PE  On Xarelto  On as needed hydromorphone          Plan:  Check stat ABG  Check stat chest x-ray  Transfer to ICU  I will start on Zosyn    I did discuss the patient's situation with her son Sánchez and also with intensivist  Code status:Full  Code        Subjective:  Patient became confused and Hypoxic   Speaking in few sentences   She was talking to her Son Sánchez at that time   She was hypotensive briefly but the Blood pressure increased to 97 systolic Was tachycardic    Review of Systems:   Unable to get much history    Current Medications Reviewed via EHR List    Objective:  Vital signs in last 24 hours:  Heart Rate:  [] 133  Resp:  [16-20] 16  BP: ()/(50-71) 97/55  SpO2:  [79 %-96 %] 94 %    Intake/Output last 3 shifts:  I/O last 3 completed shifts:  In: -   Out: 900 [Urine:900]      Physical Exam:  EYES: closed  NECK: no JVD  HEART : S1 S2 Tachycardia  LUNGS: Has crackles bilaterally without  Wheezing   ABDOMEN:  Soft, No tenderness, Bowel sounds are decreased  CNS patient is sleepy has asterixis         Imaging:  Chest x-ray is ordered    Lab Results:  Personally Reviewed.   Fingerstick Blood Glucose: No results for input(s): POCGLUFGR in the last 48 hours.    Recent Results (from the past 24 hour(s))   HM2(CBC w/o Differential)    Collection Time: 04/01/19 11:44 AM   Result Value Ref Range    WBC 21.1 (H) 4.0 - 11.0 thou/uL    RBC 3.66 (L) 3.80 - 5.40 mill/uL    Hemoglobin 8.8 (L) 12.0 - 16.0 g/dL    Hematocrit 29.7 (L) 35.0 - 47.0 %    MCV 81 80 - 100 fL    MCH 24.0 (L) 27.0 - 34.0 pg    MCHC 29.6 (L) 32.0 - 36.0 g/dL    RDW 19.9 (H) 11.0 - 14.5 %    Platelets 436 140 - 440 thou/uL    MPV 9.8 8.5 - 12.5 fL   Potassium    Collection Time: 04/01/19 11:49 AM   Result Value Ref Range    Potassium 3.0 (L) 3.5 - 5.0 mmol/L   Comprehensive Metabolic Panel    Collection Time: 04/01/19 11:49 AM   Result Value Ref Range    Sodium 138 136 - 145 mmol/L    Potassium 3.0 (L) 3.5 - 5.0 mmol/L    Chloride 96 (L) 98 - 107 mmol/L    CO2 28 22 - 31 mmol/L    Anion Gap, Calculation 14 5 - 18 mmol/L    Glucose 157 (H) 70 - 125 mg/dL    BUN 20 8 - 22 mg/dL    Creatinine 0.80 0.60 - 1.10 mg/dL    GFR MDRD Af Amer >60 >60 mL/min/1.73m2    GFR MDRD Non Af  Amer >60 >60 mL/min/1.73m2    Bilirubin, Total 0.2 0.0 - 1.0 mg/dL    Calcium 8.4 (L) 8.5 - 10.5 mg/dL    Protein, Total 6.1 6.0 - 8.0 g/dL    Albumin 1.6 (L) 3.5 - 5.0 g/dL    Alkaline Phosphatase 198 (H) 45 - 120 U/L    AST 9 0 - 40 U/L    ALT 10 0 - 45 U/L   Magnesium    Collection Time: 04/01/19 11:49 AM   Result Value Ref Range    Magnesium 1.5 (L) 1.8 - 2.6 mg/dL           Advanced Care Planning  Discharge plan unknown at this time  Spent 40 minutes of critical care time    Jaron Edwards  Date: 4/1/2019  Time: 6:06 PM  Hospitalist Service  Part of this chart was created using a dictation software. Typographic errors, word substitutions, and Grammatical errors may unintentionally occur.

## 2021-05-27 NOTE — SIGNIFICANT EVENT
Writer answered patient's light at 0720. Pt stated that she was having back spasms and needed to see the doctor. Writer told patient she would let the nurse know. Patient then called the unit from her room phone and asked to see the doctor and accused the writer of not telling her nurse about the situation. Writer assured ot that she had told the RN about the situation and that when she was done getting report on her and another patient she would be in to see her.

## 2021-05-27 NOTE — PROGRESS NOTES
Hospitalist Progress Note        Assessment and Plan  Principal Problem:    Sepsis (H)  Active Problems:    Hospital-acquired pneumonia    Personal history of PE (pulmonary embolism)    Normocytic anemia    Noncompliance with medication regimen    Severe major depression, single episode, with psychotic features (H)    Paranoia (H)    Unable to control anger    Cough    Nonintractable epilepsy without status epilepticus, unspecified epilepsy type (H)    Acute pulmonary edema (H)    Cigarette smoker      Respiratory difficulty  -  Likely multifactorial.  Ongoing tobacco use likely playing a role.  -  Continue furosemide  -  Tobacco cessation education provided       Paraplegia  -  Was due to an epidural hematoma  -  Continue baclofen  -  Continue gabapentin  -  Supportive care  -  Decubitus ulcer pecautions  -  Wound service consulted      History of seizure disorder  -  Continue levetiracetam  -  Monitoring     History of DVT and PE  -  Continue rivaroxaban anticoagulation      Hypothyroidism  -  Continue levothyroxine      Chronic pain syndrome  -  Continue methadone     Anxiety, other psychiatric conditions  -  Continue quetiapine  -  Continue lorazepam    AQUILINO  -  Continue ferrous sulfate iron supplementation  -  Monitoring hemoglobin    Overactive bladder  -  Continue oxybutynin    Possible UTI  -  Undergoing treatment with oral levofloxacin course  -  Clinical monitoring    Stage IV sacral decubitus ulcer with chronic osteomyelitis, heel wound  -  Wound service consulted    Tobacco use  -  Tobacco cessation education provided again today  -  Nicotine replacement therapy as indicated    Anxiety  -  May be responsible for tachycardia  -  PRN lorazepam ordered       VTE risk reduction.  On rivaroxaban anticoagulation.      Discharge.  Awaiting evaluation regarding placement.               Brief Summary  63-year-old lady with a very complex past medical history including alcohol dependence, anxiety, chronic kidney  disease, history of spinal hematoma with resultant lower extremity paraplegia and a chronic indwelling suprapubic catheter, recurrent UTIs, chronic pain syndrome, COPD, depression, history of DVT, GERD, gastroparesis, hypertension, hypothyroidism, lung cancer status post Left lower lobe resection, obesity, rheumatoid arthritis and pulmonary embolism on chronic anticoagulation who is admitted for evaluation and management of respiratory difficulty.    Subjective and Interim Events  Reported ongoing pulmonary congestion, appears to be worse today.  Did not report CP or palpitations.    Physical Examination    Vital signs in last 24 hours  Heart Rate:  [] 110  Resp:  [20] 20  BP: ()/(51-58) 132/58 95% O2 Device: None (Room air) O2 Flow Rate (L/min): 4 L/min  Weight:   163 lb (73.9 kg) Weight change:     General: Middle-aged lady in wheelchair.  Awake, comfortable, NAD   HEENT: Sclera white.  No redness or jaundice.   Cardiac: RRR, no abnormal heart sounds   Pulmonary: Rhonchi in bilateral lung fields  Abdomen: Nondistended.  Nontender to palpation.   Musculoskeletal: Paresis of bilateral lower extremities  Skin: Normally turgid   Neurologic: Awake, alert, appropriately interactive  Psychiatric: Calm      Intake/Output last 3 shifts  I/O last 3 completed shifts:  In: 120 [P.O.:120]  Out: 450 [Urine:450]  Body mass index is 29.81 kg/m .        Pertinent Labs   Lab Results: personally reviewed.   Results from last 7 days   Lab Units 03/23/19 2024 03/20/19  2359 03/20/19  1803  03/19/19  0650   LN-SODIUM mmol/L 136  --  136  --  137   LN-POTASSIUM mmol/L 3.9  2.7* 4.2 3.5   < > 3.7   LN-CHLORIDE mmol/L 94*  --  94*  --  94*   LN-CO2 mmol/L 28  --  31  --  31   LN-BLOOD UREA NITROGEN mg/dL 26*  --  20  --  19   LN-CREATININE mg/dL 0.95  --  0.74  --  0.73   LN-CALCIUM mg/dL 8.4*  --  9.3  --  9.1    < > = values in this interval not displayed.     Results from last 7 days   Lab Units 03/23/19 2024  03/20/19  1803   LN-WHITE BLOOD CELL COUNT thou/uL 10.8 15.5*   LN-HEMOGLOBIN g/dL 9.0* 9.8*   LN-HEMATOCRIT % 30.4* 33.0*   LN-PLATELET COUNT thou/uL 361 338         Pertinent Radiology   Radiology Results: Personally reviewed impression/s     XR CHEST 1 VIEW PORTABLE  3/18/2019 11:17 AM     INDICATION: Cough, possible acute pulmonary edema  COMPARISON: 03/13/2019     FINDINGS: No change. Heart size normal. Mild diffuse interstitial prominence but no new focal pneumonia. PICC line appears in good position.          EKG Results: Personally reviewed ECG tracing.  3/9/19 ECG demonstrated sinus tachycardia.                Kurt Coleman MD, MS  Internal Medicine Hospitalist  3/25/2019

## 2021-05-27 NOTE — PROGRESS NOTES
"Clinical Nutrition Therapy Follow Up Note    Current Nutrition Prescription:   Diet: regular;   Diet Supplements: strawberry Boost GC /day--sent with dinner.    Current Nutrition Intake:  Pt ordering variable amounts depending on how many meals she skips--ranging from 1500 calories to > 3400 calories,  gm protein.     Intake inadequate only due to pt refusing meals.  Although may be eating snacks/junk food if still has some from gift shop.     Anthropometrics:   Height: 5' 2\" (157.5 cm)  Admission weight: 160# stated  Weight: (refused)  Ideal body weight ~100# (IBW-10-15# for paraplegia)  Pt has very few actual weights and usually gives a stated wt with admission and refuses to cooperate to allow bed to be zeroed.  Wt listed for 12/6/18 was 11# higher than prior weights that mentioned that pt refused to allow excess linens to be removed from bed--suspect wt not accurate on 12/6/18 as well.  All prior weights are questionable for this reason and also due to weights with specialty mattresses/beds.    Edema: nonpitting generalized, pt wouldn't allow assessment of edema lately per RNs.    RD Nutrition Focused Physical Exam:  The patient has the following physical signs which could indicate malnutrition: No noted fat/muscle loss noted in face, arms.      GI Status/Output:   GI symptoms include:   Last BM noted in I/Os or doc flowsheet: 3/22 smear a      Skin/Wound:  Isaiah score Isaiah Scale Score: 14; multiple pressure ulcers noted; WOCN following. Please see latest WOC RN notes for status of wounds.  Doubtful they will heal as pt continues to refuse cares and sits on stool and urine and refuses brief change.     Medications:  Medications reviewed.      Labs:  Labs reviewed:     Estimated Nutrition Needs:  Assessment weight is 45.5 kg, ideal weight     Energy Needs: 9309-1548 kcals daily, 30-35 kcal/kg  Protein Needs: 68-91 g daily, 1.5-2.0 g/kg.  Fluid Needs: ~9877-9348 mls daily, ~35-40 mls/kg  Very difficult to "  needs with no reliable weight with lower muscle mass w/ paraplegia but high needs for healing of multiple wounds.     Malnutrition: Not noted with no reliable hx re: weight or oral intake.     Nutrition Risk Level: stable-moderate risk    Nutrition dx:   Increased nutrient needs r/t wounds as evidenced by standard needs for multiple wounds noted in WOC RN note.     Goal:   Meet estimated nutrition needs and Wound healing. PRogressing (variable intake lately with increase refusal of meals.     Intervention:   Continue current plan as pt doesn't want extra Boost.     Monitoring/Evaluation:   Intake, labs, wounds.     Electronically signed by:  Cecile Black RD

## 2021-05-27 NOTE — PLAN OF CARE
"Problem: Discharge Barriers  Goal: Patient's discharge needs are met  Outcome: Progressing  Care Plan  Care Management        Care Management Goals of the Day: Progression of care     Care Progression Reviewed With: Charge RN, MD, HUC, RNCM, CMSW     Barriers to Discharge: initiation of CADI waiver      Discharge Disposition: [facility]     Expected Discharge Date: TBD     Transportation: [facility] ([phone])        Care Coordination Narrative:       Raffaele Dyson RN Clinical Manager, Cristina Batista LICSW Manager of Care Management, and SW met with pt to discuss weekend events. Pt stated she \"sonam\" recalled the events when questioned about them. It was enforced with the that she is not to be on other units in the hospital and that is only allowed to be in public spaces or on unit 4700. Pt stated understanding and agreement with. Additionally discussed with pt's behavior while in the hospital to which pt stated she would continue to attempt to keep her behavior in check.       Pt has been accepted at [facility] [place] [facility] (P: [phone]/F: [phone]). Pt has had her CADI assessment. Per Taylor, the [facility] Choices  ([phone]/F: [phone]), she will not have her curtesy assessment completed until likely 4/3/2019 then she will send it to [facility] as this is the [facility] her funding is going to be through. Taylor states that after this the DON at [place] (Mary) would need to submit a cost of cares report to [facility] as well to be approved. MARGI has left a message for Mary to find out if the Cost of Cares report is completed.     SW will continue to follow and assist with further d/c needs.     CYNTHIA Gonsalez  4/1/2019  11:27 AM     "

## 2021-05-27 NOTE — PLAN OF CARE
Problem: Discharge Barriers  Goal: Patient's discharge needs are met  Outcome: Progressing  Care Plan  Care Management        Care Management Goals of the Day: Progression of care     Care Progression Reviewed With: Charge RN, MD, HUC, RNCM, CMSW     Barriers to Discharge: CADI Assessment     Discharge Disposition: [facility]     Expected Discharge Date: TBD     Transportation: [facility] ([phone])        Care Coordination Narrative:       Pt has been accepted at [facility] the [place] [facility]. Pt will have her CADI assessment will be on Wednesday 3/27/2019 at 10:00am. Taylor is the the [facility] Choices  ([phone]/F: [phone]).     CYNTHIA Gonsalez  3/25/2019   4:36 PM

## 2021-05-27 NOTE — PLAN OF CARE
Contacted Dr Edwards re pt request to see MD.  Pt left to go outside to smoke immediately after requesting MD visit.  Pt requesting nurses to give lasix.  Pt blood pressure low and pt was recently in the ICU due to low BP.  MD aware and instructed RNs to hold lasix.  Pt also refusing IV antibiotics at this time.

## 2021-05-27 NOTE — PLAN OF CARE
Problem: Non-compliance with treatment regimen  Goal: Compliance with treatment regimen  Outcome: Not Progressing  Pt was very very demanding, verbally abusive and non compliant with treatment. Staff spent 1.5 hours in room trying to get her into bed. Pt was heavily incontinent of stool and will not allow staff to clean her up well. She held the remote to the ceiling lift while transferring and refused to let it down. Pt abdominal fold is denuded with foul smell.

## 2021-05-27 NOTE — PLAN OF CARE
Non-compliance with treatment regimen      Compliance with treatment regimen Progressing    Patient's baseline: actively non-compliant, actively uncooperative, refused assessment, refused cares, refused some medications, pick and choose medications only wanted to take pain medications: baclofen, Ativan, seroquel, ditropan, po dilaudid, flexeril. Patient stayed on her own wheelchair already dressed up bundled up with warm clothing to wheel herself outside. Patient baseline independent with decisions, refused educations, refused suggestions. Wants to be in control of everything, of everybody. Therapeutic communication, approach, limit setting, allowing Patient to vent out, respect her decision--that helped the Nursing Team made it through this shift as calm and as pleasant as it can be, providing visual assessment, and what the Patient allowed us to do for her own wellbeing.

## 2021-05-27 NOTE — PLAN OF CARE
Problem: Discharge Barriers  Goal: Patient's discharge needs are met  Outcome: Progressing  Care Plan  Care Management        Care Management Goals of the Day: Progression of care     Care Progression Reviewed With: Charge RN, MD, HUC, RNCM, CMSW     Barriers to Discharge: initiation of CADI waiver      Discharge Disposition: [facility]     Expected Discharge Date: TBD     Transportation: [facility] ([phone])        Care Coordination Narrative:       Pt has been accepted at [facility] the [place] [facility] (P: [phone]/F: [phone]). Pt has had her CADI assessment. MARGI spoke with Taylor, the [facility] Choices  ([phone]/F: [phone]), to inquire about the time frame for how long it will take for paperwork to be completed. Per Taylor she will not have her curtesy assessment completed until likely 4/3/2019 then she will send it to [facility] as this is the [facility] her funding is going to be through. Taylor states that after this the DON at [place] (Mary) would need to submit a cost of cares report to [facility] as well to be approved. MARGI stated understanding of this. SW has left a message for Mary to find out if the Cost of Cares report is completed.     Per Pt's request the SW has called 6 additional Group Home/Assisted Living facilities to inquire about availability. At this time all of those 6 additional facilities were either full or could not meet the pt's needs. Facilities are as follows:      [facility]: Declined, Not Handicap accessible    [facility]: Declined, too medically complex     [facility]: No Availability    [facility]: No availability     [facility]: Does not have capacity for Waiver at this time    [facility]: No Availability     SW will continue to follow and assist with further d/c needs.     CYNTHIA Gonsalez  3/27/2019  2:47 PM

## 2021-05-27 NOTE — PROGRESS NOTES
Wound Ostomy  WOC Assessment       Allergies:  No Known Allergies    Diagnosis:   Patient Active Problem List    Diagnosis Date Noted     Cigarette smoker      Acute pulmonary edema (H)      Cough      Nonintractable epilepsy without status epilepticus, unspecified epilepsy type (H)      Unable to control anger      Severe major depression, single episode, with psychotic features (H)      Paranoia (H)      Upper back pain      Abnormal CT scan of lung      Panic anxiety syndrome      Pelvic pain 01/09/2019     Ulcer due to Treponema vincentii, with fat layer exposed (H) 01/07/2019     Atelectasis      Tension-type headache, not intractable, unspecified chronicity pattern      Weakness of left hand 12/04/2018     Focal motor seizure (H) 12/04/2018     Pelvic pain in female 11/21/2018     Chronic osteomyelitis (H)      Drug-induced constipation      Quadriplegia (H)      Goals of care, counseling/discussion 07/16/2018     Advanced care planning/counseling discussion 07/16/2018     Malingerer for IV Dilaudid 07/16/2018     Moderate protein-calorie malnutrition (H) 07/16/2018     Infection 07/14/2018     Noncompliance with medication regimen      Stage IV pressure ulcer of sacral region (H) 07/11/2018     Gangrene (H) 07/10/2018     Centrilobular emphysema (H) 07/02/2018     Bilateral lower extremity edema 07/02/2018     Hypoxemia 07/01/2018     Left lower lobe pneumonia (H) 06/27/2018     Acute bronchitis due to other specified organisms 06/27/2018     Nephrostomy complication (H) 03/06/2018     History of encephalopathy      Closed left subtrochanteric femur fracture (H) 02/27/2018     Acute blood loss anemia      Other specified hypotension      Normocytic anemia 02/26/2018     Adjustment disorder with mixed anxiety and depressed mood      Sleep difficulties      Irritability and anger      Fever in other diseases 01/31/2018     Severe sepsis (H) 01/31/2018     Ulcer 01/31/2018     Hypothyroidism due to Hashimoto's  thyroiditis      Abscess, gluteal, right      History of DVT (deep vein thrombosis)      Urinary incontinence due to urethral sphincter incompetence      Weakness 09/07/2017     Chronic anticoagulation 09/03/2017     Urinary tract infection associated with cystostomy catheter (H) 09/02/2017     Dislodged wound Vacuum 08/14/2017     Abdominal pain, unspecified location      Coagulopathy (H)      Anxiety      UTI (urinary tract infection) 08/10/2017     Elevated lactic acid level 07/29/2017     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     Suprapubic catheter dysfunction, subsequent encounter      Bilateral hydronephrosis      Cystitis      Colon wall thickening      Abdominal pain 06/03/2017     Flank pain 06/02/2017     Right upper quadrant abdominal pain 06/02/2017     Episodic cluster headache, not intractable      Sepsis (H) 05/28/2017     Sepsis secondary to UTI (H) 05/28/2017     Pyelonephritis      Hyponatremia      Chronic obstructive pulmonary disease with acute exacerbation (H) 04/18/2017     Depression 04/18/2017     Partial symptomatic epilepsy with simple partial seizures, intractable, with status epilepticus (H)      Acute on chronic intracranial subdural hematoma (H)      Brain edema (H)      Subdural hematoma (H) 04/09/2017     Convulsions, unspecified convulsion type (H)      Neck infection 03/31/2017     Lower abdominal pain 03/01/2017     Fever, unspecified fever cause      Pneumonia of left lower lobe due to infectious organism (H)      S/P cholecystectomy      Choledocholithiasis with obstruction 01/02/2017     Cholelithiasis 01/02/2017     Hx of pulmonary embolus 01/02/2017     Claustrophobia      Urinary tract infection, site unspecified      Hypotension, unspecified hypotension type      Chronic low back pain without sciatica, unspecified back pain laterality      Acute encephalopathy      Sepsis, due to unspecified organism (H) 12/30/2016     Personal history of PE (pulmonary embolism) 12/01/2016      "Cognitive disorder      Insomnia due to anxiety and fear      HCAP (healthcare-associated pneumonia) 11/04/2016     Atypical chest pain 10/23/2016     Diastolic CHF, acute on chronic (H)      Chest tightness or pressure      History of MDR Enterobacter cloacae infection      Anxiety disorder      Esophageal dysmotility      Major depressive disorder, recurrent episode, moderate (H)      Seizure disorder (H)      Heel ulcer, right, limited to breakdown of skin (H)      Decubitus ulcer of sacral region, stage 4 (H)      Paraplegia at T4 level (H) 04/28/2016     Hypokalemia      Chronic pain syndrome 01/12/2016     Major depression 01/12/2016     Alcohol abuse 01/12/2016     Hospital-acquired pneumonia 10/26/2015     COPD exacerbation (H) 08/03/2015     Gastroesophageal reflux disease without esophagitis 05/26/2015     Acquired hypothyroidism 05/26/2015     Iron deficiency anemia 05/26/2015     Opioid-induced constipation (OIC) 05/26/2015     Paraplegia (H) 05/26/2015     Palliative care encounter 12/08/2014     Nephrostomy tube displaced (H) 12/02/2014     Mechanical complication of suprapubic catheter (H) 11/20/2014     Acute respiratory failure with hypoxia (H) 08/19/2014     COPD (chronic obstructive pulmonary disease) (H) 08/19/2014     Lactic acidosis 08/19/2014     SVT (supraventricular tachycardia) (H) 08/19/2014     Dislodged Bhandari catheter, subsequent encounter 07/30/2014     Other chronic pain      Lumbosacral Disc Degeneration      Herpes Simplex Type I      Nicotine Dependence      Essential hypertension      Cancer of lung (H) 09/24/2013     Neurogenic bladder 01/29/2013     Neurogenic bowel 01/29/2013     Decubitus ulcer 01/25/2013     Mixed hyperlipidemia 01/25/2013     Decubitus ulcer of right buttock, stage 4 (H) 01/25/2013     Decubitus ulcer, stage III (H) 01/25/2013       Height:  5' 2\" (1.575 m)    Weight:   163 lb (73.9 kg)    Labs:  No results for input(s): CRP, HGBA1C, LABPRE, CDIFFTOX, HGB, " ALBUMIN in the last 72 hours.    Invalid input(s): CDIFF, HEM    Isaiah:  Isaiah Scale Score: 13    Specialty Bed:  [facility]    Wound culture obtained: No    Edema:  No    Patient not in bed at this time. Patient off floor. If patient returns to bed this shift, nursing staff will contact WOC RN if patient will allow wound assessment. Information below from last WOC assessments which patient allowed.    Anatomic Site/Laterality: Sacrum    Reason for ongoing care:   Wound assessment and plan of care    Encounter Type:  Subsequent Encounter Patient refused assessment of this wound today, see previous assessment from 3/5/19 listed below:    Wound Type:   Pressure Injury (Pressure Ulcer): Present on Admit    Stage:  Stage 4    Associated conditions/related trauma: Patient with malnutrition, paraplegia, adjustment disorder, history of pressure injuries to buttocks, incontinence of both bowel and bladder, CHF, chronic pain syndrome.  Patient's wounds made worse by frequent refusal of cares including turning and being cleaned up.  Had debridement on 1/10/19.    Assessment:    Length: 3 cm    Width: 3 cm    Depth: 2 cm  (measurements positional for this wound)    Tunneling/Undermining: Yes Up to 3cm towards 12 o'clock    Wound Bed: 100% Granular (of wound bed able to be visualized)    Exudate: Yes Serosanguineous Moderate    Periwound Skin: Scar Tissue    Treatment Plan: Gauze: Wet-to-moist, cover with Mepilex Sacral       Anatomic Site/Laterality: B buttocks    Reason for ongoing care:   Wound assessment and plan of care    Encounter Type:  Subsequent Encounter Patient refused assessment of this wound today, see previous assessment from 3/5/19 listed below:    Wound Type:   Pressure Injury (Pressure Ulcer): Present on Admit    Stage:  Stage 3 v. Full thickness IAD (patient sits in urine and fecal matter for hours at a time, refusing to let nursing staff clean area)    Associated conditions/related trauma: See  "above    Assessment:    Multiple areas of denudement noted to B buttocks likely related to pressure and/or IAD injury. Patient will not turn onto left side or farther over onto right side to allow better assessment of R trochanter/lateral buttock area. Calmoseptine was ordered yesterday based upon assessment of staff RN. This is an appropriate barrier product to use, but patient will not allow it to be used stating, \"it dries my skin too much.\" Patient note to have satellite lesions to upper aspect of buttock erythema indicating there may be a fungal component as well. Discussed use of Critic Aid AF with patient - barrier protection as well as more moisturizing than zinc based product. This will also treat possible fungal component and hopefully decrease irritation to skin in this area.    Tunneling/Undermining: No    Wound Bed: fibrin/granular mix    Exudate: Yes Serosanguineous Large    Periwound Skin: Scar Tissue    Treatment Plan: Antifungal: Critic Aid AF           Anatomic Site/Laterality: L IT    Reason for ongoing care:   Wound assessment and plan of care    Encounter Type:  Subsequent Encounter Patient refused assessment of this wound today, see previous assessment from 3/5/19 listed below:    Wound Type:   Pressure Injury (Pressure Ulcer): Present on Admit    Stage:  US    Associated conditions/related trauma: Patient with malnutrition, paraplegia, adjustment disorder, history of pressure injuries to buttocks, incontinence of both bowel and bladder, CHF, chronic pain syndrome.  Patient's wounds made worse by frequent refusal of cares including turning and being cleaned up.  Had debridement on 1/10/19.    Assessment:    Length: 0.5 cm    Width: 0.5 cm    Tunneling/Undermining: No    Wound Bed: 100% Dried drainage    Exudate: No    Periwound Skin: Erythema and satellite lesions    Treatment Plan: Open to air       Anatomic Site/Laterality: Right dorsal foot     Reason for ongoing care:   Wound assessment and " plan of care    Encounter Type:  Subsequent Encounter Patient refused assessment of this wound today. See previous assessment from 3/14/19 listed below.     Wound Type:   Denudement:  Foreign body present? No    Tissue Damage:   Exposed fat layer    Related trauma: Abrasion vs pressure injury.    Assessment:    0.7x0.7cm    Tunneling/Undermining: No    Wound Bed: 100% Agranular    Exudate: Yes Serosanguineous Scant    Periwound Skin: Scar Tissue    Treatment Plan: Silicone foam and gauze as this is only thing patient will allow on wound.       Anatomic Site/Laterality: Right lateral shin    Reason for ongoing care:   Wound assessment and plan of care    Encounter Type:  Subsequent Encounter Patient refused assessment of this wound today. See previous assessment from 3/14/19 listed below.     Pressure Injury (Pressure Ulcer): Present on Admit    Stage:  Stage 3    Associated conditions/related trauma: See above    Assessment:    Length: 5.5cm    Width: 2cm    Depth: 1.5cm    Tunneling/Undermining: No    Wound Bed: 80% Agranular and 20% Eschar/Dried drainage    Exudate: Yes Serosanguineous Moderate    Periwound Skin: Intact, distal to above wound is 0.5x2cm area of dried drainage abrasion    Treatment Plan: Silicone foam and gauze as this is only thing patient will allow on wound.     Anatomic Site/Laterality: Right heel    Reason for ongoing care:   Wound assessment and plan of care    Encounter Type:  Subsequent Encounter Patient refused assessment of this wound today. See previous assessment from 3/14/19 listed below.     Pressure Injury (Pressure Ulcer): Present on Admit    Stage:  Stage 3    Associated conditions/related trauma: See above    Assessment:    Approximately 2x1cm, unable to fully measure as patient will only partially allow foot to be raised    Tunneling/Undermining: No    Wound Bed: Approximately 100% Dried Drainage    Exudate: No    Periwound Skin: Scar Tissue    Treatment Plan: Silicone foam and  gauze as this is only thing patient will allow on wound.  Patient refusing Rooke boots, continue offloading with pillow and bath blanket     Anatomic Site/Laterality: Left heel    Reason for ongoing care:   Wound assessment and plan of care    Encounter Type:  Subsequent Encounter Assessment from 3/22/19 listed below:    Pressure Injury (Pressure Ulcer): Present on Admit    Stage:  Unstageable    Associated conditions/related trauma: See above    Assessment:    Heel is 4x4.2x0.2cm, 40% granular, 60% fibrin    Medial foot is 5x3cm, 10% agranular, 20% slough, 70% eschar    Tunneling/Undermining: No    Wound Bed: see above    Exudate: Yes Serosanguineous Moderate    Periwound Skin: Maceration and Scar Tissue    Treatment Plan: Silicone foam and gauze as this is only thing patient will allow on wound. Patient refusing Rooke boots, continue offloading with pillow and bath blanket     Anatomic Site/Laterality: Bilateral toes    Reason for ongoing care:   Wound assessment and plan of care    Encounter Type:  Subsequent Encounter Assessment from 3/14/19 listed below:    Denudement:  Foreign body present? No    Tissue Damage:   Breakdown of skin    Related trauma: Patient with multiple abrasions to toes from bumping into things.    Assessment:    Scattered areas of dried drainage noted to be intact on multiple toes.    Tunneling/Undermining: No    Wound Bed: 100% Dried Drainage    Exudate: No    Periwound Skin: Intact    Treatment Plan: Open to Air     Nursing care provided was coordinated care with RN.    Discussed plan of care with nurse.    Outcomes and treatment recommendations are to promote skin integrity, contain exudate, promote wound healing and promote debridement autolytic.    Actions taken by WOC RN: attempted to coordiante plan of care for [facility] wound assessment.      Planned Follow Up: Weekly assessments of each wound will attempted by WOC RN; however, patient is frequently up in chair in the early AM and does  not return to bed until later evening hours. At other times she will not allow assessment of wounds d/t a variety of reasons.    Plan for next visit: Reassess wound(s) and Reassess skin integrity.

## 2021-05-27 NOTE — PLAN OF CARE
"Pt refusing assessment.  Is verbally abusive to staff.  Wanted to get up at 707 when explained to pt that we were at the change of shift and we would be ASAP pt became belligerent and called on phone eight times in 30 mins requesting to talk with charge nurse and nursing supervisor.  Pt told writer that she had low intelligence and was not as smart as ptis.    Went outside to smoke and brought back in what appeared to be a homeless person but pt states \"its her cousin.\"  Woman still in pts room 2 hrs later. No wound care performed d/t pt refusal. Discharge undetermined at this time.     "

## 2021-05-27 NOTE — PLAN OF CARE
Problem: Psychosocial Needs  Goal: Demonstrates ability to cope with hospitalization/illness  Outcome: Not Progressing    Pt appears to be very forgetful in addition to being very demanding. Pt will call the unit phone number demanding the charge nurse attend to her needs. Pt will be reminded that she has an assigned RN who can assist. Pt has c/o to charge nurse that the assigned RN has not given her the medication she requested. RN will attempted to provide meds and pt will refuse stating that she will only have the charge nurse give her the medications and/or provide cares.      Problem: Non-compliance with treatment regimen  Goal: Compliance with treatment regimen  Outcome: Not Progressing     Pt refuse cares, labs and medications off and on.

## 2021-05-27 NOTE — PROGRESS NOTES
Infectious Diseases Progress Note  [facility]    Date of visit: 4/8/2019      ASSESSMENT   63-year-old woman with a history of paraplegia, COPD, recurrent UTIs, chronic decubitus wounds who ID is consulted regarding positive urine culture.    1. Urinary tract infection.  Patient has chronic catheterization via a suprapubic catheter and urethral catheter.  As such she is chronically colonized.  Urine culture was collected in the setting of decompensation with altered mental status and hypotension.  It is difficult to interpret urine culture results, as she is expected to be chronically colonized.  Her urine culture grew ESBL producing E. coli along with Pseudomonas that is resistant to carbapenems.   2. Chronic decubitus wounds.  Followed by wound care.  Multiple contributing factors including tobacco use and poor adherence to nursing cares that leads to persistent wounds.  Patient complaining of worsening left heel wound.  3. HCAP.  Currently on meropenem. cxr yesterday showing LLL pneumonia. procalcitonin is normal. Gentamicin added for UTI, but will also offer double gram-negative coverage  4. Chronic pain.  Hypotension. Chronic, managed with midodrine.     Principal Problem:    Sepsis (H)  Active Problems:    Hospital-acquired pneumonia    Personal history of PE (pulmonary embolism)    Normocytic anemia    Noncompliance with medication regimen    Severe major depression, single episode, with psychotic features (H)    Paranoia (H)    Unable to control anger    Cough    Nonintractable epilepsy without status epilepticus, unspecified epilepsy type (H)    Acute pulmonary edema (H)    Cigarette smoker       PLAN   -Complete 7-day course of meropenem, ending today  -Continue Short course of gentamicin x 5 days, pharmacy to dose for UTI, ending 4/10  -SPC has been changed  -Wound care - compliance needed    Will sign off, please call with questions      Tra Wright MD  [facility]      ===========================================      SUBJECTIVE / INTERVAL HISTORY:     No cough durinf interview  No rash itching fevers  Hungry        Review of Systems     All systems reviewed, negative except for the above.            Antibiotics   Meropenem 4/2-  Gentamicin 4/6-    Previous:  Vancomycin 4/1-4/6  Zosyn 4/1      Physical Exam     Temp:  [98  F (36.7  C)] 98  F (36.7  C)  Heart Rate:  [111-117] 111  Resp:  [18] (P) 18  BP: (87-98)/(64-70) 98/70    Vitals:    04/08/19 0830   BP:    Pulse:    Resp: (P) 18   Temp:    SpO2:        GENERAL:  well-developed, well-nourished, sitting in chair sleeping   HENT:  Head is normocephalic, atraumatic.   EYES:  Eyes have anicteric sclerae without conjunctival injection or stigmata of endocarditis.   LUNGS: Coarse sounds bilaterally   CARDIOVASCULAR:  Regular rate and rhythm with no murmurs, gallops or rubs.  ABDOMEN:  Normal bowel sounds, soft, nontender.   EXT: Bilateral lower extremity edema  SKIN:  No acute rashes.  Wounds are wrapped, see WOC notes clean  NEUROLOGIC:  Sleeping. Arousable, but goes back to sleep.      Cultures   4/1 urine culture: ESBL producing E. coli (sensitive to gentamicin, meropenem, nitrofurantoin, and tobramycin).  Pseudomonas aeruginosa (sensitive to aztreonam, cefepime, ceftazidime, gentamicin, Zosyn, and tobramycin).        Pertinent Labs:     Results from last 7 days   Lab Units 04/08/19  0516 04/07/19  1130 04/07/19  0603   LN-WHITE BLOOD CELL COUNT thou/uL 9.7 12.8* 9.9   LN-HEMOGLOBIN g/dL 7.4* 7.6* 7.7*   LN-PLATELET COUNT thou/uL 297 264 271   LN-MONOCYTES RELATIVE PERCENT % 7  --  6   LN-MONOCYTES - REL (DIFF) %  --  2  --        Results from last 7 days   Lab Units 04/08/19  0516 04/07/19  1130 04/07/19  0909 04/06/19 1943 04/03/19  0405  04/01/19 1932   LN-SODIUM mmol/L 142 142  --   --   --  143   < > 140   LN-POTASSIUM mmol/L 3.3* 3.3* 3.9 2.9*   < > 3.5   < > 3.7   LN-CHLORIDE mmol/L 104 109*  --   --   --   107   < > 98   LN-CO2 mmol/L 32* 26  --   --   --  30   < > 34*   LN-BLOOD UREA NITROGEN mg/dL 6* 8  --   --   --  9   < > 23*   LN-CREATININE mg/dL 0.50* 0.56*  --  0.64  --  0.59*   < > 0.87   LN-CALCIUM mg/dL 7.4* 7.0*  --   --   --  7.7*   < > 8.7   LN-ALBUMIN g/dL 1.2* 1.2*  --   --   --   --   --  1.6*   LN-PROTEIN TOTAL g/dL 4.5* 4.3*  --   --   --   --   --  6.1   LN-BILIRUBIN TOTAL mg/dL 0.1 <0.1  --   --   --   --   --  0.2   LN-ALKALINE PHOSPHATASE U/L 192* 182*  --   --   --   --   --  202*   LN-ALT (SGPT) U/L 12 11  --   --   --   --   --  10   LN-AST (SGOT) U/L 18 22  --   --   --   --   --  10    < > = values in this interval not displayed.       Results from last 7 days   Lab Units 04/01/19  1932   LN-C-REACTIVE PROTEIN mg/dL 24.0*           Imaging:     No results found.      Attestation:  I have reviewed today's Medications, Vital Signs, Imaging, and Labs.

## 2021-05-27 NOTE — PLAN OF CARE
Patient's pain/discomfort is manageable Progressing    Patient complains of generalized pain 9/10. Scheduled Methadone administered and PRN Ibuprofen.  Pt states medication provided some relief but still complains of pain 7/10. PRN Dilaudid PO administered. Upon reassessment pt was sleeping.     Pt complained of anxiety, requested PRN Ativan PO. PRN Ativan PO administered.  Pt went out 1x to smoke this evening. Pt also refused repositioning and skin assessments this shift.  Pt's lung sounds continue to be course, scheduled nebulizer administered by RT. Pt was in a pleasant mood but actively uncooperative with nursing cares.

## 2021-05-27 NOTE — PROGRESS NOTES
Critical Care Progress Note  4/2/2019   10:48 AM    Admit Date: 2/2/2019  ICU Day: 1  Code Status: full code      Problem List:   Principal Problem:    Sepsis (H)  Active Problems:    Hospital-acquired pneumonia    Personal history of PE (pulmonary embolism)    Normocytic anemia    Noncompliance with medication regimen    Severe major depression, single episode, with psychotic features (H)    Paranoia (H)    Unable to control anger    Cough    Nonintractable epilepsy without status epilepticus, unspecified epilepsy type (H)    Acute pulmonary edema (H)    Cigarette smoker          Plan:   1. Start Midodrine 10mg three times a day.   2. Psych consulted to see.   3. Resistant to all cares and abusive to staff. Difficult situation in trying to care for her.   4. Continue meropenem and follow cultures. Labwork improved today.   5. Will ask pain team to see; concerned that much of her AMS and hypotension is from her opioid dependence.      ICU Prophylaxis   Feeding: regular diet  Bhandari: yes; in addition to suprapubic   Analgesia/Sedation: prn in addition to scheduled methadone and gabapentin.   DVT prophylaxis: heparin gtt  HOB > 30 degrees: per patient   GI Proph: PPI   Glycemic Control: spotchecking   Family updated: none    Dispo: ICU until her blood pressure stabilizes.     Cecile Glasgow, [facility]/Critical Care    _______________________________________________________________    HPI: 63 y.o. current everyday smoker with most pertinent history of COPD, paraplegia, chronic indwelling urinary catheter w/ recurrent UTIs, VTE on chronic anticoagulation, GERD, gastroparesis, HTN, & lung cancer s/p LLL resection, admitted on 2/2/2019 with UTI, sacral decubitus ulcer, & respiratory distress. She was transferred to the ICU following a rapid response in setting of acute onset somnolence, hypoxia, & hypotension.    ROS: Complaining of back and leg spasms.     Events over last 24-hours: transfer to ICU for  hypotension and AMS.     Objective:     Vitals:    04/02/19 0823 04/02/19 0831 04/02/19 0900 04/02/19 1000   BP:   109/80    Pulse: (!) 112 (!) 111 (!) 114 (!) 118   Resp: 20  (!) 49 (!) 34   Temp:       TempSrc:       SpO2: 96% 97%  (!) 86%   Weight:       Height:          I/O:     Intake/Output Summary (Last 24 hours) at 4/2/2019 1048  Last data filed at 4/2/2019 0600  Gross per 24 hour   Intake 4034 ml   Output 950 ml   Net 3084 ml     Wt Readings from Last 3 Encounters:   04/02/19 169 lb 8.5 oz (76.9 kg)   12/08/18 162 lb (73.5 kg)   12/06/18 192 lb 9.6 oz (87.4 kg)      Weight change:     Physical Exam: Patient refused physical exam.   Gen: awake, irritable and uncooperative   HEENMT:extra ocular movement intact and sclera clear, anicteric  NEURO: paraplegic  CARDIOVASCULAR: RRR on telemetry.   PULMONARY: speaking loudly in full sentences; no cough. No respiratory distress.    PSYCH: demanding and abusive toward staff.     Labs:   Results from last 7 days   Lab Units 04/02/19  0437 04/01/19  1932   LN-SODIUM mmol/L 142 140   LN-POTASSIUM mmol/L 3.4* 3.7   LN-CHLORIDE mmol/L 103 98   LN-CO2 mmol/L 31 34*   LN-BLOOD UREA NITROGEN mg/dL 17 23*   LN-CREATININE mg/dL 0.73 0.87   LN-CALCIUM mg/dL 7.8* 8.7   LN-PROTEIN TOTAL g/dL  --  6.1   LN-BILIRUBIN TOTAL mg/dL  --  0.2   LN-ALKALINE PHOSPHATASE U/L  --  202*   LN-ALT (SGPT) U/L  --  10   LN-AST (SGOT) U/L  --  10       Results from last 7 days   Lab Units 04/02/19  0437   LN-WHITE BLOOD CELL COUNT thou/uL 21.4*   LN-HEMOGLOBIN g/dL 7.2*   LN-HEMATOCRIT % 24.0*   LN-PLATELET COUNT thou/uL 426       Micro: blood and urine cultures pending.     Imaging: all imaging personalized reviewed 4/1 CXR - No change. Mild diffuse interstitial prominence but no focal pneumonia or pleural effusion evident. Heart size upper limits of normal. Right PICC line in place.      Cecile Glasgow, [facility] Pulmonary/Critical Care

## 2021-05-27 NOTE — PROGRESS NOTES
"Called for PRN treatment. Patient was found to be wearing nebulizer without medication in it at 4 LPM. Medication was placed in nebulizer and treatment was started. Patient had phone call less than 1 minute into treatment. Patient asked RT to leave and said RN could restart treatment when she was done with phone call. RT will await another call. Breath sounds remain coarse with unproductive cough.    /70 (Patient Position: Sitting)   Pulse (!) 113   Temp 97.9  F (36.6  C) (Oral)   Resp 18   Ht 5' 2\" (1.575 m)   Wt 163 lb (73.9 kg)   LMP  (LMP Unknown)   SpO2 94%   BMI 29.81 kg/m      "

## 2021-05-27 NOTE — PROGRESS NOTES
S Daily Progress Note    Assessment/Plan:  Marychuy Zhou is 63 y.o. female with history of alcohol dependence, anxiety disorder, chronic kidney disease, history of spinal hematoma with resultant lower extremity paraplegia and a chronic indwelling suprapubic catheter, recurrent UTIs, chronic pain syndrome, COPD, depression, history of DVT, GERD, gastroparesis, hypertension, hypothyroidism, lung cancer s/p left lower lobe resection, obesity, rheumatoid arthritis, pulmonary embolism on chronic anticoagulation, who was admitted to this hospital on 2/2/2019 for recurrent UTI, sacral decub ulcer and respiratory distress. She was treated with a course of antibiotics for hospital acquired pneumonia, and sacral decub ulcer with osteomyelitis. She has been very non compliant with cares and has been refusing medications and regular nursing cares.  On 4/1/2019 patient became hypoxic and hypotensive and was transferred to ICU          Assessment:  Metabolic encephalopathy  Probably on the basis of respiratory infection complicated by respiratory failure as well as acute UTI    Acute E. coli and Pseudomonas urinary tract infection  On meropenem   ESBL E. coli sensitive to meropenem but not Pseudomonas  Will consult ID in this regard    Acute Hypoxic respiratory failure  improving  Discussed the case with intensivist    Probable Aspiration with Hospital-acquired pneumonia   Probably on the basis of aspiration   Unfortunately patient continues to smoke  Refusing bronchial hygiene therapy  On Meropenem as per Pulmonology    COPD with acute exacerbation  ABG will be checked stat    Paraplegia secondary to epidural hematoma in 2010  Uses a walker    Stage IV decubitus ulcer with osteomyelitis  Completed course of antibiotics  Wound care to follow is going to open dressing at 930 tomorrow    History of seizures  Keppra    History of DVT and PE  On Xarelto  On as needed hydromorphone          Plan:    PATIENT WAS NOT SEE AS SHE WAS  NOT IN HER ROOM ON MULTIPLE OCCASSIONS     Code status:Full Code        Subjective:  NOT IN THE ROOM     Review of Systems:   No other symptoms of pain  Current Medications Reviewed via EHR List    Objective:  Vital signs in last 24 hours:  Temp:  [96.6  F (35.9  C)-97.6  F (36.4  C)] 97.5  F (36.4  C)  Heart Rate:  [] 99  Resp:  [18-20] 18  BP: (80-90)/(50-84) 90/50  SpO2:  [90 %-100 %] 100 %    Intake/Output last 3 shifts:  I/O last 3 completed shifts:  In: 30 [I.V.:30]  Out: 1050 [Urine:1050]      Physical Exam:  DID NOT EXAMINE THE PATIENT         Imaging:  Chest x-ray is ordered    Lab Results:  Personally Reviewed.   Fingerstick Blood Glucose: No results for input(s): POCGLUFGR in the last 48 hours.    Recent Results (from the past 24 hour(s))   Magnesium    Collection Time: 04/06/19  6:51 AM   Result Value Ref Range    Magnesium 1.4 (L) 1.8 - 2.6 mg/dL   HM1 (CBC with Diff)    Collection Time: 04/06/19  6:51 AM   Result Value Ref Range    WBC 12.7 (H) 4.0 - 11.0 thou/uL    RBC 3.06 (L) 3.80 - 5.40 mill/uL    Hemoglobin 7.6 (L) 12.0 - 16.0 g/dL    Hematocrit 26.1 (L) 35.0 - 47.0 %    MCV 85 80 - 100 fL    MCH 24.8 (L) 27.0 - 34.0 pg    MCHC 29.1 (L) 32.0 - 36.0 g/dL    RDW 19.8 (H) 11.0 - 14.5 %    Platelets 251 140 - 440 thou/uL    MPV 9.5 8.5 - 12.5 fL    Neutrophils % 83 (H) 50 - 70 %    Lymphocytes % 9 (L) 20 - 40 %    Monocytes % 6 2 - 10 %    Eosinophils % 3 0 - 6 %    Basophils % 0 0 - 2 %    Neutrophils Absolute 10.4 (H) 2.0 - 7.7 thou/uL    Lymphocytes Absolute 1.1 0.8 - 4.4 thou/uL    Monocytes Absolute 0.7 0.0 - 0.9 thou/uL    Eosinophils Absolute 0.3 0.0 - 0.4 thou/uL    Basophils Absolute 0.0 0.0 - 0.2 thou/uL           Advanced Care Planning  Discharge plan unknown at this time      Jaron Edwards  Date: 4/6/2019  Time: 6:06 PM  Hospitalist Service  Part of this chart was created using a dictation software. Typographic errors, word substitutions, and Grammatical errors may unintentionally  occur.

## 2021-05-27 NOTE — PLAN OF CARE
Pt A&Ox4, able to make needs known. C/O chronic pain to buttocks and neuropathy pain. Pt requesting PRN Dilaudid, Flexeril and ibuprofen in conjunction with scheduled Methadone. Pt remains in wheelchair throughout shift. Unable to assess wounds at this time. Pt refused all vital signs this AM, has been noted to be verbally abusive to staff as well. Pt left floor several times this AM to go out to smoke. Hgb and PLT lab drawn. PICC line dressing, caps and IV tubing changed this AM. Refer to flowsheet documentation. Will continue to monitor at this time.

## 2021-05-27 NOTE — PLAN OF CARE
Blood pressure is elevated above 140 over 90 mmHg      Blood pressure (BP) is within acceptable limits Not Applicable this Shift        Daily Care      Daily care needs are met Not Applicable this Shift        Decreased Mental Status Causing Increased Need for Safety      Provide a safe environment for patient (Implement appropriate elements in plan of care) Not Applicable this Shift      Decrease patient's symptoms Not Applicable this Shift      To ensure patient safety, patient will abstain from any threats or actions to harm self or others Not Applicable this Shift        Discharge Barriers      Patient's discharge needs are met Not Applicable this Shift        Impaired Gas Exchange      Demonstrate improved ventilation and adequate oxygenation of tissues as evidenced by absence of respiratory distress Not Applicable this Shift        Infection      Signs and symptoms of infections are decreased or avoided Not Applicable this Shift        Insufficient Nutritional Intake      Mobility/activity is maintained at optimum level for patient Not Applicable this Shift        Knowledge Deficit      Patient/family/caregiver demonstrates understanding of disease process, treatment plan, medications, and discharge instructions Not Applicable this Shift        Non-compliance with treatment regimen      Compliance with treatment regimen Not Applicable this Shift        Pain      Patient's pain/discomfort is manageable Not Applicable this Shift        Potential for Compromised Skin Integrity      Skin integrity is maintained or improved Not Applicable this Shift        Potential for Falls      Patient will remain free of falls Not Applicable this Shift        Potential for transmission of organism by Contact, Enteric, Droplet and/or Airborne routes      Prevent transmission of organisms Not Applicable this Shift        Psychosocial Needs      Demonstrates ability to cope with hospitalization/illness Not Applicable this Shift       Collaborate with patient/family/caregiver to identify patient specific goals for this hospitalization Not Applicable this Shift        Safety      Patient will be injury free during hospitalization Not Applicable this Shift        Tissue Integrity      Damaged tissue is healing and protected Not Applicable this Shift        Pt refused all teaching and education today

## 2021-05-27 NOTE — DISCHARGE SUMMARY
All tubes removed from pt. Present in hair, hair binder and small blue clip. No earrings or dentures. No jewelry, rings or watches. Clothin pairs blue jeans, salmon colored tshirt, pink sweater, white sweater, purple winter coat and pink lap blanket. 3 small black bags with miscellaneous bags of popcorn, donuts and other snack foods, numerous cans of soda all placed in pt's personal motorized wheelchair. Wheelchair  placed in seat of wheelchair as well as two boxes thin mint cookies. Pt noted to have several bottles of home medications in with personal belongings as well. Bag seal closed with identification tag. Security up to room to obtain all credit cards, and valuables.

## 2021-05-27 NOTE — SIGNIFICANT EVENT
.House Officer Death Note      Time of Death: 0645  Date of Death: 4/10/2019     Subjective: Called for code blue, code run for 20+ minutes without ROSC. Code was called off after 21 minutes. I was at the bedside for pronouncement of death.      Objective:   Gen: Unresponsive. Pale. Moribund.   HEENT: Pupils mid-fixed.   CV: Pulseless. No heart sounds.   Pulm: Apneic. No breath sounds.   Neuro: Pupils fixed. No spontaneous movement. No response to noxious stimuli.   Skin: Cold, pale.      After examining the patient and finding no signs of life, she was pronounced .      Death certificate and cremation request to be completed by the patient's attending physician.     Rest in peace,       Loreta Rodríguez,   Internal Medicine  [facility]  Hospitalist

## 2021-05-27 NOTE — SIGNIFICANT EVENT
Pt was seen down on unit 3500 in a patients room. Apparently she met the family down in the lobby and followed them up to the floor. Pt was in the another pt's room and when she came out of the room she demanded to know who the nurse was for that pt. Pt told the RN that she needed to go in that room and turn the patient and give a duo neb because the pt wasn't breathing very well.  When the RN went into the pt room the family said they were fine and so was the patient.  Pt was told she needed to return to her floor.  Supervisor was called and security came up to the floor to talk to the patient.

## 2021-05-29 ENCOUNTER — RECORDS - HEALTHEAST (OUTPATIENT)
Dept: ADMINISTRATIVE | Facility: CLINIC | Age: 66
End: 2021-05-29

## 2021-05-30 ENCOUNTER — RECORDS - HEALTHEAST (OUTPATIENT)
Dept: ADMINISTRATIVE | Facility: CLINIC | Age: 66
End: 2021-05-30

## 2021-05-30 VITALS
BODY MASS INDEX: 32.87 KG/M2 | WEIGHT: 185.5 LBS | HEIGHT: 63 IN | BODY MASS INDEX: 32.87 KG/M2 | HEIGHT: 63 IN | WEIGHT: 185.5 LBS

## 2021-05-30 VITALS — BODY MASS INDEX: 34.35 KG/M2 | HEIGHT: 62 IN | WEIGHT: 187.8 LBS | BODY MASS INDEX: 34.56 KG/M2 | WEIGHT: 187.8 LBS

## 2021-05-30 VITALS — BODY MASS INDEX: 34.48 KG/M2 | WEIGHT: 188.49 LBS

## 2021-05-30 VITALS — BODY MASS INDEX: 33.35 KG/M2 | WEIGHT: 182.32 LBS

## 2021-05-30 VITALS — HEIGHT: 62 IN | BODY MASS INDEX: 32.98 KG/M2 | WEIGHT: 180.34 LBS | BODY MASS INDEX: 33.58 KG/M2

## 2021-05-30 VITALS — BODY MASS INDEX: 33.99 KG/M2 | WEIGHT: 185.85 LBS

## 2021-05-30 VITALS — WEIGHT: 186 LBS | HEIGHT: 62 IN | BODY MASS INDEX: 34.23 KG/M2

## 2021-05-31 ENCOUNTER — RECORDS - HEALTHEAST (OUTPATIENT)
Dept: ADMINISTRATIVE | Facility: CLINIC | Age: 66
End: 2021-05-31

## 2021-05-31 VITALS — BODY MASS INDEX: 30.9 KG/M2 | HEIGHT: 63 IN | WEIGHT: 174.4 LBS

## 2021-05-31 VITALS — WEIGHT: 182 LBS | BODY MASS INDEX: 32.24 KG/M2 | BODY MASS INDEX: 31.89 KG/M2 | WEIGHT: 180 LBS

## 2021-05-31 VITALS — WEIGHT: 182.2 LBS | HEIGHT: 63 IN | BODY MASS INDEX: 32.24 KG/M2 | BODY MASS INDEX: 32.28 KG/M2

## 2021-05-31 VITALS — BODY MASS INDEX: 32.44 KG/M2 | HEIGHT: 63 IN | WEIGHT: 183.1 LBS

## 2021-05-31 VITALS — BODY MASS INDEX: 31.53 KG/M2 | WEIGHT: 178 LBS

## 2021-05-31 VITALS — BODY MASS INDEX: 32.43 KG/M2 | WEIGHT: 183.1 LBS

## 2021-06-01 ENCOUNTER — RECORDS - HEALTHEAST (OUTPATIENT)
Dept: ADMINISTRATIVE | Facility: CLINIC | Age: 66
End: 2021-06-01

## 2021-06-01 VITALS — HEIGHT: 62 IN | BODY MASS INDEX: 32.2 KG/M2 | WEIGHT: 175 LBS

## 2021-06-01 VITALS — BODY MASS INDEX: 24.82 KG/M2 | WEIGHT: 140.1 LBS | HEIGHT: 63 IN

## 2021-06-02 ENCOUNTER — RECORDS - HEALTHEAST (OUTPATIENT)
Dept: ADMINISTRATIVE | Facility: CLINIC | Age: 66
End: 2021-06-02

## 2021-06-02 VITALS — BODY MASS INDEX: 33.51 KG/M2 | WEIGHT: 182.1 LBS | HEIGHT: 62 IN

## 2021-06-02 VITALS — HEIGHT: 63 IN | WEIGHT: 158 LBS | BODY MASS INDEX: 28 KG/M2

## 2021-06-02 VITALS
HEIGHT: 63 IN | WEIGHT: 160 LBS | BODY MASS INDEX: 28.35 KG/M2 | HEIGHT: 63 IN | BODY MASS INDEX: 28.35 KG/M2 | WEIGHT: 160 LBS

## 2021-06-03 ENCOUNTER — RECORDS - HEALTHEAST (OUTPATIENT)
Dept: ADMINISTRATIVE | Facility: CLINIC | Age: 66
End: 2021-06-03

## 2021-06-08 NOTE — ANESTHESIA POSTPROCEDURE EVALUATION
Patient: Marychuy Zhou  CHOLECYSTECTOMY, LAPAROSCOPIC  Anesthesia type: general    Patient location: PACU  Last vitals:   Vitals:    01/04/17 1700   BP: 102/56   Pulse: 80   Resp: 14   Temp:    SpO2: 98%     Post vital signs: stable  Level of consciousness: awake and responds to simple questions  Post-anesthesia pain: pain controlled  Post-anesthesia nausea and vomiting: no  Pulmonary: unassisted, return to baseline  Cardiovascular: stable and blood pressure at baseline  Hydration: adequate  Anesthetic events: no    QCDR Measures:  ASA# 11 - Amalia-op Cardiac Arrest: ASA11B - Patient did NOT experience unanticipated cardiac arrest  ASA# 12 - Amalia-op Mortality Rate: ASA12B - Patient did NOT die  ASA# 13 - PACU Re-Intubation Rate: ASA13B - Patient did NOT require a new airway mgmt  ASA# 10 - Composite Anes Safety: ASA10A - No serious adverse event  ASA# 38 - New Corneal Injury: ASA38A - No new exposure keratitis or corneal abrasion in PACU    Additional Notes:

## 2021-06-08 NOTE — ANESTHESIA CARE TRANSFER NOTE
Last vitals:   Vitals:    01/03/17 1402   BP: 135/85   Pulse: 77   Resp: 16   Temp: 36.6  C (97.9  F)   SpO2: 95%     Patient's level of consciousness is drowsy  Spontaneous respirations: yes  Maintains airway independently: yes  Dentition unchanged: yes  Oropharynx: oropharynx clear of all foreign objects    QCDR Measures:  ASA# 20 - Surgical Safety Checklist: ASA20A - Safety Checks Done  PQRS# 430 - Adult PONV Prevention: 4558F - Pt received => 2 anti-emetic agents (different classes) preop & intraop  ASA# 8 - Peds PONV Prevention: NA - Not pediatric patient, not GA or 2 or more risk factors NOT present  PQRS# 424 - Amalia-op Temp Management: 4559F - At least one body temp DOCUMENTED => 35.5C or 95.9F within required timeframe  PQRS# 426 - PACU Transfer Protocol: - Transfer of care checklist used  ASA# 14 - Acute Post-op Pain: ASA14B - Patient did NOT experience pain >= 7 out of 10    I completed my SBAR handoff to the receiving nurse per policy and procedure.

## 2021-06-08 NOTE — ANESTHESIA PREPROCEDURE EVALUATION
Anesthesia Evaluation      Patient summary reviewed     Airway   Mallampati: III  Neck ROM: full   Pulmonary     breath sounds clear to auscultation  (+) pneumonia, COPD, asthma  shortness of breath, a smoker    ROS comment: Home  O2  DVT on chronic Warfarin therapy  Ca lung with LLL                         Cardiovascular - normal exam  (+) hypertension, dysrhythmias, CHF, ,     ECG reviewed  Rhythm: regular  Rate: normal,      ROS comment: Hx SVT     Neuro/Psych - negative ROS   (+) neuromuscular disease,  depression, anxiety/panic attacks, dementia, chronic pain    Comments: Paraplegia  Anxiety/Depression  Neuro-cognitive issues  High dose opiate use      Endo/Other    (+) hypothyroidism, arthritis, obesity,      GI/Hepatic/Renal    (+) GERD,   chronic renal disease, impaired hepatic function     Other findings: Vascular myelopathies  Recent admission for sepsis   MRSA  Multiple physical issues,review chart history  Hx ETOH, encephalopathy, lbp,sepsis, chem. Dep..,SVT, Paraplegia, ARF, shock      Dental - normal exam   (+) poor dentition                       Anesthesia Plan  Planned anesthetic: general endotracheal  Use glidescope  May require post operative ventilator    ASA 3   Induction: intravenous   Anesthetic plan and risks discussed with: patient  Anesthesia plan special considerations: video-assisted, rapid sequence induction,   Post-op plan: routine recovery

## 2021-06-08 NOTE — ANESTHESIA CARE TRANSFER NOTE
Last vitals:   Vitals:    01/04/17 1600   BP: 115/65   Pulse: 85   Resp: 18   Temp: 36.6  C (97.8  F)   SpO2: 100%     Patient's level of consciousness is drowsy  Spontaneous respirations: yes  Maintains airway independently: yes  Dentition unchanged: yes  Oropharynx: oropharynx clear of all foreign objects    QCDR Measures:  ASA# 20 - Surgical Safety Checklist: ASA20A - Safety Checks Done  PQRS# 430 - Adult PONV Prevention: 4558F - Pt received => 2 anti-emetic agents (different classes) preop & intraop  ASA# 8 - Peds PONV Prevention: NA - Not pediatric patient, not GA or 2 or more risk factors NOT present  PQRS# 424 - Amalia-op Temp Management: 4559F - At least one body temp DOCUMENTED => 35.5C or 95.9F within required timeframe  PQRS# 426 - PACU Transfer Protocol: - Transfer of care checklist used  ASA# 14 - Acute Post-op Pain: ASA14B - Patient did NOT experience pain >= 7 out of 10    I completed my SBAR handoff to the receiving nurse per policy and procedure.

## 2021-06-08 NOTE — PROGRESS NOTES
Patient ID: Marychuy Zhou is a 61 y.o. female.  Visit Vitals     BP 98/58     Pulse 95     Temp 98.3  F (36.8  C)  Comment: tylenol recently     SpO2 95%       Assessment/Plan:                   Diagnoses and all orders for this visit:    Urinary tract infection associated with cystostomy catheter, sequela    ESBL (extended spectrum beta-lactamase) producing bacteria infection    Pulmonary embolism    Hypothyroidism, unspecified type    Urinary tract infection associated with cystostomy catheter, initial encounter    Other orders  -     aspirin 81 mg chewable tablet; Chew 1 tablet (81 mg total) daily.  Dispense: 90 tablet; Refill: 3  -     temazepam (RESTORIL) 15 mg capsule; Take 1 capsule (15 mg total) by mouth bedtime as needed for sleep.  Dispense: 30 capsule; Refill: 1  -     Discontinue: LORazepam (ATIVAN) 0.5 MG tablet; Take 1 tablet (0.5 mg total) by mouth every 6 (six) hours as needed for anxiety.  Dispense: 60 tablet; Refill: 0        DISCUSSION  Continue current medications.  Temazepam for sleep.  Reviewed considerations including a multitude of Hospital records as noted below.  Subjective:     HPI    Marychuy Zhou is a [age]-year-old female with his paraplegia secondary to spinal hematoma.  She has an amazingly complex medical history.  She resides at home currently has caretakers who provide support.  She is here today to follow-up from a hospitalization and a subsequent emergency Department visit.  Hospital records are reviewed in depth.  I'm familiar with her medical history.  Unfortunately because of her ongoing medical concerns she is at high risk for rehospitalization as well as at high risk for developing additional more severe infections.  She is also at high risk for developing skin breakdown.  She is currently in the healthcare home program.  Patient is somewhat resistant to allowing contact and services.  Patient does inform me that she does need to undergo an assessment to determine how  much help she will get in her home.  At this point in time she reports she is feeling well.  She is tired.  We discussed again the medical concerns as outlined below and considerations regarding trying to arrange for specialty follow-up visits.  Medications are prescribed as noted above.  Patient brings up concerns regarding significant issues with sleep.  We discussed using temazepam.  We outlined considerably the risks involved with usage of such medication in conjunction with other medications.      Below is a summary of her ongoing medical concerns.  Mental Health    Anxiety and insomnia are long-standing and current concerns. She is on Abilify, duloxetine, Depakote and lorazepam for management of mental health concerns. She has declined consideration to be referred to a psychiatrist. She has not seen a behavioral therapist we will continue to work on these recommendations. No indication for any immediate change in medications. She reports worsening anxiety symptoms, she reports taking medications as prescribed. She is agreeable for mental health referral at this time.  Pulmonology    She has a history of pulmonary embolus and is anticoagulated. She has a history of lung cancer and is status post partial lung resection. She has the diagnosis of COPD, she has been hospitalized for flareups of COPD. There is no distinct measurement to quantify her COPD. she has had oximetry done overnight which showed basal oxygen saturation of 89.9% with dropped to 73%. She has a high likelihood of underlying sleep apnea. I would like her to see a pulmonologist to help manage her complex pulmonary concerns, she declines referral for specialty provider at this time.    Cardiac    A stress test performed in October indicates a previous infarct but no areas of ischemia. Previous imaging studies have shown the presence of coronary atherosclerosis.    Urinary    She has a suprapubic catheter. She has a neurogenic bladder because of  her underlying paraplegia. She's been hospitalized on numerous occasions because of urinary tract infection and sepsis. She has had severe sepsis. She has declined consideration for urostomy placement. I strongly recommend that she see a urologist to discuss longer-term options. A referral will be placed. She wishes to contemplate this. Unfortunately the catheter has not caused any difficulties for several weeks.  Neurology    She has paraplegia and is confined to wheelchair secondary to a spinal hematoma. She has a diagnosis of seizure disorder. I was not able to find any documentation of this in her chart. Patient states she thinks she had a seizure when she was in a coma, she believes this is why she was placed on Depakote. Discussed referral to neurology to help decide if continued seizure medication is necessary. She'll occasion has a tremor but it has been under control recently.    Infectious Disease    Aside from urinary tract infections and recent pneumonia she has also had positive cultures for MRSA, Pseudomonas and ESBL producing E. coli at this time there is no sign that she has any active infection process. She likely has colonization. The ESBL producing E. coli was found on a culture swab obtained from her suprapubic catheter site.  She needs follow-up chest x-ray for recent pneumonia  Skin    Current skin ulcerations include sacral ulcer. In the past we have attempted to refer to vascular clinic/wound care clinic and/or attempts for referrals have been unsuccessful for a variety of reasons. Patient has not followed through with appointments.  Patient currently reports that wound care is going well and she has no concerns  Pain/Musculoskeletal    She is chronic pain, her pain previously was a posterior shoulder blade-type pain. Now she is complaining of a neuropathic pain involving the extremities. He also has had abdominal pain from her colitis Her pain issues are complex. She had a pain intervention  team consultation during her hospital stay and she's been changed over to Percocet. She states she's to 2 tablets every 6 hours with reasonable pain control currently.  Hematology    Colitis  Currently she is not having any significant symptoms. She had a colonoscopy scheduled with gastroenterology for follow-up, she canceled the appointment because she was in the hospital and the declines to reschedule.           Review of Systems  Complete review of systems is obtained.  Other than the specific considerations noted above complete review of systems is negative.          Objective:   Medications:  Current Outpatient Prescriptions   Medication Sig     acetaminophen (TYLENOL) 325 MG tablet Take 650 mg by mouth every 4 (four) hours as needed for pain.     acetic acid 0.25 % irrigation See wound care     ascorbic acid (VITAMIN C WITH BLAZE HIPS) 500 MG tablet Take 500 mg by mouth 2 (two) times a day.     aspirin 81 mg chewable tablet Chew 1 tablet (81 mg total) daily.     bisacodyl (DULCOLAX) 10 mg suppository Insert 10 mg into the rectum daily as needed.     DULoxetine (CYMBALTA) 30 MG capsule Take 60 mg by mouth every morning.      furosemide (LASIX) 40 MG tablet Take 0.5 tablets (20 mg total) by mouth daily.     gabapentin (NEURONTIN) 300 MG capsule Take 1 capsule (300 mg total) by mouth 3 (three) times a day.     Deven Therapuetic Nutrition 7-7-1.5 gram PwPk Take 1 packet by mouth 2 (two) times a day.     levothyroxine (SYNTHROID, LEVOTHROID) 125 MCG tablet Take 2 tablets (250 mcg total) by mouth daily. 2 tabs     MULTIVITAMIN/IRON/FOLIC ACID (CERTAVITE-ANTIOXIDANT ORAL) Take 1 tablet by mouth every morning.      omeprazole (PRILOSEC) 20 MG capsule Take 20 mg by mouth daily.     oxybutynin (DITROPAN) 5 MG tablet Take 5 mg by mouth 3 (three) times a day. Bladder control     OXYGEN-AIR DELIVERY SYSTEMS MISC Inhale 1-2 L bedtime.     senna-docusate (PERICOLACE) 8.6-50 mg tablet Take 1 tablet by mouth daily as needed for  constipation.     albuterol (PROVENTIL HFA;VENTOLIN HFA) 90 mcg/actuation inhaler Inhale 2 puffs every 6 (six) hours as needed for wheezing.     busPIRone (BUSPAR) 5 MG tablet Take 7.5 mg by mouth 3 (three) times a day.     dicyclomine (BENTYL) 10 MG capsule Take 1 capsule (10 mg total) by mouth 4 (four) times a day as needed (abdominal cramping).     divalproex (DEPAKOTE) 250 MG EC tablet Take 250 mg by mouth 2 (two) times a day.     fluticasone (FLOVENT HFA) 110 mcg/actuation inhaler Inhale 2 puffs 2 (two) times a day.     LORazepam (ATIVAN) 0.5 MG tablet Take 0.5 mg by mouth every 8 (eight) hours as needed for anxiety.     menthol-zinc oxide (CALMOSEPTINE) 0.44-20.6 % Oint ointment Apply 1 application topically 2 (two) times a day as needed.     miconazole nitrate (CRITIC-AID AF) 2 % Oint ointment Apply 1 application topically 2 (two) times a day as needed.     oxyCODONE-acetaminophen (PERCOCET) 5-325 mg per tablet Take 2 tablets by mouth every 6 (six) hours as needed for pain.     SELSUN BLUE, SALICYLIC ACID, TOP Apply 1 application topically daily as needed. Apply to face redness/flaking areas     temazepam (RESTORIL) 15 mg capsule Take 15 mg by mouth bedtime as needed for sleep.     warfarin (COUMADIN) 1 MG tablet Take 1 mg by mouth daily.     white petrolatum (AQUAPHOR ORIGINAL) 41 % Oint Apply 1 application topically daily as needed. Apply to bilateral lower extremeties     Allergies:  No Known Allergies    Tobacco:   reports that she quit smoking about 3 years ago. She has never used smokeless tobacco.     Physical Exam      Visit Vitals     BP 98/58     Pulse 95     Temp 98.3  F (36.8  C)  Comment: tylenol recently     SpO2 95%           General Appearance:  Alert, cooperative, no distress   Eyes:  No conjunctival irritation, no scleral icterus    Ears:  Normal TM's and external ear canals, both ears   Throat: Lips, mucosa, and tongue normal; teeth and gums normal   Neck: Supple, symmetrical, trachea  midline, no adenopathy;   thyroid: No enlargement/tenderness/nodules   Lungs:  good air movement without wheeze or crackle    Heart:  Regular rate and rhythm, S1 and S2 normal, no murmur, rub or gallop   Abdomen:  Soft, non-tender, bowel sounds active all four quadrants,   no masses, no organomegaly, suprapubic catheter in place.    Extremities: lower extremities do not show any evidence of significant edema at this time.    Skin: visualized portions of skin are free from any rashes irritation lesions or ulcerations. Specific ulcerations are not due today secondary to inability to position patient appropriately for observation.    Neurologic: patient seated comfortably in wheelchair. Does not move lower extremities.

## 2021-06-08 NOTE — ANESTHESIA PREPROCEDURE EVALUATION
Anesthesia Evaluation        Airway   Mallampati: II  Neck ROM: limited   Pulmonary     breath sounds clear to auscultation  (+) pneumonia, COPD severe, asthma  moderate,  poorly controlled, shortness of breath, a smoker    ROS comment: Home  O2  DVT on chronic Warfarin therapy  Ca lung with LLL                         Cardiovascular - normal exam  (+) hypertension, dysrhythmias, CHF, ,     ECG reviewed     ROS comment: Hx SVT     Neuro/Psych    (+) neuromuscular disease,  depression, anxiety/panic attacks, dementia, chronic pain    Comments: Paraplegia  Anxiety/Depression  Neuro-cognitive issues  High dose opiate use      Endo/Other    (+) hypothyroidism, arthritis, obesity,      GI/Hepatic/Renal    (+) GERD poorly controlled,   chronic renal disease CRI, impaired hepatic function     Other findings: Vascular myelopathies  Recent admission for sepsis   MRSA  Multiple physical issues,review chart history  Hx ETOH      Dental    (+) lower dentures and upper dentures                       Anesthesia Plan  Planned anesthetic: general endotracheal  Use glidescope  May require post operative ventilator    ASA 4     Anesthetic plan and risks discussed with: patient  Anesthesia plan special considerations: video-assisted, rapid sequence induction,   Post-op plan: extended intubation/vent support and routine recovery

## 2021-06-08 NOTE — ANESTHESIA POSTPROCEDURE EVALUATION
Patient: Marychuy Zhou  ENDOSCOPIC RETROGRADE CHOLANGIOPANCREATOGRAPHY  Anesthesia type: general    Patient location: PACU  Last vitals:   Vitals:    01/03/17 1402   BP: 135/85   Pulse: 77   Resp: 16   Temp: 36.6  C (97.9  F)   SpO2: 95%     Post vital signs: stable  Level of consciousness: awake and responds to simple questions  Post-anesthesia pain: pain controlled  Post-anesthesia nausea and vomiting: no  Pulmonary: unassisted, return to baseline  Cardiovascular: stable and blood pressure at baseline  Hydration: adequate  Anesthetic events: no    QCDR Measures:  ASA# 11 - Amalia-op Cardiac Arrest: ASA11B - Patient did NOT experience unanticipated cardiac arrest  ASA# 12 - Amalia-op Mortality Rate: ASA12B - Patient did NOT die  ASA# 13 - PACU Re-Intubation Rate: ASA13B - Patient did NOT require a new airway mgmt  ASA# 10 - Composite Anes Safety: ASA10A - No serious adverse event  ASA# 38 - New Corneal Injury: ASA38A - No new exposure keratitis or corneal abrasion in PACU    Additional Notes:

## 2021-06-08 NOTE — PROGRESS NOTES
Email:  Spouse: None  Children: Yes  Employment: None  Smoking, Alcohol, other:  Services receiving: Medical Assistance  [facility] Follow up s: Every two months  [facility] Enrollment: August 11th 2015    Please refer to [facility] SW telephone visit on 3/7/17 for more information.     [facility] Care Guide Delegation:  Due: As soon as able  Delegation: Coordinate getting an IRIS to the patient to sign so [facility] SW can connect with the [facility] and learn more about what services the patient is getting and who should be contacted to assist with [employer] coverage. Can wait until the patient comes in for an appointment or mail the patient an IRIS. Will need the IRIS to be release of info from [facility].- IRIS has been mailed to Marychuy

## 2021-06-09 NOTE — PROGRESS NOTES
"[facility] SW called the patient per  request to \"look into waiver programs.\" SW asked the patient if she was currently getting any services in the home and the patient reported she has an \"aid\" that comes to help her--when asked what company the aid works for the patient reported through \"[employer].\" SW asked if the patient knew if the aid was covered through her insurance or not, the patient reported \"they are covered by the agency.\" SW asked if the patient has a  or  she has been working with, the patient reported she has a financial worker with the [facility] named Maria T Echeverria, but she is not aware of anyone else.     SW asked what kind of services the patient was interested in getting in the home and the patient reported she wanted \"free metro mobility\" SW reported SW can look into this and asked if there were any other needs she had at this time--assistance with cleaning, meal delivery, etc. The patient reported the aids clean for her and she is already getting meal delivery. SW asked if there were any services she was not getting that she would want, the patient reported she just wants free metro mobility.     [facility] SW unclear as to whether or not the patient is receiving services from her insurance or through the [facility], informed the patient that in order to assist with coordinating additional services SW would need to contact the patient's  since it seems to  she probably has one since she reported she is getting in-home services and not private paying for them. MARGI explained IRIS to the patient and asked if she would be able to come to the clinic and complete one--the patient reported she could not and traveling was too expensive and difficult for her at this time. [facility] MARIG reported that SW will coordinate with  to get an IRIS mailed to the patient to complete so SW can contact the [facility] to get more information on what services the patient is receiving and who her  " is.     [facility] SW called [employer] to consult on how the patient could get [employer] covered at 100%, SW was informed [employer] doesn't manage coverage but that clients can get 100% coverage through the county--recommend the patient connect with a [facility]  for assistance.    [facility] SW will need IRIS to connect with the [facility] to find out if the patient has a [facility]  and/or be able to communicate with the patient's financial worker if this is who should be contacted to assist with [employer] coverage.    [facility] Care Guide Delegation:  Due: As soon as able  Delegation: Coordinate getting an IRIS to the patient to sign so [facility] SW can connect with the [facility] and learn more about what services the patient is getting and who should be contacted to assist with [employer] coverage. Can wait until the patient comes in for an appointment or mail the patient an IRIS. Will need the IRIS to be release of info from [facility].

## 2021-06-10 NOTE — ANESTHESIA CARE TRANSFER NOTE
Last vitals:   Vitals:    04/10/17 2041   BP: 109/54   Pulse: 68   Resp: 16   Temp: 37.6  C (99.6  F)   SpO2: 100%     Patient's level of consciousness is unresponsive  Spontaneous respirations: no: Ventilator   Maintains airway independently: no: Intubated  Dentition unchanged: yes  Oropharynx: ETT in place    QCDR Measures:  ASA# 20 - Surgical Safety Checklist: ASA20A - Safety Checks Done  PQRS# 430 - Adult PONV Prevention: 4558F - Pt received => 2 anti-emetic agents (different classes) preop & intraop  ASA# 8 - Peds PONV Prevention: NA - Not pediatric patient, not GA or 2 or more risk factors NOT present  PQRS# 424 - Amalia-op Temp Management: 4559F - At least one body temp DOCUMENTED => 35.5C or 95.9F within required timeframe  PQRS# 426 - PACU Transfer Protocol: - Transfer of care checklist used  ASA# 14 - Acute Post-op Pain: ASA14B - Patient did NOT experience pain >= 7 out of 10    I completed my SBAR handoff to the receiving nurse per policy and procedure.

## 2021-06-10 NOTE — ANESTHESIA PROCEDURE NOTES
Central line    Start time: 4/10/2017 8:21 PM  End time: 4/10/2017 8:34 PM  Patient location: OR Post-induction  Indications: vascular access  Performing Anesthesiologist: WAYLON HOLCOMB  Pre-procedure Checklist  Completed: patient identified, site marked, risks, benefits, and alternatives discussed, timeout performed, consent obtained, hand hygiene performed, all elements of maximal sterile barriers used including cap, mask, gown, sterile gloves, and large sheet and skin prep agent completely dried prior to procedure    Procedure Details:  Preparation: 2% chlorhexidine  Location details: right internal jugular  Catheter type: TLCC  Introducer type: MAC  Lumens:triple lumenNumber of attempts: 1  Ultrasound evaluation of access site: yes  Vessel patent by US exam  Concurrent real time visualization of needle entry  Post-procedure:   Antimicrobial disks with CHG applied and line sutured  Assessment: blood return through all ports  Complications: none

## 2021-06-10 NOTE — PROGRESS NOTES
CHART NOTE     DATE OF SERVICE:  2017     : 1955   61 y.o.     ASSESSMENT :   Doing well with improvement in symptoms and function. Resolution of L SDH.      PLAN:  Loosen restrictions.  RTC prn. Enc to call with any new questions or concerns.     HPI:  Marychuy Zhou is status post Left Craniotomy for evacuation of SDH and removal of subdural process on 4-10-17 by Dr. Salinas. FINAL PATH: ORGANIZING HEMATOMA - NO OTHER TISSUE TYPE IDENTIFIED. Patient w/ very complex medical hx  presented to the ED at [facility] for altered mentation. On warfarin for her history of PE.   CT scan of head revealed Acute/subacute L Parietal SDH,  2 cm in thickness, w/2 mm L shift.. Post-operatively she developed a Right internal jugular vein thrombus. PLAN:  No anticoagulation until seen in clinic in 2 weeks with Head CT-- will likely require restart at some point due to past h/o PE. Needs to f/u with Neurology for seizure management.    Last seen in clinic on 2017. C/O  head pain, more than headache.  No N/V, no dizziness.  Lives at home with her son, has HHA that comes for 11 hours per day.  Unable to move RUE in the hospital, now full strength returned. Baseline paraplegia from previous cord injury, in .  Recent PCP visit, Dr. Zamora. He referred her to Hematology, Pulmonology and Psychiatry. Staples removed.  Head CT: There is some dural thickening seen beneath the craniotomy flap w/minimal residual low attenuating subdural collection over the left cerebral convexity is decreased in attenuation and size from previous examination.  PLAN: Ok to resume Coumadin.  Referral to neurology, on Depakote and Keppra, no neurologist.        TODAY, Marychuy Zhou reports no HA but on meds for chronic pain.  No N/V, memory and cognition are back to normal.  Back on Coumadin since our last visit.  Lives with son, HHA, 11 hours per day. Confined to  at all times, baseline x 7 years since spinal  hematoma/spinal cord injury and resultant paraplegia. Seizure disorder, med management per Dr. Zamora.  Does not need to see Neurology.      PAST MEDICAL HISTORY, SURGICAL HISTORY, REVIEW OF SYMPTOMS, MEDICATIONS AND ALLERGIES:  Past medical history, surgical history, review of symptoms, medications and allergies remain unchanged.    Past Medical History:   Diagnosis Date     Alcohol dependence     history     Anxiety      Chronic kidney disease      Chronic pain     on Methadone     Constipation      COPD (chronic obstructive pulmonary disease)      Decubitus ulcer     had wound vac     Depression      DVT (deep venous thrombosis) 5/26/2015     Emphysema lung      ESBL (extended spectrum beta-lactamase) producing bacteria infection 08/2015    urine     Gastroparesis      GERD (gastroesophageal reflux disease)      HCAP (healthcare-associated pneumonia)      Herpes Simplex Type I      Hyperlipemia      Hypertension      Hypothyroid      Lung cancer     in remission, s/p LLL resection     MRSA (methicillin resistant Staphylococcus aureus)     urine and positive in nares(2014)     MRSA infection      Neurogenic bladder     chronic gonzalez     Neuropathy 5/26/2015     Obesity      Osteoporosis      Paraplegia 2010    spinal hematoma     Pneumonia      Pulmonary embolism 12/2016    chronic, on coumadin     Rheumatoid arthritis      SVT (supraventricular tachycardia) 8/19/2014     UTI (lower urinary tract infection) 8/27/2015    Recurrent due to suprapubic catheter     Vascular myelopathies        PHYSICAL EXAM:      Neurological exam reveals:  Recent and remote memory intact, fund of knowledge wnl.    Alert and oriented x3, speech fluent and appropriate. Comprehension intact   PERRL, EOMI, No nystagmus, Face symmetric, tongue midline, Shoulder shrug equal  Moves upper extremities, BLE plegia  Gait and station:In WC, baseline  Incision: Healed scar       RADIOGRAPHIC IMAGING: Head CT: Near complete resolution of L SDH.  Evidence of dural membrane thickening.   Films personally reviewed, also with Dr. Jhonathan Oliveira, SPR.  Reviewed  with patient and family.

## 2021-06-10 NOTE — PROGRESS NOTES
1st Monthly F/U Call:     Please forward return calls to [phone].    I have called Marychuy 3 times over the past two weeks and have been unsuccessful in reaching this patient for 1 month now.  This patient has also not returned any of my messages.   I will continue attempting to reach out to this patient in one month. I will also check this patient s chart for upcoming appointments, ER reports that may contain a new phone number, or any other recent activity.  I have informed the primary care provider by tasking regarding the lack of successful follow up.    4/28/17 PC #1 LMTCB    5/5/17 PC #2 LMTCB    5/22/17 PC #3 Marychuy atated she was just waking up and asked to call me back.

## 2021-06-10 NOTE — PROGRESS NOTES
Patient ID: Marychuy Zhou is a 61 y.o. female.  /66  Pulse (!) 110  SpO2 97%    Assessment/Plan:                   Diagnoses and all orders for this visit:    Subdural hematoma    Pressure ulcer, stage II    Cancer of lung    CONCEPCION (generalized anxiety disorder)    Depression    Lower paraplegia    Chronic pain syndrome    Neurogenic bladder         DISCUSSION  We will make referral to psychiatry.  Please see discussion below regarding my other recommendations none of which patient was willing to take action on at this time.  Subjective:     HPI    Marychuy Zhou is a 61-year-old female who has a very complex medical history.  She has paraplegia secondary to a spinal hematoma and is confined to a wheelchair.  She currently resides at home with assistance.  She has a hired care provider who provides 11 hours of care per day.  Her son resides with her and helps to care for her in the other timeframe.  Her most recent significant health concern was a subdural hematoma that occurred while on anticoagulation.  This required surgical evacuation.  She seems to be doing well from a recovery standpoint at this time having minimal discomfort.  She had follow-up with neurosurgery and that consultation note was reviewed today.  She is now back on anticoagulation.  She states she feels better than she has for a very long period of time.  She denies any difficulties with her breathing.  She denies problems with her skin although she does have a known history of ulcerations including an active sacral ulcer that is superficial currently.  She has significant issues with depression and anxiety.  She has been referred to specialty providers but has not established care with any new specialist since her last visit.  This is been an ongoing difficult process, in terms of establishing with specialist that are recommended.    As with prior visits patient arrives via a private transportation company and states that she must  leave before the end of her scheduled appointment time, thus the visit was somewhat rushed today but patient was quite adamant she did not have any concerns.    Below is a summary by organ system of her current concerns that are all reviewed briefly again today.    Neurologic  She underwent craniotomy for subdural hematoma evacuation on April 10, 2017.  She reports pain is controlled.  Denies any significant problems.  She is on Keppra in addition to Depakote.  She has a reported history of seizure disorder in the distant past that had not been confirmed by available records.  She is paraplegic secondary to a spinal hematoma.       Hematology  Her hemoglobin at discharge on April 14, 2017 was 7.6.  Patient declines any blood draw again today, but does not have any obvious signs or symptoms of anemia.  She does have a history of blood clot including DVT and pulmonary embolus.   when she developed the above described subdural hematoma anticoagulation was stopped.  She did develop a DVT in the upper extremity which was not treated for a period of time, but after neurosurgery clearance she was restarted on Coumadin.  No new bleeding concerns exist.  Anticoagulation is managed by the anticoagulation nurse.  It had been discussed regarding the possibility of having her evaluated by hematology and/or pulmonology for her complex pulmonary embolus and DVT considerations, referrals were placed but patient did not follow through with establishing appointments.     Infectious disease  She has a history of significant infections including MRSA, ESBL and VRE.  She has been admitted with sepsis on several occasions.  She has had significant issues with infections in the urinary system.  She has had pneumonia.  Most recent infection was a parotid gland infection that was caused by MRSA.  There is currently no sign of any active infection process.     Chronic pain  She is chronic pain stemming from partial lung resection in the more  distant past.  Has had increased pain secondary to her recent craniotomy.  Currently reports pain is controlled.  Current pain regiment is oxycodone/acetaminophen 5-3 25 2 tablets 3 times daily.  She is also maintained on gabapentin for neuropathic pain.  Other factors complicating her pain control include the use of sedating medications including lorazepam use to manage anxiety and temazepam used for sleep.  Her current pain control regiment does allow for adequate pain control.  There have been no significant adverse effects from her pain control medications at this point in time.  Her pain control regiment is reviewed with her thoroughly today.     Mental health  She has a history of significant depression and anxiety.  Current medications include Buspirone, Cymbalta, lorazepam, temazepam.  She is agreeable to seeing a psychiatrist to help manage her complex mental health concerns.  She reports no current difficulties with sleep.  She reports her anxiety is reasonably controlled.  A referral was placed for psychiatry consultation but no appointment was established.     Pulmonology  She has COPD.  She has not had pulmonary function testing to quantify her airway obstruction.  She is not reporting any current breathing difficulty.   she has previously used oxygen but reports she is not using oxygen at this time.  She has a history of lung cancer with partial lung resection.  She does continue to follow with her lung surgeon for surveillance.  She has a history of pulmonary embolism.  We have discussed referral to a pulmonologist to help manage her complex pulmonary issues.       Cardiac  Echocardiogram most recent performed in October 2016 showed normal ejection fraction.  Elevated pulmonary pressures were noted.  A pharmacologic stress test was performed in October 2016.  It was negative for ischemia.  There was a suggestion of nontransmural infarction in the distal anterior wall.  It was described that it may  represent breast shadowing.  There are no current cardiac complaints.  A history of diastolic dysfunction is listed in her chart.     Urology  She has a neurogenic bladder with a suprapubic catheter.  Has had significant infection issues including ESBL producing E. coli.  There is no current infection concern.  Catheter was changed during recent hospitalization.  She has been resistant to considering any type of urinary diversion other than the suprapubic catheter because she is very fearful of the possibility of having a surgical procedure.  She denies any concerns for urinary system difficulties.     Skin  She has had issues with ulcerations including decubitus ulcer in the sacral area.  She has had previous concerns with ulcerations in the feet.  Currently the sacral ulcer is stage II.  It is being managed by home care nursing.  Patient is reporting that she feels it is improving.  We have discussed obtaining a air mattress to allow for continued healing and prevention in the future of additional skin issues as she is high risk based on her history and underlying medical problems as described.  Patient does not provide detailed information regarding her skin concerns.  She has not aware of any skin concerns on the extremities declines examination today.     Gastro  She has a history of colitis without any current concerns.  She does have a history of constipation that is multifactorial.  She is not reporting any current bowel issues.  She had more recently developed gallstones and cholecystitis that required gallbladder removal.     Endocrine  She has hypothyroidism.  TSH was 2.66 on April 11, 2017.  There is no history of diabetes.  An A1c was performed August 5, 2013 and was 5.7.  Review of glucose readings does not show any significant concern with elevated blood sugar.  A serum albumin level was 2.7 on April 10, 2017.     Health maintenance  The following health maintenance considerations should be  considered.  Mammography, colonoscopy and gynecologic screening.  She has incomplete immunization records and the following immunizations should be considered.  Tetanus update with pertussis protection, pneumococcal vaccine, shingles vaccine.    These health maintenance concerns were discussed.  Patient is agreeable to these but does not want to set anything up today nor does she want to receive any immunizations or have a blood draw.       Review of Systems  Complete review of systems is obtained.  Other than the specific considerations noted above complete review of systems is negative.          Objective:   Medications:  Current Outpatient Prescriptions   Medication Sig Note     acetaminophen (TYLENOL) 325 MG tablet Take 650 mg by mouth every 4 (four) hours as needed for pain.      amLODIPine (NORVASC) 5 MG tablet Take 1 tablet (5 mg total) by mouth daily.      ascorbic acid (VITAMIN C WITH BLAZE HIPS) 500 MG tablet Take 500 mg by mouth 2 (two) times a day.      aspirin 81 mg chewable tablet  5/19/2017: Received from: External Pharmacy     bisacodyl (DULCOLAX) 10 mg suppository Insert 10 mg into the rectum daily as needed.      busPIRone (BUSPAR) 5 MG tablet Take 7.5 mg by mouth 3 (three) times a day.      dicyclomine (BENTYL) 10 MG capsule Take 1 capsule (10 mg total) by mouth 4 (four) times a day as needed (abdominal cramping).      divalproex (DEPAKOTE) 250 MG EC tablet Take 1 tablet (250 mg total) by mouth 2 (two) times a day.      DULoxetine (CYMBALTA) 30 MG capsule Take 60 mg by mouth every morning.       DULoxetine (CYMBALTA) 30 MG capsule TAKE 2 CAPSULES BY MOUTH EVERY EVENING      fluticasone (FLOVENT HFA) 110 mcg/actuation inhaler Inhale 2 puffs 2 (two) times a day.      furosemide (LASIX) 40 MG tablet Take 0.5 tablets (20 mg total) by mouth daily.      gabapentin (NEURONTIN) 300 MG capsule Take 1 capsule (300 mg total) by mouth 3 (three) times a day.      Deven Therapuetic Nutrition 7-7-1.5 gram PwPk  Take 1 packet by mouth 2 (two) times a day.      levETIRAcetam (KEPPRA) 750 MG tablet Take 1 tablet (750 mg total) by mouth 2 (two) times a day.      levothyroxine (SYNTHROID, LEVOTHROID) 125 MCG tablet Take 2 tablets (250 mcg total) by mouth daily. 2 tabs      LORazepam (ATIVAN) 0.5 MG tablet TAKE 1 TABLET BY MOUTH EVERY 8 HOURS AS NEEDED FOR ANXIETY. DO NOT TAKE WITHIN 2 HOURS OF TEMAZEPAM      menthol-zinc oxide (CALMOSEPTINE) 0.44-20.6 % Oint ointment Apply 1 application topically 2 (two) times a day as needed.      miconazole nitrate (CRITIC-AID AF) 2 % Oint ointment Apply 1 application topically 2 (two) times a day as needed.      MULTIVITAMIN/IRON/FOLIC ACID (CERTAVITE-ANTIOXIDANT ORAL) Take 1 tablet by mouth every morning.       omeprazole (PRILOSEC) 20 MG capsule Take 20 mg by mouth daily.      oxybutynin (DITROPAN) 5 MG tablet Take 5 mg by mouth 3 (three) times a day. Bladder control      oxyCODONE-acetaminophen (PERCOCET) 5-325 mg per tablet Take 2 tablets by mouth every 6 (six) hours as needed for pain.      OXYGEN-AIR DELIVERY SYSTEMS MISC Inhale 1-2 L bedtime.      SELSUN BLUE, SALICYLIC ACID, TOP Apply 1 application topically daily as needed. Apply to face redness/flaking areas      senna-docusate (PERICOLACE) 8.6-50 mg tablet Take 2 tablets by mouth 2 (two) times a day.      temazepam (RESTORIL) 15 mg capsule TAKE ONE CAPSULE BY MOUTH EVERY NIGHT AT BEDTIME AS NEEDED FOR SLEEP. **DO NOT TAKE WITHIN 2 HOURS OF LORAZEPAM**      warfarin (COUMADIN) 1 MG tablet Take 1mg (1 tab) by mouth daily OR AS DIRECTED. Adjust dose based on INR      warfarin (COUMADIN) 2 MG tablet Take 2 mg by mouth daily. 2 mg today (5/8) and 1 mg next two days.    Indications: deep venous thrombosis      white petrolatum (AQUAPHOR ORIGINAL) 41 % Oint Apply 1 application topically daily as needed. Apply to bilateral lower extremeties        Allergies:  No Known Allergies    Tobacco:   reports that she quit smoking about 3 years  ago. She has never used smokeless tobacco.     Physical Exam       /66  Pulse (!) 110  SpO2 97%

## 2021-06-10 NOTE — PROGRESS NOTES
Patient ID: Marychuy Zhou is a 61 y.o. female.  BP 98/60  Pulse (!) 120  SpO2 98%    Assessment/Plan:                   Diagnoses and all orders for this visit:    Acute on chronic intracranial subdural hematoma    Acute encephalopathy    Brain edema    Chronic pain syndrome    Opiate dependence, continuous    Pulmonary embolism  -     Ambulatory referral to Pulmonology  -     Ambulatory referral to Hematology    Depression  -     Ambulatory referral to Psychiatry    Other emphysema  -     Ambulatory referral to Pulmonology    Decubitus ulcer, stage II         DISCUSSION  The referrals as noted above are placed.  An order has been prepared and will be sent to a medical supply company to obtain an air mattress.  See the detailed discussion below for further information.  A significant amount of time was spent reviewing patient's records that she has had 3 hospitalizations since my last visit with her.  Overall the time of the visit was over 1 hour with more than 50% of that time spent in counseling and coordination of care.  An additional 1 hour was spent on documentation.  Subjective:     HPI    Marychuy Zhou is a 61-year-old female who is confined to wheelchair because of a spinal hematoma which resulted in paraplegia.  She has a complex medical history and is here today for hospitalization after she suffered a subdural hematoma.  She underwent surgery for evacuation of the blood on April 10, 2017.  She has a multitude of chronic medical issues along with multiple hospitalizations for various issues including significant infection processes.  She has a history of DVT and pulmonary embolus and had been on blood thinning medication.  Because of her subdural hematoma blood thinners have been stopped.  She had developed a acute DVT in the right jugular vein.  She has chronic issues with skin breakdown and ulceration but reports no concerns currently.  She is currently at home.  Family is helping to provide  care.  She also has chronic pain.  She has a significant anemia.  She is chronic mental health issues.  There are prior concerns for seizure disorder for which she was on Depakote and now is on Keppra because of concern of seizure associated with her subdural hematoma at the time of presentation.  She has a history of lung cancer and COPD.  She is undergone lung resection.  She has a neurogenic bladder with a suprapubic catheter, has had significant infection issues associated with the urinary system.  She has a prior history of colitis which has not caused symptoms for several months, she did not attend specialty follow-up visit for colonoscopy as recommended.  She is due for health maintenance.    At the current time patient feels everything is going well.  She feels confident in the ability of her family to help provide additional care.  She declines any laboratory evaluation today.  She does have follow-up scheduled with neurosurgical provider.  We have discussed obtaining an air mattress to help prevent further skin issues and to allow for appropriate healing of her decubitus ulcer.    From a social situation standpoint she has services provided by the [facility] to provide care.  Her son has moved in with her to help provide additional care.  In the recent past patient has generally declined referrals to specialty providers and has had difficulty attending follow-up appointments.  We discussed a strategy toward trying to arrange for appropriate follow-up visits.      Neurologic  She underwent craniotomy for subdural hematoma evacuation on April 10, 2017.  Staples are in place.  She reports pain is controlled.  Denies any significant problems.  Feels very fortunate.  She is on Keppra in addition to Depakote.  She has a reported history of seizure disorder in the distant past that had not been confirmed by available records.  She had been maintained on Depakote.  She is paraplegic secondary to a spinal hematoma.   She has follow-up scheduled with neurosurgery.     Hematology  Her hemoglobin at discharge on April 14, 2017 was 7.6.  Patient declines any blood draw today.  She denies any symptoms associated with anemia today.  She does have a history of blood clot including DVT and pulmonary embolus.  She has been maintained on warfarin.  She developed the described subdural hematoma and is no longer on anticoagulation.  She did subsequently develop a acute right jugular DVT during her hospital stay.  I discussed with her today balancing the risks of anticoagulation versus bleeding given her complex situation.  We discussed enlisting the help of a hematologist and/or pulmonologist to help guide long-term management of this complex issue.    Infectious disease  She has a history of significant infections including MRSA, ESBL and VRE.  She has been admitted with sepsis on several occasions.  She has had significant issues with infections in the urinary system.  She has had pneumonia.  Most recent infection was a parotid gland infection that was caused by MRSA.  There is currently no sign of any active infection process.    Chronic pain  She is chronic pain stemming from partial lung resection in the more distant past.  Has had increased pain secondary to her recent craniotomy.  Currently reports pain is controlled.  Current pain regiment is oxycodone/acetaminophen 5-3 25 2 tablets 3 times daily.  She is also maintained on gabapentin for neuropathic pain.  Other factors complicating her pain control include the use of sedating medications including lorazepam use to manage anxiety and temazepam used for sleep.  Her current pain control regiment does allow for adequate pain control.  There have been no significant adverse effects from her pain control medications at this point in time.    Mental health  She has a history of significant depression and anxiety.  Current medications include Buspirone, Cymbalta, lorazepam, temazepam.   She is agreeable to seeing a psychiatrist to help manage her complex mental health concerns.  She reports no current difficulties with sleep.  She reports her anxiety is reasonably controlled.    Pulmonology  She has COPD.  She has not had pulmonary function testing to quantify her airway obstruction.  She is not reporting any current breathing difficulty.  She does use oxygen at night.  She has a history of lung cancer with partial lung resection.  She does continue to follow with her lung surgeon for surveillance.  She has a history of pulmonary embolism.  We have discussed referral to a pulmonologist to help manage her complex pulmonary issues.      Cardiac  Echocardiogram most recent performed in October 2016 showed normal ejection fraction.  Elevated pulmonary pressures were noted.  A pharmacologic stress test was performed in October 2016.  It was negative for ischemia.  There was a suggestion of nontransmural infarction in the distal anterior wall.  It was described that it may represent breast shadowing.  There are no current cardiac complaints.  A history of diastolic dysfunction is listed in her chart.    Urology  She has a neurogenic bladder with a suprapubic catheter.  Has had significant infection issues including ESBL producing E. coli.  There is no current infection concern.  Catheter was changed during recent hospitalization.  She has been resistant to considering any type of urinary diversion other than the suprapubic catheter because she is very fearful of the possibility of having a surgical procedure.    Skin  She has had issues with ulcerations including decubitus ulcer in the sacral area.  She has had previous concerns with ulcerations in the feet.  Currently the sacral ulcer is stage II.  It is being managed by home care nursing.  Patient is reporting that she feels it is improving.  We have discussed obtaining a air mattress to allow for continued healing and prevention in the future of  additional skin issues as she is high risk based on her history and underlying medical problems as described.    Gastro  She has a history of colitis without any current concerns.  She does have a history of constipation that is multifactorial.  She is not reporting any current bowel issues.  She had more recently developed gallstones and cholecystitis that required gallbladder removal.    Endocrine  She has hypothyroidism.  TSH was 2.66 on April 11, 2017.  There is no history of diabetes.  An A1c was performed August 5, 2013 and was 5.7.  Review of glucose readings does not show any significant concern with elevated blood sugar.  A serum albumin level was 2.7 on April 10, 2017.    Health maintenance  The following health maintenance considerations should be considered.  Mammography, colonoscopy and gynecologic screening.  She has incomplete immunization records and the following immunizations should be considered.  Tetanus update with pertussis protection, pneumococcal vaccine, shingles vaccine.    Review of Systems  Complete review of systems is obtained.  Other than the specific considerations noted above complete review of systems is negative.        Objective:   Medications:  Current Outpatient Prescriptions   Medication Sig     acetaminophen (TYLENOL) 325 MG tablet Take 650 mg by mouth every 4 (four) hours as needed for pain.     amLODIPine (NORVASC) 5 MG tablet Take 1 tablet (5 mg total) by mouth daily.     ascorbic acid (VITAMIN C WITH BLAZE HIPS) 500 MG tablet Take 500 mg by mouth 2 (two) times a day.     bisacodyl (DULCOLAX) 10 mg suppository Insert 10 mg into the rectum daily as needed.     busPIRone (BUSPAR) 5 MG tablet Take 7.5 mg by mouth 3 (three) times a day.     dicyclomine (BENTYL) 10 MG capsule Take 1 capsule (10 mg total) by mouth 4 (four) times a day as needed (abdominal cramping).     divalproex (DEPAKOTE) 250 MG EC tablet Take 250 mg by mouth 2 (two) times a day.     DULoxetine (CYMBALTA) 30 MG  capsule Take 60 mg by mouth every morning.      fluticasone (FLOVENT HFA) 110 mcg/actuation inhaler Inhale 2 puffs 2 (two) times a day.     furosemide (LASIX) 40 MG tablet Take 0.5 tablets (20 mg total) by mouth daily.     gabapentin (NEURONTIN) 300 MG capsule Take 1 capsule (300 mg total) by mouth 3 (three) times a day.     Deven Therapuetic Nutrition 7-7-1.5 gram PwPk Take 1 packet by mouth 2 (two) times a day.     levETIRAcetam (KEPPRA) 750 MG tablet Take 1 tablet (750 mg total) by mouth 2 (two) times a day.     levothyroxine (SYNTHROID, LEVOTHROID) 125 MCG tablet Take 2 tablets (250 mcg total) by mouth daily. 2 tabs     LORazepam (ATIVAN) 0.5 MG tablet TAKE 1 TABLET BY MOUTH EVERY 8 HOURS AS NEEDED FOR ANXIETY. DO NOT TAKE WITHIN 2 HOURS OF TEMAZEPAM     menthol-zinc oxide (CALMOSEPTINE) 0.44-20.6 % Oint ointment Apply 1 application topically 2 (two) times a day as needed.     miconazole nitrate (CRITIC-AID AF) 2 % Oint ointment Apply 1 application topically 2 (two) times a day as needed.     MULTIVITAMIN/IRON/FOLIC ACID (CERTAVITE-ANTIOXIDANT ORAL) Take 1 tablet by mouth every morning.      omeprazole (PRILOSEC) 20 MG capsule Take 20 mg by mouth daily.     oxybutynin (DITROPAN) 5 MG tablet Take 5 mg by mouth 3 (three) times a day. Bladder control     oxyCODONE-acetaminophen (PERCOCET) 5-325 mg per tablet Take 2 tablets by mouth every 6 (six) hours as needed for pain.     OXYGEN-AIR DELIVERY SYSTEMS MISC Inhale 1-2 L bedtime.     SELSUN BLUE, SALICYLIC ACID, TOP Apply 1 application topically daily as needed. Apply to face redness/flaking areas     senna-docusate (PERICOLACE) 8.6-50 mg tablet Take 2 tablets by mouth 2 (two) times a day.     temazepam (RESTORIL) 15 mg capsule TAKE ONE CAPSULE BY MOUTH EVERY NIGHT AT BEDTIME AS NEEDED FOR SLEEP. **DO NOT TAKE WITHIN 2 HOURS OF LORAZEPAM**     white petrolatum (AQUAPHOR ORIGINAL) 41 % Oint Apply 1 application topically daily as needed. Apply to bilateral lower  murtaza     DULoxetine (CYMBALTA) 30 MG capsule TAKE 2 CAPSULES BY MOUTH EVERY EVENING     Allergies:  No Known Allergies    Tobacco:   reports that she quit smoking about 3 years ago. She has never used smokeless tobacco.     Physical Exam      BP 98/60  Pulse (!) 120  SpO2 98%    Overall exam is somewhat limited because of inability to position patient and her preference of what we do examine during office visit.    General: Patient is alert, no signs of distress.  She is seated comfortably in her wheelchair.  She has an upbeat attitude and is conversive.    HEENT: A dressing is in place over the craniotomy incision site.  The dressing is dry.  In the skin adjacent to the dressing there is no evidence of any significant concern.  There is no conjunctival irritation.  Mouth is moist.  Neck is supple without palpable lymph nodes or masses.    Lungs: There are no wheezes or crackles, there is good air movement throughout lung fields.    Heart: Regular rate and rhythm no murmurs heard.    Abdomen: Soft nondistended, no tenderness on palpation.  No palpable masses.    Extremities: Limited exam, edema consistent with baseline.  In observed areas no significant skin breakdown or signs of infection.

## 2021-06-10 NOTE — PROGRESS NOTES
CHART NOTE     DATE OF SERVICE:  2017     : 1955     ASSESSMENT :   Minimal residual SDH, patient doing well overall with return of RUE function.  Incision healed--Patient very pleased that she had staples out with no sedation. Seizure disorder.     PLAN:   Staples removed today. Okay to restart Coumadin--do not start with big burst, resume normal dosing.  RTC 4 weeks with new head CT. Referral to Neurology for medication management.      HPI:  Marychuy Zhou is status post Left Craniotomy for evacuation of SDH and removal of subdural process on 4-10-17 by Dr. Salinas. FINAL PATH: ORGANIZING HEMATOMA - NO OTHER TISSUE TYPE IDENTIFIED    Patient w/ very complex medical hx (see below)  presented to the ED at [facility] for altered mentation. On warfarin for her history of PE.   CT scan of head revealed Acute/subacute L Parietal SDH,  2 cm in thickness, w/2 mm L shift.. Post-operatively she developed a Right internal jugular vein thrombus. PLAN:  No anticoagulation until seen in clinic in 2 weeks with Head CT-- will likely require restart at some point due to past h/o PE. Needs to f/u with Neurology for seizure management.     Son called 2017 with concerns that his mother was sleeping too much, given her history of deep depression. When she is awake, she is not confused, is alert, oriented and coherent. No lethargy, some headache persists. Patient does have appt on 17 with PCP to discuss her antidepressants. Advised to call with any decline in patient's neuro status.       TODAY, Marychuy Zhou reports having head pain, more than headache.  No N/V, no dizziness.  Lives at home with her son, has HHA that comes for 11 hours per day.  Unable to move RUE in the hospital, now full strength returned. Baseline paraplegia from previous cord injury, in WC.  Recent PCP visit, Dr. Zamora. He referred her to Hematology, Pulmonology and Psychiatry. Patient very anxious when told we were  removing her staples, wanting sedation.       PAST MEDICAL HISTORY, SURGICAL HISTORY, REVIEW OF SYMPTOMS, MEDICATIONS AND ALLERGIES:  Past medical history, surgical history, review of symptoms, medications and allergies remain unchanged.    Past Medical History:   Diagnosis Date     Alcohol dependence     history     Anxiety      Chronic kidney disease      Chronic pain     on Methadone     Constipation      COPD (chronic obstructive pulmonary disease)      Decubitus ulcer     had wound vac     Depression      DVT (deep venous thrombosis) 5/26/2015     Emphysema lung      ESBL (extended spectrum beta-lactamase) producing bacteria infection 08/2015    urine     Gastroparesis      GERD (gastroesophageal reflux disease)      HCAP (healthcare-associated pneumonia)      Herpes Simplex Type I      Hyperlipemia      Hypertension      Hypothyroid      Lung cancer     in remission, s/p LLL resection     MRSA (methicillin resistant Staphylococcus aureus)     urine and positive in nares(2014)     MRSA infection      Neurogenic bladder     chronic gonzalez     Neuropathy 5/26/2015     Obesity      Osteoporosis      Paraplegia 2010    spinal hematoma     Pneumonia      Pulmonary embolism 12/2016    chronic, on coumadin     Rheumatoid arthritis      SVT (supraventricular tachycardia) 8/19/2014     UTI (lower urinary tract infection) 8/27/2015    Recurrent due to suprapubic catheter     Vascular myelopathies      Past Surgical History:   Procedure Laterality Date     CHOLECYSTECTOMY       CRANIECTOMY Left 4/10/2017    Procedure: LEFT CRANIOTOMY FOR SUBDURAL HEMATOMA EVACUATION AND SUBDURA PROCESS;  Surgeon: Maria Alejandra Salinas MD;  Location: [facility] OR;  Service:      ERCP N/A 1/3/2017    Procedure: ENDOSCOPIC RETROGRADE CHOLANGIOPANCREATOGRAPHY;  Surgeon: Roel Echeverria MD;  Location: [facility] OR;  Service:      FRACTURE SURGERY      tibula/fibula     LAPAROSCOPIC CHOLECYSTECTOMY N/A 1/4/2017    Procedure:  "CHOLECYSTECTOMY, LAPAROSCOPIC;  Surgeon: Danis Aviles MD;  Location: [facility];  Service:      PICC  12/31/2016          PICC AND MIDLINE TEAM LINE INSERTION  8/19/2014          AL APPENDECTOMY      Description: Appendectomy;  Recorded: 06/09/2008;     AL MARTE W/O FACETEC FORAMOT/DSKC 1/2 VRT SEG, CERVICAL      Description: Laminectomy Lumbar;  Recorded: 06/04/2013;     AL REMOVAL OF TONSILS,<13 Y/O      Description: Tonsillectomy;  Recorded: 06/10/2008;     AL TOTAL ABDOM HYSTERECTOMY      Description: Hysterectomy;  Recorded: 06/10/2008;     PHYSICAL EXAM:      BP 95/69  Pulse (!) 112  Ht 5' 2\" (1.575 m)  Wt 186 lb (84.4 kg)  SpO2 96%  BMI 34.02 kg/m2    Neurological exam reveals:  Recent and remote memory intact, fund of knowledge wnl.    Alert and oriented x3, speech fluent and appropriate.   PERRL, EOMI, No nystagmus, Face symmetric, tongue w/ sl L deviation, Shoulder shrug equal  Comprehension intact with 2 step crossover command.   Finger nose finger slightly dysmetric bilaterally  Arm strength bilateral grasp, biceps, triceps, and deltoids back to baseline now--couldn't lift her R arm during hospitalization.   BLE complete paralysis  Muscle Bulk and tone wnl.   Gait and station:In WC, BLE plegia/paralysis  Incision: CDI without erythema or edema, incision cleansed with betadine and staples removed by RN-- we provided ample distraction with teaching, viewing imaging that patient melita very well.      RADIOGRAPHIC IMAGING: Head CT:  1.There is some dural thickening seen beneath the craniotomy flap w/minimal residual low attenuating subdural collection over the left cerebral convexity is decreased in attenuation and size from previous examination.  2. Postsurgical changes of left frontoparietal craniotomy.     Films personally reviewed, also with Dr. Salinas.  Reviewed  with patient and family.          "

## 2021-06-10 NOTE — ANESTHESIA POSTPROCEDURE EVALUATION
Patient: Marychuy Zhou  LEFT CRANIOTOMY FOR SUBDURAL HEMATOMA EVACUATION AND SUBDURA PROCESS  Anesthesia type: general    Patient location: Room 5136  Last vitals:   Vitals:    04/11/17 0700   BP:    Pulse: 68   Resp: 21   Temp:    SpO2: 100%     Post vital signs: stable  Level of consciousness: sedated, on vent  Post-anesthesia pain: pain controlled  Post-anesthesia nausea and vomiting: no  Pulmonary: ventilated  Cardiovascular: stable and blood pressure at baseline  Hydration: adequate  Anesthetic events: no    QCDR Measures:  ASA# 11 - Amalia-op Cardiac Arrest: ASA11B - Patient did NOT experience unanticipated cardiac arrest  ASA# 12 - Amalia-op Mortality Rate: ASA12B - Patient did NOT die  ASA# 13 - PACU Re-Intubation Rate: ASA13B - Patient did NOT require a new airway mgmt  ASA# 10 - Composite Anes Safety: ASA10A - No serious adverse event  ASA# 38 - New Corneal Injury: ASA38A - No new exposure keratitis or corneal abrasion in PACU    Additional Notes:  
Patient

## 2021-06-10 NOTE — ANESTHESIA PREPROCEDURE EVALUATION
"Anesthesia Evaluation      Patient summary reviewed     Airway   Comment: Unable to assess airway   Pulmonary    (+) pneumonia (HCAP), COPD, rhonchi,                          Cardiovascular   Exercise tolerance: < 4 METS  (+) hypertension, CHF, ,     Rhythm: regular     ROS comment: 10.17.16  Normal left ventricular size and systolic function.  Left ventricular ejection fraction is visually estimated to be 60-65%.  Mild concentric left ventricular hypertrophy.  No significant valvular abnormalities.  Mildly elevated pulmonary artery pressure.  No significant change from 6/23/2016     Neuro/Psych    (+) depression, anxiety/panic attacks, chronic pain    Comments: SDH    Endo/Other    (+) hypothyroidism, arthritis (RA), obesity,      GI/Hepatic/Renal    (+) GERD,   chronic renal disease,      Other findings:   \"Active Problems:  Subdural hematoma  Convulsions, unspecified convulsion type     #Acute on chronic subdural hematoma and acute encephalopathy: Patient was admitted to neuro ICU started on IV Keppra patient has received vitamin K and FFP INR was 1.54. Continue care for neurosurgery and neurology,      #Acute encephalopathy : from sepsis and Acute SDH     #sepsis  with fever tachycardia tachypnea elevated count : Blood culture so far negative patient currently on IV Zosyn and meropenem. Source unclear , on oral doxy at home for parotitis and cellulitis      #Complex partial seizure continue Keppra and neurology following        #History of spinal hematoma leading to lower extremity paraplegia     #History of DVT and pulmonary emboli patient has been on anticoagulation with Coumadin currently on hold secondary to acute subdural hematoma      #History of indwelling suprapubic catheter     #Recent right-sided neck infection and parotitis with cellulitis initially treated with IV Vanco and Zosyn at [facility] and subsequently discharged on p.o. Doxycycline on 4/4/2017     #Hypothyroidism: Currently getting IV " levothyroxine     #GI prophylaxis with PPI     #Depression anxiety at home patient has been taking buspirone Cymbalta resume once able to take orally  # decubitus ulcer : wound care following      Chief Complaint: change in mental status      HPI:   Marychuy Zhou is a 61 y.o. old female  with a very complex past medical history including alcohol dependence, anxiety, chronic kidney disease, history of spinal hematoma because of which she has lower extremity paraplegia and a chronic indwelling suprapubic catheter, recurrent UTIs, chronic pain on methadone, questionable COPD, depression, history of DVT, GERD, gastroparesis, hypertension, hypothyroidism, lung cancer status post left lower lobe resection, obesity, rheumatoid arthritis, pulmonary embolism on chronic anticoagulation and vascular myelopathies who presented to the emergency room at [facility] today after her home health aide called 911 the patient appeared to be altered. . She does have a history of pulmonary emboli and has been on anticoagulation for the last several years for this at the time of discharge from the hospital initiated on Lovenox in addition to Coumadin as her INR was noted to be subtherapeutic.  CT scan of head revealed an acute left parietal subdural hematoma with slight left shift patient was also found to have fever patient was started on IV antibiotics with linezolid meropenem and IV Keppra has been started neurology neurosurgery has evaluated the patient     couldn't get any history from patient , most of the history taken from EMR , RN and critical care star        Medical History    Active Ambulatory (Non-Hospital) Problems    Diagnosis    Neck infection    S/P cholecystectomy    Choledocholithiasis with obstruction    Cholelithiasis    Claustrophobia    Hypotension, unspecified hypotension type    Chronic low back pain without sciatica, unspecified back pain laterality    Acute encephalopathy    Cognitive  "disorder    Insomnia due to anxiety and fear    HCAP (healthcare-associated pneumonia)    Diastolic CHF, acute on chronic    Chest tightness or pressure    History of MDR Enterobacter cloacae infection    CONCEPCION (generalized anxiety disorder)    Esophageal dysmotility    Heel ulcer, right, limited to breakdown of skin    Pressure ulcer, sacrum, stage IV    Lower paraplegia    Chronic pain syndrome    Major depression    Alcohol abuse    GERD (gastroesophageal reflux disease)    Hypothyroidism    Iron deficiency anemia    Paraplegia    Palliative care encounter    Mechanical complication of suprapubic catheter    COPD (chronic obstructive pulmonary disease)    SVT (supraventricular tachycardia)    Opiate dependence, continuous    Lumbosacral Disc Degeneration    Herpes Simplex Type I    Nicotine Dependence    Hypertension    Cancer of lung    Neurogenic bladder    Neurogenic bowel    Decubitus ulcer    Hyperlipidemia    Rheumatoid arthritis    Pressure ulcer, unspecified pressure ulcer stage\"             Results for SHALINI CHONG (MRN [id number]) as of 4/10/2017 15:53    4/10/2017 03:32  Sodium: 133 (L)  Potassium: 4.0  Chloride: 100  CO2: 20 (L)  Anion Gap, Calculation: 13  BUN: 6 (L)  Creatinine: 0.65  GFR MDRD Af Amer: >60  GFR MDRD Non Af Amer: >60  Calcium: 9.0  AST: 12  ALT: 8  ALBUMIN: 2.7 (L)  Protein, Total: 8.1 (H)  Alkaline Phosphatase: 147 (H)  Bilirubin, Total: 0.4  Bilirubin, Direct: 0.2  Glucose: 118  WBC: 14.3 (H)  RBC: 4.00  Hemoglobin: 10.0 (L)  Hematocrit: 33.1 (L)  MCV: 83  MCH: 25.0 (L)  MCHC: 30.2 (L)  RDW: 19.1 (H)  Platelets: 239  MPV: 9.8  Neutrophils %: 80 (H)  Lymphocytes %: 10 (L)  Monocytes %: 10  Eosinophils %: 0  Basophils %: 0  Neutrophils Absolute: 11.2 (H)  Lymphocytes Absolute: 1.4  Monocytes Absolute: 1.5 (H)  Eosinophils Absolute: 0.0  Basophils Absolute: 0.0      ECHO 10/16/16:    Conclusions      Summary  Normal left ventricular size and systolic function.  Left ventricular " ejection fraction is visually estimated to be 60-65%.  Mild concentric left ventricular hypertrophy.  No significant valvular abnormalities.  Mildly elevated pulmonary artery pressure.  No significant change from 6/23/2016            Dental    (+) poor dentition                       Anesthesia Plan  Planned anesthetic: general endotracheal    Consent obtained by her son  ASA 5 - emergent   Induction: intravenous   Anesthetic plan and risks discussed with: child/children  Anesthesia plan special considerations: antiemetics, arterial catheterization, IV therapy two IVs,   Post-op plan: other            aJyde Lopez MD  Staff Anesthesiologist  [facility]  [facility]  4/10/17

## 2021-06-10 NOTE — ANESTHESIA PROCEDURE NOTES
Arterial Line  Reason for Procedure: hemodynamic monitoring and multiple ABGs  Patient location during procedure: OR pre-induction  Start time: 4/10/2017 4:31 PM  End time: 4/10/2017 4:40 PM  Staffing:  Performing  Anesthesiologist: LILIAN CONNOR  Performing CRNA: NISA OKEEFE  Sterile Precautions:  sterile barriers used during insertion: cap, mask, sterile gloves, large sheet, and hand hygiene used.  Arterial Line:   Immediately prior to procedure a time out was called to verify the correct patient, procedure, equipment, support staff and site/side marked as required  Laterality: right  Location: brachial  Prepped with: ChloroPrep    Needle gauge: 20 G  Number of Attempts: 2 (One attempt right radial. One attempt right brachial)  Secured with: tape  Flushed with: saline  1% lidocaine local anesthesia used for skin prep.   See MAR for additional medications given.  Ultrasound evaluation of access site: yes  Vessel patent by US exam    Concurrent real time visualization of needle entry    Additional Notes:

## 2021-06-11 NOTE — ANESTHESIA PREPROCEDURE EVALUATION
Anesthesia Evaluation      Patient summary reviewed   History of anesthetic complications     Airway   Mallampati: II  Neck ROM: full   Pulmonary - normal exam   (+) COPD, asthma  shortness of breath, a smoker  (-) pneumonia                         Cardiovascular - normal exam  Exercise tolerance: < 4 METS  (+) hypertension, CHF, , hypercholesterolemia,      Neuro/Psych    (+) seizures, neuromuscular disease,  depression, anxiety/panic attacks, dementia, chronic pain    Endo/Other    (+) hypothyroidism, arthritis, obesity,      GI/Hepatic/Renal    (+) GERD well controlled, esophageal disease,  chronic renal disease, bowel prep     Other findings: Complex medical history. Not clear if has been a difficult intubation vs elective use of glidescope for past anesthetics.  Spinal hematoma resulted in LE paraplegia, neurogenic bladder w/suprapubic cath.  Questionable COPD, had been on home O2 at night but has been off for a year.  Quit smoking 4 yrs ago.  Impaired cognitive status.  H/o subdural drained 4/10/17 at [facility], possible sepsis at that time also.  H/o diastolic CHF.  Echo 10/17/16:  Normal LV function, mild concentric LVH, mildly elevated PAP, mild aortic sclerosis, trace MR and TR.  Chart lists CRI, Cr nl, but has poor nutritional status.  DVT and PE, anticoagulated, INR will need to be checked (recommended below 1.5).  Presumably DVT of IJ around time of subdural (central line?).  LLL'ectomy for lung Ca.  H/o SVT.  Rheumatoid arthritis.  Esophageal dysmotility.  Old note lists impaired liver function, unclear why.  H/o opioid dependence, EtOH abuse, smoker.  Decubitis ulcer hx.  Usually runs a low blood pressure.  Glidescope has been used for past intubations.  K 4.6, Cr 0.69, INR 1.63, Hg 10.3.        Dental    (+) upper dentures and lower dentures                       Anesthesia Plan  Planned anesthetic: MAC    ASA 3     Anesthetic plan and risks discussed with: patient    Post-op plan: routine  recovery

## 2021-06-11 NOTE — ANESTHESIA POSTPROCEDURE EVALUATION
Patient: Marychuy Zhou  COLONOSCOPY  Anesthesia type: MAC    Patient location: PACU phase 2  Last vitals:   Vitals:    06/08/17 0950   BP: 124/76   Pulse: 84   Resp: 12   Temp: 36.4  C (97.6  F)   SpO2: 96%   Case done MAC.  Note is a late entry owing to clinical demands.  Post vital signs: stable  Level of consciousness: awake and responds to simple questions  Post-anesthesia pain: pain controlled  Post-anesthesia nausea and vomiting: no  Pulmonary: unassisted, return to baseline  Cardiovascular: stable and blood pressure at baseline  Hydration: adequate  Anesthetic events: no    QCDR Measures:  ASA# 11 - Amalia-op Cardiac Arrest: ASA11B - Patient did NOT experience unanticipated cardiac arrest  ASA# 12 - Amalia-op Mortality Rate: ASA12B - Patient did NOT die  ASA# 13 - PACU Re-Intubation Rate: ASA13B - Patient did NOT require a new airway mgmt  ASA# 10 - Composite Anes Safety: ASA10A - No serious adverse event  ASA# 38 - New Corneal Injury: ASA38A - No new exposure keratitis or corneal abrasion in PACU    Additional Notes:

## 2021-06-11 NOTE — PROGRESS NOTES
MRN:  [id number]    Patient name: Marychuy Zhou    Care Guide: Alejandra    Discuss at Care Conferences: 6/30/2017    Barriers: Enrolled in [facility] 8/11/15. Never had a PCAM. Goals need updating   Plan Summary: Schedule pt for RN assessment     Action  Due Date   [facility] RN will:  Do RN assessment 7/28/2017   Delegations: Action  Due Date   [facility] SW will:  NA    [facility] Care Guide will: Schedule pt for RN assessment 7/21/2017

## 2021-06-11 NOTE — PROGRESS NOTES
Monthly F/U Call:  Care Guide called patient.  If this patient is returning our call please transfer to Alejandra Murguia at ext [phone]. I have called this patient and have been unsuccessful in reaching this patient for 2 months now.  This patient has also not returned any of my messages.   I will continue attempting to reach out to this patient in one month. I will also check this patient s chart for upcoming appointments, ER reports that may contain a new phone number, or any other recent activity.

## 2021-06-11 NOTE — ANESTHESIA CARE TRANSFER NOTE
Last vitals:   Vitals:    06/08/17 0929   BP: 97/55   Pulse:    Resp: 16   Temp: 36.2  C (97.2  F)   SpO2: 100%     Patient's level of consciousness is drowsy  Spontaneous respirations: yes  Maintains airway independently: yes  Dentition unchanged: yes  Oropharynx: oropharynx clear of all foreign objects    QCDR Measures:  ASA# 20 - Surgical Safety Checklist: ASA20A - Safety Checks Done  PQRS# 430 - Adult PONV Prevention: 4558F - Pt received => 2 anti-emetic agents (different classes) preop & intraop  ASA# 8 - Peds PONV Prevention: NA - Not pediatric patient, not GA or 2 or more risk factors NOT present  PQRS# 424 - Amalia-op Temp Management: 4559F - At least one body temp DOCUMENTED => 35.5C or 95.9F within required timeframe  PQRS# 426 - PACU Transfer Protocol: - Transfer of care checklist used  ASA# 14 - Acute Post-op Pain: ASA14A - Patient experienced pain >= 7 out of 10

## 2021-06-12 NOTE — PROGRESS NOTES
7/21/17 Clinic Care Coordination/Health Care Home follow up. Patient currently inpatient at [facility] .    MRN:  [id number]     Patient name: Marychuy Zhou     Care Guide: Alejandra     Discuss at Care Conferences: 6/30/2017     Barriers: Enrolled in [facility] 8/11/15. Never had a PCAM. Goals need updating   Plan Summary: Schedule pt for RN assessment       Action  Due Date   [facility] RN will:  Do RN assessment 7/28/2017   Delegations: Action  Due Date   [facility] SW will:  NA     [facility] Care Guide will: Schedule pt for RN assessment 7/21/2017

## 2021-06-12 NOTE — PROGRESS NOTES
Managing Chronic Pain: Medicines  Medicines can help you live better with chronic pain. You may use over-the-counter or prescription medicines. It can take some time and trial and error to work out the best treatment plan for you. Work with your health care provider to find the best medicines for you, and to use them safely and effectively.  Tell your health care provider about all medicines you`re taking, including herbs and vitamins.   A part of your treatment plan  Depending on your situation and the type of pain, you may take medicines:    At times when pain is more intense than usual.    For pain relief throughout the day.    Before activities that tend to trigger pain, like going shopping or doing physical therapy.     To decrease sensitivity to pain and help you sleep.  There are 4 major groupings of medicines for the treatment of chronic pain:  Non-opioids. These include the commonly used medicine acetaminophen as well as the non-steroidal anti-inflammatory drugs (NSAIDs), like aspirin and ibuprofen, naproxen sodium, and ketoprofen. These all help control pain but NSAIDs also help relieve inflammation. These drugs are available over-the-counter. Some NSAIDS are available by prescription only.  Acetaminophen can cause liver damage if taken above the recommended dose. NSAIDs may cause stomach problems like bleeding ulcers. Using them over the long-term can cause heart problems and stroke in a very small number of people. None of these drugs is addictive.  Opioids. This includes drugs, like morphine, oxycodone, codeine, fentanyl, and methadone. Opioids may be used to treat breakthrough pain or severe chronic pain. Opioids are available only by prescription. These medicines may be effective for managing chronic pain. But they are controversial because of their side effects and because they can be addictive.    Adjuvants. This group includes medicines that were originally made to treat other conditions, but were  also found to relieve pain. Examples of adjuvant drugs include antidepressants and anticonvulsants.  Antidepressants. These help pain by working on the same brain chemicals that play a role in depression. They also help improve sleep. Tricyclic antidepressants are 1 group of antidepressants used for treating chronic pain caused by nerve injury (neuropathic pain). Examples include amitriptyline, nortriptyline, and desipramine. Serotonin-norepinephrine reuptake inhibitors (SNRIs), like duloxetine and milnacipran, are also used.  Some types of antidepressants are used in low doses for sleep problems. They may also be prescribed if you are very sensitive to pain or some kinds of nerve pain.  Anticonvulsants, developed to prevent seizures, can help certain pain conditions, particularly nerve (neuropathic) pain. Examples include gabapentin and pregabalin.  Other pain medicines    Topical. These medicines, like lidocaine or capsaicin, are applied to the skin to treat pain in one location.    Muscle relaxants. These may be used to stop painful muscle spasms. Drugs like cyclobenzaprine can be sedating.  Taking medicine safely    Take your medicine on time and in the right dose as prescribed.    Tell your health care professional if your medicine doesn't relieve your pain or work for a long enough time, or if you have side effects.    Don't take other people's medicines. They may not be safe for you.  Avoid alcohol, tobacco, and illegal drugs. These may interact with your medicines causing you harm.

## 2021-06-12 NOTE — PROGRESS NOTES
ANTICOAGULATION  MANAGEMENT    Reviewed records from recent Hospital Admission for UTI. Patient was prescribed Levaquin    No adjustment to anticoagulation plan needed. Patient already mad appt to follow up with PCP 8/18. Will check INR on 8/18.     Edmundo Yu RN

## 2021-06-12 NOTE — PROGRESS NOTES
Assessment:     1. Diarrhea  diphenoxylate-atropine (LOMOTIL) 2.5-0.025 mg per tablet   2. Pulmonary embolism  INR   3. DVT (deep venous thrombosis)  INR       Plan:     1. Diarrhea  Loose stools which seem intractable to loperamide; I did a medication management we will remove the senna tablets  - diphenoxylate-atropine (LOMOTIL) 2.5-0.025 mg per tablet; Take 1 tablet by mouth 4 (four) times a day as needed for diarrhea.  Dispense: 30 tablet; Refill: 1    2. Pulmonary embolism  Patient is asymptomatic post discharge from hospital we will check her INR by fingerstick  - INR    3. 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  Pros and cons of therapy discussed I did do a medication management I am to start her on some Lomotil and see if this helps her out.  She will see Dr. Sánchez Zamora when he returns from his brief leave here.  She is clinically stable she denies any visual changes here she can hear out of her right ear appetite has returned no chest symptoms shortness of breath dyspnea bowels as noted patient has indwelling catheter and is wheelchair-bound I did do a chart review discharge summary review and medication management with the patient all medical questions that were asked were answered we will check an INR today patient will be followed as noted.   The patient will need skilled nursing home visits for at least 2-3 weeks because of paraplegia, wheelchair-bound, indwelling catheter, high risk UTI will need observation and monitoring of personal hygiene to avoid readmission to hospital.  Skilled nursing visits at least daily for 2 weeks and then every other day for 2 weeks.  Review of Systems: A complete 14 point review of systems was obtained and is negative or as stated in the history of present illness.    Past Medical History:   Diagnosis Date     Alcohol dependence     history     Anxiety      Cancer of lung 9/24/2013    Overview:  Adenocarcinoma Stage 1A per preoperative needle biopsy   Adenocarcinoma Stage 1A per preoperative needle biopsy  Wedge r/s 9/2013     Chronic kidney disease      Chronic pain     on Methadone     Constipation      COPD (chronic obstructive pulmonary disease)      Decubitus ulcer     had wound vac     Depression      Diastolic CHF, acute on chronic      DVT (deep venous thrombosis) 5/26/2015     Emphysema lung      ESBL (extended spectrum beta-lactamase) producing bacteria infection 08/2015    urine     Gastroparesis      GERD (gastroesophageal reflux disease)      HCAP (healthcare-associated pneumonia)      Herpes Simplex Type I      Hyperlipemia      Hypertension      Hypothyroid      Hypothyroidism 5/26/2015     Intracranial subdural hematoma      Kidney infection      Kidney stone      Lung cancer     in remission, s/p LLL resection     MRSA infection      Neurogenic bladder     chronic gonzalez     Neuropathy 5/26/2015     Obesity      Opiate dependence, continuous      Osteoporosis      Paraplegia 2010    spinal hematoma     Pneumonia      Pulmonary embolism 12/2016    chronic, on coumadin     Rheumatoid arthritis      S/P cholecystectomy      Sepsis 5/28/2017     SVT (supraventricular tachycardia) 8/19/2014     UTI (urinary tract infection)      Vascular myelopathies      Family History   Problem Relation Age of Onset     COPD  Mother       in her 50s     COPD Brother      Lung cancer Sister      Liver disease Father      COPD Father       in his 50s     Past Surgical History:   Procedure Laterality Date     CHOLECYSTECTOMY       COLONOSCOPY N/A 2017    Procedure: COLONOSCOPY;  Surgeon: Conor Hdez MD;  Location: [facility];  Service:      CRANIECTOMY Left 4/10/2017    Procedure: LEFT CRANIOTOMY FOR SUBDURAL HEMATOMA EVACUATION AND SUBDURA PROCESS;  Surgeon: Maria Alejandra Salinas MD;  Location: [facility] OR;  Service:      ERCP N/A 1/3/2017    Procedure: ENDOSCOPIC RETROGRADE CHOLANGIOPANCREATOGRAPHY;  Surgeon: Roel Echeverria MD;  Location: [facility];  Service:      FRACTURE SURGERY      tibula/fibula     LAPAROSCOPIC CHOLECYSTECTOMY N/A 2017    Procedure: CHOLECYSTECTOMY, LAPAROSCOPIC;  Surgeon: Danis Aviles MD;  Location: [facility];  Service:      PICC  2016          PICC AND MIDLINE TEAM LINE INSERTION  2014          OK APPENDECTOMY      Description: Appendectomy;  Recorded: 2008;     OK MARTE W/O FACETEC FORAMOT/DSKC  VRT SEG, CERVICAL      Description: Laminectomy Lumbar;  Recorded: 2013;     OK REMOVAL OF TONSILS,<11 Y/O      Description: Tonsillectomy;  Recorded: 06/10/2008;     OK TOTAL ABDOM HYSTERECTOMY      Description: Hysterectomy;  Recorded: 06/10/2008;     Social History   Substance Use Topics     Smoking status: Former Smoker     Quit date: 10/23/2013     Smokeless tobacco: Never Used     Alcohol use No      Comment: currently sober, history of ETOH abuse         Objective:   /76  Pulse 86  SpO2 98%    General Appearance:  Alert, cooperative, no distress  Head:  Normocephalic, no obvious abnormality  Ears: TM anatomy normal  Eyes:  PERRL, EOM's intact, conjunctiva and corneas clear  Nose:  Nares symmetrical, septum midline, mucosa pink, no sinus tenderness  Throat:  Lips, tongue, and mucosa are moist, pink, and intact  Neck:   Supple, symmetrical, trachea midline, no adenopathy; thyroid: no enlargement, symmetric,no tenderness/mass/nodules; no carotid bruit, no JVD  Back:  Symmetrical, no curvature, ROM normal, no CVA tenderness  Chest/Breast:  No mass or tenderness  Lungs:  Clear to auscultation bilaterally, respirations unlabored   Heart:  Normal PMI, regular rate & rhythm, S1 and S2 normal, no murmurs, rubs, or gallops  Abdomen:  Soft, non-tender, bowel sounds active all four quadrants, no mass, or organomegaly patient is complaining of diarrhea we will treat the patient with the following medication for  Musculoskeletal:  Tone and strength strong and symmetrical, all extremities  Lymphatic:  No adenopathy  Skin/Hair/Nails:  Skin warm, dry, and intact, no rashes  Neurologic: Patient is in a wheelchair and has indwelling catheters draining clear urine  Extremities:  No edema.  Lisa's sign negative.    Genitourinary: deferred  Pulses:  Equal bilaterally           This note has been dictated using voice recognition software. Any grammatical or context distortions are unintentional and inherent to the the software.

## 2021-06-12 NOTE — PROGRESS NOTES
Patient ID: Marychuy Zhou is a 61 y.o. female.  /76  Pulse (!) 136  Wt 178 lb (80.7 kg) Comment: weight per patient  SpO2 98%  BMI 31.53 kg/m2    Assessment/Plan:                   Diagnoses and all orders for this visit:    Chronic pain  -     oxyCODONE-acetaminophen (PERCOCET) 5-325 mg per tablet; Take 2 tablets by mouth every 6 (six) hours as needed for pain.  Dispense: 120 tablet; Refill: 0  -     Ambulatory referral to Pain Clinic    Major depression  -     Ambulatory referral to Psychiatry    Anxiety  -     Ambulatory referral to Psychiatry    Other orders  -     hydrOXYzine (ATARAX) 25 MG tablet; Take 1 tablet (25 mg total) by mouth at bedtime.  Dispense: 30 tablet; Refill: 6  -     LORazepam (ATIVAN) 0.5 MG tablet; Take 1 tablet (0.5 mg total) by mouth every 8 (eight) hours as needed for anxiety.  Dispense: 90 tablet; Refill: 0      DISCUSSION  Referral to psychiatry and pain clinic.  See discussion below.  Medications refilled as noted above.  Duration of visit 35 minutes more than 50% of time spent in counseling and coronation of care.  Subjective:     HPI    Marychuy Zhou is a 62-year-old female with a very complex medical history.  Previous office visits notes that I have done have summarized her significant medical issues.  She is here today after having had a hospitalization in early June that was primarily for abdominal and flank pain.  Following that hospitalization she was at home and then had return to the emergency department where she was noted to have alcohol intoxication.  Patient is not forthcoming with details of her alcohol intoxication.  She has a very difficult history with significant complicated problems.  She demonstrates appropriate mental capacity to make decisions for her own health care.  Her primary significant concerns addressed today center around mental health management and management of chronic pain.  For mental health she is maintained on lorazepam.  She  reports significant concerns.  She is a complex medication regimen.  She has been agreeable to see psychiatry in the past but has never followed through.  Today we discussed arranging for repeat referral to psychiatry.  We reviewed the dangers of concurrent use of benzodiazepines and chronic opioid pain medications.  She demonstrates an appropriate understanding of this significant risk.  It is felt that the benefits of symptom relief outweighs this risk at this time.  Reviewed the importance of proper administration of medications.  We have discussed referral to a pain clinic given her complex status.  I am willing to continue to prescribe medication as long as it is taken appropriately.  Steps are taken to monitor prescriptions including routine checks of the [place] prescription monitoring website.  No additional refills of the medication has been provided.  Patient does continue to report numerous areas of pain including pain associated with her subdural hematoma.  She had surgical evacuation of a subdural hematoma this past spring.  It has been evaluated and there is no significant clinical difference at this time.  Patient has significant history of infection but no infection symptoms are elicited today.  She has a history of lung cancer with lung resection as well as probable COPD.  She reports no breathing difficulty currently.  She was at one point on home oxygen.    In integrity has been a ongoing concern.  Patient does not agree to examinations in the clinic nor is it possible with the resources in the clinic to adequately examine her skin.  She does have home nursing care.  Patient reports numerous difficulties with coordinating care with home nursing care.    She does have a chronic indwelling suprapubic catheter because of neurogenic bladder.  She has had numerous infection issues but there appears to be no concern currently.    Review of Systems  Complete review of systems is obtained.  Other than  the specific considerations noted above complete review of systems is negative.      Objective:   Medications:  Current Outpatient Prescriptions   Medication Sig     acetaminophen (TYLENOL) 325 MG tablet Take 2 tablets (650 mg total) by mouth every 6 (six) hours as needed for pain.     ascorbic acid (VITAMIN C WITH BLAZE HIPS) 500 MG tablet Take 500 mg by mouth 2 (two) times a day.     aspirin 81 mg chewable tablet Chew 81 mg daily.      bisacodyl (DULCOLAX) 10 mg suppository Insert 10 mg into the rectum daily as needed.     busPIRone (BUSPAR) 5 MG tablet Take 7.5 mg by mouth 3 (three) times a day.     diaper,brief,adult,disposable (BRIEFS, ADULT-EXTRA LARGE) Misc Use 1 each As Directed as needed.     dicyclomine (BENTYL) 10 MG capsule Take 1 capsule (10 mg total) by mouth 4 (four) times a day as needed (abdominal cramping).     disposable gloves (PURPLE NITRILE GLOVES) Misc Use 1 each As Directed as needed.     divalproex (DEPAKOTE) 250 MG EC tablet Take 1 tablet (250 mg total) by mouth 2 (two) times a day.     DULoxetine (CYMBALTA) 30 MG capsule Take 60 mg by mouth every morning.      gabapentin (NEURONTIN) 300 MG capsule Take 1 capsule (300 mg total) by mouth 3 (three) times a day.     hydrOXYzine (ATARAX) 25 MG tablet Take 1 tablet (25 mg total) by mouth at bedtime.     Deven Therapuetic Nutrition 7-7-1.5 gram PwPk Take 1 packet by mouth 2 (two) times a day.     levETIRAcetam (KEPPRA) 250 MG tablet Take 750 mg by mouth 2 (two) times a day.     levothyroxine (SYNTHROID, LEVOTHROID) 125 MCG tablet Take 2 tablets (250 mcg total) by mouth daily. 2 tabs     lidocaine (XYLOCAINE) 5 % ointment Apply qid to scar     LORazepam (ATIVAN) 0.5 MG tablet Take 1 tablet (0.5 mg total) by mouth every 8 (eight) hours as needed for anxiety.     menthol-zinc oxide (CALMOSEPTINE) 0.44-20.6 % Oint ointment Apply 1 application topically 2 (two) times a day as needed.     miconazole nitrate (CRITIC-AID AF) 2 % Oint ointment Apply 1  application topically 2 (two) times a day as needed.     MULTIVITAMIN/IRON/FOLIC ACID (CERTAVITE-ANTIOXIDANT ORAL) Take 1 tablet by mouth every morning.      omeprazole (PRILOSEC) 20 MG capsule Take 20 mg by mouth daily.     oxybutynin (DITROPAN) 5 MG tablet Take 5 mg by mouth 3 (three) times a day. Bladder control     oxyCODONE-acetaminophen (PERCOCET) 5-325 mg per tablet Take 2 tablets by mouth every 6 (six) hours as needed for pain.     SELSUN BLUE, SALICYLIC ACID, TOP Apply 1 application topically daily as needed. Apply to face redness/flaking areas     senna-docusate (PERICOLACE) 8.6-50 mg tablet Take 2 tablets by mouth 2 (two) times a day.     trolamine salicylate (ASPERCREME) 10 % cream Apply to head incision qid prn     vitamin A & D ointment Use topically as needed.     warfarin (COUMADIN) 1 MG tablet Take by mouth See Admin Instructions. 1 mg on Tue & Sat; 2 mg all other days     white petrolatum (AQUAPHOR ORIGINAL) 41 % Oint Apply 1 application topically daily as needed. Apply to bilateral lower extremeties     Allergies:  No Known Allergies    Tobacco:   reports that she quit smoking about 3 years ago. She has never used smokeless tobacco.     Physical Exam      /76  Pulse (!) 136  Wt 178 lb (80.7 kg) Comment: weight per patient  SpO2 98%  BMI 31.53 kg/m2      General: Patient seated comfortably in her wheelchair    HEENT: No scleral icterus or conjunctival irritation, mouth is moist no oral lesions neck supple without lymph nodes or masses    Lungs: Good air movement throughout the upper lung fields slight diminishment in the lower lung fields no wheezes or crackles    Heart: Regular rate and rhythm no murmur    Abdomen: Suprapubic catheter in place soft no distention no significant tenderness on palpation    Extremities: Lower extremities are covered patient declines consideration to uncover extremities at this time she denies ulceration, no significant edema is noted.

## 2021-06-12 NOTE — PROGRESS NOTES
Marychuy's recent history is reviewed.  Her medical history is familiar is well known.  Patient called today and canceled her follow-up appointment.  Patient is in need of home care services.  Unfortunately [facility] is no longer willing to provide this service because of difficulties which have arisen during the course of their efforts to provide appropriate care.  There are concerns have been documented.  I spoke with patient by telephone.  We are in the process of making arrangements for a different home care service to provide care.  Patient is encouraged to schedule appropriate follow-up with me to work on her multiple complex medical issues.  In our phone conversation I did not identify any pertinent issue requiring emergent evaluation.  I recommend if she feels that she is having any significant decompensation in her health that she call 911 to be transported to the emergency department.  We will make every effort to work with home care agencies to establish care with her as soon as possible.  Patient did inquire about refilling pain medication early because she states she is having increasing pain, I declined to provide any additional medication and this will be discussed at her follow-up visit with me next week.

## 2021-06-12 NOTE — PROGRESS NOTES
My Clinic Care Coordination Wellness Plan  This Maintenance Wellness Plan provides private information in regards to the work I have done with my Care Team from my Primary Care Clinic.  This document provides insight on the goals I have worked hard to achieve.  My Care Team congratulates me on my journey to become well.  With the assistance of my Clinic Care Guide and Primary Care Physician,  I have succeeded in improving areas of my health that I identified as barriers to becoming well.  I will continue to seek wellness and use the skills I have obtained to further my journey.  My Care Guide will follow up with me in 3 months.  In the meantime, if I should have any questions or concerns I will contact my Care Guide.     [facility]  [address], [address] [place] [address]  [phone]      My Preferred Method of Contact:  Phone: [phone]    My Primary/Preferred Language:  English    Preferred Learning Style:  Face to face discussion    Emergency Contact: Extended Emergency Contact Information  Primary Emergency Contact: Sánchez Paredes   [facility]  Home Phone: [phone]  Relation: Child     PCP:  Sánchez Zamora MD  Specialists:    Care Team            Sánchez Zamora MD PCP - General, Family Medicine    [phone]     [facility] Care Coordination Care Guide, Clinic Care Coordination    [facility]. Phone: [phone]; Fax: [phone]    Andrzej Hough MD Physician, Hematology and Oncology    [phone]         Accomplishments:  Goals        Patient Stated      COMPLETED: I have accomplished being approved for the [employer] program. (pt-stated)            Personal Plan  If I am having trouble with services through the [employer] program I will call my worker Harpreet rooney at [phone].         COMPLETED: I have accomplished getting myself established with a food delivery services.  (pt-stated)            Personal Plan  I will  continue to get food deolivery services through [employer] and if that ends up not working out I will refer back to the list of options my Care Guide sent me or talk with my Financial worker with [facility].         COMPLETED: I have accomplished looking into resources.  (pt-stated)            Personal Plan  If I am in need of resources I will call the Disability Linkage line at [phone].        COMPLETED: I have been consistently getting INR checks in my home. (pt-stated)            Personal Plan  When I am unable to get INR checks done inmy home I will talk to my PCP for advise.         Other      COMPLETED: I have accomplished getting a supplement program by obtaining Medical Assistance.            Personal Plan  I will continue to stay on Medical Assistance. If I am having trouble with my Medical Assistance coverage I will call my Financial worker or the main phone number for [facility] at [phone].              Advanced Directive/Living Will: On file with the clinic    Clinical Emergency Plan    Managing Chronic Pain: Medicines  Medicines can help you live better with chronic pain. You may use over-the-counter or prescription medicines. It can take some time and trial and error to work out the best treatment plan for you. Work with your health care provider to find the best medicines for you, and to use them safely and effectively.  Tell your health care provider about all medicines you`re taking, including herbs and vitamins.   A part of your treatment plan  Depending on your situation and the type of pain, you may take medicines:    At times when pain is more intense than usual.    For pain relief throughout the day.    Before activities that tend to trigger pain, like going shopping or doing physical therapy.     To decrease sensitivity to pain and help you sleep.  There are 4 major groupings of medicines for the treatment of chronic pain:  Non-opioids. These include the commonly used  medicine acetaminophen as well as the non-steroidal anti-inflammatory drugs (NSAIDs), like aspirin and ibuprofen, naproxen sodium, and ketoprofen. These all help control pain but NSAIDs also help relieve inflammation. These drugs are available over-the-counter. Some NSAIDS are available by prescription only.  Acetaminophen can cause liver damage if taken above the recommended dose. NSAIDs may cause stomach problems like bleeding ulcers. Using them over the long-term can cause heart problems and stroke in a very small number of people. None of these drugs is addictive.  Opioids. This includes drugs, like morphine, oxycodone, codeine, fentanyl, and methadone. Opioids may be used to treat breakthrough pain or severe chronic pain. Opioids are available only by prescription. These medicines may be effective for managing chronic pain. But they are controversial because of their side effects and because they can be addictive.    Adjuvants. This group includes medicines that were originally made to treat other conditions, but were also found to relieve pain. Examples of adjuvant drugs include antidepressants and anticonvulsants.  Antidepressants. These help pain by working on the same brain chemicals that play a role in depression. They also help improve sleep. Tricyclic antidepressants are 1 group of antidepressants used for treating chronic pain caused by nerve injury (neuropathic pain). Examples include amitriptyline, nortriptyline, and desipramine. Serotonin-norepinephrine reuptake inhibitors (SNRIs), like duloxetine and milnacipran, are also used.  Some types of antidepressants are used in low doses for sleep problems. They may also be prescribed if you are very sensitive to pain or some kinds of nerve pain.  Anticonvulsants, developed to prevent seizures, can help certain pain conditions, particularly nerve (neuropathic) pain. Examples include gabapentin and pregabalin.  Other pain medicines    Topical. These  medicines, like lidocaine or capsaicin, are applied to the skin to treat pain in one location.    Muscle relaxants. These may be used to stop painful muscle spasms. Drugs like cyclobenzaprine can be sedating.  Taking medicine safely    Take your medicine on time and in the right dose as prescribed.    Tell your health care professional if your medicine doesn't relieve your pain or work for a long enough time, or if you have side effects.    Don't take other people's medicines. They may not be safe for you.  Avoid alcohol, tobacco, and illegal drugs. These may interact with your medicines causing you harm.      All [facility] clinic patients have access to a Nurse 24 hours a day, 7 days a week.  If you have questions or want advice from a Nurse, please know [facility] is here for you.  You can call your clinic and they will connect you or you can call Care Connection at [phone].  [facility] also has Walk In Care clinics in multiple locations.  Call the number listed above for more information about our Walk In Care clinics or visit the [facility] website at [email or web address].

## 2021-06-13 NOTE — PROGRESS NOTES
I spoke with Marychuy today about Graduating from [employer]. Marychuy said she felt good doing that and she did not have any other goals she would like to creat with me at this time. I sent Dr. Zamora a message today asking if he is OK with Marychuy graduation from Clinic Care Coordination/[facility] [place] .

## 2021-06-13 NOTE — PROGRESS NOTES
Patient ID: Marychuy Zhou is a 62 y.o. female.  /58  Pulse 100  SpO2 99%    Assessment/Plan:                   Diagnoses and all orders for this visit:    Chronic suprapubic catheter    Neurogenic bladder    Chronic pain syndrome  -     Ambulatory referral to Pain Clinic    Major depression  -     Ambulatory referral to Psychiatry    Lower paraplegia    Hypothyroidism, unspecified type    Pulmonary embolism    Insomnia due to anxiety and fear  -     Ambulatory referral to Psychiatry         DISCUSSION  Referral to psychiatry and to pain clinic for ongoing management of these concerns.  Please see summary below.  Patient will need follow-up lab testing including repeat TSH test within the next month.  She needs to have continued home nursing care with services including nursing, medical social worker, home health aide.    Duration of the visit exceeded 45 minutes more than 50% of this time spent in counseling and coordination of care.    Medications reconciled  Subjective:     HPI     Marychuy Zhou is a 62 y.o. female is a 62 year female with a extremely complex medical history.  She has paraplegia secondary to spinal hematoma.  She resides independently with the help of her son and has additional services provided by home health aide and home nursing.  She has had issues with recurrent infections such as recurrent urinary tract infections because of neurogenic bladder with chronic indwelling suprapubic catheter.  She has a history of sepsis.  She has a history of resistant bacteria.  She also has a history of pneumonia, parotid gland infection.  Patient also has had issues with skin breakdown requiring ongoing treatment.  She has a history of lung cancer status post partial resection of lung, history of pulmonary emboli, chronic pain, significant mental health issues requiring multiple medications.  She is here today for follow-up.  He was most recently admitted to the hospital for generalized  weakness and abdominal pain.  Her hospital records are reviewed.    She has recurrent hospitalizations.  When she gets concerning symptoms such as abdominal pain or just does not feel well she tends to seek evaluation in the emergency department.  Most often there are significant findings in terms of presence of bacteria whether this represents colonization or infection is often a very difficult determination.  She is most often admitted for further workup.  She has been seen in consultation with infectious disease.  She has had a multitude of other issues ranging from breathing difficulty, subdural hematoma, increased pain and leg swelling.  At this point in time she reports she is doing well.  Recent hospital records are reviewed.    We have had multiple discussions regarding seeking the help of specialty providers for help in managing her complex concerns.  The most pertinent considerations are mental health and chronic pain.  Patient is again agreeable to these 2 referrals.  Patient has not had follow through with any specialty provider.    Patient is homebound because she is paraplegic and cannot transport herself.  She requires a special vehicle for transportation.  Patient has a complex medication and medical history as outlined above and ends summarized in even greater detail below.  She needs home nursing help with management of medications.  Patient needs skilled nursing to help maintain skin integrity for ongoing education of other care providers as well as routine inspection.  Patient needs help with managing anticoagulation therapy for her blood thinners.  She needs help in managing her suprapubic catheter this does require periodic changes as well as education and monitoring for signs of developing infection.    Today patient reports she is feeling very well she is very happy with how things are going.  I attempted to entertain a discussion regarding developing a plan to avoid recurrent  hospitalizations.  Patient subsequently informs me that her ride is scheduled to pick her up and she must cut her appointment short.  Nearly every time she schedules an office visit she schedules her ride to arrive near the end of her scheduled appointment time making it so we either have to rush through the visit or she simply just needs to leave in the middle of the visit.  This has historically made it difficult to accomplish all necessary tasks during the course of an office visit.  This has been discussed but despite our discussions remains a recurrent problem.    Neurologic  She underwent craniotomy for subdural hematoma evacuation on April 10, 2017.  She reports pain is controlled.  Denies any significant problems.  She is on Keppra in addition to Depakote.  She has a reported history of seizure disorder in the distant past that had not been confirmed by available records.  She is paraplegic secondary to a spinal hematoma. She has neuropathic pain in the legs which is currently controlled       Hematology  Her hemoglobin at discharge on September 7, 2017 was 10.3.  Patient declines any blood draw again today, but does not have any obvious signs or symptoms of anemia.  She does have a history of blood clot including DVT and pulmonary embolus.   when she developed the above described subdural hematoma anticoagulation was stopped.  She did develop a DVT in the upper extremity which was not treated for a period of time, but after neurosurgery clearance she was restarted on Coumadin.  No new bleeding concerns exist.  Anticoagulation is managed by the anticoagulation nurse.  It had been discussed regarding the possibility of having her evaluated by hematology and/or pulmonology for her complex pulmonary embolus and DVT considerations, referrals were placed but patient did not follow through with establishing appointments.  No new hematologic concerns have arisen      Infectious disease  She has a history of  significant infections including MRSA, ESBL and VRE.  She has been admitted with sepsis on several occasions.  She has had significant issues with infections in the urinary system.  She has had pneumonia.  Another recent infection was a parotid gland infection that was caused by MRSA.  There is currently no sign of any active infection process.  She has been seen by infectious disease.      Chronic pain  She is chronic pain stemming from partial lung resection in the more distant past.  Has had increased pain secondary to her recent craniotomy.  Currently reports pain is controlled.  Current pain regiment is oxycodone/acetaminophen 5-3 25 2 tablets 3 times daily.  She is also maintained on gabapentin for neuropathic pain.  Other factors complicating her pain control include the use of sedating medications including lorazepam use to manage anxiety and temazepam used for sleep.  Her current pain control regiment does allow for adequate pain control.  Today we discussed referral to a pain clinic.      Mental health  She has a history of significant depression and anxiety.  Current medications include Buspirone, Cymbalta, lorazepam, temazepam.  She is agreeable to seeing a psychiatrist to help manage her complex mental health concerns.  She reports no current difficulties with sleep.  She reports her anxiety is reasonably controlled.  A referral was placed for psychiatry consultation but no appointment was established, patient is again agreeable for referral.      Pulmonology  She has COPD.  She has not had pulmonary function testing to quantify her airway obstruction.  She is not reporting any current breathing difficulty.   she has previously used oxygen but reports she is not using oxygen at this time.  She has a history of lung cancer with partial lung resection.  She does continue to follow with her lung surgeon for surveillance.  She has a history of pulmonary embolism.          Cardiac  Echocardiogram most recent  performed in October 2016 showed normal ejection fraction.  Elevated pulmonary pressures were noted.  A pharmacologic stress test was performed in October 2016.  It was negative for ischemia.  There was a suggestion of nontransmural infarction in the distal anterior wall.  It was described that it may represent breast shadowing.  There are no current cardiac complaints.  A history of diastolic dysfunction is listed in her chart.      Urology  She has a neurogenic bladder with a suprapubic catheter.  Has had significant and recurrent infection issues including ESBL producing E. coli.  There is often uncertainty regarding the presence of an infection because of her colonization.  There is no current infection concern.  Catheter was changed during recent hospitalization.  She has been resistant to considering any type of urinary diversion other than the suprapubic catheter because she is very fearful of the possibility of having a surgical procedure.  She denies any concerns for urinary system difficulties currently.  She states she wishes to contemplate her potential options and possibly seek a second opinion from the [facility].  She assures me she has follow-up scheduled with [facility] urology although I do not believe she has ever seen them on an outpatient basis.      Skin  She has had issues with ulcerations including decubitus ulcer in the sacral area.  She has had previous concerns with ulcerations in the feet.  Patient denies the presence of any ulcerations currently.  Efforts have been made to obtain an air mattress, for various reasons this has not been followed through.      Gastro  She has a history of colitis without any current digestive system concerns.  She has had hospital evaluations for abdominal pain.  She does have a history of constipation that is multifactorial.  She underwent a screening colonoscopy on June 8, 2017 which was described as normal but having a moderately tortuous  colon. She is not reporting any current bowel issues.  She had developed gallstones and cholecystitis that required gallbladder removal.        Endocrine  She has hypothyroidism.  TSH was 0.06 on September 7, 2017.  There is no history of diabetes.  An A1c was performed August 5, 2013 and was 5.7.  Review of glucose readings does not show any significant concern with elevated blood sugar.  A serum albumin level was 2.7 on April 10, 2017.      Health maintenance  The following health maintenance considerations should be considered.  Mammography, gynecologic screening.  She has incomplete immunization records and the following immunizations should be considered.  Tetanus update with pertussis protection, pneumococcal vaccine, shingles vaccine.    Review of Systems  Complete review of systems is obtained.  Other than the specific considerations noted above complete review of systems is negative.          Objective:   Medications:  Current Outpatient Prescriptions   Medication Sig     acetaminophen (TYLENOL) 325 MG tablet Take 2 tablets (650 mg total) by mouth every 6 (six) hours as needed for pain.     amLODIPine (NORVASC) 5 MG tablet TAKE 1 TABLET BY MOUTH DAILY     ascorbic acid (VITAMIN C WITH BLAZE HIPS) 500 MG tablet Take 500 mg by mouth 2 (two) times a day.     aspirin 81 mg chewable tablet Chew 81 mg daily.      bisacodyl (DULCOLAX) 10 mg suppository Insert 10 mg into the rectum daily as needed.     busPIRone (BUSPAR) 5 MG tablet Take 1.5 tablets (7.5 mg total) by mouth 3 (three) times a day.     diaper,brief,adult,disposable (BRIEFS, ADULT-EXTRA LARGE) Misc Use 1 each As Directed as needed.     disposable gloves (PURPLE NITRILE GLOVES) Misc Use 1 each As Directed as needed.     divalproex (DEPAKOTE) 250 MG EC tablet Take 1 tablet (250 mg total) by mouth 2 (two) times a day.     DULoxetine (CYMBALTA) 30 MG capsule Take 60 mg by mouth every evening.     gabapentin (NEURONTIN) 300 MG capsule Take 1 capsule (300 mg  total) by mouth 3 (three) times a day.     hydrOXYzine (ATARAX) 25 MG tablet TAKE 1 TABLET (25MG TOTAL) BY MOUTH AT BEDTIME     Deven Therapuetic Nutrition 7-7-1.5 gram PwPk Take 1 packet by mouth 2 (two) times a day.     lactulose 10 gram/15 mL (15 mL) Soln Take 15 mL (10 g total) by mouth daily as needed (use 3rd if senna and miralax do not work).     levETIRAcetam (KEPPRA) 250 MG tablet Take 750 mg by mouth 2 (two) times a day.     levothyroxine (SYNTHROID, LEVOTHROID) 125 MCG tablet TAKE 2 TABLETS BY MOUTH DAILY.     lidocaine (XYLOCAINE) 5 % ointment Apply qid prn to urethra     LORazepam (ATIVAN) 0.5 MG tablet Take 0.5 mg by mouth every 8 (eight) hours as needed for anxiety.     menthol-zinc oxide (CALMOSEPTINE) 0.44-20.6 % Oint ointment Apply 1 application topically 3 (three) times a day. Apply to buttocks and ITs     miconazole (MICOTIN) 2 % powder Apply topically 2 (two) times a day. To groin folds     MULTIVITAMIN/IRON/FOLIC ACID (CERTAVITE-ANTIOXIDANT ORAL) Take 1 tablet by mouth every morning.      omeprazole (PRILOSEC) 20 MG capsule Take 20 mg by mouth daily before breakfast.      oxybutynin (DITROPAN) 5 MG tablet Take 5 mg by mouth 3 (three) times a day. Bladder control     oxyCODONE-acetaminophen (PERCOCET) 5-325 mg per tablet Take 2 tablets by mouth every 6 (six) hours as needed for pain.     polyethylene glycol (MIRALAX) 17 gram packet Take 1 packet (17 g total) by mouth daily as needed (use 2nd  if senna is not working).     SELSUN BLUE, SALICYLIC ACID, TOP Apply 1 application topically daily as needed. Apply to face redness/flaking areas     senna-docusate (PERICOLACE) 8.6-50 mg tablet Take 2 tablets by mouth 2 (two) times a day. Hold for loose stool     warfarin (COUMADIN) 1 MG tablet TAKE 1 TABLET BY MOUTH DAILY OR AS DIRECTED. ADJUST DOSE BASE ON INR RESULTS     white petrolatum (AQUAPHOR ORIGINAL) 41 % Oint Apply 1 application topically daily as needed (skin irritation). Apply to bilateral  lower extremeties        Allergies:  No Known Allergies    Tobacco:   reports that she quit smoking about 3 years ago. She has never used smokeless tobacco.     Physical Exam          /58  Pulse 100  SpO2 99%      General: Patient seated comfortably in her wheelchair     HEENT: No scleral icterus or conjunctival irritation, mouth is moist no oral lesions neck supple without lymph nodes or masses     Lungs: Good air movement throughout the upper lung fields slight diminishment in the lower lung fields no wheezes or crackles     Heart: Regular rate and rhythm no murmur     Abdomen: Suprapubic catheter in place soft no distention no significant tenderness on palpation     Extremities: Lower extremities are covered patient declines consideration to uncover extremities at this time she denies ulceration, no significant edema is noted.

## 2021-06-13 NOTE — PROGRESS NOTES
ANTICOAGULATION  MANAGEMENT    Reviewed records from recent Hospital Admission for Abdominal pain.    No adjustment to anticoagulation plan needed    Edmundo Yu RN

## 2021-06-14 NOTE — PROGRESS NOTES
ANTICOAGULATION  MANAGEMENT    Reviewed records from recent Hospital Admission for Sepsis.    No adjustment to anticoagulation plan needed.    Edmundo Yu RN

## 2021-06-14 NOTE — PROGRESS NOTES
My Clinic Care Coordination Wellness Plan  This Graduation Wellness Plan provides private information in regards to the work I have done with my Care Team from my Primary Care Clinic.  This document provides insight on the goals I have accomplished.  My Care Team congratulates me on my journey to maintain wellness.  This document will help guide me on my journey to maintain a healthy lifestyle.  I will use this to help me overcome any barriers I may encounter.  If I should have any questions or concerns, I will continue to contact the members of my Care Team or contact my Primary Care Clinic for assistance.      [facility]  [address], [address] [place]  [address]  [phone]      My Preferred Method of Contact:  Phone: [phone]    My Primary/Preferred Language:  English    Preferred Learning Style:  Face to face discussion    Emergency Contact: Extended Emergency Contact Information  Primary Emergency Contact: Sánchez Paredes   [facility]  Home Phone: [phone]  Relation: Child  Secondary Emergency Contact: Robby Barkley   [facility]  Mobile Phone: [phone]  Relation: Cousin     PCP:  Sánchez Zamora MD  Specialists:    Care Team            Sánchez Zamora MD PCP - General, Family Medicine    [phone]     Andrzej Hough MD Physician, Hematology and Oncology    [phone]     Harpreet Miller     [phone]     [facility] Care Manager        Accomplishments:  Goals        Patient Stated      COMPLETED: I have accomplished being approved for the [employer] program. (pt-stated)            Personal Plan  If I am having trouble with services through the [employer] program I will call my worker Harpreet miller at [phone].         COMPLETED: I have accomplished getting myself established with a food delivery services.  (pt-stated)            Personal Plan  I will continue to get food deolivery services through  [employer] and if that ends up not working out I will refer back to the list of options my Care Guide sent me or talk with my Financial worker with [facility].         COMPLETED: I have accomplished looking into resources.  (pt-stated)            Personal Plan  If I am in need of resources I will call the Disability Linkage line at [phone].        COMPLETED: I have been consistently getting INR checks in my home. (pt-stated)            Personal Plan  When I am unable to get INR checks done inmy home I will talk to my PCP for advise.         Other      COMPLETED: I have accomplished getting a supplement program by obtaining Medical Assistance.            Personal Plan  I will continue to stay on Medical Assistance. If I am having trouble with my Medical Assistance coverage I will call my Financial worker or the main phone number for [facility] at [phone].              Advanced Directive/Living Will: On file with the clinic    Clinical Emergency Plan    Managing Chronic Pain: Medicines  Medicines can help you live better with chronic pain. You may use over-the-counter or prescription medicines. It can take some time and trial and error to work out the best treatment plan for you. Work with your health care provider to find the best medicines for you, and to use them safely and effectively.  Tell your health care provider about all medicines you`re taking, including herbs and vitamins.   A part of your treatment plan  Depending on your situation and the type of pain, you may take medicines:    At times when pain is more intense than usual.    For pain relief throughout the day.    Before activities that tend to trigger pain, like going shopping or doing physical therapy.     To decrease sensitivity to pain and help you sleep.  There are 4 major groupings of medicines for the treatment of chronic pain:  Non-opioids. These include the commonly used medicine acetaminophen as well as the  non-steroidal anti-inflammatory drugs (NSAIDs), like aspirin and ibuprofen, naproxen sodium, and ketoprofen. These all help control pain but NSAIDs also help relieve inflammation. These drugs are available over-the-counter. Some NSAIDS are available by prescription only.  Acetaminophen can cause liver damage if taken above the recommended dose. NSAIDs may cause stomach problems like bleeding ulcers. Using them over the long-term can cause heart problems and stroke in a very small number of people. None of these drugs is addictive.  Opioids. This includes drugs, like morphine, oxycodone, codeine, fentanyl, and methadone. Opioids may be used to treat breakthrough pain or severe chronic pain. Opioids are available only by prescription. These medicines may be effective for managing chronic pain. But they are controversial because of their side effects and because they can be addictive.    Adjuvants. This group includes medicines that were originally made to treat other conditions, but were also found to relieve pain. Examples of adjuvant drugs include antidepressants and anticonvulsants.  Antidepressants. These help pain by working on the same brain chemicals that play a role in depression. They also help improve sleep. Tricyclic antidepressants are 1 group of antidepressants used for treating chronic pain caused by nerve injury (neuropathic pain). Examples include amitriptyline, nortriptyline, and desipramine. Serotonin-norepinephrine reuptake inhibitors (SNRIs), like duloxetine and milnacipran, are also used.  Some types of antidepressants are used in low doses for sleep problems. They may also be prescribed if you are very sensitive to pain or some kinds of nerve pain.  Anticonvulsants, developed to prevent seizures, can help certain pain conditions, particularly nerve (neuropathic) pain. Examples include gabapentin and pregabalin.  Other pain medicines    Topical. These medicines, like lidocaine or capsaicin, are  applied to the skin to treat pain in one location.    Muscle relaxants. These may be used to stop painful muscle spasms. Drugs like cyclobenzaprine can be sedating.  Taking medicine safely    Take your medicine on time and in the right dose as prescribed.    Tell your health care professional if your medicine doesn't relieve your pain or work for a long enough time, or if you have side effects.    Don't take other people's medicines. They may not be safe for you.  Avoid alcohol, tobacco, and illegal drugs. These may interact with your medicines causing you harm.      All [facility] clinic patients have access to a Nurse 24 hours a day, 7 days a week.  If you have questions or want advice from a Nurse, please know [facility] is here for you.  You can call your clinic and they will connect you or you can call [facility] at [phone].  [facility] also has Walk In Care clinics in multiple locations.  Call the number listed above for more information about our Walk In Care clinics or visit the [facility] website at [email or web address].

## 2021-06-14 NOTE — ANESTHESIA POSTPROCEDURE EVALUATION
Patient: Marychuy Zhou  DEBRIDEMENT, SACRAL ULCER  Anesthesia type: MAC    Patient location: PACU  Last vitals:   Vitals:    12/22/17 1255   BP: 91/51   Pulse: 96   Resp: 18   Temp: 36.1  C (97  F)   SpO2: 96%     Post vital signs: stable  Level of consciousness: awake and responds to simple questions  Post-anesthesia pain: pain controlled  Post-anesthesia nausea and vomiting: no  Pulmonary: unassisted, return to baseline  Cardiovascular: stable and blood pressure at baseline  Hydration: adequate  Anesthetic events: no    QCDR Measures:  ASA# 11 - Amalia-op Cardiac Arrest: ASA11B - Patient did NOT experience unanticipated cardiac arrest  ASA# 12 - Amalia-op Mortality Rate: ASA12B - Patient did NOT die  ASA# 13 - PACU Re-Intubation Rate: ASA13B - Patient did NOT require a new airway mgmt  ASA# 10 - Composite Anes Safety: ASA10A - No serious adverse event    Additional Notes:

## 2021-06-14 NOTE — ANESTHESIA CARE TRANSFER NOTE
Last vitals:   Vitals:    12/22/17 1055   BP: 122/65   Pulse: (!) 101   Resp: 20   Temp: 36.4  C (97.6  F)   SpO2: 100%     Patient's level of consciousness is drowsy  Spontaneous respirations: yes  Maintains airway independently: yes  Dentition unchanged: yes  Oropharynx: oropharynx clear of all foreign objects    QCDR Measures:  ASA# 20 - Surgical Safety Checklist: WHO surgical safety checklist completed prior to induction  PQRS# 430 - Adult PONV Prevention: 4558F - Pt received => 2 anti-emetic agents (different classes) preop & intraop  ASA# 8 - Peds PONV Prevention: NA - Not pediatric patient, not GA or 2 or more risk factors NOT present  PQRS# 424 - Amalia-op Temp Management: 4559F - At least one body temp DOCUMENTED => 35.5C or 95.9F within required timeframe  PQRS# 426 - PACU Transfer Protocol: - Transfer of care checklist used  ASA# 14 - Acute Post-op Pain: ASA14B - Patient did NOT experience pain >= 7 out of 10

## 2021-06-14 NOTE — ANESTHESIA PREPROCEDURE EVALUATION
Anesthesia Evaluation      Patient summary reviewed     Airway   Mallampati: III   Pulmonary - normal exam   (+) COPD mild,   (-) pneumonia                         Cardiovascular - normal exam  (+) hypertension, CHF, ,     ECG reviewed     ROS comment: EF 60-65%     Neuro/Psych      Comments: Paraplegia secondary to T4 hematoma    Endo/Other    (+) hypothyroidism, arthritis,      Comments: Sepsis, infection sacral ulcer    GI/Hepatic/Renal    (+) GERD well controlled,   chronic renal disease,      Other findings: Hb 8.7 K 3.9      Dental    (+) lower dentures and upper dentures                       Anesthesia Plan  Planned anesthetic: MAC    ASA 4     Anesthetic plan and risks discussed with: patient    Post-op plan: routine recovery

## 2021-06-15 NOTE — ANESTHESIA POSTPROCEDURE EVALUATION
Patient: Marychuy Zhou  DEBRIDEMENT, PRESSURE ULCER  Anesthesia type: general    Patient location: PACU  Last vitals:   Vitals:    02/05/18 1045   BP: 100/60   Pulse: 99   Resp: 13   Temp:    SpO2: 97%     Post vital signs: stable  Level of consciousness: awake and responds to simple questions  Post-anesthesia pain: pain controlled  Post-anesthesia nausea and vomiting: no  Pulmonary: unassisted, return to baseline  Cardiovascular: stable and blood pressure at baseline  Hydration: adequate  Anesthetic events: no    QCDR Measures:  ASA# 11 - Amalia-op Cardiac Arrest: ASA11B - Patient did NOT experience unanticipated cardiac arrest  ASA# 12 - Amalia-op Mortality Rate: ASA12B - Patient did NOT die  ASA# 13 - PACU Re-Intubation Rate: ASA13B - Patient did NOT require a new airway mgmt  ASA# 10 - Composite Anes Safety: ASA10A - No serious adverse event    Additional Notes:
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
## 2021-06-15 NOTE — PROGRESS NOTES
Patient ID: Marychuy Zhou is a 62 y.o. female.  /66  Pulse (!) 136  SpO2 98%    Assessment/Plan:                   Diagnoses and all orders for this visit:    Chronic pain syndrome    Anxiety    Pulmonary embolism    Depression    Neurogenic bowel         DISCUSSION  We will make an effort to get a hospital bed covered for her at this time.  We will continue her current medication management with plan to refer to pain clinic and to psychiatry for help in managing chronic concerns.  We will need to arrange for short-term follow-up.  Subjective:     HPI    Marychuy Zhou is a 62 y.o. female with a complex medical history that includes paraplegia secondary to spinal hematoma, neurogenic bladder with suprapubic catheter multiple episodes of urinary infections.,  Multiple other infections many of which have been resistant to multiple antibiotics, decubitus ulcer, history of DVT and pulmonary embolism, history of partial lung resection for lung cancer, seizure disorder with recent subdural hematoma, chronic pain on narcotic pain medication, hypothyroidism, anxiety and depression on multiple medications for management, chronic anemia, and recently found nonobstructing 3 mm right kidney stone.  She was hospitalized from October 14 - October 16.  She is now here today for follow-up from that hospitalization.  At the current time patient reports she is doing well.  She denies breathing difficulty.  States that she is eating regularly.  She is optimistic about her future cares.  We did discussion today regarding referral to a pain clinic as well as to psychiatry for help in managing her most complex concerns including chronic pain management and mental health.  She has been referred to the [facility] urology department for further considerations related to her urinary system and urologic disorders as described.  Patient has not typically had follow through with referrals.  She is a difficult social  situation many of the details are uncertain at this point in time.  Previous office visit notes I have summarized her rather significant medical history and steps we have taken in order to try and manage some of those complex concerns.  Today we made a plan again as we have previously to make several referrals.  But again overall patient states she is doing well.    In regards to her chronic pain management she has several different areas of pain most of which stem from an chronically stem from her partial lung resection.  She is on oxycodone currently.  She has been able to step down on pain management compared to the time when I first seen her.  Patient denies significant concerns related to side effects of pain management she does understand there are risks involved and there are additive risks especially with concurrent use of other medications such as lorazepam for example.  We discussed all of this in depth today as we have previously.    Review of Systems  Complete review of systems is obtained.  Other than the specific considerations noted above complete review of systems is negative.          Objective:   Medications:  Current Outpatient Prescriptions   Medication Sig     diphenoxylate-atropine (LOMOTIL) 2.5-0.025 mg per tablet TAKE 1 TABLET BY MOUTH 4 TIMES A DAY AS NEEDED FOR DIARRHEA     levETIRAcetam (KEPPRA) 750 MG tablet TAKE 1 TABLET BY MOUTH TWICE DAILY       Allergies:  No Known Allergies    Tobacco:   reports that she quit smoking about 4 years ago. She has never used smokeless tobacco.     Physical Exam          /66  Pulse (!) 136  SpO2 98%         General: Patient seated comfortably in her wheelchair      HEENT: No scleral icterus or conjunctival irritation, mouth is moist no oral lesions neck supple without lymph nodes or masses      Lungs: Good air movement throughout the upper lung fields slight diminishment in the lower lung fields no wheezes or crackles      Heart: Regular rate and  rhythm no murmur      Abdomen: Suprapubic catheter in place soft no distention no significant tenderness on palpation      Extremities: Lower extremities are covered patient declines consideration to uncover extremities at this time she denies ulceration, no significant edema is noted.

## 2021-06-15 NOTE — ANESTHESIA PREPROCEDURE EVALUATION
Anesthesia Evaluation      Patient summary reviewed   No history of anesthetic complications     Airway   Mallampati: III  Neck ROM: full   Pulmonary - normal exam    breath sounds clear to auscultation  (+) pneumonia, COPD,                          Cardiovascular - normal exam  (+) hypertension, CHF, ,     ECG reviewed        Neuro/Psych      Endo/Other    (+) hypothyroidism, arthritis,      GI/Hepatic/Renal    (+) GERD,   chronic renal disease,           Dental    (+) edentulous                       Anesthesia Plan  Planned anesthetic: general endotracheal    ASA 3   Induction: intravenous   Anesthetic plan and risks discussed with: patient  Anesthesia plan special considerations: antiemetics,   Post-op plan: routine recovery

## 2021-06-15 NOTE — ANESTHESIA CARE TRANSFER NOTE
Patient spontaneously breathing.  Tidal volumes > 400ml.  Following commands, suctioned and extubated with balloon down.  O2 via mask at 10L.  To PACU, VSS, SBAR report to RN per institutional handoff policy and procedure.  Transfer of care.    Last vitals:   Vitals:    02/05/18 0828   BP: 91/52   Pulse: 84   Resp: 12   Temp: 36.8  C (98.2  F)   SpO2: 100%     Patient's level of consciousness is drowsy  Spontaneous respirations: yes  Maintains airway independently: yes  Dentition unchanged: yes  Oropharynx: oropharynx clear of all foreign objects    QCDR Measures:  ASA# 20 - Surgical Safety Checklist: WHO surgical safety checklist completed prior to induction  PQRS# 430 - Adult PONV Prevention: 4558F - Pt received => 2 anti-emetic agents (different classes) preop & intraop  ASA# 8 - Peds PONV Prevention: NA - Not pediatric patient, not GA or 2 or more risk factors NOT present  PQRS# 424 - Amalia-op Temp Management: 4559F - At least one body temp DOCUMENTED => 35.5C or 95.9F within required timeframe  PQRS# 426 - PACU Transfer Protocol: - Transfer of care checklist used  ASA# 14 - Acute Post-op Pain: ASA14B - Patient did NOT experience pain >= 7 out of 10

## 2021-06-15 NOTE — PROGRESS NOTES
ANTICOAGULATION  MANAGEMENT    Reviewed records from recent Hospital Admission for skin ulcer.    No adjustment to anticoagulation plan needed because patient was discharged to TCU.    Edmundo Yu RN

## 2021-06-16 NOTE — PROGRESS NOTES
ANTICOAGULATION  MANAGEMENT    Reviewed records from recent Emergency Room Visit for pelvic pain.    No adjustment to anticoagulation plan needed. Pt discharged back to skilled nursing facility.    Keli Marrero RN

## 2021-06-16 NOTE — PROGRESS NOTES
ANTICOAGULATION  MANAGEMENT    Reviewed records from recent Hospital Admission for pain control.    patient discharged to a TCU.    Keli Marrero RN

## 2021-06-16 NOTE — PROGRESS NOTES
ANTICOAGULATION  MANAGEMENT    Reviewed records from recent Hospital Admission for hip fracture/sepsis.    No adjustment to anticoagulation plan needed as patient was discharged to a Skilled Nursing Facility.    Keli Mrarero RN

## 2021-06-17 NOTE — PROGRESS NOTES
ANTICOAGULATION  MANAGEMENT    Reviewed records from recent Hospital Admission for Anemia.    No adjustment to anticoagulation plan needed. Pt was discharged back to a SNF.    Keli Marrero RN

## 2021-06-18 NOTE — PROGRESS NOTES
ANTICOAGULATION  MANAGEMENT    Reviewed records from recent Emergency Room Visit for UTI.    No adjustment to anticoagulation plan needed. Pt was discharged back to TCU.    Edmundo Yu RN

## 2021-06-19 NOTE — ANESTHESIA POSTPROCEDURE EVALUATION
Patient: Marychuy Zhou  DEBRIDEMENT, PRESSURE ULCER  Anesthesia type: general    Patient location: floor  Last vitals:   Vitals:    07/12/18 1323   BP: 108/55   Pulse: 77   Resp:    Temp:    SpO2:      Post vital signs: stable  Level of consciousness: awake and responds to simple questions  Post-anesthesia pain: pain controlled  Post-anesthesia nausea and vomiting: no  Pulmonary: unassisted, return to baseline  Cardiovascular: stable and blood pressure at baseline  Hydration: adequate  Anesthetic events: no    QCDR Measures:  ASA# 11 - Amalia-op Cardiac Arrest: ASA11B - Patient did NOT experience unanticipated cardiac arrest  ASA# 12 - Amalia-op Mortality Rate: ASA12B - Patient did NOT die  ASA# 13 - PACU Re-Intubation Rate: ASA13B - Patient did NOT require a new airway mgmt  ASA# 10 - Composite Anes Safety: ASA10A - No serious adverse event    Additional Notes:

## 2021-06-19 NOTE — ANESTHESIA CARE TRANSFER NOTE
Last vitals:   Vitals:    07/12/18 1145   BP: 149/73   Pulse: (!) 102   Resp:    Temp:    SpO2: 99%     Patient's level of consciousness is drowsy  Spontaneous respirations: yes  Maintains airway independently: yes  Dentition unchanged: yes  Oropharynx: oropharynx clear of all foreign objects    QCDR Measures:  ASA# 20 - Surgical Safety Checklist: WHO surgical safety checklist completed prior to induction  PQRS# 430 - Adult PONV Prevention: 4558F - Pt received => 2 anti-emetic agents (different classes) preop & intraop  ASA# 8 - Peds PONV Prevention: NA - Not pediatric patient, not GA or 2 or more risk factors NOT present  PQRS# 424 - Amalia-op Temp Management: 4559F - At least one body temp DOCUMENTED => 35.5C or 95.9F within required timeframe  PQRS# 426 - PACU Transfer Protocol: - Transfer of care checklist used  ASA# 14 - Acute Post-op Pain: ASA14B - Patient did NOT experience pain >= 7 out of 10

## 2021-06-19 NOTE — ANESTHESIA PREPROCEDURE EVALUATION
Anesthesia Evaluation      Patient summary reviewed   No history of anesthetic complications     Airway   Mallampati: III  Neck ROM: full   Pulmonary    (+) pneumonia, COPD moderate, decreased breath sounds, a smoker (quit 2013)                         Cardiovascular - normal exam  Exercise tolerance: > or = 4 METS  (+) hypertension well controlled, dysrhythmias (svt), CHF, , hypercholesterolemia,      Neuro/Psych    (+) neuromuscular disease (paraplegia due to spinal hematoma),  depression, anxiety/panic attacks,     Comments: Alcohol dependence - history    Endo/Other    (+) hypothyroidism, arthritis (RA), obesity (bmi 32),      GI/Hepatic/Renal    (+) GERD well controlled and intermittent,   chronic renal disease (ckd; nephrolithiasis),      Other findings: Opiate dependence, continuous (H)     Other chronic pain    Lumbosacral Disc Degeneration    Herpes Simplex Type I    Nicotine Dependence    Hypertension    Dislodged Bhandari catheter, subsequent encounter    Acute respiratory failure with hypoxia (H)    COPD (chronic obstructive pulmonary disease) (H)    Lactic acidosis    SVT (supraventricular tachycardia) (H)    GERD (gastroesophageal reflux disease)    Hypothyroidism    Iron deficiency anemia    Drug-induced constipation    Paraplegia (H)    Chronic pain syndrome    Major depression    Alcohol abuse    Decubitus ulcer    Cancer of lung (H)    Neurogenic bladder    Neurogenic bowel    Hyperlipidemia    Palliative care encounter    Hypokalemia    Paraplegia at T4 level (H)    Major depressive disorder, recurrent episode, moderate (H)    Seizure disorder (H)    Heel ulcer, right, limited to breakdown of skin (H)    Decubitus ulcer of sacral region, stage 4 (H)    Esophageal dysmotility    Anxiety disorder    History of MDR Enterobacter cloacae infection    Mechanical complication of suprapubic catheter (H)    Diastolic CHF, acute on chronic (H)    Chest tightness or pressure    Atypical chest pain    Decubitus  ulcer of right buttock, stage 4 (H)    Nephrostomy tube displaced (H)    HCAP (healthcare-associated pneumonia)    Cognitive disorder    Insomnia due to anxiety and fear    Sepsis, due to unspecified organism (H)    Urinary tract infection, site unspecified    Hypotension, unspecified hypotension type    Chronic low back pain without sciatica, unspecified back pain laterality    Acute encephalopathy    Claustrophobia    Choledocholithiasis with obstruction    Cholelithiasis    Hx of pulmonary embolus    S/P cholecystectomy    Pneumonia of left lower lobe due to infectious organism (H)    Lower abdominal pain    Neck infection    Subdural hematoma (H)    Convulsions, unspecified convulsion type (H)    Partial symptomatic epilepsy with simple partial seizures, intractable, with status epilepticus (H)    Acute on chronic intracranial subdural hematoma (H)    Brain edema (H)    Chronic obstructive pulmonary disease with acute exacerbation (H)    Decubitus ulcer, stage III (H)    Depression    Sepsis (H)    Sepsis secondary to UTI (H)    Pyelonephritis    Hyponatremia    Episodic cluster headache, not intractable    Flank pain    Right upper quadrant abdominal pain    Abdominal pain    Colon wall thickening    Cystitis    Suprapubic catheter dysfunction, subsequent encounter    Bilateral hydronephrosis    Elevated lactic acid level    Paroxysmal hemicrania    UTI (urinary tract infection)    Abdominal pain, unspecified location    Coagulopathy (H)    Anxiety    Dislodged wound Vacuum    Urinary tract infection associated with cystostomy catheter (H)    History of pulmonary embolism    Chronic anticoagulation    Weakness    Urinary incontinence due to urethral sphincter incompetence    Abscess, gluteal, right    History of DVT (deep vein thrombosis)    Hypothyroidism due to Hashimoto's thyroiditis    Fever in other diseases    Severe sepsis (H)    Ulcer (H)    Sleep difficulties    Irritability and anger    Adjustment  disorder with mixed anxiety and depressed mood    Anemia    Closed left subtrochanteric femur fracture (H)    Acute blood loss anemia    Other specified hypotension    History of encephalopathy    Nephrostomy complication (H)    Left lower lobe pneumonia (H)    Acute bronchitis due to other specified organisms    Hypoxemia    Centrilobular emphysema (H)    Bilateral lower extremity edema    Sacral decubitus ulcer    Gangrene (H)          Dental    (+) upper dentures and lower dentures                       Anesthesia Plan  Planned anesthetic: general endotracheal    ASA 4   Induction: intravenous   Anesthetic plan and risks discussed with: patient  Anesthesia plan special considerations: antiemetics,   Post-op plan: routine recovery        Chemistry        Component Value Date/Time     07/10/2018 2220    K 3.6 07/10/2018 2220     07/10/2018 2220    CO2 19 (L) 07/10/2018 2220    BUN 7 (L) 07/10/2018 2220    CREATININE 0.61 07/10/2018 2220     07/10/2018 2220        Component Value Date/Time    CALCIUM 8.6 07/10/2018 2220    ALKPHOS 157 (H) 07/04/2018 0731    AST 8 07/04/2018 0731    ALT <9 07/04/2018 0731    BILITOT 0.2 07/04/2018 0731        Lab Results   Component Value Date    WBC 10.9 07/12/2018    HGB 8.2 (L) 07/12/2018    HCT 28.7 (L) 07/12/2018    MCV 74 (L) 07/12/2018     07/12/2018     Lab Results   Component Value Date    INR 2.27 (H) 07/11/2018    INR 1.80 (H) 05/10/2018    INR 1.66 (H) 03/26/2018

## 2021-06-21 NOTE — PROGRESS NOTES
" Patient ID: Marychuy Zhou is a 63 y.o. female.  /62  Pulse (!) 110  Ht 5' 2.5\" (1.588 m)  Wt 158 lb (71.7 kg) Comment: per pt estimate  LMP  (LMP Unknown)  SpO2 99%  BMI 28.44 kg/m2    Assessment/Plan:                   Diagnoses and all orders for this visit:    Seizure disorder (H)  -     Ambulatory referral to Neurology    Paraplegia at T4 level (H)  -     Ambulatory referral to Home Health    Opiate dependence, continuous (H)  -     methadone (DOLOPHINE) 5 MG tablet; Take 1 tablet (5 mg total) by mouth every 8 (eight) hours.  Dispense: 90 tablet; Refill: 0  -     Ambulatory referral to Pain Clinic    Pulmonary embolism (H)    Chronic suprapubic catheter (H)    Neurogenic bladder  -     Ambulatory referral to Home Health    Chronic pain  -     Ambulatory referral to Home Health  -     Ambulatory referral to Pain Clinic    Sacral insufficiency fracture    Pressure injury of skin of sacral region, unspecified injury stage  -     Ambulatory referral to Home Health    Pressure injury of skin of right foot, unspecified injury stage  -     Ambulatory referral to Home Health    Pressure injury of skin of right heel, unspecified injury stage  -     Ambulatory referral to Home Health    Pressure injury of skin of left heel, unspecified injury stage  -     Ambulatory referral to Home Health    Other orders  -     Cancel: Mammo Screening Bilateral; Future; Expected date: 10/16/18  -     hydrOXYzine HCl (ATARAX) 25 MG tablet; Take 1 tablet (25 mg total) by mouth every 8 (eight) hours as needed for itching.  Dispense: 90 tablet; Refill: 6  -     oxyCODONE (ROXICODONE) 5 MG immediate release tablet; Take 1-2 tablets (5-10 mg total) by mouth every 8 (eight) hours as needed for pain.  Dispense: 60 tablet; Refill: 0    40 minutes was allotted for this visit.  I spent approximately 30 minutes before the visit sorting through information.  The visit itself with the patient took approximately 70 minutes this involved " counseling and coordination of care.  I spent greater than 1 hour time sorting through additional information following the visit and documenting the visit.     DISCUSSION  The most pertinent consideration is to manage her chronic pressure ulcers involving the sacral region and the feet as described in the diagnoses above.  She needs referral to a vascular/wound care clinic.  We will arrange for home nursing to evaluate and help care for these wounds in an appropriate manner.  Based on the severity of her situation did offer consideration for hospitalization or consideration for an alternative living environment such as at a long-term care facility and patient declines understanding that there is a high risk of serious debilitation because of this as well as potential need for amputation, extensive surgery or even death.    I am skeptical about the diagnosis of seizure but obviously concerned.  We will continue her Keppra.  Patient is cautioned that should she have anything she feels is a seizure she needs to be evaluated on an emergent basis.  We will make arrangements for her to be seen by neurology.    She has a long history of chronic pain.  I do believe she has pain in need of treatment with medication therapy.  I am concerned about her ability to accurately reports pain.  Patient does not appear to be in distress at this point to suggest the degree of pain that she claims to have.  I will make my best judgment in prescribing medication.  Will resume her usual dose of the methadone which is 5 mg 3 times daily.  Will prescribe oxycodone 5 mg extended release tablets 1-2 tablets every 8 hours as needed.  I have supplied the amount as listed above.  I will make arrangements for her to be seen at a pain clinic in the most timely manner possible.  She needs to follow through with this referral or I will discontinue prescribing medications.    The significance of the ala insufficiency fractures is uncertain.  Patient  was extremely concerned about the diagnosis of bone marrow edema.  I do not think she needs further doses of steroid.  This will need to be reassessed along with all of these issues discussed.    We will arrange for follow-up visit in 2-3 weeks.  Patient must attend follow-up visits and the importance of this is discussed.    During the course of our visit patient does demonstrate that she is knowledgeable of her health concerns and is competent to make decisions for herself.  I am very concerned about her frail state of health with the multitude of concerns, the severity of the concerns, the possibility of significant short-term decompensation and the complexity of management involved.  I discussed this and reiterated this multiple times throughout the course of this visit.  There is no piece of information or situation that I feel would warrant that patient is forced against her will into any situation.    Medication list was updated as much as reasonably possible.  Many medications were listed 2 and 3 times on the list.  Patient brought with her only a portion of her medications.  Medications will need to be double checked in the future and patient is requested to bring all medications for future visits.  Subjective:     RONALD Zhou is a 63 y.o. female with an extensive and complex medical history that includes paraplegia secondary to spinal hematoma in the distant past, neurogenic bladder with suprapubic catheter and multiple episodes of urinary tract infections leading to sepsis.  Multiple other infections often with resistance.  She is a history of multiple decubitus ulcers and current concerns for sacral ulcers as well as ulcers on the feet and ankles.  She has a history of DVT and pulmonary embolus and is anticoagulated with Xarelto.  She is a history of partial lung resection for lung cancer.  She has a reported seizure disorder, details regarding the diagnosis of her seizure disorder are  largely unknown.  Patient is reporting concerns about seizures at the current time.  She has anxiety and depression which are likely multifactorial.  She has chronic pain that originally was primarily centered around pain from her lung cancer resection, now pain is described as severe in the legs.  Patient has been out of pain medications for some time.  She is a chronic anemia that is likely multifactorial.  She is a history of kidney stones and gallstones.  She has a history of chronic obstructive pulmonary disease with a history of smoking.  Patient reports to me during the course of our visit today that she has returned to smoking.    I last saw her nearly a year ago.  She has been in several different hospital care systems for various reasons throughout the past year.  She did spend time in transitional care but has since moved to a private home.  Patient states she is currently living with a girlfriend.  Patient assures me that she has nursing care that she receives 12 hours of the day.    She began calling in recent months requesting medication refills.  I had asked her to schedule an appointment to reestablish care given the length of time since I had seen her.  There was great resistance in doing so and a lot of difficulty with transportation.  She now [place] in a more distant part of town.  She does state that she plans to continue to come here.  We had a conversation about the logistics of her travel to be seen to be cared for.  Patient assures me that she will continue to be able to be seen on a regular basis.  She is complex medical issues many of which require the assistance of specialty care providers.  This is been an ongoing issue in that when appointments are set up patient does not follow through.  I have asked her to follow through on appointments with specialty providers to manage these complex concerns discussed in more detail below.  We had a conversation today regarding her treatment of  staff on the telephone as well.  Discussed the importance of interacting with staff in an appropriate manner also discussed realistic expectations of staff resources.    Patient reports that she is concerned she may have been having seizures.  She reports unusual episodes of shaking that have occurred in recent weeks.  Patient states that she went to the hospital however there is no record that she went to the hospital specifically for this.  Review of hospital records from outside care systems indicate that there was some uncertainty about this.  She is maintained on Keppra.  Patient insists her dose was changed however it appeared it was not.  We discussed the importance of following up with neurology given the complexity of the issue.    She insists that she has severe 15 out of 10 pain in her legs.  She has been out of chronic narcotic pain medication for at least several days.  She has been to maintained on methadone 5 mg 3 times daily in conjunction with oxycodone.  I discussed with her my comfort and prescribing medications.  I do think it is reasonable for her to resume medication for control of pain however given the complexity of the issues she needs to be ultimately managed by a pain clinic.  I will only prescribe medication doses that I am comfortable with given her overall situation and if there is any need for pain treatment beyond that she will need to be seen in a hospital situation such as emergency department or by a pain management specialist.    She has chronic decubitus ulcers, the most significant is in the sacral region.  I am unable to evaluate this today.  Patient reports that is being cared for but that she was told it will likely never heal.  Patient reports no acute issue with this.  She does have multiple new ulcers that have arisen on the feet and ankles.  I discussed with her my significant concern regarding all of these ulcerations in conjunction with her living situation.  She does  not want to consider any alternative in terms of living situation or even acute hospitalization for management of these concerns.  We did discuss that there is a high risk of infection and progressing toward death if these become infected.  Patient understands this.    I spent a significant amount of time today reviewing her past history, trying to gather together records through care everywhere in epic.  This was a significant visit in terms of the amount of information on top of her already complex health history.  Patient had a lot of expectation about information to be known during the course of the visit.  The pertinent concerns are as listed above.    We did touch briefly on anticoagulation management she is not reporting any bleeding concerns.  She reports no breathing difficulty or lung problems currently.  She does report worsened anxiety and depression symptoms.  She does request refill of lorazepam which I declined.  She has not been on this medication for some time as much as I can tell by reviewing records.  Patient reports that her urinary system seem to be doing well but she did have some leakage of urine through the urethra recently.    Below I have included her health history as I have been trying to keep up-to-date with as of her last visit with me in November 2017.    Neurologic  She underwent craniotomy for subdural hematoma evacuation on April 10, 2017.  She has a reported history of seizure disorder in the distant past that had not been confirmed by available records.  She is paraplegic secondary to a spinal hematoma. She has neuropathic pain in the legs.      Hematology  She has a history of anemia.  She does have a history of blood clot including DVT and pulmonary embolus.  She is currently on rivaroxaban.    Infectiosious disease  She has a history of significant infections including MRSA, ESBL and VRE.  She has been admitted with sepsis on several occasions.  She has had significant issues  with infections in the urinary system.  She has had pneumonia.  Another infection was a parotid gland infection that was caused by MRSA.  There is been concern recently that multiple decubitus ulcers have been a source for infection.      Chronic pain  She has chronic pain stemming from partial lung resection in the more distant past.  More recently patient complains of what she describes as pain in her legs which is thought to be a neuropathic pain.      Mental health  She has a history of significant depression and anxiety.      Pulmonology  She has COPD.  She has not had pulmonary function testing to quantify her airway obstruction.  She is not reporting any current breathing difficulty.   she has previously used oxygen but reports she is not using oxygen at this time.  She has a history of lung cancer with partial lung resection.  She does continue to follow with her lung surgeon for surveillance.  She has a history of pulmonary embolism.          Cardiac  Echocardiogram most recent performed in October 2016 showed normal ejection fraction.  Elevated pulmonary pressures were noted.  A pharmacologic stress test was performed in October 2016.  It was negative for ischemia.  There was a suggestion of nontransmural infarction in the distal anterior wall.  It was described that it may represent breast shadowing.        Urology  She has a neurogenic bladder with a suprapubic catheter.  Has had significant and recurrent infection issues including ESBL producing E. coli.  There is often uncertainty regarding the presence of an infection because of her colonization.  There is no current infection concern.   She has been resistant to considering any type of urinary diversion other than the suprapubic catheter because she is very fearful of the possibility of having a surgical procedure.  She denies any concerns for urinary system difficulties currently.  he has follow-up scheduled with [facility] urology although I do not  believe she has ever seen them on an outpatient basis.      Skin  Since I have last seen her she has developed significant decubitus ulcers including the sacral area which have been ongoing, more recently she has developed ulcers involving the right foot, right heel and left heel.  Patient reports that these are not healing.  She is undergoing wound management by her care providers.        Gastro  She has a history of colitis without any current digestive system concerns.  She has had hospital evaluations for abdominal pain.  She does have a history of constipation that is multifactorial.  She underwent a screening colonoscopy on June 8, 2017 which was described as normal but having a moderately tortuous colon. She is not reporting any current bowel issues.  She had developed gallstones and cholecystitis that required gallbladder removal.        Endocrine  She has hypothyroidism.     Musculoskeletal  She had an MRI scan performed on October 2, 2018.  She has bilateral sacral ala insufficiency fractures with bone marrow edema.  She was prescribed a course of dexamethasone upon hospital discharge for management of this condition.  Patient is very fixated and concerned about this at the time of our visit.    Review of Systems  Complete review of systems is obtained.  Other than the specific considerations noted above complete review of systems is negative.          Objective:   Medications:  Current Outpatient Prescriptions   Medication Sig Note     acetaminophen (TYLENOL) 500 MG tablet Take 1 tablet (500 mg total) by mouth every 8 (eight) hours. (Patient taking differently: Take 500 mg by mouth every 8 (eight) hours as needed for pain. )      ARIPiprazole (ABILIFY) 5 MG tablet Take 1 tablet (5 mg total) by mouth 2 (two) times a day.      bisacodyl (DULCOLAX) 10 mg suppository As needed for severe constipation (Patient taking differently: Insert 10 mg into the rectum daily as needed. As needed for severe constipation)       calcium, as carbonate, (TUMS) 200 mg calcium (500 mg) chewable tablet Chew 1 tablet (200 mg total) 4 (four) times a day as needed. (Patient taking differently: Chew 200 mg 4 (four) times a day with meals and bedtime. )      collagenase ointment Apply topically. 10/16/2018: Received from: [facility] Received Sig: Apply topically to affected area(s) once daily.     cyclobenzaprine (FLEXERIL) 5 MG tablet Take 1 tablet (5 mg total) by mouth 3 (three) times a day as needed for muscle spasms.      DAKIN'S SOLUTION 0.125 % Soln external solution USE TO CLEANSE WOUND AND MOISTEN GAUZE TWICE DAILY;USE TO CLEANSE WOUND AND MOISTEN GAUZE AS NEEDED 10/16/2018: Received from: External Pharmacy     dexamethasone (DECADRON) 4 MG tablet Take 4 mg by mouth 2 (two) times a day with meals.      diaper,brief,adult,disposable (BRIEFS, ADULT-EXTRA LARGE) Misc Use 1 each As Directed as needed.      disposable gloves (PURPLE NITRILE GLOVES) Misc Use 1 each As Directed as needed.      ibuprofen (ADVIL,MOTRIN) 600 MG tablet Take 600 mg by mouth. 10/16/2018: [facility] Received Sig: Take 1 tablet by mouth 4 times daily with meals and at bedtime. Maximum of 3200 mg in 24 hours.     ipratropium-albuterol (DUO-NEB) 0.5-2.5 mg/3 mL nebulizer Take 3 mL by nebulization every 4 (four) hours as needed (wheezing).       Deven Therapuetic Nutrition 7-7-1.5 gram PwPk Take 1 packet by mouth. 10/16/2018: Received from: [facility] Received Sig: Take 1 Packet by mouth 2 times daily.     levETIRAcetam (KEPPRA) 250 MG tablet Take 1 tablet (250 mg total) by mouth 2 (two) times a day for 15 days.      levothyroxine (SYNTHROID, LEVOTHROID) 125 MCG tablet Take 125 mcg by mouth Daily at 6:00 am.      methadone (DOLOPHINE) 5 MG tablet Take 1 tablet (5 mg total) by mouth every 8 (eight) hours.      miconazole (MICOTIN) 2 % powder Apply topically 2  (two) times a day. To groin folds      multivit-mins-ferrous gluconat 9 mg iron/15 mL Liqd 15 mL by Enteral Tube route daily.       omeprazole (PRILOSEC) 20 MG capsule Take 20 mg by mouth daily before breakfast.      ondansetron (ZOFRAN) 4 MG tablet Take 4 mg by mouth every 6 (six) hours as needed for nausea.      oxybutynin (DITROPAN) 5 MG tablet Take 1 tablet (5 mg total) by mouth 3 (three) times a day.      polyethylene glycol (GLYCOLAX) 17 gram/dose powder 17 g by NOT APPLICABLE route. 10/16/2018: Received from: [facility] Received Sig: Measure 17 grams with cap provided and dissolve in 4 to 8 ounces of liquid then drink or take by nasogastric tube once daily for constipation     rivaroxaban (XARELTO) 20 mg Tab Take 1 tablet (20 mg total) by mouth daily.      senna-docusate (PERICOLACE) 8.6-50 mg tablet Take 2 tablets by mouth 2 (two) times a day.      venlafaxine (EFFEXOR) 37.5 MG tablet Take 1 tablet (37.5 mg total) by mouth 2 (two) times a day with meals.      zinc oxide 40 % Oint Apply topically. 10/16/2018: Received from: [facility] Received Sig: Apply  topically to affected area(s) each time if needed. For dry skin or irritation     hydrOXYzine HCl (ATARAX) 25 MG tablet Take 1 tablet (25 mg total) by mouth every 8 (eight) hours as needed for itching.      levothyroxine (SYNTHROID, LEVOTHROID) 125 MCG tablet Take 125 mcg by mouth. 10/16/2018: Received from: [facility] Received Sig: Take 1 tablet by mouth once daily.     melatonin 3 mg Tab tablet Take 1 tablet (3 mg total) by mouth at bedtime as needed.      oxyCODONE (ROXICODONE) 5 MG immediate release tablet Take 1-2 tablets (5-10 mg total) by mouth every 8 (eight) hours as needed for pain.      Allergies:  No Known Allergies    Tobacco:   reports that she has been smoking Cigarettes.  She has never used smokeless tobacco.     Physical Exam      /62  " Pulse (!) 110  Ht 5' 2.5\" (1.588 m)  Wt 158 lb (71.7 kg) Comment: per pt estimate  LMP  (LMP Unknown)  SpO2 99%  BMI 28.44 kg/m2    General: Patient is alert appears to be comfortable seated in her wheelchair in a slightly reclined position.    HEENT: No scleral icterus or tactile irritation, mouth is moist neck supple no masses or lymphadenopathy.    Lungs: Good air movement throughout I hear no wheezes crackles or other focal sounds    Heart: Regular rate and rhythm no murmur    Abdomen: Soft nondistended no tenderness on palpation limited expection of the abdomen today.    Extremities: Unable to examine sacral decubitus ulcer as we are not able to position patient and she declines any consideration to appropriately position her during the course of this visit.  Examination of the lower extremities reveals that they are school to the touch but there is capillary refill.  There is no significant edema.  There are significant ulcerations noted on both feet.  On the anterior portion of the foot there is a large lesion measuring approximately 6 cm x 4 cm with a thick black eschar in the middle.  There is no sign of significant infection there is no ongoing bleeding or drainage.  There is a heel ulceration on that same foot, the lesion was not stage completely but it does seem to go down through to the subcutaneous layers but is not to the bone.  There was no bleeding, signs of infection or drainage from this lesion.  There is another similar lesion on the left heel.              "

## 2021-06-21 NOTE — PROGRESS NOTES
WOC Note by Frank Macario RN, WOC at 3/14/2019  1:57 PM     Author: Frank Macario RN, WOC Service: -- Author Type: Registered Nurse    Filed: 3/14/2019  2:10 PM Date of Service: 3/14/2019  1:57 PM Status: Signed    : Frank Macario RN, WOC (Registered Nurse)       Wound Ostomy  WOC Assessment with Photograph      Allergies:  No Known Allergies    Diagnosis:   Patient Active Problem List    Diagnosis Date Noted   ? Unable to control anger    ? Severe major depression, single episode, with psychotic features (H)    ? Paranoia (H)    ? Upper back pain    ? Abnormal CT scan of lung    ? Panic anxiety syndrome    ? Pelvic pain 01/09/2019   ? Ulcer due to Treponema vincentii, with fat layer exposed (H) 01/07/2019   ? Atelectasis    ? Tension-type headache, not intractable, unspecified chronicity pattern    ? Weakness of left hand 12/04/2018   ? Focal motor seizure (H) 12/04/2018   ? Pelvic pain in female 11/21/2018   ? Chronic osteomyelitis (H)    ? Drug-induced constipation    ? Quadriplegia (H)    ? Goals of care, counseling/discussion 07/16/2018   ? Advanced care planning/counseling discussion 07/16/2018   ? Malingerer for IV Dilaudid 07/16/2018   ? Moderate protein-calorie malnutrition (H) 07/16/2018   ? Infection 07/14/2018   ? Noncompliance with medication regimen    ? Stage IV pressure ulcer of sacral region (H) 07/11/2018   ? Gangrene (H) 07/10/2018   ? Centrilobular emphysema (H) 07/02/2018   ? Bilateral lower extremity edema 07/02/2018   ? Hypoxemia 07/01/2018   ? Left lower lobe pneumonia (H) 06/27/2018   ? Acute bronchitis due to other specified organisms 06/27/2018   ? Nephrostomy complication (H) 03/06/2018   ? History of encephalopathy    ? Closed left subtrochanteric femur fracture (H) 02/27/2018   ? Acute blood loss anemia    ? Other specified hypotension    ? Normocytic anemia 02/26/2018   ? Adjustment disorder with mixed anxiety and depressed mood    ? Sleep difficulties    ? Irritability  and anger    ? Fever in other diseases 01/31/2018   ? Severe sepsis (H) 01/31/2018   ? Ulcer 01/31/2018   ? Hypothyroidism due to Hashimoto's thyroiditis    ? Abscess, gluteal, right    ? History of DVT (deep vein thrombosis)    ? Urinary incontinence due to urethral sphincter incompetence    ? Weakness 09/07/2017   ? Chronic anticoagulation 09/03/2017   ? Urinary tract infection associated with cystostomy catheter (H) 09/02/2017   ? Dislodged wound Vacuum 08/14/2017   ? Abdominal pain, unspecified location    ? Coagulopathy (H)    ? Anxiety    ? UTI (urinary tract infection) 08/10/2017   ? Elevated lactic acid level 07/29/2017   ? 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   ? Suprapubic catheter dysfunction, subsequent encounter    ? Bilateral hydronephrosis    ? Cystitis    ? Colon wall thickening    ? Abdominal pain 06/03/2017   ? Flank pain 06/02/2017   ? Right upper quadrant abdominal pain 06/02/2017   ? Episodic cluster headache, not intractable    ? Sepsis (H) 05/28/2017   ? Sepsis secondary to UTI (H) 05/28/2017   ? Pyelonephritis    ? Hyponatremia    ? Chronic obstructive pulmonary disease with acute exacerbation (H) 04/18/2017   ? Depression 04/18/2017   ? Partial symptomatic epilepsy with simple partial seizures, intractable, with status epilepticus (H)    ? Acute on chronic intracranial subdural hematoma (H)    ? Brain edema (H)    ? Subdural hematoma (H) 04/09/2017   ? Convulsions, unspecified convulsion type (H)    ? Neck infection 03/31/2017   ? Lower abdominal pain 03/01/2017   ? Fever, unspecified fever cause    ? Pneumonia of left lower lobe due to infectious organism (H)    ? S/P cholecystectomy    ? Choledocholithiasis with obstruction 01/02/2017   ? Cholelithiasis 01/02/2017   ? Hx of pulmonary embolus 01/02/2017   ? Claustrophobia    ? Urinary tract infection, site unspecified    ? Hypotension, unspecified hypotension type    ? Chronic low back pain without sciatica, unspecified back pain laterality     ? Acute encephalopathy    ? Sepsis, due to unspecified organism (H) 12/30/2016   ? History of pulmonary embolus (PE) 12/01/2016   ? Cognitive disorder    ? Insomnia due to anxiety and fear    ? HCAP (healthcare-associated pneumonia) 11/04/2016   ? Atypical chest pain 10/23/2016   ? Diastolic CHF, acute on chronic (H)    ? Chest tightness or pressure    ? History of MDR Enterobacter cloacae infection    ? Anxiety disorder    ? Esophageal dysmotility    ? Major depressive disorder, recurrent episode, moderate (H)    ? Seizure disorder (H)    ? Heel ulcer, right, limited to breakdown of skin (H)    ? Decubitus ulcer of sacral region, stage 4 (H)    ? Paraplegia at T4 level (H) 04/28/2016   ? Hypokalemia    ? Chronic pain syndrome 01/12/2016   ? Major depression 01/12/2016   ? Alcohol abuse 01/12/2016   ? Hospital-acquired pneumonia 10/26/2015   ? COPD exacerbation (H) 08/03/2015   ? Gastroesophageal reflux disease without esophagitis 05/26/2015   ? Acquired hypothyroidism 05/26/2015   ? Iron deficiency anemia 05/26/2015   ? Opioid-induced constipation (OIC) 05/26/2015   ? Paraplegia (H) 05/26/2015   ? Palliative care encounter 12/08/2014   ? Nephrostomy tube displaced (H) 12/02/2014   ? Mechanical complication of suprapubic catheter (H) 11/20/2014   ? Acute respiratory failure with hypoxia (H) 08/19/2014   ? COPD (chronic obstructive pulmonary disease) (H) 08/19/2014   ? Lactic acidosis 08/19/2014   ? SVT (supraventricular tachycardia) (H) 08/19/2014   ? Dislodged Bhandari catheter, subsequent encounter 07/30/2014   ? Other chronic pain    ? Lumbosacral Disc Degeneration    ? Herpes Simplex Type I    ? Nicotine Dependence    ? Essential hypertension    ? Cancer of lung (H) 09/24/2013   ? Neurogenic bladder 01/29/2013   ? Neurogenic bowel 01/29/2013   ? Decubitus ulcer 01/25/2013   ? Mixed hyperlipidemia 01/25/2013   ? Decubitus ulcer of right buttock, stage 4 (H) 01/25/2013   ? Decubitus ulcer, stage III (H) 01/25/2013  "      Height:  5' 2\" (1.575 m)    Weight:   163 lb (73.9 kg)    Labs:  Recent Labs     03/12/19  1409   HGB 9.8*       Isaiah:  Isaiah Scale Score: 14    Specialty Bed:  [facility]    Wound culture obtained: No    Edema:  No    Patient not in room at coordinated assessment time this morning and refused to go back to bed when she returned.  Patient did allow assessment of legs, will re-attempt to see sacrum and buttock wounds tomorrow, patient does agree.      Anatomic Site/Laterality: Sacrum    Reason for ongoing care:   Wound assessment and plan of care    Encounter Type:  Subsequent Encounter Patient refused assessment of this wound today, see previous assessment from 3/5/19 listed below:    Wound Type:   Pressure Injury (Pressure Ulcer): Present on Admit    Stage:  Stage 4    Associated conditions/related trauma: Patient with malnutrition, paraplegia, adjustment disorder, history of pressure injuries to buttocks, incontinence of both bowel and bladder, CHF, chronic pain syndrome.  Patient's wounds made worse by frequent refusal of cares including turning and being cleaned up.  Had debridement on 1/10/19.    Assessment:    Length: 3 cm    Width: 3 cm    Depth: 2 cm  (measurements positional for this wound)    Tunneling/Undermining: Yes Up to 3cm towards 12 o'clock    Wound Bed: 100% Granular (of wound bed able to be visualized)    Exudate: Yes Serosanguineous Moderate    Periwound Skin: Scar Tissue    Treatment Plan: Gauze: Wet-to-moist, cover with Mepilex Sacral       Anatomic Site/Laterality: B buttocks    Reason for ongoing care:   Wound assessment and plan of care    Encounter Type:  Subsequent Encounter Patient refused assessment of this wound today, see previous assessment from 3/5/19 listed below:    Wound Type:   Pressure Injury (Pressure Ulcer): Present on Admit    Stage:  Stage 3 v. Full thickness IAD (patient sits in urine and fecal matter for hours at a time, refusing to let nursing staff clean " "area)    Associated conditions/related trauma: See above    Assessment:    Multiple areas of denudement noted to B buttocks likely related to pressure and/or IAD injury. Patient will not turn onto left side or farther over onto right side to allow better assessment of R trochanter/lateral buttock area. Calmoseptine was ordered yesterday based upon assessment of staff RN. This is an appropriate barrier product to use, but patient will not allow it to be used stating, \"it dries my skin too much.\" Patient note to have satellite lesions to upper aspect of buttock erythema indicating there may be a fungal component as well. Discussed use of Critic Aid AF with patient - barrier protection as well as more moisturizing than zinc based product. This will also treat possible fungal component and hopefully decrease irritation to skin in this area.    Tunneling/Undermining: No    Wound Bed: fibrin/granular mix    Exudate: Yes Serosanguineous Large    Periwound Skin: Scar Tissue    Treatment Plan: Antifungal: Critic Aid AF           Anatomic Site/Laterality: L IT    Reason for ongoing care:   Wound assessment and plan of care    Encounter Type:  Subsequent Encounter Patient refused assessment of this wound today, see previous assessment from 3/5/19 listed below:    Wound Type:   Pressure Injury (Pressure Ulcer): Present on Admit    Stage:  US    Associated conditions/related trauma: Patient with malnutrition, paraplegia, adjustment disorder, history of pressure injuries to buttocks, incontinence of both bowel and bladder, CHF, chronic pain syndrome.  Patient's wounds made worse by frequent refusal of cares including turning and being cleaned up.  Had debridement on 1/10/19.    Assessment:    Length: 0.5 cm    Width: 0.5 cm    Tunneling/Undermining: No    Wound Bed: 100% Dried drainage    Exudate: No    Periwound Skin: Erythema and satellite lesions    Treatment Plan: Open to air       Anatomic Site/Laterality: Right dorsal foot "     Reason for ongoing care:   Wound assessment and plan of care    Encounter Type:  Subsequent Encounter     Wound Type:   Denudement:  Foreign body present? No    Tissue Damage:   Exposed fat layer    Related trauma: Abrasion vs pressure injury.    Assessment:    0.7x0.7cm    Tunneling/Undermining: No    Wound Bed: 100% Agranular    Exudate: Yes Serosanguineous Scant    Periwound Skin: Scar Tissue    Treatment Plan: Silicone foam and gauze as this is only thing patient will allow on wound.       Anatomic Site/Laterality: Right lateral shin    Reason for ongoing care:   Wound assessment and plan of care    Encounter Type:  Subsequent Encounter     Pressure Injury (Pressure Ulcer): Present on Admit    Stage:  Stage 3    Associated conditions/related trauma: See above    Assessment:    Length: 5.5cm    Width: 2cm    Depth: 1.5cm    Tunneling/Undermining: No    Wound Bed: 80% Agranular and 20% Eschar/Dried drainage    Exudate: Yes Serosanguineous Moderate    Periwound Skin: Intact, distal to above wound is 0.5x2cm area of dried drainage abrasion    Treatment Plan: Silicone foam and gauze as this is only thing patient will allow on wound.         Anatomic Site/Laterality: Right heel    Reason for ongoing care:   Wound assessment and plan of care    Encounter Type:  Subsequent Encounter     Pressure Injury (Pressure Ulcer): Present on Admit    Stage:  Stage 3    Associated conditions/related trauma: See above    Assessment:    Approximately 2x1cm, unable to fully measure as patient will only partially allow foot to be raised    Tunneling/Undermining: No    Wound Bed: Approximately 100% Dried Drainage    Exudate: No    Periwound Skin: Scar Tissue    Treatment Plan: Silicone foam and gauze as this is only thing patient will allow on wound.  Patient refusing Rooke boots, continue offloading with pillow and bath blanket     Anatomic Site/Laterality: Left heel    Reason for ongoing care:   Wound assessment and plan of  care    Encounter Type:  Subsequent Encounter     Pressure Injury (Pressure Ulcer): Present on Admit    Stage:  Unstageable    Associated conditions/related trauma: See above    Assessment:    Heel is 3x5x0.5cm, 40% granular, 60% slough    Medial foot is 4x2.5cm, 10% agranular, 20% slough, 70% eschar    Tunneling/Undermining: No    Wound Bed: Approximately 60% Agranular and 40% Slough    Exudate: Yes Serosanguineous Moderate    Periwound Skin: Maceration and Scar Tissue    Treatment Plan: Silicone foam and gauze as this is only thing patient will allow on wound. Patient refusing Rooke boots, continue offloading with pillow and bath blanket         Anatomic Site/Laterality: Bilateral toes    Reason for ongoing care:   Wound assessment and plan of care    Encounter Type:  Subsequent Encounter Denudement:  Foreign body present? No    Tissue Damage:   Breakdown of skin    Related trauma: Patient with multiple abrasions to toes from bumping into things.    Assessment:    Scattered areas of dried drainage noted to be intact on multiple toes.    Tunneling/Undermining: No    Wound Bed: 100% Dried Drainage    Exudate: No    Periwound Skin: Intact    Treatment Plan: Open to Air     Nursing care provided was coordinated care with RN.    Discussed plan of care with nurse and patient.    Outcomes and treatment recommendations are to promote skin integrity, contain exudate, promote wound healing and promote debridement autolytic.    Actions taken by WOC RN: 10 minutes of education.      Planned Follow Up: Weekly assessments of each wound will attempted by WOC RN; however, patient is frequently up in chair in the early AM and does not return to bed until later evening hours. At other times she will not allow assessment of wounds d/t a variety of reasons.    Plan for next visit: Reassess wound(s) and Reassess skin integrity.

## 2021-06-21 NOTE — PROGRESS NOTES
WO Note by Kimberly Colon RN, WOC at 3/5/2019 10:02 AM     Author: Kimberly Colon RN, WO Service: -- Author Type: Registered Nurse    Filed: 3/5/2019 10:28 AM Date of Service: 3/5/2019 10:02 AM Status: Signed    : Kimberly Colon RN, WOC (Registered Nurse)       Wound Ostomy  WOC Assessment with Photograph      Allergies:  No Known Allergies    Diagnosis:   Patient Active Problem List    Diagnosis Date Noted   ? Unable to control anger    ? Severe major depression, single episode, with psychotic features (H)    ? Paranoia (H)    ? Upper back pain    ? Abnormal CT scan of lung    ? Panic anxiety syndrome    ? Pelvic pain 01/09/2019   ? Ulcer due to Treponema vincentii, with fat layer exposed (H) 01/07/2019   ? Atelectasis    ? Tension-type headache, not intractable, unspecified chronicity pattern    ? Weakness of left hand 12/04/2018   ? Focal motor seizure (H) 12/04/2018   ? Pelvic pain in female 11/21/2018   ? Chronic osteomyelitis (H)    ? Drug-induced constipation    ? Quadriplegia (H)    ? Goals of care, counseling/discussion 07/16/2018   ? Advanced care planning/counseling discussion 07/16/2018   ? Malingerer for IV Dilaudid 07/16/2018   ? Moderate protein-calorie malnutrition (H) 07/16/2018   ? Infection 07/14/2018   ? Noncompliance with medication regimen    ? Stage IV pressure ulcer of sacral region (H) 07/11/2018   ? Gangrene (H) 07/10/2018   ? Centrilobular emphysema (H) 07/02/2018   ? Bilateral lower extremity edema 07/02/2018   ? Hypoxemia 07/01/2018   ? Left lower lobe pneumonia (H) 06/27/2018   ? Acute bronchitis due to other specified organisms 06/27/2018   ? Nephrostomy complication (H) 03/06/2018   ? History of encephalopathy    ? Closed left subtrochanteric femur fracture (H) 02/27/2018   ? Acute blood loss anemia    ? Other specified hypotension    ? Normocytic anemia 02/26/2018   ? Adjustment disorder with mixed anxiety and depressed mood    ? Sleep difficulties    ? Irritability and  anger    ? Fever in other diseases 01/31/2018   ? Severe sepsis (H) 01/31/2018   ? Ulcer 01/31/2018   ? Hypothyroidism due to Hashimoto's thyroiditis    ? Abscess, gluteal, right    ? History of DVT (deep vein thrombosis)    ? Urinary incontinence due to urethral sphincter incompetence    ? Weakness 09/07/2017   ? Chronic anticoagulation 09/03/2017   ? Urinary tract infection associated with cystostomy catheter (H) 09/02/2017   ? Dislodged wound Vacuum 08/14/2017   ? Abdominal pain, unspecified location    ? Coagulopathy (H)    ? Anxiety    ? UTI (urinary tract infection) 08/10/2017   ? Elevated lactic acid level 07/29/2017   ? 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   ? Suprapubic catheter dysfunction, subsequent encounter    ? Bilateral hydronephrosis    ? Cystitis    ? Colon wall thickening    ? Abdominal pain 06/03/2017   ? Flank pain 06/02/2017   ? Right upper quadrant abdominal pain 06/02/2017   ? Episodic cluster headache, not intractable    ? Sepsis (H) 05/28/2017   ? Sepsis secondary to UTI (H) 05/28/2017   ? Pyelonephritis    ? Hyponatremia    ? Chronic obstructive pulmonary disease with acute exacerbation (H) 04/18/2017   ? Depression 04/18/2017   ? Partial symptomatic epilepsy with simple partial seizures, intractable, with status epilepticus (H)    ? Acute on chronic intracranial subdural hematoma (H)    ? Brain edema (H)    ? Subdural hematoma (H) 04/09/2017   ? Convulsions, unspecified convulsion type (H)    ? Neck infection 03/31/2017   ? Lower abdominal pain 03/01/2017   ? Fever, unspecified fever cause    ? Pneumonia of left lower lobe due to infectious organism (H)    ? S/P cholecystectomy    ? Choledocholithiasis with obstruction 01/02/2017   ? Cholelithiasis 01/02/2017   ? Hx of pulmonary embolus 01/02/2017   ? Claustrophobia    ? Urinary tract infection, site unspecified    ? Hypotension, unspecified hypotension type    ? Chronic low back pain without sciatica, unspecified back pain laterality    ?  Acute encephalopathy    ? Sepsis, due to unspecified organism (H) 12/30/2016   ? History of pulmonary embolus (PE) 12/01/2016   ? Cognitive disorder    ? Insomnia due to anxiety and fear    ? HCAP (healthcare-associated pneumonia) 11/04/2016   ? Atypical chest pain 10/23/2016   ? Diastolic CHF, acute on chronic (H)    ? Chest tightness or pressure    ? History of MDR Enterobacter cloacae infection    ? Anxiety disorder    ? Esophageal dysmotility    ? Major depressive disorder, recurrent episode, moderate (H)    ? Seizure disorder (H)    ? Heel ulcer, right, limited to breakdown of skin (H)    ? Decubitus ulcer of sacral region, stage 4 (H)    ? Paraplegia at T4 level (H) 04/28/2016   ? Hypokalemia    ? Chronic pain syndrome 01/12/2016   ? Major depression 01/12/2016   ? Alcohol abuse 01/12/2016   ? Hospital-acquired pneumonia 10/26/2015   ? COPD exacerbation (H) 08/03/2015   ? Gastroesophageal reflux disease without esophagitis 05/26/2015   ? Acquired hypothyroidism 05/26/2015   ? Iron deficiency anemia 05/26/2015   ? Opioid-induced constipation (OIC) 05/26/2015   ? Paraplegia (H) 05/26/2015   ? Palliative care encounter 12/08/2014   ? Nephrostomy tube displaced (H) 12/02/2014   ? Mechanical complication of suprapubic catheter (H) 11/20/2014   ? Acute respiratory failure with hypoxia (H) 08/19/2014   ? COPD (chronic obstructive pulmonary disease) (H) 08/19/2014   ? Lactic acidosis 08/19/2014   ? SVT (supraventricular tachycardia) (H) 08/19/2014   ? Dislodged Bhandari catheter, subsequent encounter 07/30/2014   ? Other chronic pain    ? Lumbosacral Disc Degeneration    ? Herpes Simplex Type I    ? Nicotine Dependence    ? Essential hypertension    ? Cancer of lung (H) 09/24/2013   ? Neurogenic bladder 01/29/2013   ? Neurogenic bowel 01/29/2013   ? Decubitus ulcer 01/25/2013   ? Mixed hyperlipidemia 01/25/2013   ? Decubitus ulcer of right buttock, stage 4 (H) 01/25/2013   ? Decubitus ulcer, stage III (H) 01/25/2013  "      Height:  5' 2\" (1.575 m)    Weight:   163 lb (73.9 kg)    Labs:  Recent Labs     03/02/19  1013  03/05/19  0832   HGB 8.2*   < > 9.2*   ALBUMIN 2.0*  --   --     < > = values in this interval not displayed.       Isaiah:  Isaiah Scale Score: 13    Specialty Bed:  [facility]    Wound culture obtained: No    Edema:  No    Patient allowed WOC assessment of B buttocks and sacral pressure injury. Coordinated with nurse and MD. When WOC RN asked patient if B foot ulcerations could be assessed, patient stated \"I would prefer if you did not do it at this time.\"     Anatomic Site/Laterality: Sacrum    Reason for ongoing care:   Wound assessment and plan of care    Encounter Type:  Subsequent Encounter Wound Type:   Pressure Injury (Pressure Ulcer): Present on Admit    Stage:  Stage 4    Associated conditions/related trauma: Patient with malnutrition, paraplegia, adjustment disorder, history of pressure injuries to buttocks, incontinence of both bowel and bladder, CHF, chronic pain syndrome.  Patient's wounds made worse by frequent refusal of cares including turning and being cleaned up.  Had debridement on 1/10/19.    Assessment:    Length: 3 cm    Width: 3 cm    Depth: 2 cm  (measurements positional for this wound)    Tunneling/Undermining: Yes Up to 3cm towards 12 o'clock    Wound Bed: 100% Granular (of wound bed able to be visualized)    Exudate: Yes Serosanguineous Moderate    Periwound Skin: Scar Tissue    Treatment Plan: Gauze: Wet-to-moist, cover with Mepilex Sacral       Anatomic Site/Laterality: B buttocks    Reason for ongoing care:   Wound assessment and plan of care    Encounter Type:  Subsequent Encounter Wound Type:   Pressure Injury (Pressure Ulcer): Present on Admit    Stage:  Stage 3 v. Full thickness IAD (patient sits in urine and fecal matter for hours at a time, refusing to let nursing staff clean area)    Associated conditions/related trauma: See above    Assessment:    Multiple areas of denudement " "noted to B buttocks likely related to pressure and/or IAD injury. Patient will not turn onto left side or farther over onto right side to allow better assessment of R trochanter/lateral buttock area. Calmoseptine was ordered yesterday based upon assessment of staff RN. This is an appropriate barrier product to use, but patient will not allow it to be used stating, \"it dries my skin too much.\" Patient note to have satellite lesions to upper aspect of buttock erythema indicating there may be a fungal component as well. Discussed use of Critic Aid AF with patient - barrier protection as well as more moisturizing than zinc based product. This will also treat possible fungal component and hopefully decrease irritation to skin in this area.    Tunneling/Undermining: No    Wound Bed: fibrin/granular mix    Exudate: Yes Serosanguineous Large    Periwound Skin: Scar Tissue    Treatment Plan: Antifungal: Critic Aid AF           Anatomic Site/Laterality: L IT    Reason for ongoing care:   Wound assessment and plan of care    Encounter Type:  Subsequent Encounter Wound Type:   Pressure Injury (Pressure Ulcer): Present on Admit    Stage:  US    Associated conditions/related trauma: Patient with malnutrition, paraplegia, adjustment disorder, history of pressure injuries to buttocks, incontinence of both bowel and bladder, CHF, chronic pain syndrome.  Patient's wounds made worse by frequent refusal of cares including turning and being cleaned up.  Had debridement on 1/10/19.    Assessment:    Length: 0.5 cm    Width: 0.5 cm    Tunneling/Undermining: No    Wound Bed: 100% Dried drainage    Exudate: No    Periwound Skin: Erythema and satellite lesions    Treatment Plan: Open to air       Anatomic Site/Laterality: Right dorsal foot     Reason for ongoing care:   Wound assessment and plan of care    Encounter Type:  Subsequent Encounter Patient refused assessment of this wound, see previous  assessment below from 2/14/19:    Wound " Type:   Denudement:  Foreign body present? No    Tissue Damage:   Exposed fat layer    Related trauma: Abrasion vs pressure injury.    Assessment:    1.8x1.5cm    Tunneling/Undermining: No    Wound Bed: 15% Agranular and 85% Fibrin/Slough    Exudate: Yes Serosanguineous Moderate    Periwound Skin: Scar Tissue    Treatment Plan: Silicone foam       Anatomic Site/Laterality: Right lateral shin    Reason for ongoing care:   Wound assessment and plan of care    Encounter Type:  Subsequent Encounter Patient refused assessment of this wound, see previous  assessment below from 2/14/19:    Pressure Injury (Pressure Ulcer): Present on Admit    Stage:  Stage 3    Associated conditions/related trauma: See above    Assessment:    Length: 5cm    Width: 2cm    Depth: 1.5cm    Tunneling/Undermining: No    Wound Bed: 80% Agranular and 20% subcutaneous tissue    Exudate: Yes Serosanguineous Moderate    Periwound Skin: Intact    Treatment Plan: Gauze: Wet-to-moist     Anatomic Site/Laterality: Right heel    Reason for ongoing care:   Wound assessment and plan of care    Encounter Type:  Subsequent Encounter Patient refused assessment of this wound, see previous  assessment below from 2/14/19:    Pressure Injury (Pressure Ulcer): Present on Admit    Stage:  Stage 3    Associated conditions/related trauma: See above    Assessment:    Unable to measure today, see above.    Tunneling/Undermining: No    Wound Bed: Approximately 10% Fibrin and 90% Granular    Exudate: Yes Serosanguineous Moderate    Periwound Skin: Scar Tissue    Treatment Plan: Silicone foam.  Patient refusing Rooke boots, continue offloading with pillow and bath blanket     Anatomic Site/Laterality: Left heel    Reason for ongoing care:   Wound assessment and plan of care    Encounter Type:  Subsequent Encounter Patient refused assessment of this wound, see previous  assessment below from 2/14/19:    Pressure Injury (Pressure Ulcer): Present on  Admit    Stage:  Unstageable    Associated conditions/related trauma: See above    Assessment:    Unable to measure today, see above.    Tunneling/Undermining: No    Wound Bed: Approximately 60% Agranular and 40% Slough    Exudate: Yes Serosanguineous Moderate    Periwound Skin: Maceration and Scar Tissue    Treatment Plan: Silicone foam. Patient refusing Rooke boots, continue offloading with pillow and bath blanket     Anatomic Site/Laterality: Bilateral toes    Reason for ongoing care:   Wound assessment and plan of care    Encounter Type:  Subsequent Encounter Denudement:  Foreign body present? No    Tissue Damage:   Breakdown of skin    Related trauma: Patient with multiple abrasions to toes from bumping into things.    Assessment:    Scattered areas of dried drainage noted to be intact on multiple toes.    Tunneling/Undermining: No    Wound Bed: 100% Dried Drainage    Exudate: No    Periwound Skin: Intact    Treatment Plan: Open to Air     Nursing care provided was coordinated care with RN and MD.    Discussed plan of care with nurse, patient and MD.    Outcomes and treatment recommendations are to promote skin integrity, contain exudate, promote wound healing and promote debridement autolytic.    Actions taken by WOC RN: 10 minutes of education and Requested MD order Critic Aid AF.      Planned Follow Up: Weekly assessments of each wound will attempted by WOC RN; however, patient is frequently up in chair in the early AM and does not return to bed until later evening hours. At other times she will not allow assessment of wounds d/t a variety of reasons.    Plan for next visit: Reassess wound(s) and Reassess skin integrity.

## 2021-06-21 NOTE — PROGRESS NOTES
"TCM DISCHARGE FOLLOW UP CALL    Discharge Date:  11/21/2018  Reason for hospital stay (discharge diagnosis)::  Sepsis  Are you feeling better, the same or worse since your discharge?:  Patient is feeling worse  Why are you feeling worse?:  Pt reports she has \"sepsis\" again and plans to call an ambulance to take her to [facility]. She hasn't check her temp and states she never has a fever when she has sepsis.\"I know when I have sepsis.\" States she vomited x1 yesterday a small amount. States he body aches \"all over\".             Do you feel like you have a plan in the event of a health emergency?: Yes (Pt calls 911. Pt is aware of nurse triage)    As part of your discharge plan, were  home care services ordered for you?: Yes    Have you seen them yet, or are they scheduled to visit?: Yes    Do you have any follow up visits scheduled with your PCP or Specialist?:  Yes, with PCP (11/27)    "

## 2021-06-21 NOTE — SIGNIFICANT EVENT
"Significant Event by Charles Dyer MD at 4/1/2019  7:22 PM     Author: Charles Dyer MD Service: Family Medicine Author Type: Resident    Filed: 4/1/2019  7:36 PM Date of Service: 4/1/2019  7:22 PM Status: Attested    : Charles Dyer MD (Resident) Cosigner: Jaron Edwards MD at 4/7/2019  3:02 PM    Attestation signed by Jaron Edwards MD at 4/7/2019  3:02 PM    Please refer to my note for details - was present for this evaluation and agree                House Officer Rapid Response Note    S: Responded to rapid response for this 63-year-old female with extensive past medical history who became increasingly somnolent, hypoxic, hypotensive, and tachycardic this afternoon.  He is responsive to nailbed stimulation, but responds to commands only intermittently.  She is able to tell me she is at [facility].  She denies chest pain at this time and notes that her breathing feels \"better\".    O:   Visit Vitals  BP 93/58 (Patient Position: Lying)   Pulse (!) 123   Temp 99.4  F (37.4  C) (Oral)   Resp 20   Ht 5' 2\" (1.575 m)   Wt 163 lb (73.9 kg)   LMP  (LMP Unknown)   SpO2 94%   BMI 29.81 kg/m      General: Appears obese, obtunded, poor hygiene.  HEENT: Pupils are dilated, but symmetric, minimally responsive to light.  Respiratory: Patient unable to lean forward to listen posteriorly, but anterior auscultation reveals diffuse rhonchi in both inspiratory and expiratory wheezes.  Cardiovascular: Heart is regular rhythm, tachycardic.  Abdomen: Abdomen is distended, bowel sounds are present.  Soft abdomen and patient denies pain to palpation.  Extremities: Numerous lower extremity ulcerations, with 1-2+ pitting edema from the knees posteriorly.    A/P: This is a 63-year-old female with history of COPD, pulmonary embolism on chronic anticoagulation, hypertension, hyperlipidemia, chronic lower extremity ulcers and sacral decubitus ulcer who has been in the hospital for several months, now " developing progressive lethargy/somnolence with hypoxia, hypotension, and tachycardia.  She does appear septic at this time.  Potential etiologies of her sepsis include the chronic lower extremity ulcerations and/or decubitus ulcer.  She could also have a bacterial pneumonia leading to sepsis.  ABG ordered.  Chest x-ray ordered.  Lactic acid ordered.  She does appear to be on chronic opiate therapy, so there could be some component of somnolence due to excess opiates.  Will give 0.4 mg Narcan x 1.  Other potential, but less likely etiologies for her hypotension and tachycardia include cardiac ischemia and/or recurrent PE.  Patient has been on rivaroxaban here in the hospital, so doubt PE.  No chest pain to suggest cardiac ischemia.  If not improving with Zosyn and Narcan, would recommend workup for PE and cardiac ischemia.  Her hypoxia and respiratory distress could be exacerbated by fluid overload, as she reportedly has a history of pulmonary edema noted in her chart, although her last echocardiogram in 2018 showed a normal left ventricular ejection fraction and known diastolic dysfunction.  Would consider IV Lasix x1 if no improvement with Zosyn, Narcan, and treatment of sepsis.  Discussed with Dr. Edwards, who spoke with intensivist, Dr. Ibanez.  Patient transferred to ICU, who will assume care of patient at this time.    Charles Dyer, PGY-2  [facility]  Pager:  [phone]

## 2021-06-21 NOTE — SIGNIFICANT EVENT
Significant Event by Brian Cobb MD at 3/3/2019  5:36 PM     Author: Brian Cobb MD Service: Family Medicine Author Type: Resident    Filed: 3/3/2019  5:48 PM Date of Service: 3/3/2019  5:36 PM Status: Attested    : Brian Cobb MD (Physician) Cosigner: Franck Gee MBBS at 3/4/2019  2:06 PM    Attestation signed by Franck Gee MBBS at 3/4/2019  2:06 PM    Separate note in chart                S: Internal code 9 called on patient at 3:50PM by [facility] elevator on 4th floor due to responders not aware that this was a patient. Patient found down and off her wheelchair and states that she slide off her wheelchair due to having too many pillows. Witnesses reports that patient's elbow hit a corner while on her wheelchair and then fel. Patient denies hitting her head, LOC, chest pain, shortness of breath, or seizures.    O: Alert and oriented female in no acute distress. Uses wheelchair to get around at baseline.     A/P:  Patient lifted into wheelchair and was able to go back to her room in stable condition.    Patient signed off to Dr. Gee.    Brian Cobb  3/3/2019

## 2021-06-21 NOTE — PROGRESS NOTES
WOC Note by Frank Macario RN, WOC at 4/2/2019 12:19 PM     Author: Frank Macario RN, WOC Service: -- Author Type: Registered Nurse    Filed: 4/2/2019 12:36 PM Date of Service: 4/2/2019 12:19 PM Status: Signed    : Frank Macario RN, WOC (Registered Nurse)       Wound Ostomy  WOC Assessment with Photograph      Allergies:  No Known Allergies    Diagnosis:   Patient Active Problem List    Diagnosis Date Noted   ? Cigarette smoker    ? Acute pulmonary edema (H)    ? Cough    ? Nonintractable epilepsy without status epilepticus, unspecified epilepsy type (H)    ? Unable to control anger    ? Severe major depression, single episode, with psychotic features (H)    ? Paranoia (H)    ? Upper back pain    ? Abnormal CT scan of lung    ? Panic anxiety syndrome    ? Pelvic pain 01/09/2019   ? Ulcer due to Treponema vincentii, with fat layer exposed (H) 01/07/2019   ? Atelectasis    ? Tension-type headache, not intractable, unspecified chronicity pattern    ? Weakness of left hand 12/04/2018   ? Focal motor seizure (H) 12/04/2018   ? Pelvic pain in female 11/21/2018   ? Chronic osteomyelitis (H)    ? Drug-induced constipation    ? Quadriplegia (H)    ? Goals of care, counseling/discussion 07/16/2018   ? Advanced care planning/counseling discussion 07/16/2018   ? Malingerer for IV Dilaudid 07/16/2018   ? Moderate protein-calorie malnutrition (H) 07/16/2018   ? Infection 07/14/2018   ? Noncompliance with medication regimen    ? Stage IV pressure ulcer of sacral region (H) 07/11/2018   ? Gangrene (H) 07/10/2018   ? Centrilobular emphysema (H) 07/02/2018   ? Bilateral lower extremity edema 07/02/2018   ? Hypoxemia 07/01/2018   ? Left lower lobe pneumonia (H) 06/27/2018   ? Acute bronchitis due to other specified organisms 06/27/2018   ? Nephrostomy complication (H) 03/06/2018   ? History of encephalopathy    ? Closed left subtrochanteric femur fracture (H) 02/27/2018   ? Acute blood loss anemia    ? Other specified  hypotension    ? Normocytic anemia 02/26/2018   ? Adjustment disorder with mixed anxiety and depressed mood    ? Sleep difficulties    ? Irritability and anger    ? Fever in other diseases 01/31/2018   ? Severe sepsis (H) 01/31/2018   ? Ulcer 01/31/2018   ? Hypothyroidism due to Hashimoto's thyroiditis    ? Abscess, gluteal, right    ? History of DVT (deep vein thrombosis)    ? Urinary incontinence due to urethral sphincter incompetence    ? Weakness 09/07/2017   ? Chronic anticoagulation 09/03/2017   ? Urinary tract infection associated with cystostomy catheter (H) 09/02/2017   ? Dislodged wound Vacuum 08/14/2017   ? Abdominal pain, unspecified location    ? Coagulopathy (H)    ? Anxiety    ? UTI (urinary tract infection) 08/10/2017   ? Elevated lactic acid level 07/29/2017   ? 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   ? Suprapubic catheter dysfunction, subsequent encounter    ? Bilateral hydronephrosis    ? Cystitis    ? Colon wall thickening    ? Abdominal pain 06/03/2017   ? Flank pain 06/02/2017   ? Right upper quadrant abdominal pain 06/02/2017   ? Episodic cluster headache, not intractable    ? Sepsis (H) 05/28/2017   ? Sepsis secondary to UTI (H) 05/28/2017   ? Pyelonephritis    ? Hyponatremia    ? Chronic obstructive pulmonary disease with acute exacerbation (H) 04/18/2017   ? Depression 04/18/2017   ? Partial symptomatic epilepsy with simple partial seizures, intractable, with status epilepticus (H)    ? Acute on chronic intracranial subdural hematoma (H)    ? Brain edema (H)    ? Subdural hematoma (H) 04/09/2017   ? Convulsions, unspecified convulsion type (H)    ? Neck infection 03/31/2017   ? Lower abdominal pain 03/01/2017   ? Fever, unspecified fever cause    ? Pneumonia of left lower lobe due to infectious organism (H)    ? S/P cholecystectomy    ? Choledocholithiasis with obstruction 01/02/2017   ? Cholelithiasis 01/02/2017   ? Hx of pulmonary embolus 01/02/2017   ? Claustrophobia    ? Urinary tract  infection, site unspecified    ? Hypotension, unspecified hypotension type    ? Chronic low back pain without sciatica, unspecified back pain laterality    ? Acute encephalopathy    ? Sepsis, due to unspecified organism (H) 12/30/2016   ? Personal history of PE (pulmonary embolism) 12/01/2016   ? Cognitive disorder    ? Insomnia due to anxiety and fear    ? HCAP (healthcare-associated pneumonia) 11/04/2016   ? Atypical chest pain 10/23/2016   ? Diastolic CHF, acute on chronic (H)    ? Chest tightness or pressure    ? History of MDR Enterobacter cloacae infection    ? Anxiety disorder    ? Esophageal dysmotility    ? Major depressive disorder, recurrent episode, moderate (H)    ? Seizure disorder (H)    ? Heel ulcer, right, limited to breakdown of skin (H)    ? Decubitus ulcer of sacral region, stage 4 (H)    ? Paraplegia at T4 level (H) 04/28/2016   ? Hypokalemia    ? Chronic pain syndrome 01/12/2016   ? Major depression 01/12/2016   ? Alcohol abuse 01/12/2016   ? Hospital-acquired pneumonia 10/26/2015   ? COPD exacerbation (H) 08/03/2015   ? Gastroesophageal reflux disease without esophagitis 05/26/2015   ? Acquired hypothyroidism 05/26/2015   ? Iron deficiency anemia 05/26/2015   ? Opioid-induced constipation (OIC) 05/26/2015   ? Paraplegia (H) 05/26/2015   ? Palliative care encounter 12/08/2014   ? Nephrostomy tube displaced (H) 12/02/2014   ? Mechanical complication of suprapubic catheter (H) 11/20/2014   ? Acute respiratory failure with hypoxia (H) 08/19/2014   ? COPD (chronic obstructive pulmonary disease) (H) 08/19/2014   ? Lactic acidosis 08/19/2014   ? SVT (supraventricular tachycardia) (H) 08/19/2014   ? Dislodged Bhandari catheter, subsequent encounter 07/30/2014   ? Other chronic pain    ? Lumbosacral Disc Degeneration    ? Herpes Simplex Type I    ? Nicotine Dependence    ? Essential hypertension    ? Cancer of lung (H) 09/24/2013   ? Neurogenic bladder 01/29/2013   ? Neurogenic bowel 01/29/2013   ?  "Decubitus ulcer 01/25/2013   ? Mixed hyperlipidemia 01/25/2013   ? Decubitus ulcer of right buttock, stage 4 (H) 01/25/2013   ? Decubitus ulcer, stage III (H) 01/25/2013       Height:  5' 2\" (1.575 m)    Weight:   169 lb 8.5 oz (76.9 kg)    Labs:  Recent Labs     04/01/19  1932 04/02/19  0437   CRP 24.0*  --    HGB  --  7.2*   ALBUMIN 1.6*  --        Isaiah:  Isaiah Scale Score: 11    Specialty Bed:  [facility]    Wound culture obtained: No    Edema:  No    Patient continuing to refuse cares, was found with large amount of stool covering wounds this morning.      Anatomic Site/Laterality: Sacrum    Reason for ongoing care:   Wound assessment and plan of care    Encounter Type:  Subsequent Encounter   Wound Type:   Pressure Injury (Pressure Ulcer): Present on Admit    Stage:  Stage 4    Associated conditions/related trauma: Patient with malnutrition, paraplegia, adjustment disorder, history of pressure injuries to buttocks, incontinence of both bowel and bladder, CHF, chronic pain syndrome.  Patient's wounds made worse by frequent refusal of cares including turning and being cleaned up.  Had debridement on 1/10/19.    Assessment:    Length: 2 cm    Width: 3 cm    Depth: 2 cm  (measurements positional for this wound)    Tunneling/Undermining: Yes Up to 3cm towards 12 o'clock    Wound Bed: 100% Agranular (of wound bed able to be visualized)    Exudate: Yes Serosanguineous Moderate    Periwound Skin: Scar Tissue    Treatment Plan: Gauze: Wet-to-moist, cover with Mepilex Sacral       Anatomic Site/Laterality: B buttocks    Reason for ongoing care:   Wound assessment and plan of care    Encounter Type:  Subsequent Encounter   Wound Type:   Pressure Injury (Pressure Ulcer): Present on Admit    Stage:  Stage 3 v. Full thickness IAD (patient sits in urine and fecal matter for hours at a time, refusing to let nursing staff clean area).  Wounds also worsened by long periods of time patient spends in chair, often in chair from " before 6AM until late evening and refusing to go back to bed.      Associated conditions/related trauma: See above    Assessment:    Multiple areas of denudement noted to B buttocks down to IT's over approximately [id number] area     Tunneling/Undermining: No    Wound Bed: fibrin/slough/granular mix    Exudate: Yes Serosanguineous Large    Periwound Skin: Scar Tissue    Treatment Plan: Silicone foam and gauze, patient will not allow other dressings to site           Anatomic Site/Laterality: L IT    Reason for ongoing care:   Wound assessment and plan of care    Encounter Type:  Subsequent Encounter Wound Type:   Pressure Injury (Pressure Ulcer): Present on Admit    Stage:  US    Associated conditions/related trauma: Patient with malnutrition, paraplegia, adjustment disorder, history of pressure injuries to buttocks, incontinence of both bowel and bladder, CHF, chronic pain syndrome.  Patient's wounds made worse by frequent refusal of cares including turning and being cleaned up.  Had debridement on 1/10/19.    Assessment:    Length: 3.5 cm    Width: 4 cm    Tunneling/Undermining: No    Wound Bed: 50% agranular, 50% fibrin/slough    Exudate: No    Periwound Skin: Erythema and satellite lesions    Treatment Plan: Gauze and Mepilex           Anatomic Site/Laterality: Right lateral shin    Reason for ongoing care:   Wound assessment and plan of care    Encounter Type:  Subsequent Encounter Pressure Injury (Pressure Ulcer): Present on Admit    Stage:  Stage 3    Associated conditions/related trauma: See above    Assessment:    Length: 4cm    Width: 2cm    Depth: 1cm    Tunneling/Undermining: No    Wound Bed: 60% Agranular and 40% Slough/Fibrin    Exudate: Yes Serosanguineous Moderate    Periwound Skin: Intact     Treatment Plan: Silicone foam and gauze as this is only thing patient will allow on wound.         Anatomic Site/Laterality: Right heel    Reason for ongoing care:   Wound assessment and plan of care    Encounter  Type:  Subsequent Encounter Pressure Injury (Pressure Ulcer): Present on Admit    Stage:  Stage 3    Associated conditions/related trauma: See above    Assessment:    Healed    Periwound Skin: Scar Tissue    Treatment Plan: Silicone foam      Anatomic Site/Laterality: Left heel    Reason for ongoing care:   Wound assessment and plan of care    Encounter Type:  Subsequent Encounter Pressure Injury (Pressure Ulcer): Present on Admit    Stage:  Unstageable    Associated conditions/related trauma: See above    Assessment:    Heel is 4x3.5x0.2cm, 25% granular, 75% slough/fibrin    Medial foot is 2.5x5cm, 100% slough and eschar    Tunneling/Undermining: No    Wound Bed: See above    Exudate: Yes Serosanguineous Moderate    Periwound Skin: Maceration and Scar Tissue    Treatment Plan: Silicone foam and gauze as this is only thing patient will allow on wound. Patient refusing Rooke boots, continue offloading with pillow and bath blanket.         Anatomic Site/Laterality: Bilateral toes    Reason for ongoing care:   Wound assessment and plan of care    Encounter Type:  Subsequent Encounter Denudement:  Foreign body present? No    Tissue Damage:   Breakdown of skin    Related trauma: Patient with multiple abrasions to toes from bumping into things.    Assessment:    Scattered areas of dried drainage noted to be intact on multiple toes.    Tunneling/Undermining: No    Wound Bed: 100% Dried Drainage    Exudate: No    Periwound Skin: Intact    Treatment Plan: Open to Air     Nursing care provided was coordinated care with RN.    Discussed plan of care with nurse and patient.    Outcomes and treatment recommendations are to promote skin integrity, contain exudate, promote wound healing and promote debridement autolytic.    Actions taken by WOC RN: 5 minutes of education.     Planned Follow Up: Weekly assessments of each wound will attempted by WOC RN; however, patient is frequently up in chair in the early AM and does not return  to bed until later evening hours. At other times she will not allow assessment of wounds d/t a variety of reasons.    Plan for next visit: Reassess wound(s) and Reassess skin integrity.

## 2021-06-21 NOTE — PROGRESS NOTES
WOC Note by Frank Macario RN, WOC at 2/14/2019 11:39 AM     Author: Frank Macario RN, WOC Service: -- Author Type: Registered Nurse    Filed: 2/14/2019 11:56 AM Date of Service: 2/14/2019 11:39 AM Status: Signed    : Frank Macario RN, WOC (Registered Nurse)       Wound Ostomy  WOC Assessment with Photograph      Allergies:  No Known Allergies    Diagnosis:   Patient Active Problem List    Diagnosis Date Noted   ? Unable to control anger    ? Severe major depression, single episode, with psychotic features (H)    ? Paranoia (H)    ? Upper back pain    ? Abnormal CT scan of lung    ? Panic anxiety syndrome    ? Pelvic pain 01/09/2019   ? Ulcer due to Treponema vincentii, with fat layer exposed (H) 01/07/2019   ? Atelectasis    ? Tension-type headache, not intractable, unspecified chronicity pattern    ? Weakness of left hand 12/04/2018   ? Focal motor seizure (H) 12/04/2018   ? Pelvic pain in female 11/21/2018   ? Chronic osteomyelitis (H)    ? Drug-induced constipation    ? Quadriplegia (H)    ? Goals of care, counseling/discussion 07/16/2018   ? Advanced care planning/counseling discussion 07/16/2018   ? Malingerer for IV Dilaudid 07/16/2018   ? Moderate protein-calorie malnutrition (H) 07/16/2018   ? Infection 07/14/2018   ? Noncompliance with medication regimen    ? Stage IV pressure ulcer of sacral region (H) 07/11/2018   ? Gangrene (H) 07/10/2018   ? Centrilobular emphysema (H) 07/02/2018   ? Bilateral lower extremity edema 07/02/2018   ? Hypoxemia 07/01/2018   ? Left lower lobe pneumonia (H) 06/27/2018   ? Acute bronchitis due to other specified organisms 06/27/2018   ? Nephrostomy complication (H) 03/06/2018   ? History of encephalopathy    ? Closed left subtrochanteric femur fracture (H) 02/27/2018   ? Acute blood loss anemia    ? Other specified hypotension    ? Anemia 02/26/2018   ? Adjustment disorder with mixed anxiety and depressed mood    ? Sleep difficulties    ? Irritability and anger     ? Fever in other diseases 01/31/2018   ? Severe sepsis (H) 01/31/2018   ? Ulcer 01/31/2018   ? Hypothyroidism due to Hashimoto's thyroiditis    ? Abscess, gluteal, right    ? History of DVT (deep vein thrombosis)    ? Urinary incontinence due to urethral sphincter incompetence    ? Weakness 09/07/2017   ? Chronic anticoagulation 09/03/2017   ? Urinary tract infection associated with cystostomy catheter (H) 09/02/2017   ? Dislodged wound Vacuum 08/14/2017   ? Abdominal pain, unspecified location    ? Coagulopathy (H)    ? Anxiety    ? UTI (urinary tract infection) 08/10/2017   ? Elevated lactic acid level 07/29/2017   ? 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   ? Suprapubic catheter dysfunction, subsequent encounter    ? Bilateral hydronephrosis    ? Cystitis    ? Colon wall thickening    ? Abdominal pain 06/03/2017   ? Flank pain 06/02/2017   ? Right upper quadrant abdominal pain 06/02/2017   ? Episodic cluster headache, not intractable    ? Sepsis (H) 05/28/2017   ? Sepsis secondary to UTI (H) 05/28/2017   ? Pyelonephritis    ? Hyponatremia    ? Chronic obstructive pulmonary disease with acute exacerbation (H) 04/18/2017   ? Depression 04/18/2017   ? Partial symptomatic epilepsy with simple partial seizures, intractable, with status epilepticus (H)    ? Acute on chronic intracranial subdural hematoma (H)    ? Brain edema (H)    ? Subdural hematoma (H) 04/09/2017   ? Convulsions, unspecified convulsion type (H)    ? Neck infection 03/31/2017   ? Lower abdominal pain 03/01/2017   ? Fever, unspecified fever cause    ? Pneumonia of left lower lobe due to infectious organism (H)    ? S/P cholecystectomy    ? Choledocholithiasis with obstruction 01/02/2017   ? Cholelithiasis 01/02/2017   ? Hx of pulmonary embolus 01/02/2017   ? Claustrophobia    ? Urinary tract infection, site unspecified    ? Hypotension, unspecified hypotension type    ? Chronic low back pain without sciatica, unspecified back pain laterality    ? Acute  "encephalopathy    ? Sepsis, due to unspecified organism (H) 12/30/2016   ? History of pulmonary embolus (PE) 12/01/2016   ? Cognitive disorder    ? Insomnia due to anxiety and fear    ? HCAP (healthcare-associated pneumonia) 11/04/2016   ? Atypical chest pain 10/23/2016   ? Diastolic CHF, acute on chronic (H)    ? Chest tightness or pressure    ? History of MDR Enterobacter cloacae infection    ? Anxiety disorder    ? Esophageal dysmotility    ? Major depressive disorder, recurrent episode, moderate (H)    ? Seizure disorder (H)    ? Heel ulcer, right, limited to breakdown of skin (H)    ? Decubitus ulcer of sacral region, stage 4 (H)    ? Paraplegia at T4 level (H) 04/28/2016   ? Hypokalemia    ? Chronic pain syndrome 01/12/2016   ? Major depression 01/12/2016   ? Alcohol abuse 01/12/2016   ? COPD exacerbation (H) 08/03/2015   ? Gastroesophageal reflux disease without esophagitis 05/26/2015   ? Acquired hypothyroidism 05/26/2015   ? Iron deficiency anemia 05/26/2015   ? Opioid-induced constipation (OIC) 05/26/2015   ? Paraplegia (H) 05/26/2015   ? Palliative care encounter 12/08/2014   ? Nephrostomy tube displaced (H) 12/02/2014   ? Mechanical complication of suprapubic catheter (H) 11/20/2014   ? Acute respiratory failure with hypoxia (H) 08/19/2014   ? COPD (chronic obstructive pulmonary disease) (H) 08/19/2014   ? Lactic acidosis 08/19/2014   ? SVT (supraventricular tachycardia) (H) 08/19/2014   ? Dislodged Bhandari catheter, subsequent encounter 07/30/2014   ? Other chronic pain    ? Lumbosacral Disc Degeneration    ? Herpes Simplex Type I    ? Nicotine Dependence    ? Essential hypertension    ? Cancer of lung (H) 09/24/2013   ? Neurogenic bladder 01/29/2013   ? Neurogenic bowel 01/29/2013   ? Decubitus ulcer 01/25/2013   ? Mixed hyperlipidemia 01/25/2013   ? Decubitus ulcer of right buttock, stage 4 (H) 01/25/2013   ? Decubitus ulcer, stage III (H) 01/25/2013       Height:  5' 2\" (1.575 m)    Weight:   162 lb " (73.5 kg)    Labs:  Recent Labs     02/13/19  1908   HGB 9.6*       Isaiah:  Isaiah Scale Score: 14    [facility]    Wound culture obtained: No    Edema:  No        Anatomic Site/Laterality: Sacrum and buttocks    Reason for ongoing care:   Wound assessment and plan of care    Encounter Type:  Subsequent Encounter Pressure Injury (Pressure Ulcer): Present on Admit    Stage:  Stage 4 and Stage 3    Associated conditions/related trauma: Patient with malnutrition, paraplegia, adjustment disorder, history of pressure injuries to buttocks, incontinence of both bowel and bladder, CHF, chronic pain syndrome.  Poor compliance with repositioning and allowing staff to remove soiled linen under her.      Assessment:    Sacrum is [id number], 100% agranular, no longer has bone exposed     Primary wound on right buttock is 4.5x3cm, 100% granular    Smaller scattered wounds surrounding these wounds ranging from 1x0.7cm to 2x2cm, mix of agranular tissue and purple discoloration.    Tunneling/Undermining: No    Wound Bed: See above    Exudate: Yes Serosanguineous Moderate    Periwound Skin: Intact    Treatment Plan: Gauze: Wet-to-moist to sacrum, with Mepilex covering rest       Anatomic Site/Laterality: Left hip    Reason for ongoing care:   Wound assessment and plan of care    Encounter Type:  Subsequent Encounter Pressure Injury (Pressure Ulcer): Present on Admit    Stage:  Stage 2    Associated conditions/related trauma: See above    Assessment:    Length: 1cm    Width: 1.5cm    Tunneling/Undermining: No    Wound Bed: 100% Dried drainage     Exudate: No    Periwound Skin: Intact    Treatment Plan: Silicone foam     Anatomic Site/Laterality: Left IT    Reason for ongoing care:   Wound assessment and plan of care    Encounter Type:  Subsequent Encounter Pressure Injury (Pressure Ulcer): Present on Admit    Stage:  Stage 3    Associated conditions/related trauma: See above    Assessment:    Length: 0.5cm    Width:  1.2cm    Depth: 0.2cm    Tunneling/Undermining: No    Wound Bed: 100% fibrin    Exudate: Yes Serosanguineous small    Periwound Skin: Intact    Treatment Plan: Mepilex   Anatomic Site/Laterality: Right dorsal foot     Reason for ongoing care:   Wound assessment and plan of care    Encounter Type:  Subsequent Encounter Wound Type:   Denudement:  Foreign body present? No    Tissue Damage:   Exposed fat layer    Related trauma: Abrasion vs pressure injury.    Assessment:    1x1cm    Tunneling/Undermining: No    Wound Bed: 100% Dried Drainage    Exudate: No    Periwound Skin: Scar Tissue    Treatment Plan: Open to Air         Anatomic Site/Laterality: Right lateral shin    Reason for ongoing care:   Wound assessment and plan of care    Encounter Type:  Subsequent Encounter Pressure Injury (Pressure Ulcer): Present on Admit    Stage:  Stage 3    Associated conditions/related trauma: See above    Assessment:    Length: 8cm    Width: 2.5cm    Depth: 1.5cm    Tunneling/Undermining: No    Wound Bed: 100% Agranular    Exudate: Yes Serosanguineous Moderate    Periwound Skin: Intact    Treatment Plan: Gauze: Wet-to-moist     Anatomic Site/Laterality: Right heel    Reason for ongoing care:   Wound assessment and plan of care    Encounter Type:  Subsequent Encounter Pressure Injury (Pressure Ulcer): Present on Admit    Stage:  Stage 3    Associated conditions/related trauma: See above    Assessment:    Length: 1cm    Width: 2cm    Tunneling/Undermining: No    Wound Bed: Approximately 50% Agranular and 50% Dried Drainage    Exudate: Yes Serosanguineous Small    Periwound Skin: Scar Tissue    Treatment Plan: Silicone foam.           Anatomic Site/Laterality: Left heel    Reason for ongoing care:   Wound assessment and plan of care    Encounter Type:  Subsequent Encounter Pressure Injury (Pressure Ulcer): Present on Admit    Stage:  Unstageable    Associated conditions/related trauma: See above    Assessment:    Length:  4cm    Width: 10cm    Depth: 0.3cm    Tunneling/Undermining: No    Wound Bed: Approximately 70% Agranular and 30% Slough/Fibrin/Dried drainage    Exudate: Yes Serosanguineous Moderate    Periwound Skin: Maceration and Scar Tissue    Treatment Plan: Silicone foam. Patient does not want Rooke boots stated previously these are too painful to use.  Continues only allow bath blankets for offloading         Anatomic Site/Laterality: Bilateral toes    Reason for ongoing care:   Wound assessment and plan of care    Encounter Type:  Subsequent Encounter Denudement:  Foreign body present? No    Tissue Damage:   Breakdown of skin    Related trauma: Patient with multiple abrasions to toes from bumping into things.    Assessment:    Approximately 6 areas of dried drainage ranging from 0.5x0.5cm to 2x1cm    Tunneling/Undermining: No    Wound Bed: 100% Dried Drainage    Exudate: No    Periwound Skin: Intact    Treatment Plan: Open to Air       Nursing care provided was coordinated care with RN.    Discussed plan of care with nurse and patient.    Outcomes and treatment recommendations are to promote skin integrity, contain exudate, promote wound healing and promote debridement autolytic.    Actions taken by WOC RN: 5 minutes of education.      Planned Follow Up: Early next week.    Plan for next visit: Reassess wound(s)

## 2021-06-21 NOTE — PROGRESS NOTES
WOC Note by Frank Macario RN, WOC at 2/11/2019  1:32 PM     Author: Frank Macario RN, WOC Service: -- Author Type: Registered Nurse    Filed: 2/11/2019  1:45 PM Date of Service: 2/11/2019  1:32 PM Status: Signed    : Frank Macario RN, WOC (Registered Nurse)       Wound Ostomy  WOC Assessment with Photograph      Allergies:  No Known Allergies    Diagnosis:   Patient Active Problem List    Diagnosis Date Noted   ? Unable to control anger    ? Severe major depression, single episode, with psychotic features (H)    ? Paranoia (H)    ? Upper back pain    ? Abnormal CT scan of lung    ? Panic anxiety syndrome    ? Pelvic pain 01/09/2019   ? Ulcer due to Treponema vincentii, with fat layer exposed (H) 01/07/2019   ? Atelectasis    ? Tension-type headache, not intractable, unspecified chronicity pattern    ? Weakness of left hand 12/04/2018   ? Focal motor seizure (H) 12/04/2018   ? Pelvic pain in female 11/21/2018   ? Chronic osteomyelitis (H)    ? Drug-induced constipation    ? Quadriplegia (H)    ? Goals of care, counseling/discussion 07/16/2018   ? Advanced care planning/counseling discussion 07/16/2018   ? Malingerer for IV Dilaudid 07/16/2018   ? Moderate protein-calorie malnutrition (H) 07/16/2018   ? Infection 07/14/2018   ? Noncompliance with medication regimen    ? Stage IV pressure ulcer of sacral region (H) 07/11/2018   ? Gangrene (H) 07/10/2018   ? Centrilobular emphysema (H) 07/02/2018   ? Bilateral lower extremity edema 07/02/2018   ? Hypoxemia 07/01/2018   ? Left lower lobe pneumonia (H) 06/27/2018   ? Acute bronchitis due to other specified organisms 06/27/2018   ? Nephrostomy complication (H) 03/06/2018   ? History of encephalopathy    ? Closed left subtrochanteric femur fracture (H) 02/27/2018   ? Acute blood loss anemia    ? Other specified hypotension    ? Anemia 02/26/2018   ? Adjustment disorder with mixed anxiety and depressed mood    ? Sleep difficulties    ? Irritability and anger     ? Fever in other diseases 01/31/2018   ? Severe sepsis (H) 01/31/2018   ? Ulcer 01/31/2018   ? Hypothyroidism due to Hashimoto's thyroiditis    ? Abscess, gluteal, right    ? History of DVT (deep vein thrombosis)    ? Urinary incontinence due to urethral sphincter incompetence    ? Weakness 09/07/2017   ? Chronic anticoagulation 09/03/2017   ? Urinary tract infection associated with cystostomy catheter (H) 09/02/2017   ? Dislodged wound Vacuum 08/14/2017   ? Abdominal pain, unspecified location    ? Coagulopathy (H)    ? Anxiety    ? UTI (urinary tract infection) 08/10/2017   ? Elevated lactic acid level 07/29/2017   ? 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   ? Suprapubic catheter dysfunction, subsequent encounter    ? Bilateral hydronephrosis    ? Cystitis    ? Colon wall thickening    ? Abdominal pain 06/03/2017   ? Flank pain 06/02/2017   ? Right upper quadrant abdominal pain 06/02/2017   ? Episodic cluster headache, not intractable    ? Sepsis (H) 05/28/2017   ? Sepsis secondary to UTI (H) 05/28/2017   ? Pyelonephritis    ? Hyponatremia    ? Chronic obstructive pulmonary disease with acute exacerbation (H) 04/18/2017   ? Depression 04/18/2017   ? Partial symptomatic epilepsy with simple partial seizures, intractable, with status epilepticus (H)    ? Acute on chronic intracranial subdural hematoma (H)    ? Brain edema (H)    ? Subdural hematoma (H) 04/09/2017   ? Convulsions, unspecified convulsion type (H)    ? Neck infection 03/31/2017   ? Lower abdominal pain 03/01/2017   ? Fever, unspecified fever cause    ? Pneumonia of left lower lobe due to infectious organism (H)    ? S/P cholecystectomy    ? Choledocholithiasis with obstruction 01/02/2017   ? Cholelithiasis 01/02/2017   ? Hx of pulmonary embolus 01/02/2017   ? Claustrophobia    ? Urinary tract infection, site unspecified    ? Hypotension, unspecified hypotension type    ? Chronic low back pain without sciatica, unspecified back pain laterality    ? Acute  "encephalopathy    ? Sepsis, due to unspecified organism (H) 12/30/2016   ? History of pulmonary embolus (PE) 12/01/2016   ? Cognitive disorder    ? Insomnia due to anxiety and fear    ? HCAP (healthcare-associated pneumonia) 11/04/2016   ? Atypical chest pain 10/23/2016   ? Diastolic CHF, acute on chronic (H)    ? Chest tightness or pressure    ? History of MDR Enterobacter cloacae infection    ? Anxiety disorder    ? Esophageal dysmotility    ? Major depressive disorder, recurrent episode, moderate (H)    ? Seizure disorder (H)    ? Heel ulcer, right, limited to breakdown of skin (H)    ? Decubitus ulcer of sacral region, stage 4 (H)    ? Paraplegia at T4 level (H) 04/28/2016   ? Hypokalemia    ? Chronic pain syndrome 01/12/2016   ? Major depression 01/12/2016   ? Alcohol abuse 01/12/2016   ? COPD exacerbation (H) 08/03/2015   ? Gastroesophageal reflux disease without esophagitis 05/26/2015   ? Acquired hypothyroidism 05/26/2015   ? Iron deficiency anemia 05/26/2015   ? Opioid-induced constipation (OIC) 05/26/2015   ? Paraplegia (H) 05/26/2015   ? Palliative care encounter 12/08/2014   ? Nephrostomy tube displaced (H) 12/02/2014   ? Mechanical complication of suprapubic catheter (H) 11/20/2014   ? Acute respiratory failure with hypoxia (H) 08/19/2014   ? COPD (chronic obstructive pulmonary disease) (H) 08/19/2014   ? Lactic acidosis 08/19/2014   ? SVT (supraventricular tachycardia) (H) 08/19/2014   ? Dislodged Bhandari catheter, subsequent encounter 07/30/2014   ? Other chronic pain    ? Lumbosacral Disc Degeneration    ? Herpes Simplex Type I    ? Nicotine Dependence    ? Essential hypertension    ? Cancer of lung (H) 09/24/2013   ? Neurogenic bladder 01/29/2013   ? Neurogenic bowel 01/29/2013   ? Decubitus ulcer 01/25/2013   ? Mixed hyperlipidemia 01/25/2013   ? Decubitus ulcer of right buttock, stage 4 (H) 01/25/2013   ? Decubitus ulcer, stage III (H) 01/25/2013       Height:  5' 2\" (1.575 m)    Weight:   162 lb " (73.5 kg)    Labs:  Recent Labs     02/09/19  0753   HGB 11.7*       Isaiah:  Isaiah Scale Score: 9    [facility]    Wound culture obtained: No    Edema:  No    Unable to assess sacrum and buttocks today as patient is already up in chair and refuses to go back to bed.  See 2/6/19 note for last partial assessment patient allowed.      Anatomic Site/Laterality: Right dorsal foot     Reason for ongoing care:   Wound assessment and plan of care    Encounter Type:  Subsequent Encounter Wound Type:   Denudement:  Foreign body present? No    Tissue Damage:   Exposed fat layer    Related trauma: Abrasion vs pressure injury.  Patient with malnutrition, paraplegia, adjustment disorder, history of pressure injuries to buttocks, incontinence of both bowel and bladder, CHF, chronic pain syndrome.      Assessment:    1x1cm    Tunneling/Undermining: No    Wound Bed: 100% Dried Drainage    Exudate: No    Periwound Skin: Scar Tissue    Treatment Plan: Open to Air         Anatomic Site/Laterality: Right lateral shin    Reason for ongoing care:   Wound assessment and plan of care    Encounter Type:  Subsequent Encounter Pressure Injury (Pressure Ulcer): Present on Admit    Stage:  Stage 3    Associated conditions/related trauma: See above    Assessment:    Length: 8cm    Width: 2.5cm    Depth: 1.5cm    Tunneling/Undermining: No    Wound Bed: 80% Agranular and clotted blood, 20% subcutaneous tissue    Exudate: Yes Serosanguineous Moderate    Periwound Skin: Intact    Treatment Plan: Gauze: Wet-to-moist          Anatomic Site/Laterality: Right heel    Reason for ongoing care:   Wound assessment and plan of care    Encounter Type:  Subsequent Encounter Pressure Injury (Pressure Ulcer): Present on Admit    Stage:  Stage 3    Associated conditions/related trauma: See above    Assessment:    Length: 1cm    Width: 2cm    Tunneling/Undermining: No    Wound Bed: Approximately 100% Dried Drainage    Exudate: No    Periwound Skin:  Scar Tissue    Treatment Plan: Silicone foam.           Anatomic Site/Laterality: Left heel    Reason for ongoing care:   Wound assessment and plan of care    Encounter Type:  Subsequent Encounter Pressure Injury (Pressure Ulcer): Present on Admit    Stage:  Unstageable    Associated conditions/related trauma: See above    Assessment:    Length: 4cm    Width: 10cm    Depth: 0.3cm    Tunneling/Undermining: No    Wound Bed: Approximately 50% Agranular and 50% Slough/Fibrin/Dried drainage    Exudate: Yes Serosanguineous Moderate    Periwound Skin: Maceration and Scar Tissue    Treatment Plan: Silicone foam. Patient does not want Rooke boots stated previously these are too painful to use.  Continues only allow bath blankets for offloading         Anatomic Site/Laterality: Bilateral toes    Reason for ongoing care:   Wound assessment and plan of care    Encounter Type:  Subsequent Encounter Denudement:  Foreign body present? No    Tissue Damage:   Breakdown of skin    Related trauma: Patient with multiple abrasions to toes from bumping into things.    Assessment:    Approximately 6 areas of dried drainage ranging from 0.5x0.5cm to 2x1cm    Tunneling/Undermining: No    Wound Bed: 100% Dried Drainage    Exudate: No    Periwound Skin: Intact    Treatment Plan: Open to Air       Nursing care provided was coordinated care with RN.    Discussed plan of care with nurse and patient.    Outcomes and treatment recommendations are to promote skin integrity, contain exudate, promote wound healing and promote debridement autolytic.    Actions taken by WOC RN: 5 minutes of education.      Planned Follow Up: Later this week.    Plan for next visit: Reassess wound(s)

## 2021-06-21 NOTE — PROGRESS NOTES
WOC Note by Shanique Brown RN, WOC at 4/9/2019  2:30 PM     Author: Shanique Brown RN, WOC Service: -- Author Type: Registered Nurse    Filed: 4/9/2019  3:06 PM Date of Service: 4/9/2019  2:30 PM Status: Signed    : Shanique Brown RN, WOC (Registered Nurse)       Wound Ostomy  WOC Assessment with Photograph      Allergies:  No Known Allergies    Diagnosis:   Patient Active Problem List    Diagnosis Date Noted   ? Cigarette smoker    ? Acute pulmonary edema (H)    ? Cough    ? Nonintractable epilepsy without status epilepticus, unspecified epilepsy type (H)    ? Unable to control anger    ? Severe major depression, single episode, with psychotic features (H)    ? Paranoia (H)    ? Upper back pain    ? Abnormal CT scan of lung    ? Panic anxiety syndrome    ? Pelvic pain 01/09/2019   ? Ulcer due to Treponema vincentii, with fat layer exposed (H) 01/07/2019   ? Atelectasis    ? Tension-type headache, not intractable, unspecified chronicity pattern    ? Weakness of left hand 12/04/2018   ? Focal motor seizure (H) 12/04/2018   ? Pelvic pain in female 11/21/2018   ? Chronic osteomyelitis (H)    ? Drug-induced constipation    ? Quadriplegia (H)    ? Goals of care, counseling/discussion 07/16/2018   ? Advanced care planning/counseling discussion 07/16/2018   ? Malingerer for IV Dilaudid 07/16/2018   ? Moderate protein-calorie malnutrition (H) 07/16/2018   ? Infection 07/14/2018   ? Noncompliance with medication regimen    ? Stage IV pressure ulcer of sacral region (H) 07/11/2018   ? Gangrene (H) 07/10/2018   ? Centrilobular emphysema (H) 07/02/2018   ? Bilateral lower extremity edema 07/02/2018   ? Hypoxemia 07/01/2018   ? Left lower lobe pneumonia (H) 06/27/2018   ? Acute bronchitis due to other specified organisms 06/27/2018   ? Nephrostomy complication (H) 03/06/2018   ? History of encephalopathy    ? Closed left subtrochanteric femur fracture (H) 02/27/2018   ? Acute blood loss anemia    ? Other specified  hypotension    ? Normocytic anemia 02/26/2018   ? Adjustment disorder with mixed anxiety and depressed mood    ? Sleep difficulties    ? Irritability and anger    ? Fever in other diseases 01/31/2018   ? Severe sepsis (H) 01/31/2018   ? Ulcer 01/31/2018   ? Hypothyroidism due to Hashimoto's thyroiditis    ? Abscess, gluteal, right    ? History of DVT (deep vein thrombosis)    ? Urinary incontinence due to urethral sphincter incompetence    ? Weakness 09/07/2017   ? Chronic anticoagulation 09/03/2017   ? Urinary tract infection associated with cystostomy catheter (H) 09/02/2017   ? Dislodged wound Vacuum 08/14/2017   ? Abdominal pain, unspecified location    ? Coagulopathy (H)    ? Anxiety    ? UTI (urinary tract infection) 08/10/2017   ? Elevated lactic acid level 07/29/2017   ? 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   ? Suprapubic catheter dysfunction, subsequent encounter    ? Bilateral hydronephrosis    ? Cystitis    ? Colon wall thickening    ? Abdominal pain 06/03/2017   ? Flank pain 06/02/2017   ? Right upper quadrant abdominal pain 06/02/2017   ? Episodic cluster headache, not intractable    ? Sepsis (H) 05/28/2017   ? Sepsis secondary to UTI (H) 05/28/2017   ? Pyelonephritis    ? Hyponatremia    ? Chronic obstructive pulmonary disease with acute exacerbation (H) 04/18/2017   ? Depression 04/18/2017   ? Partial symptomatic epilepsy with simple partial seizures, intractable, with status epilepticus (H)    ? Acute on chronic intracranial subdural hematoma (H)    ? Brain edema (H)    ? Subdural hematoma (H) 04/09/2017   ? Convulsions, unspecified convulsion type (H)    ? Neck infection 03/31/2017   ? Lower abdominal pain 03/01/2017   ? Fever, unspecified fever cause    ? Pneumonia of left lower lobe due to infectious organism (H)    ? S/P cholecystectomy    ? Choledocholithiasis with obstruction 01/02/2017   ? Cholelithiasis 01/02/2017   ? Hx of pulmonary embolus 01/02/2017   ? Claustrophobia    ? Urinary tract  infection, site unspecified    ? Hypotension, unspecified hypotension type    ? Chronic low back pain without sciatica, unspecified back pain laterality    ? Acute encephalopathy    ? Sepsis, due to unspecified organism (H) 12/30/2016   ? Personal history of PE (pulmonary embolism) 12/01/2016   ? Cognitive disorder    ? Insomnia due to anxiety and fear    ? HCAP (healthcare-associated pneumonia) 11/04/2016   ? Atypical chest pain 10/23/2016   ? Diastolic CHF, acute on chronic (H)    ? Chest tightness or pressure    ? History of MDR Enterobacter cloacae infection    ? Anxiety disorder    ? Esophageal dysmotility    ? Major depressive disorder, recurrent episode, moderate (H)    ? Seizure disorder (H)    ? Heel ulcer, right, limited to breakdown of skin (H)    ? Decubitus ulcer of sacral region, stage 4 (H)    ? Paraplegia at T4 level (H) 04/28/2016   ? Hypokalemia    ? Chronic pain syndrome 01/12/2016   ? Major depression 01/12/2016   ? Alcohol abuse 01/12/2016   ? Hospital-acquired pneumonia 10/26/2015   ? COPD exacerbation (H) 08/03/2015   ? Gastroesophageal reflux disease without esophagitis 05/26/2015   ? Acquired hypothyroidism 05/26/2015   ? Iron deficiency anemia 05/26/2015   ? Opioid-induced constipation (OIC) 05/26/2015   ? Paraplegia (H) 05/26/2015   ? Palliative care encounter 12/08/2014   ? Nephrostomy tube displaced (H) 12/02/2014   ? Mechanical complication of suprapubic catheter (H) 11/20/2014   ? Acute respiratory failure with hypoxia (H) 08/19/2014   ? COPD (chronic obstructive pulmonary disease) (H) 08/19/2014   ? Lactic acidosis 08/19/2014   ? SVT (supraventricular tachycardia) (H) 08/19/2014   ? Dislodged Bhandari catheter, subsequent encounter 07/30/2014   ? Other chronic pain    ? Lumbosacral Disc Degeneration    ? Herpes Simplex Type I    ? Nicotine Dependence    ? Essential hypertension    ? Cancer of lung (H) 09/24/2013   ? Neurogenic bladder 01/29/2013   ? Neurogenic bowel 01/29/2013   ?  "Decubitus ulcer 01/25/2013   ? Mixed hyperlipidemia 01/25/2013   ? Decubitus ulcer of right buttock, stage 4 (H) 01/25/2013   ? Decubitus ulcer, stage III (H) 01/25/2013       Height:  5' 2\" (1.575 m)    Weight:   182 lb 1.6 oz (82.6 kg)    Labs:  Recent Labs     04/08/19  0516   HGB 7.4*   ALBUMIN 1.2*       Isaiah:  Isaiah Scale Score: 14    Specialty Bed:  [facility]    Wound culture obtained: No    Edema:  No    Patient requesting WOC assessment of bilateral feet and legs. Refused assessment of other wounds.   Anatomic Site/Laterality: Sacrum    Reason for ongoing care:   Wound assessment and plan of care    Encounter Type:  Subsequent Encounter Patient up in chair and refused to go back to bed at time of assessment. Assessment below from 4/2/19.  Wound Type:   Pressure Injury (Pressure Ulcer): Present on Admit    Stage:  Stage 4    Associated conditions/related trauma: Patient with malnutrition, paraplegia, adjustment disorder, history of pressure injuries to buttocks, incontinence of both bowel and bladder, CHF, chronic pain syndrome.  Patient's wounds made worse by frequent refusal of cares including turning and being cleaned up.  Had debridement on 1/10/19.    Assessment:    Length: 2 cm    Width: 3 cm    Depth: 2 cm  (measurements positional for this wound)    Tunneling/Undermining: Yes Up to 3cm towards 12 o'clock    Wound Bed: 100% Agranular (of wound bed able to be visualized)    Exudate: Yes Serosanguineous Moderate    Periwound Skin: Scar Tissue    Treatment Plan: Gauze: Wet-to-moist, cover with Mepilex Sacral       Anatomic Site/Laterality: B buttocks    Reason for ongoing care:   Wound assessment and plan of care    Encounter Type:  Subsequent Encounter Patient up in chair and refused to go back to bed at time of assessment. Assessment below from 4/2/19.  Wound Type:   Pressure Injury (Pressure Ulcer): Present on Admit    Stage:  Stage 3 v. Full thickness IAD (patient sits in urine and fecal matter for " hours at a time, refusing to let nursing staff clean area).  Wounds also worsened by long periods of time patient spends in chair, often in chair from before 6AM until late evening and refusing to go back to bed.      Associated conditions/related trauma: See above    Assessment:    Multiple areas of denudement noted to B buttocks down to IT's over approximately [id number] area     Tunneling/Undermining: No    Wound Bed: fibrin/slough/granular mix    Exudate: Yes Serosanguineous Large    Periwound Skin: Scar Tissue    Treatment Plan: Silicone foam and gauze, patient will not allow other dressings to site     Anatomic Site/Laterality: L IT    Reason for ongoing care:   Wound assessment and plan of care    Encounter Type:  Subsequent Encounter Patient up in chair and refused to go back to bed at time of assessment. Assessment below from 4/2/19.  Wound Type:   Pressure Injury (Pressure Ulcer): Present on Admit    Stage:      Associated conditions/related trauma: Patient with malnutrition, paraplegia, adjustment disorder, history of pressure injuries to buttocks, incontinence of both bowel and bladder, CHF, chronic pain syndrome.  Patient's wounds made worse by frequent refusal of cares including turning and being cleaned up.  Had debridement on 1/10/19.    Assessment:    Length: 3.5 cm    Width: 4 cm    Tunneling/Undermining: No    Wound Bed: 50% agranular, 50% fibrin/slough    Exudate: No    Periwound Skin: Erythema and satellite lesions    Treatment Plan: Gauze and Mepilex         Anatomic Site/Laterality: Right lateral shin    Reason for ongoing care:   Wound assessment and plan of care    Encounter Type:  Subsequent Encounter Pressure Injury (Pressure Ulcer): Present on Admit    Stage:  Stage 3    Associated conditions/related trauma: See above    Assessment:    Length: 4cm    Width: 2cm    Depth: 1cm    Tunneling/Undermining: No    Wound Bed: 60% Agranular and 40% Slough/Fibrin    Exudate: Yes Serosanguineous  Small    Periwound Skin: Intact     Treatment Plan: Silicone foam and gauze as this is only thing patient will allow on wound.         Anatomic Site/Laterality: Right heel    Reason for ongoing care:   Wound assessment and plan of care    Encounter Type:  Subsequent Encounter Pressure Injury (Pressure Ulcer): Present on Admit    Stage:  Stage 3    Associated conditions/related trauma: See above    Assessment:    Healed    Periwound Skin: Scar Tissue    Treatment Plan: Silicone foam      Anatomic Site/Laterality: Left heel    Reason for ongoing care:   Wound assessment and plan of care    Encounter Type:  Subsequent Encounter Pressure Injury (Pressure Ulcer): Present on Admit    Stage:  Unstageable    Associated conditions/related trauma: See above    Assessment:    Heel is 4x3.5x0.2cm, 25% granular, 75% slough/fibrin    Medial foot is 10x7.5x0.2cm, mix of slough, soft eschar, agranular    Tunneling/Undermining: No    Wound Bed: See above    Exudate: Yes Serosanguineous Moderate    Periwound Skin: Maceration and Scar Tissue    Treatment Plan: Silicone foam and gauze as this is only thing patient will allow on wound. Patient refusing Rooke boots, continue offloading with pillow and bath blanket.         Anatomic Site/Laterality: Bilateral toes    Reason for ongoing care:   Wound assessment and plan of care    Encounter Type:  Subsequent Encounter Denudement:  Foreign body present? No    Tissue Damage:   Breakdown of skin    Related trauma: Patient with multiple abrasions to toes from bumping into things.    Assessment:    Left medial great toe with open wound measuring 1.5x1.5cm- 100% agranular    Scattered areas of dried drainage noted to be intact on multiple toes.    Tunneling/Undermining: No    Wound Bed: see above    Exudate: No    Periwound Skin: Intact    Treatment Plan: Open to Air      Anatomic Site/Laterality: R lateral leg    Reason for ongoing care:   Wound assessment and plan of care    Encounter  Type:  Initial Denudement:  Unknown etiology  Foreign body present? No    Tissue Damage:   Exposed fat layer    Related trauma: See above.     Assessment:    0.4x1.2x0.1cm    Tunneling/Undermining: No    Wound Bed: 100% slough    Exudate: yes, serous, small    Periwound Skin: Intact    Treatment Plan: silicone foam     Anatomic Site/Laterality: right medial leg    Reason for ongoing care:   Wound assessment and plan of care    Encounter Type:  Initial Pressure Injury: new    Stage:  Stage 2- there is purple discoloration, so unable to rule out if damage is deeper    Related trauma: Patient frequently refuses repositioning and when in bed, likes to be positioned on her right side with her left ankle resting on her right leg.     Assessment:    5x3cm    Tunneling/Undermining: No    Wound Bed: 60% non-blanchable erythema with purple discoloration, 40% agranular    Exudate: yes, serous, small    Periwound Skin: Intact    Treatment Plan: silicone foam         Patient demanding that MD be notified and see wounds prior to reapplying dressings. Would not allow WOC RN to reapply dressings as MD had not assessed wounds yet.    Nursing care provided was coordinated care with RN.    Discussed plan of care with nurse and patient.    Outcomes and treatment recommendations are to promote skin integrity, contain exudate, promote wound healing and promote debridement autolytic.    Actions taken by WOC RN: 5 minutes of education.     Planned Follow Up: Weekly assessments of each wound will attempted by WOC RN; however, patient is frequently up in chair in the early AM and does not return to bed until later evening hours. At other times she will not allow assessment of wounds d/t a variety of reasons.    Plan for next visit: Reassess wound(s) and Reassess skin integrity.    Total time with patient: 45 minutes

## 2021-06-22 NOTE — TELEPHONE ENCOUNTER
Patient calling requesting this medication to be expedited due to the time constraints her delivery elysia has. He/she is only available from 1-5 and patient is out of medication. Patient informed that refills can take up to 72 hours to process.      Controlled Substance Refill Request  Medication:   Requested Prescriptions     Pending Prescriptions Disp Refills     oxyCODONE (ROXICODONE) 10 mg immediate release tablet 24 tablet 0     Sig: TAKE 1 TABLET (10 MG TOTAL) BY MOUTH EVERY 4 (FOUR) HOURS AS NEEDED. EFFECTIVE: 12/29/18     oxybutynin (DITROPAN) 5 MG tablet 90 tablet 0     Sig: Take 1 tablet (5 mg total) by mouth 3 (three) times a day.     Refused Prescriptions Disp Refills     methadone (DOLOPHINE) 10 MG tablet [Pharmacy Med Name: METHADONE HCL 10 MG TABLET 10 TAB] 21 tablet 0     Sig: TAKE 1 TABLET (10 MG TOTAL) BY MOUTH 3 (THREE) TIMES A DAY. EFFFECTIVE DATE: 12/31/18     Refused By: NEEL BOND     Reason for Refusal: Patient should contact provider first     Date Last Fill: 1/3/19  Pharmacy: [facility]   Submit electronically to pharmacy    Controlled Substance Agreement on File:   Encounter-Level CSA Scan Date:    There are no encounter-level csa scan date.       Last office visit with primary: Visit date not found

## 2021-06-22 NOTE — TELEPHONE ENCOUNTER
RN cannot approve Refill Request    RN can NOT refill this medication med is not covered by policy/route to provider.    Sánchez Clark, [facility] Connection Triage/Med Refill 1/3/2019    Requested Prescriptions   Pending Prescriptions Disp Refills     QUEtiapine (SEROQUEL) 50 MG tablet 30 tablet 3     Sig: Take 1 tablet (50 mg total) by mouth at bedtime.    There is no refill protocol information for this order

## 2021-06-22 NOTE — PROGRESS NOTES
Patient ID: Marychuy Zhou is a 63 y.o. female.  /60   Pulse (!) 120   LMP  (LMP Unknown)   SpO2 98%     Assessment/Plan:                   Diagnoses and all orders for this visit:    Other chronic pain  -     Discontinue: oxyCODONE (ROXICODONE) 10 mg immediate release tablet  Dispense: 24 tablet; Refill: 0  -     Discontinue: oxyCODONE (ROXICODONE) 10 mg immediate release tablet  Dispense: 24 tablet; Refill: 0    Chronic pain syndrome    Paraplegia at T4 level (H)    Opiate dependence, continuous (H)    Chronic suprapubic catheter (H)    Pressure injury of skin of sacral region, unspecified injury stage    Other orders  -     Cancel: cyclobenzaprine (FLEXERIL) 5 MG tablet  Dispense: 90 tablet; Refill: 0  -     Cancel: levETIRAcetam (KEPPRA) 250 MG tablet  Dispense: 60 tablet; Refill: 3  -     Cancel: oxyCODONE (ROXICODONE) 10 mg immediate release tablet  Dispense: 12 tablet; Refill: 0  -     Cancel: QUEtiapine (SEROQUEL XR) 50 mg Tb24 24 hr tablet  Dispense: 30 tablet; Refill: 1  -     Cancel: rivaroxaban 20 mg Tab  Dispense: 30 tablet; Refill: 2  -     Cancel: Mammo Screening Bilateral  -     Discontinue: QUEtiapine (SEROQUEL XR) 50 mg Tb24 24 hr tablet  Dispense: 30 tablet; Refill: 2  -     cyclobenzaprine (FLEXERIL) 5 MG tablet  Dispense: 90 tablet; Refill: 2  -     levETIRAcetam (KEPPRA) 250 MG tablet  Dispense: 60 tablet; Refill: 3  -     rivaroxaban 20 mg Tab  Dispense: 30 tablet; Refill: 3           DISCUSSION  And previous office visit notes I have summarized her complex history.  Today we did not delve into her full history as the duration of our visit was approximately 15 minutes.  I have spent time reviewing her medical history as much as possible including her most recent hospitalization.  I have agreed to provide limited prescriptions for pain medication until she gets into see the pain clinic.  I will prescribe less than 1 week supply of medication so that there is not extra medication on  hand.  I have reiterated the importance of appropriate use.  I think it is reasonable for me to prescribe medication in this fashion until she can get to a pain clinic.  I did also discussed the importance of continued close follow-up as well as being up with specialty providers.  I did discuss with her specifically the importance of undergoing the bone biopsy recommended by infectious disease to evaluate for the potential of osteomyelitis.  Patient assures me that her current ulcers are being cared for appropriately she declines a specific evaluation today given the limited time.  Subjective:     RONALD Zhou is a 63 y.o. female is an extremely medically complex and socially complex patient.  She has paraplegia secondary to a spinal hematoma and is confined to a wheelchair.  She has multiple decubitus ulcers and a chronic infection in the bone it has been on recommended to undergo a biopsy.  She has neurogenic bladder with chronic indwelling suprapubic catheter.  She has a history of multiple drug resistant infections.  She has a history of cancer.  She has a history of blood clot and is on anticoagulation.  She has chronic pain is on methadone and oxycodone.  She has significant psychiatric history and is on multiple medications for management.  She has had frequent hospitalizations.  She arrived more than 40 minutes late for her appointment.  Patient also states that she must leave with any short period of time after arriving.  Patient arrived near the timeframe of clinic closing.    Patient states that the current time she is doing reasonably well.  Patient's biggest concern is ongoing prescriptions for pain medication.  Patient has been referred to multiple specialty providers in the past.  There has been little to no follow through with recommended specialty visits.  Patient is scheduled at this time to be seen at the pain clinic.  Patient's complex pain management regiment needs to be managed by a  specialist.  It is only reasonable for me to prescribe small amounts of medication for her to have on hand.  I do believe this patient has significant pain and needs to be on pain medication.  I am concerned about her ability to properly manage all medical concerns given her resources.  Patient does have a PCA.  Patient lives in a private home.  Patient is here today unaccompanied.  There have been concerns expressed by previous care providers about the ability of her PCA to provide appropriate care.  I discussed these concerns with the patient and have insisted that she follow-up with the pain clinic.    Review of Systems  Complete review of systems is obtained.  Other than the specific considerations noted above complete review of systems is negative.          Objective:   Medications:  Current Outpatient Medications   Medication Sig Note     acetaminophen (TYLENOL) 500 MG tablet Take 1,000 mg by mouth.      arginine/glutamine/calcium bmb (LARRY THERAPUETIC NUTRITION) 7-7-1.5 gram PwPk Take 1 packet by mouth 2 (two) times a day.      benzocaine-menthol (CEPACOL) 15-3.6 mg Take 1 lozenge by mouth every hour as needed.      busPIRone (BUSPAR) 5 MG tablet       calcium, as carbonate, (TUMS) 200 mg calcium (500 mg) chewable tablet Chew 1 tablet (200 mg total) 4 (four) times a day as needed.      collagenase ointment Apply topically daily.      cyclobenzaprine (FLEXERIL) 5 MG tablet Take 1 tablet (5 mg total) by mouth 3 (three) times a day as needed for muscle spasms.      DAKIN'S SOLUTION 0.125 % Soln external solution USE TO CLEANSE WOUND AND MOISTEN GAUZE TWICE DAILY;USE TO CLEANSE WOUND AND MOISTEN GAUZE AS NEEDED      diaper,brief,adult,disposable (BRIEFS, ADULT-EXTRA LARGE) Misc Use 1 each As Directed as needed.      disposable gloves (PURPLE NITRILE GLOVES) Misc Use 1 each As Directed as needed.      gabapentin (NEURONTIN) 300 MG capsule Take 3 capsules (900 mg total) by mouth 2 (two) times daily after lunch and  supper.      gabapentin (NEURONTIN) 300 MG capsule Take 2 capsules (600 mg total) by mouth daily In the morning.      hydrocortisone 1 % cream Use three times a day prn itching/eczema on skin.      hydrOXYzine HCl (ATARAX) 25 MG tablet Take 25-50 mg by mouth every 6 (six) hours as needed for itching.      ibuprofen (ADVIL,MOTRIN) 600 MG tablet Take 600 mg by mouth 4 (four) times a day with meals and bedtime.      ipratropium-albuterol (DUO-NEB) 0.5-2.5 mg/3 mL nebulizer Inhale 3 mL.      levETIRAcetam (KEPPRA) 250 MG tablet Take 1 tablet (250 mg total) by mouth 2 (two) times a day.      levothyroxine (SYNTHROID, LEVOTHROID) 125 MCG tablet Take 125 mcg by mouth Daily at 6:00 am.      melatonin 3 mg Tab tablet Take 1 tablet (3 mg total) by mouth at bedtime as needed. (Patient taking differently: Take 12 mg by mouth at bedtime as needed .      )      methadone (DOLOPHINE) 10 MG tablet Take 10 mg by mouth.      miconazole (MICOTIN) 2 % powder Apply topically 2 (two) times a day. To groin folds      multivit-mins-ferrous gluconat 9 mg iron/15 mL Liqd 15 mL by Enteral Tube route daily.       multivitamin/iron/folic acid (CENTRUM WOMEN ORAL) TAKE 15ML BY ENTERAL TUBE ONCE DAILY      nutritional supplements Powd 1 packet bid for wound healing      omeprazole (PRILOSEC) 20 MG capsule Take 20 mg by mouth daily as needed (heartburn).      oxybutynin (DITROPAN) 5 MG tablet Take 1 tablet (5 mg total) by mouth 3 (three) times a day.      QUEtiapine (SEROQUEL) 25 MG tablet Take 25 mg by mouth 2 (two) times a day Morning and afternoon .      rivaroxaban 20 mg Tab Take 1 tablet (20 mg total) by mouth daily with supper.      venlafaxine (EFFEXOR) 37.5 MG tablet Take 1 tablet (37.5 mg total) by mouth 2 (two) times a day with meals. 11/30/2018: Has not filled since 8/23/18 x 14 day supply - patient states she took 3-4 days ago - would only have supply left if non-compliant      zinc oxide 20 % ointment Apply 1 application topically  daily as needed for dry skin (or irritation).      methadone (DOLOPHINE) 10 MG tablet TAKE 1 TABLET (10 MG TOTAL) BY MOUTH 3 (THREE) TIMES A DAY.      oxyCODONE (ROXICODONE) 10 mg immediate release tablet TAKE 1 TABLET (10 MG TOTAL) BY MOUTH EVERY 4 (FOUR) HOURS AS NEEDED. EFFECTIVE: 12/29/18      QUEtiapine (SEROQUEL) 50 MG tablet Take 1 tablet (50 mg total) by mouth at bedtime.        Allergies:  Allergies   Allergen Reactions     Morphine Anaphylaxis       Tobacco:   reports that she has been smoking cigarettes.  she has never used smokeless tobacco.     Physical Exam          /60   Pulse (!) 120   LMP  (LMP Unknown)   SpO2 98%         General Appearance:    Alert, cooperative, no distress, appears stated age she is seated comfortably in her wheelchair.   Head:    Normocephalic, without obvious abnormality, atraumatic   Eyes:    PERRL, conjunctiva/corneas clear       Neck:   Supple, symmetrical, trachea midline, no adenopathy;        thyroid:  No enlargement/tenderness/nodules       Lungs:     Clear to auscultation bilaterally, respirations unlabored       Heart:    Regular rate and rhythm, S1 and S2 normal, no murmur, rub   or gallop   Abdomen:    Upper abdomen is soft and nontender.           Extremities:  Brief examination of her lower extremities around ankle region only reveals that there is trace edema without any obvious skin lesions seen on the anterior shins.

## 2021-06-22 NOTE — PROGRESS NOTES
"TCM DISCHARGE FOLLOW UP CALL    Discharge Date:  12/12/2018  Reason for hospital stay (discharge diagnosis)::  UTI, Sepsis, COPD  Are you feeling better, the same or worse since your discharge?:  Patient is feeling the same (PCA helps her w/wound care/dressing changes.)  Do you feel like you have a plan in the event of a health emergency?: Yes (\"Call 911.\")    As part of your discharge plan, were  home care services ordered for you?: Yes    Have you seen them yet, or are they scheduled to visit?: No (Waiting for call back \"from a different agency\" for an appt.  Does not recall name of agency that will come out.)    Did you receive any new medications, or was there a change to your medications?: Yes    Are you taking those medications, or do you have any established regiment?:  RN reviewed new/changed d/c medications w/pt from d/c paperwork.  Pt states she is taking oxycodone for pain, but hasn't started Prednisone yet; pharmacy will deliver this afternoon & she will start as she gets it.  Do you have any follow up visits scheduled with your PCP or Specialist?:  Yes, with PCP (Dr. Zamora 12/14/18)  (RN) Is PCP appt scheduled soon enough (within 14 days of discharge date)?: Yes        Julia Hdez RN Care Manager, Population Health    "

## 2021-06-22 NOTE — TELEPHONE ENCOUNTER
Controlled Substance Refill Request  Medication:   Requested Prescriptions     Pending Prescriptions Disp Refills     methadone (DOLOPHINE) 10 MG tablet [Pharmacy Med Name: METHADONE HCL 10 MG TABLET 10 TAB] 21 tablet 0     Sig: TAKE 1 TABLET (10 MG TOTAL) BY MOUTH 3 (THREE) TIMES A DAY. EFFFECTIVE DATE: 12/31/18     Date Last Fill: 12/31/18  Pharmacy: [facility]   Submit electronically to pharmacy  Controlled Substance Agreement on File:   Encounter-Level CSA Scan Date:    There are no encounter-level csa scan date.       Last office visit: Last office visit pertaining to requested medication was 12/14/18.    Pt in the hospital

## 2021-06-22 NOTE — TELEPHONE ENCOUNTER
Patient Returning Call  Reason for call:  Patient called back.  Information relayed to patient:  Informed that the provider is out of the office today.  Patient states she needs this today early so it gets to the pharmacy in time for delivery.  Patient is requesting covering provider to fill.  Please address.  Patient has additional questions:  Yes  If YES, what are your questions/concerns:  As above.  Okay to leave a detailed message?: Yes

## 2021-06-23 NOTE — PLAN OF CARE
"Problem: Discharge Barriers  Goal: Patient's discharge needs are met  Outcome: Progressing  Care Plan  Care Management      Care Management Goals of the Day: assess for potential dc needs    Care Progression Reviewed With: Charge RN, MD, HUC, RNCM, CMSW    Barriers to Discharge: pain control, placement    Discharge Disposition: unknown    Expected Discharge Date: TBD    Transportation: medical      Care Coordination Narrative: Met with pt to introduce role of Care Mgment and assess for discharge needs. After exhaustive search for accepting TCU, [facility] accepted patient for admission and after one day she requested to return to the hospital for pain control issues. States \"I refuse to return to that place.  It is unsafe and dangerous.  You cannot make me go back there.\"  States she is planning to discharge to a garden level apartment and get extended PCA hours.  Writer unable to reason with pt re: the likelihood she will be unable to make these arrangements in the context of her current hospital stay.  Placement will be challenging.          "

## 2021-06-23 NOTE — ED NOTES
Patient now awake and pressing her call light every few minutes making multiple demands but then yelling at staff when they come to assist her.

## 2021-06-23 NOTE — PROGRESS NOTES
Care Plan Progress Note:    Name: Marychuy Zhou  :   1955  MRN:   [id number]    A one-time dose of oral Ativan 0.5mg given per provider orders.  Oral Dilaudid PRN and oral Flexeril PRN given.  Patient is still sitting up in her wheelchair - observed to be making phone calls in her room.  At 1430, placed a pillow under her left arm and shifted patient slightly in the wheelchair per patient request.  Patient reported that her mashed potatoes are cold.  Nutrition ambassador is coming to bring a new orders of mashed potatoes.  Will continue to monitor.    2019 2:30 PM    Penelope Ortiz RN
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
## 2021-06-23 NOTE — PROGRESS NOTES
"[facility] Daily Progress Note    Assessment/Plan:  Marychuy Zhou is a 63-year-old with history of HTN, HLD, hypothyroidism, COPD, DVT, PE and paraplegia, sacral decubitus ulcers, chronic foot ulcers who was discharged 12/1/2019 only to come back to the hospital on 2/2/2019 evening and was admitted on 2/3/2019 with chest according to her she did not get any adequate pain relief in her rehab facility. Unfortunately in the Hospital patient has had issues with behavior. Verbally abusing staff as well as at times physically throwing thing at people. She has not been following physician and Nursing advises and has not allowed nursing staff to change her position and take care of her Sacral wound, with the wound soiled at times. There was a conference with Hospital administration 2/6/2019 requesting her co-operation in the care of her and allowing nursing staff to follow protocols but unfortunately she has refused to talk and asked them all to get out of the room. Later she told me that \"this is a retaliatory measures being taken against her\"        Assessment:    Stage IV sacral decubitus ulcer  No fever  Lactic acid was mildly elevated which is coming down  On Keflex.      Anxiety  Will continue hydroxyzine  Patient says she has no relief with Hydroxyzine.   I offered her trazodone which he does not want to take  She wanted Ativan and that too every 3 hrs,   Psychiatry has recommended that Ativan be stopped and not given.     Anemia  Patient wants blood transfusion though the hemoglobin is 8.5 down from 9.7.  I do not think that she requires any blood transfusion at this time unless less than 7 or has any hemodynamic compromise     COPD  Hypertension   Hypocholesteremia  Chronic constipation  Neurogenic bladder  Seizure disorder on antiepileptics  Hypothyroidism  T4 quadriplegia  DVT and PE on Xarelto        Plan:  Check hemoglobin in the morning  Patient can be discharged when on nursing home bed is available    Code " "status:Full Code        Subjective:  Patient says that she has a lot of anxiety.   Psychiatry has seen harley today.     She has been asking for blood transfusion even though her hemoglobin has been >8    There was a conference with Hospital administration today requesting her co-operation in the care of her and allowing nursing staff to follow protocols but unfortunately she has refused to talk and asked them all to get out of the room. Later she told me that \"this is a retaliatory measures being taken against her\"  She also said that she has a good  who has already made her win over a million dollars from the hospital earlier in a law suite.     She has been requesting Ativan every 3 hours which I did tell her cannot be given as advised by Psychiatry.       Review of Systems:   No fever    Current Medications Reviewed via EHR List    Objective:  Vital signs in last 24 hours:  Temp:  [97  F (36.1  C)-98.7  F (37.1  C)] 97  F (36.1  C)  Heart Rate:  [] 90  Resp:  [16-20] 18  BP: (79-98)/(47-54) 98/48  SpO2:  [95 %-100 %] 97 %    Intake/Output last 3 shifts:  I/O last 3 completed shifts:  In: -   Out: 10 [Urine:10]      Physical Exam:  EYES: EOM+   NECK: no JVD  Heart S1 S2 RRR  Lungs clear to auscultation with decreased breath sounds in the Lung bases   CNS Alert and Oriented x 3   I did not examine her sacral area at the request of the patient        Lab Results:  Personally Reviewed.   Fingerstick Blood Glucose: No results for input(s): POCGLUFGR in the last 48 hours.    Recent Results (from the past 24 hour(s))   Platelet Count - every other day x 3    Collection Time: 02/05/19  9:49 PM   Result Value Ref Range    Platelets 366 140 - 440 thou/uL   Basic Metabolic Panel    Collection Time: 02/06/19  6:10 AM   Result Value Ref Range    Sodium 139 136 - 145 mmol/L    Potassium 3.9 3.5 - 5.0 mmol/L    Chloride 107 98 - 107 mmol/L    CO2 25 22 - 31 mmol/L    Anion Gap, Calculation 7 5 - 18 mmol/L    Glucose " 108 70 - 125 mg/dL    Calcium 8.1 (L) 8.5 - 10.5 mg/dL    BUN 6 (L) 8 - 22 mg/dL    Creatinine 0.57 (L) 0.60 - 1.10 mg/dL    GFR MDRD Af Amer >60 >60 mL/min/1.73m2    GFR MDRD Non Af Amer >60 >60 mL/min/1.73m2   HM1 (CBC with Diff)    Collection Time: 02/06/19  6:10 AM   Result Value Ref Range    WBC 10.7 4.0 - 11.0 thou/uL    RBC 3.63 (L) 3.80 - 5.40 mill/uL    Hemoglobin 8.5 (L) 12.0 - 16.0 g/dL    Hematocrit 29.0 (L) 35.0 - 47.0 %    MCV 80 80 - 100 fL    MCH 23.4 (L) 27.0 - 34.0 pg    MCHC 29.3 (L) 32.0 - 36.0 g/dL    RDW 20.7 (H) 11.0 - 14.5 %    Platelets 370 140 - 440 thou/uL    MPV 9.3 8.5 - 12.5 fL    Neutrophils % 75 (H) 50 - 70 %    Lymphocytes % 12 (L) 20 - 40 %    Monocytes % 8 2 - 10 %    Eosinophils % 5 0 - 6 %    Basophils % 0 0 - 2 %    Neutrophils Absolute 7.9 (H) 2.0 - 7.7 thou/uL    Lymphocytes Absolute 1.3 0.8 - 4.4 thou/uL    Monocytes Absolute 0.9 0.0 - 0.9 thou/uL    Eosinophils Absolute 0.5 (H) 0.0 - 0.4 thou/uL    Basophils Absolute 0.0 0.0 - 0.2 thou/uL   Manual Differential    Collection Time: 02/06/19  6:10 AM   Result Value Ref Range    Platelet Estimate Normal Normal    Ovalocytes 1+ (!) Negative    Polychromasia 1+ (!) Negative           Advanced Care Planning  Discharge plan: unknown      Jaron Edwards  Date: 2/6/2019  Time: 4:36 PM  Hospitalist Service  Part of this chart was created using a dictation software. Typographic errors, word substitutions, and Grammatical errors may unintentionally occur.

## 2021-06-23 NOTE — PLAN OF CARE
"SW met with pt along with Patient Advocate and unit Clinical Manager to establish treatment plan expectations while pt is hospitalized. Treatment plan was laid out with pt in hopes to establish goals of care for how pt and staff are to interact with each other. Pt became frustrated with this process and refused to sign. Pt became frustrated with SW questioning her years of experience and stated she never saw the patient advocate during her previous stay, however this was untrue as the pt advocate worked closely with the pt up until her discharge. SW requested to work with the pt to come up with a treatment plan she can agree to with expectation for the pt and the staff while hospitalized. Pt declined to do this. Pt refused to sign treatment plan and refused to speak further with the SW stating \"go get more experience.\"    Care Management will continue to work with pt on completing safe discharge plan.     CYNTHIA Gonsalez LGSW  2/4/2019  4:42 PM   "

## 2021-06-23 NOTE — PROGRESS NOTES
At 0005 i went in room 4740 with nurse canseco.the patient told me to move her blankets from the chair to the bed.after doing that she told me to get out, that I dint know what I was doing.

## 2021-06-23 NOTE — PLAN OF CARE
"Pt is minimally cooperative with staff. She refuses physical assessment and is demanding of the cares that she does allow.  Pt reports having numbness/tingling to left arm related to metal bar on wheel chair. Pt initially refused to let me reposition her but did eventually allow me to place folded blanket between her and metal bar. When offered to transfer pt back to bed, she replied \"No, I'll tell you when I'm ready to go back to bed\".   "

## 2021-06-23 NOTE — PROGRESS NOTES
"[facility] Daily Progress Note    Assessment/Plan:  Marychuy Zhou is a 63-year-old with history of HTN, HLD, hypothyroidism, COPD, DVT, PE and paraplegia, sacral decubitus ulcers, chronic foot ulcers who was discharged 12/1/2019 only to come back to the hospital on 2/2/2019 evening and was admitted on 2/3/2019 with chest according to her she did not get any adequate pain relief in her rehab facility. Unfortunately in the Hospital patient has had issues with behavior. Verbally abusing staff as well as at times physically throwing thing at people. She has not been following physician and Nursing advises and has not allowed nursing staff to change her position and take care of her Sacral wound, with the wound soiled at times. There was a conference with Hospital administration 2/6/2019 requesting her co-operation in the care of her and allowing nursing staff to follow protocols but unfortunately she has refused to talk and asked them all to get out of the room. Later she told me that \"this is a retaliatory measures being taken against her\"        Assessment:    Stage IV sacral decubitus ulcer  No fever  Lactic acid was mildly elevated which is coming down  On Keflex.      Anxiety  Will continue hydroxyzine  Patient says she has no relief with Hydroxyzine.   I offered her trazodone which he does not want to take  She wanted Ativan and that too every 3 hrs,   Psychiatry has recommended that Ativan be stopped and not given.     Anemia  Patient wants blood transfusion though the hemoglobin is 8.5 down from 9.7.  I do not think that she requires any blood transfusion at this time unless less than 7 or has any hemodynamic compromise     COPD  Hypertension   Hypocholesteremia  Chronic constipation  Neurogenic bladder  Seizure disorder on antiepileptics  Hypothyroidism  T4 quadriplegia  DVT and PE on Xarelto        Plan:  Check hemoglobin in the morning    Patient can be discharged when on nursing home bed is available    Code " status:Full Code        Subjective:  Patient sleeping x 2      Review of Systems:   No fever    Current Medications Reviewed via EHR List    Objective:  Vital signs in last 24 hours:  Temp:  [97.5  F (36.4  C)-99.3  F (37.4  C)] 98.3  F (36.8  C)  Heart Rate:  [] 112  Resp:  [20] 20  BP: (113-118)/(62-73) 113/62  SpO2:  [93 %-100 %] 100 %    Intake/Output last 3 shifts:  I/O last 3 completed shifts:  In: -   Out: 150 [Urine:150]      Physical Exam:    I did not examine the patient   Came to the room x 2         Lab Results:  Personally Reviewed.   Fingerstick Blood Glucose: No results for input(s): POCGLUFGR in the last 48 hours.    No results found for this or any previous visit (from the past 24 hour(s)).        Advanced Care Planning  Discharge plan: unknown      Jaron Edwards  Date: 2/7/2019  Time: 4:36 PM  Hospitalist Service  Part of this chart was created using a dictation software. Typographic errors, word substitutions, and Grammatical errors may unintentionally occur.

## 2021-06-23 NOTE — PROGRESS NOTES
"Pt very rude, demanding and verbally abusive to staff. Pt putting on call light to request to be changed/repositioned, notified that staff member will find other staff members to help with this as patient requires assist of 2-3 for cares, despite this pt continually put on call light to yell at staff. Writer went into room to speak with patient, as patient was verbalizing her concerns writer stated \"okay\" and nodded head several times to show patient that this writer was listening, pt found this to be offensive and starting mocking writer. Told writer to \"wipe that smirk off your face\" and demanded PRN pain medication that was not yet due stating \"you work for me, if it wasn't for me you wouldn't have a job\". Pt put on call light several times when writer was already in the room attempting to meet patient's needs. Pt called  9 times between the times of 2015 to 2113 demanding to be taken down to the ER.   "

## 2021-06-23 NOTE — PROGRESS NOTES
"Admitted in the early hours of this morning after being d/lucila less than 48 hours ago  Briefly, 63-year-old woman with multiple hospitalizations, long-standing paraplegia, chronic  decubitus ulcers, lack of self-care and poor personality with verbal abuse towards multiple providers.  Patient was discharged from this facility to a care facility and soon after reaching there, patient decided to take [employer] [facility] and go visit her son and grandson.  Subsequently she returned to the care facility [facility] [employer] [facility].  Of note is the fact patient is receiving large doses of Dilaudid orally and she was not given any Dilaudid to go with and she left the facility.  On return to the TCU she demanded her pain medications which were given to her, however patient decided that she was not getting adequate pain relief and she decided to call the paramedics and come to the emergency department and subsequently was admitted for pain control.  Because she returned in less than designated.  Of time she was admitted as an inpatient.      At the time of my evaluation of the patient this morning she was found to be verbally abusive towards the nurse calling her \"the red devil \".  Patient and I had an extended conversation about multiple issues.  She demands the following things: She demands oral Dilaudid, 12 mg of melatonin every night, she wants to be positioned only a certain way, she wants better control of her muscle spasms, and she does not want to go back to the care facility that she came from.  She also tells me that the methadone is not working for her.    I met with the patient in the presence of the care manager as well as her bedside nurse.  I informed the patient that she needs to be respectful of everybody was caring for her just the way we are respectful of her.  We finally came to the following conclusions:    1/.:  Flexeril will be discontinued and she will be started on baclofen for her muscle spasms  2/.  Methadone " will be tapered off for 1 week and then stopped.  3/.  Patient will be placed on 3 mg of oral Dilaudid every 3 hours as needed.  4/.  Diet will be provided as regular diet  5/.  Patient will work with care management to be discharged.  6/.  There is no indication of antibiotics  7/.  Patient has iron deficiency anemia will be started on Venofer while in the hospital and should be discharged on oral iron at that time.  8/.  She will not be prescribed 12 milligrams of melatonin that she advised us to do so but instead will be tried on Lunesta.

## 2021-06-23 NOTE — PROGRESS NOTES
"Pt requested to see the charge RN (the writer). The writer went into patient's room and had a long conversation with her. She is inquiry about going outside the building and getting fresh air even though she is on the 72 hours hold. I explained to the patient that she is on hold and until the status changes we can't actually let her leave the unit. She still insisted to go outdifr and was verbally abusive to the writer. Writer paged the provider. In the meantime, patient's \"friend\" called the desk and stated \" the patient is not getting the help she deserves\". He added \"you guys are restricting the patient not to move\". Apparently, the patient told the friend that the staff refusing to get her to her electric chair. Writer spoke with the primary nurse and the 1:1 aide for the patient. The patient is planning on leaving the unit if she gets up to her chair. Writer spoke with the nurse supervisor on duty. Will continue to monitor.   "

## 2021-06-23 NOTE — PLAN OF CARE
Problem: Discharge Barriers  Goal: Patient's discharge needs are met  Outcome: Progressing  Care Plan  Care Management      Care Management Goals of the Day: Progression of care    Care Progression Reviewed With: Charge RN, MD, HUC, RNCM, CMSW    Barriers to Discharge: Medical, Commitment    Discharge Disposition: TBD    Expected Discharge Date: TBD    Transportation: TBD      Care Coordination Narrative:     SW spoke with Leonarda from [place] [employer] and was informed that they will support the petition for commitment. SW stated understanding of this. Per Leonarda court hold and paperwork will likely come to the unit [employer] fax machine on Monday, 2/11/2019, prior to the hold expiration. SW stated understanding of this. Additionally Leonarda informed the SW that they will not be supporting the [place] at this time as the pt is currently complying with taking her neuroleptic medications. SW stated understanding of this.    SW to wait to inform pt of county's decision to support petition for commitment until court documents received.     SW will continue to follow and assist with further d/c needs.     CYNTHIA Gonsalez  2/8/2019  3:53 PM

## 2021-06-23 NOTE — PLAN OF CARE
"Patient's pain/discomfort is manageable Progressing      Prevent transmission of organisms Progressing      Daily care needs are met Progressing    Isolation precautions in place. 1:1 staff in place. Pain controlled with medication. Assisted with cares as needed. Dozed through the night, but was awake most of shift. Utilizing call system very frequently. Has very detailed and intricate specifications in how cares provided and will ask for them to be done differently upon completion. For example, had specific way pillows should be placed, 4 pillows at foot of bed standing on side like dominoes leaning at a slight angle. Nurse spent 15 minutes attempting to place them correctly. Shortly after finishing, told nurse to lay 2 pillows flat on side and keep 2 standing up on side (this was the original placement of pillows). Calling very frequently and will call the  before staff has any time to meet request. Spoke at great length of her plans to doug the hospital. Did not use inappropriate language with this writer or name-call. In fact, was pleasant in demeanor. Spoke poorly of hospital though and plan to \"doug the hell out of them\".     Requested flexeril be made available more frequently, note place to provider team and will report off to day nurse. Refused turning and repositioning. Did not want to let nurse assess her buttocks or pressure wounds. Wanted to put micotin powder on self and completed task when nurse was in room.     One of her pens leaked ink. Black ink on hands and wrist. Was able to get some off using hand  as it seems to break the ink down well.  left within her reach and extra washcloths. Disposed of pen with her permission.    Have made every attempt to meet very extensive list of requests.   "

## 2021-06-23 NOTE — PROGRESS NOTES
Wound Ostomy  WOC Assessment         Allergies:  No Known Allergies    Diagnosis:   Patient Active Problem List    Diagnosis Date Noted     Severe major depression, single episode, with psychotic features (H)      Paranoia (H)      Upper back pain      Abnormal CT scan of lung      Panic anxiety syndrome      Pelvic pain 01/09/2019     Ulcer due to Treponema vincentii, with fat layer exposed (H) 01/07/2019     Atelectasis      Tension-type headache, not intractable, unspecified chronicity pattern      Weakness of left hand 12/04/2018     Focal motor seizure (H) 12/04/2018     Pelvic pain in female 11/21/2018     Chronic osteomyelitis (H)      Drug-induced constipation      Quadriplegia (H)      Goals of care, counseling/discussion 07/16/2018     Advanced care planning/counseling discussion 07/16/2018     Malingerer for IV Dilaudid 07/16/2018     Moderate protein-calorie malnutrition (H) 07/16/2018     Infection 07/14/2018     Noncompliance with medication regimen      Stage IV pressure ulcer of sacral region (H) 07/11/2018     Gangrene (H) 07/10/2018     Centrilobular emphysema (H) 07/02/2018     Bilateral lower extremity edema 07/02/2018     Hypoxemia 07/01/2018     Left lower lobe pneumonia (H) 06/27/2018     Acute bronchitis due to other specified organisms 06/27/2018     Nephrostomy complication (H) 03/06/2018     History of encephalopathy      Closed left subtrochanteric femur fracture (H) 02/27/2018     Acute blood loss anemia      Other specified hypotension      Anemia 02/26/2018     Adjustment disorder with mixed anxiety and depressed mood      Sleep difficulties      Irritability and anger      Fever in other diseases 01/31/2018     Severe sepsis (H) 01/31/2018     Ulcer 01/31/2018     Hypothyroidism due to Hashimoto's thyroiditis      Abscess, gluteal, right      History of DVT (deep vein thrombosis)      Urinary incontinence due to urethral sphincter incompetence      Weakness 09/07/2017     Chronic  anticoagulation 09/03/2017     Urinary tract infection associated with cystostomy catheter (H) 09/02/2017     Dislodged wound Vacuum 08/14/2017     Abdominal pain, unspecified location      Coagulopathy (H)      Anxiety      UTI (urinary tract infection) 08/10/2017     Elevated lactic acid level 07/29/2017     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     Suprapubic catheter dysfunction, subsequent encounter      Bilateral hydronephrosis      Cystitis      Colon wall thickening      Abdominal pain 06/03/2017     Flank pain 06/02/2017     Right upper quadrant abdominal pain 06/02/2017     Episodic cluster headache, not intractable      Sepsis (H) 05/28/2017     Sepsis secondary to UTI (H) 05/28/2017     Pyelonephritis      Hyponatremia      Chronic obstructive pulmonary disease with acute exacerbation (H) 04/18/2017     Depression 04/18/2017     Partial symptomatic epilepsy with simple partial seizures, intractable, with status epilepticus (H)      Acute on chronic intracranial subdural hematoma (H)      Brain edema (H)      Subdural hematoma (H) 04/09/2017     Convulsions, unspecified convulsion type (H)      Neck infection 03/31/2017     Lower abdominal pain 03/01/2017     Fever, unspecified fever cause      Pneumonia of left lower lobe due to infectious organism (H)      S/P cholecystectomy      Choledocholithiasis with obstruction 01/02/2017     Cholelithiasis 01/02/2017     Hx of pulmonary embolus 01/02/2017     Claustrophobia      Urinary tract infection, site unspecified      Hypotension, unspecified hypotension type      Chronic low back pain without sciatica, unspecified back pain laterality      Acute encephalopathy      Sepsis, due to unspecified organism (H) 12/30/2016     History of pulmonary embolus (PE) 12/01/2016     Cognitive disorder      Insomnia due to anxiety and fear      HCAP (healthcare-associated pneumonia) 11/04/2016     Atypical chest pain 10/23/2016     Diastolic CHF, acute on chronic (H)       "Chest tightness or pressure      History of MDR Enterobacter cloacae infection      Anxiety disorder      Esophageal dysmotility      Major depressive disorder, recurrent episode, moderate (H)      Seizure disorder (H)      Heel ulcer, right, limited to breakdown of skin (H)      Decubitus ulcer of sacral region, stage 4 (H)      Paraplegia at T4 level (H) 04/28/2016     Hypokalemia      Chronic pain syndrome 01/12/2016     Major depression 01/12/2016     Alcohol abuse 01/12/2016     COPD exacerbation (H) 08/03/2015     Gastroesophageal reflux disease without esophagitis 05/26/2015     Acquired hypothyroidism 05/26/2015     Iron deficiency anemia 05/26/2015     Opioid-induced constipation (OIC) 05/26/2015     Paraplegia (H) 05/26/2015     Palliative care encounter 12/08/2014     Nephrostomy tube displaced (H) 12/02/2014     Mechanical complication of suprapubic catheter (H) 11/20/2014     Acute respiratory failure with hypoxia (H) 08/19/2014     COPD (chronic obstructive pulmonary disease) (H) 08/19/2014     Lactic acidosis 08/19/2014     SVT (supraventricular tachycardia) (H) 08/19/2014     Dislodged Bhandari catheter, subsequent encounter 07/30/2014     Other chronic pain      Lumbosacral Disc Degeneration      Herpes Simplex Type I      Nicotine Dependence      Essential hypertension      Cancer of lung (H) 09/24/2013     Neurogenic bladder 01/29/2013     Neurogenic bowel 01/29/2013     Decubitus ulcer 01/25/2013     Mixed hyperlipidemia 01/25/2013     Decubitus ulcer of right buttock, stage 4 (H) 01/25/2013     Decubitus ulcer, stage III (H) 01/25/2013       Height:  5' 2\" (1.575 m)    Weight:   162 lb (73.5 kg)    Labs:  Recent Labs     02/06/19  0610   HGB 8.5*       Isaiah:  Isaiah Scale Score: 9    Specialty Bed:  Specialty Bed: [place] Position Pro (ICU only) (STACH)    Wound culture obtained: No    Edema:  Yes:  Generalized    Anatomic Site/Laterality: sacrum    Reason for ongoing care:   Wound assessment and " "plan of care    Encounter Type:  Subsequent Encounter Wound Type:   Pressure Injury (Pressure Ulcer): Present on Admit    Stage:  Stage 4    Associated conditions/related trauma: Patient with malnutrition, paraplegia, adjustment disorder, history of pressure injuries to buttocks, incontinence of both bowel and bladder, CHF, chronic pain syndrome.      Assessment:    Unable to obtain measurements and pictures of wounds today as patient refused to have help with turning and she is unable to hold herself over for the amount of time needed to measure and take photos.     Wound Bed: 75% Agranular, 20% Slough and 5% exposed bone    Exudate: Yes Serosanguineous Moderate. Patient had removed dressing that was placed yesterday, drainage from wound going onto Covidien pads underneath patient.     Periwound Skin: Erythema, Maceration and Scar Tissue    Treatment Plan: NS gauze, silicone foam       Anatomic Site/Laterality: left buttock    Reason for ongoing care:   Wound assessment and plan of care    Encounter Type:  Subsequent Encounter Wound Type:   Pressure Injury (Pressure Ulcer): Present on Admit    Stage:  Stage 4    Associated conditions/related trauma: See above. This was a previously healed wound, now opened again. Patient states \"there's not an open wound there, don't put anything on it!\"     Assessment:    Length: 2cm    Width: 0.2cm    Depth: unable to assess    Tunneling/Undermining: unable to assess    Wound Bed: 100% Agranular    Exudate: Yes Serous Small    Periwound Skin: Maceration and Scar Tissue    Treatment Plan: Silicone foam     Anatomic Site/Laterality: left heel    Reason for ongoing care:   Wound assessment and plan of care    Encounter Type:  Subsequent Encounter Wound Type:   Pressure Injury (Pressure Ulcer): Present on Admit    Stage:  Stage 3    Associated conditions/related trauma: See above    Assessment:    Difficult to assess as patient refuses to have foot lifted high enough for full " "assessment- appears to measure approx 4x4x0.2cm    Tunneling/Undermining: No    Wound Bed: 85% Granular and 15% Slough    Exudate: Yes Serous Large- patient previously was refusing dressings to be applied, so drainage from wound going onto all linens on bed    Periwound Skin: Erythema and Maceration    Treatment Plan: Silicone foam     Anatomic Site/Laterality: right heel    Reason for ongoing care:   Wound assessment and plan of care    Encounter Type:  Subsequent Encounter Wound Type:   Pressure Injury (Pressure Ulcer): Present on Admit    Stage:  Stage 3    Associated conditions/related trauma: See above    Assessment:    Length: 2cm    Width: 1.5cm    Depth: 0.1cm    Tunneling/Undermining: No    Wound Bed: 100% Agranular    Exudate: Yes Serous Small- difficult to determine amount of drainage from wound as patient was previously refusing dressings and drainage was draining onto bed linens.     Periwound Skin: Scar Tissue    Treatment Plan: Silicone foam     Anatomic Site/Laterality: pannus     Reason for ongoing care:   Skin integrity    Encounter Type:  Subsequent Encounter Skin integrity:    Bright erythema noted under pannus- was only able to assess left side as patient was positioned on right and refused to turn. White discharge and erythema noted at abdominal/thigh folds near mons pubis. Patient applied thick dusting of miconazole powder to folds, thighs, and mons pubis- refused assistance with this.        Patient refused to turn onto left side, so unable to assess right buttock, right IT, and right lateral shin, and right dorsal foot wounds. Scattered areas of dried drainage to toes of left foot. Unable to assess right foot as it was underneath pillows that patient refused to have moved.     Patient laying on heavily soiled fitted sheet on bed that she is refusing to have changed. Also has formed stool underneath her, which she refused to have cleaned stating \"there's no poop down there it's fine.\" Allowed " new Covidien pads to be tucked underneath her left side and right side, allowed removed of Covidien pad underneath heels, refused new one to be placed.     Nursing care provided was coordinated care with RN and reposition.    Discussed plan of care with nurse, patient and charge RN, care manger.    Outcomes and treatment recommendations are to promote skin integrity, contain exudate and promote wound healing.    Actions taken by WOC RN: 5 minutes of education and WOC Discharge recommendations entered.    Planned Follow Up: will reapproach patient, but patient does frequently refuse assessments of wounds.    Plan for next visit: Reassess wound(s) and Reassess skin integrity     Total time in patient's room: 45 minutes

## 2021-06-23 NOTE — PROGRESS NOTES
"Pt was very rude and progressively verbally abusive to charge RN, nursing assistant and RN this evening. She explained several times in a row how she wanted the pillows at her feet, RN and CNA placed them as directed and was directed by patient that it wasn't correct several times; she was progressively rude to staff.  Pt made rude remarks about charge RN stating that the RN was smiling at her discomfort (patients bed was wet and pt had had a BM) that she was happy at her pain. Assured her that the charge RN was here to help her the same as all the staff here.     Pt did have a BM and all her sheets were soaked. She refused to have the fitted sheet changed, RN had to pull out her other soiled blankets, sheets and pillows as and cleaned her buttocks as best as possible within what patient would allow. She has clean chux sheets under her buttocks and as best as staff could get between her body and the soiled fitted sheet.     She insisted that the two catheters were always together stating that \"I've been doing this 10 years, so I know\" and she pulled on the indwelling catheter until she pulled it out with balloon in tact. During this time, this RN had been asking her and then telling her to stop or she would pull the catheter out and again got the \"10 years\" answer from patient and patient kept pulling at catheter.     Janiya Bhakta RN 2/4/2019 10:56 PM       "

## 2021-06-23 NOTE — TELEPHONE ENCOUNTER
I reviewed the hospital notes and do not see anything indicating that patient is being discharged today. I will monitor this.

## 2021-06-23 NOTE — PROGRESS NOTES
" The  akbar and me went  to the room to  Offer her her medciation and she  Said she want the 3 mg I gave the 2 mg and watch her taken it then I went to get the order  1 mg when was brought she said  patient was verbally abusive to the Akbar  \" stupid and wothrless \". She was to stop calling the staff name. She  asked me to go and bring the juice  When I brought it to her she said she said who sent me.  Ginger ale was given to her.   "

## 2021-06-23 NOTE — PROGRESS NOTES
"Pt has new order for 1 unit of blood transfusion, this writer went into pt's room to set up tubings and pre-assessment for blood transfusion. Temp was note to be 99.8, writer notified pt and offered Tylenol. While scanning medication, pt requested that blanket be taken off from her body and insulting writing in the process.Writer took blankets off and handed medication and a cup of water to pt. Pt began asking for her bedside table to be moved closer, and this writer informed pt that the table was moved so that writer can gain access to IV line and the pole for set-up.Pt began cursing and swearing for the door to be open, started using the call light to threaten writer and stating \" I will hit you with this thing\". It was unclear why pt was angry and verbally abusive to writer. Immediately pt threw the cup of water on writer. Writer left the room and notified charge nurse. Blood tubing in place with NS running at this time.   "

## 2021-06-23 NOTE — PROGRESS NOTES
Pt has allowed some cares i.e.coccyx dressing  change but she refused her micotin powder.Pt has been requesting to leave her room and go outside for fresh air since 2000.She was reminded that she is on a Hold and is not allow to leave her room to go outside.She cont to call the nursing station laterally every second.She changed her request to just sitting in the her electric in the room.Staff is not certain about that decision as pt is difficult to redirect.Will brad the MD.

## 2021-06-23 NOTE — TELEPHONE ENCOUNTER
Pharmacy calling for an update on the medications, writer relayed the patient is still in the hospital and this will delay the response on the refill.     Controlled Substance Refill Request  Medication Name:   Requested Prescriptions     Pending Prescriptions Disp Refills     oxyCODONE (ROXICODONE) 10 mg immediate release tablet [Pharmacy Med Name: OXYCODONE HCL 10 MG TABLET 10 TAB] 24 tablet 0     Sig: TAKE 1 TABLET (10 MG TOTAL) BY MOUTH EVERY 4 (FOUR) HOURS AS NEEDED. EFFECTIVE: 12/29/18     Date Last Fill:1/3/19 Pharmacy: [facility]       Submit electronically to pharmacy  Controlled Substance Agreement Date Scanned:   Encounter-Level CSA Scan Date:    There are no encounter-level csa scan date.       Last office visit with prescriber/PCP: 12/14/2018 Sánchez Zamora MD OR same dept: Visit date not found OR same specialty: Visit date not found  Last physical: Visit date not found Last MTM visit: Visit date not found

## 2021-06-23 NOTE — PROGRESS NOTES
Pt c/o a 12/10 sharp pain on top of her head, states this is not a headache but a pain that follows the old incision line from when she had pervious brain surgery, states she needs an MRI done. Pt also c/o being dizzy, states her Hgb must be low and she needs a blood transfusion, Hgb came back shortly thereafter at 11.7, pt notified of this. Dr. Edwards notified of patients concerns. Pt refusing to be turned/repositioned because the sharp pain in her head gets worse with even the slightest movement. Vitals as follows this morning; T-98.7, P-70, R-16, BP-107/60, O2-90%. Will continue to monitor.

## 2021-06-23 NOTE — PROGRESS NOTES
Called to assess pt for coarse/congested breath sounds and cough. Pt placed on 15 L oxymask, titrated to 8 L, satting 92%. Pt has weak, productive cough, sputum swallowed. BS coarse, with slight expiratory wheeze. Had prn Albuterol at 0227 by RN. Chart review shows pt +2 lbs since admission with lower extremity edema. pt c/o pain in her lungs when she breathes and coughs. RT offered suctioning orally and nasal, pt refused. Communication to RN, who is text paging MD. Pt may benefit from some diuretics. Placed pt on continuous pulse ox to monitor oxygenation. Will continue to monitor.    BENJY VelasquezT

## 2021-06-23 NOTE — PLAN OF CARE
Daily care needs are met Not Progressing      Patient/family/caregiver demonstrates understanding of disease process, treatment plan, medications, and discharge instructions Not Progressing    Pt refusing extensive assessment, daily cares, and repositioning. Continuing to offer daily cares and repositioning to patient. Pt pain 9/10 continuously. BP low and MD notified. MD orders Liter NS bolus and BP check. Labs drawn and viewable. Awaiting MD orders as result of labs. MD hold on PRN dilaudid until BP WDL. Continuing to monitor.

## 2021-06-23 NOTE — PLAN OF CARE
Problem: Discharge Barriers  Goal: Patient's discharge needs are met  Outcome: Progressing  Care Plan  Care Management      Care Management Goals of the Day: Progression of care    Care Progression Reviewed With: Charge RN, MD, HUC, RNCM, CMSW    Barriers to Discharge:Medical, commitment    Discharge Disposition:TBD     Expected Discharge Date:TBD    Transportation:TBD      Care Coordination Narrative:See note from MARGI Kinsey for DC planning and commitment on Monday 2/11/2019.

## 2021-06-23 NOTE — PLAN OF CARE
"Patient's pain/discomfort is manageable Not Progressing    Pt reports pain 9-10 consistantly. Medicated with prn dilaudid and ativan and tylenol. With minimal relief.    Skin integrity is maintained or improved Not Progressing    Pt refused all cares. Asked on multiple occasion if we could reposition her, change her soiled linen, assist with personal cares and she refused. Explained risks associated with sitting in soiled linen and not changing position-which in turn agitated pt. MD aware of situation.     Pt allowed sacral mepilex to be changed. Changed 1st time and pt stated I did it improperly, had me do dressing change a 2nd time. Pt verbally abusive throughout cares making statements of how I am \"imcompetant\" and she needs some one \"smarter\" to arrange her pillows that can \"follow simple commands, not an idiot like me\" Asked pt several times to please be more respectful to which only makes her more agitated and abusive.     Pt reports feeling \"dizzy\" however refuses vitals to be taken or lab draw this AM. States she \"needs a blood transfusion\" Passed information along to MD, informed pt that I did talk to MD and he does not want to order at this time. Pt upset.           "
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
## 2021-06-23 NOTE — PLAN OF CARE
Patient/family/caregiver demonstrates understanding of disease process, treatment plan, medications, and discharge instructions Progressing    Patient allowed us to turn her at the end of the shift to her left side, she had small bm.  Changed mepilex on sacral area and changed dressing.  Patient was eating and drinking well on this shift.  Noted the gonzalez bag needs to get switched out per NA, when she emptied the gonzalez and said the green part keeps opening and it might be leaking, Marychuy did not allow us to change the bag, evening RN updated so it can get done.Julia Eller

## 2021-06-23 NOTE — PROGRESS NOTES
SW present pt with 72hr hold. Pt was read her rights and give a copy of hold. Raffaele DysonRN Clinical Manager, and Security present to witness hold rights. SW to complete and fax petition for judicial commitment paperwork to [facility] [employer] (P: [phone]/F: [phone]).     CYNTHIA Gonsalez   2/6/2019

## 2021-06-23 NOTE — PROGRESS NOTES
"Pt refused to allow nurse to do assessment.  Says she is too cold and will not allow it. That she will let me know later when she will allow me to assess her.   Pt slept from 1540 to 2130.  Awoke c/o neck pain. Allowed staff to reposition her.  Requested pain meds immediately and began to demand staff to get meds.  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  Talked about buying house for son.  Also started to talk about sueing [employer] for 1.6 million and said it was time to doug them again.   "
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
## 2021-06-23 NOTE — PROGRESS NOTES
Pt stating writer had the NA's give her medication last night at 2 am. Pt was sleeping at that time and no meds were given by anyone at that time.

## 2021-06-23 NOTE — PROGRESS NOTES
Medication dumped in neb and the phone rang and patient stated she would not take neb now since someone called and they are long distance. Patient's breath sounds are coarse and sats are 99% on room air since she is not keeping nasal cannula in her nose.     Andrew Key, LRT

## 2021-06-23 NOTE — PLAN OF CARE
Pt refuses supper this pm.  Gave list of prn meds as pt requested.  Continues on 72 hr hold.  Mood fluctuates.  Can be nice and cooperative and turns in an instant.  Accuses staff as not knowing anything.  Discharge undetermined att his time.
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
## 2021-06-23 NOTE — TELEPHONE ENCOUNTER
Controlled Substance Refill Request  Medication Name:   Requested Prescriptions     Pending Prescriptions Disp Refills     oxyCODONE (ROXICODONE) 10 mg immediate release tablet 24 tablet 0     Date Last Fill:   1/3/2019  Pharmacy:   [facility] [place], [place] - [address] [place] [address]. [phone]    Submit electronically to pharmacy  Controlled Substance Agreement Date Scanned:   Encounter-Level CSA Scan Date:    There are no encounter-level csa scan date.       Last office visit with prescriber/PCP: 12/14/2018 Sánchez Zamora MD OR same dept: 12/14/2018 Sánchez Zamora MD OR same specialty: 12/14/2018 Sánchez Zamora MD  Last physical: Visit date not found Last MTM visit: Visit date not found

## 2021-06-23 NOTE — PROGRESS NOTES
"  Upon awakening for morning medications at 0615, pt became very agitated, demanding, and verbally abusive to staff. This writer informed pt that her bedding was significantly soiled underneath her and that it should be changed, however pt stated she would not allow anyone to touch her and that staff are not allowed to remove the soiled bedding or chux, but rather only place new chux over the soiled chux. Pt consented only to 1 new chux being placed over soiled bedding and pillows being repositioned after extensive direction by the patient.  Pt refused nursing assessment, repositioning, and further cleaning/cares.     Pt frequently on call light with multiple demands, even with staff members already in patient's room. Pt verbally abusive to those staff members attempting to meet pt's demands, remarking to a nursing assistant \"are you that incompetent that you can't understand??\". This writer also observed pt lifting up her water cup at bedside stating that she would throw her glass of water at staff who do not comply appropriately.      "

## 2021-06-23 NOTE — BRIEF OP NOTE
Pt. Stated to writer for the doctor come into see her now.Pt. Stated  Low  hemoglobin  Will kill her first.

## 2021-06-23 NOTE — ANESTHESIA PREPROCEDURE EVALUATION
Anesthesia Evaluation      Patient summary reviewed   No history of anesthetic complications     Airway   Mallampati: III  Neck ROM: full   Pulmonary    (+) pneumonia, COPD, decreased breath sounds, a smoker (quit 2013)                         Cardiovascular - normal exam  Exercise tolerance: > or = 4 METS  (+) hypertension, dysrhythmias (svt), CHF, , hypercholesterolemia,      Neuro/Psych    (+) neuromuscular disease (paraplegia due to spinal hematoma),  depression, anxiety/panic attacks,     Comments: Alcohol dependence - history    Endo/Other    (+) hypothyroidism, arthritis (RA), obesity (bmi 32),      GI/Hepatic/Renal    (+) GERD,   chronic renal disease (ckd; nephrolithiasis),      Other findings: Opiate dependence, continuous (H)     Other chronic pain    Lumbosacral Disc Degeneration    Herpes Simplex Type I    Nicotine Dependence    Hypertension    Dislodged Bhandari catheter, subsequent encounter    Acute respiratory failure with hypoxia (H)    COPD (chronic obstructive pulmonary disease) (H)    Lactic acidosis    SVT (supraventricular tachycardia) (H)    GERD (gastroesophageal reflux disease)    Hypothyroidism    Iron deficiency anemia    Drug-induced constipation    Paraplegia (H)    Chronic pain syndrome    Major depression    Alcohol abuse    Decubitus ulcer    Cancer of lung (H)    Neurogenic bladder    Neurogenic bowel    Hyperlipidemia    Palliative care encounter    Hypokalemia    Paraplegia at T4 level (H)    Major depressive disorder, recurrent episode, moderate (H)    Seizure disorder (H)    Heel ulcer, right, limited to breakdown of skin (H)    Decubitus ulcer of sacral region, stage 4 (H)    Esophageal dysmotility    Anxiety disorder    History of MDR Enterobacter cloacae infection    Mechanical complication of suprapubic catheter (H)    Diastolic CHF, acute on chronic (H)    Chest tightness or pressure    Atypical chest pain    Decubitus ulcer of right buttock, stage 4 (H)    Nephrostomy tube  displaced (H)    HCAP (healthcare-associated pneumonia)    Cognitive disorder    Insomnia due to anxiety and fear    Sepsis, due to unspecified organism (H)    Urinary tract infection, site unspecified    Hypotension, unspecified hypotension type    Chronic low back pain without sciatica, unspecified back pain laterality    Acute encephalopathy    Claustrophobia    Choledocholithiasis with obstruction    Cholelithiasis    Hx of pulmonary embolus    S/P cholecystectomy    Pneumonia of left lower lobe due to infectious organism (H)    Lower abdominal pain    Neck infection    Subdural hematoma (H)    Convulsions, unspecified convulsion type (H)    Partial symptomatic epilepsy with simple partial seizures, intractable, with status epilepticus (H)    Acute on chronic intracranial subdural hematoma (H)    Brain edema (H)    Chronic obstructive pulmonary disease with acute exacerbation (H)    Decubitus ulcer, stage III (H)    Depression    Sepsis (H)    Sepsis secondary to UTI (H)    Pyelonephritis    Hyponatremia    Episodic cluster headache, not intractable    Flank pain    Right upper quadrant abdominal pain    Abdominal pain    Colon wall thickening    Cystitis    Suprapubic catheter dysfunction, subsequent encounter    Bilateral hydronephrosis    Elevated lactic acid level    Paroxysmal hemicrania    UTI (urinary tract infection)    Abdominal pain, unspecified location    Coagulopathy (H)    Anxiety    Dislodged wound Vacuum    Urinary tract infection associated with cystostomy catheter (H)    History of pulmonary embolism    Chronic anticoagulation    Weakness    Urinary incontinence due to urethral sphincter incompetence    Abscess, gluteal, right    History of DVT (deep vein thrombosis)    Hypothyroidism due to Hashimoto's thyroiditis    Fever in other diseases    Severe sepsis (H)    Ulcer (H)    Sleep difficulties    Irritability and anger    Adjustment disorder with mixed anxiety and depressed  mood    Anemia    Closed left subtrochanteric femur fracture (H)    Acute blood loss anemia    Other specified hypotension    History of encephalopathy    Nephrostomy complication (H)    Left lower lobe pneumonia (H)    Acute bronchitis due to other specified organisms    Hypoxemia    Centrilobular emphysema (H)    Bilateral lower extremity edema    Sacral decubitus ulcer    Gangrene (H)          Dental    (+) upper dentures and lower dentures                         Anesthesia Plan  Planned anesthetic: general endotracheal    ASA 4   Induction: intravenous   Anesthetic plan and risks discussed with: patient  Anesthesia plan special considerations: antiemetics,   Post-op plan: routine recovery        Chemistry        Component Value Date/Time     01/07/2019 1138    K 3.8 01/07/2019 1138     01/07/2019 1138    CO2 21 (L) 01/07/2019 1138    BUN 4 (L) 01/07/2019 1138    CREATININE 0.58 (L) 01/07/2019 1138    GLU 94 01/07/2019 1138        Component Value Date/Time    CALCIUM 8.3 (L) 01/07/2019 1138    ALKPHOS 251 (H) 01/07/2019 1138    AST 8 01/07/2019 1138    ALT <9 01/07/2019 1138    BILITOT 0.2 01/07/2019 1138        Lab Results   Component Value Date    WBC 9.9 01/07/2019    HGB 11.6 (L) 01/07/2019    HCT 39.4 01/07/2019    MCV 78 (L) 01/07/2019     (H) 01/07/2019     Lab Results   Component Value Date    INR 1.15 (H) 11/30/2018    INR 1.12 (H) 11/16/2018    INR 2.27 (H) 07/11/2018

## 2021-06-23 NOTE — PLAN OF CARE
Problem: Psychosocial Needs  Goal: Demonstrates ability to cope with hospitalization/illness  Outcome: Not Progressing  Pt continues to have multiple requests for her prn medications though they are due yet. She continues to threaten that she's going to call her . She says her prn medications should be given to her anytime she requests not by the ordered frequency. She says she does not think staff cares much about her, and a reason why she has to leave this hospital. Will continue to monitor    Julio C Grant RN

## 2021-06-23 NOTE — PLAN OF CARE
Care Plan  Care Management      Care Management Goals of the Day: chart review and consultation with peers.    Care Progression Reviewed With: Charge RN, MD, HUC, RNCM, CMSW    Barriers to Discharge:placement    Discharge Disposition:SNF    Expected Discharge Date:    Transportation:medical      Care Coordination Narrative:Currently patient is not open to  Adult Protection, [place] case management or any waiver services, Her case management with  [facility] termed 8/31/18. Any care management from [facility] now will be from  [facility] ,[phone]. Patient uses [employer] transportation at .   If an evaluation of decision making capacity is ordered, Dr. Arleth Danielle's cell is .  I have a call out to [facility] to check on MA bed hold.

## 2021-06-23 NOTE — PROGRESS NOTES
"Clinical Nutrition Therapy Follow Up Note      Reason for Assessment:   Marychuy Zhou is a 63 y.o. female assessed by the registered Dietitian for follow up  Admitting dx: UTI.  PMH: Paraplegia @ T4, chronic decubitus ulcers, chronic pain syndrome, h/o ETOH abuse, mild COPD, h/o lung Ca (wedge resection 2013), hypothyroidism, GERD, Fe deficiency anemia, h/o PE, MDD, anxiety, cognitive d/o, esophageal dysmotility, h/o FT for TF, h/o non-compliance with medical Tx. Ongoing smoking.    Noted 1/14 debridement of wounds on BLE.     S: Pt continues to be verbally abusive with staff.  She has a court hold and isn't allowed to leave her room in her mobilized wheel chair to visit the gift shop for candy.     Nutrition History: (1/10)  Information from patient and chart.    Social/living situation: was staying @ friends' home with home care involvement. (\"friend\" was PCA that allowed her to live with her if she paid rent).   Diet prior to admission: regular  Recent food/fluid intake: unknown--pt unreliable historian  Recent Supplements: none noted.  Pt has tried and dislikes: gelatein 20, Enlive, Deven.  She doesn't want Boost + nor Magic Cup.   Cultural/Sabianism food needs or preferences: none   Food allergies or intolerances: NKFA      Current Nutrition Prescription:   Diet: regular  Diet Supplements:  Boost Plus strawberry w/ dinner meal    Current Nutrition Intake:  Today pt didn't order breakfast but ordered ~1475 calories, 38 g protein and 253 gm CHO with lunch including 3 juice, 3 fruit ice, 2 Pepsi.      Last two days has ordered > 4000 calories > 115 gm protein although sometimes pt has a food complaint and another tray is sent with reordered items.  Usually pt is order additional side items however.     If pt is eating < 50% of what she orders and most of the protein foods on her tray this should meet her needs to promote wound healing.     Anthropometrics:   Height: 5' 2\" (157.5 cm)  Admission weight: 160# " stated  Weight: 162 lb (73.5 kg)pt continues to refuse removal of linens for accurate weight x multiple days  BMI (Calculated): 29.6  BMI indication: 25-29.9 overweight  Ideal body weight ~100# (IBW-10-15# for paraplegia)  Weight History:  Wt Readings from Last 10 Encounters:   02/11/19 162 lb (73.5 kg)   12/08/18 162 lb (73.5 kg)   12/06/18 192 lb 9.6 oz (87.4 kg)   11/19/18 171 lb 1 oz (77.6 kg)   10/16/18 158 lb (71.7 kg)   08/11/18 165 lb (74.8 kg)   07/22/18 164 lb 4.8 oz (74.5 kg)   07/10/18 175 lb (79.4 kg)   07/05/18 174 lb 8 oz (79.2 kg)   06/26/18 158 lb (71.7 kg)   Pt has very few actual weights and usually gives a stated wt with admission and refuses to cooperate to allow bed to be zeroed.  Wt listed for 12/6/18 was 11# higher than prior weights that mentioned that pt refused to allow excess linens to be removed from bed--suspect wt not accurate on 12/6/18 as well.  All prior weights are questionable for this reason and also due to weights with specialty mattresses/beds.    +2 BLE edema 2/6 per RN     RD Nutrition Focused Physical Exam:  The patient has the following physical signs which could indicate malnutrition: No noted fat/muscle loss noted in face arms.      GI Status/Output:   GI symptoms include: Has been constipated--occasionally allowed bowel Rx  Bowel Sounds present per nurse  Last BM: 2/7 extra Lg formed  per nurse   Pt not allowing nurses to change her linens, clean her up adequately after she has stool.     Skin/Wound:  Isaiah score Isaiah Scale Score: 9    Pressure ulcer/Decubitus ulcer was noted. Info below from 2/6 WOC note and prior 1/24 note). Please see latest WOC note for update of wounds.    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  R buttock:  Stage 3:   R Lateral buttock: Stage 2:     Medications:  Medications reviewed.       Labs:  Labs reviewed:   Hgb11.7 2/9 (up from 8/5 on 2/6).  Last transfusion was 2/3.  Fe 22 2/3    Estimated Nutrition Needs:  Assessment weight is 45.5 kg, ideal weight    Energy Needs: 3436-7324 kcals daily, 30-35 kcal/kg  Protein Needs: 68-91 g daily, 1.5-2.0 g/kg.  Fluid Needs: ~3178-5102 mls daily, ~35-40 mls/kg  Very difficult to  needs with no reliable weight with lower muscle mass w/ paraplegia but high needs for healing of multiple wounds.     Malnutrition: Not noted with no reliable hx re: weight or oral intake.     Nutrition Risk Level: moderate risk    Nutrition dx:   Increased nutrient needs r/t wounds as evidenced by standard needs for multiple wounds noted in WOC RN note.     Goal:   Meet estimated nutrition needs and Wound healing. Progressing.    Intervention:   Continue current nutrition Rx.    Monitoring/Evaluation:   Follow intake, weights (if pt allows), condition of wounds, if able.     Electronically signed by:  Cecile Black RD
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
## 2021-06-23 NOTE — PLAN OF CARE
"2300 - Patient called the  stating \"she wanted a nurse but not her nurse because the nurse refused to page the DrCricket Regarding a neck x-ray which she stated the [facility] said he was going to order on day shift for the numbness in her arms.\" Patient also stated \" 10.5 years of being paralyzed she has never seen so much crap in her life she had to be 63 years old to see it.\" She also wants her blankets fixed. When patient called the  the aid was already in patients room. While writer is typing this note patient has put the call light on even though there is staff in the room.  "

## 2021-06-23 NOTE — PLAN OF CARE
"Problem: Discharge Barriers  Goal: Patient's discharge needs are met  Outcome: Progressing  Care Plan  Care Management      Care Management Goals of the Day: Progression of care    Care Progression Reviewed With: Charge RN, MD, HUC, RNCM, CMSW    Barriers to Discharge: Commitment, wound care    Discharge Disposition: TBD    Expected Discharge Date: TBD    Transportation: TBD      Care Coordination Narrative:     SW met with pt along side Raffaele Dyson,RN Clinical Manager, and bedside RNPenelope. Security was standing by as well. SW informed pt that once 72hr hold expires that she will then be on a court hold from [employer]. SW additionally informed pt that her preliminary hearing will be held at [facility] on February 12, 2019 at 10:30am. Pt stated \"And I have a right to be there.\" SW stated agreement with this statement and informed pt that transportation will be arranged via [employer]  to which pt stated disagreement with. Pt stated \" I will arrange my own transportation with [employer].\" SW informed pt that due to the court process pt must be transported with a  or . Pt again stated that the SW's statement was untrue and that all she needed was an aid. SW did not respond to this. SW informed pt that she has a court appointed , Dinesh Brown. SW provided pt with his phone number.     SW provided pt with copy of court documents as per her rights. When asking pt where she would like the documents placed the pt stated \" up your a**\". SW then placed them on pt's bedside table. Pt then continued to ask the SW how hold she was and how much experience she had in the field of SW. SW advised the pt that should she have further questions relating to the commitment process that the SW would remain available.     CYNTHIA Gonsalez  2/11/2019  9:52 AM     Update:    MARGI spoke with [employer] in regards to pt's transportation. MARGI spoke with Geoff from [employer] who states pt " will likely transport via [facility] transportation given her mobility. MARGI stated understanding of this and inquired if pt's isolation status would be an issues. Geoff was unsure about this. Geoff will touch base with the  and call the SW back to confirm transportation to court.     CYNTHIA Gonsalez  2/11/2019  10:26 AM     Update:    MARGI spoke with Deputy Love from [facility]  office to discuss pt's transport to preliminary hearing. Per Elias Love he spoke with the pt himself and it was determined between them that she will not go tomorrow to the preliminary hearing. This was decided as she cannot be guaranteed that she can go in her personal w/c as [facility] normally uses a stretcher to transport. Per Bandar Love the pt's court appointed , Dinesh Evans, will contact the pt after the preliminary hearing is completed.     CYNTHIA Gonsalez  2/11/2019   2:56 PM

## 2021-06-23 NOTE — PROGRESS NOTES
Pt called  demanding another dose of flexeril.  Pt had dose at 0612 and requesting another dose at 0810.  Explained that the order is for three times a day and we need to wait before the next dose. Pt stated, No I will get the med, because I asked for it. I explained how prn meds work and just because she asks for a med, doesn't mean she will get that med. Spoke with primary nurse and explained situation.  Pt also demanding that the 1:1 be removed and not stand outside her room.  Explained that she is on a 72 hour hold and we have the right to have a sitter in place.

## 2021-06-23 NOTE — TELEPHONE ENCOUNTER
Who is calling:  Patient   Reason for Call:  Requesting a return phone call from provider Patient declined on giving any additional information, but did state it's in regards to her mental health. She is currently admitted to [facility]. Requesting this message be submitted high priority.  Date of last appointment with primary care: 10/16/2018  Has the patient been recently seen:  No  Okay to leave a detailed message: No

## 2021-06-23 NOTE — PROGRESS NOTES
S Daily Progress Note    Assessment/Plan:  Marychuy Zhou is a 63-year-old with history of HTN, HLD, hypothyroidism, COPD, DVT, PE and paraplegia, sacral decubitus ulcers, chronic foot ulcers who was discharged 12/1/2019 only to come back to the hospital on 2/2/2019 evening and was admitted on 2/3/2019 with chest according to her she did not get any adequate pain relief in her rehab facility.       Assessment:    Stage IV sacral decubitus ulcer  No fever  Lactic acid was mildly elevated which is coming down  On Keflex.  If lactic acid remains elevated or WBC increase Zosyn can be started    Anxiety  Will continue hydroxyzine  Patient says she has no relief with  I offered her trazodone which he does not want to take  We will give her Ativan for a day and then evaluate and if needed will let us psychiatry to help    Anemia  Patient wants hemoglobin checked which will be done later today  Will transfuse 1 unit of blood should the hemoglobin be less than 7    COPD  Hypertension   Hypocholesteremia  Chronic constipation  Neurogenic bladder  Seizure disorder on antiepileptics  Hypothyroidism  T4 quadriplegia  DVT and PE on Xarelto        Plan:  Check hemoglobin  To transfuse 1 unit of blood if the hemoglobin is less than 7  Restart Xarelto -hold if hemoglobin is less than 7  Ibuprofen will be restarted at the request of the patient  Ativan will be given for 1 day for anxiety  Code status:Full Code        Subjective:  Patient says that the pain is better but her anxiety is significant  He was offered trazodone which she has refused  She wants diazepam or Ativan given as she states that hydroxyzine does not improve her anxiety  Patient looks quite anxious and has been seen by patient representative as well as unit manager    Review of Systems:   No fever    Current Medications Reviewed via EHR List    Objective:  Vital signs in last 24 hours:  Temp:  [98  F (36.7  C)-99.8  F (37.7  C)] 98.1  F (36.7  C)  Heart Rate:   [] 87  Resp:  [16-20] 18  BP: ()/(50-60) 117/56  SpO2:  [97 %-100 %] 100 %    Intake/Output last 3 shifts:  I/O last 3 completed shifts:  In: 1455 [P.O.:1080; Blood:375]  Out: 2925 [Urine:2925]      Physical Exam:  EYES: EOM+   NECK: no JVD  HEART : S1 S2 RRR   LUNGS: Clear to auscultation without Crepitations or Wheezing   ABDOMEN:  Soft, No tenderness, Bowel sounds are positive  CNS Alert and Oriented x 3   I did not examine her sacral area at the request of the patient        Lab Results:  Personally Reviewed.   Fingerstick Blood Glucose: No results for input(s): POCGLUFGR in the last 48 hours.    Recent Results (from the past 24 hour(s))   HM2(CBC W/O DIFF)    Collection Time: 02/03/19  9:55 PM   Result Value Ref Range    WBC 9.7 4.0 - 11.0 thou/uL    RBC 3.24 (L) 3.80 - 5.40 mill/uL    Hemoglobin 7.4 (L) 12.0 - 16.0 g/dL    Hematocrit 25.6 (L) 35.0 - 47.0 %    MCV 79 (L) 80 - 100 fL    MCH 22.8 (L) 27.0 - 34.0 pg    MCHC 28.9 (L) 32.0 - 36.0 g/dL    RDW 21.1 (H) 11.0 - 14.5 %    Platelets 374 140 - 440 thou/uL    MPV 9.3 8.5 - 12.5 fL   Lactic Acid    Collection Time: 02/03/19  9:55 PM   Result Value Ref Range    Lactic Acid 2.8 (H) 0.5 - 2.2 mmol/L   Type and Screen    Collection Time: 02/03/19 11:43 PM   Result Value Ref Range    ABORh O POS     Antibody Screen Negative Negative   Crossmatch    Collection Time: 02/04/19  2:21 AM   Result Value Ref Range    Crossmatch Compatible     Blood Expiration Date [id number]     Unit Type O Pos     Unit Number [id number]     Status Issued     Component Red Blood Cells     PRODUCT CODE O5276H46     Issue Date and Time [id number]     Blood Type 5100     CODING SYSTEM [id number]    Basic Metabolic Panel    Collection Time: 02/04/19  7:04 AM   Result Value Ref Range    Sodium 142 136 - 145 mmol/L    Potassium 4.0 3.5 - 5.0 mmol/L    Chloride 111 (H) 98 - 107 mmol/L    CO2 23 22 - 31 mmol/L    Anion Gap, Calculation 8 5 - 18 mmol/L    Glucose 119 70 - 125  mg/dL    Calcium 8.2 (L) 8.5 - 10.5 mg/dL    BUN 6 (L) 8 - 22 mg/dL    Creatinine 0.55 (L) 0.60 - 1.10 mg/dL    GFR MDRD Af Amer >60 >60 mL/min/1.73m2    GFR MDRD Non Af Amer >60 >60 mL/min/1.73m2   Lactic Acid    Collection Time: 02/04/19  7:04 AM   Result Value Ref Range    Lactic Acid 2.2 0.5 - 2.2 mmol/L           Advanced Care Planning  Discharge plan: unknown      Jaron Edwards  Date: 2/4/2019  Time: 4:36 PM  Hospitalist Service  Part of this chart was created using a dictation software. Typographic errors, word substitutions, and Grammatical errors may unintentionally occur.

## 2021-06-23 NOTE — PROGRESS NOTES
"[phone]  Patient calling very frequently. Has not been inappropriate, but has been claiming other staff persons were unprofessional. Called aide and asked for pain medication, then asked nurse, nurse left room and before nurse had fully exited room patient called to  asking for the medication. While nurse was in medication room, called 3 more times for the medication. Has been refusing turning and repositioning. Has refused to let nurse assess wounds. Told nurse \"They just did them all up earlier, they're fine. Don't monkey around with them\". PRN flexaril fell onto soiled blanket (dropped pills and spilled her Pepsi). Fresh linens and another dose dispensed later when requested. Had been given PRN dilaudid with bedtime pills. Nurse wrote on white board that next dose could not be dispensed until 2320. Patient called 2015 requesting dilaudid. Was unable to keep eyes open and was dosing off mid-sentence. Nurse informed that medication could not be administered for another hour. Patient said \"You're a nurse. You can get it out early.\" Nurse restated that the medication could not be given for another hour. Patient said \"but you can give meds an hour before or an hour after the due time.\" Nurse informed patient that there is a window for scheduled medications, but not PRN. Nurse checked back and patient was sound asleep less than one minute after the interaction. Complained that nose was dry from nasal cannula, humidifier attachment applied.    "

## 2021-06-23 NOTE — PROGRESS NOTES
S Daily Progress Note    Assessment/Plan:  Marychuy Zhou is a 63-year-old with history of HTN, HLD, hypothyroidism, COPD, DVT, PE and paraplegia, sacral decubitus ulcers, chronic foot ulcers who was discharged 12/1/2019 only to come back to the hospital on 2/2/2019 evening and was admitted on 2/3/2019 with chest according to her she did not get any adequate pain relief in her rehab facility.       Assessment:    Stage IV sacral decubitus ulcer  No fever  Lactic acid was mildly elevated which is coming down  On Keflex.  If lactic acid remains elevated or WBC increase Zosyn can be started    Anxiety  Will continue hydroxyzine  Patient says she has no relief with  I offered her trazodone which he does not want to take  We will give her Ativan for an another day and then evaluate and if needed will let us psychiatry to help    Anemia  Patient wants blood transfusion though the hemoglobin is better at 9.7 as compared to 7.4 before    COPD  Hypertension   Hypocholesteremia  Chronic constipation  Neurogenic bladder  Seizure disorder on antiepileptics  Hypothyroidism  T4 quadriplegia  DVT and PE on Xarelto        Plan:  Check CBC in the morning  Patient can be discharged when on nursing home bed is available    Code status:Full Code        Subjective:  Patient has had bowel movement passed in the bed and had some issues with the nurse cleaning it up on a timely basis.  She did not wanted to be turned    She has been asking for blood transfusion even though her hemoglobin has been 9.7.  He has told nurses that she is the patient and would know what needs to be done rather than the doctor.     Patient has had anger bursts and may be suffering from personality disorder making her care very much difficult as she is mean to the staff.     Review of Systems:   No fever    Current Medications Reviewed via EHR List    Objective:  Vital signs in last 24 hours:  Temp:  [98.7  F (37.1  C)] 98.7  F (37.1  C)  Heart Rate:  [88-94]  94  Resp:  [16-18] 16  BP: ()/(47-55) 85/53  SpO2:  [96 %-100 %] 100 %    Intake/Output last 3 shifts:  I/O last 3 completed shifts:  In: 3 [I.V.:3]  Out: 180 [Urine:180]      Physical Exam:  EYES: EOM+   NECK: no JVD  I could not examine the patient thoroughly as initially we had a long talk and she needed a change in the backseat and later as she was sleeping  CNS Alert and Oriented x 3   I did not examine her sacral area at the request of the patient        Lab Results:  Personally Reviewed.   Fingerstick Blood Glucose: No results for input(s): POCGLUFGR in the last 48 hours.    Recent Results (from the past 24 hour(s))   Hemoglobin    Collection Time: 02/04/19  8:02 PM   Result Value Ref Range    Hemoglobin 9.7 (L) 12.0 - 16.0 g/dL   Crossmatch    Collection Time: 02/05/19 12:04 AM   Result Value Ref Range    Crossmatch Compatible     Blood Expiration Date [id number]     Unit Type O Pos     Unit Number [id number]     Status Transfused     Component Red Blood Cells     PRODUCT CODE I4533C19     Issue Date and Time [id number]     Blood Type 5100     CODING SYSTEM [id number]            Advanced Care Planning  Discharge plan: unknown      Jaron Edwards  Date: 2/5/2019  Time: 4:36 PM  Hospitalist Service  Part of this chart was created using a dictation software. Typographic errors, word substitutions, and Grammatical errors may unintentionally occur.

## 2021-06-23 NOTE — PLAN OF CARE
"Care Plan Progress Note:    Name: Marychuy Zhou  :   1955  MRN:   [id number]    x2 staff present in room for assessment (writer and Xochilt Her NA)    Problem: Daily Care  Goal: Daily needs are met  Outcome: Not Progressing    Writer went in to patient's room at 0820 this AM to assess patient.  Upon entering room, patient was observed to be sleeping.  Writer woke patient up and patient immediately requested writer \"to listen to her lungs\".  Stridor, coarse, and wheezing lung sounds noted.  Patient reports \"feeling like a bunch of fluid is weighing down her chest\" and reports \"shortness of breath\".  Writer asked patient if she can watch her use the incentive spirometer and explained to the patient that it will help her breathing and help loosen up the congestion.  Patient stated \"what's an incentive spirometer?\".  Writer went and obtained the IS and educated patient on use of it.  Patient replied stating \"hell no, I'm not using that thing\".  Patient is on 2 liters O2 via oxymask - placed humidified O2.  Writer offered repositioning of patient and requested to patient if writer could do a skin assessment.  Patient stated \"I can move myself, I don't need you\" and then stated \"I will let you know when I want to be repositioned\".  No skin assessment performed as patient refused.  Writer offered to assist with washing patient's face (noted patient to have red candy all over front of gown and red stain from candy all over patient's face).  Patient again, refused, stating \"she can wash her own face\".  Air mattress in place.  Will continue to offer daily cares and re-approach as needed.    Problem: Pain  Goal: Pain will be managed  Outcome: Not Progressing    Patient was reporting left arm pain/numbness/tingling stating \"she can't feel her arm\".  Patient was able to lift up her arm for writer.  Patient stated \"I've been telling a doctor about it but nobody has done anything and nobody has come to see me in 2 days\".  " "Writer explained to the patient that the providers round on her daily.  Oral Dilaudid PRN and Flexeril PRN given at patient request - for reports of pain rated \"12/10\".    Comments:  Patient was calm during this interaction with writer and even requested to tell writer \"two jokes\".  Patient remains on contact precautions.  72 hour hold in place.  1:1 sitter sitting outside door.  Call light in reach.  Asked patient if she needed anything else prior to writer leaving the room and patient stated \"no, I'm fine\".  Will continue to monitor.      2/11/2019 8:45 AM    Penelope Ortiz RN      "

## 2021-06-23 NOTE — PLAN OF CARE
Patient/family/caregiver demonstrates understanding of disease process, treatment plan, medications, and discharge instructions Not Progressing    Patient sleepy after oral dilaudid and ativan combination around 1340 today.  Sleeping the majority of the evening shift but still not allowing any turns/cares per staff.  Remains on 72 hour hold and 1:1 sitter outside the room for safety.

## 2021-06-23 NOTE — PROGRESS NOTES
"Pt is on the phone talking to someone that she refers to as her POA.She is telling him that \"you are going to get alot of money out of this one when we doug them\".She also gave staff names to that person.Pt was given her dilaudid and trazodone.Staff is going to put her in  her electric chair per Hospitalist after she gets off the phone.   "

## 2021-06-23 NOTE — PLAN OF CARE
Nutritional status is improving Progressing    Pt is eating well. No nausea or vomiting. Will continue to monitor.     Patient's pain/discomfort is manageable Progressing    Generalized pain is being managed with scheduled Dilaudid 3mg every 3 hours. Pt c/o pain with repositioning limbs.

## 2021-06-23 NOTE — PLAN OF CARE
Problem: Psychosocial Needs  Goal: Demonstrates ability to cope with hospitalization/illness  Outcome: Not Progressing  Pt refusing cares, would not let staff turn and reposition her, supra pubic catheter by passing and pt would not let staff change dirty sheets below her refusing katia care and other daily hygeine, says she would call for staff when she needs their help. Will continue to encourage participation with daniel rGant RN

## 2021-06-23 NOTE — PLAN OF CARE
"Daily Care      Daily care needs are met Not Progressing        Pain      Patient's pain/discomfort is manageable Not Progressing        Potential for Compromised Skin Integrity      Skin integrity is maintained or improved Not Progressing        Psychosocial Needs      Demonstrates ability to cope with hospitalization/illness Not Progressing          Patient on 1:1 sitter and contact precautions. Patient allowed for assessment to be partially completed. Patient would not turn on side for skin on back side/pressure ulcers to be assessed. Patient did not allow for staff to reposition her or complete hygiene. Per NA, patient was swearing at her at start of shift and called her a \"condescending bitch.\" Patient requesting pain meds every 3 hours. Patient tachy with HR up to 120.  Patient has edema in bilateral lower extremities. Patient audibly congested with coarse and exp wheezing LS on auscultation. Patient given PRN inhaler and nebulizer. Patient on O2 NS or oxymask at 3-8 L. RT called to assess patient. RT recommended lasix. MD text paged but no response. Will continue to monitor. Patient complained of needing chest xray, and neck xray. Patient complained of numbness in LUE. Patient has suprapubic catheter with 775 ml out.  "

## 2021-06-23 NOTE — PLAN OF CARE
Patient continues to have behaviors. She called the  because she was looking to speak with the supervisor because she was unhappy with staff while trying to reposition her. Dressing to heel came off, blood currently on the bed but patient is not allowing the sheet or dressing to be changed. Will continue to monitor.

## 2021-06-23 NOTE — PROGRESS NOTES
Pt sleeping first half of shift.  NA went in room to get vitals and pt didn't wake up.  This writer went in room to attempt to wake pt and she would open her eyes and then fall back to sleep.  VSS except for low blood pressure of 85/53, shortly after leaving pts room she was on her call light because she couldn't reach her phone.  Call light in reach, continue to monitor.

## 2021-06-23 NOTE — PROGRESS NOTES
" The patient said \"you didn't give me my dilaudid they have to check your blood and  The one at the desk supported you\" \" You are bad nurse \" \"My friend nurses\" know how to report people\".  "

## 2021-06-23 NOTE — PLAN OF CARE
Problem: Discharge Barriers  Goal: Patient's discharge needs are met  Outcome: Progressing  Care Plan  Care Management      Care Management Goals of the Day: Progression of care    Care Progression Reviewed With: Charge RN, MD, HUC, RNCM, CMSW    Barriers to Discharge: placement, commitment    Discharge Disposition: TBD    Expected Discharge Date: TBD    Transportation: TBD      Care Coordination Narrative:     MARGI spoke with Leonarda Rodas ([phone]) in regards to commitment. Leonarda will be the Pre-Petition screener for [place]. Leonarda has received all necessary paperwork and will update the SW with decision made. Leonarda will call the pt to complete her assessment give pt has VRE, ESBL, and MRSA. SW stated understanding of this.  additionally provided Leonarda with the phone number for pt's Lindsey mackenzie ([phone]) as  is unable to call this individual due to request of pt. \    CYNTHIA Gonsalez LGSW  2/7/2019  3:18 PM

## 2021-06-23 NOTE — PROGRESS NOTES
"[facility] Daily Progress Note    Assessment/Plan:  Marychuy Zhou is a 63-year-old with history of HTN, HLD, hypothyroidism, COPD, DVT, PE and paraplegia, sacral decubitus ulcers, chronic foot ulcers who was discharged 12/1/2019 only to come back to the hospital on 2/2/2019 evening and was admitted on 2/3/2019 with chest according to her she did not get any adequate pain relief in her rehab facility. Unfortunately in the Hospital patient has had issues with behavior. Verbally abusing staff as well as at times physically throwing thing at people. She has not been following physician and Nursing advises and has not allowed nursing staff to change her position and take care of her Sacral wound, with the wound soiled at times. There was a conference with Hospital administration 2/6/2019 requesting her co-operation in the care of her and allowing nursing staff to follow protocols but unfortunately she has refused to talk and asked them all to get out of the room. Later she told me that \"this is a retaliatory measures being taken against her\"        Assessment:    Stage IV sacral decubitus ulcer  No fever  Lactic acid was mildly elevated which is coming down  On Keflex.      Anxiety  Management by Psychiatry    Anemia  Patient wants blood transfusion though the hemoglobin is >8 explained to her that unless her hemoglobin is less than 7 or less than 8 with symptoms we will hold on transfusion as it could have its own side effects    Cough  With crackles in the lungs we will check chest x-ray    COPD  Hypertension   Hypocholesteremia  Chronic constipation  Neurogenic bladder  Seizure disorder on antiepileptics  Hypothyroidism  T4 quadriplegia  DVT and PE on Xarelto        Plan:  Since the hemoglobin is increasing we will not check it in the morning  Continue albuterol inhalers  Chest x-ray will be checked to rule out pneumonia  Patient can be discharged when on nursing home bed is available    Code status:Full " Code        Subjective:  Patient is fast asleep today      Review of Systems:   No fever    Current Medications Reviewed via EHR List    Objective:  Vital signs in last 24 hours:  Temp:  [97.8  F (36.6  C)-98.7  F (37.1  C)] 97.8  F (36.6  C)  Heart Rate:  [] 108  Resp:  [16-20] 18  BP: (107-143)/(60-72) 111/72  SpO2:  [90 %-99 %] 99 %    Intake/Output last 3 shifts:  I/O last 3 completed shifts:  In: 2600 [P.O.:2600]  Out: 20 [Urine:20]      Physical Exam:  Did not examine the patient as he is sleeping      Lab Results:  Personally Reviewed.   Fingerstick Blood Glucose: No results for input(s): POCGLUFGR in the last 48 hours.    Recent Results (from the past 24 hour(s))   Platelet Count - every other day x 3    Collection Time: 02/09/19  7:53 AM   Result Value Ref Range    Platelets 354 140 - 440 thou/uL   Hemoglobin    Collection Time: 02/09/19  7:53 AM   Result Value Ref Range    Hemoglobin 11.7 (L) 12.0 - 16.0 g/dL     The chest x-ray done 2/9/2019 showed  Clear lungs. Normal heart size and pulmonary vascularity. No pleural fluid or pneumothorax. Calcified plaque of the aortic arch.      Advanced Care Planning  Discharge plan: unknown      Jaron Edwards  Date: 2/9/2019  Time: 4:36 PM  Hospitalist Service  Part of this chart was created using a dictation software. Typographic errors, word substitutions, and Grammatical errors may unintentionally occur.

## 2021-06-23 NOTE — PLAN OF CARE
Daily Care      Daily care needs are met Progressing        Patient refused to allow staff to assess her wounds. Patient rude and demanding at times. Swearing at staff. Two staff members present with any interactions with patient.

## 2021-06-23 NOTE — TELEPHONE ENCOUNTER
Please call hospitalist service at [facility].  Request that the hospitalist send patient with a prescription on discharge for pain medication until she is seen by me in follow-up.

## 2021-06-23 NOTE — PROGRESS NOTES
"Spiritual Care Note    Spiritual Assessment: Patient called and requested support. The first time I stopped by, she was on the phone and asked me to return later. The second time, she was on the phone, wrapped up the call, and then we met. She said she is having a \"terrible\" day, that staff have been very rude to her, and that \"they make things up about stuff I didn't do!\" She said that earlier, she demanded that one aide leave the room, and the patient said the aide was \"writing down everything I say.\"     This writer let patient know that she charts our conversation, which patient acknowledged and said she understood. This writer asked patient about spiritual support, and patient said us talking was helpful for her.     Care Provided: Provided empathetic and pastoral presence, Provided listening presence    Plan of Care:  available for spiritual care or emotional support as needed.      Deidre Swann,     "

## 2021-06-23 NOTE — PROGRESS NOTES
"Patient asked Aid to reposition her legs. Patient was instructing aid on moving her legs.  Patient appeared to be getting upset because aid could not turn pillows in place while holding feet at the same time. Aid told patient for her safety and ours she needed to be  On the other side of the bed to reposition her better. After 10 mins of trying to get her comfortable patient stated \"you don't know shit let me call the manager \" Then She threw two soiled pillows at aid. The pillows had blood and feces on it.  One of the pillows landed at the bottom of aid's pants.The Aid had to change her pants.   "

## 2021-06-23 NOTE — TELEPHONE ENCOUNTER
Dr. Zamora I called and spoke with the nurse Janiya at   patient will be discharged today with dilaudid 2 mg - 1 tablet every 3 hours PRN - 20 tablets  Will also be given Keflex that she will take until 2/14/2019  Janiya will speak with the hospitalist to determine whether patient is to continue methadone and will see if a prescription can be filled for the patient by the hospitalist.    Oxycodone was discontinued as she will be given dilaudid.     Prescriptions will be filled at their pharmacy so patient will be able to take them home with her      Janiya and I scheduled an INF on Monday at 11:20 am

## 2021-06-23 NOTE — PLAN OF CARE
Respitory came in to yaneth patient breathing treatment. She refused for him to check any vitals on himand swore at him stating he needs to do hids damn job and give her breathing  Treatment. As he was seeting it up, she kept telling him to go faster and when he was done she told  he was a dumb idiot.

## 2021-06-23 NOTE — TELEPHONE ENCOUNTER
Patient may need hospitalist to send medication to be available if she is discharged on the weekend.  Once doses are confirmed and discharge and she has scheduled follow-up with me I will be happy to prescribe on a limited basis as prior.

## 2021-06-23 NOTE — PROGRESS NOTES
Was called for PRN Neb treatment and pt was feeling shortness of breath. Refused Vital sign and Lung ausculation. pt on 3 L Oxymask. RT will continue to monitor.    BENJY PittsT

## 2021-06-23 NOTE — PLAN OF CARE
Patient's pain/discomfort is manageable Not Progressing    Pt is alert and oriented x3 this shift and making needs known appropriately. VS are stable this shift.Pt continues to complain of pain and demanding for more pain medications that are not currently ordered.PRN Dilaudid administered this shift.Pt remains verbally abusive and difficult to staff. Pt is very demanding and will ask for multiple things at this same time without giving writer the time to address one request at a time.On multiple occassion this shift, pt will speak in a very demeaning manner to writer and other staff members and will lash out with insults. Pt's behavior is difficult to re-direct.   Collaborate with patient/family/caregiver to identify patient specific goals for this hospitalization Not Progressing    Pt is actively uncooperative with cares and treatment. Pt's ongoing behaviors make it difficult to provide cares. Pt

## 2021-06-23 NOTE — ED NOTES
Patient refusing IV and blood draws, refusing to have RN take look at catheter and areas of discomfort. Patient verbally aggressive towards staff. ER MD notified.

## 2021-06-23 NOTE — PROGRESS NOTES
"Staff attempted to provide cares and complete assessment of patient. Pt became verbally abusive and attempted to strike staff when they touched her, stating, \"Don't touch me.\" Pt perseverating that she cannot breathe and needs to be repositioned. Staff attempted to reposition pt, pt states, \"you are doing it wrong\" and refuses to have linens changed, wounds assessed, or roll in bed. Staff informed pt that she cannot be verbally or physically abusive to staff, pt denies performing abusive behavior. Pt refused some medications including IV fluids. Charge RN, nursing supervisor, security, and Dr. Moon notified. Will continue to reapproach pt and redirect abusive behaviors.   "

## 2021-06-23 NOTE — ED NOTES
Patient continually has been belligerent, demanding, and verbally abuse to staff. When attempting to wrap her IV, assess her wounds, and give care she continually yells at staff to move her legs up despite being unable to move them any further.

## 2021-06-23 NOTE — TELEPHONE ENCOUNTER
Spoke with Dr. Zamora who reviewed hospital record. Discharge date is still to be determined. Will notify pharmacy that Dr. Zamora will send refill of medications when patient is discharged from the hospital.

## 2021-06-23 NOTE — H&P
Admission History and Physical   Marychuy Zhou,  1955, MRN [id number]    [facility]  No admission diagnoses are documented for this encounter.    PCP: Sánchez Zamora MD, [unfilled], [phone]   Code status:  Full Code       Extended Emergency Contact Information  Primary Emergency Contact: Sánchez Paredes   [facility]  Home Phone: [phone]  Work Phone: [phone]  Mobile Phone: [phone]  Relation: Child  Secondary Emergency Contact: Tobias Paredes   [place]  Mobile Phone: [phone]  Relation: Son-In-Law       Assessment and Plan   Marychuy Zhou is a 63 y.o. old female with a history of HTN, HLD, COPD, DVT, PE and paraplegia, sacral decubitus ulcers, chronic foot ulcers who was discharge yesterday form [facility] following management of her decubitus ulcer, with &D and antibiotics, who presents with worsening sacral and heals pain.  # Stage IV sacral decubitus ulcer:  - No overt signs of sepsis, elevated lactate with no fever or leukocytosis, HDS.   - Received IV zosyn in ED. Will contue with PTA Keflex.  - Pain control.   - Bedside wounds care.   - IVFs.   - Consider pan-culture if fever or hemodynamic instability.     # Chronic problems: COPD HTN, HLD:  - Continue PTA medications.     # Chronic constipation:   - Continue miralax, and dulcolax supp, avoid narcotics overuse.     Principal Problem:    Sepsis (H)      Disposition: Gen. medicine, observation.  Code status: Full code.    DVT Prophylaxis: Heparin or Early ambulation.     Chief Complaint: Sacral and heals pain     HPI:    Marychuy Zhou is a 63 y.o. old female with a history of HTN, HLD, COPD, DVT, PE and paraplegia, sacral decubitus ulcers, chronic foot ulcers who was discharge yesterday form [facility] following management of her decubitus ulcer, with &D and antibiotics.   Patient reports worsening heels and buttock pain since discharge, she also noticed some  bleeding from the surgical site and has been more anxious over last couple of hours. She noted herself being more weak and dizzy and at the NH no one offered her any help and she was not feeling safe.   Denied CP,  Current shortness of breath, fevers, chills, abdominal pain, chronically constipation because of her paraplegia, denied urinary symptoms.             History is provided by patient and Electronic medical records.       Review of Systems:  A 12 point comprehensive review of systems was negative except as noted.    Medical History  Past Medical History:   Diagnosis Date     Alcohol dependence (H)      Anxiety      Cancer of lung (H) 9/24/2013     Chronic kidney disease      Chronic pain      Chronic suprapubic catheter (H)      Constipation      COPD (chronic obstructive pulmonary disease) (H)      Decubitus ulcer      Depression      Diastolic CHF, acute on chronic (H)      DVT (deep venous thrombosis) (H) 5/26/2015     ESBL (extended spectrum beta-lactamase) producing bacteria infection 08/2015     Gastroparesis      GERD (gastroesophageal reflux disease)      HCAP (healthcare-associated pneumonia)      Herpes Simplex Type I      Hyperlipemia      Hypertension      Hypothyroidism 5/26/2015     Intracranial subdural hematoma (H)      Kidney stone      Lower paraplegia (H) 4/28/2016     MRSA infection      Neurogenic bladder      Neuropathy (H) 5/26/2015     Obesity      Opiate dependence, continuous (H)      Osteoporosis      Pneumonia      Pulmonary embolism (H) 12/2016     Rheumatoid arthritis (H)      Sepsis (H) 5/28/2017     SVT (supraventricular tachycardia) (H) 8/19/2014     Vascular myelopathies (H)         Surgical History  She  has a past surgical history that includes pr appendectomy; pr removal of tonsils,<12 y/o; pr total abdom hysterectomy; pr monroe w/o facetec foramot/dskc 1/2 vrt seg, cervical; Fracture surgery; Cholecystectomy; PICC Team Line Insertion (8/19/2014); PICC (12/31/2016); PICC  "(12/26/2017); hh midline insertion (2/1/2018); PICC Team Line Insertion (2/27/2018); PICC Team Line Insertion (7/16/2018); CT Bone Biopsy (11/16/2018); IR PICC Placement 5+ Years W/O Fluoro Guidance (12/8/2018); IRRIGATION AND DEBRIDEMENT, LOWER EXTREMITY; LEFT HEEL AND GREAT TOE, RIGHT LATERAL AND PORTION OF FOOT (Bilateral, 1/14/2019); INCISION AND DRAINAGE, SACRAL ULCER (N/A, 1/10/2019); DEBRIDEMENT, PRESSURE ULCER (N/A, 7/12/2018); DEBRIDEMENT, PRESSURE ULCER (Right, 2/5/2018); DEBRIDEMENT, SACRAL ULCER (N/A, 12/22/2017); COLONOSCOPY (N/A, 6/8/2017); LEFT CRANIOTOMY FOR SUBDURAL HEMATOMA EVACUATION AND SUBDURA PROCESS (Left, 4/10/2017); CHOLECYSTECTOMY, LAPAROSCOPIC (N/A, 1/4/2017); and ENDOSCOPIC RETROGRADE CHOLANGIOPANCREATOGRAPHY (N/A, 1/3/2017).       Social History  Reviewed, and she  reports that she has been smoking cigarettes.  she has never used smokeless tobacco. She reports that she drinks alcohol. She reports that she does not use drugs.       Allergies  No Known Allergies Family History  Reviewed, and family history includes COPD in her brother, father, and mother; Liver disease in her father; Lung cancer in her sister.          Prior to Admission Medications     (Not in a hospital admission)         Physical Exam:  Temp:  [98.4  F (36.9  C)] 98.4  F (36.9  C)  Heart Rate:  [112-133] 133  Resp:  [18] 18  BP: ()/(51-60) 93/51    BP 93/51   Pulse (!) 133   Temp 98.4  F (36.9  C) (Oral)   Resp 18   Ht 5' 2\" (1.575 m)   Wt 162 lb (73.5 kg)   LMP  (LMP Unknown)   SpO2 (!) 87%   BMI 29.63 kg/m      General Appearance:    NAD.   Head:    Normocephalic, without obvious abnormality, atraumatic   Eyes:    PERRL, conjunctiva/corneas clear, EOM's intact,both eyes    Ears:    Normal external ear canals no drainage or erythema bilat.   Nose:   Nares normal by gross inspection,  mucosa normal, no drainage or sinus tenderness   Throat:   Lips, mucosa, and tongue normal; teeth and gums normal   Neck:   " Supple, symmetrical, trachea midline, no adenopathy;        thyroid:  No enlargement/tenderness/nodules   Back:     Symmetric, no curvature, ROM normal, no CVA tenderness   Respiratory:     Lungs are clear to auscultation bilaterally, no wheezing or rales.   Chest wall:    No tenderness or deformity   Cardiovascular:    Regular rate and rhythm, S1 and S2 normal, I/VI systolic murmur, no rubs, no JVD, no edema   Gastrointestinal:     Soft, non-tender, stool burden.    Musculoskeletal:   Sacral ulcer stage IV    Pulses:   2+ and symmetric all extremities   Skin:   Skin color, texture, turgor normal, no rashes or lesions on exposed areas.   Neurologic:   Alert,awake and oriented x 3. B/L paraplegia with atrophic changes.             Pertinent Labs  Lab Results: personally reviewed.   Results from last 7 days   Lab Units 02/03/19  0046   LN-SODIUM mmol/L 139   LN-POTASSIUM mmol/L 3.9   LN-CHLORIDE mmol/L 106   LN-CO2 mmol/L 20*   LN-BLOOD UREA NITROGEN mg/dL 6*   LN-CREATININE mg/dL 0.72   LN-CALCIUM mg/dL 8.4*     Results from last 7 days   Lab Units 02/03/19  0046   LN-WHITE BLOOD CELL COUNT thou/uL 8.2   LN-HEMOGLOBIN g/dL 8.0*   LN-HEMATOCRIT % 27.8*   LN-PLATELET COUNT thou/uL 491*   LN-NEUTROPHILS RELATIVE PERCENT % 67   LN-MONOCYTES RELATIVE PERCENT % 7           MOST RECENT A1c, Iron, TIBC, Coags, TFTs  Lab Results   Component Value Date    HGBA1C 5.7 08/05/2013    INR 1.15 (H) 11/30/2018    PTT 36 11/30/2018     Lab Results   Component Value Date    IRON 20 (L) 03/25/2018    TRANSFERRIN 89 (L) 03/25/2018    TRANSFERRIN 89 (L) 03/25/2018     Lab Results   Component Value Date    TSH 20.39 (H) 06/21/2018    FREET4 0.7 03/27/2018         Advanced Care Planning  Discharge Planning discussed with patient  Anticipated Length of Stay in midnights (including a midnight in the Emergency Department after triage if applicable): 1-2 nights for evaluation and treatment of Sacral ulcers           Quang Díaz MD  Internal  Medicine-[facility]    This note was created with help of Dragon dictation system. Grammatical /typing errors are not intentional.

## 2021-06-23 NOTE — PROGRESS NOTES
Answered call light from patient [id number] at 10:10pm with rafi 1:1 lelae,she  told us to put her legs down while she was  pushing the bed botton arrow down .I tried also to push the arrow down, but the bed was already down.Pt claimed we're not helping. Told her the bed was already down.refused explanation given to her.Then asked me to give her meds, I told her the nurse will give it to her and I will call her Nurse. I left the room.When I went  back to try and help the Nurse,she told me I don't do anything.

## 2021-06-23 NOTE — PLAN OF CARE
"Problem: Decreased Mental Status Causing Increased Need for Safety  Goal: To ensure patient safety, patient will abstain from any threats or actions to harm self or others  Outcome: Not Progressing  When approached for dressing changes this evening, patient angrily told writer \"dont touch me...\" followed by a verbal insult.      Problem: Pain  Goal: Patient's pain/discomfort is manageable  Outcome: Not Progressing  Patient rated her pain at 10/10 even with PO dilaudid given.    Problem: Potential for transmission of organism by Contact, Enteric, Droplet and/or Airborne routes  Goal: Prevent transmission of organisms  Outcome: Progressing  Contact precautions were observed due to patient's history of VRE, ESBL and MRSA. Isolation cart is located outside patient's door.      Problem: Potential for Compromised Skin Integrity  Goal: Skin integrity is maintained or improved  Outcome: Not Progressing  Patient refused skin assessment and wound assessment and dressing changes even when approached several times.      Comments:   Patient's mood was pleasant at start of shift and she even told jokes and engaged in small talk with staff. She later requested a nebulizer but when Respiratory Therapy came to give it, she said she was on a long-distance phone call and that RT should wait. She allowed writer to do only limited physical assessment. She refused all her wound cares and became more and more agitated when writer tried to convince her to comply. Her family dropped off her motorized wheelchair earlier tonight and patient requested to go outside. She was informed that she could not go outside due to her hold status and infection control concerns. This evening, she complained of \"not being able to breath\" with 3 liters oxygen via nasal cannula. Writer attempted to check her oxygen saturation level at that time but patient refused. RT was called and a nebulizer was given. Patient also refused all repositioning this shift.  "

## 2021-06-23 NOTE — PLAN OF CARE
"Decrease patient's symptoms Not Progressing    Patient very agitated throughout the entire shift.  Swearing and yelling at staff members.  Agitation increases when patient does not get control over the situation or is told she cannot have something; I.e. Meds, new nursing staff, Doctor at her availability, etc. De-escalation techniques attempted with minimal success if any.     Patient's pain/discomfort is manageable Not Progressing    Patient constantly reporting pain however will not give writer a number when asked on the pain scale.  Told writer she is an \"incompetent idiot\" and \"shouldn't ask such dumb questions.\"      Skin integrity is maintained or improved Not Progressing    Patient sitting in her own feces and refusing to turn or allow staff to provide any cares.  She did allow the wound RN to change some of the dressings but again had to control what was being done.  Multiple skin breakdowns, rashes, excoriation and reddened areas visible just to the eyes but patient is not allowing staff to assess her skin.    Nutritional status is improving Not Progressing    Patient has not had any meals delivered to her today.  She has been eating bags of candy at the bedside instead.  Writer encouraged patient to order meals and was told to \"go to hell you idiot.\"      Demonstrates ability to cope with hospitalization/illness Not Progressing    Patient is unable to currently cope with the situation.  She calls staff members continuously with the call light and phone and even when staff are attempting to address the situation she calls again.  Patient is very unreasonable in every conversation.  She is refusing to let writer touch or care for her in any way.  Most recently calling writer \"phony and a fake.\"  She stated it was \"convenient that all 7 assholes came by today after she's sued [employer] for 1.5 million dollars and now they are coming back for it.\"    "

## 2021-06-23 NOTE — PROGRESS NOTES
"Care Plan Progress Note:    Name: Marychuy Zhou  :   1955  MRN:   [id number]    1700:  Patient put on her call light requesting \"more Ativan, Dilaudid, Flexeril, and Baclofen\".  Writer reminded patient that she is on scheduled Baclofen three times a day and that she already received that at 1:20PM - with her one-time dose of oral Ativan.  Oral Dilaudid PRN was given at this time.  Patient seems to be very forgetful and needs constant reminders regarding her medications.  Patient is still in her electric wheelchair and refused to go back to bed yet.  The representative from the [employer] shop brought up a bag full of candy that the patient ordered and paid for over the phone.  The bag of candy includes multiple chocolate bars, star-burst, etc.  The bag of candy was given to the patient.  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      Patient refused to be cleaned up so far this evening - room smells of foul urine.  Patient reports \"she will tell us when she wants to go back to bed\".  Patient then demanded \"that she needs a unit of blood\".  Writer informed patient that her HgB is stable at 11.9 and no blood is to be given - as this decision is up to the providers.  Patient proceeded to say \"you don't know what the hell you are talking about\".  Writer exited room.    2019 6:30 PM    Penelope Ortiz RN      "

## 2021-06-23 NOTE — TELEPHONE ENCOUNTER
Came to the wrong Dr At the [facility]    Brenda Donald CMA ([facility])

## 2021-06-23 NOTE — PLAN OF CARE
"Patient has been on call light constantly. Also calling hospital  numerous times times. Spent over 1 hour in patient room trying to repostion patient, patient is extremely demanding about how she is moved.Patient repositioned, but thent gives you contradicting orders as to how to move her limbs, place pillow, blankets and chux.  Patient refuses to have lines changed, all lines are solid with fluids, unable to assess where it is coming from. Will only allow new chux to be placed over soiled ones. Patient yelling at staff \" you are incompetent and stupid\", \"don't you know what the hell you are doing\". Patient on call light while staff is in room. Patient will ask for items and then put call light on while staff is leaving room demending said item.   "

## 2021-06-23 NOTE — TELEPHONE ENCOUNTER
Spoke with pharmacist Gudelia and notified her that the patient is still at the hospital. Dr. Zamora will refill medications when she has been discharged if appropriate.

## 2021-06-23 NOTE — PLAN OF CARE
Unable to do complete assessment on this pt. She has been very demanding and rude. Nothing is right. On light frequently carrying on about same complaint even after Rn has knowledge of complaint and attempting to help her out.  frequently re approaching for cares.  Complaints of pain today. Dilaudid oral for this Q 3hours.

## 2021-06-23 NOTE — ANESTHESIA PREPROCEDURE EVALUATION
Anesthesia Evaluation      Patient summary reviewed   No history of anesthetic complications     Airway   Mallampati: III  Neck ROM: full  Comment: Smaller mouth opening   Pulmonary    (+) COPD (Emphysema), wheezes, a smoker                         Cardiovascular   (+) hypertension, CHF (Diastolic), ,     (-) angina, murmur  ECG reviewed  Rhythm: regular  Rate: abnormal,    no murmur   ROS comment: DVT/PE on Xarelto     Neuro/Psych    (+) seizures, anxiety/panic attacks, chronic pain    Comments: T4 paraplegia  Chronic opioid dependence, methadone  Behavioral d/o    Endo/Other    (+) hypothyroidism, arthritis,      GI/Hepatic/Renal    (+) GERD well controlled,   chronic renal disease CRI,      Other findings: MRSA, VRE, ESBL contact precautions    Echo 3/26/18   Summary       Normal central venous pressure.    Left ventricle ejection fraction is normal. The calculated left ventricular ejection fraction is 65%.    Normal right ventricular size and systolic function.    No hemodynamically significant valvular heart abnormalities.    XR CHEST 1 VIEW PORTABLE  12/8/2018 5:25 PM     INDICATION: Coughing  COMPARISON: 12/04/2018.     FINDINGS: Stable alignment of the left midlung. No acute lung consolidation, significant pleural effusion or pneumothorax. Normal heart size and pulmonary vascularity. Calcified plaque of the aorta.      Dental    (+) edentulous                       Anesthesia Plan  Planned anesthetic: MAC  Prn GETA with Glidescope    Chronic narcotic use, tolerance  Duoneb in preop    Propofol plus ketamine   ASA 4     Anesthetic plan and risks discussed with: patient  Anesthesia plan special considerations: antiemetics,   Post-op plan: routine recovery

## 2021-06-23 NOTE — TELEPHONE ENCOUNTER
Controlled Substance Refill Request  Medication Name:   Requested Prescriptions     Pending Prescriptions Disp Refills     methadone (DOLOPHINE) 10 MG tablet 21 tablet 0     Sig: Take 1 tablet (10 mg total) by mouth 3 (three) times a day.     oxyCODONE (ROXICODONE) 10 mg immediate release tablet 24 tablet 0     Date Last Fill: 12/31/18 & 1/3/19  Pharmacy: [facility]  Submit electronically to pharmacy  Controlled Substance Agreement Date Scanned:   Encounter-Level CSA Scan Date:    There are no encounter-level csa scan date.       Last office visit with prescriber/PCP: 12/14/2018 Sánchez Zamora MD OR same dept: 12/14/2018 Sánchez Zamora MD OR same specialty: 12/14/2018 Sánchez Zamora MD  Last physical: Visit date not found Last MTM visit: Visit date not found      Ping Pharmacy Technician from [facility] is calling to request patient's medication. Patient is supposed to be discharged from the hospital today. Patient is requesting her medication be sent today, please advise.

## 2021-06-23 NOTE — TELEPHONE ENCOUNTER
Who is calling:  Patient  Reason for Call:  Patient stated she wants Sánchez Zamora MD to refill her pain medication now because she wants it delivered to her house by the time she gets home today. Patient was informed per Sánchez Zamora MD's message below, that he would like patient to ask the hospitalist for an Rx and that he won't approve an Rx until she is seen. Patient was upset about this and about [facility]'s refill process. Patient stated she will talk to [facility] hospitalist, to see if they will prescribe her her pain medication.  Date of last appointment with primary care: 12/14/18  Has the patient been recently seen:  Yes  Okay to leave a detailed message: No

## 2021-06-23 NOTE — ANESTHESIA CARE TRANSFER NOTE
Last vitals:   Vitals:    01/14/19 1318   BP: 92/59   Pulse: 85   Resp: 14   Temp: 36.7  C (98  F)   SpO2: 97%     Patient's level of consciousness is awake  Spontaneous respirations: yes  Maintains airway independently: yes  Dentition unchanged: yes  Oropharynx: oropharynx clear of all foreign objects    QCDR Measures:  ASA# 20 - Surgical Safety Checklist: WHO surgical safety checklist completed prior to induction    PQRS# 430 - Adult PONV Prevention: 4558F - Pt received => 2 anti-emetic agents (different classes) preop & intraop  ASA# 8 - Peds PONV Prevention: NA - Not pediatric patient, not GA or 2 or more risk factors NOT present  PQRS# 424 - Amalia-op Temp Management: 4559F - At least one body temp DOCUMENTED => 35.5C or 95.9F within required timeframe  PQRS# 426 - PACU Transfer Protocol:NA - Patient did not go to PACU  ASA# 14 - Acute Post-op Pain: NA - Patient under age 10y or did not go to PACU

## 2021-06-23 NOTE — PROGRESS NOTES
"Clinical Nutrition Therapy Assessment Note    Reason: RD following pt before discharge yesterday d/t wounds.  Wounds still present.  Admitting dx: sepsis.  PMH: Paraplegia @ T4, chronic decubitus ulcers, chronic pain syndrome, h/o ETOH abuse, mild COPD, h/o lung Ca (wedge resection 2013), hypothyroidism, GERD, Fe deficiency anemia, h/o PE, MDD, anxiety, cognitive d/o, esophageal dysmotility, h/o FT for TF, h/o non-compliance with medical Tx. Ongoing smoking.    Noted 1/14 debridement of wounds on BLE.     S: Noted pt verbally abusive to staff.  Chart reviewed.     Nutrition History: (1/10)  Information from patient and chart.    Social/living situation: was staying @ friends' home with home care involvement. (\"friend\" was PCA that allowed her to live with her if she paid rent).   Diet prior to admission: regular  Recent food/fluid intake: unknown--pt unreliable historian  Recent Supplements: none noted.  Pt has tried and dislikes: gelatein 20, Enlive, Deven.  She doesn't want Boost + nor Magic Cup.   Cultural/Scientologist food needs or preferences: none   Food allergies or intolerances: NKFA    Current Nutrition Prescription:   Diet: regular    Current Nutrition Intake:  Meals that are recorded are noted to be 100% intake.       Anthropometrics:   Height: 5' 2\" (157.5 cm)  Admission weight: 162#  Weight: 162 lb (73.5 kg)BMI (Calculated): 29.6  BMI indication: 25-29.9 overweight  Ideal body weight ~100# (IBW-10-15# for paraplegia)  Weight History:  Wt Readings from Last 10 Encounters:   02/02/19 162 lb (73.5 kg)   12/08/18 162 lb (73.5 kg)   12/06/18 192 lb 9.6 oz (87.4 kg)   11/19/18 171 lb 1 oz (77.6 kg)   10/16/18 158 lb (71.7 kg)   08/11/18 165 lb (74.8 kg)   07/22/18 164 lb 4.8 oz (74.5 kg)   07/10/18 175 lb (79.4 kg)   07/05/18 174 lb 8 oz (79.2 kg)   06/26/18 158 lb (71.7 kg)   Pt has very few actual weights and usually gives a stated wt with admission and refuses to cooperate to allow bed to be zeroed.  Wt " listed for 12/6/18 was 11# higher than prior weights that mentioned that pt refused to allow excess linens to be removed from bed--suspect wt not accurate on 12/6/18 as well.  All prior weights are questionable for this reason and also due to weights with specialty mattresses/beds.    +1 BLE edema last noted 1/31.    RD Nutrition Focused Physical Exam: previously noted.  Physical signs which could indicate malnutrition: No noted fat/muscle loss noted in face arms.      GI Status/Output:   GI symptoms include: constipation hx.  Last BM: n/a    Skin/Wound:  Isaiah Scale Score: 11    Pressure ulcer/Decubitus ulcer noted. 1/31 WOC note reviewed  Presacral wound--debrided to bone 1/10-- stage 4 (3cm x 3cm x 2cm w/ 3cm undermining circumferentially).  L heel: unstageable (per 1/14 op note, 4cm area debrided including subq tissue/bone)  R lateral leg-small area debrided 1/14  R Lateral shin: Stage 3: 5x2x1.5 cm  R dorsum foot 1.8 x 1.5 cm area debrided 1/14 (abrasion vs pressure injury)  R heel: Stage 3;  Not measured  R buttock:  Stage 3: 5.5 x 4.5 x 0.2cm   R Lateral buttock: Stage 2: 1x 0.5 cm  Bilateral toes: skin breakdown,     Pt has h/o B IT stage 3 wounds however pt wouldn't allow WOC RN or other staff to see her L side to check for any breakdown on L IT/butt.      Medications:  Medications reviewed.      Labs:  Labs reviewed:   Hgb 8      Estimated Nutrition Needs:  Assessment weight is 45.5 kg, ideal weight    Energy Needs: 1569-6415 kcals daily, 30-35 kcal/kg  Protein Needs: 68-91 g daily, 1.5-2.0 g/kg.  Fluid Needs: ~4314-9243 mls daily, ~35-40 mls/kg  Very difficult to  needs with no reliable weight with lower muscle mass w/ paraplegia but high needs for healing of multiple wounds.     Malnutrition: Not noted with no reliable hx re: weight or oral intake.     Nutrition Risk Level: moderate risk    Nutrition dx:   Increased nutrient needs r/t wounds as evidenced by standard needs for multiple wounds noted  in WOC RN note.     Goal:   Meet estimated nutrition needs and Wound healing.    Intervention:   Restart Boost plus qd.    Monitoring/Evaluation:   Follow intake, weights (if pt allows), condition of wounds, if able.     Electronically signed by:  Dora Mccauley RD

## 2021-06-23 NOTE — PLAN OF CARE
"0025: patient requested dilaudid, flexeril, melatonin and ibuprofen. Medication given, except dilaudid as it was too soon. Patient informed of time she could have it next and that she would need to call again for it.  Patient also requested to have nurse listen to lungs as \" I haven't been out of bed in 5 weeks I know I have pneumonia\". Patient allowed nursing assistant to check blood pressure, refused other vitals including 02 saturation \" I am breathing, I am taking in oxygen\". Patient allowed writer to listen to lungs, refused any other assessments of body systems or skin.  Lungs diminished coarse rhonchi. Inhaler given per patient request. Attempted to encourage patient to deep breath, offered patient inclusive spirometer, Patient refused \" I don't do that stuff\" patient then told writer to leave and the she was \"a cold hearted bitch\"  "

## 2021-06-23 NOTE — TELEPHONE ENCOUNTER
She is currently inpatient in the hospital and I will not provide a refill until there is a clear discharge plan and clear recommendation for dosing of medications moving forward.

## 2021-06-23 NOTE — PLAN OF CARE
Patient requested her ativan 1mg and ibuprofen once it was cleared by pharmacy, patient was sleeping when attempted to give. Will give to her once she wakes if she still needs medications.     Janiya Bhakta RN 2/4/2019 6:40 PM

## 2021-06-23 NOTE — PROGRESS NOTES
Hospitalist Progress Note     Assessment/Plan:     Marychuy Zhou is a 62 y.o. 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  Similar to her prior multiple hospitalizations, her behavior was a significant issue with abusiveness, and belligerence towards staff.  She was also nonadherent with medical care.  She was evaluated by psychiatry and was noted to have major depressive disorder with psychotic features as well as with a component of paranoia.  She was placed on a 72-hour hold.  Commitment hearing pending on 2/12/19.    Active Problem:   Severe MDD with psychotic features:  Paranoia:  Anxiety:  - on 72-hr hold per psychiatry with commitment hearing tomorrow.  - appreciate psychiatry involvement.  Continue quetiapine.    Stage IV sacral decubitus ulcer with chronic osteomyelitis (7/2018) status post I&D and IV antibiotics and recurrences:  History of recurrent UTIs:  -Afebrile, no leukocytosis..stage III right sacral decubitus ulcer and probable stage IV left sacral decubitus ulcer.  Coccygeal ulcer as well. Right LE stage IV ulcer  -Past UTIs (ESBL E. coli/Klebsiella, Pseudomonas, Acinetobacter, enterococcus, Proteus).  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      Plan:  - continue keflex three times a day until 2/14 per ID recommendations. Appreciate WOCN involvement.     Severe constipation, narcotic induced as well as neurogenic, resolved:  -Continue Miralax  scheduled. Prn senna.  Dulcolax as needed.     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  -She was evaluated by urology in the past for urethral incompetence and lower abdominal/pelvic pain.  She had nephrostomies and suprapubic catheters in the past.     Plan:  - continue gabapentin and baclofen. Continue oxybutynin. Replace suprapubic catheter every 4 weeks as an outpatient.     Microcytic anemia:  -Hemoglobin 11.7 above her baseline. Low MCV. No acute signs of blood loss. Anemia likely due to malnutrition.     Plan:  -Continue iron supplementation.  Recommend iron studies as an outpatient     Moderate protein calorie malnutrition:  -She does have low BUN signifying catabolic state and/or protein malnutrition.      Plan:  -Registered dietitian involved.     Centrilobular emphysema:  -Stable.  Not oxygen dependent.  No acute exacerbation.     Plan:  -Continue DuoNeb as needed.     History of DVT/PE:  -Continue rivaroxaban.     History of subdural hematoma (4/2017):  -No acute issues.     Hypothyroidism:  -Continue levothyroxine.     History of seizure disorder:  -Continue levetiracetam.     Opioid dependence:  -Follows with the PCP and plan to follow-up with the pain clinic.  On methadone and oxycodone as an outpatient. Methadone held here.  on Dilaudid. Continue gabapentin    Insomnia:  -Continue eszopiclone     DVT prophylaxis: Continue rivaroxaban.      Subjective:     Patient's behavior without change from baseline including agitation, refusal of cares, and abusive behaviors towards staff.  She reports that he has been malpositioned on her left side level on her chair putting into her left arm and reports associated left arm numbness.    Review of systems:     Please see Subjective.    Current Medications:     Scheduled Meds:    baclofen  10 mg Oral TID     cephalexin  1,000 mg Oral  TID     eszopiclone  3 mg Oral QHS     gabapentin  900 mg Oral DAILY     levETIRAcetam  250 mg Oral BID     levothyroxine  125 mcg Oral Daily 0600     miconazole   Topical BID     multivitamin with minerals  1 tablet Oral DAILY     neomycin-bacitracin-polymyxin   Topical TID     oxybutynin  5 mg Oral TID     polyethylene glycol  17 g Oral DAILY     QUEtiapine  25 mg Oral BID     QUEtiapine  50 mg Oral QHS     rivaroxaban  20 mg Oral Daily with supper     Continuous Infusions:  PRN Meds:.acetaminophen, albuterol, albuterol **AND** Nebulizer treatment intermittent, bisacodyl, bisacodyl, calcium (as carbonate), cyclobenzaprine, HYDROmorphone, hydrOXYzine HCl, ibuprofen, magnesium hydroxide, melatonin, naloxone **OR** naloxone, OLANZapine, omeprazole, ondansetron **OR** ondansetron, polyethylene glycol, polyvinyl alcohol, sodium chloride bacteriostatic, traZODone    Objective:       Vital signs in last 24 hours:     Temp:  [98.2  F (36.8  C)] 98.2  F (36.8  C)  Heart Rate:  [] 95  Resp:  [16-20] 20  BP: ()/(70-76) 108/76  Weight: 162 lb (73.5 kg)    Physical Exam:     General appearance: Alert, Awake, No distress   Head & Neck Atraumatic, supple, no tracheal deviation   ENT  PER, conjunctiva clear, no scleral icterus, EOMI, no nasal discharge   Resp: clear to auscultation bilaterally, no wheezing, no crackles   CVS: S1, S2 normal, regular rate and rhythm, no murmurs   GI: Soft, nontender, distended and BS+   MSK: No swelling, or tenderness   Neuro:  Alert and oriented ×3, paraplegia, lack of redirectibility, agitated.      Intake/Output this shift:     I/O last 3 completed shifts:  In: 1240 [P.O.:1240]  Out: 1500 [Urine:1500]    Pertinent Labs:     Lab Results: personally reviewed.     Pertinent Radiology:       Advanced Care Planning:     Barrier to discharge: Pending commitment process  Anticipated LOS: To be determined  Disposition: To be determined    Peggy Valentino M.D.  [facility] Hospitalist  Internal  Medicine

## 2021-06-23 NOTE — PROGRESS NOTES
I am seeing Marychuy in cross cover.  She is mildly hypotensive and slightly tachycardic.  Her blood pressure is at her baseline.  Her heart rate has improved.  Her lactic acid level was elevated at 3.1 at the time of admission.  She feels well except for pain related to her wounds and mild dizziness which she attributes to anemia.  She has significant blood loss through her wounds and is on anticoagulation.  We rechecked her lactate level is 2.8.  We rechecked a hemoglobin has dropped down to 7.4.  She is requesting a blood transfusion.  I did review previous lactic acid levels in the seem to be around 2.8.  Her mentation is normal.  At this point we will give her IV fluids, a unit of packed red blood cells and monitor her vital signs and recheck a lactate.  She does not appear acutely ill and we will therefore not escalate antibiotics.    Khai Figueroa

## 2021-06-23 NOTE — ANESTHESIA POSTPROCEDURE EVALUATION
Patient: Marychuy Zhou  INCISION AND DRAINAGE, SACRAL ULCER  Anesthesia type: general    Patient location: PACU  Last vitals:   Vitals:   BP: 111/63   Pulse: (!) 111   Resp: 20   Temp: 36.7  C (98  F)   SpO2: 100%     Post vital signs: stable  Level of consciousness: awake and responds to simple questions  Post-anesthesia pain: pain controlled  Post-anesthesia nausea and vomiting: no  Pulmonary: unassisted, return to baseline  Cardiovascular: stable and blood pressure at baseline  Hydration: adequate  Anesthetic events: no    QCDR Measures:  ASA# 11 - Amalia-op Cardiac Arrest: ASA11B - Patient did NOT experience unanticipated cardiac arrest  ASA# 12 - Amalia-op Mortality Rate: ASA12B - Patient did NOT die  ASA# 13 - PACU Re-Intubation Rate: ASA13B - Patient did NOT require a new airway mgmt  ASA# 10 - Composite Anes Safety: ASA10A - No serious adverse event    Additional Notes:

## 2021-06-23 NOTE — ED NOTES
"Call placed to [facility] to get report on patient coming to ED. Gavin Rosales (RN) patient went on an RIC this afternoon, coming back at 2100 this evening. Patient received her night time medications including 4mg dilaudid. She then began yelling at staff stating, \"I need more dilaudid than that.\" Staff reports that patient stated that she would be coming to ED to receive IV dilaudid.   "

## 2021-06-23 NOTE — TELEPHONE ENCOUNTER
"  Patient calling.   She states she is in [facility] waiting for Dr. Zamora to call her. She reports \"their trying to lock me up in a psyche hospital\".    Patient states she would like either Fidencio or Dr. Zamora to call her ,    Sandra Archer RN  [facility] Triage/refill nurse    "

## 2021-06-23 NOTE — ANESTHESIA CARE TRANSFER NOTE
Last vitals:   Vitals:    01/10/19 1210   BP: 97/59   Pulse: 80   Resp: 16   Temp: 36.4  C (97.6  F)   SpO2: 100%     Patient's level of consciousness is drowsy  Spontaneous respirations: yes  Maintains airway independently: yes  Dentition unchanged: yes  Oropharynx: oropharynx clear of all foreign objects    QCDR Measures:  ASA# 20 - Surgical Safety Checklist: WHO surgical safety checklist completed prior to induction    PQRS# 430 - Adult PONV Prevention: 4558F - Pt received => 2 anti-emetic agents (different classes) preop & intraop  ASA# 8 - Peds PONV Prevention: NA - Not pediatric patient, not GA or 2 or more risk factors NOT present  PQRS# 424 - Amalia-op Temp Management: 4559F - At least one body temp DOCUMENTED => 35.5C or 95.9F within required timeframe  PQRS# 426 - PACU Transfer Protocol: - Transfer of care checklist used  ASA# 14 - Acute Post-op Pain: ASA14B - Patient did NOT experience pain >= 7 out of 10

## 2021-06-23 NOTE — PLAN OF CARE
Problem: Psychosocial Needs  Goal: Demonstrates ability to cope with hospitalization/illness  Outcome: Not Progressing  Pt having repeated request for flexeril, saying she is to have it when she needs it and not as the regular order of every 6 hours says. Explained to her the essence for the time spread but would not accept to the order, requesting for the MD to be notified. MD notified. Will continue to monitor and treat as needed.    Julio C Grant RN

## 2021-06-23 NOTE — ANESTHESIA POSTPROCEDURE EVALUATION
Patient: Marychuy Zhou  IRRIGATION AND DEBRIDEMENT, LOWER EXTREMITY; LEFT HEEL AND GREAT TOE, RIGHT LATERAL AND PORTION OF FOOT  Anesthesia type: MAC    Patient location: [facility] Surgical Floor  Last vitals:   Vitals:    01/14/19 1402   BP: (!) 89/61   Pulse: (!) 105   Resp: 16   Temp: 36.9  C (98.4  F)   SpO2: 98%     Post vital signs: stable  Level of consciousness: awake, alert and oriented  Post-anesthesia pain: pain controlled  Post-anesthesia nausea and vomiting: no  Pulmonary: unassisted, return to baseline  Cardiovascular: stable and blood pressure at baseline  Hydration: adequate  Anesthetic events: no    QCDR Measures:  ASA# 11 - Amalia-op Cardiac Arrest: ASA11B - Patient did NOT experience unanticipated cardiac arrest  ASA# 12 - Amalia-op Mortality Rate: ASA12B - Patient did NOT die  ASA# 13 - PACU Re-Intubation Rate: NA - No ETT / LMA used for case  ASA# 10 - Composite Anes Safety: ASA10A - No serious adverse event    Additional Notes:

## 2021-06-23 NOTE — TELEPHONE ENCOUNTER
I do not have anything I can add at this time.  I have been keeping up-to-date with her clinical progress and I recommend that she follow the advice of her care providers in the hospital.  I will not be able to call her but would invite her to see me in the clinic setting to discuss any and all concerns when it is appropriate to do so.

## 2021-06-23 NOTE — PROGRESS NOTES
"Patient Asked aid to get her note pad from the table. Aid found her note pad underneath the pillow in her bed. She said \"fuck look what you did, its crumbled up. You owe me a new note book pad.\"  Aid apologize and told her I would try to get you a new note pad. Charge nurse found a note pad. Note pad was given to patient said \"Thank you now you can go.\"  "

## 2021-06-23 NOTE — PROGRESS NOTES
Pt refused to be repositioned during shift several times. Pt did allow nurse to perform catheter care on suprapubic catheter and to clean abdomen and groin area. Skin is very denuded and red. Pt refused miconazole powder. Pt did not have dressing on heel, but allowed nurse to place folded 4x4 gauze with mepilex dressing over. Coccyx dressing was changed on NOC shift last night. Will continue to encourage repositioning while in bed.

## 2021-06-23 NOTE — SIGNIFICANT EVENT
"At 2209 on 2/6 Pt woke up and put on call light for nurse to come and give her bedtime meds.  Pt also called the NA into the room to reposition her legs stating the bed was in the up position and she wanted it put down, but the foot of the bed was all the way down..  Pt called multiple times to the nurses station and the  b/c the staff wasn't in her room giving her meds or making her legs how she wanted.  Writer went into room at approx 2215 with HS meds. Pt started calling for the NA to come back in the room and when the NA came in, Pt told her to move her legs down b/c the bed was up, when she was told the bed was down pt put on the call light and wanted a nurse in her room even though I was standing right there. Pt was calling the NA dumb and stupid. Writer tried to change the subject back to hs meds.     While trying to scan meds into computer pt was demanding that she be given her apple juice from the other side of the bed, she then demanded her medications. Pt didn't understand that only one thing could be done at a time and wanted to know if we are all stupid. Pt stated she was convulsing and I told her I didn't see her convulsing, she then said she was having spasms b/c of her anxiety from the NA's not knowing how to do what she wanted.   Pt then demanded that the MD be called for anxiety meds and she needed a blood transfusion. Writer explained to Pt her Hgb was checked in the am and the MD didn't order any blood and she had been told that by the day staff already. Writer asked pt if the chucks underneath her could be changed b/c they were soiled. Pt at first said nothing was going to be touched but then changed her mind. Several purulent soaked/saturated chucks were taken out from underneath pt. Writer also convinced pt to clean abdominal folds and reapply nystatin powder.  Pt then stated she wanted a suppository; writer asked if she would be able to turn over far enough to get one. Pt stated \"you " "just saw my ass hole, you mean to tell me you don't know what an ass hole looks like?\" Suppository given.       Pt told her air mattress was here and when asked if she would let us put her in it she stated she would only agree to it if she had anxiety meds and a dose of IV dilaudid. Paged MD again. Picc team called to place peripheral iv in foot. Ativan and dilaudid given and pt stated she needed 30 minutes for it to kick in before we could try to move her. Dilaudid and ativan given at 0032 and 0033. At 0120 Pt was asleep in her bed and when we tried to wake her up she kept snoring.  Pt was transferred into [facility] and the lift was used to put her on the new bed. Pt had had an XL hard stool. Whole bed was saturated in very purulent body secretions. Pt was cleaned up and dressings applied to wounds. Pt opened eyes only for a brief second and then back to sleep during the transferring and clean-up.                                                                                            "

## 2021-06-23 NOTE — PROGRESS NOTES
"[facility] Daily Progress Note    Assessment/Plan:  Marychuy Zhou is a 63-year-old with history of HTN, HLD, hypothyroidism, COPD, DVT, PE and paraplegia, sacral decubitus ulcers, chronic foot ulcers who was discharged 12/1/2019 only to come back to the hospital on 2/2/2019 evening and was admitted on 2/3/2019 with chest according to her she did not get any adequate pain relief in her rehab facility. Unfortunately in the Hospital patient has had issues with behavior. Verbally abusing staff as well as at times physically throwing thing at people. She has not been following physician and Nursing advises and has not allowed nursing staff to change her position and take care of her Sacral wound, with the wound soiled at times. There was a conference with Hospital administration 2/6/2019 requesting her co-operation in the care of her and allowing nursing staff to follow protocols but unfortunately she has refused to talk and asked them all to get out of the room. Later she told me that \"this is a retaliatory measures being taken against her\"        Assessment:    Stage IV sacral decubitus ulcer  No fever  Lactic acid was mildly elevated which is coming down  On Keflex.      Anxiety  Management by Psychiatry    Anemia  Patient wants blood transfusion though the hemoglobin is >8 explained to her that unless her hemoglobin is less than 7 or less than 8 with symptoms we will hold on transfusion as it could have its own side effects    Cough  With crackles in the lungs we will check chest x-ray    COPD  Hypertension   Hypocholesteremia  Chronic constipation  Neurogenic bladder  Seizure disorder on antiepileptics  Hypothyroidism  T4 quadriplegia  DVT and PE on Xarelto        Plan:  Check hemoglobin in the morning  Continue albuterol inhalers  Chest x-ray will be checked to rule out pneumonia  Patient can be discharged when on nursing home bed is available    Code status:Full Code        Subjective:  Complains of cough thinks that " there is sputum produced but she swallows      Review of Systems:   No fever    Current Medications Reviewed via EHR List    Objective:  Vital signs in last 24 hours:  Temp:  [97.5  F (36.4  C)-97.6  F (36.4  C)] 97.6  F (36.4  C)  Heart Rate:  [] 106  Resp:  [20] 20  BP: (119-128)/(74-75) 128/75  SpO2:  [100 %] 100 %    Intake/Output last 3 shifts:  I/O last 3 completed shifts:  In: 700 [P.O.:700]  Out: 620 [Urine:620]      Physical Exam:  Heart S1-S2 tachycardia is present  Crackles in the left lung anteriorly  Abdomen is soft without any tenderness  CNS patient is alert and oriented x3  I did not examine the patient's sacral ulcer  Lower extremities have deformities      Lab Results:  Personally Reviewed.   Fingerstick Blood Glucose: No results for input(s): POCGLUFGR in the last 48 hours.    Recent Results (from the past 24 hour(s))   Platelet Count - every other day x 3    Collection Time: 02/08/19 11:36 AM   Result Value Ref Range    Platelets 327 140 - 440 thou/uL   Hemoglobin    Collection Time: 02/08/19 11:36 AM   Result Value Ref Range    Hemoglobin 9.1 (L) 12.0 - 16.0 g/dL           Advanced Care Planning  Discharge plan: unknown      Jaron Edwards  Date: 2/8/2019  Time: 4:36 PM  Hospitalist Service  Part of this chart was created using a dictation software. Typographic errors, word substitutions, and Grammatical errors may unintentionally occur.

## 2021-06-23 NOTE — SIGNIFICANT EVENT
"Writer answered patient's light and patient wanted more juliana's tucked under her.  Writer suggested we need to change her linens as they are visibly soiled from blood and stool.  Patient picked up her water cup and threatened to throw it at writer stating, \"Touch me and I'll fucking get you wet.\"  Writer told patient she needed to get the soiled linens out and that her skin and wounds needed to be assessed but never touched the patient.  Patient then attempted to \"fire\" the nurse and call the switchboard multiple times.  Writer excused herself from the room and patient stated, \"get the hell out and never come back.\"  "

## 2021-06-23 NOTE — PLAN OF CARE
"Perineal skin integrity is maintained or improved Not Progressing      Demonstrates ability to cope with hospitalization/illness Not Progressing      To ensure patient safety, patient will abstain from any threats or actions to harm self or others Not Progressing      Daily care needs are met Not Progressing      Patient's pain/discomfort is manageable Progressing      Patient A&Ox4, refuses vital signs, interventions, cares, and assessments, stating \"don't touch me.\" Patient's affect was labile during shift including agitation, yelling, complaining, demanding, hopelessness, anxiety, and tearfulness. Patient was verbally abusive towards staff calling the RN a \"red headed devil\" and calling the MD a \"cold-hearted bitch.\" When staff attempted to communicate with the patient, she spoke over staff and perseverated on topics such as repositioning and pain. Patient turned on call light while staff members where in the room and refused communication with staff repeatedly. Charge RN and security notified, security discussed that verbal abuse will not be tolerated between patient and staff. Patient refuses hygiene cares, linen changes, and repositioning during shift. Patient reports chronic severe pain and muscle spasms improved with PRN dilauded and flexeril. Pt refuses some medications and agrees to take others, MD notified of refusal of medications and cares. Pt refuses all teaching from RN. Will continue to monitor pain and behaviors with redirection as needed.   "

## 2021-06-23 NOTE — CONSULTS
INITIAL PSYCHIATRIC CONSULTATION  This note was created with the help of Dragon dictation system. Grammatical and typing errors are not intentional.                REASON FOR REQUEST: 72-hour hold    ASSESSMENT/RECOMMENDATIONS/PLAN :   Patient's exhibits severe depression with paranoia, and profound apathy, lack of motivation and anxiety which are cumulatively affecting her functionality and safety and her ability to function in community. Her refusal of care and non compliance likely is due to worsening depression and onset of new psychosis thus warranting treatment with neuroleptics and antidepressant. She has been threatening staff via throwing fecal and blood soiled linens at them thus exposing to blood/fecal born pathogens. She has refused to take neuroleptics voluntarily. Should paranoia worsen will attempt antipsychotics via IM under a psychiatric emergency, in the mean time a petition for mental health commitment will be filed.     Severe depression, single episode with psychotic features.  Paranoia.  Noncompliance with medical treatments and recommendations; refusal of care which can be life-threatening given that she is laying in fetal material, has an open wound, increased risk for sepsis, history of sepsis.    Recommendations:  Patient has refused neuroleptics.  Cymbalta 20 mg daily times 4 days then increase to 30 mg x 4 days then increase to 60 mg thereafter.  Patient has chronic pain and Cymbalta may help target pain, depression and anxiety.  Olanzapine 2.5 mg 3 times a day as needed.  Petition for civil commitment for mental health.  Declaring psychiatric emergency.  Minimize Dilaudid and Ativan, increases risk for respiratory depression and related complications.  Encourage physical therapy, sitting up.    MENTAL STATUS EXAMINATION:     Appearance: The patient appears stated age, Hospital garb, withdrawn.  Malodorous, laying in soiled linen and clothing, patient is dismissive  Reliability:  "Fair  Behavior: Restrictive, antisocial.  Patient is throwing soiled linen and clothing (feces and blood) at staff.  Patient needs isolation precaution for VRE, MRSA, ESBL.  Speech: spontaneous, loud and coherent.  Associations: connected, intact.  Language:There are no difficulties with expressive or receptive language as observed throughout the interview.    Mood: Guarded.  Apathetic.  Affect: Blunted.  Judgement: Able to make basic decision regarding safety.  Insight: Good, intact. Understands medication compliance and follow up. Wants to seek insight psyciatric hospitalization at present, contract for safety.  Gait and station:  No clear EPS.    Thought process: Logical    Thought content: Paranoid.  No evidence of active visual or auditory hallucinations.  Delusions.  Fund of knowledge: Average.    Attention / Concentration: Distractibility noted.  Short Term Memory: Depressed  Long Term Memory: Fair  Suicidal ideation: No.  Homicidal ideation: Difficult to assess.  Patient has had made verbal threats to harm staff.     Vital Signs: BP 98/48 (Patient Position: Lying)   Pulse 90   Temp 97  F (36.1  C) (Axillary)   Resp 18   Ht 5' 2\" (1.575 m)   Wt 162 lb (73.5 kg)   LMP  (LMP Unknown)   SpO2 97%   BMI 29.63 kg/m      HISTORY OF PRESENT ILLNESS:     Presenting history to include: Per [facility] [place]/Specialists:   Marychuy Zhou is a 63 y.o. old female  With PMH significant for quadriplegia at T4 level, emphysema, ongoing smoking, recent osteomyelitis, chronic indwelling gonzalez and supra pubic, focal motor seizures, chronic pain, stage IV sacral decubitus ulcer and other decubiti, h/o DVT, PE, osteoporosis, hypothyroidism, multiple fractures, HTN, HLD, lung cancer. Patient apparently lives in an independent home/apartment and was brought in by paramedics for complaint of severe pelvic pain since 4 AM this morning. Patient apparently reported feeling like this when she is septic. During my visit she also has chills " "of one day duration which she blames on not getting adequate rest and has pain in her rib cage for last 5 weeks. Denies any fevers. Has a chronic congested cough but does not expectorate any phlegm as she swallows it. States that she never got better since her previous hospitalization in 12/2018 for cough and COPD exacerbation. She is uncooperative and refuses to tell me anything further about her rib cage pain as she states she is having pelvic pain and the ED physician has not treated it adequately enough. She refuses to answer multiple questions in the ROS.   She has an abnormal UA and elevated lactic acid and was treated with IV Rocephin based on previous culture sensitivity and IV fluids. Overall 12 point ROS is negative but very limited.  She has a h/o multiple decubitus ulcers and osteomyelitis but is unwilling to discuss why she skipped the bone biopsy that was scheduled for 12/2018 and is refusing to let me assess her supra pubic site and site of decubiti.   History is provided by patient and record review. Patient is very upset about not getting adequate pain management in the ED and is not fully cooperative, refusing to answer questions a lot of times and refusing to allow physical exam for various reasons such as having chills, being in pain, catheter being in between her legs and so she does not want me to look at it. Overall sub optimal physical exam and history due to patient being uncooperative.   Per nursing:  Writer answered patient's light and patient wanted more juliana's tucked under her.  Writer suggested we need to change her linens as they are visibly soiled from blood and stool.  Patient picked up her water cup and threatened to throw it at writer stating, \"Touch me and I'll fucking get you wet.\"  Writer told patient she needed to get the soiled linens out and that her skin and wounds needed to be assessed but never touched the patient.  Patient then attempted to \"fire\" the nurse and call the " "switchboard multiple times.  Writer excused herself from the room and patient stated, \"get the hell out and never come back    Patient asked Aid to reposition her legs. Patient was instructing aid on moving her legs.  Patient appeared to be getting upset because aid could not turn pillows in place while holding feet at the same time. Aid told patient for her safety and ours she needed to be  On the other side of the bed to reposition her better. After 10 mins of trying to get her comfortable patient stated \"you don't know shit let me call the manager \" Then She threw two soiled pillows at aid. The pillows had blood and feces on it.  One of the pillows landed at the bottom of aid's pants.The Aid had to change her pants.     Pt reports feeling \"dizzy\" however refuses vitals to be taken or lab draw this AM. States she \"needs a blood transfusion\" Passed information along to MD, informed pt that I did talk to MD and he does not want to order at this time. Pt upset.  soiled bedding, what appears to be blood and feces throughout bedding. Pt refuses to let RN do physical assessment or remove covers to assess where blood is coming from. MD made aware through text page. Sticky note also left for MD. Will continue to reapproach and attempt assessment throughout day    Pt frequently on call light with multiple demands, even with staff members already in patient's room. Pt verbally abusive to those staff members attempting to meet pt's demands, remarking to a nursing assistant \"are you that incompetent that you can't understand??\". This writer also observed pt lifting up her water cup at bedside stating that she would throw her glass of water at staff who do not comply appropriately    Pt very rude, demanding and verbally abusive to staff. Pt putting on call light to request to be changed/repositioned, notified that staff member will find other staff members to help with this as patient requires assist of 2-3 for cares, despite " "this pt continually put on call light to yell at staff. Writer went into room to speak with patient, as patient was verbalizing her concerns writer stated \"okay\" and nodded head several times to show patient that this writer was listening, pt found this to be offensive and starting mocking writer. Told writer to \"wipe that smirk off your face\" and demanded PRN pain medication that was not yet due stating \"you work for me, if it wasn't for me you wouldn't have a job\". Pt put on call light several times when writer was already in the room attempting to meet patient's needs. Pt called  9 times between the times of 2015 to 2113 demanding to be taken down to the ER.     Patient A&Ox4, refuses vital signs, interventions, cares, and assessments, stating \"don't touch me.\" Patient's affect was labile during shift including agitation, yelling, complaining, demanding, hopelessness, anxiety, and tearfulness. Patient was verbally abusive towards staff calling the RN a \"red headed devil\" and calling the MD a \"cold-hearted bitch.\" When staff attempted to communicate with the patient, she spoke over staff and perseverated on topics such as repositioning and pain. Patient turned on call light while staff members where in the room and refused communication with staff repeatedly. Charge RN and security notified, security discussed that verbal abuse will not be tolerated between patient and staff. Patient refuses hygiene cares, linen changes, and repositioning during shift. Patient reports chronic severe pain and muscle spasms improved with PRN dilauded and flexeril. Pt refuses some medications and agrees to take others, MD notified of refusal of medications and cares. Pt refuses all teaching from RN. Will continue to monitor pain and behaviors with redirection as needed.       Marychuy is well known to me from many previous assessments however she did not recognize me during today's assessment.  In the past she engaged rational " "conversation acknowledging that she needed to be treated with antibiotics therefore she was in hospital.  During this assessment she was noted to be decompensated, apathetic, with mood lability and suspicious.  She was angry, irritated and screaming at staff.  She has not allowed dressings or linens to be changed for more than 4 hours.  She needs to be turned and repositioned every 2 hours which she has been refusing consistently saying that \"I know how to change and turn myself I have no new wound because I know\".    She acknowledges having a history of depression, onset 10 years ago in the context of her medical illness and physical decompensation.  She does not recall trials of antidepressants and she is refusing any medication to target depression and anxiety except Ativan, Valium and Dilaudid.  She did not want anyone changing her linens, nor removing soiled items.  She was laying in feces and blood discharge from her wound.  She has thrown feces soaked pillow, sheets and other clothing items at staff, verbally threatened them of litigation and that she was not happy with care.  She acknowledges that she had depression and she was taking antidepressant referring to Valium and Seroquel and that her anxiety is because of the depression.  She said that she felt threatened by the nursing staff as she felt that they did not have the training to change wound dressing or change her linens.  She was asked about visual and auditory hallucinations for which she except that she never had those hallucinations.  When she was asked if she was afraid of staff she said \"they do not know what they are doing\".  She does emphasize that she is not suicidal, never been suicidal and that she does not remember ever receiving treatment on psychiatric inpatient unit.  She does not acknowledge that she needs to be in isolation precaution, \"I do not have to worry about it\".  She refused to speak with the site medical director Dr. Luna " "Luis Miguel or the vice president of nursing Ms. Ely Guevara when she was offered an opportunity to express her concerns.  She dismissed them by saying that she did not need to talk to them, did not need their help.  She asked them to get out of her room as well.  She had refused to speak with .  She is speaking loudly, threatening litigation, asking people to get out of her room.  Review of Systems:As per HPI. Remainders of 12 point review of systems negative.  Psychiatric ROS: Patient is dismissive, says that she is depressed and she has anxiety but refuses to take any antidepressant or neuroleptics except Ativan.  She says no when psychiatric review of system questions are asked and terminates the interview skilled nursing \"now get out of my room\".        PFSH reviewed  and not pertinent to chief complaint/reason for visit  BP 98/48 (Patient Position: Lying)   Pulse 90   Temp 97  F (36.1  C) (Axillary)   Resp 18   Ht 5' 2\" (1.575 m)   Wt 162 lb (73.5 kg)   LMP  (LMP Unknown)   SpO2 97%   BMI 29.63 kg/m    Amphetamines (no units)   Date Value   01/07/2019 Screen Negative     Phencyclidine (no units)   Date Value   01/07/2019 Screen Negative     Barbiturates (no units)   Date Value   01/07/2019 Screen Negative     Cocaine Metabolite (no units)   Date Value   01/07/2019 Screen Negative     Oxycodone (no units)   Date Value   01/07/2019 (!)    Screen Positive (Confirmation available on request)     Creatinine, Urine (mg/dL)   Date Value   01/07/2019 35.8     THC (no units)   Date Value   01/07/2019 Screen Negative     Alcohol, Blood (mg/dL)   Date Value   10/01/2017 <10     Recent Results (from the past 24 hour(s))   Platelet Count - every other day x 3   Result Value Ref Range    Platelets 366 140 - 440 thou/uL   Basic Metabolic Panel   Result Value Ref Range    Sodium 139 136 - 145 mmol/L    Potassium 3.9 3.5 - 5.0 mmol/L    Chloride 107 98 - 107 mmol/L    CO2 25 22 - 31 mmol/L    Anion Gap, Calculation 7 " 5 - 18 mmol/L    Glucose 108 70 - 125 mg/dL    Calcium 8.1 (L) 8.5 - 10.5 mg/dL    BUN 6 (L) 8 - 22 mg/dL    Creatinine 0.57 (L) 0.60 - 1.10 mg/dL    GFR MDRD Af Amer >60 >60 mL/min/1.73m2    GFR MDRD Non Af Amer >60 >60 mL/min/1.73m2   HM1 (CBC with Diff)   Result Value Ref Range    WBC 10.7 4.0 - 11.0 thou/uL    RBC 3.63 (L) 3.80 - 5.40 mill/uL    Hemoglobin 8.5 (L) 12.0 - 16.0 g/dL    Hematocrit 29.0 (L) 35.0 - 47.0 %    MCV 80 80 - 100 fL    MCH 23.4 (L) 27.0 - 34.0 pg    MCHC 29.3 (L) 32.0 - 36.0 g/dL    RDW 20.7 (H) 11.0 - 14.5 %    Platelets 370 140 - 440 thou/uL    MPV 9.3 8.5 - 12.5 fL    Neutrophils % 75 (H) 50 - 70 %    Lymphocytes % 12 (L) 20 - 40 %    Monocytes % 8 2 - 10 %    Eosinophils % 5 0 - 6 %    Basophils % 0 0 - 2 %    Neutrophils Absolute 7.9 (H) 2.0 - 7.7 thou/uL    Lymphocytes Absolute 1.3 0.8 - 4.4 thou/uL    Monocytes Absolute 0.9 0.0 - 0.9 thou/uL    Eosinophils Absolute 0.5 (H) 0.0 - 0.4 thou/uL    Basophils Absolute 0.0 0.0 - 0.2 thou/uL   Manual Differential   Result Value Ref Range    Platelet Estimate Normal Normal    Ovalocytes 1+ (!) Negative    Polychromasia 1+ (!) Negative     Recent Results (from the past 72 hour(s))   HM2(CBC W/O DIFF)    Collection Time: 02/03/19  9:55 PM   Result Value Ref Range    WBC 9.7 4.0 - 11.0 thou/uL    RBC 3.24 (L) 3.80 - 5.40 mill/uL    Hemoglobin 7.4 (L) 12.0 - 16.0 g/dL    Hematocrit 25.6 (L) 35.0 - 47.0 %    MCV 79 (L) 80 - 100 fL    MCH 22.8 (L) 27.0 - 34.0 pg    MCHC 28.9 (L) 32.0 - 36.0 g/dL    RDW 21.1 (H) 11.0 - 14.5 %    Platelets 374 140 - 440 thou/uL    MPV 9.3 8.5 - 12.5 fL   Lactic Acid    Collection Time: 02/03/19  9:55 PM   Result Value Ref Range    Lactic Acid 2.8 (H) 0.5 - 2.2 mmol/L   Type and Screen    Collection Time: 02/03/19 11:43 PM   Result Value Ref Range    ABORh O POS     Antibody Screen Negative Negative   Basic Metabolic Panel    Collection Time: 02/04/19  7:04 AM   Result Value Ref Range    Sodium 142 136 - 145 mmol/L     Potassium 4.0 3.5 - 5.0 mmol/L    Chloride 111 (H) 98 - 107 mmol/L    CO2 23 22 - 31 mmol/L    Anion Gap, Calculation 8 5 - 18 mmol/L    Glucose 119 70 - 125 mg/dL    Calcium 8.2 (L) 8.5 - 10.5 mg/dL    BUN 6 (L) 8 - 22 mg/dL    Creatinine 0.55 (L) 0.60 - 1.10 mg/dL    GFR MDRD Af Amer >60 >60 mL/min/1.73m2    GFR MDRD Non Af Amer >60 >60 mL/min/1.73m2   Lactic Acid    Collection Time: 02/04/19  7:04 AM   Result Value Ref Range    Lactic Acid 2.2 0.5 - 2.2 mmol/L   Hemoglobin    Collection Time: 02/04/19  8:02 PM   Result Value Ref Range    Hemoglobin 9.7 (L) 12.0 - 16.0 g/dL   Crossmatch    Collection Time: 02/05/19 12:04 AM   Result Value Ref Range    Crossmatch Compatible     Blood Expiration Date [id number]     Unit Type O Pos     Unit Number [id number]     Status Transfused     Component Red Blood Cells     PRODUCT CODE M5758G27     Issue Date and Time [id number]     Blood Type 5100     CODING SYSTEM [id number]    Platelet Count - every other day x 3    Collection Time: 02/05/19  9:49 PM   Result Value Ref Range    Platelets 366 140 - 440 thou/uL   Basic Metabolic Panel    Collection Time: 02/06/19  6:10 AM   Result Value Ref Range    Sodium 139 136 - 145 mmol/L    Potassium 3.9 3.5 - 5.0 mmol/L    Chloride 107 98 - 107 mmol/L    CO2 25 22 - 31 mmol/L    Anion Gap, Calculation 7 5 - 18 mmol/L    Glucose 108 70 - 125 mg/dL    Calcium 8.1 (L) 8.5 - 10.5 mg/dL    BUN 6 (L) 8 - 22 mg/dL    Creatinine 0.57 (L) 0.60 - 1.10 mg/dL    GFR MDRD Af Amer >60 >60 mL/min/1.73m2    GFR MDRD Non Af Amer >60 >60 mL/min/1.73m2   HM1 (CBC with Diff)    Collection Time: 02/06/19  6:10 AM   Result Value Ref Range    WBC 10.7 4.0 - 11.0 thou/uL    RBC 3.63 (L) 3.80 - 5.40 mill/uL    Hemoglobin 8.5 (L) 12.0 - 16.0 g/dL    Hematocrit 29.0 (L) 35.0 - 47.0 %    MCV 80 80 - 100 fL    MCH 23.4 (L) 27.0 - 34.0 pg    MCHC 29.3 (L) 32.0 - 36.0 g/dL    RDW 20.7 (H) 11.0 - 14.5 %    Platelets 370 140 - 440 thou/uL    MPV 9.3 8.5 -  12.5 fL    Neutrophils % 75 (H) 50 - 70 %    Lymphocytes % 12 (L) 20 - 40 %    Monocytes % 8 2 - 10 %    Eosinophils % 5 0 - 6 %    Basophils % 0 0 - 2 %    Neutrophils Absolute 7.9 (H) 2.0 - 7.7 thou/uL    Lymphocytes Absolute 1.3 0.8 - 4.4 thou/uL    Monocytes Absolute 0.9 0.0 - 0.9 thou/uL    Eosinophils Absolute 0.5 (H) 0.0 - 0.4 thou/uL    Basophils Absolute 0.0 0.0 - 0.2 thou/uL   Manual Differential    Collection Time: 02/06/19  6:10 AM   Result Value Ref Range    Platelet Estimate Normal Normal    Ovalocytes 1+ (!) Negative    Polychromasia 1+ (!) Negative       PMH:   Past Medical History:   Diagnosis Date     Alcohol dependence (H)     history     Anxiety      Cancer of lung (H) 9/24/2013    Overview:  Adenocarcinoma Stage 1A per preoperative needle biopsy   Adenocarcinoma Stage 1A per preoperative needle biopsy  Wedge r/s 9/2013     Chronic kidney disease      Chronic pain     on Methadone     Chronic suprapubic catheter (H)      Constipation      COPD (chronic obstructive pulmonary disease) (H)      Decubitus ulcer     had wound vac     Depression      Diastolic CHF, acute on chronic (H)      DVT (deep venous thrombosis) (H) 5/26/2015     ESBL (extended spectrum beta-lactamase) producing bacteria infection 08/2015    urine     Gastroparesis      GERD (gastroesophageal reflux disease)      HCAP (healthcare-associated pneumonia)      Herpes Simplex Type I      Hyperlipemia      Hypertension      Hypothyroidism 5/26/2015     Intracranial subdural hematoma (H)      Kidney stone      Lower paraplegia (H) 4/28/2016    Overview:  spinal epidural hematoma, emergent decomp Overview:  spinal epidural hematoma, emergent decomp Overview:  spinal epidural hematoma, emergent decomp     MRSA infection      Neurogenic bladder     chronic gonzalez     Neuropathy (H) 5/26/2015     Obesity      Opiate dependence, continuous (H)      Osteoporosis      Pneumonia      Pulmonary embolism (H) 12/2016    chronic, on coumadin      Rheumatoid arthritis (H)      Sepsis (H) 2017     SVT (supraventricular tachycardia) (H) 2014     Vascular myelopathies (H)            Current Medications:Scheduled Meds:    baclofen  10 mg Oral TID     cephalexin  1,000 mg Oral TID     eszopiclone  3 mg Oral QHS     gabapentin  900 mg Oral DAILY     iron sucrose  200 mg Intravenous Q24H     levETIRAcetam  250 mg Oral BID     levothyroxine  125 mcg Oral Daily 0600     methadone  2.5 mg Oral BID     miconazole   Topical BID     multivitamin with minerals  1 tablet Oral DAILY     neomycin-bacitracin-polymyxin   Topical TID     oxybutynin  5 mg Oral TID     polyethylene glycol  17 g Oral DAILY     QUEtiapine  25 mg Oral BID     QUEtiapine  50 mg Oral QHS     rivaroxaban  20 mg Oral Daily with supper     sodium chloride  3 mL Intravenous Line Care     Continuous Infusions:  PRN Meds:.acetaminophen, albuterol, bisacodyl, bisacodyl, cyclobenzaprine, HYDROmorphone, hydrOXYzine HCl, ibuprofen, ketorolac, LORazepam, magnesium hydroxide, melatonin, naloxone **OR** naloxone, OLANZapine, omeprazole, ondansetron **OR** ondansetron, polyethylene glycol, sodium chloride bacteriostatic, traZODone                Family History: PERSONALLY REVIEWED.  Family History   Problem Relation Age of Onset     COPD Mother          in her 50s     COPD Brother      Lung cancer Sister      Liver disease Father      COPD Father          in his 50s     Pertinent Family hx not pertinent to Chief Complaint or reason for visit.     Social History:  PERSONALLY REVIEWED.  Social History     Socioeconomic History     Marital status:      Spouse name: Not on file     Number of children: Not on file     Years of education: Not on file     Highest education level: Not on file   Social Needs     Financial resource strain: Not on file     Food insecurity - worry: Not on file     Food insecurity - inability: Not on file     Transportation needs - medical: Not on file      Transportation needs - non-medical: Not on file   Occupational History     Not on file   Tobacco Use     Smoking status: Current Every Day Smoker     Types: Cigarettes     Smokeless tobacco: Never Used   Substance and Sexual Activity     Alcohol use: Yes     Comment: currently sober, history of ETOH abuse     Drug use: No     Sexual activity: Not on file   Other Topics Concern     Not on file   Social History Narrative    The patient lives at home and has 24 hour care.   She has 2 sons and some grandchildren.    She won a malpractice lawsuit against the doctor who did not diagnose her spinal hematoma correctly.    Patient arrived in the ED alone 10/01/17    not pertinent to Chief Complaint or reason for visit.             Allergies as of 06/01/2014 Reviewed     Review of Systems:As per HPI. Remainders of 12 point review of systems negative.    Review of Pertinent Laboratory:      PERSONALLY REVIEWED.    Physical Exam: Temp:  [97  F (36.1  C)-98.7  F (37.1  C)] 97  F (36.1  C)  Heart Rate:  [] 90  Resp:  [16-20] 18  BP: (79-98)/(47-54) 98/48   Vitals: reviewed in chart     Physical exam as per medical team: reviewed in chart      diagnoses, risk and benefits of medications discussed with staff. Care coordination with care management team.   Thank you for this consultation.       Lory Michael; NP  [facility]        This note was created with the help of Dragon dictation system. Grammatical and typing errors are not intentional.

## 2021-06-23 NOTE — SIGNIFICANT EVENT
"Care Plan Progress Note:    Name: Marychuy Zhou  :   1955  MRN:   [id number]    Behaviors / Verbal Abuse    x2 staff present during interaction with patient (writer and Marietta BENEDICT)    At 0900, patient put on her call light requesting \"to get in her electric wheelchair\".  Patient immediately became demanding in her room saying \"give me the large heart shape mepilex\" and \"two gauze\".  Writer started removing the covers and pillows at the bottom of the bed - and patient got hostile towards writer stating \"don't you fucking touch that pillow, put it back now.\"  Writer placed pillow back at the bottom of her feet and patient stated \"you are fucking doing it wrong!  No!  Bend my foot upwards.  Don't bend my knee.  Don't touch my fucking knee\".  Patient very particular and kept stating \"you are fucking doing it wrong\".  Writer explained that the pillows needs to be removed so we can turn patient on her side to place the farrah lift sling - so staff can get her up in the chair at her request.  Patient continued to fixate on her pillows at the bottom of her feet and kept stating \"I will tell you exactly what to do, You are going to make me have a convulsion\".  Writer calmly explained to the patient that swearing and verbal abuse is not tolerated - and stated to patient \"that when she is ready to cooperate and not swear and make demands, that writer will come back in and assist with getting her up to the wheelchair\".  Patient proceeded to call writer \"a bitch nurse\".  Writer exited room.    2019 9:30AM    Penelope Ortiz RN      "

## 2021-06-23 NOTE — PROGRESS NOTES
"Spiritual Care Note    Spiritual Assessment: Patient was referred to me. I introduced myself and Spiritual Health to the patient.  Her food tray arrived at about the same time and she offered to share her meal (this writer politely declined). Patient said she was having a hard day because staff were being \"so rude to me.\"     She talked about her family, how she sued when she was paralyzed after a procedure, and how she shared the money with her two sons, buying them each a house. This writer offered prayer, which she declined to do at this time, saying I could say prayers on her behalf.     Care Provided: Provided listening presence, Provided empathetic and pastoral presence    Plan of Care:  available for spiritual care or emotional support as needed.      Deidre Swann,     "

## 2021-06-23 NOTE — PROGRESS NOTES
"Psychiatric Progress Note  Single episode, severe depression with anxiety features and psychotic features.  Paranoia.  Difficulty controlling anger.  Cognitive dysfunction in the light of untreated mental illness.  At risk for encephalopathy due to frequent hospitalizations.  PLAN/RECOMMENDATIONS:  Olanzapine 2.5 mg 3 times daily as needed.  Seroquel 50 mg at bedtime.  Seroquel 25 mg twice daily  Gabapentin 900 mg daily.  Minimize benzodiazepine.  Minimize narcotics.  SUBJECTIVE:  Patient refused wound care due to \"on phone\" \"you get out of my room to because I am going to be on this phone\", remains angry, anxious, dismissive, accusatory and refusing cares.  She was asked if she had a conversation with [facility] screener's, her response was \"none of your business\".  Wound care staff attempted to change dressing twice, patient refused on both attempts and kicked them out.  She continues to accuse nurses of stealing her medications.  She threatens litigation, and gives direction to wound care specialist to how to do wound care, refuses cares and noncompliant.  Angry, irritated, cantankerous, verbally abusive, using derogatory terms toward staff.    MENTAL STATUS EXAMINATION: Same as yesterday.  Patient is not in acute distress.  She is on phone refusing to have wound care done, refusing to converse with anyone, using profanity when kicking people out of her room.  Behavior is cantankerous, antisocial.  Paranoid.  Accusatory towards staff of stealing her narcotics.  Thought process disorganized.  Thought content paranoid.  Thought formation does not show loosening of associations.  No flight of ideas.  Judgment, insight, memory, comprehension, fund of knowledge are fair.  Language is intact.  No tonal abnormalities.  Patient is paraplegic.  Luis Miguel distractibility.  Difficulty retaining new information.  Perseveration on narcotics.  Perseveration on benzodiazepine.  Vital signs in last 24 hours  Temp:  " [97.5  F (36.4  C)-99.3  F (37.4  C)] 97.9  F (36.6  C)  Heart Rate:  [] 108  Resp:  [20] 20  BP: (113-118)/(65-73) 113/73  Weight:   162 lb (73.5 kg)

## 2021-06-23 NOTE — PROGRESS NOTES
Md came to the room and again pt restated that she would like to go outside as there is no reason for her not to go outside.Every effort to redirect her was going no where.Nursing supervisor was also notified about the ongoing situation.Will cont to monitor.

## 2021-06-23 NOTE — PLAN OF CARE
Writer in pt room providing pt care. During cares writer was  Having a conversation with pt. Pt asked writer if it snow out. Informed pt that no it had not snowed out tonight that the snow would be coming on Tuesday. Pt stated that she would be staying a few extra days, all she had to do was ( placed her finger on her lips and moved them in a up and down motion ). Writer exited the room

## 2021-06-23 NOTE — PROGRESS NOTES
"Clinical Nutrition Therapy Follow Up Note      Reason for Assessment:   Marychuy Zhou is a 63 y.o. female assessed by the registered Dietitian for follow up  Admitting dx: UTI.  PMH: Paraplegia @ T4, chronic decubitus ulcers, chronic pain syndrome, h/o ETOH abuse, mild COPD, h/o lung Ca (wedge resection 2013), hypothyroidism, GERD, Fe deficiency anemia, h/o PE, MDD, anxiety, cognitive d/o, esophageal dysmotility, h/o FT for TF, h/o non-compliance with medical Tx. Ongoing smoking.    Noted 1/14 debridement of wounds on BLE.     S: Pt sleeping at present.  RN stated that she refused to order any meals today and instead is eating candy that she got from the gift shop.      Nutrition History: (1/10)  Information from patient and chart.    Social/living situation: was staying @ friends' home with home care involvement. (\"friend\" was PCA that allowed her to live with her if she paid rent).   Diet prior to admission: regular  Recent food/fluid intake: unknown--pt unreliable historian  Recent Supplements: none noted.  Pt has tried and dislikes: gelatein 20, Enlive, Deven.  She doesn't want Boost + nor Magic Cup.   Cultural/Islam food needs or preferences: none   Food allergies or intolerances: NKFA      Current Nutrition Prescription:   Diet: regular  Diet Supplements:  Boost Plus strawberry w/ dinner meal    Current Nutrition Intake:  Aside from not orderint today, pt has been ordering 2750 to 3150 calories and 70 to 95 gm protein each day.  She often orders additional food requiring nutrition staff to send her 6+ trays a day.  Her caloric intake is mostly high sugar items like lemon bars, chocolate milk, pudding.     I have talked with the patient on multiple past occasions on balance of intake to promote healing and focus on protein sources. Pt often meets her protein needs due to the large volume of food she orders each day.    Anthropometrics:   Height: 5' 2\" (157.5 cm)  Admission weight: 160# stated  Weight: " 162 lb (73.5 kg)pt continues to refuse removal of linens for accurate weight x multiple days  BMI (Calculated): 29.6  BMI indication: 25-29.9 overweight  Ideal body weight ~100# (IBW-10-15# for paraplegia)  Weight History:  Wt Readings from Last 10 Encounters:   02/02/19 162 lb (73.5 kg)   12/08/18 162 lb (73.5 kg)   12/06/18 192 lb 9.6 oz (87.4 kg)   11/19/18 171 lb 1 oz (77.6 kg)   10/16/18 158 lb (71.7 kg)   08/11/18 165 lb (74.8 kg)   07/22/18 164 lb 4.8 oz (74.5 kg)   07/10/18 175 lb (79.4 kg)   07/05/18 174 lb 8 oz (79.2 kg)   06/26/18 158 lb (71.7 kg)   Pt has very few actual weights and usually gives a stated wt with admission and refuses to cooperate to allow bed to be zeroed.  Wt listed for 12/6/18 was 11# higher than prior weights that mentioned that pt refused to allow excess linens to be removed from bed--suspect wt not accurate on 12/6/18 as well.  All prior weights are questionable for this reason and also due to weights with specialty mattresses/beds.    +2 BLE edema 2/6 per RN     RD Nutrition Focused Physical Exam:  The patient has the following physical signs which could indicate malnutrition: No noted fat/muscle loss noted in face arms.      GI Status/Output:   GI symptoms include: Has been constipated  Bowel Sounds present per nurse  Last BM: 2/4 per nurse   Pt not allowing nurses to change her linens, clean her up adequately after she has stool.     Skin/Wound:  Isaiah score Isaiah Scale Score: 9    Pressure ulcer/Decubitus ulcer was noted. Info from 2/6 WOC note and prior 1/24 note). Pt didn't allow WOC RN to position her so she could access wounds on R side (butt, IT, shin, foot).  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  R buttock:  Stage 3:   R Lateral buttock: Stage 2:     Medications:  Medications reviewed.      Labs:  Labs  reviewed:   Hgb 8.5 down from 9.7 on 2/4  Fe 22 2/3    Estimated Nutrition Needs:  Assessment weight is 45.5 kg, ideal weight    Energy Needs: 0496-4538 kcals daily, 30-35 kcal/kg  Protein Needs: 68-91 g daily, 1.5-2.0 g/kg.  Fluid Needs: ~4249-6955 mls daily, ~35-40 mls/kg  Very difficult to  needs with no reliable weight with lower muscle mass w/ paraplegia but high needs for healing of multiple wounds.     Malnutrition: Not noted with no reliable hx re: weight or oral intake.     Nutrition Risk Level: moderate risk    Nutrition dx:   Increased nutrient needs r/t wounds as evidenced by standard needs for multiple wounds noted in WOC RN note.     Goal:   Meet estimated nutrition needs and Wound healing. Progressing.    Intervention:   Continue current nutrition Rx.    Monitoring/Evaluation:   Follow intake, weights (if pt allows), condition of wounds, if able.     Electronically signed by:  Cecile Black RD

## 2021-06-23 NOTE — SIGNIFICANT EVENT
"Care Plan Progress Note:    Name: Marychuy Zhou  :   1955  MRN:   [id number]    Behaviors  10:00AM - 10:45AM  x2 staff present in room (writer and Marietta BENEDICT)    Patient put on her call light again at 10:00AM requesting to get in her electric wheelchair (this is the 2nd attempt of this, since prior attempt - writer had to leave due to patient being verbally aggressing and swearing).  Patient stated \"she was ready to be cooperative.  Immediately, patient starting directing her own cares and was demanding how things should be done.  Patient wanted no assistance turning on her side saying \"I can turn myself while you wipe me\".  Patient layed out her own mepilex, gauze, and wet wipes.  Patient proceeded to tell writer how to wipe her and exactly what to do.  Patient screamed \"don't you turn me or touch my feet because I will start seizing and have shortness of breath\".  Patient refused for writer to lay bed flat for repositioning.  Patient refused writer to clean frontal perineal area and would only allow writer to cleanse sacrum.  Noted patient to be completely soiled in urine and blood (sheets and bed soiled).  Sacral wounds were dark red in color and kept draining blood.  Wiped sacrum with katia wipes and applied a large heart mepilex over area.  Dry brief placed and placed a sling underneath patient.  Cecile lifted patient up to her wheelchair.  Patient continued to be particular and demanding during the whole transfer calling the writer \"a complete bitch\" and \"an incompetent nurse\".  Noted that patient's wheelchair doesn't have a seatbelt.  Asked patient about this and patient states \"I've never had a seat belt, I don't need one\".  Call light and phone in reach at patient request.  1:1 sitter remains sitting outside the door for close observation.    2019 11:00 AM    Penelope Ortiz RN      "

## 2021-06-23 NOTE — PROGRESS NOTES
Psychiatric Progress Note  Single episode, severe depression with anxiety features and psychotic features.  Paranoia.  Difficulty controlling anger.  Cognitive dysfunction in the light of untreated mental illness.  At risk for encephalopathy due to frequent hospitalizations.  PLAN/RECOMMENDATIONS:  Olanzapine 2.5 mg 3 times daily as needed.  Seroquel 50 mg at bedtime.  Seroquel 25 mg twice daily  Gabapentin 900 mg daily.  SUBJECTIVE:  Patient remains angry, anxious, dismissive, accusatory and refusing cares.  She accusing nurses of stealing her medications.  She threatens litigation, and gives direction to wound care specialist to how to do wound care, refuses cares and noncompliant.  Angry, irritated, cantankerous, verbally abusive, using derogatory terms toward staff.    MENTAL STATUS EXAMINATION:   Patient is not in acute distress.  Behavior is cantankerous, antisocial.  Paranoid.  Accusatory towards staff of stealing her narcotics.  Thought process disorganized.  Thought content paranoid.  Thought formation does not show loosening of associations.  No flight of ideas.  Judgment, insight, memory, comprehension, fund of knowledge are fair.  Language is intact.  No tonal abnormalities.  Patient is paraplegic.  Luis Miguel distractibility.  Difficulty retaining new information.  Perseveration on narcotics.  Patient agreed to take olanzapine for anxiety management today.  Vital signs in last 24 hours  Temp:  [97.5  F (36.4  C)-99.3  F (37.4  C)] 97.9  F (36.6  C)  Heart Rate:  [] 108  Resp:  [20] 20  BP: (113-118)/(65-73) 113/73  Weight:   162 lb (73.5 kg)

## 2021-06-23 NOTE — PROGRESS NOTES
Pt asking staff to search bed for missing candy that she states was in one of her bags at the bedside. Did let NA reposition legs at this time, candy not found in bed or anywhere in room.

## 2021-06-23 NOTE — PLAN OF CARE
Problem: Potential for transmission of organism by Contact, Enteric, Droplet and/or Airborne routes  Goal: Prevent transmission of organisms  Outcome: Progressing  Contact precautions cont

## 2021-06-23 NOTE — ED TRIAGE NOTES
Patient arrived via EMS, reports that patient bypassed staff at [facility]CU and wheeled herself straight to the outside and waited for EMS. Patient stating that she has a catheter issue, refused to let RN see it. Also states that she has heel and tailbone pain. States that her pain is a 12/10, no visible grimacing or reaction to being lifted from cart to ER bed. RN to call TCU facility for clearer report on patient. Will continue to monitor.

## 2021-06-23 NOTE — PROGRESS NOTES
"0015: Writer answered patient's call light.  Patient told writer to lean down and check under the bed for patient's pen, which wasn't there.  Writer checked and patient said, \"now, check under the chair.  It has wheels.\"  Writer moved patient's chair and pen was not there.  When writer told patient, patient said to writer, \"Do you have trouble hearing? I asked you to move that chair and look under it.\"  Writer told patient that writer just did that; writer moved chair again and told patient again that her pen was not under the chair.  Patient then told writer, \"Get a key and open the closet. I want to see what's in it.\"  Writer told patient that writer doesn't have a key, and that writer is here to care for patient, and if she needed anything pertaining to her care, writer would provide it.       0300: Patient requested new blankets as she'd spilled juice in bed.  Writer brought new blankets and patient gave writer directions for how patient wanted pillows and blankets situated.  Patient allowed writer to pull linens out just far enough \"so it's not wet underneath,\" but would not let writer provide additional cares or replace linens.  Patient used her pen to gesture and point to what she wanted, but when writer started, patient said, \"No, not like that.  Weren't you listening? What part of this don't you understand?\"  Patient told writer to \"hold the corner of the pillow, now move all the pillows back.\" Writer attempted to follow direction but patient again said, \"No, what are you doing? Now you have to start over.\"  Patient did not raise her voice, but continuously said to writer in an agitated tone, \"No, you're doing it wrong. Do it again.\" Writer took approximately 30 minutes positioning blankets and pillows, as well as positioning side table as patient wanted it.      0400: Writer was still in patient's room and another staff member came in to assist writer.  Patient again spilled juice on herself and was given " "new blankets.  As nursing assistant attempted to replace side table, patient yelled, \"Not like that. What do you think you're doing? What kind of idiot are you?\" Writer told patient that speaking to staff that way is rude and disrespectful.  Patient replied, \"Well, of course you're taking her side. You all always stick together. And my  knows a lot of people.\"  Writer responded that staff are not trying to do things incorrectly, but it's understandable that she gets frustrated.  Patient said, \"Of course I'm frustrated, everybody's disrespecting me.\"  Writer told patient that writer and staff don't always understand patient's directions, and if staff aren't doing something as she requests, to try and explain it differently.  Patient raised her voice, saying \"How can I make this any clearer?\" Writer told patient that speaking to staff in an angry tone and calling them names isn't going to get staff to understand what she's asking for.  Staff were eventually able to get patient situated per her request.  Patient then asked writer about her medications, \"that I have to ask for.\"  Writer went through patient's PRNs and told her what times they were available.  Writer then told patient that writer would bring patient's PRNs at 0700 as that is when PRN Dilaudid is due.  Patient said, \"okay, sounds like a plan.\"    0600: Patient put call light on requesting medications.  Writer went into patient's room and patient said, \"I asked for my medications at 5. You said I had medications at 5 and 6.\" Writer told patient that writer was planning to bring in PRNs at 0700 as previously discussed.  Patient said, \"No, I have 5 and 6 written down here, which means I should get my meds at 5 and 6.\"  Writer told patient that two PRNs were able to be given and gave them per her request.      Patient refused assessment, most cares throughout NOC.    "

## 2021-06-23 NOTE — TELEPHONE ENCOUNTER
"Who is calling:  Patient  Reason for Call:  Patient calling stating she needs her oxycodone refilled.  Informed her of below message that Sánchez Zamora MD will fill medications when she is discharged from the hospital.  Patient stated they never tell her when she is going to be discharged and we are not open on the weekends and if she gets discharged and doesn't have her medications she will be \"screwed.\"  Patient stated she has to have her pharmacy deliver and they don't deliver on the weekends.   Explained then even if Sánchez Zamora MD send in her RX she wouldn't get it because she is not home currently and she cant get a delivery on the weekends.  Patient stated \"well Dr. Zamora only gives me 4 days at a time and you guys needs 72 hours to fill medications, so talk to him about it then.\"    Please contact patient to advise on medication.    Sánchez Zamora MD already stated in below encounter that he will not fill medication until patient is discharged from the hospital and no discharge is planned currently.    Patient stated her ulcers were debrided and she will be \"SOL without her pain medication.\"     Patient is difficult to reason with and then gets rude on the phone.  Writer stated she would send the message to Sánchez Zamora MD.    Date of last appointment with primary care: 12/14/2018  Has the patient been recently seen:  No  Okay to leave a detailed message: No      "

## 2021-06-23 NOTE — PROGRESS NOTES
Wound Ostomy  WOC Assessment         Allergies:  No Known Allergies    Diagnosis:   Patient Active Problem List    Diagnosis Date Noted     Unable to control anger      Severe major depression, single episode, with psychotic features (H)      Paranoia (H)      Upper back pain      Abnormal CT scan of lung      Panic anxiety syndrome      Pelvic pain 01/09/2019     Ulcer due to Treponema vincentii, with fat layer exposed (H) 01/07/2019     Atelectasis      Tension-type headache, not intractable, unspecified chronicity pattern      Weakness of left hand 12/04/2018     Focal motor seizure (H) 12/04/2018     Pelvic pain in female 11/21/2018     Chronic osteomyelitis (H)      Drug-induced constipation      Quadriplegia (H)      Goals of care, counseling/discussion 07/16/2018     Advanced care planning/counseling discussion 07/16/2018     Malingerer for IV Dilaudid 07/16/2018     Moderate protein-calorie malnutrition (H) 07/16/2018     Infection 07/14/2018     Noncompliance with medication regimen      Stage IV pressure ulcer of sacral region (H) 07/11/2018     Gangrene (H) 07/10/2018     Centrilobular emphysema (H) 07/02/2018     Bilateral lower extremity edema 07/02/2018     Hypoxemia 07/01/2018     Left lower lobe pneumonia (H) 06/27/2018     Acute bronchitis due to other specified organisms 06/27/2018     Nephrostomy complication (H) 03/06/2018     History of encephalopathy      Closed left subtrochanteric femur fracture (H) 02/27/2018     Acute blood loss anemia      Other specified hypotension      Anemia 02/26/2018     Adjustment disorder with mixed anxiety and depressed mood      Sleep difficulties      Irritability and anger      Fever in other diseases 01/31/2018     Severe sepsis (H) 01/31/2018     Ulcer 01/31/2018     Hypothyroidism due to Hashimoto's thyroiditis      Abscess, gluteal, right      History of DVT (deep vein thrombosis)      Urinary incontinence due to urethral sphincter incompetence       Weakness 09/07/2017     Chronic anticoagulation 09/03/2017     Urinary tract infection associated with cystostomy catheter (H) 09/02/2017     Dislodged wound Vacuum 08/14/2017     Abdominal pain, unspecified location      Coagulopathy (H)      Anxiety      UTI (urinary tract infection) 08/10/2017     Elevated lactic acid level 07/29/2017     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     Suprapubic catheter dysfunction, subsequent encounter      Bilateral hydronephrosis      Cystitis      Colon wall thickening      Abdominal pain 06/03/2017     Flank pain 06/02/2017     Right upper quadrant abdominal pain 06/02/2017     Episodic cluster headache, not intractable      Sepsis (H) 05/28/2017     Sepsis secondary to UTI (H) 05/28/2017     Pyelonephritis      Hyponatremia      Chronic obstructive pulmonary disease with acute exacerbation (H) 04/18/2017     Depression 04/18/2017     Partial symptomatic epilepsy with simple partial seizures, intractable, with status epilepticus (H)      Acute on chronic intracranial subdural hematoma (H)      Brain edema (H)      Subdural hematoma (H) 04/09/2017     Convulsions, unspecified convulsion type (H)      Neck infection 03/31/2017     Lower abdominal pain 03/01/2017     Fever, unspecified fever cause      Pneumonia of left lower lobe due to infectious organism (H)      S/P cholecystectomy      Choledocholithiasis with obstruction 01/02/2017     Cholelithiasis 01/02/2017     Hx of pulmonary embolus 01/02/2017     Claustrophobia      Urinary tract infection, site unspecified      Hypotension, unspecified hypotension type      Chronic low back pain without sciatica, unspecified back pain laterality      Acute encephalopathy      Sepsis, due to unspecified organism (H) 12/30/2016     History of pulmonary embolus (PE) 12/01/2016     Cognitive disorder      Insomnia due to anxiety and fear      HCAP (healthcare-associated pneumonia) 11/04/2016     Atypical chest pain 10/23/2016     Diastolic  "CHF, acute on chronic (H)      Chest tightness or pressure      History of MDR Enterobacter cloacae infection      Anxiety disorder      Esophageal dysmotility      Major depressive disorder, recurrent episode, moderate (H)      Seizure disorder (H)      Heel ulcer, right, limited to breakdown of skin (H)      Decubitus ulcer of sacral region, stage 4 (H)      Paraplegia at T4 level (H) 04/28/2016     Hypokalemia      Chronic pain syndrome 01/12/2016     Major depression 01/12/2016     Alcohol abuse 01/12/2016     COPD exacerbation (H) 08/03/2015     Gastroesophageal reflux disease without esophagitis 05/26/2015     Acquired hypothyroidism 05/26/2015     Iron deficiency anemia 05/26/2015     Opioid-induced constipation (OIC) 05/26/2015     Paraplegia (H) 05/26/2015     Palliative care encounter 12/08/2014     Nephrostomy tube displaced (H) 12/02/2014     Mechanical complication of suprapubic catheter (H) 11/20/2014     Acute respiratory failure with hypoxia (H) 08/19/2014     COPD (chronic obstructive pulmonary disease) (H) 08/19/2014     Lactic acidosis 08/19/2014     SVT (supraventricular tachycardia) (H) 08/19/2014     Dislodged Bhandari catheter, subsequent encounter 07/30/2014     Other chronic pain      Lumbosacral Disc Degeneration      Herpes Simplex Type I      Nicotine Dependence      Essential hypertension      Cancer of lung (H) 09/24/2013     Neurogenic bladder 01/29/2013     Neurogenic bowel 01/29/2013     Decubitus ulcer 01/25/2013     Mixed hyperlipidemia 01/25/2013     Decubitus ulcer of right buttock, stage 4 (H) 01/25/2013     Decubitus ulcer, stage III (H) 01/25/2013       Height:  5' 2\" (1.575 m)    Weight:   162 lb (73.5 kg)    Labs:  Recent Labs     02/06/19  0610   HGB 8.5*       Isaiah:  Isaiah Scale Score: 7    Specialty Bed:  Specialty Bed: [facility] High-Low/ Standard Mattress  (STACH)    Attempted to see patient twice. Patient on phone both attempts, at second visit patient forcefully " "stated \"I'm on the phone right now. I've been waiting a long time to make a call and I'm going to be on the phone for a good long while. So, no.\"     Discussed with RN and with Behavioral Health CNP    Actions taken by [facility] RN: none at this time    Planned Follow Up: will re-approach patient, but patient does frequently refuse cares and assessments.        "

## 2021-06-23 NOTE — SIGNIFICANT EVENT
"Care Plan Progress Note:    Name: Marychuy Zhou  :   1955  MRN:   [id number]    Behaviors  Patient put on her call light demanding for writer \"to call the doctor\".  Writer asked what she wanted from the doctor.  Patient stated \"it's none of your [employer] business\".  Patient then reported \"I want to go down to the gift shop, call him NOW!\"  Writer called and spoke with the  - and patient is NOTallowed to leave her room and continues on a court hold.  Writer went to patient's room and informed her that she can't leave her room to go to the gift shop.  Patient proceeded to call the [employer] shop.  Writer spoke with a representative down in the [employer] shop and they said they can bring up items for the patient to buy.  2 minutes later, patient put on her call light demanding \"to call the doctor again\".  Patient states \"she wants to know what is going on with her left arm numbness\".  Writer informed the patient that the doctor is aware of this and that they will be back tomorrow to round on her.  Patient then asked for \"two orange ice cups\".  Two orange ice cups given.    2019 3:55 PM    Penelope Ortiz RN      "

## 2021-06-23 NOTE — PLAN OF CARE
Pt with soiled bedding, what appears to be blood and feces throughout bedding. Pt refuses to let RN do physical assessment or remove covers to assess where blood is coming from. MD made aware through text page. Sticky note also left for MD. Will continue to reapproach and attempt assessment throughout day.
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
## 2021-06-23 NOTE — PROGRESS NOTES
"Pt declined assessment of wounds by RN this shift. Allowed NA to reposition legs and put new pads under buttock, allowed slight repositioning of buttocks but very demanding and irritable with staffs attempts to do cares. Upset that PRN pain medications was late this shift when administered at 1223 stating it was \"3 hours late\", although pt did not ask for pain medication until this time and was previously noted to be sleeping. When attempting to explain to patient that this particular medication is AS NEEDED and staff would not know or be required to automatically bring into her when she is sleeping, pt became upset and kept insisting it was late. Will continue to monitor.       "

## 2021-06-23 NOTE — PROGRESS NOTES
"Care Plan Progress Note:    Name: Marychuy Zhou  :   1955  MRN:   [id number]    Patient put on her call light requesting to speak with writer.  Patient states \"the doctor just left and he said I can have Ativan\".  Informed patient that there are no orders yet in the computer for Ativan - and explained to the patient that I can give that medication once the provider places the order.  Patient proceeded to writer what time her court appointment was tomorrow.  Writer informed patient that it is at 10:30AM and that they will provide transportation.  Patient stated \"no, she sets up the ride through [employer]\".  Writer explained to the patient again that because it is a court hearing, that the court will be the one setting up the transportation.  Patient then stated \"oh good, then they can pay for it\".  Patient proceeded to ask writer for any referrals for a .  Writer explained that she would need to be the one to contact a .  Patient stated \"her  is in trial\" and she stated \"they will probably appoint me a  through senior linkage line\".  Patient then stated \"I bet that I can get a  to sign off on all this, you just wait\".  Patient is still sitting up in her electric wheelchair - taking notes on her yellow note pad and making phone calls.  Call light in reach.  Writer exited room.  Will continue to monitor.    2019 12:05 PM    Penelope Ortiz RN      "

## 2021-06-23 NOTE — PROGRESS NOTES
Pt was drowsy most of the shift difficulty waking pt up. BP was soft 87/54 . Towards shift end pt woke up c/o CP upon assessment pt could not describe the type of CP VSS stable MD notified. Gave pt her PRN pain meds. Pt was agitated with writer states writer is taking too long to give her the meds. While writer was scanning pt med , writer noted pt calling someone on the phone telling the person writer does not want to give her pain med and is  also taking too long to give the meds. coccyx dressing was changed. Continue to monitor

## 2021-06-23 NOTE — PLAN OF CARE
"2015: Patient called the  stating \" for the last four days she has been sitting in her chair feeling dizzy, tingling in her arms and her legs. This is the worst care she has received since she has been paralyzed\". Patient also stated \" That she has asked for help and she just keeps getting told that staff will tell her nurse and nothing happens.\" Writer told patient that staff will come in the room to assist her.    At 20:20 staff went into patients room to assist patient with her needs.  "

## 2021-06-23 NOTE — TELEPHONE ENCOUNTER
Writer relayed to pharmacy the patient is currently admitted and this will delay the response to refill by provider.    Controlled Substance Refill Request  Medication Name:   Requested Prescriptions     Pending Prescriptions Disp Refills     methadone (DOLOPHINE) 10 MG tablet [Pharmacy Med Name: METHADONE HCL 10 MG TABLET 10 TAB] 21 tablet 0     Sig: TAKE 1 TABLET (10 MG TOTAL) BY MOUTH 3 (THREE) TIMES A DAY. EFFFECTIVE DATE: 12/31/18     Date Last Fill: 12/31/18  Pharmacy: [facility]       Submit electronically to pharmacy  Controlled Substance Agreement Date Scanned:   Encounter-Level CSA Scan Date:    There are no encounter-level csa scan date.       Last office visit with prescriber/PCP: 7/24/2015 Margaret Hugo MD OR same dept: Visit date not found OR same specialty: Visit date not found  Last physical: Visit date not found Last MTM visit: Visit date not found

## 2021-06-23 NOTE — PROGRESS NOTES
Called to patient's room for patient feeling short of breath. Patient seems to have upper airway wheeze. Slightly coarse breath sounds. Patient states that she coughs up sputum and swallows it back down. Patient seems to be very very anxious and once neb was given, she calmed down. Per patient request, she was switched to an oxymask with 2L running. Saturations at 99%. Patient seems to be much calmer.     Andrew Key, BENJYT

## 2021-06-23 NOTE — PLAN OF CARE
"Pain      Patient's pain/discomfort is manageable Progressing     patient was medicated with prn dilaudid with relief, also had   prn ativan    Potential for Compromised Skin Integrity      Skin integrity is maintained or improved Progressing     patient has multiple open area at the coccyx right shin, trochanters, heels, right shin , legs are swollen and weepy. dressings were chnaged    Psychosocial Needs      Demonstrates ability to cope with hospitalization/illness Progressing     patient will cope and adjust   Well.    Safety      Patient will be injury free during hospitalization Progressing     will be free from complication.     patient was admitted d/t sepsis she has been verbally abusive to the staff the room is unkept, refused care and  Staff found it difficult to provide care. she is on 72 hrs hold and  has s/p  cath,  multiples wd  in different stages. Her room was switched d/t  New room has ceiling lift. She is a para and total assist with cares. She has been sleeping after she received prn dilaudid with ativan. She is on 1:1 safety assistance. She had a suppository and had X - L Bowel movement. She was moved to the room with 4 staff and  slept throughout the movements. Staff made several attempt to wake her up. She being spoke to as each  care was given. All her wounds were covered and changed.  She has abdominal fold that was red, biateral lower extremities edema. Feet are elevated./65 (Patient Position: Lying)   Pulse 92   Temp 97.5  F (36.4  C) (Oral)   Resp 20   Ht 5' 2\" (1.575 m)   Wt 162 lb (73.5 kg)   LMP  (LMP Unknown)   SpO2 96%   BMI 29.63 kg/m      "

## 2021-06-23 NOTE — PLAN OF CARE
"Patient called and another aide LG responded to call with . LG went to get ice for patient, Patient stated to , \"why do you have to come in with that bitch?'  redirected her and stated that LG was a nice aide and then redierected her on using such harsReporter redirected patient on just being kind to everyone and using a warmer tone. Patient then stated she supposed she can use her nice face and laughed.  "

## 2021-06-24 NOTE — PROGRESS NOTES
Patient received from 4700 at 2100. Pt demanding, but appropriate for the situation. Pt arrived in bed and requested to stay in bed, stating she was exhausted. Dressing change performed on sacral ulcer. Pt resting comfortably, will continue to monitor.

## 2021-06-24 NOTE — PLAN OF CARE
Constipation will be resolved Progressing      Damaged tissue is healing and protected Progressing      Pt had large BM this am.  Coccyx wounds cleansed and dressed with Mepilex.  Pt refused meds this am, was verbally abusive and demanding to staff.  Pt left unit to smoke today.  Pt more cooperative at end of shift.  Generalized pain managed with PRN Dilaudid and scheduled meds, will continue to monitor.

## 2021-06-24 NOTE — PROGRESS NOTES
Received page from nursing staff at 3:46 PM stating blood pressure 83/54  Telemetry reviewed: Heart rate in 120s  Patient seen right away at bedside   She is sitting up in the bed in no acute distress alert oriented  No significant change in appearance compared to morning  Denies any distress, states her breathing is better  Denies any dizziness, chest pain    Patient refused to allow repeat examination,   She was however agreeable to letting me check a radial pulse after repeated counseling  Radial pulse palpable but feeble.    Called to explain the patient despite multiple attempts.  Nursing staff also try to  her but she did not seem open to advice    Patient explained that her sepsis could be getting worse as her blood pressure is low and heart rate is beating fast  Patient keeps saying she knows her body better and that her blood pressure has always been low    Allow me to finally check blood pressure after repeated counseling  ; Recheck blood pressure 70s over 50     Early this morning patient blood pressure was on lower side but improved with IV fluid hydration    Repeat IV fluid bolus ordered  Nursing staff requested to recheck after IV fluid bolus and repeat IV fluid bolus if still low    IV fluid rate increased to 150 mils per hour    Try to  patient about getting blood test to see, patient declined any blood tests, patient states she just does not like being pregnant multiple times  States she would prefer if she has a PICC line or a midline  Initially patient wanted midline but was okay with PICC line after infectious disease explained to her that vancomycin could not be given through midline  Consent taking    Infectious disease also was present during this event    Antibiotics as per infectious disease    Intensivist called and updated about patient, plan to see patient blood pressure improved with IV fluid hydration, if not improving will transfer to ICU.  Intensivist agreeable with the  plan    Pressure still on lower side despite IV fluid bolus    PICC line insertion in process    Will transfer patient to ICU: Intensivist updated    40 minutes of additional time spent on patient evaluation, collection nursing staff and intensivist

## 2021-06-24 NOTE — PROGRESS NOTES
[facility]   Daily Progress Note    Place of Service: [facility]  Reason for Follow up: urinary incontinence    Subjective  Patient tolerating gonzalez catheter. Little to no bypassing around catheters. She is much happier with urological situation with both SP tube and gonzalez catheter. Afebrile.     Objective  Intake/Output last 24 hrs:    Intake/Output Summary (Last 24 hours) at 2/18/2019 1302  Last data filed at 2/18/2019 0938  Gross per 24 hour   Intake 2085 ml   Output 800 ml   Net 1285 ml     Temp:  [96.6  F (35.9  C)-97.9  F (36.6  C)] 96.6  F (35.9  C)  Heart Rate:  [] 77  Resp:  [20] 20  BP: (110-145)/(58-79) 145/67    Physical Exam:    General: NAD, alert, cooperative  Abdomen: soft, obese, non-tender, non-distended. No suprapubic fullness or tenderness. SP tube in place.   Genitourinary: Gonzalez catheter in place. Urine clear with minimal red tinge in tubing. No blood clots noted.   Psychological: alert and oriented, answers questions appropriately    Pertinent Labs   Lab Results: personally reviewed.   Lab Results   Component Value Date     02/17/2019     02/16/2019     02/15/2019    K 4.3 02/17/2019    K 3.7 02/16/2019    K 3.8 02/15/2019    CO2 22 02/17/2019    CO2 26 02/16/2019    CO2 22 02/15/2019    BUN 15 02/17/2019    BUN 17 02/16/2019    BUN 10 02/15/2019    CREATININE 0.56 (L) 02/18/2019    CREATININE 0.66 02/17/2019    CREATININE 0.74 02/16/2019    CALCIUM 7.4 (L) 02/17/2019    CALCIUM 7.7 (L) 02/16/2019    CALCIUM 8.3 (L) 02/15/2019     Lab Results   Component Value Date    WBC 10.8 02/18/2019    WBC 19.6 (H) 02/17/2019    WBC 24.8 (H) 02/16/2019    WBC 6.0 09/19/2015    WBC 6.0 09/18/2015    WBC 9.3 09/10/2015    HGB 7.9 (L) 02/18/2019    HGB 7.6 (L) 02/17/2019    HGB 7.7 (L) 02/16/2019    HCT 27.7 (L) 02/18/2019    HCT 26.5 (L) 02/17/2019    HCT 26.6 (L) 02/16/2019    MCV 86 02/18/2019    MCV 87 02/17/2019    MCV 85 02/16/2019     02/18/2019     02/17/2019      02/16/2019     Assessment/Plan  Marychuy Zhou is being seen by [facility] for urinary incontinence  1. Maintain gonzalez catheter and SP tube.   2. Ditropan working for bladder spasms. No complaint of bladder spasms today during encounter.   3. Follow up as an outpatient to discuss further intervention. Injection of botox was discussed. Our office will call to schedule appointment.     Sánchez Camara  [facility]  Phone #[phone]

## 2021-06-24 NOTE — PLAN OF CARE
Compliance with treatment regimen Progressing      Patient's pain/discomfort is manageable Progressing      Patient will be injury free during hospitalization Progressing      Patient alert and oriented x4, refused temp, HR elevated, complain of pain in foot 9/10, PRN medications given. Patient up in chair. Plan is to change dressing when patient back in bed. Call light within reach. Able to make needs known. Will continue to monitor.

## 2021-06-24 NOTE — PLAN OF CARE
Constipation will be resolved Progressing      Pt's constipation was completely resolved. RN recommends holding any stool softener. Pt had two extra large bowel movements. Complete dressing change on sacrum pressure ulcers completed. One at beginning to shift after bowel movement. Second dressing change towards end of shift following another large bowel movement.      Daily care needs are met Progressing      Provide a safe environment for patient (Implement appropriate elements in plan of care) Progressing      Pt was surprisingly pleasant and was cooperative with most cares. Cares were all still provided and directed by pt per pt's demands. Pt requested two nebs during the night and was able to sleep in between cares. PRN pain meds were always requested upon availability.

## 2021-06-24 NOTE — PROGRESS NOTES
Wound Ostomy  WOC Assessment         Allergies:  No Known Allergies    Diagnosis:   Patient Active Problem List    Diagnosis Date Noted     Unable to control anger      Severe major depression, single episode, with psychotic features (H)      Paranoia (H)      Upper back pain      Abnormal CT scan of lung      Panic anxiety syndrome      Pelvic pain 01/09/2019     Ulcer due to Treponema vincentii, with fat layer exposed (H) 01/07/2019     Atelectasis      Tension-type headache, not intractable, unspecified chronicity pattern      Weakness of left hand 12/04/2018     Focal motor seizure (H) 12/04/2018     Pelvic pain in female 11/21/2018     Chronic osteomyelitis (H)      Drug-induced constipation      Quadriplegia (H)      Goals of care, counseling/discussion 07/16/2018     Advanced care planning/counseling discussion 07/16/2018     Malingerer for IV Dilaudid 07/16/2018     Moderate protein-calorie malnutrition (H) 07/16/2018     Infection 07/14/2018     Noncompliance with medication regimen      Stage IV pressure ulcer of sacral region (H) 07/11/2018     Gangrene (H) 07/10/2018     Centrilobular emphysema (H) 07/02/2018     Bilateral lower extremity edema 07/02/2018     Hypoxemia 07/01/2018     Left lower lobe pneumonia (H) 06/27/2018     Acute bronchitis due to other specified organisms 06/27/2018     Nephrostomy complication (H) 03/06/2018     History of encephalopathy      Closed left subtrochanteric femur fracture (H) 02/27/2018     Acute blood loss anemia      Other specified hypotension      Normocytic anemia 02/26/2018     Adjustment disorder with mixed anxiety and depressed mood      Sleep difficulties      Irritability and anger      Fever in other diseases 01/31/2018     Severe sepsis (H) 01/31/2018     Ulcer 01/31/2018     Hypothyroidism due to Hashimoto's thyroiditis      Abscess, gluteal, right      History of DVT (deep vein thrombosis)      Urinary incontinence due to urethral sphincter incompetence       Weakness 09/07/2017     Chronic anticoagulation 09/03/2017     Urinary tract infection associated with cystostomy catheter (H) 09/02/2017     Dislodged wound Vacuum 08/14/2017     Abdominal pain, unspecified location      Coagulopathy (H)      Anxiety      UTI (urinary tract infection) 08/10/2017     Elevated lactic acid level 07/29/2017     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     Suprapubic catheter dysfunction, subsequent encounter      Bilateral hydronephrosis      Cystitis      Colon wall thickening      Abdominal pain 06/03/2017     Flank pain 06/02/2017     Right upper quadrant abdominal pain 06/02/2017     Episodic cluster headache, not intractable      Sepsis (H) 05/28/2017     Sepsis secondary to UTI (H) 05/28/2017     Pyelonephritis      Hyponatremia      Chronic obstructive pulmonary disease with acute exacerbation (H) 04/18/2017     Depression 04/18/2017     Partial symptomatic epilepsy with simple partial seizures, intractable, with status epilepticus (H)      Acute on chronic intracranial subdural hematoma (H)      Brain edema (H)      Subdural hematoma (H) 04/09/2017     Convulsions, unspecified convulsion type (H)      Neck infection 03/31/2017     Lower abdominal pain 03/01/2017     Fever, unspecified fever cause      Pneumonia of left lower lobe due to infectious organism (H)      S/P cholecystectomy      Choledocholithiasis with obstruction 01/02/2017     Cholelithiasis 01/02/2017     Hx of pulmonary embolus 01/02/2017     Claustrophobia      Urinary tract infection, site unspecified      Hypotension, unspecified hypotension type      Chronic low back pain without sciatica, unspecified back pain laterality      Acute encephalopathy      Sepsis, due to unspecified organism (H) 12/30/2016     History of pulmonary embolus (PE) 12/01/2016     Cognitive disorder      Insomnia due to anxiety and fear      HCAP (healthcare-associated pneumonia) 11/04/2016     Atypical chest pain 10/23/2016     Diastolic  "CHF, acute on chronic (H)      Chest tightness or pressure      History of MDR Enterobacter cloacae infection      Anxiety disorder      Esophageal dysmotility      Major depressive disorder, recurrent episode, moderate (H)      Seizure disorder (H)      Heel ulcer, right, limited to breakdown of skin (H)      Decubitus ulcer of sacral region, stage 4 (H)      Paraplegia at T4 level (H) 04/28/2016     Hypokalemia      Chronic pain syndrome 01/12/2016     Major depression 01/12/2016     Alcohol abuse 01/12/2016     Hospital-acquired pneumonia 10/26/2015     COPD exacerbation (H) 08/03/2015     Gastroesophageal reflux disease without esophagitis 05/26/2015     Acquired hypothyroidism 05/26/2015     Iron deficiency anemia 05/26/2015     Opioid-induced constipation (OIC) 05/26/2015     Paraplegia (H) 05/26/2015     Palliative care encounter 12/08/2014     Nephrostomy tube displaced (H) 12/02/2014     Mechanical complication of suprapubic catheter (H) 11/20/2014     Acute respiratory failure with hypoxia (H) 08/19/2014     COPD (chronic obstructive pulmonary disease) (H) 08/19/2014     Lactic acidosis 08/19/2014     SVT (supraventricular tachycardia) (H) 08/19/2014     Dislodged Bhandari catheter, subsequent encounter 07/30/2014     Other chronic pain      Lumbosacral Disc Degeneration      Herpes Simplex Type I      Nicotine Dependence      Essential hypertension      Cancer of lung (H) 09/24/2013     Neurogenic bladder 01/29/2013     Neurogenic bowel 01/29/2013     Decubitus ulcer 01/25/2013     Mixed hyperlipidemia 01/25/2013     Decubitus ulcer of right buttock, stage 4 (H) 01/25/2013     Decubitus ulcer, stage III (H) 01/25/2013       Height:  5' 2\" (1.575 m)    Weight:   163 lb (73.9 kg)    Labs:  Recent Labs     02/17/19  0524  02/19/19  0805   HGB 7.6*   < > 8.6*   ALBUMIN 1.5*  --   --     < > = values in this interval not displayed.       Isaiah:  Isaiah Scale Score: 11    Specialty Bed:  Specialty Bed: [facility]" ([place])    Patient up in chair when WOC RN went to room this AM to see if patient would allow assessment of wounds. Per 1:1 CNA, she had changed dressing prior to patient getting up in chair and that areas which had previously healed are opened again. This is consistent with prior WOC RN findings.     Plan of care discussed with staff RN and it appears patient is constantly bypassing urine despite presence of both gonzalez cath and supra pubic cath being in place. Patient is also incontinent of stool and refuses pericares which can contribute to wound contamination/infection and other skin issues. Will plan to discuss option of colostomy and urostomy options with patient at next WOC visit. If patient is interested in these options, will discuss with MD regarding process for pursuing these options.

## 2021-06-24 NOTE — PLAN OF CARE
Decreased Mental Status Causing Increased Need for Safety      Provide a safe environment for patient (Implement appropriate elements in plan of care) Progressing        Pt is med complaint. Pt's v/s stable. Pt received pain meds prn for generalized body ache  through the day. Wound rn changed the wound today. Pt is very irritable with the staff . Pt is up on the chair this afternoon. Continue to monitor pt.

## 2021-06-24 NOTE — PROGRESS NOTES
[facility] has dismissed Marychuy's commitment case, no commitment. Informed unit that she no longer needs to be on a one to one, able to leave her room and go outside to smoke. This was confirmed with administration. Care management will continue to look for placement, needs skilled nursing.This information was passed on to charge nurse and ANS.

## 2021-06-24 NOTE — PROGRESS NOTES
Patient called around 0635 am this morning, requested for pain medications.  Patient agreed to have her vitals taken before medications were administered. Patient stated that she wanted to get up because she was having trouble breathing. Writer and NA helped the patient to get out of bed. Patient angry, agitated, and calling staff names the whole time. Patient was the one controlling the mechanical lift (she was the one manipulating the  holding the mechanical lift remote).  Patient was helped to the wheelchair and started saying that the writer and NA left her hanging on the lift. Patient refused to have blood drawn this morning saying that it will be done later today.

## 2021-06-24 NOTE — PLAN OF CARE
received a phone call from [facility] police dispatch that a patient named Marychuy Zhou had call 911.

## 2021-06-24 NOTE — PLAN OF CARE
Problem: Discharge Barriers  Goal: Patient's discharge needs are met  Outcome: Progressing  Care Plan  Care Management        Care Management Goals of the Day: Progression of care, discharge planning      Care Progression Reviewed With: Charge RN, MD, HUC, RNCM, CMSW     Barriers to Discharge: Severe constipation, iv antibiotics, placement    Discharge Disposition: LTC/Group Home     Expected Discharge Date: 2/25/2019, pending      Transportation: [facility]     Care Coordination Narrative:   Per previous [facility] note, [facility] has dismissed Commitment case. Care management continuing to look for placement in LTC/group home. [facility] previously discussed group home option if pt can get CADI waiver through [facility]. The following referrals have been made:     Waiting for Response    [facility]- Referral sent 2/23/19    [facility]: Referral sent    [facility]: Referral sent     [facility]: Referral Sent    [facility]: Referral sent    [facility] [place]: Referral Sent    [facility] [place]: Referrals sent    [facility] Referral sent    [facility] [place]: Referral sent    [facility](P: [phone]/F: [phone]) : Referral Sent (hand faxed)    Declined    [facility] [place]: Declined, no LTC beds    [facility]: Declined, no LTC beds    [place]: Declined, Does not meet criteria     [place]: Declined, non-smoking facility    [facility]: Declined, no LTC beds    [place]: DECLINED    [facility]: DECLINED    [place]: DECLINED    [facility]: DECLINED    [facility] [place]: DECLINED    [facility] [place]: DECLINED    [facility]: DECLINED    [facility]: DECLINED    [facility]: DECLINED    [facility]: DECLINED    [facility]: DECLINED    [facility]: DECLINED    [facility]: DE[facility]ED    [facility]: DECLINED    [facility]:  DECLINED    [place]: DECLINED    [facility]: DECLINED    [facility]: DECLINED    [facility]: DECLINED    [facility]: DECLINED    [facility]: DECLINED    [facility]: DECLINED    [facility]: [place]

## 2021-06-24 NOTE — PLAN OF CARE
"Non-compliance with treatment regimen      Compliance with treatment regimen Not Progressing        Pain      Patient's pain/discomfort is manageable Not Progressing        Pt continues to rate pain 10/10 when awake and states that her current pain regimen is ineffective. She is noncompliant with re-postioning and wound cares. She slept for about 4-5 hours off loading on her left side in bed. Then demanded to get into w/c because \"I can change into about 100 different positions in the chair\". She does not allow any teaching to be done, believes she knows whats best and will get very irritable if things are not done her way. Continues to yell and belittle staff. Incontinent of BM tonight, mepilex soiled, she would not allow it to be changed.   "

## 2021-06-24 NOTE — PLAN OF CARE
Problem: Lymphedema Therapy  Goal: Lymphedema Goals  Patient will demonstrate the following by 3/6/19, in order to maximize independence with ADL/IADL performance:   -Patient/family to verbalize ability to direct care for edema management techniques.     Goals entered on 2/27/2019 by Amelia Hernandez, PT, DPT, CLT        Outcome: Completed Date Met: 02/28/19  Lymphedema Therapy Discharge Summary  Refer to Care Plan goals above for progress towards goals at time of discharge.   Date of Lymphedema Discharge: 2/28/19  Refer to daily documentation flowsheet for equipment issued.   Discharge Destination: Remains in hospital  Discharge Comments:   Patient now tolerating Tubigrip (tan tubular compression) to manage LE edema, should wear 24-hours/day and remove once daily for skin check/moisturizing. [place] from toes to knees, double back over foot for 2-layer compression. Tubigrip can be hand washed every 7-10 days (or as needed), lay flat to dry. Please ensure Tubigrip lays flat to avoid tourniquet effect. Tubigrip should be removed if pain, numbness/tingling, or if soiled.    [facility] staff able to assist pt with tuibgrip. Will complete lymphedema therapy orders.    Amelia Hernandez, PT, DPT, CLT

## 2021-06-24 NOTE — PLAN OF CARE
"Compliance with treatment regimen Not Progressing    Marychuy is still refusing assessments and is particular in every aspect of her cares.  She will not allow us to complete the blood draws and says, \"I'll tell you when you can do anything.\"  Usually she has been in a better mood but today she is very agitated and short with all staff that enter her room.  Patient reports she did not sleep at all last night so there is a possibility that sleep deprivation is a major factor.      Patient's pain/discomfort is manageable Not Progressing    Patient always reporting pain 10/10 and reports, \"Nothing helps with it besides the IV pain medications.\"  MD notified of patients pain but MD has yet to see the patient this afternoon.  Patient is finally resting in her bed so staff are letting her sleep without interruptions.  Continue to monitor and medicate with the as needed meds ordered.    "

## 2021-06-24 NOTE — PROGRESS NOTES
Called by nursing staff and of note the patient wanted to talk to me.  Patient seen at bedside, she states she wants a dressing change and was made to take a look at her sacral decubiti    Patient seen at bedside with nurse and nursing aide    : Sacral decubiti examined: Large about 5-10 cm , appears deep, stage IV, bone not visible, red granulation tissue seen, appears clean no discharge or bleeding  : Wound packing appears dry   : Surrounding small stage II decubiti appears clean    Updates from nursing staff from 4700  ; It appears patient had a pretty soaked wound dressing yesterday evening, blood present    : Anemia  could likely be bleeding from sacral decubiti  ; No active bleeding seen at present  ; Monitor  : We will consider surgical evaluation if repeated bleeding seen

## 2021-06-24 NOTE — PLAN OF CARE
Damaged tissue is healing and protected Not Progressing      Skin integrity is maintained or improved Not Progressing    Pt refused assessment of heels, allowed sacral dressing to be changed during process of assisting pt to mechanical wheelchair.     Collaborate with patient/family/caregiver to identify patient specific goals for this hospitalization Not Progressing      Compliance with treatment regimen Not Progressing    Pt refused assessments stating she didn't want to be touched or to have to move.  Weak congested cough noted, per pt it is productive. Pt also refused neb treatments x3 attempts. Using oxymask prn for comfort.     Patient's pain/discomfort is manageable Progressing    Pt consistently rates high levels of pain. Prn dilaudid, ibuprofen, and flexeril administered with pt noted sleeping shortly after admin.

## 2021-06-24 NOTE — CONSULTS
Consultation - Infectious Disease  Marychuy Zhou,  1955, MRN [id number]    Admitting Dx: Lactic acidosis [E87.2]  Chronic pain [G89.29]  Sacral decubitus ulcer [L89.159]  Sepsis (H) [A41.9]    PCP: Sánchez Zamora MD, [phone]   Code status:  Full Code       Extended Emergency Contact Information  Primary Emergency Contact: WillSánchez spear   [facility]  Home Phone: [phone]  Work Phone: [phone]  Mobile Phone: [phone]  Relation: Child  Secondary Emergency Contact: Tobias Paredes   [place]  Mobile Phone: [phone]  Relation: Son-In-Law       ASSESSMENT   1. Hospital acquired pneumonia, agree that aspiration is a consideration. Has leukocytosis, slightly improved with levofloxacin, not checked today. Has a lot of airway secretions, agree needs assistance with clearing this.   2. H/o osteomyelitis sacrum and heel. Now completed cephalexin course. By last wound care eval there is no longer exposed bone at sacrum  3. H/o MDROs, not on most recent cultures  4. Paraplegia at T4.   5. Neurogenic bladder.  6. Difficult IV access -- peripheral IV in foot  7. Chronic pain  8. Behavioral disorder: She is blacklisted by most all local TCUs/ NHs. She has commitment trial on 19.        Principal Problem:    Sepsis (H)  Active Problems:    Noncompliance with medication regimen    Severe major depression, single episode, with psychotic features (H)    Paranoia (H)    Unable to control anger       PLAN   Sputum culture if able.  Agree with meropenem and vancomycin  RT to assist with secretions -- flutter valve, etc.     Sánchez Lemus MD  [facility]  On-Call: [phone]   ______________________________________________________________________        Reason For Consult: pneumonia     HPI    We have been requested by Dr. Dawkins to evaluate Marychuy Zhou who is a 63 y.o. year old female for the above. She has a PMHx significant for  COPD/emphysemia, LLL malignancy s/p resection, paraplegia secondary to T4 hematoma, neurogenic bladder, recurrent UTIs, history of seizure disorder, hypothyroidism, chronic pain syndrome on narcotics, decubitus right buttock, right lower extremity, right heel and left heel ulcers, rheumatoid arthritis, history of DVTs and PEs on anticoagulation, ESBL/MRSA/VRE infections previous hospitalization for chronic osteomyelitis of the ischium, and multiple hospitalizations who presented on 2/2/19 with uncontrolled pain.  She is known to us from recent hospital staySimilar to her prior multiple hospitalizations, her behavior was a significant issue with abusiveness, and belligerence towards staff.  She was also nonadherent with medical care.  She was evaluated by psychiatry and was noted to have major depressive disorder with psychotic features as well as with a component of paranoia.  She was placed on a 72-hour hold.  Commitment hearing  on 2/12/19 which she refused to attend. Trial date 2/19.    Last seen by us 1/18/19 -- had undergone debridement of sacral ulcer down to bone on 1/10/19. Cultures with MSSA, peptostreptococcus, skin jamie, GNRs, strep. Also then with heel debrided 1/14/19. Plan was Keflex 1000mg three times a day PO until 2/14.       Noted to have dyspnea, hypoxia, rhonchi on 2/12. CXR suggested pneumonia. Started on levofloxacin.      She notes that she has felt like she has pneumonia for the past week. Is frustrated that she wasn't listened to earlier. Feels pain in her chest, phlegm building up that she cannot cough up, dyspnea. Is on O2 face mask. Recalls that she has not had a BM in 3 weeks. Also frustrated with being told that she is mentally ill and a danger to the community. Denies having refused to attend hearing this week, states that she was called by the  the night prior and was told she didn't need to go. Otherwise as jokes to tell me.        Medical History  Active Ambulatory  (Non-Hospital) Problems    Diagnosis     Upper back pain     Abnormal CT scan of lung     Panic anxiety syndrome     Pelvic pain     Ulcer due to Treponema vincentii, with fat layer exposed (H)     Atelectasis     Tension-type headache, not intractable, unspecified chronicity pattern     Weakness of left hand     Focal motor seizure (H)     Pelvic pain in female     Chronic osteomyelitis (H)     Drug-induced constipation     Quadriplegia (H)     Goals of care, counseling/discussion     Advanced care planning/counseling discussion     Malingerer for IV Dilaudid     Moderate protein-calorie malnutrition (H)     Infection     Stage IV pressure ulcer of sacral region (H)     Gangrene (H)     Centrilobular emphysema (H)     Bilateral lower extremity edema     Hypoxemia     Left lower lobe pneumonia (H)     Acute bronchitis due to other specified organisms     Nephrostomy complication (H)     History of encephalopathy     Closed left subtrochanteric femur fracture (H)     Acute blood loss anemia     Other specified hypotension     Anemia     Adjustment disorder with mixed anxiety and depressed mood     Sleep difficulties     Irritability and anger     Fever in other diseases     Severe sepsis (H)     Ulcer     Hypothyroidism due to Hashimoto's thyroiditis     Abscess, gluteal, right     History of DVT (deep vein thrombosis)     Urinary incontinence due to urethral sphincter incompetence     Weakness     Chronic anticoagulation     Urinary tract infection associated with cystostomy catheter (H)     Dislodged wound Vacuum     Abdominal pain, unspecified location     Coagulopathy (H)     Anxiety     UTI (urinary tract infection)     Elevated lactic acid level     Paroxysmal hemicrania     Suprapubic catheter dysfunction, subsequent encounter     Bilateral hydronephrosis     Cystitis     Colon wall thickening     Abdominal pain     Flank pain     Right upper quadrant abdominal pain     Episodic cluster headache, not  intractable     Sepsis secondary to UTI (H)     Pyelonephritis     Hyponatremia     Chronic obstructive pulmonary disease with acute exacerbation (H)     Depression     Partial symptomatic epilepsy with simple partial seizures, intractable, with status epilepticus (H)     Acute on chronic intracranial subdural hematoma (H)     Brain edema (H)     Subdural hematoma (H)     Convulsions, unspecified convulsion type (H)     Neck infection     Lower abdominal pain     Fever, unspecified fever cause     Pneumonia of left lower lobe due to infectious organism (H)     S/P cholecystectomy     Choledocholithiasis with obstruction     Cholelithiasis     Hx of pulmonary embolus     Claustrophobia     Urinary tract infection, site unspecified     Hypotension, unspecified hypotension type     Chronic low back pain without sciatica, unspecified back pain laterality     Acute encephalopathy     Sepsis, due to unspecified organism (H)     History of pulmonary embolus (PE)     Cognitive disorder     Insomnia due to anxiety and fear     HCAP (healthcare-associated pneumonia)     Atypical chest pain     Diastolic CHF, acute on chronic (H)     Chest tightness or pressure     History of MDR Enterobacter cloacae infection     Anxiety disorder     Esophageal dysmotility     Major depressive disorder, recurrent episode, moderate (H)     Seizure disorder (H)     Heel ulcer, right, limited to breakdown of skin (H)     Decubitus ulcer of sacral region, stage 4 (H)     Paraplegia at T4 level (H)     Hypokalemia     Chronic pain syndrome     Major depression     Alcohol abuse     COPD exacerbation (H)     Gastroesophageal reflux disease without esophagitis     Acquired hypothyroidism     Iron deficiency anemia     Opioid-induced constipation (OIC)     Paraplegia (H)     Palliative care encounter     Nephrostomy tube displaced (H)     Mechanical complication of suprapubic catheter (H)     Acute respiratory failure with hypoxia (H)     COPD  (chronic obstructive pulmonary disease) (H)     Lactic acidosis     SVT (supraventricular tachycardia) (H)     Dislodged Bhandari catheter, subsequent encounter     Other chronic pain     Lumbosacral Disc Degeneration     Herpes Simplex Type I     Nicotine Dependence     Essential hypertension     Cancer of lung (H)     Neurogenic bladder     Neurogenic bowel     Decubitus ulcer     Mixed hyperlipidemia     Decubitus ulcer of right buttock, stage 4 (H)     Decubitus ulcer, stage III (H)     Past Medical History:   Diagnosis Date     Alcohol dependence (H)      Anxiety      Cancer of lung (H) 9/24/2013     Chronic kidney disease      Chronic pain      Chronic suprapubic catheter (H)      Constipation      COPD (chronic obstructive pulmonary disease) (H)      Decubitus ulcer      Depression      Diastolic CHF, acute on chronic (H)      DVT (deep venous thrombosis) (H) 5/26/2015     ESBL (extended spectrum beta-lactamase) producing bacteria infection 08/2015     Gastroparesis      GERD (gastroesophageal reflux disease)      HCAP (healthcare-associated pneumonia)      Herpes Simplex Type I      Hyperlipemia      Hypertension      Hypothyroidism 5/26/2015     Intracranial subdural hematoma (H)      Kidney stone      Lower paraplegia (H) 4/28/2016     MRSA infection      Neurogenic bladder      Neuropathy (H) 5/26/2015     Obesity      Opiate dependence, continuous (H)      Osteoporosis      Pneumonia      Pulmonary embolism (H) 12/2016     Rheumatoid arthritis (H)      Sepsis (H) 5/28/2017     SVT (supraventricular tachycardia) (H) 8/19/2014     Vascular myelopathies (H)     Surgical History  She  has a past surgical history that includes pr appendectomy; pr removal of tonsils,<14 y/o; pr total abdom hysterectomy; pr monroe w/o facetec foramot/dskc 1/2 vrt seg, cervical; Fracture surgery; Cholecystectomy; PICC Team Line Insertion (8/19/2014); PICC (12/31/2016); PICC (12/26/2017); hh midline insertion (2/1/2018); PICC Team Line  Insertion (2/27/2018); PICC Team Line Insertion (7/16/2018); CT Bone Biopsy (11/16/2018); IR PICC Placement 5+ Years W/O Fluoro Guidance (12/8/2018); IRRIGATION AND DEBRIDEMENT, LOWER EXTREMITY; LEFT HEEL AND GREAT TOE, RIGHT LATERAL AND PORTION OF FOOT (Bilateral, 1/14/2019); INCISION AND DRAINAGE, SACRAL ULCER (N/A, 1/10/2019); DEBRIDEMENT, PRESSURE ULCER (N/A, 7/12/2018); DEBRIDEMENT, PRESSURE ULCER (Right, 2/5/2018); DEBRIDEMENT, SACRAL ULCER (N/A, 12/22/2017); COLONOSCOPY (N/A, 6/8/2017); LEFT CRANIOTOMY FOR SUBDURAL HEMATOMA EVACUATION AND SUBDURA PROCESS (Left, 4/10/2017); CHOLECYSTECTOMY, LAPAROSCOPIC (N/A, 1/4/2017); and ENDOSCOPIC RETROGRADE CHOLANGIOPANCREATOGRAPHY (N/A, 1/3/2017).   Social History  Reviewed, and she  reports that she has been smoking cigarettes.  she has never used smokeless tobacco. She reports that she drinks alcohol. She reports that she does not use drugs.   Allergies  No Known Allergies Family History  family history includes COPD in her brother, father, and mother; Liver disease in her father; Lung cancer in her sister.  .    Psychosocial Needs  Social History     Social History Narrative    The patient lives at home and has 24 hour care.   She has 2 sons and some grandchildren.    She won a malpractice lawsuit against the doctor who did not diagnose her spinal hematoma correctly.    Patient arrived in the ED alone 10/01/17     Additional psychosocial needs reviewed per nursing assessment.         Prior to Admission Medications   Medications Prior to Admission   Medication Sig Dispense Refill Last Dose     acetaminophen (TYLENOL) 500 MG tablet Take 1-2 tablets (500-1,000 mg total) by mouth every 4 (four) hours as needed.  0      albuterol sulfate 90 mcg/actuation AePB Inhale 180 mcg every 4 (four) hours as needed (Wheezing or dyspnea). 1 each 0      benzocaine-menthol (CEPACOL) 15-3.6 mg Take 1 lozenge by mouth every hour as needed.  0 Taking     bisacodyl (DULCOLAX) 10 mg  suppository Insert 10 mg into the rectum daily as needed.        calcium, as carbonate, (TUMS) 200 mg calcium (500 mg) chewable tablet Chew 1 tablet (200 mg total) 4 (four) times a day as needed. 360 tablet 3 Taking     [] cephalexin (KEFLEX) 500 MG capsule Take 2 capsules (1,000 mg total) by mouth 3 (three) times a day for 27 days. 162 capsule 0      cyclobenzaprine (FLEXERIL) 10 MG tablet Take 1 tablet (10 mg total) by mouth 3 (three) times a day as needed for muscle spasms. 10 tablet 0      diaper,brief,adult,disposable (BRIEFS, ADULT-EXTRA LARGE) Misc Use 1 each As Directed as needed. 180 each 11 Taking     disposable gloves (PURPLE NITRILE GLOVES) Misc Use 1 each As Directed as needed. 200 each 11 Taking     gabapentin (NEURONTIN) 300 MG capsule Take 3 capsules (900 mg total) by mouth daily. 30 capsule 1      hydrocortisone 1 % ointment Apply topically 2 (two) times a day. Apply to affected area 30 g 0      HYDROmorphone (DILAUDID) 2 MG tablet Take 1.5 tablets (3 mg total) by mouth every 3 (three) hours as needed. 20 tablet 0      hydrOXYzine HCl (ATARAX) 25 MG tablet Take 1-2 tablets (25-50 mg total) by mouth every 6 (six) hours as needed for itching. 15 tablet 0      ibuprofen (ADVIL,MOTRIN) 400 MG tablet Take 1 tablet (400 mg total) by mouth every 6 (six) hours as needed. 20 tablet 0      levETIRAcetam (KEPPRA) 250 MG tablet Take 1 tablet (250 mg total) by mouth 2 (two) times a day. 60 tablet 3 2019 at Unknown time     levothyroxine (SYNTHROID, LEVOTHROID) 125 MCG tablet Take 125 mcg by mouth Daily at 6:00 am.   2019 at Unknown time     lidocaine 4 % patch Place 2 patches on the skin daily. Remove and discard patch with 12 hours or as directed by MD. 30 patch 1      melatonin 3 mg Tab tablet Take 1 tablet (3 mg total) by mouth at bedtime as needed. (Patient taking differently: Take 12 mg by mouth at bedtime as needed .      ) 90 tablet 1 Taking     methadone (DOLOPHINE) 5 MG tablet Take 1  tablet (5 mg total) by mouth 2 (two) times a day. 30 tablet 0      methadone (DOLOPHINE) 5 MG tablet Take 3 tablets (15 mg total) by mouth at bedtime. 30 tablet 0      miconazole (MICOTIN) 2 % powder Apply topically 2 (two) times a day. To groin folds 85 g 6 1/6/2019 at Unknown time     multivitamin with minerals (THERA-M) 9 mg iron-400 mcg Tab tablet Take 1 tablet by mouth daily.  0      neomycin-bacitracin-polymyxin (NEOSPORIN) ointment Apply topically 2 (two) times a day. Apply to affected area. Apply together with hydrocortisone cream.  0      omeprazole (PRILOSEC) 20 MG capsule Take 20 mg by mouth daily as needed (heartburn).   Taking     oxybutynin (DITROPAN) 5 MG tablet Take 1 tablet (5 mg total) by mouth 3 (three) times a day. 90 tablet 0 1/6/2019 at Unknown time     polyethylene glycol (MIRALAX) 17 gram packet Take 1 packet (17 g total) by mouth 2 (two) times a day as needed.  0      QUEtiapine (SEROQUEL) 25 MG tablet Take 1 tablet (25 mg total) by mouth 2 (two) times a day. Morning and afternoon 15 tablet 0      QUEtiapine (SEROQUEL) 50 MG tablet Take 1 tablet (50 mg total) by mouth at bedtime. 30 tablet 3      rivaroxaban 20 mg Tab Take 1 tablet (20 mg total) by mouth daily with supper. 30 tablet 3 1/6/2019 at Unknown time     white petrolatum (AQUAPHOR NATURAL HEALING) 41 % Oint Apply 1 application topically 3 (three) times a day as needed (dry skin).  0      zinc oxide 20 % ointment Apply 1 application topically daily as needed for dry skin (or irritation).   Taking          Anti-infectives:   Meropenem 2/15-  Vancomycin 2/15-    Flagyl 2/14-15  Levofloxacin 2/12-    Cultures: previous reviewed  Imaging: reviewed images          Review of Systems:  Bhandari in. IV in foot. Paralysis of lower extremities. All other systems negative in detail except what is noted above. Physical Exam:  Temp:  [97.6  F (36.4  C)-98.6  F (37  C)] 97.6  F (36.4  C)  Heart Rate:  [] 141  Resp:  [17-28] 28  BP:  ()/(68-80) 96/68    GENERAL:  Lying in her wheel chair reclined.   EYES: No conjunctival injection, pupils equally reactive to light, extra-ocular movement intact  HEAD, EARS, NOSE, MOUTH, AND THROAT: Nontraumatic, mouth without oral ulcers. Edentulous.   RESPIRATORY: coarse rhonchi  CARDIOVASCULAR: Regular rate and rhythm, normal S1 and S2, no murmurs, rubs, or gallops.  ABDOMEN: Soft, nontender, no masses, distended, lack of sensation.  Normal bowel sounds.  MUSCULOSKELETAL: No synovitis.  LYMPHATIC: No cervical lymphadenopathy.  SKIN/HAIR/NAILS: No rashes, no signs of peripheral emboli. Wound nurse photo noted from yesterday.  NEUROLOGIC: paraplegia       Pertinent Labs    Results from last 7 days   Lab Units 02/14/19  1935 02/13/19  1908 02/09/19  0753   LN-WHITE BLOOD CELL COUNT thou/uL 16.0* 18.7*  --    LN-HEMOGLOBIN g/dL 9.7* 9.6* 11.7*   LN-HEMATOCRIT % 34.4* 33.3*  --    LN-PLATELET COUNT thou/uL 368 425 354            Results from last 7 days   Lab Units 02/15/19  1631   LN-SODIUM mmol/L 139   LN-POTASSIUM mmol/L 3.8   LN-CHLORIDE mmol/L 109*   LN-CO2 mmol/L 22   LN-BLOOD UREA NITROGEN mg/dL 10   LN-CREATININE mg/dL 0.81   LN-CALCIUM mg/dL 8.3*       Lab Results   Component Value Date    ALT <9 01/07/2019    AST 8 01/07/2019    ALKPHOS 251 (H) 01/07/2019    BILITOT 0.2 01/07/2019          Lab Results   Component Value Date    CRP 0.7 01/12/2019    CRP 1.1 (H) 01/11/2019    CRP 11.2 (H) 12/01/2018        Lab Results   Component Value Date    SEDRATE 28 (H) 01/11/2019        Pertinent Radiology  Radiology Results: Reviewed  Xr Chest 1 View Portable    Result Date: 2/15/2019  XR CHEST 1 VIEW PORTABLE 2/15/2019 3:00 PM INDICATION: Increasing hypoxia. COMPARISON: 2/12/2019. FINDINGS: New opacities over the inferior right upper lobe and right lung base. Findings suspicious for pneumonia. Diffuse interstitial prominence with vascular distinctness also present suggesting pulmonary edema. The differential  for the previously described pulmonary opacities would include asymmetric edema. Prior left upper lobe opacities are not conspicuous on today's exam. Small right pleural effusion. Stable cardiomediastinal silhouette.     Xr Chest 1 View Portable    Result Date: 2/9/2019  XR CHEST 1 VIEW PORTABLE 2/9/2019 11:47 AM INDICATION: Cough COMPARISON: 01/07/2019. FINDINGS: Clear lungs. Normal heart size and pulmonary vascularity. No pleural fluid or pneumothorax. Calcified plaque of the aortic arch.    Xr Chest 1 View Portable    Result Date: 1/7/2019  XR CHEST 1 VIEW PORTABLE 1/7/2019 12:09 PM INDICATION: Cough, dyspnea, tachycardia COMPARISON: 12/11/2018. FINDINGS: The lungs are clear. There is no pneumothorax or pleural effusion. The heart size and pulmonary vascularity are normal. The left lower lobe atelectasis seen on the prior study has cleared. The PICC catheter that was present on the prior examination has been removed.    Xr Cervical Spine 2 - 3 Vws    Result Date: 2/12/2019  formerly [facility] RADIOLOGY EXAM: XR CERVICAL SPINE 2 - 3 VWS LOCATION: [facility] DATE/TIME: 2/12/2019 4:28 PM INDICATION: Left upper extremity numbness, neck pain COMPARISON: 02/26/2018. FINDINGS: On the lateral view there is visualization from C1 to the bottom of C7. The prevertebral soft tissues and the predental space is maintained. There is good anatomic alignment with no evidence of subluxation. The vertebral body heights are well-maintained throughout. There is prominent degenerative disc disease from the C4-C5 to C6-C7 disc space levels. These levels have a moderate loss of height, endplate changes and small anterior osteophyte formations. There is diffuse facet arthropathy  throughout the cervical region.     Ct Head Without Contrast    Result Date: 1/24/2019  formerly [facility] RADIOLOGY EXAM: CT HEAD WO CONTRAST LOCATION: [facility] DATE/TIME: 1/24/2019 10:17 PM INDICATION: Headache headache COMPARISON: None. TECHNIQUE: Routine  without IV contrast. Multiplanar reformats. Dose reduction techniques were used. FINDINGS: INTRACRANIAL CONTENTS: No intracranial hemorrhage, extraaxial collection, or mass effect.  No CT evidence of acute infarct. Mild atrophy. Mild presumed chronic small vessel ischemia. The ventricles and sulci are normal for age. VISUALIZED ORBITS/SINUSES/MASTOIDS: No significant orbital abnormality. No significant paranasal sinus mucosal disease. No significant middle ear or mastoid effusion. OSSEOUS STRUCTURES/SOFT TISSUES: Postoperative changes left craniotomy.     CONCLUSION: No hemorrhage, mass, or mass effect.    Xr Chest 1 View    Result Date: 2/12/2019  XR CHEST 1 VIEW 2/12/2019 4:29 PM INDICATION: Crackles, increased work of breathing COMPARISON: 02/09/2019 and older studies. FINDINGS: Shallow inspiration. Pannus projects over the lower chest due to positioning. Patient has developed focal interstitial opacities in the left upper lobe. Findings may reflect a pneumonitis. Right lung remains clear. Heart and pulmonary vascularity are normal. NOTE: ABNORMAL REPORT THE DICTATION ABOVE DESCRIBES AN ABNORMALITY FOR WHICH FOLLOW-UP IS NEEDED. .    Us Abdomen Limited    Result Date: 1/9/2019  US ABDOMEN LIMITED 1/9/2019 3:14 PM INDICATION: Pain in rib cage and elevated alk phos evaluate ruq COMPARISON: CT abdomen 11/20/2018 FINDINGS: GALLBLADDER: Postcholecystectomy. BILE DUCTS: Mild to moderate bile duct dilation. The common bile duct measures 10 mm versus 11 mm on the comparison CT. LIVER: Normal where seen. RIGHT KIDNEY: No hydronephrosis. Right renal cortical thinning and multifocal scarring. PANCREAS: Not imaged in detail on this limited study. No incidental abnormalities. No ascites in the right upper quadrant.     CONCLUSION: 1.  Mild to moderate extra hepatic biliary ductal dilatation, similar to the CT from 01/20/2018 and likely in part related to the patient's age and post cholecystectomy status. No obstructing stone  or mass is identified.

## 2021-06-24 NOTE — PROGRESS NOTES
Urology to see and replace suprapubic catheter today. Supplies in room.  Patient transferring to unit 4700 once transport arrives. Patient aware of the transfer and agreeable. Report given to Julia [place]. 4700 tele pack in place on patient. Rude behavior noted to some staff, swearing and name calling. Redirected.    Shanice Butterfiedl, RN

## 2021-06-24 NOTE — PROGRESS NOTES
Began c/o difficulty breathing and noted audible wheezes and crackles.  Pt requested a neb tx, also requested writer to call physician and request a stronger neb tx and inhaler.  Also wanting more pain meds.  Notified md who ordered a portable cxr.  Pt refused ordering any more pain meds.

## 2021-06-24 NOTE — PLAN OF CARE
Daily Care      Daily care needs are met Progressing        Non-compliance with treatment regimen      Compliance with treatment regimen Progressing        Pain      Patient's pain/discomfort is manageable Progressing        Potential for Falls      Patient will remain free of falls Progressing          VSS (refused temp). Able to express needs. Pain/discomfort managed with scheduled medications and PRN Dilaudid, Flexeril, and Ibuprofen. While patient complained about staff, she was more compliant and less demanding with cares. Dressing changed to coccyx and heel. PICC line dressing changed and TPA was used on occluded lumens. Lung sounds coarse and non-productive cough present.Will continue to monitor.

## 2021-06-24 NOTE — PROGRESS NOTES
[facility] Daily Progress Note      Date of Service: 3/4/2019     Assessment and plan    Acute COPD exacerbation  : Feel underlying pneumonia unlikely  : Has some leukocytosis but pro calcitonin was negative  : Complete doxycycline course  ; Leukocytosis mildly increased today, but clinically improved  ; More crackles and wheeze improved with increased prednisone  : Continue present 5 mg 2-3 days and taper slowly  ; Patient advised to refrain from going out to smoke with subzero temperatures  : Patient still appears to be going out to smoke  ; Patient had just gone out to smoke when I saw her    Acute on chronic anemia  ;Microcytic anemia  History of IV iron therapy  : Hemoglobin seems to be trending down  : Recheck hemoglobin better this morning  ; Continue iron supplement  : Hemoglobin stable      Recent severe sepsis with impending septic shock  : Antibiotic course completed as per ID recommendation  ; Concern for recurrent aspirations  ; Appreciate speech therapy input  ; Patient refusing video swallow  : Noncompliant with aspiration precautions  ; Present management as above    Severe constipation  Getting better with bowel movement  No issues at present  ; Having regular bowel movement as per patient and nurse    History of stage IV sacral decubitus ulcer with chronic osteomyelitis (7/2018) status post I&D and IV antibiotics and recurrences:  History of recurrent UTIs:  : allowed  examination on 2/17/2019:   : Wound care nurse consulted but patient refusing wound examination  : Patient did not allow wound examination at present    opioid dependence:  Chronic pain:  -Follows with the PCP and plan to follow-up with the pain clinic.    On methadone and oxycodone as an outpatient.   She had been off methadone since admission which likely led to worsening of her chronic pain.   : Methadone was resumed on 2/16/2019 at 10 mg twice daily usually on 10 mg 3 times daily  : Mental status stable, no worsening respiratory  status  ; Methadone increased to 10 mg 3 times daily on 2/19/2019     Severe MDD with psychotic features:  Paranoia and anxiety  : Patient was recently evaluated by psychiatry   on 72-hr hold per psychiatry with commitment hearing on 2/12 which she missed.  Trial date set for 2/19 which COURT  declined commitment   Continue quetiapine.  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     History of seizure disorder:  Continue levetiracetam.     Insomnia:  Continue eszopiclone      This note was dictated using voice recognition software. Any grammatical or context distortions are unintentional and inherent to the software.  Subjective:   Patient was coming from outside when I saw her  Patient states she has been fine since sliding of the wheelchair yesterday  Denies any pain anywhere  Patient advised to refrain from going or if possible and to be careful    Patient states her breathing is better  No chest pain  No discomfort in the abdomen, having bowel movement      Brief History: 62 y.o. 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  Similar to her prior multiple hospitalizations, her behavior was a significant issue with abusiveness, and  "belligerence towards staff.  She was also nonadherent with medical care.  She was evaluated by psychiatry and was noted to have major depressive disorder with psychotic features as well as with a component of paranoia.  She was placed on a 72-hour hold.  Commitment hearing  on 2/12/19 which she refused to attend.  Patient had a trial date on 12/19/2019 and commitment was refused by court, and she has been off one-to-one since then.   Patient completed 10 days of meropenem and vancomycin      Chart reviewed, events noted. Pt seen and examined.     Objective     Vital signs in last 24 hours:  Temp:  [98.3  F (36.8  C)] 98.3  F (36.8  C)  Heart Rate:  [] 88  Resp:  [16] 16  BP: (114-152)/(55-99) 114/55  Weight:   163 lb (73.9 kg)  Weight change:   Body mass index is 29.81 kg/m .    Intake/Output last 3 shifts:  I/O last 3 completed shifts:  In: 150 [P.O.:120; I.V.:30]  Out: 3900 [Urine:3900]  Intake/Output this shift:  No intake/output data recorded.      Physical Exam:  /55 (Patient Position: Lying)   Pulse 88   Temp 98.3  F (36.8  C) (Oral)   Resp 16   Ht 5' 2\" (1.575 m)   Wt 163 lb (73.9 kg)   LMP  (LMP Unknown)   SpO2 94%   BMI 29.81 kg/m      FiO2 (%): (room air) O2 Device: None (Room air) O2 Flow Rate (L/min): 6 L/min      General Appearance:    Alert, no apparent distress.    HEENT:    Normocephalic. Pupils equal and reactive. No scleral   Icterus. Moist oral mucosa    Lungs:     Clear to auscultation bilaterally, respirations unlabored  Bilateral wheeze and crackles markedly improved  Minimal crackles present   Heart::    Regular rate and rhythm, S1 and S2 normal, no murmur, rub   or gallop.   Abdomen:  Soft to touch, distention improved   Extremity :  Socks on        CNS:   Alert and oriented,   no facial asymmetry  Paraplegic       Imaging:  personally reviewed.      Scheduled Meds:    baclofen  10 mg Oral TID     calcium-vitamin D  1 tablet Oral DAILY     eszopiclone  3 mg Oral QHS     " ferrous sulfate  325 mg Oral BID with meals     furosemide  40 mg Oral BID - diuretic     gabapentin  900 mg Oral DAILY     levETIRAcetam  250 mg Oral BID     levothyroxine  125 mcg Oral Daily 0600     methadone  10 mg Oral TID     miconazole   Topical BID     multivitamin with minerals  1 tablet Oral DAILY     neomycin-bacitracin-polymyxin   Topical TID     omeprazole  20 mg Oral QAM AC     oxybutynin  5 mg Oral TID     pink lady  1 enema Rectal Once     polyethylene glycol  17 g Oral BID     polyethylene glycol  4,000 mL Oral Once     predniSONE  40 mg Oral Daily with brkfst     QUEtiapine  25 mg Oral BID     QUEtiapine  50 mg Oral QHS     rivaroxaban  20 mg Oral Daily with supper     senna-docusate  2 tablet Oral BID     sodium chloride  10-30 mL Intravenous Q8H FIXED TIMES     Continuous Infusions:    PRN Meds:.acetaminophen, albuterol, albuterol **AND** Nebulizer treatment intermittent, bisacodyl, calcium (as carbonate), codeine-guaiFENesin, cyclobenzaprine, HYDROmorphone, hydrOXYzine HCl, ibuprofen, ipratropium-albuterol **AND** [COMPLETED] Nebulizer treatment intermittent, magnesium hydroxide, melatonin, naloxone **OR** naloxone, OLANZapine, ondansetron **OR** ondansetron, polyethylene glycol, polyvinyl alcohol, sodium chloride bacteriostatic, sodium chloride bacteriostatic, sodium chloride, sodium chloride, sodium chloride, sodium phosphates 133 mL, traZODone    Lab Results:  personally reviewed.   not applicable  Recent Results (from the past 24 hour(s))   Potassium    Collection Time: 03/03/19  7:37 PM   Result Value Ref Range    Potassium 5.1 (H) 3.5 - 5.0 mmol/L   Magnesium    Collection Time: 03/04/19  9:09 AM   Result Value Ref Range    Magnesium 1.9 1.8 - 2.6 mg/dL   Basic Metabolic Panel    Collection Time: 03/04/19  9:09 AM   Result Value Ref Range    Sodium 132 (L) 136 - 145 mmol/L    Potassium 4.7 3.5 - 5.0 mmol/L    Chloride 89 (L) 98 - 107 mmol/L    CO2 36 (H) 22 - 31 mmol/L    Anion Gap,  Calculation 7 5 - 18 mmol/L    Glucose 78 70 - 125 mg/dL    Calcium 8.5 8.5 - 10.5 mg/dL    BUN 18 8 - 22 mg/dL    Creatinine 0.63 0.60 - 1.10 mg/dL    GFR MDRD Af Amer >60 >60 mL/min/1.73m2    GFR MDRD Non Af Amer >60 >60 mL/min/1.73m2   HM1 (CBC with Diff)    Collection Time: 03/04/19  9:09 AM   Result Value Ref Range    WBC 14.6 (H) 4.0 - 11.0 thou/uL    RBC 3.76 (L) 3.80 - 5.40 mill/uL    Hemoglobin 9.2 (L) 12.0 - 16.0 g/dL    Hematocrit 31.4 (L) 35.0 - 47.0 %    MCV 84 80 - 100 fL    MCH 24.5 (L) 27.0 - 34.0 pg    MCHC 29.3 (L) 32.0 - 36.0 g/dL    RDW 21.9 (H) 11.0 - 14.5 %    Platelets 385 140 - 440 thou/uL    MPV 10.0 8.5 - 12.5 fL   Manual Differential    Collection Time: 03/04/19  9:09 AM   Result Value Ref Range    Total Neutrophils % 70 50 - 70 %    Lymphocytes % 20 20 - 40 %    Monocytes % 5 2 - 10 %    Eosinophils %  5 0 - 6 %    Basophils % 0 0 - 2 %    Myelocytes % 2 (H) <=1 %    Total Neutrophils Absolute 10.1 (H) 2.0 - 7.7 thou/ul    Lymphocytes Absolute 2.8 0.8 - 4.4 thou/uL    Monocytes Absolute 0.7 0.0 - 0.9 thou/uL    Eosinophils Absolute 0.7 (H) 0.0 - 0.4 thou/uL    Basophils Absolute 0.0 0.0 - 0.2 thou/uL    Myelocytes Absolute 0.2 (H) <=0.1 thou/uL    Platelet Estimate Normal Normal    Ovalocytes 1+ (!) Negative    Polychromasia 1+ (!) Negative    Tear Drop Cells 1+ (!) Negative               Advance Care Planning:   Barriers to discharge: Monitoring hemoglobin, respond to oral steroid, awaiting placement  Anticipated discharge day: 2-3 days  Disposition: Issues with placement      Gerard Juárez MD  [facility]  Hospitalist

## 2021-06-24 NOTE — PLAN OF CARE
Goal:   Compliance with treatment regimen Not Progressing    Barriers: Patient very controlling and insisting that she knows what she needs and not the hospital nurses or doctors. Tried to encourage conversation on the items that need to be done, but patient periodically refuses turns, wound and gonzalez care, vital signs and demands PRN pain medications every 4 hours. Will continue to monitor.    Outcome: Not progressing

## 2021-06-24 NOTE — PROGRESS NOTES
Hospitalist Progress Note    Assessment/Plan  Brief History: 62 y.o. 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  Similar to her prior multiple hospitalizations, her behavior was a significant issue with abusiveness, and belligerence towards staff.  She was also nonadherent with medical care.  She was evaluated by psychiatry and was noted to have major depressive disorder with psychotic features as well as with a component of paranoia.  She was placed on a 72-hour hold.  Commitment hearing  on 2/12/19 which she refused to attend.  Patient had a trial date on 12/19/2019 and commitment was refused by court, and she has been off one-to-one since then     Severe constipation  X-ray did not show any evidence of obstruction CT abdomen recently did not show any signs of obstruction likely secondary to pain medication and immobility,  Had to MiraLAX and enema yesterday still no effect, she refused GoLYTELY yesterday, the nurse will encourage her to take it again today and may need to have repeat adenoma if no effect still,  Patient is concerned about not having bowel movement for a month, but per nursing she had a bowel movement for about a week ago    Severe sepsis and impending septic shock  Resolved, continue to have a stable blood pressure, and likely related to aspiration pneumonia on meropenem and vancomycin ID recommended 7-10 days, today is the seventh day of antibiotic  Leukocytosis likely from steroid use for  now,    Acute hypoxic respiratory failure  -Hypoxia resolved she is off oxygen, she still diuresing with IV Lasix appears to have volume overload      Acute on chronic anemia  Microcytic history of iron therapy in the past, hemoglobin 7.6 her baseline around 9, likely secondary to her sacral decubitus ulcer, no evidence of bleeding globin improved to 9.6 for the last 2 days      HX of stage IV sacral decubitus ulcer with chronic osteomyelitis  Had incision and drainage IV antibiotic,    History of recurrent urinary tract infection    Sinus tachycardia,  Heart rate has improved with treatment of sepsis and hydration currently stable      Opioid dependence,  Chronic pain, initially methadone was held due to hypotension, resumed back on methadone, 10 3 times daily      Severe MDD with psychotic features  Paranoia and anxiety,  Patient was recently evaluated by psychiatry had a 72-hour hold per psychiatry with commitment hearing on February 2, she missed her trial date for February 19      Quadriplegia secondary to T4 hematoma, neurogenic bladder,  Patient has chronic abdominal pelvic pain had a prior admissions to related to that, she was seen by urology in the past and she has urethral incompetence and lower abdominal/pelvic pain, has had nephrostomies and suprapubic catheter in the past,  Currently has gabapentin, baclofen, oxybutynin, suprapubic catheter was not working appropriately, urology evaluated her during the admission      History of DVT, and PE  On Xarelto    History of subdural hematoma  On 2017 April  Currently tolerating anticoagulation      Seizure disorder,  Stable   -Resume levetiracetam      Hypothyroidism  Resume levothyroxine    Barriers to Discharge: Constipation, severe, IV antibiotic, placement,    Anticipated discharge date/Disposition: Likely TCU to get accepted in 2 days    Subjective  Patient was seen this morning she was complaining of severe constipation and she has not had a bowel  movement for a month, she had MiraLAX and enema yesterday but apparently she refused to take likely, I explained to her the next step is to give her another enema to disimpact her, she agreed for no bowel movement today she will get another enema, and  nurse will encourage her to take a likely  Objective    Vital signs in last 24 hours  Temp:  [98  F (36.7  C)-98.7  F (37.1  C)] 98  F (36.7  C)  Heart Rate:  [] 102  Resp:  [16-20] 20  BP: ()/(51-89) 134/89 96% O2 Device: None (Room air) O2 Flow Rate (L/min): 2 L/min  Weight:   163 lb (73.9 kg) Weight change:     Intake/Output last 3 shifts  I/O last 3 completed shifts:  In: 280 [P.O.:240; I.V.:40]  Out: 2725 [Urine:2725]  Body mass index is 29.81 kg/m .    Physical Exam:  General Appearance: Alert, oriented x3, does not appear in distress.  HEENT: Normocephalic. No scleral icterus, . Mucous membranes moist.  Heart: :Regular rate and rhythm, normal S1 ,S2, No murmurs, no JVD, 1+ pedal edema   Lungs: Clear to auscultation bilaterally. No wheezing or crackles  Abdomen: Soft, diffuse  tender, no rebound or rigidity, non distended, bowel sounds present.  Extremity: No deformity. 1+ pedal edema   Neurologic: Alert, Oriented x3, speech is intact, Muscle Power 3/5 of bilateral upper  extremities, , CN IX-XII grossly intact    Pertinent Labs   Lab Results: personally reviewed.   Results from last 7 days   Lab Units 02/22/19  0820 02/21/19  1519 02/18/19  0552 02/17/19  0524 02/16/19  1829   LN-SODIUM mmol/L 140 139  --  143 140   LN-POTASSIUM mmol/L 4.2 4.4  --  4.3 3.7   LN-CHLORIDE mmol/L 101 101  --  116* 108*   LN-CO2 mmol/L 33* 28  --  22 26   LN-BLOOD UREA NITROGEN mg/dL 10 11  --  15 17   LN-CREATININE mg/dL 0.53* 0.68 0.56* 0.66 0.74   LN-CALCIUM mg/dL 7.9* 8.0*  --  7.4* 7.7*   LN-ALBUMIN g/dL  --   --   --  1.5* 1.6*   LN-PROTEIN TOTAL g/dL  --   --   --  5.1* 5.5*   LN-BILIRUBIN TOTAL mg/dL  --   --   --  0.1 0.1   LN-ALKALINE PHOSPHATASE U/L  --   --    --  136* 133*   LN-ALT (SGPT) U/L  --   --   --  15 14   LN-AST (SGOT) U/L  --   --   --  21 26     Results from last 7 days   Lab Units 02/22/19  0820 02/21/19  0907 02/20/19  0629  02/17/19  0524   LN-WHITE BLOOD CELL COUNT thou/uL 22.0* 19.0* 13.6*   < > 19.6*   LN-HEMOGLOBIN g/dL 9.6* 9.8* 8.9*   < > 7.6*   LN-HEMATOCRIT % 33.0* 33.5* 30.5*   < > 26.5*   LN-PLATELET COUNT thou/uL 343 371 318   < > 341   LN-NEUTROPHILS RELATIVE PERCENT %  --   --   --   --  96*   LN-MONOCYTES RELATIVE PERCENT %  --   --   --   --  1*   LN-MONOCYTES - REL (DIFF) % 2 5 4  --   --     < > = values in this interval not displayed.     Results from last 7 days   Lab Units 02/17/19  0524 02/16/19  2340 02/16/19  1829   LN-TROPONIN I ng/mL <0.01 0.01 0.01           Medications  Scheduled Meds:    baclofen  10 mg Oral TID     eszopiclone  3 mg Oral QHS     furosemide  20 mg Intravenous DAILY     gabapentin  900 mg Oral DAILY     levETIRAcetam  250 mg Oral BID     levothyroxine  125 mcg Oral Daily 0600     meropenem  1 g Intravenous Q8H     methadone  10 mg Oral TID     miconazole   Topical BID     multivitamin with minerals  1 tablet Oral DAILY     neomycin-bacitracin-polymyxin   Topical TID     omeprazole  20 mg Oral QAM AC     oxybutynin  5 mg Oral TID     polyethylene glycol  17 g Oral BID     polyethylene glycol  4,000 mL Oral Once     QUEtiapine  25 mg Oral BID     QUEtiapine  50 mg Oral QHS     rivaroxaban  20 mg Oral Daily with supper     senna-docusate  1 tablet Oral BID     sodium chloride  10-30 mL Intravenous Q8H FIXED TIMES     vancomycin  1.25 g Intravenous Q24H     Continuous Infusions:  PRN Meds:.acetaminophen, albuterol, albuterol **AND** Nebulizer treatment intermittent, bisacodyl, calcium (as carbonate), cyclobenzaprine, HYDROmorphone, hydrOXYzine HCl, ibuprofen, magnesium hydroxide, melatonin, naloxone **OR** naloxone, OLANZapine, ondansetron **OR** ondansetron, polyethylene glycol, polyvinyl alcohol, sodium chloride  bacteriostatic, sodium chloride bacteriostatic, sodium chloride, sodium chloride, sodium chloride, sodium phosphates 133 mL, traZODone        Advanced Care Planning:  Discharge Planning discussed with patient   Total time with this patient is 35 min with greater than 50% of time spent in coordination of care.  Care discussed and coordinated with RN, care management .      Kyle Benítez MD  Internal Medicine Hospitalist  2/22/2019

## 2021-06-24 NOTE — PROGRESS NOTES
[facility] Daily Progress Note    Assessment/Plan:  Marychuy Zhou is a 62 y.o. 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  Similar to her prior multiple hospitalizations, her behavior was a significant issue with abusiveness, and belligerence towards staff which improved in my opinion over the period of time in the Hospital.  She was also nonadherent with medical care initially which has also been improving by-and-large.  She was evaluated by psychiatry and was noted to have major depressive disorder with psychotic features as well as with a component of paranoia.  She was placed on a 72-hour hold.  Commitment hearing  on 2/12/19 which she refused to attend.  Patient had a trial date on 12/19/2019 and commitment was refused by court, and she has been off one-to-one since then.   Patient completed 10 days of meropenem and vancomycin          Assessment:  Acute COPD exacerbation  : Feel underlying pneumonia unlikely  : Has some leukocytosis but pro calcitonin was negative  : Completed doxycycline course  : Unfortunately continues to smoke and have wheezing.   : Leukocytosis improving without intervention  : Lung sounds improved  ; Patient will likely need slow tapering of prednisone  ; Patient advised to refrain from going out to smoke with subzero temperatures  : Patient still appears to be going out to smoke  ; Encourage using flutter valve     Acute on chronic anemia  ;Microcytic anemia  History of IV iron  therapy  : Hemoglobin seems to be stable  : Recheck hemoglobin in the morning  ; Continue iron supplement    Recent severe sepsis with impending septic shock  : Antibiotic course completed as per ID recommendation  ; Concern for recurrent aspirations  ; Appreciate speech therapy input  ; Patient refusing video swallow  : Noncompliant with aspiration precautions  ; Present management as above     Severe constipation  Getting better with bowel movement  No issues at present  ; Having regular bowel movement as per patient and nurse     History of stage IV sacral decubitus ulcer with chronic osteomyelitis (7/2018) status post I&D and IV antibiotics and recurrences:  History of recurrent UTIs:  : Wound was not examined today by me:   ; Wound dressing as per wound care nurse     opioid dependence:  Chronic pain:  -Follows with the PCP and plan to follow-up with the pain clinic.    On methadone and oxycodone as an outpatient.   She had been off methadone since admission which likely led to worsening of her chronic pain.   : Methadone was resumed on 2/16/2019 at 10 mg twice daily usually on 10 mg 3 times daily and also getting PRN during the night  : Mental status stable, no worsening respiratory status       Severe MDD with psychotic features:  Paranoia and anxiety  : Patient was recently evaluated by psychiatry   on 72-hr hold per psychiatry with commitment hearing on 2/12 which she missed.  Trial date set for 2/19 which COURT  declined commitment   Continue quetiapine.  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     History of seizure disorder:  Continue levetiracetam.     Insomnia:  Continue eszopiclone        Plan:  Patient wanted INR to be  checked   Methadone for an another at night     Code status:Full Code        Subjective:  Complaining of t leg pain which she says is very severe looks some what in distress    Review of Systems:   Says shortness of breath is improving    Current Medications Reviewed via EHR List    Objective:  Vital signs in last 24 hours:  Temp:  [98.4  F (36.9  C)-98.7  F (37.1  C)] 98.4  F (36.9  C)  Heart Rate:  [100-130] 130  Resp:  [14-20] 20  BP: ()/(48-69) 126/66  SpO2:  [90 %-96 %] 90 %    Intake/Output last 3 shifts:  I/O last 3 completed shifts:  In: -   Out: 3350 [Urine:3350]      Physical Exam:  EYES: EOM+    NECK: mo JVD  HEART : S1 S2 RRR   LUNGS: Patient has bilateral wheezing in the lungs   ABDOMEN:   Soft, No tenderness , Bowel sounds are positive  CNS  Alert and Oriented x 3    LOWER LIMBS: Patient has paraplegia          Lab Results:  Personally Reviewed.   Fingerstick Blood Glucose: No results for input(s): [id number] in the last 48 hours.    Recent Results (from the past 24 hour(s))   Potassium - Next AM    Collection Time: 03/10/19  2:17 PM   Result Value Ref Range    Potassium 4.2 3.5 - 5.0 mmol/L   Protime-INR    Collection Time: 03/10/19  2:17 PM   Result Value Ref Range    INR 0.97 0.90 - 1.10           Advanced Care Planning  Discharge plan: She should work and is looking for LTAC placement or a group home placement      Jaron Edwards  Date: 3/10/2019  Time: 4:51 PM  Hospitalist Service  Part of this chart was created using a dictation software. Typographic errors, word substitutions, and Grammatical errors may unintentionally occur.

## 2021-06-24 NOTE — PLAN OF CARE
"Daily care needs are met Progressing      Mobility/activity is maintained at optimum level for patient Progressing      Patient's pain/discomfort is manageable Progressing      Patient will remain free of falls Progressing      Patient had c/o leg pain/generalized pain this shift and received PRN dilaudid x2 as well as PRN tylenol with some relief noted.  Patient mostly medication compliant, except refused for her PICC line to get flushed, stating that \"it should have gotten done earlier.\"  Has been up in her wheelchair all shift and went outside frequently throughout the shift.  On K+ protocol and was 3.7, received 4omEq x1 and will get rechecked in the morning.  Allowed majority of assessment to be completed, all except for skin assessment.  A&O and able to use their call light appropriately to ask for assistance as needed.  Appeared to be comfortable and stable this shift, will continue to monitor.     "

## 2021-06-24 NOTE — PROGRESS NOTES
"Clinical Nutrition Therapy Follow Up Note      Reason for Assessment:   Marychuy Zhou is a 63 y.o. female assessed by the registered Dietitian for follow up  Admitting dx: UTI.  PMH: Paraplegia @ T4, chronic decubitus ulcers, chronic pain syndrome, h/o ETOH abuse, mild COPD, h/o lung Ca (wedge resection 2013), hypothyroidism, GERD, Fe deficiency anemia, h/o PE, MDD, anxiety, cognitive d/o, esophageal dysmotility, h/o FT for TF, h/o non-compliance with medical Tx. Ongoing smoking.    Noted 1/14 debridement of wounds on BLE.     S: Pt continues to be verbally abusive with staff.  She has a court hold.     Nutrition History: (1/10)  Information from patient and chart.    Social/living situation: was staying @ friends' home with home care involvement. (\"friend\" was PCA that allowed her to live with her if she paid rent).   Diet prior to admission: regular  Recent food/fluid intake: unknown--pt unreliable historian  Recent Supplements: none noted.  Pt has tried and dislikes: gelatein 20, Enlive, Deven.  She doesn't want Boost + nor Magic Cup .   Cultural/Jain food needs or preferences: none   Food allergies or intolerances: NKFA      Current Nutrition Prescription:   Diet: NPO for suspected aspiration pnemonia  Diet Supplements:  Boost Plus strawberry w/ dinner meal    Current Nutrition Intake:  Prior to NPO status, pt continued to order variable amounts of food (5449-6103 calories/day,  gm protein/day).     No intake recorded but intake usually very high in empty calories from pepsi, lemon bars, etc.     Noted pt ordered a whole bag of candy from the gift shop that she paid for over the phone earlier in the week.     Apparently pt not taking the Boost send with dinner.     Anthropometrics:   Height: 5' 2\" (157.5 cm)  Admission weight: 160# stated  Weight: 162 lb (73.5 kg) stated 2/11--no way to  accuracy.  BMI (Calculated): 29.6  BMI indication: 25-29.9 overweight  Ideal body weight ~100# (IBW-10-15# " for paraplegia)  Weight History:  Wt Readings from Last 10 Encounters:   02/11/19 162 lb (73.5 kg)   12/08/18 162 lb (73.5 kg)   12/06/18 192 lb 9.6 oz (87.4 kg)   11/19/18 171 lb 1 oz (77.6 kg)   10/16/18 158 lb (71.7 kg)   08/11/18 165 lb (74.8 kg)   07/22/18 164 lb 4.8 oz (74.5 kg)   07/10/18 175 lb (79.4 kg)   07/05/18 174 lb 8 oz (79.2 kg)   06/26/18 158 lb (71.7 kg)   Pt has very few actual weights and usually gives a stated wt with admission and refuses to cooperate to allow bed to be zeroed.  Wt listed for 12/6/18 was 11# higher than prior weights that mentioned that pt refused to allow excess linens to be removed from bed--suspect wt not accurate on 12/6/18 as well.  All prior weights are questionable for this reason and also due to weights with specialty mattresses/beds.    Edema: +2 BLE, nonpitting BUE 2/15    RD Nutrition Focused Physical Exam:  The patient has the following physical signs which could indicate malnutrition: No noted fat/muscle loss noted in face arms.      GI Status/Output:   GI symptoms include: Constipation 2/15  Bowel Sounds present per nurse  Last BM: 2/13 Lg formed  per nurse   Pt not allowing nurses to change her linens, clean her up adequately after she has stool.     Skin/Wound:  Isaiah score Isaiah Scale Score: 13    Pressure ulcer/Decubitus ulcer was noted. Info below from 2/6 WOC note and prior 1/24 note). Please see latest WOC note for update of wounds.    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  R buttock:  Stage 3:   R Lateral buttock: Stage 2:     Medications:  Medications reviewed.      Labs:  Labs reviewed:     Estimated Nutrition Needs:  Assessment weight is 45.5 kg, ideal weight    Energy Needs: 9680-8153 kcals daily, 30-35 kcal/kg  Protein Needs: 68-91 g daily, 1.5-2.0 g/kg.  Fluid Needs: ~7721-5817 mls daily,  ~35-40 mls/kg  Very difficult to  needs with no reliable weight with lower muscle mass w/ paraplegia but high needs for healing of multiple wounds.     Malnutrition: Not noted with no reliable hx re: weight or oral intake.     Nutrition Risk Level: moderate risk    Nutrition dx:   Increased nutrient needs r/t wounds as evidenced by standard needs for multiple wounds noted in WOC RN note.     Goal:   Meet estimated nutrition needs and Wound healing. Progressing.    Intervention:   D/c Boost.      Monitoring/Evaluation:   Follow feeding plan/SLP eval results, intake, weights (if pt allows), condition of wounds, if able.     Electronically signed by:  Cecile Black RD

## 2021-06-24 NOTE — PLAN OF CARE
"Pain      Patient's pain/discomfort is manageable Progressing        Pt pain partly being controlled w/PRN dilaudid and ibuprofen. Pt demanding the doctor review her orders this morning so as to increase her pain medication. Pt lungs sound coarse w/crackels. She is refusing to use her incentive spirometer, RT called for PRN nebulizer, RT stated \"Pt refused nebulizer and requesting lasix.\" MD paged w/pt request. Awaiting response. Pt also requested PRN flexiril tonight. Pt currently laying on wet bedding d/t bypassing suprapubic catheter. Writer asked pt if the NA and I could change her bedding and place new clean dry ones on her bed. Pt responded \"I aint moving till this shit kicks in, I am in too much pain\". Risks stated to pt, but pt still refused to be changed. Writer told pt to let us know when she was not in pain, in order for us to change her bedding. Pt agreed to let us know. Pt still laying in wet bedding. \"I am not moving until my pain is taken care of\". Most recent dose of PRN dilaudid given at 0518AM. Pt has call button in reach. Will continue to monitor.  "

## 2021-06-24 NOTE — PROGRESS NOTES
Patient requested to be up in the chair, when blankets were removed to begin the process it was observed that the indwelling urethral catheter had fallen out, balloon intact. With new kit, observing proper sterile procedure, attempted a new catheter insertion, unable to obtain. Will inform oncoming nurse to see if he/she can achieve catheter insertion, if not, will inform MD.

## 2021-06-24 NOTE — PROGRESS NOTES
"I was helping the nurse with the Pt and as we were getting her adjusted in her chair she was watching the news. On the news their was a \"shoooting in [place]\" and a little boy was killed. She asked me \" Well how do you feel about that and your baby? Do you want that to happen to you? \" She was referring to my unborn child.   I said \"My baby is going to be fine.\"   Pt was very rude to me and the nurse the entire time we were helping with her cares.   "

## 2021-06-24 NOTE — PLAN OF CARE
"Problem: Decreased Mental Status Causing Increased Need for Safety  Goal: Decrease patient's symptoms  Outcome: Not Progressing  Pt cursing the nursing assistance and stating she should note come into her room and some time not to touch her. Pt redirected and made pt be aware that she will not fire any staff from taken care of her and she stayed quiet without responding.    Problem: Pain  Goal: Patient's pain/discomfort is manageable  Outcome: Not Progressing  Pt received prn diluadid x2, ibuprofen and tylenol during the evening shift for general pain with effective result.    Problem: Psychosocial Needs  Goal: Collaborate with patient/family/caregiver to identify patient specific goals for this hospitalization  Outcome: Not Progressing  Pt alert and oriented x4 and able to make needs known. Pt refused to be reposition every two hours and pt also refused wound cares during the evening shift. This writer explained the importance complying with treatment and pt stated \" I don't want to listen to your bold shit\"    Problem: Knowledge Deficit  Goal: Patient/family/caregiver demonstrates understanding of disease process, treatment plan, medications, and discharge instructions  Outcome: Not Progressing  Pt refusing reposition and spent most of the shift in her wheel chair and pt educated with further risk of skin breakdown and pt stated she know all that.    Comments: Pt refused fleet enema and received prn suppository in the present of another EMILY and pt stated it going to work overnight. At some time during the shift pt mode elevated and she gave this writer some candy including the nursing assistance and we try to refused and she insisted.   "

## 2021-06-24 NOTE — PROGRESS NOTES
Spiritual Care Note    Spiritual Assessment: Pt is Caodaism.Requests communion if possible. Pt upbeat and pleasant, spoke of life having several more blessings than hard times. Pt reported not being bitter concerning paralyis. She is in contact with 2 sons and her grandchildren.     Care Provided: Support through conversation. Pt remembered writer from several times in the past. Affirmation of person and alie. Shared blessing.    Plan of Care: Our department will follow as needed or as requested.    SHANICE Toure.

## 2021-06-24 NOTE — PLAN OF CARE
Blood pressure (BP) is within acceptable limits Progressing      Constipation will be resolved Progressing      Patient's discharge needs are met Progressing

## 2021-06-24 NOTE — OP NOTE
Pt. Up in chair pt was swearing all the time   In transfer.saying (jose) (Robyn). Pt stated  that we do not know anything. So  Very upset with thig,

## 2021-06-24 NOTE — PLAN OF CARE
Problem: Pain  Goal: Patient's pain/discomfort is manageable  Outcome: Progressing  Pain is controlled with prn dilaudid, pt continues to request for staff to give her more doses even when the time frame is too close from when she just had it. When staff tries to explain to her she becomes verbally abusive saying staff does not know their job. Staff continues to encourage her to follow the medication administration protocol.       Problem: Safety  Goal: Patient will be injury free during hospitalization  Outcome: Progressing  Transferred into w/c with use of farrah lift and tolerated well    Problem: Psychosocial Needs  Goal: Demonstrates ability to cope with hospitalization/illness  Outcome: Progressing  Pt signed a smoke waiver today, and went out to go smoke. 1:1 staff discontinued.    Problem: Discharge Barriers  Goal: Patient's discharge needs are met  Outcome: Progressing  Continues to receive IV antibiotics. awaiting for placement.    Julio C Grant RN

## 2021-06-24 NOTE — PROGRESS NOTES
Prn Duoneb given per RN request. Patient asleep upon entry to room. BS coarse, HR 90, RR 18, oxygen saturation 99% on RA.

## 2021-06-24 NOTE — PROGRESS NOTES
Hospitalist Progress Note     Assessment/Plan:     Marychuy Zhou is a 62 y.o. 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  Similar to her prior multiple hospitalizations, her behavior was a significant issue with abusiveness, and belligerence towards staff.  She was also nonadherent with medical care.  She was evaluated by psychiatry and was noted to have major depressive disorder with psychotic features as well as with a component of paranoia.  She was placed on a 72-hour hold.  Commitment hearing  on 2/12/19 which she refused to attend. Trial date 2/19.    Active Problem:     #ARMANDO pneumonia - suspect aspiration.    -Levaquin, add flagyl.  Pulmonary hygiene  -Swallow evaluation and flutter valve refused  -prednisone started 2 days ago.  WBC 18 today, could be steroid effect.  Monitor.    Left arm numbness:  - likely positional as she lays on the left. No chest pain. Neck XR with degenerative changes, no fracture    Plan:  - off load left side.     Opioid dependence:  Chronic pain:  -Follows with the PCP and plan to follow-up with the pain clinic.  On methadone and oxycodone as an outpatient. She had been off methadone since admission which likely led to worsening of her chronic pain.  It was resumed at 10mg two times a day on 2/12/19 (normally on 10TID).  Seems quite comfortable and telling jokes by the end of our encounter.    Plan:  -Methadone 10 mg twice daily. Dilaudid 2mg 4h PRN.  Titrate methadone  up to 10TID in the next few days if needed.  Continue gabapentin    Severe MDD with psychotic features:  Paranoia:  Anxiety:  - on 72-hr hold per psychiatry with commitment hearing on 2/12 which she missed.  Trial date set for 2/19.  - appreciate psychiatry involvement.  Continue quetiapine.    Stage IV sacral decubitus ulcer with chronic osteomyelitis (7/2018) status post I&D and IV antibiotics and recurrences:  History of recurrent UTIs:  -Afebrile, .stage III right sacral decubitus ulcer and probable stage IV left sacral decubitus ulcer.  Coccygeal ulcer as well. Right LE stage IV ulcer  -Past UTIs (ESBL E. coli/Klebsiella, Pseudomonas, Acinetobacter, enterococcus, Proteus).  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      Plan:  - continue keflex three times a day until 2/14 per ID recommendations.  Due to patient's refusal for turns and for nursing to change wound dressings, patient will likely reinfect her sacral wound and may at some point require resumption of antibiotics.  Appreciate WOCN involvement.     Severe constipation, narcotic induced as well as neurogenic, BM documented yesterday  -Continue Miralax scheduled. Prn senna.  Dulcolax as needed.  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  -She was evaluated by urology in the past for urethral incompetence and lower abdominal/pelvic pain.  She had nephrostomies and suprapubic catheters in the past.     Plan:  - continue gabapentin and baclofen. Continue oxybutynin. Replace suprapubic catheter every 4 weeks as an outpatient.  Bhandari     Microcytic anemia:  -Hemoglobin 11.7 above her baseline. Low MCV. No acute signs of blood loss. Anemia likely due to malnutrition.     Plan:  -Continue iron supplementation.  Recommend iron studies as an outpatient     Moderate protein  "calorie malnutrition:  -She does have low BUN signifying catabolic state and/or protein malnutrition.      Plan:  -Registered dietitian involved.     Centrilobular emphysema:  -Stable.  Not oxygen dependent.  No acute exacerbation.     Plan:  -Continue DuoNeb as needed.     History of DVT/PE:-Continue rivaroxaban.     History of subdural hematoma (4/2017):  -No acute issues.     Hypothyroidism:  -Continue levothyroxine.     History of seizure disorder:  -Continue levetiracetam.    Insomnia:  -Continue eszopiclone     DVT prophylaxis: Continue rivaroxaban.      Subjective:     Had many grievances..... upset that  lied to her about court hearing being cancelled, \"false\" documentation of aggressive and threatening behavior etc.    Reports she has fluid on her lungs and has productive cough.    I informed her that she has upper lobe PNA which could be aspiration in etiology from eating while lying flat in bed.  I recommended flutter valve which was refused because she reports it made her gag in the past  I recommended sitting up higher in bed for PO intake which as refused  I recommended swallow evaluation which was refused  I advised monitoring WBC which could be related to infection vs. Steroid effect.  Patient informed me that steroids due not cause a leukocytosis.  Not clear to me she will allow blood work today.  Reports no BM in months.  I saw   Advised further work with RT on pulmonary hygiene therapies      Review of systems:     Please see Subjective.    Current Medications:     Scheduled Meds:    baclofen  10 mg Oral TID     cephalexin  1,000 mg Oral TID     eszopiclone  3 mg Oral QHS     gabapentin  900 mg Oral DAILY     levETIRAcetam  250 mg Oral BID     levoFLOXacin  750 mg Oral Daily     levothyroxine  125 mcg Oral Daily 0600     methadone  10 mg Oral BID     metroNIDAZOLE  500 mg Intravenous Q12H     miconazole   Topical BID     multivitamin with minerals  1 tablet Oral DAILY     " neomycin-bacitracin-polymyxin   Topical TID     oxybutynin  5 mg Oral TID     polyethylene glycol  17 g Oral DAILY     predniSONE  30 mg Oral Daily with supper     QUEtiapine  25 mg Oral BID     QUEtiapine  50 mg Oral QHS     rivaroxaban  20 mg Oral Daily with supper     Continuous Infusions:  PRN Meds:.acetaminophen, albuterol, albuterol **AND** Nebulizer treatment intermittent, bisacodyl, bisacodyl, calcium (as carbonate), cyclobenzaprine, HYDROmorphone, hydrOXYzine HCl, ibuprofen, magnesium hydroxide, melatonin, naloxone **OR** naloxone, OLANZapine, omeprazole, ondansetron **OR** ondansetron, polyethylene glycol, polyvinyl alcohol, sodium chloride bacteriostatic, traZODone    Objective:       Vital signs in last 24 hours:     Temp:  [97.3  F (36.3  C)-98  F (36.7  C)] 98  F (36.7  C)  Heart Rate:  [100-108] 108  Resp:  [16-18] 16  BP: (103-111)/(51-66) 103/51  Weight: 162 lb (73.5 kg)    Physical Exam:     General appearance: Alert, Awake, No distress.  Lying flat and eating breakfast       ENT anicteric   Resp: Wet sounding cough.  Upper airway rhonchi.  No conversational dyspnea       GI:  ildly distended.   MSK: No swelling   Neuro:  Alert and oriented ×3, paraplegia, lack of redirectibility, some agitation.      Intake/Output this shift:     I/O last 3 completed shifts:  In: -   Out: 1200 [Urine:1200]    Pertinent Labs:     Lab Results: personally reviewed.     Pertinent Radiology:       Advanced Care Planning:     Barrier to discharge: Pending commitment process  Anticipated LOS: To be determined  Disposition: To be determined    Peggy Valentino M.D.  [facility]  Internal Medicine

## 2021-06-24 NOTE — PLAN OF CARE
Daily care needs are met Not Progressing      Patient/family/caregiver demonstrates understanding of disease process, treatment plan, medications, and discharge instructions Not Progressing      Compliance with treatment regimen Not Progressing      Skin integrity is maintained or improved Not Progressing      Demonstrates ability to cope with hospitalization/illness Not Progressing      Collaborate with patient/family/caregiver to identify patient specific goals for this hospitalization Not Progressing      Damaged tissue is healing and protected Not Progressing      Patient is actively non-compliant with treatment team. refused assessments, education, repositioning, lab draws and general cares.   Re- attempt lab draw for potassium protocol in am per patient request.

## 2021-06-24 NOTE — PROGRESS NOTES
RCAT Treatment Plan      Patient Score: 20  Patient Acutiy: 4    Clinical Indication for Therapy: Infiltrates consolidation Lt. Lung, non-ambulatory, lower extremity paralysis    Therapy Ordered: HHN treatment PRN for shortness of breath with desaturation, mucus clearense    Assessment Summary: Patient needs encouragement to deep breathing and cough. HHN tx. May not be necessary if patient follows nursing directives for repositioning which may help mobilize secretions which mostly appear to be in upper airway.     BENJY SantanaT        (     02/15/19 0547   Reason for Assessment (RCAT)   Assessment Reason  COPD   $ RCAT Eval Time 15 min.  Yes   Vitals (RCAT)   Heart Rate (!) 116   /76   Resp 24   SpO2 94 %   Chart Assessment (RCAT)   Pulmonary Status  3   Surgical Status  0   Chest Xray  2   Patient Assessment (RCAT)    Respiratory Pattern  3   Mental Status  0   Breath Sounds  4   Cough Effectiveness  2   Level of Activity  4   O2 Required SpO2 >= 92%  2   Chart + Pt. Assessment Total Points  20   RCAT Acuity Score 20 (Acuity 4)   Clinical Indications (RCAT)   Aerosol Hygiene Home regimen   Broncho-Pulmonary Therapy Productive cough   Volume Expansion Therapy Prevent atelectasis   RCAT Order Placed  Yes       Jarrett Cartwright, BENJYT

## 2021-06-24 NOTE — PROGRESS NOTES
It appears internal code 9 was called after patient patient slid off motorized wheelchair.  Patient seen at bedside: She is sitting up in wheelchair in no acute distress  Patient states she did not fall she just laid off, denies any injury  States there was a bump on the floor which made her fall of.  Denies hitting her head  Chest pain difficulty breathing or dizziness    Updates from resident and nursing staff  ; It appears patient states upon did not have any significant injury.    Examination  ; No head or neck tenderness  No back tenderness or chest tenderness  No clear bruising seen

## 2021-06-24 NOTE — PROGRESS NOTES
"Patient is suppose to have an Xray done in room. Patient is in her wheel chair and does not want to get into bed for Xray. Patient stated, \"they have done it a hundred times before in my chair\" Xray wants her in bed for exam. Will call radiology when patient is back in bed.  "

## 2021-06-24 NOTE — PROGRESS NOTES
INFECTIOUS DISEASE FOLLOW UP NOTE    Date: 2019    ASSESSMENT:  1. Hospital acquired pneumonia, agree that aspiration is a consideration. Has leukocytosis, steroids may contribute. Respiratory and hemodynamic status now improved.   2. H/o osteomyelitis sacrum and heel. Now completed cephalexin course. By last wound care eval there is no longer exposed bone at sacrum  3. H/o MDROs, not on most recent cultures  4. Paraplegia at T4.   5. Neurogenic bladder.  6. Difficult IV access.   7. Chronic pain  8. Behavioral disorder: She is blacklisted by most all local TCUs/ NHs. She has commitment trial on 19.    PLAN:  Continue meropenem and vancomycin, 7-10 days.    Sánchez Lemus MD  [facility]   On-Call: [phone]     ______________________________________________________________________    SUBJECTIVE / INTERVAL HISTORY: transferred to ICU for pressors, now off as I see her, and she is transferring back to 4700. She is most concerned about position of her legs for suprapubic catheter change later today. Breathing better, still with cough. No fever.     ROS: paraplegia. All other systems negative except as listed above.        OBJECTIVE:  Vitals:    19 1200   BP: 104/54   Pulse: 97   Resp: (!) 35   Temp: 98.8  F (37.1  C)   SpO2: 97%          Vital Signs  Temp: 98.8  F (37.1  C)  Temp Source: Oral  Heart Rate: 97  Heart Rate Source: Machine/Monitor  Resp: (!) 35  BP: 104/54  MAP (mmHg) (Calculated): 71  BP Location: Right forearm  BP Method: Machine/Monitor  Patient Position: Lying    Temp (24hrs), Av.7  F (37.1  C), Min:98.7  F (37.1  C), Max:98.8  F (37.1  C)      GENERAL:  in bed in NAD. On room air  EYES: No conjunctival injection, pupils equally reactive to light, extra-ocular movement intact  HEAD, EARS, NOSE, MOUTH, AND THROAT: Nontraumatic, mouth without oral ulcers. Edentulous.   RESPIRATORY: coarse rhonchi stable  CARDIOVASCULAR: Regular rate and rhythm, normal  S1 and S2, no murmurs, rubs, or gallops.  ABDOMEN: Soft, nontender, no masses, distended, lack of sensation.  SP catheter. Normal bowel sounds.  MUSCULOSKELETAL: No synovitis.  LYMPHATIC: No cervical lymphadenopathy.  SKIN/HAIR/NAILS: No rashes, no signs of peripheral emboli. Wound nurse photo noted from yesterday.  NEUROLOGIC: paraplegia      Antibiotics:  Meropenem 2/15-  Vancomycin 2/15-     Flagyl 2/14-15  Levofloxacin 2/12-      Pertinent labs:    Results from last 7 days   Lab Units 02/17/19  0524 02/16/19  1829 02/14/19  1935   LN-WHITE BLOOD CELL COUNT thou/uL 19.6* 24.8* 16.0*   LN-HEMOGLOBIN g/dL 7.6* 7.7* 9.7*   LN-HEMATOCRIT % 26.5* 26.6* 34.4*   LN-PLATELET COUNT thou/uL 341 344 368        Results from last 7 days   Lab Units 02/17/19  0524 02/16/19  1829 02/15/19  1631   LN-SODIUM mmol/L 143 140 139   LN-POTASSIUM mmol/L 4.3 3.7 3.8   LN-CHLORIDE mmol/L 116* 108* 109*   LN-CO2 mmol/L 22 26 22   LN-BLOOD UREA NITROGEN mg/dL 15 17 10   LN-CREATININE mg/dL 0.66 0.74 0.81   LN-CALCIUM mg/dL 7.4* 7.7* 8.3*        Lab Results   Component Value Date    CRP 0.7 01/12/2019    CRP 1.1 (H) 01/11/2019    CRP 11.2 (H) 12/01/2018        Lab Results   Component Value Date    SEDRATE 28 (H) 01/11/2019       Lab Results   Component Value Date    ALT 15 02/17/2019    AST 21 02/17/2019    ALKPHOS 136 (H) 02/17/2019    BILITOT 0.1 02/17/2019         MICROBIOLOGY DATA:  reviewed    RADIOLOGY:  Ct Chest Abdomen Pelvis Without Oral Without Iv Contrast    Result Date: 2/17/2019  [facility] RADIOLOGY EXAM: CT CHEST ABDOMEN PELVIS WO ORAL WO IV CONTRAST LOCATION: [facility] DATE/TIME: 2/17/2019 11:51 AM INDICATION: Sepsis, drop in hemoglbin look for source of bleeding COMPARISON: Abdomen CT 11/20/2018, chest x-ray 02/15/2019 TECHNIQUE: Helical thin-section CT scan of the chest, abdomen, and pelvis was performed without IV contrast. Multiplanar reformats were obtained. Dose reduction techniques were used. CONTRAST:  None. FINDINGS: Patient's turned to the right. CHEST: Patient has patchy groundglass and peribronchiolar opacities diffusely throughout both lungs with focal areas of peribronchiolar consolidation laterally in the right upper lobe and right lower lobe posteriorly. These have a developed since the November exam. Trace pleural effusions again noted. Right upper extremity PICC line catheter is in good position. Mild coronary artery calcifications. No adenopathy. Aorta is normal caliber.  ABDOMEN: Prior cholecystectomy. Noncontrast images of the liver, spleen, fatty replaced pancreas, adrenal glands and left kidney are unremarkable. Minimal prominence of the right renal pelvis again noted. Punctate cortical calcification. No obstruction. Aorta and branches are heavily calcified. Small bowel loops are normal. PELVIS: Moderate amount retained stool throughout the colon. Appendix not identified. No pelvic fluid or mass. Suprapubic catheter in place. Urethral Hbandari has been removed. Prior hysterectomy. MUSCULOSKELETAL: Bilateral ischial decubitus ulcers. No undrained fluid collection. That on the left goes down to the bone. The bone is intact. Ununited left proximal femur fracture. Bilateral old inferior pubic rami fractures. Bones are quite demineralized. No soft tissue hematomas. Mild dependent edema in the subcutaneous fat.     CONCLUSION: 1.  No evidence of occult hemorrhage in the chest, abdomen or pelvis. 2.  Patient has bilateral pulmonary pneumonitis/pneumonia, greatest on the right side as noted above. 3.  Other noncritical findings are stable    Xr Chest 1 View Portable    Result Date: 2/15/2019  XR CHEST 1 VIEW PORTABLE 2/15/2019 3:00 PM INDICATION: Increasing hypoxia. COMPARISON: 2/12/2019. FINDINGS: New opacities over the inferior right upper lobe and right lung base. Findings suspicious for pneumonia. Diffuse interstitial prominence with vascular distinctness also present suggesting pulmonary edema. The  differential for the previously described pulmonary opacities would include asymmetric edema. Prior left upper lobe opacities are not conspicuous on today's exam. Small right pleural effusion. Stable cardiomediastinal silhouette.     Xr Chest 1 View Portable    Result Date: 2/9/2019  XR CHEST 1 VIEW PORTABLE 2/9/2019 11:47 AM INDICATION: Cough COMPARISON: 01/07/2019. FINDINGS: Clear lungs. Normal heart size and pulmonary vascularity. No pleural fluid or pneumothorax. Calcified plaque of the aortic arch.    Xr Chest 1 View Portable    Result Date: 1/7/2019  XR CHEST 1 VIEW PORTABLE 1/7/2019 12:09 PM INDICATION: Cough, dyspnea, tachycardia COMPARISON: 12/11/2018. FINDINGS: The lungs are clear. There is no pneumothorax or pleural effusion. The heart size and pulmonary vascularity are normal. The left lower lobe atelectasis seen on the prior study has cleared. The PICC catheter that was present on the prior examination has been removed.    Xr Cervical Spine 2 - 3 Vws    Result Date: 2/12/2019  [facility] RADIOLOGY EXAM: XR CERVICAL SPINE 2 - 3 VWS LOCATION: [facility] DATE/TIME: 2/12/2019 4:28 PM INDICATION: Left upper extremity numbness, neck pain COMPARISON: 02/26/2018. FINDINGS: On the lateral view there is visualization from C1 to the bottom of C7. The prevertebral soft tissues and the predental space is maintained. There is good anatomic alignment with no evidence of subluxation. The vertebral body heights are well-maintained throughout. There is prominent degenerative disc disease from the C4-C5 to C6-C7 disc space levels. These levels have a moderate loss of height, endplate changes and small anterior osteophyte formations. There is diffuse facet arthropathy  throughout the cervical region.     Ct Head Without Contrast    Result Date: 1/24/2019  [facility] RADIOLOGY EXAM: CT HEAD WO CONTRAST LOCATION: [facility] DATE/TIME: 1/24/2019 10:17 PM INDICATION: Headache headache COMPARISON: None.  TECHNIQUE: Routine without IV contrast. Multiplanar reformats. Dose reduction techniques were used. FINDINGS: INTRACRANIAL CONTENTS: No intracranial hemorrhage, extraaxial collection, or mass effect.  No CT evidence of acute infarct. Mild atrophy. Mild presumed chronic small vessel ischemia. The ventricles and sulci are normal for age. VISUALIZED ORBITS/SINUSES/MASTOIDS: No significant orbital abnormality. No significant paranasal sinus mucosal disease. No significant middle ear or mastoid effusion. OSSEOUS STRUCTURES/SOFT TISSUES: Postoperative changes left craniotomy.     CONCLUSION: No hemorrhage, mass, or mass effect.    Xr Chest 1 View    Result Date: 2/12/2019  XR CHEST 1 VIEW 2/12/2019 4:29 PM INDICATION: Crackles, increased work of breathing COMPARISON: 02/09/2019 and older studies. FINDINGS: Shallow inspiration. Pannus projects over the lower chest due to positioning. Patient has developed focal interstitial opacities in the left upper lobe. Findings may reflect a pneumonitis. Right lung remains clear. Heart and pulmonary vascularity are normal. NOTE: ABNORMAL REPORT THE DICTATION ABOVE DESCRIBES AN ABNORMALITY FOR WHICH FOLLOW-UP IS NEEDED. .    Us Abdomen Limited    Result Date: 1/9/2019  US ABDOMEN LIMITED 1/9/2019 3:14 PM INDICATION: Pain in rib cage and elevated alk phos evaluate ruq COMPARISON: CT abdomen 11/20/2018 FINDINGS: GALLBLADDER: Postcholecystectomy. BILE DUCTS: Mild to moderate bile duct dilation. The common bile duct measures 10 mm versus 11 mm on the comparison CT. LIVER: Normal where seen. RIGHT KIDNEY: No hydronephrosis. Right renal cortical thinning and multifocal scarring. PANCREAS: Not imaged in detail on this limited study. No incidental abnormalities. No ascites in the right upper quadrant.     CONCLUSION: 1.  Mild to moderate extra hepatic biliary ductal dilatation, similar to the CT from 01/20/2018 and likely in part related to the patient's age and post cholecystectomy status. No  obstructing stone or mass is identified.

## 2021-06-24 NOTE — PROGRESS NOTES
Wound Ostomy  WOC Assessment         Spoke with patient's RN, buttock dressings already changed today and patient is sleeping.  Will plan to see patient tomorrow between [id number].  RN reports she will let Marychuy know this so she is also aware of plan.

## 2021-06-24 NOTE — PLAN OF CARE
Problem: Discharge Barriers  Goal: Patient's discharge needs are met  Outcome: Progressing  Care Plan  Care Management      Care Management Goals of the Day: Progression of Care    Care Progression Reviewed With: Charge RN, MD, HUC, RNCM, CMSW    Barriers to Discharge: Placement, wound care    Discharge Disposition: TBD    Expected Discharge Date: TBD    [facility][phone][facility]      Care Coordination Narrative:      At this time multiple referrals have been sent to SNF's. Additionally SW has made referrals referrals to group homes as well. A referral has been made on pt's behalf to [facility] (P: [phone]/F: [phone]), Mary is the . SW is still awaiting call back. MARGI has additionally created a list of group homes and will begin attempting to reach out to these as well.     MARGI has previously spoken with Ellen ([phone]) at [facility] in regards to getting an assessment for the pt for the CADI waiver. Per Ellen at this the Co is about 2-3 months out for general assessments, however if the SW finds a facility that can accept the (such as a group home) that could pt the pt closer to the top of the list. Ellen states that should something come up prior to that 2-3 month time frame the SW is able to call her back or to call her Supervisor, Julia Rangel ([phone]).    SW attempted to meet with pt today, 3/13/2019, however she was sleeping and SW didn't wake the pt up.     MARGI will continue to follow and assist with further d/c needs.     CYNTHIA Gonsalez  3/13/2019  3:27 PM

## 2021-06-24 NOTE — PROGRESS NOTES
"At 10:22 am the patient put on her call light and I answered it at the desk patient stated \"The cleaning lady(housekeeping) is outside my door talking crap about me outside my room.\" Patient currently on a 1:1 who is sitting outside of patients room. 1:1 Nursing Assistant is in patients room.  "

## 2021-06-24 NOTE — PLAN OF CARE
Patient's pain/discomfort is manageable Progressing      Fluid and electrolyte balance are achieved/maintained Progressing      Patient/family/caregiver demonstrates understanding of disease process, treatment plan, medications, and discharge instructions Progressing      Constipation will be resolved Progressing      Pt alert and oriented. Pt was irritable, abusive angry at the start of this shift. Pt required for IV ABX to be disconnected , she will go out to smoke. Prn dilaudid and tylenol was given for pain as well as Flexeril for muscle relaxer with effective outcome. Per MD order to give prn suppository and enema. Pt refused to be transferred bed, no prn suppository and enema given during this shift. Pt refusing, sleeping on wheelchair. Will continue to monitor.

## 2021-06-24 NOTE — PLAN OF CARE
Problem: Pain  Goal: Patient's pain/discomfort is manageable  Outcome: Progressing  Pt c/o buttock/coccyx pain and rated it at 12/10 on the pain scale. She was medicated with PRN Dilaudid 2 mg and later, she received her scheduled Methadone at . Pt also wanted to talk to MD regarding her pain and the medications. MD was paged and writer is awaiting response.    Problem: Non-compliance with treatment regimen  Goal: Compliance with treatment regimen  Outcome: Progressing  Pt has been fairly complaint with some treatment regimen, she refused to be turned and repositioned and the 2nd sets of vital sign. However, she agreed for coccyx wound dressing change after MD's assessment, and writer was able to do partial per-care. She was also med complaint. Overall, pt has been very receptive to this writer, staff will continue to monitor as pt remain unpredictable.

## 2021-06-24 NOTE — PLAN OF CARE
Daily care needs are met Progressing    Pt in chair most of day. Uses call light appropriately. Complies with safety precautions.   Calls for pain medications: PRN dilaudid , ibuprofen , tylenol.     Compliance with treatment regimen Progressing    Pt refuses assessments.. Tried to reaproach, pt refused.    No other concerns at this time. Will continue to monitor.

## 2021-06-24 NOTE — PROGRESS NOTES
Critical Care Progress Note  2/17/2019      Admit Date: 2/2/2019  ICU Day: 2  Mechanical Ventilation Day: na  CODE: Full Code    Critical Care Chief Complaint: sepsis      Problem List/Hospital Course:   Principal Problem:    Sepsis (H)  Active Problems:    Noncompliance with medication regimen    Severe major depression, single episode, with psychotic features (H)    Paranoia (H)    Unable to control anger          Interim Events:     On vasopressors overnight, but off through this morning.  Various complaints of pain and care issues.              Assessment/Plan by System:   Marychuy Zhou is a 63 y.o. female admitted to ICU for sepsis presumed secondary to pneumonia.  Possibly hypotension somewhat vasogenic secondary to her paraplegia, she states her normal BP is 80/60.   ASSESSMENT   1. Sepsis with impending shock.  Suspected source is pulmonary.  Patient has low blood pressure at baseline, SBPs in the 90s and sometimes 80s mmHg with sleep.  Today with recurrent hypotension, treated with NS 1000 mL total and increased IVF rates with some improvement.  However, concerned for overlying pulmonary edema, thus limiting full 30 mL/kg fluid bolus.    2. Hospital acquired pneumonia, possible aspiration pneumonia.  Patient has been admitted since 2/3/2019.  Serial CXR findings from 2/9, 2/12, and 2/15 performed, initial film on 2/9 reported normal; has some crowding of vessels but this could have been related to slightly reduced lung volumes. Recent CXR 2/15 with evolving patchy infiltrates and vascular congestion.    3. Possible COPD with exacerbation.  CT chest 06/2018 showed mild centrilobular and paraseptal emphysema, no prior PFTs.    4. Chronic pain syndrome; opioid dependence.  Tapered off methadone on admission due to request to be taken off, then restarted on 2/14.   5. Severe depression with anxiety and psychotic features.  Evaluated by Psychiatry.  Patient blacklisted by most local TCU/NH.  Has a commitment  trial on 2/19/19.  Previously had commitment hearing on 2/12, but refused to attend.   6. Seizure disorder.    7. Insomnia NOS.    8. Constipation.  Last bowel movement recorded 2/13/19.   9. Hypoalbuminemia with protein-calorie malnutrition.     10. Neurogenic bladder with chronic suprapubic catheter.  Additional Bhandari catheter placed 2/14/19.    11. Hypothyroidism.    12. Normocytic anemia.  Bloodwork 2/3 with reduced iron (22), reduced TIBC (152), reduced transferrin (122), and reduced transferrin saturation (14%) all concerning for anemia of chronic disease.  Ferritin level not checked.    13. History of DVT/PE. On rivaroxaban. Notably, has history of subdural hematoma in 04/2017 s/p left craniotomy for SDH evacuation.  14. Stage IV sacral decubitus ulcer.  Followed by Wound services.   15. Chronic osteomyelitis of sacrum and heel.  Completed course of Keflex on 2/14.  Followed by Wound services.   16. History of recurrent UTIs.    17. History of multi-drug resistant organisms.  Has history of VRE, ESBL, MRSA in the past.  No current cultures growing these organisms.   18. Difficulties with vascular access.  Has refused access in the past, peripheral IV placed to foot.  Now with PICC line.    19. T4 paraplegic.  Paraplegia secondary to T4 hematoma. Mobile with use of an electric wheelchair.     Advance Directives:  Received      PLAN   Systems to Assess:     Pulmonary: Wean supplemental O2 as tolerated; goal O2 sat > 92%.  HOB > 30 degrees to limit aspiration risk.    ? Wean supplemental O2 as tolerates.   ? Solumedrol 40 mg IV x 2 continued (last dose 2/18), then will give 5 days of prednisone at 20mg then off  ? Albuterol and Duonebs PRN per patient preference.   ? Check sputum culture if able to provide.  Abx as below.     Cardiovascular: Cardiac monitoring.   ? SBP goal > 90 mmHg, MAP goal > 65 mmHg.    ? Norepinephrine gtt, vasopressin gtt as needed.   ? Decreased IVF rate given risk of pulmonary  edema.  ? Repeat lactic acid improved 1.9 --> 1.1.   ? EKG PRN.    ? Off pressors since this morning, can transfer out of ICU if remains stable    Neurological: Neuro checks per ICU protocol.   ? Psychiatry management appreciated, currently with 1:1 sitter.   ? Keppra for seizure management, eszopiclone for insomnia, seroquel scheduled.  ? Continue baclofen 10 mg three times a day, methadone 10 mg two times a day  ? Limit narcotics and benzodiazepines.  ? Pain control: PRNs.      GI/: continue regular diet with thin liquids.  Monitor for aspiration.    ? GI prophylaxis:  Not indicated.  PPI available PRN.      Renal: Monitor I/O's.  Electrolyte repletion PRN.  Avoid/limit nephrotoxic agents.   ? IVFs: NS @ 50 mL/hour (dose reduced).      Heme/Coag: Monitor H/H. Hgb did decrease from 9.7 to 7.7.  She does have mild oozing related to wounds; will monitor.    ? Transfuse as needed for goal Hgb > 7 or if symptomatic anemia.   ? DVT prophylaxis:  Rivaroxaban.     Infectious disease: General precautions.   ? Infectious diseases consultation appreciated.   ? Continue meropenem, vancomycin.      Lines:   RUE PICC line, placed 2/16/19-  Bhandari catheter, placed 2/14/19-  Suprapubic catheter, longstanding.      Activity: Out of bed     Code Status:  Full code.  In review, the patient has commitment trial on 2/19/19.  She is being followed by Psychiatry services as well.  Had a commitment hearing scheduled for 2/12/19 but patient refused to attend.          This patient is considered critically ill and requires ICU level of care due to sepsis.     Total Critical Care time, not including separate billable procedure time:  35        Medications:       norepinephrine 4 mg/250 ml in D5W (0.016 mg/ml) Stopped (02/17/19 8720)     sodium chloride 0.9% 50 mL/hr (02/17/19 3834)     vasopressin         baclofen  10 mg Oral TID     eszopiclone  3 mg Oral QHS     gabapentin  900 mg Oral DAILY     levETIRAcetam  250 mg Oral BID      "levothyroxine  125 mcg Oral Daily 0600     meropenem  1 g Intravenous Q8H     methadone  10 mg Oral BID     methylPREDNISolone sodium succinate  40 mg Intravenous Q24H     miconazole   Topical BID     multivitamin with minerals  1 tablet Oral DAILY     neomycin-bacitracin-polymyxin   Topical TID     oxybutynin  5 mg Oral TID     polyethylene glycol  17 g Oral DAILY     QUEtiapine  25 mg Oral BID     QUEtiapine  50 mg Oral QHS     rivaroxaban  20 mg Oral Daily with supper     sodium chloride  10-30 mL Intravenous Q8H FIXED TIMES     vancomycin  1.25 g Intravenous Q18H         Exam/Data:   Vitals  /59   Pulse 95   Temp 98.7  F (37.1  C) (Oral)   Resp (!) 31   Ht 5' 2\" (1.575 m)   Wt 163 lb (73.9 kg)   LMP  (LMP Unknown)   SpO2 98%   BMI 29.81 kg/m       I/O last 3 completed shifts:  In: 1473 [P.O.:340; I.V.:533; IV Piggyback:600]  Out: 400 [Urine:400]  Weight change:   Wt Readings from Last 3 Encounters:   02/17/19 163 lb (73.9 kg)   12/08/18 162 lb (73.5 kg)   12/06/18 192 lb 9.6 oz (87.4 kg)          EXAM:  GEN: Cooperative but demanding, instructing nursing how to move her and in what direction  HEENT: Normocephalic, atraumatic.  Extraoccular eye movements intact, anicteric sclera. No sinus tenderness to palpation.  Moist mucous membranes.   NECK: Supple.    PULM: Non-labored breathing.  No use of accessory muscles.  Bilateral rhonchi but improved with clearance/cough.  No wheezing.  No rales.    CVS: Tachycardia.  Regular rhythm.  Normal S1, S2.  No rubs, murmurs, or gallops.    ABDOMEN: Hypoactive bowel sounds.  Protuberant but soft.  No masses palpable.    EXTREMITIES/SKIN:  Multiple sacral wounds with mild fresh blood, stage IV sacral ulceration without active bleeding.  Heel ulcerations.    NEURO:  Awake.  Oriented to person, place, time and situation.  Cranial nerves 2-12 grossly intact.   Moving upper extremities.        DATA  All laboratory and radiology has been personally reviewed by myself " today.  Results from last 7 days   Lab Units 02/17/19  0524   LN-WHITE BLOOD CELL COUNT thou/uL 19.6*   LN-HEMOGLOBIN g/dL 7.6*   LN-HEMATOCRIT % 26.5*   LN-PLATELET COUNT thou/uL 341     Results from last 7 days   Lab Units 02/17/19  0524 02/16/19  1829 02/15/19  1631   LN-SODIUM mmol/L 143 140 139   LN-POTASSIUM mmol/L 4.3 3.7 3.8   LN-CHLORIDE mmol/L 116* 108* 109*   LN-CO2 mmol/L 22 26 22   LN-BLOOD UREA NITROGEN mg/dL 15 17 10   LN-CREATININE mg/dL 0.66 0.74 0.81   LN-CALCIUM mg/dL 7.4* 7.7* 8.3*   LN-PROTEIN TOTAL g/dL 5.1* 5.5*  --    LN-BILIRUBIN TOTAL mg/dL 0.1 0.1  --    LN-ALKALINE PHOSPHATASE U/L 136* 133*  --    LN-ALT (SGPT) U/L 15 14  --    LN-AST (SGOT) U/L 21 26  --              Patient is critically ill with total CCT spent 35 minutes thus far today.     Christopher Lagnford MD PhD  [facility] [address]

## 2021-06-24 NOTE — PLAN OF CARE
"Pt c/o pain most of shift. Texted out to Dr Garcia to inform MD of increased pain in left heel and that shoots up into left leg.  MD ordered Dilaudid 2 mg q 3 hrs instead of q 4 hrs.  Pt still crying.  Given Flexeril, Tylenol, Ibuprofen throughout shift with no relief.  MD called again. Dr Garcia ordered ne time dose of IV Dilaudid 1mg.  Pt was very upset stating \"that isn't going to do a god damn thing.\"  Pt crying and stating she couldn't breathe.  Called RT at which time they came up and gave prn neb treatment.  Pt sleeping at 1120. Discharge pending d/t inability to find placement.    "

## 2021-06-24 NOTE — PROGRESS NOTES
"Patient up to chair with help of Faustino [place] CNA, patient refused me to assist and every time she see me she speak bad, \" get out of here you canot help anyone\" \" I need my medication right away, that's why you are here not to sleep\". Patient would ask Faustino for everything, patient informed that am taking care of her but was too rude. After patient got on the chair she went out with electrical chair out to smoke came back at 0640 and started yelling at me I have cause all kind of distress. I did not say anything.  "

## 2021-06-24 NOTE — PLAN OF CARE
Infection      Signs and symptoms of infections are decreased or avoided Not Progressing          Pain      Patient's pain/discomfort is manageable Progressing        WBC elevated. LS coarse and congested. C/o shortness of breath. CXR done, pt restarted on IV abx. Telemetry initiated. Pain managed well with oral dilaudid.

## 2021-06-24 NOTE — PROGRESS NOTES
Patient is on room air and is resting comfortably. SpO2 recorded at 98%.    Jarrett Cartwright, LRT

## 2021-06-24 NOTE — PLAN OF CARE
Patient's pain/discomfort is manageable Progressing    Pt reports ongoing pain in bilat feet/legs, as well as generalized pain. Medicated with prn dilaudid, tylenol, ibuprofen, as well as flexeril. Also on scheduled methadone.     Patient's discharge needs are met Progressing    Wound care declined. Pt states it is done when she gets into bed prior to sleeping at night.     Up in chair throughout shift. Outside frequently.    Lungs diminished with loose congested cough. Remains on RA.

## 2021-06-24 NOTE — PROGRESS NOTES
Hospitalist Progress Note     Assessment/Plan:     Marychuy Zhou is a 62 y.o. 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 Her pain regimen was adjusted and pain better controlled.  She had some issues with staff members during her hospital stay. She was evaluated by psychiatry and was noted to have major depressive disorder with a component of paranoia. During her hospitalization here, she completed 10-day course of vancomycin and meropenem per ID.  She was also treated for recurrent aspiration pneumonia. She is currently medically stable for discharge.    Active Problem:   Probable recurrent aspiration pneumonia:  -Current respiratory status stable.  CXR on 2/12 with ARMANDO infiltrates, possible pneumonitis    Plan:  -Finished antibiotics.  Finishing prednisone taper.  SLP involved.  Patient refused video swallow study.  Not following aspiration precautions.  -flutter valve.    Opioid dependence:  Chronic pain:  -Follows with the PCP and plan to follow-up with the pain clinic.  On methadone and oxycodone as an outpatient. Continued here.    Plan:  -Continue methadone 3 times daily.  On prn dilaudid. Continue gabapentin. Pain clinic referral on discharge.    Severe MDD with psychotic features:  Paranoia:  Anxiety:  -Continue quetiapine.    Stage IV sacral decubitus ulcer with chronic osteomyelitis (7/2018) status post I&D and IV antibiotics and recurrences:  History of recurrent UTIs:  -Afebrile, no  leukocytosis..stage III right sacral decubitus ulcer and probable stage IV left sacral decubitus ulcer.  Coccygeal ulcer as well. Right LE stage IV ulcer, patient not fully cooperative with wound cares and high risk of reinfection.  -Past UTIs (ESBL E. coli/Klebsiella, Pseudomonas, Acinetobacter, enterococcus, Proteus).  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      Plan:  -Finished course of antibiotics. Appreciate WOCN involvement.     Severe constipation, narcotic induced as well as neurogenic, resolved:  -Continue Miralax scheduled. Prn senna.  Dulcolax as needed.     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  -She was evaluated by urology in the past for urethral incompetence and lower abdominal/pelvic pain.  She had nephrostomies and suprapubic catheters in the past.     Plan:  - continue gabapentin and baclofen. Continue oxybutynin. Replace suprapubic catheter every 4 weeks as an outpatient.     Microcytic anemia:  -Hemoglobin 11.7 above her baseline. Low MCV. No acute signs of blood loss. Anemia likely due to malnutrition.     Plan:  -Continue iron supplementation.  Recommend iron studies as an outpatient     Moderate protein calorie malnutrition:  -She does have low BUN signifying catabolic state and/or protein malnutrition.      Plan:  -Registered dietitian involved.     Centrilobular emphysema:  -Stable.  Not oxygen dependent.  No acute exacerbation.     Plan:  -Continue DuoNeb as needed.     History of DVT/PE:  -Continue rivaroxaban 20 mg daily.  DVT likely unprovoked and due to paraplegia and at an increased risk of recurrent DVT.  Continue current dose.     History of subdural hematoma (4/2017):  -No acute issues.     Hypothyroidism:  -Continue levothyroxine.     History of seizure disorder:  -Continue  levetiracetam.    Insomnia:  -Continue eszopiclone     DVT prophylaxis: Continue rivaroxaban.      Subjective:     No acute events overnight. Very pleasant today and grateful. She reports significant pain with bed transfers.    Review of systems:     Please see Subjective.    Current Medications:     Scheduled Meds:    baclofen  10 mg Oral TID     calcium-vitamin D  1 tablet Oral DAILY     eszopiclone  3 mg Oral QHS     ferrous sulfate  325 mg Oral BID with meals     furosemide  40 mg Oral BID - diuretic     gabapentin  900 mg Oral DAILY     levETIRAcetam  250 mg Oral BID     levothyroxine  125 mcg Oral Daily 0600     methadone  10 mg Oral TID     multivitamin with minerals  1 tablet Oral DAILY     omeprazole  20 mg Oral QAM AC     oxybutynin  5 mg Oral TID     polyethylene glycol  17 g Oral BID     potassium chloride ER  30 mEq Oral Q4H     [START ON 3/13/2019] predniSONE  10 mg Oral Daily with brkfst     predniSONE  20 mg Oral Daily with brkfst     QUEtiapine  25 mg Oral BID     QUEtiapine  50 mg Oral QHS     rivaroxaban  20 mg Oral Daily with supper     senna-docusate  2 tablet Oral BID     sodium chloride  10-30 mL Intravenous Q8H FIXED TIMES     Continuous Infusions:  PRN Meds:.acetaminophen, albuterol, alteplase, bisacodyl, calcium (as carbonate), codeine-guaiFENesin, cyclobenzaprine, HYDROmorphone, hydrOXYzine HCl, ibuprofen, ipratropium-albuterol **AND** [] Nebulizer treatment intermittent, magnesium hydroxide, melatonin, miconazole nitrate, naloxone **OR** naloxone, OLANZapine, ondansetron **OR** ondansetron, polyethylene glycol, polyvinyl alcohol, sodium chloride bacteriostatic, sodium chloride bacteriostatic, sodium chloride, sodium chloride, sodium chloride, sodium phosphates 133 mL, traZODone    Objective:       Vital signs in last 24 hours:     Heart Rate:  [103-130] 112  Resp:  [16-20] 16  BP: (101-126)/(64-66) 101/64  Weight: 163 lb (73.9 kg)    Physical Exam:     General appearance: Alert,  Awake, No distress   Head & Neck Atraumatic, supple, no tracheal deviation   ENT  PER, conjunctiva clear, no scleral icterus, EOMI, no nasal discharge   Resp: +rhonchi, no wheezing, no crackles   CVS: S1, S2 normal, regular rate and rhythm, no murmurs   GI: Soft, nontender, distended and BS+   MSK: No swelling, or tenderness   Neuro:  Alert and oriented ×3, paraplegia      Intake/Output this shift:     I/O last 3 completed shifts:  In: -   Out: 2500 [Urine:2500]    Pertinent Labs:     Lab Results: personally reviewed.     Pertinent Radiology:       Advanced Care Planning:     Barrier to discharge: Placement  Anticipated LOS: To be determined  Disposition: To be determined    Peggy Valentino M.D.  [facility]  [facility]

## 2021-06-24 NOTE — PROGRESS NOTES
Physical Therapy    Pt seen for initial lymphedema evaluation.  Pt presents with mild B LE edema.  Pt fit with tubigrip on B LE.  Pt is to wear as much as tolerated. They can be removed if too painful or if pt has sudden onset of SOB.  Will be back 2/28/19 to reassess swelling.  Please call rehab services with any questions.    2/27/2019 by Amelia Hernandez, PT, DPT, CLT

## 2021-06-24 NOTE — PROGRESS NOTES
"Pt continues to refuse assessment of sacral wounds. Pt assisted back to bed with ceiling lift and staff x3, Pt demanding and rude, pt repeatedly stating to staff \"it is 10:20, not 4:20, if it was 4:20 I know you would already have it rolled\" and then laughs. Took 40 minutes to assist pt back to bed and position per her direction. Will continue to monitor and provide cares as allowed.  "

## 2021-06-24 NOTE — PROGRESS NOTES
Psychiatric Progress Note  Single episode, severe depression with anxiety features and psychotic features.  Paranoia.  Difficulty controlling anger.  Cognitive dysfunction in the light of untreated mental illness.  At risk for encephalopathy due to frequent hospitalizations.    PLAN/RECOMMENDATIONS:  Olanzapine 2.5 mg 3 times daily as needed.  Seroquel 50 mg at bedtime.  Seroquel 25 mg twice daily  Gabapentin 900 mg daily.  Minimize benzodiazepine.  Minimize narcotics.  SUBJECTIVE:  Remains belligerent and with mood lability. Complaining about multiple things and how most staff are stupid. She wants to call the [facility] court to find out about her commitment proceedings and status. She does not want to go to any TCU.   She does not have hallucinations, delusions, cantankerous. Calls demeaning names to staff.       MENTAL STATUS EXAMINATION: Same as yesterday.  Patient is verbally abusive, non compliant, laying in soiled and wet levi things, using profanity frequently and refusing cares, loud and angry conversation.  Speech is loud.  Thought process is clear.  Thought content does not show active visual or auditory hallucinations.  No suicidality.  Patient is dismissive of her isolation precaution status . thought formation does not show loosening of associations or flight of ideas.  Judgment, insight, memory, attention, comprehension, fund of knowledge are fair.  Language is intact.  Affect is blunted mood restricted.  Patient is wheelchair-bound.  Moves upper extremities without any difficulties.  Awake, orientation x3.   Wheel chair dependent.      Vital signs in last 24 hours  Temp:  [97.5  F (36.4  C)-99.3  F (37.4  C)] 97.9  F (36.6  C)  Heart Rate:  [] 108  Resp:  [20] 20  BP: (113-118)/(65-73) 113/73  Weight:   162 lb (73.5 kg)

## 2021-06-24 NOTE — PLAN OF CARE
"Care Plan Progress Note:    Name: Marychuy Zhou  :   1955  MRN:   [id number]    Problem: Compromised Skin Integrity  Goal: Skin integrity will be maintained / improved  Outcome: Not Progressing    Assist x2 staff in room and farrah lifted patient up into her electric wheelchair this AM.  Patient continues to have large open wounds on her sacrum area that are reddened and bleeding.  Small, incontinent stool noted.  Patient directs her daily cares & is very particular about how she is being moved/turne - and patient applied a dry gauze to a large heart mepilex for writer to apply over her sacrum.  Packed deep sacral wound with a moistened normal saline gauze.  Covered with the dry gauze and mepliex.  Buttocks / perineum area is denuded with a large, yeasty rash noted.  Patient refused for writer to cleanse perineum or abdominal area.  Noted suprapubic and gonzalez catheter to be bypassing urine.  Patient refused cares for that.  Bilateral lower extremity wounds are currently wrapped - unable to assess wounds on BLE.  [facility] RN unable to evaluate patient today due to patient being up in the chair already this AM.  Patient agreed to wait to get up in her wheelchair tomorrow until the [facility] RN can evaluate her wounds - which would be around 8:30-9:00AM tomorrow.  Patient is refusing to wear an incontinent brief.  x2 chucks pads placed on seat of wheelchair.  WO RN ordered Calmoseptine Ointment PRN to apply to denuded areas on sacrum.    It was visibly noted that perineum and abdominal folds are very reddened and noted \"chunks\" of thick, white substance in those areas.  Patient refused all katia cares near her catheter and in her skin folds.  Patient stated \"she can do that herself\".    Comments:  Patient was calm and appeared to be in a good mood during interaction with patient today.  Patient had her electric wheelchair company come to the hospital today to fix her wheelchair.  They placed a new seat cushion and " "adjusted her head rest.  Patient reports \"her chair feels much more comfortable\".  PO Dilaudid, PO Tylenol, PO Flexeril, and PO Ibuprofen PRN given for chronic pain.  Patient continues on contact precautions.  Patient continues to intermittently leave the unit in her wheelchair to go outside to smoke.  Writer asked patient about the fall from her wheelchair yesterday and patient stated \"I didn't fall at all, I don't know what you are talking about\".  Patient refusing to wear her seatbelt while up in her chair.  K+ 4.7 and Mg+ 1.9 - both scheduled to be re-drawn tomorrow AM per the protocols.      1900:  Patient refused her evening assessment from [id number].  Patient is up in her electric wheelchair and reports she doesn't want to go back to bed until later tonight.  Patient continues to leave the unit to go to the gift shop, outside to smoke, and around the hospital at own discretion.    Discharge Planning:  Awaiting Placement      3/4/2019 2:45 PM    Penelope Ortiz RN      "

## 2021-06-24 NOTE — PLAN OF CARE
Constipation      Constipation will be resolved Not Progressing        Daily Care      Daily care needs are met Not Progressing        Non-compliance with treatment regimen      Compliance with treatment regimen Not Progressing          Pain      Patient's pain/discomfort is manageable Progressing        Pt slept most of the night, refuses to be turned, no bowel movement this shift, requested pain medication x1 this morning.  Reagan Rojas RN

## 2021-06-24 NOTE — PROGRESS NOTES
"Patient wants writer to call \"[facility]\" \"they're there all the time\" and request they arrive an hour earlier than they have scheduled to pick her up. Writer asked patient what time they're are arriving, patient stated \"I'm at my wits end with you guys\" \"I've given you all the information you need\" \"where's the brains in this operation\".   Writer confirmed with charge RN and PRITESH that patient is to be dressed and ready to go at 0830, transport scheduled for 0930 with court time approximately 1100.   "

## 2021-06-24 NOTE — PLAN OF CARE
Demonstrates ability to cope with hospitalization/illness Not Progressing    Pt continues to be demanding and verbally abusive to staff when awake tonight. Will continue to monitor, set and enforce boundaries.  Patient's pain/discomfort is manageable Progressing    Pt continues to rate pain 10/10, slept much of night, appears comfortable. Woke pt up at 0300 to admin IV abx and pt received PRN dilaudid and tylenol at that time. Sleeping upon reassessment.   Damaged tissue is healing and protected Not Progressing    Pt refuses turning and repositioning. Encouraged pt to reposition and re-approached, pt continues to refuse. VSS.

## 2021-06-24 NOTE — SIGNIFICANT EVENT
"Nursing staff responded to someone screaming \"nurse\" from by the [facility] elevators. Staff responded while internal code 9 was called. Pt was noted to have fallen out of wheelchair and was being held up by [employer] employee who was calling out for assistance. Pt kept stating \"I hit a bump in the tunnel\". Store Room employee later stated that he observed patient turn the corner by the elevators too fast and she seemed to get her arm caught, and when she attempted to free herself she slipped out of chair, he then ran over to assist patient from falling onto side. Pt then told employee just to help her get back into chair and that no one needed to be called, but employee stated he told patient that he was unable to do this. Pt was assisted back into w/c by farrah lift and several staff member. Wound dressing were noted to have fallen off and both pt and wheelchair were visibly soiled. Pt then later told staff she fell out of her wheelchair because two pillows were placed behind her back instead of one. Pt not in any distress after getting put back into wheelchair, went immediately back to her room to get assessed by resident who responded to internal code 9. Pt denies any acute pain or hitting head during incident. Dr. Gee notified of patient's fall and will also be coming to assess patient. Pt noted to not have working seatbelt in chair. Writer asked patient if everything else was working okay on her wheelchair and pt stated yes and that someone from chair company is actually coming out to see her tomorrow to speak with her about starting the process of getting a new chair. Security staff expressed concern to this writer about having to frequently be called to let patient outside onto [address] to smoke and that if she is outside and they get called away, she might have to wait to come back inside. They spoke with patient about using the [facility] Entrance to go out side as that entrance had automatic doors, " pt states she is in agreement with this plan.    stable Satisfactory control.

## 2021-06-24 NOTE — PROGRESS NOTES
"Clinical Nutrition Therapy Follow Up Note    Current Nutrition Prescription:   Diet: regular;   Diet Supplements:none; has refused these    Current Nutrition Intake:  Variable appetite, eating % of meals.  Generally is ordering meals high in empty calories from pepsi, lemon bars, etc.      Pt ordered 2-3 meals/day--usually gets enough calories but protein is not adequate to meet needs. Pt is aware of need for protein but doesn't want supplement drinks and will only order what she wants to order.     Anthropometrics:   Height: 5' 2\" (157.5 cm)  Admission weight: 160# stated  Weight: 163 lb (73.9 kg) 2/17Ideal body weight ~100# (IBW-10-15# for paraplegia)  Pt has very few actual weights and usually gives a stated wt with admission and refuses to cooperate to allow bed to be zeroed.  Wt listed for 12/6/18 was 11# higher than prior weights that mentioned that pt refused to allow excess linens to be removed from bed--suspect wt not accurate on 12/6/18 as well.  All prior weights are questionable for this reason and also due to weights with specialty mattresses/beds.    Edema: +1 BLE 2/18, nonpitting BUE 2/17    RD Nutrition Focused Physical Exam:  The patient has the following physical signs which could indicate malnutrition: No noted fat/muscle loss noted in face arms.      GI Status/Output:   GI symptoms include: constipated, distended  Last BM: 2/17     Skin/Wound:  Isaiah score Isaiah Scale Score: 12; multiple pressure ulcers noted; WOCN following.    Medications:  Medications reviewed.    GoLytely prep ordered 2/20 but not given  Daily miralax continues.   Pt has requested Rx for constipation but then refuses it once ordered.     Labs:  Labs reviewed:   Glucose: 185  2/20: abdominal XRay--stool throughout colon.    Malnutrition: Not noted with no reliable hx re: weight or oral intake.     Nutrition Risk Level: moderate risk    Nutrition dx:   Increased nutrient needs r/t wounds as evidenced by standard needs " for multiple wounds noted in WOC RN note.     Goal:   Meet estimated nutrition needs and Wound healing. Progressing.    Intervention:   Continue current plan.  Encourage protein intake.    Monitoring/Evaluation:   intake, weights, wound healing.     Electronically signed by:  Cecile Black RD

## 2021-06-24 NOTE — PLAN OF CARE
Patient's pain/discomfort is manageable Not Progressing    Patient complaining this morning of worsening bilateral heel pain. Patient states it has never been this bad. Pain is radiating up her legs. Pain medication given, patient seems to be more comfortable now, has not had any further complaints but would like Lidocaine gel ordered for heel wounds.   Patient/family/caregiver demonstrates understanding of disease process, treatment plan, medications, and discharge instructions Progressing    Patient did refuse assessment this morning but stated we could maybe do it later in the day. Will re-approach later in the day. Patient is requesting a chest X-ray today, stating she thinks her pneumonia is worse. Patient does have a congested, productive cough but swallows anything she is able to cough up. Patient refused lung assessment. Patient does not want CXR until later tonight when she gets back into bed.

## 2021-06-24 NOTE — PLAN OF CARE
Non-compliance with treatment regimen      Compliance with treatment regimen Progressing    Pt did not want to be disturb for assessment. Pt observed sleeping comfortably with even respirations noted.      Pain      Patient's pain/discomfort is manageable Progressing    Pt slept most of the shift. Pt woke up at 5 and asked for pain medication. Ibuprofen, flexeril, and dilaudid given per request.

## 2021-06-24 NOTE — CONSULTS
[facility] Urology   Consultation - GENERAL     Type of Consult: inpatient  Place of Service: [facility]  Reason for Consultation: urinary incontinence   Consult Requested by: Dr. Gee    History of Present Illness:   64 y/o female with multiple medical and psychiatric comorbidities. Admitted for pain control and also treated for pneumonia. Well-known to urology for neurogenic bladder, incontinence. Seen by [facility] for SPT changes. Neurogenic bladder is now being managed by SPT. Previously had bilateral nephrostomies for urinary diversion. Patient c/o urine leakage per urethra. She has sacral ulcers. She wants to have both a urethral and suprapubic catheter in place. SPT has been draining well.     Pertinent Labs:   Lab Results: personally reviewed   Lab Results   Component Value Date     02/17/2019     02/16/2019     02/15/2019    K 4.3 02/17/2019    K 3.7 02/16/2019    K 3.8 02/15/2019    CO2 22 02/17/2019    CO2 26 02/16/2019    CO2 22 02/15/2019    BUN 15 02/17/2019    BUN 17 02/16/2019    BUN 10 02/15/2019    CREATININE 0.66 02/17/2019    CREATININE 0.74 02/16/2019    CREATININE 0.81 02/15/2019    CALCIUM 7.4 (L) 02/17/2019    CALCIUM 7.7 (L) 02/16/2019    CALCIUM 8.3 (L) 02/15/2019     Lab Results   Component Value Date    WBC 19.6 (H) 02/17/2019    WBC 24.8 (H) 02/16/2019    WBC 16.0 (H) 02/14/2019    WBC 6.0 09/19/2015    WBC 6.0 09/18/2015    WBC 9.3 09/10/2015    HGB 7.6 (L) 02/17/2019    HGB 7.7 (L) 02/16/2019    HGB 9.7 (L) 02/14/2019    HCT 26.5 (L) 02/17/2019    HCT 26.6 (L) 02/16/2019    HCT 34.4 (L) 02/14/2019    MCV 87 02/17/2019    MCV 85 02/16/2019    MCV 87 02/14/2019     02/17/2019     02/16/2019     02/14/2019           Past Medical History:   Diagnosis Date     Alcohol dependence (H)     history     Anxiety      Cancer of lung (H) 9/24/2013    Overview:  Adenocarcinoma Stage 1A per preoperative needle biopsy   Adenocarcinoma Stage 1A per preoperative  needle biopsy  Wedge r/s 9/2013     Chronic kidney disease      Chronic pain     on Methadone     Chronic suprapubic catheter (H)      Constipation      COPD (chronic obstructive pulmonary disease) (H)      Decubitus ulcer     had wound vac     Depression      Diastolic CHF, acute on chronic (H)      DVT (deep venous thrombosis) (H) 5/26/2015     ESBL (extended spectrum beta-lactamase) producing bacteria infection 08/2015    urine     Gastroparesis      GERD (gastroesophageal reflux disease)      HCAP (healthcare-associated pneumonia)      Herpes Simplex Type I      Hyperlipemia      Hypertension      Hypothyroidism 5/26/2015     Intracranial subdural hematoma (H)      Kidney stone      Lower paraplegia (H) 4/28/2016    Overview:  spinal epidural hematoma, emergent decomp Overview:  spinal epidural hematoma, emergent decomp Overview:  spinal epidural hematoma, emergent decomp     MRSA infection      Neurogenic bladder     chronic gonzalez     Neuropathy (H) 5/26/2015     Obesity      Opiate dependence, continuous (H)      Osteoporosis      Pneumonia      Pulmonary embolism (H) 12/2016    chronic, on coumadin     Rheumatoid arthritis (H)      Sepsis (H) 5/28/2017     SVT (supraventricular tachycardia) (H) 8/19/2014     Vascular myelopathies (H)        Past Surgical History:   Procedure Laterality Date     CHOLECYSTECTOMY       COLONOSCOPY N/A 6/8/2017    Procedure: COLONOSCOPY;  Surgeon: Conor Hdez MD;  Location: [facility];  Service:      CRANIECTOMY Left 4/10/2017    Procedure: LEFT CRANIOTOMY FOR SUBDURAL HEMATOMA EVACUATION AND SUBDURA PROCESS;  Surgeon: Maria Alejandra Salinas MD;  Location: [facility];  Service:      CT BIOPSY BONE  11/16/2018     DECUBITUS ULCER EXCISION N/A 12/22/2017    Procedure: DEBRIDEMENT, SACRAL ULCER;  Surgeon: Pierre Cazares MD;  Location: [facility] OR;  Service:      DECUBITUS ULCER EXCISION Right 2/5/2018    Procedure: DEBRIDEMENT, PRESSURE ULCER;   Surgeon: Pierre Cazares MD;  Location: [facility] OR;  Service:      DECUBITUS ULCER EXCISION N/A 2018    Procedure: DEBRIDEMENT, PRESSURE ULCER;  Surgeon: Pierre Cazares MD;  Location: [facility] OR;  Service:      ERCP N/A 1/3/2017    Procedure: ENDOSCOPIC RETROGRADE CHOLANGIOPANCREATOGRAPHY;  Surgeon: Roel Echeverria MD;  Location: [facility] OR;  Service:      FRACTURE SURGERY      tibula/fibula     HH MIDLINE INSERTION  2018          INCISION AND DRAINAGE OF WOUND N/A 1/10/2019    Procedure: INCISION AND DRAINAGE, SACRAL ULCER;  Surgeon: Pierre Cazares MD;  Location: [facility] OR;  Service: General     IR PICC PLACEMENT >5 YEARS W/O FLUORO GUIDANCE  2018     LAPAROSCOPIC CHOLECYSTECTOMY N/A 2017    Procedure: CHOLECYSTECTOMY, LAPAROSCOPIC;  Surgeon: Danis Aviles MD;  Location: [facility] OR;  Service:      PICC  2016          PICC  2017          PICC  2019          PICC AND MIDLINE TEAM LINE INSERTION  2014          PICC AND MIDLINE TEAM LINE INSERTION  2018          PICC AND MIDLINE TEAM LINE INSERTION  2018          DE APPENDECTOMY      Description: Appendectomy;  Recorded: 2008;     DE MARTE W/O FACETEC FORAMOT/DSKC  VRT SEG, CERVICAL      Description: Laminectomy Lumbar;  Recorded: 2013;     DE REMOVAL OF TONSILS,<13 Y/O      Description: Tonsillectomy;  Recorded: 06/10/2008;     DE TOTAL ABDOM HYSTERECTOMY      Description: Hysterectomy;  Recorded: 06/10/2008;       Family History   Problem Relation Age of Onset     COPD Mother          in her 50s     COPD Brother      Lung cancer Sister      Liver disease Father      COPD Father          in his 50s       Social History     Socioeconomic History     Marital status:      Spouse name: Not on file     Number of children: Not on file     Years of education: Not on file     Highest education level: Not on file   Social Needs     Financial  resource strain: Not on file     Food insecurity - worry: Not on file     Food insecurity - inability: Not on file     Transportation needs - medical: Not on file     Transportation needs - non-medical: Not on file   Occupational History     Not on file   Tobacco Use     Smoking status: Current Every Day Smoker     Types: Cigarettes     Smokeless tobacco: Never Used   Substance and Sexual Activity     Alcohol use: Yes     Comment: currently sober, history of ETOH abuse     Drug use: No     Sexual activity: Not on file   Other Topics Concern     Not on file   Social History Narrative    The patient lives at home and has 24 hour care.   She has 2 sons and some grandchildren.    She won a malpractice lawsuit against the doctor who did not diagnose her spinal hematoma correctly.    Patient arrived in the ED alone 10/01/17       No current facility-administered medications on file prior to encounter.      Current Outpatient Medications on File Prior to Encounter   Medication Sig Dispense Refill     acetaminophen (TYLENOL) 500 MG tablet Take 1-2 tablets (500-1,000 mg total) by mouth every 4 (four) hours as needed.  0     albuterol sulfate 90 mcg/actuation AePB Inhale 180 mcg every 4 (four) hours as needed (Wheezing or dyspnea). 1 each 0     benzocaine-menthol (CEPACOL) 15-3.6 mg Take 1 lozenge by mouth every hour as needed.  0     bisacodyl (DULCOLAX) 10 mg suppository Insert 10 mg into the rectum daily as needed.       calcium, as carbonate, (TUMS) 200 mg calcium (500 mg) chewable tablet Chew 1 tablet (200 mg total) 4 (four) times a day as needed. 360 tablet 3     cyclobenzaprine (FLEXERIL) 10 MG tablet Take 1 tablet (10 mg total) by mouth 3 (three) times a day as needed for muscle spasms. 10 tablet 0     diaper,brief,adult,disposable (BRIEFS, ADULT-EXTRA LARGE) Misc Use 1 each As Directed as needed. 180 each 11     disposable gloves (PURPLE NITRILE GLOVES) Misc Use 1 each As Directed as needed. 200 each 11      gabapentin (NEURONTIN) 300 MG capsule Take 3 capsules (900 mg total) by mouth daily. 30 capsule 1     hydrocortisone 1 % ointment Apply topically 2 (two) times a day. Apply to affected area 30 g 0     HYDROmorphone (DILAUDID) 2 MG tablet Take 1.5 tablets (3 mg total) by mouth every 3 (three) hours as needed. 20 tablet 0     hydrOXYzine HCl (ATARAX) 25 MG tablet Take 1-2 tablets (25-50 mg total) by mouth every 6 (six) hours as needed for itching. 15 tablet 0     ibuprofen (ADVIL,MOTRIN) 400 MG tablet Take 1 tablet (400 mg total) by mouth every 6 (six) hours as needed. 20 tablet 0     levETIRAcetam (KEPPRA) 250 MG tablet Take 1 tablet (250 mg total) by mouth 2 (two) times a day. 60 tablet 3     levothyroxine (SYNTHROID, LEVOTHROID) 125 MCG tablet Take 125 mcg by mouth Daily at 6:00 am.       lidocaine 4 % patch Place 2 patches on the skin daily. Remove and discard patch with 12 hours or as directed by MD. 30 patch 1     melatonin 3 mg Tab tablet Take 1 tablet (3 mg total) by mouth at bedtime as needed. (Patient taking differently: Take 12 mg by mouth at bedtime as needed .      ) 90 tablet 1     methadone (DOLOPHINE) 5 MG tablet Take 1 tablet (5 mg total) by mouth 2 (two) times a day. 30 tablet 0     methadone (DOLOPHINE) 5 MG tablet Take 3 tablets (15 mg total) by mouth at bedtime. 30 tablet 0     miconazole (MICOTIN) 2 % powder Apply topically 2 (two) times a day. To groin folds 85 g 6     multivitamin with minerals (THERA-M) 9 mg iron-400 mcg Tab tablet Take 1 tablet by mouth daily.  0     neomycin-bacitracin-polymyxin (NEOSPORIN) ointment Apply topically 2 (two) times a day. Apply to affected area. Apply together with hydrocortisone cream.  0     omeprazole (PRILOSEC) 20 MG capsule Take 20 mg by mouth daily as needed (heartburn).       oxybutynin (DITROPAN) 5 MG tablet Take 1 tablet (5 mg total) by mouth 3 (three) times a day. 90 tablet 0     polyethylene glycol (MIRALAX) 17 gram packet Take 1 packet (17 g total)  "by mouth 2 (two) times a day as needed.  0     QUEtiapine (SEROQUEL) 25 MG tablet Take 1 tablet (25 mg total) by mouth 2 (two) times a day. Morning and afternoon 15 tablet 0     QUEtiapine (SEROQUEL) 50 MG tablet Take 1 tablet (50 mg total) by mouth at bedtime. 30 tablet 3     rivaroxaban 20 mg Tab Take 1 tablet (20 mg total) by mouth daily with supper. 30 tablet 3     white petrolatum (AQUAPHOR NATURAL HEALING) 41 % Oint Apply 1 application topically 3 (three) times a day as needed (dry skin).  0     zinc oxide 20 % ointment Apply 1 application topically daily as needed for dry skin (or irritation).         Review of Systems:   \"A full 10 point review of systems was taken and is negative aside from what is noted above in the HPI.\"       Intake/Output past 24 hrs:    Intake/Output Summary (Last 24 hours) at 2/17/2019 1637  Last data filed at 2/17/2019 1214  Gross per 24 hour   Intake 1473 ml   Output 850 ml   Net 623 ml       Physical Exam:  Temp:  [98.7  F (37.1  C)-98.8  F (37.1  C)] 98.8  F (37.1  C)  Heart Rate:  [] 104  Resp:  [8-54] 20  BP: ()/(46-68) 110/58  General: NAD, alert, cooperative  Head: normocephalic, without abnormality / atraumatic  Abdomen: soft, non-tender, non-distended. No suprapubic fullness or tenderness.   Geniturinary: suprapubic catheter draining clear yellow urine, insertion site clean. Patulous urethra  Skin: bilateral inguinal erythematous rash  Musculoskeletal: lower extremity contractures  Psychological: alert, calm and very demanding     Assessment/Plan: Marychuy Zhou is a female with neurogenic bladder, incontinence, skin ulcers managed with suprapubic tube  - patient requested a urethral catheter. 16 Fr Bhandari catheter inserted under sterile conditions (15 cc in the balloon)  - Maintain SPT, change Q 4 weeks  - Concerned that leaving a urethral catheter in will cause further erosion of the urethra and worsen leakage in the long term  - She is on Ditropan for bladder " spasms, consider adding Belladonna Opium suppositories as well  - Injection of botox into the bladder may be a good option for her incontinence. If possible she should follow up as outpatient for that       Thank you for allowing me to participate in the care of this patient. Please feel free to contact me with any questions or concerns.   Marilee Pro MD  [facility]  Pager: [phone]  Office Phone: #[phone]

## 2021-06-24 NOTE — PLAN OF CARE
"Problem: Pain  Goal: Patient's pain/discomfort is manageable  Outcome: Progressing      Problem: Potential for Compromised Skin Integrity  Goal: Skin integrity is maintained or improved  Outcome: Not Progressing      Problem: Non-compliance with treatment regimen  Goal: Compliance with treatment regimen  Outcome: Not Progressing      Comments: A&Ox4, able to verbalize needs. VSS, tachycardic 102. C/o coccyx pain rated 8/10, well controlled with PRN flexeril and PRN Dilaudid. When writer arrived pt was already up to wheelchair for the day, refused assessment of pressure ulcers, lungs, gonzalez catheter, suprapubic catheter, and other general assessments. C/o not having BM for \"a month\", per documentation had BM 2-13, and then BM this AM after digital disimpaction. Pt refusing interventions to include: scheduled miralax, Golytely, and enema. Refuses repositioning in w/c. Wheels self off Unit frequently to visit gift shop and go outside to smoke. Currently resting in w/c, no s/s distress, call light within reach, room near nurses station and door open.  "

## 2021-06-24 NOTE — PLAN OF CARE
Care Plan  Care Management      Care Management Goals of the Day: Progression of care    Care Progression Reviewed With: Charge RN, MD, HUC, RNCM, CMSW    Barriers to Discharge: IV abx    Discharge Disposition: LTC    Expected Discharge Date: 02/25/2019    Transportation: [employer] [facility]      Care Coordination Narrative:      Kennedy Gunderson has dismissed Commitment Case. At this time the CM team will continue to look for appropriate d/c placement on pt's behalf. The follow referrals have been made:    Waiting for Response    [facility]: Referral sent    [facility]: Referral sent     [facility]: Referral Sent    [facility]: Referral sent    [facility]: Referral sent    [facility]: Re-referred    [facility]: Referral sent  Declined    [facility] [place]: Declined, no LTC beds    [facility]: Declined, no LTC beds    [place]: Declined, Does not meet criteria     [facility] [facility]: Declined, non-smoking facility    [facility]: DECLINED    [facility]: DECLINED    [facility] [place]: DECLINED    [facility] [place]: DECLINED    [facility]: DECLINED    [facility]: DECLINED    [facility]: DECLINED    [facility]: DECLINED    [facility]: DECLINED    [facility]: DECLINED    [facility]: DECLINED    [facility]: DECLINED    [facility]: DECLINED    [facility]: DECLINED    [facility]: DECLINED    [facility]: DECLINED    [facility]: DECLINED    [facility]: DECLINED    [facility]: DECLINED    [facility]: DECLINED    [facility]: DECLIEND    SW will continue to follow and assist with further d/c needs.     CYNTHIA Gonsalez  2/20/2019  10:55 AM

## 2021-06-24 NOTE — PROGRESS NOTES
"RT called to patients room to assess respiratory status. Patient has Coarse/Rhonchi Breath sounds with weak cough. RT explained and offered every bronchial hygiene treatment available, patient refused all treatments. RT explained NT suctioning procedure and asked to NT suction, patient refused. RT explained and expressed concern for patients current respiratory status. Patient has been intubated in the past and RT explained that without being able to treat her intubation was a future possibility.    MD currently aware. RT will continue to follow and treat as allowed.    BP 96/68 (Patient Position: Sitting)   Pulse (!) 141   Temp 97.6  F (36.4  C) (Oral)   Resp 28   Ht 5' 2\" (1.575 m)   Wt 162 lb (73.5 kg)   LMP  (LMP Unknown)   SpO2 95%   BMI 29.63 kg/m      "

## 2021-06-24 NOTE — PROGRESS NOTES
Infectious Disease Chart Check:    Plan per Dr. Lemus's note from 2/17/19  Total duration of antibiotics, Vancomycin and meropenem, 7-10 days from 2/15/19  ID will sign off.   No charges for chart check.   Assessment and plan per Dr. Lemus's note copied below:  Fannie Hodges MD  [facility]  [phone]        INFECTIOUS DISEASE FOLLOW UP NOTE     Date:    2/17/2019     ASSESSMENT:  1. Hospital acquired pneumonia, agree that aspiration is a consideration. Has leukocytosis, steroids may contribute. Respiratory and hemodynamic status now improved.   2. H/o osteomyelitis sacrum and heel. Now completed cephalexin course. By last wound care eval there is no longer exposed bone at sacrum  3. H/o MDROs, not on most recent cultures  4. Paraplegia at T4.   5. Neurogenic bladder.  6. Difficult IV access.   7. Chronic pain  8. Behavioral disorder: She is blacklisted by most all local TCUs/ NHs. She has commitment trial on 2/19/19.     PLAN:  Continue meropenem and vancomycin, 7-10 days.     Sánchez Lemus MD  [facility]   On-Call: [phone]

## 2021-06-24 NOTE — PROGRESS NOTES
Patient allowed the writer to change the dressing on her bottom, wound packed and mepilex applied.

## 2021-06-24 NOTE — PLAN OF CARE
"Problem: Pain  Goal: Patient's pain/discomfort is manageable  Outcome: Progressing  Pt has been complaining of left foot pain and was medicated with PRN Dilaudid X3, Tylenol X1, and  Ibuprofen X2. Per pt, all the PRNs were not effective, and she requested to speaking to the doctor. Pt was seen by Dr. Edwards but no new order given. Later, pt request for IV Dilaudid and was told that there was no order. Pt became very upset and started crying and swearing. She stated \"they want me to loose my legs, this is not fair, bitch\". Dr. Garcia was notified about pt's behaviors and request, and he ordered a one time dose of IV Dilaudid. Medication was given in addition to the oral doses. Pt is in bed at this time.      "

## 2021-06-24 NOTE — PROGRESS NOTES
[facility] Daily Progress Note      Date of Service: 2/21/2019     Assessment and plan    Severe constipation  ; Recent CT did not show any signs of obstruction  : X-ray abdomen did not show any signs of obstruction  ; Likely secondary to pain medication and immobility  ; Continue bowel regimen  : GoLYTELY patient agrees  ; Having some bowel movement this morning    Severe sepsis with impending septic shock  : Symptomatically improved  ; Leukocytosis resolved  ; Blood pressure stable  ; Likely secondary to pneumonia probable aspiration pneumonia  ; Continue broad-spectrum antibiotics as per ID recommendation  : CT chest abdomen pelvis not show any other pathology  : Continue antibiotics as per ID recommendation  : Increased leukocytosis but feel likely secondary to hemoconcentration review of diuresis  : Symptomatically better    Acute hypoxic respiratory failure: Resolved  Consider secondary to pneumonia and probable COPD exacerbation  : Off oxygen  : Steroids switch to oral   ; No new issues  ; Mildly fluid overloaded  ; We will keep on daily IV Lasix for now    Acute on chronic anemia  ;Microcytic anemia  History of IV iron therapy  Hemoglobin down to 7.6  ; Baseline hemoglobin around 9  : Bleeding likely secondary to sacral decubiti  ; Hemoglobin stable at present  ; No sign of bleeding from sacral decubiti during examination of 2/17/2019  ; Hemoglobin better as 8.6 to 8.9 -9.8    History of stage IV sacral decubitus ulcer with chronic osteomyelitis (7/2018) status post I&D and IV antibiotics and recurrences:  History of recurrent UTIs:  : allowed  examination on 2/17/2019: Appeared clean at that time, no signs of bleeding  : Continue wound care    Sinus tachycardia  ; EKG on 2/15/2019 showed sinus tachycardia  ; Treating underlying condition as above  ; Heart rate improved with treatment of underlying sepsis and hydration  : Heart rate stable      Opioid dependence:  Chronic pain:  -Follows with the PCP and  plan to follow-up with the pain clinic.    On methadone and oxycodone as an outpatient.   She had been off methadone since admission which likely led to worsening of her chronic pain.   : Methadone was resumed on 2/16/2019 at 10 mg twice daily usually on 10 mg 3 times daily  : Mental status stable, no worsening respiratory status  ; Methadone increased to 10 mg 3 times daily on 2/19/2019     Severe MDD with psychotic features:  Paranoia and anxiety  : Patient was recently evaluated by psychiatry   on 72-hr hold per psychiatry with commitment hearing on 2/12 which she missed.  Trial date set for 2/19.   Continue quetiapine.  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  -She was evaluated by urology in the past for urethral incompetence and lower abdominal/pelvic pain.    She had nephrostomies and suprapubic catheters in the past.    continue gabapentin and baclofen. Continue oxybutynin.   Replace suprapubic catheter every 4 weeks as an outpatient.    Has gonzalez due to incontinence and sacral decub  ; Suprapubic catheter not working properly: Urology consulted    History of DVT and PE  ; Continue Xarelto     History of subdural hematoma (4/2017):  : Tolerating anticoagulation     Hypothyroidism:  Continue levothyroxine.     History of seizure disorder:  Continue levetiracetam.     Insomnia:  Continue eszopiclone      This note was dictated using voice recognition software. Any grammatical or context distortions are unintentional and inherent to the software.  Subjective:   Patient examined while her sacral decubiti dressing is being changed  Psychotic but appears clean, but patient has just been clean  Initial packing did have some discharge on it  No signs of active bleeding    Patient also had some stool in her diaper, and  "appeared to be having stool incontinence      Brief History: 62 y.o. 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  Similar to her prior multiple hospitalizations, her behavior was a significant issue with abusiveness, and belligerence towards staff.  She was also nonadherent with medical care.  She was evaluated by psychiatry and was noted to have major depressive disorder with psychotic features as well as with a component of paranoia.  She was placed on a 72-hour hold.  Commitment hearing  on 2/12/19 which she refused to attend.  Patient had a trial date on 12/19/2019 and commitment was refused by court, and she has been off one-to-one since then      Chart reviewed, events noted. Pt seen and examined.     Objective     Vital signs in last 24 hours:  Temp:  [97.6  F (36.4  C)-98.2  F (36.8  C)] 97.6  F (36.4  C)  Heart Rate:  [] 85  Resp:  [20-22] 20  BP: (113-124)/(64-75) 118/70  Weight:   163 lb (73.9 kg)  Weight change:   Body mass index is 29.81 kg/m .    Intake/Output last 3 shifts:  I/O last 3 completed shifts:  In: 1960 [P.O.:1960]  Out: 2550 [Urine:2550]  Intake/Output this shift:  I/O this shift:  In: 250 [P.O.:240; I.V.:10]  Out: 2025 [Urine:2025]      Physical Exam:  /70 (Patient Position: Sitting)   Pulse 85   Temp 97.6  F (36.4  C) (Oral)   Resp 20   Ht 5' 2\" (1.575 m)   Wt 163 lb (73.9 kg)   LMP  (LMP Unknown)   SpO2 96%   BMI 29.81 kg/m        O2 Device: None (Room air) O2 Flow " Rate (L/min): 2 L/min      General Appearance:    Alert, no apparent distress.    HEENT:    Normocephalic. Pupils equal and reactive. No scleral   Icterus. Moist oral mucosa    Lungs:     Clear to auscultation bilaterally, respirations unlabored  Basal air entry improved  No wheeze or crackle   Heart::    Regular rate and rhythm, S1 and S2 normal, no murmur, rub   or gallop.   Abdomen:  Soft, mild local discomfort no significant tenderness  Mild distended  Bowel sounds present   Extremity :  Lower extremity edema, chronic ulcers present    Sacral decubiti appears clean, no active discharge     CNS:   Alert and oriented,   no facial asymmetry  Paraplegic       Imaging:  personally reviewed.      Scheduled Meds:    baclofen  10 mg Oral TID     eszopiclone  3 mg Oral QHS     furosemide  20 mg Intravenous DAILY     gabapentin  900 mg Oral DAILY     levETIRAcetam  250 mg Oral BID     levothyroxine  125 mcg Oral Daily 0600     meropenem  1 g Intravenous Q8H     methadone  10 mg Oral TID     miconazole   Topical BID     multivitamin with minerals  1 tablet Oral DAILY     neomycin-bacitracin-polymyxin   Topical TID     omeprazole  20 mg Oral QAM AC     oxybutynin  5 mg Oral TID     polyethylene glycol  17 g Oral BID     polyethylene glycol  4,000 mL Oral Once     predniSONE  20 mg Oral Daily with brkfst     QUEtiapine  25 mg Oral BID     QUEtiapine  50 mg Oral QHS     rivaroxaban  20 mg Oral Daily with supper     sodium chloride  10-30 mL Intravenous Q8H FIXED TIMES     vancomycin  1.25 g Intravenous Q24H     Continuous Infusions:    PRN Meds:.acetaminophen, albuterol, albuterol **AND** Nebulizer treatment intermittent, bisacodyl, calcium (as carbonate), cyclobenzaprine, HYDROmorphone, hydrOXYzine HCl, ibuprofen, magnesium hydroxide, melatonin, naloxone **OR** naloxone, OLANZapine, ondansetron **OR** ondansetron, polyethylene glycol, polyvinyl alcohol, sodium chloride bacteriostatic, sodium chloride bacteriostatic, sodium  chloride, sodium chloride, sodium chloride, sodium phosphates 133 mL, traZODone    Lab Results:  personally reviewed.   not applicable  Recent Results (from the past 24 hour(s))   HM1 (CBC with Diff)    Collection Time: 02/21/19  9:07 AM   Result Value Ref Range    WBC 19.0 (H) 4.0 - 11.0 thou/uL    RBC 3.97 3.80 - 5.40 mill/uL    Hemoglobin 9.8 (L) 12.0 - 16.0 g/dL    Hematocrit 33.5 (L) 35.0 - 47.0 %    MCV 84 80 - 100 fL    MCH 24.7 (L) 27.0 - 34.0 pg    MCHC 29.3 (L) 32.0 - 36.0 g/dL    RDW 22.7 (H) 11.0 - 14.5 %    Platelets 371 140 - 440 thou/uL    MPV 9.8 8.5 - 12.5 fL   Manual Differential    Collection Time: 02/21/19  9:07 AM   Result Value Ref Range    Total Neutrophils % 74 (H) 50 - 70 %    Lymphocytes % 11 (L) 20 - 40 %    Monocytes % 5 2 - 10 %    Eosinophils %  4 0 - 6 %    Basophils % 0 0 - 2 %    Metamyelocytes % 4 (H) <=1 %    Myelocytes % 4 (H) <=1 %    Total Neutrophils Absolute 14.0 (H) 2.0 - 7.7 thou/ul    Lymphocytes Absolute 2.1 0.8 - 4.4 thou/uL    Monocytes Absolute 0.9 0.0 - 0.9 thou/uL    Eosinophils Absolute 0.7 (H) 0.0 - 0.4 thou/uL    Basophils Absolute 0.0 0.0 - 0.2 thou/uL    Metamyelocytes Absolute 0.7 (H) <=0.1 thou/uL    Myelocytes Absolute 0.8 (H) <=0.1 thou/uL    Platelet Estimate Normal Normal    Ovalocytes 1+ (!) Negative    Tear Drop Cells 1+ (!) Negative               Advance Care Planning:   Barriers to discharge: IV antibiotics  Anticipated discharge day: Multiple days  Disposition: Unclear, ?  [place] as patient needs 7-10 days of IV antibiotics as per infectious disease      Gerard Juárez MD  [facility]  Hospitalist
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
## 2021-06-24 NOTE — PLAN OF CARE
Collaborate with patient/family/caregiver to identify patient specific goals for this hospitalization Progressing    Pt for the most part has been pleasant and cooperative this shift. Can be irritable at times and is very particular about the process of transferring from bed to wheelchair but overall mood has much improved.     Skin integrity is maintained or improved Not Progressing    Pt woke at 0415 c/o leg spasms. Received prn medications and requested to get up in her wheelchair at this time. Pt was advised that it would be beneficial to reposition in bed and wait to get up in chair to assist in preventing further pressure over buttock/sacral area but pt declined. Buttock/sacral area cleansed, barrier cream applied, and mepilex changed.

## 2021-06-24 NOTE — PLAN OF CARE
Problem: Discharge Barriers  Goal: Patient's discharge needs are met  Outcome: Progressing  Care Plan  Care Management      Care Management Goals of the Day: Progression of care    Care Progression Reviewed With: Charge RN, MD, HUC, RNCM, CMSW    Barriers to Discharge: Placement    Discharge Disposition: TBD    Expected Discharge Date: TBD    Transportation: [employer] ([phone])        Care Coordination Narrative:      Kennedy Gunderson has dismissed Commitment Case. At this time the CM team will continue to look for appropriate d/c placement on pt's behalf. The follow referrals have been made:     Waiting for Response    [facility] at [facility]: Left Message, waiting for response    [facility]ates at [facility]: Left message, waiting for response     [facility] [place]: Left message, waiting for response    [facility]: Referral sent    [facility]ates of [facility]: Referral sent    [place]: Referral sent    [facility]: Referral sent    [facility]: Referral sent  Declined    [facility]ates at [place]: Declined, no LTC beds    [place]: Declined    [facility]: Declined, no LTC beds    [place]: Declined, Does not meet criteria     [place]: Declined, non-smoking facility    [facility] [place]: Declined, no LTC beds    [facility] [place]: Declined, no LTC beds    [facility] [place]: Declined, no LTC at facility    [facility]: Declined, No female beds in LTC or TCU    [facility]: Declined    [facility](P: [phone]/F: [phone]) : Declined, too medically complex. (hand faxed)    [facility]: Declined.    Estates of [place]: Declined, no beds available    [facility]: DECLINED, cannot take methadone    [facility]: DECLINED    [facility]: DECLINED    [facility]: DECLINED    [facility]: DECLINED    [facility]: DECLINED    Estates at [place]: DECLINED    Estates at [place]: DECLINED    [facility]:  DECLINED    [facility]: DECLINED    [place]: DECLINED    [place]: DECLINED    [place]: DECLINED    [facility]: DECLINED    [facility]: DECLINED    [facility]: DECLINED    [facility]: DECLINED    [place]: DECLINED    [facility]: DECLINED    [facility] [place]: DECLINED    [facility]: DECLINED    [facility]: DECLINED    [facility]: DECLINED    [facility]: DECLINED    [facility]: DECLIEND      ** Please note that this list is updated as responses are received.     MARGI additionally discussed with pt going to a group home if pt was able to get on a CADI waiver through [place]. Pt is in agreement with this if it is possible.     MARGI has received application from [facility] (P: [phone]/F: [phone]) for [place] assessment to find out if pt qualifies for a CADI waiver. SW to complete this with pt and fax back.     MARGI additionally has obtained a list of medical group homes and a contact for hospitals Taina Phillips ([phone]) to find out if assessment for CADI waiver needs to be done in hospitals rather than [place].      MARGI will continue to follow and assist with further d/c needs    CYNTHIA Gonsalez  3/1/2019  7:56 AM

## 2021-06-24 NOTE — PLAN OF CARE
Compromised Skin Integrity      Fluid and electrolyte balance are achieved/maintained Progressing          Patient assisted up to electric w/c this AM.  Independent with tilting w/c throughout shift.  Able to lift back forward for adjustments to pillows.    Decreased Mental Status Causing Increased Need for Safety      Provide a safe environment for patient (Implement appropriate elements in plan of care) Progressing      Decrease patient's symptoms Progressing      To ensure patient safety, patient will abstain from any threats or actions to harm self or others Progressing          Infection      Signs and symptoms of infections are decreased or avoided Progressing          A/O x 4.  No signs of confusion noted.  Able to make needs known.  Dressing change completed to LE wounds.  Wound to right lateral leg has large amount of serosang drainage with foul odor.  Received IV antibiotics per order.  Catheters patent and draining pink tinged urine.    Non-compliance with treatment regimen      Compliance with treatment regimen Progressing            Patient was compliant and pleasant with staff today.  Wanted to give specific direction to cares being provided.  No anger or aggression noted.      She placed a call to [facility] for placement options following court tomorrow, message given to  to follow up with request.      Provider stated to continue telemetry monitoring for another day d/t recent IV steroids and previous low Bp readings.  [place] RN

## 2021-06-24 NOTE — PLAN OF CARE
Problem: Pain  Goal: Patient's pain/discomfort is manageable  Outcome: Progressing      Problem: Safety  Goal: Patient will be injury free during hospitalization  Outcome: Progressing  Up to wheel chair with assist of 2 and use of ceiling lift and tolerated well. Wheels self around and off the unit. Frequently goes outside the building to smoke    Problem: Psychosocial Needs  Goal: Demonstrates ability to cope with hospitalization/illness  Outcome: Progressing      Problem: Discharge Barriers  Goal: Patient's discharge needs are met  Outcome: Adequate for Discharge  Waiting for placement.    Julio C Grant RN

## 2021-06-24 NOTE — PLAN OF CARE
Problem: Daily Care  Goal: Daily care needs are met  Outcome: Progressing  Slept all night, no beh noted. Pain meds and sleeping meds given with effectiveness.

## 2021-06-24 NOTE — PLAN OF CARE
"Approached patient about doing her morning line draw. Patient refused, states \"the next shift can do it\".  "

## 2021-06-24 NOTE — PLAN OF CARE
Problem: Discharge Barriers  Goal: Patient's discharge needs are met  Outcome: Progressing  Care Plan  Care Management      Care Management Goals of the Day: DC planning, progression of care    Care Progression Reviewed With: Charge RN, MD, HUC, RNCM    Barriers to Discharge:placement    Discharge Disposition:TBD    Expected Discharge Date:TBD    Transportation:HE wheelchair      Care Coordination Narrative:Pt is homeless, we are  Looking for a LTC facility or group home to take care of pt, HE wheelchair to transfer, CM to follow.

## 2021-06-24 NOTE — PLAN OF CARE
Problem: Discharge Barriers  Goal: Patient's discharge needs are met  Outcome: Progressing  Care Plan  Care Management      Care Management Goals of the Day: Progression of care    Care Progression Reviewed With: Charge RN, MD, HUC, RNCM, CMSW    Barriers to Discharge: Placement    Discharge Disposition: TBD    Expected Discharge Date: TBD    Transportation:  [place] ([phone])      Care Coordination Narrative:   Per previous notes, SW spoke with Ellen ([phone]) at [facility] in regards to getting an assessment for the pt for the CADI waiver. Per Ellen at this the Co is about 2-3 months out for general assessments, however if the SW finds a facility that can accept the (such as a group home) that could pt the pt closer to the top of the list. SW stated understanding of this. Ellen states that should something come up prior to that 2-3 month time frame the SW is able to call her back or to call her Supervisor, Julia Rangel ([phone]).    SW will continue to search for LTC placement and for Group home placement on the pt's behalf. See previous notes for placement referrals sent and decisions.    Waiting for Response    [facility] at [facility]: Left Message, waiting for response-left message    Est[facility] at [facility]: Left message, waiting for response -left message     [place] [place]: Left message, waiting for response    [facility]: Referral sent    [facility] of [facility]: Referral sent    [facility] (Declined in the past, new referral sent 3/7/19)      Declined    [facility]ates at [place]: Declined, no LTC beds    [place]: Declined    [facility]: Declined, no LTC beds    [place]: Declined, Does not meet criteria     [place]: Declined, non-smoking facility    [facility] [place]: Declined, no LTC beds    [facility]: Declined, no LTC beds    [place]: Declined, no [facility]TC at [facility]: Declined    [facility]: Declined, No female beds in LTC or TCU    [facility]: Declined    [facility](P: [phone]/F: [phone]) : Declined, too medically complex. (hand faxed)    [facility]: Declined.    Estates of [place]: Declined, no beds available    [facility]: Declined, only shared rooms    [place] [facility]: DECLINED, cannot take methadone    [facility]: DECLINED    [facility]: DECLINED    [place]: DECLINED    [facility]: DECLINED    [facility]: DECLINED    [facility]: DECLINED    Estates at [place]: DECLINED    Estates at [place]: DECLINED    [facility]: DECLINED    [facility]: DECLINED    [facility]: DECLINED    [place]: DECLINED    [place]: DECLINED    [facility]: DECLINED    [facility]: DECLINED    [facility]: DECLINED    [facility]: DECLINED    [facility]: DECLINED    [facility]: DECLINED    [facility]: DECLINED    [facility]: DECLINED    [facility]: DECLINED    [facility]: DECLINED    [facility]: DECLINED    [facility]: DECLINED    Patient has been declined by all [facility]      ** Please note that this list is updated as responses are received.     SW additionally discussed with pt going to a group home if pt was able to get on a CADI waiver through [employer]. Pt is in agreement with this if it is possible.

## 2021-06-24 NOTE — PLAN OF CARE
Skin integrity is maintained or improved Not Progressing    Pt. continues to refuse cares frequently. Pt. up in chair for all of AM shift, refused lung, cardiac, and GI auscultation x5. Pt. Also refused dressing changes and wound care x3 .Pt. continues to c/o constipation, noted last BM was 2/13 per charting. Pt. did take scheduled miralax this AM, and one dose of PRN miralax later in the afternoon. Writer offered MOM, but pt. refused. [place] lady enema ordered.

## 2021-06-24 NOTE — PLAN OF CARE
Potential for Compromised Skin Integrity      Skin integrity is maintained or improved Not Progressing          Potential for Falls      Patient will remain free of falls Progressing        Psychosocial Needs      Demonstrates ability to cope with hospitalization/illness Progressing        Refused majority of assessment and cares this shift. Lab unable to get blood specimen for Hgb this AM, pt refused second phlebotomist. Was mostly pleasant with moments of agitation and verbal abuse towards staff when attempting to provide cares or transfer into . Refused to allow staff to remove soiled sliing from under her, but did allow brief minimal katia care when up in lift. Writer was unable to assess or provide wound cares d/t refusal.

## 2021-06-24 NOTE — PROGRESS NOTES
Pt complains of bed beeping.   and S representative both came out x2 to recalibrate mattress and check bed.  Both times professionals noted that there was nothing wrong with either the mattress or the controller, no reason they could detect why the bed wouldn't be working correctly.  The beeping sound usually comes from pressing buttons on the machine.  If the problem continues, there's no additional course of action to be taken.

## 2021-06-24 NOTE — PLAN OF CARE
Daily Care      Daily care needs are met Progressing        Pain      Patient's pain/discomfort is manageable Progressing        Safety      Patient will be injury free during hospitalization Progressing          Pt c/o generalized pain, prn dilaudid and flexeril given x2, prn ibuprofen given times one. Pt refused to let me assess her, in particular her gonzalez catheters or the wounds on her feet. Pt also refused pericare. Pt is leaking urine even with the suprapubic and urethral catheter in place. Writer attempted to do wound care on buttocks but pt refused and just allowed me to put clean bandages on. Pt yelling at staff, being verbally abusive and demanding things be done a particular way numerous times through out the day. Writer reminded pt that we must be respectful and use our manners or writer and nursing assistant will leave until she is ready to be respectful. Pt refused to allow the writer or nursing assistant to empty her gonzalez bags and demanded that they be tucked behind her legs while up in the chair, because of this the suprapubic gonzalez bag leaked a large amount of urine on the floor. Pt also refused repositioning. Pt still c/o constipation, no BM on my shift. Writer attempted to explain to pt the importance of wound care but pt refuses any teaching and says she know what is best for her. Writer expressed concern to pt about her safety when she goes outside to smoke but pt was not accepting to advice.

## 2021-06-24 NOTE — PROGRESS NOTES
At 10:24am patient put on call light and writer answered the call at the . Nursing Assistant is currently in the patients room. Patient stated she wanted 2 more blankets. Nurse brought 2 blankets into patient.

## 2021-06-24 NOTE — PROGRESS NOTES
Information/referral faxed to [facility], they have assisted living /group home setting for MH patient.    : Mary [phone] and fax: [phone]. Awaiting call back.

## 2021-06-24 NOTE — PLAN OF CARE
"Pt put call light on requesting to get up into wheelchair. Pt informed staff was busy at the moment and would be with her soon as possible. Pt put the call light on again, not even one minute later with the same request of wanting in the wheelchair. Reminded pt that staff was busy at the current time and staff would be in as soon as they could. Pt then told writer that \" Your useless\".   "

## 2021-06-24 NOTE — PROGRESS NOTES
Wound Ostomy  WOC Assessment         Allergies:  No Known Allergies    Diagnosis:   Patient Active Problem List    Diagnosis Date Noted     Unable to control anger      Severe major depression, single episode, with psychotic features (H)      Paranoia (H)      Upper back pain      Abnormal CT scan of lung      Panic anxiety syndrome      Pelvic pain 01/09/2019     Ulcer due to Treponema vincentii, with fat layer exposed (H) 01/07/2019     Atelectasis      Tension-type headache, not intractable, unspecified chronicity pattern      Weakness of left hand 12/04/2018     Focal motor seizure (H) 12/04/2018     Pelvic pain in female 11/21/2018     Chronic osteomyelitis (H)      Drug-induced constipation      Quadriplegia (H)      Goals of care, counseling/discussion 07/16/2018     Advanced care planning/counseling discussion 07/16/2018     Malingerer for IV Dilaudid 07/16/2018     Moderate protein-calorie malnutrition (H) 07/16/2018     Infection 07/14/2018     Noncompliance with medication regimen      Stage IV pressure ulcer of sacral region (H) 07/11/2018     Gangrene (H) 07/10/2018     Centrilobular emphysema (H) 07/02/2018     Bilateral lower extremity edema 07/02/2018     Hypoxemia 07/01/2018     Left lower lobe pneumonia (H) 06/27/2018     Acute bronchitis due to other specified organisms 06/27/2018     Nephrostomy complication (H) 03/06/2018     History of encephalopathy      Closed left subtrochanteric femur fracture (H) 02/27/2018     Acute blood loss anemia      Other specified hypotension      Normocytic anemia 02/26/2018     Adjustment disorder with mixed anxiety and depressed mood      Sleep difficulties      Irritability and anger      Fever in other diseases 01/31/2018     Severe sepsis (H) 01/31/2018     Ulcer 01/31/2018     Hypothyroidism due to Hashimoto's thyroiditis      Abscess, gluteal, right      History of DVT (deep vein thrombosis)      Urinary incontinence due to urethral sphincter incompetence       Weakness 09/07/2017     Chronic anticoagulation 09/03/2017     Urinary tract infection associated with cystostomy catheter (H) 09/02/2017     Dislodged wound Vacuum 08/14/2017     Abdominal pain, unspecified location      Coagulopathy (H)      Anxiety      UTI (urinary tract infection) 08/10/2017     Elevated lactic acid level 07/29/2017     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     Suprapubic catheter dysfunction, subsequent encounter      Bilateral hydronephrosis      Cystitis      Colon wall thickening      Abdominal pain 06/03/2017     Flank pain 06/02/2017     Right upper quadrant abdominal pain 06/02/2017     Episodic cluster headache, not intractable      Sepsis (H) 05/28/2017     Sepsis secondary to UTI (H) 05/28/2017     Pyelonephritis      Hyponatremia      Chronic obstructive pulmonary disease with acute exacerbation (H) 04/18/2017     Depression 04/18/2017     Partial symptomatic epilepsy with simple partial seizures, intractable, with status epilepticus (H)      Acute on chronic intracranial subdural hematoma (H)      Brain edema (H)      Subdural hematoma (H) 04/09/2017     Convulsions, unspecified convulsion type (H)      Neck infection 03/31/2017     Lower abdominal pain 03/01/2017     Fever, unspecified fever cause      Pneumonia of left lower lobe due to infectious organism (H)      S/P cholecystectomy      Choledocholithiasis with obstruction 01/02/2017     Cholelithiasis 01/02/2017     Hx of pulmonary embolus 01/02/2017     Claustrophobia      Urinary tract infection, site unspecified      Hypotension, unspecified hypotension type      Chronic low back pain without sciatica, unspecified back pain laterality      Acute encephalopathy      Sepsis, due to unspecified organism (H) 12/30/2016     History of pulmonary embolus (PE) 12/01/2016     Cognitive disorder      Insomnia due to anxiety and fear      HCAP (healthcare-associated pneumonia) 11/04/2016     Atypical chest pain 10/23/2016     Diastolic  "CHF, acute on chronic (H)      Chest tightness or pressure      History of MDR Enterobacter cloacae infection      Anxiety disorder      Esophageal dysmotility      Major depressive disorder, recurrent episode, moderate (H)      Seizure disorder (H)      Heel ulcer, right, limited to breakdown of skin (H)      Decubitus ulcer of sacral region, stage 4 (H)      Paraplegia at T4 level (H) 04/28/2016     Hypokalemia      Chronic pain syndrome 01/12/2016     Major depression 01/12/2016     Alcohol abuse 01/12/2016     Hospital-acquired pneumonia 10/26/2015     COPD exacerbation (H) 08/03/2015     Gastroesophageal reflux disease without esophagitis 05/26/2015     Acquired hypothyroidism 05/26/2015     Iron deficiency anemia 05/26/2015     Opioid-induced constipation (OIC) 05/26/2015     Paraplegia (H) 05/26/2015     Palliative care encounter 12/08/2014     Nephrostomy tube displaced (H) 12/02/2014     Mechanical complication of suprapubic catheter (H) 11/20/2014     Acute respiratory failure with hypoxia (H) 08/19/2014     COPD (chronic obstructive pulmonary disease) (H) 08/19/2014     Lactic acidosis 08/19/2014     SVT (supraventricular tachycardia) (H) 08/19/2014     Dislodged Bhandari catheter, subsequent encounter 07/30/2014     Other chronic pain      Lumbosacral Disc Degeneration      Herpes Simplex Type I      Nicotine Dependence      Essential hypertension      Cancer of lung (H) 09/24/2013     Neurogenic bladder 01/29/2013     Neurogenic bowel 01/29/2013     Decubitus ulcer 01/25/2013     Mixed hyperlipidemia 01/25/2013     Decubitus ulcer of right buttock, stage 4 (H) 01/25/2013     Decubitus ulcer, stage III (H) 01/25/2013       Height:  5' 2\" (1.575 m)    Weight:   163 lb (73.9 kg)    Labs:  Recent Labs     02/25/19  0521   HGB 9.0*       Isaiah:  Isaiah Scale Score: 15    Specialty Bed:  Specialty Bed: [facility] RN attempted to see patient for wound assessment between 10:15 and 10:30 this AM. " Patient already up in wheelchair and dressing changed prior to up to chair. If patient would go back to bed during this shift, will attempt to see later today; however, this is very unlikely as she generally remains up in the chair all day despite nursing recommendations to lay down to relieve pressure on buttocks/IT site.

## 2021-06-24 NOTE — PLAN OF CARE
"Pt demanding that MD be called to get an order for IV dilaudid for her \"severe bone pain.\" When pt was told that the previous MD wouldn't order it and he had increased the methadone, Pt stated the MD will order it if you \"word the text correctly.\" When writer asked if there are any other pain medications that would help, Pt became agitated and said we don't care about her pain and we need to call the MD right now. Writer tried to explain that we do care about her pain but thought we could try something other than dilaudid. On call MD paged and stated he will come up to talk to her.  "

## 2021-06-24 NOTE — PROGRESS NOTES
Pt was pleasant most of the evening. Did not get along well when it came to fluids. She was allowed water for meds, but wanted to keep the water after the meds were given to her. Along with the ice chips she was allowed. About 30 mins later she put her call light on, wanted the small bag next to the window with food in it. Writer kept asking her want she wanted since she was NPO, she kept saying it was none of my business. Put the bag back on window seal, she was not happy but more willing help her to very few things that were not food in the bag. But she refused to tell the writer. She kept moving her purse during all this. There was another call light, wondering where her purse was. Different staff went in the room, the purse was found on her lap.

## 2021-06-24 NOTE — PROGRESS NOTES
"Pharmacy Consult: Vancomycin Dosing    Pharmacist consulted to dose vancomycin for Marychuy Zhou, a 63 y.o. female.    Ordering provider: Dr. Dawkins     Indication for vancomycin therapy: Aspiration Pneumonia    Goal Trough Range:  15-20 mcg/mL based on indication    Other current antimicrobials             meropenem 1 g in NaCl 0.9 % 50 mL (MINI-BAG Plus) (MERREM)  Every 8 hours          vancomycin 1 g in sodium chloride 0.9% 250 mL (VANCOCIN)  Once             Subjective/Objective:    Patient was admitted for Sepsis (H) on 2/2/2019    Height: 5' 2\" (1.575 m)    Actual Body Weight (ABW): 73.5 kg (162 lb)    Ideal body weight: 50.1 kg (110 lb 7.2 oz)  Adjusted ideal body weight: 59.5 kg (131 lb 1.1 oz)    BMI: Body mass index is 29.63 kg/m .    No Known Allergies    Patient Active Problem List   Diagnosis     Other chronic pain     Lumbosacral Disc Degeneration     Herpes Simplex Type I     Nicotine Dependence     Essential hypertension     Dislodged Bhandari catheter, subsequent encounter     Acute respiratory failure with hypoxia (H)     COPD (chronic obstructive pulmonary disease) (H)     Lactic acidosis     SVT (supraventricular tachycardia) (H)     Gastroesophageal reflux disease without esophagitis     Acquired hypothyroidism     Iron deficiency anemia     Opioid-induced constipation (OIC)     Paraplegia (H)     COPD exacerbation (H)     Chronic pain syndrome     Major depression     Alcohol abuse     Decubitus ulcer     Cancer of lung (H)     Neurogenic bladder     Neurogenic bowel     Mixed hyperlipidemia     Palliative care encounter     Hypokalemia     Paraplegia at T4 level (H)     Major depressive disorder, recurrent episode, moderate (H)     Seizure disorder (H)     Heel ulcer, right, limited to breakdown of skin (H)     Decubitus ulcer of sacral region, stage 4 (H)     Esophageal dysmotility     Anxiety disorder     History of MDR Enterobacter cloacae infection     Mechanical complication of suprapubic " catheter (H)     Diastolic CHF, acute on chronic (H)     Chest tightness or pressure     Atypical chest pain     Decubitus ulcer of right buttock, stage 4 (H)     Nephrostomy tube displaced (H)     HCAP (healthcare-associated pneumonia)     Cognitive disorder     Insomnia due to anxiety and fear     Sepsis, due to unspecified organism (H)     Urinary tract infection, site unspecified     Hypotension, unspecified hypotension type     Chronic low back pain without sciatica, unspecified back pain laterality     Acute encephalopathy     Claustrophobia     Choledocholithiasis with obstruction     Cholelithiasis     Hx of pulmonary embolus     S/P cholecystectomy     Fever, unspecified fever cause     Pneumonia of left lower lobe due to infectious organism (H)     Lower abdominal pain     Neck infection     Subdural hematoma (H)     Convulsions, unspecified convulsion type (H)     Partial symptomatic epilepsy with simple partial seizures, intractable, with status epilepticus (H)     Acute on chronic intracranial subdural hematoma (H)     Brain edema (H)     Chronic obstructive pulmonary disease with acute exacerbation (H)     Decubitus ulcer, stage III (H)     Depression     Sepsis (H)     Sepsis secondary to UTI (H)     Pyelonephritis     Hyponatremia     Episodic cluster headache, not intractable     Flank pain     Right upper quadrant abdominal pain     Abdominal pain     Colon wall thickening     Cystitis     Suprapubic catheter dysfunction, subsequent encounter     Bilateral hydronephrosis     Elevated lactic acid level     Paroxysmal hemicrania     UTI (urinary tract infection)     Abdominal pain, unspecified location     Coagulopathy (H)     Anxiety     Dislodged wound Vacuum     Urinary tract infection associated with cystostomy catheter (H)     History of pulmonary embolus (PE)     Chronic anticoagulation     Weakness     Urinary incontinence due to urethral sphincter incompetence     Abscess, gluteal, right      History of DVT (deep vein thrombosis)     Hypothyroidism due to Hashimoto's thyroiditis     Fever in other diseases     Severe sepsis (H)     Ulcer     Sleep difficulties     Irritability and anger     Adjustment disorder with mixed anxiety and depressed mood     Anemia     Closed left subtrochanteric femur fracture (H)     Acute blood loss anemia     Other specified hypotension     History of encephalopathy     Nephrostomy complication (H)     Left lower lobe pneumonia (H)     Acute bronchitis due to other specified organisms     Hypoxemia     Centrilobular emphysema (H)     Bilateral lower extremity edema     Stage IV pressure ulcer of sacral region (H)     Gangrene (H)     Noncompliance with medication regimen     Infection     Goals of care, counseling/discussion     Advanced care planning/counseling discussion     Malingerer for IV Dilaudid     Moderate protein-calorie malnutrition (H)     Chronic osteomyelitis (H)     Drug-induced constipation     Quadriplegia (H)     Pelvic pain in female     Weakness of left hand     Focal motor seizure (H)     Tension-type headache, not intractable, unspecified chronicity pattern     Atelectasis     Pelvic pain     Panic anxiety syndrome     Ulcer due to Treponema vincentii, with fat layer exposed (H)     Abnormal CT scan of lung     Upper back pain     Severe major depression, single episode, with psychotic features (H)     Paranoia (H)     Unable to control anger    Past Medical History:   Diagnosis Date     Alcohol dependence (H)     history     Anxiety      Cancer of lung (H) 9/24/2013    Overview:  Adenocarcinoma Stage 1A per preoperative needle biopsy   Adenocarcinoma Stage 1A per preoperative needle biopsy  Wedge r/s 9/2013     Chronic kidney disease      Chronic pain     on Methadone     Chronic suprapubic catheter (H)      Constipation      COPD (chronic obstructive pulmonary disease) (H)      Decubitus ulcer     had wound vac     Depression      Diastolic CHF,  acute on chronic (H)      DVT (deep venous thrombosis) (H) 5/26/2015     ESBL (extended spectrum beta-lactamase) producing bacteria infection 08/2015    urine     Gastroparesis      GERD (gastroesophageal reflux disease)      HCAP (healthcare-associated pneumonia)      Herpes Simplex Type I      Hyperlipemia      Hypertension      Hypothyroidism 5/26/2015     Intracranial subdural hematoma (H)      Kidney stone      Lower paraplegia (H) 4/28/2016    Overview:  spinal epidural hematoma, emergent decomp Overview:  spinal epidural hematoma, emergent decomp Overview:  spinal epidural hematoma, emergent decomp     MRSA infection      Neurogenic bladder     chronic gonzalez     Neuropathy (H) 5/26/2015     Obesity      Opiate dependence, continuous (H)      Osteoporosis      Pneumonia      Pulmonary embolism (H) 12/2016    chronic, on coumadin     Rheumatoid arthritis (H)      Sepsis (H) 5/28/2017     SVT (supraventricular tachycardia) (H) 8/19/2014     Vascular myelopathies (H)         Temp Readings from Current Encounter:     02/15/19 0243 02/15/19 0803   Temp: 98.6  F (37  C) 97.6  F (36.4  C)     Net Intake/Output (last 24 hours):  I/O last 3 completed shifts:  In: 1540 [P.O.:1540]  Out: 1000 [Urine:1000]    Recent Labs     02/13/19  1908 02/14/19  1935 02/15/19  1631   WBC 18.7* 16.0*  --    LACTICACID  --   --  1.9   BUN  --   --  10   CREATININE  --   --  0.81     Estimated Creatinine Clearance: 67.6 mL/min (by C-G formula based on SCr of 0.81 mg/dL).    No results for input(s): CULTURE in the last 72 hours.    Results for orders placed or performed during the hospital encounter of 02/02/19   Culture, Urine    Collection Time: 02/03/19 12:15 AM   Result Value Status    Culture  Final     Mixture of organism types with no predominating organism.       No results for input(s): VANCOMYCIN in the last 168 hours.    Vancomycin administrations: (last 120 hours)     None          Assessment/Plan:    Pharmacist consulted to  dose vancomycin for Aspiration Pneumonia, goal trough range 15-20 mcg/mL.  1. Initiate vancomycin 1000 mg IV every 12 hours (13.6 mg/kg actual body weight).   2. No vancomycin level available for assessment.  3. Pharmacist will plan to check a vancomycin trough level prior to 4th or 5th dose, or sooner if indicated by change in renal function or clinical status.  4. Pharmacist will continue to follow.    Thank you for the consult.  Rosaura Granger, PharmD 2/15/2019 6:35 PM

## 2021-06-24 NOTE — PLAN OF CARE
Blood pressure (BP) is within acceptable limits Not Progressing    Not allowing for any assessments to be made including vital signs.  Compliance with treatment regimen Not Progressing    Is not allowing any wounds cares or assessments to be done.  Demonstrates ability to cope with hospitalization/illness Not Progressing      Collaborate with patient/family/caregiver to identify patient specific goals for this hospitalization Not Progressing      Damaged tissue is healing and protected Not Progressing      Patient's pain/discomfort is manageable Progressing    Complains of pain all over, 8-9/10. Medicated with scheduled pain meds as well as prns. No change.

## 2021-06-24 NOTE — PLAN OF CARE
Problem: Discharge Barriers  Goal: Patient's discharge needs are met  Outcome: Progressing  Care Plan  Care Management      Care Management Goals of the Day: Progression of care    Care Progression Reviewed With: Charge RN, MD, HUC, RNCM, CMSW    Barriers to Discharge: IV abx, Placement     Discharge Disposition: LTC    Expected Discharge Date: 2/25/2019    Transportation: [facility]      Care Coordination Narrative:      Kennedy Gunderson has dismissed Commitment Case. At this time the CM team will continue to look for appropriate d/c placement on pt's behalf. The follow referrals have been made:     Waiting for Response    [facility]: Referral sent    [facility]: Referral sent     [facility]: Referral Sent    [facility]: Referral sent  Declined    [facility] [place]: Declined, no LTC beds    [facility]: Declined, no LTC beds    [place]: Declined, Does not meet criteria     [place]: Declined, non-smoking facility    [facility]: Declined, no LTC beds    [place]: DECLINED    [facility]: DECLINED    [facility]: DECLINED    [facility]: DECLINED    [facility] [place]: DECLINED    [facility] [place]: DECLINED    [facility]: DECLINED    [facility]: DECLINED    [facility]: DECLINED    [facility]: DECLINED    [facility]: DECLINED    [facility]: DECLINED    [facility]: DECLINED    [facility]: DECLINED    [facility]: DECLINED    [facility]: DECLINED    [facility]: DECLINED    [facility]: DECLINED    [facility]: DECLINED    [facility]: DECLINED    [facility]: DECLINED    [facility]: DECLINED    [facility]: DECLIEND     SW will continue to follow and assist with further d/c needs.       CYNTHIA Gonsalez  2/21/2019  7:38 AM

## 2021-06-24 NOTE — PLAN OF CARE
"Pain      Patient's pain/discomfort is manageable Not Progressing        Pt refuses care,Refuses assessments, refused vitals, Refuses repositioning. Requested pain meds,including dilaudid and flexaril. Writer instructed that she can't have pain meds until she allows staff to make sure that her vitals are stable. Pt responded with \"Fuck You\". Nurse informed pt that Is inappropriate to swear at staff. Pt allowed vitals which are stable. Refused to turn. \"I won't turn but I want you to push my feet down & then shove my heels up my fucking ass\" Again redirected pt on remaining appropriate. Pt was re-offered pain meds which she took and remained quiet. Continues to refuse repositioning. On 1:1 for safety. Continuing to monitor.    "

## 2021-06-24 NOTE — PROGRESS NOTES
Hospitalist Progress Note    Date of Service: 2/23/2019     Brief summary:    Marychuy Kuhn a 63 y.o.female with past medical history of COPD/emphysemia, LLL malignancy s/p resection, paraplegia secondary to T4 hematoma, neurogenic bladder, recurrent UTIs, history of seizure disorder, hypothyroidism, chronic pain syndrome on narcotics, decubitus right buttock, right lower extremity, right heel and left heel ulcers, rheumatoid arthritis, history of DVTs and PEs on anticoagulation, ESBL/MRSA/VRE infections previous hospitalization for chronic osteomyelitis of the ischium, and multiple hospitalizations who presented on 2/2/19 with uncontrolled pain.  Similar to her prior multiple hospitalizations, her behavior was a significant issue with abusiveness, and belligerence towards staff. She was also nonadherent with medical care.  She was evaluated by psychiatry and was noted to have major depressive disorder with psychotic features as well as with a component of paranoia. She was placed on a 72-hour hold. Commitment hearing  on 2/12/19 which she refused to attend. Patient had a trial date on 12/19/2019 and commitment was refused by court, and she has been off one-to-one since then.     Assessment and Plan     Principal Problem:    Sepsis (H)  Active Problems:    Hospital-acquired pneumonia    Normocytic anemia    Noncompliance with medication regimen    Severe major depression, single episode, with psychotic features (H)    Paranoia (H)    Unable to control anger    Severe constipation, likely sec to immobility, pain eds  X-ray did not show any evidence of obstruction CT abdomen recently did not show any signs of obstruction. Pt reports no BM for 1 month but somewhat a poor historian, nursing reports indicate about a week duration.  Has been on miralax BID since 2/19(refused yesterday) and senokot since yesterday and received fleet enema 2/18 and 2/122.   Encourage miralax, increase senokot s, will try pink lady  enema today      Severe sepsis and impending septic shock  Resolved, continue to have a stable blood pressure, and likely related to aspiration pneumonia on meropenem and vancomycin( since 2/15)  ID recommended 7-10 days, today is the eighth day of antibiotic  Leukocytosis likely from steroid use      Volume overload, still with significant leg edema. ECHO with normal LVEF. Pt has severe hypoalbuminemia and dependent edema contributing. Change to po lasix 40 mg po daily, Encourage on elevating leg.     Acute on chronic anemia, stable, no active bleeding, monitor intermittently.      HX of stage IV sacral decubitus ulcer with chronic osteomyelitis Had incision and drainage      Opioid dependence,  Chronic pain, initially methadone was held due to hypotension, resumed back on methadone, 10 3 times daily      Severe MDD with psychotic features  Paranoia and anxiety,  Patient was recently evaluated by psychiatry       Quadriplegia secondary to T4 hematoma, neurogenic bladder,  Continue with gabapentin, baclofen, oxybutynin, suprapubic catheter was not working appropriately, urology evaluated her during the admission, follow up with Urology as outpatient      History of recurrent urinary tract infection    History of DVT, and PE On Xarelto     History of subdural hematoma On 2017 April, Currently tolerating anticoagulation     Seizure disorder Stable ,  Continue keppra      Hypothyroidism Resume levothyroxine     Code status full code   DVT prophylaxis: xarelto    Barrier's to discharge : Severe constipation, iv antibiotics, placement  Anticipated discharge date:  2-3 days  Disposition:  TCU    Subjective  Reports feeling tired, diffuse aches, Not had BM for a month per pt. Denies chest pain, shortness of breath, nausea or vomiting.     Chart reviewed, events noted. Pt seen and examined.     Objective  Interval history:    Vital signs in last 24 hours:  Heart Rate:  [] 87  Resp:  [16-18] 16  BP: (112-130)/(56-72)  "124/70    /70 (Patient Position: Sitting)   Pulse 87   Temp 98  F (36.7  C) (Oral)   Resp 16   Ht 5' 2\" (1.575 m)   Wt 163 lb (73.9 kg)   LMP  (LMP Unknown)   SpO2 97%   BMI 29.81 kg/m      Weight:    163 lb (73.9 kg)  Weight change:   Body mass index is 29.81 kg/m .  BS reviewed     Intake/Output last 3 shifts:   I/O last 3 completed shifts:  In: 500 [P.O.:500]  Out: 2350 [Urine:2350]    Body mass index     Physical Exam:       General- Alert, cooperative, no apparent distress     HEENT- Atraumatic    Chest- B/L air entry, no wheeze, no crackles, not in distress     CVS- S1S2 is   regular rate and rhythm, no murmur noted    Abd- Soft, non tender, non distended, BS is present   CNS-  Awake, alert, conversant, paraplegic    Ext- B/L 2+lower extremity edema, chronic ulcer, sacral decubitii,      Lab results: Personally reviewed  Results from last 7 days   Lab Units 02/22/19  0820 02/21/19  1519 02/18/19  0552 02/17/19  0524 02/16/19  1829   LN-SODIUM mmol/L 140 139  --  143 140   LN-POTASSIUM mmol/L 4.2 4.4  --  4.3 3.7   LN-CHLORIDE mmol/L 101 101  --  116* 108*   LN-CO2 mmol/L 33* 28  --  22 26   LN-BLOOD UREA NITROGEN mg/dL 10 11  --  15 17   LN-CREATININE mg/dL 0.53* 0.68 0.56* 0.66 0.74   LN-CALCIUM mg/dL 7.9* 8.0*  --  7.4* 7.7*   LN-ALBUMIN g/dL  --   --   --  1.5* 1.6*   LN-PROTEIN TOTAL g/dL  --   --   --  5.1* 5.5*   LN-BILIRUBIN TOTAL mg/dL  --   --   --  0.1 0.1   LN-ALKALINE PHOSPHATASE U/L  --   --   --  136* 133*   LN-ALT (SGPT) U/L  --   --   --  15 14   LN-AST (SGOT) U/L  --   --   --  21 26     Results from last 7 days   Lab Units 02/22/19  0820 02/21/19  0907 02/20/19  0629  02/17/19  0524   LN-WHITE BLOOD CELL COUNT thou/uL 22.0* 19.0* 13.6*   < > 19.6*   LN-HEMOGLOBIN g/dL 9.6* 9.8* 8.9*   < > 7.6*   LN-HEMATOCRIT % 33.0* 33.5* 30.5*   < > 26.5*   LN-PLATELET COUNT thou/uL 343 371 318   < > 341   LN-NEUTROPHILS RELATIVE PERCENT %  --   --   --   --  96*   LN-MONOCYTES RELATIVE " PERCENT %  --   --   --   --  1*   LN-MONOCYTES - REL (DIFF) % 2 5 4  --   --     < > = values in this interval not displayed.     Results from last 7 days   Lab Units 02/17/19  0524 02/16/19  2340 02/16/19  1829   LN-TROPONIN I ng/mL <0.01 0.01 0.01       MOST RECENT A1c, Iron, TIBC, Coags, TFTs  Lab Results   Component Value Date    HGBA1C 5.7 08/05/2013    INR 1.15 (H) 11/30/2018    PTT 36 11/30/2018     Lab Results   Component Value Date    IRON 22 (L) 02/03/2019    TRANSFERRIN 122 (L) 02/03/2019     Lab Results   Component Value Date    TSH 20.39 (H) 06/21/2018    FREET4 0.7 03/27/2018       Radiology results: Personally reviewed impression    Medication: Current medication personally reviewed.    Advanced Care Planning:     Discharge plan was discussed with patient, RN, CM    Total time spent in direct patient care is greater than 35 minutes with more than 50% of time spent in counseling and coordination of care with patient and family, in face to face contact, coordination with staff, chart review and medication adjustments.    Paulie Antunez MD  [facility]  Hospitalist   Pager [phone]

## 2021-06-24 NOTE — PLAN OF CARE
Blood pressure (BP) is within acceptable limits Progressing      Mobility/activity is maintained at optimum level for patient Progressing      Compliance with treatment regimen Progressing      Patient's pain/discomfort is manageable Progressing      Skin integrity is maintained or improved Progressing      Patient will remain free of falls Progressing      Demonstrates ability to cope with hospitalization/illness Progressing      Pt AOx4, vitals stable. Pt has been calm and pleasant. Pt refused to have temperature checked this AM. Pt was up in wheelchair at start of shift, refused writer to assess buttocks/coccyx wounds. Pt refused to be repositioned by staff while in wheelchair. Pt refused writer to assess gonzalez catheter and suprapubic catheter sites.      Course lung sounds and expiratory wheezes noted. Pt is tolerating ventilation on room air, O2 sats remain above 92%. Generalized pain rated 6-7/10; PRN PO Dilaudid given x2, Tylenol x1, Ibuprofen x1, Flexeril x1. Pt is medication compliant. Pt allowed writer to change dressing on bilateral heels.     Right heel: site is pink, small amount serous drainage, surrounding skin fragile.   Left heel: site is pink, yellow, and black. Small amount serous drainage. Surrounding skin is pink and fragile.    Will continue to monitor pt status.

## 2021-06-24 NOTE — PROGRESS NOTES
Patient now up in chair after a short nap this afternoon and appears in better spirits.  She was very demanding and rude however when staff was trying to get her into her wheelchair.  She is still refusing line draws so potassium protocol was not able to be completed for the day.  Stools x2 as well as sacral dressing changed twice.  Refusing any type of assessments however and unable to visualize any other wounds.

## 2021-06-24 NOTE — PROGRESS NOTES
"Clinical Nutrition Therapy Follow Up Note    Current Nutrition Prescription:   Diet: regular;   Diet Supplements: strawberry Boost GC 2x/day.    S: pt was actually available today in her room.  She states she is drinking the Boost and has been getting it her entire stay.  (however it had been d/c'd as she wasn't drinking earlier this stay and then reordered 2/25).  She would like it decreased to just 1x/day.    Current Nutrition Intake:  Pt is ordering 6768-3211 calories (usually >2500) and  gm protein/day.  Pt is eating the majority of wt she orders.  On days with lower calorie counts, she has skipped meals.      Pt is getting usually excessive calories and sometimes on lower end of protein needs.  Hopefully the extra calories are sparing more protein for wound healing.     Anthropometrics:   Height: 5' 2\" (157.5 cm)  Admission weight: 160# stated  Weight: (pt refused at this time) 2/17Ideal body weight ~100# (IBW-10-15# for paraplegia)  Pt has very few actual weights and usually gives a stated wt with admission and refuses to cooperate to allow bed to be zeroed.  Wt listed for 12/6/18 was 11# higher than prior weights that mentioned that pt refused to allow excess linens to be removed from bed--suspect wt not accurate on 12/6/18 as well.  All prior weights are questionable for this reason and also due to weights with specialty mattresses/beds.    Edema: nonpitting generalized, pt wouldn't allow assessment of edema lately per RNs.    RD Nutrition Focused Physical Exam:  The patient has the following physical signs which could indicate malnutrition: No noted fat/muscle loss noted in face, arms.      GI Status/Output:   GI symptoms include:   Last BM: 3/7- XL formed BM       Skin/Wound:  Isaiah score Isaiah Scale Score: 14; multiple pressure ulcers noted; WOCN following.    Medications:  Medications reviewed.    miralax two times a day (hold after 1 BM), pericolace 2 2x/d.    Labs:  Labs reviewed: "       Malnutrition: Not noted with no reliable hx re: weight or oral intake.     Nutrition Risk Level: stable-moderate risk    Nutrition dx:   Increased nutrient needs r/t wounds as evidenced by standard needs for multiple wounds noted in WOC RN note.     Goal:   Meet estimated nutrition needs and Wound healing. Progressing.    Intervention:   I adjusted strawberry Boost GC 1x/d with dinner.     Monitoring/Evaluation:   Intake, labs, wounds.     Electronically signed by:  Cecile Black RD

## 2021-06-24 NOTE — PLAN OF CARE
"Pain      Patient's pain/discomfort is manageable Progressing        Safety      Patient will be injury free during hospitalization Progressing        Pt VSS. Pt pain under control w/ PRN pain meds (see MAR). Pt requested to sit up in the amanda \"I am so stiff, I need to sit up. Pt moved to her chair at 3AM. Slightly irritable but mostly okay for majority of the night. She has her call button in reach. Will continue to monitor.    "

## 2021-06-24 NOTE — PLAN OF CARE
Compromised Skin Integrity      Fluid and electrolyte balance are achieved/maintained Progressing          Unable to do full skin assessment as patient up in wheelchair and refused to go back to bed, did let me change heel dressing on left and top of foot dressing on right.top of foot wound looks good, no drainage and wound bed pink. Left heel wound had small amount of [place] drainage and surrounding tissue is red and fragile

## 2021-06-24 NOTE — PROGRESS NOTES
[place] Daily Progress Note      Date of Service: 2/20/2019     Assessment and plan    Severe constipation  : Mild abdominal bloating today  ; Recent CT did not show any signs of obstruction  : Not having bowel movement despite Fleet enema  ; We will get repeat abdominal x-ray  ; Receiving MiraLAX  ; Last Fleet enema was on 2/18/2019  ; Repeat an fleet enema when patient agreeable  ; GoLYTELY: Continue till bowel movement results  ; Plan discussed with nursing staff    Severe sepsis with impending septic shock  : Symptomatically improved  ; Leukocytosis resolved  ; Blood pressure stable  ; Likely secondary to pneumonia probable aspiration pneumonia  ; Continue broad-spectrum antibiotics as per ID recommendation  : CT chest abdomen pelvis not show any other pathology  : Continue antibiotics as per ID recommendation  ; No new issues  ; Leukocytosis mildly increased but infection was symptomatically     Acute hypoxic respiratory failure: Resolved  Consider secondary to pneumonia and probable COPD exacerbation  : Off oxygen  : Steroids switch to oral   ; No new issues  ; Mildly fluid overloaded  ; We will keep on daily IV Lasix for now    Acute on chronic anemia  ;Microcytic anemia  History of IV iron therapy  Hemoglobin down to 7.6  ; Baseline hemoglobin around 9  : Bleeding likely secondary to sacral decubiti  ; Hemoglobin stable at present  ; No sign of bleeding from sacral decubiti during examination of 2/17/2019  ; Hemoglobin better as 8.6 to 8.9     History of stage IV sacral decubitus ulcer with chronic osteomyelitis (7/2018) status post I&D and IV antibiotics and recurrences:  History of recurrent UTIs:  : allowed  examination on 2/17/2019: Appeared clean at that time, no signs of bleeding  : Continue wound care    Sinus tachycardia  ; EKG on 2/15/2019 showed sinus tachycardia  ; Treating underlying condition as above  ; Heart rate improved with treatment of underlying sepsis and hydration  : Heart rate  stable      Opioid dependence:  Chronic pain:  -Follows with the PCP and plan to follow-up with the pain clinic.    On methadone and oxycodone as an outpatient.   She had been off methadone since admission which likely led to worsening of her chronic pain.   : Methadone was resumed on 2/16/2019 at 10 mg twice daily usually on 10 mg 3 times daily  : Mental status stable, no worsening respiratory status  ; Methadone increased to 10 mg 3 times daily on 2/19/2019     Severe MDD with psychotic features:  Paranoia and anxiety  : Patient was recently evaluated by psychiatry   on 72-hr hold per psychiatry with commitment hearing on 2/12 which she missed.  Trial date set for 2/19.   Continue quetiapine.  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  -She was evaluated by urology in the past for urethral incompetence and lower abdominal/pelvic pain.    She had nephrostomies and suprapubic catheters in the past.    continue gabapentin and baclofen. Continue oxybutynin.   Replace suprapubic catheter every 4 weeks as an outpatient.    Has gonzalez due to incontinence and sacral decub  ; Suprapubic catheter not working properly: Urology consulted    History of DVT and PE  ; Continue Xarelto     History of subdural hematoma (4/2017):  : Tolerating anticoagulation     Hypothyroidism:  Continue levothyroxine.     History of seizure disorder:  Continue levetiracetam.     Insomnia:  Continue eszopiclone      This note was dictated using voice recognition software. Any grammatical or context distortions are unintentional and inherent to the software.  Subjective:   She was sitting up in her wheelchair in no acute distress  She is alert oriented  States she feels much better today  She feels her a little more bloated today  Tolerating diet well, no  "abdominal pain or nausea  Breathing is much better        Brief History: 62 y.o. 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  Similar to her prior multiple hospitalizations, her behavior was a significant issue with abusiveness, and belligerence towards staff.  She was also nonadherent with medical care.  She was evaluated by psychiatry and was noted to have major depressive disorder with psychotic features as well as with a component of paranoia.  She was placed on a 72-hour hold.  Commitment hearing  on 2/12/19 which she refused to attend. Trial date 2/19.      Chart reviewed, events noted. Pt seen and examined.     Objective     Vital signs in last 24 hours:  Temp:  [98.3  F (36.8  C)] 98.3  F (36.8  C)  Heart Rate:  [80-94] 86  Resp:  [16-18] 18  BP: (109-173)/(54-84) 109/54  Weight:   163 lb (73.9 kg)  Weight change:   Body mass index is 29.81 kg/m .    Intake/Output last 3 shifts:  I/O last 3 completed shifts:  In: 500 [P.O.:500]  Out: 1950 [Urine:1950]  Intake/Output this shift:  I/O this shift:  In: -   Out: 1250 [Urine:1250]      Physical Exam:  /54 (Patient Position: Lying)   Pulse 86   Temp 98.3  F (36.8  C) (Oral)   Resp 18   Ht 5' 2\" (1.575 m)   Wt 163 lb (73.9 kg)   LMP  (LMP Unknown)   SpO2 94%   BMI 29.81 kg/m        O2 Device: None (Room air) O2 Flow Rate (L/min): 2 L/min      General Appearance:    Alert, no apparent distress.    HEENT:    Normocephalic. Pupils equal and reactive. " No scleral   Icterus. Moist oral mucosa    Lungs:     Clear to auscultation bilaterally, respirations unlabored  Basal air entry improved  No wheeze or crackle   Heart::    Regular rate and rhythm, S1 and S2 normal, no murmur, rub   or gallop.   Abdomen:  Soft, mild local discomfort no significant tenderness  Mild distended  Bowel sounds present   Extremity :  Lower extremity edema, chronic ulcers present       CNS:   Alert and oriented,   no facial asymmetry  Paraplegic       Imaging:  personally reviewed.      Scheduled Meds:    baclofen  10 mg Oral TID     eszopiclone  3 mg Oral QHS     gabapentin  900 mg Oral DAILY     levETIRAcetam  250 mg Oral BID     levothyroxine  125 mcg Oral Daily 0600     meropenem  1 g Intravenous Q8H     methadone  10 mg Oral TID     miconazole   Topical BID     multivitamin with minerals  1 tablet Oral DAILY     neomycin-bacitracin-polymyxin   Topical TID     omeprazole  20 mg Oral QAM AC     oxybutynin  5 mg Oral TID     polyethylene glycol  17 g Oral DAILY     predniSONE  20 mg Oral Daily with brkfst     QUEtiapine  25 mg Oral BID     QUEtiapine  50 mg Oral QHS     rivaroxaban  20 mg Oral Daily with supper     sodium chloride  10-30 mL Intravenous Q8H FIXED TIMES     vancomycin  1.25 g Intravenous Q24H     Continuous Infusions:    PRN Meds:.acetaminophen, albuterol, albuterol **AND** Nebulizer treatment intermittent, bisacodyl, calcium (as carbonate), cyclobenzaprine, HYDROmorphone, hydrOXYzine HCl, ibuprofen, magnesium hydroxide, melatonin, naloxone **OR** naloxone, OLANZapine, ondansetron **OR** ondansetron, polyethylene glycol, polyvinyl alcohol, sodium chloride bacteriostatic, sodium chloride bacteriostatic, sodium chloride, sodium chloride, sodium chloride, sodium phosphates 133 mL, traZODone    Lab Results:  personally reviewed.   not applicable  Recent Results (from the past 24 hour(s))   Vancomycin (Vancocin )    Collection Time: 02/19/19  5:19 PM   Result Value Ref Range     Vancomycin 25.9 (HH) <=25.0 ug/mL   HM1 (CBC with Diff)    Collection Time: 02/20/19  6:29 AM   Result Value Ref Range    WBC 13.6 (H) 4.0 - 11.0 thou/uL    RBC 3.60 (L) 3.80 - 5.40 mill/uL    Hemoglobin 8.9 (L) 12.0 - 16.0 g/dL    Hematocrit 30.5 (L) 35.0 - 47.0 %    MCV 85 80 - 100 fL    MCH 24.7 (L) 27.0 - 34.0 pg    MCHC 29.2 (L) 32.0 - 36.0 g/dL    RDW 22.5 (H) 11.0 - 14.5 %    Platelets 318 140 - 440 thou/uL    MPV 9.4 8.5 - 12.5 fL   Manual Differential    Collection Time: 02/20/19  6:29 AM   Result Value Ref Range    Total Neutrophils % 74 (H) 50 - 70 %    Lymphocytes % 13 (L) 20 - 40 %    Monocytes % 4 2 - 10 %    Eosinophils %  7 (H) 0 - 6 %    Basophils % 0 0 - 2 %    Metamyelocytes % 2 (H) <=1 %    Total Neutrophils Absolute 10.1 (H) 2.0 - 7.7 thou/ul    Lymphocytes Absolute 1.8 0.8 - 4.4 thou/uL    Monocytes Absolute 0.5 0.0 - 0.9 thou/uL    Eosinophils Absolute 1.0 (H) 0.0 - 0.4 thou/uL    Basophils Absolute 0.0 0.0 - 0.2 thou/uL    Metamyelocytes Absolute 0.3 (H) <=0.1 thou/uL    Platelet Estimate Normal Normal    Ovalocytes 1+ (!) Negative    Polychromasia 1+ (!) Negative    Tear Drop Cells 1+ (!) Negative               Advance Care Planning:   Barriers to discharge: IV antibiotics  Anticipated discharge day: Multiple days  Disposition: Unclear, ?  [place] as patient needs 7-10 days of IV antibiotics as per infectious disease      Gerard Juárez MD  [facility]  Hospitalist

## 2021-06-24 NOTE — PROGRESS NOTES
"Pharmacy Consult: Vancomycin Dosing    Pharmacist consulted to dose vancomycin for Marychuy Zhou, a 63 y.o. female.    Ordering provider:Dr. Dawkins, patient is being followed by infectious disease     Indication for vancomycin therapy: Hospital Acquired Pneumonia Sepsis    Goal Trough Range:  15-20 mcg/mL based on indication    Other current antimicrobials             vancomycin 1.25 g in sodium chloride 0.9% 500 mL (VANCOCIN)  Every 18 hours          meropenem 1 g in NaCl 0.9 % 50 mL (MINI-BAG Plus) (MERREM)  Every 8 hours             Subjective/Objective:    Patient was admitted for Sepsis (H) on 2/2/2019    Height: 5' 2\" (1.575 m)    Actual Body Weight (ABW): 73.9 kg (163 lb)    Ideal body weight: 50.1 kg (110 lb 7.2 oz)  Adjusted ideal body weight: 59.6 kg (131 lb 7.5 oz)    BMI: Body mass index is 29.81 kg/m .    No Known Allergies    Patient Active Problem List   Diagnosis     Other chronic pain     Lumbosacral Disc Degeneration     Herpes Simplex Type I     Nicotine Dependence     Essential hypertension     Dislodged Bhandari catheter, subsequent encounter     Acute respiratory failure with hypoxia (H)     COPD (chronic obstructive pulmonary disease) (H)     Lactic acidosis     SVT (supraventricular tachycardia) (H)     Gastroesophageal reflux disease without esophagitis     Acquired hypothyroidism     Iron deficiency anemia     Opioid-induced constipation (OIC)     Paraplegia (H)     COPD exacerbation (H)     Chronic pain syndrome     Major depression     Alcohol abuse     Decubitus ulcer     Cancer of lung (H)     Neurogenic bladder     Neurogenic bowel     Mixed hyperlipidemia     Palliative care encounter     Hypokalemia     Paraplegia at T4 level (H)     Major depressive disorder, recurrent episode, moderate (H)     Seizure disorder (H)     Heel ulcer, right, limited to breakdown of skin (H)     Decubitus ulcer of sacral region, stage 4 (H)     Esophageal dysmotility     Anxiety disorder     History of " MDR Enterobacter cloacae infection     Mechanical complication of suprapubic catheter (H)     Diastolic CHF, acute on chronic (H)     Chest tightness or pressure     Atypical chest pain     Decubitus ulcer of right buttock, stage 4 (H)     Nephrostomy tube displaced (H)     HCAP (healthcare-associated pneumonia)     Cognitive disorder     Insomnia due to anxiety and fear     Sepsis, due to unspecified organism (H)     Urinary tract infection, site unspecified     Hypotension, unspecified hypotension type     Chronic low back pain without sciatica, unspecified back pain laterality     Acute encephalopathy     Claustrophobia     Choledocholithiasis with obstruction     Cholelithiasis     Hx of pulmonary embolus     S/P cholecystectomy     Fever, unspecified fever cause     Pneumonia of left lower lobe due to infectious organism (H)     Lower abdominal pain     Neck infection     Subdural hematoma (H)     Convulsions, unspecified convulsion type (H)     Partial symptomatic epilepsy with simple partial seizures, intractable, with status epilepticus (H)     Acute on chronic intracranial subdural hematoma (H)     Brain edema (H)     Chronic obstructive pulmonary disease with acute exacerbation (H)     Decubitus ulcer, stage III (H)     Depression     Sepsis (H)     Sepsis secondary to UTI (H)     Pyelonephritis     Hyponatremia     Episodic cluster headache, not intractable     Flank pain     Right upper quadrant abdominal pain     Abdominal pain     Colon wall thickening     Cystitis     Suprapubic catheter dysfunction, subsequent encounter     Bilateral hydronephrosis     Elevated lactic acid level     Paroxysmal hemicrania     UTI (urinary tract infection)     Abdominal pain, unspecified location     Coagulopathy (H)     Anxiety     Dislodged wound Vacuum     Urinary tract infection associated with cystostomy catheter (H)     History of pulmonary embolus (PE)     Chronic anticoagulation     Weakness     Urinary  incontinence due to urethral sphincter incompetence     Abscess, gluteal, right     History of DVT (deep vein thrombosis)     Hypothyroidism due to Hashimoto's thyroiditis     Fever in other diseases     Severe sepsis (H)     Ulcer     Sleep difficulties     Irritability and anger     Adjustment disorder with mixed anxiety and depressed mood     Anemia     Closed left subtrochanteric femur fracture (H)     Acute blood loss anemia     Other specified hypotension     History of encephalopathy     Nephrostomy complication (H)     Left lower lobe pneumonia (H)     Acute bronchitis due to other specified organisms     Hypoxemia     Centrilobular emphysema (H)     Bilateral lower extremity edema     Stage IV pressure ulcer of sacral region (H)     Gangrene (H)     Noncompliance with medication regimen     Infection     Goals of care, counseling/discussion     Advanced care planning/counseling discussion     Malingerer for IV Dilaudid     Moderate protein-calorie malnutrition (H)     Chronic osteomyelitis (H)     Drug-induced constipation     Quadriplegia (H)     Pelvic pain in female     Weakness of left hand     Focal motor seizure (H)     Tension-type headache, not intractable, unspecified chronicity pattern     Atelectasis     Pelvic pain     Panic anxiety syndrome     Ulcer due to Treponema vincentii, with fat layer exposed (H)     Abnormal CT scan of lung     Upper back pain     Severe major depression, single episode, with psychotic features (H)     Paranoia (H)     Unable to control anger    Past Medical History:   Diagnosis Date     Alcohol dependence (H)     history     Anxiety      Cancer of lung (H) 9/24/2013    Overview:  Adenocarcinoma Stage 1A per preoperative needle biopsy   Adenocarcinoma Stage 1A per preoperative needle biopsy  Wedge r/s 9/2013     Chronic kidney disease      Chronic pain     on Methadone     Chronic suprapubic catheter (H)      Constipation      COPD (chronic obstructive pulmonary  disease) (H)      Decubitus ulcer     had wound vac     Depression      Diastolic CHF, acute on chronic (H)      DVT (deep venous thrombosis) (H) 5/26/2015     ESBL (extended spectrum beta-lactamase) producing bacteria infection 08/2015    urine     Gastroparesis      GERD (gastroesophageal reflux disease)      HCAP (healthcare-associated pneumonia)      Herpes Simplex Type I      Hyperlipemia      Hypertension      Hypothyroidism 5/26/2015     Intracranial subdural hematoma (H)      Kidney stone      Lower paraplegia (H) 4/28/2016    Overview:  spinal epidural hematoma, emergent decomp Overview:  spinal epidural hematoma, emergent decomp Overview:  spinal epidural hematoma, emergent decomp     MRSA infection      Neurogenic bladder     chronic gonzalez     Neuropathy (H) 5/26/2015     Obesity      Opiate dependence, continuous (H)      Osteoporosis      Pneumonia      Pulmonary embolism (H) 12/2016    chronic, on coumadin     Rheumatoid arthritis (H)      Sepsis (H) 5/28/2017     SVT (supraventricular tachycardia) (H) 8/19/2014     Vascular myelopathies (H)         Temp Readings from Current Encounter:     02/17/19 0000 02/17/19 0400 02/17/19 0800   Temp: (P) 98.7  F (37.1  C) 98.7  F (37.1  C) 98.7  F (37.1  C)     Net Intake/Output (last 24 hours):  I/O last 3 completed shifts:  In: 1473 [P.O.:340; I.V.:533; IV Piggyback:600]  Out: 400 [Urine:400]    Recent Labs     02/14/19  1935 02/15/19  1631 02/16/19  1829 02/17/19  0524   WBC 16.0*  --  24.8* 19.6*   NEUTROABS  --   --   --  18.5*   LACTICACID  --  1.9 1.4  --    BUN  --  10 17 15   CREATININE  --  0.81 0.74 0.66     Estimated Creatinine Clearance: 83.2 mL/min (by C-G formula based on SCr of 0.66 mg/dL).    No results for input(s): CULTURE in the last 72 hours.    Results for orders placed or performed during the hospital encounter of 02/02/19   Culture, Urine    Collection Time: 02/03/19 12:15 AM   Result Value Status    Culture  Final     Mixture of organism  types with no predominating organism.       Recent labs: (last 7 days)     02/17/19  0523 02/17/19  0845   VANCOMYCIN 26.8* 24.9       Vancomycin administrations: (last 120 hours)     Date/Time Action Medication Dose Rate    02/16/19 2128 New Bag    vancomycin 1 g in sodium chloride 0.9% 250 mL (VANCOCIN) 1 g 135 mL/hr    02/16/19 0854 New Bag    vancomycin 1 g in sodium chloride 0.9% 250 mL (VANCOCIN) 1 g 135 mL/hr    02/15/19 1852 New Bag    vancomycin 1 g in sodium chloride 0.9% 250 mL (VANCOCIN) 1 g 135 mL/hr          Assessment/Plan:    Pharmacist consulted to dose vancomycin for Hospital Acquired Pneumonia Sepsis, goal trough range 15-20 mcg/mL.  1. Change to vancomycin 1250mg IV every 18h hours (16.9 mg/kg actual body weight).  2. Vancomycin trough level of 24.9 mcg/mL was above the goal trough range. This trough level was drawn at steady state.  3. Pharmacist will plan to re-check a vancomycin trough level prior to 4th dose of new dose, sooner if clinically indicated .  4. Pharmacist will continue to follow.    Thank you for the consult.  Marilia Lopez [facility] 2/17/2019 9:46 AM

## 2021-06-24 NOTE — PLAN OF CARE
"VSS, po pain med ok, pt up to chair on elda, declined abd xr, fleet, supp and miralax x2 w/o result, remains distended and constipated, refused go lightly, refused dressing change and assessment of coccyx wound stating \"she can tell when it needs it\" ref repo stating she micro turns self.  Overall pleasant and cooperative on shift with intermittent unreasonable requests related seeing specific staff who were on break, picking and choosing which staff were to perform specific routine tasks etc  "

## 2021-06-24 NOTE — PROGRESS NOTES
[facility] Daily Progress Note    Assessment/Plan:  Marychuy Zhou is a 62 y.o. 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  Similar to her prior multiple hospitalizations, her behavior was a significant issue with abusiveness, and belligerence towards staff.  She was also nonadherent with medical care.  She was evaluated by psychiatry and was noted to have major depressive disorder with psychotic features as well as with a component of paranoia.  She was placed on a 72-hour hold.  Commitment hearing  on 2/12/19 which she refused to attend.  Patient had a trial date on 12/19/2019 and commitment was refused by court, and she has been off one-to-one since then.   Patient completed 10 days of meropenem and vancomycin          Assessment:  Acute COPD exacerbation  : Feel underlying pneumonia unlikely  : Has some leukocytosis but pro calcitonin was negative  : Complete doxycycline course  : Leukocytosis improving without intervention  : Lung sounds improved  ; Patient will likely need slow tapering of prednisone  ; Patient advised to refrain from going out to smoke with subzero temperatures  : Patient still appears to be going out to smoke  ; Encourage using flutter valve     Leukocytosis with some atypical cells  ; We will get peripheral smear     Acute on chronic anemia  ;Microcytic anemia  History of IV iron therapy  : Hemoglobin seems to be trending down  : Recheck hemoglobin better this morning  ; Continue iron  supplement  : Hemoglobin stable     Recent severe sepsis with impending septic shock  : Antibiotic course completed as per ID recommendation  ; Concern for recurrent aspirations  ; Appreciate speech therapy input  ; Patient refusing video swallow  : Noncompliant with aspiration precautions  ; Present management as above     Severe constipation  Getting better with bowel movement  No issues at present  ; Having regular bowel movement as per patient and nurse     History of stage IV sacral decubitus ulcer with chronic osteomyelitis (7/2018) status post I&D and IV antibiotics and recurrences:  History of recurrent UTIs:  : Wound examined today with wound nurse: No signs of active bleeding  ; Skin excoriation seen  ; Wound dressing as per wound care nurse     opioid dependence:  Chronic pain:  -Follows with the PCP and plan to follow-up with the pain clinic.    On methadone and oxycodone as an outpatient.   She had been off methadone since admission which likely led to worsening of her chronic pain.   : Methadone was resumed on 2/16/2019 at 10 mg twice daily usually on 10 mg 3 times daily  : Mental status stable, no worsening respiratory status  ; Methadone increased to 10 mg 3 times daily on 2/19/2019     Severe MDD with psychotic features:  Paranoia and anxiety  : Patient was recently evaluated by psychiatry   on 72-hr hold per psychiatry with commitment hearing on 2/12 which she missed.  Trial date set for 2/19 which COURT  declined commitment   Continue quetiapine.  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     History of seizure disorder:  Continue levetiracetam.     Insomnia:  Continue eszopiclone        Plan:  Patient is baking for IV  Dilaudid at least one time but unfortunately could not give her because she is already on 10 mg thrice daily of methadone which I will give 1 more time during the night if there is pain    Code status:Full Code        Subjective:  Complaining of left as well as Right leg pain which she says is very severe though patient looks comfortable    Review of Systems:   Patient was not seen in the room and had to go several times to be able to examine her     Current Medications Reviewed via EHR List    Objective:  Vital signs in last 24 hours:  Heart Rate:  [] 110  Resp:  [18-20] 20  BP: (100-112)/(61-73) 110/63  SpO2:  [95 %-99 %] 98 %    Intake/Output last 3 shifts:  I/O last 3 completed shifts:  In: -   Out: 400 [Urine:400]      Physical Exam:  EYES: EOM+    NECK: mo JVD  HEART : S1 S2 RRR   LUNGS: Patient has bilateral wheezing in the lungs   ABDOMEN:   Soft, No tenderness , Bowel sounds are positive  CNS  Alert and Oriented x 3    LOWER LIMBS: Patient has paraplegia          Lab Results:  Personally Reviewed.   Fingerstick Blood Glucose: No results for input(s): POCGLUFGR in the last 48 hours.    No results found for this or any previous visit (from the past 24 hour(s)).        Advanced Care Planning  Discharge plan: She should work and is looking for LTAC placement or a group home placement      Jaron Edwards  Date: 3/8/2019  Time: 4:51 PM  Hospitalist Service  Part of this chart was created using a dictation software. Typographic errors, word substitutions, and Grammatical errors may unintentionally occur.

## 2021-06-24 NOTE — PROGRESS NOTES
"Writer administered prn melatonin per patient request and gave patient the number for the [facility] department. Patient asked for assistance dialing the number and then threw the phone down on the bed next to her stating \"they're closed for the holiday\" \"thanks, I've been asking for 3 days to have the time changed\" \"you guys are causing me a lot of anxiety\"   On writers way out of patients room, patient stated to NA \"that little bitch could've brought my pain meds earlier\" \"she could use a good lay\"  "

## 2021-06-24 NOTE — PLAN OF CARE
To ensure patient safety, patient will abstain from any threats or actions to harm self or others Progressing      Patient's pain/discomfort is manageable Progressing      Damaged tissue is healing and protected Progressing      Pt A&Ox4, labile affect with agitation, anger, and defensiveness, actively uncooperative, demanding, and complaining. Pt refused vital signs, complete physical assessment, and repositioning. Dressing changed on wound on pt's coccyx, refused all other wound care. Pt left the unit during shift to smoke. PRN dilauded and flexeril given for severe chronic pain, reports some improvement. Labs drawn from PICC line, hgb 8.2, magnesium and potassium replacement protocols continued as ordered. Dr. Gee informed of pt's refusal of cares. Will continue to monitor safety and redirect behaviors as needed.

## 2021-06-24 NOTE — PROGRESS NOTES
Encouraged patient to turn and patient refused, she stated she was comfortable in the position she was in on her right side.

## 2021-06-24 NOTE — PLAN OF CARE
Problem: Discharge Barriers  Goal: Patient's discharge needs are met  Outcome: Progressing  Care Plan  Care Management      Care Management Goals of the Day: Progression of care    Care Progression Reviewed With: Charge RN, MD, HUC, RNCM, CMSW    Barriers to Discharge: Placement    Discharge Disposition: TBD    Expected Discharge Date: TBD    Transportation:  [facility] ([phone])      Care Coordination Narrative:     SW spoke with Ellen ([phone]) at \[facility]\"" in regards to getting an assessment for the pt for the CADI waiver. Per Ellen at this the Co is about 2-3 months out for general assessments, however if the SW finds a facility that can accept the (such as a group home) that could pt the pt closer to the top of the list. SW stated understanding of this. [facility] states that should something come up prior to that 2-3 month time frame the SW is able to call her back or to call her Supervisor, Julia Rangel ([phone]).    SW will continue to search for LTC placement and for Group home placement on the pt's behalf. See previous notes for placement referrals sent and decisions.    CYNTHIA Gonsalez  3/6/2019  3:32 PM

## 2021-06-24 NOTE — PLAN OF CARE
Blood pressure (BP) is within acceptable limits Not Progressing    Patient refused vitals and has been off the unit multiple times when nursing assistant attempted to check. Will continue to attempt vitals as we are able.    Compliance with treatment regimen Not Progressing    Patient still refusing turns/cares as she wants.  She is up in the chair and tells staff that she will tell us what happens and when.  Continuing to offer cares and will chart refusals as needed.      Patient's pain/discomfort is manageable Not Progressing    Patient always reporting high levels of pain.  Scheduled and as needed medications offered around the clock.  Patient sleeps in between doses as well.  Continue to monitor and medicate per orders.    Damaged tissue is healing and protected Not Progressing    Patient was already up in wheelchair this morning so unable to fully assess wounds. Wound care also attempted without any success.

## 2021-06-24 NOTE — CONSULTS
PULMONARY / CRITICAL CARE CONSULTATION NOTE      Consultation - Pulmonary/Critical Care Medicine  Marychuy Zhou,  1955, MRN [id number]  Date / Time of Admission:  2019 10:34 PM    Admitting Dx: Lactic acidosis [E87.2]  Chronic pain [G89.29]  Sacral decubitus ulcer [L89.159]  Sepsis (H) [A41.9]    PCP: Sánchez Zamora MD, [phone]  Consulting physician:  Franck Gee MBBS   Code status:  Full Code       Extended Emergency Contact Information  Primary Emergency Contact: LenaSánchez   [facility]  Home Phone: [phone]  Work Phone: [phone]  Mobile Phone: [phone]  Relation: Child  Secondary Emergency Contact: Tobias Paredes   [place]  Mobile Phone: [phone]  Relation: Son-In-Law       ID:  Marychuy Zhou is a 63 y.o. 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       ASSESSMENT   1. Sepsis with impending shock.  Suspected source is pulmonary.  Patient has low blood pressure at baseline, SBPs in the 90s and sometimes 80s mmHg with sleep.  Today with recurrent hypotension, treated with NS 1000 mL total and increased IVF rates with some improvement.  However, concerned for overlying pulmonary edema, thus limiting full 30 mL/kg fluid bolus.    2. Hospital acquired pneumonia, possible aspiration pneumonia.  Patient has been admitted since 2/3/2019.  Serial CXR findings from , , and 2/15 performed, initial film on  reported normal; has  some crowding of vessels but this could have been related to slightly reduced lung volumes. Recent CXR 2/15 with evolving patchy infiltrates and vascular congestion.    3. Possible COPD with exacerbation.  CT chest 06/2018 showed mild centrilobular and paraseptal emphysema, no prior PFTs.    4. Chronic pain syndrome; opioid dependence.  Tapered off methadone on admission due to request to be taken off, then restarted on 2/14.   5. Severe depression with anxiety and psychotic features.  Evaluated by Psychiatry.  Patient blacklisted by most local TCU/NH.  Has a commitment trial on 2/19/19.  Previously had commitment hearing on 2/12, but refused to attend.   6. Seizure disorder.    7. Insomnia NOS.    8. Constipation.  Last bowel movement recorded 2/13/19.   9. Hypoalbuminemia with protein-calorie malnutrition.     10. Neurogenic bladder with chronic suprapubic catheter.  Additional Bhandari catheter placed 2/14/19.    11. Hypothyroidism.    12. Normocytic anemia.  Bloodwork 2/3 with reduced iron (22), reduced TIBC (152), reduced transferrin (122), and reduced transferrin saturation (14%) all concerning for anemia of chronic disease.  Ferritin level not checked.    13. History of DVT/PE. On rivaroxaban. Notably, has history of subdural hematoma in 04/2017 s/p left craniotomy for SDH evacuation.  14. Stage IV sacral decubitus ulcer.  Followed by Wound services.   15. Chronic osteomyelitis of sacrum and heel.  Completed course of Keflex on 2/14.  Followed by Wound services.   16. History of recurrent UTIs.    17. History of multi-drug resistant organisms.  Has history of VRE, ESBL, MRSA in the past.  No current cultures growing these organisms.   18. Difficulties with vascular access.  Has refused access in the past, peripheral IV placed to foot.  Now with PICC line.    19. T4 paraplegic.  Paraplegia secondary to T4 hematoma. Mobile with use of an electric wheelchair.    Advance Directives:  Received     PLAN   Systems to  Assess:     Pulmonary: Wean supplemental O2 as tolerated; goal O2 sat > 92%.  HOB > 30 degrees to limit aspiration risk.      Wean supplemental O2 as tolerates.     Solumedrol 40 mg IV x 2 continued (last dose 2/18), then will taper accordingly.      Albuterol and Duonebs PRN per patient preference.     Check sputum culture if able to provide.  Abx as below.     Cardiovascular: Cardiac monitoring.     SBP goal > 90 mmHg, MAP goal > 65 mmHg.      Norepinephrine gtt, vasopressin gtt as needed.     Decreased IVF rate given risk of pulmonary edema.    Repeat lactic acid improved 1.9 --> 1.1.     EKG PRN.      Neurological: Neuro checks per ICU protocol.     Psychiatry management appreciated, currently with 1:1 sitter.     Keppra for seizure management, eszopiclone for insomnia, seroquel scheduled.    Continue baclofen 10 mg three times a day, methadone 10 mg two times a day    Limit narcotics and benzodiazepines.    Pain control: PRNs.      GI/: continue regular diet with thin liquids.  Monitor for aspiration.      GI prophylaxis:  Not indicated.  PPI available PRN.      Renal: Monitor I/O's.  Electrolyte repletion PRN.  Avoid/limit nephrotoxic agents.     IVFs: NS @ 50 mL/hour (dose reduced).      Heme/Coag: Monitor H/H. Hgb did decrease from 9.7 to 7.7.  She does have mild oozing related to wounds; will monitor.      Transfuse as needed for goal Hgb > 7 or if symptomatic anemia.     DVT prophylaxis:  Rivaroxaban.     Infectious disease: General precautions.     Infectious diseases consultation appreciated.     Continue meropenem, vancomycin.      Endocrine: Insulin not indicated.  Glucose checks PRN.  Hypothyroid management with levothyroxine.    Musculoskeletal: Bedrest.  Wound care management appreciated.      Patient is very particular regarding her wound care steps, assisted staff.  I have encouraged the patient to show us her preferences/steps but I separately used clean supplies in the same method given her  lack of hand  or glove use.      Lines:   RUE PICC line, placed 2/16/19-  Bhandari catheter, placed 2/14/19-  Suprapubic catheter, longstanding.     Activity: Bed Rest    Code Status:  Full code.  In review, the patient has commitment trial on 2/19/19.  She is being followed by Psychiatry services as well.  Had a commitment hearing scheduled for 2/12/19 but patient refused to attend.      Thank you for this interesting consultation.  Please call if any questions or concerns.    The patient and/or the family were educated about the above plan of care and indicated understanding.      This patient is considered critically ill and requires ICU level of care due to sepsis with impending shock physiology.    Total Critical Care time, not including separate billable procedure time:  50 minutes.    Kailee Araya MD, MPH  Pulmonary & Critical Care Medicine  Pager: [phone]  2/16/2019  8:55 PM     ICU DAILY CHECKLIST   Can patient transfer out of MICU? no    FAST HUG:    Feeding:  Feeding: Yes.  Patient is receiving ORAL    Bhandari:Yes  Analgesia/Sedation:Yes, PRNs and scheduled methadone.  Thromboembolic prophylaxis: yes; Mode:  Rivaroxaban  HOB>30:  Yes  Stress Ulcer Protocol Active: not applicable; Mode: Not Indicated  Glycemic Control: Any glucose > 180 no; Mode of Insulin Therapy: Not indicated    INTUBATED:  Can patient have daily waking:  not applicable  Can patient have spontaneous breathing trial:  not applicable    Restraints? no    PHYSICAL THERAPY AND MOBILITY:  Can patient have PT and mobility trial: no  Activity: Bed Rest       Chief Complaint No chief complaint on file.         [facility]    Marychuy Zhou is a 63 y.o. 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 History is provided by the patient and review of medical records.      Per Epic review, the patient presented to [facility] from a TCU ([facility]).  She went on a leave of absence in the afternoon, returned in the evening for her medications, yelled at staff for higher doses of dilaudid, and informed staff that she would go to the ED for IV pain medications.  Upon ED arrival, patient exhibited behavioral difficulties with yelling at staff, refusing certain examinations, and locations for vascular access/blood draws.  Ultimately, was admitted for pain control and placement. Given her verbal abuse of staff, hospital administration conference held 2/6/19 requesting her cooperation, but patient refused to talk and asked them all to leave her room.  Had also been throwing bloody and fecal covered material/linens at staff.  Evaluated by Psychiatry 2/6, concern for severe depression with anxiety & psychotic features. Placed on psychiatric hold 2/11, commitment hearing 2/12 but patient refused to attend, and trial date scheduled 2/19.     On 2/12, patient developed increased dyspnea with hypoxia and rhonchi, CXR concerning for pneumonitis/ARMANDO infiltrates although poor inspiratory effort.  Levaquin and prednisone burst initiated 2/12.  Flagyl added 2/14 given concern for possible aspiration, swallow eval and flutter valve requested but patient refused.  Abx switched to meropenem/vancomycin on 2/15.  Infectious Diseases consulted 2/15.      Today, patient had intermittent hypotension with SBPs in the 70s-80s mmHg.  RUE PICC line placed.  Received  mL bolus x 2, IVF rate increased to 150 mL/hour, and transferred to  "the ICU for continued care.  Patient reports issues with abdominal pain, sacral pain, heel pain.  Also with dyspnea, requesting Oxymask use instead of nasal cannula, and feels that her lungs are coarse with fluid.  Otherwise, no fevers, chills, N/V, chest pain/pressure.  \"Wet cough\" but unable to provide sputum sample; is able to swallow the mucous however.       Review of Systems   Pertinent items are noted in HPI.  A 12 point comprehensive review of systems was negative except as noted.     Active Problem List   Patient Active Problem List    Diagnosis Date Noted     Unable to control anger      Severe major depression, single episode, with psychotic features (H)      Paranoia (H)      Upper back pain      Abnormal CT scan of lung      Panic anxiety syndrome      Pelvic pain 01/09/2019     Ulcer due to Treponema vincentii, with fat layer exposed (H) 01/07/2019     Atelectasis      Tension-type headache, not intractable, unspecified chronicity pattern      Weakness of left hand 12/04/2018     Focal motor seizure (H) 12/04/2018     Pelvic pain in female 11/21/2018     Chronic osteomyelitis (H)      Drug-induced constipation      Quadriplegia (H)      Goals of care, counseling/discussion 07/16/2018     Advanced care planning/counseling discussion 07/16/2018     Malingerer for IV Dilaudid 07/16/2018     Moderate protein-calorie malnutrition (H) 07/16/2018     Infection 07/14/2018     Noncompliance with medication regimen      Stage IV pressure ulcer of sacral region (H) 07/11/2018     Gangrene (H) 07/10/2018     Centrilobular emphysema (H) 07/02/2018     Bilateral lower extremity edema 07/02/2018     Hypoxemia 07/01/2018     Left lower lobe pneumonia (H) 06/27/2018     Acute bronchitis due to other specified organisms 06/27/2018     Nephrostomy complication (H) 03/06/2018     History of encephalopathy      Closed left subtrochanteric femur fracture (H) 02/27/2018     Acute blood loss anemia      Other specified " hypotension      Anemia 02/26/2018     Adjustment disorder with mixed anxiety and depressed mood      Sleep difficulties      Irritability and anger      Fever in other diseases 01/31/2018     Severe sepsis (H) 01/31/2018     Ulcer 01/31/2018     Hypothyroidism due to Hashimoto's thyroiditis      Abscess, gluteal, right      History of DVT (deep vein thrombosis)      Urinary incontinence due to urethral sphincter incompetence      Weakness 09/07/2017     Chronic anticoagulation 09/03/2017     Urinary tract infection associated with cystostomy catheter (H) 09/02/2017     Dislodged wound Vacuum 08/14/2017     Abdominal pain, unspecified location      Coagulopathy (H)      Anxiety      UTI (urinary tract infection) 08/10/2017     Elevated lactic acid level 07/29/2017     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     Suprapubic catheter dysfunction, subsequent encounter      Bilateral hydronephrosis      Cystitis      Colon wall thickening      Abdominal pain 06/03/2017     Flank pain 06/02/2017     Right upper quadrant abdominal pain 06/02/2017     Episodic cluster headache, not intractable      Sepsis (H) 05/28/2017     Sepsis secondary to UTI (H) 05/28/2017     Pyelonephritis      Hyponatremia      Chronic obstructive pulmonary disease with acute exacerbation (H) 04/18/2017     Depression 04/18/2017     Partial symptomatic epilepsy with simple partial seizures, intractable, with status epilepticus (H)      Acute on chronic intracranial subdural hematoma (H)      Brain edema (H)      Subdural hematoma (H) 04/09/2017     Convulsions, unspecified convulsion type (H)      Neck infection 03/31/2017     Lower abdominal pain 03/01/2017     Fever, unspecified fever cause      Pneumonia of left lower lobe due to infectious organism (H)      S/P cholecystectomy      Choledocholithiasis with obstruction 01/02/2017     Cholelithiasis 01/02/2017     Hx of pulmonary embolus 01/02/2017     Claustrophobia      Urinary tract infection, site  unspecified      Hypotension, unspecified hypotension type      Chronic low back pain without sciatica, unspecified back pain laterality      Acute encephalopathy      Sepsis, due to unspecified organism (H) 12/30/2016     History of pulmonary embolus (PE) 12/01/2016     Cognitive disorder      Insomnia due to anxiety and fear      HCAP (healthcare-associated pneumonia) 11/04/2016     Atypical chest pain 10/23/2016     Diastolic CHF, acute on chronic (H)      Chest tightness or pressure      History of MDR Enterobacter cloacae infection      Anxiety disorder      Esophageal dysmotility      Major depressive disorder, recurrent episode, moderate (H)      Seizure disorder (H)      Heel ulcer, right, limited to breakdown of skin (H)      Decubitus ulcer of sacral region, stage 4 (H)      Paraplegia at T4 level (H) 04/28/2016     Hypokalemia      Chronic pain syndrome 01/12/2016     Major depression 01/12/2016     Alcohol abuse 01/12/2016     COPD exacerbation (H) 08/03/2015     Gastroesophageal reflux disease without esophagitis 05/26/2015     Acquired hypothyroidism 05/26/2015     Iron deficiency anemia 05/26/2015     Opioid-induced constipation (OIC) 05/26/2015     Paraplegia (H) 05/26/2015     Palliative care encounter 12/08/2014     Nephrostomy tube displaced (H) 12/02/2014     Mechanical complication of suprapubic catheter (H) 11/20/2014     Acute respiratory failure with hypoxia (H) 08/19/2014     COPD (chronic obstructive pulmonary disease) (H) 08/19/2014     Lactic acidosis 08/19/2014     SVT (supraventricular tachycardia) (H) 08/19/2014     Dislodged Bhandari catheter, subsequent encounter 07/30/2014     Other chronic pain      Lumbosacral Disc Degeneration      Herpes Simplex Type I      Nicotine Dependence      Essential hypertension      Cancer of lung (H) 09/24/2013     Neurogenic bladder 01/29/2013     Neurogenic bowel 01/29/2013     Decubitus ulcer 01/25/2013     Mixed hyperlipidemia 01/25/2013      Decubitus ulcer of right buttock, stage 4 (H) 01/25/2013     Decubitus ulcer, stage III (H) 01/25/2013         Medical/Surgical History   Past Medical History:   Diagnosis Date     Alcohol dependence (H)     history     Anxiety      Cancer of lung (H) 9/24/2013    Overview:  Adenocarcinoma Stage 1A per preoperative needle biopsy   Adenocarcinoma Stage 1A per preoperative needle biopsy  Wedge r/s 9/2013     Chronic kidney disease      Chronic pain     on Methadone     Chronic suprapubic catheter (H)      Constipation      COPD (chronic obstructive pulmonary disease) (H)      Decubitus ulcer     had wound vac     Depression      Diastolic CHF, acute on chronic (H)      DVT (deep venous thrombosis) (H) 5/26/2015     ESBL (extended spectrum beta-lactamase) producing bacteria infection 08/2015    urine     Gastroparesis      GERD (gastroesophageal reflux disease)      HCAP (healthcare-associated pneumonia)      Herpes Simplex Type I      Hyperlipemia      Hypertension      Hypothyroidism 5/26/2015     Intracranial subdural hematoma (H)      Kidney stone      Lower paraplegia (H) 4/28/2016    Overview:  spinal epidural hematoma, emergent decomp Overview:  spinal epidural hematoma, emergent decomp Overview:  spinal epidural hematoma, emergent decomp     MRSA infection      Neurogenic bladder     chronic gonzalez     Neuropathy (H) 5/26/2015     Obesity      Opiate dependence, continuous (H)      Osteoporosis      Pneumonia      Pulmonary embolism (H) 12/2016    chronic, on coumadin     Rheumatoid arthritis (H)      Sepsis (H) 5/28/2017     SVT (supraventricular tachycardia) (H) 8/19/2014     Vascular myelopathies (H)      Past Surgical History:   Procedure Laterality Date     CHOLECYSTECTOMY       COLONOSCOPY N/A 6/8/2017    Procedure: COLONOSCOPY;  Surgeon: Conor Hdez MD;  Location: [facility];  Service:      CRANIECTOMY Left 4/10/2017    Procedure: LEFT CRANIOTOMY FOR SUBDURAL HEMATOMA EVACUATION AND SUBDURA  PROCESS;  Surgeon: Maria Alejandra Salinas MD;  Location: [facility] OR;  Service:      CT BIOPSY BONE  11/16/2018     DECUBITUS ULCER EXCISION N/A 12/22/2017    Procedure: DEBRIDEMENT, SACRAL ULCER;  Surgeon: Pierre Cazares MD;  Location: [facility] OR;  Service:      DECUBITUS ULCER EXCISION Right 2/5/2018    Procedure: DEBRIDEMENT, PRESSURE ULCER;  Surgeon: Pierre Cazares MD;  Location: [facility] OR;  Service:      DECUBITUS ULCER EXCISION N/A 7/12/2018    Procedure: DEBRIDEMENT, PRESSURE ULCER;  Surgeon: Pierre Cazares MD;  Location: [facility] OR;  Service:      ERCP N/A 1/3/2017    Procedure: ENDOSCOPIC RETROGRADE CHOLANGIOPANCREATOGRAPHY;  Surgeon: Roel Echeverria MD;  Location: [facility] OR;  Service:      FRACTURE SURGERY      tibula/fibula     HH MIDLINE INSERTION  2/1/2018          INCISION AND DRAINAGE OF WOUND N/A 1/10/2019    Procedure: INCISION AND DRAINAGE, SACRAL ULCER;  Surgeon: Pierre Cazares MD;  Location: [facility] OR;  Service: General     IR PICC PLACEMENT >5 YEARS W/O FLUORO GUIDANCE  12/8/2018     LAPAROSCOPIC CHOLECYSTECTOMY N/A 1/4/2017    Procedure: CHOLECYSTECTOMY, LAPAROSCOPIC;  Surgeon: Danis Aviles MD;  Location: [facility];  Service:      PICC  12/31/2016          PICC  12/26/2017          PICC  2/16/2019          PICC AND MIDLINE TEAM LINE INSERTION  8/19/2014          PICC AND MIDLINE TEAM LINE INSERTION  2/27/2018          PICC AND MIDLINE TEAM LINE INSERTION  7/16/2018          AZ APPENDECTOMY      Description: Appendectomy;  Recorded: 06/09/2008;     AZ MARTE W/O FACETEC FORAMOT/DSKC 1/2 VRT SEG, CERVICAL      Description: Laminectomy Lumbar;  Recorded: 06/04/2013;     AZ REMOVAL OF TONSILS,<11 Y/O      Description: Tonsillectomy;  Recorded: 06/10/2008;     AZ TOTAL ABDOM HYSTERECTOMY      Description: Hysterectomy;  Recorded: 06/10/2008;         Allergies   No Known Allergies      Medications:  OUTpatient medications    Prior to Admission medications    Medication Sig Start Date End Date Taking? Authorizing Provider   acetaminophen (TYLENOL) 500 MG tablet Take 1-2 tablets (500-1,000 mg total) by mouth every 4 (four) hours as needed. 1/18/19   Jimmy Callaway MD   albuterol sulfate 90 mcg/actuation AePB Inhale 180 mcg every 4 (four) hours as needed (Wheezing or dyspnea). 1/18/19   Jimmy Callaway MD   benzocaine-menthol (CEPACOL) 15-3.6 mg Take 1 lozenge by mouth every hour as needed. 12/12/18   Alina Roa MD   bisacodyl (DULCOLAX) 10 mg suppository Insert 10 mg into the rectum daily as needed.    PROVIDER, HISTORICAL   calcium, as carbonate, (TUMS) 200 mg calcium (500 mg) chewable tablet Chew 1 tablet (200 mg total) 4 (four) times a day as needed. 11/4/18   Sánchez Zamora MD   cyclobenzaprine (FLEXERIL) 10 MG tablet Take 1 tablet (10 mg total) by mouth 3 (three) times a day as needed for muscle spasms. 1/31/19   Jermaine Wray MD   diaper,brief,adult,disposable (BRIEFS, ADULT-EXTRA LARGE) Misc Use 1 each As Directed as needed. 11/4/18   Sánchez Zamora MD   disposable gloves (PURPLE NITRILE GLOVES) Misc Use 1 each As Directed as needed. 11/4/18   Sánchez Zamora MD   gabapentin (NEURONTIN) 300 MG capsule Take 3 capsules (900 mg total) by mouth daily. 2/1/19   Jermaine Wray MD   hydrocortisone 1 % ointment Apply topically 2 (two) times a day. Apply to affected area 1/18/19   Jimmy Callaway MD   HYDROmorphone (DILAUDID) 2 MG tablet Take 1.5 tablets (3 mg total) by mouth every 3 (three) hours as needed. 2/1/19   Jermaine Wray MD   hydrOXYzine HCl (ATARAX) 25 MG tablet Take 1-2 tablets (25-50 mg total) by mouth every 6 (six) hours as needed for itching. 1/31/19   Jermaine Wray MD   ibuprofen (ADVIL,MOTRIN) 400 MG tablet Take 1 tablet (400 mg total) by mouth every 6 (six) hours as needed. 1/31/19   Jermaine Wray MD   levETIRAcetam (KEPPRA) 250 MG tablet Take 1 tablet (250 mg total) by  mouth 2 (two) times a day. 12/14/18   Sánchez Zamora MD   levothyroxine (SYNTHROID, LEVOTHROID) 125 MCG tablet Take 125 mcg by mouth Daily at 6:00 am.    PROVIDER, HISTORICAL   lidocaine 4 % patch Place 2 patches on the skin daily. Remove and discard patch with 12 hours or as directed by MD. 2/1/19   Jermaine Wray MD   melatonin 3 mg Tab tablet Take 1 tablet (3 mg total) by mouth at bedtime as needed.  Patient taking differently: Take 12 mg by mouth at bedtime as needed .       10/18/18   Sánchez Zamora MD   methadone (DOLOPHINE) 5 MG tablet Take 1 tablet (5 mg total) by mouth 2 (two) times a day. 2/1/19   Jermaine Wray MD   methadone (DOLOPHINE) 5 MG tablet Take 3 tablets (15 mg total) by mouth at bedtime. 2/1/19   Jermaine Wray MD   miconazole (MICOTIN) 2 % powder Apply topically 2 (two) times a day. To groin folds 11/4/18   Sánchez Zamora MD   multivitamin with minerals (THERA-M) 9 mg iron-400 mcg Tab tablet Take 1 tablet by mouth daily. 1/19/19   Jimmy Callaway MD   neomycin-bacitracin-polymyxin (NEOSPORIN) ointment Apply topically 2 (two) times a day. Apply to affected area. Apply together with hydrocortisone cream. 1/18/19   Jimmy Callaway MD   omeprazole (PRILOSEC) 20 MG capsule Take 20 mg by mouth daily as needed (heartburn).    PROVIDER, HISTORICAL   oxybutynin (DITROPAN) 5 MG tablet Take 1 tablet (5 mg total) by mouth 3 (three) times a day. 7/23/18   Omid Vaca,    polyethylene glycol (MIRALAX) 17 gram packet Take 1 packet (17 g total) by mouth 2 (two) times a day as needed. 1/31/19   Jermaine Wray MD   QUEtiapine (SEROQUEL) 25 MG tablet Take 1 tablet (25 mg total) by mouth 2 (two) times a day. Morning and afternoon 1/31/19   Jermaine Wray MD   QUEtiapine (SEROQUEL) 50 MG tablet Take 1 tablet (50 mg total) by mouth at bedtime. 1/31/19   Jermaine Wray MD   rivaroxaban 20 mg Tab Take 1 tablet (20 mg total) by mouth daily with supper. 12/14/18   Sánchez Zamora,  MD   white petrolatum (AQUAPHOR NATURAL HEALING) 41 % Oint Apply 1 application topically 3 (three) times a day as needed (dry skin). 19   Jimmy Callaway MD   zinc oxide 20 % ointment Apply 1 application topically daily as needed for dry skin (or irritation).    PROVIDER, HISTORICAL           Medications:  INpatient medications     baclofen  10 mg Oral TID     eszopiclone  3 mg Oral QHS     gabapentin  900 mg Oral DAILY     levETIRAcetam  250 mg Oral BID     levothyroxine  125 mcg Oral Daily 0600     meropenem  1 g Intravenous Q8H     methadone  10 mg Oral BID     methylPREDNISolone sodium succinate  40 mg Intravenous Q24H     miconazole   Topical BID     multivitamin with minerals  1 tablet Oral DAILY     neomycin-bacitracin-polymyxin   Topical TID     oxybutynin  5 mg Oral TID     polyethylene glycol  17 g Oral DAILY     QUEtiapine  25 mg Oral BID     QUEtiapine  50 mg Oral QHS     rivaroxaban  20 mg Oral Daily with supper     sodium chloride  10-30 mL Intravenous Q8H FIXED TIMES     vancomycin  1 g Intravenous Q12H           Family History  Social History   Reviewed, and:  Family History   Problem Relation Age of Onset     COPD Mother          in her 50s     COPD Brother      Lung cancer Sister      Liver disease Father      COPD Father          in his 50s     Reviewed, and:  Social History     Socioeconomic History     Marital status:      Spouse name: Not on file     Number of children: Not on file     Years of education: Not on file     Highest education level: Not on file   Social Needs     Financial resource strain: Not on file     Food insecurity - worry: Not on file     Food insecurity - inability: Not on file     Transportation needs - medical: Not on file     Transportation needs - non-medical: Not on file   Occupational History     Not on file   Tobacco Use     Smoking status: Current Every Day Smoker     Types: Cigarettes     Smokeless tobacco: Never Used   Substance and  "Sexual Activity     Alcohol use: Yes     Comment: currently sober, history of ETOH abuse     Drug use: No     Sexual activity: Not on file   Other Topics Concern     Not on file   Social History Narrative    The patient lives at home and has 24 hour care.   She has 2 sons and some grandchildren.    She won a malpractice lawsuit against the doctor who did not diagnose her spinal hematoma correctly.    Patient arrived in the ED alone 10/01/17      Psychosocial Needs  Social History     Social History Narrative    The patient lives at home and has 24 hour care.   She has 2 sons and some grandchildren.    She won a malpractice lawsuit against the doctor who did not diagnose her spinal hematoma correctly.    Patient arrived in the ED alone 10/01/17     Additional psychosocial needs reviewed per nursing assessment.      Physical Exam   VITALS:  /53 (Patient Position: Sitting)   Pulse (!) 117   Temp 97.8  F (36.6  C) (Oral)   Resp 16   Ht 5' 2\" (1.575 m)   Wt 162 lb (73.5 kg)   LMP  (LMP Unknown)   SpO2 94%   BMI 29.63 kg/m    Temp:  [97.7  F (36.5  C)-97.8  F (36.6  C)] 97.8  F (36.6  C)  Heart Rate:  [] 117  Resp:  [16-20] 16  BP: ()/(51-65) 100/53  SpO2:  [92 %-100 %] 94 %    PHYSICAL EXAM:   GEN: Pleasant female, telling jokes in the room, very particular about her care needs but otherwise respectful to staff.    HEENT: Normocephalic, atraumatic.  Extraoccular eye movements intact, anicteric sclera. No sinus tenderness to palpation.  Moist mucous membranes.   NECK: Supple.    PULM: Non-labored breathing.  No use of accessory muscles.  Bilateral rhonchi but improved with clearance/cough.  No wheezing.  No rales.    CVS: Tachycardia.  Regular rhythm.  Normal S1, S2.  No rubs, murmurs, or gallops.    ABDOMEN: Hypoactive bowel sounds.  Protuberant but soft.  No masses palpable.    EXTREMITIES/SKIN:  Multiple sacral wounds with mild fresh blood, stage IV sacral ulceration without active bleeding.  " Heel ulcerations.    NEURO:  Awake.  Oriented to person, place, time and situation.  Cranial nerves 2-12 grossly intact.   Moving upper extremities.      I&O:    No intake or output data in the 24 hours ending 02/16/19 2055     Pertinent Labs   Lab Results: personally reviewed.     Serum Glucose range:No results for input(s): POCGLU in the last 72 hours.  ABG:  ABG:  No results for input(s): PHART, CCR4VRG, PO2ART, OXYHB, BEARTCALC, CARBOXYHGB, METHGB, POCPEEP, TEMP, 71559, POCRATE, POCFLOW, PSV in the last 72 hours.    Invalid input(s): EP18RENYHZU, 02SAT, VENTTIVOL  CBC:  Results from last 7 days   Lab Units 02/16/19  1829 02/14/19  1935 02/13/19  1908   LN-WHITE BLOOD CELL COUNT thou/uL 24.8* 16.0* 18.7*   LN-HEMOGLOBIN g/dL 7.7* 9.7* 9.6*   LN-HEMATOCRIT % 26.6* 34.4* 33.3*   LN-PLATELET COUNT thou/uL 344 368 425     Chemistry:  Results from last 7 days   Lab Units 02/16/19  1829 02/15/19  1631   LN-SODIUM mmol/L 140 139   LN-POTASSIUM mmol/L 3.7 3.8   LN-CHLORIDE mmol/L 108* 109*   LN-CO2 mmol/L 26 22   LN-BLOOD UREA NITROGEN mg/dL 17 10   LN-CREATININE mg/dL 0.74 0.81   LN-CALCIUM mg/dL 7.7* 8.3*   LN-ALBUMIN g/dL 1.6*  --    LN-ALT (SGPT) U/L 14  --    LN-AST (SGOT) U/L 26  --    LN-ALKALINE PHOSPHATASE U/L 133*  --    LN-BILIRUBIN TOTAL mg/dL 0.1  --      Coags:  Lab Results   Component Value Date    INR 1.15 (H) 11/30/2018    INR 1.12 (H) 11/16/2018    INR 2.27 (H) 07/11/2018     Cardiac Markers:  Results from last 7 days   Lab Units 02/16/19  1829   LN-TROPONIN I ng/mL 0.01       Microbiology:    Blood culture x 1, 2/16/19:  Collected and pending.     Blood culture x 2, 2/3/19:  No growth.     Urine culture, 2/3/19:  No growth.      Cardiac/Radiology Studies   Cardiac:    EKG:   personally reviewed.      TTE, 3/26/2018:  Summary:    Normal central venous pressure.    Left ventricle ejection fraction is normal. The calculated left ventricular ejection fraction is 65%.    Normal right ventricular size and  systolic function.    No hemodynamically significant valvular heart abnormalities.    Radiology:  Personally reviewed image/s and Personally reviewed impression/s    Chest X-Ray, 2/15/2019:  New opacities over the inferior right upper lobe and right lung base. Findings suspicious for pneumonia. Diffuse interstitial prominence with vascular distinctness also present suggesting pulmonary edema. The differential for the previously described pulmonary opacities would include asymmetric edema. Prior left upper lobe opacities are not conspicuous on today's exam. Small right pleural effusion. Stable cardiomediastinal silhouette.      Chest X-Ray, 2/12/2019:  Shallow inspiration. Pannus projects over the lower chest due to positioning.  Patient has developed focal interstitial opacities in the left upper lobe. Findings may reflect a pneumonitis. Right lung remains clear. Heart and pulmonary vascularity are normal.    Chest X-Ray, 2/9/19:  Clear lungs. Normal heart size and pulmonary vascularity. No pleural fluid or pneumothorax. Calcified plaque of the aortic arch.    Outside reports reviewed: Historical medical records.

## 2021-06-24 NOTE — PLAN OF CARE
Problem: Discharge Barriers  Goal: Patient's discharge needs are met  Outcome: Progressing  Care Plan  Care Management      Care Management Goals of the Day: Progression of care    Care Progression Reviewed With: Charge RN, MD, HUC, RNCM, CMSW    Barriers to Discharge: placement    Discharge Disposition: TBD    Expected Discharge Date: TBD    Transportation: [facility] ([phone])      Care Coordination Narrative:     Kennedy Gunderson has dismissed Commitment Case. At this time the CM team will continue to look for appropriate d/c placement on pt's behalf. The follow referrals have been made:     Waiting for Response    [facility]: Left Message, waiting for response    [facility] at [facility]: Left message, waiting for response     [place]: Left message, waiting for response    [facility]: Referral sent    [facility]: Referral sent    [place]: Referral sent    [facility]: Referral sent    [facility]: Referral sent  Declined    [facility] [place]: Declined, no LTC beds    [place]: Declined    [facility]: Declined, no LTC beds    [place]: Declined, Does not meet criteria     [place]: Declined, non-smoking facility    [facility] [place]: Declined, no LTC beds    [facility] [place]: Declined, no LTC beds    [facility] [place]: Declined, no LTC at facility    [facility]: Declined, No female beds in LTC or TCU    [facility]: Declined    [facility](P: [phone]/F: [phone]) : Declined, too medically complex. (hand faxed)    [facility]: Declined.    [facility] [place]: Declined, no beds available    [facility]: DECLINED, cannot take methadone    [facility]: DECLINED    [facility]: DECLINED    [facility]: DECLINED    [facility]: DECLINED    [facility]: DECLINED    [facility] [place]: DECLINED    [facility] [place]: DECLINED    [facility]:  DECLINED    [facility]: DECLINED    [place]: DECLINED    [place]: DECLINED    [place]: DECLINED    [facility]: DECLINED    [facility]: DECLINED    [facility]: DECLINED    [facility]: DECLINED    [place]: DECLINED    [facility]: DECLINED    [facility]: DECLINED    [facility]: DECLINED    [facility]: DECLINED    [facility]: DECLINED    [facility]: DECLINED    [facility]: DECLIEND      ** Please note that this list is updated as responses are received.     MARGI additionally discussed with pt going to a group home if pt was able to get on a CADI waiver through [facility]. Pt is in agreement with this if it is possible.     MARGI has received application from [facility] (P: [phone]/F: [phone]) for [place] assessment to find out if pt qualifies for a CADI waiver. SW to complete this with pt and fax back.     MARGI additionally has obtained a list of medical group homes and a contact for [facility] Taina Phillips ([phone]) to find out if assessment for CADI waiver needs to be done in [facility] rather than [place].      MARGI will continue to follow and assist with further d/c needs.     CYNTHIA Gonsalez  2/28/2019  3:59 PM

## 2021-06-24 NOTE — PLAN OF CARE
"              Discharge Barriers      Patient's discharge needs are met Not Progressing        Knowledge Deficit      Patient/family/caregiver demonstrates understanding of disease process, treatment plan, medications, and discharge instructions Not Progressing        Non-compliance with treatment regimen      Compliance with treatment regimen Not Progressing        Pain      Patient's pain/discomfort is manageable Not Progressing        Tissue Integrity      Damaged tissue is healing and protected Not Progressing        Patient alert/oriented, makes needs known, uses call light appropriately.Patient c/o  generalized pain rated 9/10 .PRN dilaudid, tylenol and flexeril, pt was sleeping for reassessment. Patient refused a full head to toe assessment twice at the start of shift. Patient refused to be repositioned.Pt requested neb treatment,stating \"I have pneumonia, I want neb treatment\" , Pt refused respiratory assessment at that time again.Pt has regular breathing, not in respiratory distress.RT was called and pt agreed to wait for RT to administer neb treatment.Pt received inhaler.Pt refused morning blood draw.Will continue to monitor.       "

## 2021-06-24 NOTE — PROGRESS NOTES
Patient called for a HHN tx. But had fallen asleep prior to my arrival. Patient had requested not to be be awakened for a treatment but will call if she awakens and want's a treatment.     Jarrett Cartwright, LRT

## 2021-06-24 NOTE — PROGRESS NOTES
Short Daily Progress Note      Date of Service: 3/1/2019     Assessment and plan    Acute COPD exacerbation  ;?  Probable underlying sepsis with pneumonia  : Leukocytosis of 22.8 yesterday, but negative pro calcitonin  ; Did not show any new clear infiltrate  ; Empirically placed on broad-spectrum antibiotics  ; IV steroids: Decreased dose and probably oral tomorrow  ; Nebulization  ; Symptomatically improved  : Feel hospital-acquired pneumonia low as patient recently completed a full 10-day course of antibiotics  : We will stop meropenem and start doxycycline  ; Will monitor response  : Patient continues to go out in the cold to smoke despite counseling  ; Patient gets upset when counseling provided    Acute on chronic anemia  ;Microcytic anemia  History of IV iron therapy  : Hemoglobin seems to be trending down  ; No clear signs of bleeding  ; Patient not allowing sacral decubiti examination  ; Hemoglobin 7.0 this morning, was 7.8 yesterday  ; Recheck 7.5    Recent severe sepsis with impending septic shock  : Appreciate ID recommendations  ; Completed antibiotic course  ; Concern for recurrent aspirations  ; Appreciate speech therapy input  ; Patient refusing video swallow  : Noncompliant with aspiration precautions  ; Present management as above    Severe constipation  Getting better with bowel movement  No issues at present    History of stage IV sacral decubitus ulcer with chronic osteomyelitis (7/2018) status post I&D and IV antibiotics and recurrences:  History of recurrent UTIs:  : allowed  examination on 2/17/2019:   : Wound care nurse consulted but patient refusing wound examination  : Patient did not allow wound examination at present    opioid dependence:  Chronic pain:  -Follows with the PCP and plan to follow-up with the pain clinic.    On methadone and oxycodone as an outpatient.   She had been off methadone since admission which likely led to worsening of her chronic pain.   : Methadone was  resumed on 2/16/2019 at 10 mg twice daily usually on 10 mg 3 times daily  : Mental status stable, no worsening respiratory status  ; Methadone increased to 10 mg 3 times daily on 2/19/2019     Severe MDD with psychotic features:  Paranoia and anxiety  : Patient was recently evaluated by psychiatry   on 72-hr hold per psychiatry with commitment hearing on 2/12 which she missed.  Trial date set for 2/19 which COURT  declined commitment   Continue quetiapine.  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     History of seizure disorder:  Continue levetiracetam.     Insomnia:  Continue eszopiclone      This note was dictated using voice recognition software. Any grammatical or context distortions are unintentional and inherent to the software.  Subjective:   Sitting up in a wheelchair in no acute distress  She was eating her breakfast when I saw her this morning  Patient states she feels better no trouble breathing  Told me to come back later for examination    Patient explained that her hemoglobin appears low this morning, will recheck later to see if it is actually low  Patient explained that last time she had drop in hemoglobin, she had some bleeding from her sacral decubiti.  Patient states she caught her dressing change overnight and would not allow change in dressing.  States is too discomforting for her.    Spoke to nursing staff: Morning this did not get any handout on dressing from night nurse.  Unaware if there was any bleeding    Receive page from nursing staff later the patient wanted to see me  Patient seen again at bedside.  Patient was wrapped up in a blanket, refused to take her blanket off    Patient appears upset with me today.   States most doctors do not asked her to show her sacral decubiti, states the asked nursing staff and get updates from them.  Patient updated that I had already spoken to nursing staff and they were not aware of dressing change and what it looks like.  Patient states she is not happy with my care and she felt all the other doctors were doing a better job of caring for her.  Patient explained that she had right to ask for change of doctors if she is not happy with my care.  Explained to her that I will can continue caring for her if she wishes me to.  Patient informed that she can tell her nurse to ask for change of position  From patient's nurse updated about this conversation      Brief History: 62 y.o. 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  Similar to her prior multiple hospitalizations, her behavior was a significant issue with abusiveness, and belligerence towards staff.  She was also nonadherent with medical care.  She was evaluated by psychiatry and was noted to have major depressive disorder with psychotic features as well as with a component of paranoia.  She was placed on a 72-hour hold.  Commitment hearing  on 2/12/19 which she refused to attend.  Patient had a trial date on 12/19/2019 and commitment was refused by court, and she has been off one-to-one since then.   Patient completed 10 days of meropenem and vancomycin      Chart reviewed, events noted. Pt seen and examined.     Objective  "    Vital signs in last 24 hours:  Temp:  [98.6  F (37  C)] 98.6  F (37  C)  Heart Rate:  [] 112  Resp:  [18-20] 20  BP: (103-111)/(56-66) 111/56  Weight:   163 lb (73.9 kg)  Weight change:   Body mass index is 29.81 kg/m .    Intake/Output last 3 shifts:  I/O last 3 completed shifts:  In: 1780 [P.O.:1780]  Out: 1600 [Urine:1600]  Intake/Output this shift:  No intake/output data recorded.      Physical Exam:  /56 (Patient Position: Semi-baez)   Pulse (!) 112   Temp 98.6  F (37  C) (Oral)   Resp 20   Ht 5' 2\" (1.575 m)   Wt 163 lb (73.9 kg)   LMP  (LMP Unknown)   SpO2 94%   BMI 29.81 kg/m        O2 Device: None (Room air) O2 Flow Rate (L/min): 6 L/min      General Appearance:    Alert, no apparent distress.    HEENT:    Normocephalic. Pupils equal and reactive. No scleral   Icterus. Moist oral mucosa    Lungs:     Clear to auscultation bilaterally, respirations unlabored  Bilateral wheezing improved and minimal  Crackles improved, minimal   Heart::    Regular rate and rhythm, S1 and S2 normal, no murmur, rub   or gallop.   Abdomen:  Did not allow examination   Extremity :  Socks on        CNS:   Alert and oriented,   no facial asymmetry  Paraplegic       Imaging:  personally reviewed.      Scheduled Meds:    baclofen  10 mg Oral TID     doxycycline  100 mg Oral BID     eszopiclone  3 mg Oral QHS     furosemide  40 mg Oral BID - diuretic     gabapentin  900 mg Oral DAILY     ipratropium-albuterol  3 mL Nebulization Q6H - RT     levETIRAcetam  250 mg Oral BID     levothyroxine  125 mcg Oral Daily 0600     methadone  10 mg Oral TID     methylPREDNISolone sodium succinate  20 mg Intravenous Q8H     miconazole   Topical BID     multivitamin with minerals  1 tablet Oral DAILY     neomycin-bacitracin-polymyxin   Topical TID     omeprazole  20 mg Oral QAM AC     oxybutynin  5 mg Oral TID     pink lady  1 enema Rectal Once     polyethylene glycol  17 g Oral BID     polyethylene glycol  4,000 mL Oral Once "     QUEtiapine  25 mg Oral BID     QUEtiapine  50 mg Oral QHS     rivaroxaban  20 mg Oral Daily with supper     senna-docusate  2 tablet Oral BID     sodium chloride  10-30 mL Intravenous Q8H FIXED TIMES     Continuous Infusions:    PRN Meds:.acetaminophen, albuterol, albuterol **AND** Nebulizer treatment intermittent, bisacodyl, calcium (as carbonate), cyclobenzaprine, HYDROmorphone, hydrOXYzine HCl, ibuprofen, magnesium hydroxide, melatonin, naloxone **OR** naloxone, OLANZapine, ondansetron **OR** ondansetron, polyethylene glycol, polyvinyl alcohol, sodium chloride bacteriostatic, sodium chloride bacteriostatic, sodium chloride, sodium chloride, sodium chloride, sodium phosphates 133 mL, traZODone    Lab Results:  personally reviewed.   not applicable  Recent Results (from the past 24 hour(s))   Basic Metabolic Panel    Collection Time: 03/01/19  5:34 AM   Result Value Ref Range    Sodium 136 136 - 145 mmol/L    Potassium 4.0 3.5 - 5.0 mmol/L    Chloride 94 (L) 98 - 107 mmol/L    CO2 32 (H) 22 - 31 mmol/L    Anion Gap, Calculation 10 5 - 18 mmol/L    Glucose 183 (H) 70 - 125 mg/dL    Calcium 7.8 (L) 8.5 - 10.5 mg/dL    BUN 15 8 - 22 mg/dL    Creatinine 0.61 0.60 - 1.10 mg/dL    GFR MDRD Af Amer >60 >60 mL/min/1.73m2    GFR MDRD Non Af Amer >60 >60 mL/min/1.73m2   HM1 (CBC with Diff)    Collection Time: 03/01/19  5:34 AM   Result Value Ref Range    WBC 13.4 (H) 4.0 - 11.0 thou/uL    RBC 2.89 (L) 3.80 - 5.40 mill/uL    Hemoglobin 7.0 (L) 12.0 - 16.0 g/dL    Hematocrit 24.1 (L) 35.0 - 47.0 %    MCV 83 80 - 100 fL    MCH 24.2 (L) 27.0 - 34.0 pg    MCHC 29.0 (L) 32.0 - 36.0 g/dL    RDW 20.6 (H) 11.0 - 14.5 %    Platelets 248 140 - 440 thou/uL    MPV 10.1 8.5 - 12.5 fL    Neutrophils % 93 (H) 50 - 70 %    Lymphocytes % 5 (L) 20 - 40 %    Monocytes % 3 2 - 10 %    Eosinophils % 0 0 - 6 %    Basophils % 0 0 - 2 %    Neutrophils Absolute 12.3 (H) 2.0 - 7.7 thou/uL    Lymphocytes Absolute 0.6 (L) 0.8 - 4.4 thou/uL     Monocytes Absolute 0.4 0.0 - 0.9 thou/uL    Eosinophils Absolute 0.0 0.0 - 0.4 thou/uL    Basophils Absolute 0.0 0.0 - 0.2 thou/uL   Type and Screen    Collection Time: 03/01/19  9:45 AM   Result Value Ref Range    ABORh O POS     Antibody Screen Negative Negative   Hemoglobin    Collection Time: 03/01/19 10:50 AM   Result Value Ref Range    Hemoglobin 7.5 (L) 12.0 - 16.0 g/dL               Advance Care Planning:   Barriers to discharge: IV antibiotics steroids  Anticipated discharge day: 2-3 days  Disposition: Issues with placement      Gerard Juárez MD  [facility]  Hospitalist

## 2021-06-24 NOTE — PROGRESS NOTES
"Despite writer expressing concerns that pt w/c would get stuck in snow and she would freeze to death, the cantankerous mannered pt insisted that she had the right to come and go as she pleased. With the added clarity of pt current mental faculty following her recent court dismissal I grudgingly agreed although my concerns remained. Her autonomy respected, per my understanding of current laws, and my responsibility to keep her safe lifted by her signing of \"smoking waiver\" we got her up to her w/c she went down and came back without issue.  "

## 2021-06-24 NOTE — PROGRESS NOTES
"This writer answered call light via desk phone and was verbally abused by patient at 0328. When asked \"This is the desk. How can I help you?' patient immediately began stating \"You're all in deep shit. You're neglecting a vulnerable adult. Your ass is in big trouble. You bitches are toast\" While talking on the phone she was also pushing the other call buttons on her remote. While on the phone asking what she needed, the patient activated all of the call lights for her room. The patient did not have any needs. She was just yelling at the staff. This has continued for approximately 30 minutes and is still occurring as of 0354. This writer only answered the phone one time. This writer observed the patient's bedside nurse, one-to-one staff, and the charge nurse attempting to resolve the situation. Patient was using call system and calling the desk via telephone and tying up the lines nonstop.  "

## 2021-06-24 NOTE — PLAN OF CARE
Care Plan  Care Management      Care Management Goals of the Day: Progression of care    Care Progression Reviewed With: Charge RN, MD, HUC, RNCM, CMSW    Barriers to Discharge: commitment    Discharge Disposition: TBD    Expected Discharge Date: TBD    Transportation: TBD      Care Coordination Narrative:     [facility] spoke with Charge RNMartha, who states Cottage Grovesamuel Love from [facility]  office called to inform the unit that the pt will be transported around 9:30a for her Commitment court on Tuesday February 19th. Pt will be able to transport via her w/c.    MARGI additionally spoke with Dr. Colorado, [facility] Court Examiner. Per Dr. Colorado he does not feel he has sufficient evidence at this time to say if the pt is mentally ill. He has requested more evidence and to speak with Lory Michael CNP. MARGI has provided his phone number to Lory to aid with this.     MAR[facility] will continue to follow and assist with further d/c needs.     CYNTHIA Gonsalez  2/15/2019  1:56 PM

## 2021-06-24 NOTE — PROGRESS NOTES
"Clinical Nutrition Therapy Follow Up Note    Current Nutrition Prescription:   Diet: regular;   Diet Supplements: vanilla Boost GC 2x/day (breakfast, dinner) with note \"pt prefers strawberry\" started 2/25.    Discussed Boost GC with MD.  I noted that we had d/c'd strawberry boost  + as she wasn't drinking it.  She discussed ordering this with pt and her RN and Marychuy agreed to this.  She noted low albumin and edema. Thus far she has been sent vanilla Boost as the note for \"prefers strawberry\" isn't visible to  staff.     Current Nutrition Intake:  Pt usually orders 2-3 meals per day and usually orders > 2700 calories and 70 to 80 gm protein (up to 100 gm if takes the boost).      Per nursing charting, pt is eating 100% of most meals recorded.  She had a large stash of candy from the gift shop in her room.  She continues to order a large portion of her food from empty calories like lemon bars, regular soft drinks.     Pt is likely meeting her calorie needs and low end of her protein needs.      Anthropometrics:   Height: 5' 2\" (157.5 cm)  Admission weight: 160# stated  Weight: 163 lb (73.9 kg) 2/17Ideal body weight ~100# (IBW-10-15# for paraplegia)  Pt has very few actual weights and usually gives a stated wt with admission and refuses to cooperate to allow bed to be zeroed.  Wt listed for 12/6/18 was 11# higher than prior weights that mentioned that pt refused to allow excess linens to be removed from bed--suspect wt not accurate on 12/6/18 as well.  All prior weights are questionable for this reason and also due to weights with specialty mattresses/beds.    Edema: +2 BLE 2/26 (with legs dependent sitting in wheelchair most of the day, nonpitting BUE 2/26    RD Nutrition Focused Physical Exam:  The patient has the following physical signs which could indicate malnutrition: No noted fat/muscle loss noted in face, arms.      GI Status/Output:   GI symptoms include: constipated (?)  Last BM: Pt had 2-3 BMs " on 2/25 and 2 thus far on 2/26.  Has been agreeable to taking some bowel Rx and had an enema 2/25.     Skin/Wound:  Isaiah score Isaiah Scale Score: 11; multiple pressure ulcers noted; WOCN following.    Medications:  Medications reviewed.    miralax two times a day (hold after 1 BM), pericolace 2 2x/d.    Labs:  Labs reviewed:   WBC 14.0 decreasing  Hgb 9.0  Last albumin 2/17 1.5 (c/w chronic infection/wounds/inflammatory state).      Malnutrition: Not noted with no reliable hx re: weight or oral intake.     Nutrition Risk Level: stable-moderate risk    Nutrition dx:   Increased nutrient needs r/t wounds as evidenced by standard needs for multiple wounds noted in WOC RN note.     Goal:   Meet estimated nutrition needs and Wound healing. Progressing.    Intervention:   I adjusted vanilla Boost GC to strawberry Boost GC 2x/d.  As pt often skips breakfast or lunch, send it 2x/day ensures that she gets it at least once with dinner. (will stick with glucose control version as she is getting very high sugar load and doesn't needs extra calories).     Monitoring/Evaluation:   intake, acceptance of Boost GC wound healing.     Electronically signed by:  Cecile Black RD

## 2021-06-24 NOTE — PROGRESS NOTES
Patient seen in morning sitting up in chair in no acute distress  She was eating her breakfast  States she feels fine states breathing is better  Denies any chest pain  Patient updated about drop in hemoglobin.   States she felt dizzy yesterday evening but feels better this morning  Explained that we will rechecking hemoglobin at 9 and may need blood transfusion    Patient refusing examination at this time  Told me to come back in an hour's time  States she is going to go out.  Patient recommended not to call with low hemoglobin.  States she she will go out as she feels fine.  States she knows her body better so we will not take this advice of mine.    Nursing staff requested to page me when patient ready for examination  Asked nursing staff about dressing change or any black stool: Nothing was reported to day shift
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
## 2021-06-24 NOTE — PLAN OF CARE
Pain      Patient's pain/discomfort is manageable Not Progressing          2 mg IV dilaudid administered x2 with no pain relief as patient rating pain at 12/10 after pain medication administration. Staff will continue to assess patient's pain and intervene per protocol.     Potential for Compromised Skin Integrity      Skin integrity is maintained or improved Not Progressing          Patient refused to be turned and repositioned, slept on her right side the whole night.    Other comments: Patient refused to have her catheters emptied (both gonzalez and supra-pubic catheter). There is some blood in the gonzalez that was placed yesterday. Dr. Garcia updated. Continue to monitor the patient.

## 2021-06-24 NOTE — PLAN OF CARE
Patient's pain/discomfort is manageable Not Progressing    Patient continues to require prn pain medication in addition to Methadone. Dilaudid, Ibuprofen and Flexeril given. Patient does rate pain high even after pain medication but appears comfortable in chair and is able to go outside for periods throughout the day.   Mobility/activity is maintained at optimum level for patient Progressing    Patient has been in electric wheelchair all day. Patient does not want to get back into bed until later this evening. Patient able to shift weight in chair and has pillow support.  Daily care needs are met Progressing    Patient c/o constipation and bloating. Abdomen is distended. Scheduled Miralax given. Go-lytely ordered but patient wants to try an enema and suppository first. Patient stated if that does not work she will consider to Go-Lytely. Patient does not want enema or supp to be given until she is back in bed which will be later this evening.

## 2021-06-24 NOTE — PROGRESS NOTES
Hospitalist Progress Note     Assessment/Plan:     Marychuy Zhou is a 62 y.o. 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 Her pain regimen was adjusted and pain better controlled.  She had some issues with staff members during her hospital stay. She was evaluated by psychiatry and was noted to have major depressive disorder with a component of paranoia. During her hospitalization here, she completed 10-day course of vancomycin and meropenem per ID.  She was also treated for recurrent aspiration pneumonia. She is currently medically stable for discharge.  Awaiting placement.    Active Problem:   Probable recurrent aspiration pneumonia:  -Current respiratory status stable.  CXR on 2/12 with ARMANDO infiltrates, possible pneumonitis    Plan:  -Finished antibiotics.  Finishing prednisone taper.  SLP involved.  Patient refused video swallow study.  Not following aspiration precautions.  -flutter valve.    Opioid dependence:  Chronic pain:  -Follows with the PCP and plan to follow-up with the pain clinic.  On methadone and oxycodone as an outpatient. Continued here.    Plan:  -Continue methadone 3 times daily.  On prn dilaudid. Continue gabapentin. Pain clinic referral on discharge.    Severe MDD with psychotic features:  Paranoia:  Anxiety:  -Continue quetiapine.    Stage IV sacral decubitus ulcer with chronic osteomyelitis (7/2018) status post I&D and IV antibiotics and recurrences:  History of recurrent  UTIs:  -Afebrile, no leukocytosis..stage III right sacral decubitus ulcer and probable stage IV left sacral decubitus ulcer.  Coccygeal ulcer as well. Right LE stage IV ulcer, patient not fully cooperative with wound cares and high risk of reinfection.  -Past UTIs (ESBL E. coli/Klebsiella, Pseudomonas, Acinetobacter, enterococcus, Proteus).  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      Plan:  -Finished course of antibiotics. Appreciate WOCN involvement.     Severe constipation, narcotic induced as well as neurogenic, resolved:  -Continue Miralax scheduled. Prn senna.  Dulcolax as needed.     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  -She was evaluated by urology in the past for urethral incompetence and lower abdominal/pelvic pain.  She had nephrostomies and suprapubic catheters in the past.     Plan:  - continue gabapentin and baclofen. Continue oxybutynin. Replace suprapubic catheter every 4 weeks as an outpatient.     Microcytic anemia:  - No acute signs of blood loss. Anemia likely due to malnutrition.     Plan:  -Continue iron supplementation.  Recommend iron studies as an outpatient     Moderate protein calorie malnutrition:  -She does have low BUN signifying catabolic state and/or protein malnutrition.      Plan:  -Registered dietitian involved.     Centrilobular emphysema:  -Stable. Not oxygen dependent. No acute exacerbation.     Plan:  -Continue DuoNeb as needed.     History of DVT/PE:  -Continue rivaroxaban 20 mg daily.  DVT likely unprovoked and due to paraplegia and at an increased risk of recurrent DVT.  Continue current dose.     History of subdural hematoma (4/2017):  -No acute issues.     Hypothyroidism:  -Continue levothyroxine.     History of seizure disorder:  -Continue levetiracetam.    Insomnia:  -Continue  eszopiclone     DVT prophylaxis: Continue rivaroxaban.      Subjective:     No acute events overnight.  No acute complaints today.    Review of systems:     Please see Subjective.    Current Medications:     Scheduled Meds:    baclofen  10 mg Oral TID     calcium-vitamin D  1 tablet Oral DAILY     eszopiclone  3 mg Oral QHS     ferrous sulfate  325 mg Oral BID with meals     furosemide  40 mg Oral BID - diuretic     gabapentin  900 mg Oral DAILY     levETIRAcetam  250 mg Oral BID     levothyroxine  125 mcg Oral Daily 0600     methadone  10 mg Oral TID     multivitamin with minerals  1 tablet Oral DAILY     omeprazole  20 mg Oral QAM AC     oxybutynin  5 mg Oral TID     polyethylene glycol  17 g Oral BID     [START ON 3/13/2019] predniSONE  10 mg Oral Daily with brkfst     QUEtiapine  25 mg Oral BID     QUEtiapine  50 mg Oral QHS     rivaroxaban  20 mg Oral Daily with supper     senna-docusate  2 tablet Oral BID     sodium chloride  10-30 mL Intravenous Q8H FIXED TIMES     Continuous Infusions:  PRN Meds:.acetaminophen, albuterol, alteplase, bisacodyl, calcium (as carbonate), codeine-guaiFENesin, cyclobenzaprine, HYDROmorphone, HYDROmorphone, hydrOXYzine HCl, ibuprofen, ipratropium-albuterol **AND** [] Nebulizer treatment intermittent, magnesium hydroxide, melatonin, miconazole nitrate, naloxone **OR** naloxone, OLANZapine, ondansetron **OR** ondansetron, polyethylene glycol, polyvinyl alcohol, sodium chloride bacteriostatic, sodium chloride bacteriostatic, sodium chloride, sodium chloride, sodium chloride, sodium phosphates 133 mL, traZODone    Objective:       Vital signs in last 24 hours:     Heart Rate:  [104] 104  Resp:  [20] 20  BP: (93)/(55) 93/55  Weight: 163 lb (73.9 kg)    Physical Exam:     General appearance: Alert, Awake, No distress   Head & Neck Atraumatic, supple, no tracheal deviation   ENT  PER, conjunctiva clear, no scleral icterus, EOMI, no nasal discharge   Resp: +rhonchi, no wheezing, no  crackles   CVS: S1, S2 normal, regular rate and rhythm, no murmurs   GI: Soft, nontender, distended and BS+   MSK: No swelling, or tenderness   Neuro:  Alert and oriented ×3, paraplegia      Intake/Output this shift:     I/O last 3 completed shifts:  In: -   Out: 2150 [Urine:2150]    Pertinent Labs:     Lab Results: personally reviewed.     Pertinent Radiology:       Advanced Care Planning:     Barrier to discharge: Placement  Anticipated LOS: To be determined  Disposition: To be determined    Peggy Valentino M.D.  [facility]  [facility] Medicine
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
## 2021-06-24 NOTE — PLAN OF CARE
Problem: Non-compliance with treatment regimen  Goal: Compliance with treatment regimen  Outcome: Not Progressing  Patient sat up I chair all shift. She was verbally aggressive when making requests to RN and aide. She refused for writer and other male aide to transfer her from wheelchair to bed, instead requesting that two female aides do it.

## 2021-06-24 NOTE — PLAN OF CARE
Problem: Discharge Barriers  Goal: Patient's discharge needs are met  Outcome: Progressing  Care Plan  Care Management      Care Management Goals of the Day: Progression of care    Care Progression Reviewed With: Charge RN, MD, HUC, RNCM, CMSW    Barriers to Discharge: IV abx, placement    Discharge Disposition: LTC/Group Home    Expected Discharge Date: 2/25/2019    Transportation: [employer]      Care Coordination Narrative:      Kennedy Gunderson has dismissed Commitment Case. At this time the CM team will continue to look for appropriate d/c placement on pt's behalf. The follow referrals have been made:     Waiting for Response    [facility]: Referral sent    [facility]: Referral sent     [facility]: Referral Sent    [facility]: Referral sent    [facility]: Referral Sent    [facility]: Referrals sent    [facility]: Referral sent    [facility]: Referral sent    [facility](P: [phone]/F: [phone]) : Referral Sent (hand faxed)  Declined    [facility] [place]: Declined, no LTC beds    [facility]: Declined, no LTC beds    [place]: Declined, Does not meet criteria     [place]: Declined, non-smoking facility    [facility]: Declined, no LTC beds    [place]: DECLINED    [facility]: DECLINED    [facility]: DECLINED    [facility]: DECLINED    [facility] [place]: DECLINED    [facility] [place]: DECLINED    [facility]: DECLINED    [facility]: DECLINED    [facility]: DECLINED    [facility]: DECLINED    [facility]: DECLINED    [facility]: DECLINED    [facility]: DECLINED    [facility]: DECLINED    [facility]: DECLINED    [facility]: DECLINED    [facility]: DECLINED    [facility]: DECLINED    [facility]: DECLINED    [facility]: DECLINED    [facility]: DECLINED    [facility]: LATONYA         SW will continue to follow and assist with further d/c needs.     SW additionally discussed with pt going to a group home if pt was able to get on a CADI waiver through [employer]. Pt is in agreement with this if it is possible.    SW will continue to follow and assist with further d/c needs.     CYNTHIA Gonsalez LGSW  2/22/2019  3:29 PM

## 2021-06-24 NOTE — PROGRESS NOTES
"Patient continues to report chronic generalized pain.  PRNs given and effective.  Patient also expressed abdominal discomfort that is worsening, asking writer \"what do you do if someone can't have a bowel movement?\"  Writer offered to do a rectal exam as well as administer an enema, which patient agreed to.  Moderate amount of hard stool felt during exam and digitally removed.  Enema given, minimal results.  Abdomen distended, tender, bowel sounds hypoactive x4.      LS diminished; O2 sats mid-90s RA; respirations regular, non-labored at rest; mild dyspnea with activity.      Patient appropriate with writer, cooperative with cares and assessments.      Patient alert/oriented, makes needs known, uses call light appropriately.  Applicable interventions for patient safety remain in place.  Will continue to monitor.  "

## 2021-06-24 NOTE — PROGRESS NOTES
[place] Daily Progress Note      Date of Service: 2/16/2019     Assessment and plan    Probable developing sepsis  ; Episodes of hypotension, tachycardia  ; Blood pressure initially improved with the morning with IV fluid hydration but dropping down again afternoon  : Being treated for pneumonia with concern for aspiration pneumonia  : On broad-spectrum antibiotics  ; Antibiotics as per infectious disease  : Speech therapy was consulted: It appears patient did well with speech therapy  ; Patient declines video swallow  ; IV fluid hydration  ; Awaiting repeat CBC    Acute hypoxic respiratory failure  Consider secondary to pneumonia and probable COPD exacerbation  ; Needing minimal oxygen at present  ; Continue Solu-Medrol: We will taper as tolerated  ; Continue bronchodilators  : Symptomatically better    Sinus tachycardia  ; Heart rate slightly higher today  ; EKG on 2/15/2019 showed sinus tachycardia  ; Treating underlying condition as above    Opioid dependence:  Chronic pain:  -Follows with the PCP and plan to follow-up with the pain clinic.    On methadone and oxycodone as an outpatient.   She had been off methadone since admission which likely led to worsening of her chronic pain.   : Methadone was resumed on 2/16/2019 at 10 mg twice daily usually on 10 mg 3 times daily  : Mental status stable, no worsening respiratory status     Severe MDD with psychotic features:  Paranoia and anxiety  : Patient was recently evaluated by psychiatry   on 72-hr hold per psychiatry with commitment hearing on 2/12 which she missed.  Trial date set for 2/19.   Continue quetiapine.  ; Denies depressed mood at present but very noncompliant     History of stage IV sacral decubitus ulcer with chronic osteomyelitis (7/2018) status post I&D and IV antibiotics and recurrences:  History of recurrent UTIs:  : Patient refusing examination at present  ; Not very compliant with nursing care  : Frequent position changes    Severe  constipation  ; Bowel regimen  : Denies any significant abdominal pain no nausea, tolerating diet     Quadriplegia secondary to T4 hematoma:  Neurogenic bladder:  -Patient has reported pelvic and abdominal pain on past admissions and has had UTIs treated with antibiotics.  -She was evaluated by urology in the past for urethral incompetence and lower abdominal/pelvic pain.    She had nephrostomies and suprapubic catheters in the past.    continue gabapentin and baclofen. Continue oxybutynin.   Replace suprapubic catheter every 4 weeks as an outpatient.    Has gonzalez due to incontinence and sacral decub    Microcytic anemia  History of IV iron therapy  ; Last hemoglobin 9.7 on 2/14/2019  : No clear signs of active bleeding    History of DVT and PE  ; Continue Xarelto     History of subdural hematoma (4/2017):  : Tolerating anticoagulation     Hypothyroidism:  Continue levothyroxine.     History of seizure disorder:  Continue levetiracetam.     Insomnia:  Continue eszopiclone      This note was dictated using voice recognition software. Any grammatical or context distortions are unintentional and inherent to the software.  Subjective:   Patient sitting up in bed in no acute distress  Patient appears irritated: States she is cold because somebody said room temperature at 50 degrees  She is alert oriented  States her breathing is better denies chest pain  States she is eating with no significant discomfort, denies abdominal pain, complains of constipation    Patient appears irritated when asked multiple questions  She seem more interested in telling her story: She keeps talking about her doctor who came to evaluate her for her competency, states that  was reviewed, states she is talking with her .    She seemed to have multiple complaints against all other medical staff, states most doctors are not listening to her, states nursing staff were not listening to her.    When asked about mood, she states she is  "stressed but denies depressed mood    Brief History: 62 y.o. 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  Similar to her prior multiple hospitalizations, her behavior was a significant issue with abusiveness, and belligerence towards staff.  She was also nonadherent with medical care.  She was evaluated by psychiatry and was noted to have major depressive disorder with psychotic features as well as with a component of paranoia.  She was placed on a 72-hour hold.  Commitment hearing  on 2/12/19 which she refused to attend. Trial date 2/19.      Chart reviewed, events noted. Pt seen and examined.     Objective     Vital signs in last 24 hours:  Temp:  [97.5  F (36.4  C)-97.8  F (36.6  C)] 97.8  F (36.6  C)  Heart Rate:  [] 109  Resp:  [16-28] 20  BP: ()/(52-76) 80/65  Weight:   162 lb (73.5 kg)  Weight change:   Body mass index is 29.63 kg/m .    Intake/Output last 3 shifts:  I/O last 3 completed shifts:  In: 250 [IV Piggyback:250]  Out: 750 [Urine:750]  Intake/Output this shift:  No intake/output data recorded.      Physical Exam:  BP (!) 80/65 (Patient Position: Sitting)   Pulse (!) 109   Temp 97.8  F (36.6  C) (Oral)   Resp 20   Ht 5' 2\" (1.575 m)   Wt 162 lb (73.5 kg)   LMP  (LMP Unknown)   SpO2 100%   BMI 29.63 kg/m        O2 Device: OxyMask O2 Flow Rate (L/min): 2 L/min    Unable to do proper examination as patient had cover her supper blanket  Only allowed me to listen to her heart and " lung  Did not allow exam no examination  Was able to look at her legs  General Appearance:    Alert, no apparent distress.    HEENT:    Normocephalic. Pupils equal and reactive. No scleral   Icterus. Moist oral mucosa    Lungs:     Clear to auscultation bilaterally, respirations unlabored  Mild wheeze at base   Heart::    Regular rate and rhythm, S1 and S2 normal, no murmur, rub   or gallop.   Abdomen:  Did not allow visual inspection, no gross tenderness   Extremity :  Lower extremity edema, chronic ulcers present       CNS:   Alert and oriented,   no facial asymmetry  Paraplegic       Imaging:  personally reviewed.      Scheduled Meds:    baclofen  10 mg Oral TID     eszopiclone  3 mg Oral QHS     gabapentin  900 mg Oral DAILY     levETIRAcetam  250 mg Oral BID     levothyroxine  125 mcg Oral Daily 0600     meropenem  1 g Intravenous Q8H     methadone  10 mg Oral BID     methylPREDNISolone sodium succinate  40 mg Intravenous Q24H     miconazole   Topical BID     multivitamin with minerals  1 tablet Oral DAILY     neomycin-bacitracin-polymyxin   Topical TID     oxybutynin  5 mg Oral TID     polyethylene glycol  17 g Oral DAILY     QUEtiapine  25 mg Oral BID     QUEtiapine  50 mg Oral QHS     rivaroxaban  20 mg Oral Daily with supper     vancomycin  1 g Intravenous Q12H     Continuous Infusions:  PRN Meds:.acetaminophen, albuterol, albuterol **AND** Nebulizer treatment intermittent, bisacodyl, calcium (as carbonate), cyclobenzaprine, HYDROmorphone, hydrOXYzine HCl, ibuprofen, magnesium hydroxide, melatonin, naloxone **OR** naloxone, OLANZapine, omeprazole, ondansetron **OR** ondansetron, polyethylene glycol, polyvinyl alcohol, sodium chloride bacteriostatic, sodium phosphates 133 mL, traZODone    Lab Results:  personally reviewed.   not applicable  Recent Results (from the past 24 hour(s))   ECG 12 lead MUSE    Collection Time: 02/15/19  2:23 PM   Result Value Ref Range    SYSTOLIC BLOOD PRESSURE  mmHg    DIASTOLIC  BLOOD PRESSURE  mmHg    VENTRICULAR RATE 146 BPM    ATRIAL RATE 146 BPM    P-R INTERVAL 120 ms    QRS DURATION 76 ms    Q-T INTERVAL 284 ms    QTC CALCULATION (BEZET) 442 ms    P Axis 70 degrees    R AXIS 73 degrees    T AXIS 44 degrees    MUSE DIAGNOSIS       Sinus tachycardia  Otherwise normal ECG  When compared with ECG of 07-JAN-2019 11:17,  No significant change was found     Basic Metabolic Panel    Collection Time: 02/15/19  4:31 PM   Result Value Ref Range    Sodium 139 136 - 145 mmol/L    Potassium 3.8 3.5 - 5.0 mmol/L    Chloride 109 (H) 98 - 107 mmol/L    CO2 22 22 - 31 mmol/L    Anion Gap, Calculation 8 5 - 18 mmol/L    Glucose 104 70 - 125 mg/dL    Calcium 8.3 (L) 8.5 - 10.5 mg/dL    BUN 10 8 - 22 mg/dL    Creatinine 0.81 0.60 - 1.10 mg/dL    GFR MDRD Af Amer >60 >60 mL/min/1.73m2    GFR MDRD Non Af Amer >60 >60 mL/min/1.73m2   Lactic Acid    Collection Time: 02/15/19  4:31 PM   Result Value Ref Range    Lactic Acid 1.9 0.5 - 2.2 mmol/L               Advance Care Planning:   Barriers to discharge: IV antibiotics  Anticipated discharge day: 2-3 days  Disposition: Unclear      Gerard Juárez MD  [facility]  Hospitalist

## 2021-06-24 NOTE — PROGRESS NOTES
[facility] Daily Progress Note    Assessment/Plan:  Marychuy Zhou is a 62 y.o. 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  Similar to her prior multiple hospitalizations, her behavior was a significant issue with abusiveness, and belligerence towards staff.  She was also nonadherent with medical care.  She was evaluated by psychiatry and was noted to have major depressive disorder with psychotic features as well as with a component of paranoia.  She was placed on a 72-hour hold.  Commitment hearing  on 2/12/19 which she refused to attend.  Patient had a trial date on 12/19/2019 and commitment was refused by court, and she has been off one-to-one since then.   Patient completed 10 days of meropenem and vancomycin          Assessment:  Acute COPD exacerbation  : Feel underlying pneumonia unlikely  : Has some leukocytosis but pro calcitonin was negative  : Complete doxycycline course  : Leukocytosis improving without intervention  : Lung sounds improved  ; Patient will likely need slow tapering of prednisone  ; Patient advised to refrain from going out to smoke with subzero temperatures  : Patient still appears to be going out to smoke  ; Encourage using flutter valve     Leukocytosis with some atypical cells  ; We will get peripheral smear     Acute on chronic anemia  ;Microcytic anemia  History of IV iron therapy  : Hemoglobin seems to be trending down  : Recheck hemoglobin better this morning  ; Continue iron  supplement  : Hemoglobin stable     Recent severe sepsis with impending septic shock  : Antibiotic course completed as per ID recommendation  ; Concern for recurrent aspirations  ; Appreciate speech therapy input  ; Patient refusing video swallow  : Noncompliant with aspiration precautions  ; Present management as above     Severe constipation  Getting better with bowel movement  No issues at present  ; Having regular bowel movement as per patient and nurse     History of stage IV sacral decubitus ulcer with chronic osteomyelitis (7/2018) status post I&D and IV antibiotics and recurrences:  History of recurrent UTIs:  : Wound examined today with wound nurse: No signs of active bleeding  ; Skin excoriation seen  ; Wound dressing as per wound care nurse     opioid dependence:  Chronic pain:  -Follows with the PCP and plan to follow-up with the pain clinic.    On methadone and oxycodone as an outpatient.   She had been off methadone since admission which likely led to worsening of her chronic pain.   : Methadone was resumed on 2/16/2019 at 10 mg twice daily usually on 10 mg 3 times daily  : Mental status stable, no worsening respiratory status  ; Methadone increased to 10 mg 3 times daily on 2/19/2019     Severe MDD with psychotic features:  Paranoia and anxiety  : Patient was recently evaluated by psychiatry   on 72-hr hold per psychiatry with commitment hearing on 2/12 which she missed.  Trial date set for 2/19 which COURT  declined commitment   Continue quetiapine.  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     History of seizure disorder:  Continue levetiracetam.     Insomnia:  Continue eszopiclone        Plan:  Methadone for an another  1-2 days at night     Code status:Full Code        Subjective:  Complaining of t leg pain which she says is very severe looks some what in distress    Review of Systems:   Says shortness of breath is improving    Current Medications Reviewed via EHR List    Objective:  Vital signs in last 24 hours:  Temp:  [98  F (36.7  C)] 98  F (36.7  C)  Heart Rate:  [110-115] 115  Resp:  [18-20] 20  BP: (111-127)/(57-78) 111/74  SpO2:  [91 %-94 %] 94 %    Intake/Output last 3 shifts:  I/O last 3 completed shifts:  In: 450 [P.O.:450]  Out: 3600 [Urine:3600]      Physical Exam:  EYES: EOM+    NECK: mo JVD  HEART : S1 S2 RRR   LUNGS: Patient has bilateral wheezing in the lungs   ABDOMEN:   Soft, No tenderness , Bowel sounds are positive  CNS  Alert and Oriented x 3    LOWER LIMBS: Patient has paraplegia          Lab Results:  Personally Reviewed.   Fingerstick Blood Glucose: No results for input(s): POCGLUFGR in the last 48 hours.    Recent Results (from the past 24 hour(s))   Potassium - Next AM    Collection Time: 03/09/19  9:03 AM   Result Value Ref Range    Potassium 3.7 3.5 - 5.0 mmol/L           Advanced Care Planning  Discharge plan: She should work and is looking for LTAC placement or a group home placement      Jaron Edwards  Date: 3/9/2019  Time: 4:51 PM  Hospitalist Service  Part of this chart was created using a dictation software. Typographic errors, word substitutions, and Grammatical errors may unintentionally occur.

## 2021-06-24 NOTE — PROGRESS NOTES
Wound Ostomy  WOC Assessment         Allergies:  No Known Allergies    Diagnosis:   Patient Active Problem List    Diagnosis Date Noted     Unable to control anger      Severe major depression, single episode, with psychotic features (H)      Paranoia (H)      Upper back pain      Abnormal CT scan of lung      Panic anxiety syndrome      Pelvic pain 01/09/2019     Ulcer due to Treponema vincentii, with fat layer exposed (H) 01/07/2019     Atelectasis      Tension-type headache, not intractable, unspecified chronicity pattern      Weakness of left hand 12/04/2018     Focal motor seizure (H) 12/04/2018     Pelvic pain in female 11/21/2018     Chronic osteomyelitis (H)      Drug-induced constipation      Quadriplegia (H)      Goals of care, counseling/discussion 07/16/2018     Advanced care planning/counseling discussion 07/16/2018     Malingerer for IV Dilaudid 07/16/2018     Moderate protein-calorie malnutrition (H) 07/16/2018     Infection 07/14/2018     Noncompliance with medication regimen      Stage IV pressure ulcer of sacral region (H) 07/11/2018     Gangrene (H) 07/10/2018     Centrilobular emphysema (H) 07/02/2018     Bilateral lower extremity edema 07/02/2018     Hypoxemia 07/01/2018     Left lower lobe pneumonia (H) 06/27/2018     Acute bronchitis due to other specified organisms 06/27/2018     Nephrostomy complication (H) 03/06/2018     History of encephalopathy      Closed left subtrochanteric femur fracture (H) 02/27/2018     Acute blood loss anemia      Other specified hypotension      Normocytic anemia 02/26/2018     Adjustment disorder with mixed anxiety and depressed mood      Sleep difficulties      Irritability and anger      Fever in other diseases 01/31/2018     Severe sepsis (H) 01/31/2018     Ulcer 01/31/2018     Hypothyroidism due to Hashimoto's thyroiditis      Abscess, gluteal, right      History of DVT (deep vein thrombosis)      Urinary incontinence due to urethral sphincter incompetence       Weakness 09/07/2017     Chronic anticoagulation 09/03/2017     Urinary tract infection associated with cystostomy catheter (H) 09/02/2017     Dislodged wound Vacuum 08/14/2017     Abdominal pain, unspecified location      Coagulopathy (H)      Anxiety      UTI (urinary tract infection) 08/10/2017     Elevated lactic acid level 07/29/2017     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     Suprapubic catheter dysfunction, subsequent encounter      Bilateral hydronephrosis      Cystitis      Colon wall thickening      Abdominal pain 06/03/2017     Flank pain 06/02/2017     Right upper quadrant abdominal pain 06/02/2017     Episodic cluster headache, not intractable      Sepsis (H) 05/28/2017     Sepsis secondary to UTI (H) 05/28/2017     Pyelonephritis      Hyponatremia      Chronic obstructive pulmonary disease with acute exacerbation (H) 04/18/2017     Depression 04/18/2017     Partial symptomatic epilepsy with simple partial seizures, intractable, with status epilepticus (H)      Acute on chronic intracranial subdural hematoma (H)      Brain edema (H)      Subdural hematoma (H) 04/09/2017     Convulsions, unspecified convulsion type (H)      Neck infection 03/31/2017     Lower abdominal pain 03/01/2017     Fever, unspecified fever cause      Pneumonia of left lower lobe due to infectious organism (H)      S/P cholecystectomy      Choledocholithiasis with obstruction 01/02/2017     Cholelithiasis 01/02/2017     Hx of pulmonary embolus 01/02/2017     Claustrophobia      Urinary tract infection, site unspecified      Hypotension, unspecified hypotension type      Chronic low back pain without sciatica, unspecified back pain laterality      Acute encephalopathy      Sepsis, due to unspecified organism (H) 12/30/2016     History of pulmonary embolus (PE) 12/01/2016     Cognitive disorder      Insomnia due to anxiety and fear      HCAP (healthcare-associated pneumonia) 11/04/2016     Atypical chest pain 10/23/2016     Diastolic  "CHF, acute on chronic (H)      Chest tightness or pressure      History of MDR Enterobacter cloacae infection      Anxiety disorder      Esophageal dysmotility      Major depressive disorder, recurrent episode, moderate (H)      Seizure disorder (H)      Heel ulcer, right, limited to breakdown of skin (H)      Decubitus ulcer of sacral region, stage 4 (H)      Paraplegia at T4 level (H) 04/28/2016     Hypokalemia      Chronic pain syndrome 01/12/2016     Major depression 01/12/2016     Alcohol abuse 01/12/2016     Hospital-acquired pneumonia 10/26/2015     COPD exacerbation (H) 08/03/2015     Gastroesophageal reflux disease without esophagitis 05/26/2015     Acquired hypothyroidism 05/26/2015     Iron deficiency anemia 05/26/2015     Opioid-induced constipation (OIC) 05/26/2015     Paraplegia (H) 05/26/2015     Palliative care encounter 12/08/2014     Nephrostomy tube displaced (H) 12/02/2014     Mechanical complication of suprapubic catheter (H) 11/20/2014     Acute respiratory failure with hypoxia (H) 08/19/2014     COPD (chronic obstructive pulmonary disease) (H) 08/19/2014     Lactic acidosis 08/19/2014     SVT (supraventricular tachycardia) (H) 08/19/2014     Dislodged Bhandari catheter, subsequent encounter 07/30/2014     Other chronic pain      Lumbosacral Disc Degeneration      Herpes Simplex Type I      Nicotine Dependence      Essential hypertension      Cancer of lung (H) 09/24/2013     Neurogenic bladder 01/29/2013     Neurogenic bowel 01/29/2013     Decubitus ulcer 01/25/2013     Mixed hyperlipidemia 01/25/2013     Decubitus ulcer of right buttock, stage 4 (H) 01/25/2013     Decubitus ulcer, stage III (H) 01/25/2013       Height:  5' 2\" (1.575 m)    Weight:   163 lb (73.9 kg)    Labs:  Recent Labs     02/20/19  0629   HGB 8.9*       Isaiah:  Isaiah Scale Score: 12    Specialty Bed:  Specialty Bed: [facility] RN called unit at 0815 to coordinate time to see patient for wound assessment prior " to getting up in the chair. Per [facility]00 RN, patient is already up in her chair. Will attempt to see tomorrow as [facility] RN will not be available at [facility] later today due to patient visits at other hospitals. Continue current plan of care.

## 2021-06-24 NOTE — SIGNIFICANT EVENT
"Entered pt room to draw timed potassium.  Pt informed writer that her blood was drawn at 530 this morning.  When I explained I needed to draw blood d/t K being replaced pt said \"not now I am going outside.  Just get me a pain pill.\"  Pt refused assessment also.  Pt proceeded to go outside.    "

## 2021-06-24 NOTE — PLAN OF CARE
Problem: Discharge Barriers  Goal: Patient's discharge needs are met  Outcome: Progressing  Care Plan  Care Management      Care Management Goals of the Day: Progression of care    Care Progression Reviewed With: Charge RN, MD, HUC, RNCM, CMSW    Barriers to Discharge: IV lasix, IV abx, Placement    Discharge Disposition: TBD    Expected Discharge Date: TBD    Transportation: [facility]      Care Coordination Narrative:      Kennedy Gunderson has dismissed Commitment Case. At this time the CM team will continue to look for appropriate d/c placement on pt's behalf. The follow referrals have been made:     Waiting for Response    [facility] [place]: Left Message, waiting for response    [facility] [place]: Left message, waiting for response     [facility]: Left message, waiting for response    [facility]: Left Message, waiting for response    [facility]: Left message, waiting for response    [facility]: Referral sent    [facility]: Referral Sent  Declined    [facility] [place]: Declined, no LTC beds    [place]: Declined    [facility]: Declined, no LTC beds    [place]: Declined, Does not meet criteria     [place]: Declined, non-smoking facility    [facility]: Declined, no LTC beds    [facility]: Declined, no LTC beds    [facility] [place]: Declined, no LTC at facility    [facility]: Declined, No female beds in LTC or TCU    [facility](P: [phone]/F: [phone]) : Declined, too medically complex. (hand faxed)    [facility]: Declined.    [place]: DECLINED    [facility]: DECLINED    [facility]: DECLINED    [facility]: DECLINED    [facility] [place]: DECLINED    [facility] [place]: DECLINED    [facility]: DECLINED    [facility]: DECLINED    [facility]: DECLINED    [facility]: DECLINED    [facility]: DECLINED    [facility]:  DECLINED    [facility]: DECLINED    [facility]: DECLINED    [facility]: DECLINED    [place]: DECLINED    [facility]: DECLINED    [facility]: DECLINED    [facility]: DECLINED    [facility]: DECLINED    [facility]: DECLINED    [facility]: DECLINED    [facility]: DECLIEND      ** Please note that this list is updated as responses are received.     MARGI additionally discussed with pt going to a group home if pt was able to get on a CADI waiver through [employer]. Pt is in agreement with this if it is possible.     SW will continue to follow and assist with further d/c needs.     CYNTHIA Gonsalez  2/25/2019  9:43 AM     Update:    MARGI has left a message for [facility] (P: [phone]/F: [phone]) requesting referral form and assessment for pt to find out if she is eligible for the CADI waiver. SW to await call back. MARGI provide the unit 2510 Fax Number ([phone]) for referral form to be faxed to if there is one.    CYNTHIA Gonsalez  .2/25/2019  11:31 AM

## 2021-06-24 NOTE — TELEPHONE ENCOUNTER
Who is calling: Patient   Reason for Call: Patient has personal question for MD, requesting to speak to him( not medically related)    Date of last appointment with primary care: 12/14/19  Has the patient been recently seen:  Yes  Okay to leave a detailed message:              [phone]

## 2021-06-24 NOTE — PROGRESS NOTES
"Pt was allowed to sleep in till 0930 .she called and asked to get up in her wheelchair.  Cont to not allow cares/assessment to wounds.  Pericare allowed as she had a bowel movement.  Has been compliant with taking her medications. Remains with frequent calls to room for \"this & that\" and has been participating in a lot of conversation with staff and on the phone.\"Refusing to drink Boost supplement at bedside/alerted dietician and she will remove it from her orders.  "

## 2021-06-24 NOTE — PLAN OF CARE
Problem: Pain  Goal: Patient's pain/discomfort is manageable  Outcome: Not Progressing  Rating pain 9/10. PRN Dilaudid given per order.     Problem: Potential for Compromised Skin Integrity  Goal: Skin integrity is maintained or improved  Outcome: Not Progressing  Refusing to turn in bed. Will continue to enourage turning and repositioning.     Problem: Infection  Goal: Signs and symptoms of infections are decreased or avoided  Outcome: Progressing  Afebrile. On IV antibiotics.     Comments: Awaiting placement.

## 2021-06-24 NOTE — SIGNIFICANT EVENT
"Patient reports she \"cannot breathe\" and that she is \"probably not going to make it out of here.\"  Demanded to get back into bed from wheelchair.  3 staff members assisted her to bed and dressing on buttocks was packed and changed as well as pericare completed as patient had a bowel movement.  Respiratory therapy was called for a nebulizer treatment but then patient told him to come back and was sleeping upon his return.  Patient also mentioned to writer, \"she wants palliative care involved as she isn't receiving proper pain control.\"   "

## 2021-06-24 NOTE — PROGRESS NOTES
Patient is not in respiratory distress at this moment and is not on supplemental oxygen. Saturations are mid 90s. BS are coarse bilaterally pre/post treatment. The cough is weak/moist. Pt might benefit from bronchial hygiene but is refusing it. RT will monitor.    BENJY SalcedoT

## 2021-06-24 NOTE — PLAN OF CARE
Problem: Speech Therapy  Goal: SLP Goals  Patient will participate in Bedside Swallow Evaluation to determine safest, yet LRD    Outcome: Completed Date Met: 02/16/19  Evaluation only

## 2021-06-24 NOTE — PROGRESS NOTES
Wound Ostomy  WOC Assessment         Allergies:  No Known Allergies    Diagnosis:   Patient Active Problem List    Diagnosis Date Noted     Unable to control anger      Severe major depression, single episode, with psychotic features (H)      Paranoia (H)      Upper back pain      Abnormal CT scan of lung      Panic anxiety syndrome      Pelvic pain 01/09/2019     Ulcer due to Treponema vincentii, with fat layer exposed (H) 01/07/2019     Atelectasis      Tension-type headache, not intractable, unspecified chronicity pattern      Weakness of left hand 12/04/2018     Focal motor seizure (H) 12/04/2018     Pelvic pain in female 11/21/2018     Chronic osteomyelitis (H)      Drug-induced constipation      Quadriplegia (H)      Goals of care, counseling/discussion 07/16/2018     Advanced care planning/counseling discussion 07/16/2018     Malingerer for IV Dilaudid 07/16/2018     Moderate protein-calorie malnutrition (H) 07/16/2018     Infection 07/14/2018     Noncompliance with medication regimen      Stage IV pressure ulcer of sacral region (H) 07/11/2018     Gangrene (H) 07/10/2018     Centrilobular emphysema (H) 07/02/2018     Bilateral lower extremity edema 07/02/2018     Hypoxemia 07/01/2018     Left lower lobe pneumonia (H) 06/27/2018     Acute bronchitis due to other specified organisms 06/27/2018     Nephrostomy complication (H) 03/06/2018     History of encephalopathy      Closed left subtrochanteric femur fracture (H) 02/27/2018     Acute blood loss anemia      Other specified hypotension      Normocytic anemia 02/26/2018     Adjustment disorder with mixed anxiety and depressed mood      Sleep difficulties      Irritability and anger      Fever in other diseases 01/31/2018     Severe sepsis (H) 01/31/2018     Ulcer 01/31/2018     Hypothyroidism due to Hashimoto's thyroiditis      Abscess, gluteal, right      History of DVT (deep vein thrombosis)      Urinary incontinence due to urethral sphincter incompetence       Weakness 09/07/2017     Chronic anticoagulation 09/03/2017     Urinary tract infection associated with cystostomy catheter (H) 09/02/2017     Dislodged wound Vacuum 08/14/2017     Abdominal pain, unspecified location      Coagulopathy (H)      Anxiety      UTI (urinary tract infection) 08/10/2017     Elevated lactic acid level 07/29/2017     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     Suprapubic catheter dysfunction, subsequent encounter      Bilateral hydronephrosis      Cystitis      Colon wall thickening      Abdominal pain 06/03/2017     Flank pain 06/02/2017     Right upper quadrant abdominal pain 06/02/2017     Episodic cluster headache, not intractable      Sepsis (H) 05/28/2017     Sepsis secondary to UTI (H) 05/28/2017     Pyelonephritis      Hyponatremia      Chronic obstructive pulmonary disease with acute exacerbation (H) 04/18/2017     Depression 04/18/2017     Partial symptomatic epilepsy with simple partial seizures, intractable, with status epilepticus (H)      Acute on chronic intracranial subdural hematoma (H)      Brain edema (H)      Subdural hematoma (H) 04/09/2017     Convulsions, unspecified convulsion type (H)      Neck infection 03/31/2017     Lower abdominal pain 03/01/2017     Fever, unspecified fever cause      Pneumonia of left lower lobe due to infectious organism (H)      S/P cholecystectomy      Choledocholithiasis with obstruction 01/02/2017     Cholelithiasis 01/02/2017     Hx of pulmonary embolus 01/02/2017     Claustrophobia      Urinary tract infection, site unspecified      Hypotension, unspecified hypotension type      Chronic low back pain without sciatica, unspecified back pain laterality      Acute encephalopathy      Sepsis, due to unspecified organism (H) 12/30/2016     History of pulmonary embolus (PE) 12/01/2016     Cognitive disorder      Insomnia due to anxiety and fear      HCAP (healthcare-associated pneumonia) 11/04/2016     Atypical chest pain 10/23/2016     Diastolic  "CHF, acute on chronic (H)      Chest tightness or pressure      History of MDR Enterobacter cloacae infection      Anxiety disorder      Esophageal dysmotility      Major depressive disorder, recurrent episode, moderate (H)      Seizure disorder (H)      Heel ulcer, right, limited to breakdown of skin (H)      Decubitus ulcer of sacral region, stage 4 (H)      Paraplegia at T4 level (H) 04/28/2016     Hypokalemia      Chronic pain syndrome 01/12/2016     Major depression 01/12/2016     Alcohol abuse 01/12/2016     Hospital-acquired pneumonia 10/26/2015     COPD exacerbation (H) 08/03/2015     Gastroesophageal reflux disease without esophagitis 05/26/2015     Acquired hypothyroidism 05/26/2015     Iron deficiency anemia 05/26/2015     Opioid-induced constipation (OIC) 05/26/2015     Paraplegia (H) 05/26/2015     Palliative care encounter 12/08/2014     Nephrostomy tube displaced (H) 12/02/2014     Mechanical complication of suprapubic catheter (H) 11/20/2014     Acute respiratory failure with hypoxia (H) 08/19/2014     COPD (chronic obstructive pulmonary disease) (H) 08/19/2014     Lactic acidosis 08/19/2014     SVT (supraventricular tachycardia) (H) 08/19/2014     Dislodged Bhandari catheter, subsequent encounter 07/30/2014     Other chronic pain      Lumbosacral Disc Degeneration      Herpes Simplex Type I      Nicotine Dependence      Essential hypertension      Cancer of lung (H) 09/24/2013     Neurogenic bladder 01/29/2013     Neurogenic bowel 01/29/2013     Decubitus ulcer 01/25/2013     Mixed hyperlipidemia 01/25/2013     Decubitus ulcer of right buttock, stage 4 (H) 01/25/2013     Decubitus ulcer, stage III (H) 01/25/2013       Height:  5' 2\" (1.575 m)    Weight:   163 lb (73.9 kg)    Labs:  Recent Labs     03/02/19  1013  03/04/19  0909   HGB 8.2*   < > 9.2*   ALBUMIN 2.0*  --   --     < > = values in this interval not displayed.       Isaiah:  Isaiah Scale Score: 11    Specialty Bed:  Low air loss " dominicss    [facility] RN attempted to see patient for wound assessment between 10:00 and 10:30 this AM. Patient already up in wheelchair and leaving unit. Dressing changed prior to up to chair this AM per day shift RN. Patient's sacral wound with red appearing wound bed and deep tunnel (stage 4 PI) and buttocks with mutliple scattered areas of bleeding partial-thickness or shallow full-thickness wounds with some amount of bleeding. This is consistent with IAD related to incontinence of urine and stool, and it has been well documented that patient refuses to allow staff to clean skin at time of soiling. Will request MD order Calmoseptine for patient.    Discussed with nursing staff, patient continues to refuse cares when needed (e.g. stool clean up, dressing changes) and has already stated to nursing staff that she will not go back to bed until this evening. If patient would go back to bed during this shift, will attempt to see later today; however, this is very unlikely as she generally remains up in the chair all day despite nursing recommendations to lay down to relieve pressure on buttocks/IT site. As patient is in a 'co-operative' mood today, per staff RN, she will approach patient about waiting to get up in the morning until 9 AM, which would allow the [facility] RN time to assess wounds. Will check in with unit tomorrow AM to see if patient is agreeable.

## 2021-06-24 NOTE — PROGRESS NOTES
Daily Progress Note      Date of Service: 3/6/2019     Subjective:     Brief History: 63 y.o. old female  With PMH significant for quadriplegia at T4 level, emphysema, ongoing smoking, recent osteomyelitis, chronic indwelling gonzalez and supra pubic, focal motor seizures, chronic pain, stage IV sacral decubitus ulcer and other decubiti, h/o DVT, PE, osteoporosis, hypothyroidism, multiple fractures, HTN, HLD, lung cancer.    Overnight there have been no new acute issues, patient's behavior continues to be a challenge.  There is documentation of verbal abuse, patient's inappropriate and aggressive behavior.  There is also documentation of patient's noncompliance with recommended treatment for Dary.  Based upon her persistent behaviors disposition has been a challenge and whenever she has been discharged to facility she has very rapidly returned back to this hospital.  There is also documentation of a failed attempt to commit the patient.  She has been evaluated by every specialty and patient did not want to obey any kind of suggestions on medical recommendations  She continues to lay in bed and eat with resultant aspiration pneumonia on multiple occasions.  Continues to complain of pain.  She has demonstrated an appropriate and actually antisocial behavior on multiple occasions.  She is to be on contact precautions for VRE, MRSA, ESBL but has been permitted to roam around the hospital without any restrictions, this is indeed unfortunate    Chart reviewed, events noted. Pt seen and examined.     Objective     Vital signs in last 24 hours:  Temp:  [98  F (36.7  C)] 98  F (36.7  C)  Heart Rate:  [] 98  Resp:  [18-22] 18  BP: (111-114)/(67-71) 113/67  Weight:   163 lb (73.9 kg)  Weight change:   Body mass index is 29.81 kg/m .    Intake/Output last 3 shifts:  I/O last 3 completed shifts:  In: -   Out: 2300 [Urine:2300]  Intake/Output this shift:  No intake/output data recorded.      Physical Exam:  /67 (Patient  "Position: Sitting)   Pulse 98   Temp 98  F (36.7  C) (Oral)   Resp 18   Ht 5' 2\" (1.575 m)   Wt 163 lb (73.9 kg)   LMP  (LMP Unknown)   SpO2 94%   BMI 29.81 kg/m      FiO2 (%): (room air) O2 Device: None (Room air) O2 Flow Rate (L/min): 6 L/min    General Appearance: Alert, not in distress.  Declined exam      Lab Results:  personally reviewed.   not applicable  Recent Results (from the past 24 hour(s))   Magnesium    Collection Time: 03/06/19  6:10 AM   Result Value Ref Range    Magnesium 2.3 1.8 - 2.6 mg/dL   Potassium - Next AM    Collection Time: 03/06/19  6:10 AM   Result Value Ref Range    Potassium 3.4 (L) 3.5 - 5.0 mmol/L   HM1 (CBC with Diff)    Collection Time: 03/06/19  6:10 AM   Result Value Ref Range    WBC 13.5 (H) 4.0 - 11.0 thou/uL    RBC 3.85 3.80 - 5.40 mill/uL    Hemoglobin 9.4 (L) 12.0 - 16.0 g/dL    Hematocrit 32.0 (L) 35.0 - 47.0 %    MCV 83 80 - 100 fL    MCH 24.4 (L) 27.0 - 34.0 pg    MCHC 29.4 (L) 32.0 - 36.0 g/dL    RDW 22.2 (H) 11.0 - 14.5 %    Platelets 346 140 - 440 thou/uL    MPV 9.4 8.5 - 12.5 fL    Neutrophils % 71 (H) 50 - 70 %    Lymphocytes % 17 (L) 20 - 40 %    Monocytes % 5 2 - 10 %    Eosinophils % 6 0 - 6 %    Basophils % 0 0 - 2 %    Neutrophils Absolute 9.2 (H) 2.0 - 7.7 thou/uL    Lymphocytes Absolute 2.3 0.8 - 4.4 thou/uL    Monocytes Absolute 0.7 0.0 - 0.9 thou/uL    Eosinophils Absolute 0.9 (H) 0.0 - 0.4 thou/uL    Basophils Absolute 0.0 0.0 - 0.2 thou/uL   Manual Differential    Collection Time: 03/06/19  6:10 AM   Result Value Ref Range    Platelet Estimate Normal Normal    Ovalocytes 1+ (!) Negative    Polychromasia 1+ (!) Negative    Tear Drop Cells 1+ (!) Negative             Assessment: and plan     Continue supportive cares  Wound care is to continue  No new recommendations.  Also placement is going to be a challenge and unless there is an accepting facility she is unlikely to be discharged anytime soon.  As expressed above, this writer expresses great " concern about not enforcement of contact precautions in the context of this patient    Advance Care Planning:   Barriers to discharge: Placement  Anticipated discharge day: Unclear  Disposition: Unclear    Total time spent in direct patient care today 25 minutes        Shauna Moon MD. [facility]

## 2021-06-24 NOTE — PLAN OF CARE
"Problem: Non-compliance with treatment regimen  Goal: Compliance with treatment regimen  Outcome: Not Progressing      Comments: Bhandari catheter bag and Supra pubic catheter drainage bag, both appear to be full. Pt refusing writer to empty bags, stating \"come back in a little bit.\" Education provided, pt continued to refuse. Will re approach.  "

## 2021-06-24 NOTE — PROGRESS NOTES
"  Pharmacy Consult: Vancomycin Dosing    Pharmacist consulted to dose Vancomycin for Marychuy Zhou, a 63 y.o. female.    Ordering provider: Dr. Lemus     Indication for vancomycin therapy: Hospital Acquired Pneumonia, Sepsis    Goal Trough Range:  15-20 mcg/mL based on indication    Other current antimicrobials             vancomycin 1.25 g in sodium chloride 0.9% 500 mL (VANCOCIN)  Every 24 hours          meropenem 1 g in NaCl 0.9 % 50 mL (MINI-BAG Plus) (MERREM)  Every 8 hours             Subjective/Objective:    Patient was admitted for Sepsis (H) on 2/2/2019    Height: 5' 2\" (1.575 m)    Actual Body Weight (ABW): 73.9 kg (163 lb)    Ideal body weight: 50.1 kg (110 lb 7.2 oz)  Adjusted ideal body weight: 59.6 kg (131 lb 7.5 oz)    BMI: Body mass index is 29.81 kg/m .    No Known Allergies    Patient Active Problem List   Diagnosis     Other chronic pain     Lumbosacral Disc Degeneration     Herpes Simplex Type I     Nicotine Dependence     Essential hypertension     Dislodged Bhandari catheter, subsequent encounter     Acute respiratory failure with hypoxia (H)     COPD (chronic obstructive pulmonary disease) (H)     Lactic acidosis     SVT (supraventricular tachycardia) (H)     Gastroesophageal reflux disease without esophagitis     Acquired hypothyroidism     Iron deficiency anemia     Opioid-induced constipation (OIC)     Paraplegia (H)     COPD exacerbation (H)     Chronic pain syndrome     Major depression     Alcohol abuse     Decubitus ulcer     Hospital-acquired pneumonia     Cancer of lung (H)     Neurogenic bladder     Neurogenic bowel     Mixed hyperlipidemia     Palliative care encounter     Hypokalemia     Paraplegia at T4 level (H)     Major depressive disorder, recurrent episode, moderate (H)     Seizure disorder (H)     Heel ulcer, right, limited to breakdown of skin (H)     Decubitus ulcer of sacral region, stage 4 (H)     Esophageal dysmotility     Anxiety disorder     History of MDR " Enterobacter cloacae infection     Mechanical complication of suprapubic catheter (H)     Diastolic CHF, acute on chronic (H)     Chest tightness or pressure     Atypical chest pain     Decubitus ulcer of right buttock, stage 4 (H)     Nephrostomy tube displaced (H)     HCAP (healthcare-associated pneumonia)     Cognitive disorder     Insomnia due to anxiety and fear     Sepsis, due to unspecified organism (H)     Urinary tract infection, site unspecified     Hypotension, unspecified hypotension type     Chronic low back pain without sciatica, unspecified back pain laterality     Acute encephalopathy     Claustrophobia     Choledocholithiasis with obstruction     Cholelithiasis     Hx of pulmonary embolus     S/P cholecystectomy     Fever, unspecified fever cause     Pneumonia of left lower lobe due to infectious organism (H)     Lower abdominal pain     Neck infection     Subdural hematoma (H)     Convulsions, unspecified convulsion type (H)     Partial symptomatic epilepsy with simple partial seizures, intractable, with status epilepticus (H)     Acute on chronic intracranial subdural hematoma (H)     Brain edema (H)     Chronic obstructive pulmonary disease with acute exacerbation (H)     Decubitus ulcer, stage III (H)     Depression     Sepsis (H)     Sepsis secondary to UTI (H)     Pyelonephritis     Hyponatremia     Episodic cluster headache, not intractable     Flank pain     Right upper quadrant abdominal pain     Abdominal pain     Colon wall thickening     Cystitis     Suprapubic catheter dysfunction, subsequent encounter     Bilateral hydronephrosis     Elevated lactic acid level     Paroxysmal hemicrania     UTI (urinary tract infection)     Abdominal pain, unspecified location     Coagulopathy (H)     Anxiety     Dislodged wound Vacuum     Urinary tract infection associated with cystostomy catheter (H)     History of pulmonary embolus (PE)     Chronic anticoagulation     Weakness     Urinary  incontinence due to urethral sphincter incompetence     Abscess, gluteal, right     History of DVT (deep vein thrombosis)     Hypothyroidism due to Hashimoto's thyroiditis     Fever in other diseases     Severe sepsis (H)     Ulcer     Sleep difficulties     Irritability and anger     Adjustment disorder with mixed anxiety and depressed mood     Normocytic anemia     Closed left subtrochanteric femur fracture (H)     Acute blood loss anemia     Other specified hypotension     History of encephalopathy     Nephrostomy complication (H)     Left lower lobe pneumonia (H)     Acute bronchitis due to other specified organisms     Hypoxemia     Centrilobular emphysema (H)     Bilateral lower extremity edema     Stage IV pressure ulcer of sacral region (H)     Gangrene (H)     Noncompliance with medication regimen     Infection     Goals of care, counseling/discussion     Advanced care planning/counseling discussion     Malingerer for IV Dilaudid     Moderate protein-calorie malnutrition (H)     Chronic osteomyelitis (H)     Drug-induced constipation     Quadriplegia (H)     Pelvic pain in female     Weakness of left hand     Focal motor seizure (H)     Tension-type headache, not intractable, unspecified chronicity pattern     Atelectasis     Pelvic pain     Panic anxiety syndrome     Ulcer due to Treponema vincentii, with fat layer exposed (H)     Abnormal CT scan of lung     Upper back pain     Severe major depression, single episode, with psychotic features (H)     Paranoia (H)     Unable to control anger    Past Medical History:   Diagnosis Date     Alcohol dependence (H)     history     Anxiety      Cancer of lung (H) 9/24/2013    Overview:  Adenocarcinoma Stage 1A per preoperative needle biopsy   Adenocarcinoma Stage 1A per preoperative needle biopsy  Wedge r/s 9/2013     Chronic kidney disease      Chronic pain     on Methadone     Chronic suprapubic catheter (H)      Constipation      COPD (chronic obstructive  pulmonary disease) (H)      Decubitus ulcer     had wound vac     Depression      Diastolic CHF, acute on chronic (H)      DVT (deep venous thrombosis) (H) 5/26/2015     ESBL (extended spectrum beta-lactamase) producing bacteria infection 08/2015    urine     Gastroparesis      GERD (gastroesophageal reflux disease)      HCAP (healthcare-associated pneumonia)      Herpes Simplex Type I      Hyperlipemia      Hypertension      Hypothyroidism 5/26/2015     Intracranial subdural hematoma (H)      Kidney stone      Lower paraplegia (H) 4/28/2016    Overview:  spinal epidural hematoma, emergent decomp Overview:  spinal epidural hematoma, emergent decomp Overview:  spinal epidural hematoma, emergent decomp     MRSA infection      Neurogenic bladder     chronic gonzalez     Neuropathy (H) 5/26/2015     Obesity      Opiate dependence, continuous (H)      Osteoporosis      Pneumonia      Pulmonary embolism (H) 12/2016    chronic, on coumadin     Rheumatoid arthritis (H)      Sepsis (H) 5/28/2017     SVT (supraventricular tachycardia) (H) 8/19/2014     Vascular myelopathies (H)         Temp Readings from Current Encounter:     02/18/19 0142 02/18/19 0528 02/18/19 1530   Temp: 97.9  F (36.6  C) 96.6  F (35.9  C) 98.3  F (36.8  C)     Net Intake/Output (last 24 hours):  I/O last 3 completed shifts:  In: -   Out: 2650 [Urine:2650]    Recent Labs     02/16/19  1829 02/17/19  0524 02/18/19  0552   WBC 24.8* 19.6* 10.8   NEUTROABS  --  18.5*  --    LACTICACID 1.4  --   --    BUN 17 15  --    CREATININE 0.74 0.66 0.56*     Estimated Creatinine Clearance: 98 mL/min (A) (by C-G formula based on SCr of 0.56 mg/dL (L)).    No results for input(s): CULTURE in the last 72 hours.    Results for orders placed or performed during the hospital encounter of 02/02/19   Culture, Urine    Collection Time: 02/03/19 12:15 AM   Result Value Status    Culture  Final     Mixture of organism types with no predominating organism.       Recent labs: (last 7  days)     02/17/19  0523 02/17/19  0845 02/19/19  1719   VANCOMYCIN 26.8* 24.9 25.9*       Vancomycin administrations: (last 120 hours)     Date/Time Action Medication Dose Rate    02/19/19 0021 New Bag    vancomycin 1.25 g in sodium chloride 0.9% 500 mL (VANCOCIN) 1.25 g 262.5 mL/hr    02/18/19 0644 New Bag    vancomycin 1.25 g in sodium chloride 0.9% 500 mL (VANCOCIN) 1.25 g 262.5 mL/hr    02/17/19 1200 New Bag    vancomycin 1.25 g in sodium chloride 0.9% 500 mL (VANCOCIN) 1.25 g 262.5 mL/hr    02/16/19 2128 New Bag    vancomycin 1 g in sodium chloride 0.9% 250 mL (VANCOCIN) 1 g 135 mL/hr    02/16/19 0854 New Bag    vancomycin 1 g in sodium chloride 0.9% 250 mL (VANCOCIN) 1 g 135 mL/hr    02/15/19 1852 New Bag    vancomycin 1 g in sodium chloride 0.9% 250 mL (VANCOCIN) 1 g 135 mL/hr          Assessment/Plan:    Pharmacist consulted to dose Vancomycin for Hospital Acquired Pneumonia, Sepsis, goal trough range 15-20 mcg/mL.  1. Change to Vancomycin 1250 mg IV every 24 hours (~17 mg/kg actual body weight) starting tomorrow 2/20/19 at 0200.  2. Vancomycin trough level of 25.9 mcg/mL was above the goal trough range. This trough level was drawn at steady state.  3. Pharmacist will plan to re-check a vancomycin trough level at steady state.  4. Pharmacist will continue to follow.    Thank you for the consult,    Candelario Lopez, PHARMD 2/19/2019 6:30 PM

## 2021-06-24 NOTE — PROGRESS NOTES
[facility] Daily Progress Note      Date of Service: 2/28/2019     Assessment and plan    Acute COPD exacerbation  ;?  Probable underlying sepsis with pneumonia  : Leukocytosis of 22.8 yesterday, but negative pro calcitonin  ; Did not show any new clear infiltrate  ; Empirically placed on broad-spectrum antibiotics  ; IV steroids  ; Nebulization  ; Symptomatically improved  ; We will slowly downgrade antibiotics: Discontinue vancomycin and continue meropenem  ; We will consider switching to doxycycline tomorrow if improving  ; Feel likely worsened secondary to continued smoking by patient in the cold  :  Recent severe sepsis with impending septic shock  : Appreciate ID recommendations  ; Completed antibiotic course  ; Concern for recurrent aspirations  ; Appreciate speech therapy input  ; Patient refusing video swallow  : Noncompliant with aspiration precautions  ; Present management as above    Severe constipation  Getting better with bowel movement    Acute on chronic anemia  ;Microcytic anemia  History of IV iron therapy  : Hemoglobin better monitor    History of stage IV sacral decubitus ulcer with chronic osteomyelitis (7/2018) status post I&D and IV antibiotics and recurrences:  History of recurrent UTIs:  : allowed  examination on 2/17/2019: Appeared clean at that time, no signs of bleeding  : Continue wound care    opioid dependence:  Chronic pain:  -Follows with the PCP and plan to follow-up with the pain clinic.    On methadone and oxycodone as an outpatient.   She had been off methadone since admission which likely led to worsening of her chronic pain.   : Methadone was resumed on 2/16/2019 at 10 mg twice daily usually on 10 mg 3 times daily  : Mental status stable, no worsening respiratory status  ; Methadone increased to 10 mg 3 times daily on 2/19/2019     Severe MDD with psychotic features:  Paranoia and anxiety  : Patient was recently evaluated by psychiatry   on 72-hr hold per psychiatry with  commitment hearing on 2/12 which she missed.  Trial date set for 2/19 which COURT  declined commitment   Continue quetiapine.  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     History of seizure disorder:  Continue levetiracetam.     Insomnia:  Continue eszopiclone      This note was dictated using voice recognition software. Any grammatical or context distortions are unintentional and inherent to the software.  Subjective:   Sitting up in a wheelchair in no acute distress  So patient, aside after smoking this morning  States she feels much better today, states breathing is better    Updates from nursing staff: Oxygen saturation well on room air    Brief History: 62 y.o. 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  Similar to her prior multiple hospitalizations, her behavior was a significant issue with abusiveness, and belligerence towards staff.  She was also nonadherent with medical care.  She was evaluated by psychiatry and was noted to have major depressive disorder with psychotic features as well as with a component of paranoia.  She was placed on a 72-hour hold.  Commitment hearing  on 2/12/19 which she refused to attend.   "Patient had a trial date on 12/19/2019 and commitment was refused by court, and she has been off one-to-one since then.   Patient completed 10 days of meropenem and vancomycin      Chart reviewed, events noted. Pt seen and examined.     Objective     Vital signs in last 24 hours:  Heart Rate:  [] 96  Resp:  [] 101  BP: ()/(54-62) 95/54  Weight:   163 lb (73.9 kg)  Weight change:   Body mass index is 29.81 kg/m .    Intake/Output last 3 shifts:  I/O last 3 completed shifts:  In: 450 [P.O.:420; I.V.:30]  Out: 2850 [Urine:2850]  Intake/Output this shift:  No intake/output data recorded.      Physical Exam:  BP 95/54 (Patient Position: Lying)   Pulse 96   Temp 98.7  F (37.1  C) (Oral)   Resp (!) 101   Ht 5' 2\" (1.575 m)   Wt 163 lb (73.9 kg)   LMP  (LMP Unknown)   SpO2 90%   BMI 29.81 kg/m        O2 Device: None (Room air) O2 Flow Rate (L/min): 5 L/min      General Appearance:    Alert, no apparent distress.    HEENT:    Normocephalic. Pupils equal and reactive. No scleral   Icterus. Moist oral mucosa    Lungs:     Clear to auscultation bilaterally, respirations unlabored  Bilateral wheezing improved  Crackles improved on right side but present on left side   Heart::    Regular rate and rhythm, S1 and S2 normal, no murmur, rub   or gallop.   Abdomen:  Soft, mild local discomfort no significant tenderness  Distention improved  Bowel sounds present   Extremity :  Lower extremity edema, chronic ulcers present         CNS:   Alert and oriented,   no facial asymmetry  Paraplegic       Imaging:  personally reviewed.      Scheduled Meds:    baclofen  10 mg Oral TID     eszopiclone  3 mg Oral QHS     furosemide  40 mg Oral BID - diuretic     gabapentin  900 mg Oral DAILY     ipratropium-albuterol  3 mL Nebulization Q6H - RT     levETIRAcetam  250 mg Oral BID     levothyroxine  125 mcg Oral Daily 0600     meropenem  1 g Intravenous Q8H     methadone  10 mg Oral TID     methylPREDNISolone sodium " succinate  40 mg Intravenous Q8H     miconazole   Topical BID     multivitamin with minerals  1 tablet Oral DAILY     neomycin-bacitracin-polymyxin   Topical TID     omeprazole  20 mg Oral QAM AC     oxybutynin  5 mg Oral TID     pink lady  1 enema Rectal Once     polyethylene glycol  17 g Oral BID     polyethylene glycol  4,000 mL Oral Once     QUEtiapine  25 mg Oral BID     QUEtiapine  50 mg Oral QHS     rivaroxaban  20 mg Oral Daily with supper     senna-docusate  2 tablet Oral BID     sodium chloride  10-30 mL Intravenous Q8H FIXED TIMES     vancomycin  1.25 g Intravenous Q24H     Continuous Infusions:    PRN Meds:.acetaminophen, albuterol, albuterol **AND** Nebulizer treatment intermittent, bisacodyl, calcium (as carbonate), cyclobenzaprine, HYDROmorphone, hydrOXYzine HCl, ibuprofen, magnesium hydroxide, melatonin, naloxone **OR** naloxone, OLANZapine, ondansetron **OR** ondansetron, polyethylene glycol, polyvinyl alcohol, sodium chloride bacteriostatic, sodium chloride bacteriostatic, sodium chloride, sodium chloride, sodium chloride, sodium phosphates 133 mL, traZODone    Lab Results:  personally reviewed.   not applicable  Recent Results (from the past 24 hour(s))   Basic Metabolic Panel    Collection Time: 02/27/19  5:17 PM   Result Value Ref Range    Sodium 132 (L) 136 - 145 mmol/L    Potassium 3.3 (L) 3.5 - 5.0 mmol/L    Chloride 88 (L) 98 - 107 mmol/L    CO2 35 (H) 22 - 31 mmol/L    Anion Gap, Calculation 9 5 - 18 mmol/L    Glucose 114 70 - 125 mg/dL    Calcium 8.2 (L) 8.5 - 10.5 mg/dL    BUN 17 8 - 22 mg/dL    Creatinine 0.76 0.60 - 1.10 mg/dL    GFR MDRD Af Amer >60 >60 mL/min/1.73m2    GFR MDRD Non Af Amer >60 >60 mL/min/1.73m2   HM1 (CBC with Diff)    Collection Time: 02/27/19  5:17 PM   Result Value Ref Range    WBC 22.8 (H) 4.0 - 11.0 thou/uL    RBC 3.33 (L) 3.80 - 5.40 mill/uL    Hemoglobin 8.2 (L) 12.0 - 16.0 g/dL    Hematocrit 27.6 (L) 35.0 - 47.0 %    MCV 83 80 - 100 fL    MCH 24.6 (L) 27.0 -  34.0 pg    MCHC 29.7 (L) 32.0 - 36.0 g/dL    RDW 20.9 (H) 11.0 - 14.5 %    Platelets 276 140 - 440 thou/uL    MPV 10.0 8.5 - 12.5 fL   Manual Differential    Collection Time: 02/27/19  5:17 PM   Result Value Ref Range    Total Neutrophils % 85 (H) 50 - 70 %    Lymphocytes % 4 (L) 20 - 40 %    Monocytes % 5 2 - 10 %    Eosinophils %  6 0 - 6 %    Basophils % 0 0 - 2 %    Total Neutrophils Absolute 19.4 (H) 2.0 - 7.7 thou/ul    Lymphocytes Absolute 0.9 0.8 - 4.4 thou/uL    Monocytes Absolute 1.1 (H) 0.0 - 0.9 thou/uL    Eosinophils Absolute 1.4 (H) 0.0 - 0.4 thou/uL    Basophils Absolute 0.0 0.0 - 0.2 thou/uL    Platelet Estimate Normal Normal    Ovalocytes 1+ (!) Negative    Tear Drop Cells 1+ (!) Negative    Spherocytes 1+ (!) Negative   Procalcitonin    Collection Time: 02/27/19  5:41 PM   Result Value Ref Range    Procalcitonin 0.26 0.00 - 0.49 ng/mL   Influenza A/B Rapid Test    Collection Time: 02/27/19  5:41 PM   Result Value Ref Range    Influenza  A, Rapid Antigen No Influenza A antigen detected No Influenza A antigen detected    Influenza B, Rapid Antigen No Influenza B antigen detected No Influenza B antigen detected   Potassium    Collection Time: 02/28/19  7:36 AM   Result Value Ref Range    Potassium 4.6 3.5 - 5.0 mmol/L   Basic Metabolic Panel    Collection Time: 02/28/19  7:36 AM   Result Value Ref Range    Sodium 137 136 - 145 mmol/L    Potassium 4.6 3.5 - 5.0 mmol/L    Chloride 95 (L) 98 - 107 mmol/L    CO2 29 22 - 31 mmol/L    Anion Gap, Calculation 13 5 - 18 mmol/L    Glucose 203 (H) 70 - 125 mg/dL    Calcium 8.2 (L) 8.5 - 10.5 mg/dL    BUN 15 8 - 22 mg/dL    Creatinine 0.69 0.60 - 1.10 mg/dL    GFR MDRD Af Amer >60 >60 mL/min/1.73m2    GFR MDRD Non Af Amer >60 >60 mL/min/1.73m2   HM1 (CBC with Diff)    Collection Time: 02/28/19 10:12 AM   Result Value Ref Range    WBC 15.4 (H) 4.0 - 11.0 thou/uL    RBC 3.19 (L) 3.80 - 5.40 mill/uL    Hemoglobin 7.8 (L) 12.0 - 16.0 g/dL    Hematocrit 26.6 (L) 35.0  - 47.0 %    MCV 83 80 - 100 fL    MCH 24.5 (L) 27.0 - 34.0 pg    MCHC 29.3 (L) 32.0 - 36.0 g/dL    RDW 20.8 (H) 11.0 - 14.5 %    Platelets 280 140 - 440 thou/uL    MPV 10.5 8.5 - 12.5 fL    Neutrophils % 94 (H) 50 - 70 %    Lymphocytes % 3 (L) 20 - 40 %    Monocytes % 2 2 - 10 %    Eosinophils % 0 0 - 6 %    Basophils % 0 0 - 2 %    Neutrophils Absolute 14.5 (H) 2.0 - 7.7 thou/uL    Lymphocytes Absolute 0.5 (L) 0.8 - 4.4 thou/uL    Monocytes Absolute 0.3 0.0 - 0.9 thou/uL    Eosinophils Absolute 0.0 0.0 - 0.4 thou/uL    Basophils Absolute 0.0 0.0 - 0.2 thou/uL   Manual Differential    Collection Time: 02/28/19 10:12 AM   Result Value Ref Range    Platelet Estimate Normal Normal    Ovalocytes 1+ (!) Negative    Polychromasia 1+ (!) Negative    Tear Drop Cells 1+ (!) Negative               Advance Care Planning:   Barriers to discharge: IV antibiotics steroids  Anticipated discharge day: 2-3 days  Disposition: Issues with placement      Gerard Juárez MD  [facility]  Hospitalist

## 2021-06-24 NOTE — PLAN OF CARE
"Patient will be injury free during hospitalization Progressing      Patient continues to be noncompliant with hygiene cares. See previous note by this writer regarding refusals of turning and incontinence care. Currently, her bed sheets are soaked with urine and she adamantly refuses to be repositioned or for staff to change her sheets. She has continued to complain of generalized pain, refuses to give a number on the scale. PRN suppository given, she states \"it won't even work until tomorrow, and I usually need two.\" Abdomen distended and per patient she is constipated.   "

## 2021-06-24 NOTE — PROGRESS NOTES
"  While geeting  geting the patient up with akbar, she was verbally abusive stated \" I would like  to knoe know why to send the sympathy card to for you. It has a very stressful encounter with the patient.  Unable to  underrstand  what she wanted  very upset and continuous swearing  at the staff while getting her up.  "

## 2021-06-24 NOTE — PLAN OF CARE
"Patient's pain/discomfort is manageable Progressing      Patient will be injury free during hospitalization Progressing      Pt sleeps through most of the night. Rates pain 12/10 \"all over\", Dilaudid, Flexeril, and Ibuprofen dosed. Pt is free from injury during shift and call light within reach. Pt is very particular about what cares and assessments she will allow and how they are done. Up in chair and goes outside to smoke. Will continue to monitor.   "

## 2021-06-24 NOTE — PLAN OF CARE
Tissue Integrity      Damaged tissue is healing and protected Not Progressing        Patient refused skin assessment and cares. Encourage patient to allow writer to assess skin, patient continues to refuse.     Pain      Patient's pain/discomfort is manageable Progressing        Patient received one time dose of IV dilaudid on previous shift, patient sleeping soundly until 0500. . Will continue to monitor and provide interventions as patient allows.

## 2021-06-24 NOTE — PROGRESS NOTES
Worsening congestion and increased O2 requirement this AM.  CXR obtained which revealed new RUL and RLL infiltrates - strongly suggestive of recurrent aspirations.  Also with possible interstitial edema.    Again advised NPO, swallow evaluation, pulmonary hygiene therapy.   All of these were refused.    Patient insists steroids and lasix are all she needs.  While these adjunctive therapies could help a bit (and are already being provided) I advised her that antibiotics and pulmonary hygiene therapy are required.  Ms. Zhou did not like what I had to say and told he nurse that she would like to fire me as her physician.    Plan:  -Broaden abx to merrem and vanco - hold keflex.    -Discussed with RT and continue pulmonary hygiene therapy as patient allows - I see she has refused all  -Check lactic acid and diurese as hemodynamics allows  -Solumedrol  -NPO and swallow evaluation.  -may end up needing to get intubated if hypoxia worsens due to non compliance with pulmonary hygiene therapies.    Full note to follow  D/w nursing    Marcello Dawkins MD  Internal Medicine Hospitalist  2/15/2019

## 2021-06-24 NOTE — PROGRESS NOTES
Hospitalist Progress Note     Assessment/Plan:     Marychuy Zhou is a 62 y.o. 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  Similar to her prior multiple hospitalizations, her behavior was a significant issue with abusiveness, and belligerence towards staff.  She was also nonadherent with medical care.  She was evaluated by psychiatry and was noted to have major depressive disorder with psychotic features as well as with a component of paranoia.  She was placed on a 72-hour hold.  Commitment hearing  on 2/12/19 which she refused to attend. Trial date 2/19.    Hospitalization complicated by aspiration pneumonia and hypoxia.    Active Problem:     #aspiration pneumonia - now with infiltrates in RUL and RLL.    -broaden to marty vanco  -sputum culture if able to produce  -pulmonary hygiene which has been refused on multiple occasions.  I have strongly and repeatedly encouraged participation in pulmonary hygiene therapy with RT.  -NPO and swallow evaluation.  Again strongly and repeatedly encouraged to not eat while lying flat.  -ID consult in light of MDRO    #hypoxic respiratory failure - primarly due to above.  Also with some wheezing/COPD/possible interstitial edema -   -O2. pulm hygiene.  Solumedrol.  Bronchodilators.  Gentle diuresis as hemodynamics allow.    #sinus tachycardia - reports baseline tachycardia 100s, worse due dyspnea. Monitor on Tele.    Left arm numbness:  - likely positional as she  lays on the left. No chest pain. Neck XR with degenerative changes, no fracture    Plan:  - off load left side.     Opioid dependence:  Chronic pain:  -Follows with the PCP and plan to follow-up with the pain clinic.  On methadone and oxycodone as an outpatient. She had been off methadone since admission which likely led to worsening of her chronic pain.  It was resumed at 10mg two times a day on 2/12/19 (normally on 10TID).  Seems quite comfortable and telling jokes by the end of our encounter.    Plan:  -Methadone 10 mg twice daily. Dilaudid 2mg 4h PRN.  Titrate methadone up to 10TID in the next few days if needed.  Continue gabapentin    Severe MDD with psychotic features:  Paranoia:  Anxiety:  - on 72-hr hold per psychiatry with commitment hearing on 2/12 which she missed.  Trial date set for 2/19.  - appreciate psychiatry involvement.  Continue quetiapine.    Stage IV sacral decubitus ulcer with chronic osteomyelitis (7/2018) status post I&D and IV antibiotics and recurrences:  History of recurrent UTIs:  -Afebrile, .stage III right sacral decubitus ulcer and probable stage IV left sacral decubitus ulcer.  Coccygeal ulcer as well. Right LE stage IV ulcer  -Past UTIs (ESBL E. coli/Klebsiella, Pseudomonas, Acinetobacter, enterococcus, Proteus).  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      Plan:  -d/c keflex as being covered by abx for PNA.  Due to patient's refusal for turns and for nursing to change wound dressings, patient will likely reinfect her sacral wound and may at some point require resumption of antibiotics.  Appreciate WOCN involvement.     Severe constipation, narcotic induced as well as neurogenic, BM documented yesterday  -Continue Miralax scheduled. Prn senna.  Dulcolax as needed.  PRN Fleets enema     Quadriplegia secondary to T4 hematoma:  Neurogenic bladder:  -Patient has reported pelvic and abdominal pain on past admissions and has had UTIs treated  with antibiotics.  -She was evaluated by urology in the past for urethral incompetence and lower abdominal/pelvic pain.  She had nephrostomies and suprapubic catheters in the past.     Plan:  - continue gabapentin and baclofen. Continue oxybutynin. Replace suprapubic catheter every 4 weeks as an outpatient.  Has gonzalez due to incontinence and sacral decub     Microcytic anemia: stable, s/p IV iron therapy.  I think her iron studies are most c/w anemia of chronic disease rather than iron deficiency (low TIBC, historically normal ferritin).   I would not treat with oral iron therapy especially in light of her chronic constipation. Monitor as outpatient.  I don't think repeating a ferritin now would be helpful in light of acute infection.     Moderate protein calorie malnutrition:  -She does have low BUN signifying catabolic state and/or protein malnutrition.      Plan:  -Registered dietitian involved.       History of DVT/PE:-Continue rivaroxaban.     History of subdural hematoma (4/2017):  -No acute issues.     Hypothyroidism:  -Continue levothyroxine.     History of seizure disorder:  -Continue levetiracetam.    Insomnia:  -Continue eszopiclone     DVT prophylaxis: Continue rivaroxaban.      Subjective/Events     As noted earlier:   Worsening congestion and increased O2 requirement this AM.  CXR obtained which revealed new RUL and RLL infiltrates - strongly suggestive of recurrent aspirations.  Also with possible interstitial edema.     Again advised NPO, swallow evaluation, pulmonary hygiene therapy.   All of these were refused.     Patient insists steroids and lasix are all she needs.  While these adjunctive therapies could help a bit (and are already being provided) I advised her that antibiotics and pulmonary hygiene therapy are required.  Ms. Zhou did not like what I had to say and told he nurse that she would like to fire me as her physician.      Review of systems:     Please see Subjective.    Current  Medications:     Scheduled Meds:    baclofen  10 mg Oral TID     eszopiclone  3 mg Oral QHS     gabapentin  900 mg Oral DAILY     levETIRAcetam  250 mg Oral BID     levothyroxine  125 mcg Oral Daily 0600     meropenem  1 g Intravenous Q8H     methadone  10 mg Oral BID     methylPREDNISolone sodium succinate  40 mg Intravenous Q24H     miconazole   Topical BID     multivitamin with minerals  1 tablet Oral DAILY     neomycin-bacitracin-polymyxin   Topical TID     oxybutynin  5 mg Oral TID     polyethylene glycol  17 g Oral DAILY     QUEtiapine  25 mg Oral BID     QUEtiapine  50 mg Oral QHS     rivaroxaban  20 mg Oral Daily with supper     vancomycin  1 g Intravenous Once     Continuous Infusions:  PRN Meds:.acetaminophen, albuterol, albuterol **AND** Nebulizer treatment intermittent, bisacodyl, calcium (as carbonate), cyclobenzaprine, HYDROmorphone, hydrOXYzine HCl, ibuprofen, magnesium hydroxide, melatonin, naloxone **OR** naloxone, OLANZapine, omeprazole, ondansetron **OR** ondansetron, polyethylene glycol, polyvinyl alcohol, sodium chloride bacteriostatic, sodium phosphates 133 mL, traZODone    Objective:       Vital signs in last 24 hours:     Temp:  [97.6  F (36.4  C)-98.6  F (37  C)] 97.6  F (36.4  C)  Heart Rate:  [] 116  Resp:  [17-28] 22  BP: ()/(68-80) 100/68  Weight: 162 lb (73.5 kg)    Physical Exam:     General appearance: Alert, Awake, No distress.  Lying flat and eating breakfast       ENT anicteric   Resp: Wet sounding cough. A bit more tachypnic today. Rhonchorous, few scattered wheezes.   CV Regular tachycardia   GI:  mildly distended.   MSK: No swelling soft, NT   Neuro:  Alert and oriented ×3, paraplegia, irritable   : gonzalez with clear urine       Intake/Output this shift:     I/O last 3 completed shifts:  In: 1540 [P.O.:1540]  Out: 1000 [Urine:1000]    Pertinent Labs:     Lab Results: personally reviewed.     Pertinent Radiology:     EKG: sinus tach.  No acute ischemic  changes.      Advanced Care Planning:     Barrier to discharge: Pending commitment process, respiratory status  Anticipated LOS: To be determined  Disposition: To be determined    Marcello Dawkins MD  Internal Medicine Hospitalist  2/15/2019

## 2021-06-24 NOTE — PLAN OF CARE
Problem: Discharge Barriers  Goal: Patient's discharge needs are met  Outcome: Progressing  Care Plan  Care Management      Care Management Goals of the Day: Progression of care    Care Progression Reviewed With: Charge RN, MD, HUC, RNCM, CMSW    Barriers to Discharge: Placement    Discharge Disposition: TBD    Expected Discharge Date: TBD    [facility] ([phone])        Care Coordination Narrative:      Kennedy Gunderson has dismissed Commitment Case. At this time the CM team will continue to look for appropriate d/c placement on pt's behalf. The follow referrals have been made:     Waiting for Response    [facility] at [facility]: Left Message, waiting for response    [facility] at [facility]: Left message, waiting for response     [facility]: Left message, waiting for response    [facility]: Referral sent    [facility]: Referral sent  Declined    Estates at [place]: Declined, no LTC beds    [place]: Declined    [facility]: Declined, no LTC beds    [place]: Declined, Does not meet criteria     [place]: Declined, non-smoking facility    [facility] [place]: Declined, no LTC beds    [facility] [place]: Declined, no LTC beds    [place]: Declined, no LTC at facility    [place]: Declined    [facility]: Declined, No female beds in LTC or TCU    [facility]: Declined    [facility](P: [phone]/F: [phone]) : Declined, too medically complex. (hand faxed)    [facility]: Declined.    Estates of [place]: Declined, no beds available    [facility]: Declined, only shared rooms    [place] [facility]: DECLINED, cannot take methadone    [facility]: DECLINED    [facility]: DECLINED    [place] [facility]: DECLINED    [facility]: DECLINED    [facility]: DECLINED    [facility]: DECLINED    Estates at [place]: DECLINED    Estates at [place]: DECLINED    [facility]:  DECLINED    [facility]: DECLINED    [place]: DECLINED    [place]: DECLINED    [place] [facility]: DECLINED    [facility] [facility]: DECLINED    [facility]: DECLINED    [facility]: DECLINED    [facility]: DECLINED    [place]: DECLINED    [facility]: DECLINED    [facility]: DECLINED    [facility]: DECLINED    [facility]: DECLINED    [facility]: DECLINED    [facility]: DECLINED    [facility]: DECLINED    Patient has been declined by all [facility]      ** Please note that this list is updated as responses are received.     MARGI additionally discussed with pt going to a group home if pt was able to get on a CADI waiver through [facility]. Pt is in agreement with this if it is possible.     MARGI has received application from [facility] term [employer] [facility] [employer] (P: [phone]/F: [phone]) for [facility] assessment to find out if pt qualifies for a CADI waiver. SW to complete this with pt and fax back.     MARGI additionally has obtained a list of medical group homes and a contact for [facility] Taina Phillips ([phone]) to find out if assessment for CADI waiver needs to be done in [facility] rather than [place].      MARGI will continue to follow and assist with further d/c needs    CYNTHIA Gonsalez  3/4/2019  8:28 AM

## 2021-06-24 NOTE — PLAN OF CARE
Pt is alert and oriented x 4, rates pain 8/10.  PRN Dilaudid 2mg PO and Flexiril 10mg given.  Pt non compliant with vitals and routine assessment, provider notified.  Pt complaining of various things throughout the night, most notably her bed beeping.  Please see previous progress note regarding bed, multiple people were consulted multiple times.  If there are further problems with the bed, turn the orange button on the side of the bed on and off.  There might be something intentionally setting the bed beeping off, there's no need to consult maintenence any further.  Pt able to make needs known, will continue to monitor.    Non-compliance with treatment regimen      Compliance with treatment regimen Not Progressing          Psychosocial Needs      Demonstrates ability to cope with hospitalization/illness Not Progressing      Collaborate with patient/family/caregiver to identify patient specific goals for this hospitalization Not Progressing          Pain      Patient's pain/discomfort is manageable Progressing

## 2021-06-24 NOTE — PLAN OF CARE
Problem: Pain  Goal: Patient's pain/discomfort is manageable  Outcome: Not Progressing  Pt received prn dilaudid once and refused to say where the pain is and requested for additional HS methadone double and Dr. Jerry ny and methadone dose increased to 20 mg at HS.

## 2021-06-24 NOTE — PLAN OF CARE
Non-compliance with treatment regimen      Compliance with treatment regimen Not Progressing        Psychosocial Needs      Demonstrates ability to cope with hospitalization/illness Not Progressing          Pain      Patient's pain/discomfort is manageable Progressing          Patient was given PRN Tylenol, Ibuprofen, Flexeril, and Dilaudid for complaints of pain/discomfort. PRN Zofran was given for nausea/ vomiting. Patient continues to be non-compliant with many cares including lab draws, repositioning, medications, and wound cares. At this time she has refused 4 straight efforts for lab to draw. She complains about many of the staff and can be very demanding and insulting. She at times acts paranoid, and suggest staff are incapable of caring for her. Will continue to monitor.

## 2021-06-24 NOTE — PLAN OF CARE
"Daily care needs are met Progressing    Pt.  c/o Lt foot pain when writer arrived on shift. Rated pain \"12/10, it radiates up to my groin.\" Pt demanded \"you need to text page the doctor about this pain because pills are not going to cut it, I need IV pain medication.\" Pt. refused assessment of Lt foot wound at this time. Also refused VS x2 from EMILY.   Writer text paged MD who will not be changing medications until rounds. Pt. Was satisfied with this answer.     Pt. Has received PRN pain meds around the clock, but continues to rate pain 9-10/10. Pt. Later stated the pain was also in her right foot. Pt. Did allow writer to change both heel and R calf dressings, but demanded gauze be placed under mepilex. Pt. wanted to put barrier cream in open wounds, but writer explained that this would harbor moisture and bacteria. Pt. Stated \"I guess you're right.\" Pt. Being followed by [facility] nurse, but per documentation noted she has refused dressing changes at times. Ulceration on top of Lt foot is 25% eschar, 25% slough, and 50% friable granulation tissue. Drainage is moderate and tan/yellow. No foul odor or erythema noted to site. Writer was unable to visualize Lt heel ulcer d/t pt. Refusing foot to be lifted high enough to see d/t pain.  Rt calf wound appears improved from picture on 2/11. Scab is present to Rt heel ulcer.       "

## 2021-06-24 NOTE — PROGRESS NOTES
"Writer verified (through SW notes in chart) [facility] is to  patient at 0930 tomorrow morning for her court date. Updated patient with this information. Patient stated \"no, court is at 0930\".   Writer will supply patient with the number for [facility] department.   "

## 2021-06-24 NOTE — PLAN OF CARE
Problem: Discharge Barriers  Goal: Patient's discharge needs are met  Outcome: Progressing  Care Plan  Care Management      Care Management Goals of the Day: Progression of care    Care Progression Reviewed With: Charge RN, MD, HUC, RNCM, CMSW    Barriers to Discharge: placement    Discharge Disposition: TBD    Expected Discharge Date: TBD    Transportation: [employer] [facility] [phone]       Care Coordination Narrative:      Kennedy Gunderson has dismissed Commitment Case. At this time the CM team will continue to look for appropriate d/c placement on pt's behalf. The follow referrals have been made:     Waiting for Response    [facility] at [facility]: Left Message, waiting for response    [facility] at [facility]: Left message, waiting for response     [place] [facility]: Left message, waiting for response    [facility]: Referral sent    Estates of [place]: Referral sent    [facility] of [facility]: Referral sent    [place]: Referral sent  Declined    Estates at [place]: Declined, no LTC beds    [place]: Declined    [facility]: Declined, no LTC beds    [place]: Declined, Does not meet criteria     [place]: Declined, non-smoking facility    [facility] [place]: Declined, no LTC beds    [facility] [place]: Declined, no LTC beds    [place]: Declined, no LTC at facility    [facility]: Declined, No female beds in LTC or TCU    [facility]: Declined    [facility](P: [phone]/F: [phone]) : Declined, too medically complex. (hand faxed)    [place] [facility]: Declined.    [facility]: DECLINED, cannot take methadone    [facility]: DECLINED    [place]: DECLINED    [facility]: DECLINED    [facility]: DECLINED    [facility]: DECLINED    [facility] at [facility]: DECLINED    Estates at [place]: DECLINED    [facility]: DECLINED    [facility]: DECLINED    [facility]: DECLINED    [place]:  DECLINED    [place]: DECLINED    [facility]: DECLINED    [facility]: DECLINED    [facility]: DECLINED    [facility]: DECLINED    [facility]: DECLINED    [facility]: DECLINED    [facility]: DECLINED    [facility]: DECLINED    [facility]: DECLINED    [facility]: DECLINED    [facility]: DECLINED    [facility]: DECLIEND      ** Please note that this list is updated as responses are received.     SW additionally discussed with pt going to a group home if pt was able to get on a CADI waiver through [employer]. Pt is in agreement with this if it is possible.     MARGI has received application from [employer] [facility] term [employer] [facility] [employer] (P: [phone]/F: [phone]) for [facility] assessment to find out if pt qualifies for a CADI waiver. SW to complete this with pt and fax back.    MARGI additionally has obtained a list of medical group homes and a contact for [facility] Taina Phillips ([phone]) to find out if assessment for CADI waiver needs to be done in [facility] rather than [place].     MARGI will continue to follow and assist with further d/c needs.     CYNTHIA Gonsalez  2/26/2019  10:52 AM

## 2021-06-24 NOTE — PROGRESS NOTES
"Care Plan Progress Note:    Name: Marychuy Zhou  :   1955  MRN:   [id number]    Patient continues to place call light on frequently throughout the night - asking \"to speak with the Charge RN\" - then writer would ask what she wanted and patient stated \"apple juice\" and \"they didn't turn off my lights when they last left my room\".  Patient has called the hospital  x2 tonight and has called the  multiple times \"demanding someone get in her room right now\".  Patient would hang up the phone on writer once staff would enter room.  Needs constant reminders to not make demands at staff.      2/15/2019 6:15 AM    Penelope Ortiz RN      "

## 2021-06-24 NOTE — PROGRESS NOTES
" The staff came to the room and she said  \"how dare you are dare you not  to get somebody here. You are no damn nurse. You are foolish just  as the that one\"  "

## 2021-06-24 NOTE — PROGRESS NOTES
Pt was given Prn neb treatment. Pt refused VS and lung auscultation and on RA. RT will continue to follow

## 2021-06-24 NOTE — PROGRESS NOTES
"Pt c/o air mattress beeping, had S rep come look at it, they recalibrated the settings and said it was working fine. Shortly afterward pt noted it to be beeping again, had  come look at it who turned it off and on and it seemed to be working again. Was then again noted to be beeping a short while later, [facility] rep again came out to look at equipment, he states everything looked completely fine and there was no reason that equipment should be beeping unless it was being tampered with. Writer was present with [facility] rep and patient in the room while [facility] rep was showing nursing staff how all the settings were all properly set, pt then stated to [facility] representative \"I'll have them given you a call later if I get lonely\". Air mattress has been functioning properly since. Will continue to monitor.       "

## 2021-06-24 NOTE — PROGRESS NOTES
Wound Ostomy  WOC Assessment         Allergies:  No Known Allergies    Diagnosis:   Patient Active Problem List    Diagnosis Date Noted     Unable to control anger      Severe major depression, single episode, with psychotic features (H)      Paranoia (H)      Upper back pain      Abnormal CT scan of lung      Panic anxiety syndrome      Pelvic pain 01/09/2019     Ulcer due to Treponema vincentii, with fat layer exposed (H) 01/07/2019     Atelectasis      Tension-type headache, not intractable, unspecified chronicity pattern      Weakness of left hand 12/04/2018     Focal motor seizure (H) 12/04/2018     Pelvic pain in female 11/21/2018     Chronic osteomyelitis (H)      Drug-induced constipation      Quadriplegia (H)      Goals of care, counseling/discussion 07/16/2018     Advanced care planning/counseling discussion 07/16/2018     Malingerer for IV Dilaudid 07/16/2018     Moderate protein-calorie malnutrition (H) 07/16/2018     Infection 07/14/2018     Noncompliance with medication regimen      Stage IV pressure ulcer of sacral region (H) 07/11/2018     Gangrene (H) 07/10/2018     Centrilobular emphysema (H) 07/02/2018     Bilateral lower extremity edema 07/02/2018     Hypoxemia 07/01/2018     Left lower lobe pneumonia (H) 06/27/2018     Acute bronchitis due to other specified organisms 06/27/2018     Nephrostomy complication (H) 03/06/2018     History of encephalopathy      Closed left subtrochanteric femur fracture (H) 02/27/2018     Acute blood loss anemia      Other specified hypotension      Normocytic anemia 02/26/2018     Adjustment disorder with mixed anxiety and depressed mood      Sleep difficulties      Irritability and anger      Fever in other diseases 01/31/2018     Severe sepsis (H) 01/31/2018     Ulcer 01/31/2018     Hypothyroidism due to Hashimoto's thyroiditis      Abscess, gluteal, right      History of DVT (deep vein thrombosis)      Urinary incontinence due to urethral sphincter incompetence       Weakness 09/07/2017     Chronic anticoagulation 09/03/2017     Urinary tract infection associated with cystostomy catheter (H) 09/02/2017     Dislodged wound Vacuum 08/14/2017     Abdominal pain, unspecified location      Coagulopathy (H)      Anxiety      UTI (urinary tract infection) 08/10/2017     Elevated lactic acid level 07/29/2017     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     Suprapubic catheter dysfunction, subsequent encounter      Bilateral hydronephrosis      Cystitis      Colon wall thickening      Abdominal pain 06/03/2017     Flank pain 06/02/2017     Right upper quadrant abdominal pain 06/02/2017     Episodic cluster headache, not intractable      Sepsis (H) 05/28/2017     Sepsis secondary to UTI (H) 05/28/2017     Pyelonephritis      Hyponatremia      Chronic obstructive pulmonary disease with acute exacerbation (H) 04/18/2017     Depression 04/18/2017     Partial symptomatic epilepsy with simple partial seizures, intractable, with status epilepticus (H)      Acute on chronic intracranial subdural hematoma (H)      Brain edema (H)      Subdural hematoma (H) 04/09/2017     Convulsions, unspecified convulsion type (H)      Neck infection 03/31/2017     Lower abdominal pain 03/01/2017     Fever, unspecified fever cause      Pneumonia of left lower lobe due to infectious organism (H)      S/P cholecystectomy      Choledocholithiasis with obstruction 01/02/2017     Cholelithiasis 01/02/2017     Hx of pulmonary embolus 01/02/2017     Claustrophobia      Urinary tract infection, site unspecified      Hypotension, unspecified hypotension type      Chronic low back pain without sciatica, unspecified back pain laterality      Acute encephalopathy      Sepsis, due to unspecified organism (H) 12/30/2016     History of pulmonary embolus (PE) 12/01/2016     Cognitive disorder      Insomnia due to anxiety and fear      HCAP (healthcare-associated pneumonia) 11/04/2016     Atypical chest pain 10/23/2016     Diastolic  "CHF, acute on chronic (H)      Chest tightness or pressure      History of MDR Enterobacter cloacae infection      Anxiety disorder      Esophageal dysmotility      Major depressive disorder, recurrent episode, moderate (H)      Seizure disorder (H)      Heel ulcer, right, limited to breakdown of skin (H)      Decubitus ulcer of sacral region, stage 4 (H)      Paraplegia at T4 level (H) 04/28/2016     Hypokalemia      Chronic pain syndrome 01/12/2016     Major depression 01/12/2016     Alcohol abuse 01/12/2016     Hospital-acquired pneumonia 10/26/2015     COPD exacerbation (H) 08/03/2015     Gastroesophageal reflux disease without esophagitis 05/26/2015     Acquired hypothyroidism 05/26/2015     Iron deficiency anemia 05/26/2015     Opioid-induced constipation (OIC) 05/26/2015     Paraplegia (H) 05/26/2015     Palliative care encounter 12/08/2014     Nephrostomy tube displaced (H) 12/02/2014     Mechanical complication of suprapubic catheter (H) 11/20/2014     Acute respiratory failure with hypoxia (H) 08/19/2014     COPD (chronic obstructive pulmonary disease) (H) 08/19/2014     Lactic acidosis 08/19/2014     SVT (supraventricular tachycardia) (H) 08/19/2014     Dislodged Bhandari catheter, subsequent encounter 07/30/2014     Other chronic pain      Lumbosacral Disc Degeneration      Herpes Simplex Type I      Nicotine Dependence      Essential hypertension      Cancer of lung (H) 09/24/2013     Neurogenic bladder 01/29/2013     Neurogenic bowel 01/29/2013     Decubitus ulcer 01/25/2013     Mixed hyperlipidemia 01/25/2013     Decubitus ulcer of right buttock, stage 4 (H) 01/25/2013     Decubitus ulcer, stage III (H) 01/25/2013       Height:  5' 2\" (1.575 m)    Weight:   163 lb (73.9 kg)    Labs:  Recent Labs     02/20/19  0629   HGB 8.9*       Isaiah:  Isaiah Scale Score: 12    Specialty Bed:  Specialty Bed: [facility] RN attempted to see patient for wound assessment between 10:15 and 10:30 this AM. " Patient already up in wheelchair. Will attempt to see later today or tomorrow.

## 2021-06-24 NOTE — PLAN OF CARE
Problem: Daily Care  Goal: Daily care needs are met  Outcome: Progressing  Supp and enema given. Unable to hold enema. Not sure how effective it will be. Small incont of Bm noted.   Dressing to coccyx area changed. position for comfort.

## 2021-06-24 NOTE — PROGRESS NOTES
Pt up in wheelchair most of shift, out to smoke frequently. Refusing assessments, rude and demanding at times with staff. Wants all cares done a certain way, and if not done this way states staff are incompetent. Did allow writer to change sacral heart mepilex dressing to sacrum when pt assisted back to bed this evening. PO Dilaudid given for pain, VSS. Will continue to monitor.

## 2021-06-24 NOTE — PROGRESS NOTES
Pt increasingly agitated when she received a phone call that woke her from her sleep and realized that we had not given her the PRN pain medications that were available. We discussed a plan and she would like to have the Tylenol and Ibuprofen alternated with the Dilaudid every 4 hours. I am agreeable to this plan.

## 2021-06-24 NOTE — PROGRESS NOTES
[facility] Daily Progress Note      Date of Service: 2/17/2019     Assessment and plan    Severe sepsis with impending septic shock  Off pressors at present  ; Continue IV fluid hydration  ; Blood pressure was stable  ; Likely secondary to pneumonia probable aspiration pneumonia  : Leukocytosis improved  ; Continue broad-spectrum antibiotics as per ID recommendation  : Did okay with speech therapy  ; Tolerating diet  ; Getting CT chest abdomen pelvis to rule out other sources of infection  ; Sacral decubiti does not appear infected as per discussion from nursing staff    Acute hypoxic respiratory failure  Consider secondary to pneumonia and probable COPD exacerbation  ; Needing minimal oxygen at present  ; Continue Solu-Medrol: We will taper as tolerated  ; Continue bronchodilators  : Symptomatically better    Acute on chronic anemia  Hemoglobin down to 7.6  ; Baseline hemoglobin around 9  ; No clear signs of bleeding except for probable bleeding from sacral decubiti  : Empiric PPI: Patient currently significantly constipated  ; Getting CT chest abdomen pelvis to look for source of bleeding  ; We will continue Xarelto as patient has history of DVT and PE and as high risk for recurrence    History of stage IV sacral decubitus ulcer with chronic osteomyelitis (7/2018) status post I&D and IV antibiotics and recurrences:  History of recurrent UTIs:  : Patient refusing examination at present  ; Not very compliant with nursing care  : Frequent position changes  ; Patient refused examination by me again today  ; As per nursing staff: Dressing was changed overnight and was told to be some oozing  ; Recheck dressing was done this morning: States patient had a small rounded decubiti, states underlying condition tissue with about a teaspoonful of soakage of dressing of blood.  ; Please call MD prior to dressing change so decubiti could be evaluated    Sinus tachycardia  ; EKG on 2/15/2019 showed sinus tachycardia  ; Treating  underlying condition as above  ; Heart rate improved with treatment of underlying sepsis and hydration    Opioid dependence:  Chronic pain:  -Follows with the PCP and plan to follow-up with the pain clinic.    On methadone and oxycodone as an outpatient.   She had been off methadone since admission which likely led to worsening of her chronic pain.   : Methadone was resumed on 2/16/2019 at 10 mg twice daily usually on 10 mg 3 times daily  : Mental status stable, no worsening respiratory status     Severe MDD with psychotic features:  Paranoia and anxiety  : Patient was recently evaluated by psychiatry   on 72-hr hold per psychiatry with commitment hearing on 2/12 which she missed.  Trial date set for 2/19.   Continue quetiapine.  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  -She was evaluated by urology in the past for urethral incompetence and lower abdominal/pelvic pain.    She had nephrostomies and suprapubic catheters in the past.    continue gabapentin and baclofen. Continue oxybutynin.   Replace suprapubic catheter every 4 weeks as an outpatient.    Has gonzalez due to incontinence and sacral decub  ; Suprapubic catheter not working properly: Urology consulted    Microcytic anemia  History of IV iron therapy  ; Last hemoglobin 9.7 on 2/14/2019  : No clear signs of active bleeding    History of DVT and PE  ; Continue Xarelto     History of subdural hematoma (4/2017):  : Tolerating anticoagulation     Hypothyroidism:  Continue levothyroxine.     History of seizure disorder:  Continue levetiracetam.     Insomnia:  Continue eszopiclone      This note was  dictated using voice recognition software. Any grammatical or context distortions are unintentional and inherent to the software.  Subjective:   Patient sitting up in bed in no acute distress  States she feels much better this morning  States that her breathing is better denies chest pain  Denies dizziness    Patient more compliant than yesterday with questions and examination  However she still seems to have multiple complaints about people not listening to her      Brief History: 62 y.o. 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  Similar to her prior multiple hospitalizations, her behavior was a significant issue with abusiveness, and belligerence towards staff.  She was also nonadherent with medical care.  She was evaluated by psychiatry and was noted to have major depressive disorder with psychotic features as well as with a component of paranoia.  She was placed on a 72-hour hold.  Commitment hearing  on 2/12/19 which she refused to attend. Trial date 2/19.      Chart reviewed, events noted. Pt seen and examined.     Objective     Vital signs in last 24 hours:  Temp:  [98.7  F (37.1  C)-98.8  F (37.1  C)] 98.8  F (37.1  C)  Heart Rate:  [] 97  Resp:  [8-54] 35  BP: ()/(46-68) 104/54  Weight:   163 lb (73.9 kg)  Weight change:   Body mass index is 29.81 kg/m .    Intake/Output last 3 shifts:  I/O last 3 completed shifts:  In: 1473 [P.O.:340; I.V.:533; IV Piggyback:600]  Out: 400  "[Urine:400]  Intake/Output this shift:  I/O this shift:  In: -   Out: 450 [Urine:450]      Physical Exam:  /54   Pulse 97   Temp 98.8  F (37.1  C) (Oral)   Resp (!) 35   Ht 5' 2\" (1.575 m)   Wt 163 lb (73.9 kg)   LMP  (LMP Unknown)   SpO2 97%   BMI 29.81 kg/m        O2 Device: OxyMask O2 Flow Rate (L/min): 2 L/min    Patient was little more compliant with examination today    Did not allow decubiti examination  General Appearance:    Alert, no apparent distress.    HEENT:    Normocephalic. Pupils equal and reactive. No scleral   Icterus. Moist oral mucosa    Lungs:     Clear to auscultation bilaterally, respirations unlabored  Mild bilateral wheezing   Heart::    Regular rate and rhythm, S1 and S2 normal, no murmur, rub   or gallop.   Abdomen:  Soft, mild local discomfort no significant tenderness  Bowel sounds present   Extremity :  Lower extremity edema, chronic ulcers present       CNS:   Alert and oriented,   no facial asymmetry  Paraplegic       Imaging:  personally reviewed.      Scheduled Meds:    baclofen  10 mg Oral TID     eszopiclone  3 mg Oral QHS     gabapentin  900 mg Oral DAILY     levETIRAcetam  250 mg Oral BID     levothyroxine  125 mcg Oral Daily 0600     meropenem  1 g Intravenous Q8H     methadone  10 mg Oral BID     [START ON 2/18/2019] methylPREDNISolone sodium succinate  40 mg Intravenous Q24H     miconazole   Topical BID     multivitamin with minerals  1 tablet Oral DAILY     neomycin-bacitracin-polymyxin   Topical TID     [START ON 2/18/2019] omeprazole  20 mg Oral QAM AC     oxybutynin  5 mg Oral TID     polyethylene glycol  17 g Oral DAILY     [START ON 2/18/2019] predniSONE  20 mg Oral Daily with brkfst     QUEtiapine  25 mg Oral BID     QUEtiapine  50 mg Oral QHS     rivaroxaban  20 mg Oral Daily with supper     sodium chloride  10-30 mL Intravenous Q8H FIXED TIMES     vancomycin  1.25 g Intravenous Q18H     Continuous Infusions:    sodium chloride 0.9% 50 mL/hr (02/17/19 " 0329)     PRN Meds:.acetaminophen, albuterol, albuterol **AND** Nebulizer treatment intermittent, bisacodyl, calcium (as carbonate), cyclobenzaprine, HYDROmorphone, hydrOXYzine HCl, ibuprofen, magnesium hydroxide, melatonin, naloxone **OR** naloxone, OLANZapine, ondansetron **OR** ondansetron, polyethylene glycol, polyvinyl alcohol, sodium chloride bacteriostatic, sodium chloride bacteriostatic, sodium chloride, sodium chloride, sodium chloride, sodium phosphates 133 mL, traZODone    Lab Results:  personally reviewed.   not applicable  Recent Results (from the past 24 hour(s))   Lactic Acid    Collection Time: 02/16/19  6:29 PM   Result Value Ref Range    Lactic Acid 1.4 0.5 - 2.2 mmol/L   HM2(CBC W/O DIFF)    Collection Time: 02/16/19  6:29 PM   Result Value Ref Range    WBC 24.8 (H) 4.0 - 11.0 thou/uL    RBC 3.13 (L) 3.80 - 5.40 mill/uL    Hemoglobin 7.7 (L) 12.0 - 16.0 g/dL    Hematocrit 26.6 (L) 35.0 - 47.0 %    MCV 85 80 - 100 fL    MCH 24.6 (L) 27.0 - 34.0 pg    MCHC 28.9 (L) 32.0 - 36.0 g/dL    RDW 22.7 (H) 11.0 - 14.5 %    Platelets 344 140 - 440 thou/uL    MPV 9.9 8.5 - 12.5 fL   Comprehensive Metabolic Panel    Collection Time: 02/16/19  6:29 PM   Result Value Ref Range    Sodium 140 136 - 145 mmol/L    Potassium 3.7 3.5 - 5.0 mmol/L    Chloride 108 (H) 98 - 107 mmol/L    CO2 26 22 - 31 mmol/L    Anion Gap, Calculation 6 5 - 18 mmol/L    Glucose 135 (H) 70 - 125 mg/dL    BUN 17 8 - 22 mg/dL    Creatinine 0.74 0.60 - 1.10 mg/dL    GFR MDRD Af Amer >60 >60 mL/min/1.73m2    GFR MDRD Non Af Amer >60 >60 mL/min/1.73m2    Bilirubin, Total 0.1 0.0 - 1.0 mg/dL    Calcium 7.7 (L) 8.5 - 10.5 mg/dL    Protein, Total 5.5 (L) 6.0 - 8.0 g/dL    Albumin 1.6 (L) 3.5 - 5.0 g/dL    Alkaline Phosphatase 133 (H) 45 - 120 U/L    AST 26 0 - 40 U/L    ALT 14 0 - 45 U/L   Troponin I    Collection Time: 02/16/19  6:29 PM   Result Value Ref Range    Troponin I 0.01 0.00 - 0.29 ng/mL   Troponin I    Collection Time: 02/16/19 11:40  PM   Result Value Ref Range    Troponin I 0.01 0.00 - 0.29 ng/mL   Vancomycin (Vancocin )    Collection Time: 02/17/19  5:23 AM   Result Value Ref Range    Vancomycin 26.8 (HH) <=25.0 ug/mL   Troponin I    Collection Time: 02/17/19  5:24 AM   Result Value Ref Range    Troponin I <0.01 0.00 - 0.29 ng/mL   Comprehensive Metabolic Panel    Collection Time: 02/17/19  5:24 AM   Result Value Ref Range    Sodium 143 136 - 145 mmol/L    Potassium 4.3 3.5 - 5.0 mmol/L    Chloride 116 (H) 98 - 107 mmol/L    CO2 22 22 - 31 mmol/L    Anion Gap, Calculation 5 5 - 18 mmol/L    Glucose 162 (H) 70 - 125 mg/dL    BUN 15 8 - 22 mg/dL    Creatinine 0.66 0.60 - 1.10 mg/dL    GFR MDRD Af Amer >60 >60 mL/min/1.73m2    GFR MDRD Non Af Amer >60 >60 mL/min/1.73m2    Bilirubin, Total 0.1 0.0 - 1.0 mg/dL    Calcium 7.4 (L) 8.5 - 10.5 mg/dL    Protein, Total 5.1 (L) 6.0 - 8.0 g/dL    Albumin 1.5 (L) 3.5 - 5.0 g/dL    Alkaline Phosphatase 136 (H) 45 - 120 U/L    AST 21 0 - 40 U/L    ALT 15 0 - 45 U/L   HM1 (CBC with Diff)    Collection Time: 02/17/19  5:24 AM   Result Value Ref Range    WBC 19.6 (H) 4.0 - 11.0 thou/uL    RBC 3.06 (L) 3.80 - 5.40 mill/uL    Hemoglobin 7.6 (L) 12.0 - 16.0 g/dL    Hematocrit 26.5 (L) 35.0 - 47.0 %    MCV 87 80 - 100 fL    MCH 24.8 (L) 27.0 - 34.0 pg    MCHC 28.7 (L) 32.0 - 36.0 g/dL    RDW 22.8 (H) 11.0 - 14.5 %    Platelets 341 140 - 440 thou/uL    MPV 9.8 8.5 - 12.5 fL    Neutrophils % 96 (H) 50 - 70 %    Lymphocytes % 2 (L) 20 - 40 %    Monocytes % 1 (L) 2 - 10 %    Eosinophils % 0 0 - 6 %    Basophils % 0 0 - 2 %    Neutrophils Absolute 18.5 (H) 2.0 - 7.7 thou/uL    Lymphocytes Absolute 0.4 (L) 0.8 - 4.4 thou/uL    Monocytes Absolute 0.2 0.0 - 0.9 thou/uL    Eosinophils Absolute 0.1 0.0 - 0.4 thou/uL    Basophils Absolute 0.0 0.0 - 0.2 thou/uL   Vancomycin (Vancocin )    Collection Time: 02/17/19  8:45 AM   Result Value Ref Range    Vancomycin 24.9 <=25.0 ug/mL               Advance Care Planning:   Barriers  to discharge: IV antibiotics  Anticipated discharge day: 2-3 days  Disposition: Unclear      Gerard Juárez MD  [facility]

## 2021-06-24 NOTE — PLAN OF CARE
Patient stated she was having pain rated 10/10 at start of shift. Gave PRN ibuprofen, tylenol and flexaril. PRN dilaudid given at noon, medications were moderately effective to decrease pain for a short time. Patient refused a full head to toe assessment twice at the start of shift, then called the nurse in the afternoon saying the assessment could be done. Patient refused wound care stating that it was already done early this morning by the overnight staff, covered and not assessed. Patient refused to be turned all morning, and requested to get up in her wheelchair at 1100. Patient then went on and off the unit throughout the day, going outside to smoke and throughout the hospital. Patient requested PRN ibuprofen in the afternoon around 1400 for pain 8/10. Will continue to monitor.

## 2021-06-24 NOTE — PLAN OF CARE
Sitting in her electric since this am. Wants to stay in it. Cooperative. Was rude and demanding this am with getting into her chair. Wants things done certain way and pace. Better this afternoon. Continue to monitor.

## 2021-06-24 NOTE — PLAN OF CARE
"Compromised Skin Integrity      Fluid and electrolyte balance are achieved/maintained Progressing     patient   Is alert she  has been  hydrating well drinking fluid    Daily Care      Daily care needs are met Progressing     she  has been refusing cares, she refused  reapproach many times was abortive. When IS was offerd she said  my breathing is okay    Decreased Mental Status Causing Increased Need for Safety      Provide a safe environment for patient (Implement appropriate elements in plan of care) Progressing     she was sleeping off and on and had snack during the shft    Pain      Patient's pain/discomfort is manageable Progressing     she c/o generalized pain rated 9/10\" I want all my medications' was medciated with dilaudid  With motrin after two hours she requested for  Tylenol and flexeril.    Potential for Compromised Skin Integrity      Skin integrity is maintained or improved Progressing     she has stage four open area and the she refused to be turn when approach the lower legs dressing are intact    Safety      Patient will be injury free during hospitalization Progressing     will be safe while hospitalized.     The patient slept at the beginning of the shift and had  Requested for prn medication, refused cares , reproached and was abortive. She refused her vital signs to be taken.  "

## 2021-06-24 NOTE — PLAN OF CARE
"Blood pressure is elevated above 140 over 90 mmHg      Blood pressure (BP) is within acceptable limits Not Progressing          Knowledge Deficit      Patient/family/caregiver demonstrates understanding of disease process, treatment plan, medications, and discharge instructions Not Progressing          Non-compliance with treatment regimen      Compliance with treatment regimen Not Progressing          Pt A&O x 4. Reported generalized pain 8-9/10, given PRN Dilaudid 2 mg PO and Flexeril 10 mg. Pt had bilateral inspiratory and expiratory wheezes, given PRN inhaler, refused neb tx. Pt refusing full assessment and vitals. C/o hgb 7.5 and \"that doctor told me I was getting a transfusion. My hgb dropped from 11 to 7 overnight something needs to be done about it\". Discussed with provider, and will continue to monitor.   "

## 2021-06-24 NOTE — PROGRESS NOTES
Hospitalist Progress Note     Assessment/Plan:     Marychuy Zhou is a 62 y.o. 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  Similar to her prior multiple hospitalizations, her behavior was a significant issue with abusiveness, and belligerence towards staff.  She was also nonadherent with medical care.  She was evaluated by psychiatry and was noted to have major depressive disorder with psychotic features as well as with a component of paranoia.  She was placed on a 72-hour hold.  Commitment hearing  on 2/12/19 which she refused to attend. Trial date 2/19.    Active Problem:   Possible pneumonia vs pneumonitis:  - dyspnea with low sats, rhonchi. CXR on 2/12 with ARMANDO infiltrates, possible pneumonitis    Plan:  - start levaquin x7 days. Prednisone x5 days. CBC in the morning.    Left arm numbness:  - likely positional as she lays on the left. No chest pain. Neck XR with degenerative changes, no fracture    Plan:  - off load left side.     Opioid dependence:  Chronic pain:  -Follows with the PCP and plan to follow-up with the pain clinic.  On methadone and oxycodone as an outpatient. She has been off methadone since admission which likely led to worsening of her chronic pain.     Plan:  - resume methadone at 5 mg two times a day and uptitrate to home dose as needed. Decrease dilaudid as a result. Continue gabapentin    Severe MDD with psychotic features:  Paranoia:  Anxiety:  - on 72-hr hold per psychiatry  with commitment hearing on 2/12 which she missed.  - appreciate psychiatry involvement.  Continue quetiapine.    Stage IV sacral decubitus ulcer with chronic osteomyelitis (7/2018) status post I&D and IV antibiotics and recurrences:  History of recurrent UTIs:  -Afebrile, no leukocytosis..stage III right sacral decubitus ulcer and probable stage IV left sacral decubitus ulcer.  Coccygeal ulcer as well. Right LE stage IV ulcer  -Past UTIs (ESBL E. coli/Klebsiella, Pseudomonas, Acinetobacter, enterococcus, Proteus).  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      Plan:  - continue keflex three times a day until 2/14 per ID recommendations. Appreciate [facility] involvement.     Severe constipation, narcotic induced as well as neurogenic, resolved:  -Continue Miralax scheduled. Prn senna.  Dulcolax as needed.     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  -She was evaluated by urology in the past for urethral incompetence and lower abdominal/pelvic pain.  She had nephrostomies and suprapubic catheters in the past.     Plan:  - continue gabapentin and baclofen. Continue oxybutynin. Replace suprapubic catheter every 4 weeks as an outpatient.     Microcytic anemia:  -Hemoglobin 11.7 above her baseline. Low MCV. No acute signs of blood loss. Anemia likely due to malnutrition.     Plan:  -Continue iron supplementation.  Recommend iron studies as an outpatient     Moderate protein calorie malnutrition:  -She does have low BUN signifying catabolic state and/or protein malnutrition.      Plan:  -Registered dietitian involved.     Centrilobular emphysema:  -Stable.  Not oxygen dependent.  No acute exacerbation.     Plan:  -Continue DuoNeb as needed.     History of DVT/PE:  -Continue rivaroxaban.     History of subdural hematoma (4/2017):  -No acute  issues.     Hypothyroidism:  -Continue levothyroxine.     History of seizure disorder:  -Continue levetiracetam.    Insomnia:  -Continue eszopiclone     DVT prophylaxis: Continue rivaroxaban.      Subjective:     Patient's behavior without change from baseline including agitation, refusal of cares, and abusive behaviors towards staff.  She reports that he has been malpositioned on her left side level on her chair putting into her left arm and reports associated left arm numbness.    Review of systems:     Please see Subjective.    Current Medications:     Scheduled Meds:    baclofen  10 mg Oral TID     cephalexin  1,000 mg Oral TID     eszopiclone  3 mg Oral QHS     gabapentin  900 mg Oral DAILY     levETIRAcetam  250 mg Oral BID     levoFLOXacin  750 mg Oral Daily     levothyroxine  125 mcg Oral Daily 0600     [START ON 2/13/2019] methadone  5 mg Oral BID     miconazole   Topical BID     multivitamin with minerals  1 tablet Oral DAILY     neomycin-bacitracin-polymyxin   Topical TID     oxybutynin  5 mg Oral TID     polyethylene glycol  17 g Oral DAILY     [START ON 2/13/2019] predniSONE  30 mg Oral Daily with supper     QUEtiapine  25 mg Oral BID     QUEtiapine  50 mg Oral QHS     rivaroxaban  20 mg Oral Daily with supper     Continuous Infusions:  PRN Meds:.acetaminophen, albuterol, albuterol **AND** Nebulizer treatment intermittent, bisacodyl, bisacodyl, calcium (as carbonate), cyclobenzaprine, HYDROmorphone, hydrOXYzine HCl, ibuprofen, magnesium hydroxide, melatonin, naloxone **OR** naloxone, OLANZapine, omeprazole, ondansetron **OR** ondansetron, polyethylene glycol, polyvinyl alcohol, sodium chloride bacteriostatic, traZODone    Objective:       Vital signs in last 24 hours:     Temp:  [98  F (36.7  C)-98.3  F (36.8  C)] 98.1  F (36.7  C)  Heart Rate:  [106-133] 117  Resp:  [16-20] 18  BP: ()/(63-77) 90/63  Weight: 162 lb (73.5 kg)    Physical Exam:     General appearance: Alert, Awake, No distress    Head & Neck Atraumatic, supple, no tracheal deviation   ENT  PER, conjunctiva clear, no scleral icterus, EOMI, no nasal discharge   Resp: clear to auscultation bilaterally, no wheezing, no crackles   CVS: S1, S2 normal, regular rate and rhythm, no murmurs   GI: Soft, nontender, distended and BS+   MSK: No swelling, or tenderness   Neuro:  Alert and oriented ×3, paraplegia, lack of redirectibility, agitated.      Intake/Output this shift:     I/O last 3 completed shifts:  In: 480 [P.O.:480]  Out: 1575 [Urine:1575]    Pertinent Labs:     Lab Results: personally reviewed.     Pertinent Radiology:       Advanced Care Planning:     Barrier to discharge: Pending commitment process  Anticipated LOS: To be determined  Disposition: To be determined    Peggy Valentino M.D.  [facility] Hospitalist  Internal Medicine

## 2021-06-24 NOTE — PLAN OF CARE
Problem: Pain  Goal: Patient's pain/discomfort is manageable  Outcome: Not Progressing  Patient consistently rated her pain at 8/10 even after pain medications were given. Will continue to monitor.      Problem: Potential for transmission of organism by Contact, Enteric, Droplet and/or Airborne routes  Goal: Prevent transmission of organisms  Outcome: Progressing  Contact precautions were observed due to patient's history of VRE, ESBL and MRSA. Isolation cart is located outside patient's room.      Comments:   Patient complied with some aspects of her cares and not others. She allowed staff to do partial assessments. She let writer change sacral dressing but not the ones on her heels. She seemed very agitated at times and was intermittently verbally aggressive towards staff.

## 2021-06-24 NOTE — PLAN OF CARE
Patient was relatively pleasant apart from a few unpleasant outbursts.  She allowed and directed dressing change.  She prepared each mepelex and handed them over to be applied to her wounds.    Mobility/activity is maintained at optimum level for patient Progressing    Patient went out to smoke a few times.  She sat up in her wheelchair until 9pm.    Compliance with treatment regimen Progressing    Directs her cares and is often irritable and impatient.    Nutritional status is improving Progressing    Patent ate most of her dinner.    Patient requested suppository and fleets enema in addition to miralax.  Stated she has not had a bowel movement for one month.  Suppository and scheduled Miralax given.  Obtained order for fleets enema. Fleets not given as patient is now sleeping.

## 2021-06-24 NOTE — SIGNIFICANT EVENT
Internal code 9 called, nursing staff responding to it were notified that while coming back inside from smoking patient yelled that she was slipping out of her chair, was assisted by security staff to be boosted back up in chair and then patient proceeded back up to the floor.

## 2021-06-24 NOTE — PROGRESS NOTES
"  Pharmacy Consult: Vancomycin Dosing     Pharmacist consulted to dose Vancomycin for Marychuy Zhou, a 63 y.o. female.    Ordering provider: Dr. Gee    Indication for vancomycin therapy: Hospital Acquired Pneumonia    Goal Trough Range:  15-20 mcg/mL based on indication    Other current antimicrobials             meropenem 1 g in NaCl 0.9 % 50 mL (MINI-BAG Plus) (MERREM)  Every 8 hours          vancomycin 1.25 g in sodium chloride 0.9% 500 mL (VANCOCIN)  Every 24 hours             Subjective/Objective:    Patient was admitted for Sepsis (H) on 2/2/2019    Height: 5' 2\" (1.575 m)    Actual Body Weight (ABW): 73.9 kg (163 lb)    Ideal body weight: 50.1 kg (110 lb 7.2 oz)  Adjusted ideal body weight: 59.6 kg (131 lb 7.5 oz)    BMI: Body mass index is 29.81 kg/m .    No Known Allergies    Patient Active Problem List   Diagnosis     Other chronic pain     Lumbosacral Disc Degeneration     Herpes Simplex Type I     Nicotine Dependence     Essential hypertension     Dislodged Bhandari catheter, subsequent encounter     Acute respiratory failure with hypoxia (H)     COPD (chronic obstructive pulmonary disease) (H)     Lactic acidosis     SVT (supraventricular tachycardia) (H)     Gastroesophageal reflux disease without esophagitis     Acquired hypothyroidism     Iron deficiency anemia     Opioid-induced constipation (OIC)     Paraplegia (H)     COPD exacerbation (H)     Chronic pain syndrome     Major depression     Alcohol abuse     Decubitus ulcer     Hospital-acquired pneumonia     Cancer of lung (H)     Neurogenic bladder     Neurogenic bowel     Mixed hyperlipidemia     Palliative care encounter     Hypokalemia     Paraplegia at T4 level (H)     Major depressive disorder, recurrent episode, moderate (H)     Seizure disorder (H)     Heel ulcer, right, limited to breakdown of skin (H)     Decubitus ulcer of sacral region, stage 4 (H)     Esophageal dysmotility     Anxiety disorder     History of MDR Enterobacter " cloacae infection     Mechanical complication of suprapubic catheter (H)     Diastolic CHF, acute on chronic (H)     Chest tightness or pressure     Atypical chest pain     Decubitus ulcer of right buttock, stage 4 (H)     Nephrostomy tube displaced (H)     HCAP (healthcare-associated pneumonia)     Cognitive disorder     Insomnia due to anxiety and fear     Sepsis, due to unspecified organism (H)     Urinary tract infection, site unspecified     Hypotension, unspecified hypotension type     Chronic low back pain without sciatica, unspecified back pain laterality     Acute encephalopathy     Claustrophobia     Choledocholithiasis with obstruction     Cholelithiasis     Hx of pulmonary embolus     S/P cholecystectomy     Fever, unspecified fever cause     Pneumonia of left lower lobe due to infectious organism (H)     Lower abdominal pain     Neck infection     Subdural hematoma (H)     Convulsions, unspecified convulsion type (H)     Partial symptomatic epilepsy with simple partial seizures, intractable, with status epilepticus (H)     Acute on chronic intracranial subdural hematoma (H)     Brain edema (H)     Chronic obstructive pulmonary disease with acute exacerbation (H)     Decubitus ulcer, stage III (H)     Depression     Sepsis (H)     Sepsis secondary to UTI (H)     Pyelonephritis     Hyponatremia     Episodic cluster headache, not intractable     Flank pain     Right upper quadrant abdominal pain     Abdominal pain     Colon wall thickening     Cystitis     Suprapubic catheter dysfunction, subsequent encounter     Bilateral hydronephrosis     Elevated lactic acid level     Paroxysmal hemicrania     UTI (urinary tract infection)     Abdominal pain, unspecified location     Coagulopathy (H)     Anxiety     Dislodged wound Vacuum     Urinary tract infection associated with cystostomy catheter (H)     History of pulmonary embolus (PE)     Chronic anticoagulation     Weakness     Urinary incontinence due to  urethral sphincter incompetence     Abscess, gluteal, right     History of DVT (deep vein thrombosis)     Hypothyroidism due to Hashimoto's thyroiditis     Fever in other diseases     Severe sepsis (H)     Ulcer     Sleep difficulties     Irritability and anger     Adjustment disorder with mixed anxiety and depressed mood     Normocytic anemia     Closed left subtrochanteric femur fracture (H)     Acute blood loss anemia     Other specified hypotension     History of encephalopathy     Nephrostomy complication (H)     Left lower lobe pneumonia (H)     Acute bronchitis due to other specified organisms     Hypoxemia     Centrilobular emphysema (H)     Bilateral lower extremity edema     Stage IV pressure ulcer of sacral region (H)     Gangrene (H)     Noncompliance with medication regimen     Infection     Goals of care, counseling/discussion     Advanced care planning/counseling discussion     Malingerer for IV Dilaudid     Moderate protein-calorie malnutrition (H)     Chronic osteomyelitis (H)     Drug-induced constipation     Quadriplegia (H)     Pelvic pain in female     Weakness of left hand     Focal motor seizure (H)     Tension-type headache, not intractable, unspecified chronicity pattern     Atelectasis     Pelvic pain     Panic anxiety syndrome     Ulcer due to Treponema vincentii, with fat layer exposed (H)     Abnormal CT scan of lung     Upper back pain     Severe major depression, single episode, with psychotic features (H)     Paranoia (H)     Unable to control anger    Past Medical History:   Diagnosis Date     Alcohol dependence (H)     history     Anxiety      Cancer of lung (H) 9/24/2013    Overview:  Adenocarcinoma Stage 1A per preoperative needle biopsy   Adenocarcinoma Stage 1A per preoperative needle biopsy  Wedge r/s 9/2013     Chronic kidney disease      Chronic pain     on Methadone     Chronic suprapubic catheter (H)      Constipation      COPD (chronic obstructive pulmonary disease) (H)       Decubitus ulcer     had wound vac     Depression      Diastolic CHF, acute on chronic (H)      DVT (deep venous thrombosis) (H) 5/26/2015     ESBL (extended spectrum beta-lactamase) producing bacteria infection 08/2015    urine     Gastroparesis      GERD (gastroesophageal reflux disease)      HCAP (healthcare-associated pneumonia)      Herpes Simplex Type I      Hyperlipemia      Hypertension      Hypothyroidism 5/26/2015     Intracranial subdural hematoma (H)      Kidney stone      Lower paraplegia (H) 4/28/2016    Overview:  spinal epidural hematoma, emergent decomp Overview:  spinal epidural hematoma, emergent decomp Overview:  spinal epidural hematoma, emergent decomp     MRSA infection      Neurogenic bladder     chronic gonzalez     Neuropathy (H) 5/26/2015     Obesity      Opiate dependence, continuous (H)      Osteoporosis      Pneumonia      Pulmonary embolism (H) 12/2016    chronic, on coumadin     Rheumatoid arthritis (H)      Sepsis (H) 5/28/2017     SVT (supraventricular tachycardia) (H) 8/19/2014     Vascular myelopathies (H)         Temp Readings from Current Encounter:     02/25/19 0728 02/25/19 1600 02/26/19 1615   Temp: 97.9  F (36.6  C) 97.8  F (36.6  C) 98.7  F (37.1  C)     Net Intake/Output (last 24 hours):  I/O last 3 completed shifts:  In: 420 [P.O.:420]  Out: 925 [Urine:925]    Recent Labs     02/25/19  0521 02/27/19  1717   WBC 14.0*  --    BUN 12 17   CREATININE 0.59* 0.76     Estimated Creatinine Clearance: 72.2 mL/min (by C-G formula based on SCr of 0.76 mg/dL).    No results for input(s): CULTURE in the last 72 hours.    Results for orders placed or performed during the hospital encounter of 02/02/19   Culture, Urine    Collection Time: 02/03/19 12:15 AM   Result Value Status    Culture  Final     Mixture of organism types with no predominating organism.       No results for input(s): VANCOMYCIN in the last 168 hours.    Vancomycin administrations: (last 120 hours)     Date/Time  Action Medication Dose Rate    02/24/19 1352 New Bag    vancomycin 1.25 g in sodium chloride 0.9% 500 mL (VANCOCIN) 1.25 g 262.5 mL/hr    02/23/19 0932 New Bag    vancomycin 1.25 g in sodium chloride 0.9% 500 mL (VANCOCIN) 1.25 g 262.5 mL/hr          Assessment/Plan:    Pharmacist consulted to dose vancomycin for Hospital Acquired Pneumonia, goal trough range 15-20 mcg/mL.  Pt was on Vancomycin and completed a 10-day course.  It was discontinued on 2/24/19.  Now restarting Vancomycin and Meropenem.  We will restart at the last adjusted regimen.    1. Initiate Vancomycin 1250 mg IV every 24 hours (~17 mg/kg actual body weight).  2. Pharmacist will plan to check a vancomycin trough level at steady state.  3. Pharmacist will continue to follow.    Thank you for the consult,    Candelario Lopez, PHARMD 2/27/2019 5:48 PM

## 2021-06-24 NOTE — PLAN OF CARE
Pain      Patient's pain/discomfort is manageable Progressing          Non-compliance with treatment regimen      Compliance with treatment regimen Not Progressing          Tissue Integrity      Damaged tissue is healing and protected Not Progressing          VSS. Tele has been NSR/ Sinus Tach. Lung sounds coarse with congested and nonproductive cough. Refuses turns/ repositions on the overnight. Only allowed assessment/ wound care to sacral wound, demanding about how dressing is done and time spent dressing it. Pain/discomfort managed with PRN Dilaudid, Ibuprofen, and Flexeril, able to rest comfortably. Will continue to monitor.

## 2021-06-24 NOTE — PROGRESS NOTES
Speech Language/Pathology  Bedside Swallow Evaluation    Problem:  Patient Active Problem List   Diagnosis     Other chronic pain     Lumbosacral Disc Degeneration     Herpes Simplex Type I     Nicotine Dependence     Essential hypertension     Dislodged Bhandari catheter, subsequent encounter     Acute respiratory failure with hypoxia (H)     COPD (chronic obstructive pulmonary disease) (H)     Lactic acidosis     SVT (supraventricular tachycardia) (H)     Gastroesophageal reflux disease without esophagitis     Acquired hypothyroidism     Iron deficiency anemia     Opioid-induced constipation (OIC)     Paraplegia (H)     COPD exacerbation (H)     Chronic pain syndrome     Major depression     Alcohol abuse     Decubitus ulcer     Cancer of lung (H)     Neurogenic bladder     Neurogenic bowel     Mixed hyperlipidemia     Palliative care encounter     Hypokalemia     Paraplegia at T4 level (H)     Major depressive disorder, recurrent episode, moderate (H)     Seizure disorder (H)     Heel ulcer, right, limited to breakdown of skin (H)     Decubitus ulcer of sacral region, stage 4 (H)     Esophageal dysmotility     Anxiety disorder     History of MDR Enterobacter cloacae infection     Mechanical complication of suprapubic catheter (H)     Diastolic CHF, acute on chronic (H)     Chest tightness or pressure     Atypical chest pain     Decubitus ulcer of right buttock, stage 4 (H)     Nephrostomy tube displaced (H)     HCAP (healthcare-associated pneumonia)     Cognitive disorder     Insomnia due to anxiety and fear     Sepsis, due to unspecified organism (H)     Urinary tract infection, site unspecified     Hypotension, unspecified hypotension type     Chronic low back pain without sciatica, unspecified back pain laterality     Acute encephalopathy     Claustrophobia     Choledocholithiasis with obstruction     Cholelithiasis     Hx of pulmonary embolus     S/P cholecystectomy     Fever, unspecified fever cause      Pneumonia of left lower lobe due to infectious organism (H)     Lower abdominal pain     Neck infection     Subdural hematoma (H)     Convulsions, unspecified convulsion type (H)     Partial symptomatic epilepsy with simple partial seizures, intractable, with status epilepticus (H)     Acute on chronic intracranial subdural hematoma (H)     Brain edema (H)     Chronic obstructive pulmonary disease with acute exacerbation (H)     Decubitus ulcer, stage III (H)     Depression     Sepsis (H)     Sepsis secondary to UTI (H)     Pyelonephritis     Hyponatremia     Episodic cluster headache, not intractable     Flank pain     Right upper quadrant abdominal pain     Abdominal pain     Colon wall thickening     Cystitis     Suprapubic catheter dysfunction, subsequent encounter     Bilateral hydronephrosis     Elevated lactic acid level     Paroxysmal hemicrania     UTI (urinary tract infection)     Abdominal pain, unspecified location     Coagulopathy (H)     Anxiety     Dislodged wound Vacuum     Urinary tract infection associated with cystostomy catheter (H)     History of pulmonary embolus (PE)     Chronic anticoagulation     Weakness     Urinary incontinence due to urethral sphincter incompetence     Abscess, gluteal, right     History of DVT (deep vein thrombosis)     Hypothyroidism due to Hashimoto's thyroiditis     Fever in other diseases     Severe sepsis (H)     Ulcer     Sleep difficulties     Irritability and anger     Adjustment disorder with mixed anxiety and depressed mood     Anemia     Closed left subtrochanteric femur fracture (H)     Acute blood loss anemia     Other specified hypotension     History of encephalopathy     Nephrostomy complication (H)     Left lower lobe pneumonia (H)     Acute bronchitis due to other specified organisms     Hypoxemia     Centrilobular emphysema (H)     Bilateral lower extremity edema     Stage IV pressure ulcer of sacral region (H)     Gangrene (H)     Noncompliance with  medication regimen     Infection     Goals of care, counseling/discussion     Advanced care planning/counseling discussion     Malingerer for IV Dilaudid     Moderate protein-calorie malnutrition (H)     Chronic osteomyelitis (H)     Drug-induced constipation     Quadriplegia (H)     Pelvic pain in female     Weakness of left hand     Focal motor seizure (H)     Tension-type headache, not intractable, unspecified chronicity pattern     Atelectasis     Pelvic pain     Panic anxiety syndrome     Ulcer due to Treponema vincentii, with fat layer exposed (H)     Abnormal CT scan of lung     Upper back pain     Severe major depression, single episode, with psychotic features (H)     Paranoia (H)     Unable to control anger       Onset date: 02/03/2019  Reason for evaluation: assess for dysphagia due to concern for aspiration pneumonia  Pertinent History: Patient is a 63 year old admitted with above medical history with orders to assess for dysphagia due to RUL and RLL pneumonia. Patient has numerous behaviors and is currently on a 72 hour hold. She had a VFSS in June of 2016 that showed no aspiration, she did have transient penetration. She was on a puree diet and thin liquids at that time due to ill fitting dentures.   Current Diet: NPO until BSS  Baseline Diet: Reg/Thin     Assessment:     Patient presents with no signs and symptoms of aspiration with all textures trialed. No symptoms of dysphagia.     Patient demonstrated no oral dysphagia despite lack of dentition.      Suspect no pharyngeal dysphagia due to absence of clinical signs and symptoms of aspiration and no prior history of dysphagia.     A Video Swallow Study is not recommended at this time due to appears to be tolerating current diet and VFSS in 2016 showed no aspiration.  Unable to rule out silent aspiration and if there are ongoing concerns for silent aspiration, could consider VFSS.  However, it appears highly unlikely patient would participate in VFSS  and unlikely to follow any restrictions.      Rehab potential is good based on evaluation results.     Recommendations/Plan:     Recommended diet of Regular and thin liquids      Recommend patient to take oral medications via liquid or puree pending patient preference.     Strategies: n/a     Recommend oral motor exercises? no     Speech therapy is not recommended at this time. Contact SLP if any questions or concerns.     Referrals: N/A     Subjective:     Patient presents as alert during this session. Patient largely focused on various errors she believed this writing was making and 'dumb blonde' jokes but did have adequate intake for good assessment. Patient reported, 'I'll teach you something about swallowing' when attempting education on purpose of exam.   An  was not applicable     Patient was given regular textures and thin liquids. Patient is able to self feed/drink.     Objective:     Dentition/Oral hygiene: Edentulous     Oral motor function was grossly intact, not formally assessed due to current behavior status.      Bolus prep and oral control was not impaired. Mastication was WFL despite lack of dentition.    Anterior-Posterior transit was not impaired.     Oral stasis did not occur with any texture.     Patient trialed breakfast of pancakes, orange sherbert, and thin chocolate milk and presented with no s/s of aspiration. Initiation of swallow appeared timely.      Hyolaryngeal movement appeared intact .     30 dysphagia minutes; 15 min conf charge for ordering meals, consulting with nursing, addressing various patient demands such as cup sizes, covers and change in meal requests   Jossy Garsia MA, CCC-SLP
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
## 2021-06-24 NOTE — PLAN OF CARE
"Patient alert/oriented, makes needs known, uses call light appropriately.Pt was asked multiple times promision for assessment, but pt still denies.Pt is very demending, irritable and verbally abusive to staff.Pt refuses to be repositioned.Pt was yelling and demending pain medications for generalized pain 9/10 \"move faster, you are moving too f''''g slow\". I proceeded to give pain medications to the patient and the pt was inappropriately calling names infront of the charge nurse. Pt was stating that \"dumb nurse ain't doing her job, hurry up and give me my dilaudid\". Pt accused the nurse of putting ice water in a wrong place(even though the nurse didn't touch the cup).  Pt was yelling at the nurse to \"get the f'''g out of my  room you stupid imbecile and don't come back\".Pt stated that she is so upset and wanted to get up to go outside to smoke.Pt refused any blood draw from PICC line.      "

## 2021-06-24 NOTE — PROGRESS NOTES
Called to room for a PRN Albuterol tx. Patient appears anxious and agitated. Patient on 4 lpm/oxmask HHN tx. Given. Br sounds coarse congestion upper airways. Moderate strength loose non-productive cough. Patient was on 4-5 lpm/oxymask when I arrived. SpO2 89%. Patient was talking a lot. After HHN tx increase O2 to 6 lpm/oxymask. SpO2 remained 94 after. RN informed.     Jarrett Cartwright, LRT

## 2021-06-24 NOTE — PLAN OF CARE
Constipation will be resolved Progressing    Patient had two involuntary bm's this shift, sacral dressing changed twice due to fecal incontinence.  Patient's nutritional intake is adequate Progressing    Patient eating and drinking WNL.  Patient's pain/discomfort is manageable Progressing    Patient rates her pain 12/10, documented 10 as its the highest number on our pain scale, getting ibuprofen and Dilaudid PRN.  Mobility/activity is maintained at optimum level for patient Progressing    Patient up in wheelchair at the end of night shift until 0930 then slept until 1245 then back up to chair at 1300.Julia Eller
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
## 2021-06-24 NOTE — PLAN OF CARE
"Patient's pain/discomfort is manageable Not Progressing    Pt reports 10/10 generalized pain scheduled methadone and PRN dilaudid, tylenol and ibuprofen given, pt reports little relief. Up in motorized wheelchair at this time, refuses to return to bed at this time.   Demonstrates ability to cope with hospitalization/illness Not Progressing    Pt calling  for writer, states she is not going to use her call light, explained to pt that it is most effective to call with call light. Pt agitated and angry with staff, continues to be demanding and impatient. Pt stated to writer \"Don't call me by name it is disrespectful.\" Pt refusing to give up glass of water it was explained to her that she is NPO for safety until swallow study can be done. Pt reports that she is fine stating \"I didn't make it to 63 because I don't know how to take care of myself, I don't need a 20 year old telling me what to do.\" Pt looking for black bag/purse; however, pt refuses to allow staff to look in the closet or under her covers as she is sitting in her electric wheelchair. Will continue to monitor.  "

## 2021-06-24 NOTE — PROGRESS NOTES
INFECTIOUS DISEASE FOLLOW UP NOTE    Date: 2019    ASSESSMENT:  1. Hospital acquired pneumonia, agree that aspiration is a consideration. Has leukocytosis, slightly improved with levofloxacin, not checked today. Airway secretions are a little better today. Hypotensive despite broad spectrum antibiotics.   2. H/o osteomyelitis sacrum and heel. Now completed cephalexin course. By last wound care eval there is no longer exposed bone at sacrum  3. H/o MDROs, not on most recent cultures  4. Paraplegia at T4.   5. Neurogenic bladder.  6. Difficult IV access -- peripheral IV in foot  7. Chronic pain  8. Behavioral disorder: She is blacklisted by most all local TCUs/ NHs. She has commitment trial on 19.    PLAN:  Sputum culture if able.  Agree with meropenem and vancomycin  PICC -- vancomycin cannot be infused into midline.       Sánchez Lemus MD  [facility]   On-Call: [phone]     ______________________________________________________________________    SUBJECTIVE / INTERVAL HISTORY: low BP. She feels better, states that her BP normally runs in the 80s. Cough is better, breathing better. Tolerating antibiotics. Remains abusive to staff. She requests more lasix.     ROS: All other systems negative except as listed above.        OBJECTIVE:  Vitals:    19 1601   BP: (!) 71/51   Pulse:    Resp:    Temp:    SpO2:           Vital Signs  Temp: (Refused)  Temp Source: (Refused)  Heart Rate: (!) 120  Heart Rate Source: Machine/Monitor  Resp: 16  BP: (!) 71/51  MAP (mmHg) (Calculated): 58  BP Location: Right arm  BP Method: Machine/Monitor  Patient Position: Sitting    Temp (24hrs), Av.7  F (36.5  C), Min:97.5  F (36.4  C), Max:97.8  F (36.6  C)      GENERAL:  Lying in her wheel chair reclined. O2 mask off as I see her.    EYES: No conjunctival injection, pupils equally reactive to light, extra-ocular movement intact  HEAD, EARS, NOSE, MOUTH, AND THROAT: Nontraumatic, mouth  without oral ulcers. Edentulous.   RESPIRATORY: coarse rhonchi a little better today.  CARDIOVASCULAR: Regular rate and rhythm, normal S1 and S2, no murmurs, rubs, or gallops.  ABDOMEN: Soft, nontender, no masses, distended, lack of sensation.  Normal bowel sounds.  MUSCULOSKELETAL: No synovitis.  LYMPHATIC: No cervical lymphadenopathy.  SKIN/HAIR/NAILS: No rashes, no signs of peripheral emboli. Wound nurse photo noted from yesterday.  NEUROLOGIC: paraplegia      Antibiotics:  Meropenem 2/15-  Vancomycin 2/15-     Flagyl 2/14-15  Levofloxacin 2/12-      Pertinent labs:    Results from last 7 days   Lab Units 02/14/19  1935 02/13/19  1908   LN-WHITE BLOOD CELL COUNT thou/uL 16.0* 18.7*   LN-HEMOGLOBIN g/dL 9.7* 9.6*   LN-HEMATOCRIT % 34.4* 33.3*   LN-PLATELET COUNT thou/uL 368 425        Results from last 7 days   Lab Units 02/15/19  1631   LN-SODIUM mmol/L 139   LN-POTASSIUM mmol/L 3.8   LN-CHLORIDE mmol/L 109*   LN-CO2 mmol/L 22   LN-BLOOD UREA NITROGEN mg/dL 10   LN-CREATININE mg/dL 0.81   LN-CALCIUM mg/dL 8.3*        Lab Results   Component Value Date    CRP 0.7 01/12/2019    CRP 1.1 (H) 01/11/2019    CRP 11.2 (H) 12/01/2018        Lab Results   Component Value Date    SEDRATE 28 (H) 01/11/2019       Lab Results   Component Value Date    ALT <9 01/07/2019    AST 8 01/07/2019    ALKPHOS 251 (H) 01/07/2019    BILITOT 0.2 01/07/2019         MICROBIOLOGY DATA:  reviewed    RADIOLOGY:  Xr Chest 1 View Portable    Result Date: 2/15/2019  XR CHEST 1 VIEW PORTABLE 2/15/2019 3:00 PM INDICATION: Increasing hypoxia. COMPARISON: 2/12/2019. FINDINGS: New opacities over the inferior right upper lobe and right lung base. Findings suspicious for pneumonia. Diffuse interstitial prominence with vascular distinctness also present suggesting pulmonary edema. The differential for the previously described pulmonary opacities would include asymmetric edema. Prior left upper lobe opacities are not conspicuous on today's exam. Small  right pleural effusion. Stable cardiomediastinal silhouette.     Xr Chest 1 View Portable    Result Date: 2/9/2019  XR CHEST 1 VIEW PORTABLE 2/9/2019 11:47 AM INDICATION: Cough COMPARISON: 01/07/2019. FINDINGS: Clear lungs. Normal heart size and pulmonary vascularity. No pleural fluid or pneumothorax. Calcified plaque of the aortic arch.    Xr Chest 1 View Portable    Result Date: 1/7/2019  XR CHEST 1 VIEW PORTABLE 1/7/2019 12:09 PM INDICATION: Cough, dyspnea, tachycardia COMPARISON: 12/11/2018. FINDINGS: The lungs are clear. There is no pneumothorax or pleural effusion. The heart size and pulmonary vascularity are normal. The left lower lobe atelectasis seen on the prior study has cleared. The PICC catheter that was present on the prior examination has been removed.    Xr Cervical Spine 2 - 3 Vws    Result Date: 2/12/2019  [facility] RADIOLOGY EXAM: XR CERVICAL SPINE 2 - 3 VWS LOCATION: [facility] DATE/TIME: 2/12/2019 4:28 PM INDICATION: Left upper extremity numbness, neck pain COMPARISON: 02/26/2018. FINDINGS: On the lateral view there is visualization from C1 to the bottom of C7. The prevertebral soft tissues and the predental space is maintained. There is good anatomic alignment with no evidence of subluxation. The vertebral body heights are well-maintained throughout. There is prominent degenerative disc disease from the C4-C5 to C6-C7 disc space levels. These levels have a moderate loss of height, endplate changes and small anterior osteophyte formations. There is diffuse facet arthropathy  throughout the cervical region.     Ct Head Without Contrast    Result Date: 1/24/2019  [facility] RADIOLOGY EXAM: CT HEAD WO CONTRAST LOCATION: [facility] DATE/TIME: 1/24/2019 10:17 PM INDICATION: Headache headache COMPARISON: None. TECHNIQUE: Routine without IV contrast. Multiplanar reformats. Dose reduction techniques were used. FINDINGS: INTRACRANIAL CONTENTS: No intracranial hemorrhage, extraaxial  collection, or mass effect.  No CT evidence of acute infarct. Mild atrophy. Mild presumed chronic small vessel ischemia. The ventricles and sulci are normal for age. VISUALIZED ORBITS/SINUSES/MASTOIDS: No significant orbital abnormality. No significant paranasal sinus mucosal disease. No significant middle ear or mastoid effusion. OSSEOUS STRUCTURES/SOFT TISSUES: Postoperative changes left craniotomy.     CONCLUSION: No hemorrhage, mass, or mass effect.    Xr Chest 1 View    Result Date: 2/12/2019  XR CHEST 1 VIEW 2/12/2019 4:29 PM INDICATION: Crackles, increased work of breathing COMPARISON: 02/09/2019 and older studies. FINDINGS: Shallow inspiration. Pannus projects over the lower chest due to positioning. Patient has developed focal interstitial opacities in the left upper lobe. Findings may reflect a pneumonitis. Right lung remains clear. Heart and pulmonary vascularity are normal. NOTE: ABNORMAL REPORT THE DICTATION ABOVE DESCRIBES AN ABNORMALITY FOR WHICH FOLLOW-UP IS NEEDED. .    Us Abdomen Limited    Result Date: 1/9/2019  US ABDOMEN LIMITED 1/9/2019 3:14 PM INDICATION: Pain in rib cage and elevated alk phos evaluate ruq COMPARISON: CT abdomen 11/20/2018 FINDINGS: GALLBLADDER: Postcholecystectomy. BILE DUCTS: Mild to moderate bile duct dilation. The common bile duct measures 10 mm versus 11 mm on the comparison CT. LIVER: Normal where seen. RIGHT KIDNEY: No hydronephrosis. Right renal cortical thinning and multifocal scarring. PANCREAS: Not imaged in detail on this limited study. No incidental abnormalities. No ascites in the right upper quadrant.     CONCLUSION: 1.  Mild to moderate extra hepatic biliary ductal dilatation, similar to the CT from 01/20/2018 and likely in part related to the patient's age and post cholecystectomy status. No obstructing stone or mass is identified.

## 2021-06-24 NOTE — PLAN OF CARE
Problem: Discharge Barriers  Goal: Patient's discharge needs are met  Outcome: Progressing  Care Plan  Care Management      Care Management Goals of the Day: Progression of care    Care Progression Reviewed With: Charge RN, MD, HUC, RNCM, CMSW    Barriers to Discharge: Placement    Discharge Disposition: TBD    Expected Discharge Date: TBD    Transportation:  [facility] ([phone])      Care Coordination Narrative:   Per previous notes, SW spoke with Ellen ([phone]) at [facility] in regards to getting an assessment for the pt for the CADI waiver. Per Ellen at this the Co is about 2-3 months out for general assessments, however if the SW finds a facility that can accept the (such as a group home) that could pt the pt closer to the top of the list. SW stated understanding of this. Ellen states that should something come up prior to that 2-3 month time frame the SW is able to call her back or to call her Supervisor, Julia Rangel ([phone]).    SW will continue to search for LTC placement and for Group home placement on the pt's behalf. See previous notes for placement referrals sent and decisions.    Waiting for Response    [facility]: Left Message, waiting for response    Estates at [facility]: Left message, waiting for response     [place]: Left message, waiting for response    [facility]: Referral sent    Estates of [facility]: Referral sent    [place] [facility] (Declined in the past, new referral sent 3/7/19)      Declined    Estates at [place]: Declined, no LTC beds    [place]: Declined    [facility]: Declined, no LTC beds    [place]: Declined, Does not meet criteria     [place]: Declined, non-smoking facility    [facility] [place]: Declined, no LTC beds    [place]: Declined, no LTC beds    [place]: Declined, no LTC at facility    [place]: Declined    [facility]: Declined, No female  beds in LTC or TCU    [facility]: Declined    [facility](P: [phone]/F: [phone]) : Declined, too medically complex. (hand faxed)    [facility]: Declined.    [facility] of [place]: Declined, no beds available    [facility]: Declined, only shared rooms    [facility]: DECLINED, cannot take methadone    [facility]: DECLINED    [facility]: DECLINED    [place]: DECLINED    [facility]: DECLINED    [facility]: DECLINED    [facility]: DECLINED    Estates at [place]: DECLINED    Estates at [place]: DECLINED    [facility]: DECLINED    [facility]: DECLINED    [facility]: DECLINED    [place]: DECLINED    [place]: DECLINED    [facility]: DECLINED    [facility]: DECLINED    [facility]: DECLINED    [facility]: DECLINED    [facility]: DECLINED    [facility]: DECLINED    [facility]: DECLINED    [facility]: DECLINED    [facility]: DECLINED    [facility]: DECLINED    [facility]: DECLINED    [facility]: DECLINED    Patient has been declined by all [facility]      ** Please note that this list is updated as responses are received.     SW additionally discussed with pt going to a group home if pt was able to get on a CADI waiver through [facility]. Pt is in agreement with this if it is possible.

## 2021-06-24 NOTE — PLAN OF CARE
Problem: Discharge Barriers  Goal: Patient's discharge needs are met  Outcome: Progressing  Care Plan  Care Management      Care Management Goals of the Day: Progression of care    Care Progression Reviewed With: Charge RN, MD, HUC, RNCM, CMSW    Barriers to Discharge: Placement    Discharge Disposition: TBD    Expected Discharge Date: TBD    Transportation: [facility] [phone]       Care Coordination Narrative:      Kennedy Gunderson has dismissed Commitment Case. At this time the CM team will continue to look for appropriate d/c placement on pt's behalf. The follow referrals have been made:     Waiting for Response    [facility] at [facility]: Left Message, waiting for response    [facility] at [facility]: Left message, waiting for response     [place]: Left message, waiting for response    [facility]: Referral sent    [facility]: Referral sent    [place]: Referral sent  Declined    [facility] at [place]: Declined, no LTC beds    [place]: Declined    [facility]: Declined, no LTC beds    [place]: Declined, Does not meet criteria     [place]: Declined, non-smoking facility    [facility] [place]: Declined, no LTC beds    [facility] [place]: Declined, no LTC beds    [place]: Declined, no LTC at facility    [facility]: Declined, No female beds in LTC or TCU    [facility]: Declined    [facility](P: [phone]/F: [phone]) : Declined, too medically complex. (hand faxed)    [facility]: Declined.    [facility] [place]: Declined, no beds available    [facility]: DECLINED, cannot take methadone    [facility]: DECLINED    [place]: DECLINED    [facility]: DECLINED    [facility]: DECLINED    [facility]: DECLINED    [facility] at [place]: DECLINED    [facility]ates at [place]: DECLINED    [facility]: DECLINED    [facility]: DECLINED    [place]:  DECLINED    [place]: DECLINED    [place]: DECLINED    [facility]: DECLINED    [facility]: DECLINED    [facility]: DECLINED    [facility]: DECLINED    [place]: DECLINED    [facility]: DECLINED    [facility]: DECLINED    [facility]: DECLINED    [facility]: DECLINED    [facility]: DECLINED    [facility]: DECLINED    [facility]: DECLIEND      ** Please note that this list is updated as responses are received.     MARGI additionally discussed with pt going to a group home if pt was able to get on a CADI waiver through [facility]. Pt is in agreement with this if it is possible.     MARGI has received application from [facility] (P: [phone]/F: [phone]) for [place] assessment to find out if pt qualifies for a CADI waiver. SW to complete this with pt and fax back.     MARGI additionally has obtained a list of medical group homes and a contact for hospitals Taina Phillips ([phone]) to find out if assessment for CADI waiver needs to be done in hospitals rather than [place].      MARGI will continue to follow and assist with further d/c needs.     CYNTHIA Gonsalez  2/27/2019  11:16 AM

## 2021-06-24 NOTE — PROGRESS NOTES
Hospitalist Progress Note  Brief history  63 y.o.female with past medical history of COPD/emphysemia, LLL malignancy s/p resection, paraplegia secondary to T4 hematoma, neurogenic bladder, recurrent UTIs, history of seizure disorder, hypothyroidism, chronic pain syndrome on narcotics, history of sacrum and heel osteomyelitis, decubitus ulcers, history of DVTs and PEs on anticoagulation, ESBL/MRSA/VRE infections who presented on 2/2/19 with uncontrolled pain.  Similar to her prior multiple hospitalizations, her behavior was a significant issue with abusiveness, belligerence towards staff and nonadherence with medical cares.  She was evaluated by psychiatry and was noted to have major depressive disorder with psychotic features.  Patient was placed on a 72-hour hold, however commitment was refused by court.    On 2/12 patient developed increased dyspnea, hypoxia, hypotension, chest x-ray demonstrated pneumonitis/lower lobes infiltrates.  Patient was transferred to the ICU for management of hospital-acquired pneumonia, possible aspiration pneumonia and sepsis with impending shock.  Patient completed 10 days of meropenem and vancomycin.  Hypotension responded to IV fluids; she never required vasopressors.  Patient continues to have congested cough with bibasilar crackles and scattered wheezes - suspect some fluid overload but mostly atelectasis.      Assessment and Plan      Severe constipation, likely sec to immobility, pain meds  Improving, patient has had several BM    HAP, probable aspiration pneumonia   Completed 10 days of meropenem and vancomycin  Leukocytosis is likely from steroid use and is trending down  Still has crackles and scattered wheezes over bases, suspect atelectasis and possible fluid overload.  Continue Lasix 40 mg twice daily.  Encouraged to use IS and flutter valve every 2 hours while awake.  Continue nebulizers as needed.  Recommended against going outside in the cold to smoke      Volume  overload.  ECHO with normal LVEF.  Continue Lasix 40 mg twice daily.  Hypoalbuminemia is likely contributing -started protein supplements twice daily.   OT evaluation regarding lymphedema wraps ordered: Will be coming tomorrow at 1 PM.   Encouraged to elevate legs.     Acute on chronic anemia, stable, no active bleeding, monitor intermittently.      HX of stage IV sacral decubitus ulcer with chronic osteomyelitis Had incision and drainage      Opioid dependence,  Chronic pain, initially methadone was held due to hypotension, resumed back on methadone, 10mg x 3 times daily      Severe MDD with psychotic features  Patient was recently evaluated by psychiatry       Quadriplegia secondary to T4 hematoma, neurogenic bladder,  Continue with gabapentin, baclofen, oxybutynin  S/p suprapubic catheter - needs to be changed every 4 weeks  Follow up with Urology as outpatient      History of recurrent urinary tract infections     History of DVT, and PE On Xarelto     History of subdural hematoma On 2017 April, Currently tolerating anticoagulation     Seizure disorder Stable ,  Continue keppra      Hypothyroidism continue levothyroxine    Barriers to Discharge : Diuresis  Anticipated Discharge :tomorrow   Disposition :LTC      Subjective  Patient complains of increase in pain after she chair for several hours last night.  She was sleeping most of the morning.  She complains of congested cough.  She had several BM and feels that her abdomen is less distended.      Objective    Vital signs in last 24 hours  Temp:  [97.8  F (36.6  C)] 97.8  F (36.6  C)  Heart Rate:  [112-142] 129  Resp:  [16-18] 18  BP: ()/(56-61) 135/60 92% O2 Device: None (Room air) O2 Flow Rate (L/min): 2 L/min  Weight:   163 lb (73.9 kg) Weight change:     Intake/Output last 3 shifts  I/O last 3 completed shifts:  In: 400 [P.O.:400]  Out: 2750 [Urine:2750]  Intake/Output this shift:  I/O this shift:  In: 480 [P.O.:480]  Out: -     Physical Exam:  GENERAL:  No acute distress, sitting in a electric wheelchair   CARDIAC: Regular rate & rhythm, mild tachycardia. S1 & S2 normal.  No gallops or murmurs. 2 + LE edema  LUNGS: Crackles over lower lung fields bilaterally -improved since yesterday  ABDOMEN: Protuberant, mildly tender on the right side, bowel sounds present  NEURO: Paraplegia       Pertinent Labs   Lab Results: personally reviewed.   Results from last 7 days   Lab Units 02/25/19  0521 02/24/19  0545 02/22/19  0820   LN-SODIUM mmol/L 138 141 140   LN-POTASSIUM mmol/L 3.7 4.6 4.2   LN-CHLORIDE mmol/L 96* 102 101   LN-CO2 mmol/L 34* 33* 33*   LN-BLOOD UREA NITROGEN mg/dL 12 11 10   LN-CREATININE mg/dL 0.59* 0.50* 0.53*   LN-CALCIUM mg/dL 8.4* 8.0* 7.9*     Results from last 7 days   Lab Units 02/25/19  0521 02/22/19  0820 02/21/19  0907 02/20/19  0629   LN-WHITE BLOOD CELL COUNT thou/uL 14.0* 22.0* 19.0* 13.6*   LN-HEMOGLOBIN g/dL 9.0* 9.6* 9.8* 8.9*   LN-HEMATOCRIT % 31.2* 33.0* 33.5* 30.5*   LN-PLATELET COUNT thou/uL 294 343 371 318   LN-MONOCYTES - REL (DIFF) %  --  2 5 4           Medications  Scheduled Meds:    baclofen  10 mg Oral TID     eszopiclone  3 mg Oral QHS     furosemide  40 mg Oral BID - diuretic     gabapentin  900 mg Oral DAILY     levETIRAcetam  250 mg Oral BID     levothyroxine  125 mcg Oral Daily 0600     methadone  10 mg Oral TID     miconazole   Topical BID     multivitamin with minerals  1 tablet Oral DAILY     neomycin-bacitracin-polymyxin   Topical TID     omeprazole  20 mg Oral QAM AC     oxybutynin  5 mg Oral TID     pink lady  1 enema Rectal Once     polyethylene glycol  17 g Oral BID     polyethylene glycol  4,000 mL Oral Once     QUEtiapine  25 mg Oral BID     QUEtiapine  50 mg Oral QHS     rivaroxaban  20 mg Oral Daily with supper     senna-docusate  2 tablet Oral BID     sodium chloride  10-30 mL Intravenous Q8H FIXED TIMES     Continuous Infusions:  PRN Meds:.acetaminophen, albuterol, albuterol **AND** Nebulizer treatment  intermittent, bisacodyl, calcium (as carbonate), cyclobenzaprine, HYDROmorphone, hydrOXYzine HCl, ibuprofen, magnesium hydroxide, melatonin, naloxone **OR** naloxone, OLANZapine, ondansetron **OR** ondansetron, polyethylene glycol, polyvinyl alcohol, sodium chloride bacteriostatic, sodium chloride bacteriostatic, sodium chloride, sodium chloride, sodium chloride, sodium phosphates 133 mL, traZODone    Pertinent Radiology   No new pertinent data    Advanced Care Planning:  Treatment/Discharge Planning discussed with the patient and RN     Joyce Rodrigues MD  Internal Medicine Hospitalist  2/26/2019

## 2021-06-24 NOTE — PLAN OF CARE
Care Plan  Care Management      Care Management Goals of the Day: Progression of care    Care Progression Reviewed With: Charge RN, MD, PRITESH, RNCM, CMSW    Barriers to Discharge: commitment    Discharge Disposition: TBD    Expected Discharge Date: TBD    Transportation: TBD      Care Coordination Narrative:   Pt's RN informed writer that pt requested a referral be sent to [facility]. RN states pt has contacted [facility] and they told her they have an opening coming up. Writer met with pt. She is aware that a referral was sent. Pt requested to make sure that Ariane at [facility] got the referral. Writer spoke with Ariane at [facility]. She states she does not have any LTC openings and that she would not be able to accept pt on TCU.     Writer updated pt that [facility] will not be able to accept her as they feel she would need a LTC bed rather than TCU. Pt is aware that writer spoke with Ariane. Pt attempted to call Ariane while writer was in the room. Pt left a message requesting Ariane contact her. Pt requested to know what the SNF gets when a referral is sent. Writer informed her they receive a facesheet and that the SNF is able to look in the chart to re: pt's care needs.       On 2/15 [facility] wroter:   MARGI spoke with Charge RNMartha, who states Deputy Love from [place]  office called to inform the unit that the pt will be transported around 9:30a for her Commitment court on Tuesday February 19th. Pt will be able to transport via her w/c.    MARGI additionally spoke with Dr. Colorado, [facility] Court Examiner. Per Dr. Colorado he does not feel he has sufficient evidence at this time to say if the pt is mentally ill. He has requested more evidence and to speak with Lory Michael CNP. MARGI has provided his phone number to Lory to aid with this.     MARGI will continue to follow and assist with further d/c needs.     CYNTHIA Gonsalez  2/18/2019  1:56 PM

## 2021-06-24 NOTE — PROGRESS NOTES
Progress Note    Assessment/Plan  Marychuy Zhou is a 63 y.o. female with PMH significant for COPD/emphysema, T4 paraplegia, LLL malignancy s/p resection, neurogenic bladder and recurrent UTIs, h/o seizure disorder, chronic pain, hypothyroidism, right buttock decub, lower extremity ulcers, RA, h/o DVTs  And PE on anticoagulation, ESBL/MRSA/VRE infections in chronic osteomyelitis of ischium and multiple recent hospitalizations. She was initially admitted this admission on 2/2/19 with uncontrolled pain. Treated for osteomyelitis/infected decub with IV meropenem and Vanco per ID. Also diagnosed with MDD with psychosis and paranoia. Now medically stable awaiting placement.     1. Probable aspiration pneumonitis/pneumonia: ARMANDO infiltrates on CXR 2/12. Completed abx course. On steroid taper. Refused videao swallow and does not follow aspiration precautions.     2. Chronic pain syndrome: On methadone for this as well as opioid dependence. Has dilaudid PRN for breakthrough pain. Also on neurontin. Will need outpt pain clinic referral at discharge.    3. Severe MDD with psychotic features, paranoia, anxiety: continue seroquel    4. Stage IV sacral decub, chr osteomyelitis, s/p I&D and IV antibiotics, s/p multiple recurrences since 7/2018: Wound care following. Patient not cooperative with wound cares and high risk for re infection. S/p course of antibiotics per ID. Also h/o past UTIs (ESBL E. coli/Klebsiella, Pseudomonas, Acinetobacter, enterococcus, Proteus).  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 T4 quadriplegia: neurogenic bladder:     6. H/o seizure disorder    7. Hypothyroidism    8. Insomnia    9. H/o DVT and PE on rivaroxaban    10. H/o SDH 4/2017    11. Anemia    12. Emphysema without exacerbation    Principal Problem:    Sepsis (H)  Active Problems:    Hospital-acquired pneumonia    Normocytic anemia    Noncompliance with medication regimen    Severe major  depression, single episode, with psychotic features (H)    Paranoia (H)    Unable to control anger      Subjective  Patient has a cough which is improving. Comfortable at rest. ROS negative.    Objective    Vital signs in last 24 hours  Temp:  [98.2  F (36.8  C)] 98.2  F (36.8  C)  Heart Rate:  [121] 121  Resp:  [20] 20  BP: (105)/(59) 105/59  Weight:   163 lb (73.9 kg)    Intake/Output last 3 shifts  I/O last 3 completed shifts:  In: 960 [P.O.:960]  Out: 3150 [Urine:3150]  Intake/Output this shift:  No intake/output data recorded.    Physical Exam  General appearance: alert and cooperative  Lungs: clear to auscultation bilaterally  Heart: regular rate and rhythm, S1, S2 normal  Abdomen: soft, non-tender  Extremities: atraumatic, no cyanosis or edema  Neurologic: moving all four extremities spontaneously, no focal weakness          Meds    baclofen  10 mg Oral TID     calcium-vitamin D  1 tablet Oral DAILY     eszopiclone  3 mg Oral QHS     ferrous sulfate  325 mg Oral BID with meals     furosemide  40 mg Oral BID - diuretic     gabapentin  900 mg Oral DAILY     levETIRAcetam  250 mg Oral BID     levothyroxine  125 mcg Oral Daily 0600     methadone  10 mg Oral TID     multivitamin with minerals  1 tablet Oral DAILY     omeprazole  20 mg Oral QAM AC     oxybutynin  5 mg Oral TID     polyethylene glycol  17 g Oral BID     predniSONE  10 mg Oral Daily with brkfst     QUEtiapine  25 mg Oral BID     QUEtiapine  50 mg Oral QHS     rivaroxaban  20 mg Oral Daily with supper     senna-docusate  2 tablet Oral BID     sodium chloride  10-30 mL Intravenous Q8H FIXED TIMES           Pertinent Labs   Lab Results: personally reviewed.   not applicable    Results from last 7 days   Lab Units 03/12/19  1439 03/11/19  1234 03/11/19  0711 03/10/19  1417  03/07/19  1736   LN-POTASSIUM mmol/L 4.3  --  3.5 4.2   < > 4.1   LN-CREATININE mg/dL  --  0.79  --   --   --  0.77    < > = values in this interval not displayed.         Results  from last 7 days   Lab Units 03/12/19  1409 03/11/19  0711   LN-WHITE BLOOD CELL COUNT thou/uL 16.7* 19.2*   LN-HEMOGLOBIN g/dL 9.8* 9.3*   LN-HEMATOCRIT % 33.5* 31.8*   LN-PLATELET COUNT thou/uL 296 [id number]

## 2021-06-24 NOTE — PROGRESS NOTES
[facility] Daily Progress Note      Date of Service: 2/27/2019     Assessment and plan    Abnormal lung sounds  ; Recently completed course of antibiotic for pneumonia and fluid overload  : Currently saturating well on room air  ; Feel more likely conducted lung sounds  : Continue Lasix  Seems to be eating well appreciate recent speech therapy evaluation  ; Patient declines video swallow  ; We will get chest x-ray for better evaluation along with CBC    Severe constipation  Getting better with bowel movement    Recent severe sepsis with impending septic shock  : Appreciate ID recommendations  ; Completed antibiotic course  ; Concern for recurrent aspirations  ; Appreciate speech therapy input  ; Patient refusing video swallow  : Noncompliant with aspiration precautions    Acute on chronic anemia  ;Microcytic anemia  History of IV iron therapy  : Hemoglobin better monitor    History of stage IV sacral decubitus ulcer with chronic osteomyelitis (7/2018) status post I&D and IV antibiotics and recurrences:  History of recurrent UTIs:  : allowed  examination on 2/17/2019: Appeared clean at that time, no signs of bleeding  : Continue wound care    opioid dependence:  Chronic pain:  -Follows with the PCP and plan to follow-up with the pain clinic.    On methadone and oxycodone as an outpatient.   She had been off methadone since admission which likely led to worsening of her chronic pain.   : Methadone was resumed on 2/16/2019 at 10 mg twice daily usually on 10 mg 3 times daily  : Mental status stable, no worsening respiratory status  ; Methadone increased to 10 mg 3 times daily on 2/19/2019     Severe MDD with psychotic features:  Paranoia and anxiety  : Patient was recently evaluated by psychiatry   on 72-hr hold per psychiatry with commitment hearing on 2/12 which she missed.  Trial date set for 2/19 which COURT  declined commitment   Continue quetiapine.  ; Denies depressed mood at present but very  "noncompliant      Quadriplegia secondary to T4 hematoma:  Neurogenic bladder:  : History of multiple UTIs in the past      History of DVT and PE  ; Continue Xarelto     History of subdural hematoma (4/2017):  : Tolerating anticoagulation     Hypothyroidism:  Continue levothyroxine.     History of seizure disorder:  Continue levetiracetam.     Insomnia:  Continue eszopiclone      This note was dictated using voice recognition software. Any grammatical or context distortions are unintentional and inherent to the software.  Subjective:   Sitting up in a wheelchair in no acute distress  States she feels fine  Denies any trouble breathing or chest pain  Eating well, feels her belly size is decreased as she is having bowel movement    Updates from nursing staff: Patient going off frequently\" to smoke      Brief History: 62 y.o. 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  Similar to her prior multiple hospitalizations, her behavior was a significant issue with abusiveness, and belligerence towards staff.  She was also nonadherent with medical care.  She was evaluated by psychiatry and was noted to have major depressive disorder with psychotic features as well as with a component of paranoia.  She was placed on a 72-hour hold.  Commitment hearing  on 2/12/19 which she refused to attend.  Patient had a trial date on 12/19/2019 and commitment was refused by court, and she has been off one-to-one since then.   Patient " "completed 10 days of meropenem and vancomycin      Chart reviewed, events noted. Pt seen and examined.     Objective     Vital signs in last 24 hours:  Temp:  [98.7  F (37.1  C)] 98.7  F (37.1  C)  Heart Rate:  [87] 87  Resp:  [20] 20  BP: (135)/(60) 135/60  Weight:   163 lb (73.9 kg)  Weight change:   Body mass index is 29.81 kg/m .    Intake/Output last 3 shifts:  I/O last 3 completed shifts:  In: 420 [P.O.:420]  Out: 925 [Urine:925]  Intake/Output this shift:  No intake/output data recorded.      Physical Exam:  /60 (Patient Position: Sitting)   Pulse 87   Temp 98.7  F (37.1  C) (Oral)   Resp 20   Ht 5' 2\" (1.575 m)   Wt 163 lb (73.9 kg)   LMP  (LMP Unknown)   SpO2 100%   BMI 29.81 kg/m        O2 Device: Nasal cannula O2 Flow Rate (L/min): 5 L/min      General Appearance:    Alert, no apparent distress.    HEENT:    Normocephalic. Pupils equal and reactive. No scleral   Icterus. Moist oral mucosa    Lungs:     Clear to auscultation bilaterally, respirations unlabored  Bilateral crackles ?  Conducted sounds  No tachypnea   Heart::    Regular rate and rhythm, S1 and S2 normal, no murmur, rub   or gallop.   Abdomen:  Soft, mild local discomfort no significant tenderness  Mild distended  Bowel sounds present   Extremity :  Lower extremity edema, chronic ulcers present         CNS:   Alert and oriented,   no facial asymmetry  Paraplegic       Imaging:  personally reviewed.      Scheduled Meds:    baclofen  10 mg Oral TID     eszopiclone  3 mg Oral QHS     furosemide  40 mg Oral BID - diuretic     gabapentin  900 mg Oral DAILY     levETIRAcetam  250 mg Oral BID     levothyroxine  125 mcg Oral Daily 0600     methadone  10 mg Oral TID     miconazole   Topical BID     multivitamin with minerals  1 tablet Oral DAILY     neomycin-bacitracin-polymyxin   Topical TID     omeprazole  20 mg Oral QAM AC     oxybutynin  5 mg Oral TID     pink lady  1 enema Rectal Once     polyethylene glycol  17 g Oral BID     " polyethylene glycol  4,000 mL Oral Once     QUEtiapine  25 mg Oral BID     QUEtiapine  50 mg Oral QHS     rivaroxaban  20 mg Oral Daily with supper     senna-docusate  2 tablet Oral BID     sodium chloride  10-30 mL Intravenous Q8H FIXED TIMES     Continuous Infusions:    PRN Meds:.acetaminophen, albuterol, albuterol **AND** Nebulizer treatment intermittent, bisacodyl, calcium (as carbonate), cyclobenzaprine, HYDROmorphone, hydrOXYzine HCl, ibuprofen, magnesium hydroxide, melatonin, naloxone **OR** naloxone, OLANZapine, ondansetron **OR** ondansetron, polyethylene glycol, polyvinyl alcohol, sodium chloride bacteriostatic, sodium chloride bacteriostatic, sodium chloride, sodium chloride, sodium chloride, sodium phosphates 133 mL, traZODone    Lab Results:  personally reviewed.   not applicable  No results found for this or any previous visit (from the past 24 hour(s)).            Advance Care Planning:   Barriers to discharge: Placement, repeat workup for abnormal lung sounds  Anticipated discharge day: 1-2 days  Disposition: Issues with placement      Gerard Juárez MD  [facility]  Hospitalist

## 2021-06-24 NOTE — PLAN OF CARE
Pt has been very uncooperative this shift. Pt has been very rude and demanding towards staff. Pt. Is holding call light on, when asnwering phone fron desk or going into room to see what the pt needs help with. Pt refuses to answer staff. Pt wants door closed and writer was informed that closing the door was not an option for pt due to pt smoking in room last week. Also wants staff to sit in room, but there needs to be 2 staff present at all times due to accusations from pt.of neglect and stealing pts. Meds. Pt  just continues to hold call light on. Supervisor and security called with update on pt. Several staff has attempted to assist pt with no avail. Pt told writer that her days were numbered and how relentless pt was.

## 2021-06-24 NOTE — PROGRESS NOTES
Wound Ostomy  WOC Assessment         Allergies:  No Known Allergies    Diagnosis:   Patient Active Problem List    Diagnosis Date Noted     Unable to control anger      Severe major depression, single episode, with psychotic features (H)      Paranoia (H)      Upper back pain      Abnormal CT scan of lung      Panic anxiety syndrome      Pelvic pain 01/09/2019     Ulcer due to Treponema vincentii, with fat layer exposed (H) 01/07/2019     Atelectasis      Tension-type headache, not intractable, unspecified chronicity pattern      Weakness of left hand 12/04/2018     Focal motor seizure (H) 12/04/2018     Pelvic pain in female 11/21/2018     Chronic osteomyelitis (H)      Drug-induced constipation      Quadriplegia (H)      Goals of care, counseling/discussion 07/16/2018     Advanced care planning/counseling discussion 07/16/2018     Malingerer for IV Dilaudid 07/16/2018     Moderate protein-calorie malnutrition (H) 07/16/2018     Infection 07/14/2018     Noncompliance with medication regimen      Stage IV pressure ulcer of sacral region (H) 07/11/2018     Gangrene (H) 07/10/2018     Centrilobular emphysema (H) 07/02/2018     Bilateral lower extremity edema 07/02/2018     Hypoxemia 07/01/2018     Left lower lobe pneumonia (H) 06/27/2018     Acute bronchitis due to other specified organisms 06/27/2018     Nephrostomy complication (H) 03/06/2018     History of encephalopathy      Closed left subtrochanteric femur fracture (H) 02/27/2018     Acute blood loss anemia      Other specified hypotension      Normocytic anemia 02/26/2018     Adjustment disorder with mixed anxiety and depressed mood      Sleep difficulties      Irritability and anger      Fever in other diseases 01/31/2018     Severe sepsis (H) 01/31/2018     Ulcer 01/31/2018     Hypothyroidism due to Hashimoto's thyroiditis      Abscess, gluteal, right      History of DVT (deep vein thrombosis)      Urinary incontinence due to urethral sphincter incompetence       Weakness 09/07/2017     Chronic anticoagulation 09/03/2017     Urinary tract infection associated with cystostomy catheter (H) 09/02/2017     Dislodged wound Vacuum 08/14/2017     Abdominal pain, unspecified location      Coagulopathy (H)      Anxiety      UTI (urinary tract infection) 08/10/2017     Elevated lactic acid level 07/29/2017     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     Suprapubic catheter dysfunction, subsequent encounter      Bilateral hydronephrosis      Cystitis      Colon wall thickening      Abdominal pain 06/03/2017     Flank pain 06/02/2017     Right upper quadrant abdominal pain 06/02/2017     Episodic cluster headache, not intractable      Sepsis (H) 05/28/2017     Sepsis secondary to UTI (H) 05/28/2017     Pyelonephritis      Hyponatremia      Chronic obstructive pulmonary disease with acute exacerbation (H) 04/18/2017     Depression 04/18/2017     Partial symptomatic epilepsy with simple partial seizures, intractable, with status epilepticus (H)      Acute on chronic intracranial subdural hematoma (H)      Brain edema (H)      Subdural hematoma (H) 04/09/2017     Convulsions, unspecified convulsion type (H)      Neck infection 03/31/2017     Lower abdominal pain 03/01/2017     Fever, unspecified fever cause      Pneumonia of left lower lobe due to infectious organism (H)      S/P cholecystectomy      Choledocholithiasis with obstruction 01/02/2017     Cholelithiasis 01/02/2017     Hx of pulmonary embolus 01/02/2017     Claustrophobia      Urinary tract infection, site unspecified      Hypotension, unspecified hypotension type      Chronic low back pain without sciatica, unspecified back pain laterality      Acute encephalopathy      Sepsis, due to unspecified organism (H) 12/30/2016     History of pulmonary embolus (PE) 12/01/2016     Cognitive disorder      Insomnia due to anxiety and fear      HCAP (healthcare-associated pneumonia) 11/04/2016     Atypical chest pain 10/23/2016     Diastolic  "CHF, acute on chronic (H)      Chest tightness or pressure      History of MDR Enterobacter cloacae infection      Anxiety disorder      Esophageal dysmotility      Major depressive disorder, recurrent episode, moderate (H)      Seizure disorder (H)      Heel ulcer, right, limited to breakdown of skin (H)      Decubitus ulcer of sacral region, stage 4 (H)      Paraplegia at T4 level (H) 04/28/2016     Hypokalemia      Chronic pain syndrome 01/12/2016     Major depression 01/12/2016     Alcohol abuse 01/12/2016     Hospital-acquired pneumonia 10/26/2015     COPD exacerbation (H) 08/03/2015     Gastroesophageal reflux disease without esophagitis 05/26/2015     Acquired hypothyroidism 05/26/2015     Iron deficiency anemia 05/26/2015     Opioid-induced constipation (OIC) 05/26/2015     Paraplegia (H) 05/26/2015     Palliative care encounter 12/08/2014     Nephrostomy tube displaced (H) 12/02/2014     Mechanical complication of suprapubic catheter (H) 11/20/2014     Acute respiratory failure with hypoxia (H) 08/19/2014     COPD (chronic obstructive pulmonary disease) (H) 08/19/2014     Lactic acidosis 08/19/2014     SVT (supraventricular tachycardia) (H) 08/19/2014     Dislodged Bhandari catheter, subsequent encounter 07/30/2014     Other chronic pain      Lumbosacral Disc Degeneration      Herpes Simplex Type I      Nicotine Dependence      Essential hypertension      Cancer of lung (H) 09/24/2013     Neurogenic bladder 01/29/2013     Neurogenic bowel 01/29/2013     Decubitus ulcer 01/25/2013     Mixed hyperlipidemia 01/25/2013     Decubitus ulcer of right buttock, stage 4 (H) 01/25/2013     Decubitus ulcer, stage III (H) 01/25/2013       Height:  5' 2\" (1.575 m)    Weight:   163 lb (73.9 kg)    Labs:  Recent Labs     02/25/19  0521   HGB 9.0*       Isaiah:  Isaiah Scale Score: 11    Specialty Bed:  Specialty Bed: [facility] RN attempted to see patient for wound assessment between 10:15 and 10:30 this AM. " Patient already up in wheelchair and dressing changed on nocs. If patient would go back to bed during this shift, will attempt to see later today; however, this is very unlikely as she generally remains up in the chair all day despite nursing recommendations to lay down to relieve pressure on buttocks/IT site.

## 2021-06-24 NOTE — PROGRESS NOTES
[facility] Daily Progress Note    Assessment/Plan:  Marychuy Zhou is a 62 y.o. 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  Similar to her prior multiple hospitalizations, her behavior was a significant issue with abusiveness, and belligerence towards staff.  She was also nonadherent with medical care.  She was evaluated by psychiatry and was noted to have major depressive disorder with psychotic features as well as with a component of paranoia.  She was placed on a 72-hour hold.  Commitment hearing  on 2/12/19 which she refused to attend.  Patient had a trial date on 12/19/2019 and commitment was refused by court, and she has been off one-to-one since then.   Patient completed 10 days of meropenem and vancomycin          Assessment:  Acute COPD exacerbation  : Feel underlying pneumonia unlikely  : Has some leukocytosis but pro calcitonin was negative  : Complete doxycycline course  : Leukocytosis improving without intervention  : Lung sounds improved  ; Patient will likely need slow tapering of prednisone  ; Patient advised to refrain from going out to smoke with subzero temperatures  : Patient still appears to be going out to smoke  ; Encourage using flutter valve     Leukocytosis with some atypical cells  ; We will get peripheral smear     Acute on chronic anemia  ;Microcytic anemia  History of IV iron therapy  : Hemoglobin seems to be trending down  : Recheck hemoglobin better this morning  ; Continue iron  supplement  : Hemoglobin stable     Recent severe sepsis with impending septic shock  : Antibiotic course completed as per ID recommendation  ; Concern for recurrent aspirations  ; Appreciate speech therapy input  ; Patient refusing video swallow  : Noncompliant with aspiration precautions  ; Present management as above     Severe constipation  Getting better with bowel movement  No issues at present  ; Having regular bowel movement as per patient and nurse     History of stage IV sacral decubitus ulcer with chronic osteomyelitis (7/2018) status post I&D and IV antibiotics and recurrences:  History of recurrent UTIs:  : Wound examined today with wound nurse: No signs of active bleeding  ; Skin excoriation seen  ; Wound dressing as per wound care nurse     opioid dependence:  Chronic pain:  -Follows with the PCP and plan to follow-up with the pain clinic.    On methadone and oxycodone as an outpatient.   She had been off methadone since admission which likely led to worsening of her chronic pain.   : Methadone was resumed on 2/16/2019 at 10 mg twice daily usually on 10 mg 3 times daily  : Mental status stable, no worsening respiratory status  ; Methadone increased to 10 mg 3 times daily on 2/19/2019     Severe MDD with psychotic features:  Paranoia and anxiety  : Patient was recently evaluated by psychiatry   on 72-hr hold per psychiatry with commitment hearing on 2/12 which she missed.  Trial date set for 2/19 which COURT  declined commitment   Continue quetiapine.  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     History of seizure disorder:  Continue levetiracetam.     Insomnia:  Continue eszopiclone        Plan:  Patient is baking for IV  Dilaudid at least one time but unfortunately could not give her because she is already on 10 mg twice daily of methadone which I will give 1 more time during the night if there is pain    Code status:Full Code        Subjective:  Complaining of left leg pain which she says is very severe though patient looks comfortable    Review of Systems:   Patient was not seen in the room and had to go several times to be able to examine her     Current Medications Reviewed via EHR List    Objective:  Vital signs in last 24 hours:  SpO2:  [97 %] 97 %    Intake/Output last 3 shifts:  I/O last 3 completed shifts:  In: 960 [P.O.:960]  Out: 2200 [Urine:2200]      Physical Exam:  EYES: EOM+    NECK: mo JVD  HEART : S1 S2 RRR   LUNGS: Patient has bilateral wheezing in the lungs   ABDOMEN:   Soft, No tenderness , Bowel sounds are positive  CNS  Alert and Oriented x 3    LOWER LIMBS: Patient has paraplegia          Lab Results:  Personally Reviewed.   Fingerstick Blood Glucose: No results for input(s): [id number] in the last 48 hours.    Recent Results (from the past 24 hour(s))   Potassium    Collection Time: 03/06/19  5:44 PM   Result Value Ref Range    Potassium 4.2 3.5 - 5.0 mmol/L           Advanced Care Planning  Discharge plan: She should work and is looking for LTAC placement or a group home placement      Jaron Edwards  Date: 3/7/2019  Time: 4:51 PM  Hospitalist Service  Part of this chart was created using a dictation software. Typographic errors, word substitutions, and Grammatical errors may unintentionally occur.

## 2021-06-24 NOTE — PLAN OF CARE
Problem: Pain  Goal: Patient's pain/discomfort is manageable  Outcome: Progressing  Patient slept till 0400 and said has pain medication given but withoutrelieff, patient could not say where the problem is she said I will not repeat myself. Patient did not want to hear anything I was saying.    Problem: Non-compliance with treatment regimen  Goal: Compliance with treatment regimen  Outcome: Progressing  Patient refused most of the care only if she want some one to do it for her and her timing. She wanted to have pain medication 30 minutes early after I told her she needed to wait for the time she got very upset and says call Faust[facility] you are not good for anything. I told the patient I will come when it's time.

## 2021-06-24 NOTE — PLAN OF CARE
"Daily Care      Daily care needs are met Not Progressing        Refusing assistance with adl's.  Refusing dressing changes.    Pain      Patient's pain/discomfort is manageable Not Progressing        C/o increasing pain in feet, radiating up to knees and sacral pain.  Requested writer to call md and md refused any more pain meds.    Psychosocial Needs      Demonstrates ability to cope with hospitalization/illness Not Progressing      Collaborate with patient/family/caregiver to identify patient specific goals for this hospitalization Not Progressing        Has been having difficulty breathing, along with chest congestion and dyspnea.  Pt mentioned palliative care and stating \"I cant take much more of this\"  Notified md, who ordered a cxr and subsequent lasix, in addition to her scheduled lasix.  Will administer when pt gets back on floor.  Pt complaining about md not giving her anymore pain meds.   "

## 2021-06-24 NOTE — PLAN OF CARE
"Mobility/activity is maintained at optimum level for patient Not Progressing      Compliance with treatment regimen Not Progressing      Damaged tissue is healing and protected Not Progressing      Collaborate with patient/family/caregiver to identify patient specific goals for this hospitalization Not Progressing      Patient is very demanding and argumentative with staff. Patient did allow writer to change dressing to buttock/coccyx area, then refused further repositioning throughout the shift.     Decrease patient's symptoms Progressing    BP stable. Continues on cardiac monitoring, NSR    Patient's pain/discomfort is manageable Progressing    Patient reports \"a little\" when asked if pain medication is helping. Will continue to monitor and treat as needed.     "

## 2021-06-24 NOTE — PLAN OF CARE
"Patient's pain/discomfort is manageable Not Progressing    Pt reports ongoing pain, reports it as being \"unmanaged\" and that people \"treat animals better than this\". Medicated with prns as able, as well as scheduled medications to manage spasms.Offered repositioning to which pt refuses and becomes agitated. MD made aware of pts report of pain issues.     Skin integrity is maintained or improved Not Progressing    Pt refused thorough skin assessment, will not turn approprietly to get an assessment. Pt demanded dressing change to coccyx with NS gauze packing and mepilex.  She is very specific in regards on how to do dressing change as well as won't turn properly to be able to do a thorough job. Did place 4x4 mepilex as pt refused to wait for me to get the proper sacral mepilex.       Pt continues to be noncompliant with hygiene cares, proper wound cares, and turn and repositioning. Suprapubic catheter dressing appears dirty as well as tubing however pt refused cares and dressing change. Also continues to be verbally aggressive and demanding.     Found cigarette lighter in pts bed, pt reports fell out of her stuff. Placed on bedside table and later noted not to be in site. Cup on bedside table that had small amount of water also appeared to possibly have what appeared to be ashes in it as well as on floor. Charge RN and clinical manager and pt advocate updated on finding.     Pt up to electric wheelchair this AM and stayed up throughout shift.     Pts lungs coarse, with congested cough. 92% RA. Pt reports she wants lasix ordered. Notified MD of pts request as well as request for extra 1 time dose of pain medication prior to xray/going back to bed.      "

## 2021-06-24 NOTE — PLAN OF CARE
"Patient's pain/discomfort is manageable Not Progressing      Patient has been complaining of severe 10/10. ALL PRN's have been given at the exact time patient wanted them. Patient has been constantly asking for RN and charge to text page the MD to get an increase in pain medication. RN previously text paged the MD and MD put in nursing communication order (see order). RN explained nursing communication order to her twice and patient said \"That's bull shit, that doctor doesn't know what he is doing. I want someone else!\"With c/o pain came demanding and complaining, and being disrespectful to staff and swearing about staff members. Patient did not yell at primary MD but did accuse her of \"not listening to her and making things up.\" RN stated that she was trying to care for her pain and that \"I cannot due everything at once.\" RN got charge nurse involved as well and is well aware of situation at this time.     Demonstrates ability to cope with hospitalization/illness Not Progressing      Patient continues to hit call light very frequently, kicked 1:1 out of room, and continues to be demanding of staff. Staff has continued to try and complete requests of patient but some requests cannot be done (such as pain meds). Patient also has been requesting a gonzalez catheter but there is an order that states \"Gonzalez not indicated at this time.\" This was also explained to patient and patient became increasingly angry stating \"I heard the doctor say to place one during the day shift. So you can place one! I am not getting in bed until I have the gonzalez placed.\" RN also explained that if gonzalez is ordered, it cannot be placed while she is in electric wheelchair.\" Patient then stated \"Yes it can, you can just do what I am asking you to do. I'll be able to spread my legs in this chair.\" Patient refused vital signs throughout the shift. BS active in A4Q. LS coarse with inspiratory and expiratory wheezes bilaterally. AT this time patient is " still upset and using call light frequently. Patient also continues to call [facility] to talk to charge nurse. Patient continues to be demanding and somewhat disrespectful about staff. Patient has call light in reach. Staff will continue to monitor closely and continue 1:1.

## 2021-06-24 NOTE — PROGRESS NOTES
"Clinical Nutrition Therapy Follow Up Note    Current Nutrition Prescription:   Diet: regular; passed bedside swallow eval with SLP 2/16  Diet Supplements:none; has refused these    Current Nutrition Intake:  Variable appetite, eating % of meals.  Generally is ordering meals high in empty calories from pepsi, lemon bars, etc.     Anthropometrics:   Height: 5' 2\" (157.5 cm)  Admission weight: 160# stated  Weight: 163 lb (73.9 kg) 2/17Ideal body weight ~100# (IBW-10-15# for paraplegia)  Pt has very few actual weights and usually gives a stated wt with admission and refuses to cooperate to allow bed to be zeroed.  Wt listed for 12/6/18 was 11# higher than prior weights that mentioned that pt refused to allow excess linens to be removed from bed--suspect wt not accurate on 12/6/18 as well.  All prior weights are questionable for this reason and also due to weights with specialty mattresses/beds.    Edema: +1 BLE 2/18, nonpitting BUE 2/17    RD Nutrition Focused Physical Exam:  The patient has the following physical signs which could indicate malnutrition: No noted fat/muscle loss noted in face arms.      GI Status/Output:   GI symptoms include: emesis 2/16, no other episodes noted.  Pt c/o constipation 2/17 but refused interventions.  Today she asked for a fleets enema but refused to have it administered.     Skin/Wound:  Isaiah score Isaiah Scale Score: 11; multiple pressure ulcers noted; WOCN following.    Medications:  Medications reviewed.      Labs:  Labs reviewed:     Malnutrition: Not noted with no reliable hx re: weight or oral intake.     Nutrition Risk Level: moderate risk    Nutrition dx:   Increased nutrient needs r/t wounds as evidenced by standard needs for multiple wounds noted in WOC RN note.     Goal:   Meet estimated nutrition needs and Wound healing. Progressing.    Intervention:   Continue current plan    Monitoring/Evaluation:   intake, weights, wound healing.     Electronically signed " by:  Norma Ring, RD

## 2021-06-24 NOTE — PROGRESS NOTES
Wound Ostomy  WOC Assessment         Allergies:  No Known Allergies    Diagnosis:   Patient Active Problem List    Diagnosis Date Noted     Unable to control anger      Severe major depression, single episode, with psychotic features (H)      Paranoia (H)      Upper back pain      Abnormal CT scan of lung      Panic anxiety syndrome      Pelvic pain 01/09/2019     Ulcer due to Treponema vincentii, with fat layer exposed (H) 01/07/2019     Atelectasis      Tension-type headache, not intractable, unspecified chronicity pattern      Weakness of left hand 12/04/2018     Focal motor seizure (H) 12/04/2018     Pelvic pain in female 11/21/2018     Chronic osteomyelitis (H)      Drug-induced constipation      Quadriplegia (H)      Goals of care, counseling/discussion 07/16/2018     Advanced care planning/counseling discussion 07/16/2018     Malingerer for IV Dilaudid 07/16/2018     Moderate protein-calorie malnutrition (H) 07/16/2018     Infection 07/14/2018     Noncompliance with medication regimen      Stage IV pressure ulcer of sacral region (H) 07/11/2018     Gangrene (H) 07/10/2018     Centrilobular emphysema (H) 07/02/2018     Bilateral lower extremity edema 07/02/2018     Hypoxemia 07/01/2018     Left lower lobe pneumonia (H) 06/27/2018     Acute bronchitis due to other specified organisms 06/27/2018     Nephrostomy complication (H) 03/06/2018     History of encephalopathy      Closed left subtrochanteric femur fracture (H) 02/27/2018     Acute blood loss anemia      Other specified hypotension      Normocytic anemia 02/26/2018     Adjustment disorder with mixed anxiety and depressed mood      Sleep difficulties      Irritability and anger      Fever in other diseases 01/31/2018     Severe sepsis (H) 01/31/2018     Ulcer 01/31/2018     Hypothyroidism due to Hashimoto's thyroiditis      Abscess, gluteal, right      History of DVT (deep vein thrombosis)      Urinary incontinence due to urethral sphincter incompetence       Weakness 09/07/2017     Chronic anticoagulation 09/03/2017     Urinary tract infection associated with cystostomy catheter (H) 09/02/2017     Dislodged wound Vacuum 08/14/2017     Abdominal pain, unspecified location      Coagulopathy (H)      Anxiety      UTI (urinary tract infection) 08/10/2017     Elevated lactic acid level 07/29/2017     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     Suprapubic catheter dysfunction, subsequent encounter      Bilateral hydronephrosis      Cystitis      Colon wall thickening      Abdominal pain 06/03/2017     Flank pain 06/02/2017     Right upper quadrant abdominal pain 06/02/2017     Episodic cluster headache, not intractable      Sepsis (H) 05/28/2017     Sepsis secondary to UTI (H) 05/28/2017     Pyelonephritis      Hyponatremia      Chronic obstructive pulmonary disease with acute exacerbation (H) 04/18/2017     Depression 04/18/2017     Partial symptomatic epilepsy with simple partial seizures, intractable, with status epilepticus (H)      Acute on chronic intracranial subdural hematoma (H)      Brain edema (H)      Subdural hematoma (H) 04/09/2017     Convulsions, unspecified convulsion type (H)      Neck infection 03/31/2017     Lower abdominal pain 03/01/2017     Fever, unspecified fever cause      Pneumonia of left lower lobe due to infectious organism (H)      S/P cholecystectomy      Choledocholithiasis with obstruction 01/02/2017     Cholelithiasis 01/02/2017     Hx of pulmonary embolus 01/02/2017     Claustrophobia      Urinary tract infection, site unspecified      Hypotension, unspecified hypotension type      Chronic low back pain without sciatica, unspecified back pain laterality      Acute encephalopathy      Sepsis, due to unspecified organism (H) 12/30/2016     History of pulmonary embolus (PE) 12/01/2016     Cognitive disorder      Insomnia due to anxiety and fear      HCAP (healthcare-associated pneumonia) 11/04/2016     Atypical chest pain 10/23/2016     Diastolic  "CHF, acute on chronic (H)      Chest tightness or pressure      History of MDR Enterobacter cloacae infection      Anxiety disorder      Esophageal dysmotility      Major depressive disorder, recurrent episode, moderate (H)      Seizure disorder (H)      Heel ulcer, right, limited to breakdown of skin (H)      Decubitus ulcer of sacral region, stage 4 (H)      Paraplegia at T4 level (H) 04/28/2016     Hypokalemia      Chronic pain syndrome 01/12/2016     Major depression 01/12/2016     Alcohol abuse 01/12/2016     Hospital-acquired pneumonia 10/26/2015     COPD exacerbation (H) 08/03/2015     Gastroesophageal reflux disease without esophagitis 05/26/2015     Acquired hypothyroidism 05/26/2015     Iron deficiency anemia 05/26/2015     Opioid-induced constipation (OIC) 05/26/2015     Paraplegia (H) 05/26/2015     Palliative care encounter 12/08/2014     Nephrostomy tube displaced (H) 12/02/2014     Mechanical complication of suprapubic catheter (H) 11/20/2014     Acute respiratory failure with hypoxia (H) 08/19/2014     COPD (chronic obstructive pulmonary disease) (H) 08/19/2014     Lactic acidosis 08/19/2014     SVT (supraventricular tachycardia) (H) 08/19/2014     Dislodged Bhandari catheter, subsequent encounter 07/30/2014     Other chronic pain      Lumbosacral Disc Degeneration      Herpes Simplex Type I      Nicotine Dependence      Essential hypertension      Cancer of lung (H) 09/24/2013     Neurogenic bladder 01/29/2013     Neurogenic bowel 01/29/2013     Decubitus ulcer 01/25/2013     Mixed hyperlipidemia 01/25/2013     Decubitus ulcer of right buttock, stage 4 (H) 01/25/2013     Decubitus ulcer, stage III (H) 01/25/2013       Height:  5' 2\" (1.575 m)    Weight:   163 lb (73.9 kg)    Labs:  Recent Labs     02/22/19  0820   HGB 9.6*       Isaiah:  Isaiah Scale Score: 12    Specialty Bed:  Specialty Bed: [facility] RN attempted to see patient for wound assessment between 10:15 and 10:30 this AM. " Patient already up in wheelchair and off unit. If patient would go back to bed during this shift, will attempt to see later today; however, this is very unlikely as she generally remains up in the chair all day despite nursing recommendations to lay down to relieve pressure on buttocks/IT site.

## 2021-06-24 NOTE — PROGRESS NOTES
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  Similar to her prior multiple hospitalizations, her behavior was a significant issue with abusiveness, and belligerence towards staff. She was also nonadherent with medical care.  She was evaluated by psychiatry and was noted to have major depressive disorder with psychotic features as well as with a component of paranoia. She was placed on a 72-hour hold. Commitment hearing  was on 2/12/19 which she refused to attend. Patient had a trial date on 12/19/2019 and commitment was refused by court, and she has been off one-to-one since then.     Assessment and Plan      Severe constipation, likely sec to immobility, pain meds  Patient had a large BM this AM   Continue to encourage miralax, senokot, enema and suppository prn    HAP, probable aspiration pneumonia   On meropenem and vancomycin day #10 - will discontinue today  Leukocytosis is likely from steroid use but will recheck WBC tomorrow    Encouraged to use IS  Recommended against going outside in the cold to smoke     Severe sepsis secondary to aspiration pneumonia  Resolved     Volume overload. Has crackles in the lungs and 2 + leg edema. ECHO with normal LVEF. Pt has severe hypoalbuminemia which is likely main cause of the edema. Increase lasix to 40 mg two times a day.  Encouraged to elevate legs.     Acute on chronic anemia, stable, no active bleeding, monitor  intermittently.      HX of stage IV sacral decubitus ulcer with chronic osteomyelitis Had incision and drainage      Opioid dependence,  Chronic pain, initially methadone was held due to hypotension, resumed back on methadone, 10mg x 3 times daily      Severe MDD with psychotic features  Paranoia and anxiety,  Patient was recently evaluated by psychiatry       Quadriplegia secondary to T4 hematoma, neurogenic bladder,  Continue with gabapentin, baclofen, oxybutynin  S/p suprapubic catheter - needs to be changed every 4 weeks  Follow up with Urology as outpatient      History of recurrent urinary tract infections     History of DVT, and PE On Xarelto     History of subdural hematoma On 2017 April, Currently tolerating anticoagulation     Seizure disorder Stable ,  Continue keppra      Hypothyroidism continue levothyroxine    Barriers to Discharge : IV antibiotics   Anticipated Discharge :tomorrow   Disposition :LTC      Subjective  Had a large BM this AM per RN report but still feels constipated. Per patient, she has not had a BM for a month prior to this. Oral intake is fair.   Complains of chest congestion. Continues to go out to smoke multiple times a day.     Objective    Vital signs in last 24 hours  Heart Rate:  [99] 99  Resp:  [18] 18  BP: (119)/(67) 119/67 96% O2 Device: None (Room air) O2 Flow Rate (L/min): 2 L/min  Weight:   163 lb (73.9 kg) Weight change:     Intake/Output last 3 shifts  I/O last 3 completed shifts:  In: 510 [P.O.:480; I.V.:30]  Out: 5650 [Urine:5650]  Intake/Output this shift:  No intake/output data recorded.    Physical Exam:  GENERAL: No acute distress, sitting in a electric wheelchair   CARDIAC: Regular rate & rhythm, mild tachycardia. S1 & S2 normal.  No gallops or murmurs. 2 + LE edema  LUNGS: Crackles over lower lung fields bilaterally   ABDOMEN: Protuberant, mildly tender on the right side, bowel sounds present  NEURO: Paraplegia       Pertinent Labs   Lab Results: personally  reviewed.   Results from last 7 days   Lab Units 02/24/19  0545 02/22/19  0820 02/21/19  1519   LN-SODIUM mmol/L 141 140 139   LN-POTASSIUM mmol/L 4.6 4.2 4.4   LN-CHLORIDE mmol/L 102 101 101   LN-CO2 mmol/L 33* 33* 28   LN-BLOOD UREA NITROGEN mg/dL 11 10 11   LN-CREATININE mg/dL 0.50* 0.53* 0.68   LN-CALCIUM mg/dL 8.0* 7.9* 8.0*     Results from last 7 days   Lab Units 02/22/19  0820 02/21/19  0907 02/20/19  0629   LN-WHITE BLOOD CELL COUNT thou/uL 22.0* 19.0* 13.6*   LN-HEMOGLOBIN g/dL 9.6* 9.8* 8.9*   LN-HEMATOCRIT % 33.0* 33.5* 30.5*   LN-PLATELET COUNT thou/uL 343 371 318   LN-MONOCYTES - REL (DIFF) % 2 5 4           Medications  Scheduled Meds:    baclofen  10 mg Oral TID     eszopiclone  3 mg Oral QHS     furosemide  40 mg Oral DAILY     gabapentin  900 mg Oral DAILY     levETIRAcetam  250 mg Oral BID     levothyroxine  125 mcg Oral Daily 0600     meropenem  1 g Intravenous Q8H     methadone  10 mg Oral TID     miconazole   Topical BID     multivitamin with minerals  1 tablet Oral DAILY     neomycin-bacitracin-polymyxin   Topical TID     omeprazole  20 mg Oral QAM AC     oxybutynin  5 mg Oral TID     pink lady  1 enema Rectal Once     polyethylene glycol  17 g Oral BID     polyethylene glycol  4,000 mL Oral Once     QUEtiapine  25 mg Oral BID     QUEtiapine  50 mg Oral QHS     rivaroxaban  20 mg Oral Daily with supper     senna-docusate  2 tablet Oral BID     sodium chloride  10-30 mL Intravenous Q8H FIXED TIMES     vancomycin  1.25 g Intravenous Q24H     Continuous Infusions:  PRN Meds:.acetaminophen, albuterol, albuterol **AND** Nebulizer treatment intermittent, bisacodyl, calcium (as carbonate), cyclobenzaprine, HYDROmorphone, hydrOXYzine HCl, ibuprofen, magnesium hydroxide, melatonin, naloxone **OR** naloxone, OLANZapine, ondansetron **OR** ondansetron, polyethylene glycol, polyvinyl alcohol, sodium chloride bacteriostatic, sodium chloride bacteriostatic, sodium chloride, sodium chloride, sodium chloride,  sodium phosphates 133 mL, traZODone    Pertinent Radiology   Radiology Results: Personally reviewed image/s  EXAM: CT CHEST ABDOMEN PELVIS WO ORAL WO IV CONTRAST  DATE/TIME: 2/17/2019 11:51 AM  1.  No evidence of occult hemorrhage in the chest, abdomen or pelvis.  2.  Patient has bilateral pulmonary pneumonitis/pneumonia, greatest on the right side as noted above.  3.  Other noncritical findings are stable    Advanced Care Planning:  Treatment/Discharge Planning discussed with the patient and RN     Joyce Rodrigues MD  Internal Medicine Hospitalist  2/24/2019

## 2021-06-24 NOTE — PROGRESS NOTES
Pt alert and oriented x4 but not compliance with care and treatment. Pt refused to be assess and pt keep going outside to smoke despite  bad weather and refusing to follow staff directions. Pt became verbally aggressive to this writer and stating this writer reason like a 3 year old child and could no follow directions. This writer made iti known to pt we are here to help her and it's inappropriate to insult staff and pt stated she don't care. About 2100 pt requested all her meds including prn dilaudid, tylenol, trazodone, melatonin  and flexeril. All prn administered and pt keep being verbally aggressive to staff. Pt had a XL BM and was clean and wound care completed, dressing applied and pt refused for wound to be clean with NS and this writer explained the important of keep wound clean and pt stated I am not a baby. Pt requested for IV dilaudid and per hospitalist notes, no IV dilaudid after increase in methadone. Pt insisted and Dr. Rodríguez paged by and one time dose of dilaudid IV administered. Prn nebulizer also administered when pt c/o shortness of breath. Pt refused this writer from listening to lung sound but prn duo neb administered after paging for RT but RT had a critical situation in the ER.

## 2021-06-24 NOTE — PROGRESS NOTES
[place] Daily Progress Note      Date of Service: 3/3/2019     Assessment and plan    Acute COPD exacerbation  : Feel underlying pneumonia unlikely  : Has some leukocytosis but pro calcitonin was negative  : Complete doxycycline course  ; Leukocytosis normalized  ; More crackles and wheeze today  ; Patient symptoms seems to worsen with tapering steroid  ; We will keep on prednisone 40 for a few days and then taper  ; Patient advised to refrain from going out to smoke with subzero temperatures    Acute on chronic anemia  ;Microcytic anemia  History of IV iron therapy  : Hemoglobin seems to be trending down  : Recheck hemoglobin better this morning  ; Continue iron supplement  : Hemoglobin stable      Recent severe sepsis with impending septic shock  : Antibiotic course completed as per ID recommendation  ; Concern for recurrent aspirations  ; Appreciate speech therapy input  ; Patient refusing video swallow  : Noncompliant with aspiration precautions  ; Present management as above    Severe constipation  Getting better with bowel movement  No issues at present  ; Having regular bowel movement as per patient and nurse    History of stage IV sacral decubitus ulcer with chronic osteomyelitis (7/2018) status post I&D and IV antibiotics and recurrences:  History of recurrent UTIs:  : allowed  examination on 2/17/2019:   : Wound care nurse consulted but patient refusing wound examination  : Patient did not allow wound examination at present    opioid dependence:  Chronic pain:  -Follows with the PCP and plan to follow-up with the pain clinic.    On methadone and oxycodone as an outpatient.   She had been off methadone since admission which likely led to worsening of her chronic pain.   : Methadone was resumed on 2/16/2019 at 10 mg twice daily usually on 10 mg 3 times daily  : Mental status stable, no worsening respiratory status  ; Methadone increased to 10 mg 3 times daily on 2/19/2019     Severe MDD with psychotic  features:  Paranoia and anxiety  : Patient was recently evaluated by psychiatry   on 72-hr hold per psychiatry with commitment hearing on 2/12 which she missed.  Trial date set for 2/19 which COURT  declined commitment   Continue quetiapine.  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     History of seizure disorder:  Continue levetiracetam.     Insomnia:  Continue eszopiclone      This note was dictated using voice recognition software. Any grammatical or context distortions are unintentional and inherent to the software.  Subjective:   Sitting up in a wheelchair in no acute distress  Patient appears more cooperative today  States she feels fine no shortness of breath  Having bowel movement    Patient explained that she is little more crackly today, plan to increase prednisone dose  Patient advised to refrain from going out to smoke        Brief History: 62 y.o. 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  Similar to her prior multiple hospitalizations, her behavior was a significant issue with abusiveness, and belligerence towards staff.  She was also nonadherent with medical care.  She was evaluated by psychiatry and was noted to  "have major depressive disorder with psychotic features as well as with a component of paranoia.  She was placed on a 72-hour hold.  Commitment hearing  on 2/12/19 which she refused to attend.  Patient had a trial date on 12/19/2019 and commitment was refused by court, and she has been off one-to-one since then.   Patient completed 10 days of meropenem and vancomycin      Chart reviewed, events noted. Pt seen and examined.     Objective     Vital signs in last 24 hours:  Heart Rate:  [85] 85  Resp:  [16] 16  BP: (115)/(66) 115/66  Weight:   163 lb (73.9 kg)  Weight change:   Body mass index is 29.81 kg/m .    Intake/Output last 3 shifts:  I/O last 3 completed shifts:  In: 530 [P.O.:500; I.V.:30]  Out: 1550 [Urine:1550]  Intake/Output this shift:  I/O this shift:  In: -   Out: 700 [Urine:700]      Physical Exam:  /66 (Patient Position: Semi-baez)   Pulse 85   Temp 98.6  F (37  C) (Oral)   Resp 16   Ht 5' 2\" (1.575 m)   Wt 163 lb (73.9 kg)   LMP  (LMP Unknown)   SpO2 93%   BMI 29.81 kg/m        O2 Device: None (Room air) O2 Flow Rate (L/min): 6 L/min      General Appearance:    Alert, no apparent distress.    HEENT:    Normocephalic. Pupils equal and reactive. No scleral   Icterus. Moist oral mucosa    Lungs:     Clear to auscultation bilaterally, respirations unlabored  Bilateral wheeze and crackles   Heart::    Regular rate and rhythm, S1 and S2 normal, no murmur, rub   or gallop.   Abdomen:  Did not allow examination   Extremity :  Socks on        CNS:   Alert and oriented,   no facial asymmetry  Paraplegic       Imaging:  personally reviewed.      Scheduled Meds:    baclofen  10 mg Oral TID     calcium-vitamin D  1 tablet Oral DAILY     doxycycline  100 mg Oral BID     eszopiclone  3 mg Oral QHS     ferrous sulfate  325 mg Oral BID with meals     furosemide  40 mg Oral BID - diuretic     gabapentin  900 mg Oral DAILY     ipratropium-albuterol  3 mL Nebulization Q6H - RT     levETIRAcetam  250 mg " Oral BID     levothyroxine  125 mcg Oral Daily 0600     magnesium oxide  400 mg Oral TID     methadone  10 mg Oral TID     miconazole   Topical BID     multivitamin with minerals  1 tablet Oral DAILY     neomycin-bacitracin-polymyxin   Topical TID     omeprazole  20 mg Oral QAM AC     oxybutynin  5 mg Oral TID     pink lady  1 enema Rectal Once     polyethylene glycol  17 g Oral BID     polyethylene glycol  4,000 mL Oral Once     predniSONE  20 mg Oral Once     [START ON 3/4/2019] predniSONE  40 mg Oral Daily with brkfst     QUEtiapine  25 mg Oral BID     QUEtiapine  50 mg Oral QHS     rivaroxaban  20 mg Oral Daily with supper     senna-docusate  2 tablet Oral BID     sodium chloride  10-30 mL Intravenous Q8H FIXED TIMES     Continuous Infusions:    PRN Meds:.acetaminophen, albuterol, albuterol **AND** Nebulizer treatment intermittent, bisacodyl, calcium (as carbonate), codeine-guaiFENesin, cyclobenzaprine, HYDROmorphone, hydrOXYzine HCl, ibuprofen, magnesium hydroxide, melatonin, naloxone **OR** naloxone, OLANZapine, ondansetron **OR** ondansetron, polyethylene glycol, polyvinyl alcohol, sodium chloride bacteriostatic, sodium chloride bacteriostatic, sodium chloride, sodium chloride, sodium chloride, sodium phosphates 133 mL, traZODone    Lab Results:  personally reviewed.   not applicable  Recent Results (from the past 24 hour(s))   Potassium    Collection Time: 03/02/19  6:01 PM   Result Value Ref Range    Potassium 5.0 3.5 - 5.0 mmol/L   HM1 (CBC with Diff)    Collection Time: 03/03/19  6:32 AM   Result Value Ref Range    WBC 9.3 4.0 - 11.0 thou/uL    RBC 3.24 (L) 3.80 - 5.40 mill/uL    Hemoglobin 7.9 (L) 12.0 - 16.0 g/dL    Hematocrit 27.5 (L) 35.0 - 47.0 %    MCV 85 80 - 100 fL    MCH 24.4 (L) 27.0 - 34.0 pg    MCHC 28.7 (L) 32.0 - 36.0 g/dL    RDW 21.2 (H) 11.0 - 14.5 %    Platelets 267 140 - 440 thou/uL    MPV 10.5 8.5 - 12.5 fL    Neutrophils % 75 (H) 50 - 70 %    Lymphocytes % 16 (L) 20 - 40 %    Monocytes  % 8 2 - 10 %    Eosinophils % 1 0 - 6 %    Basophils % 0 0 - 2 %    Neutrophils Absolute 6.7 2.0 - 7.7 thou/uL    Lymphocytes Absolute 1.5 0.8 - 4.4 thou/uL    Monocytes Absolute 0.8 0.0 - 0.9 thou/uL    Eosinophils Absolute 0.1 0.0 - 0.4 thou/uL    Basophils Absolute 0.0 0.0 - 0.2 thou/uL   Manual Differential    Collection Time: 03/03/19  6:32 AM   Result Value Ref Range    Platelet Estimate Normal Normal    Polychromasia 1+ (!) Negative    Tear Drop Cells 1+ (!) Negative               Advance Care Planning:   Barriers to discharge: Monitoring hemoglobin, respond to oral steroid, awaiting placement  Anticipated discharge day: 2-3 days  Disposition: Issues with placement      Gerard Juárez MD  [facility]  Hospitalist

## 2021-06-24 NOTE — PROGRESS NOTES
Patient for a Prn albuterol tx. Which was given at 2250. Patient also asked not to be awakened for her 0200 HHN tx.     Jarrett Cartwright, BENJYT

## 2021-06-24 NOTE — PLAN OF CARE
Problem: Pain  Goal: Patient's pain/discomfort is manageable  Outcome: Progressing  Pain control satisfactory with ordered pain medications.     Problem: Potential for Compromised Skin Integrity  Goal: Skin integrity is maintained or improved  Outcome: Not Progressing  Refusing to turn in bed.

## 2021-06-24 NOTE — PROGRESS NOTES
[facility]AT Treatment Plan    Patient Score: 15    Patient Acuity: 3    Clinical Indication for Therapy: Pulmonary hygiene    Therapy Ordered: Home regimen Nebulizer treatments as scheduled    Assessment Summary: Pt seen for Pulmonary hygiene and flutter valve. Pt was offered every bronchial hygiene treatment available. Despite RT expressing the need for pulmonary hygiene due to her pneumonia,Pt refused all treatments offered and sent the RT out of her room. RT will continue to follow and treat as allowed.      Irene Garcia, LRT  2/17/2019

## 2021-06-24 NOTE — PROGRESS NOTES
Received call from nursing staff stating patient is currently more shortness of breath  : Patient placed on oxygen via facemask at 5 L  ; No oxygen checked off oxygen as patient did not allow    Patient seen at bedside: States she feels more short of breath, states is been going on from yesterday.  Patient had only stated that she felt better this morning: Patient states that what she always states  Also appears to having a wet cough.  Patient refused to check oxygen saturation off oxygen  Continue on 5 L oxygen via facemask: She is satting at 99%    Patient states she went out 2 times today to smoke.    No fever documented    Patient gets rude when asked questions, keeps stating she knows her body better and and that we need to do as she tells us.    Examination  ; S1-S2 regular, mild tachycardia  ; Lungs bilateral crackles and wheeze    Repeat chest x-ray today showed  Decreased focal mid right lung opacity though there remains mild interstitial infiltrate at both lung bases. No pleural effusion. Heart size normal.     Assessment  ; Shortness of breath  ; Feel likely secondary to COPD exacerbation  ; Likely secondary to continued smoking and going out in the cold  ; Cannot rule out recurrence of aspiration pneumonia or hospital-acquired pneumonia  ; Recheck CBC, pro calcitonin  ; Check BMP: If needed we will give some additional Lasix  ; IV steroids with broad-spectrum antibiotic for now  ; Nebulization  : Cardiac monitoring as patient is short of breath and tachycardic    Additional 35 minutes spent on patient evaluation, and discussion with nursing staff

## 2021-06-24 NOTE — PLAN OF CARE
"Patient refused to have a full assessment, multiple approaches made. Patient did take her medications and allowed for meplix change. Patient complained of pain rated 10/10 throughout the day, PRN dilaudid, ibuprofen and tylenol used throughout the day. Patient made several mean comments in the morning towards nurse, stating that she was \"incompitent\" and asked \"do you even know how to use your mind?\" Patient stated that \"all the nurses are lying about me, saying that I refused my cares but i've been begging them to do my cares.\" Later in the day patient stated that she really liked her nurse and that she will miss the nurse when she leaves for the day. 2 staff in the room at all times during cares and medication administration. Will continue to monitor.   "

## 2021-06-24 NOTE — PROGRESS NOTES
"As I ws doing multiple tasks around the room per patient request the pt states, \"why are you so hyper, why are you moving around so quickly.\"  I responded \"because I am doing what you ask, but I don't want to talk about me right now.\"  Pt states, \"you should really get a new job, and check your attitude.\"  I explained to patient I would be back in once she wasn't so agitated.  I then left the room with the pills that I had yet to give the patient, her hydromorphone, flexiril, and ibuprofen. Pt is now compulsively calling the  to get a hold of the desk.  Staff explains to pt she can only have one nurse and I will be back in once it is a less hostile work environement.   "

## 2021-06-24 NOTE — PROGRESS NOTES
[place] Daily Progress Note      Date of Service: 3/2/2019     Assessment and plan    Acute COPD exacerbation  : Feel underlying pneumonia unlikely  : Has some leukocytosis but pro calcitonin was negative  : Symptomatically improved with IV steroids  ; Continue oral doxycycline for 6 more days  ; We will change steroids to oral  ; Nebulization as needed  ; Patient still continued to go outside in the cold to smoke despite counseling  ; We will have to monitor for worsening    Acute on chronic anemia  ;Microcytic anemia  History of IV iron therapy  : Hemoglobin seems to be trending down  : Recheck hemoglobin better this morning  ; Continue iron supplement  ; We will consider IV iron if hemoglobin starts dropping  ; Nursing staff did report some mild bleed from sacral ulcer this morning    Recent severe sepsis with impending septic shock  : Antibiotic course completed as per ID recommendation  ; Concern for recurrent aspirations  ; Appreciate speech therapy input  ; Patient refusing video swallow  : Noncompliant with aspiration precautions  ; Present management as above    Severe constipation  Getting better with bowel movement  No issues at present  ; Having regular bowel movement as per patient and nurse    History of stage IV sacral decubitus ulcer with chronic osteomyelitis (7/2018) status post I&D and IV antibiotics and recurrences:  History of recurrent UTIs:  : allowed  examination on 2/17/2019:   : Wound care nurse consulted but patient refusing wound examination  : Patient did not allow wound examination at present    opioid dependence:  Chronic pain:  -Follows with the PCP and plan to follow-up with the pain clinic.    On methadone and oxycodone as an outpatient.   She had been off methadone since admission which likely led to worsening of her chronic pain.   : Methadone was resumed on 2/16/2019 at 10 mg twice daily usually on 10 mg 3 times daily  : Mental status stable, no worsening respiratory status  ;  Methadone increased to 10 mg 3 times daily on 2/19/2019     Severe MDD with psychotic features:  Paranoia and anxiety  : Patient was recently evaluated by psychiatry   on 72-hr hold per psychiatry with commitment hearing on 2/12 which she missed.  Trial date set for 2/19 which COURT  declined commitment   Continue quetiapine.  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     History of seizure disorder:  Continue levetiracetam.     Insomnia:  Continue eszopiclone      This note was dictated using voice recognition software. Any grammatical or context distortions are unintentional and inherent to the software.  Subjective:   Sitting up in a wheelchair in no acute distress  Patient's nurse at bedside  Patient states she feels her breathing is much better today    Per nursing staff patient had some large brown stool this morning    As per nurse her dressing was changed this morning: Initial packing was already taken out by patient so nurse not sure if packing was soaked and not.  Nurse does states that her perianal area appears excoriated from stool and had some fresh mild bleeding.    Patient appears upset with me stating that I did not check her hemoglobin in the night, states she hadthe doctor on call.  Explained to patient that I was not working in the night.   Showed patient yesterday morning's lab, showed that a repeat hemoglobin was improving.  Patient appeared to come down after talking and then started telling me some story about sneezing        Brief History: 62 y.o. old female with a PMHx significant for COPD/emphysemia, LLL malignancy s/p resection, paraplegia secondary to T4 hematoma, neurogenic bladder, recurrent UTIs, history of  "seizure disorder, hypothyroidism, chronic pain syndrome on narcotics, decubitus right buttock, right lower extremity, right heel and left heel ulcers, rheumatoid arthritis, history of DVTs and PEs on anticoagulation, ESBL/MRSA/VRE infections previous hospitalization for chronic osteomyelitis of the ischium, and multiple hospitalizations who presented on 2/2/19 with uncontrolled pain.  Similar to her prior multiple hospitalizations, her behavior was a significant issue with abusiveness, and belligerence towards staff.  She was also nonadherent with medical care.  She was evaluated by psychiatry and was noted to have major depressive disorder with psychotic features as well as with a component of paranoia.  She was placed on a 72-hour hold.  Commitment hearing  on 2/12/19 which she refused to attend.  Patient had a trial date on 12/19/2019 and commitment was refused by court, and she has been off one-to-one since then.   Patient completed 10 days of meropenem and vancomycin      Chart reviewed, events noted. Pt seen and examined.     Objective     Vital signs in last 24 hours:  Heart Rate:  [102] 102  Resp:  [16] 16  Weight:   163 lb (73.9 kg)  Weight change:   Body mass index is 29.81 kg/m .    Intake/Output last 3 shifts:  I/O last 3 completed shifts:  In: 960 [P.O.:960]  Out: 2200 [Urine:2200]  Intake/Output this shift:  No intake/output data recorded.      Physical Exam:  /56 (Patient Position: Semi-baez)   Pulse (!) 102   Temp 98.6  F (37  C) (Oral)   Resp 16   Ht 5' 2\" (1.575 m)   Wt 163 lb (73.9 kg)   LMP  (LMP Unknown)   SpO2 99%   BMI 29.81 kg/m        O2 Device: Nasal cannula O2 Flow Rate (L/min): 6 L/min      General Appearance:    Alert, no apparent distress.    HEENT:    Normocephalic. Pupils equal and reactive. No scleral   Icterus. Moist oral mucosa    Lungs:     Clear to auscultation bilaterally, respirations unlabored  No wheeze or crackles audible   Heart::    Regular rate and rhythm, " S1 and S2 normal, no murmur, rub   or gallop.   Abdomen:  Did not allow examination   Extremity :  Socks on        CNS:   Alert and oriented,   no facial asymmetry  Paraplegic       Imaging:  personally reviewed.      Scheduled Meds:    baclofen  10 mg Oral TID     doxycycline  100 mg Oral BID     eszopiclone  3 mg Oral QHS     ferrous sulfate  325 mg Oral BID with meals     furosemide  40 mg Oral BID - diuretic     gabapentin  900 mg Oral DAILY     ipratropium-albuterol  3 mL Nebulization Q6H - RT     levETIRAcetam  250 mg Oral BID     levothyroxine  125 mcg Oral Daily 0600     magnesium oxide  400 mg Oral TID     methadone  10 mg Oral TID     miconazole   Topical BID     multivitamin with minerals  1 tablet Oral DAILY     neomycin-bacitracin-polymyxin   Topical TID     omeprazole  20 mg Oral QAM AC     oxybutynin  5 mg Oral TID     pink lady  1 enema Rectal Once     polyethylene glycol  17 g Oral BID     polyethylene glycol  4,000 mL Oral Once     potassium chloride ER  40 mEq Oral Q4H     [START ON 3/3/2019] predniSONE  20 mg Oral Daily with brkfst    Followed by     [START ON 3/6/2019] predniSONE  15 mg Oral Daily with brkfst    Followed by     [START ON 3/9/2019] predniSONE  10 mg Oral Daily with brkfst    Followed by     [START ON 3/12/2019] predniSONE  5 mg Oral Daily with brkfst     predniSONE  40 mg Oral Once     QUEtiapine  25 mg Oral BID     QUEtiapine  50 mg Oral QHS     rivaroxaban  20 mg Oral Daily with supper     senna-docusate  2 tablet Oral BID     sodium chloride  10-30 mL Intravenous Q8H FIXED TIMES     Continuous Infusions:    PRN Meds:.acetaminophen, albuterol, albuterol **AND** Nebulizer treatment intermittent, bisacodyl, calcium (as carbonate), codeine-guaiFENesin, cyclobenzaprine, HYDROmorphone, hydrOXYzine HCl, ibuprofen, magnesium hydroxide, melatonin, naloxone **OR** naloxone, OLANZapine, ondansetron **OR** ondansetron, polyethylene glycol, polyvinyl alcohol, sodium chloride  bacteriostatic, sodium chloride bacteriostatic, sodium chloride, sodium chloride, sodium chloride, sodium phosphates 133 mL, traZODone    Lab Results:  personally reviewed.   not applicable  Recent Results (from the past 24 hour(s))   Potassium - Next AM    Collection Time: 03/02/19  7:11 AM   Result Value Ref Range    Potassium 3.2 (L) 3.5 - 5.0 mmol/L   Comprehensive Metabolic Panel    Collection Time: 03/02/19 10:13 AM   Result Value Ref Range    Sodium 138 136 - 145 mmol/L    Potassium 3.3 (L) 3.5 - 5.0 mmol/L    Chloride 95 (L) 98 - 107 mmol/L    CO2 35 (H) 22 - 31 mmol/L    Anion Gap, Calculation 8 5 - 18 mmol/L    Glucose 80 70 - 125 mg/dL    BUN 13 8 - 22 mg/dL    Creatinine 0.54 (L) 0.60 - 1.10 mg/dL    GFR MDRD Af Amer >60 >60 mL/min/1.73m2    GFR MDRD Non Af Amer >60 >60 mL/min/1.73m2    Bilirubin, Total 0.1 0.0 - 1.0 mg/dL    Calcium 8.2 (L) 8.5 - 10.5 mg/dL    Protein, Total 6.2 6.0 - 8.0 g/dL    Albumin 2.0 (L) 3.5 - 5.0 g/dL    Alkaline Phosphatase 117 45 - 120 U/L    AST 9 0 - 40 U/L    ALT 12 0 - 45 U/L   HM1 (CBC with Diff)    Collection Time: 03/02/19 10:13 AM   Result Value Ref Range    WBC 11.7 (H) 4.0 - 11.0 thou/uL    RBC 3.39 (L) 3.80 - 5.40 mill/uL    Hemoglobin 8.2 (L) 12.0 - 16.0 g/dL    Hematocrit 28.5 (L) 35.0 - 47.0 %    MCV 84 80 - 100 fL    MCH 24.2 (L) 27.0 - 34.0 pg    MCHC 28.8 (L) 32.0 - 36.0 g/dL    RDW 20.8 (H) 11.0 - 14.5 %    Platelets 284 140 - 440 thou/uL    MPV 9.8 8.5 - 12.5 fL    Neutrophils % 76 (H) 50 - 70 %    Lymphocytes % 16 (L) 20 - 40 %    Monocytes % 8 2 - 10 %    Eosinophils % 1 0 - 6 %    Basophils % 0 0 - 2 %    Neutrophils Absolute 8.7 (H) 2.0 - 7.7 thou/uL    Lymphocytes Absolute 1.9 0.8 - 4.4 thou/uL    Monocytes Absolute 0.9 0.0 - 0.9 thou/uL    Eosinophils Absolute 0.1 0.0 - 0.4 thou/uL    Basophils Absolute 0.0 0.0 - 0.2 thou/uL   Manual Differential    Collection Time: 03/02/19 10:13 AM   Result Value Ref Range    Platelet Estimate Normal Normal     Polychromasia 1+ (!) Negative    Tear Drop Cells 1+ (!) Negative   Magnesium    Collection Time: 03/02/19 10:13 AM   Result Value Ref Range    Magnesium 1.6 (L) 1.8 - 2.6 mg/dL               Advance Care Planning:   Barriers to discharge: Monitoring hemoglobin, respond to oral steroid, awaiting placement  Anticipated discharge day: 2-3 days  Disposition: Issues with placement      Gerard Juárez MD  [facility]

## 2021-06-24 NOTE — TREATMENT PLAN
" RCAT Treatment Plan      Patient Score: 8  Patient Acutiy: 2    Clinical Indication for Therapy: History of Broncospasm    Therapy Ordered: DuoNebs Q4 PRN, Albuterol PRN, Flutter valve    Assessment Summary:BP (!) 152/99   Pulse (!) 123   Temp 98.3  F (36.8  C) (Oral)   Resp 16   Ht 5' 2\" (1.575 m)   Wt 163 lb (73.9 kg)   LMP  (LMP Unknown)   SpO2 98%   BMI 29.81 kg/m    Patient on Room Air.  Lung sounds have remained clear. Patient has been calling for a PRN at night or early AM, but has otherwise been okay without or refusing. Patient's home med orders are for Albuterol PRN.  Changed DuoNebs to PRN per RCAT protocol.     03/03/19 1726   Reason for Assessment (RCAT)   RCAT Assesment Re-eval   Assessment Reason  COPD   $ RCAT Eval Time 15 min.  Yes   Vitals (RCAT)   Resp 16   FiO2 (%) (room air)   Chart Assessment (RCAT)   Pulmonary Status  3   Surgical Status  0   Chest Xray  3   Patient Assessment (RCAT)    Respiratory Pattern  0   Mental Status  0   Breath Sounds  0   Cough Effectiveness  0   Level of Activity  2   O2 Required SpO2 >= 92%  0   Chart + Pt. Assessment Total Points  8   RCAT Acuity Score 8 (Acuity 2)   Clinical Indications (RCAT)   Aerosol Hygiene RCAT protocol   RCAT Order Placed  ASIM Loving    "

## 2021-06-24 NOTE — SIGNIFICANT EVENT
"Pt slipped out of seat of w/c at 1600. Nurse asked pt multiple times if nursing staff could assist her back into bed to perform wound care and hygiene cares. Pt refused, stating, \"I stay in my wheel chair until 9 or 10pm\". Nurse explained to pt that other staff observed that her wound dressings came off when assisting her back in to w/c following internal code 9 incident. Pt denied that wound dressings came off and stated that she was fine and wanted to go outside again. Nurse encouraged pt to wear seat belt while seated in w/c for safety. Pt replied, \"I haven't worn a seat belt for 10 and a half years\". Pt also stated that seat belt does not work. Pt would not let nurse assess w/c to see if it had a functional seat belt. Pt was told once again that it is not safe to go outside or to be in w/c with seat belt not in place. Pt stated she was fine and again refused to get out of w/c.   "

## 2021-06-24 NOTE — TREATMENT PLAN
Pt around 3:30 PM, pt requested to talk to the MD regarding the wound on her coccyx/sacral ulcers. The Hospitalist was paged and responded. Dr. Gee came, and with the help of this writer and the CNA, her wound as assessed. Pt cooperated with staff and dressing was changed by this writer after MD's assessment. Staff used the opportunity to do katia-care. Pt was also seen by the Urologist, Dr. Pro at 4:37 PM to evaluate her supra pubic cath.  A gonzalez catheter was inserted as per pt's request, and she now has supra pubic and gonzalez catheters.   Statement Selected

## 2021-06-24 NOTE — PLAN OF CARE
Problem: Pain  Goal: Patient's pain/discomfort is manageable  Outcome: Progressing  Patient continues to rate pain at 10/10 or higher, requesting for pain medication even when it is not due. Had 2 mg Dilaudid oral x 2, Tylenol x 1, and Ibuprofen x 1 and reports no relief.    Problem: Daily Care  Goal: Daily care needs are met  Outcome: Progressing  Patient continue to be hostile to the staff during the night. She continues to ask for Bhandari Catheter that was pulled sometimes ago, as she consistently asking the writer to call MD, this writer obliged, and an order to re-insert the Bhandari was obtained. Bhandari was inserted at this time. At some point patient asked the staff to close the door as she was not able to sleep with people talking, but shortly after the request was granted, a smell of cigarette was noticed, Charge nurse was informed, and he went in to see things for himself. The smell of cigarette was found to filled the room, but she denied having cigarette in the room, security and supervisor were called, they both came. Security took the lighter from patient for keep in the nursing station and she was told that she can't smoke in the room. Patient continues to be verbally abusive to staff throughout the shift. Dressing to the buttocks changed with wet to dry dressing and large Mepilex applied. Will continue to monitor.

## 2021-06-24 NOTE — PLAN OF CARE
Compliance with treatment regimen Not Progressing    Patient refuses turns/ repositioning. Refusing of some cares and medications. Main patient concern is pain management.    Patient's pain/discomfort is manageable Not Progressing    Patient appears very comfortable, sleeping on/off. Incongruent physical assessment with pt stated pain level. Always rates at a 10/10, even though drowsy and sleepy.    Skin integrity is maintained or improved Not Progressing    No new skin concerns, but refuses to reposition unless she requests. Wounds remain to sacrum, bilat feet and abdominal area.    Demonstrates ability to cope with hospitalization/illness Not Progressing    Patient cooperative and pleasant this morning, however demanding and very particular. Behaviors much better when patient has control over as much as possible.    Patient/family/caregiver demonstrates understanding of disease process, treatment plan, medications, and discharge instructions Not Progressing    Attempts to educate on reasons for repositioning, medications etc unsuccessful. Patient states she has taken care of herself her whole life and will be just fine. Will continue to monitor.    Shanice Butterfield RN

## 2021-06-24 NOTE — PROGRESS NOTES
Pt currently on 5 lpm oxymask, SpO2 99%.  Pt received 2 nebulizers this shift.  Breath sounds are coarse.  Pt has a congested non productive cough. Pt does not want to be woken up at night for nebulizer, but will call if one is needed.  RT will continue to monitor.

## 2021-06-24 NOTE — PROGRESS NOTES
"Pt. Requested a POA change today and RN explained that changing a POA requires legal action/. Patient stated \"that the person has paperwork notarized.\" RN Stated \"that when the paperwork gets here, we can change the POA.\" Patient also requested that this person (Don) be added to emergency contacts. He was then added to face sheet by Charge Nurse. Patient also requested (David) to be added to face sheet, and David was already on face sheet. Patient then also requested that \"Tobias\" be taken off face sheet because \"he is not part of the family anymore.\" Staff not comfortable removing this person from face sheet at this time.     Patient also requested for more pain meds or increase frequency of pain meds. RN did text page MD in regards to this and MD put in nursing communication order (see order). RN explained this to patient and patient not very happy about it but was not disrespectful to RN. Patient thanked RN for the help. Staff will continue to monitor.  "

## 2021-06-24 NOTE — PROGRESS NOTES
"Pt is found to be smoking in room. Cigarette ashes are observed on the table next to patient. Pt states \"I'm not denying it\". Pt is educated about the dangers of smoking in the hospital and how this is for everyone's safety. Pt states \"I have pneumonia\". Also is educated on how smoking will not benefit pneumonia and cessation is encouraged. Pt is offered nicotine gum or patch and denies offer for any nicotine products.   "

## 2021-06-24 NOTE — PROGRESS NOTES
"Security searches pt's bags for cigarettes or lighters as this is the second time pt has smoked inside the hospital. They found four lighters, 1 half a smoked cigarette, and a one hitter pipe. Pt admits this pipe is for \"medical marijuana\". Security takes half smoked cigarette and pipe and states they will destroy these. The lighters are bagged and will be kept at the desk.   "

## 2021-06-24 NOTE — PROGRESS NOTES
"Patient will need bronchial hygiene treatments to progress in recovery. However she continues to refuse. Saying they make her gag. RT explained the importance of clearing secretions in which she stated she 'takes care of that herself\". At this point RT will continue to encourage patient to deep breath and cough.     Patient stated she was going to need a nebulizer after breakfast, which had not arrived yet. RT instructed her to ask her nurse to contact after RT for treatment when needed.    /80 (Patient Position: Lying)   Pulse (!) 128   Temp 97.6  F (36.4  C) (Oral)   Resp 24   Ht 5' 2\" (1.575 m)   Wt 162 lb (73.5 kg)   LMP  (LMP Unknown)   SpO2 93%   BMI 29.63 kg/m      "

## 2021-06-24 NOTE — PLAN OF CARE
"Fluid and electrolyte balance are achieved/maintained Progressing      Patient will remain free of falls Progressing      Pt continues to be verbally abusive to staff. Writer has a witness in room during each interaction with patient. All call light requests answered and assistance continuously offered. Pt is uncooperative and continues to yell/insult at staff. Pt makes threats of suing staff for \"neglect and abuse of a vulnerable adult\" but cannot describe actions that have made her feel this way. Staff has been in the patient's room at least hourly and pt has called over 40 times tonight with all calls answered. Pt reports not sleeping because \"you've been keeping me up all night\". Pt was observed sleeping 3-4 hours of the night by writer. Tylenol, Ibuprofen, Dilaudid, and flexeril were all dosed one time during shift. Call light always in pt's reach and continuously checked to ensure pt can call for help.   "

## 2021-06-24 NOTE — PROGRESS NOTES
[facility] Daily Progress Note      Date of Service: 2/19/2019     Assessment and plan    Severe sepsis with impending septic shock  : Symptomatically improved  ; Leukocytosis resolved  ; Blood pressure stable  ; Likely secondary to pneumonia probable aspiration pneumonia  ; Continue broad-spectrum antibiotics as per ID recommendation  : CT chest abdomen pelvis not show any other pathology  : Continue antibiotics as per ID recommendation  ; No new issues    Acute hypoxic respiratory failure: Resolved  Consider secondary to pneumonia and probable COPD exacerbation  : Off oxygen  : Steroids switch to oral   ; No new issues  ; Mildly fluid overloaded  ; We will give a dose of IV Lasix    Acute on chronic anemia  ;Microcytic anemia  History of IV iron therapy  Hemoglobin down to 7.6  ; Baseline hemoglobin around 9  : Bleeding likely secondary to sacral decubiti  ; Hemoglobin stable at present  ; No sign of bleeding from sacral decubiti during examination of 2/17/2019  ; Hemoglobin better as 8.6 today    History of stage IV sacral decubitus ulcer with chronic osteomyelitis (7/2018) status post I&D and IV antibiotics and recurrences:  History of recurrent UTIs:  : allowed  examination on 2/17/2019: Appeared clean at that time, no signs of bleeding  : Continue wound care    Sinus tachycardia  ; EKG on 2/15/2019 showed sinus tachycardia  ; Treating underlying condition as above  ; Heart rate improved with treatment of underlying sepsis and hydration  : Heart rate stable      Opioid dependence:  Chronic pain:  -Follows with the PCP and plan to follow-up with the pain clinic.    On methadone and oxycodone as an outpatient.   She had been off methadone since admission which likely led to worsening of her chronic pain.   : Methadone was resumed on 2/16/2019 at 10 mg twice daily usually on 10 mg 3 times daily  : Mental status stable, no worsening respiratory status  ; Methadone increased to 10 mg 3 times daily on  2/19/2019     Severe MDD with psychotic features:  Paranoia and anxiety  : Patient was recently evaluated by psychiatry   on 72-hr hold per psychiatry with commitment hearing on 2/12 which she missed.  Trial date set for 2/19.   Continue quetiapine.  ; Denies depressed mood at present but very noncompliant     Severe constipation  ; Bowel regimen  : Denies any significant abdominal pain no nausea, tolerating diet  ; Patient states she will take a Fleet enema today when she is ready  ; States she will take today evening  ; Recent CT did not show any signs of obstruction     Quadriplegia secondary to T4 hematoma:  Neurogenic bladder:  -Patient has reported pelvic and abdominal pain on past admissions and has had UTIs treated with antibiotics.  -She was evaluated by urology in the past for urethral incompetence and lower abdominal/pelvic pain.    She had nephrostomies and suprapubic catheters in the past.    continue gabapentin and baclofen. Continue oxybutynin.   Replace suprapubic catheter every 4 weeks as an outpatient.    Has gonzalez due to incontinence and sacral decub  ; Suprapubic catheter not working properly: Urology consulted    History of DVT and PE  ; Continue Xarelto     History of subdural hematoma (4/2017):  : Tolerating anticoagulation     Hypothyroidism:  Continue levothyroxine.     History of seizure disorder:  Continue levetiracetam.     Insomnia:  Continue eszopiclone      This note was dictated using voice recognition software. Any grammatical or context distortions are unintentional and inherent to the software.  Subjective:   She was sitting up in her wheelchair in no acute distress  She is alert oriented  States she feels fine, no trouble breathing no chest pain no dizziness  Eating well, no abdominal pain no nausea    Patient is aware that she is going to call today  Requesting a dose of pain medication prior to changing her clothes      Brief History: 62 y.o. old female with a PMHx significant  "for COPD/emphysemia, LLL malignancy s/p resection, paraplegia secondary to T4 hematoma, neurogenic bladder, recurrent UTIs, history of seizure disorder, hypothyroidism, chronic pain syndrome on narcotics, decubitus right buttock, right lower extremity, right heel and left heel ulcers, rheumatoid arthritis, history of DVTs and PEs on anticoagulation, ESBL/MRSA/VRE infections previous hospitalization for chronic osteomyelitis of the ischium, and multiple hospitalizations who presented on 2/2/19 with uncontrolled pain.  Similar to her prior multiple hospitalizations, her behavior was a significant issue with abusiveness, and belligerence towards staff.  She was also nonadherent with medical care.  She was evaluated by psychiatry and was noted to have major depressive disorder with psychotic features as well as with a component of paranoia.  She was placed on a 72-hour hold.  Commitment hearing  on 2/12/19 which she refused to attend. Trial date 2/19.      Chart reviewed, events noted. Pt seen and examined.     Objective     Vital signs in last 24 hours:  Temp:  [98.3  F (36.8  C)] 98.3  F (36.8  C)  Heart Rate:  [80-91] 80  Resp:  [16-18] 18  BP: (132-173)/() 173/84  Weight:   163 lb (73.9 kg)  Weight change:   Body mass index is 29.81 kg/m .    Intake/Output last 3 shifts:  I/O last 3 completed shifts:  In: 1224 [P.O.:480; I.V.:194; IV Piggyback:550]  Out: 3175 [Urine:3175]  Intake/Output this shift:  No intake/output data recorded.      Physical Exam:  /84 (Patient Position: Sitting)   Pulse 80   Temp 98.3  F (36.8  C) (Oral)   Resp 18   Ht 5' 2\" (1.575 m)   Wt 163 lb (73.9 kg)   LMP  (LMP Unknown)   SpO2 98%   BMI 29.81 kg/m        O2 Device: None (Room air) O2 Flow Rate (L/min): 2 L/min      General Appearance:    Alert, no apparent distress.    HEENT:    Normocephalic. Pupils equal and reactive. No scleral   Icterus. Moist oral mucosa    Lungs:     Clear to auscultation bilaterally, " respirations unlabored  Basal air entry improved  No wheeze or crackle   Heart::    Regular rate and rhythm, S1 and S2 normal, no murmur, rub   or gallop.   Abdomen:  Soft, mild local discomfort no significant tenderness  Bowel sounds present   Extremity :  Lower extremity edema, chronic ulcers present       CNS:   Alert and oriented,   no facial asymmetry  Paraplegic       Imaging:  personally reviewed.      Scheduled Meds:    baclofen  10 mg Oral TID     eszopiclone  3 mg Oral QHS     gabapentin  900 mg Oral DAILY     levETIRAcetam  250 mg Oral BID     levothyroxine  125 mcg Oral Daily 0600     meropenem  1 g Intravenous Q8H     methadone  10 mg Oral TID     miconazole   Topical BID     multivitamin with minerals  1 tablet Oral DAILY     neomycin-bacitracin-polymyxin   Topical TID     omeprazole  20 mg Oral QAM AC     oxybutynin  5 mg Oral TID     polyethylene glycol  17 g Oral DAILY     predniSONE  20 mg Oral Daily with brkfst     QUEtiapine  25 mg Oral BID     QUEtiapine  50 mg Oral QHS     rivaroxaban  20 mg Oral Daily with supper     sodium chloride  10-30 mL Intravenous Q8H FIXED TIMES     vancomycin  1.25 g Intravenous Q18H     Continuous Infusions:    PRN Meds:.acetaminophen, albuterol, albuterol **AND** Nebulizer treatment intermittent, bisacodyl, calcium (as carbonate), cyclobenzaprine, HYDROmorphone, hydrOXYzine HCl, ibuprofen, magnesium hydroxide, melatonin, naloxone **OR** naloxone, OLANZapine, ondansetron **OR** ondansetron, polyethylene glycol, polyvinyl alcohol, sodium chloride bacteriostatic, sodium chloride bacteriostatic, sodium chloride, sodium chloride, sodium chloride, sodium phosphates 133 mL, traZODone    Lab Results:  personally reviewed.   not applicable  Recent Results (from the past 24 hour(s))   Hemoglobin    Collection Time: 02/19/19  8:05 AM   Result Value Ref Range    Hemoglobin 8.6 (L) 12.0 - 16.0 g/dL               Advance Care Planning:   Barriers to discharge: IV  antibiotics  Anticipated discharge day: Multiple days  Disposition: Unclear, ?  [place] as patient needs 7-10 days of IV antibiotics as per infectious disease      Gerard Juárez MD  [facility] Hospitalist

## 2021-06-24 NOTE — PLAN OF CARE
Problem: Pain  Goal: Patient's pain/discomfort is manageable  Outcome: Progressing      Problem: Psychosocial Needs  Goal: Demonstrates ability to cope with hospitalization/illness  Outcome: Progressing      Problem: Tissue Integrity  Goal: Damaged tissue is healing and protected  Outcome: Not Progressing  Pt refused assessment of ulcers.

## 2021-06-24 NOTE — PROGRESS NOTES
Hospitalist Progress Note       Assessment and Plan      Severe constipation, likely sec to immobility, pain meds  Patient reports having 3 BM today, although none is charted  Continue to encourage miralax, senokot, enema and suppository prn    HAP, probable aspiration pneumonia   Completed 10 days of meropenem and vancomycin  Leukocytosis is likely from steroid use and is trending down  Still has crackles over bases, suspect atelectasis. Encouraged to use IS - discussed with nursing staff  Recommended against going outside in the cold to smoke      Volume overload.  ECHO with normal LVEF.  Continue Lasix 40 mg twice daily.  Hypoalbuminemia is likely contributing -start protein supplements twice daily.  Will also request OT evaluation regarding lymphedema wraps.   Encouraged to elevate legs.     Acute on chronic anemia, stable, no active bleeding, monitor intermittently.      HX of stage IV sacral decubitus ulcer with chronic osteomyelitis Had incision and drainage      Opioid dependence,  Chronic pain, initially methadone was held due to hypotension, resumed back on methadone, 10mg x 3 times daily      Severe MDD with psychotic features  Patient was recently evaluated by psychiatry       Quadriplegia secondary to T4 hematoma, neurogenic bladder,  Continue with gabapentin, baclofen, oxybutynin  S/p suprapubic catheter - needs to be changed every 4 weeks  Follow up with Urology as outpatient      History of recurrent urinary tract infections     History of DVT, and PE On Xarelto     History of subdural hematoma On 2017 April, Currently tolerating anticoagulation     Seizure disorder Stable ,  Continue keppra      Hypothyroidism continue levothyroxine    Barriers to Discharge : Diuresis  Anticipated Discharge :tomorrow   Disposition :LTC      Subjective  Patient reports that her breathing is better.  She continues to go outside and smoke several times daily.  Lower extremity edema is unchanged.  Oral intake is fair.   Patient reports that she had 3 BM today, however none has charted by nursing staff.    Objective    Vital signs in last 24 hours  Temp:  [97.8  F (36.6  C)-97.9  F (36.6  C)] 97.8  F (36.6  C)  Heart Rate:  [110-142] 142  Resp:  [16-18] 18  BP: (91-95)/(58-61) 91/61 93% O2 Device: None (Room air) O2 Flow Rate (L/min): 2 L/min  Weight:   163 lb (73.9 kg) Weight change:     Intake/Output last 3 shifts  I/O last 3 completed shifts:  In: 400 [P.O.:400]  Out: 3185 [Urine:3185]  Intake/Output this shift:  No intake/output data recorded.    Physical Exam:  GENERAL: No acute distress, sitting in a electric wheelchair   CARDIAC: Regular rate & rhythm, mild tachycardia. S1 & S2 normal.  No gallops or murmurs. 2 + LE edema  LUNGS: Crackles over lower lung fields bilaterally -improved since yesterday  ABDOMEN: Protuberant, mildly tender on the right side, bowel sounds present  NEURO: Paraplegia       Pertinent Labs   Lab Results: personally reviewed.   Results from last 7 days   Lab Units 02/25/19  0521 02/24/19  0545 02/22/19  0820   LN-SODIUM mmol/L 138 141 140   LN-POTASSIUM mmol/L 3.7 4.6 4.2   LN-CHLORIDE mmol/L 96* 102 101   LN-CO2 mmol/L 34* 33* 33*   LN-BLOOD UREA NITROGEN mg/dL 12 11 10   LN-CREATININE mg/dL 0.59* 0.50* 0.53*   LN-CALCIUM mg/dL 8.4* 8.0* 7.9*     Results from last 7 days   Lab Units 02/25/19  0521 02/22/19  0820 02/21/19  0907 02/20/19  0629   LN-WHITE BLOOD CELL COUNT thou/uL 14.0* 22.0* 19.0* 13.6*   LN-HEMOGLOBIN g/dL 9.0* 9.6* 9.8* 8.9*   LN-HEMATOCRIT % 31.2* 33.0* 33.5* 30.5*   LN-PLATELET COUNT thou/uL 294 343 371 318   LN-MONOCYTES - REL (DIFF) %  --  2 5 4           Medications  Scheduled Meds:    baclofen  10 mg Oral TID     eszopiclone  3 mg Oral QHS     furosemide  40 mg Oral BID - diuretic     gabapentin  900 mg Oral DAILY     levETIRAcetam  250 mg Oral BID     levothyroxine  125 mcg Oral Daily 0600     methadone  10 mg Oral TID     miconazole   Topical BID     multivitamin with minerals   1 tablet Oral DAILY     neomycin-bacitracin-polymyxin   Topical TID     omeprazole  20 mg Oral QAM AC     oxybutynin  5 mg Oral TID     pink lady  1 enema Rectal Once     polyethylene glycol  17 g Oral BID     polyethylene glycol  4,000 mL Oral Once     QUEtiapine  25 mg Oral BID     QUEtiapine  50 mg Oral QHS     rivaroxaban  20 mg Oral Daily with supper     senna-docusate  2 tablet Oral BID     sodium chloride  10-30 mL Intravenous Q8H FIXED TIMES     Continuous Infusions:  PRN Meds:.acetaminophen, albuterol, albuterol **AND** Nebulizer treatment intermittent, bisacodyl, calcium (as carbonate), cyclobenzaprine, HYDROmorphone, hydrOXYzine HCl, ibuprofen, magnesium hydroxide, melatonin, naloxone **OR** naloxone, OLANZapine, ondansetron **OR** ondansetron, polyethylene glycol, polyvinyl alcohol, sodium chloride bacteriostatic, sodium chloride bacteriostatic, sodium chloride, sodium chloride, sodium chloride, sodium phosphates 133 mL, traZODone    Pertinent Radiology   No new pertinent data    Advanced Care Planning:  Treatment/Discharge Planning discussed with the patient and DREAD Rodrigues MD  Internal Medicine Hospitalist  2/25/2019

## 2021-06-24 NOTE — PROGRESS NOTES
[facility] Daily Progress Note      Date of Service: 2/18/2019     Assessment and plan    Severe sepsis with impending septic shock  Off pressors at present  : Leukocytosis improved from 24.8 to 10.8  ; Blood pressure stable  ; Likely secondary to pneumonia probable aspiration pneumonia  : Leukocytosis improved  ; Continue broad-spectrum antibiotics as per ID recommendation  : CT chest abdomen pelvis not show any other pathology  : Continue antibiotics as per ID recommendation    Acute hypoxic respiratory failure: Resolved  Consider secondary to pneumonia and probable COPD exacerbation  : Off oxygen  : Steroids switch to oral today    Acute on chronic anemia  Hemoglobin down to 7.6  ; Baseline hemoglobin around 9  : Bleeding likely secondary to sacral decubiti  ; Hemoglobin stable at present  ; No sign of bleeding from sacral decubiti during examination of 2/17/2019    History of stage IV sacral decubitus ulcer with chronic osteomyelitis (7/2018) status post I&D and IV antibiotics and recurrences:  History of recurrent UTIs:  : allowed  examination on 2/17/2019: Appeared clean at that time, no signs of bleeding  : Continue wound care    Sinus tachycardia  ; EKG on 2/15/2019 showed sinus tachycardia  ; Treating underlying condition as above  ; Heart rate improved with treatment of underlying sepsis and hydration  ; Heart rate much better today  : We will keep on telemetry for 1 more day as steroids were switched to oral today    Opioid dependence:  Chronic pain:  -Follows with the PCP and plan to follow-up with the pain clinic.    On methadone and oxycodone as an outpatient.   She had been off methadone since admission which likely led to worsening of her chronic pain.   : Methadone was resumed on 2/16/2019 at 10 mg twice daily usually on 10 mg 3 times daily  : Mental status stable, no worsening respiratory status     Severe MDD with psychotic features:  Paranoia and anxiety  : Patient was recently evaluated by  psychiatry   on 72-hr hold per psychiatry with commitment hearing on 2/12 which she missed.  Trial date set for 2/19.   Continue quetiapine.  ; Denies depressed mood at present but very noncompliant     Severe constipation  ; Bowel regimen  : Denies any significant abdominal pain no nausea, tolerating diet  ; Patient states she will take a Fleet enema today when she is ready  ; States she will take today evening  ; Recent CT did not show any signs of obstruction     Quadriplegia secondary to T4 hematoma:  Neurogenic bladder:  -Patient has reported pelvic and abdominal pain on past admissions and has had UTIs treated with antibiotics.  -She was evaluated by urology in the past for urethral incompetence and lower abdominal/pelvic pain.    She had nephrostomies and suprapubic catheters in the past.    continue gabapentin and baclofen. Continue oxybutynin.   Replace suprapubic catheter every 4 weeks as an outpatient.    Has gonzalez due to incontinence and sacral decub  ; Suprapubic catheter not working properly: Urology consulted    Microcytic anemia  History of IV iron therapy  ; Last hemoglobin 9.7 on 2/14/2019  : No clear signs of active bleeding    History of DVT and PE  ; Continue Xarelto     History of subdural hematoma (4/2017):  : Tolerating anticoagulation     Hypothyroidism:  Continue levothyroxine.     History of seizure disorder:  Continue levetiracetam.     Insomnia:  Continue eszopiclone      This note was dictated using voice recognition software. Any grammatical or context distortions are unintentional and inherent to the software.  Subjective:   She was sitting up in her wheelchair in no acute distress  She is alert oriented  She was going through some paperwork when I saw her  States she feels fine denies any trouble breathing, no chest pain    Patient appears more cooperative and calm today    Did not make any inappropriate comments to me today      Brief History: 62 y.o. old female with a PMHx  "significant for COPD/emphysemia, LLL malignancy s/p resection, paraplegia secondary to T4 hematoma, neurogenic bladder, recurrent UTIs, history of seizure disorder, hypothyroidism, chronic pain syndrome on narcotics, decubitus right buttock, right lower extremity, right heel and left heel ulcers, rheumatoid arthritis, history of DVTs and PEs on anticoagulation, ESBL/MRSA/VRE infections previous hospitalization for chronic osteomyelitis of the ischium, and multiple hospitalizations who presented on 2/2/19 with uncontrolled pain.  Similar to her prior multiple hospitalizations, her behavior was a significant issue with abusiveness, and belligerence towards staff.  She was also nonadherent with medical care.  She was evaluated by psychiatry and was noted to have major depressive disorder with psychotic features as well as with a component of paranoia.  She was placed on a 72-hour hold.  Commitment hearing  on 2/12/19 which she refused to attend. Trial date 2/19.      Chart reviewed, events noted. Pt seen and examined.     Objective     Vital signs in last 24 hours:  Temp:  [96.6  F (35.9  C)-97.9  F (36.6  C)] 96.6  F (35.9  C)  Heart Rate:  [] 77  Resp:  [20] 20  BP: (110-145)/(58-79) 145/67  Weight:   163 lb (73.9 kg)  Weight change:   Body mass index is 29.81 kg/m .    Intake/Output last 3 shifts:  I/O last 3 completed shifts:  In: 1605 [P.O.:480; I.V.:1125]  Out: 725 [Urine:725]  Intake/Output this shift:  I/O this shift:  In: 480 [P.O.:480]  Out: 525 [Urine:525]      Physical Exam:  /67 (Patient Position: Sitting)   Pulse 77   Temp 96.6  F (35.9  C) (Oral)   Resp 20   Ht 5' 2\" (1.575 m)   Wt 163 lb (73.9 kg)   LMP  (LMP Unknown)   SpO2 95%   BMI 29.81 kg/m        O2 Device: None (Room air) O2 Flow Rate (L/min): 2 L/min    No significant change in physical exam compared to 2/17/2019  General Appearance:    Alert, no apparent distress.    HEENT:    Normocephalic. Pupils equal and reactive. No " scleral   Icterus. Moist oral mucosa    Lungs:     Clear to auscultation bilaterally, respirations unlabored  Mild bilateral wheezing   Heart::    Regular rate and rhythm, S1 and S2 normal, no murmur, rub   or gallop.   Abdomen:  Soft, mild local discomfort no significant tenderness  Bowel sounds present   Extremity :  Lower extremity edema, chronic ulcers present       CNS:   Alert and oriented,   no facial asymmetry  Paraplegic       Imaging:  personally reviewed.      Scheduled Meds:    baclofen  10 mg Oral TID     eszopiclone  3 mg Oral QHS     gabapentin  900 mg Oral DAILY     levETIRAcetam  250 mg Oral BID     levothyroxine  125 mcg Oral Daily 0600     meropenem  1 g Intravenous Q8H     methadone  10 mg Oral BID     miconazole   Topical BID     multivitamin with minerals  1 tablet Oral DAILY     neomycin-bacitracin-polymyxin   Topical TID     omeprazole  20 mg Oral QAM AC     oxybutynin  5 mg Oral TID     polyethylene glycol  17 g Oral DAILY     predniSONE  20 mg Oral Daily with brkfst     QUEtiapine  25 mg Oral BID     QUEtiapine  50 mg Oral QHS     rivaroxaban  20 mg Oral Daily with supper     sodium chloride  10-30 mL Intravenous Q8H FIXED TIMES     vancomycin  1.25 g Intravenous Q18H     Continuous Infusions:    sodium chloride 0.9% 50 mL/hr (02/18/19 0400)     PRN Meds:.acetaminophen, albuterol, albuterol **AND** Nebulizer treatment intermittent, bisacodyl, calcium (as carbonate), cyclobenzaprine, HYDROmorphone, hydrOXYzine HCl, ibuprofen, magnesium hydroxide, melatonin, naloxone **OR** naloxone, OLANZapine, ondansetron **OR** ondansetron, polyethylene glycol, polyvinyl alcohol, sodium chloride bacteriostatic, sodium chloride bacteriostatic, sodium chloride, sodium chloride, sodium chloride, sodium phosphates 133 mL, traZODone    Lab Results:  personally reviewed.   not applicable  Recent Results (from the past 24 hour(s))   Creatinine    Collection Time: 02/18/19  5:52 AM   Result Value Ref Range     Creatinine 0.56 (L) 0.60 - 1.10 mg/dL    GFR MDRD Af Amer >60 >60 mL/min/1.73m2    GFR MDRD Non Af Amer >60 >60 mL/min/1.73m2   HM2(CBC w/o Differential)    Collection Time: 02/18/19  5:52 AM   Result Value Ref Range    WBC 10.8 4.0 - 11.0 thou/uL    RBC 3.22 (L) 3.80 - 5.40 mill/uL    Hemoglobin 7.9 (L) 12.0 - 16.0 g/dL    Hematocrit 27.7 (L) 35.0 - 47.0 %    MCV 86 80 - 100 fL    MCH 24.5 (L) 27.0 - 34.0 pg    MCHC 28.5 (L) 32.0 - 36.0 g/dL    RDW 22.9 (H) 11.0 - 14.5 %    Platelets 316 140 - 440 thou/uL    MPV 9.7 8.5 - 12.5 fL               Advance Care Planning:   Barriers to discharge: IV antibiotics  Anticipated discharge day: Multiple days  Disposition: Unclear, ?  [place] as patient needs 7-10 days of IV antibiotics as per infectious disease      Gerard Juárez MD  [facility]  Hospitalist

## 2021-06-24 NOTE — PLAN OF CARE
Patient very drowsy at start of shift. BP was 83/48 recheck was 95/56. Patient snoring and barely aroused when blood pressure was rechecked. Patient slept soundly until around 5 AM when she woke up and demanded all the prn medications she could have and her methadone. Explained to patient it was too soon for Methadone as that is a scheduled medication. Patient was given flexeril, po dilaudid and ibuprofen. Patient moved to her w/c via ceiling lift, patient very demanding and particular about how she is moved. Patient was incontinent of stool before getting up to chair, new drsg placed to sacrum, patient will only allow drsg chair to be done per her directions, and patient preps the dressing and will only allow those to be used. Patient entire sacral area denuded and oozing. Patient went out to smoke after being put in her w/c.

## 2021-06-24 NOTE — PROGRESS NOTES
"Patient is soaked in urine since writer's arrival on shift at 1530, she adamantly refuses to be turned, moved, or cleaned up and the farrah sling is still underneath her and she refuses for staff to remove this also.     7:29 PM Patient continues to refuse to allow for incontinence care or turning and repositioning, stating she is in toomuch pain. MD discussed with her her current pain regimen.     7:58 PM  The patient allowed for this writer to place a sacral mepilex on her bottom however would not allow us to change any of the urine soaked sheets or remove the ceiling lift sling that is underneath her. She became very rude with staff about this, calling us dumb multiple times. Risks and benefits explained. She was also requesting an extra mg of dilaudid but this is outside of the order's parameters and this writer had administered the full PRN dose of 2mg of dilaudid previously.     8:38 PM  Writer was in the room with multiple other staff members to give HS medications and to give a PRN suppository per patient request because she stated she has not had a bowel movement in a long time and that she felt distended and like \"stool was coming up to my ribs.\"  She is refusing repositioning and refusing incontinence care. She is calling this writer stupid.   "

## 2021-06-24 NOTE — PLAN OF CARE
Continues to refuse all assessments including skin and vital signs. Refused to have gonzalez and supra pubic catheter outputs measured as well.

## 2021-06-24 NOTE — PLAN OF CARE
Impaired Gas Exchange      Demonstrate improved ventilation and adequate oxygenation of tissues as evidenced by absence of respiratory distress Adequate for Discharge          Ineffective Airway Clearance      Maintain airway patency Progressing        Khoi Jon, LRT

## 2021-06-24 NOTE — PLAN OF CARE
Care Plan  Care Management      Care Management Goals of the Day: Progression of care    Care Progression Reviewed With: Charge RN, MD, HUC, RNCM, CMSW    Barriers to Discharge: medical, commitment     Discharge Disposition: TBD    Expected Discharge Date: TBD    Transportation: TBD       Care Coordination Narrative:     At this time pt will remain on the court hold until the trial date of 2/19/2019. Pt will be evaluated by a court provider prior to this date.     SW will continue to follow and assist with further d/c needs.     CYNTHIA Gonsalez  2/12/2019 3:46 PM

## 2021-06-24 NOTE — PLAN OF CARE
Daily care needs are met Progressing      Pt alert and oriented. Vital signs stable. Pt in chair. Refused assessment this morning. Allowed me to give her her medications. Pt pleasant.

## 2021-06-24 NOTE — PROGRESS NOTES
Hospitalist Progress Note     Assessment/Plan:     Marychuy Zhou is a 62 y.o. 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  Similar to her prior multiple hospitalizations, her behavior was a significant issue with abusiveness, and belligerence towards staff.  She was also nonadherent with medical care.  She was evaluated by psychiatry and was noted to have major depressive disorder with psychotic features as well as with a component of paranoia.  She was placed on a 72-hour hold.  Commitment hearing  on 2/12/19 which she refused to attend. Trial date 2/19.    Active Problem:   Possible pneumonia vs pneumonitis:  - dyspnea with low sats, rhonchi. CXR on 2/12 with ARMANDO infiltrates, possible pneumonitis    Plan:  -Continue Levaquin x7 days. Prednisone for a total of x5 days. CBC in the morning.    Left arm numbness:  - likely positional as she lays on the left. No chest pain. Neck XR with degenerative changes, no fracture    Plan:  - off load left side.     Opioid dependence:  Chronic pain:  -Follows with the PCP and plan to follow-up with the pain clinic.  On methadone and oxycodone as an outpatient. She has been off methadone since admission which likely led to worsening of her chronic pain.  Now it was resumed on 2/12/19, Dilaudid has been decreased much to the disgruntlement of the patient but she appears more comfortable objectively.    Plan:  -Resumed methadone on 2/12.  Increase to 10 mg twice daily.  Decrease dilaudid as a result. Continue gabapentin    Severe MDD with psychotic features:  Paranoia:  Anxiety:  - on 72-hr hold per psychiatry with commitment hearing on 2/12 which she missed.  Trial date set for 2/19.  - appreciate psychiatry involvement.  Continue quetiapine.    Stage IV sacral decubitus ulcer with chronic osteomyelitis (7/2018) status post I&D and IV antibiotics and recurrences:  History of recurrent UTIs:  -Afebrile, no leukocytosis..stage III right sacral decubitus ulcer and probable stage IV left sacral decubitus ulcer.  Coccygeal ulcer as well. Right LE stage IV ulcer  -Past UTIs (ESBL E. coli/Klebsiella, Pseudomonas, Acinetobacter, enterococcus, Proteus).  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      Plan:  - continue keflex three times a day until 2/14 per ID recommendations.  Due to patient's refusal for turns and for nursing to change wound dressings, patient will likely reinfect her sacral wound and may at some point require resumption of antibiotics.  Appreciate WOCN involvement.     Severe constipation, narcotic induced as well as neurogenic, resolved:  -Continue Miralax scheduled. Prn senna.  Dulcolax as needed.     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  -She was evaluated by urology in the past for urethral incompetence and lower abdominal/pelvic pain.  She had nephrostomies and suprapubic catheters in the past.     Plan:  - continue gabapentin and baclofen. Continue oxybutynin. Replace suprapubic catheter every 4 weeks as an outpatient.     Microcytic anemia:  -Hemoglobin 11.7 above her baseline. Low MCV. No acute signs of blood loss. Anemia likely due to malnutrition.     Plan:  -Continue iron supplementation.  Recommend iron studies as an outpatient     Moderate protein calorie malnutrition:  -She does have  "low BUN signifying catabolic state and/or protein malnutrition.      Plan:  -Registered dietitian involved.     Centrilobular emphysema:  -Stable.  Not oxygen dependent.  No acute exacerbation.     Plan:  -Continue DuoNeb as needed.     History of DVT/PE:  -Continue rivaroxaban.     History of subdural hematoma (4/2017):  -No acute issues.     Hypothyroidism:  -Continue levothyroxine.     History of seizure disorder:  -Continue levetiracetam.    Insomnia:  -Continue eszopiclone     DVT prophylaxis: Continue rivaroxaban.      Subjective:     Patient's behavior without much significant change.  She was re-started on methadone yesterday.  To me, in comparison from yesterday, she seems more mild mannered and attempted to tell \"jokes\".  She still complained about her Dilaudid decreased to compensate for methadone initiation.     Review of systems:     Please see Subjective.    Current Medications:     Scheduled Meds:    baclofen  10 mg Oral TID     cephalexin  1,000 mg Oral TID     eszopiclone  3 mg Oral QHS     gabapentin  900 mg Oral DAILY     levETIRAcetam  250 mg Oral BID     levoFLOXacin  750 mg Oral Daily     levothyroxine  125 mcg Oral Daily 0600     methadone  10 mg Oral BID     miconazole   Topical BID     multivitamin with minerals  1 tablet Oral DAILY     neomycin-bacitracin-polymyxin   Topical TID     oxybutynin  5 mg Oral TID     polyethylene glycol  17 g Oral DAILY     predniSONE  30 mg Oral Daily with supper     QUEtiapine  25 mg Oral BID     QUEtiapine  50 mg Oral QHS     rivaroxaban  20 mg Oral Daily with supper     Continuous Infusions:  PRN Meds:.acetaminophen, albuterol, albuterol **AND** Nebulizer treatment intermittent, bisacodyl, bisacodyl, calcium (as carbonate), cyclobenzaprine, HYDROmorphone, hydrOXYzine HCl, ibuprofen, magnesium hydroxide, melatonin, naloxone **OR** naloxone, OLANZapine, omeprazole, ondansetron **OR** ondansetron, polyethylene glycol, polyvinyl alcohol, sodium chloride " bacteriostatic, traZODone    Objective:       Vital signs in last 24 hours:     Temp:  [97.6  F (36.4  C)] 97.6  F (36.4  C)  Heart Rate:  [101-117] 101  Resp:  [18-22] 20  BP: ()/(63-68) 116/67  Weight: 162 lb (73.5 kg)    Physical Exam:     General appearance: Alert, Awake, No distress   Head & Neck Atraumatic, supple, no tracheal deviation   ENT  PER, conjunctiva clear, no scleral icterus, EOMI, no nasal discharge   Resp:  Breathing unlabored       GI:  Mildly distended.   MSK: No swelling   Neuro:  Alert and oriented ×3, paraplegia, lack of redirectibility, some agitation.      Intake/Output this shift:     I/O last 3 completed shifts:  In: 950 [P.O.:950]  Out: 2250 [Urine:2250]    Pertinent Labs:     Lab Results: personally reviewed.     Pertinent Radiology:       Advanced Care Planning:     Barrier to discharge: Pending commitment process  Anticipated LOS: To be determined  Disposition: To be determined    Peggy Valentino M.D.  [facility] Hospitalist  Internal Medicine

## 2021-06-24 NOTE — PROGRESS NOTES
Wound Ostomy  WOC Assessment         Allergies:  No Known Allergies    Diagnosis:   Patient Active Problem List    Diagnosis Date Noted     Unable to control anger      Severe major depression, single episode, with psychotic features (H)      Paranoia (H)      Upper back pain      Abnormal CT scan of lung      Panic anxiety syndrome      Pelvic pain 01/09/2019     Ulcer due to Treponema vincentii, with fat layer exposed (H) 01/07/2019     Atelectasis      Tension-type headache, not intractable, unspecified chronicity pattern      Weakness of left hand 12/04/2018     Focal motor seizure (H) 12/04/2018     Pelvic pain in female 11/21/2018     Chronic osteomyelitis (H)      Drug-induced constipation      Quadriplegia (H)      Goals of care, counseling/discussion 07/16/2018     Advanced care planning/counseling discussion 07/16/2018     Malingerer for IV Dilaudid 07/16/2018     Moderate protein-calorie malnutrition (H) 07/16/2018     Infection 07/14/2018     Noncompliance with medication regimen      Stage IV pressure ulcer of sacral region (H) 07/11/2018     Gangrene (H) 07/10/2018     Centrilobular emphysema (H) 07/02/2018     Bilateral lower extremity edema 07/02/2018     Hypoxemia 07/01/2018     Left lower lobe pneumonia (H) 06/27/2018     Acute bronchitis due to other specified organisms 06/27/2018     Nephrostomy complication (H) 03/06/2018     History of encephalopathy      Closed left subtrochanteric femur fracture (H) 02/27/2018     Acute blood loss anemia      Other specified hypotension      Normocytic anemia 02/26/2018     Adjustment disorder with mixed anxiety and depressed mood      Sleep difficulties      Irritability and anger      Fever in other diseases 01/31/2018     Severe sepsis (H) 01/31/2018     Ulcer 01/31/2018     Hypothyroidism due to Hashimoto's thyroiditis      Abscess, gluteal, right      History of DVT (deep vein thrombosis)      Urinary incontinence due to urethral sphincter incompetence       Weakness 09/07/2017     Chronic anticoagulation 09/03/2017     Urinary tract infection associated with cystostomy catheter (H) 09/02/2017     Dislodged wound Vacuum 08/14/2017     Abdominal pain, unspecified location      Coagulopathy (H)      Anxiety      UTI (urinary tract infection) 08/10/2017     Elevated lactic acid level 07/29/2017     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     Suprapubic catheter dysfunction, subsequent encounter      Bilateral hydronephrosis      Cystitis      Colon wall thickening      Abdominal pain 06/03/2017     Flank pain 06/02/2017     Right upper quadrant abdominal pain 06/02/2017     Episodic cluster headache, not intractable      Sepsis (H) 05/28/2017     Sepsis secondary to UTI (H) 05/28/2017     Pyelonephritis      Hyponatremia      Chronic obstructive pulmonary disease with acute exacerbation (H) 04/18/2017     Depression 04/18/2017     Partial symptomatic epilepsy with simple partial seizures, intractable, with status epilepticus (H)      Acute on chronic intracranial subdural hematoma (H)      Brain edema (H)      Subdural hematoma (H) 04/09/2017     Convulsions, unspecified convulsion type (H)      Neck infection 03/31/2017     Lower abdominal pain 03/01/2017     Fever, unspecified fever cause      Pneumonia of left lower lobe due to infectious organism (H)      S/P cholecystectomy      Choledocholithiasis with obstruction 01/02/2017     Cholelithiasis 01/02/2017     Hx of pulmonary embolus 01/02/2017     Claustrophobia      Urinary tract infection, site unspecified      Hypotension, unspecified hypotension type      Chronic low back pain without sciatica, unspecified back pain laterality      Acute encephalopathy      Sepsis, due to unspecified organism (H) 12/30/2016     History of pulmonary embolus (PE) 12/01/2016     Cognitive disorder      Insomnia due to anxiety and fear      HCAP (healthcare-associated pneumonia) 11/04/2016     Atypical chest pain 10/23/2016     Diastolic  "CHF, acute on chronic (H)      Chest tightness or pressure      History of MDR Enterobacter cloacae infection      Anxiety disorder      Esophageal dysmotility      Major depressive disorder, recurrent episode, moderate (H)      Seizure disorder (H)      Heel ulcer, right, limited to breakdown of skin (H)      Decubitus ulcer of sacral region, stage 4 (H)      Paraplegia at T4 level (H) 04/28/2016     Hypokalemia      Chronic pain syndrome 01/12/2016     Major depression 01/12/2016     Alcohol abuse 01/12/2016     Hospital-acquired pneumonia 10/26/2015     COPD exacerbation (H) 08/03/2015     Gastroesophageal reflux disease without esophagitis 05/26/2015     Acquired hypothyroidism 05/26/2015     Iron deficiency anemia 05/26/2015     Opioid-induced constipation (OIC) 05/26/2015     Paraplegia (H) 05/26/2015     Palliative care encounter 12/08/2014     Nephrostomy tube displaced (H) 12/02/2014     Mechanical complication of suprapubic catheter (H) 11/20/2014     Acute respiratory failure with hypoxia (H) 08/19/2014     COPD (chronic obstructive pulmonary disease) (H) 08/19/2014     Lactic acidosis 08/19/2014     SVT (supraventricular tachycardia) (H) 08/19/2014     Dislodged Bhandari catheter, subsequent encounter 07/30/2014     Other chronic pain      Lumbosacral Disc Degeneration      Herpes Simplex Type I      Nicotine Dependence      Essential hypertension      Cancer of lung (H) 09/24/2013     Neurogenic bladder 01/29/2013     Neurogenic bowel 01/29/2013     Decubitus ulcer 01/25/2013     Mixed hyperlipidemia 01/25/2013     Decubitus ulcer of right buttock, stage 4 (H) 01/25/2013     Decubitus ulcer, stage III (H) 01/25/2013       Height:  5' 2\" (1.575 m)    Weight:   163 lb (73.9 kg)    Labs:  Recent Labs     03/01/19  1050   HGB 7.5*       Isaiah:  Isaiah Scale Score: 14    Specialty Bed:  Specialty Bed: [facility] RN attempted to see patient for wound assessment between 10:45 and 11:00 this AM. " Patient already up in wheelchair and on phone. Dressing changed prior to up to chair. Discussed with nursing staff, patient continues to refuse cares when needed (e.g. stool clean up, dressing changes) and has already stated to nursing staff that she will not go back to bed until this evening. If patient would go back to bed during this shift, will attempt to see later today; however, this is very unlikely as she generally remains up in the chair all day despite nursing recommendations to lay down to relieve pressure on buttocks/IT site.    12:04 Communicated with hospitalist regarding refusal of cares. Per hospitalist, continue to attempt to see patient for wound assessment during WOC hours and continue to document refusals if given or assessment if able to complete.

## 2021-06-24 NOTE — PLAN OF CARE
Compliance with treatment regimen Not Progressing      Damaged tissue is healing and protected Not Progressing    Still refusing assessment, wound cares, repositioning, and vital signs to be checked. Calls very frequently for staff. Does take pain medication and call often for it. Nurse writes time on white board to remind when it has been given. Goes out to smoke. Can hear congested cough and wheezing without stethoscope and proper assessment.

## 2021-06-24 NOTE — PROGRESS NOTES
"Patient refused to be repositioned all day while in bed. Patient requested to get up into her chair around 1130 and remains in her chair stating she does not want to get back in bed yet. Patient stated that she \"loved her nursing assistant today and she is a breath of fresh air.\"   "

## 2021-06-24 NOTE — PROGRESS NOTES
Asked by nursing to give patient a PRN neb.  On RT arrival, she refused the neb and said that she would call for it when she wanted it.

## 2021-06-24 NOTE — PROGRESS NOTES
"RT was called to give prn neb to pt. Pt did not have neb set up in room. When I returned from supply room, set up neb, pt was upset that I was not moving fast enough for her. Pt started name calling \"imbicile\",  \"fat a--\", and yelling expletives \"move faster, you are moving too f---ing slow\". I proceeded to hand the neb to pt and she was angry that there was no aerosol mask on it. Pt threw neb at me and said to get a mask now. I explained to pt that there was no aerosol masks on this unit at the moment and that I could go get one but it would take a few minutes. She started yelling at me to \"get the f--- out of her room you stupid imbicile and don't come back\". Pt did not get neb treatment. Reported to RN that pt did not get treatment, neb set up left on bedside table, aerosol mask was retrieved and brought down to the pt, left on iso cart as RN requested.     Marge Chacko, LRT    "

## 2021-06-25 NOTE — PLAN OF CARE
Blood pressure (BP) is within acceptable limits Progressing      Blood pressure (BP) is within acceptable limits Progressing    Would not allow bp to be checked  Daily care needs are met Progressing      Decrease patient's symptoms Progressing      Mobility/activity is maintained at optimum level for patient Progressing      Patient/family/caregiver demonstrates understanding of disease process, treatment plan, medications, and discharge instructions Progressing    Chooses when she wants medications or treatments.  Patient's pain/discomfort is manageable Progressing    Continues on scheduled pain medications as well as prn meds. Always rates pain above pain scale.  Patient will be injury free during hospitalization Progressing

## 2021-06-25 NOTE — PLAN OF CARE
Pain      Patient's pain/discomfort is manageable Progressing        Safety      Patient will be injury free during hospitalization Progressing        Pt refused vitals to be taken, she came back on the unit at 0157AM. Refused to get in bed, slept in her electric wheel chair. Pain controlled w/PRN Pain meds (see MAR). Pt refused most of her head to toe assessment. She has her call button in reach. Will continue to monitor.

## 2021-06-25 NOTE — PROGRESS NOTES
Wound Ostomy  WOC Assessment         Was notified that patient requested WOC to assess wounds. Attempted to see patient twice, patient sleeping both times. Per RN sacral dressing had already been changed today. Will attempt to follow up later this week for wound re-assessment.

## 2021-06-25 NOTE — PLAN OF CARE
Knowledge Deficit      Patient/family/caregiver demonstrates understanding of disease process, treatment plan, medications, and discharge instructions Progressing        Pt refused most assessments. She did allow labs after her extended smoke break.     Impaired Gas Exchange      Demonstrate improved ventilation and adequate oxygenation of tissues as evidenced by absence of respiratory distress Progressing        Pt complains of a cough but insists on smoking.

## 2021-06-25 NOTE — PLAN OF CARE
Blood pressure (BP) is within acceptable limits Not Progressing      Compliance with treatment regimen Not Progressing    Pt not compliant with care team assessments such as head to toe and vital signs. Actively non-compliant. BP systolic 90's and unable to re-check on shift. Pt has smoking waiver on file and regularly going out to smoke in motorized cart. Continuing to monitor.    Provide a safe environment for patient (Implement appropriate elements in plan of care) Progressing      To ensure patient safety, patient will abstain from any threats or actions to harm self or others Progressing    Pt call light at hand and answered promptly. Patient medication compliant and not threatening self or staff. Pleasant demeanor, excited to move into new assisted living facility. Continuing to monitor.

## 2021-06-25 NOTE — PLAN OF CARE
"Problem: Non-compliance with treatment regimen  Goal: Compliance with treatment regimen  Outcome: Not Progressing      Comments: Refused head to toe assessment, refused vital sign checks, refused catheter assessments and emptying, refused repositioning. Refused AM lab draw, stating \"they don't do that until afternoon, they have never done it at 6AM\". Yesterday labs resulted at 0617, informed pt of this and pt called writer a liar. Emotional support given and pt continued to verbally degrade writer stating, \"You don't know what you're talking about.\" \"Why are you so stupid if you're a nurse.\" Writer left room.  "

## 2021-06-25 NOTE — PLAN OF CARE
Problem: Discharge Barriers  Goal: Patient's discharge needs are met  Outcome: Progressing  Care Plan  Care Management      Care Management Goals of the Day: Progression of care    Care Progression Reviewed With: Charge RN, MD, HUC, RNCM, CMSW    Barriers to Discharge: CADI Assessment    Discharge Disposition: [facility]    Expected Discharge Date: TBD    Transportation: [facility] ([phone])        Care Coordination Narrative:       Pt has been accepted at [facility]. Pt will have her CADI assessment will be on Wednesday 3/27/2019 at 10:00am. [facility] is the the [facility]  ([phone]/F: [phone]).    CYNTHIA Gonsalez  3/21/2019  9:53 AM

## 2021-06-25 NOTE — PLAN OF CARE
Patient's pain/discomfort is manageable Progressing    Pt reports 12/10 generalized pain, PRN dilaudid given per pt request, pt sleeping after administration. Will continue to monitor.  Daily care needs are met Progressing    Pt denies chest pain, nausea or shortness of breath. Pt refused parts of assessment, refused assessment of sacral and heel wounds. Refused repositioning and cares. PRN robitussin given for weak congested cough. Pt allowed for PICC to be flushed, flushed very poorly/sluggish unable to obtain lab draw this am. Pt is in bed resting with eyes closed at this time, appears comfortable. Will continue to monitor.

## 2021-06-25 NOTE — PLAN OF CARE
"Patient will be injury free during hospitalization Progressing      Patient continues to refuse multiple cares this shift including - lab draws from PICC line, repositioning (did not get out of wheelchair this shift), shift vital signs, and for her urinary catheter to be emptied. She was on and off upset about this writer being \"behind\" on PRN dilaudid when the patient had not requested it. She complained of having her pneumonia being back and asked about her WBC count (MD notified) and requested more lasix and to be placed on antibiotics (MD notified). She was out to smoke multiple times this shift. She refused AM stool softeners.   "

## 2021-06-25 NOTE — PLAN OF CARE
Problem: Discharge Barriers  Goal: Patient's discharge needs are met  Outcome: Progressing  Care Plan  Care Management      Care Management Goals of the Day: Progression of care    Care Progression Reviewed With: Charge RN, MD, HUC, RNCM, CMSW    Barriers to Discharge: Placement    Discharge Disposition: TBD    Expected Discharge Date: TBD    Transportation: [facility] ([phone])    Care Coordination Narrative:      Mary from [facility] (P: [phone]/F: [phone]) met with pt today, 3/18/2019  at 9:00am for an in-person interview to see if she is a good fit for their [facility]. Once SW hears back from Mary on if they have accepted the pt the SW will proceed with obtaining CADI assessment. Per pt she felt the assessment went well and is anxious to move forward.     SW will continue to follow and assist with further d/c needs.     CYNTHIA Gonsalez  3/18/2019  3:53 PM

## 2021-06-25 NOTE — PLAN OF CARE
Psychosocial Needs      Demonstrates ability to cope with hospitalization/illness Progressing        Pt had spent entire night up in WC and wanted to lay down at 1100. NA and I spent 1 hour laying pt down on her terms, and doing a very quick dressing change to buttocks according to pt's directions, which did not include cleansing area. After pt settled in bed she requested WOC nurse, which was paged. Pt was asleep when WOC nurse attempted to see her 2 x. Pt also requested RT for a neb, and was also asleep when RT came. No respiratory distress noted. Refused much of assessment and VS.

## 2021-06-25 NOTE — PLAN OF CARE
This evening pt demanded to have two Dilaudid pain pills, stating that is what she receives every night. Pt has not been getting two Dilaudid pills at bedtime. She received a one time extra Dilaudid dose on Monday evening. Pt demanded nurse to call Hospitalist to come and see her and to get order for extra Dilaudid pill. Hospitalist refused to order Dilaudid and did not come to see pt. Pt then became upset with nurse and stated no staff tried hard enough for her and that she will be talking with administration tomorrow. Pt refused to let nurse get her in bed and to complete wound cares.

## 2021-06-25 NOTE — PLAN OF CARE
Patient left unit to smoke at 2335. At 0130 patient had not returned to the floor. Security was called to locate her via cameras. Patient was located in the [address] entrance vestibule.  This writer went to speak with patient. Patient agreed to returned to the floor but first went outside to smoke again. Will continue to monitor for patient return to floor.

## 2021-06-25 NOTE — PROGRESS NOTES
"Clinical Nutrition Therapy Follow Up Note    Current Nutrition Prescription:   Diet: regular;   Diet Supplements: strawberry Boost GC /day--sent with dinner.    Current Nutrition Intake:  Pt is ordering 8511-6314 calories (often with only 2 meals) and usually >  gm protein/day.  Intake records are spotting but when recorded it is 100%.      Pt is getting usually excessive calories and meeting protein needs most meals.      Anthropometrics:   Height: 5' 2\" (157.5 cm)  Admission weight: 160# stated  Weight: (pt refused )  Ideal body weight ~100# (IBW-10-15# for paraplegia)  Pt has very few actual weights and usually gives a stated wt with admission and refuses to cooperate to allow bed to be zeroed.  Wt listed for 12/6/18 was 11# higher than prior weights that mentioned that pt refused to allow excess linens to be removed from bed--suspect wt not accurate on 12/6/18 as well.  All prior weights are questionable for this reason and also due to weights with specialty mattresses/beds.    Edema: nonpitting generalized, pt wouldn't allow assessment of edema lately per RNs.    RD Nutrition Focused Physical Exam:  The patient has the following physical signs which could indicate malnutrition: No noted fat/muscle loss noted in face, arms.      GI Status/Output:   GI symptoms include:   Last BM: 3/20 1 loose  Appears to be stooling more regularly now.      Skin/Wound:  Isaiah score Isaiah Scale Score: 15; multiple pressure ulcers noted; WOCN following. Please see latest WOC RN notes for status of wounds.  Doubtful they will heal as pt continues to refuse cares and sits on stool and urine and refuses brief change.     Medications:  Medications reviewed.      Labs:  Labs reviewed:     Estimated Nutrition Needs:  Assessment weight is 45.5 kg, ideal weight     Energy Needs: 8819-6527 kcals daily, 30-35 kcal/kg  Protein Needs: 68-91 g daily, 1.5-2.0 g/kg.  Fluid Needs: ~4442-8925 mls daily, ~35-40 mls/kg  Very difficult to "  needs with no reliable weight with lower muscle mass w/ paraplegia but high needs for healing of multiple wounds.     Malnutrition: Not noted with no reliable hx re: weight or oral intake.     Nutrition Risk Level: stable-moderate risk    Nutrition dx:   Increased nutrient needs r/t wounds as evidenced by standard needs for multiple wounds noted in WOC RN note.     Goal:   Meet estimated nutrition needs and Wound healing. MET.    Intervention:   Continue current plan.    Monitoring/Evaluation:   Intake, labs, wounds.     Electronically signed by:  Cecile Black RD

## 2021-06-25 NOTE — PROGRESS NOTES
Hospitalist Progress Note    Assessment/Plan  A: Patient is a 64 y/o woman who has multiple medical problems including paraplegia and opioid dependence. Patient initially presented with sacral and heel pain but has since had an extended hospital course. Patient has completed a course of antibiotics for aspiration pneumonitis. Patient still is coughing, likely from acute pulmonary edema - this is resolving. Patient reported a history of emphysema but the only set of PFTs available are characteristic of restrictive lung disease related to obesity.     P:  1.) Cough, likely acute pulmonary edema, resolving: Patient on PO lasix now.  2.) Stage IV sacral decubitus ulcers with chronic osteomyelitis: Patient has completed a course of antibiotics. Wound care as necessary.  3.) Personal history of DVT/PE: Patient on xarelto.  4.) Chronic pain: Patient already on methadone with hydromorphone, cyclobenzaprine, ibuprofen and acetaminophen as needed  5.) Paraplegia: Monitoring for safety.  6.) Seizure disorder: Continue antiepileptics.  7.) Nicotine dependence/cigarette smoking, ongoing: PFTs might be considered as an outpatient.        Principal Problem:    Sepsis (H)  Active Problems:    Hospital-acquired pneumonia    Normocytic anemia    Noncompliance with medication regimen    Severe major depression, single episode, with psychotic features (H)    Paranoia (H)    Unable to control anger    Cough    Nonintractable epilepsy without status epilepticus, unspecified epilepsy type (H)    Acute pulmonary edema (H)      Barriers to Discharge:  Placement      Subjective  Patient reports cough improved. Patient notes no new problems.    Objective    Vital signs in last 24 hours  Heart Rate:  [107] 107  Resp:  [20] 20  BP: (102)/(65) 102/65 91% O2 Device: None (Room air) O2 Flow Rate (L/min): 4 L/min  Weight:   163 lb (73.9 kg) Weight change:     Intake/Output last 3 shifts  I/O last 3 completed shifts:  In: -   Out: 2301 [Urine:2300;  Stool:1]  Body mass index is 29.81 kg/m .    Physical Exam    General:     Patient comfortable, NAD.   HEENT:     No scleral icterus or conjunctival injection.   Heart:    RRR, S1 S2 w/o murmurs.   Lungs:     Breath sounds present. Some wheezing present. No crackles     heard.   Abdomen:   Soft, notnender.     Pertinent Labs   Lab Results: personally reviewed.   Results from last 7 days   Lab Units 03/19/19  0650 03/18/19  0504 03/17/19  1622   LN-SODIUM mmol/L 137 138 138   LN-POTASSIUM mmol/L 3.7 3.7 3.0*  3.0*   LN-CHLORIDE mmol/L 94* 93* 92*   LN-CO2 mmol/L 31 31 32*   LN-BLOOD UREA NITROGEN mg/dL 19 15 17   LN-CREATININE mg/dL 0.73 0.66 0.70  0.67   LN-CALCIUM mg/dL 9.1 9.5 9.5     Results from last 7 days   Lab Units 03/16/19  0617 03/15/19  1850 03/14/19  2234   LN-WHITE BLOOD CELL COUNT thou/uL 13.7* 13.7* 16.6*   LN-HEMOGLOBIN g/dL 9.4* 9.6* 10.2*   LN-HEMATOCRIT % 31.8* 32.8* 34.3*   LN-PLATELET COUNT thou/uL 265 296 323   LN-NEUTROPHILS RELATIVE PERCENT % 79*  --   --    LN-MONOCYTES RELATIVE PERCENT % 5  --   --                Medications  Scheduled Meds:    baclofen  10 mg Oral TID     calcium-vitamin D  1 tablet Oral DAILY     eszopiclone  3 mg Oral QHS     ferrous sulfate  325 mg Oral BID with meals     furosemide  40 mg Oral BID - diuretic     gabapentin  900 mg Oral DAILY     levETIRAcetam  250 mg Oral BID     levothyroxine  125 mcg Oral Daily 0600     methadone  10 mg Oral TID     multivitamin with minerals  1 tablet Oral DAILY     omeprazole  20 mg Oral QAM AC     oxybutynin  5 mg Oral TID     polyethylene glycol  17 g Oral QHS     QUEtiapine  25 mg Oral BID     QUEtiapine  50 mg Oral QHS     rivaroxaban  20 mg Oral Daily with supper     senna-docusate  2 tablet Oral BID     sodium chloride  10-30 mL Intravenous Q8H FIXED TIMES     Continuous Infusions:  PRN Meds:.acetaminophen, albuterol, alteplase, benzocaine-menthol, benzonatate, bisacodyl, calcium (as carbonate), codeine-guaiFENesin,  cyclobenzaprine, HYDROmorphone, hydrOXYzine HCl, ibuprofen, ipratropium-albuterol **AND** [] Nebulizer treatment intermittent, lidocaine, magnesium hydroxide, melatonin, miconazole nitrate, naloxone **OR** naloxone, OLANZapine, ondansetron **OR** ondansetron, polyethylene glycol, polyvinyl alcohol, sodium chloride bacteriostatic, sodium chloride bacteriostatic, sodium chloride, sodium chloride, sodium chloride, sodium phosphates 133 mL, traZODone    Imaging:    CXR: No change. Heart size normal. Mild diffuse interstitial prominence but no new focal pneumonia. PICC line appears in good position.

## 2021-06-25 NOTE — PLAN OF CARE
Daily Care      Daily care needs are met Progressing        Pain      Patient's pain/discomfort is manageable Progressing          Non-compliance with treatment regimen      Compliance with treatment regimen Not Progressing          Potential for Compromised Skin Integrity      Skin integrity is maintained or improved Not Progressing              VSS.Pain/discomfort managed with PRN Dilaudid, Ibuprofen, Flexeril and scheduled medications. Lung sounds are coarse and patient has a congested cough, PRN Robitussin given. Patient continues to go outside to smoke. Dressing done to sacrum after getting into bed for sleep. Prior to getting back up to the the patient became irritable and would not allow another needed dressing change. She was also very demanding while assisting with cares. Will continue to monitor.

## 2021-06-25 NOTE — PLAN OF CARE
Problem: Non-compliance with treatment regimen  Goal: Compliance with treatment regimen  Outcome: Not Progressing

## 2021-06-25 NOTE — PROGRESS NOTES
"Pt upset and yelling at staff after her requests for extra dilaudid were not honored by MD. Pt stated that she is taking this to administration and states \"I am relentless and will get my way\". Pt then proceeded to state that she controls 6 million people. Pt stated that she is in severe pain and reports \"I am drowning in my own secretions\" (However pt had just returned from smoking a cigarette) and pt further states that \"everyone around here has bad intentions, and I always have good intentions and that is why I needed to go smoke to be around people with good intentions\".  Pt stopping staff in the hallways and telling them \"you need to think before you speak.\"  Writer then asked pt if we could go to her room and discuss her concerns. Pt stated \"no\" writer then needed to take phone call and pt returned herself to her room while writer on phone.     Writer went to talk with patient and pt stated that she has informed her sons that if anything happens to her they are to file a wrongful death claim pt said she does not do well with \"unfinished business\" and stated \"I will have that doctor for lunch\".  Pt then stated that last comment is \"off the record.\" Pt stated \"I have  5 times and I can feel myself slipping away.\" Writer administered requested PRN medications at this time as well, PRN PO dilaudid, flexeril and tylenol. Pt then went to smoke again stating \"if I sit in this room I will focus on my pain.\"     Pt returned from having another cigarette and stated that writer was the only one allowed to do cares. Due to unit acuity and timing writer did cares. Pt was sitting in stool and was cleaned extensively, assisted into bed from chair, sacral wound assessed and new dressing applied and a large amount of barrier cream around the dressing per pts direction as pt states \"it is on fire.\" Refused heel assessment. Pt currently in bed appears comfortable. Will continue to monitor.    "

## 2021-06-25 NOTE — PROGRESS NOTES
Hospitalist Progress Note    Assessment/Plan  A: Patient is a 64 y/o woman who has multiple medical problems including paraplegia and opioid dependence. Patient initially presented with sacral and heel pain but has since had an extended hospital course. Patient has completed a course of antibiotics for aspiration pneumonitis. Patient still is coughing. Patient reported a history of emphysema but the only set of PFTs available are characteristic of restrictive lung disease related to obesity.    P:  1.) Cough: Continue incentive spirometer and acapella valve. Trial of 1 more dose of IV lasix.  2.) Stage IV sacral decubitus ulcers with chronic osteomyelitis: Patient has completed a course of antibiotics. Wound care as necessary.  3.) Personal history of DVT/PE: Patient on xarelto.  4.) Chronic pain: Patient already on methadone with hydromorphone, cyclobenzaprine, ibuprofen and acetaminophen as needed  5.) Paraplegia: Monitoring for safety.  6.) Seizure disorder: Continue antiepileptics.      Principal Problem:    Sepsis (H)  Active Problems:    Hospital-acquired pneumonia    Normocytic anemia    Noncompliance with medication regimen    Severe major depression, single episode, with psychotic features (H)    Paranoia (H)    Unable to control anger      Barriers to Discharge:  Placement      Subjective  Patient notes still having cough that is only occasionally productive. Patient notes no new problems.    Patient states that she was diagnosed with emphysema 30 years ago. Patient stated that she has not had pulmonary function tests for several years.    Objective    Vital signs in last 24 hours    98% O2 Device: None (Room air) O2 Flow Rate (L/min): 4 L/min  Weight:   163 lb (73.9 kg) Weight change:     Intake/Output last 3 shifts  No intake/output data recorded.  Body mass index is 29.81 kg/m .    Physical Exam    General:     Patient comfortable, NAD.   HEENT:     No scleral icterus or conjunctival injection.   Heart:     RRR, S1 S2 w/o murmurs.   Lungs:     Breath sounds present with coarsening.   Abdomen:   Soft, nontender.     Pertinent Labs   Lab Results: personally reviewed.   Results from last 7 days   Lab Units 19  1439 19  1234 19  0711 03/10/19  1417   LN-POTASSIUM mmol/L 4.3  --  3.5 4.2   LN-CREATININE mg/dL  --  0.79  --   --    LN-MAGNESIUM mg/dL  --   --  1.8  --      Results from last 7 days   Lab Units 19  0617 03/15/19  1850 19  2234  19  0711   LN-WHITE BLOOD CELL COUNT thou/uL 13.7* 13.7* 16.6*   < > 19.2*   LN-HEMOGLOBIN g/dL 9.4* 9.6* 10.2*   < > 9.3*   LN-HEMATOCRIT % 31.8* 32.8* 34.3*   < > 31.8*   LN-PLATELET COUNT thou/uL 265 296 323   < > 290   LN-NEUTROPHILS RELATIVE PERCENT % 79*  --   --   --  88*   LN-MONOCYTES RELATIVE PERCENT % 5  --   --   --  4    < > = values in this interval not displayed.               Medications  Scheduled Meds:    baclofen  10 mg Oral TID     calcium-vitamin D  1 tablet Oral DAILY     eszopiclone  3 mg Oral QHS     ferrous sulfate  325 mg Oral BID with meals     furosemide  40 mg Intravenous Once     furosemide  40 mg Oral BID - diuretic     gabapentin  900 mg Oral DAILY     levETIRAcetam  250 mg Oral BID     levothyroxine  125 mcg Oral Daily 0600     methadone  10 mg Oral TID     multivitamin with minerals  1 tablet Oral DAILY     omeprazole  20 mg Oral QAM AC     oxybutynin  5 mg Oral TID     polyethylene glycol  17 g Oral QHS     QUEtiapine  25 mg Oral BID     QUEtiapine  50 mg Oral QHS     rivaroxaban  20 mg Oral Daily with supper     senna-docusate  2 tablet Oral BID     sodium chloride  10-30 mL Intravenous Q8H FIXED TIMES     Continuous Infusions:  PRN Meds:.acetaminophen, albuterol, alteplase, benzocaine-menthol, benzonatate, bisacodyl, calcium (as carbonate), codeine-guaiFENesin, cyclobenzaprine, HYDROmorphone, hydrOXYzine HCl, ibuprofen, ipratropium-albuterol **AND** [] Nebulizer treatment intermittent, lidocaine,  magnesium hydroxide, melatonin, miconazole nitrate, naloxone **OR** naloxone, OLANZapine, ondansetron **OR** ondansetron, polyethylene glycol, polyvinyl alcohol, sodium chloride bacteriostatic, sodium chloride bacteriostatic, sodium chloride, sodium chloride, sodium chloride, sodium phosphates 133 mL, traZODone    Other:    PFTs in 2014:    The spirometry revealed a symmetric reduction in FVC and FEV1 with a normal  FEV1 to FVC ratio. There was no significant change after using  bronchodilators. Abbreviated lung volumes were done, and both the SVC and ERV  were reduced. Diffusion capacity was moderately reduced and improved to  normal after correction for alveolar volume.     CONCLUSION  The above is most suggestive of a restrictive ventilatory defect (secondary  to obesity). Complete lung volumes are necessary to substantiate the same.  Clinical correlation is recommended.

## 2021-06-25 NOTE — PROGRESS NOTES
Hospitalist Progress Note    Assessment/Plan  A: Patient is a 62 y/o woman who has multiple medical problems including paraplegia and opioid dependence. Patient initially presented with sacral and heel pain but has since had an extended hospital course. Patient has completed a course of antibiotics for aspiration pneumonitis. Patient still is coughing, likely from acute pulmonary edema Patient reported a history of emphysema but the only set of PFTs available are characteristic of restrictive lung disease related to obesity.     P:  1.) Cough, likely acute pulmonary edema: Continue IV lasix.  2.) Stage IV sacral decubitus ulcers with chronic osteomyelitis: Patient has completed a course of antibiotics. Wound care as necessary.  3.) Personal history of DVT/PE: Patient on xarelto.  4.) Chronic pain: Patient already on methadone with hydromorphone, cyclobenzaprine, ibuprofen and acetaminophen as needed  5.) Paraplegia: Monitoring for safety.  6.) Seizure disorder: Continue antiepileptics.  7.) Nicotine dependence/cigarette smoking, ongoing: PFTs might be considered as an outpatient.      Principal Problem:    Sepsis (H)  Active Problems:    Hospital-acquired pneumonia    Normocytic anemia    Noncompliance with medication regimen    Severe major depression, single episode, with psychotic features (H)    Paranoia (H)    Unable to control anger    Cough    Nonintractable epilepsy without status epilepticus, unspecified epilepsy type (H)      Barriers to Discharge:  Placement, behavioural problems; IV diuretics      Subjective  Patient notes improvement in cough. Patient notes no new problems.    Objective    Vital signs in last 24 hours  Heart Rate:  [98] 98  Resp:  [16-20] 16 93% O2 Device: None (Room air) O2 Flow Rate (L/min): 4 L/min  Weight:   163 lb (73.9 kg) Weight change:     Intake/Output last 3 shifts  I/O last 3 completed shifts:  In: -   Out: 1950 [Urine:1950]  Body mass index is 29.81 kg/m .    Physical  Exam    General:     Patient comfortable, NAD.   HEENT:     No scleral icterus or conjunctival injection.   Heart:    RRR, S1 S2 w/o murmurs.   Lungs:     Breath sounds present but coarsened.   Abdomen:   Soft, nontender.     Pertinent Labs   Lab Results: personally reviewed.   Results from last 7 days   Lab Units 03/12/19  1439 03/11/19  1234 03/11/19  0711 03/10/19  1417   LN-POTASSIUM mmol/L 4.3  --  3.5 4.2   LN-CREATININE mg/dL  --  0.79  --   --    LN-MAGNESIUM mg/dL  --   --  1.8  --      Results from last 7 days   Lab Units 03/16/19  0617 03/15/19  1850 03/14/19  2234  03/11/19  0711   LN-WHITE BLOOD CELL COUNT thou/uL 13.7* 13.7* 16.6*   < > 19.2*   LN-HEMOGLOBIN g/dL 9.4* 9.6* 10.2*   < > 9.3*   LN-HEMATOCRIT % 31.8* 32.8* 34.3*   < > 31.8*   LN-PLATELET COUNT thou/uL 265 296 323   < > 290   LN-NEUTROPHILS RELATIVE PERCENT % 79*  --   --   --  88*   LN-MONOCYTES RELATIVE PERCENT % 5  --   --   --  4    < > = values in this interval not displayed.               Medications  Scheduled Meds:    baclofen  10 mg Oral TID     calcium-vitamin D  1 tablet Oral DAILY     eszopiclone  3 mg Oral QHS     ferrous sulfate  325 mg Oral BID with meals     furosemide  40 mg Intravenous Q8H     gabapentin  900 mg Oral DAILY     levETIRAcetam  250 mg Oral BID     levothyroxine  125 mcg Oral Daily 0600     methadone  10 mg Oral TID     multivitamin with minerals  1 tablet Oral DAILY     omeprazole  20 mg Oral QAM AC     oxybutynin  5 mg Oral TID     polyethylene glycol  17 g Oral QHS     QUEtiapine  25 mg Oral BID     QUEtiapine  50 mg Oral QHS     rivaroxaban  20 mg Oral Daily with supper     senna-docusate  2 tablet Oral BID     sodium chloride  10-30 mL Intravenous Q8H FIXED TIMES     Continuous Infusions:  PRN Meds:.acetaminophen, albuterol, alteplase, benzocaine-menthol, benzonatate, bisacodyl, calcium (as carbonate), codeine-guaiFENesin, cyclobenzaprine, HYDROmorphone, hydrOXYzine HCl, ibuprofen, ipratropium-albuterol  **AND** [] Nebulizer treatment intermittent, lidocaine, magnesium hydroxide, melatonin, miconazole nitrate, naloxone **OR** naloxone, OLANZapine, ondansetron **OR** ondansetron, polyethylene glycol, polyvinyl alcohol, sodium chloride bacteriostatic, sodium chloride bacteriostatic, sodium chloride, sodium chloride, sodium chloride, sodium phosphates 133 mL, traZODone

## 2021-06-25 NOTE — PROGRESS NOTES
Hospitalist Progress Note        Assessment and Plan  Principal Problem:    Sepsis (H)  Active Problems:    Hospital-acquired pneumonia    Personal history of PE (pulmonary embolism)    Normocytic anemia    Noncompliance with medication regimen    Severe major depression, single episode, with psychotic features (H)    Paranoia (H)    Unable to control anger    Cough    Nonintractable epilepsy without status epilepticus, unspecified epilepsy type (H)    Acute pulmonary edema (H)    Cigarette smoker      Respiratory difficulty  -  Likely multifactorial.  Ongoing tobacco use likely playing a role.  -  Continue furosemide  -  Tobacco cessation education provided today      Paraplegia  -  Was due to an epidural hematoma  -  Continue baclofen  -  Continue gabapentin  -  Supportive care  -  Decubitus ulcer pecautions  -  Wound service consulted      History of seizure disorder  -  Continue levetiracetam  -  Monitoring     History of DVT and PE  -  Continue rivaroxaban anticoagulation      Hypothyroidism  -  Continue levothyroxine      Chronic pain syndrome  -  Continue methadone     Anxiety, other psychiatric conditions  -  Continue quetiapine    AQUILINO  -  Continue ferrous sulfate iron supplementation  -  Monitoring hemoglobin    Overactive bladder  -  Continue oxybutynin    Stage IV sacral decubitus ulcer with chronic osteomyelitis, heel wound  -  Wound service consulted    Tobacco use  -  Tobacco cessation education  -  Nicotine replacement therapy as indicated       VTE risk reduction.  On rivaroxaban anticoagulation.      Discharge.  Awaiting evaluation next week.                Brief Summary  63-year-old lady with a very complex past medical history including alcohol dependence, anxiety, chronic kidney disease, history of spinal hematoma with resultant lower extremity paraplegia and a chronic indwelling suprapubic catheter, recurrent UTIs, chronic pain syndrome, COPD, depression, history of DVT, GERD, gastroparesis,  hypertension, hypothyroidism, lung cancer status post Left lower lobe resection, obesity, rheumatoid arthritis and pulmonary embolism on chronic anticoagulation who is admitted for evaluation and management of respiratory difficulty.    Subjective and Interim Events  Reported ongoing cough.  Did not report CP or palpitations.    Physical Examination    Vital signs in last 24 hours  Temp:  [98  F (36.7  C)-98.9  F (37.2  C)] 98  F (36.7  C)  Heart Rate:  [114-127] 127  Resp:  [18-20] 20  BP: (111-120)/(58-75) 111/58 92% O2 Device: None (Room air) O2 Flow Rate (L/min): 4 L/min  Weight:   163 lb (73.9 kg) Weight change:     General: Middle-aged lady in wheelchair.  Awake, comfortable, NAD   HEENT: Sclera white.  No redness or jaundice.   Cardiac: RRR, no abnormal heart sounds   Pulmonary: CTA chirag   Abdomen: Nondistended.  Nontender to palpation.   Musculoskeletal: Paresis of bilateral lower extremities  Skin: Normally turgid   Neurologic: Awake, alert, appropriately interactive  Psychiatric: Calm      Intake/Output last 3 shifts  I/O last 3 completed shifts:  In: 770 [P.O.:740; I.V.:30]  Out: 2100 [Urine:2100]  Body mass index is 29.81 kg/m .        Pertinent Labs   Lab Results: personally reviewed.   Results from last 7 days   Lab Units 03/20/19  2359 03/20/19  1803 03/20/19  1335 03/19/19  0650 03/18/19  0504   LN-SODIUM mmol/L  --  136  --  137 138   LN-POTASSIUM mmol/L 4.2 3.5 3.1* 3.7 3.7   LN-CHLORIDE mmol/L  --  94*  --  94* 93*   LN-CO2 mmol/L  --  31  --  31 31   LN-BLOOD UREA NITROGEN mg/dL  --  20  --  19 15   LN-CREATININE mg/dL  --  0.74  --  0.73 0.66   LN-CALCIUM mg/dL  --  9.3  --  9.1 9.5     Results from last 7 days   Lab Units 03/20/19  1803 03/16/19  0617 03/15/19  1850   LN-WHITE BLOOD CELL COUNT thou/uL 15.5* 13.7* 13.7*   LN-HEMOGLOBIN g/dL 9.8* 9.4* 9.6*   LN-HEMATOCRIT % 33.0* 31.8* 32.8*   LN-PLATELET COUNT thou/uL 338 265 296   LN-NEUTROPHILS RELATIVE PERCENT %  --  79*  --    LN-MONOCYTES  RELATIVE PERCENT %  --  5  --                  Pertinent Radiology   Radiology Results: Personally reviewed impression/s     XR CHEST 1 VIEW PORTABLE  3/18/2019 11:17 AM     INDICATION: Cough, possible acute pulmonary edema  COMPARISON: 03/13/2019     FINDINGS: No change. Heart size normal. Mild diffuse interstitial prominence but no new focal pneumonia. PICC line appears in good position.          EKG Results: Personally reviewed ECG tracing.  3/9/19 ECG demonstrated sinus tachycardia.              Kurt Coleman MD, MS  Internal Medicine Hospitalist  3/21/2019

## 2021-06-25 NOTE — PLAN OF CARE
Problem: Discharge Barriers  Goal: Patient's discharge needs are met  Outcome: Progressing  Care Plan  Care Management      Care Management Goals of the Day: Progression of care    Care Progression Reviewed With: Charge RN, MD, HUC, RNCM, CMSW    Barriers to Discharge: CADI assessment    Discharge Disposition: Group Home    Expected Discharge Date: TBD    Transportation: [facility] ([phone])      Care Coordination Narrative:     Mary from [facility] (P: [phone]/F: [phone]) met with pt Yesterday, 3/18/2019  at 9:00am for an in-person interview to see if she is a good fit for their [facility]. At this time Mary has accepted the pt for the [facility].     SW has left a message for Ellen ([phone]) and supervisor Julia Rangel ([phone]) at [facility] in regards to getting an assessment for the pt for the CADI waiver. SW to await call back from either Ellen or Julia in regards to when waiver assessment can be done.      Previously per Ellen at this the Co is about 2-3 months out for general assessments, however if the SW finds a facility that can accept the (such as a group home) that could pt the pt closer to the top of the list. Ellen states that should something come up prior to that 2-3 month time frame the SW is able to call her back or to call her Supervisor, Julia Rangel ([phone]).    CYNTHIA Gonsalez  3/19/2019  10:21 AM     Update:    MARGI spoke with Ellen from [facility] and provided information about [facility]. Ellen will pass the information on the the  who will reach out the this SW with an assessment date and time. SW to await call from .    CYNTHIA Gonsalez  3/19/2019  1:15 PM     Update:    MARGI spoke with Taylor from [facility]. Taylor requested information be sent so she can start on getting the pt authorized for CADI. MARGI stated understanding of this and has sent requested information. Pt signed an IRIS  allowing for only the last psychiatric note to be sent which SW has done. Taylor with be out to assess the pt for the CADI waiver on Wednesday March 27th at 10:00am. CM to provide any further medical documentation at that time.     CYNTHIA Gonsalez  3/19/2019  4:15 PM

## 2021-06-25 NOTE — PLAN OF CARE
Pt refused assessment this am.  Pt up to wc going out to smoke.  Pt refused PICC line flushing at 08 and 1400.  No s/s of distress.  Vss. Will continue to monitor,

## 2021-06-25 NOTE — PROGRESS NOTES
Pt requested duoneb, had been out having a cigarette prior to request. Informed the person that called to have her take her MDI. Went up to check patient, asked if she took her MDI, she stated she did, and it doesn't work. I informed pt that if she chooses to smoke, she should not be requesting a neb treatment when she arrives back to her room.  She said the conversation was done, I left the room.

## 2021-06-25 NOTE — PLAN OF CARE
"Problem: Pain  Goal: Patient's pain/discomfort is manageable  Outcome: Not Progressing  Pt consistently complains of pain and states that it never gets below an 8. Given PRN dilauded x2, tylenol, ibuprofen, and flexeril. Continue to monitor and intervene as needed.     Problem: Psychosocial Needs  Goal: Demonstrates ability to cope with hospitalization/illness  Outcome: Not Progressing  Pt's mood is labile and is demanding. Was making rude comments to this writer such as \"you don't know how to do anything right and you're so slow\" but later made comments saying that this writer and nursing assistant are doing a great job and no one else has gotten me into bed this fast.\"     Problem: Non-compliance with treatment regimen  Goal: Compliance with treatment regimen  Outcome: Progressing  Pt was refusing vitals and not letting staff empty catheter bags until getting into bed at 2230. Catheter bags had 2800 mL of cloudy sedimented urine. No vital signs were obtained this shift. Did allow dressing changes when back in bed. Wounds are red and pink with serosanguinous drainage. Did not allow this writer to assess wound on foot and stated \"they already changed that dressing earlier today.\"     Comments: Pt made a comment to this writer stating that she was interviewing a place in [place] to move into on Monday. Stated that \"I run the show and they are coming here for me to interview.\"  "

## 2021-06-25 NOTE — PLAN OF CARE
"Problem: Pain  Goal: Patient's pain/discomfort is manageable  Outcome: Progressing      Problem: Psychosocial Needs  Goal: Demonstrates ability to cope with hospitalization/illness  Outcome: Not Progressing      Comments: A&Ox4, able to verbalize needs. Refused all VS assessments, refused head to toe assessment. Writer + Nursing assistant spent >45 minutes putting pt into bed from electric wheelchair. Pt directed cares, verbally abusive toward writer and nursing assistant. Asked pt x2 if we could change dressings on coccyx, pt refused stating \"It'll get done when I want.\" After 45 minutes of repositioning pt finally comfortable in bed and staff leaving room pt states \"Where are you going, I need my dressings changed.\" Writer + Nursing assistant spent another 15 minutes changing dressing on coccys and repositioning pt per her demands. Pt very particular with cares, demanding, verbally abusive.   "

## 2021-06-25 NOTE — PLAN OF CARE
"Care Plan Progress Note:    Name: Marychuy Zhou  :   1955  MRN:   [id number]    Problem: Behaviors / Refusal of Assessments  Goal: Patient will cooperative in plan of care  Outcome: Not Progressing    1615:  Patient is currently up in her electric wheelchair in her room.  Patient refused her labs to be drawn for 0600 and for the day shift nurse.  Patient was started on IV Lasix today.  Writer explained to the patient - the importance of getting her labs drawn so we can monitor her Potassium level.  Writer explained to the patient that IV Lasix can cause your Potassium level to drop.  Patient stated \"no, the doctor told me I don't need them done until tomorrow\".  Writer also explained that medications can't be given until we know what her blood levels are.  Patient immediately started swearing at writer stating \"you stupid fucking nurse\" and demand that writer calls the provider.  Patient finally stated \"I'll let you draw my blood, but I'm going to call the charge nurse and report you\".  Patient continued to verbally abuse writer as writer was drawing her blood from the PICC line stating \"you are a liar\" and \"how do you have a job, you incompetent idiot\".  Oral Dilaudid PRN given and Ibuprofen PRN given.  Scheduled medications given.  Patient refused to rate her pain stating \"don't talk to me\".  Writer was calm and exited the room after medications were given.    Patient refused all assessments.  At 1645, noted K+ level to come back at 3.0.  Patient was in her electric wheelchair and out to the desk.  Writer explained to the patient that her K+ level was low and that two oral K+ doses were ordered per the protocol - and asked patient if she would agree to take the K+ pills.  Patient ignored writer, looked away, and proceeded not to respond .  Patient took off on her scooter then yelled down the hallway \"I'm not refusing you stupid ass nurse\".  Patient left the unit.  Writer re-scheduled K+ to be given at 8PM " "and 12AM - with a K+ level to be re-checked in the morning.      1715:  Patient is back to the unit - demanding and yelling that writer \"calls the doctor\".  Writer informed patient that the doctors will be back tomorrow to round on her - and to express her concerns to them then.  Patient proceeded to go back to her room.    3/17/2019 5:00 PM    Penelope Ortiz RN      "

## 2021-06-25 NOTE — PROGRESS NOTES
"Clinical Nutrition Therapy Follow Up Note    Current Nutrition Prescription:   Diet: regular;   Diet Supplements: strawberry Boost GC /day--sent with dinner.    S: Pt not in room.  It appears she ate the majority of food on her tray.  However she appears to be hoarding some non-perishable items like potato chips and pop.     Current Nutrition Intake:  Pt is ordering 5510-6482 calories (usually >3000) and usually > 100 gm protein/day.  Intake records are spotting but when recorded it is 100%.  However this morning it was recorded she had 100% of breakfast but she didn't order a tray--unclear if she went down to get food from cafeteria.      Pt is getting usually excessive calories and meeting protein needs.      Anthropometrics:   Height: 5' 2\" (157.5 cm)  Admission weight: 160# stated  Weight: (refused) 2/17  Ideal body weight ~100# (IBW-10-15# for paraplegia)  Pt has very few actual weights and usually gives a stated wt with admission and refuses to cooperate to allow bed to be zeroed.  Wt listed for 12/6/18 was 11# higher than prior weights that mentioned that pt refused to allow excess linens to be removed from bed--suspect wt not accurate on 12/6/18 as well.  All prior weights are questionable for this reason and also due to weights with specialty mattresses/beds.    Edema: nonpitting generalized, pt wouldn't allow assessment of edema lately per RNs.    RD Nutrition Focused Physical Exam:  The patient has the following physical signs which could indicate malnutrition: No noted fat/muscle loss noted in face, arms.      GI Status/Output:   GI symptoms include:   Last BM: 3/13- 2 BMs  Appears to be stooling more regularly now.      Skin/Wound:  Isaiah score Isaiah Scale Score: 14; multiple pressure ulcers noted; WOCN following. Please see latest WOC RN notes for status of wounds.  Doubtful they will heal as pt continues to refuse cares and sits on stool and urine and refuses brief change. "     Medications:  Medications reviewed.      Labs:  Labs reviewed:     Estimated Nutrition Needs:  Assessment weight is 45.5 kg, ideal weight     Energy Needs: 4950-0541 kcals daily, 30-35 kcal/kg  Protein Needs: 68-91 g daily, 1.5-2.0 g/kg.  Fluid Needs: ~7572-0768 mls daily, ~35-40 mls/kg  Very difficult to  needs with no reliable weight with lower muscle mass w/ paraplegia but high needs for healing of multiple wounds.     Malnutrition: Not noted with no reliable hx re: weight or oral intake.     Nutrition Risk Level: stable-moderate risk    Nutrition dx:   Increased nutrient needs r/t wounds as evidenced by standard needs for multiple wounds noted in WOC RN note.     Goal:   Meet estimated nutrition needs and Wound healing. MET.    Intervention:   Continue current plan    Monitoring/Evaluation:   Intake, labs, wounds.     Electronically signed by:  Cecile Black RD

## 2021-06-25 NOTE — PROGRESS NOTES
Patient up in chair.  Has non-productive cough.  Refusing VS at this time.  Pain rates 9/10 to LE/back.  PRN Dilaudid given.  El Figueroa RN

## 2021-06-25 NOTE — PROGRESS NOTES
Wound Ostomy  WOC Assessment         Patient not in bed at planned assessment time.  Reports her pain was too bad at 4:30AM and she had to get up.  Patient plans to stay up in chair so assessment of buttocks and sacral ulcers not possible today.  Will attempt to see patient again next week.

## 2021-06-25 NOTE — PROGRESS NOTES
03/21/19 0011   Reason for Assessment (RCAT)   RCAT Assesment Re-eval   Assessment Reason  COPD   $ RCAT Eval Time 15 min.  Yes   Vitals (RCAT)   Heart Rate (!) 115   Resp 18   SpO2 96 %   Chart Assessment (RCAT)   Pulmonary Status  3   Surgical Status  0   Chest Xray  0   Patient Assessment (RCAT)    Respiratory Pattern  0   Mental Status  0   Breath Sounds  4   Cough Effectiveness  2   Level of Activity  2   O2 Required SpO2 >= 92%  1   Chart + Pt. Assessment Total Points  12   RCAT Acuity Score 12 (Acuity 3)   Clinical Indications (RCAT)   Aerosol Hygiene Bronchospasm   Broncho-Pulmonary Therapy Productive cough   RCAT Order Placed  No     I am called to assess/give the patient a nebulizer treatment. Pt is not in respiratory distress and is on 2 lpm oxygen sating high 90s. BS are coarse/expiratory wheezes, and the cough is weak/moist. Bronchial hygiene is at the bedside, but pt does not use it. Pt did not finish the neb treatment. Pt is able to use albuterol inhaler. RT will monitor.    BENJY SalcedoT

## 2021-06-25 NOTE — PLAN OF CARE
Compliance with treatment regimen Progressing      Patient's pain/discomfort is manageable Progressing      Pt pleasant with interactions this am.  Rated pain 7/10, will continue to monitor.  Pt met with owner of [employer] this am to determine possible placement.  Contact precautions continued.  Pt refused vital signs until 1330, , refused to have gonzalez emptied.

## 2021-06-25 NOTE — SIGNIFICANT EVENT
"Care Plan Progress Note:    Name: Marychuy Zhou  :   1955  MRN:   [id number]    Patient put on her call light at 0315 requesting to get up into her wheelchair.  Patient's nurse and NA were on break.  Explained to the patient that her nurse and NA will be in to get her up in about 30 minutes.  Patient refused and demanded \"someone get in there right now\" and patient was yelling/screaming over the phone.  Patient continued to put on her call light multiple times and continuously call the nursing station phone - just screaming and yelling \"to get in her room right now\".  Patient was stating \"if she doesn't get up now, she can't breathe\" and \"she has pneumonia that she needs to get up\".  Received a call from security that patient called the [facility] [employer] a couple times.  Primary RN left her break early to come out to nursing station to get patient up into her wheelchair.  Patient was up to her wheelchair by 3:45AM and proceeded to go outside to smoke.    3/16/2019 4:00 AM    Penelope Ortiz RN      "

## 2021-06-25 NOTE — PLAN OF CARE
Daily care needs are met Progressing      Pt has been sleeping most of the shift. PRN Dilaudid, Flexeril and Ibuprofen given around 0425.

## 2021-06-25 NOTE — PLAN OF CARE
Pt alert and oriented. Slept most of shift in bed. Pt up to chair this am. Pleasant but very demanding of staff. Dressing on bottom changed. Pt asking for PRN pain medications. Pt complies with safety precautions. Uses call light as needed. No other concerns at this time. Will continue to monitor.

## 2021-06-25 NOTE — PLAN OF CARE
Skin integrity is maintained or improved Change in Patient Condition      Damaged tissue is healing and protected Change in Patient Condition    After the feet wounds were evaluated per Frank Macario [facility] RN it was noted that the pt's wounds have worsened. And that is due to refusal to off load heels, non-compliance with plan of care, and refusal to have wound care performed.   Patient/family/caregiver demonstrates understanding of disease process, treatment plan, medications, and discharge instructions Not Applicable this Shift      SS still having issues locating an accepting facility. Please review care management and SS notes.   To ensure patient safety, patient will abstain from any threats or actions to harm self or others Progressing      Pt has been quite pleasant to this writer. No threats. Noted pt to engage in fabrication of stories about previous shift nurses, also demeaning statement about charge RN but has been kind to this writer.   Staff will follow behavior, intervene PRN and document.

## 2021-06-25 NOTE — PLAN OF CARE
Blood pressure is elevated above 140 over 90 mmHg      Blood pressure (BP) is within acceptable limits Progressing        Daily Care      Daily care needs are met Progressing        Decreased Mental Status Causing Increased Need for Safety      Provide a safe environment for patient (Implement appropriate elements in plan of care) Progressing      Decrease patient's symptoms Progressing      To ensure patient safety, patient will abstain from any threats or actions to harm self or others Progressing        Impaired Gas Exchange      Demonstrate improved ventilation and adequate oxygenation of tissues as evidenced by absence of respiratory distress Progressing        Infection      Signs and symptoms of infections are decreased or avoided Progressing        Insufficient Nutritional Intake      Mobility/activity is maintained at optimum level for patient Progressing        Knowledge Deficit      Patient/family/caregiver demonstrates understanding of disease process, treatment plan, medications, and discharge instructions Progressing        Non-compliance with treatment regimen      Compliance with treatment regimen Progressing        Pain      Patient's pain/discomfort is manageable Progressing        Potential for Compromised Skin Integrity      Skin integrity is maintained or improved Progressing        Potential for Falls      Patient will remain free of falls Progressing        Potential for transmission of organism by Contact, Enteric, Droplet and/or Airborne routes      Prevent transmission of organisms Progressing        Psychosocial Needs      Demonstrates ability to cope with hospitalization/illness Progressing      Collaborate with patient/family/caregiver to identify patient specific goals for this hospitalization Progressing        Safety      Patient will be injury free during hospitalization Progressing        Tissue Integrity      Damaged tissue is healing and protected Progressing

## 2021-06-25 NOTE — PLAN OF CARE
Demonstrate improved ventilation and adequate oxygenation of tissues as evidenced by absence of respiratory distress Not Progressing    At beginning of shift patient shortness of breath with a very weak congested cough.  Pt allowed RN to only  assess lung sounds and take her temperature. Lung sounds coarse, O2 applied via nasal cannula at 3 liters. RT called to floor to give a neb tx.  Pt stated she felt better and then went to sleep. All other assessments were refused as well as her other vitals.  Will continue to monitor.  Patient's pain/discomfort is manageable Progressing    Pt states pain adequately controlled with current pain medications.  Essential oils offered prn.  Will continue to monitor.

## 2021-06-25 NOTE — PROGRESS NOTES
"Writer brought PO dilaudid 2mg to room and administered to pt along with PRN tylenol and Flexeril. Pt was having wound care done per WO RN Frank Macario. Pt stated \"Oh hey I dropped my dilaudid on the floor, you're going to need to get me a new one.\" Writer began searching floor and found 1 ES tylenol on the floor no dilaudid. WOC RN also assisted writer with attempting to find the dropped dilaudid. Writer informed charge RN. WO RN even stated to pt that he also only saw the tylenol hit the floor. Pt again insisted the dilaudid fell.   Charge RN came to room with a sweeper and swept entire floor moving all furniture around. No dilaudid pill was ever found. Pt refused to have her W/C and blankets inspected for lost pill.   Charge RN informed pt that the writer would not be able to replace the missing pill until the pill was found.  Pt continued to carry on and complain about the charge RN and called her \"Nurse Dalton\".   Pt then tried to tell writer it was her discretion.   Writer called MD and spoke to MD about the situation and MD ordered for a pt to receive a replacement dose even though the missing pill was not located. Pt is not opiate naive.   "

## 2021-06-25 NOTE — PLAN OF CARE
Problem: Discharge Barriers  Goal: Patient's discharge needs are met  Outcome: Progressing  Care Plan  Care Management      Care Management Goals of the Day: Progression of care    Care Progression Reviewed With: Charge RN, MD, HUC, RNCM, CMSW    Barriers to Discharge: CADI assessment    Discharge Disposition: [facility]    Expected Discharge Date: TBD    Transportation: Critical access hospital ([phone])      Care Coordination Narrative:      Pt has been accepted at [facility]. Pt will have her CADI assessment will be on Wednesday 3/27/2019 at 10:00am. [place] is the the [facility] Choices  ([phone]/F: [phone]).    CYNTHIA Gonsalez  3/20/2019  8:00 AM

## 2021-06-25 NOTE — PLAN OF CARE
Problem: Pain  Goal: Patient's pain/discomfort is manageable  Outcome: Progressing  PRN dilaudid was given x1 this shift for 8/10 pain.        Problem: Potential for transmission of organism by Contact, Enteric, Droplet and/or Airborne routes  Goal: Prevent transmission of organisms  Outcome: Progressing  Contact precautions continue to be observed. Isolation cart is located outside patient's room.

## 2021-06-25 NOTE — PROGRESS NOTES
Hospitalist Progress Note     Assessment/Plan:     Marychuy Zhou is a 62 y.o. 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 Her pain regimen was adjusted and pain better controlled.  She had some issues with staff members during her hospital stay. She was evaluated by psychiatry and was noted to have major depressive disorder with a component of paranoia. During her hospitalization here, she completed 10-day course of vancomycin and meropenem per ID.  She was also treated for recurrent aspiration pneumonia. She is currently medically stable for discharge.  Awaiting placement.    Active Problem:   Probable recurrent aspiration pneumonia:  -Current respiratory status stable.  CXR on 2/12 with ARMANDO infiltrates, possible pneumonitis, repeat CXR 3/13 with improvement. Leukocytosis likely due to prednisone taper which she finished on 3/15. Afebrile and will trend CBC to see if it persists beyond stopping prednisone taper. Patient is a recurrent aspiration risk as she does not follow precautions and refuses labs when she feels like it.    Plan:  -Finished antibiotics recently.  Finishing prednisone taper today.  SLP involved.  Patient refused video swallow study.  Not following aspiration precautions.  -flutter valve.  - CBC and procalcitonin in the AM    Opioid dependence:  Chronic pain:  -Follows with the PCP and plan to follow-up with the pain clinic.  On methadone and oxycodone as an outpatient. Continued  here.    Plan:  -Continue methadone 3 times daily.  On prn dilaudid. Continue gabapentin. Pain clinic referral on discharge.    Severe MDD with psychotic features:  Paranoia:  Anxiety:  -Continue quetiapine.    Stage IV sacral decubitus ulcer with chronic osteomyelitis (7/2018) status post I&D and IV antibiotics and recurrences:  History of recurrent UTIs:  -Afebrile, no leukocytosis..stage III right sacral decubitus ulcer and probable stage IV left sacral decubitus ulcer.  Coccygeal ulcer as well. Right LE stage IV ulcer, patient not fully cooperative with wound cares and high risk of reinfection.  -Past UTIs (ESBL E. coli/Klebsiella, Pseudomonas, Acinetobacter, enterococcus, Proteus).  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      Plan:  -Finished course of antibiotics. Appreciate WOCN involvement.     Severe constipation, narcotic induced as well as neurogenic, resolved:  -Continue Miralax scheduled. Prn senna.  Dulcolax as needed.     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  -She was evaluated by urology in the past for urethral incompetence and lower abdominal/pelvic pain.  She had nephrostomies and suprapubic catheters in the past.     Plan:  - continue gabapentin and baclofen. Continue oxybutynin. Replace suprapubic catheter every 4 weeks as an outpatient.     Microcytic anemia:  - No acute signs of blood loss. Anemia likely due to malnutrition.     Plan:  -Continue iron supplementation.  Recommend iron studies as an outpatient     Moderate protein calorie malnutrition:  -She does have low BUN signifying catabolic state and/or protein malnutrition.      Plan:  -Registered dietitian involved.     Centrilobular emphysema:  -Stable. Not oxygen dependent. No acute exacerbation.     Plan:  -Continue DuoNeb as needed.     History of  DVT/PE:  -Continue rivaroxaban 20 mg daily.  DVT likely unprovoked and due to paraplegia and at an increased risk of recurrent DVT.  Continue current dose.     History of subdural hematoma (2017):  -No acute issues.     Hypothyroidism:  -Continue levothyroxine.     History of seizure disorder:  -Continue levetiracetam.    Insomnia:  -Continue eszopiclone     DVT prophylaxis: Continue rivaroxaban.      Subjective:     Requesting more dilaudid and received an extra one yesterday. Agitated at times. Reports she may have pneumonia but is afebrile, does not follow aspiration precautions and goes out of the hospital to smoke frequently. She requests antibiotics and lab draws.    Review of systems:     Please see Subjective.    Current Medications:     Scheduled Meds:    baclofen  10 mg Oral TID     calcium-vitamin D  1 tablet Oral DAILY     eszopiclone  3 mg Oral QHS     ferrous sulfate  325 mg Oral BID with meals     furosemide  40 mg Oral BID - diuretic     gabapentin  900 mg Oral DAILY     levETIRAcetam  250 mg Oral BID     levothyroxine  125 mcg Oral Daily 0600     methadone  10 mg Oral TID     multivitamin with minerals  1 tablet Oral DAILY     omeprazole  20 mg Oral QAM AC     oxybutynin  5 mg Oral TID     polyethylene glycol  17 g Oral QHS     QUEtiapine  25 mg Oral BID     QUEtiapine  50 mg Oral QHS     rivaroxaban  20 mg Oral Daily with supper     senna-docusate  2 tablet Oral BID     sodium chloride  10-30 mL Intravenous Q8H FIXED TIMES     Continuous Infusions:  PRN Meds:.acetaminophen, albuterol, alteplase, bisacodyl, calcium (as carbonate), codeine-guaiFENesin, cyclobenzaprine, HYDROmorphone, hydrOXYzine HCl, ibuprofen, ipratropium-albuterol **AND** [] Nebulizer treatment intermittent, lidocaine, magnesium hydroxide, melatonin, miconazole nitrate, naloxone **OR** naloxone, OLANZapine, ondansetron **OR** ondansetron, polyethylene glycol, polyvinyl alcohol, sodium chloride bacteriostatic, sodium  chloride bacteriostatic, sodium chloride, sodium chloride, sodium chloride, sodium phosphates 133 mL, traZODone    Objective:       Vital signs in last 24 hours:     Heart Rate:  [108-132] 112  Resp:  [16-22] 22  BP: ()/(52-86) 93/52  Weight: 163 lb (73.9 kg)    Physical Exam:     General appearance: Alert, Awake, No distress   Head & Neck Atraumatic, supple, no tracheal deviation   ENT  PER, conjunctiva clear, no scleral icterus, EOMI, no nasal discharge   Resp: +rhonchi, no wheezing, no crackles   CVS: S1, S2 normal, regular rate and rhythm, no murmurs   GI: Soft, nontender, distended and BS+   MSK: No swelling, or tenderness   Neuro:  Alert and oriented ×3, paraplegia      Intake/Output this shift:     I/O last 3 completed shifts:  In: 240 [P.O.:240]  Out: 2100 [Urine:2100]    Pertinent Labs:     Lab Results: personally reviewed.     Pertinent Radiology:       Advanced Care Planning:     Barrier to discharge: Placement  Anticipated LOS: To be determined  Disposition: To be determined    Peggy Valentino M.D.  [facility]  Internal Medicine

## 2021-06-25 NOTE — PLAN OF CARE
Problem: Psychosocial Needs  Goal: Demonstrates ability to cope with hospitalization/illness  Outcome: Progressing      Problem: Tissue Integrity  Goal: Damaged tissue is healing and protected  Outcome: Not Progressing      Comments: A&Ox4, able to verbalize needs. Refused vital signs and head to toe assessment. At start of shift pt was in room sleeping in wheelchair, when awoke for assessment (refused) pt stated she would just sleep in chair and to leave her alone. Has slept majority of shift thus far, refused to reposition, refused to have catheters assessed or emptied. Will re approach later in shift. Currently resting in wheelchair, no s/s distress, gripper socks in place, call light within reach, room near nurses station and door open.

## 2021-06-25 NOTE — PLAN OF CARE
Patient's pain/discomfort is manageable Progressing      Pt refused vital signs and assessment to be done. Patient refused to have gonzalez and suprapubic emptied.  Let RN draw blood from PICC line.     Patient demanding and particular about cares. When she wants something she wants it done right away.Poonam Weiner

## 2021-06-25 NOTE — PLAN OF CARE
Daily Care      Daily care needs are met Not Progressing    Pt refused all personal cares and assessments, has been sitting in chair all day, has been outside to smoke several times.      Potential for Compromised Skin Integrity      Skin integrity is maintained or improved Not Progressing    Pt refuses all assessments, and personal cares, does not want to be taken out of chair to get washed up.      Pain      Patient's pain/discomfort is manageable Progressing    Pt has complained of chronic generalized pain, dilaudid given with relief.         Safety      Patient will be injury free during hospitalization Progressing

## 2021-06-25 NOTE — PLAN OF CARE
Problem: Discharge Barriers  Goal: Patient's discharge needs are met  Outcome: Progressing  Care Plan  Care Management      Care Management Goals of the Day: Progression of care    Care Progression Reviewed With: Charge RN, MD, HUC, RNCM, CMSW    Barriers to Discharge: Placement    Discharge Disposition: TBD    Expected Discharge Date: TBD    Transportation: [facility] ([phone])      Care Coordination Narrative:     MARGI met with pt per pt request to discuss d/c plan. SW informed pt that this time SW is continuing to work on attempting to find an appropriate Group home or correction placement that would be able to accommodate the pt's complex medical and possible accept the CADI waiver. SW explained to the pt that once a placement is found than the pt will have an assessment for the waiver. Pt stated understanding of this. SW has provided pt with list of possible group homes.     SW will continue to follow and assist with further d/c needs.    CYNTHIA Gonsalez  3/15/2019  11:25 AM      Update:    MARGI and Manager of Care Management Cristina [facility]ista [facility] met with pt to inform of possible placement interview. Mary from [facility] (P: [phone]/F: [phone]) will come to meet with pt on Monday, 3/18/2019 at 9:00am for an in-person interview to see if she is a good fit for their [facility]. Pt is in agreement with this interview. Should pt be a good fit and accepted SW will proceed with obtaining CADI assessment.     CYNTHIA Gonsalez   3/15/2019  12:00 PM     Summary of CM Care Coordination:     At this time multiple referrals have been sent to SNF's. Additionally SW has made referrals referrals to group homes as well.       MARGI has previously spoken with Ellen ([phone]) at [facility] in regards to getting an assessment for the pt for the CADI waiver. Per Ellen at this the Co is about 2-3 months out for general assessments, however if the SW finds a facility that can accept the (such  as a group home) that could pt the pt closer to the top of the list. Ellen states that should something come up prior to that 2-3 month time frame the SW is able to call her back or to call her Supervisor, Julia Rangel ([phone]).    GROUP HOMES:    [facility]: Referral sent, Will evaluate pt on 3/18/19    [facility]: Declined, non smoking not enough Skilled care need (needs 24hr RN care)    [facility]: Declined, non smoking & not enough skilled care need(needs 24hr RN care)    [facility]: Declined, non-smoking & not enough skilled Care need (needs 24hr RN care)    SNF Waiting for Response    [facility]: Left Message, waiting for response    [facility]: Left message, waiting for response     [facility]: Left message, waiting for response    [facility]: Referral sent    [facility]: Referral sent    [facility]: (Declined in the past, new referral sent 3/7/19)    [facility]:  (Declined in the past, new referral sent 3/7/19)  SNF Declined    [facility] [place]: Declined, no LTC beds    [facility]: Declined    [facility]: Declined, no LTC beds    [place]: Declined, Does not meet criteria     [facility] [facility]: Declined, non-smoking facility    [facility]: Declined, no LTC beds    [facility]: Declined, no LTC beds    [facility]: Declined    [facility]: Declined    [facility](P: [phone]/F: [phone]) : Declined, too medically complex. (hand faxed)    [facility]: Declined.    [facility] [place]: Declined, no beds available    [facility]: Declined, only shared rooms    [facility]: DECLINED, cannot take methadone    [facility]: DECLINED    [facility]: DECLINED    [facility]: DECLINED    [facility]: DECLINED    [facility]: DECLINED    [facility]: DECLINED    [facility] [place]: DECLINED    [facility] [place]: DECLINED    [facility]: DECLINED    [facility]: DECLINED    [facility]: DECLINED    [facility]: DECLINED    [facility]: DECLINED    [facility]: DECLINED    [facility]: DECLINED    [facility]: DECLINED    [facility]: DECLINED    [facility]: DECLINED    [facility]: DECLINED    [facility]: DECLINED    [facility]: DECLINED    [facility]: DECLINED    [facility]: DECLINED    [facility]: DECLINED    [facility]: DECLINED    Patient has been declined by all [facility]      ** Please note that this list is updated as responses are received.

## 2021-06-25 NOTE — PROGRESS NOTES
Hospitalist Progress Note    Assessment/Plan  A: Patient is a 64 y/o woman who has multiple medical problems including paraplegia and opioid dependence. Patient initially presented with sacral and heel pain but has since had an extended hospital course. Patient has completed a course of antibiotics for aspiration pneumonitis. Patient still is coughing, likely from acute pulmonary edema Patient reported a history of emphysema but the only set of PFTs available are characteristic of restrictive lung disease related to obesity.     P:  1.) Cough, likely acute pulmonary edema: Continue IV lasix. Will check CXR.  2.) Stage IV sacral decubitus ulcers with chronic osteomyelitis: Patient has completed a course of antibiotics. Wound care as necessary.  3.) Personal history of DVT/PE: Patient on xarelto.  4.) Chronic pain: Patient already on methadone with hydromorphone, cyclobenzaprine, ibuprofen and acetaminophen as needed  5.) Paraplegia: Monitoring for safety.  6.) Seizure disorder: Continue antiepileptics.  7.) Nicotine dependence/cigarette smoking, ongoing: PFTs might be considered as an outpatient.      Principal Problem:    Sepsis (H)  Active Problems:    Hospital-acquired pneumonia    Normocytic anemia    Noncompliance with medication regimen    Severe major depression, single episode, with psychotic features (H)    Paranoia (H)    Unable to control anger    Cough    Nonintractable epilepsy without status epilepticus, unspecified epilepsy type (H)    Acute pulmonary edema (H)      Barriers to Discharge:  Placement      Subjective  Patient notes some improvement in cough. Patient notes no new problems.    Objective    Vital signs in last 24 hours  Temp:  [97.6  F (36.4  C)-98.5  F (36.9  C)] 98.5  F (36.9  C)  Heart Rate:  [113-129] 113  Resp:  [18] 18  BP: (105-143)/(60-71) 105/60 91% O2 Device: None (Room air) O2 Flow Rate (L/min): 4 L/min  Weight:   163 lb (73.9 kg) Weight change:     Intake/Output last 3  shifts  I/O last 3 completed shifts:  In: 1800 [P.O.:1740; I.V.:60]  Out: 3050 [Urine:3050]  Body mass index is 29.81 kg/m .    Physical Exam    General:     Patient comfortable, NAD.   HEENT:     No scleral icterus or conjunctival injection.   Heart:    RRR, S1 S2 w/o murmurs.   Lungs:     Breath sounds present. Expiratory wheezing present. No     overt crackles.   Abdomen:   Soft, nontender.     Pertinent Labs   Lab Results: personally reviewed.   Results from last 7 days   Lab Units 03/18/19  0504 03/17/19  1622 03/12/19  1439 03/11/19  1234   LN-SODIUM mmol/L 138 138  --   --    LN-POTASSIUM mmol/L 3.7 3.0*  3.0* 4.3  --    LN-CHLORIDE mmol/L 93* 92*  --   --    LN-CO2 mmol/L 31 32*  --   --    LN-BLOOD UREA NITROGEN mg/dL 15 17  --   --    LN-CREATININE mg/dL 0.66 0.70  0.67  --  0.79   LN-CALCIUM mg/dL 9.5 9.5  --   --      Results from last 7 days   Lab Units 03/16/19  0617 03/15/19  1850 03/14/19  2234   LN-WHITE BLOOD CELL COUNT thou/uL 13.7* 13.7* 16.6*   LN-HEMOGLOBIN g/dL 9.4* 9.6* 10.2*   LN-HEMATOCRIT % 31.8* 32.8* 34.3*   LN-PLATELET COUNT thou/uL 265 296 323   LN-NEUTROPHILS RELATIVE PERCENT % 79*  --   --    LN-MONOCYTES RELATIVE PERCENT % 5  --   --                Medications  Scheduled Meds:    baclofen  10 mg Oral TID     calcium-vitamin D  1 tablet Oral DAILY     eszopiclone  3 mg Oral QHS     ferrous sulfate  325 mg Oral BID with meals     furosemide  40 mg Intravenous Q8H     gabapentin  900 mg Oral DAILY     levETIRAcetam  250 mg Oral BID     levothyroxine  125 mcg Oral Daily 0600     methadone  10 mg Oral TID     multivitamin with minerals  1 tablet Oral DAILY     omeprazole  20 mg Oral QAM AC     oxybutynin  5 mg Oral TID     polyethylene glycol  17 g Oral QHS     potassium chloride ER  40 mEq Oral Once     QUEtiapine  25 mg Oral BID     QUEtiapine  50 mg Oral QHS     rivaroxaban  20 mg Oral Daily with supper     senna-docusate  2 tablet Oral BID     sodium chloride  10-30 mL Intravenous  Q8H FIXED TIMES     Continuous Infusions:  PRN Meds:.acetaminophen, albuterol, alteplase, benzocaine-menthol, benzonatate, bisacodyl, calcium (as carbonate), codeine-guaiFENesin, cyclobenzaprine, HYDROmorphone, hydrOXYzine HCl, ibuprofen, ipratropium-albuterol **AND** [] Nebulizer treatment intermittent, lidocaine, magnesium hydroxide, melatonin, miconazole nitrate, naloxone **OR** naloxone, OLANZapine, ondansetron **OR** ondansetron, polyethylene glycol, polyvinyl alcohol, sodium chloride bacteriostatic, sodium chloride bacteriostatic, sodium chloride, sodium chloride, sodium chloride, sodium phosphates 133 mL, traZODone

## 2021-06-25 NOTE — PROGRESS NOTES
Wound Ostomy  WOC Assessment         Attempted to see patient for wound assessment. Patient refused wound assessment stating that dressings already had been change. Did agree to stay in bed longer tomorrow morning and do wound assessment at 8:30am on 3/22/19. RN present in room during discussion. Sticky note left for care team.

## 2021-06-25 NOTE — PROGRESS NOTES
Hospitalist Progress Note    Assessment/Plan  A: Patient is a 62 y/o woman who has multiple medical problems including paraplegia and opioid dependence. Patient initially presented with sacral and heel pain but has since had an extended hospital course. Patient has completed a course of antibiotics for aspiration pneumonitis. Patient still is coughing, likely from acute pulmonary edema - this is resolving. Patient reported a history of emphysema but the only set of PFTs available are characteristic of restrictive lung disease related to obesity.     P:  1.) Cough, likely acute pulmonary edema, resolving: Patient on PO lasix now. Also encouraged using incentive spirometer. Patient already using acapella valve.  2.) Stage IV sacral decubitus ulcers with chronic osteomyelitis: Continue wound care. Monitoring for evidence of recurrent infection.  3.) Personal history of DVT/PE: Patient on xarelto.  4.) Chronic pain: Patient already on methadone with hydromorphone, cyclobenzaprine, ibuprofen and acetaminophen as needed  5.) Paraplegia: Monitoring for safety.  6.) Seizure disorder: Continue antiepileptics.  7.) Nicotine dependence/cigarette smoking, ongoing: PFTs might be considered as an outpatient.      Principal Problem:    Sepsis (H)  Active Problems:    Hospital-acquired pneumonia    Personal history of PE (pulmonary embolism)    Normocytic anemia    Noncompliance with medication regimen    Severe major depression, single episode, with psychotic features (H)    Paranoia (H)    Unable to control anger    Cough    Nonintractable epilepsy without status epilepticus, unspecified epilepsy type (H)    Acute pulmonary edema (H)    Cigarette smoker      Barriers to Discharge:  Placement      Subjective  Patient notes feeling somewhat worse today. Patient notes increased drainage from her sacral decubitus ulcer today.    Objective    Vital signs in last 24 hours  Temp:  [99.2  F (37.3  C)] 99.2  F (37.3  C)  Heart Rate:   [133] 133  Resp:  [20] 20  BP: (115)/(59) 115/59 98% O2 Device: None (Room air) O2 Flow Rate (L/min): 4 L/min  Weight:   163 lb (73.9 kg) Weight change:     Intake/Output last 3 shifts  I/O last 3 completed shifts:  In: 960 [P.O.:960]  Out: 1350 [Urine:1350]  Body mass index is 29.81 kg/m .    Physical Exam    General:     Patient comfortable, NAD.   HEENT:     No scleral icterus or conjunctival injection.   Heart:    Fast, S1 S2 w/o murmurs.   Lungs:     Breath sounds present, coarsened.   Abdomen:   Soft, nontender.     Pertinent Labs   Lab Results: personally reviewed.   Results from last 7 days   Lab Units 03/20/19  1335 03/19/19  0650 03/18/19  0504 03/17/19  1622   LN-SODIUM mmol/L  --  137 138 138   LN-POTASSIUM mmol/L 3.1* 3.7 3.7 3.0*  3.0*   LN-CHLORIDE mmol/L  --  94* 93* 92*   LN-CO2 mmol/L  --  31 31 32*   LN-BLOOD UREA NITROGEN mg/dL  --  19 15 17   LN-CREATININE mg/dL  --  0.73 0.66 0.70  0.67   LN-CALCIUM mg/dL  --  9.1 9.5 9.5     Results from last 7 days   Lab Units 03/16/19  0617 03/15/19  1850 03/14/19  2234   LN-WHITE BLOOD CELL COUNT thou/uL 13.7* 13.7* 16.6*   LN-HEMOGLOBIN g/dL 9.4* 9.6* 10.2*   LN-HEMATOCRIT % 31.8* 32.8* 34.3*   LN-PLATELET COUNT thou/uL 265 296 323   LN-NEUTROPHILS RELATIVE PERCENT % 79*  --   --    LN-MONOCYTES RELATIVE PERCENT % 5  --   --                Medications  Scheduled Meds:    baclofen  10 mg Oral TID     calcium-vitamin D  1 tablet Oral DAILY     eszopiclone  3 mg Oral QHS     ferrous sulfate  325 mg Oral BID with meals     furosemide  40 mg Oral BID - diuretic     gabapentin  900 mg Oral DAILY     levETIRAcetam  250 mg Oral BID     levothyroxine  125 mcg Oral Daily 0600     methadone  10 mg Oral TID     multivitamin with minerals  1 tablet Oral DAILY     omeprazole  20 mg Oral QAM AC     oxybutynin  5 mg Oral TID     polyethylene glycol  17 g Oral QHS     potassium chloride ER  40 mEq Oral Q4H     QUEtiapine  25 mg Oral BID     QUEtiapine  50 mg Oral QHS      rivaroxaban  20 mg Oral Daily with supper     senna-docusate  2 tablet Oral BID     sodium chloride  10-30 mL Intravenous Q8H FIXED TIMES     Continuous Infusions:  PRN Meds:.acetaminophen, albuterol, alteplase, benzocaine-menthol, benzonatate, bisacodyl, calcium (as carbonate), codeine-guaiFENesin, cyclobenzaprine, HYDROmorphone, hydrOXYzine HCl, ibuprofen, ipratropium-albuterol **AND** [] Nebulizer treatment intermittent, lidocaine, magnesium hydroxide, melatonin, miconazole nitrate, naloxone **OR** naloxone, OLANZapine, ondansetron **OR** ondansetron, polyethylene glycol, polyvinyl alcohol, sodium chloride bacteriostatic, sodium chloride bacteriostatic, sodium chloride, sodium chloride, sodium chloride, sodium phosphates 133 mL, traZODone

## 2021-07-01 NOTE — PROCEDURES
"Procedures by Sánchez Glynn RN at 2/16/2019  7:13 PM     Author: Sánchez Glynn RN Service: -- Author Type: Registered Nurse    Filed: 2/16/2019  7:19 PM Date of Service: 2/16/2019  7:13 PM Status: Signed    : Sánchez Glynn RN (Registered Nurse)     Procedure Orders    1. Insert PICC [[id number]] ordered by Franck Gee MBBS at 02/16/19 1623           Procedures    1. PICC [ATH011 (Custom)]             PICC Line Insertion Procedure Note  Pt. Name: Marychuy Zhou  MRN:        [id number]    Procedure: Insertion of a  TRIPLE Lumen  5 fr  Bard SOLO (valved) Power PICC, Lot number [id number]    Indications: Sepsis protocol; vascular access    Contraindications : Left arm - previous assessment of venous system(s) - size/diameter not appropriate for access.    Procedure Details   Patient identified with 2 identifiers and \"Time Out\" conducted.  .     Central line insertion bundle followed: hand hygeine performed prior to procedure, site cleansed with cholraprep, hat, mask, sterile gloves,sterile gown worn, patient draped with maximum barrier head to toe drape, sterile field maintained.    The vein was assessed and found to be compressible and of adequate size. 4 ml 1% Lidocaine administered sq to the insertion site. A 5 Fr PICC was inserted into the BASILIC vein of the right arm with ultrasound guidance. one attempt(s) required to access vein.   Catheter threaded without difficulty. Good blood return noted.    Modified Seldinger Technique used for insertion.    The 8 sharps that are included in the PICC insertion kit were accounted for and disposed of in the sharps container prior to breakdown of the sterile field.    Catheter secured with Statlock, biopatch and Tegaderm dressing applied.    Findings:  Total catheter length  42 cm, with 3 cm exposed. Mid upper arm circumference is 35 cm. Catheter was flushed with 30 cc NS. Patient  tolerated procedure well.    Tip placement verified by 3CG " technology.    CLABSI prevention brochure left at bedside.    Patient's primary RN notified PICC is ready for use.    Comments:      Initial attempt of RUE brachial vein was unsuccessful due to resistance in advancing wire due to spasm.        Sánchez Arnold RN, MSN, [facility]  [phone]

## 2021-07-03 NOTE — ADDENDUM NOTE
Addendum Note by Tre Walden RN at 4/28/2017  4:08 PM     Author: Tre Walden RN Service: -- Author Type: RN, Care Manager    Filed: 4/28/2017  4:08 PM Encounter Date: 4/28/2017 Status: Signed    : Tre Walden RN (RN, Care Manager)    Addended by: TRE WALDEN on: 4/28/2017 04:08 PM        Modules accepted: Orders

## 2021-07-03 NOTE — ADDENDUM NOTE
Addendum Note by Lorie Noble CMA at 7/13/2017  2:25 PM     Author: Lorie Noble CMA Service: -- Author Type: Certified Medical Assistant    Filed: 7/13/2017  2:25 PM Encounter Date: 7/13/2017 Status: Signed    : Lorie Noble CMA (Certified Medical Assistant)    Addended by: LORIE NOBLE on: 7/13/2017 02:25 PM        Modules accepted: Orders

## 2021-07-03 NOTE — ADDENDUM NOTE
Addendum Note by Neel Bond MD at 12/20/2018 10:25 AM     Author: Neel Bond MD Service: -- Author Type: Physician    Filed: 12/20/2018 10:25 AM Encounter Date: 12/19/2018 Status: Signed    : Neel Bond MD (Physician)    Addended by: NEEL BOND on: 12/20/2018 10:25 AM        Modules accepted: Orders

## 2021-07-03 NOTE — ADDENDUM NOTE
Addendum Note by Neel Bond MD at 12/29/2018  9:57 PM     Author: Neel Bond MD Service: -- Author Type: Physician    Filed: 12/29/2018  9:57 PM Encounter Date: 12/29/2018 Status: Signed    : Neel Bond MD (Physician)    Addended by: NEEL BOND on: 12/29/2018 09:57 PM        Modules accepted: Orders

## 2021-07-03 NOTE — ED PROVIDER NOTES
ED Provider Notes by Coral Collado MD at 2/2/2019 11:21 PM     Author: Coral Collado MD Service: Emergency Medicine Author Type: Physician    Filed: 2/3/2019  2:30 AM Date of Service: 2/2/2019 11:21 PM Status: Signed    : Coral Collado MD (Physician)       eMERGENCY dEPARTMENT eNCOUnter        CHIEF COMPLAINT    No chief complaint on file.      ED COURSE & MEDICAL DECISION MAKING    Pertinent Labs & Imaging studies reviewed. (See chart for details)    11:05 PM: I met with patient and performed initial physical exam.      ED Course as of Feb 03 0230   Sat Feb 02, 2019   2333 Afebrile.  Vital signs here with blood pressure initially 99/57, heart rate is 119.  She is saturating at 92% on room air.  Patient well-known to the emergency department, has difficulty at times cooperating with staff.  States that she will not let us examine her or do anything until we position her knees correctly.  Patient was positioned multiple times in the bed, she then states that she is in so much pain she would need IV pain medication.  She did receive 4 mg of oral Dilaudid prior to arrival at her nursing home and had told him that she was coming here because she needed to get IV Dilaudid.  Upon initial arrival to the room patient sleeping comfortably with bent blanket over her head.  Needed to be awakened to perform exam.  She was recently admitted to the hospital for debridement of her bilateral heel ulcers.  She has a stage IV sacral decubitus ulcer with some evidence of osteomyelitis as well.  She is a history of MSSA.  She is currently on Keflex.  [JH]   2335 She is complaining of some increased drainage from the wound in her sacral area.  She did let us examine partially this wound, she has a bandage that is covering it however it is grossly contaminated with stool, could significantly fell swelling with discharge.  There is no evidence of purulence identified.  She states she does not get fevers but often gets septic.   [JH]   2338 Here she also continues to be verbally abusive towards staff, verbally abusive towards triage nurse, RN caring for patient as well as MD.  She is refusing to allow us to position her to perform thorough physical examination.  She is refusing to allow us to evaluate her wounds on her heels however what I did see from her sacral decub ulcer again this is grossly contaminated with stool, there is discharge, it is soaking through a pad and a check.  She is tachycardic here however she normally comes in fairly tachycardic.  We did attempt to talk to her again today about letting PICC team try to get any access in her arms and she is adamantly refusing.  She will only let us evaluate her for access in her heels and her ankles.  She has a long-standing history of this, is notoriously difficult in order to obtain blood from as well as place IV.  We are attempting to call PICC to see if they will come in to draw blood in place IV and lower extremity.  She does have penchant for becoming quite ill, her sacral decub ulcer here does appear to be possibly infected.  Will check labs, provide fluids once access is obtained.  Repeatedly asking for pain medication.  [JH]   2340 She did also states that staff at her TCU pulled out her catheter, she states she does have a suprapubic as well as a Bhandari catheter.  Refusing to let MD evaluate her abdomen for suprapubic catheter.  Her Bhandari catheter is in place and is draining urine that is full of sediment.  Refusing to let us examine catheter for more than that however she does have active drainage going on at this time.  []   Sun Feb 03, 2019   0118 Patient's labs here without any leukocytosis however she does have a lactic acidosis with a lactic acid 3.1.  Urinalysis does appear to be infected however this could likely be colonization, at this point given the fact the patient is on Keflex, will give a dose of Zosyn for possible infection in her sacral ulcer this will  "likely cover.  She does not have any leukocytosis.  We will give dose of pain medication, liter of fluid, antibiotics.  Will call and speak with hospitalist for admission.  PICC team here was able to get a peripheral line in her lower extremity.  She continues to be verbally abusive to all staff present.  [JH]      ED Course User Index  [JH] Coral Collado MD         At the conclusion of the encounter I discussed the results of all of the tests and the disposition. The questions were answered. The patient or family acknowledged understanding and was agreeable with the care plan.     FINAL IMPRESSION    1. Sacral decubitus ulcer    2. Sepsis (H)    3. Chronic pain    4. Lactic acidosis        HPI    Marychuy Zhou is a 63 y.o. female who presents to the Emergency Department with a history of HTN, HLD, COPD, DVT and PE,via EMS for pain in her heels and toes. Patient complains of pain in her heels and toes and states that they are bleeding. She reports the pain got worse since yesterday. Patient also reports there is a catheter that is \"tied in\". She states she \"can't breathe' from the pain in her foot. She reports the last time she had this pain, she felt dizzy. Patient states the pain is \"unbearable\".  Patient states the nursing home she is staying in is \"not safe\". Denies any fever or chills. Denies any other complaints or symptoms at this time.    The creation of this record is based on the scribes observations of the work being performed by Coral Collado MD and the providers statements to them. It was created on his behalf by Coral Collado, a trained medical scribe. This document has been checked and approved by the attending provider.        REVIEW OF SYSTEMS    Negative for fevers and chills.  All other systems reviewed and are negative.    PAST MEDICAL HISTORY    Past Medical History:   Diagnosis Date   ? Alcohol dependence (H)    ? Anxiety    ? Cancer of lung (H) 9/24/2013   ? Chronic kidney disease    ? " Chronic pain    ? Chronic suprapubic catheter (H)    ? Constipation    ? COPD (chronic obstructive pulmonary disease) (H)    ? Decubitus ulcer    ? Depression    ? Diastolic CHF, acute on chronic (H)    ? DVT (deep venous thrombosis) (H) 5/26/2015   ? ESBL (extended spectrum beta-lactamase) producing bacteria infection 08/2015   ? Gastroparesis    ? GERD (gastroesophageal reflux disease)    ? HCAP (healthcare-associated pneumonia)    ? Herpes Simplex Type I    ? Hyperlipemia    ? Hypertension    ? Hypothyroidism 5/26/2015   ? Intracranial subdural hematoma (H)    ? Kidney stone    ? Lower paraplegia (H) 4/28/2016   ? MRSA infection    ? Neurogenic bladder    ? Neuropathy (H) 5/26/2015   ? Obesity    ? Opiate dependence, continuous (H)    ? Osteoporosis    ? Pneumonia    ? Pulmonary embolism (H) 12/2016   ? Rheumatoid arthritis (H)    ? Sepsis (H) 5/28/2017   ? SVT (supraventricular tachycardia) (H) 8/19/2014   ? Vascular myelopathies (H)        SURGICAL HISTORY    Past Surgical History:   Procedure Laterality Date   ? CHOLECYSTECTOMY     ? COLONOSCOPY N/A 6/8/2017    Procedure: COLONOSCOPY;  Surgeon: Conor Hdez MD;  Location: [facility];  Service:    ? CRANIECTOMY Left 4/10/2017    Procedure: LEFT CRANIOTOMY FOR SUBDURAL HEMATOMA EVACUATION AND SUBDURA PROCESS;  Surgeon: Maria Alejandra Salinas MD;  Location: [facility] OR;  Service:    ? CT BIOPSY BONE  11/16/2018   ? DECUBITUS ULCER EXCISION N/A 12/22/2017    Procedure: DEBRIDEMENT, SACRAL ULCER;  Surgeon: Pierre Cazares MD;  Location: [facility] OR;  Service:    ? DECUBITUS ULCER EXCISION Right 2/5/2018    Procedure: DEBRIDEMENT, PRESSURE ULCER;  Surgeon: Pierre Cazares MD;  Location: [facility] OR;  Service:    ? DECUBITUS ULCER EXCISION N/A 7/12/2018    Procedure: DEBRIDEMENT, PRESSURE ULCER;  Surgeon: Pierre Cazares MD;  Location: [facility] OR;  Service:    ? ERCP N/A 1/3/2017    Procedure: ENDOSCOPIC RETROGRADE  CHOLANGIOPANCREATOGRAPHY;  Surgeon: Roel Echeverria MD;  Location: [facility] OR;  Service:    ? FRACTURE SURGERY      tibula/fibula   ? HH MIDLINE INSERTION  2/1/2018        ? INCISION AND DRAINAGE OF WOUND N/A 1/10/2019    Procedure: INCISION AND DRAINAGE, SACRAL ULCER;  Surgeon: Pierre Cazares MD;  Location: [facility] OR;  Service: General   ? IR PICC PLACEMENT >5 YEARS W/O FLUORO GUIDANCE  12/8/2018   ? LAPAROSCOPIC CHOLECYSTECTOMY N/A 1/4/2017    Procedure: CHOLECYSTECTOMY, LAPAROSCOPIC;  Surgeon: Danis Aviles MD;  Location: [facility] OR;  Service:    ? PICC  12/31/2016        ? PICC  12/26/2017        ? PICC AND MIDLINE TEAM LINE INSERTION  8/19/2014        ? PICC AND MIDLINE TEAM LINE INSERTION  2/27/2018        ? PICC AND MIDLINE TEAM LINE INSERTION  7/16/2018        ? VA APPENDECTOMY      Description: Appendectomy;  Recorded: 06/09/2008;   ? VA MARTE W/O FACETEC FORAMOT/DSKC 1/2 VRT SEG, CERVICAL      Description: Laminectomy Lumbar;  Recorded: 06/04/2013;   ? VA REMOVAL OF TONSILS,<13 Y/O      Description: Tonsillectomy;  Recorded: 06/10/2008;   ? VA TOTAL ABDOM HYSTERECTOMY      Description: Hysterectomy;  Recorded: 06/10/2008;       CURRENT MEDICATIONS       Medication List      CONTINUE taking these medications    diaper,brief,adult,disposable Misc  Commonly known as:  BRIEFS, ADULT-EXTRA LARGE  Use 1 each As Directed as needed.     disposable gloves Misc  Commonly known as:  PURPLE NITRILE GLOVES  Use 1 each As Directed as needed.        ASK your doctor about these medications    acetaminophen 500 MG tablet  Commonly known as:  TYLENOL  Take 1-2 tablets (500-1,000 mg total) by mouth every 4 (four) hours as needed.     albuterol sulfate 90 mcg/actuation Aepb  Inhale 180 mcg every 4 (four) hours as needed (Wheezing or dyspnea).     benzocaine-menthol 15-3.6 mg  Commonly known as:  CEPACOL  Take 1 lozenge by mouth every hour as needed.     bisacodyl 10 mg suppository  Commonly  known as:  DULCOLAX     calcium (as carbonate) 200 mg calcium (500 mg) chewable tablet  Commonly known as:  TUMS  Chew 1 tablet (200 mg total) 4 (four) times a day as needed.     cephalexin 500 MG capsule  Commonly known as:  KEFLEX  Take 2 capsules (1,000 mg total) by mouth 3 (three) times a day for 27 days.     cyclobenzaprine 10 MG tablet  Commonly known as:  FLEXERIL  Take 1 tablet (10 mg total) by mouth 3 (three) times a day as needed for muscle spasms.     gabapentin 300 MG capsule  Commonly known as:  NEURONTIN  Take 3 capsules (900 mg total) by mouth daily.     hydrocortisone 1 % ointment  Apply topically 2 (two) times a day. Apply to affected area     HYDROmorphone 2 MG tablet  Commonly known as:  DILAUDID  Take 1.5 tablets (3 mg total) by mouth every 3 (three) hours as needed.     hydrOXYzine HCl 25 MG tablet  Commonly known as:  ATARAX  Take 1-2 tablets (25-50 mg total) by mouth every 6 (six) hours as needed for itching.     ibuprofen 400 MG tablet  Commonly known as:  ADVIL,MOTRIN  Take 1 tablet (400 mg total) by mouth every 6 (six) hours as needed.     levETIRAcetam 250 MG tablet  Commonly known as:  KEPPRA  Take 1 tablet (250 mg total) by mouth 2 (two) times a day.     levothyroxine 125 MCG tablet  Commonly known as:  SYNTHROID, LEVOTHROID     lidocaine 4 % patch  Place 2 patches on the skin daily. Remove and discard patch with 12 hours or as directed by MD.     melatonin 3 mg Tab tablet  Take 1 tablet (3 mg total) by mouth at bedtime as needed.     * methadone 5 MG tablet  Commonly known as:  DOLOPHINE  Take 1 tablet (5 mg total) by mouth 2 (two) times a day.     * methadone 5 MG tablet  Commonly known as:  DOLOPHINE  Take 3 tablets (15 mg total) by mouth at bedtime.     miconazole 2 % powder  Commonly known as:  MICOTIN  Apply topically 2 (two) times a day. To groin folds     multivitamin with minerals 9 mg iron-400 mcg Tab tablet  Commonly known as:  THERA-M  Take 1 tablet by mouth daily.      neomycin-bacitracin-polymyxin ointment  Commonly known as:  NEOSPORIN  Apply topically 2 (two) times a day. Apply to affected area. Apply together with hydrocortisone cream.     omeprazole 20 MG capsule  Commonly known as:  PriLOSEC     oxybutynin 5 MG tablet  Commonly known as:  DITROPAN  Take 1 tablet (5 mg total) by mouth 3 (three) times a day.     polyethylene glycol 17 gram packet  Commonly known as:  MIRALAX  Take 1 packet (17 g total) by mouth 2 (two) times a day as needed.     * QUEtiapine 25 MG tablet  Commonly known as:  SEROquel  Take 1 tablet (25 mg total) by mouth 2 (two) times a day. Morning and afternoon     * QUEtiapine 50 MG tablet  Commonly known as:  SEROquel  Take 1 tablet (50 mg total) by mouth at bedtime.     rivaroxaban 20 mg Tab  Take 1 tablet (20 mg total) by mouth daily with supper.     white petrolatum 41 % Oint  Commonly known as:  AQUAPHOR NATURAL HEALING  Apply 1 application topically 3 (three) times a day as needed (dry skin).     zinc oxide 20 % ointment         * This list has 4 medication(s) that are the same as other medications prescribed for you. Read the directions carefully, and ask your doctor or other care provider to review them with you.                ALLERGIES    No Known Allergies    FAMILY HISTORY    Family History   Problem Relation Age of Onset   ? COPD Mother          in her 50s   ? COPD Brother    ? Lung cancer Sister    ? Liver disease Father    ? COPD Father          in his 50s       SOCIAL HISTORY    Social History     Socioeconomic History   ? Marital status:      Spouse name: Not on file   ? Number of children: Not on file   ? Years of education: Not on file   ? Highest education level: Not on file   Social Needs   ? Financial resource strain: Not on file   ? Food insecurity - worry: Not on file   ? Food insecurity - inability: Not on file   ? Transportation needs - medical: Not on file   ? Transportation needs - non-medical: Not on file  "  Occupational History   ? Not on file   Tobacco Use   ? Smoking status: Current Every Day Smoker     Types: Cigarettes   ? Smokeless tobacco: Never Used   Substance and Sexual Activity   ? Alcohol use: Yes     Comment: currently sober, history of ETOH abuse   ? Drug use: No   ? Sexual activity: Not on file   Other Topics Concern   ? Not on file   Social History Narrative    The patient lives at home and has 24 hour care.   She has 2 sons and some grandchildren.    She won a malpractice lawsuit against the doctor who did not diagnose her spinal hematoma correctly.    Patient arrived in the ED alone 10/01/17       PHYSICAL EXAM    VITAL SIGNS: BP 93/51   Pulse (!) 133   Temp 98.4  F (36.9  C) (Oral)   Resp 18   Ht 5' 2\" (1.575 m)   Wt 162 lb (73.5 kg)   LMP  (LMP Unknown)   SpO2 (!) 87%   BMI 29.63 kg/m     Constitutional: Chronically ill-appearing.  Appears older than stated age  EYES: Conjunctivae clear  HENT:  Atraumatic, external ears normal, nose normal, oropharynx moist. Neck- supple   Respiratory:  No respiratory distress, normal breath sounds, no rales, no wheezing   Cardiovascular: Tachycardic  GI: Patient refused abdominal examination  : No CVA tenderness  Musculoskeletal: Lower extremities and edematous.  Dressings applied to bilateral heels.  Patient is refusing to let me look at her wounds.  There is no surrounding erythema around her area of bandaging.  Integument: Stage IV sacral decubitus ulcer appreciated.  She did let me examine this, appears deep.  No significant purulent discharge however there is copious watery discharge.  She has stool staining her bottom in her back.  Wound is grossly contaminated with stool but does not appear to be extruding from the wound.  Foul-smelling.  Neurologic:  Alert & oriented x 3, moves upper extremities without issues  Psych: Irritable      RADIOLOGY/PROCEDURES    I have independently reviewed and interpreted the below imaging, pending the final " radiology read  Please see official radiology report.  No results found.    Results for orders placed or performed during the hospital encounter of 02/02/19   Basic Metabolic Panel   Result Value Ref Range    Sodium 139 136 - 145 mmol/L    Potassium 3.9 3.5 - 5.0 mmol/L    Chloride 106 98 - 107 mmol/L    CO2 20 (L) 22 - 31 mmol/L    Anion Gap, Calculation 13 5 - 18 mmol/L    Glucose 103 70 - 125 mg/dL    Calcium 8.4 (L) 8.5 - 10.5 mg/dL    BUN 6 (L) 8 - 22 mg/dL    Creatinine 0.72 0.60 - 1.10 mg/dL    GFR MDRD Af Amer >60 >60 mL/min/1.73m2    GFR MDRD Non Af Amer >60 >60 mL/min/1.73m2   Lactic Acid   Result Value Ref Range    Lactic Acid 3.1 (H) 0.5 - 2.2 mmol/L   Urinalysis-UC if Indicated   Result Value Ref Range    Color, UA Straw Colorless, Yellow, Straw, Light Yellow    Clarity, UA Cloudy (!) Clear    Glucose, UA Negative Negative    Bilirubin, UA Negative Negative    Ketones, UA Trace (!) Negative    Specific Gravity, UA 1.006 1.001 - 1.030    Blood, UA Trace (!) Negative    pH, UA 5.5 4.5 - 8.0    Protein, UA Negative Negative mg/dL    Urobilinogen, UA 2.0 E.U./dL <2.0 E.U./dL, 2.0 E.U./dL    Nitrite, UA Negative Negative    Leukocytes, UA Large (!) Negative    Bacteria, UA Many (!) None Seen hpf    RBC, UA 5-10 (!) None Seen, 0-2 hpf    WBC, UA 25-50 (!) None Seen, 0-5 hpf    Squam Epithel, UA 0-5 None Seen, 0-5 lpf    WBC Clumps Present (!) None Seen   HM1 (CBC with Diff)   Result Value Ref Range    WBC 8.2 4.0 - 11.0 thou/uL    RBC 3.53 (L) 3.80 - 5.40 mill/uL    Hemoglobin 8.0 (L) 12.0 - 16.0 g/dL    Hematocrit 27.8 (L) 35.0 - 47.0 %    MCV 79 (L) 80 - 100 fL    MCH 22.7 (L) 27.0 - 34.0 pg    MCHC 28.8 (L) 32.0 - 36.0 g/dL    RDW 21.4 (H) 11.0 - 14.5 %    Platelets 491 (H) 140 - 440 thou/uL    MPV 9.7 8.5 - 12.5 fL    Neutrophils % 67 50 - 70 %    Lymphocytes % 17 (L) 20 - 40 %    Monocytes % 7 2 - 10 %    Eosinophils % 9 (H) 0 - 6 %    Basophils % 0 0 - 2 %    Neutrophils Absolute 5.4 2.0 - 7.7 thou/uL     Lymphocytes Absolute 1.4 0.8 - 4.4 thou/uL    Monocytes Absolute 0.6 0.0 - 0.9 thou/uL    Eosinophils Absolute 0.8 (H) 0.0 - 0.4 thou/uL    Basophils Absolute 0.0 0.0 - 0.2 thou/uL       DISCHARGE MEDICATIONS     Medication List      CONTINUE taking these medications    diaper,brief,adult,disposable Misc  Commonly known as:  BRIEFS, ADULT-EXTRA LARGE  Use 1 each As Directed as needed.     disposable gloves Misc  Commonly known as:  PURPLE NITRILE GLOVES  Use 1 each As Directed as needed.        ASK your doctor about these medications    acetaminophen 500 MG tablet  Commonly known as:  TYLENOL  Take 1-2 tablets (500-1,000 mg total) by mouth every 4 (four) hours as needed.     albuterol sulfate 90 mcg/actuation Aepb  Inhale 180 mcg every 4 (four) hours as needed (Wheezing or dyspnea).     benzocaine-menthol 15-3.6 mg  Commonly known as:  CEPACOL  Take 1 lozenge by mouth every hour as needed.     bisacodyl 10 mg suppository  Commonly known as:  DULCOLAX     calcium (as carbonate) 200 mg calcium (500 mg) chewable tablet  Commonly known as:  TUMS  Chew 1 tablet (200 mg total) 4 (four) times a day as needed.     cephalexin 500 MG capsule  Commonly known as:  KEFLEX  Take 2 capsules (1,000 mg total) by mouth 3 (three) times a day for 27 days.     cyclobenzaprine 10 MG tablet  Commonly known as:  FLEXERIL  Take 1 tablet (10 mg total) by mouth 3 (three) times a day as needed for muscle spasms.     gabapentin 300 MG capsule  Commonly known as:  NEURONTIN  Take 3 capsules (900 mg total) by mouth daily.     hydrocortisone 1 % ointment  Apply topically 2 (two) times a day. Apply to affected area     HYDROmorphone 2 MG tablet  Commonly known as:  DILAUDID  Take 1.5 tablets (3 mg total) by mouth every 3 (three) hours as needed.     hydrOXYzine HCl 25 MG tablet  Commonly known as:  ATARAX  Take 1-2 tablets (25-50 mg total) by mouth every 6 (six) hours as needed for itching.     ibuprofen 400 MG tablet  Commonly known as:   ADVIL,MOTRIN  Take 1 tablet (400 mg total) by mouth every 6 (six) hours as needed.     levETIRAcetam 250 MG tablet  Commonly known as:  KEPPRA  Take 1 tablet (250 mg total) by mouth 2 (two) times a day.     levothyroxine 125 MCG tablet  Commonly known as:  SYNTHROID, LEVOTHROID     lidocaine 4 % patch  Place 2 patches on the skin daily. Remove and discard patch with 12 hours or as directed by MD.     melatonin 3 mg Tab tablet  Take 1 tablet (3 mg total) by mouth at bedtime as needed.     * methadone 5 MG tablet  Commonly known as:  DOLOPHINE  Take 1 tablet (5 mg total) by mouth 2 (two) times a day.     * methadone 5 MG tablet  Commonly known as:  DOLOPHINE  Take 3 tablets (15 mg total) by mouth at bedtime.     miconazole 2 % powder  Commonly known as:  MICOTIN  Apply topically 2 (two) times a day. To groin folds     multivitamin with minerals 9 mg iron-400 mcg Tab tablet  Commonly known as:  THERA-M  Take 1 tablet by mouth daily.     neomycin-bacitracin-polymyxin ointment  Commonly known as:  NEOSPORIN  Apply topically 2 (two) times a day. Apply to affected area. Apply together with hydrocortisone cream.     omeprazole 20 MG capsule  Commonly known as:  PriLOSEC     oxybutynin 5 MG tablet  Commonly known as:  DITROPAN  Take 1 tablet (5 mg total) by mouth 3 (three) times a day.     polyethylene glycol 17 gram packet  Commonly known as:  MIRALAX  Take 1 packet (17 g total) by mouth 2 (two) times a day as needed.     * QUEtiapine 25 MG tablet  Commonly known as:  SEROquel  Take 1 tablet (25 mg total) by mouth 2 (two) times a day. Morning and afternoon     * QUEtiapine 50 MG tablet  Commonly known as:  SEROquel  Take 1 tablet (50 mg total) by mouth at bedtime.     rivaroxaban 20 mg Tab  Take 1 tablet (20 mg total) by mouth daily with supper.     white petrolatum 41 % Oint  Commonly known as:  AQUAPHOR NATURAL HEALING  Apply 1 application topically 3 (three) times a day as needed (dry skin).     zinc oxide 20 %  ointment         * This list has 4 medication(s) that are the same as other medications prescribed for you. Read the directions carefully, and ask your doctor or other care provider to review them with you.                   Coral Collado MD  02/03/19 [id number]

## 2021-07-03 NOTE — ADDENDUM NOTE
Addendum Note by Fay Bejarano RN at 4/14/2017  3:10 PM     Author: Fay Bejarano RN Service: -- Author Type: Registered Nurse    Filed: 4/14/2017  3:10 PM Encounter Date: 4/14/2017 Status: Signed    : Fay Bejarano RN (Registered Nurse)    Addended by: FAY BEJARANO on: 4/14/2017 03:10 PM        Modules accepted: Orders

## 2021-08-03 PROBLEM — T83.511A CATHETER-ASSOCIATED URINARY TRACT INFECTION (H): Status: RESOLVED | Noted: 2017-02-28 | Resolved: 2017-04-10

## 2021-08-03 PROBLEM — N39.0 CATHETER-ASSOCIATED URINARY TRACT INFECTION (H): Status: RESOLVED | Noted: 2017-02-28 | Resolved: 2017-04-10

## 2021-08-03 PROBLEM — A41.9 SEPSIS (H): Status: RESOLVED | Noted: 2017-02-24 | Resolved: 2017-04-10

## 2022-02-25 NOTE — ED AVS SNAPSHOT
[facility], Emergency Department    [address] [address]    Phone:  [phone]                                       Marychuy Zhou   MRN: [id number]    Department:  [facility], Emergency Department   Date of Visit:  5/22/2018           Patient Information     Date Of Birth          1955        Your diagnoses for this visit were:     Abdominal pain, epigastric        You were seen by Michele Stout MD.        Discharge Instructions       TODAY'S VISIT:  You were seen today for abdominal pain.  Negative evaluation for acute intra-abdominal pathology.  -     FOLLOW-UP:  Please make an appointment to follow up with:  - Your Primary Care Provider as soon as possible regarding your chronic anemia, your hemoglobin has dropped to below 7 and may need transfusion.    PRESCRIPTIONS / MEDICATIONS:  -    OTHER INSTRUCTIONS:  -Please return to the emergency department if you develop cough, shortness of breath for there was concern of developing pneumonia near chest CT.  If you develop new or worsening abdominal pain please return to the emergency department.     RETURN TO THE EMERGENCY DEPARTMENT  Return to the Emergency Department at any time for new/worsening symptoms.       Your next 10 appointments already scheduled     Jun 11, 2018  3:20 PM CDT   (Arrive by 3:05 PM)   NEW NEUROGENIC BLADDER with Faustino Coyne MD   [facility] ([facility])    [address]  [address] [facility] [address] [phone]              24 Hour Appointment Hotline       To make an appointment at any [facility], call [phone] ([phone]). If you don't have a family doctor or clinic, we will help you find one. [facility] are conveniently located to serve the needs of you and your family.             Review of your medicines      Our records show that you are taking the medicines listed below. If  New prescription sent to the pharmacy.    these are incorrect, please call your family doctor or clinic.        Dose / Directions Last dose taken    ascorbic acid 500 MG Tabs   Dose:  500 mg        Take 1 tablet (500 mg) by mouth daily for 7 days   Refills:  0        cyclobenzaprine 5 MG tablet   Commonly known as:  FLEXERIL   Dose:  5 mg   Quantity:  42 tablet        Take 1 tablet (5 mg) by mouth 3 times daily as needed for muscle spasms   Refills:  0        diazepam 2 MG tablet   Commonly known as:  VALIUM   Dose:  4 mg   Quantity:  60 tablet        Take 2 tablets (4 mg) by mouth every 6 hours as needed for anxiety   Refills:  0        dronabinol 2.5 MG capsule   Commonly known as:  MARINOL   Dose:  2.5 mg        Take 1 capsule (2.5 mg) by mouth 2 times daily (before meals)   Refills:  0        ipratropium - albuterol 0.5 mg/2.5 mg/3 mL 0.5-2.5 (3) MG/3ML neb solution   Commonly known as:  DUONEB   Dose:  3 mL   Quantity:  360 mL        Take 1 vial (3 mLs) by nebulization every 4 hours as needed for wheezing   Refills:  0        levETIRAcetam 250 MG tablet   Commonly known as:  KEPPRA   Dose:  250 mg        Take 1 tablet (250 mg) by mouth every evening Until 4/7/2018 then decrease to 250mg BID 4/8/2018-4/21/2018, then decrease to 250mg QAM 4/22-5/5/2018   Refills:  0        LEVOTHYROXINE SODIUM PO   Dose:  125 mcg        Take 125 mcg by mouth every morning   Refills:  0        loperamide 2 MG capsule   Commonly known as:  IMODIUM   Dose:  4 mg   Quantity:  20 capsule        Take 2 capsules (4 mg) by mouth 4 times daily   Refills:  0        miconazole 2 % Aerp powder   Commonly known as:  MICATIN        Apply topically 2 times daily Apply to groin folds   Refills:  0        mineral oil-hydrophilic petrolatum        Apply topically daily Apply to lower extremities for skin irritation.   Refills:  0        multivitamins with minerals Liqd liquid   Dose:  15 mL        15 mLs by Per Feeding Tube route daily   Refills:  0        OMEPRAZOLE PO   Dose:  20 mg         Take 20 mg by mouth daily (with breakfast)   Refills:  0        ondansetron 4 MG ODT tab   Commonly known as:  ZOFRAN ODT   Dose:  4 mg   Quantity:  20 tablet        Take 1 tablet (4 mg) by mouth every 6 hours as needed for nausea   Refills:  1        oxybutynin chloride 15 MG Tb24   Dose:  15 mg   Quantity:  30 tablet        Take 1 tablet (15 mg) by mouth At Bedtime   Refills:  0        oxyCODONE IR 15 MG tablet   Commonly known as:  ROXICODONE   Dose:  15 mg   Quantity:  120 tablet        Take 1 tablet (15 mg) by mouth every 3 hours as needed for moderate to severe pain   Refills:  0        PROSTAT PO   Dose:  1 oz   Indication:  Wound Healing        1 oz daily   Refills:  0        protein modular   Dose:  1 packet        1 packet by Per Feeding Tube route 3 times daily   Refills:  0        * QUEtiapine 25 MG tablet   Commonly known as:  SEROquel   Dose:  25 mg   Quantity:  60 tablet        Take 1 tablet (25 mg) by mouth daily   Refills:  0        * QUEtiapine 50 MG tablet   Commonly known as:  SEROquel   Dose:  50 mg   Quantity:  120 tablet        Take 1 tablet (50 mg) by mouth At Bedtime   Refills:  0        triamcinolone 0.1 % cream   Commonly known as:  KENALOG        Apply topically 3 times daily   Refills:  0        vitamin A 28081 UNIT capsule   Dose:  77872 Units   Quantity:  14 capsule        Take 2 capsules (20,000 Units) by mouth daily for 7 days   Refills:  0        XARELTO PO   Dose:  20 mg        Take 20 mg by mouth At Bedtime   Refills:  0        zinc sulfate 220 (50 Zn) MG capsule   Commonly known as:  ZINCATE   Dose:  220 mg   Quantity:  7 capsule        Take 1 capsule (220 mg) by mouth daily for 7 days   Refills:  0        * Notice:  This list has 2 medication(s) that are the same as other medications prescribed for you. Read the directions carefully, and ask your doctor or other care provider to review them with you.            Procedures and tests performed during your visit     CBC with  "platelets differential    CT Abdomen Pelvis w Contrast    Comprehensive metabolic panel    EKG 12-lead, tracing only    Lactic acid whole blood    Lipase    Peripheral IV catheter    Troponin I    UA with Microscopic    Urine Culture    Vascular Access Care Adult IP Consult    XR Gastrostomy Tube Check      Orders Needing Specimen Collection     None      Pending Results     Date and Time Order Name Status Description    5/22/2018 1819 XR Gastrostomy Tube Check Preliminary     5/22/2018 1819 CT Abdomen Pelvis w Contrast Preliminary     5/22/2018 1620 EKG 12-lead, tracing only Preliminary     5/22/2018 1620 Urine Culture Preliminary             Pending Culture Results     Date and Time Order Name Status Description    5/22/2018 1620 Urine Culture Preliminary             Pending Results Instructions     If you had any lab results that were not finalized at the time of your Discharge, you can call the ED Lab Result RN at [phone]. You will be contacted by this team for any positive Lab results or changes in treatment. The nurses are available 7 days a week from 10A to 6:30P.  You can leave a message 24 hours per day and they will return your call.        Thank you for choosing [facility]       Thank you for choosing [facility] for your care. Our goal is always to provide you with excellent care. Hearing back from our patients is one way we can continue to improve our services. Please take a few minutes to complete the written survey that you may receive in the mail after you visit with us. Thank you!        [employer] Information     [employer] lets you send messages to your doctor, view your test results, renew your prescriptions, schedule appointments and more. To sign up, go to [email or web address] . Click on \"Log in\" on the left side of the screen, which will take you to the Welcome page. Then click on \"Sign up Now\" on the right side of the page.     You will be asked to enter the access code listed below, as well as " some personal information. Please follow the directions to create your username and password.     Your access code is: [email or web address]  Expires: 2018 10:46 PM     Your access code will  in 90 days. If you need help or a new code, please call your [facility] clinic or [phone].        Care EveryWhere ID     This is your Care EveryWhere ID. This could be used by other organizations to access your [facility] medical records  [id number]        Equal Access to Services     DANIELLA HILL : Drake mccarthyo Solucretia, waaxda luqadaha, qaybta kaalmada ademeliza, hemant dave . So [facility] [phone].    ATENCIÓN: Si habla español, tiene a thorpe disposición servicios gratuitos de asistencia lingüística. Llame al [phone].    We comply with applicable federal civil rights laws and [place] laws. We do not discriminate on the basis of race, color, national origin, age, disability, sex, sexual orientation, or gender identity.            After Visit Summary       This is your record. Keep this with you and show to your community pharmacist(s) and doctor(s) at your next visit.

## 2022-08-26 NOTE — PROGRESS NOTES
"Emergency Social Work Services Note    Date of  Intervention: 05/09/18  Last Emergency Department Visit:  04/10/18  Care Plan:  none  Collaborated with:  Chart review, ED RN, patient    Data:  Marychuy Zhou is a 62 year old  female who comes to [facility] ED from her TCU d/t dislodged g-tube. ED SW consulted by ED RN due to concerns about patient's care at TCU. RN staff noted g-tube accidentally pulled out by aide at facility. Marychuy has wound care needs and reports she requested it be changed this morning, but staff did not and it appears if wound care has not been done in some time.     Intervention:  ED Sw met with Marychuy to discuss above concerns. Marychuy is alert and oriented, ready for d/c back to [facility] TCU and did not wish to discuss topic with ED SW. She reported she is already working with facility SW about her concerns and stated, \"I'm managing this, I don't need help.\" Offered to connect with TCU staff and discuss ombudsman, Marychuy declined and did not wish to speak to SW further.     Assessment:  patient staying in TCU, concerns about care    Plan:    Anticipated Disposition:  Facility:  [facility]    Barriers to d/c plan:  none    Follow Up:  None, Marychuy declined ED SW assistance, is working with TCU SW regarding her concerns.     VIDHYA Robb, [facility] [place], Emergency Dept [facility]  Pager: [phone] Mon-Sat 9 am - 9 pm, on-call/after hours pager [phone]      "
Interventional Radiology Pre-Procedure Sedation Assessment   Time of Assessment: 3:08 PM    Expected Level: Moderate Sedation    Indication: Sedation is required for the following type of Procedure: GI    Sedation and procedural consent: Risks, benefits and alternatives were discussed with Patient    PO Intake: Appropriately NPO for procedure    ASA Class: Class 3 - SEVERE SYSTEMIC DISEASE, DEFINITE FUNCTIONAL LIMITATIONS.    Mallampati: Grade 3:  Soft palate visible, posterior pharyngeal wall not visible    Lungs: Lungs Clear with good breath sounds bilaterally    Heart: Normal heart sounds and rate    History and physical reviewed and no updates needed. I have reviewed the lab findings, diagnostic data, medications, and the plan for sedation. I have determined this patient to be an appropriate candidate for the planned sedation and procedure and have reassessed the patient IMMEDIATELY PRIOR to sedation and procedure.    Fidencio Arvizu PA-C  Interventional Radiology  [phone]      
Patient Name: Marychuy Zhou  Medical Record Number: [id number]  Today's Date: 5/9/2018    Procedure: Gastrostomy tube check and possible replacement.  Proceduralist: Zane BURNS    Sedation start time: 1500  Sedation end time: 1530  Sedation medications administered: Fentanyl: 50mcg   Total sedation time: 30 minutes    Procedure start time: 1515    Procedure end time: 1530    Report given to: Maurice CANADA      Other Notes: Pt arrived to IR room 2 from ED. Pt denies any questions or concerns regarding procedure. Pt positioned supine and monitored per protocol. Pt tolerated procedure without any noted complications. Pt transferred back to ED.    Danielle Desai, RN    
no

## 2023-03-24 NOTE — ED PROVIDER NOTES
History     Chief Complaint:  Pelvic Pain    HPI   Marychuy Zhou is a 62 year old female T10 paraplegic with a history of decubitus ulcer, chronic kidney disease, opiate dependence and sepsis who presents to the emergency department today for evaluation of pelvic pain. EMS reports the patient contacted them from her assisted living facility, [facility]. At her care facility, her doctor wanted to obtain a UA but the patient refused. She has had several emergency department visits over the past few months, normally [facility]. She has had a PICC line for antibiotic delivery because of her history of sepsis. She has a wound on her tailbone and this is the area which is giving her the most pain and prompted her to contact EMS. While en route her BP was 115/80. The patient repots that she will get similar pelvic pain when she has sepsis. She denies fever.  No chills.  She did get her dose of OxyContin tonight and 2 oxycodone tablets.    Allergies:  No Known Drug Allergies     Medications:    oxybutynin   divalproex    Indications  senna-docusate (PERICOLACE) 8.6-50 mg tablet    aspirin 81 mg   gabapentin (NEURONTIN) 300 MG capsule     omeprazole (PRILOSEC) 20 MG capsule     melatonin 10 mg Tab     diazePAM (VALIUM) 5 MG tablet     DULoxetine (CYMBALTA) 60 MG capsule    levETIRAcetam (KEPPRA) 500 MG tablet    ARIPiprazole (ABILIFY) 5 MG tablet             acetaminophen (TYLENOL) 325 MG tablet            zolpidem (AMBIEN) 5 MG tablet             lamoTRIgine (LAMICTAL) 25 MG tablet           potassium chloride SA (K-DUR,KLOR-CON) 10 MEQ tablet     rivaroxaban 20 mg Tab     oxyCODONE (OXYCONTIN)  HYDROmorphone (DILAUDID)   levothyroxine (SYNTHROID, LEVOTHROID) 150 MCG tablet    ferrous sulfate     Past Medical History:    GERD (gastroesophageal reflux disease)      Hypertension      Anxiety      COPD (chronic obstructive pulmonary disease)      Hyperlipemia      Depression      Gastroparesis      Constipation     [FreeTextEntry1] : 1.CKD stage 4: Pt. with CKD in setting in setting of uncontrolled DM and HTN. Scr was elevated/stable at 1.98 on labs done during recent hospital stay at [facility] on 3/2/23. Spot urine TP/CR was elevated at 1.4 on 10/2/22. Kidney sonogram done on 9/28/22 showed bilateral increased echogenicity but no renal masses, hydronephrosis, or calculi. Importance of good glycemic and BP control reviewed with patient. Pt. says she recently had blood work done at PCP's office. Pt. to ask PCP's office to fax her recent lab results to our office (for review). Monitor renal panel. Advised patient to avoid NSAIDs, RCAs, and other nephrotoxins. Monitor renal function. \par \par 2. HTN: BP in acceptable range during office visit. Low salt diet advised. Monitor BP. \par \par 3. LE edema: Pt. with mild bilateral LE pitting edema on exam today. Advised to continue oral diuretic therapy (oral Bumex 1 mg twice daily). Low salt diet and fluid restriction advised. Monitor daily weights. \par \par \par Follow-up pending lab results.   "  Osteoporosis      Chronic pain     Vascular myelopathies      Alcohol dependence     Decubitus ulcer     Obesity      Rheumatoid arthritis      Neurogenic bladder     Chronic kidney disease      DVT (deep venous thrombosis)    Herpes Simplex Type I      Neuropathy     SVT (supraventricular tachycardia)    Pneumonia      MRSA infection      ESBL (extended spectrum beta-lactamase)   HCAP (healthcare-associated pneumonia)      Pulmonary embolism    Kidney stone      Intracranial subdural hematoma      Opiate dependence, continuous      Hypothyroidism     Cancer of lung    Diastolic CHF, acute on chronic      Sepsis     Chronic suprapubic catheter      Lower paraplegia     Past Surgical History:    NV APPENDECTOMY       NV REMOVAL OF TONSILS,<14 Y/O       NV TOTAL ABDOM HYSTERECTOMY       NV MARTE W/O FACETEC FORAMOT/DSKC 1/2 VRT SEG, CERVICAL       CHOLECYSTECTOMY          CHOLANGIOPANCREATOGRAPHY  CRANIECTOMY   COLONOSCOPY   DECUBITUS ULCER EXCISION    Family History:    COPD    Social History:  The patient was alone in the emergency department.   Smoking Status: former  Smokeless Tobacco: never  Alcohol Use: no     Review of Systems   Constitutional: Negative for fever.   Genitourinary: Positive for pelvic pain.   All other systems reviewed and are negative.    Physical Exam   First Vitals: /75  Pulse 68  Temp 97.8  F (36.6  C) (Oral)  Resp 18  Ht 1.6 m (5' 3\")  Wt 74.8 kg (165 lb)  SpO2 99%  BMI 29.23 kg/m2    Physical Exam  Nursing note and vitals reviewed.    Constitutional:  Appears well-developed and well-nourished, comfortable.    HENT:    Nose normal.  No discharge.      Oropharynx is clear and moist.  Eyes:    Conjunctivae are normal without injection. No lid droop.     Pupils are equal, round, and reactive to light.      Right eye exhibits no discharge. Left eye exhibits no discharge.      No scleral icterus.  Cardiovascular:  Normal rate, regular rhythm with normal S1 and S2.      Normal heart " sounds and peripheral pulses 2+ and equal.       No murmur or gerardo.  Pulmonary:  Effort normal and breath sounds clear to auscultation bilaterally. No respiratory distress.  No stridor.     No wheezes. No rales.  GI:    Soft. No distension and no mass. No tenderness.      No rebound and no guarding. No flank pain.  No HSM.  Musculoskeletal:  Patient has 2+ edema in her feet.  She has no movement in the lower extremities.  Upper extremities are normal.  There are large decubiti over her sacrum and all the way through her inner upper butt cheeks.  No increased redness or evidence that    these are infected.  Neurological:   Alert and oriented. No cranial nerve deficit, no facial droop.     Exhibits good muscle tone in her upper extremities.. Coordination normal.  She is bedridden     GCS eye subscore is 4. GCS verbal subscore is 5.      GCS motor subscore is 6.   Skin:    Skin is warm and dry.  The sacral and buttock decubiti as noted above.  No diaphoresis.      No erythema. No pallor.    Psychiatric:   Behavior is normal. Appropriate mood and affect.     Judgment and thought content normal.     Emergency Department Course     Laboratory:  Laboratory findings were communicated with the patient who voiced understanding of the findings.    CBC: WBC 10.7, HGB 10.7,   BMP: AWNL Creatinine 0.49 (L)  INR: 1.15 (H)    Emergency Department Course:  Nursing notes and vitals reviewed.  I performed an exam of the patient as documented above.   IV was inserted and blood was drawn for laboratory testing, results above.    [address] [facility] contacted to obtain patient's MAR    I personally reviewed the laboratory results with the Patient and answered all related questions prior to  Discharge.  Findings and plan explained to the Patient. Patient discharged home with instructions regarding supportive care, medications, and reasons to return. The importance of close follow-up was reviewed.     Impression & Plan      Medical  Decision Making:  Patient comes in to our ER because of the weather.  She normally goes to [facility] and this is her first visit here.  She is now in the nursing home down the street.  She is a T10 paraplegic and has had a history of sepsis.  She has chronic decubiti on her buttock and sacral area but these actually looked pretty good when I looked at them.  She has had no fevers or chills.  Her labs here are normal including a CBC and basic metabolic panel.  Her INR is only 1.15.  Her blood pressure is normal as well.  I do not find any evidence of sepsis at this time or a reason for an increase in her pelvic pain.  She was very sleepy and slept most the time she was in the ER.  Did not appear to be in pain.  I suspect that her chronic pain medicines kicked in by the time she arrived here.  She will be discharged back to the care center for further wound management and follow-up by her primary physician.    Diagnosis:      1. Pelvic pain in female   2. Paraplegia (H)     Disposition:  discharged to home: I find no evidence of sepsis at this visit, your blood work looks good.  Continue to take your current medications.  Set up an appointment with your doctor for wound recheck and continue your wound care with the wound nurse.  Recheck with your doctor or the emergency room at [facility] if you develop fevers.    Scribe Disclosure:  I, Yossi Kim, am serving as a scribe at 9:13 PM on 4/2/2018 to document services personally performed by Penelope Marcum MD based on my observations and the provider's statements to me.     4/2/2018    EMERGENCY DEPARTMENT       Penelope Marcum MD  04/02/18 8833

## 2023-06-26 NOTE — PLAN OF CARE
Problem: Patient Care Overview  Goal: Plan of Care/Patient Progress Review  VS: Pt A&Ox 4, forgetful. VSS except low BP. Refuses use of incentive spirometer.    Output: SP cath remains patent with adequate output. Pt LBM 4/17 and is passing gas, PRN Imodium given   Activity: Bedrest. Assist of 2 for repositioning. Patient refusing Q2H repositioning.    Skin: Wounds to buttocks, rash to bilateral thighs   Pain: Has pain in bilateral hips and it is being controlled with PRN dilaudid 2 mg.     CMS: CMS and Neuro's are intact. Patient is paraplegic with baseline absent sensation to BLE.  Refusing PCD's to BLE.    Dressing: Buttock dressings are CDI.    Diet:  Is on regualr diet. Denies nausea.    LDA:   PIV is patent in left arm and saline locked between antibiotics. 500 mL bolus given overnight.   Sleep: Awakened every 2 hours to reposition. Patient refusing.    Plan: Continue to monitor pt           · Presenting with increased weakness over the past several days with generalized malaise, 3 days nausea/vomiting as well as intermittent diarrhea. Of note was recently hospitalized here also with generalized weakness and acute cystitis, appears rehab was recommended by PT/OT however patient refused this and went home with home therapies. · Possibly multifactorial in the setting of electrolyte abnormalities, possible pyelonephritis, and a nausea/vomiting with possible reflux with esophageal dysfunction as below.   Management of these individual problems as per associated plans  · PT/OT evaluation will be requested

## 2024-02-06 NOTE — PROGRESS NOTES
Detail Level: Detailed Pt insisted on going outside to smoke one last time prior to transferring back to bed.  Pt had just taken some of HS medications and was sleepy and was barely able to keep eyes open.  Pt had signed waiver and writer accompanied pt outside to promote safety due to external code 9 called prior in day.  Pt outside for approximately 20 minutes and was assisted back to room.   Pathology Override (Pathology Will Render As Diagnosis Name If Left Blank): Left Tip of Nose Field Number: 1 Lesion Dimensions-X Axis In Cm: 0.8 Lesion Margin Size In Cm: 0.5 Shield Size In Cm: 1.5 x 1.5 Applicator Size In Cm: 2.5 cm Energy (Kv): 70 Treatment Time (Min): 0.42 Time, Dose, Fractionation Factor (Tdf) For Prescription 1: 98 Daily Dose (Cgy): 275.52 Number Of Fractions For Prescription 1: 20 Treatments Per Week: 3 Total Dose For Prescription 1 (Cgy): 5510.4 Add A Second Prescription?: No Additional Fraction(S) Needed:: 0 Bill For Physics Consultation: No - Render Text Only Physics Documentation: (Physics Check Verbiage) Field Number: 2 Shield Size In Cm: 2.0 x 2.0 Applicator Size In Cm: 3.0 cm Treatment Time (Min): 0.41 Time, Dose, Fractionation Factor (Tdf) For Prescription 1: 97 Daily Dose (Cgy): 274.29 Total Dose For Prescription 1 (Cgy): 5485.8

## 2024-06-17 PROBLEM — Z71.89 ADVANCED DIRECTIVES, COUNSELING/DISCUSSION: Status: RESOLVED | Noted: 2018-01-01 | Resolved: 2024-06-17

## 2025-01-06 NOTE — PLAN OF CARE
Blood pressure is elevated above 140 over 90 mmHg      Blood pressure (BP) is within acceptable limits Not Progressing        Impaired Gas Exchange      Demonstrate improved ventilation and adequate oxygenation of tissues as evidenced by absence of respiratory distress Not Progressing        Insufficient Nutritional Intake      Mobility/activity is maintained at optimum level for patient Not Progressing            Pt receiving two 500 mL and 1 liter LR boluses this shift r/t hypotension.  Pt verbally abuse, argumentative to staff this shift and refusing cares this shift.  Pt calling the unit phone multiple times requesting different staff members each time.  Pt weaned down to 2 Liter O2 NC from 5 Liters O2. Lung sounds are coarse bilaterally with upper airway congestion noted.  Weak non-productive cough noted.  Will continue to monitor.     Normal

## 2025-03-25 NOTE — PROGRESS NOTES
"Pt refusing most cares throughout shift. Unable to reposition pt for 12 hour shift. Educated pt on importance of repositioning d/t uclers, refusal continued. Unsure if pt was incontinent of stool during shift. Pt not able to state if she could tell if she had been incontinent. Refusing IS and flutter valve. Again educated on importance for her lungs, continued to refuse. Refused gonzalez and suprapubic catheter assessments and cares. For 2000 assessment writer attempted to move bedside table to assess catheter drainage and sites. Pt stated that was \"none of you business\" and demanded bedside table be placed back exactly how it was. Writer attempted to return table to previous position for over 5 minutes while pt called me a \"dumb blonde elena.\" RN infomed pt she needed to be respectful of staff members. Charge nurse then called by pt to \"fire\" RN. Writer returned to room 15 minutes later, and pt was pleasant but continued to refuse most cares. RN unable to assess any posterior skin or any skin covered by blankets/grown. Unable to complete oral or catheter cares throughout shift d/t refusal. Pt again educated on importance of those cares but continued to refuse. Pt refusing catheters to be emptied at times stating \"I know when it's full.\" Unable to make sure catheter is adequately draining. Minimal urine output throughout shift, Dr. Ash aware. Unable to check temperatures at times d/t refusal as pt states \"I know when I have a fever. You don't need to check it.\" Unable to obtain weight as pt refusing to allow excess blankets and pillows to be removed from bed. Will inform oncoming nurse to obtain weight if/when able. Dr. Ash aware of all refusal.    At 2100 pt requested checkbook to be removed from purse for stamps. Checkbook given to pt. 2 stamps removed by pt and placed on envelopes. Checkbook returned to inside zipper pocket of purse by writer per pt request. Two letters given to writer to mail. Letters to be " placed in outgoing mail this AM. Charge nurse aware. Pt then accused writer of stealing from checkbook. Checkbook present in purse and verified by JAK Moore. Pt verbalized agreement that checkbook present with RN and NA.    At start of shift pt repeatedly requesting to speak with doctor, stating she hadn't seen a doctor all day. Dr. Garcia paged x2 on evenings. MD stated Dr. Niño saw her during the day and will see her again tomorrow. Dr. Ash to bedside to speak with pt at 2325. Shortly after intensivist spoke with pt, she again repeated no doctor had seen her all day.    Female

## (undated) RX ORDER — FENTANYL CITRATE 50 UG/ML
INJECTION, SOLUTION INTRAMUSCULAR; INTRAVENOUS
Status: DISPENSED
Start: 2018-01-01

## (undated) RX ORDER — CEFAZOLIN SODIUM 2 G/100ML
INJECTION, SOLUTION INTRAVENOUS
Status: DISPENSED
Start: 2018-01-01

## (undated) RX ORDER — LIDOCAINE HYDROCHLORIDE 10 MG/ML
INJECTION, SOLUTION EPIDURAL; INFILTRATION; INTRACAUDAL; PERINEURAL
Status: DISPENSED
Start: 2018-01-01

## (undated) RX ORDER — HEPARIN SODIUM,PORCINE 10 UNIT/ML
VIAL (ML) INTRAVENOUS
Status: DISPENSED
Start: 2018-01-01

## (undated) RX ORDER — BUPIVACAINE HYDROCHLORIDE 2.5 MG/ML
INJECTION, SOLUTION EPIDURAL; INFILTRATION; INTRACAUDAL
Status: DISPENSED
Start: 2018-01-01